# Patient Record
Sex: FEMALE | Race: WHITE | NOT HISPANIC OR LATINO | Employment: OTHER | URBAN - METROPOLITAN AREA
[De-identification: names, ages, dates, MRNs, and addresses within clinical notes are randomized per-mention and may not be internally consistent; named-entity substitution may affect disease eponyms.]

---

## 2018-01-25 ENCOUNTER — CONVERSION ENCOUNTER (OUTPATIENT)
Dept: FAMILY MEDICINE CLINIC | Facility: CLINIC | Age: 48
End: 2018-01-25

## 2018-01-25 ENCOUNTER — OFFICE VISIT CONVERTED (OUTPATIENT)
Dept: FAMILY MEDICINE CLINIC | Facility: CLINIC | Age: 48
End: 2018-01-25
Attending: FAMILY MEDICINE

## 2018-02-14 ENCOUNTER — OFFICE VISIT CONVERTED (OUTPATIENT)
Dept: PULMONOLOGY | Facility: CLINIC | Age: 48
End: 2018-02-14
Attending: INTERNAL MEDICINE

## 2018-02-20 ENCOUNTER — OFFICE VISIT CONVERTED (OUTPATIENT)
Dept: OTOLARYNGOLOGY | Facility: CLINIC | Age: 48
End: 2018-02-20
Attending: OTOLARYNGOLOGY

## 2018-03-23 ENCOUNTER — OFFICE VISIT CONVERTED (OUTPATIENT)
Dept: CARDIOLOGY | Facility: CLINIC | Age: 48
End: 2018-03-23
Attending: INTERNAL MEDICINE

## 2018-03-23 ENCOUNTER — CONVERSION ENCOUNTER (OUTPATIENT)
Dept: CARDIOLOGY | Facility: CLINIC | Age: 48
End: 2018-03-23

## 2018-04-13 ENCOUNTER — OFFICE VISIT CONVERTED (OUTPATIENT)
Dept: PULMONOLOGY | Facility: CLINIC | Age: 48
End: 2018-04-13
Attending: PHYSICIAN ASSISTANT

## 2018-05-02 ENCOUNTER — OFFICE VISIT CONVERTED (OUTPATIENT)
Dept: PULMONOLOGY | Facility: CLINIC | Age: 48
End: 2018-05-02
Attending: INTERNAL MEDICINE

## 2018-05-17 ENCOUNTER — OFFICE VISIT CONVERTED (OUTPATIENT)
Dept: PULMONOLOGY | Facility: CLINIC | Age: 48
End: 2018-05-17
Attending: PHYSICIAN ASSISTANT

## 2018-06-12 ENCOUNTER — OFFICE VISIT CONVERTED (OUTPATIENT)
Dept: PULMONOLOGY | Facility: CLINIC | Age: 48
End: 2018-06-12
Attending: PHYSICIAN ASSISTANT

## 2018-06-27 ENCOUNTER — OFFICE VISIT CONVERTED (OUTPATIENT)
Dept: PULMONOLOGY | Facility: CLINIC | Age: 48
End: 2018-06-27
Attending: INTERNAL MEDICINE

## 2018-07-16 ENCOUNTER — OFFICE VISIT CONVERTED (OUTPATIENT)
Dept: PULMONOLOGY | Facility: CLINIC | Age: 48
End: 2018-07-16
Attending: PHYSICIAN ASSISTANT

## 2018-07-25 ENCOUNTER — OFFICE VISIT CONVERTED (OUTPATIENT)
Dept: PULMONOLOGY | Facility: CLINIC | Age: 48
End: 2018-07-25
Attending: INTERNAL MEDICINE

## 2018-07-30 ENCOUNTER — OFFICE VISIT CONVERTED (OUTPATIENT)
Dept: FAMILY MEDICINE CLINIC | Facility: CLINIC | Age: 48
End: 2018-07-30
Attending: FAMILY MEDICINE

## 2018-08-01 ENCOUNTER — OFFICE VISIT CONVERTED (OUTPATIENT)
Dept: PULMONOLOGY | Facility: CLINIC | Age: 48
End: 2018-08-01
Attending: PHYSICIAN ASSISTANT

## 2018-08-09 ENCOUNTER — OFFICE VISIT CONVERTED (OUTPATIENT)
Dept: PULMONOLOGY | Facility: CLINIC | Age: 48
End: 2018-08-09
Attending: PHYSICIAN ASSISTANT

## 2018-08-09 ENCOUNTER — OFFICE VISIT CONVERTED (OUTPATIENT)
Dept: FAMILY MEDICINE CLINIC | Facility: CLINIC | Age: 48
End: 2018-08-09
Attending: FAMILY MEDICINE

## 2018-08-23 ENCOUNTER — CONVERSION ENCOUNTER (OUTPATIENT)
Dept: MAMMOGRAPHY | Facility: HOSPITAL | Age: 48
End: 2018-08-23

## 2018-08-27 ENCOUNTER — OFFICE VISIT CONVERTED (OUTPATIENT)
Dept: FAMILY MEDICINE CLINIC | Facility: CLINIC | Age: 48
End: 2018-08-27
Attending: FAMILY MEDICINE

## 2018-08-28 ENCOUNTER — OFFICE VISIT CONVERTED (OUTPATIENT)
Dept: CARDIOLOGY | Facility: CLINIC | Age: 48
End: 2018-08-28
Attending: INTERNAL MEDICINE

## 2018-09-07 ENCOUNTER — OFFICE VISIT CONVERTED (OUTPATIENT)
Dept: PULMONOLOGY | Facility: CLINIC | Age: 48
End: 2018-09-07
Attending: PHYSICIAN ASSISTANT

## 2018-09-11 ENCOUNTER — OFFICE VISIT CONVERTED (OUTPATIENT)
Dept: FAMILY MEDICINE CLINIC | Facility: CLINIC | Age: 48
End: 2018-09-11
Attending: FAMILY MEDICINE

## 2018-10-15 ENCOUNTER — OFFICE VISIT CONVERTED (OUTPATIENT)
Dept: FAMILY MEDICINE CLINIC | Facility: CLINIC | Age: 48
End: 2018-10-15
Attending: FAMILY MEDICINE

## 2018-11-02 ENCOUNTER — OFFICE VISIT CONVERTED (OUTPATIENT)
Dept: CARDIOLOGY | Facility: CLINIC | Age: 48
End: 2018-11-02
Attending: INTERNAL MEDICINE

## 2018-12-18 ENCOUNTER — OFFICE VISIT CONVERTED (OUTPATIENT)
Dept: PULMONOLOGY | Facility: CLINIC | Age: 48
End: 2018-12-18
Attending: INTERNAL MEDICINE

## 2018-12-20 ENCOUNTER — OFFICE VISIT CONVERTED (OUTPATIENT)
Dept: FAMILY MEDICINE CLINIC | Facility: CLINIC | Age: 48
End: 2018-12-20
Attending: FAMILY MEDICINE

## 2019-01-09 ENCOUNTER — HOSPITAL ENCOUNTER (OUTPATIENT)
Dept: CARDIOLOGY | Facility: HOSPITAL | Age: 49
Discharge: HOME | End: 2019-01-09
Attending: INTERNAL MEDICINE

## 2019-01-09 LAB — CONV HIV-1/ HIV-2: NEGATIVE

## 2019-01-16 ENCOUNTER — OFFICE VISIT CONVERTED (OUTPATIENT)
Dept: PULMONOLOGY | Facility: CLINIC | Age: 49
End: 2019-01-16
Attending: PHYSICIAN ASSISTANT

## 2019-01-22 ENCOUNTER — OFFICE VISIT CONVERTED (OUTPATIENT)
Dept: FAMILY MEDICINE CLINIC | Facility: CLINIC | Age: 49
End: 2019-01-22
Attending: FAMILY MEDICINE

## 2019-02-05 ENCOUNTER — OFFICE VISIT CONVERTED (OUTPATIENT)
Dept: PULMONOLOGY | Facility: CLINIC | Age: 49
End: 2019-02-05
Attending: INTERNAL MEDICINE

## 2019-02-05 ENCOUNTER — HOSPITAL ENCOUNTER (OUTPATIENT)
Dept: LAB | Facility: HOSPITAL | Age: 49
Discharge: HOME | End: 2019-02-05
Attending: INTERNAL MEDICINE

## 2019-02-05 LAB
25(OH)D3 SERPL-MCNC: 30.2 NG/ML (ref 30–100)
ALBUMIN SERPL-MCNC: 4.1 G/DL (ref 3.5–5)
ALBUMIN/GLOB SERPL: 1.2 {RATIO} (ref 1.4–2.6)
ALP SERPL-CCNC: 215 U/L (ref 42–98)
ALT SERPL-CCNC: 27 U/L (ref 10–40)
ANION GAP SERPL CALC-SCNC: 19 MMOL/L (ref 8–19)
AST SERPL-CCNC: 38 U/L (ref 15–50)
BASOPHILS # BLD AUTO: 0.03 10*3/UL (ref 0–0.2)
BASOPHILS NFR BLD AUTO: 0.31 % (ref 0–3)
BILIRUB SERPL-MCNC: 0.46 MG/DL (ref 0.2–1.3)
BUN SERPL-MCNC: 17 MG/DL (ref 5–25)
BUN/CREAT SERPL: 22 {RATIO} (ref 6–20)
CALCIUM SERPL-MCNC: 9.6 MG/DL (ref 8.7–10.4)
CHLORIDE SERPL-SCNC: 86 MMOL/L (ref 99–111)
CHOLEST SERPL-MCNC: 173 MG/DL (ref 107–200)
CHOLEST/HDLC SERPL: 5.4 {RATIO} (ref 3–6)
CONV CO2: 37 MMOL/L (ref 22–32)
CONV CREATININE URINE, RANDOM: 123 MG/DL (ref 10–300)
CONV MICROALBUM.,U,RANDOM: 19.2 MG/L (ref 0–20)
CONV TOTAL PROTEIN: 7.4 G/DL (ref 6.3–8.2)
CREAT UR-MCNC: 0.77 MG/DL (ref 0.5–0.9)
EOSINOPHIL # BLD AUTO: 0.24 10*3/UL (ref 0–0.7)
EOSINOPHIL # BLD AUTO: 2.4 % (ref 0–7)
ERYTHROCYTE [DISTWIDTH] IN BLOOD BY AUTOMATED COUNT: 12.8 % (ref 11.5–14.5)
EST. AVERAGE GLUCOSE BLD GHB EST-MCNC: 237 MG/DL
GFR SERPLBLD BASED ON 1.73 SQ M-ARVRAT: >60 ML/MIN/{1.73_M2}
GLOBULIN UR ELPH-MCNC: 3.3 G/DL (ref 2–3.5)
GLUCOSE SERPL-MCNC: 177 MG/DL (ref 65–99)
HBA1C MFR BLD: 13.9 G/DL (ref 12–16)
HBA1C MFR BLD: 9.9 % (ref 3.5–5.7)
HCT VFR BLD AUTO: 39.2 % (ref 37–47)
HDLC SERPL-MCNC: 32 MG/DL (ref 40–60)
LDLC SERPL CALC-MCNC: 80 MG/DL (ref 70–100)
LYMPHOCYTES # BLD AUTO: 2.19 10*3/UL (ref 1–5)
MAGNESIUM SERPL-MCNC: 1.65 MG/DL (ref 1.6–2.3)
MCH RBC QN AUTO: 31.1 PG (ref 27–31)
MCHC RBC AUTO-ENTMCNC: 35.4 G/DL (ref 33–37)
MCV RBC AUTO: 87.9 FL (ref 81–99)
MICROALBUMIN/CREAT UR: 15.6 MG/G{CRE} (ref 0–35)
MONOCYTES # BLD AUTO: 0.7 10*3/UL (ref 0.2–1.2)
MONOCYTES NFR BLD AUTO: 7.1 % (ref 3–10)
NEUTROPHILS # BLD AUTO: 6.64 10*3/UL (ref 2–8)
NEUTROPHILS NFR BLD AUTO: 67.8 % (ref 30–85)
NRBC BLD AUTO-RTO: 0 % (ref 0–0.01)
OSMOLALITY SERPL CALC.SUM OF ELEC: 294 MOSM/KG (ref 273–304)
PLATELET # BLD AUTO: 250 10*3/UL (ref 130–400)
PMV BLD AUTO: 6.6 FL (ref 7.4–10.4)
POTASSIUM SERPL-SCNC: 2.7 MMOL/L (ref 3.5–5.3)
RBC # BLD AUTO: 4.46 10*6/UL (ref 4.2–5.4)
SODIUM SERPL-SCNC: 139 MMOL/L (ref 135–147)
T4 FREE SERPL-MCNC: 1.3 NG/DL (ref 0.9–1.8)
TRIGL SERPL-MCNC: 305 MG/DL (ref 40–150)
TSH SERPL-ACNC: 7.25 M[IU]/L (ref 0.27–4.2)
VARIANT LYMPHS NFR BLD MANUAL: 22.4 % (ref 20–45)
VLDLC SERPL-MCNC: 61 MG/DL (ref 5–37)
WBC # BLD AUTO: 9.79 10*3/UL (ref 4.8–10.8)

## 2019-02-11 ENCOUNTER — OFFICE VISIT CONVERTED (OUTPATIENT)
Dept: CARDIOLOGY | Facility: CLINIC | Age: 49
End: 2019-02-11
Attending: INTERNAL MEDICINE

## 2019-02-11 ENCOUNTER — HOSPITAL ENCOUNTER (OUTPATIENT)
Dept: OTHER | Facility: HOSPITAL | Age: 49
Discharge: HOME | End: 2019-02-11
Attending: INTERNAL MEDICINE

## 2019-02-11 LAB
ANION GAP SERPL CALC-SCNC: 19 MMOL/L (ref 8–19)
BUN SERPL-MCNC: 13 MG/DL (ref 5–25)
BUN/CREAT SERPL: 19 {RATIO} (ref 6–20)
CALCIUM SERPL-MCNC: 9.9 MG/DL (ref 8.7–10.4)
CHLORIDE SERPL-SCNC: 100 MMOL/L (ref 99–111)
CONV CO2: 28 MMOL/L (ref 22–32)
CREAT UR-MCNC: 0.69 MG/DL (ref 0.5–0.9)
GFR SERPLBLD BASED ON 1.73 SQ M-ARVRAT: >60 ML/MIN/{1.73_M2}
GLUCOSE SERPL-MCNC: 106 MG/DL (ref 65–99)
OSMOLALITY SERPL CALC.SUM OF ELEC: 299 MOSM/KG (ref 273–304)
POTASSIUM SERPL-SCNC: 3.3 MMOL/L (ref 3.5–5.3)
SODIUM SERPL-SCNC: 144 MMOL/L (ref 135–147)

## 2019-03-08 ENCOUNTER — HOSPITAL ENCOUNTER (OUTPATIENT)
Dept: LAB | Facility: HOSPITAL | Age: 49
Discharge: HOME | End: 2019-03-08
Attending: INTERNAL MEDICINE

## 2019-03-08 LAB
ANION GAP SERPL CALC-SCNC: 14 MMOL/L (ref 8–19)
BNP SERPL-MCNC: 51 PG/ML (ref 0–900)
BUN SERPL-MCNC: 11 MG/DL (ref 5–25)
BUN/CREAT SERPL: 14 {RATIO} (ref 6–20)
CALCIUM SERPL-MCNC: 10.2 MG/DL (ref 8.7–10.4)
CHLORIDE SERPL-SCNC: 85 MMOL/L (ref 99–111)
CONV CO2: 37 MMOL/L (ref 22–32)
CREAT UR-MCNC: 0.8 MG/DL (ref 0.5–0.9)
GFR SERPLBLD BASED ON 1.73 SQ M-ARVRAT: >60 ML/MIN/{1.73_M2}
GLUCOSE SERPL-MCNC: 344 MG/DL (ref 65–99)
MAGNESIUM SERPL-MCNC: 1.62 MG/DL (ref 1.6–2.3)
OSMOLALITY SERPL CALC.SUM OF ELEC: 289 MOSM/KG (ref 273–304)
POTASSIUM SERPL-SCNC: 2.8 MMOL/L (ref 3.5–5.3)
SODIUM SERPL-SCNC: 133 MMOL/L (ref 135–147)

## 2019-03-19 ENCOUNTER — HOSPITAL ENCOUNTER (OUTPATIENT)
Dept: OTHER | Facility: HOSPITAL | Age: 49
Discharge: HOME | End: 2019-03-19
Attending: INTERNAL MEDICINE

## 2019-03-19 LAB
ANION GAP SERPL CALC-SCNC: 19 MMOL/L (ref 8–19)
BUN SERPL-MCNC: 14 MG/DL (ref 5–25)
BUN/CREAT SERPL: 15 {RATIO} (ref 6–20)
CALCIUM SERPL-MCNC: 9.8 MG/DL (ref 8.7–10.4)
CHLORIDE SERPL-SCNC: 91 MMOL/L (ref 99–111)
CONV CO2: 32 MMOL/L (ref 22–32)
CREAT UR-MCNC: 0.96 MG/DL (ref 0.5–0.9)
GFR SERPLBLD BASED ON 1.73 SQ M-ARVRAT: >60 ML/MIN/{1.73_M2}
GLUCOSE SERPL-MCNC: 327 MG/DL (ref 65–99)
OSMOLALITY SERPL CALC.SUM OF ELEC: 299 MOSM/KG (ref 273–304)
POTASSIUM SERPL-SCNC: 3.5 MMOL/L (ref 3.5–5.3)
SODIUM SERPL-SCNC: 138 MMOL/L (ref 135–147)

## 2019-04-09 ENCOUNTER — OFFICE VISIT CONVERTED (OUTPATIENT)
Dept: PULMONOLOGY | Facility: CLINIC | Age: 49
End: 2019-04-09
Attending: INTERNAL MEDICINE

## 2019-04-09 ENCOUNTER — HOSPITAL ENCOUNTER (OUTPATIENT)
Dept: LAB | Facility: HOSPITAL | Age: 49
Discharge: HOME | End: 2019-04-09
Attending: INTERNAL MEDICINE

## 2019-04-09 LAB
ALBUMIN SERPL-MCNC: 4.4 G/DL (ref 3.5–5)
ALBUMIN/GLOB SERPL: 1.3 {RATIO} (ref 1.4–2.6)
ALP SERPL-CCNC: 185 U/L (ref 42–98)
ALT SERPL-CCNC: 24 U/L (ref 10–40)
ANION GAP SERPL CALC-SCNC: 17 MMOL/L (ref 8–19)
AST SERPL-CCNC: 34 U/L (ref 15–50)
BILIRUB SERPL-MCNC: 0.32 MG/DL (ref 0.2–1.3)
BUN SERPL-MCNC: 13 MG/DL (ref 5–25)
BUN/CREAT SERPL: 19 {RATIO} (ref 6–20)
CALCIUM SERPL-MCNC: 10.4 MG/DL (ref 8.7–10.4)
CHLORIDE SERPL-SCNC: 99 MMOL/L (ref 99–111)
CONV CO2: 30 MMOL/L (ref 22–32)
CONV TOTAL PROTEIN: 7.8 G/DL (ref 6.3–8.2)
CREAT UR-MCNC: 0.7 MG/DL (ref 0.5–0.9)
GFR SERPLBLD BASED ON 1.73 SQ M-ARVRAT: >60 ML/MIN/{1.73_M2}
GLOBULIN UR ELPH-MCNC: 3.4 G/DL (ref 2–3.5)
GLUCOSE SERPL-MCNC: 147 MG/DL (ref 65–99)
OSMOLALITY SERPL CALC.SUM OF ELEC: 297 MOSM/KG (ref 273–304)
POTASSIUM SERPL-SCNC: 4.1 MMOL/L (ref 3.5–5.3)
SODIUM SERPL-SCNC: 142 MMOL/L (ref 135–147)

## 2019-04-18 ENCOUNTER — HOSPITAL ENCOUNTER (OUTPATIENT)
Dept: OTHER | Facility: HOSPITAL | Age: 49
Discharge: HOME | End: 2019-04-18
Attending: INTERNAL MEDICINE

## 2019-04-18 LAB
ANION GAP SERPL CALC-SCNC: 21 MMOL/L (ref 8–19)
BUN SERPL-MCNC: 19 MG/DL (ref 5–25)
BUN/CREAT SERPL: 22 {RATIO} (ref 6–20)
CALCIUM SERPL-MCNC: 10.6 MG/DL (ref 8.7–10.4)
CHLORIDE SERPL-SCNC: 93 MMOL/L (ref 99–111)
CONV CO2: 31 MMOL/L (ref 22–32)
CREAT UR-MCNC: 0.87 MG/DL (ref 0.5–0.9)
GFR SERPLBLD BASED ON 1.73 SQ M-ARVRAT: >60 ML/MIN/{1.73_M2}
GLUCOSE SERPL-MCNC: 207 MG/DL (ref 65–99)
OSMOLALITY SERPL CALC.SUM OF ELEC: 302 MOSM/KG (ref 273–304)
POTASSIUM SERPL-SCNC: 2.7 MMOL/L (ref 3.5–5.3)
SODIUM SERPL-SCNC: 142 MMOL/L (ref 135–147)

## 2019-04-22 ENCOUNTER — OFFICE VISIT CONVERTED (OUTPATIENT)
Dept: FAMILY MEDICINE CLINIC | Facility: CLINIC | Age: 49
End: 2019-04-22
Attending: FAMILY MEDICINE

## 2019-04-23 ENCOUNTER — HOSPITAL ENCOUNTER (OUTPATIENT)
Dept: GASTROENTEROLOGY | Facility: HOSPITAL | Age: 49
Setting detail: HOSPITAL OUTPATIENT SURGERY
Discharge: HOME | End: 2019-04-23
Attending: INTERNAL MEDICINE

## 2019-04-23 ENCOUNTER — HOSPITAL ENCOUNTER (OUTPATIENT)
Dept: OTHER | Facility: HOSPITAL | Age: 49
Discharge: HOME | End: 2019-04-23
Attending: INTERNAL MEDICINE

## 2019-04-23 LAB
ANION GAP SERPL CALC-SCNC: 19 MMOL/L (ref 8–19)
BUN SERPL-MCNC: 16 MG/DL (ref 5–25)
BUN/CREAT SERPL: 25 {RATIO} (ref 6–20)
CALCIUM SERPL-MCNC: 9.6 MG/DL (ref 8.7–10.4)
CHLORIDE SERPL-SCNC: 94 MMOL/L (ref 99–111)
CONV CO2: 30 MMOL/L (ref 22–32)
CREAT UR-MCNC: 0.63 MG/DL (ref 0.5–0.9)
GFR SERPLBLD BASED ON 1.73 SQ M-ARVRAT: >60 ML/MIN/{1.73_M2}
GLUCOSE BLD-MCNC: 92 MG/DL (ref 65–99)
GLUCOSE SERPL-MCNC: 52 MG/DL (ref 65–99)
OSMOLALITY SERPL CALC.SUM OF ELEC: 289 MOSM/KG (ref 273–304)
POTASSIUM SERPL-SCNC: 2.9 MMOL/L (ref 3.5–5.3)
SODIUM SERPL-SCNC: 140 MMOL/L (ref 135–147)

## 2019-04-25 ENCOUNTER — HOSPITAL ENCOUNTER (OUTPATIENT)
Dept: OTHER | Facility: HOSPITAL | Age: 49
Discharge: HOME | End: 2019-04-25
Attending: INTERNAL MEDICINE

## 2019-04-25 LAB
ANION GAP SERPL CALC-SCNC: 17 MMOL/L (ref 8–19)
BACTERIA SPEC AEROBE CULT: NORMAL
BUN SERPL-MCNC: 15 MG/DL (ref 5–25)
BUN/CREAT SERPL: 23 {RATIO} (ref 6–20)
CALCIUM SERPL-MCNC: 10.1 MG/DL (ref 8.7–10.4)
CHLORIDE SERPL-SCNC: 95 MMOL/L (ref 99–111)
CONV CO2: 30 MMOL/L (ref 22–32)
CREAT UR-MCNC: 0.64 MG/DL (ref 0.5–0.9)
GFR SERPLBLD BASED ON 1.73 SQ M-ARVRAT: >60 ML/MIN/{1.73_M2}
GLUCOSE SERPL-MCNC: 161 MG/DL (ref 65–99)
OSMOLALITY SERPL CALC.SUM OF ELEC: 292 MOSM/KG (ref 273–304)
POTASSIUM SERPL-SCNC: 3 MMOL/L (ref 3.5–5.3)
SODIUM SERPL-SCNC: 139 MMOL/L (ref 135–147)

## 2019-04-30 ENCOUNTER — HOSPITAL ENCOUNTER (OUTPATIENT)
Dept: OTHER | Facility: HOSPITAL | Age: 49
Discharge: HOME | End: 2019-04-30
Attending: INTERNAL MEDICINE

## 2019-05-02 ENCOUNTER — HOSPITAL ENCOUNTER (OUTPATIENT)
Dept: OTHER | Facility: HOSPITAL | Age: 49
Discharge: HOME | End: 2019-05-02
Attending: FAMILY MEDICINE

## 2019-05-02 LAB
ANION GAP SERPL CALC-SCNC: 18 MMOL/L (ref 8–19)
BUN SERPL-MCNC: 11 MG/DL (ref 5–25)
BUN/CREAT SERPL: 17 {RATIO} (ref 6–20)
CALCIUM SERPL-MCNC: 9.7 MG/DL (ref 8.7–10.4)
CHLORIDE SERPL-SCNC: 93 MMOL/L (ref 99–111)
CONV CO2: 33 MMOL/L (ref 22–32)
CREAT UR-MCNC: 0.66 MG/DL (ref 0.5–0.9)
GFR SERPLBLD BASED ON 1.73 SQ M-ARVRAT: >60 ML/MIN/{1.73_M2}
GLUCOSE SERPL-MCNC: 143 MG/DL (ref 65–99)
OSMOLALITY SERPL CALC.SUM OF ELEC: 292 MOSM/KG (ref 273–304)
POTASSIUM SERPL-SCNC: 3.5 MMOL/L (ref 3.5–5.3)
SODIUM SERPL-SCNC: 140 MMOL/L (ref 135–147)

## 2019-05-03 ENCOUNTER — OFFICE VISIT CONVERTED (OUTPATIENT)
Dept: CARDIOLOGY | Facility: CLINIC | Age: 49
End: 2019-05-03
Attending: INTERNAL MEDICINE

## 2019-05-07 ENCOUNTER — OFFICE VISIT CONVERTED (OUTPATIENT)
Dept: FAMILY MEDICINE CLINIC | Facility: CLINIC | Age: 49
End: 2019-05-07
Attending: FAMILY MEDICINE

## 2019-05-09 ENCOUNTER — HOSPITAL ENCOUNTER (OUTPATIENT)
Dept: OTHER | Facility: HOSPITAL | Age: 49
Discharge: HOME | End: 2019-05-09
Attending: FAMILY MEDICINE

## 2019-05-09 LAB
ANION GAP SERPL CALC-SCNC: 15 MMOL/L (ref 8–19)
BUN SERPL-MCNC: 16 MG/DL (ref 5–25)
BUN/CREAT SERPL: 24 {RATIO} (ref 6–20)
CALCIUM SERPL-MCNC: 9.8 MG/DL (ref 8.7–10.4)
CHLORIDE SERPL-SCNC: 96 MMOL/L (ref 99–111)
CONV CO2: 35 MMOL/L (ref 22–32)
CREAT UR-MCNC: 0.68 MG/DL (ref 0.5–0.9)
GFR SERPLBLD BASED ON 1.73 SQ M-ARVRAT: >60 ML/MIN/{1.73_M2}
GLUCOSE SERPL-MCNC: 73 MG/DL (ref 65–99)
OSMOLALITY SERPL CALC.SUM OF ELEC: 296 MOSM/KG (ref 273–304)
POTASSIUM SERPL-SCNC: 3.1 MMOL/L (ref 3.5–5.3)
SODIUM SERPL-SCNC: 143 MMOL/L (ref 135–147)

## 2019-05-21 ENCOUNTER — HOSPITAL ENCOUNTER (OUTPATIENT)
Dept: OTHER | Facility: HOSPITAL | Age: 49
Discharge: HOME | End: 2019-05-21
Attending: INTERNAL MEDICINE

## 2019-05-21 LAB
ANION GAP SERPL CALC-SCNC: 17 MMOL/L (ref 8–19)
BUN SERPL-MCNC: 18 MG/DL (ref 5–25)
BUN/CREAT SERPL: 25 {RATIO} (ref 6–20)
CALCIUM SERPL-MCNC: 9.7 MG/DL (ref 8.7–10.4)
CHLORIDE SERPL-SCNC: 102 MMOL/L (ref 99–111)
CONV CO2: 29 MMOL/L (ref 22–32)
CREAT UR-MCNC: 0.73 MG/DL (ref 0.5–0.9)
GFR SERPLBLD BASED ON 1.73 SQ M-ARVRAT: >60 ML/MIN/{1.73_M2}
GLUCOSE SERPL-MCNC: 97 MG/DL (ref 65–99)
OSMOLALITY SERPL CALC.SUM OF ELEC: 300 MOSM/KG (ref 273–304)
POTASSIUM SERPL-SCNC: 4 MMOL/L (ref 3.5–5.3)
SODIUM SERPL-SCNC: 144 MMOL/L (ref 135–147)

## 2019-06-04 ENCOUNTER — OFFICE VISIT CONVERTED (OUTPATIENT)
Dept: PULMONOLOGY | Facility: CLINIC | Age: 49
End: 2019-06-04
Attending: INTERNAL MEDICINE

## 2019-06-05 ENCOUNTER — HOSPITAL ENCOUNTER (OUTPATIENT)
Dept: OTHER | Facility: HOSPITAL | Age: 49
Discharge: HOME | End: 2019-06-05
Attending: FAMILY MEDICINE

## 2019-06-05 LAB
ANION GAP SERPL CALC-SCNC: 14 MMOL/L (ref 8–19)
BUN SERPL-MCNC: 15 MG/DL (ref 5–25)
BUN/CREAT SERPL: 21 {RATIO} (ref 6–20)
CALCIUM SERPL-MCNC: 9.9 MG/DL (ref 8.7–10.4)
CHLORIDE SERPL-SCNC: 94 MMOL/L (ref 99–111)
CONV CO2: 33 MMOL/L (ref 22–32)
CREAT UR-MCNC: 0.73 MG/DL (ref 0.5–0.9)
GFR SERPLBLD BASED ON 1.73 SQ M-ARVRAT: >60 ML/MIN/{1.73_M2}
GLUCOSE SERPL-MCNC: 281 MG/DL (ref 65–99)
OSMOLALITY SERPL CALC.SUM OF ELEC: 297 MOSM/KG (ref 273–304)
POTASSIUM SERPL-SCNC: 3.3 MMOL/L (ref 3.5–5.3)
SODIUM SERPL-SCNC: 138 MMOL/L (ref 135–147)

## 2019-06-17 ENCOUNTER — HOSPITAL ENCOUNTER (OUTPATIENT)
Dept: OTHER | Facility: HOSPITAL | Age: 49
Discharge: HOME | End: 2019-06-17
Attending: INTERNAL MEDICINE

## 2019-06-17 LAB
ANION GAP SERPL CALC-SCNC: 18 MMOL/L (ref 8–19)
BUN SERPL-MCNC: 21 MG/DL (ref 5–25)
BUN/CREAT SERPL: 22 {RATIO} (ref 6–20)
CALCIUM SERPL-MCNC: 9.6 MG/DL (ref 8.7–10.4)
CHLORIDE SERPL-SCNC: 99 MMOL/L (ref 99–111)
CONV CO2: 30 MMOL/L (ref 22–32)
CREAT UR-MCNC: 0.94 MG/DL (ref 0.5–0.9)
GFR SERPLBLD BASED ON 1.73 SQ M-ARVRAT: >60 ML/MIN/{1.73_M2}
GLUCOSE SERPL-MCNC: 202 MG/DL (ref 65–99)
OSMOLALITY SERPL CALC.SUM OF ELEC: 305 MOSM/KG (ref 273–304)
POTASSIUM SERPL-SCNC: 4.1 MMOL/L (ref 3.5–5.3)
SODIUM SERPL-SCNC: 143 MMOL/L (ref 135–147)

## 2019-06-18 ENCOUNTER — OFFICE VISIT CONVERTED (OUTPATIENT)
Dept: CARDIOLOGY | Facility: CLINIC | Age: 49
End: 2019-06-18
Attending: INTERNAL MEDICINE

## 2019-06-24 ENCOUNTER — OFFICE VISIT CONVERTED (OUTPATIENT)
Dept: FAMILY MEDICINE CLINIC | Facility: CLINIC | Age: 49
End: 2019-06-24
Attending: FAMILY MEDICINE

## 2019-06-24 ENCOUNTER — CONVERSION ENCOUNTER (OUTPATIENT)
Dept: FAMILY MEDICINE CLINIC | Facility: CLINIC | Age: 49
End: 2019-06-24

## 2019-06-24 ENCOUNTER — HOSPITAL ENCOUNTER (OUTPATIENT)
Dept: FAMILY MEDICINE CLINIC | Facility: CLINIC | Age: 49
Discharge: HOME | End: 2019-06-24
Attending: FAMILY MEDICINE

## 2019-06-24 LAB
ANION GAP SERPL CALC-SCNC: 18 MMOL/L (ref 8–19)
BUN SERPL-MCNC: 17 MG/DL (ref 5–25)
BUN/CREAT SERPL: 24 {RATIO} (ref 6–20)
CALCIUM SERPL-MCNC: 10.1 MG/DL (ref 8.7–10.4)
CHLORIDE SERPL-SCNC: 96 MMOL/L (ref 99–111)
CONV CO2: 27 MMOL/L (ref 22–32)
CREAT UR-MCNC: 0.7 MG/DL (ref 0.5–0.9)
GFR SERPLBLD BASED ON 1.73 SQ M-ARVRAT: >60 ML/MIN/{1.73_M2}
GLUCOSE SERPL-MCNC: 164 MG/DL (ref 65–99)
OSMOLALITY SERPL CALC.SUM OF ELEC: 287 MOSM/KG (ref 273–304)
POTASSIUM SERPL-SCNC: 4.8 MMOL/L (ref 3.5–5.3)
SODIUM SERPL-SCNC: 136 MMOL/L (ref 135–147)

## 2019-07-09 ENCOUNTER — HOSPITAL ENCOUNTER (OUTPATIENT)
Dept: OTHER | Facility: HOSPITAL | Age: 49
Discharge: HOME | End: 2019-07-09
Attending: INTERNAL MEDICINE

## 2019-07-09 LAB
ANION GAP SERPL CALC-SCNC: 19 MMOL/L (ref 8–19)
BUN SERPL-MCNC: 17 MG/DL (ref 5–25)
BUN/CREAT SERPL: 27 {RATIO} (ref 6–20)
CALCIUM SERPL-MCNC: 9.7 MG/DL (ref 8.7–10.4)
CHLORIDE SERPL-SCNC: 94 MMOL/L (ref 99–111)
CONV CO2: 30 MMOL/L (ref 22–32)
CREAT UR-MCNC: 0.62 MG/DL (ref 0.5–0.9)
GFR SERPLBLD BASED ON 1.73 SQ M-ARVRAT: >60 ML/MIN/{1.73_M2}
GLUCOSE SERPL-MCNC: 120 MG/DL (ref 65–99)
OSMOLALITY SERPL CALC.SUM OF ELEC: 291 MOSM/KG (ref 273–304)
POTASSIUM SERPL-SCNC: 3.8 MMOL/L (ref 3.5–5.3)
SODIUM SERPL-SCNC: 139 MMOL/L (ref 135–147)

## 2019-07-16 ENCOUNTER — HOSPITAL ENCOUNTER (OUTPATIENT)
Dept: OTHER | Facility: HOSPITAL | Age: 49
Discharge: HOME | End: 2019-07-16
Attending: INTERNAL MEDICINE

## 2019-07-16 LAB
ANION GAP SERPL CALC-SCNC: 26 MMOL/L (ref 8–19)
BUN SERPL-MCNC: 16 MG/DL (ref 5–25)
BUN/CREAT SERPL: 21 {RATIO} (ref 6–20)
CALCIUM SERPL-MCNC: 9.8 MG/DL (ref 8.7–10.4)
CHLORIDE SERPL-SCNC: 94 MMOL/L (ref 99–111)
CONV CO2: 25 MMOL/L (ref 22–32)
CREAT UR-MCNC: 0.78 MG/DL (ref 0.5–0.9)
GFR SERPLBLD BASED ON 1.73 SQ M-ARVRAT: >60 ML/MIN/{1.73_M2}
GLUCOSE SERPL-MCNC: 125 MG/DL (ref 65–99)
OSMOLALITY SERPL CALC.SUM OF ELEC: 297 MOSM/KG (ref 273–304)
POTASSIUM SERPL-SCNC: 3.2 MMOL/L (ref 3.5–5.3)
SODIUM SERPL-SCNC: 142 MMOL/L (ref 135–147)

## 2019-08-01 ENCOUNTER — OFFICE VISIT CONVERTED (OUTPATIENT)
Dept: FAMILY MEDICINE CLINIC | Facility: CLINIC | Age: 49
End: 2019-08-01
Attending: FAMILY MEDICINE

## 2019-08-02 ENCOUNTER — HOSPITAL ENCOUNTER (OUTPATIENT)
Dept: OTHER | Facility: HOSPITAL | Age: 49
Discharge: HOME | End: 2019-08-02
Attending: INTERNAL MEDICINE

## 2019-08-02 LAB
ANION GAP SERPL CALC-SCNC: 21 MMOL/L (ref 8–19)
BUN SERPL-MCNC: 17 MG/DL (ref 5–25)
BUN/CREAT SERPL: 24 {RATIO} (ref 6–20)
CALCIUM SERPL-MCNC: 9.8 MG/DL (ref 8.7–10.4)
CHLORIDE SERPL-SCNC: 90 MMOL/L (ref 99–111)
CONV CO2: 29 MMOL/L (ref 22–32)
CREAT UR-MCNC: 0.7 MG/DL (ref 0.5–0.9)
GFR SERPLBLD BASED ON 1.73 SQ M-ARVRAT: >60 ML/MIN/{1.73_M2}
GLUCOSE SERPL-MCNC: 272 MG/DL (ref 65–99)
OSMOLALITY SERPL CALC.SUM OF ELEC: 293 MOSM/KG (ref 273–304)
POTASSIUM SERPL-SCNC: 3.8 MMOL/L (ref 3.5–5.3)
SODIUM SERPL-SCNC: 136 MMOL/L (ref 135–147)

## 2019-08-14 ENCOUNTER — HOSPITAL ENCOUNTER (OUTPATIENT)
Dept: OTHER | Facility: HOSPITAL | Age: 49
Discharge: HOME | End: 2019-08-14
Attending: INTERNAL MEDICINE

## 2019-08-14 LAB
ANION GAP SERPL CALC-SCNC: 19 MMOL/L (ref 8–19)
BUN SERPL-MCNC: 15 MG/DL (ref 5–25)
BUN/CREAT SERPL: 19 {RATIO} (ref 6–20)
CALCIUM SERPL-MCNC: 9.5 MG/DL (ref 8.7–10.4)
CHLORIDE SERPL-SCNC: 98 MMOL/L (ref 99–111)
CONV CO2: 28 MMOL/L (ref 22–32)
CREAT UR-MCNC: 0.78 MG/DL (ref 0.5–0.9)
GFR SERPLBLD BASED ON 1.73 SQ M-ARVRAT: >60 ML/MIN/{1.73_M2}
GLUCOSE SERPL-MCNC: 133 MG/DL (ref 65–99)
OSMOLALITY SERPL CALC.SUM OF ELEC: 295 MOSM/KG (ref 273–304)
POTASSIUM SERPL-SCNC: 4 MMOL/L (ref 3.5–5.3)
SODIUM SERPL-SCNC: 141 MMOL/L (ref 135–147)

## 2019-09-10 ENCOUNTER — HOSPITAL ENCOUNTER (OUTPATIENT)
Dept: OTHER | Facility: HOSPITAL | Age: 49
Discharge: HOME | End: 2019-09-10
Attending: INTERNAL MEDICINE

## 2019-09-10 LAB
ANION GAP SERPL CALC-SCNC: 19 MMOL/L (ref 8–19)
BUN SERPL-MCNC: 26 MG/DL (ref 5–25)
BUN/CREAT SERPL: 31 {RATIO} (ref 6–20)
CALCIUM SERPL-MCNC: 10 MG/DL (ref 8.7–10.4)
CHLORIDE SERPL-SCNC: 93 MMOL/L (ref 99–111)
CONV CO2: 32 MMOL/L (ref 22–32)
CREAT UR-MCNC: 0.83 MG/DL (ref 0.5–0.9)
GFR SERPLBLD BASED ON 1.73 SQ M-ARVRAT: >60 ML/MIN/{1.73_M2}
GLUCOSE SERPL-MCNC: 175 MG/DL (ref 65–99)
OSMOLALITY SERPL CALC.SUM OF ELEC: 299 MOSM/KG (ref 273–304)
POTASSIUM SERPL-SCNC: 3.6 MMOL/L (ref 3.5–5.3)
SODIUM SERPL-SCNC: 140 MMOL/L (ref 135–147)

## 2019-09-17 ENCOUNTER — OFFICE VISIT CONVERTED (OUTPATIENT)
Dept: PULMONOLOGY | Facility: CLINIC | Age: 49
End: 2019-09-17
Attending: INTERNAL MEDICINE

## 2019-09-23 ENCOUNTER — OFFICE VISIT CONVERTED (OUTPATIENT)
Dept: CARDIOLOGY | Facility: CLINIC | Age: 49
End: 2019-09-23
Attending: INTERNAL MEDICINE

## 2019-10-10 ENCOUNTER — HOSPITAL ENCOUNTER (OUTPATIENT)
Dept: OTHER | Facility: HOSPITAL | Age: 49
Discharge: HOME | End: 2019-10-10
Attending: INTERNAL MEDICINE

## 2019-10-10 LAB
ANION GAP SERPL CALC-SCNC: 17 MMOL/L (ref 8–19)
BUN SERPL-MCNC: 23 MG/DL (ref 5–25)
BUN/CREAT SERPL: 26 {RATIO} (ref 6–20)
CALCIUM SERPL-MCNC: 10.5 MG/DL (ref 8.7–10.4)
CHLORIDE SERPL-SCNC: 91 MMOL/L (ref 99–111)
CONV CO2: 31 MMOL/L (ref 22–32)
CREAT UR-MCNC: 0.89 MG/DL (ref 0.5–0.9)
GFR SERPLBLD BASED ON 1.73 SQ M-ARVRAT: >60 ML/MIN/{1.73_M2}
GLUCOSE SERPL-MCNC: 129 MG/DL (ref 65–99)
OSMOLALITY SERPL CALC.SUM OF ELEC: 287 MOSM/KG (ref 273–304)
POTASSIUM SERPL-SCNC: 3.3 MMOL/L (ref 3.5–5.3)
SODIUM SERPL-SCNC: 136 MMOL/L (ref 135–147)

## 2019-10-15 ENCOUNTER — HOSPITAL ENCOUNTER (OUTPATIENT)
Dept: URGENT CARE | Facility: CLINIC | Age: 49
Discharge: HOME | End: 2019-10-15
Attending: EMERGENCY MEDICINE

## 2019-11-22 ENCOUNTER — OFFICE VISIT CONVERTED (OUTPATIENT)
Dept: FAMILY MEDICINE CLINIC | Facility: CLINIC | Age: 49
End: 2019-11-22
Attending: FAMILY MEDICINE

## 2019-11-22 ENCOUNTER — HOSPITAL ENCOUNTER (OUTPATIENT)
Dept: FAMILY MEDICINE CLINIC | Facility: CLINIC | Age: 49
Discharge: HOME | End: 2019-11-22
Attending: FAMILY MEDICINE

## 2019-11-22 LAB
ALBUMIN SERPL-MCNC: 4.3 G/DL (ref 3.5–5)
ALBUMIN/GLOB SERPL: 1.3 {RATIO} (ref 1.4–2.6)
ALP SERPL-CCNC: 208 U/L (ref 42–98)
ALT SERPL-CCNC: 40 U/L (ref 10–40)
AMYLASE SERPL-CCNC: 57 U/L (ref 30–110)
ANION GAP SERPL CALC-SCNC: 21 MMOL/L (ref 8–19)
AST SERPL-CCNC: 42 U/L (ref 15–50)
BASOPHILS # BLD AUTO: 0.04 10*3/UL (ref 0–0.2)
BASOPHILS NFR BLD AUTO: 0.4 % (ref 0–3)
BILIRUB SERPL-MCNC: 0.28 MG/DL (ref 0.2–1.3)
BUN SERPL-MCNC: 25 MG/DL (ref 5–25)
BUN/CREAT SERPL: 28 {RATIO} (ref 6–20)
CALCIUM SERPL-MCNC: 10.2 MG/DL (ref 8.7–10.4)
CHLORIDE SERPL-SCNC: 89 MMOL/L (ref 99–111)
CONV ABS IMM GRAN: 0.03 10*3/UL (ref 0–0.2)
CONV CO2: 30 MMOL/L (ref 22–32)
CONV IMMATURE GRAN: 0.3 % (ref 0–1.8)
CONV TOTAL PROTEIN: 7.7 G/DL (ref 6.3–8.2)
CREAT UR-MCNC: 0.89 MG/DL (ref 0.5–0.9)
DEPRECATED RDW RBC AUTO: 45.9 FL (ref 36.4–46.3)
EOSINOPHIL # BLD AUTO: 0.17 10*3/UL (ref 0–0.7)
EOSINOPHIL # BLD AUTO: 1.8 % (ref 0–7)
ERYTHROCYTE [DISTWIDTH] IN BLOOD BY AUTOMATED COUNT: 14.2 % (ref 11.7–14.4)
GFR SERPLBLD BASED ON 1.73 SQ M-ARVRAT: >60 ML/MIN/{1.73_M2}
GLOBULIN UR ELPH-MCNC: 3.4 G/DL (ref 2–3.5)
GLUCOSE SERPL-MCNC: 431 MG/DL (ref 65–99)
HCT VFR BLD AUTO: 40.6 % (ref 37–47)
HGB BLD-MCNC: 13.3 G/DL (ref 12–16)
LIPASE SERPL-CCNC: 36 U/L (ref 5–51)
LYMPHOCYTES # BLD AUTO: 1.32 10*3/UL (ref 1–5)
LYMPHOCYTES NFR BLD AUTO: 14.3 % (ref 20–45)
MCH RBC QN AUTO: 29.4 PG (ref 27–31)
MCHC RBC AUTO-ENTMCNC: 32.8 G/DL (ref 33–37)
MCV RBC AUTO: 89.6 FL (ref 81–99)
MONOCYTES # BLD AUTO: 0.73 10*3/UL (ref 0.2–1.2)
MONOCYTES NFR BLD AUTO: 7.9 % (ref 3–10)
NEUTROPHILS # BLD AUTO: 6.91 10*3/UL (ref 2–8)
NEUTROPHILS NFR BLD AUTO: 75.3 % (ref 30–85)
NRBC CBCN: 0 % (ref 0–0.7)
OSMOLALITY SERPL CALC.SUM OF ELEC: 305 MOSM/KG (ref 273–304)
PLATELET # BLD AUTO: 259 10*3/UL (ref 130–400)
PMV BLD AUTO: 9.7 FL (ref 9.4–12.3)
POTASSIUM SERPL-SCNC: 4.4 MMOL/L (ref 3.5–5.3)
RBC # BLD AUTO: 4.53 10*6/UL (ref 4.2–5.4)
SODIUM SERPL-SCNC: 136 MMOL/L (ref 135–147)
T4 FREE SERPL-MCNC: 1.4 NG/DL (ref 0.9–1.8)
TSH SERPL-ACNC: 7.21 M[IU]/L (ref 0.27–4.2)
WBC # BLD AUTO: 9.2 10*3/UL (ref 4.8–10.8)

## 2019-12-03 ENCOUNTER — HOSPITAL ENCOUNTER (OUTPATIENT)
Dept: URGENT CARE | Facility: CLINIC | Age: 49
Discharge: HOME | End: 2019-12-03
Attending: PHYSICIAN ASSISTANT

## 2019-12-09 ENCOUNTER — HOSPITAL ENCOUNTER (OUTPATIENT)
Dept: FAMILY MEDICINE CLINIC | Facility: CLINIC | Age: 49
Discharge: HOME | End: 2019-12-09
Attending: FAMILY MEDICINE

## 2019-12-09 LAB
C DIFF TOX B STL QL CT TISS CULT: NEGATIVE
CONV 027 TOXIN: NEGATIVE

## 2019-12-11 LAB — BACTERIA SPEC AEROBE CULT: NORMAL

## 2020-01-28 ENCOUNTER — OFFICE VISIT CONVERTED (OUTPATIENT)
Dept: CARDIOLOGY | Facility: CLINIC | Age: 50
End: 2020-01-28
Attending: INTERNAL MEDICINE

## 2020-01-28 ENCOUNTER — CONVERSION ENCOUNTER (OUTPATIENT)
Dept: CARDIOLOGY | Facility: CLINIC | Age: 50
End: 2020-01-28

## 2020-02-29 ENCOUNTER — HOSPITAL ENCOUNTER (OUTPATIENT)
Dept: URGENT CARE | Facility: CLINIC | Age: 50
Discharge: HOME | End: 2020-02-29

## 2020-04-16 ENCOUNTER — TELEMEDICINE CONVERTED (OUTPATIENT)
Dept: FAMILY MEDICINE CLINIC | Facility: CLINIC | Age: 50
End: 2020-04-16
Attending: FAMILY MEDICINE

## 2020-08-03 ENCOUNTER — OFFICE VISIT CONVERTED (OUTPATIENT)
Dept: CARDIOLOGY | Facility: CLINIC | Age: 50
End: 2020-08-03
Attending: INTERNAL MEDICINE

## 2020-08-07 ENCOUNTER — OFFICE VISIT CONVERTED (OUTPATIENT)
Dept: PULMONOLOGY | Facility: CLINIC | Age: 50
End: 2020-08-07
Attending: NURSE PRACTITIONER

## 2020-08-10 ENCOUNTER — HOSPITAL ENCOUNTER (OUTPATIENT)
Dept: LAB | Facility: HOSPITAL | Age: 50
Discharge: HOME | End: 2020-08-10
Attending: INTERNAL MEDICINE

## 2020-08-10 LAB
ALBUMIN SERPL-MCNC: 4.2 G/DL (ref 3.5–5)
ALBUMIN/GLOB SERPL: 1.3 {RATIO} (ref 1.4–2.6)
ALP SERPL-CCNC: 271 U/L (ref 53–141)
ALT SERPL-CCNC: 30 U/L (ref 10–40)
ANION GAP SERPL CALC-SCNC: 27 MMOL/L (ref 8–19)
AST SERPL-CCNC: 45 U/L (ref 15–50)
BILIRUB SERPL-MCNC: 0.44 MG/DL (ref 0.2–1.3)
BNP SERPL-MCNC: 38 PG/ML (ref 0–900)
BUN SERPL-MCNC: 33 MG/DL (ref 5–25)
BUN/CREAT SERPL: 28 {RATIO} (ref 6–20)
CALCIUM SERPL-MCNC: 10.7 MG/DL (ref 8.7–10.4)
CHLORIDE SERPL-SCNC: 86 MMOL/L (ref 99–111)
CONV CO2: 30 MMOL/L (ref 22–32)
CONV TOTAL PROTEIN: 7.4 G/DL (ref 6.3–8.2)
CREAT UR-MCNC: 1.16 MG/DL (ref 0.5–0.9)
GFR SERPLBLD BASED ON 1.73 SQ M-ARVRAT: 54 ML/MIN/{1.73_M2}
GLOBULIN UR ELPH-MCNC: 3.2 G/DL (ref 2–3.5)
GLUCOSE SERPL-MCNC: 289 MG/DL (ref 65–99)
MAGNESIUM SERPL-MCNC: 1.59 MG/DL (ref 1.6–2.3)
OSMOLALITY SERPL CALC.SUM OF ELEC: 306 MOSM/KG (ref 273–304)
POTASSIUM SERPL-SCNC: 3.5 MMOL/L (ref 3.5–5.3)
SODIUM SERPL-SCNC: 139 MMOL/L (ref 135–147)

## 2020-09-14 ENCOUNTER — CONVERSION ENCOUNTER (OUTPATIENT)
Dept: CARDIOLOGY | Facility: CLINIC | Age: 50
End: 2020-09-14

## 2020-10-08 ENCOUNTER — HOSPITAL ENCOUNTER (OUTPATIENT)
Dept: OTHER | Facility: HOSPITAL | Age: 50
Discharge: HOME | End: 2020-10-08
Attending: FAMILY MEDICINE

## 2020-10-08 LAB
ANION GAP SERPL CALC-SCNC: 21 MMOL/L (ref 8–19)
BUN SERPL-MCNC: 56 MG/DL (ref 5–25)
BUN/CREAT SERPL: 36 {RATIO} (ref 6–20)
CALCIUM SERPL-MCNC: 11 MG/DL (ref 8.7–10.4)
CHLORIDE SERPL-SCNC: 85 MMOL/L (ref 99–111)
CONV CO2: 27 MMOL/L (ref 22–32)
CREAT UR-MCNC: 1.55 MG/DL (ref 0.5–0.9)
GFR SERPLBLD BASED ON 1.73 SQ M-ARVRAT: 38 ML/MIN/{1.73_M2}
GLUCOSE SERPL-MCNC: 276 MG/DL (ref 65–99)
INR PPP: 1.94 (ref 2–3)
OSMOLALITY SERPL CALC.SUM OF ELEC: 293 MOSM/KG (ref 273–304)
POTASSIUM SERPL-SCNC: 3.8 MMOL/L (ref 3.5–5.3)
PROTHROMBIN TIME: 19.7 S (ref 9.4–12)
SODIUM SERPL-SCNC: 129 MMOL/L (ref 135–147)

## 2020-10-09 ENCOUNTER — OFFICE VISIT CONVERTED (OUTPATIENT)
Dept: FAMILY MEDICINE CLINIC | Facility: CLINIC | Age: 50
End: 2020-10-09
Attending: FAMILY MEDICINE

## 2020-10-09 ENCOUNTER — CONVERSION ENCOUNTER (OUTPATIENT)
Dept: FAMILY MEDICINE CLINIC | Facility: CLINIC | Age: 50
End: 2020-10-09

## 2020-10-15 ENCOUNTER — HOSPITAL ENCOUNTER (OUTPATIENT)
Dept: OTHER | Facility: HOSPITAL | Age: 50
Discharge: HOME | End: 2020-10-15
Attending: FAMILY MEDICINE

## 2020-10-15 LAB
ANION GAP SERPL CALC-SCNC: 20 MMOL/L (ref 8–19)
APPEARANCE UR: CLEAR
BILIRUB UR QL: NEGATIVE
BUN SERPL-MCNC: 53 MG/DL (ref 5–25)
BUN/CREAT SERPL: 42 {RATIO} (ref 6–20)
CALCIUM SERPL-MCNC: 10 MG/DL (ref 8.7–10.4)
CHLORIDE SERPL-SCNC: 82 MMOL/L (ref 99–111)
COLOR UR: YELLOW
CONV CO2: 33 MMOL/L (ref 22–32)
CONV COLLECTION SOURCE (UA): ABNORMAL
CONV UROBILINOGEN IN URINE BY AUTOMATED TEST STRIP: 0.2 {EHRLICHU}/DL (ref 0.1–1)
CREAT UR-MCNC: 1.27 MG/DL (ref 0.5–0.9)
GFR SERPLBLD BASED ON 1.73 SQ M-ARVRAT: 49 ML/MIN/{1.73_M2}
GLUCOSE SERPL-MCNC: 239 MG/DL (ref 65–99)
GLUCOSE UR QL: 100 MG/DL
HGB UR QL STRIP: NEGATIVE
INR PPP: 2.13 (ref 2–3)
KETONES UR QL STRIP: NEGATIVE MG/DL
LEUKOCYTE ESTERASE UR QL STRIP: NEGATIVE
MAGNESIUM SERPL-MCNC: 1.72 MG/DL (ref 1.6–2.3)
NITRITE UR QL STRIP: NEGATIVE
OSMOLALITY SERPL CALC.SUM OF ELEC: 294 MOSM/KG (ref 273–304)
PH UR STRIP.AUTO: 5.5 [PH] (ref 5–8)
POTASSIUM SERPL-SCNC: 3.5 MMOL/L (ref 3.5–5.3)
PROT UR QL: NEGATIVE MG/DL
PROTHROMBIN TIME: 20.9 S (ref 9.4–12)
SODIUM SERPL-SCNC: 131 MMOL/L (ref 135–147)
SP GR UR: 1.01 (ref 1–1.03)

## 2020-10-22 ENCOUNTER — OFFICE VISIT CONVERTED (OUTPATIENT)
Dept: PULMONOLOGY | Facility: CLINIC | Age: 50
End: 2020-10-22
Attending: INTERNAL MEDICINE

## 2020-10-23 ENCOUNTER — HOSPITAL ENCOUNTER (OUTPATIENT)
Dept: OTHER | Facility: HOSPITAL | Age: 50
Discharge: HOME | End: 2020-10-23
Attending: INTERNAL MEDICINE

## 2020-10-23 LAB
ANION GAP SERPL CALC-SCNC: 20 MMOL/L (ref 8–19)
BUN SERPL-MCNC: 50 MG/DL (ref 5–25)
BUN/CREAT SERPL: 39 {RATIO} (ref 6–20)
CALCIUM SERPL-MCNC: 10.8 MG/DL (ref 8.7–10.4)
CHLORIDE SERPL-SCNC: 91 MMOL/L (ref 99–111)
CONV CO2: 34 MMOL/L (ref 22–32)
CREAT UR-MCNC: 1.27 MG/DL (ref 0.5–0.9)
GFR SERPLBLD BASED ON 1.73 SQ M-ARVRAT: 49 ML/MIN/{1.73_M2}
GLUCOSE SERPL-MCNC: 134 MG/DL (ref 65–99)
OSMOLALITY SERPL CALC.SUM OF ELEC: 307 MOSM/KG (ref 273–304)
POTASSIUM SERPL-SCNC: 3.7 MMOL/L (ref 3.5–5.3)
SODIUM SERPL-SCNC: 141 MMOL/L (ref 135–147)

## 2020-10-26 ENCOUNTER — CONVERSION ENCOUNTER (OUTPATIENT)
Dept: OTHER | Facility: HOSPITAL | Age: 50
End: 2020-10-26

## 2020-10-26 ENCOUNTER — OFFICE VISIT CONVERTED (OUTPATIENT)
Dept: CARDIOLOGY | Facility: CLINIC | Age: 50
End: 2020-10-26
Attending: INTERNAL MEDICINE

## 2020-11-02 ENCOUNTER — HOSPITAL ENCOUNTER (OUTPATIENT)
Dept: OTHER | Facility: HOSPITAL | Age: 50
Discharge: HOME | End: 2020-11-02
Attending: INTERNAL MEDICINE

## 2020-11-03 LAB
ANION GAP SERPL CALC-SCNC: 15 MMOL/L (ref 8–19)
BUN SERPL-MCNC: 49 MG/DL (ref 5–25)
BUN/CREAT SERPL: 45 {RATIO} (ref 6–20)
CALCIUM SERPL-MCNC: 9.7 MG/DL (ref 8.7–10.4)
CHLORIDE SERPL-SCNC: 88 MMOL/L (ref 99–111)
CONV CO2: 39 MMOL/L (ref 22–32)
CREAT UR-MCNC: 1.09 MG/DL (ref 0.5–0.9)
GFR SERPLBLD BASED ON 1.73 SQ M-ARVRAT: 58 ML/MIN/{1.73_M2}
GLUCOSE SERPL-MCNC: 172 MG/DL (ref 65–99)
MAGNESIUM SERPL-MCNC: 1.91 MG/DL (ref 1.6–2.3)
OSMOLALITY SERPL CALC.SUM OF ELEC: 303 MOSM/KG (ref 273–304)
POTASSIUM SERPL-SCNC: 4.1 MMOL/L (ref 3.5–5.3)
SODIUM SERPL-SCNC: 138 MMOL/L (ref 135–147)

## 2020-11-19 ENCOUNTER — HOSPITAL ENCOUNTER (OUTPATIENT)
Dept: OTHER | Facility: HOSPITAL | Age: 50
Discharge: HOME | End: 2020-11-19
Attending: FAMILY MEDICINE

## 2020-11-19 LAB
ANION GAP SERPL CALC-SCNC: 13 MMOL/L (ref 8–19)
BASOPHILS # BLD AUTO: 0.03 10*3/UL (ref 0–0.2)
BASOPHILS NFR BLD AUTO: 0.3 % (ref 0–3)
BUN SERPL-MCNC: 46 MG/DL (ref 5–25)
BUN/CREAT SERPL: 45 {RATIO} (ref 6–20)
CALCIUM SERPL-MCNC: 9.9 MG/DL (ref 8.7–10.4)
CHLORIDE SERPL-SCNC: 87 MMOL/L (ref 99–111)
CONV ABS IMM GRAN: 0.07 10*3/UL (ref 0–0.2)
CONV CO2: 38 MMOL/L (ref 22–32)
CONV IMMATURE GRAN: 0.7 % (ref 0–1.8)
CREAT UR-MCNC: 1.03 MG/DL (ref 0.5–0.9)
DEPRECATED RDW RBC AUTO: 53.1 FL (ref 36.4–46.3)
EOSINOPHIL # BLD AUTO: 0.12 10*3/UL (ref 0–0.7)
EOSINOPHIL # BLD AUTO: 1.2 % (ref 0–7)
ERYTHROCYTE [DISTWIDTH] IN BLOOD BY AUTOMATED COUNT: 15.4 % (ref 11.7–14.4)
GFR SERPLBLD BASED ON 1.73 SQ M-ARVRAT: >60 ML/MIN/{1.73_M2}
GLUCOSE SERPL-MCNC: 239 MG/DL (ref 65–99)
HCT VFR BLD AUTO: 36 % (ref 37–47)
HGB BLD-MCNC: 11 G/DL (ref 12–16)
INR PPP: 3.08 (ref 2–3)
LYMPHOCYTES # BLD AUTO: 1.17 10*3/UL (ref 1–5)
LYMPHOCYTES NFR BLD AUTO: 11.9 % (ref 20–45)
MCH RBC QN AUTO: 28.7 PG (ref 27–31)
MCHC RBC AUTO-ENTMCNC: 30.6 G/DL (ref 33–37)
MCV RBC AUTO: 94 FL (ref 81–99)
MONOCYTES # BLD AUTO: 0.62 10*3/UL (ref 0.2–1.2)
MONOCYTES NFR BLD AUTO: 6.3 % (ref 3–10)
NEUTROPHILS # BLD AUTO: 7.8 10*3/UL (ref 2–8)
NEUTROPHILS NFR BLD AUTO: 79.6 % (ref 30–85)
NRBC CBCN: 0 % (ref 0–0.7)
OSMOLALITY SERPL CALC.SUM OF ELEC: 298 MOSM/KG (ref 273–304)
PLATELET # BLD AUTO: 298 10*3/UL (ref 130–400)
PMV BLD AUTO: 8.5 FL (ref 9.4–12.3)
POTASSIUM SERPL-SCNC: 3.5 MMOL/L (ref 3.5–5.3)
PROTHROMBIN TIME: 29.9 S (ref 9.4–12)
RBC # BLD AUTO: 3.83 10*6/UL (ref 4.2–5.4)
SODIUM SERPL-SCNC: 134 MMOL/L (ref 135–147)
WBC # BLD AUTO: 9.81 10*3/UL (ref 4.8–10.8)

## 2020-11-23 ENCOUNTER — OFFICE VISIT CONVERTED (OUTPATIENT)
Dept: CARDIOLOGY | Facility: CLINIC | Age: 50
End: 2020-11-23
Attending: INTERNAL MEDICINE

## 2020-11-24 ENCOUNTER — OFFICE VISIT CONVERTED (OUTPATIENT)
Dept: FAMILY MEDICINE CLINIC | Facility: CLINIC | Age: 50
End: 2020-11-24
Attending: FAMILY MEDICINE

## 2020-12-01 ENCOUNTER — HOSPITAL ENCOUNTER (OUTPATIENT)
Dept: OTHER | Facility: HOSPITAL | Age: 50
Discharge: HOME | End: 2020-12-01
Attending: FAMILY MEDICINE

## 2020-12-01 LAB
INR PPP: 2.03 (ref 2–3)
PROTHROMBIN TIME: 20 S (ref 9.4–12)

## 2020-12-02 ENCOUNTER — OFFICE VISIT CONVERTED (OUTPATIENT)
Dept: PULMONOLOGY | Facility: CLINIC | Age: 50
End: 2020-12-02
Attending: INTERNAL MEDICINE

## 2020-12-14 ENCOUNTER — OFFICE VISIT CONVERTED (OUTPATIENT)
Dept: FAMILY MEDICINE CLINIC | Facility: CLINIC | Age: 50
End: 2020-12-14
Attending: FAMILY MEDICINE

## 2020-12-14 ENCOUNTER — CONVERSION ENCOUNTER (OUTPATIENT)
Dept: FAMILY MEDICINE CLINIC | Facility: CLINIC | Age: 50
End: 2020-12-14

## 2020-12-14 ENCOUNTER — HOSPITAL ENCOUNTER (OUTPATIENT)
Dept: OTHER | Facility: HOSPITAL | Age: 50
Discharge: HOME | End: 2020-12-14
Attending: FAMILY MEDICINE

## 2020-12-14 LAB
INR PPP: 1.94 (ref 2–3)
PROTHROMBIN TIME: 19.2 S (ref 9.4–12)

## 2020-12-22 ENCOUNTER — HOSPITAL ENCOUNTER (OUTPATIENT)
Dept: OTHER | Facility: HOSPITAL | Age: 50
Discharge: HOME | End: 2020-12-22
Attending: FAMILY MEDICINE

## 2020-12-22 LAB
INR PPP: 2.55 (ref 2–3)
PROTHROMBIN TIME: 24.9 S (ref 9.4–12)

## 2020-12-28 ENCOUNTER — OFFICE VISIT CONVERTED (OUTPATIENT)
Dept: CARDIOLOGY | Facility: CLINIC | Age: 50
End: 2020-12-28
Attending: INTERNAL MEDICINE

## 2020-12-29 ENCOUNTER — HOSPITAL ENCOUNTER (OUTPATIENT)
Dept: OTHER | Facility: HOSPITAL | Age: 50
Discharge: HOME | End: 2020-12-29
Attending: INTERNAL MEDICINE

## 2020-12-29 LAB
ALBUMIN SERPL-MCNC: 3.8 G/DL (ref 3.5–5)
ALBUMIN/GLOB SERPL: 0.9 {RATIO} (ref 1.4–2.6)
ALP SERPL-CCNC: 331 U/L (ref 53–141)
ALT SERPL-CCNC: 32 U/L (ref 10–40)
ANION GAP SERPL CALC-SCNC: 18 MMOL/L (ref 8–19)
AST SERPL-CCNC: 62 U/L (ref 15–50)
BILIRUB SERPL-MCNC: 0.56 MG/DL (ref 0.2–1.3)
BUN SERPL-MCNC: 41 MG/DL (ref 5–25)
BUN/CREAT SERPL: 38 {RATIO} (ref 6–20)
CALCIUM SERPL-MCNC: 10.6 MG/DL (ref 8.7–10.4)
CHLORIDE SERPL-SCNC: 82 MMOL/L (ref 99–111)
CONV CO2: 37 MMOL/L (ref 22–32)
CONV TOTAL PROTEIN: 8.1 G/DL (ref 6.3–8.2)
CREAT UR-MCNC: 1.09 MG/DL (ref 0.5–0.9)
GFR SERPLBLD BASED ON 1.73 SQ M-ARVRAT: 58 ML/MIN/{1.73_M2}
GLOBULIN UR ELPH-MCNC: 4.3 G/DL (ref 2–3.5)
GLUCOSE SERPL-MCNC: 409 MG/DL (ref 65–99)
MAGNESIUM SERPL-MCNC: 1.65 MG/DL (ref 1.6–2.3)
OSMOLALITY SERPL CALC.SUM OF ELEC: 303 MOSM/KG (ref 273–304)
POTASSIUM SERPL-SCNC: 3.6 MMOL/L (ref 3.5–5.3)
SODIUM SERPL-SCNC: 133 MMOL/L (ref 135–147)

## 2020-12-31 ENCOUNTER — OFFICE VISIT CONVERTED (OUTPATIENT)
Dept: FAMILY MEDICINE CLINIC | Facility: CLINIC | Age: 50
End: 2020-12-31
Attending: FAMILY MEDICINE

## 2021-01-05 ENCOUNTER — HOSPITAL ENCOUNTER (OUTPATIENT)
Dept: CARDIOLOGY | Facility: HOSPITAL | Age: 51
Discharge: HOME | End: 2021-01-05
Attending: FAMILY MEDICINE

## 2021-01-05 ENCOUNTER — HOSPITAL ENCOUNTER (OUTPATIENT)
Dept: OTHER | Facility: HOSPITAL | Age: 51
Discharge: HOME | End: 2021-01-05
Attending: FAMILY MEDICINE

## 2021-01-05 ENCOUNTER — OFFICE VISIT CONVERTED (OUTPATIENT)
Dept: GASTROENTEROLOGY | Facility: CLINIC | Age: 51
End: 2021-01-05
Attending: INTERNAL MEDICINE

## 2021-01-05 LAB
ALBUMIN SERPL-MCNC: 4.1 G/DL (ref 3.5–5)
ALBUMIN/GLOB SERPL: 1 {RATIO} (ref 1.4–2.6)
ALP SERPL-CCNC: 615 U/L (ref 53–141)
ALT SERPL-CCNC: 178 U/L (ref 10–40)
ANION GAP SERPL CALC-SCNC: 14 MMOL/L (ref 8–19)
AST SERPL-CCNC: 208 U/L (ref 15–50)
BILIRUB SERPL-MCNC: 0.48 MG/DL (ref 0.2–1.3)
BUN SERPL-MCNC: 47 MG/DL (ref 5–25)
BUN/CREAT SERPL: 50 {RATIO} (ref 6–20)
CALCIUM SERPL-MCNC: 10.8 MG/DL (ref 8.7–10.4)
CHLORIDE SERPL-SCNC: 81 MMOL/L (ref 99–111)
CONV CO2: 42 MMOL/L (ref 22–32)
CONV TOTAL PROTEIN: 8.3 G/DL (ref 6.3–8.2)
CREAT UR-MCNC: 0.94 MG/DL (ref 0.5–0.9)
GFR SERPLBLD BASED ON 1.73 SQ M-ARVRAT: >60 ML/MIN/{1.73_M2}
GLOBULIN UR ELPH-MCNC: 4.2 G/DL (ref 2–3.5)
GLUCOSE SERPL-MCNC: 162 MG/DL (ref 65–99)
INR PPP: 2.39 (ref 2–3)
OSMOLALITY SERPL CALC.SUM OF ELEC: 294 MOSM/KG (ref 273–304)
POTASSIUM SERPL-SCNC: 2.6 MMOL/L (ref 3.5–5.3)
PROTHROMBIN TIME: 23.4 S (ref 9.4–12)
SODIUM SERPL-SCNC: 134 MMOL/L (ref 135–147)

## 2021-01-08 ENCOUNTER — HOSPITAL ENCOUNTER (OUTPATIENT)
Dept: ULTRASOUND IMAGING | Facility: HOSPITAL | Age: 51
Discharge: HOME | End: 2021-01-08
Attending: FAMILY MEDICINE

## 2021-01-11 ENCOUNTER — HOSPITAL ENCOUNTER (OUTPATIENT)
Dept: CARDIOLOGY | Facility: HOSPITAL | Age: 51
Discharge: HOME | End: 2021-01-11
Attending: FAMILY MEDICINE

## 2021-01-12 ENCOUNTER — HOSPITAL ENCOUNTER (OUTPATIENT)
Dept: OTHER | Facility: HOSPITAL | Age: 51
Discharge: HOME | End: 2021-01-12
Attending: FAMILY MEDICINE

## 2021-01-12 LAB
ALBUMIN SERPL-MCNC: 3.9 G/DL (ref 3.5–5)
ALBUMIN/GLOB SERPL: 1 {RATIO} (ref 1.4–2.6)
ALP SERPL-CCNC: 613 U/L (ref 53–141)
ALT SERPL-CCNC: 107 U/L (ref 10–40)
ANION GAP SERPL CALC-SCNC: 15 MMOL/L (ref 8–19)
AST SERPL-CCNC: 104 U/L (ref 15–50)
BASOPHILS # BLD AUTO: 0.07 10*3/UL (ref 0–0.2)
BASOPHILS NFR BLD AUTO: 0.7 % (ref 0–3)
BILIRUB SERPL-MCNC: 0.46 MG/DL (ref 0.2–1.3)
BUN SERPL-MCNC: 35 MG/DL (ref 5–25)
BUN/CREAT SERPL: 38 {RATIO} (ref 6–20)
CALCIUM SERPL-MCNC: 10.6 MG/DL (ref 8.7–10.4)
CHLORIDE SERPL-SCNC: 83 MMOL/L (ref 99–111)
CONV ABS IMM GRAN: 0.04 10*3/UL (ref 0–0.2)
CONV CO2: 39 MMOL/L (ref 22–32)
CONV IMMATURE GRAN: 0.4 % (ref 0–1.8)
CONV TOTAL PROTEIN: 7.8 G/DL (ref 6.3–8.2)
CREAT UR-MCNC: 0.93 MG/DL (ref 0.5–0.9)
DEPRECATED RDW RBC AUTO: 52.3 FL (ref 36.4–46.3)
EOSINOPHIL # BLD AUTO: 0.33 10*3/UL (ref 0–0.7)
EOSINOPHIL # BLD AUTO: 3.3 % (ref 0–7)
ERYTHROCYTE [DISTWIDTH] IN BLOOD BY AUTOMATED COUNT: 15.9 % (ref 11.7–14.4)
FERRITIN SERPL-MCNC: 140 NG/ML (ref 10–200)
GFR SERPLBLD BASED ON 1.73 SQ M-ARVRAT: >60 ML/MIN/{1.73_M2}
GLOBULIN UR ELPH-MCNC: 3.9 G/DL (ref 2–3.5)
GLUCOSE SERPL-MCNC: 242 MG/DL (ref 65–99)
HCT VFR BLD AUTO: 39 % (ref 37–47)
HGB BLD-MCNC: 12.2 G/DL (ref 12–16)
INR PPP: 1.8 (ref 2–3)
IRON SATN MFR SERPL: 12 % (ref 20–55)
IRON SERPL-MCNC: 61 UG/DL (ref 60–170)
LYMPHOCYTES # BLD AUTO: 1.88 10*3/UL (ref 1–5)
LYMPHOCYTES NFR BLD AUTO: 18.7 % (ref 20–45)
MCH RBC QN AUTO: 28.2 PG (ref 27–31)
MCHC RBC AUTO-ENTMCNC: 31.3 G/DL (ref 33–37)
MCV RBC AUTO: 90.1 FL (ref 81–99)
MONOCYTES # BLD AUTO: 0.89 10*3/UL (ref 0.2–1.2)
MONOCYTES NFR BLD AUTO: 8.9 % (ref 3–10)
NEUTROPHILS # BLD AUTO: 6.84 10*3/UL (ref 2–8)
NEUTROPHILS NFR BLD AUTO: 68 % (ref 30–85)
NRBC CBCN: 0 % (ref 0–0.7)
OSMOLALITY SERPL CALC.SUM OF ELEC: 294 MOSM/KG (ref 273–304)
PLATELET # BLD AUTO: 273 10*3/UL (ref 130–400)
PMV BLD AUTO: 9.5 FL (ref 9.4–12.3)
POTASSIUM SERPL-SCNC: 3.2 MMOL/L (ref 3.5–5.3)
PROTHROMBIN TIME: 17.9 S (ref 9.4–12)
RBC # BLD AUTO: 4.33 10*6/UL (ref 4.2–5.4)
SODIUM SERPL-SCNC: 134 MMOL/L (ref 135–147)
TIBC SERPL-MCNC: 496 UG/DL (ref 245–450)
TRANSFERRIN SERPL-MCNC: 347 MG/DL (ref 250–380)
WBC # BLD AUTO: 10.05 10*3/UL (ref 4.8–10.8)

## 2021-01-13 LAB
DSDNA AB SER-ACNC: NEGATIVE [IU]/ML
ENA AB SER IA-ACNC: NEGATIVE {RATIO}

## 2021-01-19 ENCOUNTER — HOSPITAL ENCOUNTER (OUTPATIENT)
Dept: OTHER | Facility: HOSPITAL | Age: 51
Discharge: HOME | End: 2021-01-19
Attending: FAMILY MEDICINE

## 2021-01-19 LAB
ANION GAP SERPL CALC-SCNC: 13 MMOL/L (ref 8–19)
BUN SERPL-MCNC: 24 MG/DL (ref 5–25)
BUN/CREAT SERPL: 26 {RATIO} (ref 6–20)
CALCIUM SERPL-MCNC: 10.1 MG/DL (ref 8.7–10.4)
CHLORIDE SERPL-SCNC: 87 MMOL/L (ref 99–111)
CONV AFP: 5 NG/ML (ref 0–8.7)
CONV CO2: 40 MMOL/L (ref 22–32)
CREAT UR-MCNC: 0.93 MG/DL (ref 0.5–0.9)
GFR SERPLBLD BASED ON 1.73 SQ M-ARVRAT: >60 ML/MIN/{1.73_M2}
GLUCOSE SERPL-MCNC: 114 MG/DL (ref 65–99)
INR PPP: 1.96 (ref 2–3)
IRON SATN MFR SERPL: 12 % (ref 20–55)
IRON SERPL-MCNC: 59 UG/DL (ref 60–170)
OSMOLALITY SERPL CALC.SUM OF ELEC: 289 MOSM/KG (ref 273–304)
POTASSIUM SERPL-SCNC: 3 MMOL/L (ref 3.5–5.3)
PROTHROMBIN TIME: 19.4 S (ref 9.4–12)
SODIUM SERPL-SCNC: 137 MMOL/L (ref 135–147)
TIBC SERPL-MCNC: 488 UG/DL (ref 245–450)
TRANSFERRIN SERPL-MCNC: 341 MG/DL (ref 250–380)

## 2021-01-20 LAB
A1AT SERPL-MCNC: 195 MG/DL (ref 101–187)
CERULOPLASMIN SERPL-MCNC: 49.9 MG/DL (ref 19–39)
CONV HEPATITIS B SURFACE AG W CONFIRMATION RE: NEGATIVE
DEPRECATED MITOCHONDRIA M2 IGG SER-ACNC: <20 UNITS (ref 0–20)
HAV IGM SERPL QL IA: NEGATIVE
HBV CORE IGM SERPL QL IA: NEGATIVE
HCV AB SER DONR QL: <0.1 S/CO RATIO (ref 0–0.9)

## 2021-01-26 ENCOUNTER — HOSPITAL ENCOUNTER (OUTPATIENT)
Dept: OTHER | Facility: HOSPITAL | Age: 51
Discharge: HOME | End: 2021-01-26
Attending: FAMILY MEDICINE

## 2021-01-26 LAB
ANION GAP SERPL CALC-SCNC: 24 MMOL/L (ref 8–19)
BUN SERPL-MCNC: 29 MG/DL (ref 5–25)
BUN/CREAT SERPL: 33 {RATIO} (ref 6–20)
CALCIUM SERPL-MCNC: 11 MG/DL (ref 8.7–10.4)
CHLORIDE SERPL-SCNC: 90 MMOL/L (ref 99–111)
CONV CO2: 30 MMOL/L (ref 22–32)
CREAT UR-MCNC: 0.88 MG/DL (ref 0.5–0.9)
GFR SERPLBLD BASED ON 1.73 SQ M-ARVRAT: >60 ML/MIN/{1.73_M2}
GLUCOSE SERPL-MCNC: 180 MG/DL (ref 65–99)
OSMOLALITY SERPL CALC.SUM OF ELEC: 302 MOSM/KG (ref 273–304)
POTASSIUM SERPL-SCNC: 2.9 MMOL/L (ref 3.5–5.3)
SODIUM SERPL-SCNC: 141 MMOL/L (ref 135–147)

## 2021-01-28 ENCOUNTER — OFFICE VISIT CONVERTED (OUTPATIENT)
Dept: FAMILY MEDICINE CLINIC | Facility: CLINIC | Age: 51
End: 2021-01-28
Attending: FAMILY MEDICINE

## 2021-01-29 ENCOUNTER — HOSPITAL ENCOUNTER (OUTPATIENT)
Dept: OTHER | Facility: HOSPITAL | Age: 51
Discharge: HOME | End: 2021-01-29
Attending: INTERNAL MEDICINE

## 2021-01-29 LAB
ANION GAP SERPL CALC-SCNC: 15 MMOL/L (ref 8–19)
BUN SERPL-MCNC: 17 MG/DL (ref 5–25)
BUN/CREAT SERPL: 21 {RATIO} (ref 6–20)
CALCIUM SERPL-MCNC: 10.2 MG/DL (ref 8.7–10.4)
CHLORIDE SERPL-SCNC: 89 MMOL/L (ref 99–111)
CONV CO2: 34 MMOL/L (ref 22–32)
CREAT UR-MCNC: 0.8 MG/DL (ref 0.5–0.9)
GFR SERPLBLD BASED ON 1.73 SQ M-ARVRAT: >60 ML/MIN/{1.73_M2}
GLUCOSE SERPL-MCNC: 313 MG/DL (ref 65–99)
OSMOLALITY SERPL CALC.SUM OF ELEC: 289 MOSM/KG (ref 273–304)
POTASSIUM SERPL-SCNC: 4.6 MMOL/L (ref 3.5–5.3)
SODIUM SERPL-SCNC: 133 MMOL/L (ref 135–147)

## 2021-02-02 ENCOUNTER — HOSPITAL ENCOUNTER (OUTPATIENT)
Dept: OTHER | Facility: HOSPITAL | Age: 51
Discharge: HOME | End: 2021-02-02
Attending: FAMILY MEDICINE

## 2021-02-02 LAB
ANION GAP SERPL CALC-SCNC: 12 MMOL/L (ref 8–19)
BUN SERPL-MCNC: 22 MG/DL (ref 5–25)
BUN/CREAT SERPL: 24 {RATIO} (ref 6–20)
CALCIUM SERPL-MCNC: 10.4 MG/DL (ref 8.7–10.4)
CHLORIDE SERPL-SCNC: 85 MMOL/L (ref 99–111)
CONV CO2: 40 MMOL/L (ref 22–32)
CREAT UR-MCNC: 0.93 MG/DL (ref 0.5–0.9)
GFR SERPLBLD BASED ON 1.73 SQ M-ARVRAT: >60 ML/MIN/{1.73_M2}
GLUCOSE SERPL-MCNC: 282 MG/DL (ref 65–99)
INR PPP: 1.07 (ref 2–3)
MAGNESIUM SERPL-MCNC: 1.84 MG/DL (ref 1.6–2.3)
OSMOLALITY SERPL CALC.SUM OF ELEC: 290 MOSM/KG (ref 273–304)
POTASSIUM SERPL-SCNC: 3.7 MMOL/L (ref 3.5–5.3)
PROTHROMBIN TIME: 11.4 S (ref 9.4–12)
SODIUM SERPL-SCNC: 133 MMOL/L (ref 135–147)

## 2021-02-12 ENCOUNTER — HOSPITAL ENCOUNTER (OUTPATIENT)
Dept: OTHER | Facility: HOSPITAL | Age: 51
Discharge: HOME | End: 2021-02-12
Attending: FAMILY MEDICINE

## 2021-02-12 LAB
ALBUMIN SERPL-MCNC: 3.9 G/DL (ref 3.5–5)
ALBUMIN/GLOB SERPL: 1 {RATIO} (ref 1.4–2.6)
ALP SERPL-CCNC: 377 U/L (ref 53–141)
ALT SERPL-CCNC: 30 U/L (ref 10–40)
ANION GAP SERPL CALC-SCNC: 17 MMOL/L (ref 8–19)
APPEARANCE UR: CLEAR
AST SERPL-CCNC: 46 U/L (ref 15–50)
BASOPHILS # BLD AUTO: 0.03 10*3/UL (ref 0–0.2)
BASOPHILS NFR BLD AUTO: 0.3 % (ref 0–3)
BILIRUB SERPL-MCNC: 0.34 MG/DL (ref 0.2–1.3)
BILIRUB UR QL: NEGATIVE
BUN SERPL-MCNC: 20 MG/DL (ref 5–25)
BUN/CREAT SERPL: 24 {RATIO} (ref 6–20)
CALCIUM SERPL-MCNC: 9.4 MG/DL (ref 8.7–10.4)
CHLORIDE SERPL-SCNC: 87 MMOL/L (ref 99–111)
COLOR UR: YELLOW
CONV ABS IMM GRAN: 0.04 10*3/UL (ref 0–0.2)
CONV CO2: 36 MMOL/L (ref 22–32)
CONV COLLECTION SOURCE (UA): NORMAL
CONV CREATININE URINE, RANDOM: 33.7 MG/DL (ref 10–300)
CONV IMMATURE GRAN: 0.4 % (ref 0–1.8)
CONV PROTEIN TO CREATININE RATIO (RANDOM URINE): 0.12 {RATIO} (ref 0–0.1)
CONV TOTAL PROTEIN: 7.9 G/DL (ref 6.3–8.2)
CONV UROBILINOGEN IN URINE BY AUTOMATED TEST STRIP: 0.2 {EHRLICHU}/DL (ref 0.1–1)
CREAT UR-MCNC: 0.82 MG/DL (ref 0.5–0.9)
DEPRECATED RDW RBC AUTO: 55.8 FL (ref 36.4–46.3)
EOSINOPHIL # BLD AUTO: 0.26 10*3/UL (ref 0–0.7)
EOSINOPHIL # BLD AUTO: 2.4 % (ref 0–7)
ERYTHROCYTE [DISTWIDTH] IN BLOOD BY AUTOMATED COUNT: 16.3 % (ref 11.7–14.4)
GFR SERPLBLD BASED ON 1.73 SQ M-ARVRAT: >60 ML/MIN/{1.73_M2}
GLOBULIN UR ELPH-MCNC: 4 G/DL (ref 2–3.5)
GLUCOSE SERPL-MCNC: 208 MG/DL (ref 65–99)
GLUCOSE UR QL: NEGATIVE MG/DL
HCT VFR BLD AUTO: 41 % (ref 37–47)
HGB BLD-MCNC: 12.6 G/DL (ref 12–16)
HGB UR QL STRIP: NEGATIVE
INR PPP: 1.14 (ref 2–3)
KETONES UR QL STRIP: NEGATIVE MG/DL
LEUKOCYTE ESTERASE UR QL STRIP: NEGATIVE
LYMPHOCYTES # BLD AUTO: 1.33 10*3/UL (ref 1–5)
LYMPHOCYTES NFR BLD AUTO: 12.5 % (ref 20–45)
MAGNESIUM SERPL-MCNC: 1.87 MG/DL (ref 1.6–2.3)
MCH RBC QN AUTO: 28.9 PG (ref 27–31)
MCHC RBC AUTO-ENTMCNC: 30.7 G/DL (ref 33–37)
MCV RBC AUTO: 94 FL (ref 81–99)
MONOCYTES # BLD AUTO: 0.81 10*3/UL (ref 0.2–1.2)
MONOCYTES NFR BLD AUTO: 7.6 % (ref 3–10)
NEUTROPHILS # BLD AUTO: 8.2 10*3/UL (ref 2–8)
NEUTROPHILS NFR BLD AUTO: 76.8 % (ref 30–85)
NITRITE UR QL STRIP: NEGATIVE
NRBC CBCN: 0 % (ref 0–0.7)
OSMOLALITY SERPL CALC.SUM OF ELEC: 293 MOSM/KG (ref 273–304)
PH UR STRIP.AUTO: 7 [PH] (ref 5–8)
PHOSPHATE SERPL-MCNC: 2.6 MG/DL (ref 2.4–4.5)
PLATELET # BLD AUTO: 295 10*3/UL (ref 130–400)
PMV BLD AUTO: 9.1 FL (ref 9.4–12.3)
POTASSIUM SERPL-SCNC: 3.1 MMOL/L (ref 3.5–5.3)
PROT UR QL: NEGATIVE MG/DL
PROT UR-MCNC: 4.1 MG/DL
PROTHROMBIN TIME: 12.2 S (ref 9.4–12)
RBC # BLD AUTO: 4.36 10*6/UL (ref 4.2–5.4)
SODIUM SERPL-SCNC: 137 MMOL/L (ref 135–147)
SP GR UR: 1.01 (ref 1–1.03)
TSH SERPL-ACNC: 1.79 M[IU]/L (ref 0.27–4.2)
WBC # BLD AUTO: 10.67 10*3/UL (ref 4.8–10.8)

## 2021-02-13 LAB — SMOOTH MUSCLE F-ACTIN AB IGG: 11 UNITS (ref 0–19)

## 2021-02-19 ENCOUNTER — TELEMEDICINE CONVERTED (OUTPATIENT)
Dept: FAMILY MEDICINE CLINIC | Facility: CLINIC | Age: 51
End: 2021-02-19
Attending: FAMILY MEDICINE

## 2021-02-23 ENCOUNTER — OFFICE VISIT CONVERTED (OUTPATIENT)
Dept: CARDIOLOGY | Facility: CLINIC | Age: 51
End: 2021-02-23
Attending: INTERNAL MEDICINE

## 2021-02-26 ENCOUNTER — HOSPITAL ENCOUNTER (OUTPATIENT)
Dept: OTHER | Facility: HOSPITAL | Age: 51
Discharge: HOME | End: 2021-02-26
Attending: INTERNAL MEDICINE

## 2021-02-26 LAB
ALBUMIN SERPL-MCNC: 3.8 G/DL (ref 3.5–5)
ANION GAP SERPL CALC-SCNC: 16 MMOL/L (ref 8–19)
APPEARANCE UR: CLEAR
BASOPHILS # BLD AUTO: 0.04 10*3/UL (ref 0–0.2)
BASOPHILS NFR BLD AUTO: 0.5 % (ref 0–3)
BILIRUB UR QL: NEGATIVE
BUN SERPL-MCNC: 18 MG/DL (ref 5–25)
BUN/CREAT SERPL: 20 {RATIO} (ref 6–20)
CALCIUM SERPL-MCNC: 9.6 MG/DL (ref 8.7–10.4)
CHLORIDE SERPL-SCNC: 90 MMOL/L (ref 99–111)
COLOR UR: YELLOW
CONV ABS IMM GRAN: 0.04 10*3/UL (ref 0–0.2)
CONV CO2: 35 MMOL/L (ref 22–32)
CONV COLLECTION SOURCE (UA): ABNORMAL
CONV CREATININE URINE, RANDOM: 25 MG/DL (ref 10–300)
CONV IMMATURE GRAN: 0.5 % (ref 0–1.8)
CONV PROTEIN TO CREATININE RATIO (RANDOM URINE): 0.16 {RATIO} (ref 0–0.1)
CONV UROBILINOGEN IN URINE BY AUTOMATED TEST STRIP: 0.2 {EHRLICHU}/DL (ref 0.1–1)
CREAT UR-MCNC: 0.89 MG/DL (ref 0.5–0.9)
DEPRECATED RDW RBC AUTO: 54.8 FL (ref 36.4–46.3)
EOSINOPHIL # BLD AUTO: 0.2 10*3/UL (ref 0–0.7)
EOSINOPHIL # BLD AUTO: 2.4 % (ref 0–7)
ERYTHROCYTE [DISTWIDTH] IN BLOOD BY AUTOMATED COUNT: 16 % (ref 11.7–14.4)
GFR SERPLBLD BASED ON 1.73 SQ M-ARVRAT: >60 ML/MIN/{1.73_M2}
GLUCOSE SERPL-MCNC: 344 MG/DL (ref 65–99)
GLUCOSE UR QL: 100 MG/DL
HCT VFR BLD AUTO: 41.2 % (ref 37–47)
HGB BLD-MCNC: 12.7 G/DL (ref 12–16)
HGB UR QL STRIP: NEGATIVE
INR PPP: 1.92 (ref 2–3)
KETONES UR QL STRIP: NEGATIVE MG/DL
LEUKOCYTE ESTERASE UR QL STRIP: NEGATIVE
LYMPHOCYTES # BLD AUTO: 1.29 10*3/UL (ref 1–5)
LYMPHOCYTES NFR BLD AUTO: 15.2 % (ref 20–45)
MAGNESIUM SERPL-MCNC: 1.99 MG/DL (ref 1.6–2.3)
MCH RBC QN AUTO: 28.5 PG (ref 27–31)
MCHC RBC AUTO-ENTMCNC: 30.8 G/DL (ref 33–37)
MCV RBC AUTO: 92.6 FL (ref 81–99)
MONOCYTES # BLD AUTO: 0.59 10*3/UL (ref 0.2–1.2)
MONOCYTES NFR BLD AUTO: 7 % (ref 3–10)
NEUTROPHILS # BLD AUTO: 6.32 10*3/UL (ref 2–8)
NEUTROPHILS NFR BLD AUTO: 74.4 % (ref 30–85)
NITRITE UR QL STRIP: NEGATIVE
NRBC CBCN: 0 % (ref 0–0.7)
OSMOLALITY SERPL CALC.SUM OF ELEC: 300 MOSM/KG (ref 273–304)
PH UR STRIP.AUTO: 7.5 [PH] (ref 5–8)
PHOSPHATE SERPL-MCNC: 2.4 MG/DL (ref 2.4–4.5)
PLATELET # BLD AUTO: 236 10*3/UL (ref 130–400)
PMV BLD AUTO: 9.3 FL (ref 9.4–12.3)
POTASSIUM SERPL-SCNC: 4.1 MMOL/L (ref 3.5–5.3)
PROT UR QL: NEGATIVE MG/DL
PROT UR-MCNC: <4 MG/DL
PROTHROMBIN TIME: 19.5 S (ref 9.4–12)
RBC # BLD AUTO: 4.45 10*6/UL (ref 4.2–5.4)
SODIUM SERPL-SCNC: 137 MMOL/L (ref 135–147)
SP GR UR: 1.01 (ref 1–1.03)
WBC # BLD AUTO: 8.48 10*3/UL (ref 4.8–10.8)

## 2021-03-04 ENCOUNTER — OFFICE VISIT CONVERTED (OUTPATIENT)
Dept: FAMILY MEDICINE CLINIC | Facility: CLINIC | Age: 51
End: 2021-03-04
Attending: FAMILY MEDICINE

## 2021-03-30 ENCOUNTER — HOSPITAL ENCOUNTER (OUTPATIENT)
Dept: OTHER | Facility: HOSPITAL | Age: 51
Discharge: HOME | End: 2021-03-30
Attending: INTERNAL MEDICINE

## 2021-03-30 LAB
ANION GAP SERPL CALC-SCNC: 16 MMOL/L (ref 8–19)
BUN SERPL-MCNC: 23 MG/DL (ref 5–25)
BUN/CREAT SERPL: 19 {RATIO} (ref 6–20)
CALCIUM SERPL-MCNC: 10.5 MG/DL (ref 8.7–10.4)
CHLORIDE SERPL-SCNC: 85 MMOL/L (ref 99–111)
CONV CO2: 35 MMOL/L (ref 22–32)
CREAT UR-MCNC: 1.24 MG/DL (ref 0.5–0.9)
GFR SERPLBLD BASED ON 1.73 SQ M-ARVRAT: 50 ML/MIN/{1.73_M2}
GLUCOSE SERPL-MCNC: 389 MG/DL (ref 65–99)
INR PPP: 2.25 (ref 2–3)
OSMOLALITY SERPL CALC.SUM OF ELEC: 294 MOSM/KG (ref 273–304)
POTASSIUM SERPL-SCNC: 3.9 MMOL/L (ref 3.5–5.3)
PROTHROMBIN TIME: 22.7 S (ref 9.4–12)
SODIUM SERPL-SCNC: 132 MMOL/L (ref 135–147)

## 2021-04-06 ENCOUNTER — OFFICE VISIT CONVERTED (OUTPATIENT)
Dept: FAMILY MEDICINE CLINIC | Facility: CLINIC | Age: 51
End: 2021-04-06
Attending: FAMILY MEDICINE

## 2021-04-06 LAB — CONV FLOW RATE: 4

## 2021-04-20 ENCOUNTER — HOSPITAL ENCOUNTER (OUTPATIENT)
Dept: GENERAL RADIOLOGY | Facility: HOSPITAL | Age: 51
Discharge: HOME | End: 2021-04-20
Attending: NURSE PRACTITIONER

## 2021-04-20 ENCOUNTER — OFFICE VISIT CONVERTED (OUTPATIENT)
Dept: PULMONOLOGY | Facility: CLINIC | Age: 51
End: 2021-04-20
Attending: NURSE PRACTITIONER

## 2021-04-20 ENCOUNTER — HOSPITAL ENCOUNTER (OUTPATIENT)
Dept: LAB | Facility: HOSPITAL | Age: 51
Discharge: HOME | End: 2021-04-20
Attending: NURSE PRACTITIONER

## 2021-04-20 LAB
ALBUMIN SERPL-MCNC: 3.9 G/DL (ref 3.5–5)
ALBUMIN/GLOB SERPL: 1.1 {RATIO} (ref 1.4–2.6)
ALP SERPL-CCNC: 487 U/L (ref 53–141)
ALT SERPL-CCNC: 82 U/L (ref 10–40)
ANION GAP SERPL CALC-SCNC: 15 MMOL/L (ref 8–19)
AST SERPL-CCNC: 90 U/L (ref 15–50)
BASOPHILS # BLD AUTO: 0.03 10*3/UL (ref 0–0.2)
BASOPHILS NFR BLD AUTO: 0.4 % (ref 0–3)
BILIRUB SERPL-MCNC: 0.61 MG/DL (ref 0.2–1.3)
BUN SERPL-MCNC: 18 MG/DL (ref 5–25)
BUN/CREAT SERPL: 20 {RATIO} (ref 6–20)
CALCIUM SERPL-MCNC: 9.5 MG/DL (ref 8.7–10.4)
CHLORIDE SERPL-SCNC: 89 MMOL/L (ref 99–111)
CONV ABS IMM GRAN: 0.06 10*3/UL (ref 0–0.2)
CONV CO2: 34 MMOL/L (ref 22–32)
CONV IMMATURE GRAN: 0.8 % (ref 0–1.8)
CONV TOTAL PROTEIN: 7.4 G/DL (ref 6.3–8.2)
CREAT UR-MCNC: 0.9 MG/DL (ref 0.5–0.9)
DEPRECATED RDW RBC AUTO: 52.4 FL (ref 36.4–46.3)
EOSINOPHIL # BLD AUTO: 0.14 10*3/UL (ref 0–0.7)
EOSINOPHIL # BLD AUTO: 1.9 % (ref 0–7)
ERYTHROCYTE [DISTWIDTH] IN BLOOD BY AUTOMATED COUNT: 15.4 % (ref 11.7–14.4)
GFR SERPLBLD BASED ON 1.73 SQ M-ARVRAT: >60 ML/MIN/{1.73_M2}
GLOBULIN UR ELPH-MCNC: 3.5 G/DL (ref 2–3.5)
GLUCOSE SERPL-MCNC: 434 MG/DL (ref 65–99)
HCT VFR BLD AUTO: 43.3 % (ref 37–47)
HGB BLD-MCNC: 13.6 G/DL (ref 12–16)
LYMPHOCYTES # BLD AUTO: 0.89 10*3/UL (ref 1–5)
LYMPHOCYTES NFR BLD AUTO: 11.8 % (ref 20–45)
MCH RBC QN AUTO: 29.4 PG (ref 27–31)
MCHC RBC AUTO-ENTMCNC: 31.4 G/DL (ref 33–37)
MCV RBC AUTO: 93.5 FL (ref 81–99)
MONOCYTES # BLD AUTO: 0.55 10*3/UL (ref 0.2–1.2)
MONOCYTES NFR BLD AUTO: 7.3 % (ref 3–10)
NEUTROPHILS # BLD AUTO: 5.87 10*3/UL (ref 2–8)
NEUTROPHILS NFR BLD AUTO: 77.8 % (ref 30–85)
NRBC CBCN: 0 % (ref 0–0.7)
OSMOLALITY SERPL CALC.SUM OF ELEC: 299 MOSM/KG (ref 273–304)
PLATELET # BLD AUTO: 246 10*3/UL (ref 130–400)
PMV BLD AUTO: 9.3 FL (ref 9.4–12.3)
POTASSIUM SERPL-SCNC: 3.9 MMOL/L (ref 3.5–5.3)
RBC # BLD AUTO: 4.63 10*6/UL (ref 4.2–5.4)
SODIUM SERPL-SCNC: 134 MMOL/L (ref 135–147)
WBC # BLD AUTO: 7.54 10*3/UL (ref 4.8–10.8)

## 2021-05-12 NOTE — PROGRESS NOTES
Progress Note      Patient Name: Joan Partida   Patient ID: [id number]   Sex: Female   YOB: 1970    Primary Care Provider: Francesco Gimenez DO   Referring Provider: Mk Dowling MD    Visit Date: April 16, 2020    Provider: Francesco Gimenez DO   Location: [facility]   Location Address: [address] [place] [address]  [id number]   Location Phone: [phone]          Chief Complaint  · Face to Face for Motor Wheelchair       History Of Present Illness  Video Conferencing Visit  Joan Partida is a 49 year old /White female who is presenting for evaluation via video conferencing. Verbal consent obtained before beginning visit.   The following staff were present during this visit: Clemencia Bolanos LPN   Joan Partida is a 49 year old /White female who presents for face to face visit for motorized scooter/wheelchair. She has COPD, morbid obesity, GIUSEPPE, Pulmonary HTN, CHF, and chronic hypoxia on O2 24/7. Currently she uses a manual wheelchair to get around her house. It is difficult due to her severe sob and requires resting to continue to the next activity. She is physically unable to use a cane or walker. When she uses these devices she has frequent falls. Thus she is unable to do her ADL even with a manual wheelchair without sitting and resting further. Currently she has chronic dependent edema with chronic ulcers. She has her lower extremities wrapped frequently and needs the ability to elevate her lower extremities. She does have the ability cognitively to use a motorized device       Past Medical History  Disease Name Date Onset Notes   Abnormal mammogram 10/21/2013 --    Allergy --  --    Anemia --  --    Arthritis --  --    COPD (chronic obstructive pulmonary disease) 03/04/2015 --    Dependent edema 08/27/2018 --    Depression --  --    Dermatitis 07/10/2014 07/10/2014    Dysphagia --  --    Fibromyalgia --  --    Foot pain 07/10/2014  "07/10/2014    GERD (gastroesophageal reflux disease) 10/17/2014 --    Hepatic steatosis 03/04/2015 --    History of tobacco abuse --  --    Hyperlipidemia --  --    Hypertension, essential 03/04/2015 --    Hypokalemia 05/07/2019 --    Hypothyroid --  --    Knee, Meniscal Derangement, NEC 01/01/2014 Left Knee Medial Meniscus Tear   LPRD (laryngopharyngeal reflux disease) --  --    Moderate episode of recurrent major depressive disorder 06/22/2017 --    Morbid obesity 03/04/2015 --    Mycobacterium avium complex 08/09/2018 --    Obesity 07/10/2014 07/10/2014   GIUSEPPE (obstructive sleep apnea) 08/09/2018 --    Pulmonary hypertension 08/27/2018 --    Stasis dermatitis of both legs 08/09/2018 --    Type 2 diabetes mellitus with diabetic polyneuropathy, with long-term current use of insulin 06/22/2017 --    Ventral hernia 03/04/2015 --    Vitamin D deficiency 11/13/2013 --    Voice hoarseness --  --          Past Surgical History  Procedure Name Date Notes   carpal tunnel 1997 --    Cesarian Section 1987,1989 --    Hernia 2011 Hernia Repair   Hysterectomy 2007 & 2011 Partial Hysterectomy   Knee surgery 2014 --          Medication List  Name Date Started Instructions   Allergy Relief (fexofenadine) 180 mg oral tablet 03/20/2020 TAKE 1 TABLET BY MOUTH TWICE DAILY   amitriptyline 50 mg oral tablet  take 1 tablet (50 mg) by oral route once daily at bedtime   BD Ultra-Fine Short Pen Needle 31 gauge x 5/16\" miscellaneous needle 10/18/2018 use as directed with Immanuel Strong 15 mcg/2 mL inhalation solution for nebulization  inhale 2 milliliters (15 mcg) by inhalation route 2 times per day   bumetanide 2 mg oral tablet  take 1 tablet (2 mg) by oral route 2 times per day   celecoxib 200 mg oral capsule 09/26/2019 TAKE 1 CAPSULE BY MOUTH TWICE DAILY   cholecalciferol (vitamin D3) 1,000 unit oral capsule 11/24/2019 TAKE 1 CAPSULE BY MOUTH ONCE DAILY   cholecalciferol (vitamin D3) 2,000 unit oral capsule 11/24/2019 TAKE 1 CAPSULE BY " MOUTH ONCE DAILY FOR 30 DAYS   dicyclomine 20 mg oral tablet  take 1 tablet by oral route As needed   Dulera 200-5 mcg/actuation inhalation HFA aerosol inhaler 03/20/2018 INHALE TWO PUFFS BY MOUTH TWICE DAILY IN THE MORNING AND IN THE EVENING   ezetimibe 10 mg oral tablet  take 1 tablet (10 mg) by oral route once daily   gabapentin 300 mg oral capsule 11/22/2019 take 3 capsules by oral route 3 times a day for 30 days   Humulin R U-500 (Conc) Insulin 500 unit/mL subcutaneous solution  inject by subcutaneous route per instructions. For use with insulin pump   ipratropium-albuterol 0.5 mg-3 mg(2.5 mg base)/3 mL inhalation solution for nebulization  use in nebulizer as directed every 4 hours as needed   levothyroxine 150 mcg oral tablet  take 1 tablet (150 mcg) by oral route once daily   losartan 50 mg oral tablet  take 1 tablet (50 mg) by oral route once daily   metolazone 2.5 mg oral tablet 03/03/2020 TAKE 1 TABLET BY MOUTH ON MONDAY, WEDNESDAY AND FRIDAY   metoprolol succinate 25 mg oral tablet extended release 24 hr 03/09/2020 TAKE 1TABLET BY MOUTH AT BEDTIME   montelukast 10 mg oral tablet 12/26/2019 TAKE 1 TABLET BY MOUTH ONCE DAILY IN THE EVENING   multivitamin Oral tablet  take 1 tablet by oral route daily   pantoprazole 40 mg oral tablet,delayed release (DR/EC) 03/23/2020 TAKE 1 TABLET BY MOUTH TWICE DAILY   polyethylene glycol 3350 17 gram/dose oral powder  take 17 gram mixed with 8 oz. water, juice, soda, coffee or tea by oral route once daily   potassium chloride 10 mEq oral tablet extended release  take 5 tablets by oral route 3 times a day   promethazine 25 mg oral tablet  take 1 tablet (25 mg) by oral route every 6 hours as needed   Pulmicort 0.5 mg/2 mL inhalation suspension for nebulization  inhale 2 milliliters (0.5 mg) by nebulization route 2 times per day   rosuvastatin 40 mg oral tablet  take 1 tablet (40 mg) by oral route once daily   sertraline 100 mg oral tablet 09/26/2019 TAKE 1 TABLET BY MOUTH  ONCE DAILY FOR 90 DAYS   Spiriva Respimat 2.5 mcg/actuation inhalation mist  inhale 2 puffs (5 mcg) by inhalation route once daily at the same time each day   spironolactone 100 mg oral tablet 2019 TAKE 1/2 (ONE-HALF) TABLET BY MOUTH TWICE DAILY   sumatriptan succinate 100 mg oral tablet 2019 TAKE 1 TABLET BY MOUTH AT ONSET OF MIGRAINE; MAY REPEAT AFTER 2 HOURS IF HEADACHE RETURNS, NOT TO EXCEED 200MG IN 24 HOURS   terbinafine HCl 250 mg oral tablet  take 1 tablet (250 mg) by oral route once daily   trazodone 50 mg oral tablet  half tablet at bedtime   triamcinolone acetonide 0.1 % topical cream 2020 APPLY A THIN LAYER OF CREAM TOPICALLY TO AFFECTED AREA(S) TWICE DAILY AS NEEDED   Ventolin HFA 90 mcg/actuation inhalation HFA aerosol inhaler  inhale 1 - 2 puffs (90 - 180 mcg) by inhalation route every 6 hours as needed         Allergy List  Allergen Name Date Reaction Notes   metformin 12 Severe --    damien --  --  --          Family Medical History  Disease Name Relative/Age Notes   Heart Disease Mother/   Grandparent-nonspecific   Diabetes Father/   --    Skin Carcinoma Father/   --          Reproductive History  Menstrual   Last Menstrual Period: 07/10/2014 Certainty of LMP Date: N/A Menopause Status: Postmenopausal   Age Menopause: 40 Method of Birth Control: Other   Pregnancy Summary   Total Pregnancies: 2 Full Term: 2 Premature: 0   Ab Induced: 0 Ab Spontaneous: 0 Ectopics: 0   Multiples: 0 Livin         Social History  Finding Status Start/Stop Quantity Notes   Alcohol Light --/-- occasional 2019 - 10/15/2018 - never drinks    Tobacco Former --/-- 1 pk/day Quit          Immunizations  NameDate Admin Mfg Trade Name Lot Number Route Inj VIS Given VIS Publication   Dvbmbxnio71/22/2019 PMC Fluzone Quadrivalent [id number] IM LD 2019    Comments: pt tolerated well with no complaints   Pgnfzkyrg27/30/2018 SKB Fluarix, quadrivalent, preservative free [id number] NE NE 2018   "  Comments: Patient recived in Dr. Pierce office   Xsrcqjgrm47/10/2014 NOV Fluvirin > 4 Years [id number] IM LD 01/10/2014 07/02/2012   Comments: Tolerated well and left office in stable condition.         Review of Systems  · Constitutional  o Denies  o : fever, fatigue, weight loss, weight gain  · Cardiovascular  o Denies  o : lower extremity edema, claudication, chest pressure, palpitations  · Respiratory  o Denies  o : shortness of breath, wheezing, cough, hemoptysis, dyspnea on exertion  · Gastrointestinal  o Denies  o : nausea, vomiting, diarrhea, constipation, abdominal pain      Vitals  Date Time BP Position Site L\R Cuff Size HR RR TEMP (F) WT  HT  BMI kg/m2 BSA m2 O2 Sat HC       09/23/2019 08:26 /58 Sitting    92 - R   418lbs 0oz 5'  2\" 76.45 2.88     11/22/2019 10:47 /65 Sitting    103 - R   425lbs 16oz 5'  2\" 77.92 2.91 97 %    01/28/2020 09:07 /60 Sitting    100 - R   435lbs 0oz 5'  2\" 79.56 2.94           Physical Examination  · Constitutional  o Appearance  o : well-nourished, well developed, no obvious deformities present  · Head and Face  o Head  o :   § Inspection  § : atraumatic, normocephalic  o Face  o :   § Inspection  § : no facial lesions  · Respiratory  o Respiratory Effort  o : breathing unlabored          Assessment  · CHF (congestive heart failure)     428.0/I50.9  · Morbid obesity     278.01/E66.01  · GIUSEPPE (obstructive sleep apnea)     327.23/G47.33  · Pulmonary hypertension     416.8/I27.20  · Stasis dermatitis of both legs     454.1/I87.2  · Mobility impaired     799.89/Z74.09  She is significantly mobility impaired due to the above. THis would give her greater freedom and conserve her energy for her ADLs      Plan  · Orders  o ACO-39: Current medications updated and reviewed () - - 04/16/2020  · Medications  o Medications have been Reconciled  o Transition of Care or Provider Policy  · Instructions  o Patient was educated/instructed on their diagnosis, treatment " and medications prior to discharge from the clinic today.            Electronically Signed by: Francesco Gimenez DO -Author on April 16, 2020 01:07:21 PM

## 2021-05-13 NOTE — PROGRESS NOTES
Progress Note      Patient Name: Joan Partida   Patient ID: [id number]   Sex: Female   YOB: 1970    Primary Care Provider: Francesco Gimenez DO   Referring Provider: Mk Dowling MD    Visit Date: October 26, 2020    Provider: Deven Myers MD   Location: [facility]   Location Address: [address] [place] [address], [address]   [facility] [place]  [id number]   Location Phone: [phone]          Chief Complaint  · Follow-up from recent hospital stay   · Reports shortness of breath and worsening edema      History Of Present Illness  REFERRING CARE PROVIDER: Mk Dowling MD and Francesco Gimenez DO   Joan Partida is a 49-year-old female with morbid obesity, chronic diastolic and right-sided heart failure, diabetes mellitus, obstructive sleep apnea, who is here as a follow-up from recent hospital stay. She was last seen in the office on September 14. She was sent to the emergency room for further evaluation and possible admission because of severe volume overload. She spent more than a week in the hospital and diuresed well and discharged home in stable condition. 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 Per report, she received IV fluids in the beginning and later received IV diuretics. She was discharged on Coumadin. Patient reports that she continues to gain weight since discharged from the hospital. She is very short of breath at rest, and pedal edema is increased as well. She was seen in pulmonology office last week and called our office as well. She received 10 mg of Metolazone for 2 days, which dropped her weight by 3 or 4 pounds, but now it is coming back up. She denies having any chest pain.   PAST MEDICAL HISTORY: (1) History of mycobacterium avium complex infection, completed long-term antibiotic therapy. (2) Reactive airway disease. (3) Hyperlipidemia. (4) Chronic edema of  "lower extremities. (5) Diabetes mellitus, on insulin pump. (6) Morbid obesity. (7) Obstructive sleep apnea, on CPAP.   PSYCHOSOCIAL HISTORY: She never used alcohol. She previously used tobacco but quit.   CURRENT MEDICATIONS: include Warfarin; Bumetanide 2 mg b.i.d.; Ezetimibe 10 mg daily; Losartan 25 mg daily; Metolazone 2.5 mg daily; Metoprolol Succinate 25 mg daily; Rosuvastatin 40 mg daily; Spironolactone 50 mg daily; Potassium 10 mEq t.i.d.; Vitamin D3; Fexofenadine 180 mg daily; Fluticasone; Gabapentin 300 mg t.i.d.; insulin; Levothyroxine 0.2 mg daily; Albuterol; Montelukast 10 mg daily; Pantoprazole 40 mg daily; MiraLAX; Sertraline 100 mg daily; Imitrex; Trazodone 50 mg daily; Incruse Ellipta Trazodone 50 mg to 100 mg nightly; melatonin 10 mg daily; . The dosage and frequency of the medications were reviewed with the patient.       Review of Systems  · Cardiovascular  o Admits  o : palpitations (fast, fluttering, or skipping beats), swelling (feet, ankles, hands), shortness of breath while walking or lying flat  o Denies  o : chest pain or angina pectoris   · Respiratory  o Admits  o : chronic or frequent cough      Vitals  Date Time BP Position Site L\R Cuff Size HR RR TEMP (F) WT  HT  BMI kg/m2 BSA m2 O2 Sat FR L/min FiO2 HC       10/26/2020 12:30 /56 Sitting    88 - R   490lbs 0oz 5'  2\" 89.62 3.12             Physical Examination  · Respiratory  o Auscultation of Lungs  o : Bilateral faint wheezing heard. Bilateral basilar crackles present.   · Cardiovascular  o Heart  o : Regular rate and rhythm. No murmurs, rubs, or gallops. No JVD.   · Gastrointestinal  o Abdominal Examination  o : Soft, nontender, nondistended. No free fluid. Bowel sounds heard in all four quadrants.  · Extremities  o Extremities  o : Warm and well perfused. 3+ pitting pedal edema bilaterally. Chronic venous stasis changes present.      Records from recent hospital stay were reviewed.    Labs done on 10/23/2020 after discharged " from the hospital showed BUN of 58, creatinine 1.27, sodium 141, potassium 3.7, chloride 91.  Calcium 10.8.           Assessment     ASSESSMENT AND PLAN:    1.  Chronic diastolic heart failure/right-sided heart failure:  Patient has significant volume overload on physical       examination today, and her weight is above her dry weight.  She recently had two admissions to the       hospital.  At this time I recommend to increase the Metolazone dose to 10 mg daily for the next 5 days,       after which she can go back to 5 mg daily.  Continue Bumex as it is.  Will increase Potassium        supplementation to 40 mEq 3 times a day and continue Spironolactone as it is.  If she does not get any       significant improvement over the next 2 to 3 days, encouraged her to go to the emergency room for        admission and IV diuretics.  2.  Follow-up closely in 6 weeks.    MD DAVIDA Diallo/panda           This note was transcribed by Alina Hills.  panda/DAVIDA  The above service was transcribed by Alina Hills, and I attest to the accuracy of the note.  JAEV                 Electronically Signed by: Alina Hills-, -Author on October 28, 2020 04:53:40 AM  Electronically Co-signed by: Deven Myers MD -Reviewer on October 28, 2020 02:32:28 PM

## 2021-05-13 NOTE — PROGRESS NOTES
Progress Note      Patient Name: Joan Partida   Patient ID: [id number]   Sex: Female   YOB: 1970    Primary Care Provider: Francesco Gimenez DO   Referring Provider: kM Dowling MD    Visit Date: November 23, 2020    Provider: Deven Myers MD   Location: [facility]   Location Address: [address] [place] [address], [address]   [facility] [place]  [id number]   Location Phone: [phone]          Chief Complaint  · Congestive heart failure   · Follow-up from recent hospital stay       History Of Present Illness  REFERRING CARE PROVIDER: Francesco Gimenez DO   Joan Partida is a 49-year-old female with morbid obesity, chronic diastolic and right-sided heart failure, diabetes mellitus, obstructive sleep apnea. Patient was recently admitted to the hospital again on 11/09/2020 with volume overload and also cellulitis of the left lower extremity. Her blood cultures were positive, and initially she had hypotension. She was treated with IV antibiotic therapy and later received aggressive IV diuresis. She was discharged home in stable condition. She lost a significant amount of fluid weight while in the hospital. However, today the patient reports that the fluids are building up again a week after getting discharged from the hospital. The redness, swelling and erythema and of the left lower extremity also has come back, and the leg is tender to touch. She thinks that the penicillin antibiotic is not working well. She reports compliance to all her medications. She denies any chest pain but reports significant shortness of breath on activities, orthopnea and also some weight gain, which she is unable to quantify.   PAST MEDICAL HISTORY: (1) History of mycobacterium avium complex infection, completed long-term antibiotic therapy. (2) Reactive airway disease. (3) Hyperlipidemia. (4) Chronic edema of lower extremities. (5) Diabetes mellitus, on insulin pump. (6) Morbid obesity. (7) Obstructive sleep  "apnea, on CPAP.   PSYCHOSOCIAL HISTORY: She never used alcohol. She previously used tobacco but quit.   CURRENT MEDICATIONS: include Ezetimibe 10 mg daily; Levothyroxine 0.2 mg daily; Metolazone 2.5 mg b.i.d.; Metoprolol 25 mg daily; Potassium 20 mEq daily; Rosuvastatin 40 mg daily; Warfarin; Bumetanide 2 mg daily; Clindamycin; L. acidophilus; penicillin; Vitamin D3; Fexofenadine 180 mg b.i.d.; Gabapentin 600 mg t.i.d.; Montelukast 10 mg daily; Pantoprazole 40 mg daily; Sertraline 100 mg daily; Trazodone 50 mg daily. The dosage and frequency of the medications were reviewed with the patient.      ALLERGIES:  Metformin, nickel.       Review of Systems  · Cardiovascular  o Admits  o : palpitations (fast, fluttering, or skipping beats), swelling (feet, ankles, hands), shortness of breath while walking or lying flat  o Denies  o : chest pain or angina pectoris   · Respiratory  o Admits  o : chronic or frequent cough      Vitals  Date Time BP Position Site L\R Cuff Size HR RR TEMP (F) WT  HT  BMI kg/m2 BSA m2 O2 Sat FR L/min FiO2 HC       11/23/2020 10:51 /64 Sitting    96 - R   490lbs 0oz 5'  2\" 89.62 3.12             Physical Examination  · Respiratory  o Auscultation of Lungs  o : Clear to auscultation bilaterally. No crackles or rhonchi.  · Cardiovascular  o Heart  o : S1, S2 is normally heard. No S3. No murmur, rubs, or gallops.  · Gastrointestinal  o Abdominal Examination  o : Soft, nontender, nondistended. No free fluid. Bowel sounds heard in all four quadrants.  · Extremities  o Extremities  o : 3+ pitting pedal edema bilaterally. Redness, erythema and tenderness of the left lower extremity present consistent with cellulitis.     Records from recent hospital stay, including labs, physician documentation and EKGs, were reviewed.             Assessment     ASSESSMENT AND PLAN:    1.  Left, lower-extremity cellulitis:  Symptoms significantly improved at the time of discharge from the hospital       but appears " to have come back.  She still has the PICC line and getting penicillin 4 times a day. Encouraged       the patient to talk to the primary care provider to see whether antibiotic duration needs to be increased or       she should get a different antibiotic for now.  2.  Chronic diastolic and right-sided heart failure:  Patient is significantly volume overloaded today.  She gained        fluid weight back since discharged from the hospital.  Recommend to increase the Metolazone dose to 10        mg once daily for the next 3 days and then go back to 5 mg daily (currently taking 2.5 mg b.i.d.).  She will        continue the current dose of Bumex.  3.  Follow up closely in 6 to 8 weeks.    MD DAVIDA Diallo/panda           This note was transcribed by Alina Hills.  panda/DAVIDA  The above service was transcribed by Alina Hills, and I attest to the accuracy of the note.  DAVIDA               Electronically Signed by: Alina Hills-, -Author on November 30, 2020 10:23:26 AM  Electronically Co-signed by: Deven Myers MD -Reviewer on November 30, 2020 10:24:18 AM

## 2021-05-13 NOTE — PROGRESS NOTES
Progress Note      Patient Name: Joan Partida   Patient ID: [id number]   Sex: Female   YOB: 1970    Primary Care Provider: Francesco Gimenez DO   Referring Provider: Mk Dowling MD    Visit Date: November 24, 2020    Provider: Francesco Gimenez DO   Location: [facility]   Location Address: [address] [place] [address]  [id number]   Location Phone: [phone]          Chief Complaint  · Follow up office visit within 7 calendar days of discharge from inpatient status (high complexity).      History Of Present Illness  FOLLOW UP OFFICE VISIT WITHIN 7 CALENDAR DAYS OF INPATIENT STATUS (SEVERE COMPLEXITY)  Joan Partida presents to office for follow up post discharge from inpatient status within 7 calendar days. Patient was contacted within 2 business days via phone conversation. Documentation of that phone contact is present in the patient's electronic chart. Patient was admitted to an inpatient faciliity on 11/09/2020 and discharged on 11/15/2020 due to: cellulitis, CHF   Admitting MD: [facility]   Her discharge summary has been reviewed and placed in the patient's electronic chart.   Patient's problem list is: Abnormal mammogram, Allergy, Arthritis, COPD (chronic obstructive pulmonary disease), Dependent edema, Depression, Dermatitis, Dysphagia, Fibromyalgia, Left Foot pain, GERD (gastroesophageal reflux disease), Hepatic steatosis, Hepatic vein thrombosis, History of tobacco abuse, Hyperlipidemia, Hypertension, essential, Hypokalemia, Hypothyroid, Long term (current) use of anticoagulants, LPRD (laryngopharyngeal reflux disease), Moderate episode of recurrent major depressive disorder, Morbid obesity, Mycobacterium avium complex, Obesity, GIUSEPPE (obstructive sleep apnea), Pulmonary hypertension, Stasis dermatitis of both legs, Type 2 diabetes mellitus with diabetic polyneuropathy, with long-term current use of insulin, Ventral hernia, Vitamin  "D deficiency, and Voice hoarseness   Patient's outpatient medication list has been reconciled with the medication list from the discharge summary and has been reviewed with the patient. Current medication list is: Allergy Relief (fexofenadine) 180 mg oral tablet, amitriptyline 50 mg oral tablet, BD Ultra-Fine Short Pen Needle 31 gauge x 5/16\" miscellaneous needle, Breo Ellipta 200-25 mcg/dose inhalation blister with device, Brovana 15 mcg/2 mL inhalation solution for nebulization, bumetanide 2 mg oral tablet, cholecalciferol (vitamin D3) 1,000 unit oral capsule, cholecalciferol (vitamin D3) 2,000 unit oral capsule, Coumadin 7.5 mg oral tablet, dicyclomine 20 mg oral tablet, EQ FEXOFENADINE 180MG TAB, ezetimibe 10 mg oral tablet, gabapentin 300 mg oral capsule, Humulin R U-500 (Conc) Insulin 500 unit/mL subcutaneous solution, Incruse Ellipta 62.5 mcg/actuation inhalation blister with device, ipratropium-albuterol 0.5 mg-3 mg(2.5 mg base)/3 mL inhalation solution for nebulization, losartan 50 mg oral tablet, metolazone 2.5 mg oral tablet, metoprolol succinate 25 mg oral tablet extended release 24 hr, montelukast 10 mg oral tablet, multivitamin Oral tablet, pantoprazole 40 mg oral tablet,delayed release (DR/EC), polyethylene glycol 3350 17 gram/dose oral powder, potassium chloride 10 mEq oral capsule, extended release, Probiotic oral, promethazine 25 mg oral tablet, Pulmicort 0.5 mg/2 mL inhalation suspension for nebulization, rosuvastatin 40 mg oral tablet, sertraline 100 mg oral tablet, spironolactone 100 mg oral tablet, sumatriptan succinate 100 mg oral tablet, terbinafine HCl 250 mg oral tablet, trazodone 50 mg oral tablet, triamcinolone acetonide 0.1 % topical cream, and Ventolin HFA 90 mcg/actuation inhalation HFA aerosol inhaler      She was d/c home on IV abx and has a PICC line in place. Her last dosage is today. Her LEFT leg remains painful. It remains warm, tender, and red.       Past Medical " "History  Disease Name Date Onset Notes   Abnormal mammogram 10/21/2013 --    Allergy --  --    Anemia --  --    Arthritis --  --    COPD (chronic obstructive pulmonary disease) 03/04/2015 --    Dependent edema 08/27/2018 --    Depression --  --    Dermatitis 07/10/2014 07/10/2014    Dysphagia --  --    Fibromyalgia --  --    Foot pain 07/10/2014 07/10/2014    GERD (gastroesophageal reflux disease) 10/17/2014 --    Hepatic steatosis 03/04/2015 --    Hepatic vein thrombosis 10/09/2020 --    History of tobacco abuse --  --    Hyperlipidemia --  --    Hypertension, essential 03/04/2015 --    Hypokalemia 05/07/2019 --    Hypothyroid --  --    Knee, Meniscal Derangement, NEC 01/01/2014 Left Knee Medial Meniscus Tear   Long term (current) use of anticoagulants --  --    LPRD (laryngopharyngeal reflux disease) --  --    Moderate episode of recurrent major depressive disorder 06/22/2017 --    Morbid obesity 03/04/2015 --    Mycobacterium avium complex 08/09/2018 --    Obesity 07/10/2014 07/10/2014   GIUSEPPE (obstructive sleep apnea) 08/09/2018 --    Pulmonary hypertension 08/27/2018 --    Stasis dermatitis of both legs 08/09/2018 --    Type 2 diabetes mellitus with diabetic polyneuropathy, with long-term current use of insulin 06/22/2017 --    Ventral hernia 03/04/2015 --    Vitamin D deficiency 11/13/2013 --    Voice hoarseness --  --          Past Surgical History  Procedure Name Date Notes   carpal tunnel 1997 --    Cesarian Section 1987,1989 --    Hernia 2011 Hernia Repair   Hysterectomy 2007 & 2011 Partial Hysterectomy   Knee surgery 2014 --          Medication List  Name Date Started Instructions   Allergy Relief (fexofenadine) 180 mg oral tablet 07/09/2020 TAKE 1 TABLET BY MOUTH TWICE DAILY   amitriptyline 50 mg oral tablet  take 1 tablet (50 mg) by oral route once daily at bedtime   BD Ultra-Fine Short Pen Needle 31 gauge x 5/16\" miscellaneous needle 10/18/2018 use as directed with Victoza   Breo Ellipta 200-25 mcg/dose " inhalation blister with device  inhale 1 puff by inhalation route once daily at the same time each day   Brovana 15 mcg/2 mL inhalation solution for nebulization  inhale 2 milliliters (15 mcg) by inhalation route 2 times per day   bumetanide 2 mg oral tablet  take 1 tablet (2 mg) by oral route 2 times per day   cholecalciferol (vitamin D3) 1,000 unit oral capsule 11/24/2019 TAKE 1 CAPSULE BY MOUTH ONCE DAILY   cholecalciferol (vitamin D3) 2,000 unit oral capsule 11/24/2019 TAKE 1 CAPSULE BY MOUTH ONCE DAILY FOR 30 DAYS   Coumadin 7.5 mg oral tablet  take 1 tablet (7.5 mg) by oral route once daily   dicyclomine 20 mg oral tablet  take 1 tablet by oral route As needed   EQ FEXOFENADINE 180MG TAB 08/12/2020 Take 1 tablet by mouth twice daily   ezetimibe 10 mg oral tablet  take 1 tablet (10 mg) by oral route once daily   gabapentin 300 mg oral capsule 05/18/2020 take 3 capsules by oral route 3 times a day for 30 days   Humulin R U-500 (Conc) Insulin 500 unit/mL subcutaneous solution  inject by subcutaneous route per instructions. For use with insulin pump   Incruse Ellipta 62.5 mcg/actuation inhalation blister with device  inhale 1 puff (62.5 mcg) by inhalation route once daily at the same time each day   ipratropium-albuterol 0.5 mg-3 mg(2.5 mg base)/3 mL inhalation solution for nebulization  use in nebulizer as directed every 4 hours as needed   losartan 50 mg oral tablet  take 1 tablet (50 mg) by oral route once daily   metolazone 2.5 mg oral tablet 03/03/2020 TAKE 1 TABLET BY MOUTH ON MONDAY, WEDNESDAY AND FRIDAY   metoprolol succinate 25 mg oral tablet extended release 24 hr 08/24/2020 TAKE HALF TABLET BY MOUTH AT BEDTIME   montelukast 10 mg oral tablet 12/26/2019 TAKE 1 TABLET BY MOUTH ONCE DAILY IN THE EVENING   multivitamin Oral tablet  take 1 tablet by oral route daily   pantoprazole 40 mg oral tablet,delayed release (DR/EC) 07/23/2020 TAKE 1 TABLET BY MOUTH TWICE DAILY   polyethylene glycol 3350 17 gram/dose  oral powder  take 17 gram mixed with 8 oz. water, juice, soda, coffee or tea by oral route once daily   potassium chloride 10 mEq oral capsule, extended release 2020 TAKE 4 CAPSULES BY MOUTH THREE TIMES DAILY   Probiotic oral  take 1 by oral route daily   promethazine 25 mg oral tablet  take 1 tablet (25 mg) by oral route every 6 hours as needed   Pulmicort 0.5 mg/2 mL inhalation suspension for nebulization  inhale 2 milliliters (0.5 mg) by nebulization route 2 times per day   rosuvastatin 40 mg oral tablet  take 1 tablet (40 mg) by oral route once daily   sertraline 100 mg oral tablet 2020 Take 1 tablet by mouth once daily   spironolactone 100 mg oral tablet 2020 TAKE 1/2 (ONE-HALF) TABLET BY MOUTH TWICE DAILY   sumatriptan succinate 100 mg oral tablet 2020 TAKE 1 TABLET BY MOUTH AT ONSET OF MIGRAINE; MAY REPEAT AFTER 2 HOURS IF HEADACHE RETURNS, NOT TO EXCEED 200MG IN 24 HOURS   terbinafine HCl 250 mg oral tablet  take 1 tablet (250 mg) by oral route once daily   trazodone 50 mg oral tablet  half tablet at bedtime   triamcinolone acetonide 0.1 % topical cream 2020 APPLY A THIN LAYER OF CREAM TOPICALLY TO AFFECTED AREA(S) TWICE DAILY AS NEEDED   Ventolin HFA 90 mcg/actuation inhalation HFA aerosol inhaler  inhale 1 - 2 puffs (90 - 180 mcg) by inhalation route every 6 hours as needed         Allergy List  Allergen Name Date Reaction Notes   metformin 12 Severe --    damien --  --  --          Family Medical History  Disease Name Relative/Age Notes   Heart Disease Mother/   Grandparent-nonspecific   Diabetes Father/   --    Skin Carcinoma Father/   --          Reproductive History  Menstrual   Last Menstrual Period: 07/10/2014 Certainty of LMP Date: N/A Menopause Status: Postmenopausal   Age Menopause: 40 Method of Birth Control: Other   Pregnancy Summary   Total Pregnancies: 2 Full Term: 2 Premature: 0   Ab Induced: 0 Ab Spontaneous: 0 Ectopics: 0   Multiples: 0 Livin  "        Social History  Finding Status Start/Stop Quantity Notes   Alcohol Light --/-- occasional 01/22/2019 - 10/15/2018 - never drinks    Tobacco Former --/-- 1 pk/day Quit 2004         Immunizations  NameDate Admin Mfg Trade Name Lot Number Route Inj VIS Given VIS Publication   [id number]/09/2020 PMC Fluzone Quadrivalent [id number] IM LD 10/09/2020 08/15/2019   Comments: PT TOLERATED WELL NDC 97127-381-89         Review of Systems  · Constitutional  o Denies  o : fatigue, fever, chills, body aches, night sweats  · HENT  o Denies  o : headaches  · Cardiovascular  o Admits  o : rapid heart rate, dyspnea on exertion, orthopnea, lower extremity edema  o Denies  o : chest pain, irregular heart beats  · Respiratory  o Denies  o : shortness of breath, wheezing, cough  · Gastrointestinal  o Admits  o : diarrhea  o Denies  o : nausea, vomiting  · Integument  o Admits  o : pigmentation changes  · Endocrine  o Admits  o : central obesity      Vitals  Date Time BP Position Site L\R Cuff Size HR RR TEMP (F) WT  HT  BMI kg/m2 BSA m2 O2 Sat FR L/min FiO2 HC       10/26/2020 12:30 /56 Sitting    88 - R   490lbs 0oz 5'  2\" 89.62 3.12       11/23/2020 10:51 /64 Sitting    96 - R   490lbs 0oz 5'  2\" 89.62 3.12       11/24/2020 09:19 /57 Sitting    88 - R  98.1     100 % 4 36%          Physical Examination  · Constitutional  o Appearance  o : well-nourished, well developed, alert, in no acute distress, well-tended appearance  · Head and Face  o Head  o :   § Inspection  § : atraumatic, normocephalic  o Face  o :   § Inspection  § : no facial lesions  o HEENT  o : Unremarkable  · Eyes  o Conjunctivae  o : conjunctivae normal  o Sclerae  o : sclerae white  o Pupils and Irises  o : pupils equal and round, pupils reactive to light bilaterally  o Eyelids/Ocular Adnexae  o : eyelid appearance normal  · Ears, Nose, Mouth and Throat  o Ears  o :   § External Ears  § : appearance within normal limits, no lesions " present  § Otoscopic Examination  § : tympanic membrane appearance within normal limits bilaterally without perforations, mobility normal  o Nose  o :   § External Nose  § : appearance normal  o Oral Cavity  o :   § Oral Mucosa  § : oral mucosa normal  § Lips  § : lip appearance normal  § Teeth  § : normal dentition for age  § Gums  § : gums pink, non-swollen, no bleeding present  § Tongue  § : tongue appearance normal  § Palate  § : hard palate normal, soft palate appearance normal  o Throat  o :   § Oropharynx  § : no inflammation or lesions present, tonsils within normal limits  · Neck  o Inspection/Palpation  o : normal appearance, no masses or tenderness, trachea midline  o Thyroid  o : gland size normal, nontender, no nodules or masses present on palpation  · Respiratory  o Respiratory Effort  o : breathing unlabored  o Auscultation of Lungs  o : normal breath sounds  · Cardiovascular  o Heart  o :   § Auscultation of Heart  § : regular rate, normal rhythm, no murmurs present  o Peripheral Vascular System  o :   § Extremities  § : mild lower extremity edema present   · Lymphatic  o Neck  o : no lymphadenopathy   · Skin and Subcutaneous Tissue  o Extremities  o :   § Left Lower Extremity  § : diffuse erythema, slight warmth, and tenderness          Assessment  · Morbid obesity     278.01/E66.01  discussed the importance of diet and significant weight loss  · Cellulitis of left lower extremity     682.6/L03.116  will add another week of abx      Plan  · Orders  o Discharge medications reconciled with the current medication list (1111F) - - 11/24/2020  · Medications  o doxycycline monohydrate 100 mg oral capsule   SIG: take 1 capsule (100 mg) by oral route 2 times per day for 10 days   DISP: (20) Capsule with 0 refills  Prescribed on 11/24/2020     o tramadol 50 mg oral tablet   SIG: take 1 tablet (50 mg) by oral route every 6 hours as needed for 10 days   DISP: (60) Tablet with 0 refills  Prescribed on  11/24/2020     o Medications have been Reconciled  o Transition of Care or Provider Policy  · Instructions  o Patient discharge summary has been reviewed and placed in patient's electronic medical record.  o Patient received a phone call from my office within 2 business days of discharge from inpatient status, and documented within the patient's chart.  o Also patient was seen (face to face) for follow up evaluation within 7 calendar days of discharge from inpatient status for a high complexity issue.  o Patient was educated on their diagnosis, treatment and any medication changes while being evaluated today.  · Disposition  o Call or Return if symptoms worsen or persist.  o Return Visit Request in/on 10 days +/- 2 days ([id number]).            Electronically Signed by: Francesco Gimenez DO -Author on November 24, 2020 09:47:23 AM

## 2021-05-13 NOTE — PROGRESS NOTES
Progress Note      Patient Name: Joan Partida   Patient ID: [id number]   Sex: Female   YOB: 1970    Primary Care Provider: Francesco Gimenez DO   Referring Provider: Mk Dowling MD    Visit Date: August 3, 2020    Provider: Deven Myers MD   Location: [facility]   Location Address: [address] [place] [address] [address]   [place]  [id number]   Location Phone: [phone]          Chief Complaint     Followup visit for diastolic and right-sided heart failure.       History Of Present Illness  REFERRING CARE PROVIDER: Mk Dowling MD   Joan Partida is a 49 year old /White female with morbid obesity, pulmonary hypertension, chronic edema, and diastolic heart failure and right-sided heart failure who is here for a followup visit. Last seen in January 2020. Over the past 6 months, she gained more than 35 pounds. She is feeling more short of breath and more swollen. She has significant orthopnea. She had a visit to Urgent Care in February for bronchitis. Denies having any chest pain. Taking all the medications as prescribed. Recently noted to have a high heart rate, as well.   PAST MEDICAL HISTORY: 1) History of Mycobacterium avium complex infection, completed long-term antibiotic therapy; 2) Reactive airway disease; 3) Hyperlipidemia; 4) Chronic edema of lower extremities; 5) Diabetes mellitus, on insulin pump; 6) Morbid obesity; 7) Obstructive sleep apnea, on CPAP.   PSYCHOSOCIAL HISTORY: Denies alcohol or tobacco use. Admits mood changes and depression.   CURRENT MEDICATIONS: Spironolactone 50 mg b.i.d.; Bumex 2 mg b.i.d.; potassium chloride 10 mEq t.i.d.; losartan 25 mg daily; rosuvastatin 20 mg daily; metoprolol ER 25 mg 1/2 q. h.s.; metolazone 2.5 mg p.o. Monday, Wednesday, Friday; sumatriptan 100 mg p.r.n.; dicyclomine 20 mg p.r.n.; gabapentin 300 mg 3 capsules t.i.d.; multivitamin daily; Ventolin p.r.n.; celecoxib 200 mg b.i.d.; sertraline 100 mg  "daily; montelukast 10 mg daily; pantoprazole 40 mg b.i.d.; fexofenadine 180 mg b.i.d.; levocetirizine 5 mg daily; Spiriva; Dulera; levothyroxine 75 mcg daily; Humulin R U-500; amitriptyline 50 mg daily; trazodone 50 mg daily; vitamin D 1000 units daily.       Review of Systems  · Cardiovascular  o Admits  o : palpitations (fast, fluttering, or skipping beats), swelling (feet, ankles, hands), shortness of breath while walking or lying flat  o Denies  o : chest pain or angina pectoris   · Respiratory  o Admits  o : chronic or frequent cough, asthma or wheezing      Vitals  Date Time BP Position Site L\R Cuff Size HR RR TEMP (F) WT  HT  BMI kg/m2 BSA m2 O2 Sat HC       08/03/2020 08:46 /72 Sitting    86 - R   478lbs 0oz 5'  2\" 87.43 3.08     08/03/2020 08:46 /78 Sitting                     Physical Examination  · Respiratory  o Auscultation of Lungs  o : Bilateral faint wheezing heard. Bilateral basilar crackles present.   · Cardiovascular  o Heart  o : Regular rate and rhythm. No murmurs, rubs, or gallops. No JVD.   · Gastrointestinal  o Abdominal Examination  o : Soft, nontender, nondistended. No free fluid. Bowel sounds heard in all four quadrants.  · Extremities  o Extremities  o : Warm and well perfused. 2+ pitting pedal edema bilaterally. Distal pulses not palpable due to edema.   · Labs  o Labs  o : No recent labs available for review.           Assessment     ASSESSMENT & PLAN:    1.  Chronic diastolic heart failure/right-sided heart failure.  Patient is significantly volume overloaded on        examination in the office today.  She has gained more than 30 pounds over the past 6 months.  Will        increase the Bumex dose to 3 mg twice a day and continue Aldactone and potassium supplementation at        the current dose.  Recommend the patient to take metolazone 2.5 mg daily for the next one week and then        go back to every other day.  Repeat a basic metabolic panel and BNP in one week.  2.  " Will follow the lab reports; otherwise, follow up closely in 6 weeks.  She is instructed to go to the        emergency room for any worsening symptoms in the meantime.        MD DAVIDA Diallo:vm             Electronically Signed by: Anya Cerda-, Other -Author on August 5, 2020 07:45:54 AM  Electronically Co-signed by: Deven Myers MD -Reviewer on August 9, 2020 05:43:03 PM

## 2021-05-13 NOTE — PROGRESS NOTES
Progress Note      Patient Name: Joan Partida   Patient ID: [id number]   Sex: Female   YOB: 1970    Primary Care Provider: Francesco Gimenez DO   Referring Provider: Mk Dowling MD    Visit Date: October 9, 2020    Provider: Francesco Gimenez DO   Location: [facility]   Location Address: [address] [place] [address]  [id number]   Location Phone: [phone]          Chief Complaint  · inpatient follow up- GIUSEPPE, morbid obesity  · Follow up office visit within 7 calendar days of discharge from inpatient status (high complexity).      History Of Present Illness  Joan Partida is a 49 year old /White female who presents for evaluation and treatment of:   FOLLOW UP OFFICE VISIT WITHIN 7 CALENDAR DAYS OF INPATIENT STATUS (SEVERE COMPLEXITY)  Joan Partida presents to office for follow up post discharge from inpatient status within 7 calendar days. Patient was contacted within 2 business days via phone conversation. Documentation of that phone contact is present in the patient's electronic chart. Patient was admitted to an inpatient faciliity on 09/28/2020 and discharged on 10/05/2020 due to: Hepatic vein thrombosis, right sided HF, Pulm HTN, UTI   Admitting MD: [facility] Hospitalist   Her discharge summary has been reviewed and placed in the patient's electronic chart.   Patient's problem list is: Abnormal mammogram, Allergy, Arthritis, COPD (chronic obstructive pulmonary disease), Dependent edema, Depression, Dermatitis, Dysphagia, Fibromyalgia, Left Foot pain, GERD (gastroesophageal reflux disease), Hepatic steatosis, History of tobacco abuse, Hyperlipidemia, Hypertension, essential, Hypokalemia, Hypothyroid, LPRD (laryngopharyngeal reflux disease), Moderate episode of recurrent major depressive disorder, Morbid obesity, Mycobacterium avium complex, Obesity, GIUSEPPE (obstructive sleep apnea), Pulmonary hypertension, Stasis dermatitis of both legs,  "Type 2 diabetes mellitus with diabetic polyneuropathy, with long-term current use of insulin, Ventral hernia, Vitamin D deficiency, and Voice hoarseness   Patient's outpatient medication list has been reconciled with the medication list from the discharge summary and has been reviewed with the patient. Current medication list is: Allergy Relief (fexofenadine) 180 mg oral tablet, amitriptyline 50 mg oral tablet, BD Ultra-Fine Short Pen Needle 31 gauge x 5/16\" miscellaneous needle, Breo Ellipta 200-25 mcg/dose inhalation blister with device, Brovana 15 mcg/2 mL inhalation solution for nebulization, bumetanide 2 mg oral tablet, celecoxib 200 mg oral capsule, cholecalciferol (vitamin D3) 1,000 unit oral capsule, cholecalciferol (vitamin D3) 2,000 unit oral capsule, Coumadin 7.5 mg oral tablet, dicyclomine 20 mg oral tablet, EQ FEXOFENADINE 180MG TAB, ezetimibe 10 mg oral tablet, gabapentin 300 mg oral capsule, Humulin R U-500 (Conc) Insulin 500 unit/mL subcutaneous solution, Incruse Ellipta 62.5 mcg/actuation inhalation blister with device, ipratropium-albuterol 0.5 mg-3 mg(2.5 mg base)/3 mL inhalation solution for nebulization, levothyroxine 150 mcg oral tablet, losartan 50 mg oral tablet, metolazone 2.5 mg oral tablet, metoprolol succinate 25 mg oral tablet extended release 24 hr, montelukast 10 mg oral tablet, multivitamin Oral tablet, pantoprazole 40 mg oral tablet,delayed release (DR/EC), polyethylene glycol 3350 17 gram/dose oral powder, potassium chloride 10 mEq oral capsule, extended release, promethazine 25 mg oral tablet, Pulmicort 0.5 mg/2 mL inhalation suspension for nebulization, rosuvastatin 40 mg oral tablet, sertraline 100 mg oral tablet, spironolactone 100 mg oral tablet, sumatriptan succinate 100 mg oral tablet, terbinafine HCl 250 mg oral tablet, trazodone 50 mg oral tablet, triamcinolone acetonide 0.1 % topical cream, and Ventolin HFA 90 mcg/actuation inhalation HFA aerosol inhaler      She has been " home 4 days now SHe has HH w/ PT and OT. THey have drawn her labs and her INR was 1.94 on 7.5mg daily of her Warfarin. She states that she feels exhausted and unable to sleep at night. She is using her Bi-PAP nightly.     SHe has persistent leg and back pain. Nothing seems to make it better. Currently she has a hospital bed.       Past Medical History  Disease Name Date Onset Notes   Abnormal mammogram 10/21/2013 --    Allergy --  --    Anemia --  --    Arthritis --  --    COPD (chronic obstructive pulmonary disease) 03/04/2015 --    Dependent edema 08/27/2018 --    Depression --  --    Dermatitis 07/10/2014 07/10/2014    Dysphagia --  --    Fibromyalgia --  --    Foot pain 07/10/2014 07/10/2014    GERD (gastroesophageal reflux disease) 10/17/2014 --    Hepatic steatosis 03/04/2015 --    History of tobacco abuse --  --    Hyperlipidemia --  --    Hypertension, essential 03/04/2015 --    Hypokalemia 05/07/2019 --    Hypothyroid --  --    Knee, Meniscal Derangement, NEC 01/01/2014 Left Knee Medial Meniscus Tear   LPRD (laryngopharyngeal reflux disease) --  --    Moderate episode of recurrent major depressive disorder 06/22/2017 --    Morbid obesity 03/04/2015 --    Mycobacterium avium complex 08/09/2018 --    Obesity 07/10/2014 07/10/2014   GIUSEPPE (obstructive sleep apnea) 08/09/2018 --    Pulmonary hypertension 08/27/2018 --    Stasis dermatitis of both legs 08/09/2018 --    Type 2 diabetes mellitus with diabetic polyneuropathy, with long-term current use of insulin 06/22/2017 --    Ventral hernia 03/04/2015 --    Vitamin D deficiency 11/13/2013 --    Voice hoarseness --  --          Past Surgical History  Procedure Name Date Notes   carpal tunnel 1997 --    Cesarian Section 1987,1989 --    Hernia 2011 Hernia Repair   Hysterectomy 2007 & 2011 Partial Hysterectomy   Knee surgery 2014 --          Medication List  Name Date Started Instructions   Allergy Relief (fexofenadine) 180 mg oral tablet 07/09/2020 TAKE 1 TABLET BY  "MOUTH TWICE DAILY   amitriptyline 50 mg oral tablet  take 1 tablet (50 mg) by oral route once daily at bedtime   BD Ultra-Fine Short Pen Needle 31 gauge x 5/16\" miscellaneous needle 10/18/2018 use as directed with Immanuel Rothman Ellipta 200-25 mcg/dose inhalation blister with device  inhale 1 puff by inhalation route once daily at the same time each day   Brovana 15 mcg/2 mL inhalation solution for nebulization  inhale 2 milliliters (15 mcg) by inhalation route 2 times per day   bumetanide 2 mg oral tablet  take 1 tablet (2 mg) by oral route 2 times per day   celecoxib 200 mg oral capsule 09/03/2020 TAKE 1 CAPSULE BY MOUTH TWICE DAILY   cholecalciferol (vitamin D3) 1,000 unit oral capsule 11/24/2019 TAKE 1 CAPSULE BY MOUTH ONCE DAILY   cholecalciferol (vitamin D3) 2,000 unit oral capsule 11/24/2019 TAKE 1 CAPSULE BY MOUTH ONCE DAILY FOR 30 DAYS   Coumadin 7.5 mg oral tablet  take 1 tablet (7.5 mg) by oral route once daily   dicyclomine 20 mg oral tablet  take 1 tablet by oral route As needed   EQ FEXOFENADINE 180MG TAB 08/12/2020 Take 1 tablet by mouth twice daily   ezetimibe 10 mg oral tablet  take 1 tablet (10 mg) by oral route once daily   gabapentin 300 mg oral capsule 05/18/2020 take 3 capsules by oral route 3 times a day for 30 days   Humulin R U-500 (Conc) Insulin 500 unit/mL subcutaneous solution  inject by subcutaneous route per instructions. For use with insulin pump   Incruse Ellipta 62.5 mcg/actuation inhalation blister with device  inhale 1 puff (62.5 mcg) by inhalation route once daily at the same time each day   ipratropium-albuterol 0.5 mg-3 mg(2.5 mg base)/3 mL inhalation solution for nebulization  use in nebulizer as directed every 4 hours as needed   levothyroxine 150 mcg oral tablet  take 1 tablet (150 mcg) by oral route once daily   losartan 50 mg oral tablet  take 1 tablet (50 mg) by oral route once daily   metolazone 2.5 mg oral tablet 03/03/2020 TAKE 1 TABLET BY MOUTH ON MONDAY, WEDNESDAY AND " FRIDAY   metoprolol succinate 25 mg oral tablet extended release 24 hr 08/24/2020 TAKE HALF TABLET BY MOUTH AT BEDTIME   montelukast 10 mg oral tablet 12/26/2019 TAKE 1 TABLET BY MOUTH ONCE DAILY IN THE EVENING   multivitamin Oral tablet  take 1 tablet by oral route daily   pantoprazole 40 mg oral tablet,delayed release (DR/EC) 07/23/2020 TAKE 1 TABLET BY MOUTH TWICE DAILY   polyethylene glycol 3350 17 gram/dose oral powder  take 17 gram mixed with 8 oz. water, juice, soda, coffee or tea by oral route once daily   potassium chloride 10 mEq oral capsule, extended release 09/02/2020 TAKE 5 CAPSULES BY MOUTH THREE TIMES DAILY   promethazine 25 mg oral tablet  take 1 tablet (25 mg) by oral route every 6 hours as needed   Pulmicort 0.5 mg/2 mL inhalation suspension for nebulization  inhale 2 milliliters (0.5 mg) by nebulization route 2 times per day   rosuvastatin 40 mg oral tablet  take 1 tablet (40 mg) by oral route once daily   sertraline 100 mg oral tablet 08/24/2020 Take 1 tablet by mouth once daily   spironolactone 100 mg oral tablet 07/20/2020 TAKE 1/2 (ONE-HALF) TABLET BY MOUTH TWICE DAILY   sumatriptan succinate 100 mg oral tablet 08/05/2020 TAKE 1 TABLET BY MOUTH AT ONSET OF MIGRAINE; MAY REPEAT AFTER 2 HOURS IF HEADACHE RETURNS, NOT TO EXCEED 200MG IN 24 HOURS   terbinafine HCl 250 mg oral tablet  take 1 tablet (250 mg) by oral route once daily   trazodone 50 mg oral tablet  half tablet at bedtime   triamcinolone acetonide 0.1 % topical cream 03/03/2020 APPLY A THIN LAYER OF CREAM TOPICALLY TO AFFECTED AREA(S) TWICE DAILY AS NEEDED   Ventolin HFA 90 mcg/actuation inhalation HFA aerosol inhaler  inhale 1 - 2 puffs (90 - 180 mcg) by inhalation route every 6 hours as needed         Allergy List  Allergen Name Date Reaction Notes   metformin 4/20/12 Severe --    damien --  --  --        Allergies Reconciled  Family Medical History  Disease Name Relative/Age Notes   Heart Disease Mother/   Grandparent-nonspecific  "  Diabetes Father/   --    Skin Carcinoma Father/   --          Reproductive History  Menstrual   Last Menstrual Period: 07/10/2014 Certainty of LMP Date: N/A Menopause Status: Postmenopausal   Age Menopause: 40 Method of Birth Control: Other   Pregnancy Summary   Total Pregnancies: 2 Full Term: 2 Premature: 0   Ab Induced: 0 Ab Spontaneous: 0 Ectopics: 0   Multiples: 0 Livin         Social History  Finding Status Start/Stop Quantity Notes   Alcohol Light --/-- occasional 2019 - 10/15/2018 - never drinks    Tobacco Former --/-- 1 pk/day Quit          Immunizations  NameDate Admin Mfg Trade Name Lot Number Route Inj VIS Given VIS Publication   [id number]/22/2019 [facility] Fluzone Quadrivalent [id number] IM LD 2019    Comments: pt tolerated well with no complaints         Review of Systems  · Constitutional  o Admits  o : fatigue  · Eyes  o Denies  o : blurred vision, changes in vision  · HENT  o Denies  o : headaches  · Cardiovascular  o Admits  o : rapid heart rate, dyspnea on exertion, orthopnea, lower extremity edema  o Denies  o : chest pain, irregular heart beats  · Respiratory  o Admits  o : dyspnea on exertion  o Denies  o : shortness of breath, wheezing, cough  · Gastrointestinal  o Denies  o : nausea, vomiting, diarrhea, constipation  · Endocrine  o Admits  o : central obesity      Vitals  Date Time BP Position Site L\R Cuff Size HR RR TEMP (F) WT  HT  BMI kg/m2 BSA m2 O2 Sat FR L/min FiO2 HC       2020 08:46 /72 Sitting    86 - R   478lbs 0oz 5'  2\" 87.43 3.08       2020 12:02 /55 Sitting    91 - R  98.1 500lbs 0oz 5'  2\" 91.45 3.15 90 %  21%    10/09/2020 03:01 /71 Sitting    102 - R  98.1 460lbs 5oz 5'  2\" 84.19 3.02 92 % 2 28%          Physical Examination  · Constitutional  o Appearance  o : well-nourished, well developed, alert, in no acute distress, well-tended appearance  · Head and Face  o Head  o :   § Inspection  § : atraumatic, normocephalic  o Face  o : "   § Inspection  § : no facial lesions  o HEENT  o : Unremarkable  · Eyes  o Conjunctivae  o : conjunctivae normal  o Sclerae  o : sclerae white  o Pupils and Irises  o : pupils equal and round, pupils reactive to light bilaterally  o Eyelids/Ocular Adnexae  o : eyelid appearance normal  · Ears, Nose, Mouth and Throat  o Ears  o :   § External Ears  § : appearance within normal limits, no lesions present  § Otoscopic Examination  § : tympanic membrane appearance within normal limits bilaterally without perforations, mobility normal  o Nose  o :   § External Nose  § : appearance normal  o Oral Cavity  o :   § Oral Mucosa  § : oral mucosa normal  § Lips  § : lip appearance normal  § Teeth  § : normal dentition for age  § Gums  § : gums pink, non-swollen, no bleeding present  § Tongue  § : tongue appearance normal  § Palate  § : hard palate normal, soft palate appearance normal  o Throat  o :   § Oropharynx  § : no inflammation or lesions present, tonsils within normal limits  · Neck  o Inspection/Palpation  o : normal appearance, no masses or tenderness, trachea midline  o Thyroid  o : gland size normal, nontender, no nodules or masses present on palpation  · Respiratory  o Respiratory Effort  o : breathing unlabored  o Auscultation of Lungs  o : normal breath sounds  · Cardiovascular  o Heart  o :   § Auscultation of Heart  § : regular rate, normal rhythm, no murmurs present  · Lymphatic  o Neck  o : no lymphadenopathy           Assessment  · Screening for depression     V79.0/Z13.89  · Need for influenza vaccination     V04.81/Z23  · CHF (congestive heart failure)     428.0/I50.9  this is primarily right sided due to morbid obesity and GIUSEPPE  · Urinary tract infection     599.0/N39.0  · Morbid obesity     278.01/E66.01  her weight is down due to diuresis.   · GIUSEPPE (obstructive sleep apnea)     327.23/G47.33  she wears her Bi-PAP nightly  · Pulmonary hypertension     416.8/I27.20  stable  · Type 2 diabetes mellitus with  diabetic polyneuropathy, with long-term current use of insulin       Type 2 diabetes mellitus with diabetic polyneuropathy     250.60/E11.42  Long term (current) use of insulin     250.60/Z79.4  care per [facility]  · Hepatic vein thrombosis     453.0/I82.0  Warfarin as prescribed    Problems Reconciled  Plan  · Orders  o Annual depression screening, 15 minutes (85975, ) - V79.0/Z13.89 - 10/09/2020  o ACO-18: Positive screen for clinical depression using a standardized tool and a follow-up plan documented () - V79.0/Z13.89 - 10/09/2020  o Immunization Admin Fee (Single) ([facility]) (45635) - V04.81/Z23 - 10/09/2020  o Fluzone Quadrivalent Vaccine, age 6 months + (97275) - V04.81/Z23 - 10/09/2020   Vaccine - Influenza; Dose: 0.5; Site: Left Deltoid; Route: Intramuscular; Date: 10/09/2020 15:13:00; Exp: 06/30/2021; Lot: [id number]; Mfg: sanCloak pasteur; TradeName: Fluzone Quadrivalent; Administered By: Maria D Cheng MA; Comment: PT TOLERATED WELL NDC 89642-809-19  o Discharge medications reconciled with the current medication list (1111F) - - 10/09/2020  o BMP [facility] (41074) - 428.0/I50.9 - 10/09/2020  o Urinalysis with Reflex Microscopy ([facility]) (54111) - 599.0/N39.0 - 10/09/2020  o ACO-39: Current medications updated and reviewed (, 1159F) - - 10/09/2020  o Magnesium, serum (76846) - 428.0/I50.9 - 10/09/2020  · Medications  o celecoxib 200 mg oral capsule   SIG: TAKE 1 CAPSULE BY MOUTH TWICE DAILY   DISP: (180) Capsule with 3 refills  Discontinued on 10/09/2020     o levothyroxine 150 mcg oral tablet   SIG: take 1 tablet (150 mcg) by oral route once daily   DISP: (0) tablet with 0 refills  Discontinued on 10/09/2020     o Medications have been Reconciled  o Transition of Care or Provider Policy  · Instructions  o Depression Screen completed and scanned into the EMR under the designated folder within the patient's documents.  o The provider screening met the required time of 15 minutes.  o Patient is taking  medications as prescribed and doing well.   o Take all medications as prescribed/directed.  o Patient instructed/educated on their diet and exercise program.  o Patient was educated/instructed on their diagnosis, treatment and medications prior to discharge from the clinic today.  o Patient instructed to seek medical attention urgently for new or worsening symptoms.  o Call the office with any concerns or questions.  o Bring all medicines with their bottles to each office visit.  o Patient discharge summary has been reviewed and placed in patient's electronic medical record.  o Patient received a phone call from my office within 2 business days of discharge from inpatient status, and documented within the patient's chart.  o Also patient was seen (face to face) for follow up evaluation within 7 calendar days of discharge from inpatient status for a high complexity issue.  o Patient was educated on their diagnosis, treatment and any medication changes while being evaluated today.  · Disposition  o Call or Return if symptoms worsen or persist.  o Return Visit Request in/on 1 month +/- 2 days (11133).            Electronically Signed by: Maria D Cheng MA -Author on October 9, 2020 04:34:21 PM  Electronically Co-signed by: Francesco Gimenez DO -Reviewer on October 9, 2020 05:37:08 PM

## 2021-05-13 NOTE — PROGRESS NOTES
Progress Note      Patient Name: Joan Partida   Patient ID: [id number]   Sex: Female   YOB: 1970    Primary Care Provider: Francesco Gimenez DO   Referring Provider: Mk Dowling MD    Visit Date: December 14, 2020    Provider: Francesco Gimenez DO   Location: [facility]   Location Address: [address] [place] [address]  [id number]   Location Phone: [phone]          Chief Complaint  · Follow up office visit within 14 calendar days of discharge from inpatient status (moderate complexity).      History Of Present Illness  FOLLOW UP OFFICE VISIT WITHIN 14 CALENDAR DAYS OF INPATIENT STATUS (MODERATE COMPLEXITY)  Joan Partida presents to office for follow up post discharge from inpatient status within 14 calendar days. Patient was contacted within 2 business days via phone conversation. Documentation of that phone contact is present in the patient's electronic chart. Patient was admitted to inpatient facility on 12/03/2020 and discharged 12/12/2020 due to: Cellulitis.   Admitting MD: [facility] Hospitalist   Her discharge summary has been reviewed and placed in the patient's electronic chart.   Patient's problem list is: Abnormal mammogram, Allergy, Arthritis, COPD (chronic obstructive pulmonary disease), Dependent edema, Depression, Dermatitis, Dysphagia, Fibromyalgia, Left Foot pain, GERD (gastroesophageal reflux disease), Hepatic steatosis, Hepatic vein thrombosis, History of tobacco abuse, Hyperlipidemia, Hypertension, essential, Hypokalemia, Hypothyroid, Long term (current) use of anticoagulants, LPRD (laryngopharyngeal reflux disease), Moderate episode of recurrent major depressive disorder, Morbid obesity, Mycobacterium avium complex, Obesity, GIUSEPPE (obstructive sleep apnea), Pulmonary hypertension, Stasis dermatitis of both legs, Type 2 diabetes mellitus with diabetic polyneuropathy, with long-term current use of insulin, Ventral hernia, Vitamin D  "deficiency, and Voice hoarseness   Patient's outpatient medication list has been reconcilled with the medication list from the discharge summary and has been reviewed with the patient. Current medication list is: Allergy Relief (fexofenadine) 180 mg oral tablet, amitriptyline 50 mg oral tablet, BD Ultra-Fine Short Pen Needle 31 gauge x 5/16\" miscellaneous needle, Breo Ellipta 200-25 mcg/dose inhalation blister with device, Brovana 15 mcg/2 mL inhalation solution for nebulization, bumetanide 2 mg oral tablet, cholecalciferol (vitamin D3) 1,000 unit oral capsule, cholecalciferol (vitamin D3) 2,000 unit oral capsule, Coumadin 7.5 mg oral tablet, dicyclomine 20 mg oral tablet, EQ FEXOFENADINE 180MG TAB, ezetimibe 10 mg oral tablet, gabapentin 300 mg oral capsule, Humulin R U-500 (Conc) Insulin 500 unit/mL subcutaneous solution, Incruse Ellipta 62.5 mcg/actuation inhalation blister with device, ipratropium-albuterol 0.5 mg-3 mg(2.5 mg base)/3 mL inhalation solution for nebulization, losartan 50 mg oral tablet, metolazone 2.5 mg oral tablet, metoprolol succinate 25 mg oral tablet extended release 24 hr, montelukast 10 mg oral tablet, multivitamin Oral tablet, pantoprazole 40 mg oral tablet,delayed release (DR/EC), polyethylene glycol 3350 17 gram/dose oral powder, potassium chloride 10 mEq oral capsule, extended release, Probiotic oral, promethazine 25 mg oral tablet, Pulmicort 0.5 mg/2 mL inhalation suspension for nebulization, rosuvastatin 40 mg oral tablet, sertraline 100 mg oral tablet, spironolactone 100 mg oral tablet, sumatriptan succinate 100 mg oral tablet, terbinafine HCl 250 mg oral tablet, tramadol 50 mg oral tablet, trazodone 50 mg oral tablet, triamcinolone acetonide 0.1 % topical cream, and Ventolin HFA 90 mcg/actuation inhalation HFA aerosol inhaler      Since she has been home her left lower leg remains red and painful. Currently she is on Zyvox.       Past Medical History  Disease Name Date Onset Notes " "  Abnormal mammogram 10/21/2013 --    Allergy --  --    Anemia --  --    Arthritis --  --    COPD (chronic obstructive pulmonary disease) 03/04/2015 --    Dependent edema 08/27/2018 --    Depression --  --    Dermatitis 07/10/2014 07/10/2014    Dysphagia --  --    Fibromyalgia --  --    Foot pain 07/10/2014 07/10/2014    GERD (gastroesophageal reflux disease) 10/17/2014 --    Hepatic steatosis 03/04/2015 --    Hepatic vein thrombosis 10/09/2020 --    History of tobacco abuse --  --    Hyperlipidemia --  --    Hypertension, essential 03/04/2015 --    Hypokalemia 05/07/2019 --    Hypothyroid --  --    Knee, Meniscal Derangement, NEC 01/01/2014 Left Knee Medial Meniscus Tear   Long term (current) use of anticoagulants --  --    LPRD (laryngopharyngeal reflux disease) --  --    Moderate episode of recurrent major depressive disorder 06/22/2017 --    Morbid obesity 03/04/2015 --    Mycobacterium avium complex 08/09/2018 --    Obesity 07/10/2014 07/10/2014   GIUSEPPE (obstructive sleep apnea) 08/09/2018 --    Pulmonary hypertension 08/27/2018 --    Stasis dermatitis of both legs 08/09/2018 --    Type 2 diabetes mellitus with diabetic polyneuropathy, with long-term current use of insulin 06/22/2017 --    Ventral hernia 03/04/2015 --    Vitamin D deficiency 11/13/2013 --    Voice hoarseness --  --          Past Surgical History  Procedure Name Date Notes   carpal tunnel 1997 --    Cesarian Section 1987,1989 --    Hernia 2011 Hernia Repair   Hysterectomy 2007 & 2011 Partial Hysterectomy   Knee surgery 2014 --          Medication List  Name Date Started Instructions   Allergy Relief (fexofenadine) 180 mg oral tablet 07/09/2020 TAKE 1 TABLET BY MOUTH TWICE DAILY   amitriptyline 50 mg oral tablet  take 1 tablet (50 mg) by oral route once daily at bedtime   BD Ultra-Fine Short Pen Needle 31 gauge x 5/16\" miscellaneous needle 10/18/2018 use as directed with Victoza   Breo Ellipta 200-25 mcg/dose inhalation blister with device  inhale " 1 puff by inhalation route once daily at the same time each day   Brovana 15 mcg/2 mL inhalation solution for nebulization  inhale 2 milliliters (15 mcg) by inhalation route 2 times per day   bumetanide 2 mg oral tablet  take 1 tablet (2 mg) by oral route 2 times per day   cholecalciferol (vitamin D3) 1,000 unit oral capsule 11/24/2019 TAKE 1 CAPSULE BY MOUTH ONCE DAILY   cholecalciferol (vitamin D3) 2,000 unit oral capsule 11/24/2019 TAKE 1 CAPSULE BY MOUTH ONCE DAILY FOR 30 DAYS   Coumadin 7.5 mg oral tablet  take 1 tablet (7.5 mg) by oral route once daily   dicyclomine 20 mg oral tablet  take 1 tablet by oral route As needed   EQ FEXOFENADINE 180MG TAB 08/12/2020 Take 1 tablet by mouth twice daily   ezetimibe 10 mg oral tablet  take 1 tablet (10 mg) by oral route once daily   gabapentin 300 mg oral capsule 11/30/2020 take 3 capsules by oral route 3 times a day for 30 days   Humulin R U-500 (Conc) Insulin 500 unit/mL subcutaneous solution  inject by subcutaneous route per instructions. For use with insulin pump   Incruse Ellipta 62.5 mcg/actuation inhalation blister with device  inhale 1 puff (62.5 mcg) by inhalation route once daily at the same time each day   ipratropium-albuterol 0.5 mg-3 mg(2.5 mg base)/3 mL inhalation solution for nebulization  use in nebulizer as directed every 4 hours as needed   losartan 50 mg oral tablet  take 1 tablet (50 mg) by oral route once daily   metolazone 2.5 mg oral tablet 03/03/2020 TAKE 1 TABLET BY MOUTH ON MONDAY, WEDNESDAY AND FRIDAY   metoprolol succinate 25 mg oral tablet extended release 24 hr 08/24/2020 TAKE HALF TABLET BY MOUTH AT BEDTIME   montelukast 10 mg oral tablet 12/26/2019 TAKE 1 TABLET BY MOUTH ONCE DAILY IN THE EVENING   multivitamin Oral tablet  take 1 tablet by oral route daily   pantoprazole 40 mg oral tablet,delayed release (DR/EC) 07/23/2020 TAKE 1 TABLET BY MOUTH TWICE DAILY   polyethylene glycol 3350 17 gram/dose oral powder  take 17 gram mixed with 8  oz. water, juice, soda, coffee or tea by oral route once daily   potassium chloride 10 mEq oral capsule, extended release 11/02/2020 TAKE 4 CAPSULES BY MOUTH THREE TIMES DAILY   Probiotic oral  take 1 by oral route daily   promethazine 25 mg oral tablet  take 1 tablet (25 mg) by oral route every 6 hours as needed   Pulmicort 0.5 mg/2 mL inhalation suspension for nebulization  inhale 2 milliliters (0.5 mg) by nebulization route 2 times per day   rosuvastatin 40 mg oral tablet  take 1 tablet (40 mg) by oral route once daily   sertraline 100 mg oral tablet 08/24/2020 Take 1 tablet by mouth once daily   spironolactone 100 mg oral tablet 07/20/2020 TAKE 1/2 (ONE-HALF) TABLET BY MOUTH TWICE DAILY   sumatriptan succinate 100 mg oral tablet 08/05/2020 TAKE 1 TABLET BY MOUTH AT ONSET OF MIGRAINE; MAY REPEAT AFTER 2 HOURS IF HEADACHE RETURNS, NOT TO EXCEED 200MG IN 24 HOURS   terbinafine HCl 250 mg oral tablet  take 1 tablet (250 mg) by oral route once daily   tramadol 50 mg oral tablet 11/24/2020 take 1 tablet (50 mg) by oral route every 6 hours as needed for 10 days   trazodone 50 mg oral tablet  half tablet at bedtime   triamcinolone acetonide 0.1 % topical cream 03/03/2020 APPLY A THIN LAYER OF CREAM TOPICALLY TO AFFECTED AREA(S) TWICE DAILY AS NEEDED   Ventolin HFA 90 mcg/actuation inhalation HFA aerosol inhaler  inhale 1 - 2 puffs (90 - 180 mcg) by inhalation route every 6 hours as needed         Allergy List  Allergen Name Date Reaction Notes   metformin 4/20/12 Severe --    damien --  --  --        Allergies Reconciled  Family Medical History  Disease Name Relative/Age Notes   Heart Disease Mother/   Grandparent-nonspecific   Diabetes Father/   --    Skin Carcinoma Father/   --          Reproductive History  Menstrual   Last Menstrual Period: 07/10/2014 Certainty of LMP Date: N/A Menopause Status: Postmenopausal   Age Menopause: 40 Method of Birth Control: Other   Pregnancy Summary   Total Pregnancies: 2 Full Term: 2  "Premature: 0   Ab Induced: 0 Ab Spontaneous: 0 Ectopics: 0   Multiples: 0 Livin         Social History  Finding Status Start/Stop Quantity Notes   Alcohol Light --/-- occasional 2019 - 10/15/2018 - never drinks    Tobacco Former --/-- 1 pk/day Quit          Immunizations  NameDate Admin Mfg Trade Name Lot Number Route Inj VIS Given VIS Publication   [place]25/2020 PMC Fluzone Quadrivalent [id number] IM LD 10/09/2020 08/15/2019   Comments: PT TOLERATED WELL NDC 14033-621-72         Review of Systems  · Constitutional  o Admits  o : fatigue  o Denies  o : fever, chills  · HENT  o Denies  o : headaches  · Cardiovascular  o Admits  o : dyspnea on exertion, lower extremity edema  o Denies  o : chest pain, irregular heart beats, rapid heart rate  · Integument  o Admits  o : rash, pigmentation changes      Vitals  Date Time BP Position Site L\R Cuff Size HR RR TEMP (F) WT  HT  BMI kg/m2 BSA m2 O2 Sat FR L/min FiO2 HC       10/26/2020 12:30 /56 Sitting    88 - R   490lbs 0oz 5'  2\" 89.62 3.12       2020 10:51 /64 Sitting    96 - R   490lbs 0oz 5'  2\" 89.62 3.12       2020 09:19 /57 Sitting    88 - R  98.1     100 % 4 36%    2020 04:09 /68 Sitting    90 - R  97.7  5'  2\"   96 %  21%          Physical Examination  · Constitutional  o Appearance  o : well-nourished, well developed, no obvious deformities present  · Head and Face  o Head  o :   § Inspection  § : atraumatic, normocephalic  o Face  o :   § Inspection  § : no facial lesions  · Respiratory  o Respiratory Effort  o : breathing unlabored, no accessory muscle use  o Auscultation of Lungs  o : normal breath sounds  · Cardiovascular  o Heart  o :   § Auscultation of Heart  § : regular rate, normal rhythm, no murmurs present  · Skin and Subcutaneous Tissue  o Extremities  o :   § Right Lower Extremity  § : chronic skin changes  § Left Lower Extremity  § : diffuse warmth, redness, and tenderness, Desquamation " feet          Assessment  · Dependent edema     782.3/R60.9  persistent. She is working on weight loss  · Hepatic vein thrombosis     453.0/I82.0  HH machelle an INR today. Her current dosage is 6mg.   · Morbid obesity     278.01/E66.01  she is working on weight loss  · Cellulitis of left lower extremity     682.6/L03.116  there is no culture to rely upon. She has a h/o MRSA therefore the Zyvox. Will have her finish and switch to Doxycycline  · Tinea pedis     110.4/B35.3  will have her keep her intradigital area clean, dry, and applu antifungal cream.       Plan  · Orders  o Discharge medications reconciled with the current medication list (1111F) - - 12/14/2020  · Medications  o Clotrimazole AF 1 % topical cream   SIG: apply to the affected and surrounding areas of skin by topical route 2 times per day in the morning and evening for 30 days   DISP: (1) Tube with 1 refills  Prescribed on 12/14/2020     o doxycycline monohydrate 100 mg oral capsule   SIG: take 1 capsule (100 mg) by oral route 2 times per day for 30 days   DISP: (60) Capsule with 0 refills  Prescribed on 12/14/2020     o Medications have been Reconciled  o Transition of Care or Provider Policy  · Instructions  o Patient discharge summary has been reviewed and placed in patient's electronic medical record.  o Patient received a phone call from my office within 2 business days of discharge from inpatient status, and documented within the patient's chart.  o Also patient was seen (face to face) for follow up evaluation within 14 calendar days of discharge from inpatient status for a severe complexity issue.  o Patient was educated on their diagnosis, treatment and any medication changes while being evaluated today.  · Disposition  o Call or Return if symptoms worsen or persist.  o Return Visit Request in/on 2 weeks +/- 2 days ([id number]).            Electronically Signed by: Francesco Gimenez DO -Author on December 14, 2020 05:27:18 PM

## 2021-05-14 ENCOUNTER — OFFICE VISIT CONVERTED (OUTPATIENT)
Dept: PULMONOLOGY | Facility: CLINIC | Age: 51
End: 2021-05-14
Attending: SPECIALIST

## 2021-05-14 VITALS
DIASTOLIC BLOOD PRESSURE: 55 MMHG | HEART RATE: 91 BPM | SYSTOLIC BLOOD PRESSURE: 135 MMHG | WEIGHT: 293 LBS | BODY MASS INDEX: 53.92 KG/M2 | TEMPERATURE: 98.1 F | OXYGEN SATURATION: 90 % | HEIGHT: 62 IN

## 2021-05-14 VITALS
SYSTOLIC BLOOD PRESSURE: 108 MMHG | HEIGHT: 62 IN | DIASTOLIC BLOOD PRESSURE: 71 MMHG | HEART RATE: 102 BPM | BODY MASS INDEX: 53.92 KG/M2 | TEMPERATURE: 98.1 F | WEIGHT: 293 LBS | OXYGEN SATURATION: 92 %

## 2021-05-14 VITALS
WEIGHT: 293 LBS | HEART RATE: 88 BPM | DIASTOLIC BLOOD PRESSURE: 56 MMHG | BODY MASS INDEX: 53.92 KG/M2 | SYSTOLIC BLOOD PRESSURE: 100 MMHG | HEIGHT: 62 IN

## 2021-05-14 VITALS
OXYGEN SATURATION: 98 % | DIASTOLIC BLOOD PRESSURE: 80 MMHG | WEIGHT: 293 LBS | BODY MASS INDEX: 53.92 KG/M2 | RESPIRATION RATE: 12 BRPM | SYSTOLIC BLOOD PRESSURE: 144 MMHG | HEIGHT: 62 IN | HEART RATE: 103 BPM

## 2021-05-14 VITALS
DIASTOLIC BLOOD PRESSURE: 66 MMHG | OXYGEN SATURATION: 93 % | HEART RATE: 94 BPM | SYSTOLIC BLOOD PRESSURE: 131 MMHG | TEMPERATURE: 97.7 F | HEIGHT: 62 IN

## 2021-05-14 VITALS
OXYGEN SATURATION: 98 % | HEIGHT: 62 IN | DIASTOLIC BLOOD PRESSURE: 69 MMHG | SYSTOLIC BLOOD PRESSURE: 138 MMHG | HEART RATE: 103 BPM | TEMPERATURE: 97.6 F

## 2021-05-14 VITALS
BODY MASS INDEX: 53.92 KG/M2 | HEART RATE: 96 BPM | DIASTOLIC BLOOD PRESSURE: 64 MMHG | SYSTOLIC BLOOD PRESSURE: 132 MMHG | WEIGHT: 293 LBS | HEIGHT: 62 IN

## 2021-05-14 VITALS
SYSTOLIC BLOOD PRESSURE: 118 MMHG | DIASTOLIC BLOOD PRESSURE: 57 MMHG | OXYGEN SATURATION: 100 % | TEMPERATURE: 98.1 F | HEART RATE: 88 BPM

## 2021-05-14 VITALS
WEIGHT: 293 LBS | SYSTOLIC BLOOD PRESSURE: 136 MMHG | HEIGHT: 62 IN | DIASTOLIC BLOOD PRESSURE: 68 MMHG | BODY MASS INDEX: 53.92 KG/M2 | HEART RATE: 98 BPM

## 2021-05-14 VITALS
TEMPERATURE: 97.7 F | DIASTOLIC BLOOD PRESSURE: 68 MMHG | OXYGEN SATURATION: 96 % | HEART RATE: 90 BPM | HEIGHT: 62 IN | SYSTOLIC BLOOD PRESSURE: 113 MMHG

## 2021-05-14 VITALS
HEART RATE: 104 BPM | SYSTOLIC BLOOD PRESSURE: 138 MMHG | BODY MASS INDEX: 53.92 KG/M2 | DIASTOLIC BLOOD PRESSURE: 82 MMHG | HEIGHT: 62 IN | WEIGHT: 293 LBS

## 2021-05-14 VITALS
TEMPERATURE: 97.7 F | SYSTOLIC BLOOD PRESSURE: 130 MMHG | DIASTOLIC BLOOD PRESSURE: 72 MMHG | HEART RATE: 98 BPM | HEIGHT: 62 IN | OXYGEN SATURATION: 99 %

## 2021-05-14 VITALS
SYSTOLIC BLOOD PRESSURE: 134 MMHG | HEART RATE: 95 BPM | HEIGHT: 62 IN | WEIGHT: 293 LBS | BODY MASS INDEX: 53.92 KG/M2 | DIASTOLIC BLOOD PRESSURE: 80 MMHG

## 2021-05-14 VITALS
HEART RATE: 103 BPM | DIASTOLIC BLOOD PRESSURE: 62 MMHG | TEMPERATURE: 97.3 F | OXYGEN SATURATION: 99 % | SYSTOLIC BLOOD PRESSURE: 130 MMHG | HEIGHT: 62 IN

## 2021-05-14 NOTE — PROGRESS NOTES
Progress Note      Patient Name: Joan Partida   Patient ID: [id number]   Sex: Female   YOB: 1970    Primary Care Provider: Francesco Gimenez DO    Visit Date: February 23, 2021    Provider: Deven Myers MD   Location: [facility]   Location Address: [address] [place] [address], [facility]   [place], [place]  [id number]   Location Phone: [phone]          Chief Complaint     Follow-up visit for congestive heart failure.       History Of Present Illness  REFERRING CARE PROVIDER: REFERRING CARE PROVIDER NAME   Joan Partida is a 50 year old /White female with morbid obesity, chronic diastolic and right sided heart failure, diabetes mellitus, obstructive sleep apnea who is here for follow-up visit. Last seen in the office in December 2020 and at that time she was also being treated for cellulitis of the lower extremities. Since then, the dose of diuretics were changed and we had a hard time in keeping up with the potassium in spite of getting significant potassium supplementation. She was seen by Dr. Joaquin, nephrologist, for persistent hypokalemia. Bumex and metolazone were discontinued and she was started on high dose of torsemide and spironolactone with minimal potassium supplementation. Today, she reports feeling better. Pedal edema is not back and her weight is close to her dry weight and she can sleep better with her BiPAP. The cellulitis is improving. Currently not on antibiotics. The most recent hospitalization was 2 months back.   PAST MEDICAL HISTORY: 1) Chronic diastolic and right-sided heart failure, on high-dose antibiotics; 2) Obstructive sleep apnea, on home CPAP; 3) Diabetes mellitus, on insulin pump; 4) Morbid obesity; 5) History of Mycobacterium avium complex infection, completed long-term antibiotic therapy.   PSYCHOSOCIAL HISTORY: Rarely uses alcohol. Previous use of tobacco, but quit.   CURRENT MEDICATIONS: Medication list was reviewed and is as documented.     "  ALLERGIES: Metformin; nickel.       Review of Systems  · Cardiovascular  o Admits  o : palpitations (fast, fluttering, or skipping beats), swelling (feet, ankles, hands), shortness of breath while walking or lying flat  o Denies  o : chest pain or angina pectoris   · Respiratory  o Admits  o : chronic or frequent cough      Vitals  Date Time BP Position Site L\R Cuff Size HR RR TEMP (F) WT  HT  BMI kg/m2 BSA m2 O2 Sat FR L/min FiO2 HC       02/23/2021 01:53 /68 Sitting    98 - R   460lbs 0oz 5'  2\" 84.13 3.02             Physical Examination  · Respiratory  o Auscultation of Lungs  o : Clear to auscultation bilaterally. No crackles or rhonchi.  · Cardiovascular  o Heart  o : S1, S2 is normally heard. No S3. No murmur, rubs, or gallops.  · Gastrointestinal  o Abdominal Examination  o : Soft, nontender, nondistended. No free fluid. Bowel sounds heard in all four quadrants.  · Extremities  o Extremities  o : 1+ pitting pedal edema bilaterally. Distal pulses are not palpable due to edema. Chronic venous stasis changes present.   · Labs  o Labs  o : The most recent available labs on 02/12/2021 showed BUN of 20, creatinine was 0.82, sodium is 137, potassium is 3.1, chloride is 87, bicarb is 36, total protein was 7.9, albumin was 3.9, globulin was 3.0, magnesium was 1.87, AST is 30, ALT is 46, TSH is 1.79.           Assessment     1.  Chronic diastolic heart failure/right sided heart failure. Diuretics were changed recently at the nephrology office due to persistent hyperkalemia. Her weight is close to her dry weight. We will get the latest labs including electrolytes from Dr. Joaquin's office. We will continue diuretics per nephrology with potassium supplementation.   2.  Hypertension, continue metoprolol. ARBs can be added as a next agent if needed, especially since the patient has diabetes mellitus.  3.  Follow-up in 6 months or earlier if needed.           Deven Myers MD  JV/vh               Electronically " Signed by: Cristal Garland-, OT -Author on March 4, 2021 09:14:44 AM  Electronically Co-signed by: Deven Myers MD -Reviewer on March 7, 2021 05:42:49 PM

## 2021-05-14 NOTE — PROGRESS NOTES
Progress Note      Patient Name: Joan Partida   Patient ID: [id number]   Sex: Female   YOB: 1970    Primary Care Provider: Francesco Gimenez DO    Visit Date: January 28, 2021    Provider: Francesco Gimenez DO   Location: [facility], [place]   Location Address: [address] [place] [address]  [id number]   Location Phone: [phone]          Chief Complaint  · 4 week follow up - Dminished pulses in lower extremitys, pain of lower left extremity, yeast dermatitis       History Of Present Illness  Joan Partida is a 50 year old /White female who presents for evaluation and treatment of: PVD. She had her arterial doppler of her upper extremities. She had mild decrease in her left compared to her right suggestive of mild, non-hemodynamic disease.      Hypokalemia- Her K+ continues to be low. She is currently up to 320mEq of K+ daily. Dr. Myers has stoped all of her diuretics except for the Spironolactone.      CHF- she has had increased weight gain with increased sob and edema.       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/09/2020 --    History of tobacco abuse --  --    Hyperlipidemia --  --    Hypertension, essential 03/04/2015 --    Hypokalemia 05/07/2019 --    Hypothyroid --  --    Knee, Meniscal Derangement, NEC 01/01/2014 Left Knee Medial Meniscus Tear   Long term (current) use of anticoagulants --  --    LPRD (laryngopharyngeal reflux disease) --  --    Moderate episode of recurrent major depressive  "disorder 06/22/2017 --    Morbid obesity 03/04/2015 --    Mycobacterium avium complex 08/09/2018 --    Obesity 07/10/2014 07/10/2014   GIUSEPPE (obstructive sleep apnea) 08/09/2018 --    Pulmonary hypertension 08/27/2018 --    Stasis dermatitis of both legs 08/09/2018 --    Type 2 diabetes mellitus with diabetic polyneuropathy, with long-term current use of insulin 06/22/2017 --    Ventral hernia 03/04/2015 --    Vitamin D deficiency 11/13/2013 --    Voice hoarseness --  --          Past Surgical History  Procedure Name Date Notes   Cardiac Catherization 4/2018 --    carpal tunnel 1997 --    Cesarian Section 1987,1989 --    Colonoscopy 2014 --    EGD 2014 --    Hernia 2011 Hernia Repair   Hysterectomy 2007 & 2011 Partial Hysterectomy   Knee surgery 2014 --          Medication List  Name Date Started Instructions   Allergy Relief (fexofenadine) 180 mg oral tablet 07/09/2020 TAKE 1 TABLET BY MOUTH TWICE DAILY   Aquaphor topical ointment 01/20/2021 apply to affected area by external route 2 times a day for 30 days   BD Ultra-Fine Short Pen Needle 31 gauge x 5/16\" miscellaneous needle 10/18/2018 use as directed with Victoza   Breo Ellipta 200-25 mcg/dose inhalation blister with device  inhale 1 puff by inhalation route once daily at the same time each day   Brovana 15 mcg/2 mL inhalation solution for nebulization  inhale 2 milliliters (15 mcg) by inhalation route 2 times per day   bumetanide 2 mg oral tablet  take 1 tablet (2 mg) by oral route 2 times per day   cholecalciferol (vitamin D3) 1,000 unit oral capsule 11/24/2019 TAKE 1 CAPSULE BY MOUTH ONCE DAILY   cholecalciferol (vitamin D3) 2,000 unit oral capsule 11/24/2019 TAKE 1 CAPSULE BY MOUTH ONCE DAILY FOR 30 DAYS   clotrimazole 1 % topical cream 12/31/2020 apply to the affected and surrounding areas of skin by topical route 2 times per day in the morning and evening for 14 days   Clotrimazole AF 1 % topical cream 12/14/2020 apply to the affected and surrounding areas " of skin by topical route 2 times per day in the morning and evening for 30 days   Coumadin 7.5 mg oral tablet  take 1 tablet (7.5 mg) by oral route once daily   dicyclomine 20 mg oral tablet  take 1 tablet by oral route As needed   doxycycline monohydrate 100 mg oral capsule 12/14/2020 take 1 capsule (100 mg) by oral route 2 times per day for 30 days   EQ FEXOFENADINE 180MG TAB 08/12/2020 Take 1 tablet by mouth twice daily   eszopiclone 1 mg oral tablet  take 1 tablet by oral route daily   ezetimibe 10 mg oral tablet  take 1 tablet (10 mg) by oral route once daily   ferrous sulfate 325 mg (65 mg iron) oral tablet 01/19/2021 take 1 tablet (325 mg) by oral route 2 times per day for 30 days   fluconazole 150 mg oral tablet 12/31/2020 take 1 tablet (150 mg) by oral route once for 1 day   gabapentin 300 mg oral capsule 11/30/2020 take 3 capsules by oral route 3 times a day for 30 days   Humulin R U-500 (Conc) Insulin 500 unit/mL subcutaneous solution  inject by subcutaneous route per instructions. For use with insulin pump   Incruse Ellipta 62.5 mcg/actuation inhalation blister with device  inhale 1 puff (62.5 mcg) by inhalation route once daily at the same time each day   ipratropium-albuterol 0.5 mg-3 mg(2.5 mg base)/3 mL inhalation solution for nebulization  use in nebulizer as directed every 4 hours as needed   levothyroxine 125 mcg oral tablet  take 1 tablet (125 mcg) by oral route once daily   losartan 50 mg oral tablet  take 1 tablet (50 mg) by oral route once daily   magnesium 250 mg oral tablet  take 2 tablets by oral route daily   metolazone 2.5 mg oral tablet 03/03/2020 TAKE 1 TABLET BY MOUTH ON MONDAY, WEDNESDAY AND FRIDAY   metolazone 5 mg oral tablet 01/28/2021 Take 1 tablet by mouth every other day   metoprolol succinate 25 mg oral tablet extended release 24 hr 01/14/2021 TAKE 2 TABLET BY MOUTH TWICE DAILY   montelukast 10 mg oral tablet 12/26/2019 TAKE 1 TABLET BY MOUTH ONCE DAILY IN THE EVENING    multivitamin Oral tablet  take 1 tablet by oral route daily   pantoprazole 40 mg oral tablet,delayed release (DR/EC) 07/23/2020 TAKE 1 TABLET BY MOUTH TWICE DAILY   polyethylene glycol 3350 17 gram/dose oral powder  take 17 gram mixed with 8 oz. water, juice, soda, coffee or tea by oral route once daily   potassium chloride 20 mEq oral tablet extended release 01/19/2021 take 4 tablets by oral route 4 times a day for 30 days   promethazine 25 mg oral tablet 01/20/2021 take 1 tablet (25 mg) by oral route every 6 hours as needed for 30 days   Pulmicort 0.5 mg/2 mL inhalation suspension for nebulization  inhale 2 milliliters (0.5 mg) by nebulization route 2 times per day   rosuvastatin 40 mg oral tablet  take 1 tablet (40 mg) by oral route once daily   sertraline 100 mg oral tablet 08/24/2020 Take 1 tablet by mouth once daily   spironolactone 100 mg oral tablet 07/20/2020 TAKE 1/2 (ONE-HALF) TABLET BY MOUTH TWICE DAILY   SUMATRIPTAN 100MG TABLETS 01/04/2021 TAKE 1 TABLET BY MOUTH AT ONSET OF MIGRAINE; MAY REPEAT AFTER 2 HOURS IF HEADACHE RETURNS, NOT TO EXCEED 200MG IN 24 HOURS   sumatriptan succinate 100 mg oral tablet 08/05/2020 TAKE 1 TABLET BY MOUTH AT ONSET OF MIGRAINE; MAY REPEAT AFTER 2 HOURS IF HEADACHE RETURNS, NOT TO EXCEED 200MG IN 24 HOURS   tramadol 50 mg oral tablet 01/07/2021 take 1 tablet (50 mg) by oral route every 6 hours as needed for 30 days   trazodone 50 mg oral tablet  half tablet at bedtime   triamcinolone acetonide 0.1 % topical cream 03/03/2020 APPLY A THIN LAYER OF CREAM TOPICALLY TO AFFECTED AREA(S) TWICE DAILY AS NEEDED   Ventolin HFA 90 mcg/actuation inhalation HFA aerosol inhaler  inhale 1 - 2 puffs (90 - 180 mcg) by inhalation route every 6 hours as needed   warfarin 5 mg oral tablet 01/12/2021 take 1 tablet (5 mg) by oral route once daily as directed         Allergy List  Allergen Name Date Reaction Notes   metformin 4/20/12 Severe --    damien --  --  --          Family Medical  "History  Disease Name Relative/Age Notes   Heart Disease Mother/   Grandparent-nonspecific   - No Family History of Colorectal Cancer  --    Diabetes Father/   --    Skin Carcinoma Father/   --          Reproductive History  Menstrual   Last Menstrual Period: 07/10/2014 Certainty of LMP Date: N/A Menopause Status: Postmenopausal   Age Menopause: 40 Method of Birth Control: Other   Pregnancy Summary   Total Pregnancies: 2 Full Term: 2 Premature: 0   Ab Induced: 0 Ab Spontaneous: 0 Ectopics: 0   Multiples: 0 Livin         Social History  Finding Status Start/Stop Quantity Notes   Alcohol Former --/-- occasional 2019 - 10/15/2018 - never drinks    Tobacco Former --/-- 1 pk/day Quit          Immunizations  NameDate Admin Mfg Trade Name Lot Number Route Inj VIS Given VIS Publication   Nefzpskpu28/2020 PMC Fluzone Quadrivalent [id number] IM LD 10/09/2020 08/15/2019   Comments: PT TOLERATED WELL NDC 29845-128-22         Review of Systems  · Constitutional  o Admits  o : fatigue, weight gain  o Denies  o : night sweats  · Cardiovascular  o Admits  o : dyspnea on exertion, orthopnea, lower extremity edema  o Denies  o : chest pain, irregular heart beats, rapid heart rate  · Respiratory  o Admits  o : shortness of breath, wheezing, cough, dyspnea on exertion      Vitals  Date Time BP Position Site L\R Cuff Size HR RR TEMP (F) WT  HT  BMI kg/m2 BSA m2 O2 Sat FR L/min FiO2 HC       2020 02:57 /62 Sitting    103 - R  97.3  5'  2\"   99 % 4 36%    2021 01:51 /80 Sitting    103 - R 12  449lbs 16oz 5'  2\" 82.31 2.99 98 %      2021 03:22 /66 Sitting    94 - R  97.7  5'  2\"   93 % 4 36%          Physical Examination  · Constitutional  o Appearance  o : well-nourished, well developed, no obvious deformities present  · Head and Face  o Head  o :   § Inspection  § : atraumatic, normocephalic  o Face  o :   § Inspection  § : no facial lesions  · Respiratory  o Respiratory Effort  o : " breathing unlabored, no accessory muscle use  o Auscultation of Lungs  o : normal breath sounds  · Cardiovascular  o Peripheral Vascular System  o :   § Extremities  § : 2+ edema present           Assessment  · CHF (congestive heart failure)     428.0/I50.9  Will await her labs tomorrow, but probably needs her BUmex and Spironolactone  · Hypokalemia     276.8/E87.6  will re-check tomorrow plus Mg+. She needs to fluid restrict and reduce her Sodium intake. She does have an appointment with Nephrology  · Morbid obesity     278.01/E66.01  worse due increased fluid retention due to holding diuretics.       Plan  · Orders  o [facility] (84878) - 428.0/I50.9, 276.8/E87.6 - 01/28/2021  o ACO-39: Current medications updated and reviewed (1159F, ) - - 01/28/2021  o Magnesium, serum (27990) - 428.0/I50.9, 276.8/E87.6 - 01/28/2021  · Medications  o metoprolol succinate 25 mg oral tablet extended release 24 hr   SIG: take 1 tablet by oral route 2 times a day for 999 days   DISP: (999) Tablet with 0 refills  Adjusted on 01/28/2021     o Clotrimazole AF 1 % topical cream   SIG: apply to the affected and surrounding areas of skin by topical route 2 times per day in the morning and evening for 30 days   DISP: (1) Tube with 1 refills  Discontinued on 01/28/2021     o dicyclomine 20 mg oral tablet   SIG: take 1 tablet by oral route As needed   DISP: (0) tablet with 0 refills  Discontinued on 01/28/2021     o doxycycline monohydrate 100 mg oral capsule   SIG: take 1 capsule (100 mg) by oral route 2 times per day for 30 days   DISP: (60) Capsule with 0 refills  Discontinued on 01/28/2021     o EQ FEXOFENADINE 180MG TAB   SIG: Take 1 tablet by mouth twice daily   DISP: (60) Tablet with -1 refills  Discontinued on 01/28/2021     o fluconazole 150 mg oral tablet   SIG: take 1 tablet (150 mg) by oral route once for 1 day   DISP: (1) Tablet with 0 refills  Discontinued on 01/28/2021     o losartan 50 mg oral tablet   SIG: take 1 tablet  (50 mg) by oral route once daily   DISP: (0) tablet with 0 refills  Discontinued on 01/28/2021     o sumatriptan succinate 100 mg oral tablet   SIG: TAKE 1 TABLET BY MOUTH AT ONSET OF MIGRAINE; MAY REPEAT AFTER 2 HOURS IF HEADACHE RETURNS, NOT TO EXCEED 200MG IN 24 HOURS   DISP: (9) Each with 0 refills  Discontinued on 01/28/2021     o Medications have been Reconciled  o Transition of Care or Provider Policy  · Instructions  o Patient is taking medications as prescribed and doing well.   o Take all medications as prescribed/directed.  o Patient instructed/educated on their diet and exercise program.  o Patient was educated/instructed on their diagnosis, treatment and medications prior to discharge from the clinic today.  o Patient instructed to seek medical attention urgently for new or worsening symptoms.  o Call the office with any concerns or questions.  o Bring all medicines with their bottles to each office visit.  · Disposition  o Call or Return if symptoms worsen or persist.  o Return Visit Request in/on 1 month +/- 2 days ([id number]).            Electronically Signed by: Francesco Gimenez DO -Author on January 28, 2021 04:12:49 PM

## 2021-05-14 NOTE — PROGRESS NOTES
Progress Note      Patient Name: Joan Partida   Patient ID: [id number]   Sex: Female   YOB: 1970    Primary Care Provider: Francesco Gimenez DO    Visit Date: March 4, 2021    Provider: Francesco Gimenez DO   Location: [facility], [place]   Location Address: [address] [place] [address]  [id number]   Location Phone: [phone]          Chief Complaint  · 1 month follow up - CHF, Morbid obesity, Hypokalemia, sinuitis      History Of Present Illness  Joan Partida is a 50 year old /White female who presents for evaluation and treatment of: CHF. She has seen Nephrology since our last visit and he had changed her diuretics. She is now on Spironolactone and Torsemide and NO Zaroxlyn or Bumex. Since then she has developed N/V and diarrhea. Her recent K+ was WNL.      MOrbid Obesity- her weight remains grossly unchanged.       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/09/2020 --    History of tobacco abuse --  --    Hyperlipidemia --  --    Hypertension, essential 03/04/2015 --    Hypokalemia 05/07/2019 --    Hypothyroid --  --    Knee, Meniscal Derangement, NEC 01/01/2014 Left Knee Medial Meniscus Tear   Long term (current) use of anticoagulants --  --    LPRD (laryngopharyngeal reflux disease) --  --    Moderate episode of recurrent major depressive disorder 06/22/2017 --    Morbid obesity 03/04/2015 --    Mycobacterium avium complex 08/09/2018 --    Obesity 07/10/2014 07/10/2014   GIUSEPPE (obstructive sleep apnea)  "08/09/2018 --    Pulmonary hypertension 08/27/2018 --    Stasis dermatitis of both legs 08/09/2018 --    Type 2 diabetes mellitus with diabetic polyneuropathy, with long-term current use of insulin 06/22/2017 --    Ventral hernia 03/04/2015 --    Vitamin D deficiency 11/13/2013 --    Voice hoarseness --  --          Past Surgical History  Procedure Name Date Notes   Cardiac Catherization 4/2018 --    carpal tunnel 1997 --    Cesarian Section 1987,1989 --    Colonoscopy 2014 --    EGD 2014 --    Hernia 2011 Hernia Repair   Hysterectomy 2007 & 2011 Partial Hysterectomy   Knee surgery 2014 --          Medication List  Name Date Started Instructions   Allergy Relief (fexofenadine) 180 mg oral tablet 07/09/2020 TAKE 1 TABLET BY MOUTH TWICE DAILY   Aquaphor topical ointment 01/20/2021 apply to affected area by external route 2 times a day for 30 days   BD Ultra-Fine Short Pen Needle 31 gauge x 5/16\" miscellaneous needle 10/18/2018 use as directed with Victoza   Breo Ellipta 200-25 mcg/dose inhalation blister with device  inhale 1 puff by inhalation route once daily at the same time each day   Brovana 15 mcg/2 mL inhalation solution for nebulization  inhale 2 milliliters (15 mcg) by inhalation route 2 times per day   clotrimazole 1 % topical cream 12/31/2020 apply to the affected and surrounding areas of skin by topical route 2 times per day in the morning and evening for 14 days   Coumadin 7.5 mg oral tablet  take 1 tablet (7.5 mg) by oral route once daily   eszopiclone 1 mg oral tablet  take 1 tablet by oral route daily   ezetimibe 10 mg oral tablet  take 1 tablet (10 mg) by oral route once daily   ferrous sulfate 325 mg (65 mg iron) oral tablet 01/19/2021 take 1 tablet (325 mg) by oral route 2 times per day for 30 days   fluconazole 150 mg oral tablet 02/19/2021 take 1 tablet (150 mg) by oral route once for 1 day   gabapentin 300 mg oral capsule 11/30/2020 take 3 capsules by oral route 3 times a day for 30 days "   Humulin R U-500 (Conc) Insulin 500 unit/mL subcutaneous solution  inject by subcutaneous route per instructions. For use with insulin pump   Incruse Ellipta 62.5 mcg/actuation inhalation blister with device  inhale 1 puff (62.5 mcg) by inhalation route once daily at the same time each day   ipratropium-albuterol 0.5 mg-3 mg(2.5 mg base)/3 mL inhalation solution for nebulization  use in nebulizer as directed every 4 hours as needed   levothyroxine 200 mcg oral tablet  take 1 tablet (200 mcg) by oral route once daily   magnesium 250 mg oral tablet  take 2 tablets by oral route daily   metoprolol succinate 25 mg oral tablet extended release 24 hr 01/28/2021 take 1 tablet by oral route 2 times a day for 999 days   montelukast 10 mg oral tablet 12/26/2019 TAKE 1 TABLET BY MOUTH ONCE DAILY IN THE EVENING   multivitamin Oral tablet  take 1 tablet by oral route daily   pantoprazole 40 mg oral tablet,delayed release (DR/EC) 07/23/2020 TAKE 1 TABLET BY MOUTH TWICE DAILY   polyethylene glycol 3350 17 gram/dose oral powder  take 17 gram mixed with 8 oz. water, juice, soda, coffee or tea by oral route once daily   potassium chloride 10 mEq oral tablet extended release  take 2 tablets (20 meq) by oral route 2 times per day with food   promethazine 25 mg oral tablet 01/20/2021 take 1 tablet (25 mg) by oral route every 6 hours as needed for 30 days   Pulmicort 0.5 mg/2 mL inhalation suspension for nebulization  inhale 2 milliliters (0.5 mg) by nebulization route 2 times per day   rosuvastatin 40 mg oral tablet  take 1 tablet (40 mg) by oral route once daily   sertraline 100 mg oral tablet 08/24/2020 Take 1 tablet by mouth once daily   spironolactone 100 mg oral tablet  take 1 tablet (100 mg) by oral route 3 times per day   torsemide 100 mg oral tablet  take 1 tablet by oral route 3 times a day   tramadol 50 mg oral tablet 01/07/2021 take 1 tablet (50 mg) by oral route every 6 hours as needed for 30 days   trazodone 50 mg oral  tablet  half tablet at bedtime   triamcinolone acetonide 0.1 % topical cream 2020 APPLY A THIN LAYER OF CREAM TOPICALLY TO AFFECTED AREA(S) TWICE DAILY AS NEEDED   Ventolin HFA 90 mcg/actuation inhalation HFA aerosol inhaler  inhale 1 - 2 puffs (90 - 180 mcg) by inhalation route every 6 hours as needed   Vitamin D3 125 mcg (5,000 unit) oral tablet  take 1 tablet by oral route daily   warfarin 2 mg oral tablet 2021 take 1 tablet (2 mg) by oral route once daily as directed   warfarin 5 mg oral tablet 2021 take 1 tablet (5 mg) by oral route once daily as directed         Allergy List  Allergen Name Date Reaction Notes   metformin 12 Severe --    damien --  --  --        Allergies Reconciled  Family Medical History  Disease Name Relative/Age Notes   Heart Disease Mother/   Grandparent-nonspecific   - No Family History of Colorectal Cancer  --    Diabetes Father/   --    Skin Carcinoma Father/   --          Reproductive History  Menstrual   Last Menstrual Period: 07/10/2014 Certainty of LMP Date: N/A Menopause Status: Postmenopausal   Age Menopause: 40 Method of Birth Control: Other   Pregnancy Summary   Total Pregnancies: 2 Full Term: 2 Premature: 0   Ab Induced: 0 Ab Spontaneous: 0 Ectopics: 0   Multiples: 0 Livin         Social History  Finding Status Start/Stop Quantity Notes   Alcohol Former --/-- occasional 2019 - 10/15/2018 - never drinks    Tobacco Former --/-- 1 pk/day Quit          Immunizations  NameDate Admin Mfg Trade Name Lot Number Route Inj VIS Given VIS Publication   Qazrcjtll09/2020 PMC Fluzone Quadrivalent [id number] LD 10/09/2020 08/15/2019   Comments: PT TOLERATED WELL NDC 64045-855-89         Review of Systems  · Constitutional  o Admits  o : fatigue  o Denies  o : weight loss  · HENT  o Denies  o : headaches  · Cardiovascular  o Admits  o : dyspnea on exertion, lower extremity edema  o Denies  o : chest pain, irregular heart beats, rapid heart  "rate  · Respiratory  o Admits  o : cough  o Denies  o : shortness of breath, wheezing  · Gastrointestinal  o Admits  o : nausea, vomiting, diarrhea      Vitals  Date Time BP Position Site L\R Cuff Size HR RR TEMP (F) WT  HT  BMI kg/m2 BSA m2 O2 Sat FR L/min FiO2 HC       02/19/2021 02:04 /80 Sitting    95 - R   462lbs 0oz 5'  2\" 84.5 3.03       02/23/2021 01:53 /68 Sitting    98 - R   460lbs 0oz 5'  2\" 84.13 3.02       03/04/2021 01:41 /72 Sitting    98 - R  97.7  5'  2\"   99 % 2 28%          Physical Examination  · Constitutional  o Appearance  o : well-nourished, well developed, no obvious deformities present  · Head and Face  o Head  o :   § Inspection  § : atraumatic, normocephalic  o Face  o :   § Inspection  § : no facial lesions  · Respiratory  o Respiratory Effort  o : breathing unlabored, no accessory muscle use  o Auscultation of Lungs  o : normal breath sounds  · Cardiovascular  o Heart  o :   § Auscultation of Heart  § : regular rate, normal rhythm, no murmurs present          Assessment  · Hepatic vein thrombosis     453.0/I82.0  continue anti-coagulant  · Hypertension, essential     401.9/I10  good control  · Hypokalemia     276.8/E87.6  improved with change of diuretics  · Morbid obesity     278.01/E66.01  her weight is unchanged. She needs to work harder on her caloric intake.       Plan  · Orders  o ACO-39: Current medications updated and reviewed (, 1159F) - - 03/04/2021  · Medications  o promethazine 25 mg oral tablet   SIG: take 1 tablet (25 mg) by oral route every 6 hours as needed for 30 days   DISP: (30) Tablet with 2 refills  Adjusted on 03/04/2021     o Medications have been Reconciled  o Transition of Care or Provider Policy  · Instructions  o Patient is taking medications as prescribed and doing well.   o Take all medications as prescribed/directed.  o Patient instructed/educated on their diet and exercise program.  o Patient was educated/instructed on their " diagnosis, treatment and medications prior to discharge from the clinic today.  o Patient instructed to seek medical attention urgently for new or worsening symptoms.  o Call the office with any concerns or questions.  o Bring all medicines with their bottles to each office visit.  · Disposition  o Call or Return if symptoms worsen or persist.  o Return Visit Request in/on 4 weeks +/- 2 days ([id number]).            Electronically Signed by: Francesco Gimenez, DO -Author on March 4, 2021 02:15:00 PM

## 2021-05-14 NOTE — PROGRESS NOTES
Progress Note      Patient Name: Joan Partida   Patient ID: [id number]   Sex: Female   YOB: 1970    Primary Care Provider: Francesco Gimenez DO    Visit Date: April 6, 2021    Provider: Francesco Gimenez DO   Location: [facility]   Location Address: [address] [place] [address]  [id number]   Location Phone: [phone]          Chief Complaint  · 4 week follow up - HTN, Obesity, Hepatic vein thrombosis, Hypokalemia, Morbid Obesity       History Of Present Illness  Joan Partida is a 50 year old /White female who presents for evaluation and treatment of: HTN. SHe is checking her BP at home and 130-140/70-80. SHe is taking all her meds as prescribed.      COPD- Her breathing is worse with allergy season.      CHF- She has had weight gain due to increased sodium intake.      Morbid obesity- Her weight remains poor. She is limited by what her  will cook or gets to eat out.       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/09/2020 --    History of tobacco abuse --  --    Hyperlipidemia --  --    Hypertension, essential 03/04/2015 --    Hypokalemia 05/07/2019 --    Hypothyroid --  --    Knee, Meniscal Derangement, NEC 01/01/2014 Left Knee Medial Meniscus Tear   Long term (current) use of anticoagulants --  --    LPRD (laryngopharyngeal reflux disease) --  --    Moderate episode of recurrent major depressive disorder 06/22/2017 --    Morbid obesity 03/04/2015 --    Mycobacterium avium complex 08/09/2018  "--    Obesity 07/10/2014 07/10/2014   GIUSEPPE (obstructive sleep apnea) 08/09/2018 --    Pulmonary hypertension 08/27/2018 --    Stasis dermatitis of both legs 08/09/2018 --    Type 2 diabetes mellitus with diabetic polyneuropathy, with long-term current use of insulin 06/22/2017 --    Ventral hernia 03/04/2015 --    Vitamin D deficiency 11/13/2013 --    Voice hoarseness --  --          Past Surgical History  Procedure Name Date Notes   Cardiac Catherization 4/2018 --    carpal tunnel 1997 --    Cesarian Section 1987,1989 --    Colonoscopy 2014 --    EGD 2014 --    Hernia 2011 Hernia Repair   Hysterectomy 2007 & 2011 Partial Hysterectomy   Knee surgery 2014 --          Medication List  Name Date Started Instructions   Allergy Relief (fexofenadine) 180 mg oral tablet 07/09/2020 TAKE 1 TABLET BY MOUTH TWICE DAILY   Aquaphor topical ointment 01/20/2021 apply to affected area by external route 2 times a day for 30 days   BD Ultra-Fine Short Pen Needle 31 gauge x 5/16\" miscellaneous needle 10/18/2018 use as directed with Victoza   Breo Ellipta 200-25 mcg/dose inhalation blister with device  inhale 1 puff by inhalation route once daily at the same time each day   Brovana 15 mcg/2 mL inhalation solution for nebulization  inhale 2 milliliters (15 mcg) by inhalation route 2 times per day   clotrimazole 1 % topical cream 12/31/2020 apply to the affected and surrounding areas of skin by topical route 2 times per day in the morning and evening for 14 days   Coumadin 7.5 mg oral tablet  take 1 tablet (7.5 mg) by oral route once daily   eszopiclone 1 mg oral tablet  take 1 tablet by oral route daily   ezetimibe 10 mg oral tablet  take 1 tablet (10 mg) by oral route once daily   ferrous sulfate 325 mg (65 mg iron) oral tablet 01/19/2021 take 1 tablet (325 mg) by oral route 2 times per day for 30 days   fluconazole 150 mg oral tablet 02/19/2021 take 1 tablet (150 mg) by oral route once for 1 day   gabapentin 300 mg oral capsule " 11/30/2020 take 3 capsules by oral route 3 times a day for 30 days   Humulin R U-500 (Conc) Insulin 500 unit/mL subcutaneous solution  inject by subcutaneous route per instructions. For use with insulin pump   Incruse Ellipta 62.5 mcg/actuation inhalation blister with device  inhale 1 puff (62.5 mcg) by inhalation route once daily at the same time each day   ipratropium-albuterol 0.5 mg-3 mg(2.5 mg base)/3 mL inhalation solution for nebulization  use in nebulizer as directed every 4 hours as needed   levothyroxine 200 mcg oral tablet  take 1 tablet (200 mcg) by oral route once daily   magnesium 250 mg oral tablet  take 2 tablets by oral route daily   metoprolol succinate 25 mg oral tablet extended release 24 hr 01/28/2021 take 1 tablet by oral route 2 times a day for 999 days   montelukast 10 mg oral tablet 12/26/2019 TAKE 1 TABLET BY MOUTH ONCE DAILY IN THE EVENING   multivitamin Oral tablet  take 1 tablet by oral route daily   pantoprazole 40 mg oral tablet,delayed release (DR/EC) 07/23/2020 TAKE 1 TABLET BY MOUTH TWICE DAILY   polyethylene glycol 3350 17 gram/dose oral powder  take 17 gram mixed with 8 oz. water, juice, soda, coffee or tea by oral route once daily   potassium chloride 10 mEq oral tablet extended release  take 2 tablets (20 meq) by oral route 2 times per day with food   promethazine 25 mg oral tablet 03/04/2021 take 1 tablet (25 mg) by oral route every 6 hours as needed for 30 days   Pulmicort 0.5 mg/2 mL inhalation suspension for nebulization  inhale 2 milliliters (0.5 mg) by nebulization route 2 times per day   rosuvastatin 40 mg oral tablet  take 1 tablet (40 mg) by oral route once daily   sertraline 100 mg oral tablet 08/24/2020 Take 1 tablet by mouth once daily   spironolactone 100 mg oral tablet  take 1 tablet (100 mg) by oral route 3 times per day   torsemide 100 mg oral tablet  take 1 tablet by oral route 3 times a day   tramadol 50 mg oral tablet 01/07/2021 take 1 tablet (50 mg) by oral  route every 6 hours as needed for 30 days   trazodone 50 mg oral tablet  half tablet at bedtime   triamcinolone acetonide 0.1 % topical cream 2020 APPLY A THIN LAYER OF CREAM TOPICALLY TO AFFECTED AREA(S) TWICE DAILY AS NEEDED   Ventolin HFA 90 mcg/actuation inhalation HFA aerosol inhaler  inhale 1 - 2 puffs (90 - 180 mcg) by inhalation route every 6 hours as needed   Vitamin D3 125 mcg (5,000 unit) oral tablet  take 1 tablet by oral route daily   warfarin 2 mg oral tablet 2021 take 1 tablet (2 mg) by oral route once daily as directed   warfarin 5 mg oral tablet 2021 take 1 tablet (5 mg) by oral route once daily as directed         Allergy List  Allergen Name Date Reaction Notes   metformin 12 Severe --    damien --  --  --        Allergies Reconciled  Family Medical History  Disease Name Relative/Age Notes   Heart Disease Mother/   Grandparent-nonspecific   - No Family History of Colorectal Cancer  --    Diabetes Father/   --    Skin Carcinoma Father/   --          Reproductive History  Menstrual   Last Menstrual Period: 07/10/2014 Certainty of LMP Date: N/A Menopause Status: Postmenopausal   Age Menopause: 40 Method of Birth Control: Other   Pregnancy Summary   Total Pregnancies: 2 Full Term: 2 Premature: 0   Ab Induced: 0 Ab Spontaneous: 0 Ectopics: 0   Multiples: 0 Livin         Social History  Finding Status Start/Stop Quantity Notes   Alcohol Former --/-- occasional 2019 - 10/15/2018 - never drinks    Tobacco Former --/-- 1 pk/day Quit          Immunizations  NameDate Admin Mfg Trade Name Lot Number Route Inj VIS Given VIS Publication   [id number]/2020 [facility] Fluzone Quadrivalent [id number] IM LD 10/09/2020 08/15/2019   Comments: PT TOLERATED WELL NDC 86914-216-72         Review of Systems  · Constitutional  o Admits  o : weight gain  o Denies  o : fatigue  · Eyes  o Denies  o : blurred vision, changes in vision  · HENT  o Admits  o : nasal congestion, postnasal  "drip  o Denies  o : headaches  · Cardiovascular  o Admits  o : dyspnea on exertion, lower extremity edema  o Denies  o : chest pain, irregular heart beats, rapid heart rate  · Respiratory  o Admits  o : cough, dyspnea on exertion  o Denies  o : shortness of breath, wheezing  · Endocrine  o Admits  o : polyuria, polydipsia, central obesity      Vitals  Date Time BP Position Site L\R Cuff Size HR RR TEMP (F) WT  HT  BMI kg/m2 BSA m2 O2 Sat FR L/min FiO2 HC       02/23/2021 01:53 /68 Sitting    98 - R   460lbs 0oz 5'  2\" 84.13 3.02       03/04/2021 01:41 /72 Sitting    98 - R  97.7  5'  2\"   99 % 2 28%    04/06/2021 02:31 /69 Sitting    103 - R  97.6  5'  2\"   98 % 4 36%          Physical Examination  · Constitutional  o Appearance  o : well-nourished, well developed, alert, in no acute distress, well-tended appearance  · Head and Face  o Head  o :   § Inspection  § : atraumatic, normocephalic  o Face  o :   § Inspection  § : no facial lesions  o HEENT  o : Unremarkable  · Eyes  o Conjunctivae  o : conjunctivae normal  o Sclerae  o : sclerae white  o Pupils and Irises  o : pupils equal and round, pupils reactive to light bilaterally  o Eyelids/Ocular Adnexae  o : eyelid appearance normal  · Ears, Nose, Mouth and Throat  o Ears  o :   § External Ears  § : appearance within normal limits, no lesions present  § Otoscopic Examination  § : tympanic membrane appearance within normal limits bilaterally without perforations, mobility normal  o Nose  o :   § External Nose  § : appearance normal  o Oral Cavity  o :   § Oral Mucosa  § : oral mucosa normal  § Lips  § : lip appearance normal  § Teeth  § : normal dentition for age  § Gums  § : gums pink, non-swollen, no bleeding present  § Tongue  § : tongue appearance normal  § Palate  § : hard palate normal, soft palate appearance normal  o Throat  o :   § Oropharynx  § : no inflammation or lesions present, tonsils within normal " limits  · Neck  o Inspection/Palpation  o : normal appearance, no masses or tenderness, trachea midline  o Thyroid  o : gland size normal, nontender, no nodules or masses present on palpation  · Respiratory  o Respiratory Effort  o : breathing unlabored  o Auscultation of Lungs  o : normal breath sounds  · Cardiovascular  o Heart  o :   § Auscultation of Heart  § : regular rate, normal rhythm, no murmurs present  · Lymphatic  o Neck  o : no lymphadenopathy   · Skin and Subcutaneous Tissue  o Extremities  o :   § Right Upper Extremity  § : induration, erythema and papules upper arms  § Left Upper Extremity  § : induration, erythema, and papules upper arms          Assessment  · Dermatitis     692.9/L30.9  this is most likely contact dermatitis.   · Hypertension, essential     401.9/I10  good control  · Hypokalemia     276.8/E87.6  It was 3.9 last week at Dr Guallpa's visit  · Morbid obesity     278.01/E66.01  unchanged  · Pulmonary hypertension     416.8/I27.20  poor, but stable  · Type 2 diabetes mellitus with diabetic polyneuropathy, with long-term current use of insulin       Type 2 diabetes mellitus with diabetic polyneuropathy     250.60/E11.42  Long term (current) use of insulin     250.60/Z79.4  care per Endo  · Hypothyroid     244.9/E03.9      Plan  · Orders  o ACO-39: Current medications updated and reviewed (1159F, ) - - 04/06/2021  o Depo-Medrol injection 80mg () - 692.9/L30.9 - 04/06/2021   Injection - Depo Medrol 80 mg; Dose: 80 mg; Site: Left Deltoid; Route: intramuscular; Date: 04/06/2021 15:50:04; Exp: 06/01/2021; Lot: [id number]; Mfg: TEVA PHARM; TradeName: methylprednisolone; Location: [facility]; Administered By: Apurva Carpenter MA; Comment: NDC- 4979-8689-05 pt tolerated well  · Medications  o Medications have been Reconciled  o Transition of Care or Provider Policy  · Instructions  o Patient is taking medications as prescribed and doing well.   o Take all  medications as prescribed/directed.  o Patient instructed/educated on their diet and exercise program.  o Patient was educated/instructed on their diagnosis, treatment and medications prior to discharge from the clinic today.  o Patient instructed to seek medical attention urgently for new or worsening symptoms.  o Call the office with any concerns or questions.  o Bring all medicines with their bottles to each office visit.  · Disposition  o Call or Return if symptoms worsen or persist.  o Return Visit Request in/on 2 months +/- 2 days ([id number]).            Electronically Signed by: Apurva Carpenter MA -Author on April 6, 2021 03:52:13 PM  Electronically Co-signed by: Francesco Gimenez DO -Reviewer on April 6, 2021 04:14:09 PM

## 2021-05-14 NOTE — PROGRESS NOTES
Progress Note      Patient Name: Joan Partida   Patient ID: [id number]   Sex: Female   YOB: 1970    Primary Care Provider: Francesco Gimenez DO    Visit Date: February 19, 2021    Provider: Francesco Gimenez DO   Location: [facility]   Location Address: [address] [place] [address]  [id number]   Location Phone: [phone]          Chief Complaint  · Acute visit for congestion, green drainage, headache, and sore nostrils      History Of Present Illness  Joan Partida is a 50 year old /White female who presents for evaluation and treatment of:   Video Conferencing Visit  Joan Partida is a 50 year old /White female who is presenting for evaluation via video conferencing via zoom. Verbal consent obtained before beginning visit.   The following staff were present during this visit: Sidra Bolanos LPN.      She states that she thinks that she has a snus infection. She states that she has head congestion and green nasal drainage. No fever or chills. She has a morning cough, no wheezing or sob. She has not taken any OTC cold meds.       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/09/2020 --    History of tobacco abuse --  --    Hyperlipidemia --  --    Hypertension, essential 03/04/2015 --    Hypokalemia 05/07/2019 --    Hypothyroid --  --    Knee, Meniscal Derangement, NEC 01/01/2014 Left Knee Medial Meniscus Tear   Long term (current) use of anticoagulants --  --    LPRD (laryngopharyngeal  "reflux disease) --  --    Moderate episode of recurrent major depressive disorder 06/22/2017 --    Morbid obesity 03/04/2015 --    Mycobacterium avium complex 08/09/2018 --    Obesity 07/10/2014 07/10/2014   GIUSEPPE (obstructive sleep apnea) 08/09/2018 --    Pulmonary hypertension 08/27/2018 --    Stasis dermatitis of both legs 08/09/2018 --    Type 2 diabetes mellitus with diabetic polyneuropathy, with long-term current use of insulin 06/22/2017 --    Ventral hernia 03/04/2015 --    Vitamin D deficiency 11/13/2013 --    Voice hoarseness --  --          Past Surgical History  Procedure Name Date Notes   Cardiac Catherization 4/2018 --    carpal tunnel 1997 --    Cesarian Section 1987,1989 --    Colonoscopy 2014 --    EGD 2014 --    Hernia 2011 Hernia Repair   Hysterectomy 2007 & 2011 Partial Hysterectomy   Knee surgery 2014 --          Medication List  Name Date Started Instructions   Allergy Relief (fexofenadine) 180 mg oral tablet 07/09/2020 TAKE 1 TABLET BY MOUTH TWICE DAILY   Aquaphor topical ointment 01/20/2021 apply to affected area by external route 2 times a day for 30 days   BD Ultra-Fine Short Pen Needle 31 gauge x 5/16\" miscellaneous needle 10/18/2018 use as directed with Victoza   Breo Ellipta 200-25 mcg/dose inhalation blister with device  inhale 1 puff by inhalation route once daily at the same time each day   Brovana 15 mcg/2 mL inhalation solution for nebulization  inhale 2 milliliters (15 mcg) by inhalation route 2 times per day   clotrimazole 1 % topical cream 12/31/2020 apply to the affected and surrounding areas of skin by topical route 2 times per day in the morning and evening for 14 days   Coumadin 7.5 mg oral tablet  take 1 tablet (7.5 mg) by oral route once daily   eszopiclone 1 mg oral tablet  take 1 tablet by oral route daily   ezetimibe 10 mg oral tablet  take 1 tablet (10 mg) by oral route once daily   ferrous sulfate 325 mg (65 mg iron) oral tablet 01/19/2021 take 1 tablet (325 mg) by oral " route 2 times per day for 30 days   gabapentin 300 mg oral capsule 11/30/2020 take 3 capsules by oral route 3 times a day for 30 days   Humulin R U-500 (Conc) Insulin 500 unit/mL subcutaneous solution  inject by subcutaneous route per instructions. For use with insulin pump   Incruse Ellipta 62.5 mcg/actuation inhalation blister with device  inhale 1 puff (62.5 mcg) by inhalation route once daily at the same time each day   ipratropium-albuterol 0.5 mg-3 mg(2.5 mg base)/3 mL inhalation solution for nebulization  use in nebulizer as directed every 4 hours as needed   levothyroxine 200 mcg oral tablet  take 1 tablet (200 mcg) by oral route once daily   magnesium 250 mg oral tablet  take 2 tablets by oral route daily   metoprolol succinate 25 mg oral tablet extended release 24 hr 01/28/2021 take 1 tablet by oral route 2 times a day for 999 days   montelukast 10 mg oral tablet 12/26/2019 TAKE 1 TABLET BY MOUTH ONCE DAILY IN THE EVENING   multivitamin Oral tablet  take 1 tablet by oral route daily   pantoprazole 40 mg oral tablet,delayed release (DR/EC) 07/23/2020 TAKE 1 TABLET BY MOUTH TWICE DAILY   polyethylene glycol 3350 17 gram/dose oral powder  take 17 gram mixed with 8 oz. water, juice, soda, coffee or tea by oral route once daily   potassium chloride 10 mEq oral tablet extended release  take 2 tablets (20 meq) by oral route 2 times per day with food   potassium chloride 20 mEq oral tablet extended release 01/19/2021 take 4 tablets by oral route 4 times a day for 30 days   promethazine 25 mg oral tablet 01/20/2021 take 1 tablet (25 mg) by oral route every 6 hours as needed for 30 days   Pulmicort 0.5 mg/2 mL inhalation suspension for nebulization  inhale 2 milliliters (0.5 mg) by nebulization route 2 times per day   rosuvastatin 40 mg oral tablet  take 1 tablet (40 mg) by oral route once daily   sertraline 100 mg oral tablet 08/24/2020 Take 1 tablet by mouth once daily   spironolactone 100 mg oral tablet  take 1  tablet (100 mg) by oral route 3 times per day   torsemide 100 mg oral tablet  take 1 tablet by oral route 3 times a day   tramadol 50 mg oral tablet 2021 take 1 tablet (50 mg) by oral route every 6 hours as needed for 30 days   trazodone 50 mg oral tablet  half tablet at bedtime   triamcinolone acetonide 0.1 % topical cream 2020 APPLY A THIN LAYER OF CREAM TOPICALLY TO AFFECTED AREA(S) TWICE DAILY AS NEEDED   Ventolin HFA 90 mcg/actuation inhalation HFA aerosol inhaler  inhale 1 - 2 puffs (90 - 180 mcg) by inhalation route every 6 hours as needed   Vitamin D3 125 mcg (5,000 unit) oral tablet  take 1 tablet by oral route daily   warfarin 2 mg oral tablet 2021 take 1 tablet (2 mg) by oral route once daily as directed   warfarin 5 mg oral tablet 2021 take 1 tablet (5 mg) by oral route once daily as directed         Allergy List  Allergen Name Date Reaction Notes   metformin 12 Severe --    damien --  --  --          Family Medical History  Disease Name Relative/Age Notes   Heart Disease Mother/   Grandparent-nonspecific   - No Family History of Colorectal Cancer  --    Diabetes Father/   --    Skin Carcinoma Father/   --          Reproductive History  Menstrual   Last Menstrual Period: 07/10/2014 Certainty of LMP Date: N/A Menopause Status: Postmenopausal   Age Menopause: 40 Method of Birth Control: Other   Pregnancy Summary   Total Pregnancies: 2 Full Term: 2 Premature: 0   Ab Induced: 0 Ab Spontaneous: 0 Ectopics: 0   Multiples: 0 Livin         Social History  Finding Status Start/Stop Quantity Notes   Alcohol Former --/-- occasional 2019 - 10/15/2018 - never drinks    Tobacco Former --/-- 1 pk/day Quit          Immunizations  NameDate Admin Mfg Trade Name Lot Number Route Inj VIS Given VIS Publication   [id number]/2020 [employer] Fluzone Quadrivalent [id number] LD 10/09/2020 08/15/2019   Comments: PT TOLERATED WELL NDC 66841-294-53         Review of  "Systems  · Constitutional  o Denies  o : fatigue, fever, chills  · HENT  o Admits  o : nasal congestion, nasal discharge, postnasal drip  o Denies  o : sinus pain, sore throat, ear pain  · Respiratory  o Admits  o : cough  o Denies  o : shortness of breath, wheezing, dyspnea on exertion      Vitals  Date Time BP Position Site L\R Cuff Size HR RR TEMP (F) WT  HT  BMI kg/m2 BSA m2 O2 Sat FR L/min FiO2 HC       01/05/2021 01:51 /80 Sitting    103 - R 12  449lbs 16oz 5'  2\" 82.31 2.99 98 %      01/28/2021 03:22 /66 Sitting    94 - R  97.7  5'  2\"   93 % 4 36%    02/19/2021 02:04 /80 Sitting    95 - R   462lbs 0oz 5'  2\" 84.5 3.03             Physical Examination  · Constitutional  o Appearance  o : well-nourished, well developed, no obvious deformities present  · Head and Face  o Head  o :   § Inspection  § : atraumatic, normocephalic  o Face  o :   § Inspection  § : no facial lesions  · Respiratory  o Respiratory Effort  o : breathing unlabored, no accessory muscle use  · Neurologic  o Mental Status Examination  o :   § Orientation  § : grossly oriented to person, place and time  § Speech/Language  § : level of language comprehension normal for age, communication ability within normal limits, voice quality normal, rate or speech normal, volume of speech normal, speech coherent  · Psychiatric  o Judgement and Insight  o : judgement for everyday activities and social situations within normal limits  o Thought Processes  o : rate of thoughts normal, thought content logical  o Mood and Affect  o : mood normal, affect appropriate          Assessment  · Sinusitis, acute     461.9/J01.90  will add a course of abx      Plan  · Orders  o ACO-39: Current medications updated and reviewed (, 1159F) - - 02/19/2021  · Medications  o cefdinir 300 mg oral capsule   SIG: take 1 capsule (300 mg) by oral route every 12 hours for 10 days   DISP: (20) Capsule with 0 refills  Prescribed on 02/19/2021     o fluconazole " 150 mg oral tablet   SIG: take 1 tablet (150 mg) by oral route once for 1 day   DISP: (1) Tablet with 0 refills  Prescribed on 02/19/2021     · Instructions  o Patient is taking medications as prescribed and doing well.   o Take all medications as prescribed/directed.  o Patient instructed/educated on their diet and exercise program.  o Patient was educated/instructed on their diagnosis, treatment and medications prior to discharge from the clinic today.  o Patient instructed to seek medical attention urgently for new or worsening symptoms.  o Call the office with any concerns or questions.  o Bring all medicines with their bottles to each office visit.  · Disposition  o Call or Return if symptoms worsen or persist.            Electronically Signed by: Apurva Carpenter MA -Author on February 19, 2021 03:00:17 PM  Electronically Co-signed by: Francesco Gimenez DO -Reviewer on February 19, 2021 03:08:47 PM

## 2021-05-14 NOTE — PROGRESS NOTES
Progress Note      Patient Name: Joan Partida   Patient ID: [id number]   Sex: Female   YOB: 1970    Primary Care Provider: Francesco Gimenez DO    Visit Date: December 31, 2020    Provider: Francesco Gimenez DO   Location: [facility]   Location Address: [address] [place] [address]  [id number]   Location Phone: [phone]          Chief Complaint  · 2 weeks follow up - Dependent Edema, Hepatic Vein thrombosis, obesity, celluilits of left lower extremity, Tinea pedis  · medication refills      History Of Present Illness  Joan Partida is a 50 year old /White female who presents for evaluation and treatment of: leg pain. Her left lower leg remains painful. It is worse with increased swelling and cold.      She has recently seen Cardiology, Dr. Myers, and had labs. These were stable, except for elevated bs. Her swelling is improved, especially her right leg.       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/09/2020 --    History of tobacco abuse --  --    Hyperlipidemia --  --    Hypertension, essential 03/04/2015 --    Hypokalemia 05/07/2019 --    Hypothyroid --  --    Knee, Meniscal Derangement, NEC 01/01/2014 Left Knee Medial Meniscus Tear   Long term (current) use of anticoagulants --  --    LPRD (laryngopharyngeal reflux disease) --  --    Moderate episode of recurrent major depressive disorder 06/22/2017 --    Morbid obesity 03/04/2015 --    Mycobacterium avium complex 08/09/2018 --    Obesity  "07/10/2014 07/10/2014   GIUSEPPE (obstructive sleep apnea) 08/09/2018 --    Pulmonary hypertension 08/27/2018 --    Stasis dermatitis of both legs 08/09/2018 --    Type 2 diabetes mellitus with diabetic polyneuropathy, with long-term current use of insulin 06/22/2017 --    Ventral hernia 03/04/2015 --    Vitamin D deficiency 11/13/2013 --    Voice hoarseness --  --          Past Surgical History  Procedure Name Date Notes   Cardiac Catherization 4/2018 --    carpal tunnel 1997 --    Cesarian Section 1987,1989 --    Colonoscopy 2014 --    EGD 2014 --    Hernia 2011 Hernia Repair   Hysterectomy 2007 & 2011 Partial Hysterectomy   Knee surgery 2014 --          Medication List  Name Date Started Instructions   Allergy Relief (fexofenadine) 180 mg oral tablet 07/09/2020 TAKE 1 TABLET BY MOUTH TWICE DAILY   amitriptyline 50 mg oral tablet  take 1 tablet (50 mg) by oral route once daily at bedtime   BD Ultra-Fine Short Pen Needle 31 gauge x 5/16\" miscellaneous needle 10/18/2018 use as directed with Victoza   Breo Ellipta 200-25 mcg/dose inhalation blister with device  inhale 1 puff by inhalation route once daily at the same time each day   Brovana 15 mcg/2 mL inhalation solution for nebulization  inhale 2 milliliters (15 mcg) by inhalation route 2 times per day   bumetanide 2 mg oral tablet  take 1 tablet (2 mg) by oral route 2 times per day   cholecalciferol (vitamin D3) 1,000 unit oral capsule 11/24/2019 TAKE 1 CAPSULE BY MOUTH ONCE DAILY   cholecalciferol (vitamin D3) 2,000 unit oral capsule 11/24/2019 TAKE 1 CAPSULE BY MOUTH ONCE DAILY FOR 30 DAYS   Clotrimazole AF 1 % topical cream 12/14/2020 apply to the affected and surrounding areas of skin by topical route 2 times per day in the morning and evening for 30 days   Coumadin 7.5 mg oral tablet  take 1 tablet (7.5 mg) by oral route once daily   dicyclomine 20 mg oral tablet  take 1 tablet by oral route As needed   doxycycline monohydrate 100 mg oral capsule 12/14/2020 take 1 " capsule (100 mg) by oral route 2 times per day for 30 days   EQ FEXOFENADINE 180MG TAB 08/12/2020 Take 1 tablet by mouth twice daily   ezetimibe 10 mg oral tablet  take 1 tablet (10 mg) by oral route once daily   gabapentin 300 mg oral capsule 11/30/2020 take 3 capsules by oral route 3 times a day for 30 days   Humulin R U-500 (Conc) Insulin 500 unit/mL subcutaneous solution  inject by subcutaneous route per instructions. For use with insulin pump   Incruse Ellipta 62.5 mcg/actuation inhalation blister with device  inhale 1 puff (62.5 mcg) by inhalation route once daily at the same time each day   ipratropium-albuterol 0.5 mg-3 mg(2.5 mg base)/3 mL inhalation solution for nebulization  use in nebulizer as directed every 4 hours as needed   losartan 50 mg oral tablet  take 1 tablet (50 mg) by oral route once daily   metolazone 2.5 mg oral tablet 03/03/2020 TAKE 1 TABLET BY MOUTH ON MONDAY, WEDNESDAY AND FRIDAY   metoprolol succinate 25 mg oral tablet extended release 24 hr 08/24/2020 TAKE HALF TABLET BY MOUTH AT BEDTIME   montelukast 10 mg oral tablet 12/26/2019 TAKE 1 TABLET BY MOUTH ONCE DAILY IN THE EVENING   multivitamin Oral tablet  take 1 tablet by oral route daily   pantoprazole 40 mg oral tablet,delayed release (DR/EC) 07/23/2020 TAKE 1 TABLET BY MOUTH TWICE DAILY   PANTOPRAZOLE 40MG TABLETS 12/28/2020 TAKE 1 TABLET BY MOUTH EVERY DAY   polyethylene glycol 3350 17 gram/dose oral powder  take 17 gram mixed with 8 oz. water, juice, soda, coffee or tea by oral route once daily   potassium chloride 10 mEq oral capsule, extended release 11/02/2020 TAKE 4 CAPSULES BY MOUTH THREE TIMES DAILY   Probiotic oral  take 1 by oral route daily   promethazine 25 mg oral tablet  take 1 tablet (25 mg) by oral route every 6 hours as needed   Pulmicort 0.5 mg/2 mL inhalation suspension for nebulization  inhale 2 milliliters (0.5 mg) by nebulization route 2 times per day   rosuvastatin 40 mg oral tablet  take 1 tablet (40 mg) by  oral route once daily   sertraline 100 mg oral tablet 2020 Take 1 tablet by mouth once daily   spironolactone 100 mg oral tablet 2020 TAKE 1/2 (ONE-HALF) TABLET BY MOUTH TWICE DAILY   sumatriptan succinate 100 mg oral tablet 2020 TAKE 1 TABLET BY MOUTH AT ONSET OF MIGRAINE; MAY REPEAT AFTER 2 HOURS IF HEADACHE RETURNS, NOT TO EXCEED 200MG IN 24 HOURS   terbinafine HCl 250 mg oral tablet  take 1 tablet (250 mg) by oral route once daily   tramadol 50 mg oral tablet 2020 take 1 tablet (50 mg) by oral route every 6 hours as needed for 10 days   trazodone 50 mg oral tablet  half tablet at bedtime   triamcinolone acetonide 0.1 % topical cream 2020 APPLY A THIN LAYER OF CREAM TOPICALLY TO AFFECTED AREA(S) TWICE DAILY AS NEEDED   Ventolin HFA 90 mcg/actuation inhalation HFA aerosol inhaler  inhale 1 - 2 puffs (90 - 180 mcg) by inhalation route every 6 hours as needed         Allergy List  Allergen Name Date Reaction Notes   metformin 12 Severe --    damien --  --  --          Family Medical History  Disease Name Relative/Age Notes   Heart Disease Mother/   Grandparent-nonspecific   Diabetes Father/   --    Skin Carcinoma Father/   --          Reproductive History  Menstrual   Last Menstrual Period: 07/10/2014 Certainty of LMP Date: N/A Menopause Status: Postmenopausal   Age Menopause: 40 Method of Birth Control: Other   Pregnancy Summary   Total Pregnancies: 2 Full Term: 2 Premature: 0   Ab Induced: 0 Ab Spontaneous: 0 Ectopics: 0   Multiples: 0 Livin         Social History  Finding Status Start/Stop Quantity Notes   Alcohol Light --/-- occasional 2019 - 10/15/2018 - never drinks    Tobacco Former --/-- 1 pk/day Quit          Immunizations  NameDate Admin Mfg Trade Name Lot Number Route Inj VIS Given VIS Publication   [id number] PMC Fluzone Quadrivalent [id number] IM LD 10/09/2020 08/15/2019   Comments: PT TOLERATED WELL NDC 93461-245-72         Review of  "Systems  · Constitutional  o Admits  o : weight loss  o Denies  o : fatigue  · Cardiovascular  o Admits  o : rapid heart rate, orthopnea, lower extremity edema  o Denies  o : chest pain, irregular heart beats, dyspnea on exertion  · Respiratory  o Admits  o : wheezing, cough, dyspnea on exertion  o Denies  o : shortness of breath      Vitals  Date Time BP Position Site L\R Cuff Size HR RR TEMP (F) WT  HT  BMI kg/m2 BSA m2 O2 Sat FR L/min FiO2 HC       12/14/2020 04:09 /68 Sitting    90 - R  97.7  5'  2\"   96 %  21%    12/28/2020 12:58 /82 Sitting    104 - R   449lbs 16oz 5'  2\" 82.31 2.99       12/31/2020 02:57 /62 Sitting    103 - R  97.3  5'  2\"   99 % 4 36%          Physical Examination  · Constitutional  o Appearance  o : well-nourished, well developed, no obvious deformities present  · Head and Face  o Head  o :   § Inspection  § : atraumatic, normocephalic  o Face  o :   § Inspection  § : no facial lesions  · Respiratory  o Respiratory Effort  o : breathing unlabored, no accessory muscle use  o Auscultation of Lungs  o : diminished breath sounds present    · Cardiovascular  o Heart  o :   § Auscultation of Heart  § : normal rhythm, no murmurs present, no pericardial friction rub  o Peripheral Vascular System  o :   § Pedal Pulses  § : dorsalis pedal pulse 2/5 right, 1/4 left   § Extremities  § : no edemal right lower leg. Chronic skin changes. Left lower leg +1 edema          Assessment  · Pain of left lower extremity     729.5/M79.605  will r/o arterial disease  · Diminished pulses in lower extremity     785.9/R09.89  will r/o arterial disease  · Yeast dermatitis     112.3/B37.2  she needs to keep clean and dry and apply anti-fungal cream      Plan  · Orders  o Arterial duplex scan of unilateral lower extremity (88633) - 729.5/M79.605, 785.9/R09.89 - 12/31/2020   left leg  · Medications  o clotrimazole 1 % topical cream   SIG: apply to the affected and surrounding areas of skin by topical " route 2 times per day in the morning and evening for 14 days   DISP: (1) Tube with 1 refills  Prescribed on 12/31/2020     o fluconazole 150 mg oral tablet   SIG: take 1 tablet (150 mg) by oral route once for 1 day   DISP: (1) Tablet with 0 refills  Prescribed on 12/31/2020     o Medications have been Reconciled  o Transition of Care or Provider Policy  · Instructions  o Patient is taking medications as prescribed and doing well.   o Take all medications as prescribed/directed.  o Patient instructed/educated on their diet and exercise program.  o Patient was educated/instructed on their diagnosis, treatment and medications prior to discharge from the clinic today.  o Patient instructed to seek medical attention urgently for new or worsening symptoms.  o Call the office with any concerns or questions.  o Bring all medicines with their bottles to each office visit.  · Disposition  o Call or Return if symptoms worsen or persist.  o Return Visit Request in/on 4 weeks +/- 2 days ([id number]).            Electronically Signed by: Francesco Gimenez, DO -Author on December 31, 2020 03:29:20 PM

## 2021-05-14 NOTE — PROGRESS NOTES
Progress Note      Patient Name: Joan Partida   Patient ID: [id number]   Sex: Female   YOB: 1970    Primary Care Provider: Francesco Gimenez DO    Visit Date: January 5, 2021    Provider: Jacob Niño MD   Location: [facility]   Location Address: [address] [place] [address]  [id number]   Location Phone: [phone]          Chief Complaint     Follow-up Budd-Chiari       History Of Present Illness     50-year-old morbidly obese female with a history of congestive heart failure, COPD on chronic home oxygen, and multiple comorbidities returns after recent hospitalization.  She was seen in October 2020 during an admission for congestive heart failure.  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  She has been on Coumadin since that time.  MRI was ordered but she was unable to tolerate due to her body habitus and inability to lie flat for any duration of time.  She returns now not having any abdominal complaints.  She denies nausea vomiting fevers or chills and overall does feel better.  She has lost about 50 pounds of fluid weight.  She has been consistent with her Coumadin.  She had labs done today however that showed an elevation of her AST, ALT, and alkaline phosphatase, that were all significantly worse when compared to previous lab results.       Past Medical History  Abnormal mammogram; Allergy; Anemia; Arthritis; COPD (chronic obstructive pulmonary disease); Dependent edema; Depression; Dermatitis; Dysphagia; Fibromyalgia; Foot pain; GERD (gastroesophageal reflux disease); Hepatic steatosis; Hepatic vein thrombosis; History of tobacco abuse; Hyperlipidemia; Hypertension, essential; Hypokalemia; Hypothyroid; Knee, Meniscal Derangement, NEC; Long term (current) use of anticoagulants; LPRD (laryngopharyngeal reflux disease); Moderate episode of recurrent major depressive disorder; Morbid obesity;  "Mycobacterium avium complex; Obesity; GIUSEPPE (obstructive sleep apnea); Pulmonary hypertension; Stasis dermatitis of both legs; Type 2 diabetes mellitus with diabetic polyneuropathy, with long-term current use of insulin; Ventral hernia; Vitamin D deficiency; Voice hoarseness         Past Surgical History  Cardiac Catherization; carpal tunnel; Cesarian Section; Colonoscopy; EGD; Hernia; Hysterectomy; Knee surgery         Medication List  Allergy Relief (fexofenadine) 180 mg oral tablet; BD Ultra-Fine Short Pen Needle 31 gauge x 5/16\" miscellaneous needle; Breo Ellipta 200-25 mcg/dose inhalation blister with device; Brovana 15 mcg/2 mL inhalation solution for nebulization; bumetanide 2 mg oral tablet; cholecalciferol (vitamin D3) 1,000 unit oral capsule; cholecalciferol (vitamin D3) 2,000 unit oral capsule; clotrimazole 1 % topical cream; Clotrimazole AF 1 % topical cream; Coumadin 7.5 mg oral tablet; dicyclomine 20 mg oral tablet; doxycycline monohydrate 100 mg oral capsule; EQ FEXOFENADINE 180MG TAB; eszopiclone 1 mg oral tablet; ezetimibe 10 mg oral tablet; fluconazole 150 mg oral tablet; gabapentin 300 mg oral capsule; Humulin R U-500 (Conc) Insulin 500 unit/mL subcutaneous solution; Incruse Ellipta 62.5 mcg/actuation inhalation blister with device; ipratropium-albuterol 0.5 mg-3 mg(2.5 mg base)/3 mL inhalation solution for nebulization; levothyroxine 125 mcg oral tablet; losartan 50 mg oral tablet; magnesium 250 mg oral tablet; metolazone 2.5 mg oral tablet; metoprolol succinate 25 mg oral tablet extended release 24 hr; montelukast 10 mg oral tablet; multivitamin Oral tablet; pantoprazole 40 mg oral tablet,delayed release (DR/EC); polyethylene glycol 3350 17 gram/dose oral powder; potassium chloride 10 mEq oral capsule, extended release; promethazine 25 mg oral tablet; Pulmicort 0.5 mg/2 mL inhalation suspension for nebulization; rosuvastatin 40 mg oral tablet; sertraline 100 mg oral tablet; spironolactone 100 mg " "oral tablet; SUMATRIPTAN 100MG TABLETS; sumatriptan succinate 100 mg oral tablet; trazodone 50 mg oral tablet; triamcinolone acetonide 0.1 % topical cream; Ventolin HFA 90 mcg/actuation inhalation HFA aerosol inhaler         Allergy List  metformin; damien       Allergies Reconciled  Family Medical History  Heart Disease; - No Family History of Colorectal Cancer; Diabetes; Skin Carcinoma         Reproductive History   2 Para 2 0 0 0 & Postmenopausal       Social History  Alcohol (Former); Tobacco (Former)         Immunizations  Name Date Admin   Influenza 10/09/2020   Influenza 2019   Influenza 2018   Influenza 01/10/2014         Review of Systems  · Constitutional  o Denies  o : chills, fever  · Cardiovascular  o Denies  o : exertional chest pain  · Respiratory  o Denies  o : shortness of breath  · Gastrointestinal  o Denies  o : nausea, vomiting, dysphagia  · Endocrine  o Denies  o : weight gain, unintentional weight loss      Vitals  Date Time BP Position Site L\R Cuff Size HR RR TEMP (F) WT  HT  BMI kg/m2 BSA m2 O2 Sat FR L/min FiO2        2021 01:51 /80 Sitting    103 - R 12  449lbs 16oz 5'  2\" 82.31 2.99 98 %            Physical Examination  · Constitutional  o Appearance  o : Morbidly obese and wheelchair-bound , awake and alert in no acute distress  · Head and Face  o Head  o : Normocephalic with no worriesome skin lesions  · Eyes  o Vision  o :   § Visual Fields  § : eyes move symmetrical in all directions  o Sclerae  o : sclerae anicteric  · Neck  o Inspection/Palpation  o : Trachea is midline, no adenopathy  · Respiratory  o Respiratory Effort  o : Breathing is unlabored.  o Inspection of Chest  o : normal appearance  o Auscultation of Lungs  o : Chest is clear to auscultation bilaterally.  · Cardiovascular  o Heart  o :   § Auscultation of Heart  § : no murmurs, rubs, or gallops  o Peripheral Vascular System  o :   § Extremities  § : no cyanosis, clubbing or edema; "   · Gastrointestinal  o Abdominal Examination  o : Abdomen is soft, nontender to palpation, with normal active bowel sounds, no appreciable hepatosplenomegaly.          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 She has been maintained on Coumadin now since early October 2020 we will continue this. Previous imaging was inconclusive for Budd-Chiari. I will repeat her liver enzymes in 1 week but send extensive lab evaluation at that time as well. She is not jaundiced today and has more of a cholestatic liver profile. We will therefore send additional labs to include DARINEL, AMA, ceruloplasmin, acute hepatitis panel, alpha-1 antitrypsin level, alpha-fetoprotein, and repeat PT/INR. This does not appear to be acute liver failure or acute Budd-Chiari syndrome however will monitor closely with repeat labs in 1 week. Unfortunately her body habitus makes additional imaging very challenging            Electronically Signed by: Jacob Niño MD -Author on January 5, 2021 02:33:32 PM

## 2021-05-14 NOTE — PROGRESS NOTES
Progress Note      Patient Name: Joan Partida   Patient ID: [id number]   Sex: Female   YOB: 1970    Primary Care Provider: Francesco Gimenez DO    Visit Date: December 28, 2020    Provider: Deven Myers MD   Location: [facility]   Location Address: [address] [place] [address], [facility]   [place], [place]  [id number]   Location Phone: [phone]          Chief Complaint     Followup visit for congestive heart failure, recent hospital stay.       History Of Present Illness  Joan Partida is a 50 year old /White female with morbid obesity, chronic diastolic and right-sided heart failure, diabetes mellitus, and obstructive sleep apnea who is here for a followup after a recent hospital stay. Patient was last seen in the office on 11/23/2020, but admitted to the hospital a week later because of increasing shortness of breath and swelling and erythema of the lower extremities. She was treated for congestive heart failure exacerbation and cellulitis of the lower extremities. She was discharged home on adjusted doses of her cardiac medications. Today, she reports feeling better. The swelling and erythema of the feet are improving, and currently on oral antibiotics. She continues to lose a few pounds and currently is close to her recent dry weight. Denies any chest pain, but does have palpitations and symptoms suggestive of orthopnea.   PAST MEDICAL HISTORY: 1) Chronic diastolic and right-sided heart failure, on high-dose antibiotics; 2) Obstructive sleep apnea, on home CPAP; 3) Diabetes mellitus, on insulin pump; 4) Morbid obesity; 5) History of Mycobacterium avium complex infection, completed long-term antibiotic therapy.   PSYCHOSOCIAL HISTORY: Previously smoked, but quit. Denies alcohol use.   CURRENT MEDICATIONS: Bentyl 20 mg p.r.n.; Humulin-R injection; pantoprazole 40 mg b.i.d. potassium 40 mEq t.i.d.; sertraline 100 mg daily; spironolactone 100 mg 1/2 tab b.i.d.; Synthroid 250 mg  "daily; gabapentin 300 mg 3 capsules t.i.d.; metoprolol succinate 25 mg b.i.d.; Bumex 3 mg b.i.d.; metolazone 5 mg daily.      ALLERGIES:   Byetta; Januvia; metformin; nickel; Trulicity.       Review of Systems  · Cardiovascular  o Admits  o : palpitations (fast, fluttering, or skipping beats), swelling (feet, ankles, hands), shortness of breath while walking or lying flat  o Denies  o : chest pain or angina pectoris   · Respiratory  o Admits  o : chronic or frequent cough      Vitals  Date Time BP Position Site L\R Cuff Size HR RR TEMP (F) WT  HT  BMI kg/m2 BSA m2 O2 Sat FR L/min FiO2 HC       12/28/2020 12:58 /82 Sitting    104 - R   449lbs 16oz 5'  2\" 82.31 2.99             Physical Examination  · Respiratory  o Auscultation of Lungs  o : Clear to auscultation bilaterally. No crackles or rhonchi.  · Cardiovascular  o Heart  o : S1, S2 is normally heard. No S3. No murmur, rubs, or gallops.  · Gastrointestinal  o Abdominal Examination  o : Soft, nontender, nondistended. No free fluid. Bowel sounds heard in all four quadrants.  · Extremities  o Extremities  o : 1+ pitting pedal edema, right lower extremity. 2+ pitting edema, left lower extremity. Minimal erythema of the left lower extremity present.   · Data  o Data  o : Records from recent hospital stay, including labs, physician documentation, and EKGs, reviewed.           Assessment     ASSESSMENT & PLAN:    1.  Chronic diastolic/right-sided heart failure.  Patient with recurrent admissions recently for volume overload.         Volume status is stable at this time, and her weight is close to her dry weight.  Continue the current dose        of Bumex, metolazone, and spironolactone, along with potassium supplementation.  She also has a p.r.n.        order for IV Bumex to be given by Home Health weekly as needed.  Will check a basic metabolic panel now        and adjust medications if needed.    2.  Hypertension.  Also noted to be tachycardic, and the dose of " metoprolol was increased.  Continue current        dose.    3.  Follow up in 3 months or earlier if needed.        Deven Myers MD  JV:vm             Electronically Signed by: Anya Cerda-, Other -Author on January 5, 2021 09:16:01 AM  Electronically Co-signed by: Deven Myers MD -Reviewer on January 11, 2021 08:32:03 AM

## 2021-05-15 VITALS
HEART RATE: 103 BPM | DIASTOLIC BLOOD PRESSURE: 65 MMHG | SYSTOLIC BLOOD PRESSURE: 133 MMHG | BODY MASS INDEX: 53.92 KG/M2 | WEIGHT: 293 LBS | HEIGHT: 62 IN | OXYGEN SATURATION: 97 %

## 2021-05-15 VITALS
DIASTOLIC BLOOD PRESSURE: 56 MMHG | TEMPERATURE: 99 F | BODY MASS INDEX: 53.92 KG/M2 | SYSTOLIC BLOOD PRESSURE: 118 MMHG | OXYGEN SATURATION: 95 % | WEIGHT: 293 LBS | HEIGHT: 62 IN | HEART RATE: 95 BPM

## 2021-05-15 VITALS
SYSTOLIC BLOOD PRESSURE: 116 MMHG | BODY MASS INDEX: 53.92 KG/M2 | TEMPERATURE: 98.2 F | WEIGHT: 293 LBS | HEART RATE: 78 BPM | OXYGEN SATURATION: 96 % | HEIGHT: 62 IN | DIASTOLIC BLOOD PRESSURE: 63 MMHG

## 2021-05-15 VITALS
SYSTOLIC BLOOD PRESSURE: 148 MMHG | HEART RATE: 86 BPM | WEIGHT: 293 LBS | HEIGHT: 62 IN | BODY MASS INDEX: 53.92 KG/M2 | DIASTOLIC BLOOD PRESSURE: 72 MMHG

## 2021-05-15 VITALS
SYSTOLIC BLOOD PRESSURE: 112 MMHG | WEIGHT: 293 LBS | HEIGHT: 62 IN | HEART RATE: 100 BPM | DIASTOLIC BLOOD PRESSURE: 60 MMHG | BODY MASS INDEX: 53.92 KG/M2

## 2021-05-15 VITALS
DIASTOLIC BLOOD PRESSURE: 58 MMHG | HEIGHT: 62 IN | WEIGHT: 293 LBS | SYSTOLIC BLOOD PRESSURE: 126 MMHG | HEART RATE: 92 BPM | BODY MASS INDEX: 53.92 KG/M2

## 2021-05-15 VITALS
BODY MASS INDEX: 53.92 KG/M2 | SYSTOLIC BLOOD PRESSURE: 144 MMHG | DIASTOLIC BLOOD PRESSURE: 66 MMHG | HEART RATE: 101 BPM | WEIGHT: 293 LBS | HEIGHT: 62 IN | OXYGEN SATURATION: 96 %

## 2021-05-15 VITALS
BODY MASS INDEX: 53.92 KG/M2 | SYSTOLIC BLOOD PRESSURE: 142 MMHG | HEART RATE: 88 BPM | HEIGHT: 62 IN | WEIGHT: 293 LBS | DIASTOLIC BLOOD PRESSURE: 58 MMHG

## 2021-05-15 VITALS
SYSTOLIC BLOOD PRESSURE: 112 MMHG | HEART RATE: 86 BPM | WEIGHT: 293 LBS | HEIGHT: 62 IN | DIASTOLIC BLOOD PRESSURE: 66 MMHG | BODY MASS INDEX: 53.92 KG/M2

## 2021-05-15 VITALS
OXYGEN SATURATION: 95 % | HEIGHT: 62 IN | DIASTOLIC BLOOD PRESSURE: 55 MMHG | BODY MASS INDEX: 53.92 KG/M2 | WEIGHT: 293 LBS | SYSTOLIC BLOOD PRESSURE: 134 MMHG | TEMPERATURE: 98.3 F | HEART RATE: 86 BPM

## 2021-05-16 VITALS
RESPIRATION RATE: 20 BRPM | WEIGHT: 293 LBS | BODY MASS INDEX: 53.92 KG/M2 | DIASTOLIC BLOOD PRESSURE: 82 MMHG | SYSTOLIC BLOOD PRESSURE: 164 MMHG | HEIGHT: 62 IN | HEART RATE: 112 BPM | OXYGEN SATURATION: 96 % | TEMPERATURE: 98.6 F

## 2021-05-16 VITALS
HEIGHT: 62 IN | OXYGEN SATURATION: 95 % | HEART RATE: 90 BPM | BODY MASS INDEX: 53.92 KG/M2 | DIASTOLIC BLOOD PRESSURE: 100 MMHG | TEMPERATURE: 98.9 F | SYSTOLIC BLOOD PRESSURE: 160 MMHG | WEIGHT: 293 LBS

## 2021-05-16 VITALS
DIASTOLIC BLOOD PRESSURE: 62 MMHG | SYSTOLIC BLOOD PRESSURE: 130 MMHG | HEIGHT: 62 IN | HEART RATE: 96 BPM | WEIGHT: 293 LBS | BODY MASS INDEX: 53.92 KG/M2

## 2021-05-16 VITALS
DIASTOLIC BLOOD PRESSURE: 62 MMHG | HEIGHT: 62 IN | WEIGHT: 293 LBS | SYSTOLIC BLOOD PRESSURE: 165 MMHG | HEART RATE: 105 BPM | BODY MASS INDEX: 53.92 KG/M2 | OXYGEN SATURATION: 98 %

## 2021-05-16 VITALS
WEIGHT: 293 LBS | SYSTOLIC BLOOD PRESSURE: 136 MMHG | HEIGHT: 62 IN | HEART RATE: 98 BPM | BODY MASS INDEX: 53.92 KG/M2 | TEMPERATURE: 98.2 F | OXYGEN SATURATION: 97 % | DIASTOLIC BLOOD PRESSURE: 81 MMHG

## 2021-05-16 VITALS
BODY MASS INDEX: 55.32 KG/M2 | SYSTOLIC BLOOD PRESSURE: 150 MMHG | WEIGHT: 293 LBS | HEIGHT: 61 IN | DIASTOLIC BLOOD PRESSURE: 78 MMHG | HEART RATE: 96 BPM

## 2021-05-16 VITALS — TEMPERATURE: 97.8 F | OXYGEN SATURATION: 94 % | RESPIRATION RATE: 20 BRPM | HEART RATE: 109 BPM

## 2021-05-16 VITALS
DIASTOLIC BLOOD PRESSURE: 78 MMHG | SYSTOLIC BLOOD PRESSURE: 171 MMHG | WEIGHT: 293 LBS | HEART RATE: 97 BPM | OXYGEN SATURATION: 95 % | BODY MASS INDEX: 53.92 KG/M2 | HEIGHT: 62 IN

## 2021-05-16 VITALS
HEIGHT: 62 IN | BODY MASS INDEX: 53.92 KG/M2 | WEIGHT: 293 LBS | DIASTOLIC BLOOD PRESSURE: 86 MMHG | HEART RATE: 96 BPM | SYSTOLIC BLOOD PRESSURE: 144 MMHG

## 2021-05-16 VITALS
HEIGHT: 62 IN | DIASTOLIC BLOOD PRESSURE: 88 MMHG | HEART RATE: 98 BPM | SYSTOLIC BLOOD PRESSURE: 152 MMHG | WEIGHT: 293 LBS | BODY MASS INDEX: 53.92 KG/M2

## 2021-05-16 VITALS
HEIGHT: 62 IN | TEMPERATURE: 98.1 F | SYSTOLIC BLOOD PRESSURE: 133 MMHG | WEIGHT: 293 LBS | HEART RATE: 92 BPM | BODY MASS INDEX: 53.92 KG/M2 | DIASTOLIC BLOOD PRESSURE: 65 MMHG | OXYGEN SATURATION: 96 %

## 2021-05-16 VITALS
OXYGEN SATURATION: 95 % | DIASTOLIC BLOOD PRESSURE: 79 MMHG | HEART RATE: 106 BPM | WEIGHT: 293 LBS | HEIGHT: 61 IN | BODY MASS INDEX: 55.32 KG/M2 | SYSTOLIC BLOOD PRESSURE: 157 MMHG

## 2021-05-16 VITALS
DIASTOLIC BLOOD PRESSURE: 71 MMHG | HEIGHT: 62 IN | HEART RATE: 105 BPM | WEIGHT: 293 LBS | SYSTOLIC BLOOD PRESSURE: 147 MMHG | BODY MASS INDEX: 53.92 KG/M2

## 2021-05-23 ENCOUNTER — TRANSCRIBE ORDERS (OUTPATIENT)
Dept: PULMONOLOGY | Facility: CLINIC | Age: 51
End: 2021-05-23

## 2021-05-23 DIAGNOSIS — J45.909 ASTHMA, UNSPECIFIED ASTHMA SEVERITY, UNSPECIFIED WHETHER COMPLICATED, UNSPECIFIED WHETHER PERSISTENT: Primary | ICD-10-CM

## 2021-05-28 VITALS
HEIGHT: 62 IN | SYSTOLIC BLOOD PRESSURE: 150 MMHG | OXYGEN SATURATION: 93 % | HEART RATE: 99 BPM | HEIGHT: 62 IN | DIASTOLIC BLOOD PRESSURE: 85 MMHG | BODY MASS INDEX: 53.92 KG/M2 | BODY MASS INDEX: 53.92 KG/M2 | BODY MASS INDEX: 53.92 KG/M2 | HEIGHT: 63 IN | SYSTOLIC BLOOD PRESSURE: 126 MMHG | TEMPERATURE: 98.4 F | HEIGHT: 62 IN | OXYGEN SATURATION: 97 % | TEMPERATURE: 99 F | HEART RATE: 101 BPM | RESPIRATION RATE: 18 BRPM | WEIGHT: 293 LBS | OXYGEN SATURATION: 93 % | WEIGHT: 293 LBS | OXYGEN SATURATION: 94 % | HEART RATE: 95 BPM | RESPIRATION RATE: 20 BRPM | DIASTOLIC BLOOD PRESSURE: 84 MMHG | SYSTOLIC BLOOD PRESSURE: 180 MMHG | DIASTOLIC BLOOD PRESSURE: 85 MMHG | DIASTOLIC BLOOD PRESSURE: 86 MMHG | OXYGEN SATURATION: 98 % | BODY MASS INDEX: 53.92 KG/M2 | HEART RATE: 104 BPM | TEMPERATURE: 98.6 F | DIASTOLIC BLOOD PRESSURE: 75 MMHG | WEIGHT: 293 LBS | RESPIRATION RATE: 14 BRPM | HEIGHT: 62 IN | SYSTOLIC BLOOD PRESSURE: 163 MMHG | TEMPERATURE: 98.7 F | TEMPERATURE: 98.2 F | TEMPERATURE: 99 F | SYSTOLIC BLOOD PRESSURE: 146 MMHG | BODY MASS INDEX: 51.91 KG/M2 | SYSTOLIC BLOOD PRESSURE: 154 MMHG | DIASTOLIC BLOOD PRESSURE: 89 MMHG | HEIGHT: 62 IN | DIASTOLIC BLOOD PRESSURE: 74 MMHG | HEART RATE: 110 BPM | BODY MASS INDEX: 53.92 KG/M2 | HEART RATE: 101 BPM | OXYGEN SATURATION: 93 % | HEART RATE: 104 BPM | OXYGEN SATURATION: 95 % | WEIGHT: 293 LBS | BODY MASS INDEX: 53.92 KG/M2 | SYSTOLIC BLOOD PRESSURE: 174 MMHG | TEMPERATURE: 98.7 F | HEART RATE: 108 BPM | RESPIRATION RATE: 16 BRPM | OXYGEN SATURATION: 96 % | HEIGHT: 62 IN | TEMPERATURE: 98.9 F | RESPIRATION RATE: 24 BRPM | RESPIRATION RATE: 14 BRPM | RESPIRATION RATE: 18 BRPM | RESPIRATION RATE: 20 BRPM | WEIGHT: 293 LBS | WEIGHT: 293 LBS | WEIGHT: 293 LBS | BODY MASS INDEX: 53.92 KG/M2 | DIASTOLIC BLOOD PRESSURE: 62 MMHG | SYSTOLIC BLOOD PRESSURE: 148 MMHG | HEIGHT: 62 IN | WEIGHT: 293 LBS

## 2021-05-28 VITALS
HEART RATE: 87 BPM | HEIGHT: 62 IN | WEIGHT: 293 LBS | BODY MASS INDEX: 53.92 KG/M2 | HEART RATE: 98 BPM | SYSTOLIC BLOOD PRESSURE: 140 MMHG | SYSTOLIC BLOOD PRESSURE: 168 MMHG | SYSTOLIC BLOOD PRESSURE: 146 MMHG | TEMPERATURE: 98.5 F | HEIGHT: 62 IN | DIASTOLIC BLOOD PRESSURE: 68 MMHG | SYSTOLIC BLOOD PRESSURE: 172 MMHG | RESPIRATION RATE: 14 BRPM | OXYGEN SATURATION: 95 % | DIASTOLIC BLOOD PRESSURE: 85 MMHG | DIASTOLIC BLOOD PRESSURE: 90 MMHG | WEIGHT: 293 LBS | BODY MASS INDEX: 53.92 KG/M2 | BODY MASS INDEX: 53.92 KG/M2 | TEMPERATURE: 99.1 F | BODY MASS INDEX: 53.92 KG/M2 | HEART RATE: 89 BPM | OXYGEN SATURATION: 97 % | HEIGHT: 62 IN | SYSTOLIC BLOOD PRESSURE: 130 MMHG | BODY MASS INDEX: 53.92 KG/M2 | DIASTOLIC BLOOD PRESSURE: 63 MMHG | HEART RATE: 117 BPM | HEART RATE: 101 BPM | HEIGHT: 62 IN | OXYGEN SATURATION: 95 % | SYSTOLIC BLOOD PRESSURE: 154 MMHG | RESPIRATION RATE: 14 BRPM | HEART RATE: 92 BPM | BODY MASS INDEX: 53.92 KG/M2 | OXYGEN SATURATION: 99 % | HEIGHT: 62 IN | HEART RATE: 100 BPM | HEIGHT: 62 IN | HEIGHT: 62 IN | DIASTOLIC BLOOD PRESSURE: 81 MMHG | HEIGHT: 62 IN | RESPIRATION RATE: 16 BRPM | HEART RATE: 92 BPM | RESPIRATION RATE: 18 BRPM | OXYGEN SATURATION: 93 % | DIASTOLIC BLOOD PRESSURE: 71 MMHG | TEMPERATURE: 98.7 F | DIASTOLIC BLOOD PRESSURE: 70 MMHG | RESPIRATION RATE: 18 BRPM | WEIGHT: 293 LBS | RESPIRATION RATE: 16 BRPM | OXYGEN SATURATION: 95 % | TEMPERATURE: 99 F | OXYGEN SATURATION: 94 % | DIASTOLIC BLOOD PRESSURE: 74 MMHG | BODY MASS INDEX: 53.92 KG/M2 | WEIGHT: 293 LBS | OXYGEN SATURATION: 94 % | HEIGHT: 62 IN | SYSTOLIC BLOOD PRESSURE: 129 MMHG | WEIGHT: 293 LBS | WEIGHT: 293 LBS | SYSTOLIC BLOOD PRESSURE: 168 MMHG | SYSTOLIC BLOOD PRESSURE: 117 MMHG | WEIGHT: 293 LBS | WEIGHT: 293 LBS | DIASTOLIC BLOOD PRESSURE: 82 MMHG | TEMPERATURE: 97.8 F | RESPIRATION RATE: 14 BRPM | HEART RATE: 101 BPM | OXYGEN SATURATION: 97 % | BODY MASS INDEX: 53.92 KG/M2 | RESPIRATION RATE: 14 BRPM | TEMPERATURE: 98.9 F | WEIGHT: 293 LBS | RESPIRATION RATE: 26 BRPM | BODY MASS INDEX: 53.92 KG/M2

## 2021-05-28 VITALS
RESPIRATION RATE: 15 BRPM | DIASTOLIC BLOOD PRESSURE: 60 MMHG | OXYGEN SATURATION: 95 % | HEIGHT: 62 IN | SYSTOLIC BLOOD PRESSURE: 120 MMHG | TEMPERATURE: 98.9 F | BODY MASS INDEX: 87.43 KG/M2 | HEART RATE: 100 BPM

## 2021-05-28 VITALS
RESPIRATION RATE: 18 BRPM | DIASTOLIC BLOOD PRESSURE: 69 MMHG | WEIGHT: 293 LBS | HEIGHT: 62 IN | HEART RATE: 90 BPM | OXYGEN SATURATION: 98 % | TEMPERATURE: 98 F | SYSTOLIC BLOOD PRESSURE: 130 MMHG | BODY MASS INDEX: 53.92 KG/M2

## 2021-05-28 VITALS
HEART RATE: 98 BPM | BODY MASS INDEX: 53.92 KG/M2 | WEIGHT: 293 LBS | SYSTOLIC BLOOD PRESSURE: 109 MMHG | HEIGHT: 62 IN | OXYGEN SATURATION: 96 % | DIASTOLIC BLOOD PRESSURE: 58 MMHG | RESPIRATION RATE: 18 BRPM | TEMPERATURE: 98.6 F

## 2021-05-28 VITALS
RESPIRATION RATE: 15 BRPM | OXYGEN SATURATION: 97 % | HEART RATE: 90 BPM | BODY MASS INDEX: 53.92 KG/M2 | TEMPERATURE: 97.7 F | HEIGHT: 62 IN | SYSTOLIC BLOOD PRESSURE: 126 MMHG | WEIGHT: 293 LBS | DIASTOLIC BLOOD PRESSURE: 55 MMHG

## 2021-05-28 NOTE — PROGRESS NOTES
Patient: AYLIN RAMOS     Acct: [id number]     Report: #[id number]  UNIT #: [id number]     : 1970    Encounter Date:2021  PRIMARY CARE: FILIBERTO BATISTA  ***Signed***  --------------------------------------------------------------------------------------------------------------------  Chief Complaint      Encounter Date      2021            Primary Care Provider      FILIBERTO BATISTA            Referring Provider      ROBERTO GARCIA            Patient Complaint      Patient is complaining of      PT here today for acute visit, Having SOA, Cough (really bad in AM), Asthma            VITALS      Height 5 ft 2 in / 157.48 cm      Weight 460 lbs  / 208.359960 kg      BSA 2.72 m2      BMI 84.1 kg/m2      Temperature 97.7 F / 36.5 C - Temporal      Pulse 90      Respirations 15      Blood Pressure 126/55 Sitting, Left Arm      Pulse Oximetry 97%, nasal canula , 4 lpm            HPI      The patient is a 50 year old female patient of Dr. Dowling's with pulmonary hype    rtension secondary to diastolic dysfunction, diastolic heart failure with     chronic volume overload and asthma. The patient presents for follow up today.             The patient states over the last several weeks she has been having increasing     shortness of breath, productive cough with yellow to green sputum and wheezing.     The patient states she is taking advair and spiriva everyday as prescribed and     allegra and Singulair for seasonal allergies. The patient states she has a     nebulizer machine at home however she has no nebulizer treatments. The patient     states she has been having some hoarseness from coughing. The patient denies any    fever or chills, night sweats, hemoptysis,  purulent sputum production, swollen     glands in head and neck, unintentional weight loss, chest pain or chest     tightness, abdominal pain, nausea or vomiting or diarrhea. The patient denies      any headaches, myalgias, sore throat,  changes in sense of taste and smell any     coronavirus or flu like symptoms. The patient states she has intermittent leg     swelling and is on diuretics and they have recently been increased and she is     scheduled to follow up with Dr. Myers.  The patient denies any orthopnea or     paroxysmal nocturnal dyspnea. The patient denies any exposure to any ill     contacts and denies any history of any bleeding or blood clotting disorders. The    patient denies any recent travel. The patient states she is active and does not     lead a sedentary lifestyle. The patient states she is able to perform her     activities of daily living.             I reviewed the Review of Systems, medical, surgical and family history and agree    with those as entered.      Copies To:   Mk Dowling      Constitutional:  Denies: Fatigue, Fever, Weight gain, Weight loss, Chills,     Insomnia, Other      Respiratory/Breathing:  Complains of: Shortness of air, Wheezing, Cough; Denies:    Hemoptysis, Pleuritic pain, Other      Endocrine:  Denies: Polydipsia, Polyuria, Heat/cold intolerance, Abnorml     menstrual pattern, Diabetes, Other      Eyes:  Denies: Blurred vision, Vision Changes, Other      Ears, nose, mouth, throat:  Denies: Mouth lesions, Thrush, Throat pain,     Hoarseness, Allergies/Hay Fever, Post Nasal Drip, Headaches, Recent Head Injury,    Nose Bleeding, Neck Stiffness, Thyroid Mass, Hearing Loss, Ear Fullness, Dry     Mouth, Nasal or Sinus Pain, Dry Lips, Nasal discharge, Nasal congestion, Other      Cardiovascular:  Denies: Palpitations, Syncope, Claudication, Chest Pain, Wake u    p Gasping for air, Leg Swelling, Irregular Heart Rate, Cyanosis, Dyspnea on     Exertion, Other      Gastrointestinal:  Denies: Nausea, Constipation, Diarrhea, Abdominal pain,     Vomiting, Difficulty Swallowing, Reflux/Heartburn, Dysphagia, Jaundice,     Bloating, Melena, Bloody stools, Other      Genitourinary:  Denies:  Urinary frequency, Incontinence, Hematuria, Urgency,     Nocturia, Dysuria, Testicular problems, Other      Musculoskeletal:  Denies: Joint Pain, Joint Stiffness, Joint Swelling, Myalgias,    Other      Hematologic/lymphatic:  DENIES: Lymphadenopathy, Bruising, Bleeding tendencies,     Other      Neurological:  Denies: Headache, Numbness, Weakness, Seizures, Other      Psychiatric:  Denies: Anxiety, Appropriate Effect, Depression, Other      Sleep:  No: Excessive daytime sleep, Morning Headache?, Snoring, Insomnia?, Stop    breathing at sleep?, Other      Integumentary:  Denies: Rash, Dry skin, Skin Warm to Touch, Other      Immunologic/Allergic:  Denies: Latex allergy, Seasonal allergies, Asthma, Urt    icaria, Eczema, Other      Immunization status:  No: Up to date            FAMILY/SOCIAL/MEDICAL HX      Surgical History:  Yes: Abdominal Surgery (HERNIA REPAIR), Oral Surgery (TONGUE     BIOPSY), Orthopedic Surgery (L MENISCUS REPAIR, RACHAEL CARPAL TUNNEL  ); No:     Appendectomy, Bladder Surgery, Bowel Surgery, CABG, Cholecystectomy, Head     Surgery, Vascular Surgery      Stroke - Family Hx:  Grandparent      Heart - Family Hx:  Grandparent      Diabetes - Family Hx:  Father      Is Father Still Living?:  Yes      Is Mother Still Living?:  Yes      Social History:  No Tobacco Use, No Alcohol Use, No Recreational Drug use      Smoking status:  Former smoker (6yo, 1 ppd, quit )       Section:  Yes (2)      Hysterectomy:  Yes (partial [id number] and 11)      Anticoagulation Therapy:  No      Antibiotic Prophylaxis:  No      Medical History:  Yes: Anemia, Arthritis, Asthma, Chronic Bronchitis/COPD,     Congestive Heart Failu, Diabetes (TYPE II), High Blood Pressure, Reflux Disease,    Shortness Of Breath (PULMONARY HYPERTENSION); No: Blood Disease, Broken Bones,     Cataracts, Chemical Dependency, Chemotherapy/Cancer, Emphysema, Chronic Liver     Disease, Colon Trouble, Colitis, Diverticulitis, Deafness  or Ringing Ears,     Depression, Anxiety, Bipolar Disorder, Epilepsy, Seizures, Glaucoma, Gall     Stones, Gout, Head Injury, Heart Attack, Heart Murmur, Hemorrhoids/Rectal Prob,     Hepatitis, Hiatal Hernia, HIV (Do not ask - volu, Kidney or Bladder Disease,     Kidney Stones, Migrane Headaches, Mitral Valve Prolapse, Psychiatric Care,     Rheumatic Fever, Sexually Transmitted Dis, Sinus Trouble, Skin     Disease/Psoriais/Ecz, Stroke, Thyroid Problem, Tuberculosis or Pos TB Te,     Miscellaneous Medical/oth (HIGH CHOLESTEROL, INSOMNIA)      Psychiatric History      none            PREVENTION      Hx Influenza Vaccination:  Yes      Date Influenza Vaccine Given:  Sep 1, 2020      Influenza Vaccine Declined:  No      2 or More Falls in Past Year?:  No      Fall Past Year with Injury?:  No      Hx Pneumococcal Vaccination:  Yes      Encouraged to follow-up with:  PCP regarding preventative exams.      Chart initiated by      Rima Cruz MA            ALLERGIES/MEDICATIONS      Allergies:        Coded Allergies:             DULAGLUTIDE (Verified  Allergy, Unknown, DUMPING SYNDROME, PANCREATITIS,     4/20/21)           EXENATIDE (Verified  Allergy, Unknown, PANCREATITIS, 4/20/21)           INSULIN DEGLUDEC (Verified  Allergy, Unknown, 4/20/21)           NICKEL (Verified  Allergy, Unknown, RASH, 4/20/21)           SITAGLIPTIN (Verified  Allergy, Unknown, PANCREATITIS, 4/20/21)           METFORMIN (Verified  Adverse Reaction, Unknown, SEVERE DIARRHEA,     DEHYDRATION, 4/20/21)      Uncoded Allergies:             COLBALT BLUE (METAL) (Allergy, Unknown, RASH; DIARRHEA, STOMACH UPSET,     4/17/18)           WHITE GOLD (Allergy, Unknown, RASH, DIARRHEA, STOMACH UPSET, 4/17/18)      Medications    Last Reconciled on 4/20/21 12:08 by ZELALEM SANCHEZ       Doxycycline Hyclate (Doxycycline Hyclate*) 100 Mg Capsule      100 MG PO BID for 7 Days, #14 CAP 0 Refills         Prov: ZELALEM SANCHEZ [facility]         4/20/21        predniSONE (predniSONE) 20 Mg Tablet      40 MG PO QDAY for 7 Days, #14 TAB 0 Refills         Prov: FROYLAN SANCHEZT [facility]         4/20/21       Albuterol/Ipratropium (Duoneb) 3 Ml Ampul.neb      3 ML INH Q4H PRN for SHORTNESS OF BREATH, #120 NEB 5 Refills         [facility]         4/20/21       Tiotropium (Spiriva) 18 Mcg Inh      2 PUFFS INH QDAY for 30 Days, #30 INH 11 Refills         Prov: Mk Dowling         3/4/21       Fluticasone/Salmeterol 230/21 (Advair /21 MCG) 12 Gm Hfa.aer.ad      2 PUFF INH BID for 30 Days, #1 INH 9 Refills         Prov: Mk Dowling         2/19/21       Ramelteon (Rozerem) 8 Mg Tab      8 MG PO HS, #30 TAB 6 Refills         Prov: ELDER JOSÉ         12/12/20       Spironolactone (Aldactone) 50 Mg Tablet      50 MG PO BID, #60 TAB         Prov: ELDER JOSÉ         12/11/20       Warfarin Sodium (Coumadin) 6 Mg Tablet      6 MG PO QDAY@16, #30 TAB 0 Refills         Prov: ELDER JOSÉ         12/11/20       Metoprolol Succinate (Metoprolol Succinate) 25 Mg Tab.er.24h      25 MG PO BID, #60 TAB.ER         Prov: ELDER JOSÉ         12/11/20       Bumetanide (BUMETANIDE) 2 Mg Tablet      3 TAB PO BID for 30 Days, #180 TAB         Prov: DAFNE ABRAMS         12/7/20       metOLazone (metOLazone) 5 Mg Tablet      5 MG PO QDAY for 30 Days, #30 TAB 3 Refills         Prov: DAFNE ABRAMS         12/7/20       Potassium Chloride (K-Dur*) 20 Meq Tab.er.prt      40 MEQ PO QID, #90 TAB 0 Refills         Prov: DAFNE ABRAMS         12/7/20       L. Acidophilus (L. Acidophilus) 1 Each Tablet      1 CAP PO TID for 14 Days, #42 TAB         Prov: Rolando Kim         11/13/20       Gabapentin (Gabapentin) 600 Mg Tablet      1.5 TAB PO TID, #90 TAB 0 Refills         Reported         11/9/20       Pantoprazole (Protonix) 40 Mg Tablet.dr      40 MG PO BIDAC, #60 TAB 0 Refills         Reported         9/14/20       Levothyroxine (Levothyroxine) 0.2 Mg  Tablet      0.2 MG PO QDAY@07, #30 TAB 0 Refills         Reported         9/14/20       Ezetimibe (Zetia) 10 Mg Tablet      10 MG PO QDAY         Reported         9/14/20       Cholecalciferol (Vitamin D3) (Vitamin D3) 5,000 Units Capsule      5000 UNITS PO QDAY for 30 Days, #30 CAP         Reported         9/14/20       Fexofenadine HCl (Allegra Allergy) 180 Mg Tablet      180 MG PO BID         Reported         9/14/20       Oxygen (OXYGEN)  Gas      L NARE EACH QDAY PRN PRN for SHORTNESS OF BREATH, #2         Reported         11/9/19       INSULIN PUMP (at home) (INSULIN PUMP (at home))  Each      1 EACH, BAG         Reported         11/9/19       Rosuvastatin Calcium (Crestor*) 40 Mg Tablet      40 MG PO HS, #30 TAB 0 Refills         Reported         6/4/19       Montelukast Sodium (Singulair*) 10 Mg Tablet      10 MG PO HS, TAB         Reported         1/20/14      Current Medications      Current Medications Reviewed 4/20/21            EXAM      Vital Signs Reviewed      Gen: WDWN, Alert, NAD.        HEENT:  PERRL, EOMI.  OP, nares clear, no sinus tenderness.      Neck:  Supple, no JVD, no thyromegaly.      Lymph: No axillary, cervical, supraclavicular lymphadenopathy noted bilaterally.      Chest: Diffuse expiratory wheezing throughout, no rales or rhonchi, normal work     of breathing noted. The patient is able to speak full sentences without     difficulty.       CV:  RRR, no MGR, pulses 2+, equal. S1, S2, present. No JVD. There is     significant bilateral pitting edema past the knees wrapped in ACE bandages.       Abd:  Soft, NT, ND, + BS, no HSM.      EXT:  No clubbing, no cyanosis, no joint tenderness. No calf tenderness     bilaterally.       Neuro:  A  Skin: No rashes or lesions.      Vtials      Vitals:             Height 5 ft 2 in / 157.48 cm           Weight 460 lbs  / 208.554073 kg           BSA 2.72 m2           BMI 84.1 kg/m2           Temperature 97.7 F / 36.5 C - Temporal           Pulse 90            Respirations 15           Blood Pressure 126/55 Sitting, Left Arm           Pulse Oximetry 97%, nasal canula , 4 lpm            REVIEW      Results Reviewed      [facility] Results Reviewed?:  Yes Prev Lab Results, Yes Prev Radiology Results, Yes     Previous [facility] Records      Lab Results      I personally reviewed Dr. Dowling's last Telehealth visit note.            Assessment      Asthma - J45.909            Dyspnea - R06.00            Cough - R05            Notes      New Medications      * Albuterol/Ipratropium (Duoneb) 3 ML AMPUL.NEB: 3 ML INH Q4H PRN SHORTNESS OF       BREATH #120         Instructions: J45.909         Dx: Asthma - J45.909      * predniSONE 20 MG TABLET: 40 MG PO QDAY 7 Days #14         Dx: Dyspnea - R06.00      * Doxycycline Hyclate (Doxycycline Hyclate*) 100 MG CAPSULE: 100 MG PO BID 7       Days #14         Dx: Dyspnea - R06.00      Discontinued Medications      * Linezoid (Zyvox) 600 MG TAB: 600 MG PO BID 5 Days #10      New Diagnostics      * Chest 2 View, 1 DAY         Dx: Dyspnea - R06.00      * CBC With Auto Diff, Routine         Dx: Dyspnea - R06.00      * Comp Metabolic Panel, 1 DAY         Dx: Dyspnea - R06.00      * Sputum Culture W/Gram Stain, 1 DAY         Dx: Cough - R05      ASSESSMENT:      1. Insomnia.       2. Pulmonary hypertension secondary to diastolic dysfunction.       3. Diastolic heart failure with chronic overload.       4. Acute asthma exacerbation.       5. Dyspnea.       6. Cough.       7. Wheezing.       8. Tobacco abuse of cigarettes in remission.       9. Seasonal allergies/allergic rhinitis.             PLAN:      1. For the patient's asthma exacerbation, I will start her on a 7 day course of     doxycycline 100 mg twice daily and prednisone burst. Expectations and course of     treatment were discussed with patient.  How to take medications and possible     side effects of medication discussed with patient.  The patient verbalized     understanding and  compliance.        2. Continue advair and spiriva as prescribed and rinse her mouth after each use.          3. I will start the patient on DuoNeb to use up to 4 times a day.       4. For seasonal allergies and allergic rhinitis continue allegra and Singulair     everyday as prescribed.       5. I will order a CBC, chest x-ray and sputum culture for further evaluation.       6. The patient is advised to call the office, call 911 or go to the ER for any     new or worsening symptoms.       7. Follow up with Dr. Myers and continue diuresis and diuretic management.       8. The patient reports she is up to date with flu and pneumonia vaccines. The     patient is advised to receive the COVID-19 vaccine when available.  The patient     is advised to follow CDC recommendations such as social distancing, wearing a     mask and washing hand for at least 20 seconds.      9. Follow up in 3-4 weeks sooner if needed.            Patient Education      Patient Education Provided:  Acute Asthma      Time Spent:  > 50% /Coord Care            Electronically signed by ZELALEM SANCHEZ [facility]  04/22/2021 15:43       Disclaimer: Converted document may not contain table formatting or lab diagrams. Please see [employer] for the authenticated document.

## 2021-05-28 NOTE — PROGRESS NOTES
Patient: AYLIN RAMOS     Acct: [id number]     Report: #[id number]  UNIT #: [id number]     : 1970    Encounter Date:2020  PRIMARY CARE: FILIBERTO BATISTA  ***Signed***  --------------------------------------------------------------------------------------------------------------------  Chief Complaint      Encounter Date      Aug 7, 2020            Primary Care Provider      FILIBERTO BATISTA            Referring Provider      ROBERTO GARCIA            Patient Complaint      Patient is complaining of      PT here today for F/U, wants to discuss oxygen, Asthma            VITALS      Height 5 ft 2.00 in / 157.48 cm      Temperature 98.9 F / 37.17 C - Temporal      Pulse 100      Respirations 15      Blood Pressure 120/60 Sitting, Right Arm      Pulse Oximetry 95%, nasal canula , 2 lpm            HPI      The patient is a 49 year old morbidly obese female with history of obstructive     sleep apnea, asthma and history of mycobacterium avium intracellular status post    treatment. The patient presents follow up today.             The patient has not been in the office since 2019. The patient states     since her last office visit her breathing is at baseline. The patient states she    will become short of breath that is moderate in severity, worse with exertion,     improved with rest. The patient states she has a history of pulmonary     hypertension, chronic hypoxic respiratory failure and lymphadema and she was     having a significant amount of swelling last week and she was seen by Dr. Myers who increased her diuretics. The patient states her swelling has     improved and she is scheduled to follow up with him next week. The patient     denies any fever or chills, night sweats, hemoptysis,  purulent sputum     production, swollen glands in head and neck, unintentional weight loss, chest     pain or chest tightness, abdominal pain, nausea or vomiting or diarrhea. The     patient  denies  any headaches, myalgias, changes in sense of taste and smell any    coronavirus or flu like symptoms. The patient does admit to intermittent leg     swelling and is under the care of Dr. Myers who is managing her diuretics. The    patient states she is on 3 liters of oxygen per minute per nasal cannula and     would like to have a portable oxygen concentrator due to not being able to carry    heavy tanks due to her congestive heart failure, lymphedema and pulmonary     hypertension and being almost wheelchair bound. The patient states she is     wearing her BiPAP every night however she feels like her mask leaks and air is     escaping and she wakes up in the middle of the night sometimes holding her mask     on. The patient states she would like to try nasal pillows. Upon reviewing the     patient's CPAP compliance report for the last 30 days the patient's average     daily use is 8 hours, 10 minutes and 15 seconds with IPAP pressure of 17 cm of     water with EPAP pressure of 13 cm of water with apnea hypopnea index of 6.4. The    patient states she does not have any morning headaches and she is not able to     sleep without her BiPAP  and her excessive daytime sleepiness has improved     however she feels air escapes her mask and would like to try nasal pillows. The     patient states she is taking dulera and spiriva everyday however when she is     sick she is using brovana and budesonide nebulizer in place of her dulera. The     patient needs a refill on her dulera and spiriva inhalers. The patient states     she is using DuoNeb and albuterol inhaler as needed. The patient states she has     not had to take any antibiotics or steroids recently and denies any     hospitalizations.             I reviewed the Review of Systems, medical, surgical and family history and agree    with those as entered.      Copies To:   Mk Dowling      Constitutional:  Denies: Fatigue, Fever, Weight  gain, Weight loss, Chills,     Insomnia, Other      Respiratory/Breathing:  Complains of: Shortness of air, Wheezing, Cough; Denies:    Hemoptysis, Pleuritic pain, Other      Endocrine:  Denies: Polydipsia, Polyuria, Heat/cold intolerance, Abnorml     menstrual pattern, Diabetes, Other      Eyes:  Denies: Blurred vision, Vision Changes, Other      Ears, nose, mouth, throat:  Denies: Mouth lesions, Thrush, Throat pain, Marie    rseness, Allergies/Hay Fever, Post Nasal Drip, Headaches, Recent Head Injury,     Nose Bleeding, Neck Stiffness, Thyroid Mass, Hearing Loss, Ear Fullness, Dry     Mouth, Nasal or Sinus Pain, Dry Lips, Nasal discharge, Nasal congestion, Other      Cardiovascular:  Denies: Palpitations, Syncope, Claudication, Chest Pain, Wake     up Gasping for air, Leg Swelling, Irregular Heart Rate, Cyanosis, Dyspnea on     Exertion, Other      Gastrointestinal:  Denies: Nausea, Constipation, Diarrhea, Abdominal pain,     Vomiting, Difficulty Swallowing, Reflux/Heartburn, Dysphagia, Jaundice,     Bloating, Melena, Bloody stools, Other      Genitourinary:  Denies: Urinary frequency, Incontinence, Hematuria, Urgency,     Nocturia, Dysuria, Testicular problems, Other      Musculoskeletal:  Complains of: Joint Swelling, Other (SWELLING ALL OVER);     Denies: Joint Pain, Joint Stiffness, Myalgias      Hematologic/lymphatic:  DENIES: Lymphadenopathy, Bruising, Bleeding tendencies,     Other      Neurological:  Complains of: Headache; Denies: Numbness, Weakness, Seizures,     Other      Psychiatric:  Denies: Anxiety, Appropriate Effect, Depression, Other      Sleep:  No: Excessive daytime sleep, Morning Headache?, Snoring, Insomnia?, Stop    breathing at sleep?, Other      Integumentary:  Denies: Rash, Dry skin, Skin Warm to Touch, Other      Immunologic/Allergic:  Denies: Latex allergy, Seasonal allergies, Asthma, Urti    caria, Eczema, Other      Immunization status:  No: Up to date            FAMILY/SOCIAL/MEDICAL  HX      Current History      carpel tunnel , hernia repair 11      Surgical History:  Yes: Abdominal Surgery (hernia repair ), Oral Surgery (tounge    biopsy); No: Appendectomy, Bladder Surgery, Bowel Surgery, CABG,     Cholecystectomy, Head Surgery, Orthopedic Surgery, Vascular Surgery      Stroke - Family Hx:  Grandparent      Heart - Family Hx:  Grandparent      Diabetes - Family Hx:  Father      Is Father Still Living?:  Yes      Is Mother Still Living?:  Yes       Family History:  Yes      Social History:  No Tobacco Use, No Alcohol Use, No Recreational Drug use      Smoking status:  Former smoker      Number of Pregnancies:  2      Age at menopause:  40       Section:  Yes (2)      Hysterectomy:  Yes (partial [id number] and 11)      Anticoagulation Therapy:  No      Antibiotic Prophylaxis:  No      Medical History:  Yes: Allergies, Anemia, Arthritis, Asthma, Congestive Heart     Failu, Diabetes (type II), High Blood Pressure, High Cholesterol, Reflux     Disease, Shortness Of Breath (pulmonary hypertension); No: Alcoholism, Blood     Disease, Broken Bones, Cataracts, Chemical Dependency, Chemotherapy/Cancer,     Chronic Bronchitis/COPD, Emphysema, Chronic Liver Disease, Colon Trouble,     Colitis, Diverticulitis, Deafness or Ringing Ears, Convulsions, Depression, Anxi    ety, Bipolar Disorder, Epilepsy, Seizures, Forgetfullness, Glaucoma, Gall     Stones, Gout, Head Injury, Heart Attack, Heart Murmur, Hemorrhoids/Rectal Prob,     Hepatitis, Hiatal Hernia, HIV (Do not ask - volu, Jaundice, Kidney or Bladder     Disease, Kidney Stones, Migrane Headaches, Mitral Valve Prolapse, Night sweats,     Phlebitis, Psychiatric Care, Rheumatic Fever, Sexually Transmitted Dis, Sinus     Trouble, Skin Disease/Psoriais/Ecz, Stroke, Thyroid Problem, Tuberculosis or Pos    TB Te, Miscellaneous Medical/oth (highcholestrol, insomnia)            PREVENTION      Hx Influenza Vaccination:  Yes      Date Influenza  Vaccine Given:  Sep 1, 2019      Influenza Vaccine Declined:  No      2 or More Falls in Past Year?:  No      Fall Past Year with Injury?:  No      Hx Pneumococcal Vaccination:  Yes      Encouraged to follow-up with:  PCP regarding preventative exams.      Chart initiated by      Danica Arriola CMA            ALLERGIES/MEDICATIONS      Allergies:        Coded Allergies:             EXENATIDE (Verified  Allergy, Unknown, PANCREATITIS, 8/7/20)           INSULIN DEGLUDEC (Verified  Allergy, Unknown, 8/7/20)           NICKEL (Verified  Allergy, Unknown, RASH, 8/7/20)           SITAGLIPTIN (Verified  Allergy, Unknown, PANCREATITIS, 8/7/20)           METFORMIN (Verified  Adverse Reaction, Unknown, SEVERE DIARRHEA,     DEHYDRATION, 8/7/20)      Uncoded Allergies:             COLBALT BLUE (METAL) (Allergy, Unknown, RASH; DIARRHEA, STOMACH UPSET,     4/17/18)           WHITE GOLD (Allergy, Unknown, RASH, DIARRHEA, STOMACH UPSET, 4/17/18)      Medications    Last Reconciled on 8/7/20 15:10 by ZELALEM SANCHEZ       Mometasone/Formoterol (Dulera 200 Mcg/5 Mcg) 13 Gm Hfa.aer.ad      2 PUFFS INH RTBID, #1 MDI 0 Refills         Prov: ZELALEM SANCHEZ [facility]         8/7/20       Tiotropium Bromide (Spiriva Respimat 2.5 mcg/Puff) 4 Gm Mist.inhal      2 PUFFS INH RTQDAY, #1 MDI 6 Refills         Prov: ZELALEM SANCHEZ [facility]         8/7/20       MDI-Albuterol (Ventolin HFA) 18 Gm Hfa.aer.ad      2 PUFFS INH QID PRN for WHEEZING / SHORTNESS OF BREATH, #1 MDI 6 Refills         Prov: ZELALEM SANCHEZ [facility]         8/7/20       metOLazone (metOLazone) 2.5 Mg Tablet      2.5 MG PO QDAY, TAB         Reported         8/7/20       Bumetanide (BUMETANIDE) 2 Mg Tablet      2.5 MG PO BID, #60 TAB         Reported         8/7/20       traZODone HCl (traZODone HCl) 50 Mg Tablet      50 MG PO HS, #30 TAB 3 Refills         Prov: Mk Dowling         5/29/20       Oxygen (OXYGEN)  Gas      L NARE EACH QDAY PRN PRN for SHORTNESS OF BREATH, #2          Reported         11/9/19       INSULIN PUMP (at home) (INSULIN PUMP (at home))  Each      1 EACH, BAG         Reported         11/9/19       Cholecalciferol (Vitamin D3) (Vitamin D3) 1,000 Units Capsule      3000 UNITS PO QDAY, #90 CAP 0 Refills         Reported         11/9/19       Gabapentin (Gabapentin) 600 Mg Tablet      600 MG PO TID, #90 TAB 0 Refills         Reported         11/9/19       Sertraline HCl (Sertraline*) 100 Mg Tablet      100 MG PO QDAY, #30 TAB 0 Refills         Reported         11/9/19       Potassium Chloride ER (Potassium Chloride ER) 10 Meq Tablet.er      50 MEQ PO TID for 30 Days, #450 TAB.ER         Reported         11/9/19       Rosuvastatin Calcium (Crestor*) 40 Mg Tablet      40 MG PO HS, #30 TAB 0 Refills         Reported         6/4/19       Levothyroxine (Levothyroxine) 0.15 Mg Tablet      0.15 MG PO QDAY@07, #30 TAB 0 Refills         Reported         6/4/19       Metoprolol Succinate (Metoprolol Succinate) 25 Mg Tab.er.24h      25 MG PO HS, #30 TAB 0 Refills         Reported         4/23/19       Amitriptyline HCl (Amitriptyline HCl) 50 Mg Tablet      50 MG PO HS, #30 TAB         Reported         4/13/19       Losartan Potassium (Losartan*) 50 Mg Tablet      50 MG PO QDAY, #30 TAB 0 Refills         Reported         4/13/19       Insulin Regular Human Rec (HumuLIN R 500 VIAL) 500 Unit/1 Ml Vial      SUBQ BID INSULIN, #1 VIAL 0 Refills         Reported         12/18/18       Promethazine Hcl (Phenergan*) 25 Mg Tablet      25 MG PO Q6H PRN for NAUSEA, #90 TAB 3 Refills         Prov: Angela Mansfield PA-C         10/22/18       Albuterol/Ipratropium (Duoneb) 3 Ml Ampul.neb      3 ML INH Q4H PRN for SHORTNESS OF BREATH, #120 NEB 6 Refills         Prov: Angela Mansfield PA-C         6/12/18       Neb-Budesonide (Pulmicort) 0.5 Mg/2 Ml Ampul.neb      0.5 MG INH BID, #60 NEB 4 Refills         Prov: Angela Mansfield PA-C         4/17/18       Arformoterol Tartrate (Brovana) 15 Mcg/2 Ml  Vial.neb      15 MCG INH BID, #60 NEB 4 Refills         Prov: Angela Mansfield PA-C         4/17/18       Fexofenadine Hcl (Fexofenadine Hcl) 180 Mg Tablet      180 MG PO BID, #60 TAB 0 Refills         Reported         4/12/18       SUMAtriptan Succinate (Imitrex) 100 Mg Tab      100 MG PO ASDIR PRN for MIGRAINE, TAB         Reported         4/12/18       Spironolactone (Aldactone) 25 Mg Tablet      50 MG PO BID, #60 TAB 3 Refills         Prov: Mk Dowling         10/23/17       Multivitamins (Multi-Vitamin) 1 Each Tablet      1 TAB PO QDAY, #30 TAB 0 Refills         Reported         6/5/17       celeCOXIB (CeleBREX) 200 Mg Capsule      200 MG PO BID, #60 CAP 0 Refills         Reported         6/5/17       Dicyclomine Hcl (DICYCLOMINE HCL) Unknown Strength Tablet      20 MG PO BID PRN for DIARRHEA, TAB         Reported         6/5/17       Montelukast Sodium (Singulair*) 10 Mg Tablet      10 MG PO HS, TAB         Reported         1/20/14      Current Medications      Current Medications Reviewed 8/7/20            EXAM      GEN- morbidly obese female, well developed, well nourished sittig in motorized     scooter  in no acute distress      Eyes-PERRL,  conjunctiva are normal in appearance extraocular muscles are     intact, no scleral icterus      Lymphatic-no swollen or enlarged cervical nodes, or axillary node, or femoral     nodes, or supraclavicular nodes      Mouth normal dentition, no erythema no ulcerations oropharynx appears normal no     exudate no evidence of postnasal drip, MP       Neck-there are no palpable supraclavicular or cervical adenopathy, thyroid is     normal in appearance no apparent nodules, there is no inspiratory or expiratory     stridor      Respiratory-Lungs clear to auscultation bilaterally, no wheezes, rales or     rhonchi, normal work of breathing noted, patient able to speak full sentences     without difficulty.        Cardiovascular-the heart rate is normal and regular S1 and S2  present with no     murmur or extra heart sounds, there is no JVD.       GI-the abdomen is normal in appearance, bowel sounds present and normal in all     quadrants no hepatosplenomegaly or masses felt      Extremities-no clubbing is present, pulses present in all extremities, capillary    refill time is normal. There is significant bilateral pitting edema present past    the knees with ACE wraps in place. Negative Kendall's sign bilaterally.  No signs    of lymphangitis, no calf tenderness.       Musculoskeletal-Normal strength in upper and lower extremities, inspection shows    no evidence of muscle atrophy      Skin-skin is normal in appearance it is warm and dry, no rashes present, no     evidence of cyanosis, palpation reveals no masses      Neurological-the patient is alert and oriented to time place and person, moves     all 4 extremities, normal gait, normal affect and mood, CN2-12 intact      Psych-normal judgment and insight is good, normal mood and affect, alert and     oriented to person, place, and time, and date      Vtials      Vitals:             Height 5 ft 2.00 in / 157.48 cm           Temperature 98.9 F / 37.17 C - Temporal           Pulse 100           Respirations 15           Blood Pressure 120/60 Sitting, Right Arm           Pulse Oximetry 95%, nasal canula , 2 lpm            REVIEW      Results Reviewed      PCCS Results Reviewed?:  Yes Prev Lab Results, Yes Prev Radiology Results, Yes     Previous Mecial Records      Lab Results      I personally reviewed Dr. Dowling's last office visit note.            Assessment      Notes      New Medications      * BUMETANIDE 2 MG TABLET: 2.5 MG PO BID #60      * metOLazone 2.5 MG TABLET: 2.5 MG PO QDAY      * UMECLIDINIUM BROMIDE (Incruse Ellipta) 62.5 MCG BLST.W.DEV: 1 PUFF INH RTQDAY       30 Days #1         Instructions: TO REPLACE SPIRIVA, NOT COVD      * Fluticasone/Vilanterol 200-25 Mcg Inh (Breo Ellipta 200-25 Mcg Inh) 1 EACH       BLST.W.DEV: 1  PUFF INH QDAY 30 Days #1      Renewed Medications      * MDI-Albuterol (Ventolin HFA) 18 GM HFA.AER.AD: 2 PUFFS INH QID PRN WHEEZING /       SHORTNESS OF BREATH #1         Dx: History of recurrent pulmonary infection - Z86.19      Discontinued Medications      * metOLazone 2.5 MG TABLET: 2.5 MG PO MOWEFR 30 Days #30      * BUMETANIDE 2 MG TABLET: 2 MG PO BID #60      * TIOTROPIUM BROMIDE (Spiriva Respimat 2.5 mcg/Puff) 4 GM MIST.INHAL: 2 PUFFS       INH RTQDAY #1      * Mometasone/Formoterol (Dulera 200 Mcg/5 Mcg) 13 GM HFA.AER.AD: 2 PUFFS INH       RTBID #1      IMPRESSION:      1. Mild intermittent asthma.       2. Insomnia.       3. Obstructive sleep apnea, patient on BiPAP.       4. Morbid obesity with BMI over 70.       5. Pulmonary hypertension secondary to obstructive sleep apnea and secondary     diastolic heart failure the patient is under the care of Dr. Myers for     diastolic heart failure.       6. History of mycobacterium avium infection.             PLAN:      1. Continue dulera and spiriva everyday as prescribed and rinse her mouth after     each use. The patient states she only uses her brovana and budesonide if she     becomes sick. The patient is advised not to use brovana and budesonide on the     days she is using dulera. The patient verbalized understanding and compliance.       2. Continue albuterol inhaler and DuoNeb as needed.       3. Upon reviewing the patient's BIPAP compliance report apnea hypopnea index is     6.4. The patient would like to try nasal mask before having settings switched     because she feels her full face mask leaks. I will have our clinical coordinator    Seth set the patient up with nasal pillows.       4. The patient would like to have a portable oxygen concentrator. The patient     states oxygen tanks are too heavy and having to sit in a motorized scooter and     due to her lymphedema and pulmonary hypertension and congestive heart failure it    is hard for her  to carry around heavy tanks. The patient would greatly benefit     from this because this would help her to become more active and perform her     activities of daily living. I will ask our clinical coordinator Seth to set the    patient up with a portable oxygen concentrator.       5. I spent three minutes counseling patient on diet and exercise. Patient's BMI     and weight were discussed.  The patient was counseled on initiating and     intensifying attempts to lose weight.  Patient was counseled about the risks of     obesity and advised to decrease caloric intake by 500 calories a day, eat three     small meals a day and advised to minimize snacking.  I recommended 30-60 minutes    of daily exercise.  The patient refuses referral to dietitian at this time.       6. The patient is advised to call the office, call 911 or go to the ER for any     new or worsening symptoms.       7. Continue supplemental oxygen to keep oxygen saturation at or above 89%.       8. Follow up with Dr. Myers for congestive heart failure and management of     diuretics as scheduled.       9. For pulmonary hypertension secondary to diastolic heart failure, continue     treatment of heart failure.       10. The patient reports she is up to date with flu and pneumonia vaccines. The p    atient is advised  to receive the new flu vaccine when it comes out later this     month. The patient verbalized understanding and compliance.       11. Follow up with Dr. Dowling in 2-3 months sooner if needed.            Patient Education      ACO BMI High above 25:  Counseling Given, Encouraged weight loss, Encourage     dietary changes, Referred to dietician      Patient Education Provided:  COPD      Time Spent:  > 50% /Coord Care            Electronically signed by [facility]ANC[facility]Z [facility]  08/11/2020 08:09       Disclaimer: Converted document may not contain table formatting or lab diagrams. Please see [employer] for the  authenticated document.

## 2021-05-28 NOTE — PROGRESS NOTES
Patient: AYLIN RAMOS     Acct: [id number]     Report: #[id number]  UNIT #: [id number]     : 1970    Encounter Date:2019  PRIMARY CARE: FILIBERTO BATISTA  ***Signed***  --------------------------------------------------------------------------------------------------------------------  Chief Complaint      Encounter Date      2019            Primary Care Provider      FILIBERTO BATISTA            Referring Provider      ROBERTO GARCIA            Patient Complaint      Patient is complaining of      Pt here for 2m f/u, copd            VITALS      Height 5 ft 2 in / 157.48 cm      Weight 413 lbs 2 oz / 187.25535 kg      BSA 2.60 m2      BMI 75.6 kg/m2      Temperature 98.7 F / 37.06 C - Oral      Pulse 101      Respirations 16      Blood Pressure 146/70 Sitting, Left Arm      Pulse Oximetry 94%, Room air            HPI      The patient is a very pleasant 48 year old female here today for follow up.  The    patient is doing better since last office visit and is continuing to have     frequent exacerbations of her asthma.  She has significant allergic rhinitis     with nasal congestion.  She is allergic to cats and has cats actively living in     her house.  The patient had pulmonary function studies since last office visit     that shows the presence of significant asthma with significant reversible     component.  She still has her daily shortness of breath, but no worse than her     baseline.            ROS      Constitutional:  Denies: Fatigue, Fever, Weight gain, Weight loss, Chills,     Insomnia, Other      Respiratory/Breathing:  Complains of: Shortness of air, Wheezing, Cough; Denies:    Hemoptysis, Pleuritic pain, Other      Endocrine:  Denies: Polydipsia, Polyuria, Heat/cold intolerance, Abnorml     menstrual pattern, Diabetes, Other      Eyes:  Denies: Blurred vision, Vision Changes, Other      Ears, nose, mouth, throat:  Denies: Congestion, Dysphagia, Hearing Changes, Nose     Bleeding, Nasal Discharge, Throat pain, Tinnitus, Other      Cardiovascular:  Denies: Chest Pain, Exertional dyspnea, Peripheral Edema,     Palpitations, Syncope, Wake up Gasping for air, Orthopnea, Tachycardia, Other      Gastrointestinal:  Denies: Abdominal pain/cramping, Bloody stools, Constipation,    Diarrhea, Melena, Nausea, Vomiting, Other      Genitourinary:  Denies: Dysuria, Urinary frequency, Incontinence, Hematuria,     Urgency, Other      Musculoskeletal:  Denies: Joint Pain, Joint Stiffness, Joint Swelling, Myalgias,    Other      Hematologic/lymphatic:  DENIES: Lymphadenopathy, Bruising, Bleeding tendencies,     Other      Neurologic:  Denies: Headache, Numbness, Weakness, Seizures, Other      Psychiatric:  Denies: Anxiety, Appropriate Effect, Depression, Other      Sleep:  No: Excessive daytime sleep, Morning Headache?, Snoring, Insomnia?, Stop    breathing at sleep?, Other      Integumentary:  Denies: Rash, Dry skin, Skin Warm to Touch, Other            FAMILY/SOCIAL/MEDICAL HX      Current History      carpel tunnel 97/98, hernia repair 11      Surgical History:  Yes: Abdominal Surgery (ABD HERNIA ), Orthopedic Surgery     (RACHAEL CARPAL TUNNEL , RT MENISCUS TEAR REPAIR)      Stroke - Family Hx:  Grandparent      Heart - Family Hx:  Grandparent      Diabetes - Family Hx:  Father      Is Father Still Living?:  Yes      Is Mother Still Living?:  Yes       Family History:  Yes      Social History:  No Tobacco Use, No Alcohol Use, No Recreational Drug use      Smoking status:  Former smoker (1ppd x 20 years)      Number of Pregnancies:  2      Age at menopause:  40       Section:  Yes (2)      Hysterectomy:  Yes (partial [id number] and 11)      Anticoagulation Therapy:  No      Antibiotic Prophylaxis:  No      Medical History:  Yes: Allergies, Anemia, Arthritis (SPINE AND KNEES), Asthma     (ALLERGY INDUCED), Blood Disease (ANEMIA), Chronic Bronchitis/COPD, Diabetes     (DMII),  Hemorrhoids/Rectal Prob (ACID REFLUX, RLQ PAIN, NAUSEA, DIARRHEA), High     Blood Pressure (MED CONTROLS), High Cholesterol, Reflux Disease, Shortness Of     Breath, Miscellaneous Medical/oth (OBESITY, HYPOTHYROID); No: Alcoholism, Broken    Bones, Cataracts, Chemical Dependency, Chemotherapy/Cancer, Emphysema, Chronic     Liver Disease, Colon Trouble, Colitis, Diverticulitis, Congestive Heart Failu,     Deafness or Ringing Ears, Convulsions, Depression, Anxiety, Bipolar Disorder,     Epilepsy, Seizures, Forgetfullness, Glaucoma, Gall Stones, Gout, Head Injury,     Heart Attack, Heart Murmur, Hepatitis, Hiatal Hernia, HIV (Do not ask - volu,     Jaundice, Kidney or Bladder Disease, Kidney Stones, Migrane Headaches, Mitral     Valve Prolapse, Night sweats, Phlebitis, Psychiatric Care, Rheumatic Fever,     Sexually Transmitted Dis, Sinus Trouble, Skin Disease/Psoriais/Ecz, Stroke,     Thyroid Problem, Tuberculosis or Pos TB Te      Psychiatric History      None            PREVENTION      Hx Influenza Vaccination:  Yes      Date Influenza Vaccine Given:  Sep 1, 2018      Influenza Vaccine Declined:  No      2 or More Falls Past Year?:  No      Fall Past Year with Injury?:  No      Hx Pneumococcal Vaccination:  Yes      Encouraged to follow-up with:  PCP regarding preventative exams.      Chart initiated by      Kellen Villa MA            ALLERGIES/MEDICATIONS      Allergies:        Coded Allergies:             EXENATIDE (Verified  Allergy, Severe, PANCREATITIS, 2/5/19)           INSULIN DEGLUDEC (Verified  Allergy, Severe, 2/5/19)           SITAGLIPTIN (Verified  Allergy, Severe, PANCREATITIS, 2/5/19)           NICKEL (Verified  Allergy, Unknown, RASH, 2/5/19)           METFORMIN (Verified  Adverse Reaction, Unknown, SEVERE DIARRHEA,     DEHYDRATION, 2/5/19)      Uncoded Allergies:             COLBALT BLUE (METAL) (Allergy, Unknown, RASH; DIARRHEA, STOMACH UPSET,     4/17/18)           WHITE GOLD (Allergy, Unknown,  RASH, DIARRHEA, STOMACH UPSET, 4/17/18)      Medications    Last Reconciled on 2/5/19 08:25 by EDGAR DOWLING MD      Bumetanide (BUMETANIDE) 2 Mg Tablet      2 MG PO BID, #60 TAB 3 Refills         Prov: Edgar Dowling         1/22/19       traZODone HCl (Desyrel) 50 Mg Tablet      25 MG PO HS for 30 Days, #30 TAB 3 Refills         Prov: Edgar Dowling         12/19/18       Azithromycin (Azithromycin) 250 Mg Tablet      250 MG PO QDAY, #30 TAB 6 Refills         Prov: Edgar Dowling         12/18/18       Lactobacillus Acidophilus (Florajen) 460 Mg Capsule      460 MG PO BID, #60 CAP 6 Refills         Prov: Edgar Dowling         12/18/18       Losartan Potassium (Losartan*) 25 Mg Tablet      25 MG PO QDAY, #30 TAB 0 Refills         Reported         12/18/18       Insulin Regular Human Rec (HumuLIN R-500 VIAL*) Unknown Strength Vial      SUBQ BID INSULIN, #1 VIAL 0 Refills         Reported         12/18/18       Lovastatin (Lovastatin) 20 Mg Tablet      40 MG PO QDAY for 30 Days, #60 TAB         Reported         12/18/18       Amitriptyline HCl (Amitriptyline HCl) 25 Mg Tablet      25 MG PO QDAY, #30 TAB         Reported         12/18/18       Promethazine Hcl (Phenergan*) 25 Mg Tablet      25 MG PO Q6H PRN for NAUSEA, #90 TAB 3 Refills         Prov: Angela Mansfield PA-C         10/22/18       Triamcinolone Acetonide 0.1% Oint (Triamcinolone Acetonide 0.1% Oint) 454 Gm     Oint...g.      1 APL TOPICAL BID for 30 Days, #1 TUBE 3 Refills         Prov: Angela Mansfield PA-C         9/7/18       Potassium Chloride (K-Tab*) 10 Meq Tablet.er      20 MEQ PO BID, TAB         Reported         9/7/18       Tiotropium Bromide (Spiriva Respimat 2.5 mcg/Puff) 4 Gm Mist.inhal      2 PUFFS INH RTQDAY, #1 MDI 6 Refills         Prov: Edgar Dowling         8/23/18       Bumetanide (BUMETANIDE) 1 Mg Tablet      2 MG PO BID, #60 TAB 4 Refills         Prov: RADHA CONNELLY         8/2/18       Ethambutol HCl (Myambutol *)  400 Mg Tab      1600 MG PO MOWEFR, #120 TAB 6 Refills         Prov: Mk Dowling         6/27/18       Rifampin (Rifampin) 300 Mg Cap      600 MG PO MOWEFR, #60 CAP 6 Refills         Prov: Mk Dowling         6/27/18       MDI-Albuterol (Ventolin HFA) 18 Gm Hfa.aer.ad      2 PUFFS INH QID PRN for WHEEZING / SHORTNESS OF BREATH, #1 MDI 6 Refills         Prov: Angela Mansfield PA-C         6/12/18       Albuterol/Ipratropium (Duoneb) 3 Ml Ampul.neb      3 ML INH Q4H PRN for SHORTNESS OF BREATH, #120 NEB 6 Refills         Prov: Angela Mansfield PA-C         6/12/18       Nebulizer Accessories (Nebulizer Kit RANDI) 1 Each Kit      EACH XX ONCE, #1 0 Refills         Prov: Angela Mansfield PA-C         4/17/18       Neb-Budesonide (Pulmicort) 0.5 Mg/2 Ml Ampul.neb      0.5 MG INH BID, #60 NEB 4 Refills         Prov: Angela Mansfield PA-C         4/17/18       Arformoterol Tartrate (Brovana) 15 Mcg/2 Ml Vial.neb      15 MCG INH BID, #60 NEB 4 Refills         Prov: Angela Mansfield PA-C         4/17/18       Cholecalciferol (Vitamin D3*) 2,000 Unit Tablet      2000 UNITS PO QDAY, #30 TAB         Reported         4/17/18       Cholecalciferol (Vitamin D*) 2,000 Unit Cap      1000 UNITS PO QDAY, #30 CAP 0 Refills         Reported         4/12/18       Fexofenadine Hcl (Fexofenadine Hcl) 180 Mg Tablet      180 MG PO BID, #60 TAB 0 Refills         Reported         4/12/18       SUMAtriptan Succinate (Imitrex) 100 Mg Tab      100 MG PO ASDIR PRN for MIGRAINE, TAB         Reported         4/12/18       Spironolactone (Aldactone) 25 Mg Tablet      50 MG PO BID, #60 TAB 3 Refills         Prov: Mk Dowling         10/23/17       Gabapentin (Gabapentin) 300 Mg Capsule      600 MG PO TID, #60 CAP 0 Refills         Reported         6/8/17       Ondansetron Hcl (Ondansetron*) Unknown Strength Tablet      4 MG PO Q4H PRN for NAUSEA, TAB 0 Refills         Reported         6/5/17       Pantoprazole (Protonix*) Unknown Strength  Tablet.dr      40 MG PO BID, #30 TAB 0 Refills         Reported         6/5/17       Multivitamins (Multi-Vitamin) Unknown Strength Tablet      PO QDAY, #30 TAB 0 Refills         Reported         6/5/17       celeCOXIB (CeleBREX) Unknown Strength Capsule      200 MG PO BID, #60 CAP 0 Refills         Reported         6/5/17       Dicyclomine Hcl (DICYCLOMINE HCL) Unknown Strength Tablet      20 MG PO BID PRN for DIARRHEA, TAB         Reported         6/5/17       Sertraline HCl (Sertraline*) 50 Mg Tablet      100 MG PO QDAY, #30 TAB 0 Refills         Reported         12/19/14       Levothyroxine (Levothyroxine) 0.1 Mg Tablet      125 MG PO QDAY@07, #30 TAB 0 Refills         Reported         12/19/14       Montelukast Sodium (Singulair*) 10 Mg Tablet      10 MG PO HS, TAB         Reported         1/20/14      Current Medications      Current Medications Reviewed 2/5/19            EXAM      GEN-patient is very pleasant but very morbidly obese lady resting comfortable in    no acute distress      Eyes-PERRL,  conjunctiva are normal in appearance extraocular muscles are     intact, no scleral icterus      Lymphatic-no swollen or enlarged cervical nodes, or axillary node, or femoral     nodes, or supraclavicular nodes      Mouth normal dentition, no erythema no ulcerations oropharynx appears normal no     exudate no evidence of postnasal drip, MP IV.        Neck-there are no palpable supraclavicular or cervical adenopathy, thyroid is     normal in appearance no apparent nodules, there is no inspiratory or expiratory     stridor      Respiratory-patient exhibits normal work of breathing, speaking in full     sentences without difficulty, the chest is normal in appearance, clear to     auscultation with no wheezes rales or rhonchi, chest is normal to percussion on     both the right and left sides      Cardiovascular-the heart rate is normal and regular S1 and S2 present with no     murmur or extra heart sounds, there is no  JVD, significant bilateral pitting     edema past the knees.        GI-the abdomen is normal in appearance, bowel sounds present and normal in all     quadrants no hepatosplenomegaly or masses felt      Extremities-no clubbing is present, pulses present in all extremities, capillary    refill time is normal      Skin-skin is normal in appearance it is warm and dry, no rashes present, no     evidence of cyanosis, palpation reveals no masses      Neurological-the patient is alert and oriented to time place and person, moves     all 4 extremities, normal gait, normal affect and mood, CN2-12 intact      Psych-normal judgment and insight is good, normal mood and affect, alert and     oriented to person, place, and time, and date      Vitals      Vitals:             Height 5 ft 2 in / 157.48 cm           Weight 413 lbs 2 oz / 187.05313 kg           BSA 2.60 m2           BMI 75.6 kg/m2           Temperature 98.7 F / 37.06 C - Oral           Pulse 101           Respirations 16           Blood Pressure 146/70 Sitting, Left Arm           Pulse Oximetry 94%, Room air            REVIEW      Results Reviewed      [facility] Results Reviewed?:  Yes Prev Lab Results, Yes Prev Radiology Results, Yes     Previous [facility] Records            Assessment      Seasonal allergies - J30.2            Notes      Changed Medications      * POTASSIUM CHLORIDE (K-Tab*) 10 MEQ TABLET.ER: 20 MEQ PO BID      Discontinued Medications      * Azithromycin (Zithromax) 250 MG TABLET: 500 MG PO ASDIR #30         Instructions: take 500mg by mouth Mondays, Wensdays and Fridays      * Amoxicillin/Clavulanic Acid 875/125 (Augmentin 875/125) 1 EACH TABLET: 875 MG       PO BID 7 Days #14      * predniSONE* 20 MG TABLET: 40 MG PO QDAY 7 Days #14      New Referrals      * Immunology/Allergy, SCHEDULED PROCEDURE         RANDY ESPINAL         Dx: Seasonal allergies - J30.2      ASSESSMENT/PLAN:       1.  MAC.  Continue triple drug therapy at this time.  Repeat bronchoscopy  in     June to make sure that this is clearing.      2.  Severe persistent uncontrolled asthma. Continue current bronchodilator     therapies, currently on inhaled corticosteroids, LABA/LAMA, antibiotics,     nebulizers. Patient may be a candidate for a biological agent, however I am not     wanting to do this at this time given the fact that she has been undergoing     active treatment for her CHRISTIANO.      3.  Chronic diastolic heart failure. Continue diuresis and Aldactone.      4.  Pulmonary hypertension.  Secondary to diastolic heart failure, continue     treatment of heart failure.      5. Morbid obesity with body mass index of 75.6.      6.  Patient continues to have weight gain. This is likely a significant     contributor to her shortness of breath.  When the patient's acute infection has     been cleared, we will send patient for evaluation for bariatric surgery.      7. We will see patient back in two months.      8.  I have personally reviewed laboratory data, imaging as well as previous     medical records.            Patient Education      Education resources provided:  Yes      Patient Education Provided:  Acute Bronchitis                 Disclaimer: Converted document may not contain table formatting or lab diagrams. Please see [employer] for the authenticated document.

## 2021-05-28 NOTE — PROGRESS NOTES
Patient: AYLIN RAMOS     Acct: [id number]     Report: #[id number]  UNIT #: [id number]     : 1970    Encounter Date:2018  PRIMARY CARE: FILIBERTO BATISTA  ***Signed***  --------------------------------------------------------------------------------------------------------------------  Chief Complaint      Encounter Date      Aug 8, 2018            Primary Care Provider      FILIBERTO BATISTA            Referring Provider      ROBERTO GARCIA            Patient Complaint      Patient is complaining of      LAN            TRANSITION OF CARE 7 D      Transition of Care      LAN 7 Day Followup      Aylin presents to office for follow up post discharge from inpatient status     within 7 calendar days. Patient was contacted within 2 business days via phone     conversation. Documentation of that phone call is present in the patient's     electronic chart. She  was admitted to inpatient facility on 18 and was     discharged on 18due to:  .            Admitting MD:       FILIBERTO BATISTA       Hers  discharge summary has been reviewed and placed in the patient's electronic    chart.            Final Diagnosis      Shortness of breath       MAC      GIUSEPPE      Morbid Obesity            Problem List      Problem List Reviewed      Patient's problem list has been reviewed from patient discharge summary.            Medications Reviewed      Meds Reviewed      Patient €™s outpatient medication list has been reconciled with the medication     list from the discharge summary and has been reviewed with the patient.            VITALS      Height 5 ft 2 in / 157.48 cm      Weight 421 lbs 5 oz / 191.450337 kg      BSA 3.04 m2      BMI 77.1 kg/m2      Temperature 98.7 F / 37.06 C - Oral      Pulse 110      Respirations 14      Blood Pressure 146/75 Sitting, Right Arm      Pulse Oximetry 94%, 2 liters            HPI      The patient is a pleasant 47-year-old white male who is a patient of Dr. Nitesh Dasilvas who  has had significant issues with volume overload from chronic     diastolic heart failure. She also does have a history of mild pulmonary     hypertension, obesity hypoventilation syndrome, and obstructive sleep apnea. She    had been seen several times by me and Dr. Dowling in the office and her     diuretics were continually increased. However, she continued to gain weight and     continued to be more short of breath with increasing edema in her abdomen and     lower extremities, so at  her last visit on 08/01/2018, I had her admitted to     [facility] for IV diuresis. She was admitted by Dr. Abhijit Swenson and was     given IV Bumex for a couple of days. She tells me that her weight started to     significantly go down, but then it has come right back up. She tells me that her    weigh was about 407 when she left the hospital but  not it is back up to 421     again. She is complaining of again increased lower extremity edema, tenderness     of her lower extremities, shortness of breath, and orthopnea. She denies     coughing or wheezing. She has not been seen by a cardiologist before, but has     been noted to have prior mild pulmonary hypertension and normal systolic     function. She tells me she is currently taking Bumex 1 mg p.o. b.i.d. and makes     adequate urine with this, but continues to gain weight and be short of breath.     She is also being treated for mycobacterium avium intracellulare infection with     rifampin, ethambutol, and azithromycin.             I have reviewed her review of systems, medical, surgical, and family history and    agree with those as entered.            ROS      Constitutional:  Denies: Fatigue, Fever, Weight gain, Weight loss, Chills,     Insomnia, Other      Respiratory/Breathing:  Complains of: Shortness of air; Denies: Wheezing, Cough,    Hemoptysis, Pleuritic pain, Other      Endocrine:  Denies: Polydipsia, Polyuria, Heat/cold intolerance, Abnorml     menstrual  pattern, Diabetes, Other      Eyes:  Denies: Blurred vision, Vision Changes, Other      Ears, nose, mouth, throat:  Denies: Congestion, Dysphagia, Hearing Changes, Nose    Bleeding, Nasal Discharge, Throat pain, Tinnitus, Other      Cardiovascular:  Denies: Chest Pain, Exertional dyspnea, Peripheral Edema,     Palpitations, Syncope, Wake up Gasping for air, Orthopnea, Tachycardia, Other      Gastrointestinal:  Denies: Abdominal pain/cramping, Bloody stools, Constipation,    Diarrhea, Melena, Nausea, Vomiting, Other      Genitourinary:  Denies: Dysuria, Urinary frequency, Incontinence, Hematuria,     Urgency, Other      Musculoskeletal:  Complains of: Other (LEG PAIN ); Denies: Joint Pain, Joint     Stiffness, Joint Swelling, Myalgias      Hematologic/lymphatic:  DENIES: Lymphadenopathy, Bruising, Bleeding tendencies,     Other      Neurologic:  Denies: Headache, Numbness, Weakness, Seizures, Other      Psychiatric:  Denies: Anxiety, Appropriate Effect, Depression, Other      Sleep:  Yes: Insomnia?; No: Excessive daytime sleep, Morning Headache?, Snoring,    Stop breathing at sleep?, Other      Integumentary:  Denies: Rash, Dry skin, Skin Warm to Touch, Other            FAMILY/SOCIAL/MEDICAL HX      Current History      carpel tunnel 97/98, hernia repair 11      Surgical History:  Yes: Abdominal Surgery (ABD HERNIA ), Orthopedic Surgery     (RACHAEL CARPAL TUNNEL , RT MENISCUS TEAR REPAIR)      Stroke - Family Hx:  Grandparent      Heart - Family Hx:  Grandparent      Diabetes - Family Hx:  Father      Is Father Still Living?:  Yes      Is Mother Still Living?:  Yes       Family History:  Yes      Social History:  No Tobacco Use, No Alcohol Use, No Recreational Drug use      Smoking status:  Former smoker (1ppd x 20 years)      Number of Pregnancies:  2      Age at menopause:  40       Section:  Yes (2)      Hysterectomy:  Yes (partial [id number] and 11)      Anticoagulation Therapy:  No      Antibiotic  Prophylaxis:  No      Medical History:  Yes: Allergies, Anemia, Arthritis (SPINE AND KNEES), Asthma     (ALLERGY INDUCED), Blood Disease (ANEMIA), Chronic Bronchitis/COPD, Diabetes     (DMII), Hemorrhoids/Rectal Prob (ACID REFLUX, RLQ PAIN, NAUSEA, DIARRHEA), High     Blood Pressure (MED CONTROLS), High Cholesterol, Reflux Disease, Shortness Of     Breath, Miscellaneous Medical/oth (OBESITY, HYPOTHYROID); No: Alcoholism, Broken    Bones, Cataracts, Chemical Dependency, Chemotherapy/Cancer, Emphysema, Chronic     Liver Disease, Colon Trouble, Colitis, Diverticulitis, Congestive Heart Failu,     Deafness or Ringing Ears, Convulsions, Depression, Anxiety, Bipolar Disorder,     Epilepsy, Seizures, Forgetfullness, Glaucoma, Gall Stones, Gout, Head Injury,     Heart Attack, Heart Murmur, Hepatitis, Hiatal Hernia, HIV (Do not ask - volu,     Jaundice, Kidney or Bladder Disease, Kidney Stones, Migrane Headaches, Mitral     Valve Prolapse, Night sweats, Phlebitis, Psychiatric Care, Rheumatic Fever,     Sexually Transmitted Dis, Sinus Trouble, Skin Disease/Psoriais/Ecz, Stroke,     Thyroid Problem, Tuberculosis or Pos TB Te      Psychiatric History       NONE            PREVENTION      Hx Influenza Vaccination:  Yes      Date Influenza Vaccine Given:  Sep 1, 2017      Influenza Vaccine Declined:  No      2 or More Falls Past Year?:  No      Fall Past Year with Injury?:  No      Hx Pneumococcal Vaccination:  Yes      Encouraged to follow-up with:  PCP regarding preventative exams.      Chart initiated by      JOAO MANN MA            ALLERGIES/MEDICATIONS      Allergies:        Coded Allergies:             EXENATIDE (Verified  Allergy, Severe, PANCREATITIS, 8/8/18)           INSULIN DEGLUDEC (Verified  Allergy, Severe, 8/8/18)           SITAGLIPTIN (Verified  Allergy, Severe, PANCREATITIS, 8/8/18)           NICKEL (Verified  Allergy, Unknown, RASH, 8/8/18)           METFORMIN (Verified  Adverse Reaction, Unknown, SEVERE  DIARRHEA,     DEHYDRATION, 8/8/18)      Uncoded Allergies:             COLBALT BLUE (METAL) (Allergy, Unknown, RASH; DIARRHEA, STOMACH UPSET,     4/17/18)           WHITE GOLD (Allergy, Unknown, RASH, DIARRHEA, STOMACH UPSET, 4/17/18)      Medications    Last Reconciled on 8/9/18 11:02 by JESSE RALPH      Bumetanide (Bumex) 1 Mg Tablet      2 MG PO BID, #60 TAB 4 Refills         Prov: RADHA CONNELLY         8/2/18       Dapagliflozin Propanediol (Farxiga) 10 Mg Tablet      10 MG PO QDAY, #30 TAB 0 Refills         Reported         7/16/18       Promethazine Hcl (Phenergan*) 25 Mg Tablet      25 MG PO Q6H Y for NAUSEA, #90 TAB 3 Refills         Prov: Mk Dowling         6/27/18       Ethambutol HCl (Myambutol *) 400 Mg Tab      1600 MG PO MOWEFR, #120 TAB 6 Refills         Prov: Mk Dowling         6/27/18       Rifampin (Rifampin) 300 Mg Cap      600 MG PO MOWEFR, #60 CAP 6 Refills         Prov: Mk Dowling         6/27/18       Azithromycin (Zithromax) 250 Mg Tablet      500 MG PO ASDIR, #30 TAB 6 Refills         Prov: Mk Dowling         6/27/18       MDI-Albuterol (Ventolin HFA*) 18 Gm Hfa.aer.ad      2 PUFFS INH QID Y for WHEEZING / SHORTNESS OF BREATH, #1 MDI 6 Refills         Prov: Angela Mansfield PA-C         6/12/18       Albuterol/Ipratropium (Duoneb) 3 Ml Ampul.neb      3 ML INH Q4H Y for SHORTNESS OF BREATH, #120 NEB 6 Refills         Prov: Angela Mansfield PA-C         6/12/18       traZODone HCl (Desyrel) 50 Mg Tablet      25 MG PO HS for 30 Days, #30 TAB 3 Refills         Prov: Mk Dowling         5/14/18       Nebulizer Accessories (Nebulizer Kit RANDI) 1 Each Kit      EACH XX ONCE, #1 0 Refills         Prov: Angela Mansfield PA-C         4/17/18       Neb-Budesonide (Pulmicort) 0.5 Mg/2 Ml Ampul.neb      0.5 MG INH BID, #60 NEB 4 Refills         Prov: Angela Mansfield PA-C         4/17/18       Arformoterol Tartrate (Brovana) 15 Mcg/2 Ml Vial.neb      15 MCG INH BID,  #60 NEB 4 Refills         Prov: Angela Mansfield PA-C         4/17/18       Tiotropium Bromide (Spiriva Respimat 2.5 mcg/Puff) 4 Gm Mist.inhal      2 PUFFS INH RTQDAY, #1 MDI 0 Refills         Reported         4/17/18       Cholecalciferol (Vitamin D3*) 2,000 Unit Tablet      2000 UNITS PO QDAY, #30 TAB         Reported         4/17/18       Cholecalciferol (Vitamin D*) 2,000 Unit Cap      1000 UNITS PO QDAY, #30 CAP 0 Refills         Reported         4/12/18       Fexofenadine Hcl (Fexofenadine Hcl) 180 Mg Tablet      180 MG PO BID, #60 TAB 0 Refills         Reported         4/12/18       Fluorometholone (FML 0.1% Ophth) 3.5 Gm Oint...g.      1 APL EYE EACH BID, TUBE         Reported         4/12/18       Liraglutide (Victoza 3-Jhony) 0.6 Mg/0.1 Ml Pen.injctr      1.8 MG SUBQ QDAY, PACKAGE         Reported         4/12/18       SUMAtriptan Succinate (Imitrex) 100 Mg Tab      100 MG PO ASDIR Y for MIGRAINE, TAB         Reported         4/12/18       Spironolactone (Aldactone) 25 Mg Tablet      50 MG PO BID, #60 TAB 3 Refills         Prov: Mk Dowling         10/23/17       Gabapentin (Gabapentin) 300 Mg Capsule      600 MG PO TID, #60 CAP 0 Refills         Reported         6/8/17       Ondansetron Hcl (Ondansetron*) Unknown Strength Tablet      4 MG PO Q4H Y for NAUSEA, TAB 0 Refills         Reported         6/5/17       Insulin Lispro (HumaLOG VIAL*) Unknown Strength Vial      SUBQ QID INSULIN, #1 VIAL 0 Refills         Reported         6/5/17       Pantoprazole (Protonix*) Unknown Strength Tablet.dr      40 MG PO BID, #30 TAB 0 Refills         Reported         6/5/17       Multivitamins (Multi-Vitamin) Unknown Strength Tablet      PO QDAY, #30 TAB 0 Refills         Reported         6/5/17       celeCOXIB (CeleBREX) Unknown Strength Capsule      200 MG PO BID, #60 CAP 0 Refills         Reported         6/5/17       Dicyclomine Hcl (Dicyclomine*) Unknown Strength Tablet      20 MG PO BID Y for DIARRHEA, TAB          Reported         6/5/17       Sertraline HCl (Sertraline*) 50 Mg Tablet      100 MG PO QDAY, #30 TAB 0 Refills         Reported         12/19/14       Levothyroxine (Levothyroxine) 0.1 Mg Tablet      125 MG PO QDAY@07, #30 TAB 0 Refills         Reported         12/19/14       MDI-Albuterol (Ventolin HFA*) 18 Gm Inhaler      1-2 PUFFS INH Q4H PRN, INH         Reported         1/20/14       Montelukast Sodium (Singulair*) 10 Mg Tablet      10 MG PO HS, TAB         Reported         1/20/14       Lovastatin (Lovastatin) 20 Mg Tab      20 MG PO QDAY, #30 0 Refills         Prov: GRAYSON WAYNE         4/10/13      Current Medications      Current Medications Reviewed 8/8/18            EXAM      GEN-patient appears stated age resting comfortable in no acute distress      Eyes-PERRL,   conjunctiva are normal in appearance extraocular muscles are     intact, no scleral icterus      Nasal-both nares are patent turbinates appear normal no polyps seen no nasal     discharge or ulcerations      Ears-tympanic membranes are normal no erythema no bulging, normal to inspection      Lymphatic-no swollen or enlarged cervical nodes, or axillary node, or femoral     nodes, or supraclavicular nodes      Mouth normal dentition, no erythema no ulcerations oropharynx appears normal no     exudate no evidence of postnasal drip      Neck-there are no palpable supraclavicular or cervical adenopathy, thyroid is     normal in appearance no apparent nodules, there is no inspiratory or expiratory     stridor      Respiratory-lungs are grossly clear to auscultation; no wheezes, rhonchi, or     crackles appreciate. Normal work of breathing noted.      Cardiovascular-bilateral lower extremities are warm and dry with +2 pitting     edema up into her thighs      GI-the abdomen is normal in appearance, bowel sounds present and normal in all     quadrants no hepatosplenomegaly or masses felt      Extremities-no clubbing is present, pulses present  in all extremities, capillary    refill time is normal      Musculoskeletal-Normal strength in upper and lower extremities, inspection shows    no evidence of muscle atrophy      Skin-skin is normal in appearance it is warm and dry, no rashes present, no     evidence of cyanosis, palpation reveals no masses      Neurological-the patient is alert and oriented to time place and person, moves     all 4 extremities, normal gait, normal affect and mood, CN2-12 intact      Psych-normal judgment and insight is good, normal mood and affect, alert and     oriented to person, place, and time, and date      Vitals      Vitals:             Height 5 ft 2 in / 157.48 cm           Weight 421 lbs 5 oz / 191.029135 kg           BSA 3.04 m2           BMI 77.1 kg/m2           Temperature 98.7 F / 37.06 C - Oral           Pulse 110           Respirations 14           Blood Pressure 146/75 Sitting, Right Arm           Pulse Oximetry 94%, 2 liters            REVIEW      Results Reviewed      P[facility] Results Reviewed?:  Yes Prev Lab Results, Yes Prev Radiology Results, Yes     Previous [facility] Records            Assessment      Chronic diastolic (congestive) heart failure - I50.32            Volume overload - E87.70            Leg edema - R60.0            Chronic hypoxemic respiratory failure - J96.11            SOB (shortness of breath) - R06.02            Notes      New Medications      * Bumetanide (Bumex) 2 MG TABLET: 2 MG PO BID #60      New Diagnostics      * BMP, Week         Dx: Chronic diastolic (congestive) heart failure - I50.32      New Referrals      * Cardiology, SCHEDULED PROCEDURE         Status changed from Active to Complete.         Dx: Chronic diastolic (congestive) heart failure - I50.32      ASSESSMENT:      1. Mycobacterium avium intracellulare infection.      2. Chronic diastolic congestive heart failure.      3. Volume overload.      4. Lower extremity edema.      5. Obesity hyperventilation syndrome.      6.  Pulmonary hypertension, mild.      7. Obstructive sleep apnea.      8. Super obesity with BMI of 77.1.            PLAN:       1. At this time, I have instructed the patient to increase her Bumex to 2 mg     p.o. b.i.d. and I will check a BMP in 1-2 weeks to followup on her electrolytes     and renal function. Her renal function has previously been normal during her     most recently hospitalization. She is not on supplemental potassium as she is     already on Aldactone 50 mg p.o. b.i.d. I have instructed her to continue taking     that. I have instructed her to keep her sodium total intake less than 2000 mg     daily. She should elevate her legs whenever she is seated. She was referred to a    lymphedema clinic after her most recent hospitalization, but they were hesitant     to wrap her legs, as they were afraid of putting much strain on her heart.       2. She should continue on rifampin, ethambutol, and azithromycin on Monday,     Wednesday, and Fridays for her mycobacterium avium intracellulare infection. She    is tolerating those well at this time.      3. She should continue Trilogy at current settings for her obesity     hypoventilation syndrome and continue her current supplemental oxygen.       4. At this time, her pulmonary hypertension should be treated with diuretics and    she is not indicated for other treatment such as pulmonary vasodilators at this     time.       5. I will refer her to cardiology for her chronic diastolic heart failure and     volume overload as that is difficult to treat with oral diuretics.       6. I will have her followup in one month with Dr. Dowling or me, sooner if     needed.            Patient Education      Time Spent:  > 50% /Coord Care                 Disclaimer: Converted document may not contain table formatting or lab diagrams. Please see [employer] for the authenticated document.

## 2021-05-28 NOTE — PROGRESS NOTES
Patient: AYLIN RAMOS     Acct: [id number]     Report: #[id number]  UNIT #: [id number]     : 1970    Encounter Date:2018  PRIMARY CARE: FILIBERTO BATISTA  ***Signed***  --------------------------------------------------------------------------------------------------------------------  Chief Complaint      Encounter Date      May 17, 2018            Primary Care Provider      FILIBERTO BATISTA            Referring Provider      ROBERTO GARCIA            Patient Complaint      Patient is complaining of      Pt is here to follow up/pneumonia/SOB/cough            VITALS      Height 5 ft 2 in / 157.48 cm      Weight 412 lbs 1 oz / 186.479557 kg      BSA 3.00 m2      BMI 75.4 kg/m2      Temperature 98.4 F / 36.89 C - Oral      Pulse 104      Respirations 24      Blood Pressure 148/86 Sitting, Left Arm      Pulse Oximetry 93%, room air            HPI      The patient is a very pleasant 47 year old white female who is super obese with     a history of mild pulmonary hypertension, obstructive sleep apnea and obesity     hypoventilation syndrome. She also recently had bronchoscopy on 18 after     having an abnormal chest CT scan several days prior showing consolidation and     collapse of right upper lobe bronchus. She was found to have thick mucous plugs     but no mass on bronchoscopy and was treated with a one week course of Levaquin     and Prednisone. The patient states that she has been very slow to recover since     that time and she is still having increased shortness of breath, coughing and     wheezing. She is using Brovana and Pulmicort nebulizers twice daily and feels     they are helping her quite a bit. She is also using Spiriva Respimat 2.5. She     is complaining of some nasal congestion with bright green mucous and some cough     productive of sputum ranging from white to light green. She is also requesting     a shower chair and a bedside commode today as she has difficulty making up  and     down the stairs to the restroom in her split level home secondary to her     breathing. She denies hemoptysis, fevers or chills.            I have reviewed his Review of Systems medical, surgical and family history and     agree with those as entered.            ROS      Constitutional:  Denies: Fatigue, Fever, Weight gain, Weight loss, Chills,     Insomnia, Other      Respiratory/Breathing:  Complains of: Shortness of air, Wheezing, Cough, Denies    : Hemoptysis, Pleuritic pain, Other      Endocrine:  Denies: Polydipsia, Polyuria, Heat/cold intolerance, Abnorml     menstrual pattern, Diabetes, Other      Eyes:  Denies: Blurred vision, Vision Changes, Other      Ears, nose, mouth, throat:  Denies: Mouth lesions, Thrush, Throat pain,     Hoarseness, Allergies/Hay Fever, Post Nasal Drip, Headaches, Recent Head Injury    , Nose Bleeding, Neck Stiffness, Thyroid Mass, Hearing Loss, Ear Fullness, Dry     Mouth, Nasal or Sinus Pain, Dry Lips, Nasal discharge, Nasal congestion, Other      Cardiovascular:  Denies: Palpitations, Syncope, Claudication, Chest Pain, Wake     up Gasping for air, Leg Swelling, Irregular Heart Rate, Cyanosis, Dyspnea on     Exertion, Other      Gastrointestinal:  Denies: Nausea, Constipation, Diarrhea, Abdominal pain,     Vomiting, Difficulty Swallowing, Reflux/Heartburn, Dysphagia, Jaundice, Bloating    , Melena, Bloody stools, Other      Genitourinary:  Denies: Urinary frequency, Incontinence, Hematuria, Urgency,     Nocturia, Dysuria, Testicular problems, Other      Musculoskeletal:  Denies: Joint Pain, Joint Stiffness, Joint Swelling, Myalgias    , Other      Hematologic/lymphatic:  DENIES: Lymphadenopathy, Bruising, Bleeding tendencies,     Other      Neurological:  Denies: Headache, Numbness, Weakness, Seizures, Other      Psychiatric:  Denies: Anxiety, Appropriate Effect, Depression, Other      Sleep:  No: Excessive daytime sleep, Morning Headache?, Snoring, Insomnia?,     Stop  breathing at sleep?, Other      Integumentary:  Denies: Rash, Dry skin, Skin Warm to Touch, Other      Immunologic/Allergic:  Denies: Latex allergy, Seasonal allergies, Asthma,     Urticaria, Eczema, Other      Immunization status:  No: Up to date            FAMILY/SOCIAL/MEDICAL HX      Current History      carpel tunnel , hernia repair 11      Surgical History:  Yes: Abdominal Surgery (ABD HERNIA ), Orthopedic Surgery     (RACHAEL CARPAL TUNNEL , LT MENISCUS TEAR REPAIR)      Stroke - Family Hx:  Grandparent      Heart - Family Hx:  Grandparent      Diabetes - Family Hx:  Father      Is Father Still Living?:  Yes      Is Mother Still Living?:  Yes      Smoking status:  Former smoker (1ppd x 20 years)      Number of Pregnancies:  2      Age at menopause:  40       Section:  Yes (2)      Hysterectomy:  Yes (partial [id number] and 11)      Anticoagulation Therapy:  No      Antibiotic Prophylaxis:  No      Medical History:  Yes: Allergies, Anemia, Arthritis (SPINE AND KNEES), Asthma (    ALLERGY INDUCED), Diabetes (DMII; INSULIN PUMP), Hemorrhoids/Rectal Prob (ACID     REFLUX, OCCASIONAL NAUSEA), High Blood Pressure (MED CONTROLS), High Cholesterol    , Reflux Disease, Shortness Of Breath, Miscellaneous Medical/oth (OBESITY,     HYPOTHYROID), No: Alcoholism, Blood Disease, Broken Bones, Cataracts, Chemical     Dependency, Chemotherapy/Cancer, Chronic Bronchitis/COPD, Emphysema, Chronic     Liver Disease, Colon Trouble, Colitis, Diverticulitis, Congestive Heart Failu,     Deafness or Ringing Ears, Convulsions, Depression, Anxiety, Bipolar Disorder,     Epilepsy, Seizures, Forgetfullness, Glaucoma, Gall Stones, Gout, Head Injury,     Heart Attack, Heart Murmur, Hepatitis, Hiatal Hernia, HIV (Do not ask - volu,     Jaundice, Kidney or Bladder Disease, Kidney Stones, Migrane Headaches, Mitral     Valve Prolapse, Night sweats, Phlebitis, Psychiatric Care, Rheumatic Fever,     Sexually Transmitted Dis, Sinus  Trouble, Skin Disease/Psoriais/Ecz, Stroke,     Thyroid Problem, Tuberculosis or Pos TB Te      Psychiatric History      None            PREVENTION      Hx Influenza Vaccination:  Yes      Date Influenza Vaccine Given:  Sep 1, 2017      Influenza Vaccine Declined:  No      2 or More Falls Past Year?:  No      Fall Past Year with Injury?:  No      Hx Pneumococcal Vaccination:  Yes      Encouraged to follow-up with:  PCP regarding preventative exams.      Chart initiated by      Pat Scott MA            ALLERGIES/MEDICATIONS      Allergies:        Coded Allergies:             EXENATIDE (Verified  Allergy, Severe, PANCREATITIS, 5/17/18)           INSULIN DEGLUDEC (Verified  Allergy, Severe, 5/17/18)           SITAGLIPTIN (Verified  Allergy, Severe, PANCREATITIS, 5/17/18)           NICKEL (Verified  Allergy, Unknown, RASH, 5/17/18)           METFORMIN (Verified  Adverse Reaction, Unknown, SEVERE DIARRHEA,     DEHYDRATION, 5/17/18)      Uncoded Allergies:             COLBALT BLUE (METAL) (Allergy, Unknown, RASH; DIARRHEA, STOMACH UPSET, 4/17 /18)           WHITE GOLD (Allergy, Unknown, RASH, DIARRHEA, STOMACH UPSET, 4/17/18)      Medications    Last Reconciled on 5/17/18 11:30 by LYNN ESPINOZA PA-C      Amoxicillin/Clavulanic Acid 875/125 (Augmentin 875/125) 1 Each Tablet      875 MG PO BID for 7 Days, #14 TAB 0 Refills         Prov: Belle Espinoza PA-C         5/17/18       predniSONE* (predniSONE*) 10 Mg Tablet      10 MG PO ASDIR, #48 TAB         Prov: Belle Espinoza PA-C         5/17/18       traZODone HCl (Desyrel) 50 Mg Tablet      25 MG PO HS for 30 Days, #30 TAB 3 Refills         Prov: Mk Dowling         5/14/18       Nebulizer Accessories (Nebulizer Kit RANDI) 1 Each Kit      EACH XX ONCE, #1 0 Refills         Prov: Belle Espinoza PA-C         4/17/18       Neb-Budesonide (Pulmicort) 0.5 Mg/2 Ml Ampul.neb      0.5 MG INH BID, #60 NEB 4 Refills         Prov: Belle Espinoza PA-C         4/17/18        Arformoterol Tartrate (Brovana) 15 Mcg/2 Ml Vial.neb      15 MCG INH BID, #60 NEB 4 Refills         Prov: Belle Espinoza PA-C         4/17/18       Tiotropium Bromide (Spiriva Respimat 2.5 mcg/Puff) 4 Gm Mist.inhal      2 PUFFS INH RTQDAY, #1 MDI 0 Refills         Reported         4/17/18       Furosemide* (Lasix*) 40 Mg Tablet      20 MG PO HS, #30 TAB 0 Refills         Reported         4/17/18       Cholecalciferol (Vitamin D3*) 2,000 Unit Tablet      2000 UNITS PO QDAY, #30 TAB         Reported         4/17/18       Cholecalciferol (Vitamin D*) 2,000 Unit Cap      1000 UNITS PO QDAY, #30 CAP 0 Refills         Reported         4/12/18       Fexofenadine Hcl (Fexofenadine Hcl) 180 Mg Tablet      180 MG PO BID, #60 TAB 0 Refills         Reported         4/12/18       Fluorometholone (FML 0.1% Ophth) 3.5 Gm Oint...g.      1 APL EYE EACH BID, TUBE         Reported         4/12/18       Liraglutide (Victoza 3-Jhony) 0.6 Mg/0.1 Ml Pen.injctr      1.8 MG SUBQ QDAY, PACKAGE         Reported         4/12/18       SUMAtriptan Succinate (Imitrex) 100 Mg Tab      100 MG PO ASDIR Y for MIGRAINE, TAB         Reported         4/12/18       Spironolactone (Aldactone) 25 Mg Tablet      50 MG PO BID, #60 TAB 3 Refills         Prov: Mk Dowling         10/23/17       Gabapentin (Gabapentin) 300 Mg Capsule      600 MG PO TID, #60 CAP 0 Refills         Reported         6/8/17       Ondansetron Hcl (Ondansetron*) Unknown Strength Tablet      4 MG PO Q4H Y for NAUSEA, TAB 0 Refills         Reported         6/5/17       Insulin Lispro (HumaLOG VIAL*) Unknown Strength Vial      SUBQ QID INSULIN, #1 VIAL 0 Refills         Reported         6/5/17       Pantoprazole (Protonix*) Unknown Strength Tablet.dr      40 MG PO BID, #30 TAB 0 Refills         Reported         6/5/17       Levocetirizine Dihydrochloride (Xyzal) Unknown Strength Tablet      5 MG PO QDAY, TAB         Reported         6/5/17       Multivitamins (Multi-Vitamin) Unknown  Strength Tablet      PO QDAY, #30 TAB 0 Refills         Reported         6/5/17       celeCOXIB (CeleBREX) Unknown Strength Capsule      200 MG PO BID, #60 CAP 0 Refills         Reported         6/5/17       Dicyclomine Hcl (Dicyclomine*) Unknown Strength Tablet      20 MG PO BID Y for DIARRHEA, TAB         Reported         6/5/17       Sertraline HCl (Sertraline*) 50 Mg Tablet      100 MG PO QDAY, #30 TAB 0 Refills         Reported         12/19/14       Levothyroxine (Levothyroxine) 0.1 Mg Tablet      125 MG PO QDAY@07, #30 TAB 0 Refills         Reported         12/19/14       MDI-Albuterol (Ventolin HFA*) 18 Gm Inhaler      1-2 PUFFS INH Q4H PRN, INH         Reported         1/20/14       Montelukast Sodium (Singulair*) 10 Mg Tablet      10 MG PO HS, TAB         Reported         1/20/14       Furosemide (Furosemide) 40 Mg Tablet      40 MG PO QDAY, TAB         Reported         1/20/14       Lovastatin (Lovastatin) 20 Mg Tab      20 MG PO QDAY, #30 0 Refills         Prov: GRAYSON WAYNE         4/10/13      Current Medications      Current Medications Reviewed 5/17/18            EXAM      GEN-patient appears stated age resting comfortable in no acute distress      Eyes-PERRL,  conjunctiva are normal in appearance extraocular muscles are intact    , no scleral icterus      Nasal-both nares are patent turbinates appear normal no polyps seen no nasal     discharge or ulcerations      Ears-tympanic membranes are normal no erythema no bulging, normal to inspection      Lymphatic-no swollen or enlarged cervical nodes, or axillary node, or femoral     nodes, or supraclavicular nodes      Mouth normal dentition, no erythema no ulcerations oropharynx appears normal no     exudate no evidence of postnasal drip, MP(default value)      Neck-there are no palpable supraclavicular or cervical adenopathy, thyroid is     normal in appearance no apparent nodules, there is no inspiratory or expiratory     stridor       Respiratory-mildly decreased breath sounds throughout, no wheezing, rhonchi or     crackles appreciated, normal work of breathing noted.       Cardiovascular-the heart rate is normal and regular S1 and S2 present with no     murmur or extra heart sounds, there is no JVD or pedal edema present      GI-the abdomen is normal in appearance, bowel sounds present and normal in all     quadrants no hepatosplenomegaly or masses felt      Extremities-no clubbing is present, pulses present in all extremities,     capillary refill time is normal      Musculoskeletal-Normal strength in upper and lower extremities, inspection     shows no evidence of muscle atrophy      Skin-skin is normal in appearance it is warm and dry, no rashes present, no     evidence of cyanosis, palpation reveals no masses      Neurological-the patient is alert and oriented to time place and person, moves     all 4 extremities, normal gait, normal affect and mood, CN2-12 intact      Psych-normal judgment and insight is good, normal mood and affect, alert and     oriented to person, place, and time, and date      Vtials      Vitals:             Height 5 ft 2 in / 157.48 cm           Weight 412 lbs 1 oz / 186.246507 kg           BSA 3.00 m2           BMI 75.4 kg/m2           Temperature 98.4 F / 36.89 C - Oral           Pulse 104           Respirations 24           Blood Pressure 148/86 Sitting, Left Arm           Pulse Oximetry 93%, room air            REVIEW      Results Reviewed      PCCS Results Reviewed?:  Yes Prev Lab Results, Yes Prev Radiology Results, Yes     Previous Mecial Records      Radiographic Results               [facility]                PACS RADIOLOGY REPORT            Patient: AYLIN RAMOS   Acct: #[id number]   Report: #[id number]            UNIT #: [id number]    DOS: 18       INSURANCE:[facility]   ORDER #:[id number]      LOCATION:[facility]     : 1970             PROVIDERS      ADMITTING:     ATTENDING: Mk Dowling      FAMILY:  LESTERFILIBERTO   ORDERING:  Mk Dowling         OTHER:    DICTATING:  Shahram De La Cruz MD            REQ #:[id number]   EXAM:CXR2 - CHEST 2V AP PA LAT      REASON FOR EXAM:  PNEUMONIA      REASON FOR VISIT:  PNEUMONIA            *******Signed******         PROCEDURE:   CHEST AP/PA AND LATERAL             COMPARISON:   [facility], CR, CHEST PA/AP   43.  [facility], CR, CHEST AP/PA 1 VIEW, 6/05/2017, 17:14.             INDICATIONS:   FOLLOW UP PNEUMONIA TIMES ONE WEEK             FINDINGS:         2 plain film images of the chest are compared to previous study performed on 6/5 /2017  and       2/12/2016.  Today's study reveals that there is a small focal area of     infiltrate in the right       middle lobe of the lung and in the  left lower lobe of the lung not seen on     previous study       consistent with inflammation and pneumonia and bronchitis.  Clinical     correlation would be helpful.        The heart mediastinum normal.  No evidence of pleural effusion or pneumothorax     or mass.             CONCLUSION:         1. Small focal infiltrates in the right middle lobe of the lung and in the      left lower lobe lung       might be caused by atelectasis or bronchitis or pneumonia.  Clinical     correlation would be helpful.              SHAHRAM DE LA CRUZ MD             Electronically Signed and Approved By: SHAHRAM DE LA CRUZ MD on 5/09/2018 at     20:47                        Until signed, this is an unconfirmed preliminary report that may contain      errors and is subject to change.                                              GOLKE:      D:05/09/18 2047                     [facility]                PACS RADIOLOGY REPORT            Patient: AYLIN RAMOS   Acct: #[id number]   Report: #[id number]            UNIT #:  [id number]    DOS: 18 1630      [facility]   ORDER #:[id number]      LOCATION:[facility]     : 1970            PROVIDERS      ADMITTING:     ATTENDING: Belle Espinoza PA-C      FAMILY:  NONE,MD   ORDERING:  Belle Espinoza PA-C         OTHER:    DICTATING:  Kang Reyes MD            REQ #:[id number]   EXAM:CHWO - CT CHEST without CONTRAST      REASON FOR EXAM:        REASON FOR VISIT:  COUGH            *******Signed******         PROCEDURE:   CT CHEST WITHOUT CONTRAST             COMPARISON:   [facility], CT, CHEST W/O CONTRAST, 2017, 12:    42.             INDICATIONS:   WHEEZING AND INCREASED SHORTNESS OF AIR             TECHNIQUE:   CT images were created without the administration of contrast     material.               PROTOCOL:     Standard imaging protocol performed                RADIATION:     DLP: 761mGy*cm          Automated exposure control was utilized to minimize radiation dose.              FINDINGS:         Lung window images reveal thin 4.5 cm area of consolidation in the anterior     right upper lobe, well       seen on series 2, image 32. This is confluent with, and obscures the right     hilum.  I cannot exclude       the possibility of an underlying mass with postobstructive pneumonia.  Close     followup is       recommended.               A 3.5 cm area of patchy airspace disease is seen in the lateral right upper     lobe on image 31.       Minimal airspace disease is seen at the lung bases.             Mediastinal windows reveal no mediastinal adenopathy.  Coronary artery     calcifications are evident.             CONCLUSION:         CT scan of the chest without IV contrast demonstrating 4.5 cm area of     consolidation or mass in the       anterior right upper lobe.  This could represent postobstructive pneumonia.      Close followup is       recommended to exclude the possibility of underlying malignancy.  Consider     obtaining a followup CT        scan in 3-4 weeks.  Bronchoscopy may be helpful.             Patchy airspace disease is seen elsewhere in the right upper lobe, and at the     lung bases.                JEANNA MANN MD             Electronically Signed and Approved By: JEANNA MANN MD on 4/13/2018 at 18:08                            Until signed, this is an unconfirmed preliminary report that may contain      errors and is subject to change.                                              COUST:      D:04/13/18 1808            Assessment      CAP (community acquired pneumonia) - J18.9            Bronchitis - J40            Obesity hypoventilation syndrome - E66.2            Super obesity - E66.9            GIUSEPPE (obstructive sleep apnea) - G47.33            Notes      New Medications      * predniSONE* 10 MG TABLET: 10 MG PO ASDIR #48       Instructions: Take 60 mg by mouth x 3 days, 40 mg x 3 days, 30 mg x 3 days, 20     mg x 3 days, then 10 mg x 3 days.       Dx: CAP (community acquired pneumonia) - J18.9      * Amoxicillin/Clavulanic Acid 875/125 (Augmentin 875/125) 1 EACH TABLET: 875 MG     PO BID 7 Days #14       Dx: CAP (community acquired pneumonia) - J18.9      New Diagnostics      * Chest W/O Cont CT, 6 WEEKS       Dx: CAP (community acquired pneumonia) - J18.9      ASSESSMENT:       1.  Recent right upper lobe collapse with consolidation.       2.  Obesity hypoventilation syndrome.      3.  Chronic hypercarbic respiratory failure.      4.  Obstructive sleep apnea.      5.  Asthma.        6.  Morbid super obesity with BMI of 75.4.            PLAN:        1. At this time I will treat the patient's upper respiratory infection with a     course of Augmentin and Prednisone.       2. Continue Brovana and Pulmicort nebulizers twice daily as well as Spiriva     Respimat 2.5.       3. Start DuoNeb q 4-6 hours as needed. She already has this at home.       4. I will repeat a CT scan without contrast in 4-6 weeks to ensure resolution     of her  recent pneumonia and right upper lobe bronchus collapse with     consolidation.       5. Follow up with Dr. Dowling, sooner if needed.            Patient Education      Patient Education Provided:  How to use a Nebulizer      Time Spent:  > 50% /Coord [facility]            Patient Education:        Bronchitis (Adult)                 Disclaimer: Converted document may not contain table formatting or lab diagrams. Please see "[employer] System for the authenticated document.

## 2021-05-28 NOTE — PROGRESS NOTES
Patient: AYLIN RAMOS     Acct: [id number]     Report: #[id number]  UNIT #: [id number]     : 1970    Encounter Date:2020  PRIMARY CARE: FILIBERTO BATISTA  ***Signed***  --------------------------------------------------------------------------------------------------------------------  History of Present Illness            Chief Complaint: 6 week follow up/ Asthma            Aylin Ramos is presenting for evaluation via Telehealth visit. Verbal     consent obtained before beginning visit.            PAST MEDICAL HISTORY/OVERVIEW OF PATIENT SYMPTOMS            Date of Onset: ()            Symptoms: SOA, wheezing, cough            Flu vaccine: current            Pneumonia Vaccine: Current            Former smoker; pt started smoking at 6 years old, 1 ppd x 30 years, quit at age     36            Quality of Symptoms:             Anything make it worse or better: ()            Severity of Symptoms: ()            Any known Exposure to COVID-19:No            Provider spent 15 minutes with the patient during telehealth visit.            The following staff were present during this visit: Pat Scott MA, Mk naik Do            The patient is a 50 year old female here for phone visit today. She has obesity     hypoventilation syndrome, severe sleep apnea and asthma and was recently     hospitalized for sepsis secondary to a cellulitic infection of her lower     extremities.  She does complain of significant insomnia, has no significant     improvement despite taking her current medication of trazodone.  She does have     pulmonary hypertension secondary to diastolic dysfunction.            Physical exam deferred due to TeleHealth visit.              Allergies and Medications      Allergies:        Coded Allergies:             DULAGLUTIDE (Verified  Allergy, Unknown, DUMPING SYNDROME, PANCREATITIS,     20)           EXENATIDE (Verified  Allergy, Unknown, PANCREATITIS, 20)            INSULIN DEGLUDEC (Verified  Allergy, Unknown, 12/2/20)           NICKEL (Verified  Allergy, Unknown, RASH, 12/2/20)           SITAGLIPTIN (Verified  Allergy, Unknown, PANCREATITIS, 12/2/20)           METFORMIN (Verified  Adverse Reaction, Unknown, SEVERE DIARRHEA,     DEHYDRATION, 12/2/20)      Uncoded Allergies:             COLBALT BLUE (METAL) (Allergy, Unknown, RASH; DIARRHEA, STOMACH UPSET,     4/17/18)           WHITE GOLD (Allergy, Unknown, RASH, DIARRHEA, STOMACH UPSET, 4/17/18)      Medications    Last Reconciled on 12/2/20 16:28 by EDGAR DOWLING MD      Ramelteon (Rozerem) 8 Mg Tab      8 MG PO HS, #30 TAB 6 Refills         Prov: Edgar Dowling         12/2/20       Clindamycin HCl (Clindamycin HCl) 300 Mg Capsule      300 MG PO QID for 10 Days, #40 CAP         Prov: Rolando Kim         11/14/20       Bumetanide (BUMETANIDE) 2 Mg Tablet      1 TAB PO TID for 30 Days, #90 TAB         Prov: Rolando Kim         11/14/20       L. Acidophilus (L. Acidophilus) 1 Each Tablet      1 CAP PO TID for 14 Days, #42 TAB         Prov: Rolando Kim         11/13/20       Gabapentin (Gabapentin) 600 Mg Tablet      1.5 TAB PO TID, #90 TAB 0 Refills         Reported         11/9/20       metOLazone (metOLazone) 5 Mg Tablet      5 MG PO BID         Reported         11/9/20       Warfarin Sod (Coumadin*) 7.5 Mg Tablet      7.5 MG PO QDAY         Reported         11/9/20       Potassium Chloride (K-Dur*) 20 Meq Tab.er.prt      40 MEQ PO TID, #90 TAB 0 Refills         Reported         11/9/20       traZODone HCl (Desyrel) 50 Mg Tablet      100 MG PO HS, #60 TAB 6 Refills         Prov: Edgar Dowling         10/22/20       Metoprolol Succinate (Metoprolol Succinate) 25 Mg Tab.er.24h      25 MG PO HS, #30 TAB 0 Refills         Reported         9/28/20       Pantoprazole (Protonix) 40 Mg Tablet.dr      40 MG PO BIDAC, #60 TAB 0 Refills         Reported         9/14/20       Levothyroxine (Levothyroxine) 0.2 Mg Tablet       0.2 MG PO QDAY@07, #30 TAB 0 Refills         Reported         9/14/20       Ezetimibe (Zetia) 10 Mg Tablet      10 MG PO QDAY         Reported         9/14/20       Cholecalciferol (Vitamin D3) (Vitamin D3) 5,000 Units Capsule      5000 UNITS PO QDAY for 30 Days, #30 CAP         Reported         9/14/20       Fexofenadine HCl (Allegra Allergy) 180 Mg Tablet      180 MG PO BID         Reported         9/14/20       Oxygen (OXYGEN)  Gas      L NARE EACH QDAY PRN PRN for SHORTNESS OF BREATH, #2         Reported         11/9/19       INSULIN PUMP (at home) (INSULIN PUMP (at home))  Each      1 EACH, BAG         Reported         11/9/19       Sertraline HCl (Sertraline*) 100 Mg Tablet      100 MG PO QDAY, #30 TAB 0 Refills         Reported         11/9/19       Rosuvastatin Calcium (Crestor*) 40 Mg Tablet      40 MG PO HS, #30 TAB 0 Refills         Reported         6/4/19       Montelukast Sodium (Singulair*) 10 Mg Tablet      10 MG PO HS, TAB         Reported         1/20/14            Assessment      Cough R05, Shortness of Air  R06.02 (wheezing)            Plan      Orders:  Phone Eval 11-20 mi [id number]      Instructions            * Chronic conditions reviewed and taken in consideration for today's treatment       plan.      * Plan Of Care: ()      * Patient instructed to seek medical attention urgently for new or worsening       symptoms.      * Patient was educated/instructed on their diagnosis, treatment and medications       today.      * Recommend self monitoring. Instructions given.      * Recommend self quarantine for 14 days.      * Recommend self quarantine until without fever for 72 hours without using fever       reducing medications.      * Recommends over the counter medications for symptom management.            ASSESSMENT:       1. Insomnia.      2. Pulmonary hypertension secondary to diastolic dysfunction.      3.  Diastolic heart failure with chronic volume overload.      4. Asthma.      5. Dyspnea.             PLAN:      1. Start patient on Rozerem.      2. Discontinue melatonin.      3. Continue trazodone.      4. Continue pap therapy.      5. Continue diuresis.      6. Continue bronchodilator therapies with Ventolin, Breo and Incruse.      7. Follow up in four months.            Electronically signed by Mk Dowling  12/14/2020 14:16       Disclaimer: Converted document may not contain table formatting or lab diagrams. Please see [employer] for the authenticated document.

## 2021-05-28 NOTE — PROGRESS NOTES
Patient: AYLIN RAMOS     Acct: [id number]     Report: #[id number]  UNIT #: [id number]     : 1970    Encounter Date:2018  PRIMARY CARE: FILIBERTO BATISTA  ***Signed***  --------------------------------------------------------------------------------------------------------------------  Chief Complaint      Encounter Date      2018            Primary Care Provider      FILIBERTO BATISTA            Referring Provider      ROBERTO GARCIA            Patient Complaint      Patient is complaining of      Pt here for f/u, GIUSEPPE            VITALS      Height 5 ft 2.00 in / 157.48 cm      Weight 423 lbs 8 oz / 192.722310 kg      BSA 3.05 m2      BMI 77.5 kg/m2      Temperature 98.9 F / 37.17 C - Oral      Pulse 117      Respirations 14      Blood Pressure 154/85 Sitting, Right Arm      Pulse Oximetry 93%, Room air            HPI      The patient is a very pleasant 47 year old white female with history of     pulmonary hypertension, obesity hypoventilation syndrome, and obstructive sleep     apnea.  She is here today for follow up.  The patient is up 11 pounds since her     office visit at the end of  on .  The patient is having worsening     shortness of breath.  The patient was recently seen in our clinic and placed on     Lasix, however, she does not feel that she is having a very good response to     Lasix.  The patient does report she takes Lasix everyday 40 mg. Initially she     was having sufficient urine output, however over the course of the past one week    , her urine output has tailed off.  She does have significant swelling of her     hands and her feet and legs past her knees bilaterally.  The patient denies     having any increased cough or increased sputum production.  Denies having any     wheezing. She has shortness of breath with all ADLs.  She feels that her weight     gain is from fluid and this is actually causing her significant issues with her     breathing. The patient  reports the use of her BiPAP machine and finds herself     using it more frequently. The patient recently had renal panel that showed     normal electrolytes. The patient does have a history of pulmonary hypertension     with very  mild pulmonary hypertension seen on right heart catheterization.            ROS      Constitutional:  Denies: Fatigue, Fever, Weight gain, Weight loss, Chills,     Insomnia, Other      Respiratory/Breathing:  Complains of: Shortness of air, Wheezing, Cough, Denies    : Hemoptysis, Pleuritic pain, Other      Endocrine:  Denies: Polydipsia, Polyuria, Heat/cold intolerance, Abnorml     menstrual pattern, Diabetes, Other      Eyes:  Denies: Blurred vision, Vision Changes, Other      Ears, nose, mouth, throat:  Denies: Mouth lesions, Thrush, Throat pain,     Hoarseness, Allergies/Hay Fever, Post Nasal Drip, Headaches, Recent Head Injury    , Nose Bleeding, Neck Stiffness, Thyroid Mass, Hearing Loss, Ear Fullness, Dry     Mouth, Nasal or Sinus Pain, Dry Lips, Nasal discharge, Nasal congestion, Other      Cardiovascular:  Denies: Palpitations, Syncope, Claudication, Chest Pain, Wake     up Gasping for air, Leg Swelling, Irregular Heart Rate, Cyanosis, Dyspnea on     Exertion, Other      Gastrointestinal:  Denies: Nausea, Constipation, Diarrhea, Abdominal pain,     Vomiting, Difficulty Swallowing, Reflux/Heartburn, Dysphagia, Jaundice, Bloating    , Melena, Bloody stools, Other      Genitourinary:  Denies: Urinary frequency, Incontinence, Hematuria, Urgency,     Nocturia, Dysuria, Testicular problems, Other      Musculoskeletal:  Denies: Joint Pain, Joint Stiffness, Joint Swelling, Myalgias    , Other      Hematologic/lymphatic:  DENIES: Lymphadenopathy, Bruising, Bleeding tendencies,     Other      Neurological:  Denies: Headache, Numbness, Weakness, Seizures, Other      Psychiatric:  Denies: Anxiety, Appropriate Effect, Depression, Other      Sleep:  No: Excessive daytime sleep, Morning  Headache?, Snoring, Insomnia?,     Stop breathing at sleep?, Other      Integumentary:  Denies: Rash, Dry skin, Skin Warm to Touch, Other      Immunologic/Allergic:  Denies: Latex allergy, Seasonal allergies, Asthma,     Urticaria, Eczema, Other      Immunization status:  No: Up to date            FAMILY/SOCIAL/MEDICAL HX      Current History      carpel tunnel 97/, hernia repair 9      Surgical History:  Yes: Abdominal Surgery (ABD HERNIA ), Orthopedic Surgery     (RACHAEL CARPAL TUNNEL , RT MENISCUS TEAR REPAIR)      Stroke - Family Hx:  Grandparent      Heart - Family Hx:  Grandparent      Diabetes - Family Hx:  Father      Is Father Still Living?:  Yes      Is Mother Still Living?:  Yes       Family History:  Yes      Social History:  No Tobacco Use, No Alcohol Use, No Recreational Drug use      Smoking status:  Former smoker (1ppd x 20 years)      Number of Pregnancies:  2      Age at menopause:  40       Section:  Yes (2)      Hysterectomy:  Yes (partial [id number] and 11)      Anticoagulation Therapy:  No      Antibiotic Prophylaxis:  No      Medical History:  Yes: Allergies, Anemia, Arthritis (SPINE AND KNEES), Asthma (    ALLERGY INDUCED), Blood Disease (ANEMIA), Chronic Bronchitis/COPD, Diabetes (    DMII), Hemorrhoids/Rectal Prob (ACID REFLUX, RLQ PAIN, NAUSEA, DIARRHEA), High     Blood Pressure (MED CONTROLS), High Cholesterol, Reflux Disease, Shortness Of     Breath, Miscellaneous Medical/oth (OBESITY, HYPOTHYROID), No: Alcoholism,     Broken Bones, Cataracts, Chemical Dependency, Chemotherapy/Cancer, Emphysema,     Chronic Liver Disease, Colon Trouble, Colitis, Diverticulitis, Congestive Heart     Failu, Deafness or Ringing Ears, Convulsions, Depression, Anxiety, Bipolar     Disorder, Epilepsy, Seizures, Forgetfullness, Glaucoma, Gall Stones, Gout, Head     Injury, Heart Attack, Heart Murmur, Hepatitis, Hiatal Hernia, HIV (Do not ask -     volu, Jaundice, Kidney or Bladder Disease, Kidney  Stones, Migrane Headaches,     Mitral Valve Prolapse, Night sweats, Phlebitis, Psychiatric Care, Rheumatic     Fever, Sexually Transmitted Dis, Sinus Trouble, Skin Disease/Psoriais/Ecz,     Stroke, Thyroid Problem, Tuberculosis or Pos TB Te      Psychiatric History      Kellen Villa MA            PREVENTION      Hx Influenza Vaccination:  Yes      Date Influenza Vaccine Given:  Sep 1, 2017      Influenza Vaccine Declined:  No      2 or More Falls Past Year?:  No      Fall Past Year with Injury?:  No      Hx Pneumococcal Vaccination:  Yes      Encouraged to follow-up with:  PCP regarding preventative exams.      Chart initiated by      Kellen Villa MA            ALLERGIES/MEDICATIONS      Allergies:        Coded Allergies:             EXENATIDE (Verified  Allergy, Severe, PANCREATITIS, 7/25/18)           INSULIN DEGLUDEC (Verified  Allergy, Severe, 7/25/18)           SITAGLIPTIN (Verified  Allergy, Severe, PANCREATITIS, 7/25/18)           NICKEL (Verified  Allergy, Unknown, RASH, 7/25/18)           METFORMIN (Verified  Adverse Reaction, Unknown, SEVERE DIARRHEA,     DEHYDRATION, 7/25/18)      Uncoded Allergies:             COLBALT BLUE (METAL) (Allergy, Unknown, RASH; DIARRHEA, STOMACH UPSET, 4/17 /18)           WHITE GOLD (Allergy, Unknown, RASH, DIARRHEA, STOMACH UPSET, 4/17/18)      Medications    Last Reconciled on 7/25/18 08:17 by MK LINDER MD      Furosemide* (Lasix*) 40 Mg Tablet      40 MG PO BID@09,17 for 7 Days, #14 TAB 0 Refills         Prov: Belle Mansfield PA-C         7/18/18       Dapagliflozin Propanediol (Farxiga) 10 Mg Tablet      10 MG PO QDAY, #30 TAB 0 Refills         Reported         7/16/18       Promethazine Hcl (Phenergan*) 25 Mg Tablet      25 MG PO Q6H Y for NAUSEA, #90 TAB 3 Refills         Prov: Mk Linder         6/27/18       Ethambutol HCl (Myambutol *) 400 Mg Tab      1600 MG PO MOWEFR, #120 TAB 6 Refills         Prov: Mk Linder         6/27/18       Rifampin  (Rifampin) 300 Mg Cap      600 MG PO MOWEFR, #60 CAP 6 Refills         Prov: Mk Dowling         6/27/18       Azithromycin (Zithromax) 250 Mg Tablet      500 MG PO ASDIR, #30 TAB 6 Refills         Prov: Mk Dowling         6/27/18       MDI-Albuterol (Ventolin HFA*) 18 Gm Hfa.aer.ad      2 PUFFS INH QID Y for WHEEZING / SHORTNESS OF BREATH, #1 MDI 6 Refills         Prov: Belle Mansfield PA-C         6/12/18       Albuterol/Ipratropium (Duoneb) 3 Ml Ampul.neb      3 ML INH Q4H Y for SHORTNESS OF BREATH, #120 NEB 6 Refills         Prov: Belle Mansfield PA-C         6/12/18       traZODone HCl (Desyrel) 50 Mg Tablet      25 MG PO HS for 30 Days, #30 TAB 3 Refills         Prov: Mk Dowling         5/14/18       Nebulizer Accessories (Nebulizer Kit RANDI) 1 Each Kit      EACH XX ONCE, #1 0 Refills         Prov: Belle Mansfield PA-C         4/17/18       Neb-Budesonide (Pulmicort) 0.5 Mg/2 Ml Ampul.neb      0.5 MG INH BID, #60 NEB 4 Refills         Prov: Belle Mansfield PA-C         4/17/18       Arformoterol Tartrate (Brovana) 15 Mcg/2 Ml Vial.neb      15 MCG INH BID, #60 NEB 4 Refills         Prov: Belle Mansfield PA-C         4/17/18       Tiotropium Bromide (Spiriva Respimat 2.5 mcg/Puff) 4 Gm Mist.inhal      2 PUFFS INH RTQDAY, #1 MDI 0 Refills         Reported         4/17/18       Furosemide* (Lasix*) 40 Mg Tablet      20 MG PO HS, #30 TAB 0 Refills         Reported         4/17/18       Cholecalciferol (Vitamin D3*) 2,000 Unit Tablet      2000 UNITS PO QDAY, #30 TAB         Reported         4/17/18       Cholecalciferol (Vitamin D*) 2,000 Unit Cap      1000 UNITS PO QDAY, #30 CAP 0 Refills         Reported         4/12/18       Fexofenadine Hcl (Fexofenadine Hcl) 180 Mg Tablet      180 MG PO BID, #60 TAB 0 Refills         Reported         4/12/18       Fluorometholone (FML 0.1% Ophth) 3.5 Gm Oint...g.      1 APL EYE EACH BID, TUBE         Reported         4/12/18       Liraglutide (Victoza 3-Jhony) 0.6 Mg/0.1 Ml  Pen.injctr      1.8 MG SUBQ QDAY, PACKAGE         Reported         4/12/18       SUMAtriptan Succinate (Imitrex) 100 Mg Tab      100 MG PO ASDIR Y for MIGRAINE, TAB         Reported         4/12/18       Spironolactone (Aldactone) 25 Mg Tablet      50 MG PO BID, #60 TAB 3 Refills         Prov: Mk Dowling         10/23/17       Gabapentin (Gabapentin) 300 Mg Capsule      600 MG PO TID, #60 CAP 0 Refills         Reported         6/8/17       Ondansetron Hcl (Ondansetron*) Unknown Strength Tablet      4 MG PO Q4H Y for NAUSEA, TAB 0 Refills         Reported         6/5/17       Insulin Lispro (HumaLOG VIAL*) Unknown Strength Vial      SUBQ QID INSULIN, #1 VIAL 0 Refills         Reported         6/5/17       Pantoprazole (Protonix*) Unknown Strength Tablet.dr      40 MG PO BID, #30 TAB 0 Refills         Reported         6/5/17       Levocetirizine Dihydrochloride (Xyzal) Unknown Strength Tablet      5 MG PO QDAY, TAB         Reported         6/5/17       Multivitamins (Multi-Vitamin) Unknown Strength Tablet      PO QDAY, #30 TAB 0 Refills         Reported         6/5/17       celeCOXIB (CeleBREX) Unknown Strength Capsule      200 MG PO BID, #60 CAP 0 Refills         Reported         6/5/17       Dicyclomine Hcl (Dicyclomine*) Unknown Strength Tablet      20 MG PO BID Y for DIARRHEA, TAB         Reported         6/5/17       Sertraline HCl (Sertraline*) 50 Mg Tablet      100 MG PO QDAY, #30 TAB 0 Refills         Reported         12/19/14       Levothyroxine (Levothyroxine) 0.1 Mg Tablet      125 MG PO QDAY@07, #30 TAB 0 Refills         Reported         12/19/14       MDI-Albuterol (Ventolin HFA*) 18 Gm Inhaler      1-2 PUFFS INH Q4H PRN, INH         Reported         1/20/14       Montelukast Sodium (Singulair*) 10 Mg Tablet      10 MG PO HS, TAB         Reported         1/20/14       Furosemide (Furosemide) 40 Mg Tablet      40 MG PO QDAY, TAB         Reported         1/20/14       Lovastatin (Lovastatin) 20 Mg Tab       20 MG PO QDAY, #30 0 Refills         Prov: GRAYSON WAYNE         4/10/13      Current Medications      Current Medications Reviewed 7/25/18            EXAM      GEN-patient is very pleasant but very morbidly obese lady resting comfortable     in no acute distress      Eyes-PERRL,  conjunctiva are normal in appearance extraocular muscles are intact    , no scleral icterus      Lymphatic-no swollen or enlarged cervical nodes, or axillary node, or femoral     nodes, or supraclavicular nodes      Mouth normal dentition, no erythema no ulcerations oropharynx appears normal no     exudate no evidence of postnasal drip, MP IV.        Neck-there are no palpable supraclavicular or cervical adenopathy, thyroid is     normal in appearance no apparent nodules, there is no inspiratory or expiratory     stridor      Respiratory-patient exhibits normal work of breathing, speaking in full     sentences without difficulty, the chest is normal in appearance, clear to     auscultation with no wheezes rales or rhonchi, chest is normal to percussion on     both the right and left sides      Cardiovascular-the heart rate is normal and regular S1 and S2 present with no     murmur or extra heart sounds, there is no JVD, significant bilateral pitting     edema past the knees.        GI-the abdomen is normal in appearance, bowel sounds present and normal in all     quadrants no hepatosplenomegaly or masses felt      Extremities-no clubbing is present, pulses present in all extremities,     capillary refill time is normal      Skin-skin is normal in appearance it is warm and dry, no rashes present, no     evidence of cyanosis, palpation reveals no masses      Neurological-the patient is alert and oriented to time place and person, moves     all 4 extremities, normal gait, normal affect and mood, CN2-12 intact      Psych-normal judgment and insight is good, normal mood and affect, alert and     oriented to person, place, and time,  and date      Vtials      Vitals:             Height 5 ft 2.00 in / 157.48 cm           Weight 423 lbs 8 oz / 192.777867 kg           BSA 3.05 m2           BMI 77.5 kg/m2           Temperature 98.9 F / 37.17 C - Oral           Pulse 117           Respirations 14           Blood Pressure 154/85 Sitting, Right Arm           Pulse Oximetry 93%, Room air            REVIEW      Results Reviewed      [facility] Results Reviewed?:  Yes Prev Lab Results, Yes Prev Radiology Results, Yes     Previous [facility] Records            Assessment      Pulmonary hypertension - I27.20            Notes      New Medications      * Bumetanide (Bumex) 1 MG TABLET: 1 MG PO QDAY #30      Discontinued Medications      * Furosemide* (Lasix*) 40 MG TABLET: 40 MG PO BID@09,17 7 Days #14      New Diagnostics      * Echo Complete, Week       Dx: Pulmonary hypertension - I27.20      * BMP, Week       Dx: Pulmonary hypertension - I27.20      ASSESSMENT/PLAN:       1.  Mycobacterium avium intracellulare infection.  Continue three drugs,     rifampin, ethambutol and Azithromax on Monday, Wednesday and Friday. The     patient is tolerating these well. She has no side effects at this time other     than some mild GI upset.      2.  Volume overload.  At this time, I feel as though the patient's shortness of     breath is secondary to her 11 pound weight gain in less than one month. I feel     that this is most likely due to fluid retention given her swelling on PE.  At     this time, I am concerned that she may not be absorbing the Lasix adequately.      We will change her to Bumex 1 mg daily which is a 40 mg Lasix equivalent which     she is what she is taking right now.  I instructed the patient to weigh herself     at the same time everyday and to call on Friday with her starting weight and     her current weight on Friday. If there is no improvement in her symptoms at     that time, we will increase the dose of Bumex to 1 mg twice a day. If the     patient  does not have any significant diuresis and weight loss, but has     persistent symptoms next week, then we will plan on admitting the patient to     the hospital for IV diuresis.      3.  Obesity/hypoventilation syndrome.  Continue trilogy at current settings.      4. Pulmonary hypertension.  Continue Aldactone and diuresis.  At this time the     patient's pulmonary hypertension is WHO group 3.  No indication for treatment     at this time in regards to pulmonary vasodilators and we will continue to treat     the underlying cause at this time.      5.  We will also obtain echocardiogram to make sure that the patient does not     have any acute changes in left ventricular function or worsening PA pressures.     I anticipate that she may have slightly increased PA pressures due to her     severe volume overload.      6.  Obstructive sleep apnea.  Continue PAP therapy at current settings.      7.  Nausea. Continue prn phenergan.  Nausea most likely is a side effect of the     Zithromax due to her treatment for MAC.      8. I have personally reviewed laboratory data, imaging as well as previous     medical records.            Patient Education      Education resources provided:  Yes      Patient Education Provided:  Pulmonary Hypertension, Sleep Apnea                 Disclaimer: Converted document may not contain table formatting or lab diagrams. Please see [email or web address] [facility] for the authenticated document.

## 2021-05-28 NOTE — PROGRESS NOTES
Patient: AYLIN RAMOS     Acct: [id number]     Report: #[id number]  UNIT #: [id number]     : 1970    Encounter Date:2018  PRIMARY CARE: FILIBERTO BATISTA  ***Signed***  --------------------------------------------------------------------------------------------------------------------  Chief Complaint      Encounter Date      2018            Primary Care Provider      FILIBERTO BATISTA            Referring Provider      FILIBERTO BATISTA            Patient Complaint      Patient is complaining of      acute visit soa            VITALS      Height 5 ft 2.00 in / 157.48 cm      Weight 423 lbs 4.000 oz / 191.651906 kg      BSA 3.05 m2      BMI 77.4 kg/m2      Temperature 98.2 F / 36.78 C - Oral      Pulse 99      Respirations 18      Blood Pressure 154/74 Sitting, Left Arm      Pulse Oximetry 96%            HPI      The patient is a very pleasant 47 year old white female who is a patient of Dr. 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 She was   having ongoing cough,     sputum production and shortness of breath and would temporarily do better with     steroids and antibiotics but would do worse when off of them.  She was started     on treatment for her mycobacterium avium complex with rifampin, Azithromycin     and ethambutol which she takes Monday, Wednesday and Friday for a year. The     patient states that she is no longer coughing up any sputum and is not having     any issues with coughing or wheezing but she has overall been more short of     breath for the past 1-2 weeks.  The patient states that she is having increased     lower extremity edema and that her weight has gone up although she does not     weigh herself at home. Her weight has gone from 186 kg two weeks ago to 191 kg     today. That is a 10 pound increase in  2 weeks. She is on Lasix 40 mg in the     morning and 20 mg in the evening and takes Aldactone 50 mg twice daily. She has     not had recent laboratory work done and denies hemoptysis, fevers or chills.     She does admit to some orthopnea and has had to sleep with extra pillows     recently.             I have reviewed his Review of Systems medical, surgical and family history and     agree with those as entered.            ROS      Constitutional:  Denies: Fatigue, Fever, Weight gain, Weight loss, Chills,     Insomnia, Other      Respiratory/Breathing:  Complains of: Shortness of air, Wheezing, Denies: Cough    , Hemoptysis, Pleuritic pain, Other      Endocrine:  Denies: Polydipsia, Polyuria, Heat/cold intolerance, Abnorml     menstrual pattern, Diabetes, Other      Eyes:  Denies: Blurred vision, Vision Changes, Other      Ears, nose, mouth, throat:  Denies: Mouth lesions, Thrush, Throat pain,     Hoarseness, Allergies/Hay Fever, Post Nasal Drip, Headaches, Recent Head Injury    , Nose Bleeding, Neck Stiffness, Thyroid Mass, Hearing Loss, Ear Fullness, Dry     Mouth, Nasal or Sinus Pain, Dry Lips, Nasal discharge, Nasal congestion, Other      Cardiovascular:  Denies: Palpitations, Syncope, Claudication, Chest Pain, Wake     up Gasping for air, Leg Swelling, Irregular Heart Rate, Cyanosis, Dyspnea on     Exertion, Other      Gastrointestinal:  Denies: Nausea, Constipation, Diarrhea, Abdominal pain,     Vomiting, Difficulty Swallowing, Reflux/Heartburn, Dysphagia, Jaundice, Bloating    , Melena, Bloody stools, Other      Genitourinary:  Denies: Urinary frequency, Incontinence, Hematuria, Urgency,     Nocturia, Dysuria, Testicular problems, Other      Musculoskeletal:  Denies: Joint Pain, Joint Stiffness, Joint Swelling, Myalgias    , Other      Hematologic/lymphatic:  DENIES: Lymphadenopathy, Bruising, Bleeding tendencies,     Other      Neurological:  Denies: Headache, Numbness, Weakness, Seizures, Other       Psychiatric:  Denies: Anxiety, Appropriate Effect, Depression, Other      Sleep:  No: Excessive daytime sleep, Morning Headache?, Snoring, Insomnia?,     Stop breathing at sleep?, Other      Integumentary:  Denies: Rash, Dry skin, Skin Warm to Touch, Other      Immunologic/Allergic:  Denies: Latex allergy, Seasonal allergies, Asthma,     Urticaria, Eczema, Other            FAMILY/SOCIAL/MEDICAL HX      Current History      carpel tunnel 97/98, hernia repair 11      Surgical History:  Yes: Abdominal Surgery (ABD HERNIA ), Orthopedic Surgery     (RACHAEL CARPAL TUNNEL , RT MENISCUS TEAR REPAIR)      Stroke - Family Hx:  Grandparent      Heart - Family Hx:  Grandparent      Diabetes - Family Hx:  Father      Is Father Still Living?:  Yes      Is Mother Still Living?:  Yes       Family History:  Yes      Social History:  No Tobacco Use, No Alcohol Use, No Recreational Drug use      Smoking status:  Former smoker (1ppd x 20 years)      Number of Pregnancies:  2      Age at menopause:  40       Section:  Yes (2)      Hysterectomy:  Yes (partial [id number] and 11)      Anticoagulation Therapy:  No      Antibiotic Prophylaxis:  No      Medical History:  Yes: Allergies, Anemia, Arthritis (SPINE AND KNEES), Asthma (    ALLERGY INDUCED), Blood Disease (ANEMIA), Chronic Bronchitis/COPD, Diabetes (    DMII), Hemorrhoids/Rectal Prob (ACID REFLUX, RLQ PAIN, NAUSEA, DIARRHEA), High     Blood Pressure (MED CONTROLS), High Cholesterol, Reflux Disease, Shortness Of     Breath, Miscellaneous Medical/oth (OBESITY, HYPOTHYROID), No: Alcoholism,     Broken Bones, Cataracts, Chemical Dependency, Chemotherapy/Cancer, Emphysema,     Chronic Liver Disease, Colon Trouble, Colitis, Diverticulitis, Congestive Heart     Failu, Deafness or Ringing Ears, Convulsions, Depression, Anxiety, Bipolar     Disorder, Epilepsy, Seizures, Forgetfullness, Glaucoma, Gall Stones, Gout, Head     Injury, Heart Attack, Heart Murmur, Hepatitis, Hiatal  Hernia, HIV (Do not ask -     volu, Jaundice, Kidney or Bladder Disease, Kidney Stones, Migrane Headaches,     Mitral Valve Prolapse, Night sweats, Phlebitis, Psychiatric Care, Rheumatic     Fever, Sexually Transmitted Dis, Sinus Trouble, Skin Disease/Psoriais/Ecz,     Stroke, Thyroid Problem, Tuberculosis or Pos TB Te      Psychiatric History      none            PREVENTION      Hx Influenza Vaccination:  Yes      Date Influenza Vaccine Given:  Sep 1, 2017      Influenza Vaccine Declined:  No      2 or More Falls Past Year?:  No      Fall Past Year with Injury?:  No      Hx Pneumococcal Vaccination:  Yes      Encouraged to follow-up with:  PCP regarding preventative exams.      Chart initiated by      petty cortes            ALLERGIES/MEDICATIONS      Allergies:        Coded Allergies:             EXENATIDE (Verified  Allergy, Severe, PANCREATITIS, 7/16/18)           INSULIN DEGLUDEC (Verified  Allergy, Severe, 7/16/18)           SITAGLIPTIN (Verified  Allergy, Severe, PANCREATITIS, 7/16/18)           NICKEL (Verified  Allergy, Unknown, RASH, 7/16/18)           METFORMIN (Verified  Adverse Reaction, Unknown, SEVERE DIARRHEA,     DEHYDRATION, 7/16/18)      Uncoded Allergies:             COLBALT BLUE (METAL) (Allergy, Unknown, RASH; DIARRHEA, STOMACH UPSET, 4/17 /18)           WHITE GOLD (Allergy, Unknown, RASH, DIARRHEA, STOMACH UPSET, 4/17/18)      Medications    Last Reconciled on 7/16/18 11:05 by LYNN STACK PA-C      Dapagliflozin Propanediol (Farxiga) 10 Mg Tablet      10 MG PO QDAY, #30 TAB 0 Refills         Reported         7/16/18       Promethazine Hcl (Phenergan*) 25 Mg Tablet      25 MG PO Q6H Y for NAUSEA, #90 TAB 3 Refills         Prov: Mk oDwling         6/27/18       Ethambutol HCl (Myambutol *) 400 Mg Tab      1600 MG PO MOWEFR, #120 TAB 6 Refills         Prov: Mk Dowling         6/27/18       Rifampin (Rifampin) 300 Mg Cap      600 MG PO MOWEFR, #60 CAP 6 Refills         Prov:  Mk Dowling         6/27/18       Azithromycin (Zithromax) 250 Mg Tablet      500 MG PO ASDIR, #30 TAB 6 Refills         Prov: Mk Dowling         6/27/18       MDI-Albuterol (Ventolin HFA*) 18 Gm Hfa.aer.ad      2 PUFFS INH QID Y for WHEEZING / SHORTNESS OF BREATH, #1 MDI 6 Refills         Prov: Belle Mansfield PA-C         6/12/18       Albuterol/Ipratropium (Duoneb) 3 Ml Ampul.neb      3 ML INH Q4H Y for SHORTNESS OF BREATH, #120 NEB 6 Refills         Prov: Belle Mansfield PA-C         6/12/18       traZODone HCl (Desyrel) 50 Mg Tablet      25 MG PO HS for 30 Days, #30 TAB 3 Refills         Prov: Mk Dowling         5/14/18       Nebulizer Accessories (Nebulizer Kit RANDI) 1 Each Kit      EACH XX ONCE, #1 0 Refills         Prov: Belle Mansfield PA-C         4/17/18       Neb-Budesonide (Pulmicort) 0.5 Mg/2 Ml Ampul.neb      0.5 MG INH BID, #60 NEB 4 Refills         Prov: Belle Mansfield PA-C         4/17/18       Arformoterol Tartrate (Brovana) 15 Mcg/2 Ml Vial.neb      15 MCG INH BID, #60 NEB 4 Refills         Prov: Belle Mansfield PA-C         4/17/18       Tiotropium Bromide (Spiriva Respimat 2.5 mcg/Puff) 4 Gm Mist.inhal      2 PUFFS INH RTQDAY, #1 MDI 0 Refills         Reported         4/17/18       Furosemide* (Lasix*) 40 Mg Tablet      20 MG PO HS, #30 TAB 0 Refills         Reported         4/17/18       Cholecalciferol (Vitamin D3*) 2,000 Unit Tablet      2000 UNITS PO QDAY, #30 TAB         Reported         4/17/18       Cholecalciferol (Vitamin D*) 2,000 Unit Cap      1000 UNITS PO QDAY, #30 CAP 0 Refills         Reported         4/12/18       Fexofenadine Hcl (Fexofenadine Hcl) 180 Mg Tablet      180 MG PO BID, #60 TAB 0 Refills         Reported         4/12/18       Fluorometholone (FML 0.1% Ophth) 3.5 Gm Oint...g.      1 APL EYE EACH BID, TUBE         Reported         4/12/18       Liraglutide (Victoza 3-Jhony) 0.6 Mg/0.1 Ml Pen.injctr      1.8 MG SUBQ QDAY, PACKAGE         Reported         4/12/18        SUMAtriptan Succinate (Imitrex) 100 Mg Tab      100 MG PO ASDIR Y for MIGRAINE, TAB         Reported         4/12/18       Spironolactone (Aldactone) 25 Mg Tablet      50 MG PO BID, #60 TAB 3 Refills         Prov: Mk Dowling         10/23/17       Gabapentin (Gabapentin) 300 Mg Capsule      600 MG PO TID, #60 CAP 0 Refills         Reported         6/8/17       Ondansetron Hcl (Ondansetron*) Unknown Strength Tablet      4 MG PO Q4H Y for NAUSEA, TAB 0 Refills         Reported         6/5/17       Insulin Lispro (HumaLOG VIAL*) Unknown Strength Vial      SUBQ QID INSULIN, #1 VIAL 0 Refills         Reported         6/5/17       Pantoprazole (Protonix*) Unknown Strength Tablet.dr      40 MG PO BID, #30 TAB 0 Refills         Reported         6/5/17       Levocetirizine Dihydrochloride (Xyzal) Unknown Strength Tablet      5 MG PO QDAY, TAB         Reported         6/5/17       Multivitamins (Multi-Vitamin) Unknown Strength Tablet      PO QDAY, #30 TAB 0 Refills         Reported         6/5/17       celeCOXIB (CeleBREX) Unknown Strength Capsule      200 MG PO BID, #60 CAP 0 Refills         Reported         6/5/17       Dicyclomine Hcl (Dicyclomine*) Unknown Strength Tablet      20 MG PO BID Y for DIARRHEA, TAB         Reported         6/5/17       Sertraline HCl (Sertraline*) 50 Mg Tablet      100 MG PO QDAY, #30 TAB 0 Refills         Reported         12/19/14       Levothyroxine (Levothyroxine) 0.1 Mg Tablet      125 MG PO QDAY@07, #30 TAB 0 Refills         Reported         12/19/14       MDI-Albuterol (Ventolin HFA*) 18 Gm Inhaler      1-2 PUFFS INH Q4H PRN, INH         Reported         1/20/14       Montelukast Sodium (Singulair*) 10 Mg Tablet      10 MG PO HS, TAB         Reported         1/20/14       Furosemide (Furosemide) 40 Mg Tablet      40 MG PO QDAY, TAB         Reported         1/20/14       Lovastatin (Lovastatin) 20 Mg Tab      20 MG PO QDAY, #30 0 Refills         Prov: COLASANTI,CHRYSALIS          4/10/13      Current Medications      Current Medications Reviewed 7/16/18            EXAM      GEN-patient appears stated age resting comfortable in no acute distress      Eyes-PERRL,  conjunctiva are normal in appearance extraocular muscles are intact    , no scleral icterus      Nasal-both nares are patent turbinates appear normal no polyps seen no nasal     discharge or ulcerations      Ears-tympanic membranes are normal no erythema no bulging, normal to inspection      Lymphatic-no swollen or enlarged cervical nodes, or axillary node, or femoral     nodes, or supraclavicular nodes      Mouth normal dentition, no erythema no ulcerations oropharynx appears normal no     exudate no evidence of postnasal drip, MP(default value)      Neck-there are no palpable supraclavicular or cervical adenopathy, thyroid is     normal in appearance no apparent nodules, there is no inspiratory or expiratory     stridor, obese with no jugular venous distension.        Respiratory-moderately decreased breath sounds throughout without wheezes,     rhonchi or crackles appreciated, normal work of breathing noted.        Cardiovascular-the heart rate is normal and regular S1 and S2 present with no     murmur or extra heart sounds, there is no JVD, +2 pitting edema up to knees     bilaterally      GI-the abdomen is normal in appearance, bowel sounds present and normal in all     quadrants no hepatosplenomegaly or masses felt      Extremities-no clubbing is present, pulses present in all extremities,     capillary refill time is normal      Musculoskeletal-Normal strength in upper and lower extremities, inspection     shows no evidence of muscle atrophy      Skin-skin is normal in appearance it is warm and dry, no rashes present, no     evidence of cyanosis, palpation reveals no masses      Neurological-the patient is alert and oriented to time place and person, moves     all 4 extremities, normal gait, normal affect and mood, CN2-12  intact      Psych-normal judgment and insight is good, normal mood and affect, alert and     oriented to person, place, and time, and date      Vtials      Vitals:             Height 5 ft 2.00 in / 157.48 cm           Weight 423 lbs 4.000 oz / 191.718627 kg           BSA 3.05 m2           BMI 77.4 kg/m2           Temperature 98.2 F / 36.78 C - Oral           Pulse 99           Respirations 18           Blood Pressure 154/74 Sitting, Left Arm           Pulse Oximetry 96%            REVIEW      Results Reviewed      [facility] Results Reviewed?:  Yes Prev Lab Results, Yes Prev Radiology Results, Yes     Previous [facility] Records            Assessment      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  Mycobacterium avium intracellulare infection being treated with rifampin,     Azithromycin and ethambutol since 06/27/18.       2.  Moderate persistent asthma.       3. Obstructive sleep apnea on nightly CPAP.      4. Acute on chronic diastolic congestive heart failure.       5. Lower extremity edema.       6. Super obesity with BMI 77.5.             PLAN:      1.  At this time I think the patient will need an increased dose of diuretics.     I will check basic metabolic panel and NT-Pro BNP today before making any     adjustments to her diuretics but I strongly suspect they will need to be     increased. I will let the patient know what increased dose of Lasix I will have     her take.       2. I have instructed her to maintain a low sodium diet of less than 2  grams     daily and to keep her fluid intake to less than 2 liters. She should elevate     her legs when sitting.       3. Continue her treatment of mycobacterium avium complex with rifampin,     Azithromycin and ethambutol.  She has only been taking this for 2 weeks but has     not complaints thus far.       4. Follow up with Dr. Dowling as scheduled, sooner if needed.            Patient Education      Time Spent:  > 50% /Coord [facility]                 Disclaimer: Converted document may not contain table formatting or lab diagrams. Please see [employer] for the authenticated document.

## 2021-05-28 NOTE — PROGRESS NOTES
Patient: AYLIN RAMOS     Acct: [id number]     Report: #[id number]  UNIT #: [id number]     : 1970    Encounter Date:2018  PRIMARY CARE: FILIBERTO BATISTA  ***Signed***  --------------------------------------------------------------------------------------------------------------------  Chief Complaint      Encounter Date      Dec 18, 2018            Primary Care Provider      FILIBERTO BATISTA            Referring Provider      ROBERTO GARCIA            Patient Complaint      Patient is complaining of      3 month f/u sleep apnea            VITALS      Height 5 ft 2 in / 157.48 cm      Weight 408 lbs 5 oz / 185.613731 kg      BSA 2.59 m2      BMI 74.7 kg/m2      Pulse 92      Respirations 14      Blood Pressure 140/74 Sitting, Left Arm      Pulse Oximetry 99%, 2 liters            HPI      The patient is a very pleasant 48 year old white female with history of     mycobacterium avium complex here today for follow up.             The patient still has persistent shortness of breath, worse with any kind of     movement, better with rest only. She does have orthopnea that is persistent     despite nebulizers and diuretics. She does have some episodic cough. She reports    compliance with her treatment for her mycobacterium avium complex however she is    having significant dyspepsia and diarrhea but no abdominal pain. Diarrhea occurs    sporadically throughout the week, several times per week along with nausea as     well. Her diarrhea is improved with anti-diarrheal medications. She denies any     bloody bowel movement at this time. She continues to have weight gain and has     significant whole body anasarca.            ROS      Constitutional:  Denies: Fatigue, Fever, Weight gain, Weight loss, Chills,     Insomnia, Other      Respiratory/Breathing:  Complains of: Shortness of air, Wheezing, Cough; Denies:    Hemoptysis, Pleuritic pain, Other      Endocrine:  Denies: Polydipsia, Polyuria, Heat/cold  intolerance, Abnorml     menstrual pattern, Diabetes, Other      Eyes:  Denies: Blurred vision, Vision Changes, Other      Ears, nose, mouth, throat:  Complains of: Post Nasal Drip, Nasal discharge;     Denies: Mouth lesions, Thrush, Throat pain, Hoarseness, Allergies/Hay Fever,     Headaches, Recent Head Injury, Nose Bleeding, Neck Stiffness, Thyroid Mass,     Hearing Loss, Ear Fullness, Dry Mouth, Nasal or Sinus Pain, Dry Lips, Nasal     congestion, Other (bloody nose )      Cardiovascular:  Denies: Palpitations, Syncope, Claudication, Chest Pain, Wake     up Gasping for air, Leg Swelling, Irregular Heart Rate, Cyanosis, Dyspnea on     Exertion, Other      Gastrointestinal:  Denies: Nausea, Constipation, Diarrhea, Abdominal pain,     Vomiting, Difficulty Swallowing, Reflux/Heartburn, Dysphagia, Jaundice,     Bloating, Melena, Bloody stools, Other      Genitourinary:  Denies: Urinary frequency, Incontinence, Hematuria, Urgency, Noc    turia, Dysuria, Testicular problems, Other      Musculoskeletal:  Denies: Joint Pain, Joint Stiffness, Joint Swelling, Myalgias,    Other      Hematologic/lymphatic:  DENIES: Lymphadenopathy, Bruising, Bleeding tendencies,     Other      Neurological:  Denies: Headache, Numbness, Weakness, Seizures, Other      Psychiatric:  Denies: Anxiety, Appropriate Effect, Depression, Other      Sleep:  No: Excessive daytime sleep, Morning Headache?, Snoring, Insomnia?, Stop    breathing at sleep?, Other      Integumentary:  Denies: Rash, Dry skin, Skin Warm to Touch, Other      Immunologic/Allergic:  Denies: Latex allergy, Seasonal allergies, Asthma,     Urticaria, Eczema, Other      Immunization status:  No: Up to date            FAMILY/SOCIAL/MEDICAL HX      Current History      carpel tunnel 97/98, hernia repair 11      Surgical History:  Yes: Abdominal Surgery (ABD HERNIA 2011), Orthopedic Surgery     (RACHAEL CARPAL TUNNEL , RT MENISCUS TEAR REPAIR)      Stroke - Family Hx:  Grandparent       Heart - Family Hx:  Grandparent      Diabetes - Family Hx:  Father      Is Father Still Living?:  Yes      Is Mother Still Living?:  Yes       Family History:  Yes      Social History:  No Tobacco Use, No Alcohol Use, No Recreational Drug use      Smoking status:  Former smoker (1ppd x 20 years)      Number of Pregnancies:  2      Age at menopause:  40       Section:  Yes (2)      Hysterectomy:  Yes (partial [id number] and 11)      Anticoagulation Therapy:  No      Antibiotic Prophylaxis:  No      Medical History:  Yes: Allergies, Anemia, Arthritis (SPINE AND KNEES), Asthma     (ALLERGY INDUCED), Blood Disease (ANEMIA), Chronic Bronchitis/COPD, Diabetes     (DMII), Hemorrhoids/Rectal Prob (ACID REFLUX, RLQ PAIN, NAUSEA, DIARRHEA), High     Blood Pressure (MED CONTROLS), High Cholesterol, Reflux Disease, Shortness Of     Breath, Miscellaneous Medical/oth (OBESITY, HYPOTHYROID); No: Alcoholism, Broken    Bones, Cataracts, Chemical Dependency, Chemotherapy/Cancer, Emphysema, Chronic     Liver Disease, Colon Trouble, Colitis, Diverticulitis, Congestive Heart Failu,     Deafness or Ringing Ears, Convulsions, Depression, Anxiety, Bipolar Disorder,     Epilepsy, Seizures, Forgetfullness, Glaucoma, Gall Stones, Gout, Head Injury,     Heart Attack, Heart Murmur, Hepatitis, Hiatal Hernia, HIV (Do not ask - volu,     Jaundice, Kidney or Bladder Disease, Kidney Stones, Migrane Headaches, Mitral     Valve Prolapse, Night sweats, Phlebitis, Psychiatric Care, Rheumatic Fever,     Sexually Transmitted Dis, Sinus Trouble, Skin Disease/Psoriais/Ecz, Stroke,     Thyroid Problem, Tuberculosis or Pos TB Te            PREVENTION      Hx Influenza Vaccination:  Yes      Date Influenza Vaccine Given:  Sep 11, 2018      Influenza Vaccine Declined:  No      2 or More Falls Past Year?:  No      Fall Past Year with Injury?:  No      Hx Pneumococcal Vaccination:  Yes      Encouraged to follow-up with:  PCP regarding preventative  exams.      Chart initiated by      Joao Reyes ma            ALLERGIES/MEDICATIONS      Allergies:        Coded Allergies:             EXENATIDE (Verified  Allergy, Severe, PANCREATITIS, 12/18/18)           INSULIN DEGLUDEC (Verified  Allergy, Severe, 12/18/18)           SITAGLIPTIN (Verified  Allergy, Severe, PANCREATITIS, 12/18/18)           NICKEL (Verified  Allergy, Unknown, RASH, 12/18/18)           METFORMIN (Verified  Adverse Reaction, Unknown, SEVERE DIARRHEA,     DEHYDRATION, 12/18/18)      Uncoded Allergies:             COLBALT BLUE (METAL) (Allergy, Unknown, RASH; DIARRHEA, STOMACH UPSET,     4/17/18)           WHITE GOLD (Allergy, Unknown, RASH, DIARRHEA, STOMACH UPSET, 4/17/18)      Medications    Last Reconciled on 12/18/18 08:08 by JOAO REYES      Losartan Potassium (Losartan*) 25 Mg Tablet      25 MG PO QDAY, #30 TAB 0 Refills         Reported         12/18/18       Insulin Regular Human Rec (HumuLIN R-500 VIAL*) Unknown Strength Vial      SUBQ BID INSULIN, #1 VIAL 0 Refills         Reported         12/18/18       Lovastatin (Lovastatin) 20 Mg Tablet      40 MG PO QDAY for 30 Days, #60 TAB         Reported         12/18/18       Amitriptyline HCl (Amitriptyline HCl) 25 Mg Tablet      25 MG PO QDAY, #30 TAB         Reported         12/18/18       Promethazine Hcl (Phenergan*) 25 Mg Tablet      25 MG PO Q6H PRN for NAUSEA, #90 TAB 3 Refills         Prov: Angela Mansfield PA-C         10/22/18       Bumetanide (BUMETANIDE) 2 Mg Tablet      2 MG PO BID, #60 TAB 3 Refills         Prov: Angela Mansfield PA-C         9/13/18       Triamcinolone Acetonide 0.1% Oint (Triamcinolone Acetonide 0.1% Oint) 454 Gm     Oint...g.      1 APL TOPICAL BID for 30 Days, #1 TUBE 3 Refills         Prov: Angela Mansfield PA-C         9/7/18       Potassium Chloride (K-Tab*) 10 Meq Tablet.er      20 MEQ PO mon,marina,frid, TAB         Reported         9/7/18       Tiotropium Bromide (Spiriva Respimat 2.5 mcg/Puff) 4 Gm  Mist.inhal      2 PUFFS INH RTQDAY, #1 MDI 6 Refills         Prov: Mk Dowling         8/23/18       Bumetanide (BUMETANIDE) 1 Mg Tablet      2 MG PO BID, #60 TAB 4 Refills         Prov: RADHA CONNELLY         8/2/18       Dapagliflozin Propanediol (Farxiga) 10 Mg Tablet      10 MG PO QDAY, #30 TAB 0 Refills         Reported         7/16/18       Ethambutol HCl (Myambutol *) 400 Mg Tab      1600 MG PO MOWEFR, #120 TAB 6 Refills         Prov: Mk Dowling         6/27/18       Rifampin (Rifampin) 300 Mg Cap      600 MG PO MOWEFR, #60 CAP 6 Refills         Prov: Mk Dowling         6/27/18       Azithromycin (Zithromax) 250 Mg Tablet      500 MG PO ASDIR, #30 TAB 6 Refills         Prov: Mk Dowling         6/27/18       MDI-Albuterol (Ventolin HFA) 18 Gm Hfa.aer.ad      2 PUFFS INH QID PRN for WHEEZING / SHORTNESS OF BREATH, #1 MDI 6 Refills         Prov: Angela Mansfield PA-C         6/12/18       Albuterol/Ipratropium (Duoneb) 3 Ml Ampul.neb      3 ML INH Q4H PRN for SHORTNESS OF BREATH, #120 NEB 6 Refills         Prov: Angela Mansfield PA-C         6/12/18       traZODone HCl (Desyrel) 50 Mg Tablet      25 MG PO HS for 30 Days, #30 TAB 3 Refills         Prov: Mk Dowling         5/14/18       Nebulizer Accessories (Nebulizer Kit RANDI) 1 Each Kit      EACH XX ONCE, #1 0 Refills         Prov: Angela Mansfield PA-C         4/17/18       Neb-Budesonide (Pulmicort) 0.5 Mg/2 Ml Ampul.neb      0.5 MG INH BID, #60 NEB 4 Refills         Prov: Angela Mansfield PA-C         4/17/18       Arformoterol Tartrate (Brovana) 15 Mcg/2 Ml Vial.neb      15 MCG INH BID, #60 NEB 4 Refills         Prov: Angela Mansfield PA-C         4/17/18       Cholecalciferol (Vitamin D3*) 2,000 Unit Tablet      2000 UNITS PO QDAY, #30 TAB         Reported         4/17/18       Cholecalciferol (Vitamin D*) 2,000 Unit Cap      1000 UNITS PO QDAY, #30 CAP 0 Refills         Reported         4/12/18       Fexofenadine Hcl  (Fexofenadine Hcl) 180 Mg Tablet      180 MG PO BID, #60 TAB 0 Refills         Reported         4/12/18       Fluorometholone (FML 0.1% Ophth) 3.5 Gm Oint...g.      1 APL EYE EACH BID, TUBE         Reported         4/12/18       Liraglutide (Victoza 3-Jhony) 0.6 Mg/0.1 Ml Pen.injctr      1.8 MG SUBQ QDAY, PACKAGE         Reported         4/12/18       SUMAtriptan Succinate (Imitrex) 100 Mg Tab      100 MG PO ASDIR PRN for MIGRAINE, TAB         Reported         4/12/18       Spironolactone (Aldactone) 25 Mg Tablet      50 MG PO BID, #60 TAB 3 Refills         Prov: Mk Dowling         10/23/17       Gabapentin (Gabapentin) 300 Mg Capsule      600 MG PO TID, #60 CAP 0 Refills         Reported         6/8/17       Ondansetron Hcl (Ondansetron*) Unknown Strength Tablet      4 MG PO Q4H PRN for NAUSEA, TAB 0 Refills         Reported         6/5/17       Insulin Lispro (HumaLOG VIAL*) Unknown Strength Vial      SUBQ QID INSULIN, #1 VIAL 0 Refills         Reported         6/5/17       Pantoprazole (Protonix*) Unknown Strength Tablet.dr      40 MG PO BID, #30 TAB 0 Refills         Reported         6/5/17       Multivitamins (Multi-Vitamin) Unknown Strength Tablet      PO QDAY, #30 TAB 0 Refills         Reported         6/5/17       celeCOXIB (CeleBREX) Unknown Strength Capsule      200 MG PO BID, #60 CAP 0 Refills         Reported         6/5/17       Dicyclomine Hcl (DICYCLOMINE HCL) Unknown Strength Tablet      20 MG PO BID PRN for DIARRHEA, TAB         Reported         6/5/17       Sertraline HCl (Sertraline*) 50 Mg Tablet      100 MG PO QDAY, #30 TAB 0 Refills         Reported         12/19/14       Levothyroxine (Levothyroxine) 0.1 Mg Tablet      125 MG PO QDAY@07, #30 TAB 0 Refills         Reported         12/19/14       MDI-Albuterol (Ventolin HFA*) 18 Gm Inhaler      1-2 PUFFS INH Q4H PRN, INH         Reported         1/20/14       Montelukast Sodium (Singulair*) 10 Mg Tablet      10 MG PO HS, TAB         Reported          1/20/14      Current Medications      Current Medications Reviewed 12/18/18            EXAM      GEN-patient is very pleasant but very morbidly obese lady resting comfortable in    no acute distress      Eyes-PERRL,  conjunctiva are normal in appearance extraocular muscles are     intact, no scleral icterus      Lymphatic-no swollen or enlarged cervical nodes, or axillary node, or femoral     nodes, or supraclavicular nodes      Mouth normal dentition, no erythema no ulcerations oropharynx appears normal no     exudate no evidence of postnasal drip, MP IV.        Neck-there are no palpable supraclavicular or cervical adenopathy, thyroid is     normal in appearance no apparent nodules, there is no inspiratory or expiratory     stridor      Respiratory-patient exhibits normal work of breathing, speaking in full     sentences without difficulty, the chest is normal in appearance, clear to     auscultation with no wheezes rales or rhonchi, chest is normal to percussion on     both the right and left sides      Cardiovascular-the heart rate is normal and regular S1 and S2 present with no     murmur or extra heart sounds, there is no JVD, significant bilateral pitting     edema past the knees.        GI-the abdomen is normal in appearance, bowel sounds present and normal in all q    uadrants no hepatosplenomegaly or masses felt      Extremities-no clubbing is present, pulses present in all extremities, capillary    refill time is normal      Skin-skin is normal in appearance it is warm and dry, no rashes present, no     evidence of cyanosis, palpation reveals no masses      Neurological-the patient is alert and oriented to time place and person, moves     all 4 extremities, normal gait, normal affect and mood, CN2-12 intact      Psych-normal judgment and insight is good, normal mood and affect, alert and     oriented to person, place, and time, and date      Vtials      Vitals:             Height 5 ft 2 in / 157.48  cm           Weight 408 lbs 5 oz / 185.654763 kg           BSA 2.59 m2           BMI 74.7 kg/m2           Pulse 92           Respirations 14           Blood Pressure 140/74 Sitting, Left Arm           Pulse Oximetry 99%, 2 liters            REVIEW      Results Reviewed      [facility] Results Reviewed?:  Yes Prev Lab Results, Yes Prev Radiology Results, Yes     Previous [facility] Records            Assessment      SOB (shortness of breath) - R06.02            Mycobacterium avium complex - A31.0            Notes      New Medications      * Amitriptyline HCl 25 MG TABLET: 25 MG PO QDAY #30      * Lovastatin 20 MG TABLET: 40 MG PO QDAY 30 Days #60      * Insulin Regular Human Rec (HumuLIN R-500 VIAL*) Unknown Strength VIAL: SUBQ       BID INSULIN #1         Instructions: **HIGH CONC**      * Losartan Potassium (Losartan*) 25 MG TABLET: 25 MG PO QDAY #30      * Lactobacillus Acidophilus (Florajen) 460 MG CAPSULE: 460 MG PO BID #60         Instructions: 1 CAP      * Azithromycin 250 MG TABLET: 250 MG PO QDAY #30      Discontinued Medications      * Lovastatin 20 MG TAB: 20 MG PO QDAY #30      New Diagnostics      * PFT-Comp, PrePost,DLCO,BodyBox, Week         Dx: SOB (shortness of breath) - R06.02      * AFB Culture      Dx: Mycobacterium avium complex - A31.0      * Hiv 1 By Eia W/West , Month         Dx: Mycobacterium avium complex - A31.0      * Sputum Culture W/Gram Stain, As Soon As Possible         Dx: Cough - R05      ASSESSMENT/PLAN:      1. Mycobacterium avium complex.  Increase Azithromycin to 250 mg daily. Continue    rifampin and ethambutol as written.       2. Obtain acid fast bacillus for sputum. She will need treatment for 1 year, she    is 6 months into the treatment now. We will plan on bronchoscopy in June 2019 if    the patient is unable to provide sputum.       3. Dyspnea. Multifactorial related to the patient's pulmonary hypertension which    is secondary to her diastolic heart failure. Continue diuresis at  this time. The    patient also has significant obesity which is contributing to her shortness of     breath.       4. Diabetes. Poorly controlled, playing a significant contributing factor.       5. We will check HIV status at this time. Immunoglobulin levels checked in the     past show no great reason why this patient has gotten this rare infection.       6. Obesity hypoventilation syndrome. Continue noninvasive positive pressure     ventilation.      7. Morbid obesity with BMI of 74.7. Will need bariatric surgery evaluation in     the future once some of her issues have been cleared up.       8. I have personally reviewed laboratory data, imaging as well as previous m    edical records.            Patient Education      Education resources provided:  Yes      Patient Education Provided:  Pulmonary Hypertension                 Disclaimer: Converted document may not contain table formatting or lab diagrams. Please see [facility] for the authenticated document.

## 2021-05-28 NOTE — PROGRESS NOTES
Patient: AYLIN RAMOS     Acct: [id number]     Report: #[id number]  UNIT #: [id number]     : 1970    Encounter Date:2019  PRIMARY CARE: FILIBERTO BATISTA  ***Signed***  --------------------------------------------------------------------------------------------------------------------  Chief Complaint      Encounter Date      Sep 17, 2019            Primary Care Provider      FILIBERTO BATISTA            Referring Provider      ROBERTO GARCIA            Patient Complaint      Patient is complaining of      PT here for 3m f/u, copd            VITALS      Height 5 ft 2 in / 157.48 cm      Weight 419 lbs 6 oz / 190.242430 kg      BSA 2.62 m2      BMI 76.7 kg/m2      Pulse 89      Respirations 14      Blood Pressure 117/63 Sitting, Left Arm      Pulse Oximetry 97%, Room air            HPI      The patient is a very pleasant 48 year old female with history of obstructive     sleep apnea, asthma and recently a history of mycobacterium avium intracellular     status post treatment. She is here for follow up today.             She is doing well at this time. She still complains of a little bit of     difficulty falling asleep on 25 mg of Trazodone but other than this she feels     that her breathing is at her baseline.  She still has her baseline dyspnea,     still has some lower extremity edema and her weight is up again today in the     office. She has no increased cough, no increased sputum production, no fever or     chills or night sweats.            ROS      Constitutional:  Denies: Fatigue, Fever, Weight gain, Weight loss, Chills,     Insomnia, Other      Respiratory/Breathing:  Complains of: Shortness of air, Wheezing, Cough; Denies:    Hemoptysis, Pleuritic pain, Other      Endocrine:  Denies: Polydipsia, Polyuria, Heat/cold intolerance, Abnorml     menstrual pattern, Diabetes, Other      Eyes:  Denies: Blurred vision, Vision Changes, Other      Ears, nose, mouth, throat:  Denies: Congestion,  Dysphagia, Hearing Changes, Nose    Bleeding, Nasal Discharge, Throat pain, Tinnitus, Other      Cardiovascular:  Denies: Chest Pain, Exertional dyspnea, Peripheral Edema,     Palpitations, Syncope, Wake up Gasping for air, Orthopnea, Tachycardia, Other      Gastrointestinal:  Denies: Abdominal pain/cramping, Bloody stools, Constipation,    Diarrhea, Melena, Nausea, Vomiting, Other      Genitourinary:  Denies: Dysuria, Urinary frequency, Incontinence, Hematuria,     Urgency, Other      Musculoskeletal:  Denies: Joint Pain, Joint Stiffness, Joint Swelling, Myalgias,    Other      Hematologic/lymphatic:  DENIES: Lymphadenopathy, Bruising, Bleeding tendencies,     Other      Neurologic:  Denies: Headache, Numbness, Weakness, Seizures, Other      Psychiatric:  Denies: Anxiety, Appropriate Effect, Depression, Other      Sleep:  No: Excessive daytime sleep, Morning Headache?, Snoring, Insomnia?, Stop    breathing at sleep?, Other      Integumentary:  Denies: Rash, Dry skin, Skin Warm to Touch, Other            FAMILY/SOCIAL/MEDICAL HX      Current History      carpel tunnel 97/98, hernia repair 11      Surgical History:  Yes: Abdominal Surgery (ABD. HERNIA ), Oral Surgery     (TONGUE BX. ), Orthopedic Surgery (RACHAEL CARPAL TUNNEL , RT MENISCUS TEAR REPAIR);    No: Appendectomy, Bladder Surgery, Bowel Surgery, CABG, Cholecystectomy, Head     Surgery, Vascular Surgery      Stroke - Family Hx:  Grandparent      Heart - Family Hx:  Grandparent      Diabetes - Family Hx:  Father      Is Father Still Living?:  Yes      Is Mother Still Living?:  Yes       Family History:  Yes      Social History:  No Tobacco Use, No Alcohol Use, No Recreational Drug use      Smoking status:  Former smoker      Number of Pregnancies:  2      Age at menopause:  40       Section:  Yes (2)      Hysterectomy:  Yes (partial [id number] and 11)      Anticoagulation Therapy:  No      Antibiotic Prophylaxis:  No      Medical History:  Yes:  Allergies, Anemia, Arthritis (SPINE AND KNEES), Asthma     (ALLERGY INDUCED), Blood Disease (ANEMIA), Congestive Heart Failu (4/2019),     Diabetes (TYPE II), Hemorrhoids/Rectal Prob (GERD, HIATAL HERNIA), High Blood     Pressure (ON MED. ), High Cholesterol, Reflux Disease, Shortness Of Breath     (MICOBACTERIUM CHE COMPLEX, COUGH); No: Alcoholism, Broken Bones, Cataracts,     Chemical Dependency, Chemotherapy/Cancer, Chronic Bronchitis/COPD, Emphysema,     Chronic Liver Disease, Colon Trouble, Colitis, Diverticulitis, Deafness or     Ringing Ears, Convulsions, Depression, Anxiety, Bipolar Disorder, Epilepsy,     Seizures, Forgetfullness, Glaucoma, Gall Stones, Gout, Head Injury, Heart     Attack, Heart Murmur, Hepatitis, Hiatal Hernia, HIV (Do not ask - volu,     Jaundice, Kidney or Bladder Disease, Kidney Stones, Migrane Headaches, Mitral     Valve Prolapse, Night sweats, Phlebitis, Psychiatric Care, Rheumatic Fever,     Sexually Transmitted Dis, Sinus Trouble, Skin Disease/Psoriais/Ecz, Stroke,     Thyroid Problem, Tuberculosis or Pos TB Te, Miscellaneous Medical/oth      Psychiatric History      None            PREVENTION      Hx Influenza Vaccination:  Yes      Date Influenza Vaccine Given:  Sep 1, 2018      Influenza Vaccine Declined:  No      2 or More Falls Past Year?:  No      Fall Past Year with Injury?:  No      Hx Pneumococcal Vaccination:  Yes      Encouraged to follow-up with:  PCP regarding preventative exams.      Chart initiated by      Kellen Villa MA            ALLERGIES/MEDICATIONS      Allergies:        Coded Allergies:             EXENATIDE (Verified  Allergy, Unknown, PANCREATITIS, 9/17/19)           INSULIN DEGLUDEC (Verified  Allergy, Unknown, 9/17/19)           NICKEL (Verified  Allergy, Unknown, RASH, 9/17/19)           SITAGLIPTIN (Verified  Allergy, Unknown, PANCREATITIS, 9/17/19)           METFORMIN (Verified  Adverse Reaction, Unknown, SEVERE DIARRHEA,     DEHYDRATION, 9/17/19)       Uncoded Allergies:             COLBALT BLUE (METAL) (Allergy, Unknown, RASH; DIARRHEA, STOMACH UPSET,     4/17/18)           WHITE GOLD (Allergy, Unknown, RASH, DIARRHEA, STOMACH UPSET, 4/17/18)      Medications    Last Reconciled on 9/17/19 08:01 by EDGAR DOWLING MD      traZODone HCl (Desyrel) 50 Mg Tablet      25 MG PO HS for 30 Days, #30 TAB 3 Refills         Prov: Edgar Dowling         8/1/19       Lactobacillus Acidophilus (Florajen) 460 Mg Capsule      460 MG PO BID, #60 CAP 4 Refills         Prov: Edgar Dowling         8/1/19       Bumetanide (BUMETANIDE) 2 Mg Tablet      2 MG PO BID, #60 TAB 6 Refills         Prov: Edgar Dowling         6/4/19       Rosuvastatin Calcium (Crestor*) 40 Mg Tablet      40 MG PO HS, #30 TAB 0 Refills         Reported         6/4/19       Levothyroxine (Levothyroxine) 0.15 Mg Tablet      0.15 MG PO QDAY@07, #30 TAB 0 Refills         Reported         6/4/19       Metoprolol Succinate (Metoprolol Succinate) 25 Mg Tab.er.24h      25 MG PO HS, #30 TAB 0 Refills         Reported         4/23/19       Metolazone (Metolazone) 2.5 Mg Tablet      2.5 MG PO MOWEFR for 30 Days, #30 TAB 1 Refill         Prov: DAFNE ABRAMS         4/16/19       Potassium Chloride (Potassium Chloride) 10 Meq Capsule.er      40 MEQ PO TID for 30 Days, #360 CAP.ER 1 Refill         Prov: DAFNE ABRAMS         4/16/19       Mometasone/Formoterol (Dulera 200 Mcg/5 Mcg) 13 Gm Hfa.aer.ad      2 PUFFS INH RTBID, #1 MDI 0 Refills         Reported         4/13/19       Amitriptyline HCl (Amitriptyline HCl) 50 Mg Tablet      50 MG PO HS, #30 TAB         Reported         4/13/19       Ergocalciferol (Ergocalciferol) 8,000 Units/Ml Drops      1000 UNITS PO QDAY, ML         Reported         4/13/19       Losartan Potassium (Losartan*) 50 Mg Tablet      50 MG PO QDAY, #30 TAB 0 Refills         Reported         4/13/19       Bumetanide (BUMETANIDE) 2 Mg Tablet      2 MG PO BID, #60 TAB 3 Refills         Prov:  Mk Dowling         1/22/19       Azithromycin (Azithromycin) 250 Mg Tablet      250 MG PO QDAY, #30 TAB 6 Refills         Prov: Mk Dowling         12/18/18       Insulin Regular Human Rec (HumuLIN R-500 VIAL*) Unknown Strength Vial      SUBQ BID INSULIN, #1 VIAL 0 Refills         Reported         12/18/18       Promethazine Hcl (Phenergan*) 25 Mg Tablet      25 MG PO Q6H PRN for NAUSEA, #90 TAB 3 Refills         Prov: Angela Mansfield PA-C         10/22/18       Tiotropium Bromide (Spiriva Respimat 2.5 mcg/Puff) 4 Gm Mist.inhal      2 PUFFS INH RTQDAY, #1 MDI 6 Refills         Prov: Mk Dowling         8/23/18       Ethambutol HCl (Myambutol *) 400 Mg Tab      1600 MG PO MOWEFR, #120 TAB 6 Refills         Prov: Mk Dowling         6/27/18       Rifampin (Rifampin) 300 Mg Cap      600 MG PO MOWEFR, #60 CAP 6 Refills         Prov: Mk Dowling         6/27/18       MDI-Albuterol (Ventolin HFA) 18 Gm Hfa.aer.ad      2 PUFFS INH QID PRN for WHEEZING / SHORTNESS OF BREATH, #1 MDI 6 Refills         Prov: Angela Mansfield PA-C         6/12/18       Albuterol/Ipratropium (Duoneb) 3 Ml Ampul.neb      3 ML INH Q4H PRN for SHORTNESS OF BREATH, #120 NEB 6 Refills         Prov: Angela Mansfield PA-C         6/12/18       Neb-Budesonide (Pulmicort) 0.5 Mg/2 Ml Ampul.neb      0.5 MG INH BID, #60 NEB 4 Refills         Prov: Angela Mansfield PA-C         4/17/18       Arformoterol Tartrate (Brovana) 15 Mcg/2 Ml Vial.neb      15 MCG INH BID, #60 NEB 4 Refills         Prov: Angela Mansfield PA-C         4/17/18       Cholecalciferol (Vitamin D3) (Vitamin D3) 2,000 Unit Cap      1000 UNITS PO QDAY, #30 CAP 0 Refills         Reported         4/12/18       Fexofenadine Hcl (Fexofenadine Hcl) 180 Mg Tablet      180 MG PO BID, #60 TAB 0 Refills         Reported         4/12/18       SUMAtriptan Succinate (Imitrex) 100 Mg Tab      100 MG PO ASDIR PRN for MIGRAINE, TAB         Reported         4/12/18        Spironolactone (Aldactone) 25 Mg Tablet      50 MG PO BID, #60 TAB 3 Refills         Prov: Mk Dowling         10/23/17       Gabapentin (Gabapentin) 300 Mg Capsule      600 MG PO TID, #60 CAP 0 Refills         Reported         6/8/17       Pantoprazole (Protonix*) Unknown Strength Tablet.dr      40 MG PO BID, #30 TAB 0 Refills         Reported         6/5/17       Multivitamins (Multi-Vitamin) Unknown Strength Tablet      PO QDAY, #30 TAB 0 Refills         Reported         6/5/17       celeCOXIB (CeleBREX) Unknown Strength Capsule      200 MG PO BID, #60 CAP 0 Refills         Reported         6/5/17       Dicyclomine Hcl (DICYCLOMINE HCL) Unknown Strength Tablet      20 MG PO BID PRN for DIARRHEA, TAB         Reported         6/5/17       Sertraline HCl (Sertraline*) 50 Mg Tablet      100 MG PO QDAY, #30 TAB 0 Refills         Reported         12/19/14       Montelukast Sodium (Singulair*) 10 Mg Tablet      10 MG PO HS, TAB         Reported         1/20/14      Current Medications      Current Medications Reviewed 9/17/19            EXAM      GEN-patient is very pleasant but very morbidly obese lady resting comfortable in    no acute distress      Eyes-PERRL,  conjunctiva are normal in appearance extraocular muscles are i    ntact, no scleral icterus      Lymphatic-no swollen or enlarged cervical nodes, or axillary node, or femoral     nodes, or supraclavicular nodes      Mouth normal dentition, no erythema no ulcerations oropharynx appears normal no     exudate no evidence of postnasal drip, MP IV.        Neck-there are no palpable supraclavicular or cervical adenopathy, thyroid is     normal in appearance no apparent nodules, there is no inspiratory or expiratory     stridor      Respiratory-patient exhibits normal work of breathing, speaking in full     sentences without difficulty, the chest is normal in appearance, clear to     auscultation with no wheezes rales or rhonchi, chest is normal to percussion  on     both the right and left sides      Cardiovascular-the heart rate is normal and regular S1 and S2 present with no     murmur or extra heart sounds, there is no JVD, significant bilateral pitting     edema past the knees.        GI-the abdomen is normal in appearance, bowel sounds present and normal in all     quadrants no hepatosplenomegaly or masses felt      Extremities-no clubbing is present, pulses present in all extremities, capillary    refill time is normal      Skin-skin is normal in appearance it is warm and dry, no rashes present, no     evidence of cyanosis, palpation reveals no masses      Neurological-the patient is alert and oriented to time place and person, moves     all 4 extremities, normal gait, normal affect and mood, CN2-12 intact      Psych-normal judgment and insight is good, normal mood and affect, alert and     oriented to person, place, and time, and date      Vitals      Vitals:             Height 5 ft 2 in / 157.48 cm           Weight 419 lbs 6 oz / 190.660982 kg           BSA 2.62 m2           BMI 76.7 kg/m2           Pulse 89           Respirations 14           Blood Pressure 117/63 Sitting, Left Arm           Pulse Oximetry 97%, Room air            REVIEW      Results Reviewed      [facility] Results Reviewed?:  Yes Prev Lab Results, Yes Prev Radiology Results, Yes     Previous [facility] Records            Assessment      Notes      New Medications      * traZODone HCl (Desyrel) 50 MG TABLET: 50 MG PO HS #30      * Polyethylene Glycol  3350 (Miralax) 119 GM POWDER: 17 GM PO QDAY #30      Discontinued Medications      * Fluconazole (Diflucan) 100 MG TABLET: 100 MG PO QDAY #3      ASSESSMENT/PLAN:      1. Mild intermittent asthma.  Continue current bronchodilator response, symptoms    appear to be well controlled at this time.       2. Insomnia. Increase trazodone to 50 mg PO q HS.       3. Obstructive sleep apnea. Continue PAP therapy at current settings.       4. Morbid obesity with BMI  76.7.  The patient needs to have weight loss surgery     and all her issues appear right now to be direct complications from her weight.     The patient is aware of this.       5. Pulmonary hypertension. Secondary to obstructive sleep apnea and secondary     diastolic heart failure. Continue treatment of diastolic heart failure.       6. I have personally reviewed laboratory data, imaging and previous medical     records.            Patient Education      Education resources provided:  Yes      Patient Education Provided:  Sleep Apnea                 Disclaimer: Converted document may not contain table formatting or lab diagrams. Please see [employer] for the authenticated document.

## 2021-05-28 NOTE — PROGRESS NOTES
Patient: AYLIN RAMOS     Acct: [id number]     Report: #[id number]  UNIT #: [id number]     : 1970    Encounter Date:2018  PRIMARY CARE: FILIBERTO BATISTA  ***Signed***  --------------------------------------------------------------------------------------------------------------------  Chief Complaint      Encounter Date      Sep 7, 2018            Primary Care Provider      FILIBERTO BATISTA            Referring Provider      ROBERTO GARCIA            Patient Complaint      Patient is complaining of      1 Month F/U Per [facility]. Results            VITALS      Height 5 ft 2 in / 157.48 cm      Weight 408 lbs 8 oz / 185.812528 kg      BSA 2.99 m2      BMI 74.7 kg/m2      Temperature 98.9 F / 37.17 C - Oral      Pulse 101      Respirations 16      Blood Pressure 163/85 Sitting, Right Arm      Pulse Oximetry 97%, 2 liters            HPI      The patient is a pleasant 47-year-old white female who is a patient of Dr. Nitesh melton and here for one month followup today. I last saw her on 2018     for transition of care after she was hospitalized at [facility]     for significant volume overload with acute-on-chronic diastolic heart failure.     She also has a history of mild pulmonary hypertension, obesity hypoventilation     syndrome, obstructive sleep apnea, and venous stasis in her lower extremities.     Since she was discharged from the hospital, I have placed her on Bumex 2 mg p.o.    b.i.d. and have referred her to cardiology as well for her diastolic heart failu    re. She tells me that she has seen Dr. Myers and that he has added metolazone     for her to take Monday, Wednesday, and Friday of each week and to also take     extra potassium on those days. She tells me that she can tell that the     metolazone and the increased dose of Bumex are helping. She denies any weight     gain, increased lower extremity edema, abdominal distention, or orthopnea. She      denies any increased coughing or wheezing, denies purulent sputum production,     hemoptysis, fever, or chills. While she was recently hospitalized at [facility] for her diastolic heart failure, she was also referred to the     lymphedema clinic for her chronic lower extremity edema and venous stasis. She     tells me that they wanted her PCP, Dr. Gimenez, to prescribe her topical steroid     cream to help with the venous stasis, but he would not prescribe this. She has     asked us to prescribe this for her today. She is doing lymphedema wraps and they    are also setting her up with compression stockings. Thus far the wraps seem to     be helping. She is also continuing to take her rifampin, ethambutol, and     azithromycin on Monday, Wednesday, Fridays for her mycobacterium avium     intracellulare infection. She denies any issues with taking those at this time.             I have reviewed her review of systems, medical, surgical, and family history and    agree with those as entered.            ROS      Constitutional:  Denies: Fatigue, Fever, Weight gain, Weight loss, Chills,     Insomnia, Other      Respiratory/Breathing:  Complains of: Shortness of air, Wheezing, Cough; Denies:    Hemoptysis, Pleuritic pain, Other      Endocrine:  Denies: Polydipsia, Polyuria, Heat/cold intolerance, Abnorml     menstrual pattern, Diabetes, Other      Eyes:  Denies: Blurred vision, Vision Changes, Other      Ears, nose, mouth, throat:  Denies: Mouth lesions, Thrush, Throat pain, Hoarsen    ess, Allergies/Hay Fever, Post Nasal Drip, Headaches, Recent Head Injury, Nose     Bleeding, Neck Stiffness, Thyroid Mass, Hearing Loss, Ear Fullness, Dry Mouth,     Nasal or Sinus Pain, Dry Lips, Nasal discharge, Nasal congestion, Other      Cardiovascular:  Denies: Palpitations, Syncope, Claudication, Chest Pain, Wake     up Gasping for air, Leg Swelling, Irregular Heart Rate, Cyanosis, Dyspnea on     Exertion, Other       Gastrointestinal:  Denies: Nausea, Constipation, Diarrhea, Abdominal pain,     Vomiting, Difficulty Swallowing, Reflux/Heartburn, Dysphagia, Jaundice,     Bloating, Melena, Bloody stools, Other      Genitourinary:  Denies: Urinary frequency, Incontinence, Hematuria, Urgency,     Nocturia, Dysuria, Testicular problems, Other      Musculoskeletal:  Denies: Joint Pain, Joint Stiffness, Joint Swelling, Myalgias,    Other      Hematologic/lymphatic:  DENIES: Lymphadenopathy, Bruising, Bleeding tendencies,     Other      Neurological:  Denies: Headache, Numbness, Weakness, Seizures, Other      Psychiatric:  Denies: Anxiety, Appropriate Effect, Depression, Other      Sleep:  No: Excessive daytime sleep, Morning Headache?, Snoring, Insomnia?, Stop    breathing at sleep?, Other      Integumentary:  Denies: Rash, Dry skin, Skin Warm to Touch, Other      Immunologic/Allergic:  Denies: Latex allergy, Seasonal allergies, Asthma,     Urticaria, Eczema, Other      Immunization status:  No: Up to date            FAMILY/SOCIAL/MEDICAL HX      Current History      carpel tunnel , hernia repair 11      Surgical History:  Yes: Abdominal Surgery (ABD HERNIA ), Orthopedic Surgery     (RACHAEL CARPAL TUNNEL , RT MENISCUS TEAR REPAIR)      Stroke - Family Hx:  Grandparent      Heart - Family Hx:  Grandparent      Diabetes - Family Hx:  Father      Is Father Still Living?:  Yes      Is Mother Still Living?:  Yes       Family History:  Yes      Social History:  No Tobacco Use, No Alcohol Use, No Recreational Drug use      Smoking status:  Former smoker (1ppd x 20 years)      Number of Pregnancies:  2      Age at menopause:  40       Section:  Yes (2)      Hysterectomy:  Yes (partial [id number] and 11)      Anticoagulation Therapy:  No      Antibiotic Prophylaxis:  No      Medical History:  Yes: Allergies, Anemia, Arthritis (SPINE AND KNEES), Asthma     (ALLERGY INDUCED), Blood Disease (ANEMIA), Chronic Bronchitis/COPD,  Diabetes     (DMII), Hemorrhoids/Rectal Prob (ACID REFLUX, RLQ PAIN, NAUSEA, DIARRHEA), High     Blood Pressure (MED CONTROLS), High Cholesterol, Reflux Disease, Shortness Of     Breath, Miscellaneous Medical/oth (OBESITY, HYPOTHYROID); No: Alcoholism, Broken    Bones, Cataracts, Chemical Dependency, Chemotherapy/Cancer, Emphysema, Chronic     Liver Disease, Colon Trouble, Colitis, Diverticulitis, Congestive Heart Failu,     Deafness or Ringing Ears, Convulsions, Depression, Anxiety, Bipolar Disorder,     Epilepsy, Seizures, Forgetfullness, Glaucoma, Gall Stones, Gout, Head Injury,     Heart Attack, Heart Murmur, Hepatitis, Hiatal Hernia, HIV (Do not ask - volu,     Jaundice, Kidney or Bladder Disease, Kidney Stones, Migrane Headaches, Mitral     Valve Prolapse, Night sweats, Phlebitis, Psychiatric Care, Rheumatic Fever,     Sexually Transmitted Dis, Sinus Trouble, Skin Disease/Psoriais/Ecz, Stroke,     Thyroid Problem, Tuberculosis or Pos TB Te            PREVENTION      Hx Influenza Vaccination:  Yes      Date Influenza Vaccine Given:  Sep 1, 2017      Influenza Vaccine Declined:  No      2 or More Falls Past Year?:  No      Fall Past Year with Injury?:  No      Hx Pneumococcal Vaccination:  Yes      Encouraged to follow-up with:  PCP regarding preventative exams.      Chart initiated by      Tamiko Reyes ma            ALLERGIES/MEDICATIONS      Allergies:        Coded Allergies:             EXENATIDE (Verified  Allergy, Severe, PANCREATITIS, 9/7/18)           INSULIN DEGLUDEC (Verified  Allergy, Severe, 9/7/18)           SITAGLIPTIN (Verified  Allergy, Severe, PANCREATITIS, 9/7/18)           NICKEL (Verified  Allergy, Unknown, RASH, 9/7/18)           METFORMIN (Verified  Adverse Reaction, Unknown, SEVERE DIARRHEA,     DEHYDRATION, 9/7/18)      Uncoded Allergies:             COLBALT BLUE (METAL) (Allergy, Unknown, RASH; DIARRHEA, STOMACH UPSET,     4/17/18)           WHITE GOLD (Allergy, Unknown, RASH, DIARRHEA,  STOMACH UPSET, 4/17/18)      Medications    Last Reconciled on 9/7/18 08:37 by JESSE RALPH      Potassium Chloride (K-Tab*) 10 Meq Tablet.er      20 MEQ PO mon,marina,frid, TAB         Reported         9/7/18       Tiotropium Bromide (Spiriva Respimat 2.5 mcg/Puff) 4 Gm Mist.inhal      2 PUFFS INH RTQDAY, #1 MDI 6 Refills         Prov: Mk Dowling         8/23/18       Bumetanide (Bumex) 2 Mg Tablet      2 MG PO BID, #60 TAB         Prov: Angela Espinoza PA-C         8/9/18       Bumetanide (Bumex) 1 Mg Tablet      2 MG PO BID, #60 TAB 4 Refills         Prov: RADHA CONNELLY         8/2/18       Dapagliflozin Propanediol (Farxiga) 10 Mg Tablet      10 MG PO QDAY, #30 TAB 0 Refills         Reported         7/16/18       Promethazine Hcl (Phenergan*) 25 Mg Tablet      25 MG PO Q6H PRN for NAUSEA, #90 TAB 3 Refills         Prov: Mk Dowling         6/27/18       Ethambutol HCl (Myambutol *) 400 Mg Tab      1600 MG PO MOWEFR, #120 TAB 6 Refills         Prov: Mk Dowling         6/27/18       Rifampin (Rifampin) 300 Mg Cap      600 MG PO MOWEFR, #60 CAP 6 Refills         Prov: Mk Dowling         6/27/18       Azithromycin (Zithromax) 250 Mg Tablet      500 MG PO ASDIR, #30 TAB 6 Refills         Prov: Mk Dowling         6/27/18       MDI-Albuterol (Ventolin HFA*) 18 Gm Hfa.aer.ad      2 PUFFS INH QID PRN for WHEEZING / SHORTNESS OF BREATH, #1 MDI 6 Refills         Prov: Angela Espinoza PA-C         6/12/18       Albuterol/Ipratropium (Duoneb) 3 Ml Ampul.neb      3 ML INH Q4H PRN for SHORTNESS OF BREATH, #120 NEB 6 Refills         Prov: Angela Espinoza PA-C         6/12/18       traZODone HCl (Desyrel) 50 Mg Tablet      25 MG PO HS for 30 Days, #30 TAB 3 Refills         Prov: Mk Dowling         5/14/18       Nebulizer Accessories (Nebulizer Kit RANDI) 1 Each Kit      EACH XX ONCE, #1 0 Refills         Prov: Angela Espinoza PA-C         4/17/18       Neb-Budesonide (Pulmicort) 0.5  Mg/2 Ml Ampul.neb      0.5 MG INH BID, #60 NEB 4 Refills         Prov: Angela Espinoza PA-C         4/17/18       Arformoterol Tartrate (Brovana) 15 Mcg/2 Ml Vial.neb      15 MCG INH BID, #60 NEB 4 Refills         Prov: Angela Espinoza PA-C         4/17/18       Cholecalciferol (Vitamin D3*) 2,000 Unit Tablet      2000 UNITS PO QDAY, #30 TAB         Reported         4/17/18       Cholecalciferol (Vitamin D*) 2,000 Unit Cap      1000 UNITS PO QDAY, #30 CAP 0 Refills         Reported         4/12/18       Fexofenadine Hcl (Fexofenadine Hcl) 180 Mg Tablet      180 MG PO BID, #60 TAB 0 Refills         Reported         4/12/18       Fluorometholone (FML 0.1% Ophth) 3.5 Gm Oint...g.      1 APL EYE EACH BID, TUBE         Reported         4/12/18       Liraglutide (Victoza 3-Jhony) 0.6 Mg/0.1 Ml Pen.injctr      1.8 MG SUBQ QDAY, PACKAGE         Reported         4/12/18       SUMAtriptan Succinate (Imitrex) 100 Mg Tab      100 MG PO ASDIR PRN for MIGRAINE, TAB         Reported         4/12/18       Spironolactone (Aldactone) 25 Mg Tablet      50 MG PO BID, #60 TAB 3 Refills         Prov: Mk Dowling         10/23/17       Gabapentin (Gabapentin) 300 Mg Capsule      600 MG PO TID, #60 CAP 0 Refills         Reported         6/8/17       Ondansetron Hcl (Ondansetron*) Unknown Strength Tablet      4 MG PO Q4H PRN for NAUSEA, TAB 0 Refills         Reported         6/5/17       Insulin Lispro (HumaLOG VIAL*) Unknown Strength Vial      SUBQ QID INSULIN, #1 VIAL 0 Refills         Reported         6/5/17       Pantoprazole (Protonix*) Unknown Strength Tablet.dr      40 MG PO BID, #30 TAB 0 Refills         Reported         6/5/17       Multivitamins (Multi-Vitamin) Unknown Strength Tablet      PO QDAY, #30 TAB 0 Refills         Reported         6/5/17       celeCOXIB (CeleBREX) Unknown Strength Capsule      200 MG PO BID, #60 CAP 0 Refills         Reported         6/5/17       Dicyclomine Hcl (Dicyclomine*) Unknown Strength Tablet       20 MG PO BID PRN for DIARRHEA, TAB         Reported         6/5/17       Sertraline HCl (Sertraline*) 50 Mg Tablet      100 MG PO QDAY, #30 TAB 0 Refills         Reported         12/19/14       Levothyroxine (Levothyroxine) 0.1 Mg Tablet      125 MG PO QDAY@07, #30 TAB 0 Refills         Reported         12/19/14       MDI-Albuterol (Ventolin HFA*) 18 Gm Inhaler      1-2 PUFFS INH Q4H PRN, INH         Reported         1/20/14       Montelukast Sodium (Singulair*) 10 Mg Tablet      10 MG PO HS, TAB         Reported         1/20/14       Lovastatin (Lovastatin) 20 Mg Tab      20 MG PO QDAY, #30 0 Refills         Prov: GRAYSON WAYNE         4/10/13      Current Medications      Current Medications Reviewed 9/7/18            EXAM      GEN-patient appears stated age resting comfortable in no acute distress      Eyes-PERRL,   conjunctiva are normal in appearance extraocular muscles are     intact, no scleral icterus      Nasal-both nares are patent turbinates appear normal no polyps seen no nasal     discharge or ulcerations      Ears-tympanic membranes are normal no erythema no bulging, normal to inspection      Lymphatic-no swollen or enlarged cervical nodes, or axillary node, or femoral     nodes, or supraclavicular nodes      Mouth normal dentition, no erythema no ulcerations oropharynx appears normal no     exudate no evidence of postnasal drip      Neck-there are no palpable supraclavicular or cervical adenopathy, thyroid is     normal in appearance no apparent nodules, there is no inspiratory or expiratory     stridor      Respiratory-lungs are grossly clear to auscultation; no wheezes, rhonchi, or     crackles appreciated. Normal work of breathing noted.      Cardiovascular-no JVD is appreciated, but neck is obese, bilateral lower     extremities have chronic edema and chronic venous stasis changes appreciated,     improved from prior      GI-the abdomen is normal in appearance, bowel sounds present and  normal in all     quadrants no hepatosplenomegaly or masses felt      Extremities-no clubbing is present, pulses present in all extremities, capillary    refill time is normal      Musculoskeletal-Normal strength in upper and lower extremities, inspection shows    no evidence of muscle atrophy      Skin-skin is normal in appearance it is warm and dry, no rashes present, no     evidence of cyanosis, palpation reveals no masses      Neurological-the patient is alert and oriented to time place and person, moves     all 4 extremities, normal gait, normal affect and mood, CN2-12 intact      Psych-normal judgment and insight is good, normal mood and affect, alert and     oriented to person, place, and time, and date      Vtials      Vitals:             Height 5 ft 2 in / 157.48 cm           Weight 408 lbs 8 oz / 185.493894 kg           BSA 2.99 m2           BMI 74.7 kg/m2           Temperature 98.9 F / 37.17 C - Oral           Pulse 101           Respirations 16           Blood Pressure 163/85 Sitting, Right Arm           Pulse Oximetry 97%, 2 liters            REVIEW      Results Reviewed      [facility] Results Reviewed?:  Yes Prev Lab Results, Yes Prev Radiology Results, Yes     Previous Mec[facility] Records      Lab Results      I have personally reviewed previous lab work, imaging, and provider notes     including previous office visits, lab work, chest imaging, and previous     echocardiogram.            Assessment      Notes      New Medications      * POTASSIUM CHLORIDE (K-Tab*) 10 MEQ TABLET.ER: 20 MEQ PO mon,marina,frid      * Triamcinolone Acetonide 0.1% Oint 454 GM OINT...G.: 1 APL TOPICAL BID 30 Days       #1      New Office Procedures      * Fluzone Vaccine, As Soon As Possible         Flu Vaccine Quadrivalent (Fluzone High Dose Syringe) 180 MCG/0.5 ML SYRINGE:         180 MICROGRAM INTRAMUSCULARLY Qty 1 SYRINGE      ASSESSMENT:      1. Mycobacterium avium intracellulare infection, on rifampin, ethambutol, and      azithromycin on Mondays, Wednesdays, and Fridays.      2. Chronic diastolic congestive heart failure, currently appears euvolemic.      3. Lower extremity edema, chronic.      4. Lymphedema and chronic venous stasis of her lower extremities.      5. Mild-to-moderate pulmonary hypertension.      6. Obesity hyperventilation syndrome.      7. Obstructive sleep apnea.      8. Super obesity with BMI of 77.1.            PLAN:       1. At this time, I will have the patient continue on her current dose of Bumex 2    mg p.o. b.i.d. as well as Monday, Wednesday, Friday metolazone with additional     potassium on those days as prescribed to her by Dr. Myers. She should continue    on Aldactone 50 mg p.o. b.i.d. as well.      2. I have reviewed her most recent BNP with her today that was done on     08/23/2018 and it was within normal limits. Her renal function has been     remaining stable and her electrolytes were within normal limits as well. She is     due to have blood work with another BNP in the next several weeks, which I will     review once it is available.       3. I recommend that she continue following up in the lymphedema clinic as this     seems to be helping with her chronic lower extremity edema. I have asked her to     have the lymphedema clinic send over their recommendation for topical steroid     cream and we can prescribe that for her.       4. She should continue on rifampin, ethambutol, and azithromycin on Mondays,     Wednesdays, and Fridays for her mycobacterium avium intracellulare infection.     She should continue nightly Trilogy and supplemental oxygen continuously for her    obesity hypoventilation syndrome.      5. She should continue on diuretics as mentioned above for her pulmonary     hypertension, which appears secondary to her diastolic heart failure.       6. I will also give her a flu shot today.       7. She should followup with Dr. Dowling in 2-3 months or sooner if needed.             Patient Education      Time Spent:  > 50% /[facility]                 Disclaimer: Converted document may not contain table formatting or lab diagrams. Please see [employer] for the authenticated document.

## 2021-05-28 NOTE — PROGRESS NOTES
Patient: AYLIN RAMOS     Acct: [id number]     Report: #[id number]  UNIT #: [id number]     : 1970    Encounter Date:2018  PRIMARY CARE: FILIBERTO BATISTA  ***Signed***  --------------------------------------------------------------------------------------------------------------------  Chief Complaint      Encounter Date      2018            Primary Care Provider      FILIBERTO BATISTA            Referring Provider      ROBERTO GARCIA            Patient Complaint      Patient is complaining of      Pt here for soa and cought            VITALS      Height 5 ft 2 in / 157.48 cm      Weight 401 lbs 3 oz / 181.391441 kg      BSA 2.96 m2      BMI 73.4 kg/m2      Temperature 99.0 F / 37.22 C - Oral      Pulse 104      Respirations 18      Blood Pressure 180/84 Sitting, Left Arm      Pulse Oximetry 95%, room air            HPI      The patient is a very pleasant 47 year old female who is a patient of Dr. Dowling's with a history of asthma and obstructive sleep apnea as well as super     obesity. She presents today with 1-2 week history of increased dyspnea on     exertion with minimal exertion such as ambulating from room to room, relieved     with rest. She also has nonproductive cough with increased wheezing.  She     states she that she can feel congestion but she is currently not able to cough     it up. She has also admitted to fevers and chills and states that her     temperature has been as high as 101 at home. She has been taking ibuprofen or     Tylenol for this. She reports she is still compliant with using her CPAP at     night and that she cannot sleep without it. She has been using Dulera and     Spiriva as well as DuoNeb as needed. She feels the DuoNeb are helping her the     most. She denies hemoptysis.             I have reviewed his Review of Systems medical, surgical and family history and     agree with those as entered.            ROS      Constitutional:  Denies: Fatigue,  Fever, Weight gain, Weight loss, Chills,     Insomnia, Other      Respiratory/Breathing:  Complains of: Shortness of air, Cough, Denies: Wheezing    , Hemoptysis, Pleuritic pain, Other      Endocrine:  Denies: Polydipsia, Polyuria, Heat/cold intolerance, Abnorml     menstrual pattern, Diabetes, Other      Eyes:  Denies: Blurred vision, Vision Changes, Other      Ears, nose, mouth, throat:  Denies: Mouth lesions, Thrush, Throat pain,     Hoarseness, Allergies/Hay Fever, Post Nasal Drip, Headaches, Recent Head Injury    , Nose Bleeding, Neck Stiffness, Thyroid Mass, Hearing Loss, Ear Fullness, Dry     Mouth, Nasal or Sinus Pain, Dry Lips, Nasal discharge, Nasal congestion, Other      Cardiovascular:  Denies: Palpitations, Syncope, Claudication, Chest Pain, Wake     up Gasping for air, Leg Swelling, Irregular Heart Rate, Cyanosis, Dyspnea on     Exertion, Other      Gastrointestinal:  Denies: Nausea, Constipation, Diarrhea, Abdominal pain,     Vomiting, Difficulty Swallowing, Reflux/Heartburn, Dysphagia, Jaundice, Bloating    , Melena, Bloody stools, Other      Genitourinary:  Denies: Urinary frequency, Incontinence, Hematuria, Urgency,     Nocturia, Dysuria, Testicular problems, Other      Musculoskeletal:  Denies: Joint Pain, Joint Stiffness, Joint Swelling, Myalgias    , Other      Hematologic/lymphatic:  DENIES: Lymphadenopathy, Bruising, Bleeding tendencies,     Other      Neurological:  Denies: Headache, Numbness, Weakness, Seizures, Other      Psychiatric:  Denies: Anxiety, Appropriate Effect, Depression, Other      Sleep:  No: Excessive daytime sleep, Morning Headache?, Snoring, Insomnia?,     Stop breathing at sleep?, Other      Integumentary:  Denies: Rash, Dry skin, Skin Warm to Touch, Other      Immunologic/Allergic:  Denies: Latex allergy, Seasonal allergies, Asthma,     Urticaria, Eczema, Other      Immunization status:  No: Up to date            FAMILY/SOCIAL/MEDICAL HX      Current History      jenna  tunnel , hernia repair 11      Surgical History:  Yes: Abdominal Surgery (ABD HERNIA ), Orthopedic Surgery     (RACHAEL CARPAL TUNNEL , LT MENISCUS TEAR REPAIR)      Stroke - Family Hx:  Grandparent      Heart - Family Hx:  Grandparent      Diabetes - Family Hx:  Father      Is Father Still Living?:  Yes      Is Mother Still Living?:  Yes      Smoking status:  Former smoker (1ppd x 20 years)      Number of Pregnancies:  2      Age at menopause:  40       Section:  Yes (2)      Hysterectomy:  Yes (partial [id number] and 11)      Anticoagulation Therapy:  No      Antibiotic Prophylaxis:  No      Medical History:  Yes: Allergies, Anemia, Arthritis (SPINE AND KNEES), Asthma (    ALLERGY INDUCED), Diabetes (DMII), Hemorrhoids/Rectal Prob (ACID REFLUX,     OCCASIONAL NAUSEA), High Blood Pressure (MED CONTROLS), High Cholesterol,     Reflux Disease, Shortness Of Breath, Miscellaneous Medical/oth (OBESITY,     HYPOTHYROID), No: Alcoholism, Blood Disease, Broken Bones, Cataracts, Chemical     Dependency, Chemotherapy/Cancer, Chronic Bronchitis/COPD, Emphysema, Chronic     Liver Disease, Colon Trouble, Colitis, Diverticulitis, Congestive Heart Failu,     Deafness or Ringing Ears, Convulsions, Depression, Anxiety, Bipolar Disorder,     Epilepsy, Seizures, Forgetfullness, Glaucoma, Gall Stones, Gout, Head Injury,     Heart Attack, Heart Murmur, Hepatitis, Hiatal Hernia, HIV (Do not ask - volu,     Jaundice, Kidney or Bladder Disease, Kidney Stones, Migrane Headaches, Mitral     Valve Prolapse, Night sweats, Phlebitis, Psychiatric Care, Rheumatic Fever,     Sexually Transmitted Dis, Sinus Trouble, Skin Disease/Psoriais/Ecz, Stroke,     Thyroid Problem, Tuberculosis or Pos TB Te      Psychiatric History      None            PREVENTION      Hx Influenza Vaccination:  Yes      Date Influenza Vaccine Given:  Sep 1, 2017      Influenza Vaccine Declined:  No      2 or More Falls Past Year?:  No      Fall Past Year with  Injury?:  No      Hx Pneumococcal Vaccination:  No      Encouraged to follow-up with:  PCP regarding preventative exams.      Chart initiated by      vinita Villa MA            ALLERGIES/MEDICATIONS      Allergies:        Coded Allergies:             NICKEL (Verified  Allergy, Unknown, RASH, 4/12/18)           METFORMIN (Verified  Adverse Reaction, Unknown, SEVERE DIARRHEA,     DEHYDRATION, 4/12/18)      Uncoded Allergies:             COLBALT BLUE (METAL) (Allergy, Unknown, RASH, 1/20/14)           WHITE GOLD (Allergy, Unknown, RASH, 1/20/14)      Medications    Last Reconciled on 4/13/18 16:12 by LYNN STACK PA-C      Levofloxacin (Levaquin*) 750 Mg Tablet      750 MG PO QDAY for 7 Days, #7 TAB 0 Refills         Prov: Belle Stack PA-C         4/13/18       predniSONE* (predniSONE*) 10 Mg Tablet      10 MG PO ASDIR, #48 TAB         Prov: Belle Stack PA-C         4/13/18       Neb-Budesonide (Pulmicort) 0.5 Mg/2 Ml Ampul.neb      0.5 MG INH BID, #60 NEB 4 Refills         Prov: Belle Stack PA-C         4/13/18       Arformoterol Tartrate (Brovana) 15 Mcg/2 Ml Vial.neb      15 MCG INH BID, #60 NEB 4 Refills         Prov: Belle Stack PA-C         4/13/18       Cholecalciferol (Vitamin D*) 2,000 Unit Cap      1000 UNITS PO QDAY, #30 CAP 0 Refills         Reported         4/12/18       Fexofenadine Hcl (Fexofenadine Hcl) 180 Mg Tablet      180 MG PO BID, #60 TAB 0 Refills         Reported         4/12/18       Fluorometholone (FML 0.1% Ophth) 3.5 Gm Oint...g.      1 APL EYE EACH BID, TUBE         Reported         4/12/18       Liraglutide (Victoza 3-Jhony) 0.6 Mg/0.1 Ml Pen.injctr      1.8 MG SUBQ QDAY, PACKAGE         Reported         4/12/18       SUMAtriptan Succinate (Imitrex) 100 Mg Tab      100 MG PO ASDIR Y for MIGRAINE, TAB         Reported         4/12/18       Furosemide (Furosemide) 20 Mg Tablet      20 MG PO QDAY, #30 TAB 0 Refills         Reported         4/12/18       Spironolactone (Aldactone) 25 Mg  Tablet      50 MG PO BID, #60 TAB 3 Refills         Prov: Mk Dowling         10/23/17       Tiotropium Bromide (Spiriva Respimat 2.5 mcg/Puff) 4 Gm Mist.inhal      2 PUFFS INH QDAY, #1 MDI 11 Refills         Prov: Mk Dowling         8/17/17       Magnesium Amino Acid Chelate (Magnesium*) 100 Mg Tablet      250 MG PO QDAY, TAB         Reported         6/8/17       Gabapentin (Gabapentin) 300 Mg Capsule      600 MG PO Q4HR, #60 CAP 0 Refills         Reported         6/8/17       Ondansetron Hcl (Ondansetron*) Unknown Strength Tablet      4 MG PO Q4H Y for NAUSEA, TAB 0 Refills         Reported         6/5/17       Insulin Lispro (HumaLOG VIAL*) Unknown Strength Vial      SUBQ QID INSULIN, #1 VIAL 0 Refills         Reported         6/5/17       Pantoprazole (Protonix*) Unknown Strength Tablet.dr      40 MG PO BID, #30 TAB 0 Refills         Reported         6/5/17       Levocetirizine Dihydrochloride (Xyzal) Unknown Strength Tablet      5 MG PO QDAY, TAB         Reported         6/5/17       Multivitamins (Multi-Vitamin) Unknown Strength Tablet      PO QDAY, #30 TAB 0 Refills         Reported         6/5/17       celeCOXIB (CeleBREX) Unknown Strength Capsule      200 MG PO BID, #60 CAP 0 Refills         Reported         6/5/17       Dicyclomine Hcl (Dicyclomine*) Unknown Strength Tablet      20 MG PO BID Y for DIARRHEA, TAB         Reported         6/5/17       Sertraline HCl (Sertraline*) 50 Mg Tablet      100 MG PO QDAY, #30 TAB 0 Refills         Reported         12/19/14       Mometasone/Formoterol (Dulera 100 Mcg/5 Mcg) 13 Gm Inh      1 PUFFS INH RTBID, INH         Reported         12/19/14       Levothyroxine (Levothyroxine) 0.1 Mg Tablet      125 MG PO QDAY@07, #30 TAB 0 Refills         Reported         12/19/14       MDI-Albuterol (Ventolin HFA*) 18 Gm Inhaler      1-2 PUFFS INH Q4H PRN, INH         Reported         1/20/14       Montelukast Sodium (Singulair*) 10 Mg Tablet      10 MG PO HS, TAB          Reported         1/20/14       Cholecalciferol (Vitamin D3*) 1,000 Units Capsule      2000 UNITS PO QDAY, CAP         Reported         1/20/14       Furosemide (Furosemide) 40 Mg Tablet      40 MG PO QDAY, TAB         Reported         1/20/14       Lovastatin (Lovastatin) 20 Mg Tab      20 MG PO QDAY, #30 0 Refills         Prov: GRAYSON WAYNE         4/10/13      Current Medications      Current Medications Reviewed 4/13/18            EXAM      GEN-patient appears stated age resting comfortable in no acute distress      Eyes-PERRL,  conjunctiva are normal in appearance extraocular muscles are intact    , no scleral icterus      Nasal-both nares are patent turbinates appear normal no polyps seen no nasal     discharge or ulcerations      Ears-tympanic membranes are normal no erythema no bulging, normal to inspection      Lymphatic-no swollen or enlarged cervical nodes, or axillary node, or femoral     nodes, or supraclavicular nodes      Mouth normal dentition, no erythema no ulcerations oropharynx appears normal no     exudate no evidence of postnasal drip, MP(default value)      Neck-there are no palpable supraclavicular or cervical adenopathy, thyroid is     normal in appearance no apparent nodules, there is no inspiratory or expiratory     stridor      Respiratory- mild to moderately decreased breath sounds throughout, scattered     expiratory wheezing, scattered rhonchi, no crackles, normal work of breathing     noted.       Cardiovascular-the heart rate is normal and regular S1 and S2 present with no     murmur or extra heart sounds, there is no JVD or pedal edema present      GI-the abdomen is normal in appearance, bowel sounds present and normal in all     quadrants no hepatosplenomegaly or masses felt      Extremities-no clubbing is present, pulses present in all extremities,     capillary refill time is normal      Musculoskeletal-Normal strength in upper and lower extremities, inspection     shows no  evidence of muscle atrophy      Skin-skin is normal in appearance it is warm and dry, no rashes present, no     evidence of cyanosis, palpation reveals no masses      Neurological-the patient is alert and oriented to time place and person, moves     all 4 extremities, normal gait, normal affect and mood, CN2-12 intact      Psych-normal judgment and insight is good, normal mood and affect, alert and     oriented to person, place, and time, and date      Vtials      Vitals:             Height 5 ft 2 in / 157.48 cm           Weight 401 lbs 3 oz / 181.931664 kg           BSA 2.96 m2           BMI 73.4 kg/m2           Temperature 99.0 F / 37.22 C - Oral           Pulse 104           Respirations 18           Blood Pressure 180/84 Sitting, Left Arm           Pulse Oximetry 95%, room air            REVIEW      Results Reviewed      [facility] Results Reviewed?:  Yes Prev Lab Results, Yes Prev Radiology Results, Yes     Previous [facility] Records            PLAN      Assessment      Asthma exacerbation - J45.901            Acute bronchitis - J20.9            CAP (community acquired pneumonia) - J18.9            BUSTOS (dyspnea on exertion) - R06.09            Cough - R05            Wheezing - R06.2            Super obese - E66.9            Notes      New Medications      * ARFORMOTEROL TARTRATE (Brovana) 15 MCG/2 ML VIAL.NEB: 15 MCG INH BID #60       Instructions: DIAGNOSIS CODE REQUIRED PRIOR TO PRESCRIBING.       Dx: Asthma exacerbation - J45.901      * Neb-Budesonide (Pulmicort) 0.5 MG/2 ML AMPUL.NEB: 0.5 MG INH BID #60       Instructions: DIAGNOSIS CODE REQUIRED PRIOR TO PRESCRIBING.       Dx: Asthma exacerbation - J45.901      * predniSONE* 10 MG TABLET: 10 MG PO ASDIR #48       Instructions: Take 60 mg by mouth x 3 days, 40 mg x 3 days, 30 mg x 3 days, 20     mg x 3 days, then 10 mg x 3 days.       Dx: Asthma exacerbation - J45.901      * Levofloxacin (Levaquin*) 750 MG TABLET: 750 MG PO QDAY 7 Days #7       Dx: Asthma  exacerbation - J45.901      New Diagnostics      * Chest W/O Cont CT, As Soon As Possible       Dx: Asthma exacerbation - J45.901      * Sputum Culture W/Gram Stain, As Soon As Possible       Dx: Asthma exacerbation - J45.901      New Office Procedures      * Solu-Medrol 125 MG, As Soon As Possible       METHYLPRED SOD SUCC (Solu-Medrol) 125 MG VIAL: 125 MILLIGRAM INTRAMUSC Qty 1     VIAL       Dx: Asthma exacerbation - J45.901      ASSESSMENT:      1. Asthma exacerbation.       2. Bronchitis versus community acquired pneumonia.       3. Dyspnea on exertion.       4. Cough.       5. Wheezing.       6. Super obesity with BMI 73.4.             PLAN:      1. At this time I will discontinue her Dulera and start her on Brovana and     Pulmicort nebulizers twice daily. Continue Spiriva Respimat 2.5 two puffs     daily. Continue DuoNeb q 4-6 hours as needed for increased coughing, wheezing     or shortness of breath.       2. I will start her on a course of Prednisone as well as Levaquin.       3. I will check a chest CT without contrast given her fevers and respiratory     symptoms to rule out a pneumonia.       4. I will check sputum culture with gram stain and tailor her antibiotic     therapy as appropriate.       5. I will give her an  intramuscular shot of Solu-Medrol 125 mg in the office     today. I have given her samples of Brovana and showed her how to use this.       6. Follow up with Dr. Dowling in 3 weeks, sooner if needed.             Addendum:       Her Chest CT resulted on 4/16/18 and did show a consolidation vs mass in her     RUL with partial lung collapse present, which could represent post-obstructive     pneumonia. I have reviewed the CT with Dr. Dowling, and after discussion with     the patient, we will proceed with bronchoscopy with BAL and washings for     further evaluation and treatment, as she is not yet feeling any better. I have     discussed the risks, benefits, and alternatives of the  procedure with the     patient over the phone, and she wishes to proceed as planned.            Patient Education      Patient Education Provided:  Acute Asthma, How to use an Inhaler, How to use a     Nebulizer      Time Spent:  > 50% /[facility]            Patient Education:        Acute Bronchitis            Procedure Orders      Get Consent signed for:        Bronchoscopy with bronchoalveolar lavage.      Risks and Benefits:        I explained the risks versus benefits to the patient including      pneumothorax, respiratory failure, bleeding and death. The patient voices      understanding and wishes to proceed with the procedure.                 Disclaimer: Converted document may not contain table formatting or lab diagrams. Please see [facility] for the authenticated document.

## 2021-05-28 NOTE — PROGRESS NOTES
Patient: AYLIN RAMOS     Acct: [id number]     Report: #[id number]  UNIT #: [id number]     : 1970    Encounter Date:2019  PRIMARY CARE: FILIBERTO BATISTA  ***Signed***  --------------------------------------------------------------------------------------------------------------------  Chief Complaint      Encounter Date      2019            Primary Care Provider      FILIBERTO BATISTA            Referring Provider      ROBERTO GARCIA            Patient Complaint      Patient is complaining of      Pt here for f/u, elpidio            VITALS      Height 5 ft 2 in / 157.48 cm      Weight 403 lbs 9 oz / 183.945905 kg      BSA 2.58 m2      BMI 73.8 kg/m2      Pulse 100      Respirations 18      Blood Pressure 168/81 Sitting, Right Arm      Pulse Oximetry 95%, Room air            HPI      The patient is a 48 year old female who is here today for follow up. She had     bronchoscopy back in 2019 to assess for clearance of her MAC infection, no     growth at this time at 5 weeks. The patient is doing well at this time. She is     20 pounds lighter than last office visit. She has been diuresed.  She is on a     stable dose of diuretics. She is complaining a heavy yeast infection in her     mouth as well as her vaginal area. This has been going on for several days. She     feels that it is likely related to her blood sugars. She has a history of poorly    controlled diabetes on an insulin pump along with her antibiotics which she     takes for her MAC infection, in particularly the high dose of Zithromax.  She     feels that she is not currently taking any medications. Symptoms have been going    on for the past several days. The patient also has some oral thrush as well.      The patient feels that her breathing is significantly improved since last office    visit, no longer using a wheelchair and is off the continuous oxygen.            ROS      Constitutional:  Denies: Fatigue, Fever, Weight gain,  Weight loss, Chills,     Insomnia, Other      Respiratory/Breathing:  Complains of: Shortness of air, Wheezing, Cough; Denies:    Hemoptysis, Pleuritic pain, Other      Endocrine:  Denies: Polydipsia, Polyuria, Heat/cold intolerance, Abnorml     menstrual pattern, Diabetes, Other      Eyes:  Denies: Blurred vision, Vision Changes, Other      Ears, nose, mouth, throat:  Denies: Mouth lesions, Thrush, Throat pain,     Hoarseness, Allergies/Hay Fever, Post Nasal Drip, Headaches, Recent Head Injury,    Nose Bleeding, Neck Stiffness, Thyroid Mass, Hearing Loss, Ear Fullness, Dry     Mouth, Nasal or Sinus Pain, Dry Lips, Nasal discharge, Nasal congestion, Other      Cardiovascular:  Denies: Palpitations, Syncope, Claudication, Chest Pain, Wake     up Gasping for air, Leg Swelling, Irregular Heart Rate, Cyanosis, Dyspnea on     Exertion, Other      Gastrointestinal:  Denies: Nausea, Constipation, Diarrhea, Abdominal pain,     Vomiting, Difficulty Swallowing, Reflux/Heartburn, Dysphagia, Jaundice,     Bloating, Melena, Bloody stools, Other      Genitourinary:  Denies: Urinary frequency, Incontinence, Hematuria, Urgency,     Nocturia, Dysuria, Testicular problems, Other      Musculoskeletal:  Denies: Joint Pain, Joint Stiffness, Joint Swelling, Myalgias,    Other      Hematologic/lymphatic:  DENIES: Lymphadenopathy, Bruising, Bleeding tendencies,     Other      Neurological:  Denies: Headache, Numbness, Weakness, Seizures, Other      Psychiatric:  Denies: Anxiety, Appropriate Effect, Depression, Other      Sleep:  No: Excessive daytime sleep, Morning Headache?, Snoring, Insomnia?, Stop    breathing at sleep?, Other      Integumentary:  Denies: Rash, Dry skin, Skin Warm to Touch, Other      Immunologic/Allergic:  Denies: Latex allergy, Seasonal allergies, Asthma,     Urticaria, Eczema, Other      Immunization status:  No: Up to date            FAMILY/SOCIAL/MEDICAL HX      Current History      carpel tunnel 97/98, hernia  repair 11      Surgical History:  Yes: Abdominal Surgery (ABD. HERNIA ), Oral Surgery     (TONGUE BX. ), Orthopedic Surgery (RACHAEL CARPAL TUNNEL , RT MENISCUS TEAR REPAIR);    No: Appendectomy, Bladder Surgery, Bowel Surgery, CABG, Cholecystectomy, Head     Surgery, Vascular Surgery      Stroke - Family Hx:  Grandparent      Heart - Family Hx:  Grandparent      Diabetes - Family Hx:  Father      Is Father Still Living?:  Yes      Is Mother Still Living?:  Yes       Family History:  Yes      Social History:  No Tobacco Use, No Alcohol Use, No Recreational Drug use      Smoking status:  Former smoker (1ppd x 20 years)      Number of Pregnancies:  2      Age at menopause:  40       Section:  Yes (2)      Hysterectomy:  Yes (partial [id number] and 11)      Anticoagulation Therapy:  No      Antibiotic Prophylaxis:  No      Medical History:  Yes: Allergies, Anemia, Arthritis (SPINE AND KNEES), Asthma     (ALLERGY INDUCED), Blood Disease (ANEMIA), Congestive Heart Failu (2019),     Diabetes (TYPE II), Hemorrhoids/Rectal Prob (GERD, HIATAL HERNIA), High Blood     Pressure (ON MED. ), High Cholesterol, Reflux Disease, Shortness Of Breath     (MICOBACTERIUM CHE COMPLEX, COUGH); No: Alcoholism, Broken Bones, Cataracts,     Chemical Dependency, Chemotherapy/Cancer, Chronic Bronchitis/COPD, Emphysema,     Chronic Liver Disease, Colon Trouble, Colitis, Diverticulitis, Deafness or     Ringing Ears, Convulsions, Depression, Anxiety, Bipolar Disorder, Epilepsy,     Seizures, Forgetfullness, Glaucoma, Gall Stones, Gout, Head Injury, Heart     Attack, Heart Murmur, Hepatitis, Hiatal Hernia, HIV (Do not ask - volu,     Jaundice, Kidney or Bladder Disease, Kidney Stones, Migrane Headaches, Mitral     Valve Prolapse, Night sweats, Phlebitis, Psychiatric Care, Rheumatic Fever,     Sexually Transmitted Dis, Sinus Trouble, Skin Disease/Psoriais/Ecz, Stroke,     Thyroid Problem, Tuberculosis or Pos TB Te, Miscellaneous  Medical/oth      Psychiatric History      None            PREVENTION      Hx Influenza Vaccination:  Yes      Date Influenza Vaccine Given:  Sep 1, 2018      Influenza Vaccine Declined:  No      2 or More Falls Past Year?:  No      Fall Past Year with Injury?:  No      Hx Pneumococcal Vaccination:  Yes      Encouraged to follow-up with:  PCP regarding preventative exams.      Chart initiated by      Kellen Villa MA            ALLERGIES/MEDICATIONS      Allergies:        Coded Allergies:             EXENATIDE (Verified  Allergy, Unknown, PANCREATITIS, 6/4/19)           INSULIN DEGLUDEC (Verified  Allergy, Unknown, 6/4/19)           NICKEL (Verified  Allergy, Unknown, RASH, 6/4/19)           SITAGLIPTIN (Verified  Allergy, Unknown, PANCREATITIS, 6/4/19)           METFORMIN (Verified  Adverse Reaction, Unknown, SEVERE DIARRHEA,     DEHYDRATION, 6/4/19)      Uncoded Allergies:             COLBALT BLUE (METAL) (Allergy, Unknown, RASH; DIARRHEA, STOMACH UPSET,     4/17/18)           WHITE GOLD (Allergy, Unknown, RASH, DIARRHEA, STOMACH UPSET, 4/17/18)      Medications    Last Reconciled on 6/4/19 08:07 by EDGAR LINDER MD      Rosuvastatin Calcium (Crestor*) 40 Mg Tablet      40 MG PO HS, #30 TAB 0 Refills         Reported         6/4/19       Levothyroxine (Levothyroxine) 0.15 Mg Tablet      0.15 MG PO QDAY@07, #30 TAB 0 Refills         Reported         6/4/19       Metoprolol Succinate (Toprol XL*) 25 Mg Tab.er.24h      25 MG PO HS, #30 TAB 0 Refills         Reported         4/23/19       Metolazone (Metolazone) 2.5 Mg Tablet      2.5 MG PO MOWEFR for 30 Days, #30 TAB 1 Refill         Prov: DAFNE ABRAMS         4/16/19       Potassium Chloride (Potassium Chloride) 10 Meq Capsule.er      40 MEQ PO TID for 30 Days, #360 CAP.ER 1 Refill         Prov: DAFNE ABRAMS         4/16/19       Mometasone/Formoterol (Dulera 200 Mcg/5 Mcg) 13 Gm Hfa.aer.ad      2 PUFFS INH RTBID, #1 MDI 0 Refills         Reported          4/13/19       Amitriptyline HCl (Amitriptyline HCl) 50 Mg Tablet      50 MG PO HS, #30 TAB         Reported         4/13/19       Ergocalciferol (Ergocalciferol) 8,000 Units/Ml Drops      1000 UNITS PO QDAY, ML         Reported         4/13/19       Losartan Potassium (Losartan*) 50 Mg Tablet      50 MG PO QDAY, #30 TAB 0 Refills         Reported         4/13/19       Bumetanide (BUMETANIDE) 2 Mg Tablet      2 MG PO BID, #60 TAB 3 Refills         Prov: Mk Dowling         1/22/19       traZODone HCl (Desyrel) 50 Mg Tablet      25 MG PO HS for 30 Days, #30 TAB 3 Refills         Prov: Mk Dowling         12/19/18       Azithromycin (Azithromycin) 250 Mg Tablet      250 MG PO QDAY, #30 TAB 6 Refills         Prov: Mk Dowling         12/18/18       Lactobacillus Acidophilus (Florajen) 460 Mg Capsule      460 MG PO BID, #60 CAP 6 Refills         Prov: Mk Dowling         12/18/18       Insulin Regular Human Rec (HumuLIN R-500 VIAL*) Unknown Strength Vial      SUBQ BID INSULIN, #1 VIAL 0 Refills         Reported         12/18/18       Promethazine Hcl (Phenergan*) 25 Mg Tablet      25 MG PO Q6H PRN for NAUSEA, #90 TAB 3 Refills         Prov: Angela Mansfield PA-C         10/22/18       Tiotropium Bromide (Spiriva Respimat 2.5 mcg/Puff) 4 Gm Mist.inhal      2 PUFFS INH RTQDAY, #1 MDI 6 Refills         Prov: Mk Dowling         8/23/18       Ethambutol HCl (Myambutol *) 400 Mg Tab      1600 MG PO MOWEFR, #120 TAB 6 Refills         Prov: Mk Dowling         6/27/18       Rifampin (Rifampin) 300 Mg Cap      600 MG PO MOWEFR, #60 CAP 6 Refills         Prov: Mk Dowling         6/27/18       MDI-Albuterol (Ventolin HFA) 18 Gm Hfa.aer.ad      2 PUFFS INH QID PRN for WHEEZING / SHORTNESS OF BREATH, #1 MDI 6 Refills         Prov: Angela Mansfield PA-C         6/12/18       Albuterol/Ipratropium (Duoneb) 3 Ml Ampul.neb      3 ML INH Q4H PRN for SHORTNESS OF BREATH, #120 NEB 6 Refills         Prov:  Angela Mansfield PA-C         6/12/18       Neb-Budesonide (Pulmicort) 0.5 Mg/2 Ml Ampul.neb      0.5 MG INH BID, #60 NEB 4 Refills         Prov: Angela Mansfield PA-C         4/17/18       Arformoterol Tartrate (Brovana) 15 Mcg/2 Ml Vial.neb      15 MCG INH BID, #60 NEB 4 Refills         Prov: Angela Mansfield PA-C         4/17/18       Cholecalciferol (Vitamin D3) (Vitamin D3) 2,000 Unit Cap      1000 UNITS PO QDAY, #30 CAP 0 Refills         Reported         4/12/18       Fexofenadine Hcl (Fexofenadine Hcl) 180 Mg Tablet      180 MG PO BID, #60 TAB 0 Refills         Reported         4/12/18       SUMAtriptan Succinate (Imitrex) 100 Mg Tab      100 MG PO ASDIR PRN for MIGRAINE, TAB         Reported         4/12/18       Spironolactone (Aldactone) 25 Mg Tablet      50 MG PO BID, #60 TAB 3 Refills         Prov: Mk Dowling         10/23/17       Gabapentin (Gabapentin) 300 Mg Capsule      600 MG PO TID, #60 CAP 0 Refills         Reported         6/8/17       Pantoprazole (Protonix*) Unknown Strength Tablet.dr      40 MG PO BID, #30 TAB 0 Refills         Reported         6/5/17       Multivitamins (Multi-Vitamin) Unknown Strength Tablet      PO QDAY, #30 TAB 0 Refills         Reported         6/5/17       celeCOXIB (CeleBREX) Unknown Strength Capsule      200 MG PO BID, #60 CAP 0 Refills         Reported         6/5/17       Dicyclomine Hcl (DICYCLOMINE HCL) Unknown Strength Tablet      20 MG PO BID PRN for DIARRHEA, TAB         Reported         6/5/17       Sertraline HCl (Sertraline*) 50 Mg Tablet      100 MG PO QDAY, #30 TAB 0 Refills         Reported         12/19/14       Montelukast Sodium (Singulair*) 10 Mg Tablet      10 MG PO HS, TAB         Reported         1/20/14      Current Medications      Current Medications Reviewed 6/4/19            EXAM      GEN-patient is very pleasant but very morbidly obese lady resting comfortable in    no acute distress      Eyes-PERRL,  conjunctiva are normal in appearance  extraocular muscles are     intact, no scleral icterus      Lymphatic-no swollen or enlarged cervical nodes, or axillary node, or femoral     nodes, or supraclavicular nodes      Mouth normal dentition, no erythema no ulcerations oropharynx appears normal no     exudate no evidence of postnasal drip, MP IV.        Neck-there are no palpable supraclavicular or cervical adenopathy, thyroid is     normal in appearance no apparent nodules, there is no inspiratory or expiratory     stridor      Respiratory-patient exhibits normal work of breathing, speaking in full     sentences without difficulty, the chest is normal in appearance, clear to     auscultation with no wheezes rales or rhonchi, chest is normal to percussion on     both the right and left sides      Cardiovascular-the heart rate is normal and regular S1 and S2 present with no     murmur or extra heart sounds, there is no JVD, significant bilateral pitting     edema past the knees.        GI-the abdomen is normal in appearance, bowel sounds present and normal in all     quadrants no hepatosplenomegaly or masses felt      Extremities-no clubbing is present, pulses present in all extremities, capillary    refill time is normal      Skin-skin is normal in appearance it is warm and dry, no rashes present, no     evidence of cyanosis, palpation reveals no masses      Neurological-the patient is alert and oriented to time place and person, moves     all 4 extremities, normal gait, normal affect and mood, CN2-12 intact      Psych-normal judgment and insight is good, normal mood and affect, alert and     oriented to person, place, and time, and date      Vtials      Vitals:             Height 5 ft 2 in / 157.48 cm           Weight 403 lbs 9 oz / 183.371079 kg           BSA 2.58 m2           BMI 73.8 kg/m2           Pulse 100           Respirations 18           Blood Pressure 168/81 Sitting, Right Arm           Pulse Oximetry 95%, Room air            REVIEW       Results Reviewed      [facility] Results Reviewed?:  Yes Prev Lab Results, Yes Prev Radiology Results, Yes     Previous [facility] Records            Assessment      Notes      New Medications      * BUMETANIDE 2 MG TABLET: 2 MG PO BID #60      * Fluconazole (Diflucan) 100 MG TABLET: 100 MG PO QDAY #3      Changed Medications      * Levothyroxine 0.15 MG TABLET: 0.15 MG PO QDAY@07 #30         Replaced 0.025 MG TABLET: 137 MCG PO QDAY@07 #15      * Rosuvastatin Calcium (Crestor*) 40 MG TABLET: 40 MG PO HS #30         Replaced 10 MG TABLET: 20 MG PO HS #60      ASSESSMENT/PLAN:      1. MAC infection. DC abx she is s/p 1 year Rx and cultures are negative      2. Chronic diastolic heart failure. compensated at this time cont current meds      3. Pulmonary hypertension. Secondary to diastolic heart failure.       4. Morbid obesity with BMI of 73.8. Needs to have weight loss and would likely     need weight loss surgery. The patient is in grave danger of death from comp    lications of her morbid obesity.       5.GIUSEPPE-cont PAP therapy      6 thrush and vaginal yeast infection-likely fromo her DM and the abx will give 3    days of oral Diflucan       I have reviewed, lab data, imaging and previous medical records.            Patient Education      Tobacco Cessation Counseling:  for under 3 minutes      Patient Education Provided:  Sleep Apnea                 Disclaimer: Converted document may not contain table formatting or lab diagrams. Please see [email or web address] for the authenticated document.

## 2021-05-28 NOTE — PROGRESS NOTES
Patient: AYLIN RAMOS     Acct: [id number]     Report: #[id number]  UNIT #: [id number]     : 1970    Encounter Date:2019  PRIMARY CARE: FILIBERTO BATISTA  ***Signed***  --------------------------------------------------------------------------------------------------------------------  Chief Complaint      Encounter Date      2019            Primary Care Provider      FILIBERTO BATISTA            Referring Provider      ROBERTO GARCIA            Patient Complaint      Patient is complaining of       Cough,Congestion,Increased Soa            VITALS      Height 5 ft 2.75 in / 159.39 cm      Weight 408 lbs 3 oz / 185.248780 kg      BSA 2.61 m2      BMI 72.9 kg/m2      Temperature 98.6 F / 37 C - Oral      Pulse 95      Respirations 14      Blood Pressure 126/62 Sitting, Left Arm      Pulse Oximetry 98%, 2 liters            HPI      The patient is a very pleasant 48 year old white female known to me from     previous office visits who is a patient of Dr. Dowling's with a history of super    morbid obesity, mycobacterium avium complex infection on Azithromycin, rifampin     and ethambutol, chronic dyspnea, obesity hypoventilation syndrome, chronic     diastolic heart failure and pulmonary hypertension secondary to heart failure.     She is here today with complaints of increased cough, wheezing, dyspnea,     increased nasal congestion with green drainage from her nose for the past 4-5     days. She denies hemoptysis, fevers or chills. She continues to use her Brovana     and Pulmicort nebs at home along with Spiriva Respimat 2.5. She has  chronic     lower extremity edema and has been diagnosed with lymphedema in the past as     well. He is doing wraps for that at home and has had no changes to her diuretic     regimen lately. She feels that her swelling is well as long as she elevates her     legs.             I have reviewed his Review of Systems medical, surgical and family history and      agree with those as entered.      Copies To:   Mk Dowling      Constitutional:  Denies: Fatigue, Fever, Weight gain, Weight loss, Chills,     Insomnia, Other      Respiratory/Breathing:  Complains of: Shortness of air, Wheezing, Cough; Denies:    Hemoptysis, Pleuritic pain, Other      Endocrine:  Denies: Polydipsia, Polyuria, Heat/cold intolerance, Abnorml     menstrual pattern, Diabetes, Other      Eyes:  Denies: Blurred vision, Vision Changes, Other      Ears, nose, mouth, throat:  Denies: Mouth lesions, Thrush, Throat pain,     Hoarseness, Allergies/Hay Fever, Post Nasal Drip, Headaches, Recent Head Injury,    Nose Bleeding, Neck Stiffness, Thyroid Mass, Hearing Loss, Ear Fullness, Dry     Mouth, Nasal or Sinus Pain, Dry Lips, Nasal discharge, Nasal congestion, Other      Cardiovascular:  Denies: Palpitations, Syncope, Claudication, Chest Pain, Wake     up Gasping for air, Leg Swelling, Irregular Heart Rate, Cyanosis, Dyspnea on     Exertion, Other      Gastrointestinal:  Denies: Nausea, Constipation, Diarrhea, Abdominal pain,     Vomiting, Difficulty Swallowing, Reflux/Heartburn, Dysphagia, Jaundice,     Bloating, Melena, Bloody stools, Other      Genitourinary:  Denies: Urinary frequency, Incontinence, Hematuria, Urgency,     Nocturia, Dysuria, Testicular problems, Other      Musculoskeletal:  Denies: Joint Pain, Joint Stiffness, Joint Swelling, Myalgias,    Other      Hematologic/lymphatic:  DENIES: Lymphadenopathy, Bruising, Bleeding tendencies,     Other      Neurological:  Denies: Headache, Numbness, Weakness, Seizures, Other      Psychiatric:  Denies: Anxiety, Appropriate Effect, Depression, Other      Sleep:  No: Excessive daytime sleep, Morning Headache?, Snoring, Insomnia?, Stop    breathing at sleep?, Other      Integumentary:  Denies: Rash, Dry skin, Skin Warm to Touch, Other      Immunologic/Allergic:  Denies: Latex allergy, Seasonal allergies, Asthma,     Urticaria, Eczema,  Other      Immunization status:  No: Up to date            FAMILY/SOCIAL/MEDICAL HX      Current History      carpel tunnel 97/98, hernia repair 11      Surgical History:  Yes: Abdominal Surgery (ABD HERNIA ), Orthopedic Surgery     (RACHAEL CARPAL TUNNEL , RT MENISCUS TEAR REPAIR)      Stroke - Family Hx:  Grandparent      Heart - Family Hx:  Grandparent      Diabetes - Family Hx:  Father      Is Father Still Living?:  Yes      Is Mother Still Living?:  Yes       Family History:  Yes      Social History:  No Tobacco Use, No Alcohol Use, No Recreational Drug use      Smoking status:  Former smoker (1ppd x 20 years)      Number of Pregnancies:  2      Age at menopause:  40       Section:  Yes (2)      Hysterectomy:  Yes (partial [id number] and 11)      Anticoagulation Therapy:  No      Antibiotic Prophylaxis:  No      Medical History:  Yes: Allergies, Anemia, Arthritis (SPINE AND KNEES), Asthma     (ALLERGY INDUCED), Blood Disease (ANEMIA), Chronic Bronchitis/COPD, Diabetes     (DMII), Hemorrhoids/Rectal Prob (ACID REFLUX, RLQ PAIN, NAUSEA, DIARRHEA), High     Blood Pressure (MED CONTROLS), High Cholesterol, Reflux Disease, Shortness Of     Breath, Miscellaneous Medical/oth (OBESITY, HYPOTHYROID); No: Alcoholism, Broken    Bones, Cataracts, Chemical Dependency, Chemotherapy/Cancer, Emphysema, Chronic     Liver Disease, Colon Trouble, Colitis, Diverticulitis, Congestive Heart Failu,     Deafness or Ringing Ears, Convulsions, Depression, Anxiety, Bipolar Disorder,     Epilepsy, Seizures, Forgetfullness, Glaucoma, Gall Stones, Gout, Head Injury,     Heart Attack, Heart Murmur, Hepatitis, Hiatal Hernia, HIV (Do not ask - volu,     Jaundice, Kidney or Bladder Disease, Kidney Stones, Migrane Headaches, Mitral     Valve Prolapse, Night sweats, Phlebitis, Psychiatric Care, Rheumatic Fever,     Sexually Transmitted Dis, Sinus Trouble, Skin Disease/Psoriais/Ecz, Stroke,     Thyroid Problem, Tuberculosis or Pos TB Te             PREVENTION      Hx Influenza Vaccination:  Yes      Date Influenza Vaccine Given:  Sep 11, 2018      Influenza Vaccine Declined:  No      2 or More Falls Past Year?:  No      Fall Past Year with Injury?:  No      Hx Pneumococcal Vaccination:  Yes      Encouraged to follow-up with:  PCP regarding preventative exams.      Chart initiated by      Tamiko Reyes ma            ALLERGIES/MEDICATIONS      Allergies:        Coded Allergies:             EXENATIDE (Verified  Allergy, Severe, PANCREATITIS, 1/16/19)           INSULIN DEGLUDEC (Verified  Allergy, Severe, 1/16/19)           SITAGLIPTIN (Verified  Allergy, Severe, PANCREATITIS, 1/16/19)           NICKEL (Verified  Allergy, Unknown, RASH, 1/16/19)           METFORMIN (Verified  Adverse Reaction, Unknown, SEVERE DIARRHEA,     DEHYDRATION, 1/16/19)      Uncoded Allergies:             COLBALT BLUE (METAL) (Allergy, Unknown, RASH; DIARRHEA, STOMACH UPSET, 4/ 17/18)           WHITE GOLD (Allergy, Unknown, RASH, DIARRHEA, STOMACH UPSET, 4/17/18)      Medications    Last Reconciled on 1/16/19 09:01 by JESSE RALPH      predniSONE* (predniSONE*) 20 Mg Tablet      40 MG PO QDAY for 7 Days, #14 TAB 0 Refills         Prov: Angela Mansfield PA-C         1/16/19       Amoxicillin/Clavulanic Acid 875/125 (Augmentin 875/125) 1 Each Tablet      875 MG PO BID for 7 Days, #14 TAB 0 Refills         Prov: Angela Mansfield PA-C         1/16/19       traZODone HCl (Desyrel) 50 Mg Tablet      25 MG PO HS for 30 Days, #30 TAB 3 Refills         Prov: Mk Dowling         12/19/18       Azithromycin (Azithromycin) 250 Mg Tablet      250 MG PO QDAY, #30 TAB 6 Refills         Prov: Mk Dowling         12/18/18       Lactobacillus Acidophilus (Florajen) 460 Mg Capsule      460 MG PO BID, #60 CAP 6 Refills         Prov: Mk Dowling         12/18/18       Losartan Potassium (Losartan*) 25 Mg Tablet      25 MG PO QDAY, #30 TAB 0 Refills         Reported          12/18/18       Insulin Regular Human Rec (HumuLIN R-500 VIAL*) Unknown Strength Vial      SUBQ BID INSULIN, #1 VIAL 0 Refills         Reported         12/18/18       Lovastatin (Lovastatin) 20 Mg Tablet      40 MG PO QDAY for 30 Days, #60 TAB         Reported         12/18/18       Amitriptyline HCl (Amitriptyline HCl) 25 Mg Tablet      25 MG PO QDAY, #30 TAB         Reported         12/18/18       Promethazine Hcl (Phenergan*) 25 Mg Tablet      25 MG PO Q6H PRN for NAUSEA, #90 TAB 3 Refills         Prov: Angela Mansfield PA-C         10/22/18       Bumetanide (BUMETANIDE) 2 Mg Tablet      2 MG PO BID, #60 TAB 3 Refills         Prov: Angela Mansfield PA-C         9/13/18       Triamcinolone Acetonide 0.1% Oint (Triamcinolone Acetonide 0.1% Oint) 454 Gm     Oint...g.      1 APL TOPICAL BID for 30 Days, #1 TUBE 3 Refills         Prov: Angela Mansfield PA-C         9/7/18       Potassium Chloride (K-Tab*) 10 Meq Tablet.er      20 MEQ PO mon,marina,frid, TAB         Reported         9/7/18       Tiotropium Bromide (Spiriva Respimat 2.5 mcg/Puff) 4 Gm Mist.inhal      2 PUFFS INH RTQDAY, #1 MDI 6 Refills         Prov: Mk Dowling         8/23/18       Bumetanide (BUMETANIDE) 1 Mg Tablet      2 MG PO BID, #60 TAB 4 Refills         Prov: RADHA CONNELLY         8/2/18       Dapagliflozin Propanediol (Farxiga) 10 Mg Tablet      10 MG PO QDAY, #30 TAB 0 Refills         Reported         7/16/18       Ethambutol HCl (Myambutol *) 400 Mg Tab      1600 MG PO MOWEFR, #120 TAB 6 Refills         Prov: Mk Dowling         6/27/18       Rifampin (Rifampin) 300 Mg Cap      600 MG PO MOWEFR, #60 CAP 6 Refills         Prov: Mk Dowling         6/27/18       Azithromycin (Zithromax) 250 Mg Tablet      500 MG PO ASDIR, #30 TAB 6 Refills         Prov: Mk Dowling         6/27/18       MDI-Albuterol (Ventolin HFA) 18 Gm Hfa.aer.ad      2 PUFFS INH QID PRN for WHEEZING / SHORTNESS OF BREATH, #1 MDI 6 Refills         Prov:  Angela Mansfield PA-C         6/12/18       Albuterol/Ipratropium (Duoneb) 3 Ml Ampul.neb      3 ML INH Q4H PRN for SHORTNESS OF BREATH, #120 NEB 6 Refills         Prov: Angela Mansfield PA-C         6/12/18       Nebulizer Accessories (Nebulizer Kit RANDI) 1 Each Kit      EACH XX ONCE, #1 0 Refills         Prov: Angela Mansfield PA-C         4/17/18       Neb-Budesonide (Pulmicort) 0.5 Mg/2 Ml Ampul.neb      0.5 MG INH BID, #60 NEB 4 Refills         Prov: Angela Mansfield PA-C         4/17/18       Arformoterol Tartrate (Brovana) 15 Mcg/2 Ml Vial.neb      15 MCG INH BID, #60 NEB 4 Refills         Prov: Angela Mansfield PA-C         4/17/18       Cholecalciferol (Vitamin D3*) 2,000 Unit Tablet      2000 UNITS PO QDAY, #30 TAB         Reported         4/17/18       Cholecalciferol (Vitamin D*) 2,000 Unit Cap      1000 UNITS PO QDAY, #30 CAP 0 Refills         Reported         4/12/18       Fexofenadine Hcl (Fexofenadine Hcl) 180 Mg Tablet      180 MG PO BID, #60 TAB 0 Refills         Reported         4/12/18       Fluorometholone (FML 0.1% Ophth) 3.5 Gm Oint...g.      1 APL EYE EACH BID, TUBE         Reported         4/12/18       Liraglutide (Victoza 3-Jhony) 0.6 Mg/0.1 Ml Pen.injctr      1.8 MG SUBQ QDAY, PACKAGE         Reported         4/12/18       SUMAtriptan Succinate (Imitrex) 100 Mg Tab      100 MG PO ASDIR PRN for MIGRAINE, TAB         Reported         4/12/18       Spironolactone (Aldactone) 25 Mg Tablet      50 MG PO BID, #60 TAB 3 Refills         Prov: Mk Dowling         10/23/17       Gabapentin (Gabapentin) 300 Mg Capsule      600 MG PO TID, #60 CAP 0 Refills         Reported         6/8/17       Ondansetron Hcl (Ondansetron*) Unknown Strength Tablet      4 MG PO Q4H PRN for NAUSEA, TAB 0 Refills         Reported         6/5/17       Insulin Lispro (HumaLOG VIAL*) Unknown Strength Vial      SUBQ QID INSULIN, #1 VIAL 0 Refills         Reported         6/5/17       Pantoprazole (Protonix*) Unknown  Strength Tablet.dr      40 MG PO BID, #30 TAB 0 Refills         Reported         6/5/17       Multivitamins (Multi-Vitamin) Unknown Strength Tablet      PO QDAY, #30 TAB 0 Refills         Reported         6/5/17       celeCOXIB (CeleBREX) Unknown Strength Capsule      200 MG PO BID, #60 CAP 0 Refills         Reported         6/5/17       Dicyclomine Hcl (DICYCLOMINE HCL) Unknown Strength Tablet      20 MG PO BID PRN for DIARRHEA, TAB         Reported         6/5/17       Sertraline HCl (Sertraline*) 50 Mg Tablet      100 MG PO QDAY, #30 TAB 0 Refills         Reported         12/19/14       Levothyroxine (Levothyroxine) 0.1 Mg Tablet      125 MG PO QDAY@07, #30 TAB 0 Refills         Reported         12/19/14       MDI-Albuterol (Ventolin HFA*) 18 Gm Inhaler      1-2 PUFFS INH Q4H PRN, INH         Reported         1/20/14       Montelukast Sodium (Singulair*) 10 Mg Tablet      10 MG PO HS, TAB         Reported         1/20/14      Current Medications      Current Medications Reviewed 1/16/19            EXAM      GEN-patient is very pleasant but very morbidly obese lady resting comfortable in    no acute distress      Eyes-PERRL,  conjunctiva are normal in appearance extraocular muscles are     intact, no scleral icterus      Lymphatic-no swollen or enlarged cervical nodes, or axillary node, or femoral     nodes, or supraclavicular nodes      Mouth normal dentition, no erythema no ulcerations oropharynx appears normal no     exudate no evidence of postnasal drip, MP IV.        Neck-there are no palpable supraclavicular or cervical adenopathy, thyroid is     normal in appearance no apparent nodules, there is no inspiratory or expiratory     stridor      Respiratory-mildly decreased bilateral breath sounds throughout, scattered     expiratory wheezing, no rhonchi or crackles appreciated, normal work of     breathing noted.        Cardiovascular-the heart rate is normal and regular S1 and S2 present with no     murmur or  extra heart sounds, there is no JVD, significant bilateral pitting     edema past the knees.        GI-the abdomen is normal in appearance, bowel sounds present and normal in all     quadrants no hepatosplenomegaly or masses felt      Extremities-no clubbing is present, pulses present in all extremities, capillary    refill time is normal      Skin-skin is normal in appearance it is warm and dry, no rashes present, no     evidence of cyanosis, palpation reveals no masses      Neurological-the patient is alert and oriented to time place and person, moves     all 4 extremities, normal gait, normal affect and mood, CN2-12 intact      Psych-normal judgment and insight is good, normal mood and affect, alert and     oriented to person, place, and time, and date      Vtials      Vitals:             Height 5 ft 2.75 in / 159.39 cm           Weight 408 lbs 3 oz / 185.436692 kg           BSA 2.61 m2           BMI 72.9 kg/m2           Temperature 98.6 F / 37 C - Oral           Pulse 95           Respirations 14           Blood Pressure 126/62 Sitting, Left Arm           Pulse Oximetry 98%, 2 liters            REVIEW      Results Reviewed      [facility] Results Reviewed?:  Yes Prev Lab Results, Yes Prev Radiology Results, Yes     Previous [facility] Records            Assessment      Notes      New Medications      * Amoxicillin/Clavulanic Acid 875/125 (Augmentin 875/125) 1 EACH TABLET: 875 MG       PO BID 7 Days #14      * predniSONE* 20 MG TABLET: 40 MG PO QDAY 7 Days #14      ASSESSMENT:      1. Acute bronchitis versus upper respiratory infection.       2. Mycobacterium avium complex infection.        3. Chronic diastolic heart failure.       4. Pulmonary hypertension secondary to congestive heart failure.       5. Diabetes, poorly controlled,        6. Obesity hypoventilation syndrome.      7. Chronic hypoxic respiratory failure.      8. Super morbid obesity with BMI 72.9.            PLAN:      1. I will treat the patient with a  short course of Augmentin and a burst of     Prednisone.        2.  Continue Brovana and Pulmicort nebulizers twice daily and Spiriva Respimat     2.5 two puffs once daily.       3.  Continue Azithromycin 250 mg daily along with rifampin and ethambutol for     her mycobacterium avium complex infection.       4. I have reviewed with the patient that her HIV testing was negative and her     recent sputum culture was also negative.       5. Continue current diuretic regimen and follow up with cardiology for     management of her chronic diastolic heart failure.       6. Continue oxygen at current flow rate and nightly Trilogy.       7. She is to let us know if her symptoms are worsening or not improving in the     coming week.       8.  The patient is up to date on her flu vaccine.        9. Follow up with Dr. Dowling as scheduled, sooner if needed.            Patient Education      Patient Education Provided:  Acute Bronchitis, How to use an Inhaler, How to use    a Nebulizer      Time Spent:  > 50% /[facility]                 Disclaimer: Converted document may not contain table formatting or lab diagrams. Please see [employer]! for the authenticated document.

## 2021-05-28 NOTE — PROGRESS NOTES
Patient: AYLIN RAMOS     Acct: [id number]     Report: #[id number]  UNIT #: [id number]     : 1970    Encounter Date:2018  PRIMARY CARE: FILIBERTO BATISTA  ***Signed***  --------------------------------------------------------------------------------------------------------------------  Chief Complaint      Encounter Date      2018            Primary Care Provider      FILIBERTO BATISTA            Referring Provider      ROBERTO GARCIA            Patient Complaint      Patient is complaining of      Pt here for 6w f/u and chest xray results            VITALS      Height 5 ft 2 in / 157.48 cm      Weight 412 lbs 1 oz / 186.582299 kg      BSA 3.00 m2      BMI 75.4 kg/m2      Temperature 99.1 F / 37.28 C - Oral      Pulse 92      Respirations 16      Blood Pressure 168/90 Sitting, Left Arm      Pulse Oximetry 95%, Room air            HPI      The patient is a 47 year old white female here today for office follow up.  The     patient underwent bronchoscopy that was performed on 2018 and the BAL has     grown out mycobacterium avium intracellulare.  The patient still has cough,     still has sputum production, still has shortness of breath. She is better when     she is on steroids and antibiotics, but as soon as she comes off steroids and     antibiotics her symptoms come back.  Her symptoms of cough are present on a     daily basis. Her symptoms of shortness of breath are also present on a daily     basis, worse with exertion, better with rest, minimal improvement in the use of     her bronchodilator therapies.  She denies having any fevers or chills at this     time.  Does have fatigue. No aggravating or relieving factors for her cough     that she could really identify other than being on steroids and antibiotics.            ROS      Constitutional:  Denies: Fatigue, Fever, Weight gain, Weight loss, Chills,     Insomnia, Other      Respiratory/Breathing:  Complains of: Shortness of air,  Wheezing, Cough, Denies    : Hemoptysis, Pleuritic pain, Other      Endocrine:  Denies: Polydipsia, Polyuria, Heat/cold intolerance, Abnorml     menstrual pattern, Diabetes, Other      Eyes:  Denies: Blurred vision, Vision Changes, Other      Ears, nose, mouth, throat:  Denies: Congestion, Dysphagia, Hearing Changes,     Nose Bleeding, Nasal Discharge, Throat pain, Tinnitus, Other      Cardiovascular:  Denies: Chest Pain, Exertional dyspnea, Peripheral Edema,     Palpitations, Syncope, Wake up Gasping for air, Orthopnea, Tachycardia, Other      Gastrointestinal:  Denies: Abdominal pain/cramping, Bloody stools, Constipation    , Diarrhea, Melena, Nausea, Vomiting, Other      Genitourinary:  Denies: Dysuria, Urinary frequency, Incontinence, Hematuria,     Urgency, Other      Musculoskeletal:  Denies: Joint Pain, Joint Stiffness, Joint Swelling, Myalgias    , Other      Hematologic/lymphatic:  DENIES: Lymphadenopathy, Bruising, Bleeding tendencies,     Other      Neurologic:  Denies: Headache, Numbness, Weakness, Seizures, Other      Psychiatric:  Denies: Anxiety, Appropriate Effect, Depression, Other      Sleep:  No: Excessive daytime sleep, Morning Headache?, Snoring, Insomnia?,     Stop breathing at sleep?, Other      Integumentary:  Denies: Rash, Dry skin, Skin Warm to Touch, Other            FAMILY/SOCIAL/MEDICAL HX      Current History      carpel tunnel 97/98, hernia repair 11      Surgical History:  Yes: Abdominal Surgery (ABD HERNIA 2011), Orthopedic Surgery     (RACHAEL CARPAL TUNNEL , RT MENISCUS TEAR REPAIR)      Stroke - Family Hx:  Grandparent      Heart - Family Hx:  Grandparent      Diabetes - Family Hx:  Father      Is Father Still Living?:  Yes      Is Mother Still Living?:  Yes       Family History:  Yes      Social History:  No Tobacco Use, No Alcohol Use, No Recreational Drug use      Smoking status:  Former smoker (1ppd x 20 years)      Number of Pregnancies:  2      Age at menopause:  40        Section:  Yes (2)      Hysterectomy:  Yes (partial [id number] and 11)      Anticoagulation Therapy:  No      Antibiotic Prophylaxis:  No      Medical History:  Yes: Allergies, Anemia, Arthritis (SPINE AND KNEES), Asthma (    ALLERGY INDUCED), Blood Disease (ANEMIA), Chronic Bronchitis/COPD, Diabetes (    DMII), Hemorrhoids/Rectal Prob (ACID REFLUX, RLQ PAIN, NAUSEA, DIARRHEA), High     Blood Pressure (MED CONTROLS), High Cholesterol, Reflux Disease, Shortness Of     Breath, Miscellaneous Medical/oth (OBESITY, HYPOTHYROID), No: Alcoholism,     Broken Bones, Cataracts, Chemical Dependency, Chemotherapy/Cancer, Emphysema,     Chronic Liver Disease, Colon Trouble, Colitis, Diverticulitis, Congestive Heart     Failu, Deafness or Ringing Ears, Convulsions, Depression, Anxiety, Bipolar     Disorder, Epilepsy, Seizures, Forgetfullness, Glaucoma, Gall Stones, Gout, Head     Injury, Heart Attack, Heart Murmur, Hepatitis, Hiatal Hernia, HIV (Do not ask -     volu, Jaundice, Kidney or Bladder Disease, Kidney Stones, Migrane Headaches,     Mitral Valve Prolapse, Night sweats, Phlebitis, Psychiatric Care, Rheumatic     Fever, Sexually Transmitted Dis, Sinus Trouble, Skin Disease/Psoriais/Ecz,     Stroke, Thyroid Problem, Tuberculosis or Pos TB Te            PREVENTION      Hx Influenza Vaccination:  Yes      Date Influenza Vaccine Given:  Sep 1, 2017      Influenza Vaccine Declined:  No      2 or More Falls Past Year?:  No      Fall Past Year with Injury?:  No      Hx Pneumococcal Vaccination:  Yes      Encouraged to follow-up with:  PCP regarding preventative exams.      Chart initiated by      Kellen Villa MA            ALLERGIES/MEDICATIONS      Allergies:        Coded Allergies:             EXENATIDE (Verified  Allergy, Severe, PANCREATITIS, 18)           INSULIN DEGLUDEC (Verified  Allergy, Severe, 18)           SITAGLIPTIN (Verified  Allergy, Severe, PANCREATITIS, 18)           NICKEL (Verified   Allergy, Unknown, RASH, 6/27/18)           METFORMIN (Verified  Adverse Reaction, Unknown, SEVERE DIARRHEA,     DEHYDRATION, 6/27/18)      Uncoded Allergies:             COLBALT BLUE (METAL) (Allergy, Unknown, RASH; DIARRHEA, STOMACH UPSET, 4/17 /18)           WHITE GOLD (Allergy, Unknown, RASH, DIARRHEA, STOMACH UPSET, 4/17/18)      Medications    Last Reconciled on 6/27/18 09:43 by EDGAR DOWLING MD      Promethazine Hcl (Phenergan*) 25 Mg Tablet      25 MG PO Q6H Y for NAUSEA, #90 TAB 3 Refills         Prov: Edgar Dowling         6/27/18       Ethambutol HCl (Myambutol *) 400 Mg Tab      1600 MG PO MOWEFR, #120 TAB 6 Refills         Prov: Edgar Dowling         6/27/18       Rifampin (Rifampin) 300 Mg Cap      600 MG PO MOWEFR, #60 CAP 6 Refills         Prov: Edgar Dowling         6/27/18       Azithromycin (Zithromax) 250 Mg Tablet      500 MG PO ASDIR, #30 TAB 6 Refills         Prov: Edgar Dowling         6/27/18       MDI-Albuterol (Ventolin HFA*) 18 Gm Hfa.aer.ad      2 PUFFS INH QID Y for WHEEZING / SHORTNESS OF BREATH, #1 MDI 6 Refills         Prov: Belle Espinoza PA-C         6/12/18       Albuterol/Ipratropium (Duoneb) 3 Ml Ampul.neb      3 ML INH Q4H Y for SHORTNESS OF BREATH, #120 NEB 6 Refills         Prov: Belle Espinoza PA-C         6/12/18       traZODone HCl (Desyrel) 50 Mg Tablet      25 MG PO HS for 30 Days, #30 TAB 3 Refills         Prov: Edgar Dowling         5/14/18       Nebulizer Accessories (Nebulizer Kit RANDI) 1 Each Kit      EACH XX ONCE, #1 0 Refills         Prov: Belle Espinoza PA-C         4/17/18       Neb-Budesonide (Pulmicort) 0.5 Mg/2 Ml Ampul.neb      0.5 MG INH BID, #60 NEB 4 Refills         Prov: Belle Espinoza PA-C         4/17/18       Arformoterol Tartrate (Brovana) 15 Mcg/2 Ml Vial.neb      15 MCG INH BID, #60 NEB 4 Refills         Prov: Belle Espinoza PA-C         4/17/18       Tiotropium Bromide (Spiriva Respimat 2.5 mcg/Puff) 4 Gm Mist.inhal      2 PUFFS INH RTQDAY,  #1 MDI 0 Refills         Reported         4/17/18       Furosemide* (Lasix*) 40 Mg Tablet      20 MG PO HS, #30 TAB 0 Refills         Reported         4/17/18       Cholecalciferol (Vitamin D3*) 2,000 Unit Tablet      2000 UNITS PO QDAY, #30 TAB         Reported         4/17/18       Cholecalciferol (Vitamin D*) 2,000 Unit Cap      1000 UNITS PO QDAY, #30 CAP 0 Refills         Reported         4/12/18       Fexofenadine Hcl (Fexofenadine Hcl) 180 Mg Tablet      180 MG PO BID, #60 TAB 0 Refills         Reported         4/12/18       Fluorometholone (FML 0.1% Ophth) 3.5 Gm Oint...g.      1 APL EYE EACH BID, TUBE         Reported         4/12/18       Liraglutide (Victoza 3-Jhony) 0.6 Mg/0.1 Ml Pen.injctr      1.8 MG SUBQ QDAY, PACKAGE         Reported         4/12/18       SUMAtriptan Succinate (Imitrex) 100 Mg Tab      100 MG PO ASDIR Y for MIGRAINE, TAB         Reported         4/12/18       Spironolactone (Aldactone) 25 Mg Tablet      50 MG PO BID, #60 TAB 3 Refills         Prov: Mk Dowling         10/23/17       Gabapentin (Gabapentin) 300 Mg Capsule      600 MG PO TID, #60 CAP 0 Refills         Reported         6/8/17       Ondansetron Hcl (Ondansetron*) Unknown Strength Tablet      4 MG PO Q4H Y for NAUSEA, TAB 0 Refills         Reported         6/5/17       Insulin Lispro (HumaLOG VIAL*) Unknown Strength Vial      SUBQ QID INSULIN, #1 VIAL 0 Refills         Reported         6/5/17       Pantoprazole (Protonix*) Unknown Strength Tablet.dr      40 MG PO BID, #30 TAB 0 Refills         Reported         6/5/17       Levocetirizine Dihydrochloride (Xyzal) Unknown Strength Tablet      5 MG PO QDAY, TAB         Reported         6/5/17       Multivitamins (Multi-Vitamin) Unknown Strength Tablet      PO QDAY, #30 TAB 0 Refills         Reported         6/5/17       celeCOXIB (CeleBREX) Unknown Strength Capsule      200 MG PO BID, #60 CAP 0 Refills         Reported         6/5/17       Dicyclomine Hcl (Dicyclomine*)  Unknown Strength Tablet      20 MG PO BID Y for DIARRHEA, TAB         Reported         6/5/17       Sertraline HCl (Sertraline*) 50 Mg Tablet      100 MG PO QDAY, #30 TAB 0 Refills         Reported         12/19/14       Levothyroxine (Levothyroxine) 0.1 Mg Tablet      125 MG PO QDAY@07, #30 TAB 0 Refills         Reported         12/19/14       MDI-Albuterol (Ventolin HFA*) 18 Gm Inhaler      1-2 PUFFS INH Q4H PRN, INH         Reported         1/20/14       Montelukast Sodium (Singulair*) 10 Mg Tablet      10 MG PO HS, TAB         Reported         1/20/14       Furosemide (Furosemide) 40 Mg Tablet      40 MG PO QDAY, TAB         Reported         1/20/14       Lovastatin (Lovastatin) 20 Mg Tab      20 MG PO QDAY, #30 0 Refills         Prov: GRAYSON WAYNE         4/10/13      Current Medications      Current Medications Reviewed 6/27/18            EXAM      GEN-patient is very pleasant but very morbidly obese lady resting comfortable     in no acute distress      Eyes-PERRL,  conjunctiva are normal in appearance extraocular muscles are intact    , no scleral icterus      Lymphatic-no swollen or enlarged cervical nodes, or axillary node, or femoral     nodes, or supraclavicular nodes      Mouth normal dentition, no erythema no ulcerations oropharynx appears normal no     exudate no evidence of postnasal drip, MP IV.        Neck-there are no palpable supraclavicular or cervical adenopathy, thyroid is     normal in appearance no apparent nodules, there is no inspiratory or expiratory     stridor      Respiratory-patient exhibits normal work of breathing, speaking in full     sentences without difficulty, the chest is normal in appearance, clear to     auscultation with no wheezes rales or rhonchi, chest is normal to percussion on     both the right and left sides      Cardiovascular-the heart rate is normal and regular S1 and S2 present with no     murmur or extra heart sounds, there is no JVD or pedal edema  present      GI-the abdomen is normal in appearance, bowel sounds present and normal in all     quadrants no hepatosplenomegaly or masses felt      Extremities-no clubbing is present, pulses present in all extremities,     capillary refill time is normal      Skin-skin is normal in appearance it is warm and dry, no rashes present, no     evidence of cyanosis, palpation reveals no masses      Neurological-the patient is alert and oriented to time place and person, moves     all 4 extremities, normal gait, normal affect and mood, CN2-12 intact      Psych-normal judgment and insight is good, normal mood and affect, alert and     oriented to person, place, and time, and date      Vitals      Vitals:             Height 5 ft 2 in / 157.48 cm           Weight 412 lbs 1 oz / 186.932464 kg           BSA 3.00 m2           BMI 75.4 kg/m2           Temperature 99.1 F / 37.28 C - Oral           Pulse 92           Respirations 16           Blood Pressure 168/90 Sitting, Left Arm           Pulse Oximetry 95%, Room air            REVIEW      Results Reviewed      [facility] Results Reviewed?:  Yes Prev Lab Results, Yes Prev Radiology Results, Yes     Previous [facility] Records            Assessment      Notes      New Medications      * Azithromycin (Zithromax) 250 MG TABLET: 500 MG PO ASDIR #30       Instructions: take 500mg by mouth Mondays, Wensdays and Fridays      * Rifampin 300 MG CAP: 600 MG PO MOWEFR #60      * Ethambutol HCl (Myambutol *) 400 MG TAB: 1,600 MG PO MOWEFR #120      * Promethazine Hcl (Phenergan*) 25 MG TABLET: 25 MG PO Q6H PRN NAUSEA #90      Discontinued Medications      * predniSONE* 10 MG TABLET: 10 MG PO ASDIR #48       Instructions: Take 60 mg by mouth x 3 days, 40 mg x 3 days, 30 mg x 3 days, 20     mg x 3 days, then 10 mg x 3 days.       Dx: History of recurrent pulmonary infection - Z86.19      * Levofloxacin (Levaquin*) 750 MG TABLET: 750 MG PO QDAY 7 Days #7       Dx: History of recurrent pulmonary infection  - Z86.19      ASSESSMENT/PLAN:       1.  Mycobacterium avium intracellulare infection.  We will start patient on     three drugs every other day for one year.  We will start patient on Rifampin     600 mg total, instructed her to take 300 mg twice daily on M-W-F.  Start     patient on Zithromax 500 mg M-W-F.  Start patient on ethambutol 1600 mg on M-W-F    , two tablets twice a day.      2.  Stressed to the patient that she will need treatment for one whole year     until her sputum clears. I have explained the risks versus benefits of this to     the patient including nausea, visual changes as well as neuropathy.  The     patient voices understanding and wishes to proceed with treatment.      3.  Pulmonary hypertension, very mild in nature, continue to follow. Continue     treatment of the patient's underlying sleep apnea.        4. Moderate persistent asthma.  Continue long acting beta agonist,     corticosteroids, albuterol, Singulair.      5.  Obstructive sleep apnea. Continue pap therapy at current settings.      6. We will also give patient Phenergan today for the nausea which I have     explained to her is very frequent in accompanying the patient's treatment for     CHRISTIANO.      7. We will see patient back in one month to monitor for toxicity from MAC     treatment.      8.  I have personally reviewed laboratory data, imaging as well as previous     medical records.            Patient Education      Education resources provided:  Yes      Patient Education Provided:  Sleep Apnea                 Disclaimer: Converted document may not contain table formatting or lab diagrams. Please see [facility] for the authenticated document.

## 2021-05-28 NOTE — PROGRESS NOTES
Patient: AYLIN RAMOS     Acct: [id number]     Report: #[id number]  UNIT #: [id number]     : 1970    Encounter Date:2019  PRIMARY CARE: FILIBERTO BATISTA  ***Signed***  --------------------------------------------------------------------------------------------------------------------  Chief Complaint      Encounter Date      2019            Primary Care Provider      FILIBERTO BATISTA            Referring Provider      ROBERTO GARCIA            Patient Complaint      Patient is complaining of      Pt here for f/u, Acute Bronchitis            VITALS      Height 5 ft 2 in / 157.48 cm      Weight 420 lbs 1 oz / 190.309003 kg      BSA 2.62 m2      BMI 76.8 kg/m2      Temperature 99.0 F / 37.22 C - Oral      Pulse 98      Respirations 14      Blood Pressure 130/71 Sitting, Right Arm      Pulse Oximetry 94%, room air            HPI      The patient is a 48 year old female with history of pulmonary hypertension,     obesity hyperventilation syndrome, obstructive sleep apnea, morbid obesity,     diastolic heart failure and asthma here today for follow up.             The patient is complaining of worsening shortness of breath, worsening swelling     and her weight is up from her baseline 405 to 420. She has been taken off     metolazone and feels that since then her swelling has been more severe and her     breathing has been worse. Her metolazone was lowered due to severe hypokalemia.     She denies having any chest pains or muscle cramps. She does have some very dry     cough at this time. She reports compliance with the use of all her     bronchodilator therapies. She is tolerating her MAC treatment with triple drug     therapy very well with no complications.      Comorbidities:  No Congestive heart failure, No Other            ROS      Constitutional:  Denies: Fatigue, Fever, Weight gain, Weight loss, Chills,     Insomnia, Other      Respiratory/Breathing:  Complains of: Shortness of air,  Wheezing, Cough; Denies:    Hemoptysis, Pleuritic pain, Other      Endocrine:  Denies: Polydipsia, Polyuria, Heat/cold intolerance, Abnorml     menstrual pattern, Diabetes, Other      Eyes:  Denies: Blurred vision, Vision Changes, Other      Ears, nose, mouth, throat:  Denies: Mouth lesions, Thrush, Throat pain,     Hoarseness, Allergies/Hay Fever, Post Nasal Drip, Headaches, Recent Head Injury,    Nose Bleeding, Neck Stiffness, Thyroid Mass, Hearing Loss, Ear Fullness, Dry     Mouth, Nasal or Sinus Pain, Dry Lips, Nasal discharge, Nasal congestion, Other      Cardiovascular:  Denies: Palpitations, Syncope, Claudication, Chest Pain, Wake     up Gasping for air, Leg Swelling, Irregular Heart Rate, Cyanosis, Dyspnea on     Exertion, Other      Gastrointestinal:  Denies: Nausea, Constipation, Diarrhea, Abdominal pain,     Vomiting, Difficulty Swallowing, Reflux/Heartburn, Dysphagia, Jaundice,     Bloating, Melena, Bloody stools, Other      Genitourinary:  Denies: Urinary frequency, Incontinence, Hematuria, Urgency,     Nocturia, Dysuria, Testicular problems, Other      Musculoskeletal:  Denies: Joint Pain, Joint Stiffness, Joint Swelling, Myalgias,    Other      Hematologic/lymphatic:  DENIES: Lymphadenopathy, Bruising, Bleeding tendencies,     Other      Neurological:  Denies: Headache, Numbness, Weakness, Seizures, Other      Psychiatric:  Denies: Anxiety, Appropriate Effect, Depression, Other      Sleep:  No: Excessive daytime sleep, Morning Headache?, Snoring, Insomnia?, Stop    breathing at sleep?, Other      Integumentary:  Denies: Rash, Dry skin, Skin Warm to Touch, Other      Immunologic/Allergic:  Denies: Latex allergy, Seasonal allergies, Asthma,     Urticaria, Eczema, Other      Immunization status:  No: Up to date            FAMILY/SOCIAL/MEDICAL HX      Current History      carpel tunnel 97/98, hernia repair 11      Surgical History:  Yes: Abdominal Surgery (ABD HERNIA 2011), Orthopedic Surgery     (RACHAEL  CARPAL TUNNEL , RT MENISCUS TEAR REPAIR)      Stroke - Family Hx:  Grandparent      Heart - Family Hx:  Grandparent      Diabetes - Family Hx:  Father      Is Father Still Living?:  Yes      Is Mother Still Living?:  Yes       Family History:  Yes      Social History:  No Tobacco Use, No Alcohol Use, No Recreational Drug use      Smoking status:  Former smoker (1ppd x 20 years)      Number of Pregnancies:  2      Age at menopause:  40       Section:  Yes (2)      Hysterectomy:  Yes (partial [id number] and 11)      Anticoagulation Therapy:  No      Antibiotic Prophylaxis:  No      Medical History:  Yes: Allergies, Anemia, Arthritis (SPINE AND KNEES), Asthma     (ALLERGY INDUCED), Blood Disease (ANEMIA), Chronic Bronchitis/COPD, Diabetes     (DMII), Hemorrhoids/Rectal Prob (ACID REFLUX, RLQ PAIN, NAUSEA, DIARRHEA), High     Blood Pressure (MED CONTROLS), High Cholesterol, Reflux Disease, Shortness Of     Breath, Miscellaneous Medical/oth (OBESITY, HYPOTHYROID); No: Alcoholism, Broken    Bones, Cataracts, Chemical Dependency, Chemotherapy/Cancer, Emphysema, Chronic     Liver Disease, Colon Trouble, Colitis, Diverticulitis, Congestive Heart Failu,     Deafness or Ringing Ears, Convulsions, Depression, Anxiety, Bipolar Disorder,     Epilepsy, Seizures, Forgetfullness, Glaucoma, Gall Stones, Gout, Head Injury,     Heart Attack, Heart Murmur, Hepatitis, Hiatal Hernia, HIV (Do not ask - volu,     Jaundice, Kidney or Bladder Disease, Kidney Stones, Migrane Headaches, Mitral     Valve Prolapse, Night sweats, Phlebitis, Psychiatric Care, Rheumatic Fever,     Sexually Transmitted Dis, Sinus Trouble, Skin Disease/Psoriais/Ecz, Stroke,     Thyroid Problem, Tuberculosis or Pos TB Te      Psychiatric History      None            PREVENTION      Hx Influenza Vaccination:  Yes      Date Influenza Vaccine Given:  Sep 1, 2018      Influenza Vaccine Declined:  No      2 or More Falls Past Year?:  No      Fall Past Year with  Injury?:  No      Hx Pneumococcal Vaccination:  Yes      Encouraged to follow-up with:  PCP regarding preventative exams.      Chart initiated by      Danica Arriola [facility]            ALLERGIES/MEDICATIONS      Allergies:        Coded Allergies:             EXENATIDE (Verified  Allergy, Severe, PANCREATITIS, 4/9/19)           INSULIN DEGLUDEC (Verified  Allergy, Severe, 4/9/19)           SITAGLIPTIN (Verified  Allergy, Severe, PANCREATITIS, 4/9/19)           NICKEL (Verified  Allergy, Unknown, RASH, 4/9/19)           METFORMIN (Verified  Adverse Reaction, Unknown, SEVERE DIARRHEA,     DEHYDRATION, 4/9/19)      Uncoded Allergies:             COLBALT BLUE (METAL) (Allergy, Unknown, RASH; DIARRHEA, STOMACH UPSET,     4/17/18)           WHITE GOLD (Allergy, Unknown, RASH, DIARRHEA, STOMACH UPSET, 4/17/18)      Medications    Last Reconciled on 4/9/19 08:25 by EDGAR DOWLING MD      Bumetanide (BUMETANIDE) 2 Mg Tablet      2 MG PO BID, #60 TAB 3 Refills         Prov: Edgar Dowling         1/22/19       traZODone HCl (Desyrel) 50 Mg Tablet      25 MG PO HS for 30 Days, #30 TAB 3 Refills         Prov: Edgar Dowling         12/19/18       Azithromycin (Azithromycin) 250 Mg Tablet      250 MG PO QDAY, #30 TAB 6 Refills         Prov: Edgar Dowling         12/18/18       Lactobacillus Acidophilus (Florajen) 460 Mg Capsule      460 MG PO BID, #60 CAP 6 Refills         Prov: Edgar Dowling         12/18/18       Losartan Potassium (Losartan*) 25 Mg Tablet      25 MG PO QDAY, #30 TAB 0 Refills         Reported         12/18/18       Insulin Regular Human Rec (HumuLIN R-500 VIAL*) Unknown Strength Vial      SUBQ BID INSULIN, #1 VIAL 0 Refills         Reported         12/18/18       Lovastatin (Lovastatin) 20 Mg Tablet      40 MG PO QDAY for 30 Days, #60 TAB         Reported         12/18/18       Amitriptyline HCl (Amitriptyline HCl) 25 Mg Tablet      25 MG PO QDAY, #30 TAB         Reported         12/18/18       Promethazine  Hcl (Phenergan*) 25 Mg Tablet      25 MG PO Q6H PRN for NAUSEA, #90 TAB 3 Refills         Prov: Angela Mansfield PA-C         10/22/18       Triamcinolone Acetonide 0.1% Oint (Triamcinolone Acetonide 0.1% Oint) 454 Gm     Oint...g.      1 APL TOPICAL BID for 30 Days, #1 TUBE 3 Refills         Prov: Angela Mansfield PA-C         9/7/18       Tiotropium Bromide (Spiriva Respimat 2.5 mcg/Puff) 4 Gm Mist.inhal      2 PUFFS INH RTQDAY, #1 MDI 6 Refills         Prov: Mk Dowling         8/23/18       Bumetanide (BUMETANIDE) 1 Mg Tablet      2 MG PO BID, #60 TAB 4 Refills         Prov: RADHA CONNELLY         8/2/18       Ethambutol HCl (Myambutol *) 400 Mg Tab      1600 MG PO MOWEFR, #120 TAB 6 Refills         Prov: Mk Dowling         6/27/18       Rifampin (Rifampin) 300 Mg Cap      600 MG PO MOWEFR, #60 CAP 6 Refills         Prov: Mk Dowling         6/27/18       MDI-Albuterol (Ventolin HFA) 18 Gm Hfa.aer.ad      2 PUFFS INH QID PRN for WHEEZING / SHORTNESS OF BREATH, #1 MDI 6 Refills         Prov: Angela Mansfield PA-C         6/12/18       Albuterol/Ipratropium (Duoneb) 3 Ml Ampul.neb      3 ML INH Q4H PRN for SHORTNESS OF BREATH, #120 NEB 6 Refills         Prov: Angela Mansfield PA-C         6/12/18       Nebulizer Accessories (Nebulizer Kit RANDI) 1 Each Kit      EACH XX ONCE, #1 0 Refills         Prov: Angela Mansfield PA-C         4/17/18       Neb-Budesonide (Pulmicort) 0.5 Mg/2 Ml Ampul.neb      0.5 MG INH BID, #60 NEB 4 Refills         Prov: Angela Mansfield PA-C         4/17/18       Arformoterol Tartrate (Brovana) 15 Mcg/2 Ml Vial.neb      15 MCG INH BID, #60 NEB 4 Refills         Prov: Angela Mansfield PA-C         4/17/18       Cholecalciferol (Vitamin D3*) 2,000 Unit Tablet      2000 UNITS PO QDAY, #30 TAB         Reported         4/17/18       Cholecalciferol (Vitamin D3*) 2,000 Unit Cap      1000 UNITS PO QDAY, #30 CAP 0 Refills         Reported         4/12/18       Fexofenadine Hcl  (Fexofenadine Hcl) 180 Mg Tablet      180 MG PO BID, #60 TAB 0 Refills         Reported         4/12/18       SUMAtriptan Succinate (Imitrex) 100 Mg Tab      100 MG PO ASDIR PRN for MIGRAINE, TAB         Reported         4/12/18       Spironolactone (Aldactone) 25 Mg Tablet      50 MG PO BID, #60 TAB 3 Refills         Prov: Mk Dowling         10/23/17       Gabapentin (Gabapentin) 300 Mg Capsule      600 MG PO TID, #60 CAP 0 Refills         Reported         6/8/17       Pantoprazole (Protonix*) Unknown Strength Tablet.dr      40 MG PO BID, #30 TAB 0 Refills         Reported         6/5/17       Multivitamins (Multi-Vitamin) Unknown Strength Tablet      PO QDAY, #30 TAB 0 Refills         Reported         6/5/17       celeCOXIB (CeleBREX) Unknown Strength Capsule      200 MG PO BID, #60 CAP 0 Refills         Reported         6/5/17       Dicyclomine Hcl (DICYCLOMINE HCL) Unknown Strength Tablet      20 MG PO BID PRN for DIARRHEA, TAB         Reported         6/5/17       Sertraline HCl (Sertraline*) 50 Mg Tablet      100 MG PO QDAY, #30 TAB 0 Refills         Reported         12/19/14       Levothyroxine (Levothyroxine) 0.1 Mg Tablet      125 MG PO QDAY@07, #30 TAB 0 Refills         Reported         12/19/14       Montelukast Sodium (Singulair*) 10 Mg Tablet      10 MG PO HS, TAB         Reported         1/20/14      Current Medications      Current Medications Reviewed 4/9/19            EXAM      GEN-patient is very pleasant but very morbidly obese lady resting comfortable in    no acute distress      Eyes-PERRL,  conjunctiva are normal in appearance extraocular muscles are     intact, no scleral icterus      Lymphatic-no swollen or enlarged cervical nodes, or axillary node, or femoral     nodes, or supraclavicular nodes      Mouth normal dentition, no erythema no ulcerations oropharynx appears normal no     exudate no evidence of postnasal drip, MP IV.        Neck-there are no palpable supraclavicular or  cervical adenopathy, thyroid is     normal in appearance no apparent nodules, there is no inspiratory or expiratory     stridor      Respiratory-patient exhibits normal work of breathing, speaking in full     sentences without difficulty, the chest is normal in appearance, clear to     auscultation with no wheezes rales or rhonchi, chest is normal to percussion on     both the right and left sides      Cardiovascular-the heart rate is normal and regular S1 and S2 present with no     murmur or extra heart sounds, there is no JVD, significant bilateral pitting     edema past the knees.        GI-the abdomen is normal in appearance, bowel sounds present and normal in all     quadrants no hepatosplenomegaly or masses felt      Extremities-no clubbing is present, pulses present in all extremities, capillary    refill time is normal      Skin-skin is normal in appearance it is warm and dry, no rashes present, no     evidence of cyanosis, palpation reveals no masses      Neurological-the patient is alert and oriented to time place and person, moves     all 4 extremities, normal gait, normal affect and mood, CN2-12 intact      Psych-normal judgment and insight is good, normal mood and affect, alert and     oriented to person, place, and time, and date      Vitals      Vitals:             Height 5 ft 2 in / 157.48 cm           Weight 420 lbs 1 oz / 190.512607 kg           BSA 2.62 m2           BMI 76.8 kg/m2           Temperature 99.0 F / 37.22 C - Oral           Pulse 98           Respirations 14           Blood Pressure 130/71 Sitting, Right Arm           Pulse Oximetry 94%, room air      Bedside Pulse Oximetry:  100            REVIEW      Results Reviewed      [facility] Results Reviewed?:  Yes Prev Lab Results, Yes Prev Radiology Results, Yes     [facility]            Assessment      SOB (shortness of breath) - R06.02            Notes      New Diagnostics      * CMP Comp Metabolic Panel, Month         Dx: SOB  (shortness of breath) - R06.02      ASSESSMENT/PLAN:      1. MAC infection. Continue triple drug therapy. Repeat bronchoscopy in 2 weeks.     The patient is not able to give a sample at this time and we need to make sure     the patient is cleared before we can discontinue treatment. She has been on     nearly a year of treatment and by the time we get the cultures back and     finalized it will be June 2019 to verify that her course has been complete with     1 year negative sputum or BAL.       2. Chronic diastolic heart failure. May be with a little bit of acute     decompensation. Continue diuresis and Aldactone. Obtain renal panel today and if    potassium is fine, the patient may need increased Metolazone. I have spoken with    cardiology regarding this and if the patient's potassium is low she may need     inpatient admission and IV diuretics.       3. Pulmonary hypertension. Secondary to diastolic heart failure.       4. Morbid obesity with BMI of 76.8. Needs to have weight loss and would likely     need weight loss surgery. The patient is in grave danger of death from     complications of her morbid obesity.       5. I have reviewed, lab data, imaging and previous medical records.            Patient Education      Education resources provided:  Yes      Patient Education Provided:  Acute Asthma                 Disclaimer: Converted document may not contain table formatting or lab diagrams. Please see [facility] for the authenticated document.

## 2021-05-28 NOTE — PROGRESS NOTES
Patient: AYLIN RAMOS     Acct: [id number]     Report: #[id number]  UNIT #: [id number]     : 1970    Encounter Date:2018  PRIMARY CARE: FILIBERTO BATISTA  ***Signed***  --------------------------------------------------------------------------------------------------------------------  Chief Complaint      Encounter Date      2018            Referring Provider      ROBERTO GARCIA            Patient Complaint      Patient is complaining of      Pt here for 3m f/u            VITALS      Height 5 ft 2 in / 157.48 cm      Weight 405 lbs 6 oz / 183.195221 kg      BSA 2.98 m2      BMI 74.1 kg/m2      Temperature 97.8 F / 36.56 C - Oral      Pulse 101      Respirations 26      Blood Pressure 172/68 Sitting, Right Arm      Pulse Oximetry 97%, Room air            HPI      Pleasant 47-year-old white female history of asthma, obstructive sleep apnea     here for follow-up      Patient is complaining of some lower extremity edema left worse than the right      He is complaining of some shortness of breath still present worse with exertion     better with rest, no orthopnea, no increased cough or increased sputum     production.      She does take bronchodilator therapies she is currently taking inhaled long-    acting beta agonist as well as inhaled corticosteroid and anticholinergic does     have moderate improvement in her symptoms but symptoms still persistent.      Still has very elevated weight with body mass mass index of 76.8      Has no associated chest pains or heart palpitations      Symptoms not any worse but not significantly better since last office visit.            ROS      Constitutional:  Denies: Fatigue, Fever, Weight gain, Weight loss, Chills,     Insomnia, Other      Respiratory/Breathing:  Complains of: Shortness of air, Wheezing, Cough, Denies    : Hemoptysis, Pleuritic pain, Other      Endocrine:  Denies: Polydipsia, Polyuria, Heat/cold intolerance, Abnorml     menstrual  pattern, Diabetes, Other      Eyes:  Denies: Blurred vision, Vision Changes, Other      Ears, nose, mouth, throat:  Denies: Mouth lesions, Thrush, Throat pain,     Hoarseness, Allergies/Hay Fever, Post Nasal Drip, Headaches, Recent Head Injury    , Nose Bleeding, Neck Stiffness, Thyroid Mass, Hearing Loss, Ear Fullness, Dry     Mouth, Nasal or Sinus Pain, Dry Lips, Nasal discharge, Nasal congestion, Other      Cardiovascular:  Denies: Palpitations, Syncope, Claudication, Chest Pain, Wake     up Gasping for air, Leg Swelling, Irregular Heart Rate, Cyanosis, Dyspnea on     Exertion, Other      Gastrointestinal:  Denies: Nausea, Constipation, Diarrhea, Abdominal pain,     Vomiting, Difficulty Swallowing, Reflux/Heartburn, Dysphagia, Jaundice, Bloating    , Melena, Bloody stools, Other      Genitourinary:  Denies: Urinary frequency, Incontinence, Hematuria, Urgency,     Nocturia, Dysuria, Testicular problems, Other      Musculoskeletal:  Denies: Joint Pain, Joint Stiffness, Joint Swelling, Myalgias    , Other      Hematologic/lymphatic:  DENIES: Lymphadenopathy, Bruising, Bleeding tendencies,     Other      Neurological:  Denies: Headache, Numbness, Weakness, Seizures, Other      Psychiatric:  Denies: Anxiety, Appropriate Effect, Depression, Other      Sleep:  No: Excessive daytime sleep, Morning Headache?, Snoring, Insomnia?,     Stop breathing at sleep?, Other      Integumentary:  Denies: Rash, Dry skin, Skin Warm to Touch, Other      Immunologic/Allergic:  Denies: Latex allergy, Seasonal allergies, Asthma,     Urticaria, Eczema, Other      Immunization status:  No: Up to date            FAMILY/SOCIAL/MEDICAL HX      Current History      carpel tunnel 97/98, hernia repair 11      Surgical History:  Yes: Abdominal Surgery (ABD HERNIA 2011), Orthopedic Surgery     (RACHAEL CARPAL TUNNEL , RT MENISCUS TEAR REPAIR)      Stroke - Family Hx:  Grandparent      Heart - Family Hx:  Grandparent      Diabetes - Family Hx:  Father       Is Father Still Living?:  Yes      Is Mother Still Living?:  Yes      Smoking status:  Former smoker (1ppd x 20 years)      Number of Pregnancies:  2      Age at menopause:  40       Section:  Yes (2)      Hysterectomy:  Yes (partial [id number] and 11)      Anticoagulation Therapy:  No      Antibiotic Prophylaxis:  No      Medical History:  Yes: Allergies, Anemia, Arthritis (SPINE AND KNEES), Asthma (    ALLERGY INDUCED), Blood Disease (ANEMIA), Chronic Bronchitis/COPD, Diabetes (    DMII), Hemorrhoids/Rectal Prob (ACID REFLUX, RLQ PAIN, NAUSEA, DIARRHEA), High     Blood Pressure (MED CONTROLS), High Cholesterol, Reflux Disease, Shortness Of     Breath, Miscellaneous Medical/oth (OBESITY, HYPOTHYROID), No: Alcoholism,     Broken Bones, Cataracts, Chemical Dependency, Chemotherapy/Cancer, Emphysema,     Chronic Liver Disease, Colon Trouble, Colitis, Diverticulitis, Congestive Heart     Failu, Deafness or Ringing Ears, Convulsions, Depression, Anxiety, Bipolar     Disorder, Epilepsy, Seizures, Forgetfullness, Glaucoma, Gall Stones, Gout, Head     Injury, Heart Attack, Heart Murmur, Hepatitis, Hiatal Hernia, HIV (Do not ask -     volu, Jaundice, Kidney or Bladder Disease, Kidney Stones, Migrane Headaches,     Mitral Valve Prolapse, Night sweats, Phlebitis, Psychiatric Care, Rheumatic     Fever, Sexually Transmitted Dis, Sinus Trouble, Skin Disease/Psoriais/Ecz,     Stroke, Thyroid Problem, Tuberculosis or Pos TB Te            Hx Influenza Vaccination:  Yes      Date Influenza Vaccine Given:  Sep 1, 2017      Influenza Vaccine Declined:  No      2 or More Falls Past Year?:  No      Fall Past Year with Injury?:  No      Hx Pneumococcal Vaccination:  Yes      Encouraged to follow-up with:  PCP regarding preventative exams.      Chart initiated by      Kellen Villa MA            ALLERGIES/MEDICATIONS      Allergies:        Coded Allergies:             METFORMIN (Verified  Allergy, Unknown, SEVERE DIARRHEA,  DEHYDRATION, 2/14/ 18)           NICKEL (Verified  Allergy, Unknown, RASH, 2/14/18)      Uncoded Allergies:             COLBALT BLUE (METAL) (Allergy, Unknown, RASH, 1/20/14)           WHITE GOLD (Allergy, Unknown, RASH, 1/20/14)      Medications    Last Reconciled on 2/14/18 09:03 by EDGAR DOWLING MD      Spironolactone (Aldactone) 25 Mg Tablet      50 MG PO BID, #60 TAB 3 Refills         Prov: Edgar Dowling         10/23/17       Butalbital/APAP/Caffeine 50/325/40 (Butalbital/APAP/Caffeine 50/325/40) 1 Each     Tablet      1 EACH PO Q6H Y for HEADACHE, TAB         Reported         9/27/17       Tiotropium Bromide (Spiriva Respimat 2.5 mcg/Puff) 4 Gm Mist.inhal      2 PUFFS INH QDAY, #1 MDI 11 Refills         Prov: Edgar Dowling         8/17/17       Magnesium Amino Acid Chelate (Magnesium*) 100 Mg Tablet      100 MG PO QDAY, TAB         Reported         6/8/17       Gabapentin (Gabapentin) 300 Mg Capsule      500 MG PO Q4HR, #60 CAP 0 Refills         Reported         6/8/17       Carboxymethylcellulose Sodium (Refresh Tears) Unknown Strength Drops      EYE EACH TID Y for DRY EYES, #1 BOTTLE         Reported         6/5/17       Fluocinonide/Emollient Base (Fluocinonide-E 0.05% Cream) Unknown Strength     Cream..g.      TOPICAL BID, #60 GM         Reported         6/5/17       Ondansetron Hcl (Ondansetron*) Unknown Strength Tablet      PO Q4H Y for NAUSEA, TAB 0 Refills         Reported         6/5/17       Insulin Lispro (HumaLOG VIAL*) Unknown Strength Vial      SUBQ QID INSULIN, #1 VIAL 0 Refills         Reported         6/5/17       Neb-Albuterol/Ipratropium (Ipratropium/Albuterol) Unknown Strength Ampul.neb      INH Q8H Y for SHORTNESS OF BREATH, #90 NEB 0 Refills         Reported         6/5/17       Pantoprazole (Protonix*) Unknown Strength Tablet.dr BLEDSOE HS, #30 TAB 0 Refills         Reported         6/5/17       Fluticasone Propionate 0.05% (Fluticasone Propionate 0.05%) Unknown Strength      Lotion      TOPICAL BID, BOTTLE         Reported         6/5/17       Sucralfate (Carafate) Unknown Strength Oral.susp      PO QDAY, #300 ML         Reported         6/5/17       Aspirin/Acetaminophen/Caffeine 250/250/65 MG (Excedrin Extra Strength) Unknown     Strength Tablet      PO Q6H, TAB         Reported         6/5/17       Levocetirizine Dihydrochloride (Xyzal) Unknown Strength Tablet      PO QDAY, TAB         Reported         6/5/17       Multivitamins (Multi-Vitamin) Unknown Strength Tablet      PO QDAY, #30 TAB 0 Refills         Reported         6/5/17       celeCOXIB (CeleBREX) Unknown Strength Capsule      100 PO BID, #60 CAP 0 Refills         Reported         6/5/17       Dicyclomine Hcl (Dicyclomine*) Unknown Strength Tablet      PO ACHS, TAB         Reported         6/5/17       Sertraline HCl (Sertraline*) 50 Mg Tablet      50 MG PO QDAY, #30 TAB 0 Refills         Reported         12/19/14       Mometasone/Formoterol (Dulera 100 Mcg/5 Mcg) 13 Gm Inh      1 PUFFS INH RTBID, INH         Reported         12/19/14       Levothyroxine (Levothyroxine) 0.1 Mg Tablet      1.5 MG PO QDAY@07, #30 TAB 0 Refills         Reported         12/19/14       MDI-Albuterol (Ventolin HFA*) 18 Gm Inhaler      1-2 PUFFS INH Q4H PRN, INH         Reported         1/20/14       Montelukast Sodium (Singulair*) 10 Mg Tablet      10 MG PO HS, TAB         Reported         1/20/14       Cholecalciferol (Vitamin D3*) 1,000 Units Capsule      3000 UNITS PO QDAY, CAP         Reported         1/20/14       Furosemide (Furosemide) 40 Mg Tablet      40 MG PO QDAY, TAB         Reported         1/20/14       Lovastatin (Lovastatin) 20 Mg Tab      20 MG PO QDAY, #30 0 Refills         Prov: GRAYSON WAYNE         4/10/13      Current Medications      Current Medications Reviewed 2/14/18            EXAM      GEN-patient is very pleasant but very morbidly obese lady resting comfortable     in no acute distress      Eyes-PERRL,   conjunctiva are normal in appearance extraocular muscles are intact    , no scleral icterus      Lymphatic-no swollen or enlarged cervical nodes, or axillary node, or femoral     nodes, or supraclavicular nodes      Mouth normal dentition, no erythema no ulcerations oropharynx appears normal no     exudate no evidence of postnasal drip, MP IV.        Neck-there are no palpable supraclavicular or cervical adenopathy, thyroid is     normal in appearance no apparent nodules, there is no inspiratory or expiratory     stridor      Respiratory-patient exhibits normal work of breathing, speaking in full     sentences without difficulty, the chest is normal in appearance, clear to     auscultation with no wheezes rales or rhonchi, chest is normal to percussion on     both the right and left sides      Cardiovascular-the heart rate is normal and regular S1 and S2 present with no     murmur or extra heart sounds, there is no JVD or pedal edema present      GI-the abdomen is normal in appearance, bowel sounds present and normal in all     quadrants no hepatosplenomegaly or masses felt      Extremities-no clubbing is present, pulses present in all extremities,     capillary refill time is normal      Skin-skin is normal in appearance it is warm and dry, no rashes present, no     evidence of cyanosis, palpation reveals no masses      Neurological-the patient is alert and oriented to time place and person, moves     all 4 extremities, normal gait, normal affect and mood, CN2-12 intact      Psych-normal judgment and insight is good, normal mood and affect, alert and     oriented to person, place, and time, and date      Vtials      Vitals:             Height 5 ft 2 in / 157.48 cm           Weight 405 lbs 6 oz / 183.164024 kg           BSA 2.98 m2           BMI 74.1 kg/m2           Temperature 97.8 F / 36.56 C - Oral           Pulse 101           Respirations 26           Blood Pressure 172/68 Sitting, Right Arm           Pulse  Oximetry 97%, Room air            REVIEW      Results Reviewed      [facility] Results Reviewed?:  Yes Prev Lab Results, Yes Prev Radiology Results, Yes     Previous [facility] Records            PLAN      Assessment      Leg swelling - M79.89            Pulmonary hypertension - I27.20            Notes      New Diagnostics      * Duplex Scan LE, SCHEDULED PROCEDURE       Dx: Leg swelling - M79.89      New Referrals      * Cardiology, SCHEDULED PROCEDURE       DAFNE ABRAMS with [facility]       Status changed from Active to Complete.       Dx: Pulmonary hypertension - I27.20      ASSESSMENT/PLAN:      1. dyspnea. awaiting echocardiogram to rule out PAH and LV dysfunction make     referral to cardiology      2. Moderate persistent asthma. Continue Singulair.  Continue long acting beta     agonists as well as inhaled corticosteroids. Continue prn albuterol.      3.  Lower extremity edema-ordered duplex of the lower extremity to rule out PE      4. Obstructive sleep apnea.  Continue BiPAP therapy current settings      5. Vaccinations up to date      6.  I have personally reviewed laboratory data, imaging as well as previous     medical records.            Patient Education      Education resources provided:  Yes      Patient Education Provided:  Sleep Apnea                 Disclaimer: Converted document may not contain table formatting or lab diagrams. Please see [employer] for the authenticated document.

## 2021-05-28 NOTE — PROGRESS NOTES
Patient: AYLIN RAMOS     Acct: [id number]     Report: #[id number]  UNIT #: [id number]     : 1970    Encounter Date:10/22/2020  PRIMARY CARE: FILIBERTO BATISTA  ***Signed***  --------------------------------------------------------------------------------------------------------------------  Chief Complaint      Encounter Date      Oct 22, 2020            Primary Care Provider      FILIBERTO BATISTA            Referring Provider      ROBERTO GARCIA            Patient Complaint      Patient is complaining of      2 month f/u, GIUSEPPE            VITALS      Height 5 ft 2 in / 157.48 cm      Weight 485 lbs 0 oz / 219.825340 kg      BSA 2.79 m2      BMI 88.7 kg/m2      Temperature 98.6 F / 37 C - Temporal      Pulse 98      Respirations 18      Blood Pressure 109/58 Sitting, Left Arm      Pulse Oximetry 96%, Nasal cannula, 2 lpm            HPI      The patient is a 49 year old female with history of diastolic heart failure,     pulmonary hypertension, asthma, obstructive sleep apnea and obesity here for     follow up today.             The patient has had a significant weight gain noted, diuretics were increased by    her primary cardiologist. Her dyspnea is worse, she feels it is secondary to her    swelling. 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 She reports no     bleeding.            ROS      Constitutional:  Complains of: Fatigue; Denies: Fever, Weight gain, Weight loss,    Chills, Insomnia, Other      Respiratory/Breathing:  Complains of: Shortness of air, Wheezing, Cough; Denies:    Hemoptysis, Pleuritic pain, Other      Endocrine:  Denies: Polydipsia, Polyuria, Heat/cold intolerance, Abnorml     menstrual pattern, Diabetes, Other      Eyes:  Denies: Blurred vision, Vision Changes, Other      Ears, nose, mouth, throat:  Denies: Mouth lesions, Thrush, Throat pain,     Hoarseness, Allergies/Hay Fever, Post Nasal  Drip, Headaches, Recent Head Injury,    Nose Bleeding, Neck Stiffness, Thyroid Mass, Hearing Loss, Ear Fullness, Dry     Mouth, Nasal or Sinus Pain, Dry Lips, Nasal discharge, Nasal congestion, Other      Cardiovascular:  Denies: Palpitations, Syncope, Claudication, Chest Pain, Wake     up Gasping for air, Leg Swelling, Irregular Heart Rate, Cyanosis, Dyspnea on     Exertion, Other      Gastrointestinal:  Denies: Nausea, Constipation, Diarrhea, Abdominal pain,     Vomiting, Difficulty Swallowing, Reflux/Heartburn, Dysphagia, Jaundice,     Bloating, Melena, Bloody stools, Other      Genitourinary:  Denies: Urinary frequency, Incontinence, Hematuria, Urgency,     Nocturia, Dysuria, Testicular problems, Other      Musculoskeletal:  Denies: Joint Pain, Joint Stiffness, Joint Swelling, Myalgias,    Other      Hematologic/lymphatic:  DENIES: Lymphadenopathy, Bruising, Bleeding tendencies,     Other      Neurological:  Denies: Headache, Numbness, Weakness, Seizures, Other      Psychiatric:  Denies: Anxiety, Appropriate Effect, Depression, Other      Sleep:  Yes: Insomnia?; No: Excessive daytime sleep, Morning Headache?, Snoring,    Stop breathing at sleep?, Other      Integumentary:  Denies: Rash, Dry skin, Skin Warm to Touch, Other      Immunologic/Allergic:  Denies: Latex allergy, Seasonal allergies, Asthma,     Urticaria, Eczema, Other      Immunization status:  No: Up to date            FAMILY/SOCIAL/MEDICAL HX      Surgical History:  Yes: Abdominal Surgery (hernia repair ), Oral Surgery (tounge    biopsy), Orthopedic Surgery (L MENISCUS REPAIR, RACHAEL CARPAL TUNNEL  ); No:     Appendectomy, Bladder Surgery, Bowel Surgery, CABG, Cholecystectomy, Head     Surgery, Vascular Surgery      Stroke - Family Hx:  Grandparent      Heart - Family Hx:  Grandparent      Diabetes - Family Hx:  Father      Is Father Still Living?:  Yes      Is Mother Still Living?:  Yes      Social History:  No Tobacco Use, No Alcohol Use, No  Recreational Drug use      Smoking status:  Former smoker (6yo, 1 ppd, quit )       Section:  Yes (2)      Hysterectomy:  Yes (partial [id number] and 11)      Anticoagulation Therapy:  No      Antibiotic Prophylaxis:  No      Medical History:  Yes: Anemia, Arthritis, Asthma, Congestive Heart Failu,     Diabetes (type II), High Blood Pressure, Reflux Disease, Shortness Of Breath     (pulmonary hypertension); No: Blood Disease, Broken Bones, Cataracts, Chemical     Dependency, Chemotherapy/Cancer, Chronic Bronchitis/COPD, Emphysema, Chronic     Liver Disease, Colon Trouble, Colitis, Diverticulitis, Deafness or Ringing Ears,    Depression, Anxiety, Bipolar Disorder, Epilepsy, Seizures, Glaucoma, Gall     Stones, Gout, Head Injury, Heart Attack, Heart Murmur, Hemorrhoids/Rectal Prob,     Hepatitis, Hiatal Hernia, HIV (Do not ask - volu, Kidney or Bladder Disease,     Kidney Stones, Migrane Headaches, Mitral Valve Prolapse, Psychiatric Care,     Rheumatic Fever, Sexually Transmitted Dis, Sinus Trouble, Skin     Disease/Psoriais/Ecz, Stroke, Thyroid Problem, Tuberculosis or Pos TB Te,     Miscellaneous Medical/oth (high cholestrol, insomnia)      Psychiatric History      none            PREVENTION      Hx Influenza Vaccination:  Yes      Date Influenza Vaccine Given:  Sep 1, 2020      Influenza Vaccine Declined:  No      2 or More Falls in Past Year?:  No      Fall Past Year with Injury?:  No      Hx Pneumococcal Vaccination:  Yes      Encouraged to follow-up with:  PCP regarding preventative exams.      Chart initiated by      Kellen Villa MA            ALLERGIES/MEDICATIONS      Allergies:        Coded Allergies:             DULAGLUTIDE (Verified  Allergy, Unknown, DUMPING SYNDROME, PANCREATITIS,     10/22/20)           EXENATIDE (Verified  Allergy, Unknown, PANCREATITIS, 10/22/20)           INSULIN DEGLUDEC (Verified  Allergy, Unknown, 10/22/20)           NICKEL (Verified  Allergy, Unknown, RASH,  10/22/20)           SITAGLIPTIN (Verified  Allergy, Unknown, PANCREATITIS, 10/22/20)           METFORMIN (Verified  Adverse Reaction, Unknown, SEVERE DIARRHEA,     DEHYDRATION, 10/22/20)      Uncoded Allergies:             COLBALT BLUE (METAL) (Allergy, Unknown, RASH; DIARRHEA, STOMACH UPSET,     4/17/18)           WHITE GOLD (Allergy, Unknown, RASH, DIARRHEA, STOMACH UPSET, 4/17/18)      Medications    Last Reconciled on 10/22/20 11:40 by EDGAR DOWLING MD      traZODone HCl (Desyrel) 50 Mg Tablet      100 MG PO HS, #60 TAB 6 Refills         Prov: Edgar Dowling         10/22/20       Warfarin Sod (Coumadin) 2.5 Mg Tablet      7.5 MG PO QDAY@16 for 60 Days, #180 TAB 0 Refills         Prov: ELDER JOSÉ         10/5/20       Potassium Chloride ER (Potassium Chloride ER) 10 Meq Tablet.er      20 MEQ PO TID for 30 Days, #180 TAB.ER         Prov: ELDER JOSÉ         10/5/20       Metoprolol Succinate (Metoprolol Succinate) 25 Mg Tab.er.24h      25 MG PO HS, #30 TAB 0 Refills         Reported         9/28/20       Bumetanide (BUMETANIDE) 2 Mg Tablet      3 MG PO BID for 30 Days, #90 TAB 4 Refills         Prov: DAFNE ABRAMS         9/21/20       metOLazone (metOLazone) 2.5 Mg Tablet      5 MG PO QDAY for 30 Days, #60 TAB 3 Refills         Prov: DAFNE ABRAMS         9/21/20       Polyethylene Glycol (Miralax*) 17 Gm Packet      17 GM PO QDAY PRN for CONSTIPATION, #30 PKT 0 Refills         Reported         9/14/20       Pantoprazole (Protonix) 40 Mg Tablet.dr      40 MG PO BIDAC, #60 TAB 0 Refills         Reported         9/14/20       Levothyroxine (Levothyroxine) 0.2 Mg Tablet      0.2 MG PO QDAY@07, #30 TAB 0 Refills         Reported         9/14/20       Ezetimibe (Zetia) 10 Mg Tablet      10 MG PO QDAY         Reported         9/14/20       Gabapentin (Gabapentin) 300 Mg Capsule      900 MG PO TID, #270 CAP 0 Refills         Reported         9/14/20       Losartan Potassium (Losartan*) 25 Mg Tablet       25 MG PO QDAY, #30 TAB 0 Refills         Reported         9/14/20       Spironolactone (Spironolactone*) 50 Mg Tablet      50 MG PO BID, TAB         Reported         9/14/20       Cholecalciferol (Vitamin D3) (Vitamin D3) 5,000 Units Capsule      5000 UNITS PO QDAY for 30 Days, #30 CAP         Reported         9/14/20       Fexofenadine HCl (Allegra Allergy) 180 Mg Tablet      180 MG PO BID         Reported         9/14/20       Fluticasone/Vilanterol 200-25 Mcg Inh (Breo Ellipta 200-25 Mcg Inh) 1 Each     Blst.w.dev      1 PUFF INH QDAY for 30 Days, #1 INH 5 Refills         Prov: [facility] SANCHEZ [facility]         8/10/20       Umeclidinium Bromide (Incruse Ellipta) 62.5 Mcg Blst.w.dev      1 PUFF INH RTQDAY for 30 Days, #1 MDI 5 Refills         Prov: [facility] SANC[facility]Z [facility]         8/10/20       MDI-Albuterol (Ventolin HFA) 18 Gm Hfa.aer.ad      2 PUFFS INH QID PRN for WHEEZING / SHORTNESS OF BREATH, #1 MDI 6 Refills         Prov: [facility] SANCHEZ [facility]         8/7/20       traZODone HCl (traZODone HCl) 50 Mg Tablet      50 MG PO HS, #30 TAB 3 Refills         Prov: Mk Dowling         5/29/20       Oxygen (OXYGEN)  Gas      L NARE EACH QDAY PRN PRN for SHORTNESS OF BREATH, #2         Reported         11/9/19       INSULIN PUMP (at home) (INSULIN PUMP (at home))  Each      1 EACH, BAG         Reported         11/9/19       Sertraline HCl (Sertraline*) 100 Mg Tablet      100 MG PO QDAY, #30 TAB 0 Refills         Reported         11/9/19       Rosuvastatin Calcium (Crestor*) 40 Mg Tablet      40 MG PO HS, #30 TAB 0 Refills         Reported         6/4/19       SUMAtriptan Succinate (Imitrex) 100 Mg Tab      100 MG PO ASDIR PRN for MIGRAINE, TAB         Reported         4/12/18       Montelukast Sodium (Singulair*) 10 Mg Tablet      10 MG PO HS, TAB         Reported         1/20/14      Current Medications      Current Medications Reviewed 10/22/20            EXAM      GEN-patient is very pleasant but very morbidly  obese lady resting comfortable in    no acute distress      Eyes-PERRL,  conjunctiva are normal in appearance extraocular muscles are     intact, no scleral icterus      Lymphatic-no swollen or enlarged cervical nodes, or axillary node, or femoral     nodes, or supraclavicular nodes      Mouth normal dentition, no erythema no ulcerations oropharynx appears normal no     exudate no evidence of postnasal drip, MP IV.        Neck-there are no palpable supraclavicular or cervical adenopathy, thyroid is     normal in appearance no apparent nodules, there is no inspiratory or expiratory     stridor      Respiratory-patient exhibits normal work of breathing, speaking in full     sentences without difficulty, the chest is normal in appearance, clear to     auscultation with no wheezes rales or rhonchi, chest is normal to percussion on     both the right and left sides      Cardiovascular-the heart rate is normal and regular S1 and S2 present with no     murmur or extra heart sounds, there is no JVD, significant bilateral pitting     edema past the knees.        GI-the abdomen is normal in appearance, bowel sounds present and normal in all     quadrants no hepatosplenomegaly or masses felt      Extremities-no clubbing is present, pulses present in all extremities, capillary    refill time is normal      Skin-skin is normal in appearance it is warm and dry, no rashes present, no     evidence of cyanosis, palpation reveals no masses      Neurological-the patient is alert and oriented to time place and person, moves     all 4 extremities, normal gait, normal affect and mood, CN2-12 intact      Psych-normal judgment and insight is good, normal mood and affect, alert and     oriented to person, place, and time, and date      Vtials      Vitals:             Height 5 ft 2 in / 157.48 cm           Weight 485 lbs 0 oz / 219.472166 kg           BSA 2.79 m2           BMI 88.7 kg/m2           Temperature 98.6 F / 37 C - Temporal            Pulse 98           Respirations 18           Blood Pressure 109/58 Sitting, Left Arm           Pulse Oximetry 96%, Nasal cannula, 2 lpm            REVIEW      Results Reviewed      [facility] Results Reviewed?:  Yes Prev Lab Results, Yes Prev Radiology Results, Yes     Previous [facility] Records            Assessment      Notes      New Medications      * traZODone HCl (Desyrel) 50 MG TABLET: 100 MG PO HS #60      Discontinued Medications      * CEFDINIR 300 MG CAP: 300 MG PO BID 5 Days #10      ASSESSMENT/PLAN:      1. Insomnia. Increase Trazodone to 100 mg PO q HS.       2. Pulmonary hypertension secondary to diastolic dysfunction and obstructive     sleep apnea. Continue PAP therapy and diuresis.       3. Diastolic heart failure with acute decompensation. Continue diuretics,     currently under the care of Dr. Myers who has adjusted her diuretic therapy.       4. Asthma. Continue current bronchodilator therapies, the patient takes     Ventolin, incruse and breo.       5. Allergies. Continue Singulair and fexofenadine.      6. We will see the patient back with a Telehealth visit.      7. I have personally reviewed laboratory data, imaging and previous medical     records.            Patient Education      Education resources provided:  Yes      Patient Education Provided:  Pulmonary Hypertension            Electronically signed by Mk Dowling  10/26/2020 21:29       Disclaimer: Converted document may not contain table formatting or lab diagrams. Please see [employer] for the authenticated document.

## 2021-05-28 NOTE — PROGRESS NOTES
Patient: AYLIN RAMOS     Acct: [id number]     Report: #[id number]  UNIT #: [id number]     : 1970    Encounter Date:2018  PRIMARY CARE: FILIBERTO BATISTA  ***Signed***  --------------------------------------------------------------------------------------------------------------------  Chief Complaint      Encounter Date      May 2, 2018            Primary Care Provider      FILIBERTO BATISTA            Referring Provider      ROBERTO GARCIA            Patient Complaint      Patient is complaining of      Pt here for f/u, soa, results and referral            VITALS      Height 5 ft 2 in / 157.48 cm      Weight 407 lbs 0 oz / 184.490063 kg      BSA 2.98 m2      BMI 74.4 kg/m2      Temperature 98.5 F / 36.94 C - Oral      Pulse 87      Respirations 18      Blood Pressure 129/82 Sitting, Right Arm      Pulse Oximetry 95%, Room air            HPI      The patient is a 47 year old obese female with pulmonary hypertension,     obstructive sleep apnea and obesity hypoventilation syndrome here today for     follow up.  The patient had bronchoscopy on 2018 after having a CT scan     that was abnormal on 2018 showing complete consolidation and collapse of     the right upper lobe bronchus. At that time, the patient underwent bronchoscopy     and no mass was seen, but the patient was found to have a thick mucous plug.      The patient still feels short of breath, but her symptoms have improved     somewhat.  She is complaining of some nasal congestion and postnasal drainage     that has been going on for the past several days.  The patient feels that she     is still short of breath.  She does report compliance with the use of her BiPAP     machine and does feel that this helps.  Since last visit with me she has also     had a right heart catheterization that showed the presence of mild pulmonary     hypertension with an elevated wedge.  The patient's shortness of breath is     better with  bronchodilator therapies, better with rest, no associated chest     pains or heart palpitations, but does have lower extremity edema.  She does     have cough that is dry and nonproductive and feels that it is related to her     sinus drainage.            ROS      Constitutional:  Denies: Fatigue, Fever, Weight gain, Weight loss, Chills,     Insomnia, Other      Respiratory/Breathing:  Complains of: Shortness of air, Wheezing, Cough, Denies    : Hemoptysis, Pleuritic pain, Other      Endocrine:  Denies: Polydipsia, Polyuria, Heat/cold intolerance, Abnorml     menstrual pattern, Diabetes, Other      Eyes:  Denies: Blurred vision, Vision Changes, Other      Ears, nose, mouth, throat:  Denies: Mouth lesions, Thrush, Throat pain,     Hoarseness, Allergies/Hay Fever, Post Nasal Drip, Headaches, Recent Head Injury    , Nose Bleeding, Neck Stiffness, Thyroid Mass, Hearing Loss, Ear Fullness, Dry     Mouth, Nasal or Sinus Pain, Dry Lips, Nasal discharge, Nasal congestion, Other      Cardiovascular:  Denies: Palpitations, Syncope, Claudication, Chest Pain, Wake     up Gasping for air, Leg Swelling, Irregular Heart Rate, Cyanosis, Dyspnea on     Exertion, Other      Gastrointestinal:  Denies: Nausea, Constipation, Diarrhea, Abdominal pain,     Vomiting, Difficulty Swallowing, Reflux/Heartburn, Dysphagia, Jaundice, Bloating    , Melena, Bloody stools, Other      Genitourinary:  Denies: Urinary frequency, Incontinence, Hematuria, Urgency,     Nocturia, Dysuria, Testicular problems, Other      Musculoskeletal:  Denies: Joint Pain, Joint Stiffness, Joint Swelling, Myalgias    , Other      Hematologic/lymphatic:  DENIES: Lymphadenopathy, Bruising, Bleeding tendencies,     Other      Neurological:  Denies: Headache, Numbness, Weakness, Seizures, Other      Psychiatric:  Denies: Anxiety, Appropriate Effect, Depression, Other      Sleep:  No: Excessive daytime sleep, Morning Headache?, Snoring, Insomnia?,     Stop breathing at  sleep?, Other      Integumentary:  Denies: Rash, Dry skin, Skin Warm to Touch, Other      Immunologic/Allergic:  Denies: Latex allergy, Seasonal allergies, Asthma,     Urticaria, Eczema, Other      Immunization status:  No: Up to date            FAMILY/SOCIAL/MEDICAL HX      Current History      carpel tunnel 97/98, hernia repair 11      Surgical History:  Yes: Abdominal Surgery (ABD HERNIA ), Orthopedic Surgery     (RACHAEL CARPAL TUNNEL , LT MENISCUS TEAR REPAIR)      Stroke - Family Hx:  Grandparent      Heart - Family Hx:  Grandparent      Diabetes - Family Hx:  Father      Is Father Still Living?:  Yes      Is Mother Still Living?:  Yes      Smoking status:  Former smoker (1ppd x 20 years)      Number of Pregnancies:  2      Age at menopause:  40       Section:  Yes (2)      Hysterectomy:  Yes (partial [id number] and 11)      Anticoagulation Therapy:  No      Antibiotic Prophylaxis:  No      Medical History:  Yes: Allergies, Anemia, Arthritis (SPINE AND KNEES), Asthma (    ALLERGY INDUCED), Diabetes (DMII; INSULIN PUMP), Hemorrhoids/Rectal Prob (ACID     REFLUX, OCCASIONAL NAUSEA), High Blood Pressure (MED CONTROLS), High Cholesterol    , Reflux Disease, Shortness Of Breath, Miscellaneous Medical/oth (OBESITY,     HYPOTHYROID), No: Alcoholism, Blood Disease, Broken Bones, Cataracts, Chemical     Dependency, Chemotherapy/Cancer, Chronic Bronchitis/COPD, Emphysema, Chronic     Liver Disease, Colon Trouble, Colitis, Diverticulitis, Congestive Heart Failu,     Deafness or Ringing Ears, Convulsions, Depression, Anxiety, Bipolar Disorder,     Epilepsy, Seizures, Forgetfullness, Glaucoma, Gall Stones, Gout, Head Injury,     Heart Attack, Heart Murmur, Hepatitis, Hiatal Hernia, HIV (Do not ask - volu,     Jaundice, Kidney or Bladder Disease, Kidney Stones, Migrane Headaches, Mitral     Valve Prolapse, Night sweats, Phlebitis, Psychiatric Care, Rheumatic Fever,     Sexually Transmitted Dis, Sinus Trouble,  Skin Disease/Psoriais/Ecz, Stroke,     Thyroid Problem, Tuberculosis or Pos TB Te      Psychiatric History      None            PREVENTION      Hx Influenza Vaccination:  Yes      Date Influenza Vaccine Given:  Sep 1, 2017      Influenza Vaccine Declined:  No      2 or More Falls Past Year?:  No      Fall Past Year with Injury?:  No      Hx Pneumococcal Vaccination:  Yes      Encouraged to follow-up with:  PCP regarding preventative exams.      Chart initiated by      Kellen Villa MA            ALLERGIES/MEDICATIONS      Allergies:        Coded Allergies:             EXENATIDE (Verified  Allergy, Severe, PANCREATITIS, 5/2/18)           INSULIN DEGLUDEC (Verified  Allergy, Severe, 5/2/18)           SITAGLIPTIN (Verified  Allergy, Severe, PANCREATITIS, 5/2/18)           NICKEL (Verified  Allergy, Unknown, RASH, 5/2/18)           METFORMIN (Verified  Adverse Reaction, Unknown, SEVERE DIARRHEA,     DEHYDRATION, 5/2/18)      Uncoded Allergies:             COLBALT BLUE (METAL) (Allergy, Unknown, RASH; DIARRHEA, STOMACH UPSET, 4/17 /18)           WHITE GOLD (Allergy, Unknown, RASH, DIARRHEA, STOMACH UPSET, 4/17/18)      Medications    Last Reconciled on 5/2/18 11:13 by EDGAR LINDER MD      Nebulizer Accessories (Nebulizer Kit RANDI) 1 Each Kit      EACH XX ONCE, #1 0 Refills         Prov: Belle Espinoza PA-C         4/17/18       Neb-Budesonide (Pulmicort) 0.5 Mg/2 Ml Ampul.neb      0.5 MG INH BID, #60 NEB 4 Refills         Prov: Belle Espinoza PA-C         4/17/18       Arformoterol Tartrate (Brovana) 15 Mcg/2 Ml Vial.neb      15 MCG INH BID, #60 NEB 4 Refills         Prov: Belle Espinoza PA-C         4/17/18       Tiotropium Bromide (Spiriva Respimat 2.5 mcg/Puff) 4 Gm Mist.inhal      2 PUFFS INH RTQDAY, #1 MDI 0 Refills         Reported         4/17/18       Furosemide* (Lasix*) 40 Mg Tablet      20 MG PO HS, #30 TAB 0 Refills         Reported         4/17/18       Cholecalciferol (Vitamin D3*) 2,000 Unit Tablet       2000 UNITS PO QDAY, #30 TAB         Reported         4/17/18       Cholecalciferol (Vitamin D*) 2,000 Unit Cap      1000 UNITS PO QDAY, #30 CAP 0 Refills         Reported         4/12/18       Fexofenadine Hcl (Fexofenadine Hcl) 180 Mg Tablet      180 MG PO BID, #60 TAB 0 Refills         Reported         4/12/18       Fluorometholone (FML 0.1% Ophth) 3.5 Gm Oint...g.      1 APL EYE EACH BID, TUBE         Reported         4/12/18       Liraglutide (Victoza 3-Jhony) 0.6 Mg/0.1 Ml Pen.injctr      1.8 MG SUBQ QDAY, PACKAGE         Reported         4/12/18       SUMAtriptan Succinate (Imitrex) 100 Mg Tab      100 MG PO ASDIR Y for MIGRAINE, TAB         Reported         4/12/18       Spironolactone (Aldactone) 25 Mg Tablet      50 MG PO BID, #60 TAB 3 Refills         Prov: Mk Dowling         10/23/17       Gabapentin (Gabapentin) 300 Mg Capsule      600 MG PO TID, #60 CAP 0 Refills         Reported         6/8/17       Ondansetron Hcl (Ondansetron*) Unknown Strength Tablet      4 MG PO Q4H Y for NAUSEA, TAB 0 Refills         Reported         6/5/17       Insulin Lispro (HumaLOG VIAL*) Unknown Strength Vial      SUBQ QID INSULIN, #1 VIAL 0 Refills         Reported         6/5/17       Pantoprazole (Protonix*) Unknown Strength Tablet.dr      40 MG PO BID, #30 TAB 0 Refills         Reported         6/5/17       Levocetirizine Dihydrochloride (Xyzal) Unknown Strength Tablet      5 MG PO QDAY, TAB         Reported         6/5/17       Multivitamins (Multi-Vitamin) Unknown Strength Tablet      PO QDAY, #30 TAB 0 Refills         Reported         6/5/17       celeCOXIB (CeleBREX) Unknown Strength Capsule      200 MG PO BID, #60 CAP 0 Refills         Reported         6/5/17       Dicyclomine Hcl (Dicyclomine*) Unknown Strength Tablet      20 MG PO BID Y for DIARRHEA, TAB         Reported         6/5/17       Sertraline HCl (Sertraline*) 50 Mg Tablet      100 MG PO QDAY, #30 TAB 0 Refills         Reported         12/19/14        Levothyroxine (Levothyroxine) 0.1 Mg Tablet      125 MG PO QDAY@07, #30 TAB 0 Refills         Reported         12/19/14       MDI-Albuterol (Ventolin HFA*) 18 Gm Inhaler      1-2 PUFFS INH Q4H PRN, INH         Reported         1/20/14       Montelukast Sodium (Singulair*) 10 Mg Tablet      10 MG PO HS, TAB         Reported         1/20/14       Furosemide (Furosemide) 40 Mg Tablet      40 MG PO QDAY, TAB         Reported         1/20/14       Lovastatin (Lovastatin) 20 Mg Tab      20 MG PO QDAY, #30 0 Refills         Prov: GRAYSON WAYNE         4/10/13      Current Medications      Current Medications Reviewed 5/2/18            EXAM      GEN-patient is very pleasant but very morbidly obese lady resting comfortable     in no acute distress      Eyes-PERRL,  conjunctiva are normal in appearance extraocular muscles are intact    , no scleral icterus      Lymphatic-no swollen or enlarged cervical nodes, or axillary node, or femoral     nodes, or supraclavicular nodes      Mouth normal dentition, no erythema no ulcerations oropharynx appears normal no     exudate no evidence of postnasal drip, MP IV.        Neck-there are no palpable supraclavicular or cervical adenopathy, thyroid is     normal in appearance no apparent nodules, there is no inspiratory or expiratory     stridor      Respiratory-patient exhibits normal work of breathing, speaking in full     sentences without difficulty, the chest is normal in appearance, clear to     auscultation with no wheezes rales or rhonchi, chest is normal to percussion on     both the right and left sides      Cardiovascular-the heart rate is normal and regular S1 and S2 present with no     murmur or extra heart sounds, there is no JVD or pedal edema present      GI-the abdomen is normal in appearance, bowel sounds present and normal in all     quadrants no hepatosplenomegaly or masses felt      Extremities-no clubbing is present, pulses present in all extremities,      capillary refill time is normal      Skin-skin is normal in appearance it is warm and dry, no rashes present, no     evidence of cyanosis, palpation reveals no masses      Neurological-the patient is alert and oriented to time place and person, moves     all 4 extremities, normal gait, normal affect and mood, CN2-12 intact      Psych-normal judgment and insight is good, normal mood and affect, alert and     oriented to person, place, and time, and date      Vtials      Vitals:             Height 5 ft 2 in / 157.48 cm           Weight 407 lbs 0 oz / 184.194599 kg           BSA 2.98 m2           BMI 74.4 kg/m2           Temperature 98.5 F / 36.94 C - Oral           Pulse 87           Respirations 18           Blood Pressure 129/82 Sitting, Right Arm           Pulse Oximetry 95%, Room air            REVIEW      Results Reviewed      [facility] Results Reviewed?:  Yes Prev Lab Results, Yes Prev Radiology Results, Yes     Previous [facility] Records            Assessment      Pneumonia - J18.9            Notes      Discontinued Medications      * predniSONE* 10 MG TABLET: 10 MG PO ASDIR #48       Instructions: Take 60 mg by mouth x 3 days, 40 mg x 3 days, 30 mg x 3 days, 20     mg x 3 days, then 10 mg x 3 days.       Dx: Asthma exacerbation - J45.901      New Diagnostics      * Chest 2 View, Week       Dx: Pneumonia - J18.9      New Office Procedures      * Prevnar 0.5 PCV13, As Soon As Possible       Pneumoc 13-Ai Conj-Dip CRm/Pf (Prevnar 13 Syringe) 0.5 ML SYRINGE: 0.5     MILLILITER INTRAMUSCULARLY Qty 1 SYRINGE      ASSESSMENT:       1.  Obesity hypoventilation syndrome.      2.  Chronic hypercarbic respiratory failure.      3.  Asthma.      4.  Obstructive sleep apnea.      5.  Morbid obesity with BMI of 74.4.      6. Right upper lobe collapse/atelectasis.            PLAN:        1.  Obtain chest x-ray today to assure resolution the previously seen pneumonia     and atelectasis. The patient has completed a course of  antibiotics.      2.  We will give patient Prevnar vaccine today in the office.      3.  Continue pap therapy at current settings at 17/13 for her obesity     hypoventilation syndrome/obstructive sleep apnea.      4.  Continue bronchodilator therapies including LABA/LAMA as well as Spiriva.      5.  The patient does have mild pulmonary hypertension, but has elevated wedge     pressure and has obesity hypoventilation syndrome which is the most likely     cause.  We will treat the patient's chronic hypoxia as well as hypercarbia.      6.  I have personally reviewed laboratory data, imaging as well as previous     medical records.            Patient Education      Education resources provided:  Yes      Patient Education Provided:  Acute Asthma                 Disclaimer: Converted document may not contain table formatting or lab diagrams. Please see [employer] for the authenticated document.

## 2021-05-28 NOTE — PROGRESS NOTES
Patient: AYLIN RAMOS     Acct: [id number]     Report: #[id number]  UNIT #: [id number]     : 1970    Encounter Date:2021  PRIMARY CARE: FILIBERTO BATISTA  ***Signed***  --------------------------------------------------------------------------------------------------------------------  Chief Complaint      Encounter Date      May 14, 2021            Primary Care Provider      FILIBERTO BATISTA            Referring Provider      ROBERTO GARCIA            Patient Complaint      Patient is complaining of      Patient is here for follow up, Asthma            VITALS      Height 5 ft 2.00 in / 157.48 cm      Weight 455 lbs  / 206.819420 kg      BSA 2.71 m2      BMI 83.2 kg/m2      Temperature 98.0 F / 36.67 C - Tympanic      Pulse 90      Respirations 18      Blood Pressure 130/69 Sitting, Left Arm      Pulse Oximetry 98%, cannula, 4 lpm      Comment: pMDI 30            High 30            HPI      Is a 50-year-old very pleasant female who is scooter and morbidly obese.  Being     closely followed by Dr. Dowling.  Patient has history of asthma, second     hypertension, diastolic dysfunction diabetes and other related.  Patient had a     chest x-ray done recently and was told that patient has the pneumonia.  And was     treated.  She is taking the inhalers is not helpful.  Her in check dial is 30.      She is on Advair and Spiriva.  And DuoNeb.  Patient used to smoke in the past     and quit in  and used to work in a restaurant in the past patient family     history of COPD especially to her dad and patient has a cat and a dog and she     knows she is allergic to the cat.  She thinks all these things happen since she     moved to [place] before that she was not having these problems.  Other medical     history management, social history and review the systems are reviewed and as     documented.      Patient had the sleep study done in 2017 and he had a AHI of 15.1 most     noticeable in rem.   No significant periodic leg movements.  Lowest oxygen     saturation is running around 80%.  And at the time patient was titrated to     bilevel of 18/14.  Patient did not bring her any download of the PAP at this     time      At present patient is on 17/13 with AHI of 6.4 and he is an average about 8     hours and this is noted in August 2020.      Patient was told that she has emphysema and also infection and she want to get     the x-rays soon.      Patient denies of having any fever admits for cough but nonproductive sputum.      Denied any recent travel history or any exposure to anybody who is sick around     her.            ROS      Constitutional:  Complains of: Fatigue; Denies: Fever, Weight gain, Weight loss,    Chills, Insomnia, Other      Respiratory/Breathing:  Complains of: Shortness of air, Wheezing; Denies: Cough     (dry), Hemoptysis, Pleuritic pain, Other      Endocrine:  Denies: Polydipsia, Polyuria, Heat/cold intolerance, Abnorml     menstrual pattern, Diabetes, Other      Eyes:  Denies: Blurred vision, Vision Changes, Other      Ears, nose, mouth, throat:  Denies: Mouth lesions, Thrush, Throat pain,     Hoarseness, Allergies/Hay Fever, Post Nasal Drip, Headaches, Recent Head Injury,    Nose Bleeding, Neck Stiffness, Thyroid Mass, Hearing Loss, Ear Fullness, Dry     Mouth, Nasal or Sinus Pain, Dry Lips, Nasal discharge, Nasal congestion, Other      Cardiovascular:  Denies: Palpitations, Syncope, Claudication, Chest Pain, Wake     up Gasping for air, Leg Swelling, Irregular Heart Rate, Cyanosis, Dyspnea on     Exertion, Other      Gastrointestinal:  Denies: Nausea, Constipation, Diarrhea, Abdominal pain,     Vomiting, Difficulty Swallowing, Reflux/Heartburn, Dysphagia, Jaundice,     Bloating, Melena, Bloody stools, Other      Genitourinary:  Denies: Urinary frequency, Incontinence, Hematuria, Urgency,     Nocturia, Dysuria, Testicular problems, Other      Musculoskeletal:  Denies: Joint Pain,  Joint Stiffness, Joint Swelling, Myalgias,    Other      Hematologic/lymphatic:  DENIES: Lymphadenopathy, Bruising, Bleeding tendencies,     Other      Neurological:  Denies: Headache, Numbness, Weakness, Seizures, Other      Psychiatric:  Denies: Anxiety, Appropriate Effect, Depression, Other      Sleep:  No: Excessive daytime sleep, Morning Headache?, Snoring, Insomnia?, Stop    breathing at sleep?, Other      Integumentary:  Denies: Rash, Dry skin, Skin Warm to Touch, Other      Immunologic/Allergic:  Denies: Latex allergy, Seasonal allergies, Asthma,     Urticaria, Eczema, Other      Immunization status:  No: Up to date            FAMILY/SOCIAL/MEDICAL HX      Surgical History:  Yes: Abdominal Surgery (HERNIA REPAIR), Oral Surgery (TONGUE     BIOPSY), Orthopedic Surgery (L MENISCUS REPAIR, RACHAEL CARPAL TUNNEL  ); No:     Appendectomy, Bladder Surgery, Bowel Surgery, CABG, Cholecystectomy, Head     Surgery, Vascular Surgery      Stroke - Family Hx:  Grandparent      Heart - Family Hx:  Grandparent      Diabetes - Family Hx:  Father      Is Father Still Living?:  Yes      Is Mother Still Living?:  Yes      Social History:  No Tobacco Use, No Alcohol Use, No Recreational Drug use      Smoking status:  Former smoker (8yo, 1 ppd, quit )       Section:  Yes (2)      Hysterectomy:  Yes (partial [id number] and 11)      Anticoagulation Therapy:  Yes (worfrin)      Antibiotic Prophylaxis:  No      Medical History:  Yes: Anemia, Arthritis, Asthma, Chronic Bronchitis/COPD,     Congestive Heart Failu, Diabetes (TYPE II), High Blood Pressure, Reflux Disease,    Shortness Of Breath (PULMONARY HYPERTENSION); No: Blood Disease, Broken Bones,     Cataracts, Chemical Dependency, Chemotherapy/Cancer, Emphysema, Chronic Liver     Disease, Colon Trouble, Colitis, Diverticulitis, Deafness or Ringing Ears,     Depression, Anxiety, Bipolar Disorder, Epilepsy, Seizures, Glaucoma, Gall     Stones, Gout, Head Injury, Heart  Attack, Heart Murmur, Hemorrhoids/Rectal Prob,     Hepatitis, Hiatal Hernia, HIV (Do not ask - volu, Kidney or Bladder Disease,     Kidney Stones, Migrane Headaches, Mitral Valve Prolapse, Psychiatric Care,     Rheumatic Fever, Sexually Transmitted Dis, Sinus Trouble, Skin     Disease/Psoriais/Ecz, Stroke, Thyroid Problem, Tuberculosis or Pos TB Te,     Miscellaneous Medical/oth (HIGH CHOLESTEROL, INSOMNIA)      Psychiatric History      none            PREVENTION      Hx Influenza Vaccination:  Yes      Date Influenza Vaccine Given:  Sep 1, 2020      Influenza Vaccine Declined:  No      2 or More Falls in Past Year?:  No      Fall Past Year with Injury?:  No      Hx Pneumococcal Vaccination:  Yes      Encouraged to follow-up with:  PCP regarding preventative exams.      Chart initiated by      Dayne Cr CMA            ALLERGIES/MEDICATIONS      Allergies:        Coded Allergies:             DULAGLUTIDE (Verified  Allergy, Unknown, DUMPING SYNDROME, PANCREATITIS,     5/14/21)           EXENATIDE (Verified  Allergy, Unknown, PANCREATITIS, 5/14/21)           INSULIN DEGLUDEC (Verified  Allergy, Unknown, 5/14/21)           NICKEL (Verified  Allergy, Unknown, RASH, 5/14/21)           SITAGLIPTIN (Verified  Allergy, Unknown, PANCREATITIS, 5/14/21)           METFORMIN (Verified  Adverse Reaction, Unknown, SEVERE DIARRHEA,     DEHYDRATION, 5/14/21)      Uncoded Allergies:             COLBALT BLUE (METAL) (Allergy, Unknown, RASH; DIARRHEA, STOMACH UPSET,     4/17/18)           WHITE GOLD (Allergy, Unknown, RASH, DIARRHEA, STOMACH UPSET, 4/17/18)      Medications    Last Reconciled on 5/14/21 15:26 by CALIN HEADLEY      Polyethylene Glycol  3350 (Miralax) 119 Gm Powder      17 GM PO QDAY, #1 BOTTLE         Prov: Mk Dowling         5/10/21       Albuterol/Ipratropium (Duoneb) 3 Ml Ampul.neb      3 ML INH Q4H PRN for SHORTNESS OF BREATH, #120 NEB 5 Refills         Prov: ZELALEM SANCHEZ [facility]          4/20/21       Tiotropium (Spiriva) 18 Mcg Inh      2 PUFFS INH QDAY for 30 Days, #30 INH 11 Refills         Prov: NiteshMk         3/4/21       Fluticasone/Salmeterol 230/21 (Advair /21 MCG) 12 Gm Hfa.aer.ad      2 PUFF INH BID for 30 Days, #1 INH 9 Refills         Prov: Mk Dowling         2/19/21       Ramelteon (Rozerem) 8 Mg Tab      8 MG PO HS, #30 TAB 6 Refills         Prov: ELDER JOSÉ         12/12/20       Spironolactone (Aldactone) 50 Mg Tablet      50 MG PO BID, #60 TAB         Prov: ELDER JOSÉ         12/11/20       Warfarin Sodium (Coumadin) 6 Mg Tablet      6 MG PO QDAY@16, #30 TAB 0 Refills         Prov: ELDER JOSÉ         12/11/20       Metoprolol Succinate (Metoprolol Succinate) 25 Mg Tab.er.24h      25 MG PO BID, #60 TAB.ER         Prov: ELDER JOSÉ         12/11/20       Bumetanide (BUMETANIDE) 2 Mg Tablet      3 TAB PO BID for 30 Days, #180 TAB         Prov: DAFNE ABRAMS         12/7/20       metOLazone (metOLazone) 5 Mg Tablet      5 MG PO QDAY for 30 Days, #30 TAB 3 Refills         Prov: DAFNE ABRAMS         12/7/20       Potassium Chloride (K-Dur*) 20 Meq Tab.er.prt      40 MEQ PO QID, #90 TAB 0 Refills         Prov: DAFNE ABRAMS         12/7/20       Gabapentin (Gabapentin) 600 Mg Tablet      1.5 TAB PO TID, #90 TAB 0 Refills         Reported         11/9/20       Pantoprazole (Protonix) 40 Mg Tablet.dr      40 MG PO BIDAC, #60 TAB 0 Refills         Reported         9/14/20       Levothyroxine (Levothyroxine) 0.2 Mg Tablet      0.2 MG PO QDAY@07, #30 TAB 0 Refills         Reported         9/14/20       Ezetimibe (Zetia) 10 Mg Tablet      10 MG PO QDAY         Reported         9/14/20       Cholecalciferol (Vitamin D3) (Vitamin D3) 5,000 Units Capsule      5000 UNITS PO QDAY for 30 Days, #30 CAP         Reported         9/14/20       Fexofenadine HCl (Allegra Allergy) 180 Mg Tablet      180 MG PO BID         Reported         9/14/20       Oxygen  (OXYGEN)  Gas      L NARE EACH QDAY PRN PRN for SHORTNESS OF BREATH, #2         Reported         11/9/19       INSULIN PUMP (at home) (INSULIN PUMP (at home))  Each      1 EACH, BAG         Reported         11/9/19       Rosuvastatin Calcium (Crestor*) 40 Mg Tablet      40 MG PO HS, #30 TAB 0 Refills         Reported         6/4/19       Montelukast Sodium (Singulair*) 10 Mg Tablet      10 MG PO HS, TAB         Reported         1/20/14      Current Medications      Current Medications Reviewed 5/14/21            EXAM      Pleasant female appeared to be anxious.  Alert and oriented x3.  Answering the     questions appropriately and accompanied by her family member.      Vtials      Vitals:             Height 5 ft 2.00 in / 157.48 cm           Weight 455 lbs  / 206.058275 kg           BSA 2.71 m2           BMI 83.2 kg/m2           Temperature 98.0 F / 36.67 C - Tympanic           Pulse 90           Respirations 18           Blood Pressure 130/69 Sitting, Left Arm           Pulse Oximetry 98%, cannula, 4 lpm           Comment: pMDI 30            High 30            Neck      Enlarged nodes:  Bilateral: None      Thyroid - size:  Normal            Respiratory      Work of breathing:  Normal (At rest)      Auscultation:  Bilateral: Diminished throughout, Rhonchi, Wheezes            Cardiovascular      Rhythm:  regular      Heart sounds:  NORMAL: S1, S2            Gastrointestinal      Abdomen description:  Obese.  Bowel sounds pres      Hepatosplenomegaly:  none      Mass:  None            Extremity      Radial pulse:  Bilateral: Normal            Skin      General color:  Normal            Assessment      Asthma         Asthma, unspecified asthma severity, unspecified whether complicated,        unspecified whether persistent - J45.909         Asthma severity: unspecified severity         Asthma persistence: unspecified         Asthma complication type: unspecified            Allergies         Allergy, initial encounter  - T78.40XA         Encounter type: initial encounter            Obstructive sleep apnea (adult) (pediatric) - G47.33            Morbid obesity - E66.01            CHF (congestive heart failure)         Chronic diastolic congestive heart failure - I50.32         Heart failure type: diastolic         Heart failure chronicity: chronic            Secondary pulmonary hypertension            History of pneumonia - Z87.01            Former smoker - Z87.891            Notes      Patient stated that she is using the BiPAP regularly I do not have any     compliance check at this time.  Encourage the patient is at least for 7 hours     which the patient stated that she is using.      With the next appointment make sure that she will bring the chip so that we will    make sure that is working okay and if needed that needs to be adjusted.      I reviewed the chest x-ray and the pulmonary function testing showed some     haziness at the bases and also spirometry.  Explained to patient patient is not     impressed.  I explained to her that I will change her medications and will see     her back again sometime in 3 months either by me or by Dr. Dowling.  If she is     not getting any better she is going to call us.  We will stop the inhalation     therapy as her in check dial is very poor.  Change to Pulmicort, Perforomist,     Yupelri, albuterol.      We will get the alpha-1 antitrypsin level with the phenotype..      Acapella.  Continue the other medical management as per the other physicians and    primary physician.      New Medications      * NEB-Albuterol Sulf (Albuterol) 2.5 MG/3 ML VIAL.NEB: 2.5 MG INH Q4H PRN       SHORTNESS OF BREATH #120         Instructions: Submit to Medicare B if applicable. Call for Diagnosis if        needed.      * Neb-Budesonide (Budesonide) 0.5 MG/2 ML AMPUL.NEB: 0.5 MG INH RTBID #60         Instructions: DIAGNOSIS CODE REQUIRED PRIOR TO PRESCRIBING.      * Formoterol Fumarate (Perforomist) 20  MCG/2 ML VIAL.NEB: 20 MCG INH BID 30 Days      #120      * REVEFENACIN (YUPELRI) 175 MCG/3 ML NEBU: 175 MCG INH RTQDAY 30 Days #30      Discontinued Medications      * L. Acidophilus 1 EACH TABLET: 1 CAP PO TID 14 Days #42      * Fluticasone/Salmeterol 230/21 (Advair /21 MCG) 12 GM HFA.AER.AD: 2 PUFF      INH BID 30 Days #1      * TIOTROPIUM (Spiriva) 18 MCG INH: 2 PUFFS INH QDAY 30 Days #30      * Albuterol/Ipratropium (Duoneb) 3 ML AMPUL.NEB: 3 ML INH Q4H PRN SHORTNESS OF       BREATH #120         Instructions: J45.909         Dx: Asthma - J45.909      * predniSONE 20 MG TABLET: 40 MG PO QDAY 7 Days #14         Dx: Dyspnea - R06.00      * Doxycycline Hyclate (Doxycycline Hyclate*) 100 MG CAPSULE: 100 MG PO BID 7       Days #14         Dx: Dyspnea - R06.00      New Diagnostics      * Chest 2 View, 3 Months         Dx: History of pneumonia - Z87.01      * PFT Pre/Post Spirometry Only, 3 Months         Dx: Asthma, unspecified asthma severity, unspecified whether complicated,        unspecified whether persistent - J45.909      * Alpha 1 Antitrypsin , 1 DAY            Electronically signed by CALIN HEADLEY  05/14/2021 15:27       Disclaimer: Converted document may not contain table formatting or lab diagrams. Please see [facility] for the authenticated document.

## 2021-05-28 NOTE — PROGRESS NOTES
Patient: AYLIN RAMOS     Acct: [id number]     Report: #[id number]  UNIT #: [id number]     : 1970    Encounter Date:2018  PRIMARY CARE: FILIBERTO BATISTA  ***Signed***  --------------------------------------------------------------------------------------------------------------------  Chief Complaint      Encounter Date      Aug 1, 2018            Primary Care Provider      FILIBERTO BATISTA            Referring Provider      FILIBERTO BATISTA            Patient Complaint      Patient is complaining of      Pt here for swollen legs/pain/COPD/Pulmonary Hypertension            VITALS      Height 5 ft 2 in / 157.48 cm      Weight 425 lbs 0 oz / 192.61463 kg      BSA 3.05 m2      BMI 77.7 kg/m2      Temperature 98.7 F / 37.06 C - Oral      Pulse 101      Respirations 20      Blood Pressure 150/85 Sitting, Right Arm      Pulse Oximetry 93%, room air            HPI      The patient is a pleasant 47-year-old white female who is a patient of Dr. Nitesh Beck™s who he and I have now seen several times in the past several weeks for    acute-on-chronic diastolic heart failure. She has continued to gain weight, havi    ng increased lower extremity edema, abdominal distension, and significant     orthopnea, despite initially having her Lasix increased to 40 mg p.o. b.i.d.     along with Aldactone 50 mg b.i.d. She did not have any significant response to     that and continued to gain weight, so Dr. Dowling then increased her Bumex to 1     mg p.o. b.i.d. along with her Aldactone, but she has continued to gain weight     and is not diuresing, despite increasing dose of oral diuretics. She is having     to sleep sitting straight up because she is significantly orthopneic, even     reclining a little bit. She is having increased lower extremity edema up into     her thighs, abdominal distension, dyspnea on exertion with minimal exertion,     relieved with rest. Denies any coughing or wheezing, other than occasional  dry     cough. Denies hemoptysis, fever, or chills. She has had a history of     mycobacterium avium intracellulare on BAL from a bronchoscopy done in early     April. She is being treated for that with Rifampin, azithromycin, and     ethambutol, which she will continue to take Monday, Wednesday, Friday for the     next year.             I have reviewed her review of systems, medical, surgical, and family history and    agree with those as entered.            ROS      Constitutional:  Denies: Fatigue, Fever, Weight gain, Weight loss, Chills,     Insomnia, Other      Respiratory/Breathing:  Complains of: Shortness of air, Wheezing, Cough; Denies:    Hemoptysis, Pleuritic pain, Other      Endocrine:  Denies: Polydipsia, Polyuria, Heat/cold intolerance, Abnorml     menstrual pattern, Diabetes, Other      Eyes:  Denies: Blurred vision, Vision Changes, Other      Ears, nose, mouth, throat:  Denies: Congestion, Dysphagia, Hearing Changes, Nose    Bleeding, Nasal Discharge, Throat pain, Tinnitus, Other      Cardiovascular:  Denies: Chest Pain, Exertional dyspnea, Peripheral Edema,     Palpitations, Syncope, Wake up Gasping for air, Orthopnea, Tachycardia, Other      Gastrointestinal:  Denies: Abdominal pain/cramping, Bloody stools, Constipation,    Diarrhea, Melena, Nausea, Vomiting, Other      Genitourinary:  Denies: Dysuria, Urinary frequency, Incontinence, Hematuria,     Urgency, Other      Musculoskeletal:  Denies: Joint Pain, Joint Stiffness, Joint Swelling, Myalgias,    Other      Hematologic/lymphatic:  DENIES: Lymphadenopathy, Bruising, Bleeding tendencies,     Other      Neurologic:  Denies: Headache, Numbness, Weakness, Seizures, Other      Psychiatric:  Denies: Anxiety, Appropriate Effect, Depression, Other      Sleep:  No: Excessive daytime sleep, Morning Headache?, Snoring, Insomnia?, Stop    breathing at sleep?, Other      Integumentary:  Denies: Rash, Dry skin, Skin Warm to Touch, Other             FAMILY/SOCIAL/MEDICAL HX      Current History      carpel tunnel , hernia repair 11      Surgical History:  Yes: Abdominal Surgery (ABD HERNIA ), Orthopedic Surgery     (RACHAEL CARPAL TUNNEL , RT MENISCUS TEAR REPAIR)      Stroke - Family Hx:  Grandparent      Heart - Family Hx:  Grandparent      Diabetes - Family Hx:  Father      Is Father Still Living?:  Yes      Is Mother Still Living?:  Yes       Family History:  Yes      Social History:  No Tobacco Use, No Alcohol Use, No Recreational Drug use      Smoking status:  Former smoker (1ppd x 20 years)      Number of Pregnancies:  2      Age at menopause:  40       Section:  Yes (2)      Hysterectomy:  Yes (partial [id number] and 11)      Anticoagulation Therapy:  No      Antibiotic Prophylaxis:  No      Medical History:  Yes: Allergies, Anemia, Arthritis (SPINE AND KNEES), Asthma     (ALLERGY INDUCED), Blood Disease (ANEMIA), Chronic Bronchitis/COPD, Diabetes     (DMII), Hemorrhoids/Rectal Prob (ACID REFLUX, RLQ PAIN, NAUSEA, DIARRHEA), High     Blood Pressure (MED CONTROLS), High Cholesterol, Reflux Disease, Shortness Of     Breath, Miscellaneous Medical/oth (OBESITY, HYPOTHYROID); No: Alcoholism, Broken    Bones, Cataracts, Chemical Dependency, Chemotherapy/Cancer, Emphysema, Chronic     Liver Disease, Colon Trouble, Colitis, Diverticulitis, Congestive Heart Failu,     Deafness or Ringing Ears, Convulsions, Depression, Anxiety, Bipolar Disorder,     Epilepsy, Seizures, Forgetfullness, Glaucoma, Gall Stones, Gout, Head Injury,     Heart Attack, Heart Murmur, Hepatitis, Hiatal Hernia, HIV (Do not ask - volu,     Jaundice, Kidney or Bladder Disease, Kidney Stones, Migrane Headaches, Mitral     Valve Prolapse, Night sweats, Phlebitis, Psychiatric Care, Rheumatic Fever,     Sexually Transmitted Dis, Sinus Trouble, Skin Disease/Psoriais/Ecz, Stroke,     Thyroid Problem, Tuberculosis or Pos TB Te      Psychiatric History      None            PREVENTION       Hx Influenza Vaccination:  Yes      Date Influenza Vaccine Given:  Sep 1, 2017      Influenza Vaccine Declined:  No      2 or More Falls Past Year?:  No      Fall Past Year with Injury?:  No      Hx Pneumococcal Vaccination:  Yes      Encouraged to follow-up with:  PCP regarding preventative exams.      Chart initiated by      Pat Scott MA            ALLERGIES/MEDICATIONS      Allergies:        Coded Allergies:             EXENATIDE (Verified  Allergy, Severe, PANCREATITIS, 8/1/18)           INSULIN DEGLUDEC (Verified  Allergy, Severe, 8/1/18)           SITAGLIPTIN (Verified  Allergy, Severe, PANCREATITIS, 8/1/18)           NICKEL (Verified  Allergy, Unknown, RASH, 8/1/18)           METFORMIN (Verified  Adverse Reaction, Unknown, SEVERE DIARRHEA,     DEHYDRATION, 8/1/18)      Uncoded Allergies:             COLBALT BLUE (METAL) (Allergy, Unknown, RASH; DIARRHEA, STOMACH UPSET,     4/17/18)           WHITE GOLD (Allergy, Unknown, RASH, DIARRHEA, STOMACH UPSET, 4/17/18)      Medications    Last Reconciled on 8/1/18 11:23 by LYNN STACK PA-C      Bumetanide (Bumex) 1 Mg Tablet      1 MG PO QDAY, #30 TAB 4 Refills         Prov: Mk Dowling         7/25/18       Dapagliflozin Propanediol (Farxiga) 10 Mg Tablet      10 MG PO QDAY, #30 TAB 0 Refills         Reported         7/16/18       Promethazine Hcl (Phenergan*) 25 Mg Tablet      25 MG PO Q6H Y for NAUSEA, #90 TAB 3 Refills         Prov: Mk Dowling         6/27/18       Ethambutol HCl (Myambutol *) 400 Mg Tab      1600 MG PO MOWEFR, #120 TAB 6 Refills         Prov: Mk Dowling         6/27/18       Rifampin (Rifampin) 300 Mg Cap      600 MG PO MOWEFR, #60 CAP 6 Refills         Prov: Mk Dowling         6/27/18       Azithromycin (Zithromax) 250 Mg Tablet      500 MG PO ASDIR, #30 TAB 6 Refills         Prov: Mk Dowling         6/27/18       MDI-Albuterol (Ventolin HFA*) 18 Gm Hfa.aer.ad      2 PUFFS INH QID Y for WHEEZING / SHORTNESS  OF BREATH, #1 MDI 6 Refills         Prov: Angela Mansfield PA-C         6/12/18       Albuterol/Ipratropium (Duoneb) 3 Ml Ampul.neb      3 ML INH Q4H Y for SHORTNESS OF BREATH, #120 NEB 6 Refills         Prov: Angela Mansfield PA-C         6/12/18       traZODone HCl (Desyrel) 50 Mg Tablet      25 MG PO HS for 30 Days, #30 TAB 3 Refills         Prov: Mk Dowling         5/14/18       Nebulizer Accessories (Nebulizer Kit RANDI) 1 Each Kit      EACH XX ONCE, #1 0 Refills         Prov: Angela Mansfield PA-C         4/17/18       Neb-Budesonide (Pulmicort) 0.5 Mg/2 Ml Ampul.neb      0.5 MG INH BID, #60 NEB 4 Refills         Prov: Angela Mansfield PA-C         4/17/18       Arformoterol Tartrate (Brovana) 15 Mcg/2 Ml Vial.neb      15 MCG INH BID, #60 NEB 4 Refills         Prov: Angela Mansfield PA-C         4/17/18       Tiotropium Bromide (Spiriva Respimat 2.5 mcg/Puff) 4 Gm Mist.inhal      2 PUFFS INH RTQDAY, #1 MDI 0 Refills         Reported         4/17/18       Furosemide* (Lasix*) 40 Mg Tablet      20 MG PO HS, #30 TAB 0 Refills         Reported         4/17/18       Cholecalciferol (Vitamin D3*) 2,000 Unit Tablet      2000 UNITS PO QDAY, #30 TAB         Reported         4/17/18       Cholecalciferol (Vitamin D*) 2,000 Unit Cap      1000 UNITS PO QDAY, #30 CAP 0 Refills         Reported         4/12/18       Fexofenadine Hcl (Fexofenadine Hcl) 180 Mg Tablet      180 MG PO BID, #60 TAB 0 Refills         Reported         4/12/18       Fluorometholone (FML 0.1% Ophth) 3.5 Gm Oint...g.      1 APL EYE EACH BID, TUBE         Reported         4/12/18       Liraglutide (Victoza 3-Jhony) 0.6 Mg/0.1 Ml Pen.injctr      1.8 MG SUBQ QDAY, PACKAGE         Reported         4/12/18       SUMAtriptan Succinate (Imitrex) 100 Mg Tab      100 MG PO ASDIR Y for MIGRAINE, TAB         Reported         4/12/18       Spironolactone (Aldactone) 25 Mg Tablet      50 MG PO BID, #60 TAB 3 Refills         Prov: Mk Dowling         10/23/17        Gabapentin (Gabapentin) 300 Mg Capsule      600 MG PO TID, #60 CAP 0 Refills         Reported         6/8/17       Ondansetron Hcl (Ondansetron*) Unknown Strength Tablet      4 MG PO Q4H Y for NAUSEA, TAB 0 Refills         Reported         6/5/17       Insulin Lispro (HumaLOG VIAL*) Unknown Strength Vial      SUBQ QID INSULIN, #1 VIAL 0 Refills         Reported         6/5/17       Pantoprazole (Protonix*) Unknown Strength Tablet.dr      40 MG PO BID, #30 TAB 0 Refills         Reported         6/5/17       Levocetirizine Dihydrochloride (Xyzal) Unknown Strength Tablet      5 MG PO QDAY, TAB         Reported         6/5/17       Multivitamins (Multi-Vitamin) Unknown Strength Tablet      PO QDAY, #30 TAB 0 Refills         Reported         6/5/17       celeCOXIB (CeleBREX) Unknown Strength Capsule      200 MG PO BID, #60 CAP 0 Refills         Reported         6/5/17       Dicyclomine Hcl (Dicyclomine*) Unknown Strength Tablet      20 MG PO BID Y for DIARRHEA, TAB         Reported         6/5/17       Sertraline HCl (Sertraline*) 50 Mg Tablet      100 MG PO QDAY, #30 TAB 0 Refills         Reported         12/19/14       Levothyroxine (Levothyroxine) 0.1 Mg Tablet      125 MG PO QDAY@07, #30 TAB 0 Refills         Reported         12/19/14       MDI-Albuterol (Ventolin HFA*) 18 Gm Inhaler      1-2 PUFFS INH Q4H PRN, INH         Reported         1/20/14       Montelukast Sodium (Singulair*) 10 Mg Tablet      10 MG PO HS, TAB         Reported         1/20/14       Furosemide (Furosemide) 40 Mg Tablet      40 MG PO QDAY, TAB         Reported         1/20/14       Lovastatin (Lovastatin) 20 Mg Tab      20 MG PO QDAY, #30 0 Refills         Prov: GRAYSON WAYNE         4/10/13      Current Medications      Current Medications Reviewed 8/1/18            EXAM      GEN-patient appears stated age resting comfortable in no acute distress      Eyes-PERRL,  conjunctiva are normal in appearance extraocular muscles are  intact,    no scleral icterus      Nasal-both nares are patent turbinates appear normal no polyps seen no nasal     discharge or ulcerations      Ears-tympanic membranes are normal no erythema no bulging, normal to inspection      Lymphatic-no swollen or enlarged cervical nodes, or axillary node, or femoral     nodes, or supraclavicular nodes      Mouth normal dentition, no erythema no ulcerations oropharynx appears normal no     exudate no evidence of postnasal drip      Neck-obese. JVD is difficult to assess      Respiratory-lungs have diminished breath sounds throughout. No wheezes, rhonchi,    or crackles appreciated. Normal work of breathing noted      Cardiovascular-the heart rate is normal and regular S1 and S2 present with no     murmur or extra heart sounds, there is no JVD or pedal edema present, nontender      GI-the abdomen is firm, mildly distended      Extremities-no clubbing is present, pulses present in all extremities, capillary    refill time is normal      Musculoskeletal-Bilateral lower extremities with +2-3 pitting edema up into     thighs      Skin-skin is normal in appearance it is warm and dry, no rashes present, no     evidence of cyanosis, palpation reveals no masses      Neurological-the patient is alert and oriented to time place and person, moves     all 4 extremities, normal gait, normal affect and mood, CN2-12 intact      Psych-normal judgment and insight is good, normal mood and affect, alert and or    iented to person, place, and time, and date      Vitals      Vitals:             Height 5 ft 2 in / 157.48 cm           Weight 425 lbs 0 oz / 192.20991 kg           BSA 3.05 m2           BMI 77.7 kg/m2           Temperature 98.7 F / 37.06 C - Oral           Pulse 101           Respirations 20           Blood Pressure 150/85 Sitting, Right Arm           Pulse Oximetry 93%, room air            REVIEW      Results Reviewed      [facility] Results Reviewed?:  Yes Prev Lab Results, Yes Prev  Radiology Results, Yes     [facility]            Assessment      ASSESSMENT:       1. Acute-on-chronic diastolic heart failure with significant volume overload,     not responding to oral diuretics.      2. Lower extremity edema.      3. Super obesity with BMI 77.7.      4. Mycobacterium avium intracellulare infection on rifampin, Azithromycin and     ethambutol since 06/27/18.       5. Moderate persistent asthma without acute exacerbation.      6. Obstructive sleep apnea on nightly CPAP.            PLAN:      1. At this time, as the patient is now not responding to multiple increases of     her oral diuretics, I will have her admitted to [facility] so that she can     receive IV diuretics. I suspect that she will need at least Lasix 80 mg IV     b.i.d. along with supplemental potassium and magnesium as needed.       2. I would recommend checking chest x-ray, BMP, and proBNP and continuing to     check her renal function and electrolytes daily.      She can followup with us again in the office after her hospital discharge. I     have spoken with Dr. Dayne Coleman about having her admitted.            Patient Education      Time Spent:  > 50% /[facility]                 Disclaimer: Converted document may not contain table formatting or lab diagrams. Please see [facility] [employer] [facility] for the authenticated document.

## 2021-05-28 NOTE — PROGRESS NOTES
Patient: AYLIN RAMOS     Acct: [id number]     Report: #[id number]  UNIT #: [id number]     : 1970    Encounter Date:2018  PRIMARY CARE: FILIBERTO BATISTA  ***Signed***  --------------------------------------------------------------------------------------------------------------------  Chief Complaint      Encounter Date      2018            Primary Care Provider      FILIBERTO BATISTA            Referring Provider      ROBERTO GARCIA            Patient Complaint      Patient is complaining of      soa            VITALS      Height 5 ft 2 in / 157.48 cm      Weight 420 lbs 0 oz / 190.682967 kg      BSA 3.03 m2      BMI 76.8 kg/m2      Temperature 99.0 F / 37.22 C - Oral      Pulse 108      Respirations 20      Blood Pressure 174/89 Sitting, Right Arm      Pulse Oximetry 93%, room air            HPI      The patient is a very pleasant 47 year old white female who is a patient of Dr. Dowling's who was last seen by me about a month ago for an acute visit. She has     a history of mild pulmonary hypertension secondary to her obesity     hypoventilation syndrome as well as obstructive sleep apnea, recurrent lung     infections and super obesity. She had a bronchoscopy by Dr. Dowling on 18     after having a chest CT scan showing complete consolidation and collapse of her     right upper lobe bronchus.  She was found to have thick mucous plugging but no     mass was seen. She has been treated with several rounds of antibiotics and     steroids since then but states she is not doing any better since her last     office visit about a month ago. She continues to take Brovana and Pulmicort     nebulizers twice daily as well as Spiriva Respimat 2.5. She also uses PRN     DuoNeb typically four times a day.  The patient states that she is still     coughing up green mucous and has green nasal congestion. She is still short of     breath with minimal exertion, productive cough and wheezing.   She admits to     recurrent lung infections last winter and spring. She is still using her CPAP     at night.             I have reviewed his Review of Systems medical, surgical and family history and     agree with those as entered.            ROS      Constitutional:  Denies: Fatigue, Fever, Weight gain, Weight loss, Chills,     Insomnia, Other      Respiratory/Breathing:  Complains of: Shortness of air, Wheezing, Cough, Denies    : Hemoptysis, Pleuritic pain, Other      Endocrine:  Denies: Polydipsia, Polyuria, Heat/cold intolerance, Abnorml     menstrual pattern, Diabetes, Other      Eyes:  Denies: Blurred vision, Vision Changes, Other      Ears, nose, mouth, throat:  Denies: Mouth lesions, Thrush, Throat pain,     Hoarseness, Allergies/Hay Fever, Post Nasal Drip, Headaches, Recent Head Injury    , Nose Bleeding, Neck Stiffness, Thyroid Mass, Hearing Loss, Ear Fullness, Dry     Mouth, Nasal or Sinus Pain, Dry Lips, Nasal discharge, Nasal congestion, Other      Cardiovascular:  Denies: Palpitations, Syncope, Claudication, Chest Pain, Wake     up Gasping for air, Leg Swelling, Irregular Heart Rate, Cyanosis, Dyspnea on     Exertion, Other      Gastrointestinal:  Denies: Nausea, Constipation, Diarrhea, Abdominal pain,     Vomiting, Difficulty Swallowing, Reflux/Heartburn, Dysphagia, Jaundice, Bloating    , Melena, Bloody stools, Other      Genitourinary:  Denies: Urinary frequency, Incontinence, Hematuria, Urgency,     Nocturia, Dysuria, Testicular problems, Other      Musculoskeletal:  Denies: Joint Pain, Joint Stiffness, Joint Swelling, Myalgias    , Other      Hematologic/lymphatic:  DENIES: Lymphadenopathy, Bruising, Bleeding tendencies,     Other      Neurological:  Denies: Headache, Numbness, Weakness, Seizures, Other      Psychiatric:  Denies: Anxiety, Appropriate Effect, Depression, Other      Sleep:  No: Excessive daytime sleep, Morning Headache?, Snoring, Insomnia?,     Stop breathing at sleep?, Other       Integumentary:  Denies: Rash, Dry skin, Skin Warm to Touch, Other      Immunologic/Allergic:  Denies: Latex allergy, Seasonal allergies, Asthma,     Urticaria, Eczema, Other      Immunization status:  No: Up to date            FAMILY/SOCIAL/MEDICAL HX      Current History      carpel tunnel 97/, hernia repair 11      Surgical History:  Yes: Abdominal Surgery (ABD HERNIA ), Orthopedic Surgery     (RACHAEL CARPAL TUNNEL , LT MENISCUS TEAR REPAIR)      Stroke - Family Hx:  Grandparent      Heart - Family Hx:  Grandparent      Diabetes - Family Hx:  Father      Is Father Still Living?:  Yes      Is Mother Still Living?:  Yes       Family History:  Yes      Social History:  No Tobacco Use, No Alcohol Use, No Recreational Drug use      Smoking status:  Former smoker (1ppd x 20 years)      Number of Pregnancies:  2      Age at menopause:  40       Section:  Yes (2)      Hysterectomy:  Yes (partial [id number] and 11)      Anticoagulation Therapy:  No      Antibiotic Prophylaxis:  No      Medical History:  Yes: Allergies, Anemia, Arthritis (SPINE AND KNEES), Asthma (    ALLERGY INDUCED), Diabetes (DMII; INSULIN PUMP), Hemorrhoids/Rectal Prob (ACID     REFLUX, OCCASIONAL NAUSEA), High Blood Pressure (MED CONTROLS), High Cholesterol    , Reflux Disease, Shortness Of Breath, Miscellaneous Medical/oth (OBESITY,     HYPOTHYROID), No: Alcoholism, Blood Disease, Broken Bones, Cataracts, Chemical     Dependency, Chemotherapy/Cancer, Chronic Bronchitis/COPD, Emphysema, Chronic     Liver Disease, Colon Trouble, Colitis, Diverticulitis, Congestive Heart Failu,     Deafness or Ringing Ears, Convulsions, Depression, Anxiety, Bipolar Disorder,     Epilepsy, Seizures, Forgetfullness, Glaucoma, Gall Stones, Gout, Head Injury,     Heart Attack, Heart Murmur, Hepatitis, Hiatal Hernia, HIV (Do not ask - volu,     Jaundice, Kidney or Bladder Disease, Kidney Stones, Migrane Headaches, Mitral     Valve Prolapse, Night sweats,  Phlebitis, Psychiatric Care, Rheumatic Fever,     Sexually Transmitted Dis, Sinus Trouble, Skin Disease/Psoriais/Ecz, Stroke,     Thyroid Problem, Tuberculosis or Pos TB Te      Psychiatric History      none            PREVENTION      Hx Influenza Vaccination:  Yes      Date Influenza Vaccine Given:  Sep 1, 2017      Influenza Vaccine Declined:  No      2 or More Falls Past Year?:  No      Fall Past Year with Injury?:  No      Hx Pneumococcal Vaccination:  Yes      Encouraged to follow-up with:  PCP regarding preventative exams.      Chart initiated by      mehran estevez/ ma            ALLERGIES/MEDICATIONS      Allergies:        Coded Allergies:             EXENATIDE (Verified  Allergy, Severe, PANCREATITIS, 6/12/18)           INSULIN DEGLUDEC (Verified  Allergy, Severe, 6/12/18)           SITAGLIPTIN (Verified  Allergy, Severe, PANCREATITIS, 6/12/18)           NICKEL (Verified  Allergy, Unknown, RASH, 6/12/18)           METFORMIN (Verified  Adverse Reaction, Unknown, SEVERE DIARRHEA,     DEHYDRATION, 6/12/18)      Uncoded Allergies:             COLBALT BLUE (METAL) (Allergy, Unknown, RASH; DIARRHEA, STOMACH UPSET, 4/17 /18)           WHITE GOLD (Allergy, Unknown, RASH, DIARRHEA, STOMACH UPSET, 4/17/18)      Medications    Last Reconciled on 6/12/18 09:09 by LYNN ESPINOZA PA-C MDI-Albuterol (Ventolin HFA*) 18 Gm Hfa.aer.ad      2 PUFFS INH QID Y for WHEEZING / SHORTNESS OF BREATH, #1 MDI 6 Refills         Prov: Belle Espinoza PA-C         6/12/18       Albuterol/Ipratropium (Duoneb) 3 Ml Ampul.neb      3 ML INH Q4H Y for SHORTNESS OF BREATH, #120 NEB 6 Refills         Prov: Belle Espinoza PA-C         6/12/18       Levofloxacin (Levaquin*) 750 Mg Tablet      750 MG PO QDAY for 7 Days, #7 TAB 0 Refills         Prov: Belle Espinoza PA-C         6/12/18       predniSONE* (predniSONE*) 10 Mg Tablet      10 MG PO ASDIR, #48 TAB         Prov: Belle Espinoza PA-C         6/12/18       traZODone HCl (Desyrel) 50 Mg  Tablet      25 MG PO HS for 30 Days, #30 TAB 3 Refills         Prov: Mk Dowling         5/14/18       Nebulizer Accessories (Nebulizer Kit RANDI) 1 Each Kit      EACH XX ONCE, #1 0 Refills         Prov: Belle Espinoza PA-C         4/17/18       Neb-Budesonide (Pulmicort) 0.5 Mg/2 Ml Ampul.neb      0.5 MG INH BID, #60 NEB 4 Refills         Prov: Belle Espinoza PA-C         4/17/18       Arformoterol Tartrate (Brovana) 15 Mcg/2 Ml Vial.neb      15 MCG INH BID, #60 NEB 4 Refills         Prov: Belle Espinoza PA-C         4/17/18       Tiotropium Bromide (Spiriva Respimat 2.5 mcg/Puff) 4 Gm Mist.inhal      2 PUFFS INH RTQDAY, #1 MDI 0 Refills         Reported         4/17/18       Furosemide* (Lasix*) 40 Mg Tablet      20 MG PO HS, #30 TAB 0 Refills         Reported         4/17/18       Cholecalciferol (Vitamin D3*) 2,000 Unit Tablet      2000 UNITS PO QDAY, #30 TAB         Reported         4/17/18       Cholecalciferol (Vitamin D*) 2,000 Unit Cap      1000 UNITS PO QDAY, #30 CAP 0 Refills         Reported         4/12/18       Fexofenadine Hcl (Fexofenadine Hcl) 180 Mg Tablet      180 MG PO BID, #60 TAB 0 Refills         Reported         4/12/18       Fluorometholone (FML 0.1% Ophth) 3.5 Gm Oint...g.      1 APL EYE EACH BID, TUBE         Reported         4/12/18       Liraglutide (Victoza 3-Jhony) 0.6 Mg/0.1 Ml Pen.injctr      1.8 MG SUBQ QDAY, PACKAGE         Reported         4/12/18       SUMAtriptan Succinate (Imitrex) 100 Mg Tab      100 MG PO ASDIR Y for MIGRAINE, TAB         Reported         4/12/18       Spironolactone (Aldactone) 25 Mg Tablet      50 MG PO BID, #60 TAB 3 Refills         Prov: Mk Dowling         10/23/17       Gabapentin (Gabapentin) 300 Mg Capsule      600 MG PO TID, #60 CAP 0 Refills         Reported         6/8/17       Ondansetron Hcl (Ondansetron*) Unknown Strength Tablet      4 MG PO Q4H Y for NAUSEA, TAB 0 Refills         Reported         6/5/17       Insulin Lispro (HumaLOG VIAL*)  Unknown Strength Vial      SUBQ QID INSULIN, #1 VIAL 0 Refills         Reported         6/5/17       Pantoprazole (Protonix*) Unknown Strength Tablet.dr      40 MG PO BID, #30 TAB 0 Refills         Reported         6/5/17       Levocetirizine Dihydrochloride (Xyzal) Unknown Strength Tablet      5 MG PO QDAY, TAB         Reported         6/5/17       Multivitamins (Multi-Vitamin) Unknown Strength Tablet      PO QDAY, #30 TAB 0 Refills         Reported         6/5/17       celeCOXIB (CeleBREX) Unknown Strength Capsule      200 MG PO BID, #60 CAP 0 Refills         Reported         6/5/17       Dicyclomine Hcl (Dicyclomine*) Unknown Strength Tablet      20 MG PO BID Y for DIARRHEA, TAB         Reported         6/5/17       Sertraline HCl (Sertraline*) 50 Mg Tablet      100 MG PO QDAY, #30 TAB 0 Refills         Reported         12/19/14       Levothyroxine (Levothyroxine) 0.1 Mg Tablet      125 MG PO QDAY@07, #30 TAB 0 Refills         Reported         12/19/14       MDI-Albuterol (Ventolin HFA*) 18 Gm Inhaler      1-2 PUFFS INH Q4H PRN, INH         Reported         1/20/14       Montelukast Sodium (Singulair*) 10 Mg Tablet      10 MG PO HS, TAB         Reported         1/20/14       Furosemide (Furosemide) 40 Mg Tablet      40 MG PO QDAY, TAB         Reported         1/20/14       Lovastatin (Lovastatin) 20 Mg Tab      20 MG PO QDAY, #30 0 Refills         Prov: GRAYSON WAYNE         4/10/13      Current Medications      Current Medications Reviewed 6/12/18            EXAM      GEN-patient appears stated age resting comfortable in no acute distress      Eyes-PERRL,  conjunctiva are normal in appearance extraocular muscles are intact    , no scleral icterus      Nasal-both nares are patent turbinates appear normal no polyps seen no nasal     discharge or ulcerations      Ears-tympanic membranes are normal no erythema no bulging, normal to inspection      Lymphatic-no swollen or enlarged cervical nodes, or axillary  node, or femoral     nodes, or supraclavicular nodes      Mouth normal dentition, no erythema no ulcerations oropharynx appears normal no     exudate no evidence of postnasal drip, MP(default value)      Neck-there are no palpable supraclavicular or cervical adenopathy, thyroid is     normal in appearance no apparent nodules, there is no inspiratory or expiratory     stridor      Respiratory-mildly decreased breath sounds throughout with scattered expiratory     wheezing, no rhonchi or crackles, normal work of breathing noted.       Cardiovascular-the heart rate is normal and regular S1 and S2 present with no     murmur or extra heart sounds, there is no JVD, there is +2 pitting edema present      GI-the abdomen is normal in appearance, bowel sounds present and normal in all     quadrants no hepatosplenomegaly or masses felt      Extremities-no clubbing is present, pulses present in all extremities,     capillary refill time is normal      Musculoskeletal-Normal strength in upper and lower extremities, inspection     shows no evidence of muscle atrophy      Skin-skin is normal in appearance it is warm and dry, no rashes present, no     evidence of cyanosis, palpation reveals no masses      Neurological-the patient is alert and oriented to time place and person, moves     all 4 extremities, normal gait, normal affect and mood, CN2-12 intact      Psych-normal judgment and insight is good, normal mood and affect, alert and     oriented to person, place, and time, and date      Vtials      Vitals:             Height 5 ft 2 in / 157.48 cm           Weight 420 lbs 0 oz / 190.430579 kg           BSA 3.03 m2           BMI 76.8 kg/m2           Temperature 99.0 F / 37.22 C - Oral           Pulse 108           Respirations 20           Blood Pressure 174/89 Sitting, Right Arm           Pulse Oximetry 93%, room air            REVIEW      Results Reviewed      PCCS Results Reviewed?:  Yes Prev Lab Results, Yes Prev Radiology  Results, Yes     [facility]            Assessment      History of recurrent pulmonary infection - Z86.19            GIUSEPPE (obstructive sleep apnea) - G47.33            Obesity hypoventilation syndrome - E66.2            CAP (community acquired pneumonia) - J18.9            Super obesity - E66.9            Notes      New Medications      * predniSONE* 10 MG TABLET: 10 MG PO ASDIR #48       Instructions: Take 60 mg by mouth x 3 days, 40 mg x 3 days, 30 mg x 3 days, 20     mg x 3 days, then 10 mg x 3 days.       Dx: History of recurrent pulmonary infection - Z86.19      * Levofloxacin (Levaquin*) 750 MG TABLET: 750 MG PO QDAY 7 Days #7       Dx: History of recurrent pulmonary infection - Z86.19      * Albuterol/Ipratropium (Duoneb) 3 ML AMPUL.NEB: 3 ML INH Q4H PRN SHORTNESS OF     BREATH #120       Instructions: DIAGNOSIS CODE REQUIRED PRIOR TO PRESCRIBING.       Dx: History of recurrent pulmonary infection - Z86.19      * MDI-Albuterol (Ventolin HFA*) 18 GM HFA.AER.AD: 2 PUFFS INH QID PRN WHEEZING     / SHORTNESS OF BREATH #1       Dx: History of recurrent pulmonary infection - Z86.19      New Diagnostics      * Probrain Natriuretic, As Soon As Possible       Dx: History of recurrent pulmonary infection - Z86.19      * Sputum Culture W/Gram Stain, As Soon As Possible       Dx: History of recurrent pulmonary infection - Z86.19      * Histoplasma Antigen Urine, As Soon As Possible       Dx: History of recurrent pulmonary infection - Z86.19      * Rast Aspergillus Fum IGE, As Soon As Possible       Dx: History of recurrent pulmonary infection - Z86.19      * IgG SUBCLASSES 1-4  IGGSC, As Soon As Possible       Dx: History of recurrent pulmonary infection - Z86.19      * Immunoglobulin  E (I, As Soon As Possible       Dx: History of recurrent pulmonary infection - Z86.19      New Office Procedures      * Solu-Medrol 125 MG, As Soon As Possible       METHYLPRED SOD SUCC (Solu-Medrol) 125 MG VIAL: 125 MILLIGRAM  INTRAMUSC Qty 1     VIAL       Dx: History of recurrent pulmonary infection - Z86.19      ASSESSMENT:       1.  Obesity hypoventilation syndrome.      2.  Chronic hypercarbic respiratory failure.      3. Asthma.        4.  Obstructive sleep apnea on CPAP.        5.  Super obesity with BMI of 76.8.      6. History of right upper lobe collapse/atelectasis.       7. Recurrent lung infections.       8. Cough.       9. Wheezing.       10. Bronchitis versus recurrent pneumonia.             PLAN:        1. At this time I will treat the patient's recurrent lung infection with a     course of Levaquin and a tapered course of Prednisone. I will also give her an      intramuscular shot of Solu-Medrol 125 mg in the office today.       2.  I will repeat a chest CT scan now to ensure follow up of her recent     pneumonia and consolidation in her right upper lobe. The patient states that     her symptoms have not gotten any better over the past several months so I will     also do fungal work up, IgE RAST aspergillus, histoplasma antigen and IgG     subclasses.       3. Given her increasing lower extremity edema despite taking Lasix 60 mg PO     daily, I will check a NT-Pro BNP and see if she needs more diuretics.       4. Continue her current bronchodilator regimen using Brovana and Pulmicort     nebulizers twice daily, DuoNeb q 4-6 hours PRN and Spiriva Respimat 2.5.       5. I will check a sputum culture with gram stain so antibiotics therapy can be     tailored as appropriate       6. Follow up with Dr. Dowling in 2-4, sooner if needed.            Patient Education      Patient Education Provided:  How to use an Inhaler, How to use a Nebulizer      Time Spent:  > 50% /[facility]                 Disclaimer: Converted document may not contain table formatting or lab diagrams. Please see clickTRUE for the authenticated document.

## 2021-06-02 ENCOUNTER — HOSPITAL ENCOUNTER (OUTPATIENT)
Dept: LAB | Facility: HOSPITAL | Age: 51
Discharge: HOME | End: 2021-06-02
Attending: INTERNAL MEDICINE

## 2021-06-02 LAB
ALBUMIN SERPL-MCNC: 4.3 G/DL (ref 3.5–5)
ANION GAP SERPL CALC-SCNC: 14 MMOL/L (ref 8–19)
APPEARANCE UR: CLEAR
BILIRUB UR QL: NEGATIVE
BUN SERPL-MCNC: 21 MG/DL (ref 5–25)
BUN/CREAT SERPL: 23 {RATIO} (ref 6–20)
CALCIUM SERPL-MCNC: 10.5 MG/DL (ref 8.7–10.4)
CHLORIDE SERPL-SCNC: 91 MMOL/L (ref 99–111)
COLOR UR: YELLOW
CONV BACTERIA: ABNORMAL
CONV CO2: 37 MMOL/L (ref 22–32)
CONV COLLECTION SOURCE (UA): ABNORMAL
CONV CREATININE URINE, RANDOM: 49.8 MG/DL (ref 10–300)
CONV UROBILINOGEN IN URINE BY AUTOMATED TEST STRIP: 0.2 {EHRLICHU}/DL (ref 0.1–1)
CREAT UR-MCNC: 0.9 MG/DL (ref 0.5–0.9)
GFR SERPLBLD BASED ON 1.73 SQ M-ARVRAT: >60 ML/MIN/{1.73_M2}
GLUCOSE SERPL-MCNC: 74 MG/DL (ref 65–99)
GLUCOSE UR QL: NEGATIVE MG/DL
HGB UR QL STRIP: NEGATIVE
KETONES UR QL STRIP: NEGATIVE MG/DL
LEUKOCYTE ESTERASE UR QL STRIP: ABNORMAL
MAGNESIUM SERPL-MCNC: 2.07 MG/DL (ref 1.6–2.3)
NITRITE UR QL STRIP: NEGATIVE
PH UR STRIP.AUTO: 7 [PH] (ref 5–8)
PHOSPHATE SERPL-MCNC: 2.9 MG/DL (ref 2.4–4.5)
POTASSIUM SERPL-SCNC: 3.2 MMOL/L (ref 3.5–5.3)
PROT UR QL: NEGATIVE MG/DL
PROT UR-MCNC: 5.4 MG/DL
RBC #/AREA URNS HPF: ABNORMAL /[HPF]
SODIUM SERPL-SCNC: 139 MMOL/L (ref 135–147)
SP GR UR: 1.01 (ref 1–1.03)
WBC #/AREA URNS HPF: ABNORMAL /[HPF]

## 2021-06-15 DIAGNOSIS — E11.40 TYPE 2 DIABETES MELLITUS WITH DIABETIC NEUROPATHY, WITHOUT LONG-TERM CURRENT USE OF INSULIN (HCC): Primary | ICD-10-CM

## 2021-06-15 RX ORDER — GABAPENTIN 300 MG/1
900 CAPSULE ORAL 3 TIMES DAILY
Qty: 270 CAPSULE | Refills: 5 | Status: SHIPPED | OUTPATIENT
Start: 2021-06-15 | End: 2021-07-20 | Stop reason: SDUPTHER

## 2021-06-15 RX ORDER — GABAPENTIN 300 MG/1
3 CAPSULE ORAL 3 TIMES DAILY
COMMUNITY
End: 2021-07-09 | Stop reason: SDUPTHER

## 2021-06-15 RX ORDER — TEMAZEPAM 15 MG/1
15 CAPSULE ORAL NIGHTLY PRN
Status: CANCELLED | OUTPATIENT
Start: 2021-06-15

## 2021-06-22 NOTE — TELEPHONE ENCOUNTER
I don't see where you have ever filled this prescription. Pt claims your name is on the prescription bottle. Canceled rx for temazepam.

## 2021-06-28 RX ORDER — ESZOPICLONE 1 MG/1
TABLET, FILM COATED ORAL
COMMUNITY
End: 2021-06-28 | Stop reason: SDUPTHER

## 2021-06-29 RX ORDER — ESZOPICLONE 1 MG/1
TABLET, FILM COATED ORAL
Qty: 30 TABLET | Refills: 3 | Status: SHIPPED | OUTPATIENT
Start: 2021-06-29 | End: 2021-10-21

## 2021-07-01 ENCOUNTER — TELEPHONE (OUTPATIENT)
Dept: FAMILY MEDICINE CLINIC | Facility: CLINIC | Age: 51
End: 2021-07-01

## 2021-07-01 NOTE — TELEPHONE ENCOUNTER
Caller: Joan Partida    Relationship to patient: Self    Best call back number: [phone]     Patient is needing: PATIENT STATED SHE HAS BEEN OUT OF HER INSULIN SHOTS FOR A DAY NOW AND WANTED TO CHECK IF DR. BATISTA HAD ANY SINGLE USE SYRINGES SHE COULD COME  UNTIL HER SCRIPT GETS FIXED AND IS READY FOR HER. PATIENT STATED THEY NEED TO BE  SYRINGES SPECIFICALLY. PLEASE CALL PATIENT BACK TO INFORM IF ANY WOULD BE AVAILABLE FOR HER

## 2021-07-06 ENCOUNTER — OFFICE VISIT (OUTPATIENT)
Dept: GASTROENTEROLOGY | Facility: CLINIC | Age: 51
End: 2021-07-06

## 2021-07-06 VITALS
SYSTOLIC BLOOD PRESSURE: 155 MMHG | BODY MASS INDEX: 51.91 KG/M2 | DIASTOLIC BLOOD PRESSURE: 64 MMHG | OXYGEN SATURATION: 94 % | WEIGHT: 293 LBS | HEIGHT: 63 IN | HEART RATE: 88 BPM

## 2021-07-06 DIAGNOSIS — I82.0 BUDD-CHIARI SYNDROME (HCC): Primary | ICD-10-CM

## 2021-07-06 DIAGNOSIS — K76.0 NAFLD (NONALCOHOLIC FATTY LIVER DISEASE): ICD-10-CM

## 2021-07-06 DIAGNOSIS — R74.8 ABNORMAL LIVER ENZYMES: ICD-10-CM

## 2021-07-06 PROCEDURE — 99214 OFFICE O/P EST MOD 30 MIN: CPT | Performed by: INTERNAL MEDICINE

## 2021-07-06 RX ORDER — ARFORMOTEROL TARTRATE 15 UG/2ML
15 SOLUTION RESPIRATORY (INHALATION)
COMMUNITY
End: 2022-01-11

## 2021-07-06 RX ORDER — INSULIN LISPRO 100 [IU]/ML
INJECTION, SOLUTION INTRAVENOUS; SUBCUTANEOUS
COMMUNITY
End: 2021-07-13

## 2021-07-06 RX ORDER — FLUTICASONE PROPIONATE 50 MCG
SPRAY, SUSPENSION (ML) NASAL
COMMUNITY
End: 2021-07-13

## 2021-07-06 RX ORDER — WARFARIN SODIUM 3 MG/1
3 TABLET ORAL DAILY
COMMUNITY
Start: 2021-02-16 | End: 2021-12-07 | Stop reason: HOSPADM

## 2021-07-06 RX ORDER — LOVASTATIN 40 MG/1
40 TABLET ORAL DAILY
COMMUNITY
End: 2021-07-13

## 2021-07-06 RX ORDER — POTASSIUM CHLORIDE 1500 MG/1
40 TABLET, FILM COATED, EXTENDED RELEASE ORAL 2 TIMES DAILY
COMMUNITY

## 2021-07-06 RX ORDER — TORSEMIDE 100 MG/1
100 TABLET ORAL 3 TIMES DAILY
COMMUNITY

## 2021-07-06 RX ORDER — WARFARIN SODIUM 5 MG/1
TABLET ORAL
COMMUNITY
Start: 2021-01-12 | End: 2021-09-14

## 2021-07-06 RX ORDER — ALBUTEROL SULFATE 90 UG/1
1-2 AEROSOL, METERED RESPIRATORY (INHALATION) EVERY 6 HOURS PRN
COMMUNITY

## 2021-07-06 RX ORDER — LOSARTAN POTASSIUM 50 MG/1
50 TABLET ORAL DAILY
COMMUNITY
End: 2021-07-13

## 2021-07-06 RX ORDER — ROSUVASTATIN CALCIUM 40 MG/1
40 TABLET, COATED ORAL DAILY
COMMUNITY
End: 2021-08-23

## 2021-07-06 RX ORDER — WARFARIN SODIUM 7.5 MG/1
TABLET ORAL
COMMUNITY
End: 2021-08-23

## 2021-07-06 RX ORDER — BUDESONIDE 0.5 MG/2ML
0.5 INHALANT ORAL
COMMUNITY

## 2021-07-06 RX ORDER — INSULIN HUMAN 500 [IU]/ML
INJECTION, SOLUTION SUBCUTANEOUS
COMMUNITY
End: 2021-12-02

## 2021-07-06 RX ORDER — SUCRALFATE 1 G/1
1 TABLET ORAL 4 TIMES DAILY
COMMUNITY
End: 2022-01-11

## 2021-07-06 RX ORDER — DICYCLOMINE HCL 20 MG
20 TABLET ORAL 3 TIMES DAILY PRN
COMMUNITY
End: 2021-12-02

## 2021-07-06 RX ORDER — POLYETHYLENE GLYCOL 3350 17 G/17G
17 POWDER, FOR SOLUTION ORAL
COMMUNITY
End: 2021-12-02

## 2021-07-06 RX ORDER — AMITRIPTYLINE HYDROCHLORIDE 50 MG/1
50 TABLET, FILM COATED ORAL DAILY
COMMUNITY
End: 2021-07-13

## 2021-07-06 RX ORDER — EZETIMIBE 10 MG/1
10 TABLET ORAL DAILY
COMMUNITY

## 2021-07-06 RX ORDER — TRAZODONE HYDROCHLORIDE 50 MG/1
50 TABLET ORAL DAILY
COMMUNITY
End: 2021-08-20 | Stop reason: SDUPTHER

## 2021-07-06 RX ORDER — SPIRONOLACTONE 100 MG/1
200 TABLET, FILM COATED ORAL 2 TIMES DAILY
COMMUNITY

## 2021-07-06 RX ORDER — LEVOTHYROXINE SODIUM 0.2 MG/1
200 TABLET ORAL DAILY
COMMUNITY
End: 2021-07-09 | Stop reason: SDUPTHER

## 2021-07-06 RX ORDER — MAGNESIUM GLUCONATE 30 MG(550)
30 TABLET ORAL 2 TIMES DAILY
COMMUNITY
End: 2021-12-02

## 2021-07-06 RX ORDER — DAPAGLIFLOZIN 10 MG/1
10 TABLET, FILM COATED ORAL DAILY
COMMUNITY
End: 2021-07-13

## 2021-07-06 NOTE — PROGRESS NOTES
Chief Complaint    Joan Partida is a 50 y.o. 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  The patient has been maintained on Coumadin.  She has COPD and is also maintained on 4 L of oxygen at all times.  She has morbid obesity.  She is confined predominantly to a motorized wheelchair.  She has a history of pancreatitis.  She reports recently being found to have a nickel allergy.  She is noted to have elevated liver enzymes which appear stable on the  range for her AST and ALT.  She denies any alcohol.  She was unable to tolerate MRI as an inpatient    Result Review :{Labs  Result Review  Imaging  Med Tab  Media  Procedures :23}     The following data was reviewed by: Jacob Niño MD on 07/06/2021:             Past Medical History:   Diagnosis Date   • Abnormal mammogram 10/21/2013   • Allergy    • Anemia    • Arthralgia    • COPD (chronic obstructive pulmonary disease) (CMS/[facility]) 03/04/2015   • Dependent edema 08/27/2018   • Depression    • Derangement of meniscus of left knee 01/01/2014    LEFT KNEE MEDIAL MENISCUS TEAR   • Dermatitis 07/10/2014   • Dysphagia    • Essential hypertension 03/04/2015   • Fibromyalgia    • Foot pain 07/10/2014   • GERD (gastroesophageal reflux disease) 10/17/2014   • Hepatic steatosis 03/04/2015   • 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CMS/HCC) 10/09/2020   • History of tobacco abuse    • Hyperlipidemia    • Hypokalemia 05/07/2019   • Hypothyroid    • Long term current use of anticoagulant    • LPRD (laryngopharyngeal reflux disease)    • Moderate episode of recurrent major depressive disorder (CMS/HCC) 06/22/2017   • Morbid obesity (CMS/HCC) 03/04/2015   • Mycobacterium avium complex (CMS/HCC) 08/09/2018   • Obesity 07/10/2014   • GIUSEPPE (obstructive sleep apnea) 08/09/2018   • Pulmonary hypertension (CMS/HCC) 08/27/2018   • Stasis dermatitis of both legs  "2018   • Type 2 diabetes mellitus, with long-term current use of insulin (CMS/HCC) 2017   • Ventral hernia 2015   • Vitamin D deficiency 2013   • Voice hoarseness        Past Surgical History:   Procedure Laterality Date   • CARDIAC CATHETERIZATION  2018   • CARPAL TUNNEL RELEASE     •  SECTION  ,     • COLONOSCOPY     • ENDOSCOPY     • HERNIA REPAIR     • HYSTERECTOMY  ,     PARTIAL   • KNEE SURGERY         Social History     Social History Narrative   • Not on file       Objective     Vital Signs:   /64   Pulse 88   Ht 160 cm (63\")   Wt (!) 203 kg (447 lb)   SpO2 94%   BMI 79.18 kg/m²     Body mass index is 79.18 kg/m².    Physical Exam            Assessment and Plan    Diagnoses and all orders for this visit:    1. Budd-Chiari syndrome (CMS/HCC) (Primary)  -     CT Abdomen Pelvis With Contrast; Future    2. Abnormal liver enzymes  -     CT Abdomen Pelvis With Contrast; Future    3. NAFLD (nonalcoholic fatty liver disease)  -     CT Abdomen Pelvis With Contrast; Future      We will schedule the patient for a follow-up CT abdomen and pelvis with IV contrast/liver protocol.  If she has no evidence of ongoing hepatic vein thrombosis then consideration to stop her Coumadin would be reasonable.  I also think she has some irritable bowel syndrome type symptoms currently and may benefit from avoidance of certain dietary triggers although she attributes this all to nickel ingestion.  She will follow up with me in 6 months            Follow Up   No follow-ups on file.  Patient was given instructions and counseling regarding her condition or for health maintenance advice. Please see specific information pulled into the AVS if appropriate.     "

## 2021-07-09 ENCOUNTER — OFFICE VISIT (OUTPATIENT)
Dept: FAMILY MEDICINE CLINIC | Facility: CLINIC | Age: 51
End: 2021-07-09

## 2021-07-09 VITALS
TEMPERATURE: 97.3 F | HEART RATE: 83 BPM | SYSTOLIC BLOOD PRESSURE: 125 MMHG | HEIGHT: 55 IN | OXYGEN SATURATION: 92 % | BODY MASS INDEX: 510.86 KG/M2 | DIASTOLIC BLOOD PRESSURE: 68 MMHG

## 2021-07-09 DIAGNOSIS — E03.9 ACQUIRED HYPOTHYROIDISM: ICD-10-CM

## 2021-07-09 DIAGNOSIS — G47.33 OSA (OBSTRUCTIVE SLEEP APNEA): ICD-10-CM

## 2021-07-09 DIAGNOSIS — E11.9 TYPE 2 DIABETES MELLITUS WITHOUT COMPLICATION, WITH LONG-TERM CURRENT USE OF INSULIN (HCC): ICD-10-CM

## 2021-07-09 DIAGNOSIS — K76.0 NAFLD (NONALCOHOLIC FATTY LIVER DISEASE): ICD-10-CM

## 2021-07-09 DIAGNOSIS — I82.0 HEPATIC VEIN THROMBOSIS (HCC): ICD-10-CM

## 2021-07-09 DIAGNOSIS — I10 HTN, GOAL BELOW 140/90: Primary | ICD-10-CM

## 2021-07-09 DIAGNOSIS — F33.1 MODERATE EPISODE OF RECURRENT MAJOR DEPRESSIVE DISORDER (HCC): ICD-10-CM

## 2021-07-09 DIAGNOSIS — E78.5 HYPERLIPIDEMIA, UNSPECIFIED HYPERLIPIDEMIA TYPE: ICD-10-CM

## 2021-07-09 DIAGNOSIS — Z79.4 TYPE 2 DIABETES MELLITUS WITHOUT COMPLICATION, WITH LONG-TERM CURRENT USE OF INSULIN (HCC): ICD-10-CM

## 2021-07-09 PROBLEM — E11.42 DIABETIC POLYNEUROPATHY: Status: ACTIVE | Noted: 2018-11-16

## 2021-07-09 PROBLEM — E11.69 HYPERLIPIDEMIA ASSOCIATED WITH TYPE 2 DIABETES MELLITUS (HCC): Status: RESOLVED | Noted: 2021-02-08 | Resolved: 2021-07-09

## 2021-07-09 PROBLEM — Z87.891 PERSONAL HISTORY OF TOBACCO USE, PRESENTING HAZARDS TO HEALTH: Status: ACTIVE | Noted: 2021-07-09

## 2021-07-09 PROBLEM — Z87.19 HISTORY OF PANCREATITIS: Status: ACTIVE | Noted: 2018-11-16

## 2021-07-09 PROBLEM — F32.A DEPRESSION: Status: ACTIVE | Noted: 2021-07-09

## 2021-07-09 PROBLEM — E88.81 INSULIN RESISTANCE: Status: ACTIVE | Noted: 2018-11-16

## 2021-07-09 PROBLEM — I27.20 PULMONARY HYPERTENSION (HCC): Status: ACTIVE | Noted: 2018-08-27

## 2021-07-09 PROBLEM — Z88.9 PREDISPOSITION TO ALLERGIC REACTION: Status: ACTIVE | Noted: 2021-07-09

## 2021-07-09 PROBLEM — E88.819 INSULIN RESISTANCE: Status: ACTIVE | Noted: 2018-11-16

## 2021-07-09 PROBLEM — E16.1 HYPERINSULINISM: Status: ACTIVE | Noted: 2021-02-08

## 2021-07-09 PROBLEM — M19.90 ARTHRITIS: Status: ACTIVE | Noted: 2021-07-09

## 2021-07-09 PROBLEM — E11.69 HYPERLIPIDEMIA ASSOCIATED WITH TYPE 2 DIABETES MELLITUS (HCC): Status: ACTIVE | Noted: 2021-02-08

## 2021-07-09 PROBLEM — E11.65 UNCONTROLLED TYPE 2 DIABETES MELLITUS WITH HYPERGLYCEMIA: Status: ACTIVE | Noted: 2021-02-08

## 2021-07-09 PROBLEM — R80.9 MICROALBUMINURIA: Status: ACTIVE | Noted: 2021-05-14

## 2021-07-09 PROBLEM — Z79.01 LONG TERM (CURRENT) USE OF ANTICOAGULANTS: Status: ACTIVE | Noted: 2021-07-09

## 2021-07-09 PROBLEM — R13.10 DYSPHAGIA: Status: ACTIVE | Noted: 2021-07-09

## 2021-07-09 PROBLEM — R49.0 VOICE HOARSENESS: Status: ACTIVE | Noted: 2021-07-09

## 2021-07-09 PROBLEM — E11.29 TYPE 2 DIABETES MELLITUS WITH RENAL COMPLICATION: Status: ACTIVE | Noted: 2018-11-16

## 2021-07-09 PROBLEM — M79.7 FIBROMYALGIA: Status: ACTIVE | Noted: 2021-07-09

## 2021-07-09 PROBLEM — E87.6 HYPOKALEMIA: Status: ACTIVE | Noted: 2019-05-07

## 2021-07-09 PROBLEM — A31.0 MYCOBACTERIUM AVIUM COMPLEX: Status: ACTIVE | Noted: 2018-08-09

## 2021-07-09 PROBLEM — K21.9 LPRD (LARYNGOPHARYNGEAL REFLUX DISEASE): Status: ACTIVE | Noted: 2021-07-09

## 2021-07-09 PROBLEM — Z96.41 PRESENCE OF INSULIN PUMP: Status: ACTIVE | Noted: 2018-11-16

## 2021-07-09 PROBLEM — IMO0002 UNCONTROLLED TYPE 2 DIABETES MELLITUS WITH NEUROLOGIC COMPLICATION: Status: ACTIVE | Noted: 2021-02-08

## 2021-07-09 PROBLEM — R60.9 DEPENDENT EDEMA: Status: ACTIVE | Noted: 2018-08-27

## 2021-07-09 PROBLEM — I87.2 STASIS DERMATITIS OF BOTH LEGS: Status: ACTIVE | Noted: 2018-08-09

## 2021-07-09 LAB
ALBUMIN SERPL-MCNC: 4.5 G/DL (ref 3.5–5.2)
ALBUMIN/GLOB SERPL: 1.3 G/DL
ALP SERPL-CCNC: 509 U/L (ref 39–117)
ALT SERPL W P-5'-P-CCNC: 60 U/L (ref 1–33)
ANION GAP SERPL CALCULATED.3IONS-SCNC: 15.5 MMOL/L (ref 5–15)
AST SERPL-CCNC: 59 U/L (ref 1–32)
BASOPHILS # BLD AUTO: 0.05 10*3/MM3 (ref 0–0.2)
BASOPHILS NFR BLD AUTO: 0.5 % (ref 0–1.5)
BILIRUB SERPL-MCNC: 0.5 MG/DL (ref 0–1.2)
BUN SERPL-MCNC: 36 MG/DL (ref 6–20)
BUN/CREAT SERPL: 31.6 (ref 7–25)
CALCIUM SPEC-SCNC: 11.1 MG/DL (ref 8.6–10.5)
CHLORIDE SERPL-SCNC: 81 MMOL/L (ref 98–107)
CHOLEST SERPL-MCNC: 243 MG/DL (ref 0–200)
CO2 SERPL-SCNC: 36.5 MMOL/L (ref 22–29)
CREAT SERPL-MCNC: 1.14 MG/DL (ref 0.57–1)
DEPRECATED RDW RBC AUTO: 48.5 FL (ref 37–54)
EOSINOPHIL # BLD AUTO: 0.21 10*3/MM3 (ref 0–0.4)
EOSINOPHIL NFR BLD AUTO: 2.3 % (ref 0.3–6.2)
ERYTHROCYTE [DISTWIDTH] IN BLOOD BY AUTOMATED COUNT: 14.3 % (ref 12.3–15.4)
GFR SERPL CREATININE-BSD FRML MDRD: 50 ML/MIN/1.73
GLOBULIN UR ELPH-MCNC: 3.5 GM/DL
GLUCOSE SERPL-MCNC: 415 MG/DL (ref 65–99)
HCT VFR BLD AUTO: 45.3 % (ref 34–46.6)
HDLC SERPL-MCNC: 45 MG/DL (ref 40–60)
HGB BLD-MCNC: 15.3 G/DL (ref 12–15.9)
IMM GRANULOCYTES # BLD AUTO: 0.06 10*3/MM3 (ref 0–0.05)
IMM GRANULOCYTES NFR BLD AUTO: 0.7 % (ref 0–0.5)
INR PPP: 1.31 (ref 2–3)
LDLC SERPL CALC-MCNC: 140 MG/DL (ref 0–100)
LDLC/HDLC SERPL: 2.97 {RATIO}
LYMPHOCYTES # BLD AUTO: 1.19 10*3/MM3 (ref 0.7–3.1)
LYMPHOCYTES NFR BLD AUTO: 13 % (ref 19.6–45.3)
MCH RBC QN AUTO: 31.3 PG (ref 26.6–33)
MCHC RBC AUTO-ENTMCNC: 33.8 G/DL (ref 31.5–35.7)
MCV RBC AUTO: 92.6 FL (ref 79–97)
MONOCYTES # BLD AUTO: 0.68 10*3/MM3 (ref 0.1–0.9)
MONOCYTES NFR BLD AUTO: 7.4 % (ref 5–12)
NEUTROPHILS NFR BLD AUTO: 6.99 10*3/MM3 (ref 1.7–7)
NEUTROPHILS NFR BLD AUTO: 76.1 % (ref 42.7–76)
NRBC BLD AUTO-RTO: 0 /100 WBC (ref 0–0.2)
PLATELET # BLD AUTO: 269 10*3/MM3 (ref 140–450)
PMV BLD AUTO: 9.4 FL (ref 6–12)
POTASSIUM SERPL-SCNC: 3.8 MMOL/L (ref 3.5–5.2)
PROT SERPL-MCNC: 8 G/DL (ref 6–8.5)
PROTHROMBIN TIME: 13.8 SECONDS (ref 9.4–12)
RBC # BLD AUTO: 4.89 10*6/MM3 (ref 3.77–5.28)
SODIUM SERPL-SCNC: 133 MMOL/L (ref 136–145)
T4 FREE SERPL-MCNC: 1.36 NG/DL (ref 0.93–1.7)
TRIGL SERPL-MCNC: 321 MG/DL (ref 0–150)
TSH SERPL DL<=0.05 MIU/L-ACNC: 1.37 UIU/ML (ref 0.27–4.2)
VLDLC SERPL-MCNC: 58 MG/DL (ref 5–40)
WBC # BLD AUTO: 9.18 10*3/MM3 (ref 3.4–10.8)

## 2021-07-09 PROCEDURE — 84681 ASSAY OF C-PEPTIDE: CPT | Performed by: FAMILY MEDICINE

## 2021-07-09 PROCEDURE — 84439 ASSAY OF FREE THYROXINE: CPT | Performed by: FAMILY MEDICINE

## 2021-07-09 PROCEDURE — 99214 OFFICE O/P EST MOD 30 MIN: CPT | Performed by: FAMILY MEDICINE

## 2021-07-09 PROCEDURE — 85025 COMPLETE CBC W/AUTO DIFF WBC: CPT | Performed by: FAMILY MEDICINE

## 2021-07-09 PROCEDURE — 85610 PROTHROMBIN TIME: CPT | Performed by: FAMILY MEDICINE

## 2021-07-09 PROCEDURE — 84443 ASSAY THYROID STIM HORMONE: CPT | Performed by: FAMILY MEDICINE

## 2021-07-09 PROCEDURE — 80053 COMPREHEN METABOLIC PANEL: CPT | Performed by: FAMILY MEDICINE

## 2021-07-09 PROCEDURE — 80061 LIPID PANEL: CPT | Performed by: FAMILY MEDICINE

## 2021-07-09 RX ORDER — METOPROLOL SUCCINATE 25 MG/1
25 TABLET, EXTENDED RELEASE ORAL 2 TIMES DAILY
COMMUNITY
Start: 2021-06-29 | End: 2021-11-30

## 2021-07-09 RX ORDER — SUMATRIPTAN 100 MG/1
1 TABLET, FILM COATED ORAL AS NEEDED
COMMUNITY
Start: 2021-06-29 | End: 2021-11-29

## 2021-07-09 RX ORDER — FEXOFENADINE HYDROCHLORIDE 180 MG/1
180 TABLET, FILM COATED ORAL 2 TIMES DAILY
COMMUNITY
Start: 2021-06-01 | End: 2021-09-24

## 2021-07-09 RX ORDER — SERTRALINE HYDROCHLORIDE 100 MG/1
150 TABLET, FILM COATED ORAL DAILY
Qty: 135 TABLET | Refills: 1 | Status: SHIPPED | OUTPATIENT
Start: 2021-07-09 | End: 2021-09-13

## 2021-07-09 RX ORDER — SYRINGE,INSUL U-500,NDL,0.5ML 31GX15/64"
SYRINGE, EMPTY DISPOSABLE MISCELLANEOUS
COMMUNITY
Start: 2021-07-02 | End: 2021-12-02

## 2021-07-09 RX ORDER — PROCHLORPERAZINE 25 MG/1
SUPPOSITORY RECTAL
COMMUNITY
Start: 2021-06-29 | End: 2021-12-02

## 2021-07-09 RX ORDER — DOXYCYCLINE HYCLATE 100 MG/1
100 CAPSULE ORAL 2 TIMES DAILY PRN
COMMUNITY
Start: 2021-04-20 | End: 2021-08-23

## 2021-07-09 RX ORDER — PROCHLORPERAZINE 25 MG/1
SUPPOSITORY RECTAL
COMMUNITY
Start: 2021-06-01 | End: 2021-12-02

## 2021-07-09 RX ORDER — NYSTATIN 100000 [USP'U]/G
POWDER TOPICAL 3 TIMES DAILY
Qty: 180 G | Refills: 1 | Status: SHIPPED | OUTPATIENT
Start: 2021-07-09 | End: 2021-09-29

## 2021-07-09 RX ORDER — FORMOTEROL FUMARATE DIHYDRATE 20 UG/2ML
20 SOLUTION RESPIRATORY (INHALATION)
COMMUNITY
End: 2021-12-07 | Stop reason: HOSPADM

## 2021-07-09 RX ORDER — SERTRALINE HYDROCHLORIDE 100 MG/1
100 TABLET, FILM COATED ORAL DAILY
COMMUNITY
Start: 2021-06-12 | End: 2021-07-09 | Stop reason: SDUPTHER

## 2021-07-09 RX ORDER — SYRINGE-NEEDLE,INSULIN,0.5 ML 31 GX5/16"
SYRINGE, EMPTY DISPOSABLE MISCELLANEOUS
COMMUNITY
Start: 2021-06-30 | End: 2021-12-02

## 2021-07-09 RX ORDER — MONTELUKAST SODIUM 10 MG/1
TABLET ORAL
COMMUNITY
Start: 2021-07-01 | End: 2021-11-03

## 2021-07-09 RX ORDER — IPRATROPIUM BROMIDE AND ALBUTEROL SULFATE 2.5; .5 MG/3ML; MG/3ML
SOLUTION RESPIRATORY (INHALATION)
COMMUNITY
Start: 2021-06-18 | End: 2021-09-27

## 2021-07-09 RX ORDER — CLOTRIMAZOLE 1 %
CREAM (GRAM) TOPICAL
COMMUNITY
Start: 2021-05-22 | End: 2021-12-02

## 2021-07-09 RX ORDER — PANTOPRAZOLE SODIUM 40 MG/1
40 TABLET, DELAYED RELEASE ORAL DAILY
COMMUNITY
Start: 2021-07-01 | End: 2021-10-13

## 2021-07-09 RX ORDER — LEVOTHYROXINE SODIUM 0.12 MG/1
250 TABLET ORAL DAILY
COMMUNITY
Start: 2021-06-22

## 2021-07-09 RX ORDER — PROMETHAZINE HYDROCHLORIDE 25 MG/1
TABLET ORAL AS NEEDED
COMMUNITY
Start: 2021-05-22 | End: 2021-09-17

## 2021-07-09 NOTE — ASSESSMENT & PLAN NOTE
Her last thyroid function was several months ago while we are doing labs we will update that today.

## 2021-07-09 NOTE — ASSESSMENT & PLAN NOTE
Currently she sees endocrinology through [facility].  Her point-of-care A1c was 10.6.

## 2021-07-09 NOTE — PROGRESS NOTES
Chief Complaint   Patient presents with   • Follow-up     2 month - HTN, Obesity, Hepatic vein thrombosis, Hypokalemia, Morbid Obesity, Pulmonary hypertension   • Med Refill        Subjective     Joanse JOMAR Partida  has a past medical history of Abnormal mammogram (10/21/2013), Allergy, Anemia, Arthralgia, COPD (chronic obstructive pulmonary disease) (CMS/[facility]) (03/04/2015), Dependent edema (08/27/2018), Depression, Derangement of meniscus of left knee (01/01/2014), Dermatitis (07/10/2014), Dysphagia, Essential hypertension (03/04/2015), Fibromyalgia, Foot pain (07/10/2014), GERD (gastroesophageal reflux disease) (10/17/2014), Hepatic steatosis (03/04/2015), 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CMS/[facility]) (10/09/2020), History of tobacco abuse, Hyperlipidemia, Hypokalemia (05/07/2019), Hypothyroid, Long term current use of anticoagulant, LPRD (laryngopharyngeal reflux disease), Moderate episode of recurrent major depressive disorder (CMS/[facility]) (06/22/2017), Morbid obesity (CMS/[facility]) (03/04/2015), Mycobacterium avium complex (CMS/[facility]) (08/09/2018), Obesity (07/10/2014), GIUSEPPE (obstructive sleep apnea) (08/09/2018), Pulmonary hypertension (CMS/[facility]) (08/27/2018), Stasis dermatitis of both legs (08/09/2018), Type 2 diabetes mellitus, with long-term current use of insulin (CMS/[facility]) (06/22/2017), Ventral hernia (03/04/2015), Vitamin D deficiency (11/13/2013), and Voice hoarseness.    Type 2 diabetes insulin-dependent-she sees an endocrinologist out of [facility].  Her last A1c was 10.  She has had some difficulty obtaining her insulin and to getting a replacement insulin pump as well.    Hypertension-she checks her blood pressure outside the office and it typically is in the mid 130s over 70s.  She is taking her medication on a daily basis.    Hyperlipidemia-I reviewed her records from here as well as up at [facility] and I do not see a lipid profile since last summer.    Nonalcoholic liver disease-recently she is seeing gastroenterology,   Jacob Morean, and he has diagnosed her with nonalcoholic fatty liver disease.      PHQ-2 Depression Screening  Little interest or pleasure in doing things? 1   Feeling down, depressed, or hopeless? 0   PHQ-2 Total Score 1   PHQ-9 Depression Screening  Little interest or pleasure in doing things? 1   Feeling down, depressed, or hopeless? 0   Trouble falling or staying asleep, or sleeping too much?     Feeling tired or having little energy?     Poor appetite or overeating?     Feeling bad about yourself - or that you are a failure or have let yourself or your family down?     Trouble concentrating on things, such as reading the newspaper or watching television?     Moving or speaking so slowly that other people could have noticed? Or the opposite - being so fidgety or restless that you have been moving around a lot more than usual?     Thoughts that you would be better off dead, or of hurting yourself in some way?     PHQ-9 Total Score 1   If you checked off any problems, how difficult have these problems made it for you to do your work, take care of things at home, or get along with other people?       Allergies   Allergen Reactions   • Cobalt Unknown - High Severity     Cobalt blue, by allergy testing.    • Dulaglutide Nausea And Vomiting   • Exenatide Other (See Comments)     pancreantitis   • Metformin Unknown - Low Severity   • Palladium Chloride Unknown - Low Severity     by allergy testing.    • Sitagliptin Other (See Comments)     panceatitis       Prior to Admission medications    Medication Sig Start Date End Date Taking? Authorizing Provider   Allergy Relief 180 MG tablet Take 180 mg by mouth 2 (Two) Times a Day. 6/1/21  Yes ProviderGina MD   amitriptyline (ELAVIL) 50 MG tablet Take 50 mg by mouth Daily.   Yes Provider, MD Gina   BD Insulin Syringe U-500 31G X 6MM 0.5 ML misc  7/2/21  Yes ProviderGina MD   budesonide (Pulmicort) 0.5 MG/2ML nebulizer solution 2 mL.   Yes Provider  MD Gina   Cholecalciferol 125 MCG (5000 UT) tablet Take 125 mcg by mouth Daily.   Yes Gina Hunt MD   clotrimazole (LOTRIMIN) 1 % cream APPLY EXTERNALLY TO THE AFFECTED AND SURROUNDING AREAS TWICE DAILY IN THE MORNING AND IN THE EVENING FOR 14 DAYS 5/22/21  Yes Gina Hunt MD   Continuous Blood Gluc  (Dexcom G6 ) device USE RECIEVER DEVICE AS DIRECTED 6/1/21  Yes Gina Hunt MD   Continuous Blood Gluc Sensor (Dexcom G6 Sensor) APPLY 1 TOPICALLY EVERY 10 DAYS 6/29/21  Yes Gina Hunt MD   Continuous Blood Gluc Transmit (Dexcom G6 Transmitter) misc USE TRANSMITTER AS INSTRUCTED 6/1/21  Yes Gina Hunt MD   Dapagliflozin Propanediol (Farxiga) 10 MG tablet Take 10 mg by mouth Daily.   Yes Gina Hunt MD   dicyclomine (BENTYL) 20 MG tablet Take 20 mg by mouth 3 (Three) Times a Day.   Yes Gina Hunt MD   doxycycline (VIBRAMYCIN) 100 MG capsule Take 100 mg by mouth 2 (Two) Times a Day As Needed. 4/20/21  Yes Gina Hunt MD   eszopiclone (LUNESTA) 1 MG tablet Take immediately before bedtime 6/29/21  Yes Mk Dowling,    ezetimibe (ZETIA) 10 MG tablet Take 10 mg by mouth Daily.   Yes Gina Hunt MD   fluticasone (FLONASE) 50 MCG/ACT nasal spray fluticasone 50 mcg/actuation nasal spray,suspension spray 1 - 2 sprays (50 - 100 mcg) in each nostril by intranasal route once daily as needed   Suspended   Yes Gina Hunt MD   Fluticasone Furoate-Vilanterol (Breo Ellipta) 200-25 MCG/INH inhaler Breo Ellipta 200-25 mcg/dose inhalation blister with device inhale 1 puff by inhalation route once daily at the same time each day   Active   Yes Gina Hunt MD   formoterol (Perforomist) 20 MCG/2ML nebulizer solution Take  by nebulization 2 (Two) Times a Day.   Yes Gina Hunt MD   gabapentin (NEURONTIN) 300 MG capsule Take 3 capsules by mouth 3 (Three) Times a Day. 6/15/21  Yes  Francesco Gimenez, DO   Insulin Lispro (HumaLOG) 100 UNIT/ML solution cartridge Humalog 100 unit/mL subcutaneous cartridge inject by subcutaneous route per prescriber's instructions. Insulin dosing requires individualization.   Suspended   Yes Gina Hunt MD   insulin regular (HUMULIN R) 500 UNIT/ML CONCENTRATED injection Humulin R U-500 (Conc) Insulin 500 unit/mL subcutaneous solution inject by subcutaneous route per  instructions.  For use with insulin pump   Active   Yes Gina Hunt MD   ipratropium-albuterol (DUO-NEB) 0.5-2.5 mg/3 ml nebulizer USE 3 ML VIA NEBULIZER EVERY 4 HOURS AS NEEDED FOR SHORTNESS OF BREATH 6/18/21  Yes Gina Hunt MD   levothyroxine (SYNTHROID, LEVOTHROID) 125 MCG tablet Take 250 mcg by mouth. 6/22/21  Yes Gina Hunt MD   losartan (COZAAR) 50 MG tablet Take 50 mg by mouth Daily.   Yes Gina Hunt MD   lovastatin (MEVACOR) 40 MG tablet Take 40 mg by mouth Daily.   Yes Gina Hunt MD   Magnesium Gluconate 550 MG tablet Take 30 mg by mouth 2 (Two) Times a Day.   Yes Gina Hunt MD   metoprolol succinate XL (TOPROL-XL) 25 MG 24 hr tablet Take 25 mg by mouth 2 (Two) Times a Day. 6/29/21  Yes Gina Hunt MD   mineral oil-hydrophilic petrolatum (AQUAPHOR) ointment 2 (Two) Times a Day. to affected area 6/30/21  Yes Gina Hunt MD   montelukast (SINGULAIR) 10 MG tablet TAKE 1 TABLET BY MOUTH ONCE DAILY EVERY EVENING 7/1/21  Yes Gina Hunt MD   pantoprazole (PROTONIX) 40 MG EC tablet Take 40 mg by mouth Daily. 7/1/21  Yes Gina Hunt MD   polyethylene glycol (MIRALAX) 17 GM/SCOOP powder 17 g.   Yes Gina Hunt MD   potassium chloride 10 MEQ CR tablet potassium chloride 10 mEq oral tablet extended release take 2 tablets (20 meq) by oral route 2 times per day with food   Active   Yes Gina Hunt MD   promethazine (PHENERGAN) 25 MG tablet As Needed. 5/22/21  Yes  "ProviderGina MD   revefenacin (YUPELRI) 175 MCG/3ML nebulizer solution Take  by nebulization Daily.   Yes ProviderGina MD   rosuvastatin (CRESTOR) 40 MG tablet Take 40 mg by mouth Daily.   Yes ProviderGina MD   sertraline (ZOLOFT) 100 MG tablet Take 100 mg by mouth Daily. 6/12/21  Yes Gina Hunt MD   spironolactone (ALDACTONE) 100 MG tablet Take 100 mg by mouth Daily.   Yes Gina Hunt MD   sucralfate (CARAFATE) 1 g tablet sucralfate 1 gram oral tablet take 1 tablet (1 gram) by oral route 4 times per day on an empty stomach 1 hour before meals and at bedtime   Suspended   Yes Gina Hunt MD   SUMAtriptan (IMITREX) 100 MG tablet Take 1 tablet by mouth As Needed. 6/29/21  Yes Gina Hunt MD   torsemide (DEMADEX) 100 MG tablet Take 100 mg by mouth 3 (Three) Times a Day.   Yes ProviderGina MD   traZODone (DESYREL) 50 MG tablet Take 50 mg by mouth Daily.   Yes ProviderGina MD   TRUEplus Insulin Syringe 31G X 5/16\" 0.5 ML misc USE AS DIRECTED SIX TIMES DAILY 6/30/21  Yes ProviderGina MD   vitamin D3 125 MCG (5000 UT) capsule capsule Take 1 capsule by mouth Daily. 6/1/21  Yes Gina Hunt MD   warfarin (COUMADIN) 2 MG tablet warfarin 2 mg oral tablet take 1 tablet (2 mg) by oral route once daily as directed 2/16/2021  Active 2/16/21  Yes ProviderGina MD   warfarin (COUMADIN) 5 MG tablet warfarin 5 mg oral tablet take 1 tablet (5 mg) by oral route once daily as directed 1/12/2021  Active 1/12/21  Yes ProviderGina MD   warfarin (Coumadin) 7.5 MG tablet Coumadin 7.5 mg oral tablet take 1 tablet (7.5 mg) by oral route once daily   Active   Yes Gina Hunt MD   albuterol sulfate HFA (Ventolin HFA) 108 (90 Base) MCG/ACT inhaler Ventolin HFA 90 mcg/actuation inhalation HFA aerosol inhaler inhale 1 - 2 puffs (90 - 180 mcg) by inhalation route every 6 hours as needed   Active    Provider, Gina, " MD   arformoterol (Brovana) 15 MCG/2ML nebulizer solution 2 mL.    ProviderGina MD   Umeclidinium Bromide (Incruse Ellipta) 62.5 MCG/INH aerosol powder  Incruse Ellipta 62.5 mcg/actuation inhalation blister with device inhale 1 puff (62.5 mcg) by inhalation route once daily at the same time each day   Active    ProviderGina MD   gabapentin (NEURONTIN) 300 MG capsule Take 3 capsules by mouth 3 (Three) Times a Day.  7/9/21  ProviderGina MD   levothyroxine (SYNTHROID, LEVOTHROID) 200 MCG tablet Take 200 mcg by mouth Daily.  7/9/21  Gina Hunt MD        Patient Active Problem List   Diagnosis   • NAFLD (nonalcoholic fatty liver disease)   • Budd-Chiari syndrome (CMS/HCC)   • Abnormal liver enzymes   • Abnormal mammogram   • Acquired hypothyroidism   • Anemia   • Arthritis   • Chronic right-sided low back pain with right-sided sciatica   • Contact dermatitis and other eczema   • COPD (chronic obstructive pulmonary disease) (CMS/[facility])   • Dependent edema   • Depression   • Major depressive disorder with current active episode   • Diabetic polyneuropathy (CMS/HCC)   • Dysphagia   • Elevated alkaline phosphatase level   • Elevated ferritin   • Encounter for long-term (current) use of insulin (CMS/HCC)   • Essential hypertension   • HTN, goal below 140/90   • Fibromyalgia   • Neck pain, bilateral   • Foot pain   • Gastroesophageal reflux disease without esophagitis   • History of pancreatitis   • Hyperlipidemia   • Hypokalemia   • Hyperinsulinism   • Insulin resistance   • Long term (current) use of anticoagulants   • LPRD (laryngopharyngeal reflux disease)   • Magnesium deficiency   • Microalbuminuria   • Moderate episode of recurrent major depressive disorder (CMS/HCC)   • Morbid obesity with BMI of 70 and over, adult (CMS/HCC)   • Mycobacterium avium complex (CMS/HCC)   • GIUSEPPE (obstructive sleep apnea)   • Pain in left hip   • Personal history of tobacco use, presenting hazards to  health   • Predisposition to allergic reaction   • Presence of insulin pump   • Pulmonary hypertension (CMS/HCC)   • Pulmonary nodule   • Serum calcium elevated   • Stasis dermatitis of both legs   • Thoracic back pain   • Uncontrolled type 2 diabetes mellitus with hyperglycemia (CMS/HCC)   • Uncontrolled type 2 diabetes mellitus with neurologic complication (CMS/HCC)   • Type 2 diabetes mellitus, with long-term current use of insulin (CMS/HCC)   • Uncontrolled type 2 diabetes mellitus without complication, with long-term current use of insulin   • Ventral hernia   • Vitamin D deficiency   • Voice hoarseness   • Wheezing   • 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CMS/HCC)   • Hepatic steatosis        Past Surgical History:   Procedure Laterality Date   • CARDIAC CATHETERIZATION  2018   • CARPAL TUNNEL RELEASE     •  SECTION  ,     • COLONOSCOPY     • ENDOSCOPY     • HERNIA REPAIR     • HYSTERECTOMY  ,     PARTIAL   • KNEE SURGERY         Social History     Socioeconomic History   • Marital status:      Spouse name: Not on file   • Number of children: Not on file   • Years of education: Not on file   • Highest education level: Not on file   Tobacco Use   • Smoking status: Former Smoker     Packs/day: 1.00   • Smokeless tobacco: Never Used   • Tobacco comment: QUIT 2004   Vaping Use   • Vaping Use: Never used   Substance and Sexual Activity   • Alcohol use: Not Currently     Comment: FORMER; OCCASIONAL   • Drug use: Never   • Sexual activity: Defer       Family History   Problem Relation Age of Onset   • Heart disease Mother         GRANDPARENT-NONSPECIFIC   • Diabetes Father    • Skin cancer Father        Family history, surgical history, past medical history, Allergies and med's reviewed with patient today and updated in [employer] EMR.     ROS:  Review of Systems   Constitutional: Positive for fatigue.   HENT: Negative for congestion, postnasal drip and rhinorrhea.    Eyes:  "Positive for blurred vision. Negative for visual disturbance.   Respiratory: Positive for cough, shortness of breath and wheezing. Negative for chest tightness.    Cardiovascular: Negative for chest pain and palpitations.   Gastrointestinal: Positive for constipation. Negative for diarrhea and indigestion.   Endocrine: Negative for polydipsia and polyuria.   Skin: Negative for rash and skin lesions.   Neurological: Negative for headache.   Hematological: Negative for adenopathy.   Psychiatric/Behavioral: Negative for depressed mood. The patient is not nervous/anxious.        OBJECTIVE:  Vitals:    07/09/21 1514   BP: 125/68   BP Location: Left arm   Patient Position: Sitting   Cuff Size: Large Adult   Pulse: 83   Temp: 97.3 °F (36.3 °C)   TempSrc: Temporal   SpO2: 92%   Height: 63 cm (24.8\")     No exam data present   Body mass index is 510.86 kg/m².  No LMP recorded (lmp unknown). Patient has had a hysterectomy.    Physical Exam  Vitals and nursing note reviewed.   Constitutional:       General: She is not in acute distress.     Appearance: Normal appearance. She is obese.   HENT:      Head: Normocephalic.      Right Ear: Tympanic membrane, ear canal and external ear normal.      Left Ear: Tympanic membrane, ear canal and external ear normal.      Nose: Nose normal.      Mouth/Throat:      Mouth: Mucous membranes are moist.      Pharynx: Oropharynx is clear.   Eyes:      General: No scleral icterus.     Conjunctiva/sclera: Conjunctivae normal.      Pupils: Pupils are equal, round, and reactive to light.   Cardiovascular:      Rate and Rhythm: Normal rate and regular rhythm.      Pulses: Normal pulses.      Heart sounds: Normal heart sounds. No murmur heard.     Pulmonary:      Effort: Pulmonary effort is normal.      Breath sounds: Normal breath sounds. No wheezing, rhonchi or rales.   Musculoskeletal:      Cervical back: No rigidity or tenderness.   Lymphadenopathy:      Cervical: No cervical adenopathy.   Skin:   "   General: Skin is warm and dry.      Coloration: Skin is not jaundiced.      Findings: Erythema present. No rash.      Comments: Under breast   Neurological:      General: No focal deficit present.      Mental Status: She is alert and oriented to person, place, and time.      Gait: Gait normal.   Psychiatric:         Mood and Affect: Mood normal.         Thought Content: Thought content normal.         Judgment: Judgment normal.         Procedures    No visits with results within 30 Day(s) from this visit.   Latest known visit with results is:   Hospital Outpatient Visit on 04/20/2021   Component Date Value Ref Range Status   • Glucose 04/20/2021 434* 65 - 99 mg/dL Final   • BUN 04/20/2021 18  5 - 25 mg/dL Final   • Creatinine 04/20/2021 0.90  0.50 - 0.90 mg/dL Final   • BUN/Creatinine Ratio 04/20/2021 20  6 - 20 [ratio] Final   • GFR 04/20/2021 >60  >60 mL/min/[1.73_m2] Final    Comment: Interpretative Data:  ------------------------------------  STAGE                  GFR  Stage 1                90 mL/min or greater  Stage 2                60-89 mL/min  Stage 3                30-59 mL/min  Stage 4                15-29 mL/min  Value <60 mL/min for 3 or more months is defined as CKD.     • Sodium 04/20/2021 134* 135 - 147 mmol/L Final   • Potassium 04/20/2021 3.9  3.5 - 5.3 mmol/L Final   • Chloride 04/20/2021 89* 99 - 111 mmol/L Final   • CO2 04/20/2021 34* 22 - 32 mmol/L Final   • Anion Gap 04/20/2021 15  8 - 19 mmol/L Final   • OSMOLALITY CALC 04/20/2021 299  273 - 304 Final   • Total Protein 04/20/2021 7.4  6.3 - 8.2 g/dL Final    Comment: If Patient is receiving dextran as a blood volume expander  result may show a potential interference.     • Albumin 04/20/2021 3.9  3.5 - 5.0 g/dL Final   • Globulin 04/20/2021 3.5  2.0 - 3.5 g/dL Final   • A/G Ratio 04/20/2021 1.1* 1.4 - 2.6 [ratio] Final   • Calcium 04/20/2021 9.5  8.7 - 10.4 mg/dL Final    Calcium value was calculated to correct for low albumin result.   •  Alkaline Phosphatase 04/20/2021 487* 53 - 141 U/L Final   • ALT (SGPT) 04/20/2021 82* 10 - 40 U/L Final   • AST (SGOT) 04/20/2021 90* 15 - 50 U/L Final   • Total Bilirubin 04/20/2021 0.61  0.20 - 1.30 mg/dL Final   • WBC 04/20/2021 7.54  4.80 - 10.80 10*3/uL Final   • RBC 04/20/2021 4.63  4.20 - 5.40 10*6/uL Final   • Hemoglobin 04/20/2021 13.6  12.0 - 16.0 g/dL Final   • Hematocrit 04/20/2021 43.3  37.0 - 47.0 % Final   • MCV 04/20/2021 93.5  81.0 - 99.0 fL Final   • MCH 04/20/2021 29.4  27.0 - 31.0 pg Final   • MCHC 04/20/2021 31.4* 33.0 - 37.0 Final   • RDW 04/20/2021 15.4* 11.7 - 14.4 % Final   • Platelets 04/20/2021 246  130 - 400 10*3/uL Final   • MPV 04/20/2021 9.3* 9.4 - 12.3 fL Final   • Neutrophil Rel % 04/20/2021 77.8  30.0 - 85.0 % Final   • Lymphocyte Rel % 04/20/2021 11.8* 20.0 - 45.0 % Final   • Monocyte Rel % 04/20/2021 7.3  3.0 - 10.0 % Final   • Eosinophil Rel % 04/20/2021 1.9  0.0 - 7.0 % Final   • Basophil Rel % 04/20/2021 0.4  0.0 - 3.0 % Final   • Neutrophils Absolute 04/20/2021 5.87  2.00 - 8.00 10*3/uL Final   • Lymphocytes Absolute 04/20/2021 0.89* 1.00 - 5.00 10*3/uL Final   • Monocytes Absolute 04/20/2021 0.55  0.20 - 1.20 10*3/uL Final   • Eosinophils Absolute 04/20/2021 0.14  0.00 - 0.70 10*3/uL Final   • Basophils Absolute 04/20/2021 0.03  0.00 - 0.20 10*3/uL Final   • Immature Granulocyte Rel % 04/20/2021 0.8  0.0 - 1.8 % Final   • Abs Imm Gran 04/20/2021 0.06  0.00 - 0.20 10*3/uL Final   • RDW-SD 04/20/2021 52.4* 36.4 - 46.3 fL Final   • NRBC 04/20/2021 0.00  0.00 - 0.70 % Final       ASSESSMENT/ PLAN:    Diagnoses and all orders for this visit:    1. HTN, goal below 140/90 (Primary)  Assessment & Plan:  Her blood pressure remains good here today.    Orders:  -     Lipid Panel  -     Comprehensive Metabolic Panel  -     CBC & Differential    2. Hyperlipidemia, unspecified hyperlipidemia type  Assessment & Plan:  She has got many other labs related to her diabetes and hypertension  through her endocrinologist but no lipid profile we will get that updated today.    Orders:  -     TSH+Free T4  -     Lipid Panel  -     Comprehensive Metabolic Panel    3. 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CMS/HCC)  Assessment & Plan:  She currently remains anticoagulated.    Orders:  -     Protime-INR; Future    4. NAFLD (nonalcoholic fatty liver disease)  Assessment & Plan:  We discussed her nonalcoholic fatty liver disease today.  That the choices are really not beneficial overall clinical trials.  Vitamin E as well as Actos have failed to show any significant response and make carry more risk than any potential benefit whatsoever.  She could use a GLP-1 but has had previous reactions to these medications and thus is probably not a candidate for that either.  Before starting anything like this she would need to rediscuss that with her endocrinologist.    Orders:  -     Comprehensive Metabolic Panel    5. GIUSEPPE (obstructive sleep apnea)  Assessment & Plan:  She wears her CPAP nightly.      6. Acquired hypothyroidism  Assessment & Plan:  Her last thyroid function was several months ago while we are doing labs we will update that today.    Orders:  -     TSH+Free T4    7. Type 2 diabetes mellitus without complication, with long-term current use of insulin (CMS/[facility])  Assessment & Plan:  Currently she sees endocrinology through [facility].  Her point-of-care A1c was 10.6.    Orders:  -     Comprehensive Metabolic Panel  -     C-Peptide; Future    8. Moderate episode of recurrent major depressive disorder (CMS/HCC)  Assessment & Plan:  She states she does not think her Zoloft was helping as much and a longer.  She states she remains tearful many days.    Orders:  -     TSH+Free T4    Other orders  -     sertraline (ZOLOFT) 100 MG tablet; Take 1.5 tablets by mouth Daily for 90 days.  Dispense: 135 tablet; Refill: 1  -     nystatin (MYCOSTATIN) 476447 UNIT/GM powder; Apply  topically to the appropriate area as directed 3  (Three) Times a Day for 90 days.  Dispense: 180 g; Refill: 1      Orders Placed Today:     New Medications Ordered This Visit   Medications   • sertraline (ZOLOFT) 100 MG tablet     Sig: Take 1.5 tablets by mouth Daily for 90 days.     Dispense:  135 tablet     Refill:  1   • nystatin (MYCOSTATIN) [id number] UNIT/GM powder     Sig: Apply  topically to the appropriate area as directed 3 (Three) Times a Day for 90 days.     Dispense:  180 g     Refill:  1        Management Plan:     An After Visit Summary was printed and given to the patient at discharge.    Follow-up: Return in about 3 months (around 10/9/2021), or if symptoms worsen or fail to improve, for Recheck.    Francesco Gimenez,  7/9/2021 16:00 EDT  This note was electronically signed.

## 2021-07-09 NOTE — ASSESSMENT & PLAN NOTE
She has got many other labs related to her diabetes and hypertension through her endocrinologist but no lipid profile we will get that updated today.

## 2021-07-09 NOTE — PATIENT INSTRUCTIONS
Obesity, Adult  Obesity is the condition of having too much total body fat. Being overweight or obese means that your weight is greater than what is considered healthy for your body size. Obesity is determined by a measurement called BMI. BMI is an estimate of body fat and is calculated from height and weight. For adults, a BMI of 30 or higher is considered obese.  Obesity can lead to other health concerns and major illnesses, including:  · Stroke.  · Coronary artery disease (CAD).  · Type 2 diabetes.  · Some types of cancer, including cancers of the colon, breast, uterus, and gallbladder.  · Osteoarthritis.  · High blood pressure (hypertension).  · High cholesterol.  · Sleep apnea.  · Gallbladder stones.  · Infertility problems.  What are the causes?  Common causes of this condition include:  · Eating daily meals that are high in calories, sugar, and fat.  · Being born with genes that may make you more likely to become obese.  · Having a medical condition that causes obesity, including:  ? Hypothyroidism.  ? Polycystic ovarian syndrome (PCOS).  ? Binge-eating disorder.  ? Cushing syndrome.  · Taking certain medicines, such as steroids, antidepressants, and seizure medicines.  · Not being physically active (sedentary lifestyle).  · Not getting enough sleep.  · Drinking high amounts of sugar-sweetened beverages, such as soft drinks.  What increases the risk?  The following factors may make you more likely to develop this condition:  · Having a family history of obesity.  · Being a woman of  descent.  · Being a man of  descent.  · Living in an area with limited access to:  ? [place], recreation centers, or sidewalks.  ? Healthy food choices, such as grocery stores and farmers' markets.  What are the signs or symptoms?  The main sign of this condition is having too much body fat.  How is this diagnosed?  This condition is diagnosed based on:  · Your BMI. If you are an adult with a BMI of 30 or  higher, you are considered obese.  · Your waist circumference. This measures the distance around your waistline.  · Your skinfold thickness. Your health care provider may gently pinch a fold of your skin and measure it.  You may have other tests to check for underlying conditions.  How is this treated?  Treatment for this condition often includes changing your lifestyle. Treatment may include some or all of the following:  · Dietary changes. This may include developing a healthy meal plan.  · Regular physical activity. This may include activity that causes your heart to beat faster (aerobic exercise) and strength training. Work with your health care provider to design an exercise program that works for you.  · Medicine to help you lose weight if you are unable to lose 1 pound a week after 6 weeks of healthy eating and more physical activity.  · Treating conditions that cause the obesity (underlying conditions).  · Surgery. Surgical options may include gastric banding and gastric bypass. Surgery may be done if:  ? Other treatments have not helped to improve your condition.  ? You have a BMI of 40 or higher.  ? You have life-threatening health problems related to obesity.  Follow these instructions at home:  Eating and drinking    · Follow recommendations from your health care provider about what you eat and drink. Your health care provider may advise you to:  ? Limit fast food, sweets, and processed snack foods.  ? Choose low-fat options, such as low-fat milk instead of whole milk.  ? Eat 5 or more servings of fruits or vegetables every day.  ? Eat at home more often. This gives you more control over what you eat.  ? Choose healthy foods when you eat out.  ? Learn to read food labels. This will help you understand how much food is considered 1 serving.  ? Learn what a healthy serving size is.  ? Keep low-fat snacks available.  ? Limit sugary drinks, such as soda, fruit juice, sweetened iced tea, and flavored  milk.  · Drink enough water to keep your urine pale yellow.  · Do not follow a fad diet. Fad diets can be unhealthy and even dangerous.  Physical activity  · Exercise regularly, as told by your health care provider.  ? Most adults should get up to 150 minutes of moderate-intensity exercise every week.  ? Ask your health care provider what types of exercise are safe for you and how often you should exercise.  · Warm up and stretch before being active.  · Cool down and stretch after being active.  · Rest between periods of activity.  Lifestyle  · Work with your health care provider and a dietitian to set a weight-loss goal that is healthy and reasonable for you.  · Limit your screen time.  · Find ways to reward yourself that do not involve food.  · Do not drink alcohol if:  ? Your health care provider tells you not to drink.  ? You are pregnant, may be pregnant, or are planning to become pregnant.  · If you drink alcohol:  ? Limit how much you use to:  § 0-1 drink a day for women.  § 0-2 drinks a day for men.  ? Be aware of how much alcohol is in your drink. In the [place] one drink equals one 12 oz bottle of beer (355 mL), one 5 oz glass of wine (148 mL), or one 1½ oz glass of hard liquor (44 mL).  General instructions  · Keep a weight-loss journal to keep track of the food you eat and how much exercise you get.  · Take over-the-counter and prescription medicines only as told by your health care provider.  · Take vitamins and supplements only as told by your health care provider.  · Consider joining a support group. Your health care provider may be able to recommend a support group.  · Keep all follow-up visits as told by your health care provider. This is important.  Contact a health care provider if:  · You are unable to meet your weight loss goal after 6 weeks of dietary and lifestyle changes.  Get help right away if you are having:  · Trouble breathing.  · Suicidal thoughts or behaviors.  Summary  · Obesity is the  condition of having too much total body fat.  · Being overweight or obese means that your weight is greater than what is considered healthy for your body size.  · Work with your health care provider and a dietitian to set a weight-loss goal that is healthy and reasonable for you.  · Exercise regularly, as told by your health care provider. Ask your health care provider what types of exercise are safe for you and how often you should exercise.  This information is not intended to replace advice given to you by your health care provider. Make sure you discuss any questions you have with your health care provider.  Document Revised: 08/22/2019 Document Reviewed: 08/22/2019  [employer] Patient Education © 2021 [employer]

## 2021-07-09 NOTE — ASSESSMENT & PLAN NOTE
We discussed her nonalcoholic fatty liver disease today.  That the choices are really not beneficial overall clinical trials.  Vitamin E as well as Actos have failed to show any significant response and make carry more risk than any potential benefit whatsoever.  She could use a GLP-1 but has had previous reactions to these medications and thus is probably not a candidate for that either.  Before starting anything like this she would need to rediscuss that with her endocrinologist.

## 2021-07-09 NOTE — ASSESSMENT & PLAN NOTE
She states she does not think her Zoloft was helping as much and a longer.  She states she remains tearful many days.

## 2021-07-12 ENCOUNTER — TELEPHONE (OUTPATIENT)
Dept: FAMILY MEDICINE CLINIC | Facility: CLINIC | Age: 51
End: 2021-07-12

## 2021-07-12 LAB — C PEPTIDE SERPL-MCNC: 8.7 NG/ML (ref 1.1–4.4)

## 2021-07-12 NOTE — TELEPHONE ENCOUNTER
To make 8 mg on her warfarin she would take a 5 mg and 1 1/2  2 mg tablets to make 8 mg.  She also needs a follow-up with her endocrinologist to talk about these other abnormalities.

## 2021-07-12 NOTE — TELEPHONE ENCOUNTER
----- Message from Francesco Gimenez DO sent at 7/10/2021  2:29 PM EDT -----  Labs are abnormal. Markedly elevated blood sugar. She needs to monitor her diet and adjust insulin. Needs to contact her Endo for Insulin adjustments.   Renal function elevated also. Needs to hydrate better.  Elevated LFT secondary to fatty liver disease.  INR is low. Increase Warfarin to 8mg daily and repeat INR 1 week.

## 2021-07-12 NOTE — TELEPHONE ENCOUNTER
Called and s/w pt. Pt is aware. Pt has concerns with her Cholesterol and wondering if she should do anything with that still being elevated. Also pt states with her Warfarin she only has a 5 mg and 2 mg tablet. Pt was asking if she could have 8 mg be prescribed to her. Please advise.

## 2021-07-13 PROCEDURE — 87086 URINE CULTURE/COLONY COUNT: CPT | Performed by: PHYSICIAN ASSISTANT

## 2021-07-14 ENCOUNTER — TELEPHONE (OUTPATIENT)
Dept: FAMILY MEDICINE CLINIC | Facility: CLINIC | Age: 51
End: 2021-07-14

## 2021-07-14 NOTE — TELEPHONE ENCOUNTER
Caller: Joan Partida    Relationship: Self    Best call back number: [phone]    What is the best time to reach you: ANY    Who are you requesting to speak with (clinical staff, provider,  specific staff member): CLINICAL    Do you know the name of the person who called:     What was the call regarding: PATIENT STATES SHE WAS SEEN AT [facility] 7/13/21 AND WAS INFORMED SHE HAS KIDNEY INFECTION, ALSO REPORTS BACK PAIN IN RIB AREA. WAS INFORMED AT URGENT CARE THAT THIS IS POSSIBLE BE KIDNEY STONES. PATIENT REQUESTING TO BE ADVISED.     Do you require a callback: YES

## 2021-07-14 NOTE — TELEPHONE ENCOUNTER
Patient is going to wait for the urine culture and call back if it is normal or if abnormal and antibiotics are not working.

## 2021-07-16 ENCOUNTER — APPOINTMENT (OUTPATIENT)
Dept: CT IMAGING | Facility: HOSPITAL | Age: 51
End: 2021-07-16

## 2021-07-16 ENCOUNTER — HOSPITAL ENCOUNTER (EMERGENCY)
Facility: HOSPITAL | Age: 51
Discharge: HOME | End: 2021-07-16
Attending: EMERGENCY MEDICINE | Admitting: EMERGENCY MEDICINE

## 2021-07-16 ENCOUNTER — TELEPHONE (OUTPATIENT)
Dept: FAMILY MEDICINE CLINIC | Facility: CLINIC | Age: 51
End: 2021-07-16

## 2021-07-16 VITALS
WEIGHT: 293 LBS | DIASTOLIC BLOOD PRESSURE: 70 MMHG | OXYGEN SATURATION: 96 % | HEART RATE: 96 BPM | TEMPERATURE: 98.9 F | BODY MASS INDEX: 51.91 KG/M2 | HEIGHT: 63 IN | RESPIRATION RATE: 19 BRPM | SYSTOLIC BLOOD PRESSURE: 124 MMHG

## 2021-07-16 DIAGNOSIS — B02.9 HERPES ZOSTER WITHOUT COMPLICATION: Primary | ICD-10-CM

## 2021-07-16 DIAGNOSIS — M25.552 PAIN IN LEFT HIP: ICD-10-CM

## 2021-07-16 LAB
ALBUMIN SERPL-MCNC: 4.2 G/DL (ref 3.5–5.2)
ALBUMIN/GLOB SERPL: 1.4 G/DL
ALP SERPL-CCNC: 448 U/L (ref 39–117)
ALT SERPL W P-5'-P-CCNC: 61 U/L (ref 1–33)
ANION GAP SERPL CALCULATED.3IONS-SCNC: 11.2 MMOL/L (ref 5–15)
AST SERPL-CCNC: 75 U/L (ref 1–32)
BASOPHILS # BLD AUTO: 0.04 10*3/MM3 (ref 0–0.2)
BASOPHILS NFR BLD AUTO: 0.5 % (ref 0–1.5)
BILIRUB SERPL-MCNC: 1 MG/DL (ref 0–1.2)
BILIRUB UR QL STRIP: ABNORMAL
BUN SERPL-MCNC: 19 MG/DL (ref 6–20)
BUN/CREAT SERPL: 18.8 (ref 7–25)
CALCIUM SPEC-SCNC: 9.5 MG/DL (ref 8.6–10.5)
CHLORIDE SERPL-SCNC: 92 MMOL/L (ref 98–107)
CLARITY UR: CLEAR
CO2 SERPL-SCNC: 32.8 MMOL/L (ref 22–29)
COLOR UR: ABNORMAL
CREAT SERPL-MCNC: 1.01 MG/DL (ref 0.57–1)
DEPRECATED RDW RBC AUTO: 51.8 FL (ref 37–54)
EOSINOPHIL # BLD AUTO: 0.22 10*3/MM3 (ref 0–0.4)
EOSINOPHIL NFR BLD AUTO: 2.9 % (ref 0.3–6.2)
ERYTHROCYTE [DISTWIDTH] IN BLOOD BY AUTOMATED COUNT: 14.9 % (ref 12.3–15.4)
GFR SERPL CREATININE-BSD FRML MDRD: 58 ML/MIN/1.73
GLOBULIN UR ELPH-MCNC: 3.1 GM/DL
GLUCOSE SERPL-MCNC: 223 MG/DL (ref 65–99)
GLUCOSE UR STRIP-MCNC: NEGATIVE MG/DL
HCT VFR BLD AUTO: 44 % (ref 34–46.6)
HGB BLD-MCNC: 14.3 G/DL (ref 12–15.9)
HGB UR QL STRIP.AUTO: NEGATIVE
IMM GRANULOCYTES # BLD AUTO: 0.05 10*3/MM3 (ref 0–0.05)
IMM GRANULOCYTES NFR BLD AUTO: 0.7 % (ref 0–0.5)
KETONES UR QL STRIP: NEGATIVE
LEUKOCYTE ESTERASE UR QL STRIP.AUTO: NEGATIVE
LYMPHOCYTES # BLD AUTO: 1.13 10*3/MM3 (ref 0.7–3.1)
LYMPHOCYTES NFR BLD AUTO: 14.9 % (ref 19.6–45.3)
MCH RBC QN AUTO: 31.2 PG (ref 26.6–33)
MCHC RBC AUTO-ENTMCNC: 32.5 G/DL (ref 31.5–35.7)
MCV RBC AUTO: 96.1 FL (ref 79–97)
MONOCYTES # BLD AUTO: 0.71 10*3/MM3 (ref 0.1–0.9)
MONOCYTES NFR BLD AUTO: 9.3 % (ref 5–12)
NEUTROPHILS NFR BLD AUTO: 5.45 10*3/MM3 (ref 1.7–7)
NEUTROPHILS NFR BLD AUTO: 71.7 % (ref 42.7–76)
NITRITE UR QL STRIP: NEGATIVE
NRBC BLD AUTO-RTO: 0 /100 WBC (ref 0–0.2)
NT-PROBNP SERPL-MCNC: 245.4 PG/ML (ref 0–900)
PH UR STRIP.AUTO: 5.5 [PH] (ref 5–8)
PLATELET # BLD AUTO: 194 10*3/MM3 (ref 140–450)
PMV BLD AUTO: 9.1 FL (ref 6–12)
POTASSIUM SERPL-SCNC: 4.2 MMOL/L (ref 3.5–5.2)
PROT SERPL-MCNC: 7.3 G/DL (ref 6–8.5)
PROT UR QL STRIP: NEGATIVE
RBC # BLD AUTO: 4.58 10*6/MM3 (ref 3.77–5.28)
SODIUM SERPL-SCNC: 136 MMOL/L (ref 136–145)
SP GR UR STRIP: 1.01 (ref 1–1.03)
UROBILINOGEN UR QL STRIP: ABNORMAL
WBC # BLD AUTO: 7.6 10*3/MM3 (ref 3.4–10.8)

## 2021-07-16 PROCEDURE — 36415 COLL VENOUS BLD VENIPUNCTURE: CPT

## 2021-07-16 PROCEDURE — 83880 ASSAY OF NATRIURETIC PEPTIDE: CPT | Performed by: NURSE PRACTITIONER

## 2021-07-16 PROCEDURE — 87086 URINE CULTURE/COLONY COUNT: CPT | Performed by: NURSE PRACTITIONER

## 2021-07-16 PROCEDURE — 81003 URINALYSIS AUTO W/O SCOPE: CPT | Performed by: PHYSICIAN ASSISTANT

## 2021-07-16 PROCEDURE — 99283 EMERGENCY DEPT VISIT LOW MDM: CPT

## 2021-07-16 PROCEDURE — 80053 COMPREHEN METABOLIC PANEL: CPT | Performed by: NURSE PRACTITIONER

## 2021-07-16 PROCEDURE — 85025 COMPLETE CBC W/AUTO DIFF WBC: CPT | Performed by: NURSE PRACTITIONER

## 2021-07-16 RX ORDER — HYDROCODONE BITARTRATE AND ACETAMINOPHEN 7.5; 325 MG/1; MG/1
1 TABLET ORAL ONCE
Status: COMPLETED | OUTPATIENT
Start: 2021-07-16 | End: 2021-07-16

## 2021-07-16 RX ORDER — ACYCLOVIR 800 MG/1
800 TABLET ORAL ONCE
Status: COMPLETED | OUTPATIENT
Start: 2021-07-16 | End: 2021-07-16

## 2021-07-16 RX ORDER — ACYCLOVIR 400 MG/1
400 TABLET ORAL
Qty: 35 TABLET | Refills: 0 | Status: SHIPPED | OUTPATIENT
Start: 2021-07-16 | End: 2021-08-17

## 2021-07-16 RX ORDER — HYDROCODONE BITARTRATE AND ACETAMINOPHEN 5; 325 MG/1; MG/1
1 TABLET ORAL EVERY 6 HOURS PRN
Qty: 12 TABLET | Refills: 0 | Status: SHIPPED | OUTPATIENT
Start: 2021-07-16 | End: 2021-07-20 | Stop reason: SDUPTHER

## 2021-07-16 RX ADMIN — ACYCLOVIR 800 MG: 800 TABLET ORAL at 22:11

## 2021-07-16 RX ADMIN — HYDROCODONE BITARTRATE AND ACETAMINOPHEN 1 TABLET: 7.5; 325 TABLET ORAL at 22:11

## 2021-07-16 NOTE — TELEPHONE ENCOUNTER
Caller: Joan Partida    Relationship: Self    Best call back number: [phone]    What is the best time to reach you: ANY    Who are you requesting to speak with (clinical staff, provider,  specific staff member): CLINICAL STAFF    What was the call regarding: PATIENT CALLED STATING THAT SHE WAS TOLD BY DR GONSALVES NURSE TO GIVE A CALL BACK IF SHE IS NOT FEELING ANY BETTER. SHE WOULD LIKE TO SPEAK TO A NURSE AS SOON AS POSSIBLE    Do you require a callback: YES

## 2021-07-17 NOTE — ED PROVIDER NOTES
Subjective   The patient presents to the emergency department and states that she has been having some left-sided back pain for about a week.  She states that she was seen at an urgent care 3 days ago and was given antibiotics for a UTI.  She states that they told her they would call her with her culture results and she has not heard from them yet.  She states she has had no fever.  She reports a decrease in appetite with nausea no vomiting.  She denies any abdominal pain or tenderness with palpation.  Also states that she developed a rash yesterday along her mid left back that wraps around her left rib area to her upper abdomen.  This is a vesicular rash that appears to be herpes zoster.  The vesicles are intact at this time.  She states that it is very tender to touch and is shooting pains up into her shoulder and down into her back.  She denies any urinary symptoms.  She states she is had no burning or discharge.  She states that she has chronic back pain and thought maybe that was what was going on until they diagnosed her with a UTI.  She states her symptoms have not gotten better with the antibiotics.          Review of Systems   Constitutional: Negative for chills and fever.   HENT: Negative for congestion, ear pain and sore throat.    Eyes: Negative for pain.   Respiratory: Negative for cough, chest tightness and shortness of breath.    Cardiovascular: Negative for chest pain.   Gastrointestinal: Positive for nausea. Negative for abdominal pain, diarrhea and vomiting.   Genitourinary: Negative for flank pain and hematuria.   Musculoskeletal: Positive for back pain. Negative for joint swelling.   Skin: Positive for rash. Negative for pallor.   Neurological: Negative for seizures and headaches.   All other systems reviewed and are negative.      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   • History of tobacco abuse    • Hyperlipidemia    • Hypokalemia 2019   • Hypothyroid    • Long term current use of anticoagulant    • LPRD (laryngopharyngeal reflux disease)    • Moderate episode of recurrent major depressive disorder (CMS/HCC) 2017   • Morbid obesity (CMS/HCC) 2015   • Mycobacterium avium complex (CMS/HCC) 2018   • Obesity 07/10/2014   • GIUSEPPE (obstructive sleep apnea) 2018   • Pulmonary hypertension (CMS/HCC) 2018   • Stasis dermatitis of both legs 2018   • Type 2 diabetes mellitus, with long-term current use of insulin (CMS/HCC) 2017   • Ventral hernia 2015   • Vitamin D deficiency 2013   • Voice hoarseness        Allergies   Allergen Reactions   • Cobalt Unknown - High Severity     Cobalt blue, by allergy testing.    • Dulaglutide Nausea And Vomiting   • Exenatide Other (See Comments)     pancreantitis   • Metformin Unknown - Low Severity   • Palladium Chloride Unknown - Low Severity     by allergy testing.    • Sitagliptin Other (See Comments)     panceatitis       Past Surgical History:   Procedure Laterality Date   • CARDIAC CATHETERIZATION  2018   • CARPAL TUNNEL RELEASE     •  SECTION  ,     • COLONOSCOPY     • ENDOSCOPY     • HERNIA REPAIR     • HYSTERECTOMY  ,     PARTIAL   • KNEE SURGERY         Family History   Problem Relation Age of Onset   • Heart disease Mother         GRANDPARENT-NONSPECIFIC   • Diabetes Father    • Skin cancer  Father        Social History     Socioeconomic History   • Marital status:      Spouse name: Not on file   • Number of children: Not on file   • Years of education: Not on file   • Highest education level: Not on file   Tobacco Use   • Smoking status: Former Smoker     Packs/day: 1.00   • Smokeless tobacco: Never Used   • Tobacco comment: QUIT 2004   Vaping Use   • Vaping Use: Never used   Substance and Sexual Activity   • Alcohol use: Not Currently     Comment: FORMER; OCCASIONAL   • Drug use: Never   • Sexual activity: Defer           Objective   Physical Exam  Vitals and nursing note reviewed.   Constitutional:       General: She is not in acute distress.     Appearance: Normal appearance. She is not toxic-appearing.   HENT:      Head: Normocephalic and atraumatic.   Cardiovascular:      Rate and Rhythm: Normal rate.      Pulses: Normal pulses.   Pulmonary:      Effort: Pulmonary effort is normal. No respiratory distress.      Breath sounds: Normal breath sounds.   Abdominal:      General: Abdomen is flat.      Palpations: Abdomen is soft.      Tenderness: There is no abdominal tenderness.   Musculoskeletal:         General: Normal range of motion.      Cervical back: Normal range of motion.   Skin:     General: Skin is warm and dry.      Capillary Refill: Capillary refill takes less than 2 seconds.      Findings: Erythema, lesion and rash present.   Neurological:      General: No focal deficit present.      Mental Status: She is alert and oriented to person, place, and time. Mental status is at baseline.         Procedures           ED Course  ED Course as of Jul 17 0008 Fri Jul 16, 2021 2146 I reviewed the patient's urine results with her.  I explained to her that I believe that was just significantly contaminated at the urgent care and that the antibiotics that she had been placed on was not necessary.  I told her I did review her urine culture which showed numerous contaminants but no definite  organisms.  The patient became emotional and is requesting to have blood work done states that she is concerned that her urine is dark and does not understand why.  She is also requesting that we contact Dr. Joaquin concerning her kidneys prior to her being discharged.    [TC]   2253 Monocytes Absolute: 0.71 [TC]      ED Course User Index  [TC] Pamela Pozo APRN                                           MDM  Number of Diagnoses or Management Options  Herpes zoster without complication: minor  Pain in left hip: minor     Amount and/or Complexity of Data Reviewed  Clinical lab tests: reviewed    Risk of Complications, Morbidity, and/or Mortality  Presenting problems: low  Diagnostic procedures: low  Management options: low    Patient Progress  Patient progress: stable      Final diagnoses:   Herpes zoster without complication   Pain in left hip       ED Disposition  ED Disposition     ED Disposition Condition Comment    Discharge Stable           Francesco Gimenez, DO  [address] [place] [address] [phone]    In 2 days  FOR FOLLOW UP    Lincoln Joaquin MD  [address]  [address]  [place] [id number] [phone]      AS SCHEDULED         Medication List      New Prescriptions    acyclovir 400 MG tablet  Commonly known as: ZOVIRAX  Take 1 tablet by mouth 5 (Five) Times a Day. Take no more than 5 doses a day.     HYDROcodone-acetaminophen 5-325 MG per tablet  Commonly known as: NORCO  Take 1 tablet by mouth Every 6 (Six) Hours As Needed for Moderate Pain .        Stop    phenazopyridine 100 MG tablet  Commonly known as: PYRIDIUM           Where to Get Your Medications      These medications were sent to [id number] - [place], [place] - [address] AT [facility] [phone] [facility] [phone] FX  [address] [place] [address]    Hours: 24-hours Phone: [phone]   · acyclovir 400 MG tablet  · HYDROcodone-acetaminophen  5-325 MG per tablet          Pamela Pozo, APRN  07/17/21 0008

## 2021-07-17 NOTE — DISCHARGE INSTRUCTIONS
Take your meds as prescribed.  Do not apply lotions or ointments to your rash.  Do not scratch or rub the areas either.  Keep covered while the lesions are weeping and open.  Once the area scabs over and is dry you do not have to cover anymore.  Continue with your home Neurontin as prescribed.  Call Dr. Gimenez's office on Monday to see if they would allow you to increase your Neurontin or how they would want you to manager shingles pain.  Return to the emergency department for any acutely developing shortness of breath, any acutely developing neurological symptoms, or any new or worse concerns.

## 2021-07-18 LAB — BACTERIA SPEC AEROBE CULT: NO GROWTH

## 2021-07-19 ENCOUNTER — APPOINTMENT (OUTPATIENT)
Dept: CT IMAGING | Facility: HOSPITAL | Age: 51
End: 2021-07-19

## 2021-07-19 NOTE — TELEPHONE ENCOUNTER
PATIENT STATES SHE ENDED UP GOING TO THE HOSPITAL FOR SHINGLES. PATIENT STATES SHE WOULD LIKE A CALL BACK TO DISCUSS TREATMENT OPTIONS. [phone]

## 2021-07-20 ENCOUNTER — OFFICE VISIT (OUTPATIENT)
Dept: FAMILY MEDICINE CLINIC | Facility: CLINIC | Age: 51
End: 2021-07-20

## 2021-07-20 ENCOUNTER — TELEPHONE (OUTPATIENT)
Dept: FAMILY MEDICINE CLINIC | Facility: CLINIC | Age: 51
End: 2021-07-20

## 2021-07-20 ENCOUNTER — TELEPHONE (OUTPATIENT)
Dept: GASTROENTEROLOGY | Facility: CLINIC | Age: 51
End: 2021-07-20

## 2021-07-20 VITALS
TEMPERATURE: 97.4 F | HEART RATE: 92 BPM | SYSTOLIC BLOOD PRESSURE: 128 MMHG | OXYGEN SATURATION: 94 % | DIASTOLIC BLOOD PRESSURE: 57 MMHG

## 2021-07-20 DIAGNOSIS — E11.40 TYPE 2 DIABETES MELLITUS WITH DIABETIC NEUROPATHY, WITHOUT LONG-TERM CURRENT USE OF INSULIN (HCC): ICD-10-CM

## 2021-07-20 DIAGNOSIS — M25.552 PAIN IN LEFT HIP: ICD-10-CM

## 2021-07-20 DIAGNOSIS — IMO0002 UNCONTROLLED TYPE 2 DIABETES MELLITUS WITH NEUROLOGIC COMPLICATION: Primary | ICD-10-CM

## 2021-07-20 PROBLEM — B02.9 HERPES ZOSTER WITHOUT COMPLICATION: Status: ACTIVE | Noted: 2021-07-20

## 2021-07-20 PROCEDURE — 99214 OFFICE O/P EST MOD 30 MIN: CPT | Performed by: FAMILY MEDICINE

## 2021-07-20 RX ORDER — GABAPENTIN 300 MG/1
900 CAPSULE ORAL 4 TIMES DAILY
Qty: 360 CAPSULE | Refills: 5 | Status: SHIPPED | OUTPATIENT
Start: 2021-07-20 | End: 2021-12-02

## 2021-07-20 RX ORDER — HYDROCODONE BITARTRATE AND ACETAMINOPHEN 5; 325 MG/1; MG/1
1 TABLET ORAL EVERY 6 HOURS PRN
Qty: 50 TABLET | Refills: 0 | Status: SHIPPED | OUTPATIENT
Start: 2021-07-20 | End: 2021-07-20 | Stop reason: SDUPTHER

## 2021-07-20 RX ORDER — HYDROCODONE BITARTRATE AND ACETAMINOPHEN 5; 325 MG/1; MG/1
1 TABLET ORAL EVERY 6 HOURS PRN
Qty: 50 TABLET | Refills: 0 | Status: SHIPPED | OUTPATIENT
Start: 2021-07-20 | End: 2021-07-30

## 2021-07-20 NOTE — PROGRESS NOTES
Chief Complaint   Patient presents with   • Hospital Follow Up Visit        Subjective     Joan Partida  has a past medical history of Abnormal mammogram (10/21/2013), Allergy, Anemia, Arthralgia, CHF (congestive heart failure) (CMS/[facility]), COPD (chronic obstructive pulmonary disease) (CMS/[facility]) (03/04/2015), Dependent edema (08/27/2018), Depression, Derangement of meniscus of left knee (01/01/2014), Dermatitis (07/10/2014), Dysphagia, Essential hypertension (03/04/2015), Fibromyalgia, Foot pain (07/10/2014), GERD (gastroesophageal reflux disease) (10/17/2014), Hepatic steatosis (03/04/2015), 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CMS/[facility]) (10/09/2020), History of tobacco abuse, Hyperlipidemia, Hypokalemia (05/07/2019), Hypothyroid, Long term current use of anticoagulant, LPRD (laryngopharyngeal reflux disease), Moderate episode of recurrent major depressive disorder (CMS/[facility]) (06/22/2017), Morbid obesity (CMS/[facility]) (03/04/2015), Mycobacterium avium complex (CMS/[facility]) (08/09/2018), Obesity (07/10/2014), GIUSEPPE (obstructive sleep apnea) (08/09/2018), Pulmonary hypertension (CMS/[facility]) (08/27/2018), Stasis dermatitis of both legs (08/09/2018), Type 2 diabetes mellitus, with long-term current use of insulin (CMS/[facility]) (06/22/2017), Ventral hernia (03/04/2015), Vitamin D deficiency (11/13/2013), and Voice hoarseness.    ER follow-up that she was to [facility] ER about a week ago for 4 left flank pain.  She had previously been to the urgent care and they thought she had a UTI and was given antibiotics.  Her's pain got worse and presented to the emergency room.  During the evaluation there she was discovered to have a rash and was diagnosed with shingles.  She was given acyclovir and Norco 5/3/2025.  She is still having a lot of pain and is using the Norco on a regular basis.      PHQ-2 Depression Screening  Little interest or pleasure in doing things?     Feeling down, depressed, or hopeless?     PHQ-2 Total Score     PHQ-9 Depression  Screening  Little interest or pleasure in doing things?     Feeling down, depressed, or hopeless?     Trouble falling or staying asleep, or sleeping too much?     Feeling tired or having little energy?     Poor appetite or overeating?     Feeling bad about yourself - or that you are a failure or have let yourself or your family down?     Trouble concentrating on things, such as reading the newspaper or watching television?     Moving or speaking so slowly that other people could have noticed? Or the opposite - being so fidgety or restless that you have been moving around a lot more than usual?     Thoughts that you would be better off dead, or of hurting yourself in some way?     PHQ-9 Total Score     If you checked off any problems, how difficult have these problems made it for you to do your work, take care of things at home, or get along with other people?       Allergies   Allergen Reactions   • Cobalt Unknown - High Severity     Cobalt blue, by allergy testing.    • Dulaglutide Nausea And Vomiting   • Exenatide Other (See Comments)     pancreantitis   • Metformin Unknown - Low Severity   • Palladium Chloride Unknown - Low Severity     by allergy testing.    • Sitagliptin Other (See Comments)     panceatitis       Prior to Admission medications    Medication Sig Start Date End Date Taking? Authorizing Provider   acyclovir (ZOVIRAX) 400 MG tablet Take 1 tablet by mouth 5 (Five) Times a Day. Take no more than 5 doses a day. 7/16/21  Yes Pamela Pozo APRN   albuterol sulfate HFA (Ventolin HFA) 108 (90 Base) MCG/ACT inhaler Ventolin HFA 90 mcg/actuation inhalation HFA aerosol inhaler inhale 1 - 2 puffs (90 - 180 mcg) by inhalation route every 6 hours as needed   Active   Yes Gina Hunt MD   Allergy Relief 180 MG tablet Take 180 mg by mouth 2 (Two) Times a Day. 6/1/21  Yes Gina Hunt MD   arformoterol (Brovana) 15 MCG/2ML nebulizer solution 2 mL.   Yes Gina Hunt MD   BD Insulin  Syringe U-500 31G X 6MM 0.5 ML misc  7/2/21  Yes Gina Hunt MD   budesonide (Pulmicort) 0.5 MG/2ML nebulizer solution 2 mL.   Yes Gina Hunt MD   cefdinir (OMNICEF) 300 MG capsule Take 1 capsule by mouth 2 (Two) Times a Day for 7 days. 7/13/21 7/20/21 Yes Sharda Reddy PA-C   Cholecalciferol 125 MCG (5000 UT) tablet Take 125 mcg by mouth Daily.   Yes Gina Hunt MD   clotrimazole (LOTRIMIN) 1 % cream APPLY EXTERNALLY TO THE AFFECTED AND SURROUNDING AREAS TWICE DAILY IN THE MORNING AND IN THE EVENING FOR 14 DAYS 5/22/21  Yes Gina Hunt MD   Continuous Blood Gluc  (Dexcom G6 ) device USE RECIEVER DEVICE AS DIRECTED 6/1/21  Yes Gina Hunt MD   Continuous Blood Gluc Sensor (Dexcom G6 Sensor) APPLY 1 TOPICALLY EVERY 10 DAYS 6/29/21  Yes Gina Hunt MD   Continuous Blood Gluc Transmit (Dexcom G6 Transmitter) misc USE TRANSMITTER AS INSTRUCTED 6/1/21  Yes Gina Hunt MD   dicyclomine (BENTYL) 20 MG tablet Take 20 mg by mouth 3 (Three) Times a Day.   Yes Gina Hunt MD   docusate sodium (COLACE) 100 MG capsule Take 100 mg by mouth 2 (Two) Times a Day.   Yes Emergency, Nurse Epic, RN   eszopiclone (LUNESTA) 1 MG tablet Take immediately before bedtime 6/29/21  Yes Mk Dowling,    ezetimibe (ZETIA) 10 MG tablet Take 10 mg by mouth Daily.   Yes Gina Hunt MD   ferrous sulfate 325 (65 FE) MG tablet Take 325 mg by mouth 2 (Two) Times a Day.   Yes Emergency, Nurse Epic, RN   Fluticasone Furoate-Vilanterol (Breo Ellipta) 200-25 MCG/INH inhaler Breo Ellipta 200-25 mcg/dose inhalation blister with device inhale 1 puff by inhalation route once daily at the same time each day   Active   Yes Gina Hunt MD   formoterol (Perforomist) 20 MCG/2ML nebulizer solution Take  by nebulization 2 (Two) Times a Day.   Yes Gina Hunt MD   gabapentin (NEURONTIN) 300 MG capsule Take 3 capsules by  mouth 3 (Three) Times a Day. 6/15/21  Yes Francesco Gimenez DO   HYDROcodone-acetaminophen (NORCO) 5-325 MG per tablet Take 1 tablet by mouth Every 6 (Six) Hours As Needed for Moderate Pain . 7/16/21  Yes Carlos A Kennedy DO   insulin regular (HUMULIN R) 500 UNIT/ML CONCENTRATED injection Humulin R U-500 (Conc) Insulin 500 unit/mL subcutaneous solution inject by subcutaneous route per  instructions.  For use with insulin pump   Active   Yes Gina Hunt MD   ipratropium-albuterol (DUO-NEB) 0.5-2.5 mg/3 ml nebulizer USE 3 ML VIA NEBULIZER EVERY 4 HOURS AS NEEDED FOR SHORTNESS OF BREATH 6/18/21  Yes Gina Hunt MD   levothyroxine (SYNTHROID, LEVOTHROID) 125 MCG tablet Take 250 mcg by mouth. 6/22/21  Yes Gina Hunt MD   Magnesium Gluconate 550 MG tablet Take 30 mg by mouth 2 (Two) Times a Day.   Yes Gina Hunt MD   metoprolol succinate XL (TOPROL-XL) 25 MG 24 hr tablet Take 25 mg by mouth 2 (Two) Times a Day. 6/29/21  Yes Gina Hunt MD   mineral oil-hydrophilic petrolatum (AQUAPHOR) ointment 2 (Two) Times a Day. to affected area 6/30/21  Yes Gina Hunt MD   montelukast (SINGULAIR) 10 MG tablet TAKE 1 TABLET BY MOUTH ONCE DAILY EVERY EVENING 7/1/21  Yes Gina Hunt MD   nystatin (MYCOSTATIN) 666636 UNIT/GM powder Apply  topically to the appropriate area as directed 3 (Three) Times a Day for 90 days. 7/9/21 10/7/21 Yes Francesco Gimenez,    pantoprazole (PROTONIX) 40 MG EC tablet Take 40 mg by mouth Daily. 7/1/21  Yes Gina Hunt MD   polyethylene glycol (MIRALAX) 17 GM/SCOOP powder 17 g.   Yes Gina Hunt MD   potassium chloride 10 MEQ CR tablet potassium chloride 10 mEq oral tablet extended release take 2 tablets (20 meq) by oral route 2 times per day with food   Active   Yes Gina Hunt MD   promethazine (PHENERGAN) 25 MG tablet As Needed. 5/22/21  Yes Gina Hunt MD   revefenacin (YUPELRI) 175  "MCG/3ML nebulizer solution Take  by nebulization Daily.   Yes Gina Hunt MD   rosuvastatin (CRESTOR) 40 MG tablet Take 40 mg by mouth Daily.   Yes Gina Hunt MD   sertraline (ZOLOFT) 100 MG tablet Take 1.5 tablets by mouth Daily for 90 days. 7/9/21 10/7/21 Yes Francesco Gimenez,    spironolactone (ALDACTONE) 100 MG tablet Take 100 mg by mouth Daily.   Yes Gina Hunt MD   sucralfate (CARAFATE) 1 g tablet sucralfate 1 gram oral tablet take 1 tablet (1 gram) by oral route 4 times per day on an empty stomach 1 hour before meals and at bedtime   Suspended   Yes Gina Hunt MD   SUMAtriptan (IMITREX) 100 MG tablet Take 1 tablet by mouth As Needed. 6/29/21  Yes Gina Hunt MD   torsemide (DEMADEX) 100 MG tablet Take 100 mg by mouth 3 (Three) Times a Day.   Yes Gina Hunt MD   traZODone (DESYREL) 50 MG tablet Take 50 mg by mouth Daily.   Yes Gina Hunt MD   TRUEplus Insulin Syringe 31G X 5/16\" 0.5 ML misc USE AS DIRECTED SIX TIMES DAILY 6/30/21  Yes Gina Hunt MD   vitamin D3 125 MCG (5000 UT) capsule capsule Take 1 capsule by mouth Daily. 6/1/21  Yes Gina Hunt MD   warfarin (COUMADIN) 2 MG tablet warfarin 2 mg oral tablet take 1 tablet (2 mg) by oral route once daily as directed 2/16/2021  Active 2/16/21  Yes Gina Hunt MD   warfarin (COUMADIN) 5 MG tablet warfarin 5 mg oral tablet take 1 tablet (5 mg) by oral route once daily as directed 1/12/2021  Active 1/12/21  Yes Gina Hunt MD   warfarin (Coumadin) 7.5 MG tablet Coumadin 7.5 mg oral tablet take 1 tablet (7.5 mg) by oral route once daily   Active   Yes Gina Hunt MD   doxycycline (VIBRAMYCIN) 100 MG capsule Take 100 mg by mouth 2 (Two) Times a Day As Needed.  Patient not taking: Reported on 7/13/2021 4/20/21   Gina Hunt MD        Patient Active Problem List   Diagnosis   • NAFLD (nonalcoholic fatty liver disease)   • " Budd-Chiari syndrome (CMS/HCC)   • Abnormal liver enzymes   • Abnormal mammogram   • Acquired hypothyroidism   • Anemia   • Arthritis   • Chronic right-sided low back pain with right-sided sciatica   • Contact dermatitis and other eczema   • COPD (chronic obstructive pulmonary disease) (CMS/HCC)   • Dependent edema   • Depression   • Major depressive disorder with current active episode   • Diabetic polyneuropathy (CMS/HCC)   • Dysphagia   • Elevated alkaline phosphatase level   • Elevated ferritin   • Encounter for long-term (current) use of insulin (CMS/HCC)   • Essential hypertension   • HTN, goal below 140/90   • Fibromyalgia   • Neck pain, bilateral   • Foot pain   • Gastroesophageal reflux disease without esophagitis   • History of pancreatitis   • Hyperlipidemia   • Hypokalemia   • Hyperinsulinism   • Insulin resistance   • Long term (current) use of anticoagulants   • LPRD (laryngopharyngeal reflux disease)   • Magnesium deficiency   • Microalbuminuria   • Moderate episode of recurrent major depressive disorder (CMS/HCC)   • Morbid obesity with BMI of 70 and over, adult (CMS/HCC)   • Mycobacterium avium complex (CMS/HCC)   • GIUSEPPE (obstructive sleep apnea)   • Pain in left hip   • Personal history of tobacco use, presenting hazards to health   • Predisposition to allergic reaction   • Presence of insulin pump   • Pulmonary hypertension (CMS/HCC)   • Pulmonary nodule   • Serum calcium elevated   • Stasis dermatitis of both legs   • Thoracic back pain   • Uncontrolled type 2 diabetes mellitus with hyperglycemia (CMS/HCC)   • Uncontrolled type 2 diabetes mellitus with neurologic complication (CMS/HCC)   • Type 2 diabetes mellitus, with long-term current use of insulin (CMS/HCC)   • Uncontrolled type 2 diabetes mellitus without complication, with long-term current use of insulin   • Ventral hernia   • Vitamin D deficiency   • Voice hoarseness   • Wheezing   • 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CMS/HCC)   • Hepatic steatosis    • Herpes zoster without complication        Past Surgical History:   Procedure Laterality Date   • CARDIAC CATHETERIZATION  2018   • CARPAL TUNNEL RELEASE     •  SECTION  ,     • COLONOSCOPY     • ENDOSCOPY     • HERNIA REPAIR     • HYSTERECTOMY  ,     PARTIAL   • KNEE SURGERY         Social History     Socioeconomic History   • Marital status:      Spouse name: Not on file   • Number of children: Not on file   • Years of education: Not on file   • Highest education level: Not on file   Tobacco Use   • Smoking status: Former Smoker     Packs/day: 1.00   • Smokeless tobacco: Never Used   • Tobacco comment: QUIT    Vaping Use   • Vaping Use: Never used   Substance and Sexual Activity   • Alcohol use: Not Currently     Comment: FORMER; OCCASIONAL   • Drug use: Never   • Sexual activity: Defer       Family History   Problem Relation Age of Onset   • Heart disease Mother         GRANDPARENT-NONSPECIFIC   • Diabetes Father    • Skin cancer Father        Family history, surgical history, past medical history, Allergies and med's reviewed with patient today and updated in [employer] EMR.     ROS:  Review of Systems   Constitutional: Positive for chills. Negative for fatigue and fever.   Musculoskeletal: Positive for back pain.   Skin: Positive for rash.       OBJECTIVE:  Vitals:    21 1143   BP: 128/57   BP Location: Left arm   Patient Position: Sitting   Cuff Size: Adult   Pulse: 92   Temp: 97.4 °F (36.3 °C)   TempSrc: Temporal   SpO2: 94%     No exam data present   There is no height or weight on file to calculate BMI.  No LMP recorded (lmp unknown). Patient has had a hysterectomy.    Physical Exam  Vitals and nursing note reviewed.   Constitutional:       General: She is not in acute distress.     Appearance: Normal appearance. She is obese. She is not ill-appearing.   HENT:      Head: Normocephalic and atraumatic.   Skin:     Findings: Rash present. Rash is  vesicular.             Comments: Left flank posterior to anterior   Neurological:      Mental Status: She is alert.         Procedures    Admission on 07/16/2021, Discharged on 07/16/2021   Component Date Value Ref Range Status   • Color, UA 07/16/2021 Dark Yellow* Yellow, Straw Final   • Appearance, UA 07/16/2021 Clear  Clear Final   • pH, UA 07/16/2021 5.5  5.0 - 8.0 Final   • Specific Gravity, UA 07/16/2021 1.014  1.005 - 1.030 Final   • Glucose, UA 07/16/2021 Negative  Negative Final   • Ketones, UA 07/16/2021 Negative  Negative Final   • Bilirubin, UA 07/16/2021 Small (1+)* Negative Final   • Blood, UA 07/16/2021 Negative  Negative Final   • Protein, UA 07/16/2021 Negative  Negative Final   • Leuk Esterase, UA 07/16/2021 Negative  Negative Final   • Nitrite, UA 07/16/2021 Negative  Negative Final   • Urobilinogen, UA 07/16/2021 0.2 E.U./dL  0.2 - 1.0 E.U./dL Final   • Glucose 07/16/2021 223* 65 - 99 mg/dL Final   • BUN 07/16/2021 19  6 - 20 mg/dL Final   • Creatinine 07/16/2021 1.01* 0.57 - 1.00 mg/dL Final   • Sodium 07/16/2021 136  136 - 145 mmol/L Final   • Potassium 07/16/2021 4.2  3.5 - 5.2 mmol/L Final   • Chloride 07/16/2021 92* 98 - 107 mmol/L Final   • CO2 07/16/2021 32.8* 22.0 - 29.0 mmol/L Final   • Calcium 07/16/2021 9.5  8.6 - 10.5 mg/dL Final   • Total Protein 07/16/2021 7.3  6.0 - 8.5 g/dL Final   • Albumin 07/16/2021 4.20  3.50 - 5.20 g/dL Final   • ALT (SGPT) 07/16/2021 61* 1 - 33 U/L Final   • AST (SGOT) 07/16/2021 75* 1 - 32 U/L Final   • Alkaline Phosphatase 07/16/2021 448* 39 - 117 U/L Final   • Total Bilirubin 07/16/2021 1.0  0.0 - 1.2 mg/dL Final   • eGFR Non African Amer 07/16/2021 58* >60 mL/min/1.73 Final   • Globulin 07/16/2021 3.1  gm/dL Final   • A/G Ratio 07/16/2021 1.4  g/dL Final   • BUN/Creatinine Ratio 07/16/2021 18.8  7.0 - 25.0 Final   • Anion Gap 07/16/2021 11.2  5.0 - 15.0 mmol/L Final   • proBNP 07/16/2021 245.4  0.0 - 900.0 pg/mL Final   • WBC 07/16/2021 7.60  3.40 -  10.80 10*3/mm3 Final   • RBC 07/16/2021 4.58  3.77 - 5.28 10*6/mm3 Final   • Hemoglobin 07/16/2021 14.3  12.0 - 15.9 g/dL Final   • Hematocrit 07/16/2021 44.0  34.0 - 46.6 % Final   • MCV 07/16/2021 96.1  79.0 - 97.0 fL Final   • MCH 07/16/2021 31.2  26.6 - 33.0 pg Final   • MCHC 07/16/2021 32.5  31.5 - 35.7 g/dL Final   • RDW 07/16/2021 14.9  12.3 - 15.4 % Final   • RDW-SD 07/16/2021 51.8  37.0 - 54.0 fl Final   • MPV 07/16/2021 9.1  6.0 - 12.0 fL Final   • Platelets 07/16/2021 194  140 - 450 10*3/mm3 Final   • Neutrophil % 07/16/2021 71.7  42.7 - 76.0 % Final   • Lymphocyte % 07/16/2021 14.9* 19.6 - 45.3 % Final   • Monocyte % 07/16/2021 9.3  5.0 - 12.0 % Final   • Eosinophil % 07/16/2021 2.9  0.3 - 6.2 % Final   • Basophil % 07/16/2021 0.5  0.0 - 1.5 % Final   • Immature Grans % 07/16/2021 0.7* 0.0 - 0.5 % Final   • Neutrophils, Absolute 07/16/2021 5.45  1.70 - 7.00 10*3/mm3 Final   • Lymphocytes, Absolute 07/16/2021 1.13  0.70 - 3.10 10*3/mm3 Final   • Monocytes, Absolute 07/16/2021 0.71  0.10 - 0.90 10*3/mm3 Final   • Eosinophils, Absolute 07/16/2021 0.22  0.00 - 0.40 10*3/mm3 Final   • Basophils, Absolute 07/16/2021 0.04  0.00 - 0.20 10*3/mm3 Final   • Immature Grans, Absolute 07/16/2021 0.05  0.00 - 0.05 10*3/mm3 Final   • nRBC 07/16/2021 0.0  0.0 - 0.2 /100 WBC Final   • Urine Culture 07/16/2021 No growth   Final   Admission on 07/13/2021, Discharged on 07/13/2021   Component Date Value Ref Range Status   • Color 07/13/2021 Yellow  Yellow, Straw, Dark Yellow, Nora Final   • Clarity, UA 07/13/2021 Cloudy* Clear Final   • Glucose, UA 07/13/2021 Negative  Negative, 1000 mg/dL (3+) mg/dL Final   • Bilirubin 07/13/2021 Small (1+)* Negative Final   • Ketones, UA 07/13/2021 Trace* Negative Final   • Specific Gravity  07/13/2021 1.015  1.005 - 1.030 Final   • Blood, UA 07/13/2021 Trace* Negative Final   • pH, Urine 07/13/2021 5.0  5.0 - 8.0 Final   • Protein, POC 07/13/2021 Negative  Negative mg/dL Final   •  Urobilinogen, UA 07/13/2021 Normal  Normal Final   • Nitrite, UA 07/13/2021 Negative  Negative Final   • Leukocytes 07/13/2021 Large (3+)* Negative Final   • Urine Culture 07/13/2021 >100,000 CFU/mL Mixed Karmen Isolated   Final   Office Visit on 07/09/2021   Component Date Value Ref Range Status   • TSH 07/09/2021 1.370  0.270 - 4.200 uIU/mL Final   • Free T4 07/09/2021 1.36  0.93 - 1.70 ng/dL Final    T4 results may be falsely increased if patient taking Biotin.   • Total Cholesterol 07/09/2021 243* 0 - 200 mg/dL Final   • Triglycerides 07/09/2021 321* 0 - 150 mg/dL Final   • HDL Cholesterol 07/09/2021 45  40 - 60 mg/dL Final   • LDL Cholesterol  07/09/2021 140* 0 - 100 mg/dL Final   • VLDL Cholesterol 07/09/2021 58* 5 - 40 mg/dL Final   • LDL/HDL Ratio 07/09/2021 2.97   Final   • Glucose 07/09/2021 415* 65 - 99 mg/dL Final   • BUN 07/09/2021 36* 6 - 20 mg/dL Final   • Creatinine 07/09/2021 1.14* 0.57 - 1.00 mg/dL Final   • Sodium 07/09/2021 133* 136 - 145 mmol/L Final   • Potassium 07/09/2021 3.8  3.5 - 5.2 mmol/L Final   • Chloride 07/09/2021 81* 98 - 107 mmol/L Final   • CO2 07/09/2021 36.5* 22.0 - 29.0 mmol/L Final   • Calcium 07/09/2021 11.1* 8.6 - 10.5 mg/dL Final   • Total Protein 07/09/2021 8.0  6.0 - 8.5 g/dL Final   • Albumin 07/09/2021 4.50  3.50 - 5.20 g/dL Final   • ALT (SGPT) 07/09/2021 60* 1 - 33 U/L Final   • AST (SGOT) 07/09/2021 59* 1 - 32 U/L Final   • Alkaline Phosphatase 07/09/2021 509* 39 - 117 U/L Final   • Total Bilirubin 07/09/2021 0.5  0.0 - 1.2 mg/dL Final   • eGFR Non African Amer 07/09/2021 50* >60 mL/min/1.73 Final   • Globulin 07/09/2021 3.5  gm/dL Final   • A/G Ratio 07/09/2021 1.3  g/dL Final   • BUN/Creatinine Ratio 07/09/2021 31.6* 7.0 - 25.0 Final   • Anion Gap 07/09/2021 15.5* 5.0 - 15.0 mmol/L Final   • WBC 07/09/2021 9.18  3.40 - 10.80 10*3/mm3 Final   • RBC 07/09/2021 4.89  3.77 - 5.28 10*6/mm3 Final   • Hemoglobin 07/09/2021 15.3  12.0 - 15.9 g/dL Final   • Hematocrit  07/09/2021 45.3  34.0 - 46.6 % Final   • MCV 07/09/2021 92.6  79.0 - 97.0 fL Final   • MCH 07/09/2021 31.3  26.6 - 33.0 pg Final   • MCHC 07/09/2021 33.8  31.5 - 35.7 g/dL Final   • RDW 07/09/2021 14.3  12.3 - 15.4 % Final   • RDW-SD 07/09/2021 48.5  37.0 - 54.0 fl Final   • MPV 07/09/2021 9.4  6.0 - 12.0 fL Final   • Platelets 07/09/2021 269  140 - 450 10*3/mm3 Final   • Neutrophil % 07/09/2021 76.1* 42.7 - 76.0 % Final   • Lymphocyte % 07/09/2021 13.0* 19.6 - 45.3 % Final   • Monocyte % 07/09/2021 7.4  5.0 - 12.0 % Final   • Eosinophil % 07/09/2021 2.3  0.3 - 6.2 % Final   • Basophil % 07/09/2021 0.5  0.0 - 1.5 % Final   • Immature Grans % 07/09/2021 0.7* 0.0 - 0.5 % Final   • Neutrophils, Absolute 07/09/2021 6.99  1.70 - 7.00 10*3/mm3 Final   • Lymphocytes, Absolute 07/09/2021 1.19  0.70 - 3.10 10*3/mm3 Final   • Monocytes, Absolute 07/09/2021 0.68  0.10 - 0.90 10*3/mm3 Final   • Eosinophils, Absolute 07/09/2021 0.21  0.00 - 0.40 10*3/mm3 Final   • Basophils, Absolute 07/09/2021 0.05  0.00 - 0.20 10*3/mm3 Final   • Immature Grans, Absolute 07/09/2021 0.06* 0.00 - 0.05 10*3/mm3 Final   • nRBC 07/09/2021 0.0  0.0 - 0.2 /100 WBC Final   • Protime 07/09/2021 13.8* 9.4 - 12.0 Seconds Final   • INR 07/09/2021 1.31* 2.00 - 3.00 Final   • C-Peptide 07/09/2021 8.7* 1.1 - 4.4 ng/mL Final    C-Peptide reference interval is for fasting patients.       ASSESSMENT/ PLAN:    Diagnoses and all orders for this visit:    1. Uncontrolled type 2 diabetes mellitus with neurologic complication (CMS/HCC) (Primary)  Assessment & Plan:  She has persistent burning of her feet.  She is already on gabapentin 900 mg 3 times daily .  We will max it out to 900 mg 4 times a day and see if it gives her any additional benefit      2. Pain in left hip  -     HYDROcodone-acetaminophen (NORCO) 5-325 MG per tablet; Take 1 tablet by mouth Every 6 (Six) Hours As Needed for Moderate Pain  for up to 10 days.  Dispense: 50 tablet; Refill: 0    3. Type 2  diabetes mellitus with diabetic neuropathy, without long-term current use of insulin (CMS/[facility])  -     gabapentin (NEURONTIN) 300 MG capsule; Take 3 capsules by mouth 4 (Four) Times a Day for 30 days.  Dispense: 360 capsule; Refill: 5      Orders Placed Today:     New Medications Ordered This Visit   Medications   • HYDROcodone-acetaminophen (NORCO) 5-325 MG per tablet     Sig: Take 1 tablet by mouth Every 6 (Six) Hours As Needed for Moderate Pain  for up to 10 days.     Dispense:  50 tablet     Refill:  0   • gabapentin (NEURONTIN) 300 MG capsule     Sig: Take 3 capsules by mouth 4 (Four) Times a Day for 30 days.     Dispense:  360 capsule     Refill:  5        Management Plan:     An After Visit Summary was printed and given to the patient at discharge.    Follow-up: Return in about 4 weeks (around 8/17/2021).    Francesco Gimenez DO 7/20/2021 12:12 EDT  This note was electronically signed.

## 2021-07-20 NOTE — ASSESSMENT & PLAN NOTE
She has persistent burning of her feet.  She is already on gabapentin 900 mg 3 times daily .  We will max it out to 900 mg 4 times a day and see if it gives her any additional benefit

## 2021-07-20 NOTE — TELEPHONE ENCOUNTER
Patients pharmacy in [place] is out of the hydrocodone. Pt is requesting it be sent to the 24 hour [facility] in [facility] as they will not transfer it

## 2021-07-20 NOTE — ASSESSMENT & PLAN NOTE
She has a typical herpes zoster rash.  She was started on the acyclovir within about 24 hours of onset of the rash.  She will take that hopefully her rash over the next week and pain will improve.  In the meantime we will continue her hydrocodone as needed.

## 2021-07-20 NOTE — TELEPHONE ENCOUNTER
Kate HENRY did peer to peer on 07/19/21   auth number is [id number]  Exp dates are 07/14/21 through 09/11/21

## 2021-07-27 ENCOUNTER — HOSPITAL ENCOUNTER (OUTPATIENT)
Dept: CT IMAGING | Facility: HOSPITAL | Age: 51
Discharge: HOME | End: 2021-07-27
Admitting: INTERNAL MEDICINE

## 2021-07-27 DIAGNOSIS — K76.0 NAFLD (NONALCOHOLIC FATTY LIVER DISEASE): ICD-10-CM

## 2021-07-27 DIAGNOSIS — I82.0 BUDD-CHIARI SYNDROME (HCC): ICD-10-CM

## 2021-07-27 DIAGNOSIS — R74.8 ABNORMAL LIVER ENZYMES: ICD-10-CM

## 2021-07-27 PROCEDURE — 0 IOPAMIDOL PER 1 ML: Performed by: INTERNAL MEDICINE

## 2021-07-27 PROCEDURE — 74178 CT ABD&PLV WO CNTR FLWD CNTR: CPT

## 2021-07-27 PROCEDURE — 74178 CT ABD&PLV WO CNTR FLWD CNTR: CPT | Performed by: RADIOLOGY

## 2021-07-27 RX ADMIN — IOPAMIDOL 100 ML: 755 INJECTION, SOLUTION INTRAVENOUS at 17:35

## 2021-08-05 ENCOUNTER — TELEPHONE (OUTPATIENT)
Dept: FAMILY MEDICINE CLINIC | Facility: CLINIC | Age: 51
End: 2021-08-05

## 2021-08-05 DIAGNOSIS — I87.2 STASIS DERMATITIS OF BOTH LEGS: Primary | ICD-10-CM

## 2021-08-05 RX ORDER — HYDROCODONE BITARTRATE AND ACETAMINOPHEN 5; 325 MG/1; MG/1
1 TABLET ORAL EVERY 6 HOURS PRN
Qty: 60 TABLET | Refills: 0 | Status: SHIPPED | OUTPATIENT
Start: 2021-08-05 | End: 2021-09-04

## 2021-08-05 NOTE — TELEPHONE ENCOUNTER
PATIENT CALLED BACK STATING   [facility] HAS 10MG BUT NOT 5MG   [facility] HAS LIMITED 5MG BUT NOT 10MG

## 2021-08-05 NOTE — TELEPHONE ENCOUNTER
Who gave her this last a refill of the hydrocodone?  She filled the prescription I gave her a couple weeks ago.

## 2021-08-05 NOTE — TELEPHONE ENCOUNTER
Caller: Helga Joan HADLEY    Relationship: Self    Best call back number: [phone]     Medication needed:   HYDROCODONE 5 MG     PATIENT STATED THAT SHE HAS SINGLES NAD WAS PRESCRIBED THE MEDICATION ABOVE TO HELP WITH HER PAIN. SHE STATED THAT SHE CALLED HER PHARMACY ([facility]) TO GET A REFILL ON THE PAIN MEDICATIONS BUT SHE WAS TOLD THAT [facility] OF THE [facility] HAVE THE 5MG TABLETS, ONLY THE 10 MG. PATIENT STATED SHE WAS GOING TO BE CALLING THE [facility] PHARMACY TO SEE IF THEY ARE CARRYING THE 5MG TABLETS AND WILL CALLBACK TO UPDATE. SHE STATED THAT IF [facility] DID NOT CARRY THE MEDICATION, SHE IS REQUESTING DR. RIVERA TO PRESCRIBE THE 10 MG AND ADJUST THE DIRECTIONS ACCORDINGLY.     PLEASE ADVISE     What is the patient's preferred pharmacy:    PATIENT IS CALLING BACK TO UPDATE PHARMACY

## 2021-08-06 NOTE — TELEPHONE ENCOUNTER
Hub staff attempted to follow warm transfer process and was unsuccessful     Caller: Joan Partida    Relationship to patient: Self    Best call back number: [phone]    Patient is needing: THE PATIENT IS TRYING TO CALL BACK ALEXY.

## 2021-08-17 ENCOUNTER — OFFICE VISIT (OUTPATIENT)
Dept: FAMILY MEDICINE CLINIC | Facility: CLINIC | Age: 51
End: 2021-08-17

## 2021-08-17 VITALS
HEART RATE: 87 BPM | OXYGEN SATURATION: 97 % | TEMPERATURE: 97.8 F | DIASTOLIC BLOOD PRESSURE: 83 MMHG | HEIGHT: 63 IN | WEIGHT: 293 LBS | SYSTOLIC BLOOD PRESSURE: 135 MMHG | BODY MASS INDEX: 51.91 KG/M2

## 2021-08-17 DIAGNOSIS — B02.9 HERPES ZOSTER WITHOUT COMPLICATION: Primary | ICD-10-CM

## 2021-08-17 PROBLEM — B02.29 POSTHERPETIC NEURALGIA: Status: ACTIVE | Noted: 2021-08-17

## 2021-08-17 PROCEDURE — 99213 OFFICE O/P EST LOW 20 MIN: CPT | Performed by: FAMILY MEDICINE

## 2021-08-17 RX ORDER — LIDOCAINE 50 MG/G
PATCH TOPICAL
Qty: 30 PATCH | Refills: 1 | Status: SHIPPED | OUTPATIENT
Start: 2021-08-17 | End: 2021-12-02

## 2021-08-17 NOTE — PROGRESS NOTES
Chief Complaint   Patient presents with   • Follow-up     1 MO F/U- rash, side pain         Subjective     Joan JOMAR Partida  has a past medical history of Abnormal mammogram (10/21/2013), Allergy, Anemia, Arthralgia, CHF (congestive heart failure) (CMS/HCC), COPD (chronic obstructive pulmonary disease) (CMS/[facility]) (03/04/2015), Dependent edema (08/27/2018), Depression, Derangement of meniscus of left knee (01/01/2014), Dermatitis (07/10/2014), Dysphagia, Essential hypertension (03/04/2015), Fibromyalgia, Foot pain (07/10/2014), GERD (gastroesophageal reflux disease) (10/17/2014), Hepatic steatosis (03/04/2015), 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/09/2020), History of tobacco abuse, Hyperlipidemia, Hypokalemia (05/07/2019), Hypothyroid, Long term current use of anticoagulant, LPRD (laryngopharyngeal reflux disease), Moderate episode of recurrent major depressive disorder (CMS/HCC) (06/22/2017), Morbid obesity (CMS/HCC) (03/04/2015), Mycobacterium avium complex (CMS/HCC) (08/09/2018), Obesity (07/10/2014), GIUSEPPE (obstructive sleep apnea) (08/09/2018), Pulmonary hypertension (CMS/HCC) (08/27/2018), Stasis dermatitis of both legs (08/09/2018), Type 2 diabetes mellitus, with long-term current use of insulin (CMS/HCC) (06/22/2017), Ventral hernia (03/04/2015), Vitamin D deficiency (11/13/2013), and Voice hoarseness.    Herpes zoster-since to the emergency room about a month ago with some flank pain.  They noticed a rash suggestive of herpes zoster.  She was treated with Valtrex.  She states the rash is still present with persistent pain burning and itching.        PHQ-2 Depression Screening  Little interest or pleasure in doing things?     Feeling down, depressed, or hopeless?     PHQ-2 Total Score     PHQ-9 Depression Screening  Little interest or pleasure in doing things?     Feeling down, depressed, or hopeless?     Trouble falling or staying asleep, or sleeping too much?     Feeling tired or having little energy?      Poor appetite or overeating?     Feeling bad about yourself - or that you are a failure or have let yourself or your family down?     Trouble concentrating on things, such as reading the newspaper or watching television?     Moving or speaking so slowly that other people could have noticed? Or the opposite - being so fidgety or restless that you have been moving around a lot more than usual?     Thoughts that you would be better off dead, or of hurting yourself in some way?     PHQ-9 Total Score     If you checked off any problems, how difficult have these problems made it for you to do your work, take care of things at home, or get along with other people?       Allergies   Allergen Reactions   • Cobalt Unknown - High Severity     Cobalt blue, by allergy testing.    • Dulaglutide Nausea And Vomiting   • Exenatide Other (See Comments)     pancreantitis   • Metformin Unknown - Low Severity   • Palladium Chloride Unknown - Low Severity     by allergy testing.    • Sitagliptin Other (See Comments)     panceatitis       Prior to Admission medications    Medication Sig Start Date End Date Taking? Authorizing Provider   acyclovir (ZOVIRAX) 400 MG tablet Take 1 tablet by mouth 5 (Five) Times a Day. Take no more than 5 doses a day. 7/16/21   Pamela Pozo APRN   albuterol sulfate HFA (Ventolin HFA) 108 (90 Base) MCG/ACT inhaler Ventolin HFA 90 mcg/actuation inhalation HFA aerosol inhaler inhale 1 - 2 puffs (90 - 180 mcg) by inhalation route every 6 hours as needed   Active    Gina Hunt MD   Allergy Relief 180 MG tablet Take 180 mg by mouth 2 (Two) Times a Day. 6/1/21   Gina Hunt MD   arformoterol (Brovana) 15 MCG/2ML nebulizer solution 2 mL.    Gina Hunt MD   BD Insulin Syringe U-500 31G X 6MM 0.5 ML misc  7/2/21   Gina Hunt MD   budesonide (Pulmicort) 0.5 MG/2ML nebulizer solution 2 mL.    Gina Hunt MD   Cholecalciferol 125 MCG (5000 UT) tablet Take 125 mcg by  mouth Daily.    Gina Hunt MD   clotrimazole (LOTRIMIN) 1 % cream APPLY EXTERNALLY TO THE AFFECTED AND SURROUNDING AREAS TWICE DAILY IN THE MORNING AND IN THE EVENING FOR 14 DAYS 5/22/21   Gina Hunt MD   Continuous Blood Gluc  (Dexcom G6 ) device USE RECIEVER DEVICE AS DIRECTED 6/1/21   Gina Hunt MD   Continuous Blood Gluc Sensor (Dexcom G6 Sensor) APPLY 1 TOPICALLY EVERY 10 DAYS 6/29/21   Gina Hunt MD   Continuous Blood Gluc Transmit (Dexcom G6 Transmitter) misc USE TRANSMITTER AS INSTRUCTED 6/1/21   Gina Hunt MD   dicyclomine (BENTYL) 20 MG tablet Take 20 mg by mouth 3 (Three) Times a Day.    Gina Hunt MD   docusate sodium (COLACE) 100 MG capsule Take 100 mg by mouth 2 (Two) Times a Day.    Emergency, Nurse Suni RN   doxycycline (VIBRAMYCIN) 100 MG capsule Take 100 mg by mouth 2 (Two) Times a Day As Needed.  Patient not taking: Reported on 7/13/2021 4/20/21   Gina Hunt MD   eszopiclone (LUNESTA) 1 MG tablet Take immediately before bedtime 6/29/21   Mk Dowling, DO   ezetimibe (ZETIA) 10 MG tablet Take 10 mg by mouth Daily.    Gina Hunt MD   ferrous sulfate 325 (65 FE) MG tablet Take 325 mg by mouth 2 (Two) Times a Day.    Emergency, Nurse Suni, RN   Fluticasone Furoate-Vilanterol (Breo Ellipta) 200-25 MCG/INH inhaler Breo Ellipta 200-25 mcg/dose inhalation blister with device inhale 1 puff by inhalation route once daily at the same time each day   Active    Gina Hunt MD   formoterol (Perforomist) 20 MCG/2ML nebulizer solution Take  by nebulization 2 (Two) Times a Day.    Gina Hunt MD   gabapentin (NEURONTIN) 300 MG capsule Take 3 capsules by mouth 4 (Four) Times a Day for 30 days. 7/20/21 8/19/21  Francesco Gimenez,    HYDROcodone-acetaminophen (Norco) 5-325 MG per tablet Take 1 tablet by mouth Every 6 (Six) Hours As Needed for Moderate Pain  for up to 30  days. 8/5/21 9/4/21  Francesco Gimenez,    insulin regular (HUMULIN R) 500 UNIT/ML CONCENTRATED injection Humulin R U-500 (Conc) Insulin 500 unit/mL subcutaneous solution inject by subcutaneous route per  instructions.  For use with insulin pump   Active    Gina Hunt MD   ipratropium-albuterol (DUO-NEB) 0.5-2.5 mg/3 ml nebulizer USE 3 ML VIA NEBULIZER EVERY 4 HOURS AS NEEDED FOR SHORTNESS OF BREATH 6/18/21   Gina Hunt MD   levothyroxine (SYNTHROID, LEVOTHROID) 125 MCG tablet Take 250 mcg by mouth. 6/22/21   Gina Hunt MD   Magnesium Gluconate 550 MG tablet Take 30 mg by mouth 2 (Two) Times a Day.    Gina Hunt MD   metoprolol succinate XL (TOPROL-XL) 25 MG 24 hr tablet Take 25 mg by mouth 2 (Two) Times a Day. 6/29/21   Gina Hunt MD   mineral oil-hydrophilic petrolatum (AQUAPHOR) ointment 2 (Two) Times a Day. to affected area 6/30/21   Gina Hunt MD   montelukast (SINGULAIR) 10 MG tablet TAKE 1 TABLET BY MOUTH ONCE DAILY EVERY EVENING 7/1/21   Gina Hunt MD   nystatin (MYCOSTATIN) 337226 UNIT/GM powder Apply  topically to the appropriate area as directed 3 (Three) Times a Day for 90 days. 7/9/21 10/7/21  Francesco Gimenez,    pantoprazole (PROTONIX) 40 MG EC tablet Take 40 mg by mouth Daily. 7/1/21   Gina Hunt MD   polyethylene glycol (MIRALAX) 17 GM/SCOOP powder 17 g.    Gina Hunt MD   potassium chloride 10 MEQ CR tablet potassium chloride 10 mEq oral tablet extended release take 2 tablets (20 meq) by oral route 2 times per day with food   Active    Gina Hunt MD   promethazine (PHENERGAN) 25 MG tablet As Needed. 5/22/21   Gina Hunt MD   revefenacin (YUPELRI) 175 MCG/3ML nebulizer solution Take  by nebulization Daily.    Gina Hunt MD   rosuvastatin (CRESTOR) 40 MG tablet Take 40 mg by mouth Daily.    Gina Hunt MD   sertraline (ZOLOFT) 100 MG tablet  "Take 1.5 tablets by mouth Daily for 90 days. 7/9/21 10/7/21  Francesco Gimenez,    spironolactone (ALDACTONE) 100 MG tablet Take 100 mg by mouth Daily.    Gina Hunt MD   sucralfate (CARAFATE) 1 g tablet sucralfate 1 gram oral tablet take 1 tablet (1 gram) by oral route 4 times per day on an empty stomach 1 hour before meals and at bedtime   Suspended    Gina Hunt MD   SUMAtriptan (IMITREX) 100 MG tablet Take 1 tablet by mouth As Needed. 6/29/21   Gina Hunt MD   torsemide (DEMADEX) 100 MG tablet Take 100 mg by mouth 3 (Three) Times a Day.    Gina Hunt MD   traZODone (DESYREL) 50 MG tablet Take 50 mg by mouth Daily.    Gina Hunt MD   TRUEplus Insulin Syringe 31G X 5/16\" 0.5 ML misc USE AS DIRECTED SIX TIMES DAILY 6/30/21   Gina Hunt MD   vitamin D3 125 MCG (5000 UT) capsule capsule Take 1 capsule by mouth Daily. 6/1/21   Gina Hunt MD   warfarin (COUMADIN) 2 MG tablet warfarin 2 mg oral tablet take 1 tablet (2 mg) by oral route once daily as directed 2/16/2021  Active 2/16/21   Gina Hunt MD   warfarin (COUMADIN) 5 MG tablet warfarin 5 mg oral tablet take 1 tablet (5 mg) by oral route once daily as directed 1/12/2021  Active 1/12/21   Gina Hunt MD   warfarin (Coumadin) 7.5 MG tablet Coumadin 7.5 mg oral tablet take 1 tablet (7.5 mg) by oral route once daily   Active    Gina Hunt MD        Patient Active Problem List   Diagnosis   • NAFLD (nonalcoholic fatty liver disease)   • Budd-Chiari syndrome (CMS/HCC)   • Abnormal liver enzymes   • Abnormal mammogram   • Acquired hypothyroidism   • Anemia   • Arthritis   • Chronic right-sided low back pain with right-sided sciatica   • Contact dermatitis and other eczema   • COPD (chronic obstructive pulmonary disease) (CMS/HCC)   • Dependent edema   • Depression   • Major depressive disorder with current active episode   • Diabetic polyneuropathy " (CMS/HCC)   • Dysphagia   • Elevated alkaline phosphatase level   • Elevated ferritin   • Encounter for long-term (current) use of insulin (CMS/HCC)   • Essential hypertension   • HTN, goal below 140/90   • Fibromyalgia   • Neck pain, bilateral   • Foot pain   • Gastroesophageal reflux disease without esophagitis   • History of pancreatitis   • Hyperlipidemia   • Hypokalemia   • Hyperinsulinism   • Insulin resistance   • Long term (current) use of anticoagulants   • LPRD (laryngopharyngeal reflux disease)   • Magnesium deficiency   • Microalbuminuria   • Moderate episode of recurrent major depressive disorder (CMS/HCC)   • Morbid obesity with BMI of 70 and over, adult (CMS/HCC)   • Mycobacterium avium complex (CMS/HCC)   • GIUSEPPE (obstructive sleep apnea)   • Pain in left hip   • Personal history of tobacco use, presenting hazards to health   • Predisposition to allergic reaction   • Presence of insulin pump   • Pulmonary hypertension (CMS/HCC)   • Pulmonary nodule   • Serum calcium elevated   • Stasis dermatitis of both legs   • Thoracic back pain   • Uncontrolled type 2 diabetes mellitus with hyperglycemia (CMS/HCC)   • Uncontrolled type 2 diabetes mellitus with neurologic complication (CMS/[facility])   • Type 2 diabetes mellitus, with long-term current use of insulin (CMS/HCC)   • Uncontrolled type 2 diabetes mellitus without complication, with long-term current use of insulin   • Ventral hernia   • Vitamin D deficiency   • Voice hoarseness   • Wheezing   • 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CMS/HCC)   • Hepatic steatosis   • Herpes zoster without complication   • Postherpetic neuralgia        Past Surgical History:   Procedure Laterality Date   • CARDIAC CATHETERIZATION  2018   • CARPAL TUNNEL RELEASE     •  SECTION  ,     • COLONOSCOPY     • ENDOSCOPY     • HERNIA REPAIR     • HYSTERECTOMY  ,     PARTIAL   • KNEE SURGERY         Social History     Socioeconomic History   • Marital  "status:      Spouse name: Not on file   • Number of children: Not on file   • Years of education: Not on file   • Highest education level: Not on file   Tobacco Use   • Smoking status: Former Smoker     Packs/day: 1.00   • Smokeless tobacco: Never Used   • Tobacco comment: QUIT 2004   Vaping Use   • Vaping Use: Never used   Substance and Sexual Activity   • Alcohol use: Not Currently     Comment: FORMER; OCCASIONAL   • Drug use: Never   • Sexual activity: Defer       Family History   Problem Relation Age of Onset   • Heart disease Mother         GRANDPARENT-NONSPECIFIC   • Diabetes Father    • Skin cancer Father        Family history, surgical history, past medical history, Allergies and med's reviewed with patient today and updated in [employer] EMR.     ROS:  Review of Systems   Constitutional: Negative for chills, fatigue and fever.   Skin: Positive for rash.       OBJECTIVE:  Vitals:    08/17/21 1342   BP: 135/83   BP Location: Left arm   Patient Position: Sitting   Cuff Size: Adult   Pulse: 87   Temp: 97.8 °F (36.6 °C)   TempSrc: Temporal   SpO2: 97%   Weight: (!) 202 kg (446 lb 3.2 oz)   Height: 159.4 cm (62.75\")     No exam data present   Body mass index is 79.67 kg/m².  No LMP recorded (lmp unknown). Patient has had a hysterectomy.    Physical Exam  Vitals and nursing note reviewed.   Constitutional:       General: She is not in acute distress.     Appearance: Normal appearance. She is obese.   HENT:      Head: Normocephalic and atraumatic.   Skin:     Findings: Rash present.             Comments: Ulcerated lesions with hyperpigmentation.   Neurological:      Mental Status: She is alert.         Procedures    No visits with results within 30 Day(s) from this visit.   Latest known visit with results is:   Admission on 07/16/2021, Discharged on 07/16/2021   Component Date Value Ref Range Status   • Color, UA 07/16/2021 Dark Yellow* Yellow, Straw Final   • Appearance, UA 07/16/2021 Clear  Clear Final   • pH, " UA 07/16/2021 5.5  5.0 - 8.0 Final   • Specific Gravity, UA 07/16/2021 1.014  1.005 - 1.030 Final   • Glucose, UA 07/16/2021 Negative  Negative Final   • Ketones, UA 07/16/2021 Negative  Negative Final   • Bilirubin, UA 07/16/2021 Small (1+)* Negative Final   • Blood, UA 07/16/2021 Negative  Negative Final   • Protein, UA 07/16/2021 Negative  Negative Final   • Leuk Esterase, UA 07/16/2021 Negative  Negative Final   • Nitrite, UA 07/16/2021 Negative  Negative Final   • Urobilinogen, UA 07/16/2021 0.2 E.U./dL  0.2 - 1.0 E.U./dL Final   • Glucose 07/16/2021 223* 65 - 99 mg/dL Final   • BUN 07/16/2021 19  6 - 20 mg/dL Final   • Creatinine 07/16/2021 1.01* 0.57 - 1.00 mg/dL Final   • Sodium 07/16/2021 136  136 - 145 mmol/L Final   • Potassium 07/16/2021 4.2  3.5 - 5.2 mmol/L Final   • Chloride 07/16/2021 92* 98 - 107 mmol/L Final   • CO2 07/16/2021 32.8* 22.0 - 29.0 mmol/L Final   • Calcium 07/16/2021 9.5  8.6 - 10.5 mg/dL Final   • Total Protein 07/16/2021 7.3  6.0 - 8.5 g/dL Final   • Albumin 07/16/2021 4.20  3.50 - 5.20 g/dL Final   • ALT (SGPT) 07/16/2021 61* 1 - 33 U/L Final   • AST (SGOT) 07/16/2021 75* 1 - 32 U/L Final   • Alkaline Phosphatase 07/16/2021 448* 39 - 117 U/L Final   • Total Bilirubin 07/16/2021 1.0  0.0 - 1.2 mg/dL Final   • eGFR Non African Amer 07/16/2021 58* >60 mL/min/1.73 Final   • Globulin 07/16/2021 3.1  gm/dL Final   • A/G Ratio 07/16/2021 1.4  g/dL Final   • BUN/Creatinine Ratio 07/16/2021 18.8  7.0 - 25.0 Final   • Anion Gap 07/16/2021 11.2  5.0 - 15.0 mmol/L Final   • proBNP 07/16/2021 245.4  0.0 - 900.0 pg/mL Final   • WBC 07/16/2021 7.60  3.40 - 10.80 10*3/mm3 Final   • RBC 07/16/2021 4.58  3.77 - 5.28 10*6/mm3 Final   • Hemoglobin 07/16/2021 14.3  12.0 - 15.9 g/dL Final   • Hematocrit 07/16/2021 44.0  34.0 - 46.6 % Final   • MCV 07/16/2021 96.1  79.0 - 97.0 fL Final   • MCH 07/16/2021 31.2  26.6 - 33.0 pg Final   • MCHC 07/16/2021 32.5  31.5 - 35.7 g/dL Final   • RDW 07/16/2021 14.9   12.3 - 15.4 % Final   • RDW-SD 07/16/2021 51.8  37.0 - 54.0 fl Final   • MPV 07/16/2021 9.1  6.0 - 12.0 fL Final   • Platelets 07/16/2021 194  140 - 450 10*3/mm3 Final   • Neutrophil % 07/16/2021 71.7  42.7 - 76.0 % Final   • Lymphocyte % 07/16/2021 14.9* 19.6 - 45.3 % Final   • Monocyte % 07/16/2021 9.3  5.0 - 12.0 % Final   • Eosinophil % 07/16/2021 2.9  0.3 - 6.2 % Final   • Basophil % 07/16/2021 0.5  0.0 - 1.5 % Final   • Immature Grans % 07/16/2021 0.7* 0.0 - 0.5 % Final   • Neutrophils, Absolute 07/16/2021 5.45  1.70 - 7.00 10*3/mm3 Final   • Lymphocytes, Absolute 07/16/2021 1.13  0.70 - 3.10 10*3/mm3 Final   • Monocytes, Absolute 07/16/2021 0.71  0.10 - 0.90 10*3/mm3 Final   • Eosinophils, Absolute 07/16/2021 0.22  0.00 - 0.40 10*3/mm3 Final   • Basophils, Absolute 07/16/2021 0.04  0.00 - 0.20 10*3/mm3 Final   • Immature Grans, Absolute 07/16/2021 0.05  0.00 - 0.05 10*3/mm3 Final   • nRBC 07/16/2021 0.0  0.0 - 0.2 /100 WBC Final   • Urine Culture 07/16/2021 No growth   Final       ASSESSMENT/ PLAN:    Diagnoses and all orders for this visit:    1. Herpes zoster without complication (Primary)  Assessment & Plan:  Her rash is somewhat better but still has a lot of neuropathic pain.        Orders Placed Today:     No orders of the defined types were placed in this encounter.       Management Plan:     An After Visit Summary was printed and given to the patient at discharge.    Follow-up: Return in about 4 weeks (around 9/14/2021) for Recheck.    Francesco Gimenez DO 8/17/2021 14:19 EDT  This note was electronically signed.

## 2021-08-17 NOTE — ASSESSMENT & PLAN NOTE
Her shingles rash is improved she just still has some hyperpigmentation and ulcerations with eschar.  She still though is rather symptomatic she is already on a high dose of gabapentin at 3600 mg daily.  She had previously been on Elavil in the past for other neuropathic pain, but did not give much benefit and interfered with her warfarin.  We will try a lidocaine patch as well as see if we get her in the pain management for a nerve block.

## 2021-08-18 ENCOUNTER — TELEPHONE (OUTPATIENT)
Dept: FAMILY MEDICINE CLINIC | Facility: CLINIC | Age: 51
End: 2021-08-18

## 2021-08-18 NOTE — TELEPHONE ENCOUNTER
Caller: Joan Partida    Relationship: Self    Best call back number: [phone]    What is the best time to reach you: ANY  Who are you requesting to speak with (clinical staff, provider,  specific staff member): CLINICAL    What was the call regarding: LIDOCAINE PATCH PRIOR AUTHORIZATION NEED PER JOHNNY LEMUS [address]    Do you require a callback: YES

## 2021-08-20 NOTE — TELEPHONE ENCOUNTER
Caller: Joan Partida    Relationship: Self    Best call back number: [phone]            What is the best time to reach you: ANYTIME    Who are you requesting to speak with (clinical staff, provider,  specific staff member): CLINICAL      What was the call regarding: LIDOCAINE PATCH PRIOR AUTHORIZATION NEED PER JOHNNY LEMUS [address]      Do you require a callback: YES

## 2021-08-23 ENCOUNTER — OFFICE VISIT (OUTPATIENT)
Dept: CARDIOLOGY | Facility: CLINIC | Age: 51
End: 2021-08-23

## 2021-08-23 VITALS
HEART RATE: 90 BPM | SYSTOLIC BLOOD PRESSURE: 120 MMHG | BODY MASS INDEX: 51.91 KG/M2 | HEIGHT: 63 IN | DIASTOLIC BLOOD PRESSURE: 56 MMHG | WEIGHT: 293 LBS

## 2021-08-23 DIAGNOSIS — I10 ESSENTIAL HYPERTENSION: ICD-10-CM

## 2021-08-23 DIAGNOSIS — I50.32 CHRONIC DIASTOLIC HEART FAILURE (HCC): Primary | ICD-10-CM

## 2021-08-23 PROCEDURE — 99214 OFFICE O/P EST MOD 30 MIN: CPT | Performed by: INTERNAL MEDICINE

## 2021-08-23 RX ORDER — TRAZODONE HYDROCHLORIDE 50 MG/1
50 TABLET ORAL DAILY
Qty: 30 TABLET | Refills: 4 | Status: SHIPPED | OUTPATIENT
Start: 2021-08-23 | End: 2021-12-23

## 2021-08-23 NOTE — PROGRESS NOTES
CARDIOLOGY FOLLOW-UP PROGRESS NOTE        Chief Complaint  Follow-up and Congestive Heart Failure    Subjective            Joan Partida presents to [facility] CARDIOLOGY  History of Present Illness    This is a 50-year-old female with morbid obesity, chronic diastolic and right-sided heart failure, diabetes mellitus, obstructive sleep apnea who is here for follow-up visit.  Most recent visit to the office was in February of this year.  Patient currently follows up with Dr. Joaquin, nephrologist and is on high-dose of diuretics including 400 mg of spironolactone daily along with 300 mg of torsemide daily in divided doses.  She is feeling fine at this time no recent hospitalizations in the past 6 months.  She lost several pounds of weight.  She is not very active at baseline but able to walk a few steps at home.  The cellulitis of the lower extremities are subsided as well.  Currently she is not taking any antibiotics.      Past History:    1) Chronic diastolic and right-sided heart failure, on high-dose diuretics; 2) Obstructive sleep apnea, on home CPAP; 3) Diabetes mellitus, on insulin pump; 4) Morbid obesity; 5) History of Mycobacterium avium complex infection, completed long-term antibiotic therapy. 6) hypertension    Medical History: has a past medical history of Abnormal mammogram (10/21/2013), Allergy, Anemia, Arthralgia, CHF (congestive heart failure) (CMS/[facility]), COPD (chronic obstructive pulmonary disease) (CMS/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), History of tobacco abuse, Hyperlipidemia, Hypokalemia (05/07/2019), Hypothyroid, Long term current use of anticoagulant, LPRD  (laryngopharyngeal reflux disease), Moderate episode of recurrent major depressive disorder (CMS/HCC) (2017), Morbid obesity (CMS/HCC) (2015), Mycobacterium avium complex (CMS/HCC) (2018), Obesity (07/10/2014), GIUSEPPE (obstructive sleep apnea) (2018), Pulmonary hypertension (CMS/HCC) (2018), Stasis dermatitis of both legs (2018), Type 2 diabetes mellitus, with long-term current use of insulin (CMS/HCC) (2017), Ventral hernia (2015), Vitamin D deficiency (2013), and Voice hoarseness.     Surgical History: has a past surgical history that includes Cardiac catheterization (2018); Carpal tunnel release ();  section (,  ); Colonoscopy (); Esophagogastroduodenoscopy (); Hernia repair (); Hysterectomy (, ); and Knee surgery ().     Family History: family history includes Diabetes in her father; Heart disease in her mother; Skin cancer in her father.     Social History: reports that she has quit smoking. She smoked 1.00 pack per day. She has never used smokeless tobacco. She reports previous alcohol use. She reports that she does not use drugs.    Allergies: Nickel, Cobalt, Dulaglutide, Exenatide, Metformin, Palladium chloride, and Sitagliptin    Current Outpatient Medications on File Prior to Visit   Medication Sig   • albuterol sulfate HFA (Ventolin HFA) 108 (90 Base) MCG/ACT inhaler Ventolin HFA 90 mcg/actuation inhalation HFA aerosol inhaler inhale 1 - 2 puffs (90 - 180 mcg) by inhalation route every 6 hours as needed   Active   • Allergy Relief 180 MG tablet Take 180 mg by mouth 2 (Two) Times a Day.   • arformoterol (Brovana) 15 MCG/2ML nebulizer solution 2 mL.   • BD Insulin Syringe U-500 31G X 6MM 0.5 ML misc    • budesonide (Pulmicort) 0.5 MG/2ML nebulizer solution 2 mL.   • Cholecalciferol 125 MCG (5000 UT) tablet Take 125 mcg by mouth Daily.   • clotrimazole (LOTRIMIN) 1 % cream APPLY EXTERNALLY TO THE AFFECTED AND  SURROUNDING AREAS TWICE DAILY IN THE MORNING AND IN THE EVENING FOR 14 DAYS   • Continuous Blood Gluc  (Dexcom G6 ) device USE RECIEVER DEVICE AS DIRECTED   • Continuous Blood Gluc Sensor (Dexcom G6 Sensor) APPLY 1 TOPICALLY EVERY 10 DAYS   • Continuous Blood Gluc Transmit (Dexcom G6 Transmitter) misc USE TRANSMITTER AS INSTRUCTED   • dicyclomine (BENTYL) 20 MG tablet Take 20 mg by mouth 3 (Three) Times a Day.   • docusate sodium (COLACE) 100 MG capsule Take 100 mg by mouth 2 (Two) Times a Day.   • eszopiclone (LUNESTA) 1 MG tablet Take immediately before bedtime   • ezetimibe (ZETIA) 10 MG tablet Take 10 mg by mouth Daily.   • ferrous sulfate 325 (65 FE) MG tablet Take 325 mg by mouth 2 (Two) Times a Day.   • formoterol (Perforomist) 20 MCG/2ML nebulizer solution Take  by nebulization 2 (Two) Times a Day.   • HYDROcodone-acetaminophen (Norco) 5-325 MG per tablet Take 1 tablet by mouth Every 6 (Six) Hours As Needed for Moderate Pain  for up to 30 days.   • insulin regular (HUMULIN R) 500 UNIT/ML CONCENTRATED injection Humulin R U-500 (Conc) Insulin 500 unit/mL subcutaneous solution inject by subcutaneous route per  instructions.  For use with insulin pump   Active   • ipratropium-albuterol (DUO-NEB) 0.5-2.5 mg/3 ml nebulizer USE 3 ML VIA NEBULIZER EVERY 4 HOURS AS NEEDED FOR SHORTNESS OF BREATH   • levothyroxine (SYNTHROID, LEVOTHROID) 125 MCG tablet Take 250 mcg by mouth Daily.   • lidocaine (LIDODERM) 5 % Remove & Discard patch within 12 hours or as directed by MD   • Magnesium Gluconate 550 MG tablet Take 30 mg by mouth 2 (Two) Times a Day.   • metoprolol succinate XL (TOPROL-XL) 25 MG 24 hr tablet Take 25 mg by mouth 2 (Two) Times a Day.   • mineral oil-hydrophilic petrolatum (AQUAPHOR) ointment 2 (Two) Times a Day. to affected area   • montelukast (SINGULAIR) 10 MG tablet TAKE 1 TABLET BY MOUTH ONCE DAILY EVERY EVENING   • nystatin (MYCOSTATIN) 283928 UNIT/GM powder Apply  topically to the  "appropriate area as directed 3 (Three) Times a Day for 90 days.   • pantoprazole (PROTONIX) 40 MG EC tablet Take 40 mg by mouth Daily.   • polyethylene glycol (MIRALAX) 17 GM/SCOOP powder 17 g.   • potassium chloride 10 MEQ CR tablet Take 20 mEq by mouth 2 (two) times a day.   • promethazine (PHENERGAN) 25 MG tablet As Needed.   • revefenacin (YUPELRI) 175 MCG/3ML nebulizer solution Take  by nebulization Daily.   • sertraline (ZOLOFT) 100 MG tablet Take 1.5 tablets by mouth Daily for 90 days.   • spironolactone (ALDACTONE) 100 MG tablet Take 200 mg by mouth 2 (two) times a day.   • sucralfate (CARAFATE) 1 g tablet sucralfate 1 gram oral tablet take 1 tablet (1 gram) by oral route 4 times per day on an empty stomach 1 hour before meals and at bedtime   Suspended   • SUMAtriptan (IMITREX) 100 MG tablet Take 1 tablet by mouth As Needed.   • torsemide (DEMADEX) 100 MG tablet Take 100 mg by mouth 3 (Three) Times a Day.   • traZODone (DESYREL) 50 MG tablet Take 50 mg by mouth Daily.   • TRUEplus Insulin Syringe 31G X 5/16\" 0.5 ML misc USE AS DIRECTED SIX TIMES DAILY   • vitamin D3 125 MCG (5000 UT) capsule capsule Take 1 capsule by mouth Daily.   • warfarin (COUMADIN) 2 MG tablet warfarin 2 mg oral tablet take 1 tablet (2 mg) by oral route once daily as directed 2/16/2021  Active   • warfarin (COUMADIN) 5 MG tablet warfarin 5 mg oral tablet take 1 tablet (5 mg) by oral route once daily as directed 1/12/2021  Active   • gabapentin (NEURONTIN) 300 MG capsule Take 3 capsules by mouth 4 (Four) Times a Day for 30 days.   • [DISCONTINUED] doxycycline (VIBRAMYCIN) 100 MG capsule Take 100 mg by mouth 2 (Two) Times a Day As Needed. (Patient not taking: Reported on 7/13/2021)   • [DISCONTINUED] Fluticasone Furoate-Vilanterol (Breo Ellipta) 200-25 MCG/INH inhaler Breo Ellipta 200-25 mcg/dose inhalation blister with device inhale 1 puff by inhalation route once daily at the same time each day   Active   • [DISCONTINUED] rosuvastatin " "(CRESTOR) 40 MG tablet Take 40 mg by mouth Daily.   • [DISCONTINUED] warfarin (Coumadin) 7.5 MG tablet Coumadin 7.5 mg oral tablet take 1 tablet (7.5 mg) by oral route once daily   Active     No current facility-administered medications on file prior to visit.          Review of Systems   Respiratory: Positive for shortness of breath (Chronic). Negative for cough and wheezing.    Cardiovascular: Negative for chest pain, palpitations and leg swelling.   Gastrointestinal: Negative for nausea and vomiting.   Neurological: Negative for dizziness and syncope.        Objective     /56   Pulse 90   Ht 159.4 cm (62.75\")   Wt (!) 202 kg (446 lb)   BMI 79.64 kg/m²       Physical Exam  General : Alert, awake, no acute distress, morbidly obese, in wheelchair  CVS : Regular rate and rhythm, no murmur, rubs or gallops  Lungs: Clear to auscultation bilaterally, no crackles or rhonchi  Abdomen: Soft, nontender, bowel sounds heard in all 4 quadrants  Extremities: Warm, well-perfused, 1+ edema bilaterally    Result Review :     The following data was reviewed by: Deven Myers MD on 08/23/2021:    CMP    CMP 6/2/21 7/9/21 7/16/21   Glucose  415 (A) 223 (A)   Glucose 74     BUN 21 36 (A) 19   Creatinine 0.90 1.14 (A) 1.01 (A)   eGFR Non African Am  50 (A) 58 (A)   Sodium 139 133 (A) 136   Potassium 3.2 (A) 3.8 4.2   Chloride 91 (A) 81 (A) 92 (A)   Calcium 10.5 (A) 11.1 (A) 9.5   Albumin 4.3 4.50 4.20   Total Bilirubin  0.5 1.0   Alkaline Phosphatase  509 (A) 448 (A)   AST (SGOT)  59 (A) 75 (A)   ALT (SGPT)  60 (A) 61 (A)   (A) Abnormal value            CBC    CBC 4/20/21 7/9/21 7/16/21   WBC 7.54 9.18 7.60   RBC 4.63 4.89 4.58   Hemoglobin 13.6 15.3 14.3   Hematocrit 43.3 45.3 44.0   MCV 93.5 92.6 96.1   MCH 29.4 31.3 31.2   MCHC 31.4 (A) 33.8 32.5   RDW 15.4 (A) 14.3 14.9   Platelets 246 269 194   (A) Abnormal value            TSH    TSH 11/6/20 2/12/21 7/9/21   TSH 4.260 (A) 1.790 1.370   (A) Abnormal value        "     Lipid Panel    Lipid Panel 10/2/20 7/9/21   Total Cholesterol  243 (A)   Total Cholesterol 106 (A)    Triglycerides 183 (A) 321 (A)   HDL Cholesterol 36 (A) 45   VLDL Cholesterol 37 58 (A)   LDL Cholesterol  33 (A) 140 (A)   LDL/HDL Ratio  2.97   (A) Abnormal value       Comments are available for some flowsheets but are not being displayed.                         Assessment and Plan        Diagnoses and all orders for this visit:    1. Chronic diastolic heart failure (CMS/HCC) (Primary)    Diastolic heart failure along with right-sided heart failure.  Volume status is stable at this time.  She hasn't had any hospital admissions in the past 6 months.  She is on high-dose of torsemide and spironolactone, currently managed by nephrology.  Recent labs show stable electrolytes including potassium and renal functions.  We will continue the same.  Previous echocardiogram showed preserved LV systolic function.    2. Essential hypertension    Well-controlled, continue metoprolol.    3.  Anticoagulant therapy : Initiated in the past for potential hepatic vein thrombosis.          Follow Up     Return in about 6 months (around 2/23/2022) for Recheck, Next scheduled follow up.    Patient was given instructions and counseling regarding her condition or for health maintenance advice. Please see specific information pulled into the AVS if appropriate.

## 2021-08-25 ENCOUNTER — APPOINTMENT (OUTPATIENT)
Dept: RESPIRATORY THERAPY | Facility: HOSPITAL | Age: 51
End: 2021-08-25

## 2021-09-13 RX ORDER — SERTRALINE HYDROCHLORIDE 100 MG/1
TABLET, FILM COATED ORAL
Qty: 90 TABLET | Refills: 1 | Status: SHIPPED | OUTPATIENT
Start: 2021-09-13 | End: 2022-07-25

## 2021-09-14 RX ORDER — WARFARIN SODIUM 5 MG/1
TABLET ORAL
Qty: 90 TABLET | Refills: 4 | Status: SHIPPED | OUTPATIENT
Start: 2021-09-14 | End: 2021-09-22 | Stop reason: SDUPTHER

## 2021-09-17 ENCOUNTER — TELEPHONE (OUTPATIENT)
Dept: FAMILY MEDICINE CLINIC | Facility: CLINIC | Age: 51
End: 2021-09-17

## 2021-09-17 RX ORDER — PROMETHAZINE HYDROCHLORIDE 25 MG/1
TABLET ORAL
Qty: 30 TABLET | Refills: 0 | Status: SHIPPED | OUTPATIENT
Start: 2021-09-17 | End: 2021-12-02

## 2021-09-17 NOTE — TELEPHONE ENCOUNTER
Caller: Joan Partida    Relationship: Self    Best call back number: [phone]    What medication are you requesting:   BED SIDE COMMODE     If a prescription is needed, what is your preferred pharmacy and phone number:      [facility] [address], [address]   [place] [address]   [phone]    Additional notes:  PATIENT STATED THAT SHE WOULD LIKE A PRESCRIPTION FOR A NEW BED SIDE COMMODE. PATIENT STATED THAT THE ONE SHE HAS CRACKED AND SHE WOULD LIKE A PRESCRIPTION FOR A NEW ONE

## 2021-09-20 RX ORDER — FERROUS SULFATE 325(65) MG
TABLET ORAL
Qty: 60 TABLET | Refills: 10 | Status: SHIPPED | OUTPATIENT
Start: 2021-09-20 | End: 2022-08-25

## 2021-09-20 NOTE — TELEPHONE ENCOUNTER
Yes go ahead and order.  Need to taking count her physical excise and probably needs a bariatric bedside commode.

## 2021-09-21 RX ORDER — PROMETHAZINE HYDROCHLORIDE 25 MG/1
TABLET ORAL
Qty: 30 TABLET | Refills: 0 | OUTPATIENT
Start: 2021-09-21

## 2021-09-22 ENCOUNTER — OFFICE VISIT (OUTPATIENT)
Dept: FAMILY MEDICINE CLINIC | Facility: CLINIC | Age: 51
End: 2021-09-22

## 2021-09-22 VITALS
SYSTOLIC BLOOD PRESSURE: 131 MMHG | OXYGEN SATURATION: 95 % | HEART RATE: 92 BPM | BODY MASS INDEX: 51.91 KG/M2 | TEMPERATURE: 98 F | HEIGHT: 63 IN | DIASTOLIC BLOOD PRESSURE: 66 MMHG | WEIGHT: 293 LBS

## 2021-09-22 DIAGNOSIS — Z79.01 LONG TERM (CURRENT) USE OF ANTICOAGULANTS: ICD-10-CM

## 2021-09-22 DIAGNOSIS — I82.0 BUDD-CHIARI SYNDROME (HCC): ICD-10-CM

## 2021-09-22 DIAGNOSIS — B02.29 POSTHERPETIC NEURALGIA: Primary | ICD-10-CM

## 2021-09-22 DIAGNOSIS — E66.01 MORBID OBESITY (HCC): ICD-10-CM

## 2021-09-22 PROBLEM — L91.8 SKIN TAGS, MULTIPLE ACQUIRED: Status: ACTIVE | Noted: 2021-09-22

## 2021-09-22 LAB
INR PPP: 1.68 (ref 2–3)
PROTHROMBIN TIME: 17.2 SECONDS (ref 9.4–12)

## 2021-09-22 PROCEDURE — 99213 OFFICE O/P EST LOW 20 MIN: CPT | Performed by: FAMILY MEDICINE

## 2021-09-22 PROCEDURE — 85610 PROTHROMBIN TIME: CPT | Performed by: FAMILY MEDICINE

## 2021-09-22 RX ORDER — PROCHLORPERAZINE 25 MG/1
SUPPOSITORY RECTAL
COMMUNITY
Start: 2021-08-25 | End: 2021-12-02

## 2021-09-22 RX ORDER — GABAPENTIN 300 MG/1
300 CAPSULE ORAL 4 TIMES DAILY
COMMUNITY
End: 2022-02-17 | Stop reason: SDUPTHER

## 2021-09-22 RX ORDER — WARFARIN SODIUM 5 MG/1
7.5 TABLET ORAL NIGHTLY
Qty: 135 TABLET | Refills: 3 | Status: SHIPPED | OUTPATIENT
Start: 2021-09-22 | End: 2022-01-10

## 2021-09-22 RX ORDER — PROCHLORPERAZINE 25 MG/1
SUPPOSITORY RECTAL
COMMUNITY
Start: 2021-09-19 | End: 2021-12-02

## 2021-09-22 RX ORDER — BLOOD-GLUCOSE METER
1 KIT MISCELLANEOUS 4 TIMES DAILY
COMMUNITY
Start: 2021-09-07 | End: 2021-12-02

## 2021-09-22 NOTE — PROGRESS NOTES
Chief Complaint   Patient presents with   • Follow-up     1 month         Subjective     Joanse JOMAR Partida  has a past medical history of Abnormal mammogram (10/21/2013), Allergy, Anemia, Arthralgia, CHF (congestive heart failure) (CMS/[facility]), COPD (chronic obstructive pulmonary disease) (CMS/[facility]) (03/04/2015), Dependent edema (08/27/2018), Depression, Derangement of meniscus of left knee (01/01/2014), Dermatitis (07/10/2014), Dysphagia, Essential hypertension (03/04/2015), Fibromyalgia, Foot pain (07/10/2014), GERD (gastroesophageal reflux disease) (10/17/2014), Hepatic steatosis (03/04/2015), 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/09/2020), History of tobacco abuse, Hyperlipidemia, Hypokalemia (05/07/2019), Hypothyroid, Long term current use of anticoagulant, LPRD (laryngopharyngeal reflux disease), Moderate episode of recurrent major depressive disorder (CMS/HCC) (06/22/2017), Mycobacterium avium complex (CMS/HCC) (08/09/2018), GIUSEPPE (obstructive sleep apnea) (08/09/2018), Pulmonary hypertension (CMS/HCC) (08/27/2018), Stasis dermatitis of both legs (08/09/2018), Type 2 diabetes mellitus, with long-term current use of insulin (CMS/HCC) (06/22/2017), Ventral hernia (03/04/2015), Vitamin D deficiency (11/13/2013), and Voice hoarseness.    Herpes zoster-he states the rash persists but the pain is all gone.    Obesity-she is down 12 pounds from her last visit about a month ago.  She has been doing caloric restriction and moderating her sodium intake.      PHQ-2 Depression Screening  Little interest or pleasure in doing things?     Feeling down, depressed, or hopeless?     PHQ-2 Total Score     PHQ-9 Depression Screening  Little interest or pleasure in doing things?     Feeling down, depressed, or hopeless?     Trouble falling or staying asleep, or sleeping too much?     Feeling tired or having little energy?     Poor appetite or overeating?     Feeling bad about yourself - or that you are a failure or have let yourself or  your family down?     Trouble concentrating on things, such as reading the newspaper or watching television?     Moving or speaking so slowly that other people could have noticed? Or the opposite - being so fidgety or restless that you have been moving around a lot more than usual?     Thoughts that you would be better off dead, or of hurting yourself in some way?     PHQ-9 Total Score     If you checked off any problems, how difficult have these problems made it for you to do your work, take care of things at home, or get along with other people?       Allergies   Allergen Reactions   • Nickel Diarrhea and Nausea And Vomiting   • Cobalt Unknown - High Severity     Cobalt blue, by allergy testing.    • Dulaglutide Nausea And Vomiting   • Exenatide Other (See Comments)     pancreantitis   • Metformin Unknown - Low Severity   • Palladium Chloride Unknown - Low Severity     by allergy testing.    • Sitagliptin Other (See Comments)     panceatitis       Prior to Admission medications    Medication Sig Start Date End Date Taking? Authorizing Provider   albuterol sulfate HFA (Ventolin HFA) 108 (90 Base) MCG/ACT inhaler Ventolin HFA 90 mcg/actuation inhalation HFA aerosol inhaler inhale 1 - 2 puffs (90 - 180 mcg) by inhalation route every 6 hours as needed   Active   Yes Gina Hunt MD   Allergy Relief 180 MG tablet Take 180 mg by mouth 2 (Two) Times a Day. 6/1/21  Yes Gina Hunt MD   arformoterol (Brovana) 15 MCG/2ML nebulizer solution 2 mL.   Yes Gina Hunt MD   BD Insulin Syringe U-500 31G X 6MM 0.5 ML misc  7/2/21  Yes Gina Hunt MD   budesonide (Pulmicort) 0.5 MG/2ML nebulizer solution 2 mL.   Yes Gina Hunt MD   Cholecalciferol 125 MCG (5000 UT) tablet Take 125 mcg by mouth Daily.   Yes Gina Hunt MD   clotrimazole (LOTRIMIN) 1 % cream APPLY EXTERNALLY TO THE AFFECTED AND SURROUNDING AREAS TWICE DAILY IN THE MORNING AND IN THE EVENING FOR 14 DAYS  5/22/21  Yes Gina Hunt MD   Continuous Blood Gluc  (Dexcom G6 ) device USE RECIEVER DEVICE AS DIRECTED 6/1/21  Yes Gina Hunt MD   Continuous Blood Gluc Sensor (Dexcom G6 Sensor) APPLY 1 TOPICALLY EVERY 10 DAYS 6/29/21  Yes Gina Hunt MD   Continuous Blood Gluc Sensor (Dexcom G6 Sensor) APPLY 1 TOPICALLY EVERY 10 DAYS 9/19/21  Yes Gina Hunt MD   Continuous Blood Gluc Transmit (Dexcom G6 Transmitter) misc USE TRANSMITTER AS INSTRUCTED 6/1/21  Yes Gina Hunt MD   Continuous Blood Gluc Transmit (Dexcom G6 Transmitter) misc USE TRANSMITTER AS INSTRUCTED. 8/25/21  Yes Gina Hunt MD   dicyclomine (BENTYL) 20 MG tablet Take 20 mg by mouth 3 (Three) Times a Day.   Yes Gina Hunt MD   docusate sodium (COLACE) 100 MG capsule Take 100 mg by mouth 2 (Two) Times a Day.   Yes Emergency, Nurse Suni, RN   eszopiclone (LUNESTA) 1 MG tablet Take immediately before bedtime 6/29/21  Yes Mk Dowling, DO   ezetimibe (ZETIA) 10 MG tablet Take 10 mg by mouth Daily.   Yes Gina Hunt MD   FeroSul 325 (65 Fe) MG tablet TAKE 1 TABLET(325 MG) BY MOUTH TWICE DAILY 9/20/21  Yes Francesco Gimenez, DO   formoterol (Perforomist) 20 MCG/2ML nebulizer solution Take  by nebulization 2 (Two) Times a Day.   Yes Gina Hunt MD   gabapentin (NEURONTIN) 300 MG capsule Take 300 mg by mouth 4 (Four) Times a Day. Pt takes 3 capsules 4 times a day   Yes Gina Hunt MD   glucose blood (FREESTYLE LITE) test strip 1 strip by Other route 4 (Four) Times a Day. 9/7/21  Yes Gina Hunt MD   insulin regular (HUMULIN R) 500 UNIT/ML CONCENTRATED injection Humulin R U-500 (Conc) Insulin 500 unit/mL subcutaneous solution inject by subcutaneous route per  instructions.  For use with insulin pump   Active   Yes Gina Hunt MD   ipratropium-albuterol (DUO-NEB) 0.5-2.5 mg/3 ml nebulizer USE 3 ML VIA NEBULIZER  EVERY 4 HOURS AS NEEDED FOR SHORTNESS OF BREATH 6/18/21  Yes Gina Hunt MD   levothyroxine (SYNTHROID, LEVOTHROID) 125 MCG tablet Take 250 mcg by mouth Daily. 6/22/21  Yes Gina Hunt MD   lidocaine (LIDODERM) 5 % Remove & Discard patch within 12 hours or as directed by MD 8/17/21  Yes Francesco Gimenez DO   Magnesium Gluconate 550 MG tablet Take 30 mg by mouth 2 (Two) Times a Day.   Yes Gina Hunt MD   metoprolol succinate XL (TOPROL-XL) 25 MG 24 hr tablet Take 25 mg by mouth 2 (Two) Times a Day. 6/29/21  Yes Gina Hunt MD   mineral oil-hydrophilic petrolatum (AQUAPHOR) ointment 2 (Two) Times a Day. to affected area 6/30/21  Yes Gina Hunt MD   montelukast (SINGULAIR) 10 MG tablet TAKE 1 TABLET BY MOUTH ONCE DAILY EVERY EVENING 7/1/21  Yes Gina Hunt MD   nystatin (MYCOSTATIN) 030767 UNIT/GM powder Apply  topically to the appropriate area as directed 3 (Three) Times a Day for 90 days. 7/9/21 10/7/21 Yes Francesco Gimenez DO   pantoprazole (PROTONIX) 40 MG EC tablet Take 40 mg by mouth Daily. 7/1/21  Yes Gina Hunt MD   polyethylene glycol (MIRALAX) 17 GM/SCOOP powder 17 g.   Yes Gina Hunt MD   potassium chloride 10 MEQ CR tablet Take 20 mEq by mouth 2 (two) times a day.   Yes Gina Hunt MD   promethazine (PHENERGAN) 25 MG tablet TAKE 1 TABLET(25 MG) BY MOUTH EVERY 6 HOURS AS NEEDED 9/17/21  Yes Francesco Gimenez DO   revefenacin (YUPELRI) 175 MCG/3ML nebulizer solution Take  by nebulization Daily.   Yes Gina Hunt MD   sertraline (ZOLOFT) 100 MG tablet TAKE 1 TABLET BY MOUTH ONCE DAILY 9/13/21  Yes Francesco Gimenez DO   spironolactone (ALDACTONE) 100 MG tablet Take 200 mg by mouth 2 (two) times a day.   Yes Gina Hunt MD   sucralfate (CARAFATE) 1 g tablet sucralfate 1 gram oral tablet take 1 tablet (1 gram) by oral route 4 times per day on an empty stomach 1 hour  "before meals and at bedtime   Suspended   Yes Provider, MD Gina   SUMAtriptan (IMITREX) 100 MG tablet Take 1 tablet by mouth As Needed. 6/29/21  Yes Gina Hunt MD   torsemide (DEMADEX) 100 MG tablet Take 100 mg by mouth 3 (Three) Times a Day.   Yes Provider, MD Gina   traZODone (DESYREL) 50 MG tablet Take 1 tablet by mouth Daily. 8/23/21  Yes Mk Dowling,    TRUEplus Insulin Syringe 31G X 5/16\" 0.5 ML misc USE AS DIRECTED SIX TIMES DAILY 6/30/21  Yes ProviderGina MD   vitamin D3 125 MCG (5000 UT) capsule capsule Take 1 capsule by mouth Daily. 6/1/21  Yes Gina Hunt MD   warfarin (COUMADIN) 2 MG tablet warfarin 2 mg oral tablet take 1 tablet (2 mg) by oral route once daily as directed 2/16/2021  Active 2/16/21  Yes ProviderGina MD   warfarin (COUMADIN) 5 MG tablet TAKE 1 TABLET(5 MG) BY MOUTH EVERY DAY AS DIRECTED 9/14/21  Yes Francesco Gimenez,    gabapentin (NEURONTIN) 300 MG capsule Take 3 capsules by mouth 4 (Four) Times a Day for 30 days. 7/20/21 8/19/21  Francesco Gimenez DO        Patient Active Problem List   Diagnosis   • NAFLD (nonalcoholic fatty liver disease)   • Budd-Chiari syndrome (CMS/HCC)   • Abnormal liver enzymes   • Abnormal mammogram   • Acquired hypothyroidism   • Anemia   • Arthritis   • Chronic right-sided low back pain with right-sided sciatica   • Contact dermatitis and other eczema   • COPD (chronic obstructive pulmonary disease) (CMS/HCC)   • Dependent edema   • Depression   • Diabetic polyneuropathy (CMS/HCC)   • Dysphagia   • Elevated alkaline phosphatase level   • Elevated ferritin   • Encounter for long-term (current) use of insulin (CMS/HCC)   • Essential hypertension   • Fibromyalgia   • Neck pain, bilateral   • Foot pain   • Gastroesophageal reflux disease without esophagitis   • History of pancreatitis   • Hyperlipidemia   • Hypokalemia   • Hyperinsulinism   • Insulin resistance   • Long term " (current) use of anticoagulants   • LPRD (laryngopharyngeal reflux disease)   • Magnesium deficiency   • Microalbuminuria   • Moderate episode of recurrent major depressive disorder (CMS/HCC)   • Morbid obesity ([facility])   • Mycobacterium avium complex (CMS/HCC)   • GIUSEPPE (obstructive sleep apnea)   • Pain in left hip   • Personal history of tobacco use, presenting hazards to health   • Predisposition to allergic reaction   • Presence of insulin pump   • Pulmonary hypertension (CMS/HCC)   • Pulmonary nodule   • Serum calcium elevated   • Stasis dermatitis of both legs   • Thoracic back pain   • Uncontrolled type 2 diabetes mellitus with hyperglycemia (CMS/HCC)   • Uncontrolled type 2 diabetes mellitus with neurologic complication (CMS/HCC)   • Uncontrolled type 2 diabetes mellitus without complication, with long-term current use of insulin   • Ventral hernia   • Vitamin D deficiency   • Voice hoarseness   • Wheezing   • 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CMS/HCC)   • Herpes zoster without complication   • Postherpetic neuralgia   • Skin tags, multiple acquired        Past Surgical History:   Procedure Laterality Date   • CARDIAC CATHETERIZATION  2018   • CARPAL TUNNEL RELEASE     •  SECTION  ,     • COLONOSCOPY     • ENDOSCOPY     • HERNIA REPAIR     • HYSTERECTOMY  ,     PARTIAL   • KNEE SURGERY         Social History     Socioeconomic History   • Marital status:      Spouse name: Not on file   • Number of children: Not on file   • Years of education: Not on file   • Highest education level: Not on file   Tobacco Use   • Smoking status: Former Smoker     Packs/day: 1.00   • Smokeless tobacco: Never Used   • Tobacco comment: QUIT 2004   Vaping Use   • Vaping Use: Never used   Substance and Sexual Activity   • Alcohol use: Not Currently     Comment: FORMER; OCCASIONAL   • Drug use: Never   • Sexual activity: Defer       Family History   Problem Relation Age of Onset   • Heart  "disease Mother         GRANDPARENT-NONSPECIFIC   • Diabetes Father    • Skin cancer Father        Family history, surgical history, past medical history, Allergies and med's reviewed with patient today and updated in [facility] EMR.     ROS:  Review of Systems   Constitutional: Negative for fatigue.   Skin: Positive for rash.       OBJECTIVE:  Vitals:    09/22/21 1443   BP: 131/66   BP Location: Left arm   Patient Position: Sitting   Pulse: 92   Temp: 98 °F (36.7 °C)   SpO2: 95%   Weight: (!) 197 kg (434 lb)   Height: 159.4 cm (62.75\")     No exam data present   Body mass index is 77.49 kg/m².  No LMP recorded (lmp unknown). Patient has had a hysterectomy.    Physical Exam  Vitals and nursing note reviewed.   Constitutional:       General: She is not in acute distress.     Appearance: Normal appearance. She is obese.   HENT:      Head: Normocephalic and atraumatic.   Skin:     Comments: Hyperpigmented lesions on the left anterior flank.  Multiple skin tags face upper eyelids next axilla under her breasts and flanks.   Neurological:      Mental Status: She is alert.         Procedures    No visits with results within 30 Day(s) from this visit.   Latest known visit with results is:   Admission on 07/16/2021, Discharged on 07/16/2021   Component Date Value Ref Range Status   • Color, UA 07/16/2021 Dark Yellow* Yellow, Straw Final   • Appearance, UA 07/16/2021 Clear  Clear Final   • pH, UA 07/16/2021 5.5  5.0 - 8.0 Final   • Specific Gravity, UA 07/16/2021 1.014  1.005 - 1.030 Final   • Glucose, UA 07/16/2021 Negative  Negative Final   • Ketones, UA 07/16/2021 Negative  Negative Final   • Bilirubin, UA 07/16/2021 Small (1+)* Negative Final   • Blood, UA 07/16/2021 Negative  Negative Final   • Protein, UA 07/16/2021 Negative  Negative Final   • Leuk Esterase, UA 07/16/2021 Negative  Negative Final   • Nitrite, UA 07/16/2021 Negative  Negative Final   • Urobilinogen, UA 07/16/2021 0.2 E.U./dL  0.2 - 1.0 E.U./dL Final   • " Glucose 07/16/2021 223* 65 - 99 mg/dL Final   • BUN 07/16/2021 19  6 - 20 mg/dL Final   • Creatinine 07/16/2021 1.01* 0.57 - 1.00 mg/dL Final   • Sodium 07/16/2021 136  136 - 145 mmol/L Final   • Potassium 07/16/2021 4.2  3.5 - 5.2 mmol/L Final   • Chloride 07/16/2021 92* 98 - 107 mmol/L Final   • CO2 07/16/2021 32.8* 22.0 - 29.0 mmol/L Final   • Calcium 07/16/2021 9.5  8.6 - 10.5 mg/dL Final   • Total Protein 07/16/2021 7.3  6.0 - 8.5 g/dL Final   • Albumin 07/16/2021 4.20  3.50 - 5.20 g/dL Final   • ALT (SGPT) 07/16/2021 61* 1 - 33 U/L Final   • AST (SGOT) 07/16/2021 75* 1 - 32 U/L Final   • Alkaline Phosphatase 07/16/2021 448* 39 - 117 U/L Final   • Total Bilirubin 07/16/2021 1.0  0.0 - 1.2 mg/dL Final   • eGFR Non African Amer 07/16/2021 58* >60 mL/min/1.73 Final   • Globulin 07/16/2021 3.1  gm/dL Final   • A/G Ratio 07/16/2021 1.4  g/dL Final   • BUN/Creatinine Ratio 07/16/2021 18.8  7.0 - 25.0 Final   • Anion Gap 07/16/2021 11.2  5.0 - 15.0 mmol/L Final   • proBNP 07/16/2021 245.4  0.0 - 900.0 pg/mL Final   • WBC 07/16/2021 7.60  3.40 - 10.80 10*3/mm3 Final   • RBC 07/16/2021 4.58  3.77 - 5.28 10*6/mm3 Final   • Hemoglobin 07/16/2021 14.3  12.0 - 15.9 g/dL Final   • Hematocrit 07/16/2021 44.0  34.0 - 46.6 % Final   • MCV 07/16/2021 96.1  79.0 - 97.0 fL Final   • MCH 07/16/2021 31.2  26.6 - 33.0 pg Final   • MCHC 07/16/2021 32.5  31.5 - 35.7 g/dL Final   • RDW 07/16/2021 14.9  12.3 - 15.4 % Final   • RDW-SD 07/16/2021 51.8  37.0 - 54.0 fl Final   • MPV 07/16/2021 9.1  6.0 - 12.0 fL Final   • Platelets 07/16/2021 194  140 - 450 10*3/mm3 Final   • Neutrophil % 07/16/2021 71.7  42.7 - 76.0 % Final   • Lymphocyte % 07/16/2021 14.9* 19.6 - 45.3 % Final   • Monocyte % 07/16/2021 9.3  5.0 - 12.0 % Final   • Eosinophil % 07/16/2021 2.9  0.3 - 6.2 % Final   • Basophil % 07/16/2021 0.5  0.0 - 1.5 % Final   • Immature Grans % 07/16/2021 0.7* 0.0 - 0.5 % Final   • Neutrophils, Absolute 07/16/2021 5.45  1.70 - 7.00 10*3/mm3  Final   • Lymphocytes, Absolute 07/16/2021 1.13  0.70 - 3.10 10*3/mm3 Final   • Monocytes, Absolute 07/16/2021 0.71  0.10 - 0.90 10*3/mm3 Final   • Eosinophils, Absolute 07/16/2021 0.22  0.00 - 0.40 10*3/mm3 Final   • Basophils, Absolute 07/16/2021 0.04  0.00 - 0.20 10*3/mm3 Final   • Immature Grans, Absolute 07/16/2021 0.05  0.00 - 0.05 10*3/mm3 Final   • nRBC 07/16/2021 0.0  0.0 - 0.2 /100 WBC Final   • Urine Culture 07/16/2021 No growth   Final       ASSESSMENT/ PLAN:    Diagnoses and all orders for this visit:    1. Postherpetic neuralgia (Primary)  Assessment & Plan:  Her pain and discomfort is gone.  She still has some numbness in this region.  She does not have any lesions but some residual hyperpigmentation.      2. Long term (current) use of anticoagulants  Assessment & Plan:  Current warfarin dose is 7.5 mg daily.  We will update her INR.    Orders:  -     Protime-INR Coumadin YES  (Warfarin) Therapy; Future    3. Budd-Chiari syndrome (CMS/HCC)    4. Morbid obesity (HCC)      Orders Placed Today:     No orders of the defined types were placed in this encounter.       Management Plan:     An After Visit Summary was printed and given to the patient at discharge.    Follow-up: Return in about 2 months (around 11/22/2021) for Recheck.    Francesco Gimenez DO 9/22/2021 15:24 EDT  This note was electronically signed.

## 2021-09-22 NOTE — ASSESSMENT & PLAN NOTE
Her pain and discomfort is gone.  She still has some numbness in this region.  She does not have any lesions but some residual hyperpigmentation.

## 2021-09-22 NOTE — ASSESSMENT & PLAN NOTE
She has multiple skin tags in multiple areas.  Instructed we can remove these and how we remove them although it does take quite a bit of time with her multiple skin tags.

## 2021-09-24 DIAGNOSIS — J30.9 ALLERGIC RHINITIS, UNSPECIFIED SEASONALITY, UNSPECIFIED TRIGGER: Primary | ICD-10-CM

## 2021-09-24 RX ORDER — FEXOFENADINE HYDROCHLORIDE 180 MG/1
TABLET, FILM COATED ORAL
Qty: 60 TABLET | Refills: 11 | Status: SHIPPED | OUTPATIENT
Start: 2021-09-24 | End: 2022-10-10

## 2021-09-27 ENCOUNTER — TELEPHONE (OUTPATIENT)
Dept: FAMILY MEDICINE CLINIC | Facility: CLINIC | Age: 51
End: 2021-09-27

## 2021-09-27 RX ORDER — IPRATROPIUM BROMIDE AND ALBUTEROL SULFATE 2.5; .5 MG/3ML; MG/3ML
SOLUTION RESPIRATORY (INHALATION)
Qty: 360 ML | Refills: 5 | Status: SHIPPED | OUTPATIENT
Start: 2021-09-27

## 2021-09-27 NOTE — TELEPHONE ENCOUNTER
Caller: Joan Partida    Relationship: Self      Medication requested (name and dosage): TRIAMCINOLONE    Pharmacy where request should be sent: [id number] - [place], [place] - [address] [facility] AT [facility] OF [address] & [place] - [phone] [facility] [phone] [facility]    Additional details provided by patient: PATIENT IS COMPLETELY OUT AND NEEDS A NEW SCRIPT CALLED IN, SHE STATES HER MEDICATION REFILL IS OUTDATED.     Best call back number: [phone]    Does the patient have less than a 3 day supply:  [x] Yes  [] No    Liz Calrke Rep   09/27/21 15:07 EDT

## 2021-09-29 RX ORDER — NYSTATIN 100000 [USP'U]/G
POWDER TOPICAL
Qty: 180 G | Refills: 1 | Status: SHIPPED | OUTPATIENT
Start: 2021-09-29

## 2021-10-12 ENCOUNTER — TELEPHONE (OUTPATIENT)
Dept: FAMILY MEDICINE CLINIC | Facility: CLINIC | Age: 51
End: 2021-10-12

## 2021-10-12 NOTE — TELEPHONE ENCOUNTER
Caller: Joan Partida    Relationship to patient: Self    Best call back number: [phone]    Chief complaint: removing skin tags/3 month check up    Type of visit: IN-OFFICE PROCEDURE    Requested date: ANY DATE- MORNING        Additional notes:THE PATIENT STATED SHE WOULD LIKE A CALL TO SCHEDULE HER APPOINTMENT FOR ANY DAY IN THE MORNING WITH PCP

## 2021-10-13 DIAGNOSIS — K21.9 GASTROESOPHAGEAL REFLUX DISEASE, UNSPECIFIED WHETHER ESOPHAGITIS PRESENT: Primary | ICD-10-CM

## 2021-10-13 RX ORDER — PANTOPRAZOLE SODIUM 40 MG/1
TABLET, DELAYED RELEASE ORAL
Qty: 60 TABLET | Refills: 1 | Status: SHIPPED | OUTPATIENT
Start: 2021-10-13 | End: 2022-05-02

## 2021-10-21 RX ORDER — ESZOPICLONE 1 MG/1
TABLET, FILM COATED ORAL
Qty: 30 TABLET | Refills: 2 | Status: SHIPPED | OUTPATIENT
Start: 2021-10-21 | End: 2022-03-04

## 2021-10-25 ENCOUNTER — TELEPHONE (OUTPATIENT)
Dept: FAMILY MEDICINE CLINIC | Facility: CLINIC | Age: 51
End: 2021-10-25

## 2021-10-25 NOTE — TELEPHONE ENCOUNTER
Caller: KIM    Relationship: MEDICAL SUPPLIER    Best call back number: [phone]    What form or medical record are you requesting: RECORDS REGARDING PATIENTS WHEELCHAIR    Who is requesting this form or medical record from you: [employer]    How would you like to receive the form or medical records (pick-up, mail, fax): FAX  If fax, what is the fax number: [phone]    Timeframe paperwork needed: ASAP    Additional notes: KIM FROM [employer] CALLED NEEDING ALL OFFICE NOTES REGARDING THE PATIENTS WHEELCHAIR FROM THE LAST THREE MONTHS

## 2021-11-03 ENCOUNTER — OFFICE VISIT (OUTPATIENT)
Dept: PULMONOLOGY | Facility: CLINIC | Age: 51
End: 2021-11-03

## 2021-11-03 VITALS
TEMPERATURE: 98 F | HEART RATE: 86 BPM | SYSTOLIC BLOOD PRESSURE: 138 MMHG | OXYGEN SATURATION: 97 % | DIASTOLIC BLOOD PRESSURE: 69 MMHG

## 2021-11-03 DIAGNOSIS — G47.33 OSA (OBSTRUCTIVE SLEEP APNEA): ICD-10-CM

## 2021-11-03 DIAGNOSIS — I50.32 CHRONIC DIASTOLIC CONGESTIVE HEART FAILURE (HCC): ICD-10-CM

## 2021-11-03 DIAGNOSIS — J45.909 ASTHMA, UNSPECIFIED ASTHMA SEVERITY, UNSPECIFIED WHETHER COMPLICATED, UNSPECIFIED WHETHER PERSISTENT: Primary | ICD-10-CM

## 2021-11-03 DIAGNOSIS — R91.1 PULMONARY NODULE: ICD-10-CM

## 2021-11-03 DIAGNOSIS — R06.2 WHEEZING: ICD-10-CM

## 2021-11-03 DIAGNOSIS — J30.2 SEASONAL ALLERGIES: ICD-10-CM

## 2021-11-03 DIAGNOSIS — B37.9 YEAST INFECTION: ICD-10-CM

## 2021-11-03 DIAGNOSIS — I27.20 PULMONARY HYPERTENSION (HCC): ICD-10-CM

## 2021-11-03 DIAGNOSIS — J32.9 SINUSITIS, UNSPECIFIED CHRONICITY, UNSPECIFIED LOCATION: ICD-10-CM

## 2021-11-03 DIAGNOSIS — E66.01 CLASS 3 SEVERE OBESITY WITH BODY MASS INDEX (BMI) OF 50.0 TO 59.9 IN ADULT, UNSPECIFIED OBESITY TYPE, UNSPECIFIED WHETHER SERIOUS COMORBIDITY PRESENT (HCC): ICD-10-CM

## 2021-11-03 DIAGNOSIS — J30.9 ALLERGIC RHINITIS, UNSPECIFIED SEASONALITY, UNSPECIFIED TRIGGER: ICD-10-CM

## 2021-11-03 PROCEDURE — 99214 OFFICE O/P EST MOD 30 MIN: CPT | Performed by: NURSE PRACTITIONER

## 2021-11-03 RX ORDER — AMOXICILLIN AND CLAVULANATE POTASSIUM 875; 125 MG/1; MG/1
1 TABLET, FILM COATED ORAL 2 TIMES DAILY
Qty: 14 TABLET | Refills: 0 | Status: SHIPPED | OUTPATIENT
Start: 2021-11-03 | End: 2021-12-02

## 2021-11-03 RX ORDER — MONTELUKAST SODIUM 10 MG/1
TABLET ORAL
Qty: 90 TABLET | Refills: 3 | Status: SHIPPED | OUTPATIENT
Start: 2021-11-03

## 2021-11-03 RX ORDER — FLUCONAZOLE 150 MG/1
150 TABLET ORAL ONCE
Qty: 7 TABLET | Refills: 0 | Status: SHIPPED | OUTPATIENT
Start: 2021-11-03 | End: 2021-11-03

## 2021-11-03 NOTE — PROGRESS NOTES
Primary Care Provider  Francesco Gimenez DO     Referring Provider  No ref. provider found     Chief Complaint  COPD (3mo f/u), Shortness of Breath, Wheezing, and Cough    Subjective          Joan Partida presents to [facility]  History of Present Illness  Joan Partida is a 50 y.o. female patient of Dr. Dowling with a history of pulmonary hypertension secondary to diastolic dysfunction, diastolic heart failure, fluid overload, and asthma.  She is here for follow-up visit today.    Patient said that she is doing well since her last visit. She denies having use any antibiotics or steroids for her lungs. She continues to use Yupelri, Perforomist, and Pulmicort as prescribed. She is also on nighttime Singulair. She states that over the past few months she has been doing well, however the past 3 weeks or so she believes that she has had an sinus infection that has migrated to her chest. She continues to wear 4 L of oxygen. She also wears her BiPAP machine at night. Patient's most recent compliance report shows a usage of 88/90 days, average use 7 hours and 5 minutes, and an AHI of 3.3. Patient states that she is in need of a new machine and she has since changed insurance companies. Overall, patient has no other concerns at this time. She is up-to-date with her flu, pneumonia, and Covid vaccine. She is able to form her ADLs as long she paces herself.     Her history of smoking is      Tobacco Use: Medium Risk   • Smoking Tobacco Use: Former Smoker   • Smokeless Tobacco Use: Never Used   .    Review of Systems   Constitutional: Negative for chills, fatigue, fever, unexpected weight gain and unexpected weight loss.   HENT: Negative for congestion (Nasal), hearing loss, mouth sores, nosebleeds, postnasal drip, sore throat and trouble swallowing.    Eyes: Negative for blurred vision and visual disturbance.   Respiratory: Positive for cough, shortness of breath  and wheezing. Negative for apnea.         Negative for Hemoptysis     Cardiovascular: Negative for chest pain, palpitations and leg swelling.   Gastrointestinal: Negative for abdominal pain, constipation, diarrhea, nausea, vomiting and GERD.        Negative for Jaundice  Negative for Bloating  Negative for Melena   Musculoskeletal: Negative for joint swelling and myalgias.        Negative for Joint pain  Negative for Joint stiffness   Skin: Negative for color change.        Negative for cyanosis   Neurological: Negative for syncope, weakness, numbness and headache.      Sleep: Negative for Excessive daytime sleepiness  Negative for morning headaches  Negative for Snoring    Family History   Problem Relation Age of Onset   • Heart disease Mother         GRANDPARENT-NONSPECIFIC   • Diabetes Father    • Skin cancer Father         Social History     Socioeconomic History   • Marital status:    Tobacco Use   • Smoking status: Former Smoker     Packs/day: 1.00   • Smokeless tobacco: Never Used   • Tobacco comment: QUIT 2004   Vaping Use   • Vaping Use: Never used   Substance and Sexual Activity   • Alcohol use: Not Currently     Comment: FORMER; OCCASIONAL   • Drug use: Never   • Sexual activity: Defer        Past Medical History:   Diagnosis Date   • Abnormal mammogram 10/21/2013   • Allergy    • Anemia    • Arthralgia    • CHF (congestive heart failure) ([facility])    • COPD (chronic obstructive pulmonary disease) ([facility]) 03/04/2015   • Dependent edema 08/27/2018   • Depression    • Derangement of meniscus of left knee 01/01/2014    LEFT KNEE MEDIAL MENISCUS TEAR   • Dermatitis 07/10/2014   • Dysphagia    • Essential hypertension 03/04/2015   • Fibromyalgia    • Foot pain 07/10/2014   • GERD (gastroesophageal reflux disease) 10/17/2014   • Hepatic steatosis 03/04/2015   • 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[facility]) 10/09/2020   • History of tobacco abuse    • Hyperlipidemia    • Hypokalemia 05/07/2019   • Hypothyroid    • Long term  current use of anticoagulant    • LPRD (laryngopharyngeal reflux disease)    • Moderate episode of recurrent major depressive disorder ([facility]) 06/22/2017   • Mycobacterium avium complex ([facility]) 08/09/2018   • GIUSEPPE (obstructive sleep apnea) 08/09/2018   • Pulmonary hypertension ([facility]) 08/27/2018   • Stasis dermatitis of both legs 08/09/2018   • Type 2 diabetes mellitus, with long-term current use of insulin ([facility]) 06/22/2017   • Ventral hernia 03/04/2015   • Vitamin D deficiency 11/13/2013   • Voice hoarseness         Immunization History   Administered Date(s) Administered   • COVID-19 (PFIZER) 04/21/2021, 05/12/2021   • Flu Vaccine Quad PF >18YRS 09/30/2018, 11/22/2019   • Influenza TIV (IM) 01/10/2014, 10/09/2020   • Influenza, Unspecified 11/04/2016, 10/09/2020   • Pneumococcal Polysaccharide (PPSV23) 01/01/2007   • Tdap 12/12/2016         Allergies   Allergen Reactions   • Nickel Diarrhea and Nausea And Vomiting   • Insulin Degludec Unknown - High Severity     Can trigger pancreatitis   • [place] Unknown - High Severity     [place] blue, by allergy testing.    • Dulaglutide Nausea And Vomiting   • Exenatide Other (See Comments)     pancreantitis   • Metformin Unknown - Low Severity   • Palladium Chloride Unknown - Low Severity     by allergy testing.    • Sitagliptin Other (See Comments)     panceatitis          Current Outpatient Medications:   •  albuterol sulfate HFA (Ventolin HFA) 108 (90 Base) MCG/ACT inhaler, Ventolin HFA 90 mcg/actuation inhalation HFA aerosol inhaler inhale 1 - 2 puffs (90 - 180 mcg) by inhalation route every 6 hours as needed   Active, Disp: , Rfl:   •  Allergy Relief 180 MG tablet, TAKE 1 TABLET BY MOUTH TWICE DAILY, Disp: 60 tablet, Rfl: 11  •  arformoterol (Brovana) 15 MCG/2ML nebulizer solution, 2 mL., Disp: , Rfl:   •  BD Insulin Syringe U-500 31G X 6MM 0.5 ML misc, , Disp: , Rfl:   •  budesonide (Pulmicort) 0.5 MG/2ML nebulizer solution, 2 mL., Disp: , Rfl:   •  Cholecalciferol 125 MCG  (5000 UT) tablet, Take 125 mcg by mouth Daily., Disp: , Rfl:   •  clotrimazole (LOTRIMIN) 1 % cream, APPLY EXTERNALLY TO THE AFFECTED AND SURROUNDING AREAS TWICE DAILY IN THE MORNING AND IN THE EVENING FOR 14 DAYS, Disp: , Rfl:   •  Continuous Blood Gluc  (Dexcom G6 ) device, USE RECIEVER DEVICE AS DIRECTED, Disp: , Rfl:   •  Continuous Blood Gluc Sensor (Dexcom G6 Sensor), APPLY 1 TOPICALLY EVERY 10 DAYS, Disp: , Rfl:   •  Continuous Blood Gluc Transmit (Dexcom G6 Transmitter) misc, USE TRANSMITTER AS INSTRUCTED, Disp: , Rfl:   •  dicyclomine (BENTYL) 20 MG tablet, Take 20 mg by mouth 3 (Three) Times a Day., Disp: , Rfl:   •  docusate sodium (COLACE) 100 MG capsule, Take 100 mg by mouth 2 (Two) Times a Day., Disp: , Rfl:   •  eszopiclone (LUNESTA) 1 MG tablet, TAKE 1 TABLET BY MOUTH IMMEDIATELY BEFORE BEDTIME, Disp: 30 tablet, Rfl: 2  •  ezetimibe (ZETIA) 10 MG tablet, Take 10 mg by mouth Daily., Disp: , Rfl:   •  FeroSul 325 (65 Fe) MG tablet, TAKE 1 TABLET(325 MG) BY MOUTH TWICE DAILY, Disp: 60 tablet, Rfl: 10  •  formoterol (Perforomist) 20 MCG/2ML nebulizer solution, Take  by nebulization 2 (Two) Times a Day., Disp: , Rfl:   •  gabapentin (NEURONTIN) 300 MG capsule, Take 300 mg by mouth 4 (Four) Times a Day. Pt takes 3 capsules 4 times a day, Disp: , Rfl:   •  glucagon (GLUCAGEN) 1 MG injection, Inject 1 mg into the appropriate muscle as directed by prescriber., Disp: , Rfl:   •  glucose blood (FREESTYLE LITE) test strip, 1 strip by Other route 4 (Four) Times a Day., Disp: , Rfl:   •  insulin regular (HUMULIN R) 500 UNIT/ML CONCENTRATED injection, Humulin R U-500 (Conc) Insulin 500 unit/mL subcutaneous solution inject by subcutaneous route per  instructions.  For use with insulin pump   Active, Disp: , Rfl:   •  ipratropium-albuterol (DUO-NEB) 0.5-2.5 mg/3 ml nebulizer, USE 3 ML VIA NEBULIZER EVERY 4 HOURS AS NEEDED FOR SHORTNESS OF BREATH, Disp: 360 mL, Rfl: 5  •  levothyroxine (SYNTHROID,  LEVOTHROID) 125 MCG tablet, Take 250 mcg by mouth Daily., Disp: , Rfl:   •  lidocaine (LIDODERM) 5 %, Remove & Discard patch within 12 hours or as directed by MD, Disp: 30 patch, Rfl: 1  •  Magnesium Gluconate 550 MG tablet, Take 30 mg by mouth 2 (Two) Times a Day., Disp: , Rfl:   •  metoprolol succinate XL (TOPROL-XL) 25 MG 24 hr tablet, Take 25 mg by mouth 2 (Two) Times a Day., Disp: , Rfl:   •  mineral oil-hydrophilic petrolatum (AQUAPHOR) ointment, 2 (Two) Times a Day. to affected area, Disp: , Rfl:   •  nystatin 365826 UNIT/GM powder, APPLY TO AFFECTED AREA THREE TIMES DAILY, Disp: 180 g, Rfl: 1  •  pantoprazole (PROTONIX) 40 MG EC tablet, TAKE 1 TABLET BY MOUTH EVERY DAY, Disp: 60 tablet, Rfl: 1  •  polyethylene glycol (MIRALAX) 17 GM/SCOOP powder, 17 g., Disp: , Rfl:   •  potassium chloride 10 MEQ CR tablet, Take 20 mEq by mouth 2 (two) times a day., Disp: , Rfl:   •  promethazine (PHENERGAN) 25 MG tablet, TAKE 1 TABLET(25 MG) BY MOUTH EVERY 6 HOURS AS NEEDED, Disp: 30 tablet, Rfl: 0  •  revefenacin (YUPELRI) 175 MCG/3ML nebulizer solution, Take  by nebulization Daily., Disp: , Rfl:   •  sertraline (ZOLOFT) 100 MG tablet, TAKE 1 TABLET BY MOUTH ONCE DAILY, Disp: 90 tablet, Rfl: 1  •  spironolactone (ALDACTONE) 100 MG tablet, Take 200 mg by mouth 2 (two) times a day., Disp: , Rfl:   •  SUMAtriptan (IMITREX) 100 MG tablet, Take 1 tablet by mouth As Needed., Disp: , Rfl:   •  torsemide (DEMADEX) 100 MG tablet, Take 100 mg by mouth 3 (Three) Times a Day., Disp: , Rfl:   •  traZODone (DESYREL) 50 MG tablet, Take 1 tablet by mouth Daily., Disp: 30 tablet, Rfl: 4  •  warfarin (COUMADIN) 2 MG tablet, warfarin 2 mg oral tablet take 1 tablet (2 mg) by oral route once daily as directed 2/16/2021  Active, Disp: , Rfl:   •  warfarin (COUMADIN) 5 MG tablet, Take 1.5 tablets by mouth Every Night for 90 days., Disp: 135 tablet, Rfl: 3  •  amoxicillin-clavulanate (Augmentin) 875-125 MG per tablet, Take 1 tablet by mouth 2  "(Two) Times a Day., Disp: 14 tablet, Rfl: 0  •  Continuous Blood Gluc Sensor (Dexcom G6 Sensor), APPLY 1 TOPICALLY EVERY 10 DAYS, Disp: , Rfl:   •  Continuous Blood Gluc Transmit (Dexcom G6 Transmitter) misc, USE TRANSMITTER AS INSTRUCTED., Disp: , Rfl:   •  fluconazole (Diflucan) 150 MG tablet, Take 1 tablet by mouth 1 (One) Time for 1 dose., Disp: 7 tablet, Rfl: 0  •  gabapentin (NEURONTIN) 300 MG capsule, Take 3 capsules by mouth 4 (Four) Times a Day for 30 days., Disp: 360 capsule, Rfl: 5  •  montelukast (SINGULAIR) 10 MG tablet, TAKE 1 TABLET BY MOUTH ONCE DAILY EVERY EVENING, Disp: 90 tablet, Rfl: 3  •  sucralfate (CARAFATE) 1 g tablet, sucralfate 1 gram oral tablet take 1 tablet (1 gram) by oral route 4 times per day on an empty stomach 1 hour before meals and at bedtime   Suspended, Disp: , Rfl:   •  TRUEplus Insulin Syringe 31G X 5/16\" 0.5 ML misc, USE AS DIRECTED SIX TIMES DAILY, Disp: , Rfl:   •  vitamin D3 125 MCG (5000 UT) capsule capsule, Take 1 capsule by mouth Daily., Disp: , Rfl:      Objective   Physical Exam  Constitutional:       General: She is not in acute distress.     Appearance: Normal appearance. She is obese.   HENT:      Right Ear: Hearing normal.      Left Ear: Hearing normal.      Nose: No nasal tenderness or congestion.      Mouth/Throat:      Mouth: Mucous membranes are moist. No oral lesions.   Eyes:      Extraocular Movements: Extraocular movements intact.      Pupils: Pupils are equal, round, and reactive to light.   Neck:      Thyroid: No thyroid mass or thyromegaly.   Cardiovascular:      Rate and Rhythm: Normal rate and regular rhythm.      Pulses: Normal pulses.      Heart sounds: Normal heart sounds. No murmur heard.      Pulmonary:      Effort: Pulmonary effort is normal.      Breath sounds: Decreased breath sounds present. No wheezing, rhonchi or rales.      Comments: Patient is on 4 L of oxygen. She is able to speak full sentences.  Chest:   Breasts:      Right: No axillary " adenopathy.       Abdominal:      General: Bowel sounds are normal. There is no distension.      Palpations: Abdomen is soft.      Tenderness: There is no abdominal tenderness.   Musculoskeletal:      Cervical back: Neck supple.      Right lower leg: No edema.      Left lower leg: No edema.   Lymphadenopathy:      Cervical: No cervical adenopathy.      Upper Body:      Right upper body: No axillary adenopathy.   Skin:     General: Skin is warm and dry.      Findings: No lesion or rash.   Neurological:      General: No focal deficit present.      Mental Status: She is alert and oriented to person, place, and time.      Cranial Nerves: Cranial nerves are intact.   Psychiatric:         Mood and Affect: Affect normal. Mood is not anxious or depressed.         Vital Signs:   /69 (BP Location: Left arm, Patient Position: Sitting, Cuff Size: Adult)   Pulse 86   Temp 98 °F (36.7 °C) (Tympanic)   SpO2 97% Comment: 4L Oxygen       Result Review :     CMP    CMP 7/9/21 7/16/21 11/1/21   Glucose 415 (A) 223 (A)    Glucose   144 (A)   BUN 36 (A) 19 31 (A)   Creatinine 1.14 (A) 1.01 (A) 0.9   eGFR Non African Am 50 (A) 58 (A)    Sodium 133 (A) 136 138   Potassium 3.8 4.2 3.7   Chloride 81 (A) 92 (A) 90 (A)   Calcium 11.1 (A) 9.5 10.7 (A)   Albumin 4.50 4.20 4.2   Total Bilirubin 0.5 1.0 0.7   Alkaline Phosphatase 509 (A) 448 (A) 472 (A)   AST (SGOT) 59 (A) 75 (A) 71 (A)   ALT (SGPT) 60 (A) 61 (A) 66   (A) Abnormal value            CBC w/diff    CBC w/Diff 7/9/21 7/16/21 11/1/21   WBC 9.18 7.60 9.81   RBC 4.89 4.58 4.80   Hemoglobin 15.3 14.3 15.3   Hematocrit 45.3 44.0 48.0 (A)   MCV 92.6 96.1 100.0   MCH 31.3 31.2 31.9   MCHC 33.8 32.5 31.9   RDW 14.3 14.9 14.4   Platelets 269 194 278   Neutrophil Rel % 76.1 (A) 71.7 72.9   Immature Granulocyte Rel % 0.7 (A) 0.7 (A) 0.6   Lymphocyte Rel % 13.0 (A) 14.9 (A) 14.5 (A)   Monocyte Rel % 7.4 9.3 8.7   Eosinophil Rel % 2.3 2.9 2.8   Basophil Rel % 0.5 0.5 0.5   (A) Abnormal  value            Data reviewed: Radiologic studies Chest x-ray 4/20/2021 and Cindi HENRY last office note   Procedures        Assessment and Plan    Diagnoses and all orders for this visit:    1. Asthma, unspecified asthma severity, unspecified whether complicated, unspecified whether persistent (Primary)    2. Pulmonary hypertension (HCC)    3. GIUSEPPE (obstructive sleep apnea)  -     CPAP Therapy    4. Allergic rhinitis, unspecified seasonality, unspecified trigger    5. Seasonal allergies    6. Chronic diastolic congestive heart failure (HCC)    7. Sinusitis, unspecified chronicity, unspecified location  -     amoxicillin-clavulanate (Augmentin) 875-125 MG per tablet; Take 1 tablet by mouth 2 (Two) Times a Day.  Dispense: 14 tablet; Refill: 0    8. Yeast infection  -     fluconazole (Diflucan) 150 MG tablet; Take 1 tablet by mouth 1 (One) Time for 1 dose.  Dispense: 7 tablet; Refill: 0    9. Pulmonary nodule    10. Wheezing    11. Class 3 severe obesity with body mass index (BMI) of 50.0 to 59.9 in adult, unspecified obesity type, unspecified whether serious comorbidity present (HCC)    12. Continue Pulmicort, Perforomist, and Yupelri as prescribed. Rinse mouth after each use.  13. Continue albuterol DuoNeb's as needed.  14. Continue submental oxygen to maintain saturations at or above 89%.  15. Continue allergy relief, and Singulair.  16. Continue BiPAP at current settings. Encouraged patient to clean mask and tubing daily. New CPAP order sent to DME company.  17. Encouraged patient to try stay as active as possible. Recommended 30 minutes of daily exercise.  18. Follow-up with our office in 5 months, sooner if needed.        Follow Up   Return in about 5 months (around 4/3/2022) for Recheck with Rayo.  Patient was given instructions and counseling regarding her condition or for health maintenance advice. Please see specific information pulled into the AVS if appropriate.

## 2021-11-03 NOTE — PATIENT INSTRUCTIONS
Sleep Apnea  Sleep apnea is a condition in which breathing pauses or becomes shallow during sleep. Episodes of sleep apnea usually last 10 seconds or longer, and they may occur as many as 20 times an hour. Sleep apnea disrupts your sleep and keeps your body from getting the rest that it needs. This condition can increase your risk of certain health problems, including:  · Heart attack.  · Stroke.  · Obesity.  · Diabetes.  · Heart failure.  · Irregular heartbeat.  What are the causes?  There are three kinds of sleep apnea:  · Obstructive sleep apnea. This kind is caused by a blocked or collapsed airway.  · Central sleep apnea. This kind happens when the part of the brain that controls breathing does not send the correct signals to the muscles that control breathing.  · Mixed sleep apnea. This is a combination of obstructive and central sleep apnea.  The most common cause of this condition is a collapsed or blocked airway. An airway can collapse or become blocked if:  · Your throat muscles are abnormally relaxed.  · Your tongue and tonsils are larger than normal.  · You are overweight.  · Your airway is smaller than normal.  What increases the risk?  You are more likely to develop this condition if you:  · Are overweight.  · Smoke.  · Have a smaller than normal airway.  · Are elderly.  · Are male.  · Drink alcohol.  · Take sedatives or tranquilizers.  · Have a family history of sleep apnea.  What are the signs or symptoms?  Symptoms of this condition include:  · Trouble staying asleep.  · Daytime sleepiness and tiredness.  · Irritability.  · Loud snoring.  · Morning headaches.  · Trouble concentrating.  · Forgetfulness.  · Decreased interest in sex.  · Unexplained sleepiness.  · Mood swings.  · Personality changes.  · Feelings of depression.  · Waking up often during the night to urinate.  · Dry mouth.  · Sore throat.  How is this diagnosed?  This condition may be diagnosed with:  · A medical history.  · A physical  exam.  · A series of tests that are done while you are sleeping (sleep study). These tests are usually done in a sleep lab, but they may also be done at home.  How is this treated?  Treatment for this condition aims to restore normal breathing and to ease symptoms during sleep. It may involve managing health issues that can affect breathing, such as high blood pressure or obesity. Treatment may include:  · Sleeping on your side.  · Using a decongestant if you have nasal congestion.  · Avoiding the use of depressants, including alcohol, sedatives, and narcotics.  · Losing weight if you are overweight.  · Making changes to your diet.  · Quitting smoking.  · Using a device to open your airway while you sleep, such as:  ? An oral appliance. This is a custom-made mouthpiece that shifts your lower jaw forward.  ? A continuous positive airway pressure (CPAP) device. This device blows air through a mask when you breathe out (exhale).  ? A nasal expiratory positive airway pressure (EPAP) device. This device has valves that you put into each nostril.  ? A bi-level positive airway pressure (BPAP) device. This device blows air through a mask when you breathe in (inhale) and breathe out (exhale).  · Having surgery if other treatments do not work. During surgery, excess tissue is removed to create a wider airway.  It is important to get treatment for sleep apnea. Without treatment, this condition can lead to:  · High blood pressure.  · Coronary artery disease.  · In men, an inability to achieve or maintain an erection (impotence).  · Reduced thinking abilities.  Follow these instructions at home:  Lifestyle  · Make any lifestyle changes that your health care provider recommends.  · Eat a healthy, well-balanced diet.  · Take steps to lose weight if you are overweight.  · Avoid using depressants, including alcohol, sedatives, and narcotics.  · Do not use any products that contain nicotine or tobacco, such as cigarettes,  e-cigarettes, and chewing tobacco. If you need help quitting, ask your health care provider.  General instructions  · Take over-the-counter and prescription medicines only as told by your health care provider.  · If you were given a device to open your airway while you sleep, use it only as told by your health care provider.  · If you are having surgery, make sure to tell your health care provider you have sleep apnea. You may need to bring your device with you.  · Keep all follow-up visits as told by your health care provider. This is important.  Contact a health care provider if:  · The device that you received to open your airway during sleep is uncomfortable or does not seem to be working.  · Your symptoms do not improve.  · Your symptoms get worse.  Get help right away if:  · You develop:  ? Chest pain.  ? Shortness of breath.  ? Discomfort in your back, arms, or stomach.  · You have:  ? Trouble speaking.  ? Weakness on one side of your body.  ? Drooping in your face.  These symptoms may represent a serious problem that is an emergency. Do not wait to see if the symptoms will go away. Get medical help right away. Call your local emergency services (911 in the [place] Do not drive yourself to the hospital.  Summary  · Sleep apnea is a condition in which breathing pauses or becomes shallow during sleep.  · The most common cause is a collapsed or blocked airway.  · The goal of treatment is to restore normal breathing and to ease symptoms during sleep.  This information is not intended to replace advice given to you by your health care provider. Make sure you discuss any questions you have with your health care provider.  Document Revised: 06/03/2020 Document Reviewed: 08/13/2019  Elsevier Patient Education © 2021 [employer]

## 2021-11-08 ENCOUNTER — TELEPHONE (OUTPATIENT)
Dept: PULMONOLOGY | Facility: CLINIC | Age: 51
End: 2021-11-08

## 2021-11-08 NOTE — TELEPHONE ENCOUNTER
Pt called and left a voicemail stating that you prescribed her amoxicillin for a possible sinus infection, she believes it is moving into her chest and wants to know if she can get a stronger rx.

## 2021-11-09 ENCOUNTER — APPOINTMENT (OUTPATIENT)
Dept: RESPIRATORY THERAPY | Facility: HOSPITAL | Age: 51
End: 2021-11-09

## 2021-11-09 DIAGNOSIS — J06.9 UPPER RESPIRATORY TRACT INFECTION, UNSPECIFIED TYPE: Primary | ICD-10-CM

## 2021-11-09 RX ORDER — DOXYCYCLINE HYCLATE 100 MG/1
100 CAPSULE ORAL 2 TIMES DAILY
Qty: 20 CAPSULE | Refills: 0 | Status: SHIPPED | OUTPATIENT
Start: 2021-11-09 | End: 2021-12-02

## 2021-11-24 ENCOUNTER — TELEPHONE (OUTPATIENT)
Dept: FAMILY MEDICINE CLINIC | Facility: CLINIC | Age: 51
End: 2021-11-24

## 2021-11-24 NOTE — TELEPHONE ENCOUNTER
Caller: Joan Partida    Relationship: Self    Best call back number: [phone]    What medication are you requesting: ANTIBIOTIC    What are your current symptoms: LEG PAIN    Have you had these symptoms before:    [] Yes  [x] No    Have you been treated for these symptoms before:   [] Yes  [x] No    If a prescription is needed, what is your preferred pharmacy and phone number:    [id number] - [place], [place] - [address] AT [facility] [address] [facility] & [place] - [phone]  - [phone]   [phone]    Additional notes: PATIENT CALLED STATING THAT SHE THINKS SHE HAS GOUT

## 2021-11-24 NOTE — TELEPHONE ENCOUNTER
Called patient to advise Dr. Gimenez is out of the office until 11/29/2021 and if needed she should seek treatment at [facility]. Patient states should would seek treatment if pain got worse.

## 2021-11-29 RX ORDER — SUMATRIPTAN 100 MG/1
TABLET, FILM COATED ORAL
Qty: 9 TABLET | Refills: 3 | Status: SHIPPED | OUTPATIENT
Start: 2021-11-29 | End: 2022-04-14

## 2021-11-30 RX ORDER — METOPROLOL SUCCINATE 25 MG/1
TABLET, EXTENDED RELEASE ORAL
Qty: 180 TABLET | Refills: 2 | Status: SHIPPED | OUTPATIENT
Start: 2021-11-30 | End: 2022-01-20

## 2021-12-02 ENCOUNTER — APPOINTMENT (OUTPATIENT)
Dept: GENERAL RADIOLOGY | Facility: HOSPITAL | Age: 51
End: 2021-12-02

## 2021-12-02 ENCOUNTER — HOSPITAL ENCOUNTER (INPATIENT)
Facility: HOSPITAL | Age: 51
LOS: 5 days | Discharge: HOME | End: 2021-12-07
Attending: EMERGENCY MEDICINE | Admitting: INTERNAL MEDICINE

## 2021-12-02 ENCOUNTER — APPOINTMENT (OUTPATIENT)
Dept: CT IMAGING | Facility: HOSPITAL | Age: 51
End: 2021-12-02

## 2021-12-02 DIAGNOSIS — J18.9 PNEUMONIA OF RIGHT LOWER LOBE DUE TO INFECTIOUS ORGANISM: ICD-10-CM

## 2021-12-02 DIAGNOSIS — G47.33 OSA (OBSTRUCTIVE SLEEP APNEA): ICD-10-CM

## 2021-12-02 DIAGNOSIS — J81.0 ACUTE PULMONARY EDEMA (HCC): ICD-10-CM

## 2021-12-02 DIAGNOSIS — J44.1 COPD EXACERBATION (HCC): ICD-10-CM

## 2021-12-02 DIAGNOSIS — J18.9 PNEUMONIA OF BOTH LUNGS DUE TO INFECTIOUS ORGANISM, UNSPECIFIED PART OF LUNG: ICD-10-CM

## 2021-12-02 DIAGNOSIS — J96.21 ACUTE ON CHRONIC RESPIRATORY FAILURE WITH HYPOXIA (HCC): Primary | ICD-10-CM

## 2021-12-02 LAB
ALBUMIN SERPL-MCNC: 4.1 G/DL (ref 3.5–5.2)
ALBUMIN/GLOB SERPL: 1.1 G/DL
ALP SERPL-CCNC: 504 U/L (ref 39–117)
ALT SERPL W P-5'-P-CCNC: 44 U/L (ref 1–33)
ANION GAP SERPL CALCULATED.3IONS-SCNC: 14.4 MMOL/L (ref 5–15)
AST SERPL-CCNC: 62 U/L (ref 1–32)
BASOPHILS # BLD AUTO: 0.05 10*3/MM3 (ref 0–0.2)
BASOPHILS NFR BLD AUTO: 0.5 % (ref 0–1.5)
BILIRUB SERPL-MCNC: 1.3 MG/DL (ref 0–1.2)
BUN SERPL-MCNC: 32 MG/DL (ref 6–20)
BUN/CREAT SERPL: 25.4 (ref 7–25)
CALCIUM SPEC-SCNC: 10.5 MG/DL (ref 8.6–10.5)
CHLORIDE SERPL-SCNC: 82 MMOL/L (ref 98–107)
CO2 SERPL-SCNC: 35.6 MMOL/L (ref 22–29)
CREAT SERPL-MCNC: 1.26 MG/DL (ref 0.57–1)
D-LACTATE SERPL-SCNC: 2.3 MMOL/L (ref 0.5–2)
D-LACTATE SERPL-SCNC: 3.6 MMOL/L (ref 0.5–2)
DEPRECATED RDW RBC AUTO: 54.7 FL (ref 37–54)
EOSINOPHIL # BLD AUTO: 0.09 10*3/MM3 (ref 0–0.4)
EOSINOPHIL NFR BLD AUTO: 0.8 % (ref 0.3–6.2)
ERYTHROCYTE [DISTWIDTH] IN BLOOD BY AUTOMATED COUNT: 15.7 % (ref 12.3–15.4)
FLUAV AG NPH QL: NEGATIVE
FLUBV AG NPH QL IA: NEGATIVE
GFR SERPL CREATININE-BSD FRML MDRD: 44 ML/MIN/1.73
GLOBULIN UR ELPH-MCNC: 3.9 GM/DL
GLUCOSE BLDC GLUCOMTR-MCNC: 367 MG/DL (ref 70–99)
GLUCOSE BLDC GLUCOMTR-MCNC: 434 MG/DL (ref 70–99)
GLUCOSE SERPL-MCNC: 287 MG/DL (ref 65–99)
HCT VFR BLD AUTO: 43 % (ref 34–46.6)
HGB BLD-MCNC: 14.4 G/DL (ref 12–15.9)
HOLD SPECIMEN: NORMAL
HOLD SPECIMEN: NORMAL
IMM GRANULOCYTES # BLD AUTO: 0.06 10*3/MM3 (ref 0–0.05)
IMM GRANULOCYTES NFR BLD AUTO: 0.5 % (ref 0–0.5)
INR PPP: 1.81 (ref 2–3)
INR PPP: 1.81 (ref 2–3)
L PNEUMO1 AG UR QL IA: NEGATIVE
LYMPHOCYTES # BLD AUTO: 0.76 10*3/MM3 (ref 0.7–3.1)
LYMPHOCYTES NFR BLD AUTO: 6.9 % (ref 19.6–45.3)
MCH RBC QN AUTO: 32.1 PG (ref 26.6–33)
MCHC RBC AUTO-ENTMCNC: 33.5 G/DL (ref 31.5–35.7)
MCV RBC AUTO: 96 FL (ref 79–97)
MONOCYTES # BLD AUTO: 0.96 10*3/MM3 (ref 0.1–0.9)
MONOCYTES NFR BLD AUTO: 8.8 % (ref 5–12)
NEUTROPHILS NFR BLD AUTO: 82.5 % (ref 42.7–76)
NEUTROPHILS NFR BLD AUTO: 9.02 10*3/MM3 (ref 1.7–7)
NRBC BLD AUTO-RTO: 0 /100 WBC (ref 0–0.2)
NT-PROBNP SERPL-MCNC: 107.1 PG/ML (ref 0–900)
PLATELET # BLD AUTO: 222 10*3/MM3 (ref 140–450)
PMV BLD AUTO: 8.7 FL (ref 6–12)
POTASSIUM SERPL-SCNC: 3.4 MMOL/L (ref 3.5–5.2)
PROCALCITONIN SERPL-MCNC: 0.23 NG/ML (ref 0–0.25)
PROT SERPL-MCNC: 8 G/DL (ref 6–8.5)
PROTHROMBIN TIME: 17.7 SECONDS (ref 9.4–12)
PROTHROMBIN TIME: 17.7 SECONDS (ref 9.4–12)
RBC # BLD AUTO: 4.48 10*6/MM3 (ref 3.77–5.28)
S PNEUM AG SPEC QL LA: NEGATIVE
SARS-COV-2 N GENE RESP QL NAA+PROBE: NOT DETECTED
SODIUM SERPL-SCNC: 132 MMOL/L (ref 136–145)
TROPONIN T SERPL-MCNC: <0.01 NG/ML (ref 0–0.03)
WBC NRBC COR # BLD: 10.94 10*3/MM3 (ref 3.4–10.8)
WHOLE BLOOD HOLD SPECIMEN: NORMAL
WHOLE BLOOD HOLD SPECIMEN: NORMAL

## 2021-12-02 PROCEDURE — 82962 GLUCOSE BLOOD TEST: CPT

## 2021-12-02 PROCEDURE — 87147 CULTURE TYPE IMMUNOLOGIC: CPT | Performed by: EMERGENCY MEDICINE

## 2021-12-02 PROCEDURE — 25010000002 CEFTRIAXONE PER 250 MG: Performed by: INTERNAL MEDICINE

## 2021-12-02 PROCEDURE — 25010000002 DEXAMETHASONE PER 1 MG: Performed by: EMERGENCY MEDICINE

## 2021-12-02 PROCEDURE — 25010000002 METHYLPREDNISOLONE PER 125 MG: Performed by: EMERGENCY MEDICINE

## 2021-12-02 PROCEDURE — 93005 ELECTROCARDIOGRAM TRACING: CPT

## 2021-12-02 PROCEDURE — 93010 ELECTROCARDIOGRAM REPORT: CPT | Performed by: INTERNAL MEDICINE

## 2021-12-02 PROCEDURE — 94799 UNLISTED PULMONARY SVC/PX: CPT

## 2021-12-02 PROCEDURE — 84145 PROCALCITONIN (PCT): CPT | Performed by: INTERNAL MEDICINE

## 2021-12-02 PROCEDURE — 87186 SC STD MICRODIL/AGAR DIL: CPT | Performed by: EMERGENCY MEDICINE

## 2021-12-02 PROCEDURE — 94640 AIRWAY INHALATION TREATMENT: CPT

## 2021-12-02 PROCEDURE — 71045 X-RAY EXAM CHEST 1 VIEW: CPT

## 2021-12-02 PROCEDURE — 85610 PROTHROMBIN TIME: CPT | Performed by: INTERNAL MEDICINE

## 2021-12-02 PROCEDURE — 87899 AGENT NOS ASSAY W/OPTIC: CPT | Performed by: INTERNAL MEDICINE

## 2021-12-02 PROCEDURE — 84484 ASSAY OF TROPONIN QUANT: CPT | Performed by: EMERGENCY MEDICINE

## 2021-12-02 PROCEDURE — 87635 SARS-COV-2 COVID-19 AMP PRB: CPT | Performed by: EMERGENCY MEDICINE

## 2021-12-02 PROCEDURE — 63710000001 INSULIN LISPRO (HUMAN) PER 5 UNITS: Performed by: PHYSICIAN ASSISTANT

## 2021-12-02 PROCEDURE — 25010000002 ONDANSETRON PER 1 MG: Performed by: EMERGENCY MEDICINE

## 2021-12-02 PROCEDURE — 87804 INFLUENZA ASSAY W/OPTIC: CPT | Performed by: EMERGENCY MEDICINE

## 2021-12-02 PROCEDURE — 71250 CT THORAX DX C-: CPT

## 2021-12-02 PROCEDURE — 99233 SBSQ HOSP IP/OBS HIGH 50: CPT | Performed by: INTERNAL MEDICINE

## 2021-12-02 PROCEDURE — 85025 COMPLETE CBC W/AUTO DIFF WBC: CPT

## 2021-12-02 PROCEDURE — 83880 ASSAY OF NATRIURETIC PEPTIDE: CPT | Performed by: EMERGENCY MEDICINE

## 2021-12-02 PROCEDURE — 83605 ASSAY OF LACTIC ACID: CPT | Performed by: EMERGENCY MEDICINE

## 2021-12-02 PROCEDURE — 99284 EMERGENCY DEPT VISIT MOD MDM: CPT

## 2021-12-02 PROCEDURE — 36415 COLL VENOUS BLD VENIPUNCTURE: CPT

## 2021-12-02 PROCEDURE — 99223 1ST HOSP IP/OBS HIGH 75: CPT | Performed by: INTERNAL MEDICINE

## 2021-12-02 PROCEDURE — 80053 COMPREHEN METABOLIC PANEL: CPT

## 2021-12-02 PROCEDURE — 93005 ELECTROCARDIOGRAM TRACING: CPT | Performed by: EMERGENCY MEDICINE

## 2021-12-02 PROCEDURE — 25010000002 AZITHROMYCIN PER 500 MG: Performed by: INTERNAL MEDICINE

## 2021-12-02 PROCEDURE — 25010000002 FUROSEMIDE PER 20 MG: Performed by: EMERGENCY MEDICINE

## 2021-12-02 PROCEDURE — 85610 PROTHROMBIN TIME: CPT

## 2021-12-02 PROCEDURE — 87040 BLOOD CULTURE FOR BACTERIA: CPT | Performed by: EMERGENCY MEDICINE

## 2021-12-02 RX ORDER — SODIUM CHLORIDE 0.9 % (FLUSH) 0.9 %
10 SYRINGE (ML) INJECTION EVERY 12 HOURS SCHEDULED
Status: DISCONTINUED | OUTPATIENT
Start: 2021-12-02 | End: 2021-12-07 | Stop reason: HOSPADM

## 2021-12-02 RX ORDER — DEXAMETHASONE SODIUM PHOSPHATE 10 MG/ML
10 INJECTION INTRAMUSCULAR; INTRAVENOUS ONCE
Status: COMPLETED | OUTPATIENT
Start: 2021-12-02 | End: 2021-12-02

## 2021-12-02 RX ORDER — IPRATROPIUM BROMIDE AND ALBUTEROL SULFATE 2.5; .5 MG/3ML; MG/3ML
3 SOLUTION RESPIRATORY (INHALATION) ONCE
Status: COMPLETED | OUTPATIENT
Start: 2021-12-02 | End: 2021-12-02

## 2021-12-02 RX ORDER — BISACODYL 10 MG
10 SUPPOSITORY, RECTAL RECTAL DAILY PRN
Status: DISCONTINUED | OUTPATIENT
Start: 2021-12-02 | End: 2021-12-07 | Stop reason: HOSPADM

## 2021-12-02 RX ORDER — NICOTINE POLACRILEX 4 MG
15 LOZENGE BUCCAL
Status: DISCONTINUED | OUTPATIENT
Start: 2021-12-02 | End: 2021-12-07 | Stop reason: HOSPADM

## 2021-12-02 RX ORDER — ARFORMOTEROL TARTRATE 15 UG/2ML
15 SOLUTION RESPIRATORY (INHALATION)
Status: DISCONTINUED | OUTPATIENT
Start: 2021-12-02 | End: 2021-12-07 | Stop reason: HOSPADM

## 2021-12-02 RX ORDER — AMOXICILLIN 250 MG
2 CAPSULE ORAL 2 TIMES DAILY
Status: DISCONTINUED | OUTPATIENT
Start: 2021-12-02 | End: 2021-12-07 | Stop reason: HOSPADM

## 2021-12-02 RX ORDER — WARFARIN SODIUM 4 MG/1
8 TABLET ORAL ONCE
Status: COMPLETED | OUTPATIENT
Start: 2021-12-02 | End: 2021-12-02

## 2021-12-02 RX ORDER — SODIUM CHLORIDE 0.9 % (FLUSH) 0.9 %
10 SYRINGE (ML) INJECTION AS NEEDED
Status: DISCONTINUED | OUTPATIENT
Start: 2021-12-02 | End: 2021-12-07 | Stop reason: HOSPADM

## 2021-12-02 RX ORDER — DEXTROSE MONOHYDRATE 100 MG/ML
25 INJECTION, SOLUTION INTRAVENOUS
Status: DISCONTINUED | OUTPATIENT
Start: 2021-12-02 | End: 2021-12-07 | Stop reason: HOSPADM

## 2021-12-02 RX ORDER — POLYETHYLENE GLYCOL 3350 17 G/17G
17 POWDER, FOR SOLUTION ORAL DAILY PRN
Status: DISCONTINUED | OUTPATIENT
Start: 2021-12-02 | End: 2021-12-07 | Stop reason: HOSPADM

## 2021-12-02 RX ORDER — CEFTRIAXONE SODIUM 1 G/50ML
1 INJECTION, SOLUTION INTRAVENOUS ONCE
Status: DISCONTINUED | OUTPATIENT
Start: 2021-12-02 | End: 2021-12-02

## 2021-12-02 RX ORDER — POTASSIUM CHLORIDE 750 MG/1
40 CAPSULE, EXTENDED RELEASE ORAL DAILY
Status: DISCONTINUED | OUTPATIENT
Start: 2021-12-02 | End: 2021-12-04 | Stop reason: SDUPTHER

## 2021-12-02 RX ORDER — CEFTRIAXONE SODIUM 1 G/50ML
1 INJECTION, SOLUTION INTRAVENOUS EVERY 24 HOURS
Status: DISCONTINUED | OUTPATIENT
Start: 2021-12-02 | End: 2021-12-07 | Stop reason: HOSPADM

## 2021-12-02 RX ORDER — ONDANSETRON 4 MG/1
4 TABLET, FILM COATED ORAL EVERY 6 HOURS PRN
Status: DISCONTINUED | OUTPATIENT
Start: 2021-12-02 | End: 2021-12-07 | Stop reason: HOSPADM

## 2021-12-02 RX ORDER — METHYLPREDNISOLONE SODIUM SUCCINATE 40 MG/ML
40 INJECTION, POWDER, LYOPHILIZED, FOR SOLUTION INTRAMUSCULAR; INTRAVENOUS EVERY 12 HOURS
Status: DISCONTINUED | OUTPATIENT
Start: 2021-12-02 | End: 2021-12-04

## 2021-12-02 RX ORDER — MONTELUKAST SODIUM 10 MG/1
10 TABLET ORAL NIGHTLY
Status: DISCONTINUED | OUTPATIENT
Start: 2021-12-02 | End: 2021-12-07 | Stop reason: HOSPADM

## 2021-12-02 RX ORDER — BISACODYL 5 MG/1
5 TABLET, DELAYED RELEASE ORAL DAILY PRN
Status: DISCONTINUED | OUTPATIENT
Start: 2021-12-02 | End: 2021-12-07 | Stop reason: HOSPADM

## 2021-12-02 RX ORDER — NICOTINE POLACRILEX 4 MG
15 LOZENGE BUCCAL
Status: DISCONTINUED | OUTPATIENT
Start: 2021-12-02 | End: 2021-12-02

## 2021-12-02 RX ORDER — TRAZODONE HYDROCHLORIDE 50 MG/1
50 TABLET ORAL DAILY
Status: DISCONTINUED | OUTPATIENT
Start: 2021-12-03 | End: 2021-12-03

## 2021-12-02 RX ORDER — ACETAMINOPHEN 325 MG/1
650 TABLET ORAL EVERY 6 HOURS PRN
Status: DISCONTINUED | OUTPATIENT
Start: 2021-12-02 | End: 2021-12-02 | Stop reason: SDUPTHER

## 2021-12-02 RX ORDER — PANTOPRAZOLE SODIUM 40 MG/1
40 TABLET, DELAYED RELEASE ORAL DAILY
Status: DISCONTINUED | OUTPATIENT
Start: 2021-12-03 | End: 2021-12-07 | Stop reason: HOSPADM

## 2021-12-02 RX ORDER — BUDESONIDE 0.5 MG/2ML
0.5 INHALANT ORAL
Status: DISCONTINUED | OUTPATIENT
Start: 2021-12-02 | End: 2021-12-07 | Stop reason: HOSPADM

## 2021-12-02 RX ORDER — SERTRALINE HYDROCHLORIDE 100 MG/1
100 TABLET, FILM COATED ORAL DAILY
Status: DISCONTINUED | OUTPATIENT
Start: 2021-12-03 | End: 2021-12-07 | Stop reason: HOSPADM

## 2021-12-02 RX ORDER — FUROSEMIDE 10 MG/ML
40 INJECTION INTRAMUSCULAR; INTRAVENOUS ONCE
Status: COMPLETED | OUTPATIENT
Start: 2021-12-02 | End: 2021-12-02

## 2021-12-02 RX ORDER — LEVOTHYROXINE SODIUM 0.12 MG/1
250 TABLET ORAL
Status: DISCONTINUED | OUTPATIENT
Start: 2021-12-03 | End: 2021-12-07 | Stop reason: HOSPADM

## 2021-12-02 RX ORDER — METOPROLOL SUCCINATE 25 MG/1
25 TABLET, EXTENDED RELEASE ORAL 2 TIMES DAILY
Status: DISCONTINUED | OUTPATIENT
Start: 2021-12-02 | End: 2021-12-07 | Stop reason: HOSPADM

## 2021-12-02 RX ORDER — ACETAMINOPHEN 325 MG/1
650 TABLET ORAL EVERY 4 HOURS PRN
Status: DISCONTINUED | OUTPATIENT
Start: 2021-12-02 | End: 2021-12-07 | Stop reason: HOSPADM

## 2021-12-02 RX ORDER — IPRATROPIUM BROMIDE AND ALBUTEROL SULFATE 2.5; .5 MG/3ML; MG/3ML
3 SOLUTION RESPIRATORY (INHALATION)
Status: DISCONTINUED | OUTPATIENT
Start: 2021-12-02 | End: 2021-12-07 | Stop reason: HOSPADM

## 2021-12-02 RX ORDER — METHYLPREDNISOLONE SODIUM SUCCINATE 125 MG/2ML
125 INJECTION, POWDER, LYOPHILIZED, FOR SOLUTION INTRAMUSCULAR; INTRAVENOUS ONCE
Status: COMPLETED | OUTPATIENT
Start: 2021-12-02 | End: 2021-12-02

## 2021-12-02 RX ORDER — FUROSEMIDE 10 MG/ML
40 INJECTION INTRAMUSCULAR; INTRAVENOUS EVERY 12 HOURS
Status: DISCONTINUED | OUTPATIENT
Start: 2021-12-03 | End: 2021-12-07 | Stop reason: HOSPADM

## 2021-12-02 RX ORDER — GUAIFENESIN 600 MG/1
600 TABLET, EXTENDED RELEASE ORAL EVERY 12 HOURS SCHEDULED
Status: DISCONTINUED | OUTPATIENT
Start: 2021-12-02 | End: 2021-12-07 | Stop reason: HOSPADM

## 2021-12-02 RX ORDER — GABAPENTIN 400 MG/1
800 CAPSULE ORAL EVERY 8 HOURS SCHEDULED
Status: DISCONTINUED | OUTPATIENT
Start: 2021-12-02 | End: 2021-12-07 | Stop reason: HOSPADM

## 2021-12-02 RX ORDER — ONDANSETRON 2 MG/ML
4 INJECTION INTRAMUSCULAR; INTRAVENOUS ONCE
Status: COMPLETED | OUTPATIENT
Start: 2021-12-02 | End: 2021-12-02

## 2021-12-02 RX ADMIN — ACETAMINOPHEN 650 MG: 325 TABLET ORAL at 20:01

## 2021-12-02 RX ADMIN — IPRATROPIUM BROMIDE AND ALBUTEROL SULFATE 3 ML: .5; 2.5 SOLUTION RESPIRATORY (INHALATION) at 14:15

## 2021-12-02 RX ADMIN — CEFTRIAXONE SODIUM 1 G: 1 INJECTION, SOLUTION INTRAVENOUS at 15:48

## 2021-12-02 RX ADMIN — IPRATROPIUM BROMIDE AND ALBUTEROL SULFATE 3 ML: .5; 2.5 SOLUTION RESPIRATORY (INHALATION) at 20:27

## 2021-12-02 RX ADMIN — POTASSIUM CHLORIDE 40 MEQ: 750 CAPSULE, EXTENDED RELEASE ORAL at 18:46

## 2021-12-02 RX ADMIN — BUDESONIDE 0.5 MG: 0.5 SUSPENSION RESPIRATORY (INHALATION) at 20:27

## 2021-12-02 RX ADMIN — GABAPENTIN 800 MG: 400 CAPSULE ORAL at 23:14

## 2021-12-02 RX ADMIN — ONDANSETRON 4 MG: 2 INJECTION INTRAMUSCULAR; INTRAVENOUS at 15:29

## 2021-12-02 RX ADMIN — SODIUM CHLORIDE, PRESERVATIVE FREE 10 ML: 5 INJECTION INTRAVENOUS at 23:15

## 2021-12-02 RX ADMIN — SENNOSIDES AND DOCUSATE SODIUM 2 TABLET: 50; 8.6 TABLET ORAL at 23:08

## 2021-12-02 RX ADMIN — FUROSEMIDE 40 MG: 10 INJECTION, SOLUTION INTRAMUSCULAR; INTRAVENOUS at 15:33

## 2021-12-02 RX ADMIN — AZITHROMYCIN DIHYDRATE 500 MG: 500 INJECTION, POWDER, LYOPHILIZED, FOR SOLUTION INTRAVENOUS at 16:30

## 2021-12-02 RX ADMIN — ARFORMOTEROL TARTRATE 15 MCG: 15 SOLUTION RESPIRATORY (INHALATION) at 20:27

## 2021-12-02 RX ADMIN — DEXAMETHASONE SODIUM PHOSPHATE 10 MG: 10 INJECTION INTRAMUSCULAR; INTRAVENOUS at 15:33

## 2021-12-02 RX ADMIN — WARFARIN SODIUM 8 MG: 4 TABLET ORAL at 23:13

## 2021-12-02 RX ADMIN — GUAIFENESIN 600 MG: 600 TABLET ORAL at 23:12

## 2021-12-02 RX ADMIN — MONTELUKAST SODIUM 10 MG: 10 TABLET, FILM COATED ORAL at 23:12

## 2021-12-02 RX ADMIN — INSULIN LISPRO 12 UNITS: 100 INJECTION, SOLUTION INTRAVENOUS; SUBCUTANEOUS at 23:11

## 2021-12-02 RX ADMIN — METOPROLOL SUCCINATE 25 MG: 25 TABLET, EXTENDED RELEASE ORAL at 23:12

## 2021-12-02 RX ADMIN — METHYLPREDNISOLONE SODIUM SUCCINATE 125 MG: 125 INJECTION, POWDER, FOR SOLUTION INTRAMUSCULAR; INTRAVENOUS at 14:11

## 2021-12-02 NOTE — ED PROVIDER NOTES
Subjective   Patient presents with a 1 month history of progressively worsening cough and shortness of air.  Cough she states has been productive in nature.  She denies exacerbating alleviating factors and describes symptoms as moderate to severe.  She states has been on multiple rounds of antibiotics with only transient improvement.  She does have a history of COPD and CHF and is home oxygen dependent.  She denies chest pain at this time.          Review of Systems   Constitutional: Negative for chills and fever.   HENT: Negative for congestion, ear pain and sore throat.    Eyes: Negative for pain.   Respiratory: Positive for cough, shortness of breath and wheezing. Negative for chest tightness.    Cardiovascular: Negative for chest pain.   Gastrointestinal: Negative for abdominal pain, diarrhea, nausea and vomiting.   Genitourinary: Negative for flank pain and hematuria.   Musculoskeletal: Negative for joint swelling.   Skin: Negative for pallor.   Neurological: Negative for seizures and headaches.   All other systems reviewed and are negative.      Past Medical History:   Diagnosis Date   • Abnormal mammogram 10/21/2013   • Allergy    • Anemia    • Arthralgia    • CHF (congestive heart failure) ([facility])    • COPD (chronic obstructive pulmonary disease) ([facility]) 03/04/2015   • Dependent edema 08/27/2018   • Depression    • Derangement of meniscus of left knee 01/01/2014    LEFT KNEE MEDIAL MENISCUS TEAR   • Dermatitis 07/10/2014   • Dysphagia    • Essential hypertension 03/04/2015   • Fibromyalgia    • Foot pain 07/10/2014   • GERD (gastroesophageal reflux disease) 10/17/2014   • Hepatic steatosis 03/04/2015   • 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   • History of tobacco abuse    • Hyperlipidemia    • Hypokalemia 05/07/2019   • Hypothyroid    • Long term current use of anticoagulant    • LPRD (laryngopharyngeal reflux disease)    • Moderate episode of recurrent major depressive disorder ([facility]) 06/22/2017   •  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   • GIUSEPPE (obstructive sleep apnea) 2018   • Pulmonary hypertension ([facility]) 2018   • Stasis dermatitis of both legs 2018   • Type 2 diabetes mellitus, with long-term current use of insulin ([facility]) 2017   • Ventral hernia 2015   • Vitamin D deficiency 2013   • Voice hoarseness        Allergies   Allergen Reactions   • Nickel Diarrhea and Nausea And Vomiting   • Insulin Degludec Unknown - High Severity     Can trigger pancreatitis   • Sitagliptin-Metformin Hcl Other (See Comments)   • [place] Unknown - High Severity     Cobalt blue, by allergy testing.    • Dulaglutide Nausea And Vomiting   • Exenatide Other (See Comments)     pancreantitis   • Metformin Unknown - Low Severity   • Palladium Chloride Unknown - Low Severity     by allergy testing.    • Sitagliptin Other (See Comments)     panceatitis       Past Surgical History:   Procedure Laterality Date   • CARDIAC CATHETERIZATION  2018   • CARPAL TUNNEL RELEASE     •  SECTION  ,     • COLONOSCOPY     • ENDOSCOPY     • HERNIA REPAIR     • HYSTERECTOMY  ,     PARTIAL   • KNEE SURGERY         Family History   Problem Relation Age of Onset   • Heart disease Mother         GRANDPARENT-NONSPECIFIC   • Diabetes Father    • Skin cancer Father        Social History     Socioeconomic History   • Marital status:    Tobacco Use   • Smoking status: Former Smoker     Packs/day: 1.00   • Smokeless tobacco: Never Used   • Tobacco comment: QUIT 2004   Vaping Use   • Vaping Use: Never used   Substance and Sexual Activity   • Alcohol use: Not Currently     Comment: FORMER; OCCASIONAL   • Drug use: Never   • Sexual activity: Defer           Objective   Physical Exam  Constitutional:       Appearance: She is obese.   Pulmonary:      Breath sounds: Wheezing present.         Procedures           ED Course  ED Course as of 21 1536   u Dec 02, 2021   1354  EKG: Rate 93, normal P waves, decreased voltage, nonspecific ST segment changes, normal QT interval.,  No change from December 2020. [RW]      ED Course User Index  [RW] Vitaly Malloy MD                                                 [facility]  Number of Diagnoses or Management Options  Pneumonia of both lungs due to infectious organism, unspecified part of lung  Diagnosis management comments: Patient presents with cough and shortness of air.  Old records reviewed she is had multiple visits for pulmonary related issues.  Differential diagnostic considerations today include but not limited to pneumonia versus Covid pneumonia versus CHF or COPD exacerbation.  Chest radiograph is read by radiology and reviewed by me suggest multifocal pneumonia and/or pulmonary edema.  INR is 1.8 and she is on Coumadin White counts 10.9.  Chemistries demonstrated potassium 3.4.  Covid ordered and pending.  ED course: She received bronchodilators and steroids with only mild improvement and remained symptomatic.  Pulmonary consultation is obtained along with medicine consultation she is admitted to medicine service with diagnosis of pulmonary edema and pneumonia and possible Covid pneumonia with steroids and antibiotics ordered and patient stable on final assessment.       Amount and/or Complexity of Data Reviewed  Clinical lab tests: reviewed  Tests in the radiology section of CPT®: reviewed  Tests in the medicine section of CPT®: reviewed    Risk of Complications, Morbidity, and/or Mortality  Presenting problems: high  Management options: low    Patient Progress  Patient progress: stable      Final diagnoses:   Pneumonia of both lungs due to infectious organism, unspecified part of lung   Acute pulmonary edema (HCC)   COPD exacerbation (HCC)       ED Disposition  ED Disposition     ED Disposition Condition Comment    Decision to Admit            No follow-up provider specified.       Medication List      No changes were made to your  prescriptions during this visit.          Vitaly Malloy MD  12/02/21 1539

## 2021-12-02 NOTE — ED NOTES
Patient states that she had a sinus infection last month, took a round of antibiotics with no relief, patient called doctor and she started another round of antibiotics, patient states she finished all the antibiotics and now has a cough, patient states she is on 4L of 02 at home,      Laya Esposito, RN  12/02/21 5274

## 2021-12-02 NOTE — H&P
[facility]  [facility]   Patient Name: Joan Partida  : 1970  MRN: [id number]  Primary Care Physician:  Francesco Gimenez DO  Date of admission: 2021    Subjective   Subjective     Chief Complaint: Difficulty breathing    HPI:  Joan Partida is a 51 y.o. female super morbid obesity, chronic respiratory failure, GIUSEPPE, COPD, diastolic congestive heart failure, right-sided heart failure who presents to the hospital with chief complaint of difficulty breathing.  She went to urgent care earlier today for evaluation of cough and was sent to the hospital.  She reports increased dyspnea at rest over the past month which acutely worsened over the last couple days.  She reports increased cough and wheezing.  Denies sick contacts.  No fevers, chills, sick contacts.  She was vaccinated back in  for Covid but has not had booster.  She has chronic lymphedema in the lower extremities and orthopnea at baseline. On arrival she did not meet SIRS criteria.  She was on baseline 4 L of oxygen with O2 sats 89%. While in the emergency room she received bronchodilators and steroids with only mild improvement in her respiratory symptoms.  She was admitted to the hospital service for ongoing evaluation and management.      Personal History     Past Medical History:  Chronic hypoxic respiratory failure  Chronic obstructive pulmonary disease  Congestive heart failure  Obstructive sleep apnea  Type 2 diabetes with long-term current use of insulin  History of Mycobacterium avium complex  Hypothyroidism  Hypertension      Past Surgical History:  Past Surgical History:   Procedure Laterality Date   • CARDIAC CATHETERIZATION  2018   • CARPAL TUNNEL RELEASE     •  SECTION  ,     • COLONOSCOPY     • ENDOSCOPY     • HERNIA REPAIR     • HYSTERECTOMY  ,     PARTIAL   • KNEE SURGERY         Family History:   Family History   Problem  Relation Age of Onset   • Heart disease Mother         GRANDPARENT-NONSPECIFIC   • Diabetes Father    • Skin cancer Father          Social History:   Social History     Socioeconomic History   • Marital status:    Tobacco Use   • Smoking status: Former Smoker     Packs/day: 1.00   • Smokeless tobacco: Never Used   • Tobacco comment: QUIT 2004   Vaping Use   • Vaping Use: Never used   Substance and Sexual Activity   • Alcohol use: Not Currently     Comment: FORMER; OCCASIONAL   • Drug use: Never   • Sexual activity: Defer         Home Medications:  Cholecalciferol, Magnesium, SUMAtriptan, albuterol sulfate HFA, arformoterol, budesonide, docusate sodium, eszopiclone, ezetimibe, ferrous sulfate, fexofenadine, formoterol, gabapentin, glucagon, insulin, ipratropium-albuterol, levothyroxine, metoprolol succinate XL, mineral oil-hydrophilic petrolatum, montelukast, nystatin, pantoprazole, potassium chloride ER, revefenacin, sertraline, spironolactone, sucralfate, torsemide, traZODone, and warfarin    Allergies:  Allergies   Allergen Reactions   • Nickel Diarrhea and Nausea And Vomiting   • Insulin Degludec Unknown - High Severity     Can trigger pancreatitis   • Sitagliptin-Metformin Hcl Other (See Comments)   • [place] Unknown - High Severity     Cobalt blue, by allergy testing.    • Dulaglutide Nausea And Vomiting   • Exenatide Other (See Comments)     pancreantitis   • Metformin Unknown - Low Severity   • Palladium Chloride Unknown - Low Severity     by allergy testing.    • Sitagliptin Other (See Comments)     panceatitis       Review of Systems  Constitutional:  No Fever, No Chills, +Fatigue  HEENT:  No Hearing Changes, No Visual changes  Cardiovascular:  No Chest Pain, +Edema, No Palpitations  Respiratory:  +Cough, +Dyspnea, No Wheezing  Gastrointestinal:  No Nausea, No Vomiting, No Diarrhea  Genitourinary:  No Dysuria, No Hesitancy  Musculoskeletal:  +Arthralgias, No Myalgias,  Neuro:  No Focal Weakness, +  headache, + peripheral neuropathy  Heme/Lymph:  No Bruising, No Bleeding      Objective   Objective     Vitals:   Temp:  [97.6 °F (36.4 °C)-98.7 °F (37.1 °C)] 98.7 °F (37.1 °C)  Heart Rate:  [87-94] 94  Resp:  [18-24] 18  BP: (142-156)/(69-75) 142/69  Flow (L/min):  [4] 4    Physical Exam    Constitutional:  female, morbid obese, alert and conversant   eyes: Pupils equal and reactive, no conjunctival injection   HENT: NCAT, nares patent, mucous membranes moist   Neck: Supple, trachea midline   Respiratory: Poor aeration bilaterally with rhonchi and expiratory wheezing, nonlabored respiration cardiovascular: RRR, no murmurs, bilateral lower extremity edema   Gastrointestinal: Positive bowel sounds, soft, nontender, nondistended   Musculoskeletal: No gross deformities, no clubbing or cyanosis to extremities   Neurologic: Oriented x 3, Cranial Nerves grossly intact, speech clear   Skin: Warm and dry, no rashes or open wounds     Result Review    Result Review:  I have personally reviewed the results from the time of this admission to 12/2/2021 16:57 EST and agree with these findings:  [x]  Laboratory  Basic Metabolic Panel    Sodium Sodium   Date Value Ref Range Status   12/02/2021 132 (L) 136 - 145 mmol/L Final      Potassium Potassium   Date Value Ref Range Status   12/02/2021 3.4 (L) 3.5 - 5.2 mmol/L Final      Chloride Chloride   Date Value Ref Range Status   12/02/2021 82 (L) 98 - 107 mmol/L Final      Bicarbonate No results found for: PLASMABICARB   BUN BUN   Date Value Ref Range Status   12/02/2021 32 (H) 6 - 20 mg/dL Final      Creatinine Creatinine   Date Value Ref Range Status   12/02/2021 1.26 (H) 0.57 - 1.00 mg/dL Final      Calcium Calcium   Date Value Ref Range Status   12/02/2021 10.5 8.6 - 10.5 mg/dL Final      Glucose      No components found for: GLUCOSE.*       INR 1.8  Procalcitonin 0.23  WBC 10.4  Creatinine 1.26    []  Microbiology  [x]  Radiology   XR Chest 1 View    Result Date:  12/2/2021  PROCEDURE: XR CHEST 1 VW  COMPARISON: [facility], CR, CHEST PA/AP & LAT 2V, 4/20/2021, 13:11.  INDICATIONS: SOA Triage Protocol  FINDINGS:  The heart is enlarged.  There are diffuse bilateral alveolar airspace opacities consistent with multifocal pneumonia or pulmonary edema.  There is no evidence of pneumothorax.  There is no pleural effusion.  CONCLUSION: Multifocal alveolar infiltrates could be from pulmonary edema or pneumonia       NORMA WYNN MD       Electronically Signed and Approved By: NORMA WYNN MD on 12/02/2021 at 13:59             [x]  EKG/Telemetry admission EKG normal sinus rhythm  []  Cardiology/Vascular   []  Pathology  []  Old records  []  Other:      Assessment/Plan   Assessment / Plan     Assessment:  Active Hospital Problems    Diagnosis  POA   • **Acute on chronic respiratory failure with hypoxia (HCC) [J96.21]  Yes   • Long term (current) use of anticoagulants [Z79.01]  Not Applicable   • Abnormal liver enzymes [R74.8]  Yes   • Uncontrolled type 2 diabetes mellitus with hyperglycemia (HCC) [E11.65]  Yes   • 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HCC) [I82.0]  Yes   • Hypokalemia [E87.6]  Yes   • Diabetic polyneuropathy (HCC) [E11.42]  Yes   • GIUSEPPE (obstructive sleep apnea) [G47.33]  Yes   • Acquired hypothyroidism [E03.9]  Yes   • COPD (chronic obstructive pulmonary disease) (HCC) [J44.9]  Yes   • Essential hypertension [I10]  Yes   • Morbid obesity (HCC) [E66.01]  Yes   Subtherapeutic INR    Plan:  Admit to monitored bed  Enhanced airborne isolation pending results of Covid   Check strep and Legionella urine antigen, respiratory sputum culture. Begin IV azithromycin and Rocephin. Start Brovana and Pulmicort nebs twice a day.  Bronchopulmonary hygiene protocol. Supplemental oxygen to maintain O2 saturation >90%Pulmonology consult, appreciate assistance  Start p.o. potassium chloride 40 mg daily.IV Lasix 40 mg twice daily.  Strict I's and O's. We will see how she responds  to this however likely will need a higher dose of diuretic.   Restart warfarin.  Pharmacy consult for INR dosing.  Goal INR 2-3  High dose sliding scale insulin  Home medications reconciled and resumed as appropriate  Diabetic, low-sodium diet  Activity ad alvaro.    DVT prophylaxis:  Mechanical DVT prophylaxis orders are present.    Admission Status:  I believe this patient meets INPATIENT status.    Electronically signed by Jean Garza DO, 12/02/21, 4:57 PM EST.

## 2021-12-02 NOTE — ED NOTES
Patient states that she has been having a productive cough. She went to urgent care today and was sent here to be evaluated,      Laya Esposito, RN  12/02/21 8230

## 2021-12-02 NOTE — CONSULTS
Pulmonary / Critical Care Consult Note      Patient Name: Joan Partida  : 1970  MRN: [id number]  Primary Care Physician:  Francesco Gimenez DO  Referring Physician: Vitaly Malloy MD  Date of admission: 2021    Subjective   Subjective     Reason for Consult/ Chief Complaint: Acute on chronic hypoxic respiratory failure    HPI:  Joan Partida is a 51 y.o. female with past medical history of COPD/asthma overlap chronic O2 use of 4 L NC, GIUSEPPE/OHS compliant with NIPPV, diastolic HFpEF, PAH, HTN, and DM presented to the ED with complaints of worsening shortness of breath, wheezing, and coarse non-productive cough.  She states she was treated one month ago for sinus infection and completed a course of Amoxicillin.  Her symptoms progressed and she then completed a course of Doxycycline.  In the ED she was found to be hypoxic on 4 L.  proBNP 107, WBC 10.94, procal 0.23, and lactate 2.3.  CXR revealed diffuse bilateral infiltrates representing multifocal pneumonia or pulmonary edema.  Because of the above our services was consulted for further evaluation and treatment.  Upon exam, she is sitting in chair.  On 4 L NC with O2 sats 89%.  She endorses a coarse non-productive cough.  She has mild increased work of breathing.  She states she has gained approximately 10 lbs in the last 2 weeks.  She denies any chest pain, hemoptysis, nausea, vomiting, or diarrhea.  She states she is compliant with her BIPAP at home that is currently under recall.  She is vaccinated for COVID but has not been able to get her booster vaccine yet.  She denies being around any sick contacts.    Review of Systems  Constitutional symptoms:  weakness and fatigue, otherwise denied complaints   Ear, nose, throat: congestion, otherwise denied complaints  Cardiovascular:  BUSTOS, orthopnea, otherwise denied complaints  Respiratory: dyspnea, cough, nonproductive, otherwise denied complaints  Gastrointestinal: Denied  complaints  Musculoskeletal: Denied complaints  Genitourinary: Denied complaints  Allergy / Immunology: Denied complaints  Hematologic: Denied complaints  Neurologic: Denied complaints  Skin: Denied complaints  Endocrine: Denied complaints  Psychiatric: Denied complaints    Personal History     Past Medical History:   Diagnosis Date   • Abnormal mammogram 10/21/2013   • Allergy    • Anemia    • Arthralgia    • CHF (congestive heart failure) ([facility])    • COPD (chronic obstructive pulmonary disease) ([facility]) 2015   • Dependent edema 2018   • Depression    • Derangement of meniscus of left knee 2014    LEFT KNEE MEDIAL MENISCUS TEAR   • Dermatitis 07/10/2014   • Dysphagia    • Essential hypertension 2015   • Fibromyalgia    • Foot pain 07/10/2014   • GERD (gastroesophageal reflux disease) 10/17/2014   • Hepatic steatosis 2015   • 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   • History of tobacco abuse    • Hyperlipidemia    • Hypokalemia 2019   • Hypothyroid    • Long term current use of anticoagulant    • LPRD (laryngopharyngeal reflux disease)    • Moderate episode of recurrent major depressive disorder ([facility]) 2017   • 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   • GIUSEPPE (obstructive sleep apnea) 2018   • Pulmonary hypertension ([facility]) 2018   • Stasis dermatitis of both legs 2018   • Type 2 diabetes mellitus, with long-term current use of insulin ([facility]) 2017   • Ventral hernia 2015   • Vitamin D deficiency 2013   • Voice hoarseness        Past Surgical History:   Procedure Laterality Date   • CARDIAC CATHETERIZATION  2018   • CARPAL TUNNEL RELEASE     •  SECTION  ,     • COLONOSCOPY     • ENDOSCOPY     • HERNIA REPAIR     • HYSTERECTOMY  ,     PARTIAL   • KNEE SURGERY         Family History: family history includes Diabetes in her father; Heart disease in her mother; Skin cancer in her father. Otherwise  pertinent FHx was reviewed and not pertinent to current issue.    Social History:  reports that she has quit smoking. She smoked 1.00 pack per day. She has never used smokeless tobacco. She reports previous alcohol use. She reports that she does not use drugs.    Home Medications:  Cholecalciferol, Dexcom G6 , Dexcom G6 Sensor, Dexcom G6 Transmitter, Insulin Syringe-Needle U-100, Insulin Syringe/Needle U-500, Magnesium, Magnesium Gluconate, SUMAtriptan, albuterol sulfate HFA, amoxicillin-clavulanate, arformoterol, budesonide, clotrimazole, dicyclomine, docusate sodium, doxycycline, eszopiclone, ezetimibe, ferrous sulfate, fexofenadine, formoterol, gabapentin, glucagon, glucose blood, insulin regular, ipratropium-albuterol, levothyroxine, lidocaine, metoprolol succinate XL, mineral oil-hydrophilic petrolatum, montelukast, nystatin, pantoprazole, polyethylene glycol, potassium chloride, promethazine, revefenacin, sertraline, spironolactone, sucralfate, torsemide, traZODone, vitamin D3, and warfarin    Allergies:  Allergies   Allergen Reactions   • Nickel Diarrhea and Nausea And Vomiting   • Insulin Degludec Unknown - High Severity     Can trigger pancreatitis   • Sitagliptin-Metformin Hcl Other (See Comments)   • [place]ham Unknown - High Severity     Cobalt blue, by allergy testing.    • Dulaglutide Nausea And Vomiting   • Exenatide Other (See Comments)     pancreantitis   • Metformin Unknown - Low Severity   • Palladium Chloride Unknown - Low Severity     by allergy testing.    • Sitagliptin Other (See Comments)     panceatitis       Objective    Objective     Vitals:   Temp:  [97.6 °F (36.4 °C)-98.7 °F (37.1 °C)] 98.7 °F (37.1 °C)  Heart Rate:  [87-93] 87  Resp:  [18-24] 18  BP: (142-156)/(69-75) 142/69  Flow (L/min):  [4] 4    Physical Exam:  Vital Signs Reviewed   General: Obese female, Awake and Alert, mild distress on 4 L NC    HEENT:  PERRL, EOMI.  OP, nares clear, no sinus tenderness  Neck:  Supple, no  JVD, no thyromegaly  Lymph: no axillary, cervical, supraclavicular lymphadenopathy noted bilaterally  Chest:  good aeration, coarse breath sounds with rhonchi and scattered wheezes, tympanic to percussion bilaterally, mild work of breathing noted  CV: RRR, no MGR, pulses 2+, equal  Abd:  Soft, NT, ND, + BS, no HSM, obese  EXT:  no clubbing, no cyanosis, 1+ BLE edema, no joint tenderness  Neuro:  A&Ox3, CN grossly intact, no focal deficits  Skin: No rashes or lesions noted    Result Review    Result Review:  I have personally reviewed the results from the time of this admission to 12/2/2021 15:06 EST and agree with these findings:  [x]  Laboratory  [x]  Microbiology  [x]  Radiology  [x]  EKG/Telemetry   []  Cardiology/Vascular   []  Pathology  [x]  Old records  []  Other:  Most notable findings include: WBC 10.94, procal 0.23, lactate 2.3, INR 1.81, proBNP 107, K+ 3.4, Na 132, Cr 1.26, AST 44, ALT 62    CXR with diffuse bilateral infiltrates representing multifocal pneumonia or pulmonary edema     Blood cultures pending  Covid pending  Flu negative    11/2020 Echo EF 55%, trace tricuspid regurg, technically difficult study    Assessment/Plan   Assessment / Plan     Active Hospital Problems:  There are no active hospital problems to display for this patient.    Impression:  Acute on chronic hypoxic respiratory failure  Multifocal pneumonia of unspecified organism  Volume overload  AMY: baseline Cr 0.9  Hypokalemia  Hyponatremia  Elevated liver enzymes  HFpEF without acute exacerbation  PAH: WHO class II and WHO class III  COPD/Asthma overlap  GIUSEPPE/OHS: compliant with NIPPV at home  HTN  DM  Super super Obesity: BMI 77.9    Plan:  Continue supplemental O2 to keep sats >90%.  May use NIPPV at night and prn naps.  May bring in home machine and use.  Procal 0.23.  Flu swab negative.  Pending COVID swab and blood cultures.  Will check sputum culture and urinary antigen for strep and legionella.  Agree with Azithromycin and  Ceftriaxone.  Can de-escalate based off cultures.  Continue Lasix 40 mg IV BID.  Trend renal panel and electrolytes.  Will start Solu-medrol 40 mg IV BID.  Continue Brovana, Pulmicort, and Duonebs.  Will also add Yulperi nebulizer.  Continue bronchopulmonary hygiene.  Encourage IS and flutter valve.  Will start Singulair and Mucinex.  Will obtain CT chest without contrast.  Encourage activity.  Up to chair as tolerated.    DVT prophylaxis:  No DVT prophylaxis order currently exists.     There are no questions and answers to display.      Labs, microbiology, radiology, medications, and provider notes personally reviewed.  Discussed with primary services and bedside RN.    Thank you for this consult and allowing me to participate in the care of Ms. Partida.    I, Mnoica PHAN , am scribing for Dr. Muhammad on 12/2/21.     Portions of this note were scribed by,  Monica PHAN, who was present on rounds with me. I personally reviewed the entirety of the documentation & made changes where appropriate. The documentation, as is signed by myself, accurately reflects my involvement in the patients care today.    Electronically signed by ELAINE Kaba, 12/02/21, 5:24 PM EST.  Electronically signed by Franko Muhammad MD, 12/02/21, 5:45 PM EST.

## 2021-12-03 LAB
ANION GAP SERPL CALCULATED.3IONS-SCNC: 15.1 MMOL/L (ref 5–15)
BASOPHILS # BLD AUTO: 0.02 10*3/MM3 (ref 0–0.2)
BASOPHILS NFR BLD AUTO: 0.1 % (ref 0–1.5)
BUN SERPL-MCNC: 44 MG/DL (ref 6–20)
BUN/CREAT SERPL: 31.9 (ref 7–25)
CALCIUM SPEC-SCNC: 10.1 MG/DL (ref 8.6–10.5)
CHLORIDE SERPL-SCNC: 82 MMOL/L (ref 98–107)
CO2 SERPL-SCNC: 32.9 MMOL/L (ref 22–29)
CREAT SERPL-MCNC: 1.38 MG/DL (ref 0.57–1)
DEPRECATED RDW RBC AUTO: 52.7 FL (ref 37–54)
EOSINOPHIL # BLD AUTO: 0 10*3/MM3 (ref 0–0.4)
EOSINOPHIL NFR BLD AUTO: 0 % (ref 0.3–6.2)
ERYTHROCYTE [DISTWIDTH] IN BLOOD BY AUTOMATED COUNT: 15.5 % (ref 12.3–15.4)
GFR SERPL CREATININE-BSD FRML MDRD: 40 ML/MIN/1.73
GLUCOSE BLDC GLUCOMTR-MCNC: 353 MG/DL (ref 70–99)
GLUCOSE BLDC GLUCOMTR-MCNC: 377 MG/DL (ref 70–99)
GLUCOSE BLDC GLUCOMTR-MCNC: 410 MG/DL (ref 70–99)
GLUCOSE SERPL-MCNC: 405 MG/DL (ref 65–99)
HCT VFR BLD AUTO: 41.4 % (ref 34–46.6)
HGB BLD-MCNC: 14.3 G/DL (ref 12–15.9)
IMM GRANULOCYTES # BLD AUTO: 0.12 10*3/MM3 (ref 0–0.05)
IMM GRANULOCYTES NFR BLD AUTO: 0.8 % (ref 0–0.5)
INR PPP: 1.71 (ref 2–3)
LYMPHOCYTES # BLD AUTO: 0.47 10*3/MM3 (ref 0.7–3.1)
LYMPHOCYTES NFR BLD AUTO: 3.1 % (ref 19.6–45.3)
MCH RBC QN AUTO: 32.4 PG (ref 26.6–33)
MCHC RBC AUTO-ENTMCNC: 34.5 G/DL (ref 31.5–35.7)
MCV RBC AUTO: 93.9 FL (ref 79–97)
MONOCYTES # BLD AUTO: 0.7 10*3/MM3 (ref 0.1–0.9)
MONOCYTES NFR BLD AUTO: 4.6 % (ref 5–12)
NEUTROPHILS NFR BLD AUTO: 13.99 10*3/MM3 (ref 1.7–7)
NEUTROPHILS NFR BLD AUTO: 91.4 % (ref 42.7–76)
NRBC BLD AUTO-RTO: 0 /100 WBC (ref 0–0.2)
PLATELET # BLD AUTO: 227 10*3/MM3 (ref 140–450)
PMV BLD AUTO: 8.8 FL (ref 6–12)
POTASSIUM SERPL-SCNC: 3.9 MMOL/L (ref 3.5–5.2)
PROTHROMBIN TIME: 16.8 SECONDS (ref 9.4–12)
RBC # BLD AUTO: 4.41 10*6/MM3 (ref 3.77–5.28)
SODIUM SERPL-SCNC: 130 MMOL/L (ref 136–145)
WBC NRBC COR # BLD: 15.3 10*3/MM3 (ref 3.4–10.8)

## 2021-12-03 PROCEDURE — 80048 BASIC METABOLIC PNL TOTAL CA: CPT | Performed by: INTERNAL MEDICINE

## 2021-12-03 PROCEDURE — 25010000002 CEFTRIAXONE PER 250 MG: Performed by: INTERNAL MEDICINE

## 2021-12-03 PROCEDURE — 94660 CPAP INITIATION&MGMT: CPT

## 2021-12-03 PROCEDURE — 85025 COMPLETE CBC W/AUTO DIFF WBC: CPT | Performed by: INTERNAL MEDICINE

## 2021-12-03 PROCEDURE — 63710000001 INSULIN LISPRO (HUMAN) PER 5 UNITS: Performed by: PHYSICIAN ASSISTANT

## 2021-12-03 PROCEDURE — 99232 SBSQ HOSP IP/OBS MODERATE 35: CPT | Performed by: INTERNAL MEDICINE

## 2021-12-03 PROCEDURE — 25010000002 METHYLPREDNISOLONE PER 40 MG: Performed by: NURSE PRACTITIONER

## 2021-12-03 PROCEDURE — 99233 SBSQ HOSP IP/OBS HIGH 50: CPT | Performed by: INTERNAL MEDICINE

## 2021-12-03 PROCEDURE — 25010000002 REVEFENACIN 175 MCG/3ML SOLUTION: Performed by: NURSE PRACTITIONER

## 2021-12-03 PROCEDURE — 85610 PROTHROMBIN TIME: CPT | Performed by: INTERNAL MEDICINE

## 2021-12-03 PROCEDURE — 87070 CULTURE OTHR SPECIMN AEROBIC: CPT | Performed by: INTERNAL MEDICINE

## 2021-12-03 PROCEDURE — 63710000001 INSULIN DETEMIR PER 5 UNITS: Performed by: INTERNAL MEDICINE

## 2021-12-03 PROCEDURE — 94799 UNLISTED PULMONARY SVC/PX: CPT

## 2021-12-03 PROCEDURE — 82962 GLUCOSE BLOOD TEST: CPT

## 2021-12-03 PROCEDURE — 94640 AIRWAY INHALATION TREATMENT: CPT

## 2021-12-03 PROCEDURE — 63710000001 INSULIN LISPRO (HUMAN) PER 5 UNITS: Performed by: INTERNAL MEDICINE

## 2021-12-03 PROCEDURE — 87205 SMEAR GRAM STAIN: CPT | Performed by: INTERNAL MEDICINE

## 2021-12-03 PROCEDURE — 25010000002 AZITHROMYCIN PER 500 MG: Performed by: INTERNAL MEDICINE

## 2021-12-03 PROCEDURE — 25010000002 FUROSEMIDE PER 20 MG: Performed by: INTERNAL MEDICINE

## 2021-12-03 RX ORDER — TRAZODONE HYDROCHLORIDE 50 MG/1
50 TABLET ORAL NIGHTLY
Status: DISCONTINUED | OUTPATIENT
Start: 2021-12-03 | End: 2021-12-07 | Stop reason: HOSPADM

## 2021-12-03 RX ORDER — WARFARIN SODIUM 6 MG/1
12 TABLET ORAL
Status: COMPLETED | OUTPATIENT
Start: 2021-12-03 | End: 2021-12-03

## 2021-12-03 RX ORDER — NYSTATIN 100000 [USP'U]/G
POWDER TOPICAL EVERY 12 HOURS SCHEDULED
Status: DISCONTINUED | OUTPATIENT
Start: 2021-12-03 | End: 2021-12-07 | Stop reason: HOSPADM

## 2021-12-03 RX ORDER — SUMATRIPTAN 50 MG/1
50 TABLET, FILM COATED ORAL
Status: DISCONTINUED | OUTPATIENT
Start: 2021-12-03 | End: 2021-12-07 | Stop reason: HOSPADM

## 2021-12-03 RX ADMIN — GABAPENTIN 800 MG: 400 CAPSULE ORAL at 21:00

## 2021-12-03 RX ADMIN — INSULIN LISPRO 10 UNITS: 100 INJECTION, SOLUTION INTRAVENOUS; SUBCUTANEOUS at 17:27

## 2021-12-03 RX ADMIN — SODIUM CHLORIDE, PRESERVATIVE FREE 10 ML: 5 INJECTION INTRAVENOUS at 08:56

## 2021-12-03 RX ADMIN — SUMATRIPTAN SUCCINATE 50 MG: 50 TABLET, FILM COATED ORAL at 23:16

## 2021-12-03 RX ADMIN — ACETAMINOPHEN 650 MG: 325 TABLET ORAL at 21:00

## 2021-12-03 RX ADMIN — METHYLPREDNISOLONE SODIUM SUCCINATE 40 MG: 40 INJECTION, POWDER, FOR SOLUTION INTRAMUSCULAR; INTRAVENOUS at 05:23

## 2021-12-03 RX ADMIN — SENNOSIDES AND DOCUSATE SODIUM 2 TABLET: 50; 8.6 TABLET ORAL at 08:54

## 2021-12-03 RX ADMIN — ARFORMOTEROL TARTRATE 15 MCG: 15 SOLUTION RESPIRATORY (INHALATION) at 08:05

## 2021-12-03 RX ADMIN — ACETAMINOPHEN 650 MG: 325 TABLET ORAL at 00:58

## 2021-12-03 RX ADMIN — IPRATROPIUM BROMIDE AND ALBUTEROL SULFATE 3 ML: .5; 2.5 SOLUTION RESPIRATORY (INHALATION) at 13:51

## 2021-12-03 RX ADMIN — AZITHROMYCIN DIHYDRATE 500 MG: 500 INJECTION, POWDER, LYOPHILIZED, FOR SOLUTION INTRAVENOUS at 15:39

## 2021-12-03 RX ADMIN — FUROSEMIDE 40 MG: 10 INJECTION, SOLUTION INTRAVENOUS at 17:26

## 2021-12-03 RX ADMIN — WARFARIN SODIUM 12 MG: 6 TABLET ORAL at 17:54

## 2021-12-03 RX ADMIN — IPRATROPIUM BROMIDE AND ALBUTEROL SULFATE 3 ML: .5; 2.5 SOLUTION RESPIRATORY (INHALATION) at 20:08

## 2021-12-03 RX ADMIN — GABAPENTIN 800 MG: 400 CAPSULE ORAL at 05:23

## 2021-12-03 RX ADMIN — METOPROLOL SUCCINATE 25 MG: 25 TABLET, EXTENDED RELEASE ORAL at 21:00

## 2021-12-03 RX ADMIN — LEVOTHYROXINE SODIUM 250 MCG: 125 TABLET ORAL at 05:49

## 2021-12-03 RX ADMIN — PANTOPRAZOLE SODIUM 40 MG: 40 TABLET, DELAYED RELEASE ORAL at 08:54

## 2021-12-03 RX ADMIN — INSULIN LISPRO 24 UNITS: 100 INJECTION, SOLUTION INTRAVENOUS; SUBCUTANEOUS at 06:34

## 2021-12-03 RX ADMIN — MONTELUKAST SODIUM 10 MG: 10 TABLET, FILM COATED ORAL at 21:00

## 2021-12-03 RX ADMIN — POTASSIUM CHLORIDE 40 MEQ: 750 CAPSULE, EXTENDED RELEASE ORAL at 08:54

## 2021-12-03 RX ADMIN — INSULIN LISPRO 10 UNITS: 100 INJECTION, SOLUTION INTRAVENOUS; SUBCUTANEOUS at 12:12

## 2021-12-03 RX ADMIN — TRAZODONE HYDROCHLORIDE 50 MG: 50 TABLET ORAL at 21:00

## 2021-12-03 RX ADMIN — CEFTRIAXONE SODIUM 1 G: 1 INJECTION, SOLUTION INTRAVENOUS at 14:47

## 2021-12-03 RX ADMIN — INSULIN LISPRO 10 UNITS: 100 INJECTION, SOLUTION INTRAVENOUS; SUBCUTANEOUS at 08:54

## 2021-12-03 RX ADMIN — SENNOSIDES AND DOCUSATE SODIUM 2 TABLET: 50; 8.6 TABLET ORAL at 21:00

## 2021-12-03 RX ADMIN — GABAPENTIN 800 MG: 400 CAPSULE ORAL at 14:15

## 2021-12-03 RX ADMIN — NYSTATIN: 100000 POWDER TOPICAL at 05:23

## 2021-12-03 RX ADMIN — INSULIN LISPRO 16 UNITS: 100 INJECTION, SOLUTION INTRAVENOUS; SUBCUTANEOUS at 17:27

## 2021-12-03 RX ADMIN — BUDESONIDE 0.5 MG: 0.5 SUSPENSION RESPIRATORY (INHALATION) at 08:05

## 2021-12-03 RX ADMIN — IPRATROPIUM BROMIDE AND ALBUTEROL SULFATE 3 ML: .5; 2.5 SOLUTION RESPIRATORY (INHALATION) at 02:22

## 2021-12-03 RX ADMIN — REVEFENACIN 175 MCG: 175 SOLUTION RESPIRATORY (INHALATION) at 08:24

## 2021-12-03 RX ADMIN — INSULIN LISPRO 20 UNITS: 100 INJECTION, SOLUTION INTRAVENOUS; SUBCUTANEOUS at 09:09

## 2021-12-03 RX ADMIN — GUAIFENESIN 600 MG: 600 TABLET ORAL at 21:00

## 2021-12-03 RX ADMIN — FUROSEMIDE 40 MG: 10 INJECTION, SOLUTION INTRAVENOUS at 05:23

## 2021-12-03 RX ADMIN — ARFORMOTEROL TARTRATE 15 MCG: 15 SOLUTION RESPIRATORY (INHALATION) at 20:08

## 2021-12-03 RX ADMIN — SERTRALINE HYDROCHLORIDE 100 MG: 100 TABLET ORAL at 08:54

## 2021-12-03 RX ADMIN — INSULIN DETEMIR 30 UNITS: 100 INJECTION, SOLUTION SUBCUTANEOUS at 08:56

## 2021-12-03 RX ADMIN — INSULIN LISPRO 20 UNITS: 100 INJECTION, SOLUTION INTRAVENOUS; SUBCUTANEOUS at 12:12

## 2021-12-03 RX ADMIN — ACETAMINOPHEN 650 MG: 325 TABLET ORAL at 10:55

## 2021-12-03 RX ADMIN — METHYLPREDNISOLONE SODIUM SUCCINATE 40 MG: 40 INJECTION, POWDER, FOR SOLUTION INTRAMUSCULAR; INTRAVENOUS at 17:26

## 2021-12-03 RX ADMIN — IPRATROPIUM BROMIDE AND ALBUTEROL SULFATE 3 ML: .5; 2.5 SOLUTION RESPIRATORY (INHALATION) at 08:05

## 2021-12-03 RX ADMIN — GUAIFENESIN 600 MG: 600 TABLET ORAL at 10:55

## 2021-12-03 RX ADMIN — NYSTATIN: 100000 POWDER TOPICAL at 22:49

## 2021-12-03 RX ADMIN — METOPROLOL SUCCINATE 25 MG: 25 TABLET, EXTENDED RELEASE ORAL at 08:54

## 2021-12-03 RX ADMIN — TRAZODONE HYDROCHLORIDE 50 MG: 50 TABLET ORAL at 00:59

## 2021-12-03 RX ADMIN — BUDESONIDE 0.5 MG: 0.5 SUSPENSION RESPIRATORY (INHALATION) at 20:08

## 2021-12-03 RX ADMIN — SODIUM CHLORIDE, PRESERVATIVE FREE 10 ML: 5 INJECTION INTRAVENOUS at 22:49

## 2021-12-03 NOTE — SIGNIFICANT NOTE
12/02/21 2000   Provider Notification   Reason for Communication Patient request   Provider Name JESSE Robles   Notification Route Phone call   Response See orders     Pt brought insulin pump from home. Orders placed to allow patient to self dose with pump. Release of responsibility form signed by patient and RN.

## 2021-12-03 NOTE — PROGRESS NOTES
Pharmacy to Dose: Warfarin  Consulting provider: Kayla  Indication for warfarin: Hepatic Vein Thrombosis  Goal INR range: 2 - 3  Home warfarin dose: warfarin 8 mg daily    Date INR Warfarin Dose Given   12/2   1.81 8 mg    12/3                      Any Vitamin K given?: no  Any major drug interactions:   - inpt - ceftriaxone, azithromycin, methylprednisolone  - home meds - sertraline, levothyroxine  Is patient on bridging therapy with another anticoagulant?: no    Plan:   - Daily INR  - RN confirmed pt did not take warfarin dose on 12/2  - Warfarin 8 mg x 1 dose, future dosing based on INR response        Thank you for this consult. Pharmacy will continue to follow.

## 2021-12-03 NOTE — PLAN OF CARE
Goal Outcome Evaluation:  Plan of Care Reviewed With: patient        Progress: improving  Outcome Summary: patient came to ED after becoming increasingly SOA at home. Covid swab resulted negative. patient VSS. O2 currently stable on baseline o2 of 4L and bipap at night for GIUSEPPE. PRN tylenol given x 2 for headache. Trazadone given for sleep. . insulin given as ordered by provider. no significant changes at this time.

## 2021-12-03 NOTE — SIGNIFICANT NOTE
12/02/21 2141   Provider Notification   Reason for Communication Review case   Provider Name JESSE Robles   Notification Route Phone call   Response See orders     BG 434orders placed for coverage

## 2021-12-03 NOTE — PLAN OF CARE
Goal Outcome Evaluation:      Patient remained on 4 liters nasal cannula this shift, at baseline. Patient's  brought her home CPAP that she wears when taking naps and sleeping at night. Blood sugars ranged 353-377. Patient has been up to [facility] and has been sitting up on cough this shift.  ace wrapped patient's BLE per request and as allowed by MD. Patient given Mucinex this shift for congestion. Possible bronchoscopy on Monday if symptoms persist per Pulmonology.   Problem: Fluid Imbalance (Pneumonia)  Goal: Fluid Balance  Outcome: Ongoing, Progressing  Intervention: Monitor and Manage Fluid Balance  Description: Assess fluid requirements to determine goal-directed fluid therapy.  Keep accurate intake, output and daily weight; monitor trends.  Monitor laboratory value trends and need for treatment adjustment.  Encourage oral intake when able; if not able to meet requirements, determine need for intravenous fluid therapy to achieve fluid balance.  Evaluate potential sources that may lead to fluid overload (e.g., oral, enteral, intravenous fluid, medication).  Evaluate need for and response to fluid restriction.  Anticipate need for diuretic agent; monitor effects if administered (e.g., blood pressure change, dysrhythmia, electrolyte alteration).  Monitor and maintain skin integrity; avoid constrictive devices and clothing if edema present.  Recent Flowsheet Documentation  Taken 12/3/2021 0856 by Jessica Craven RN  Fluid/Electrolyte Management: fluids provided     Problem: Infection (Pneumonia)  Goal: Resolution of Infection Signs and Symptoms  Outcome: Ongoing, Progressing  Intervention: Prevent Infection Progression  Description: Implement transmission-based precautions and isolation, as indicated, to prevent spread of infection.  Obtain cultures prior to initiating antimicrobial therapy. Do not delay treatment for laboratory results in the presence of high suspicion.  Administer ordered antimicrobial  therapy promptly; reassess need regularly.  Identify and manage signs of early sepsis.  Provide fever-reduction and comfort measures.  Recent Flowsheet Documentation  Taken 12/3/2021 1645 by Jessica Craven RN  Infection Management: aseptic technique maintained     Problem: Respiratory Compromise (Pneumonia)  Goal: Effective Oxygenation and Ventilation  Outcome: Ongoing, Progressing  Intervention: Promote Airway Secretion Clearance  Description: Assess the effectiveness of pulmonary hygiene and ability to perform airway clearance techniques.  Promote early mobility/ambulation; match to ability and tolerance.  Encourage deep breathing and lung expansion therapy to prevent atelectasis (e.g., incentive spirometry, positive airway pressure).  Anticipate the need to splint chest or abdominal wall with cough to minimize discomfort; assist if needed.  Initiate cough-enhancement and airway-clearance techniques with instruction (e.g., active cycle breathing, positive expiratory pressure, suction); consider mechanical insufflation-exsufflation in the presence of neuromuscular weakness.  Consider pharmacologic therapy (e.g., systemic corticosteroid, beta-2 agonist, mucolytic, antimicrobial) to improve mucus clearance, inflammation, cough response and air flow.  Initiate inspiratory muscle training to decrease the risk of pulmonary complications.  Recent Flowsheet Documentation  Taken 12/3/2021 0856 by Jessica Craven RN  Cough And Deep Breathing: done independently per patient  Intervention: Optimize Oxygenation and Ventilation  Description: Establish oxygenation and ventilation parameters and goals; consider home baseline values for chronic cardiac and lung conditions.  Anticipate noninvasive and invasive monitoring (e.g., pulse oximetry, end-tidal carbon dioxide, blood gases, cardiovascular).  Maintain optimal position to relieve discomfort, breathlessness and ventilation/perfusion mismatch.  Provide oxygen therapy judiciously  to avoid hyperoxemia; adjust to achieve oxygenation goal.  Monitor fluid balance closely to minimize the risk of fluid overload.  Implement noninvasive or invasive positive pressure to enhance alveolar ventilation.  Recent Flowsheet Documentation  Taken 12/3/2021 0856 by Jessica Craven RN  Airway/Ventilation Management: airway patency maintained     Problem: Adult Inpatient Plan of Care  Goal: Plan of Care Review  Outcome: Ongoing, Progressing  Flowsheets (Taken 12/3/2021 1846)  Progress: no change  Goal: Patient-Specific Goal (Individualized)  Outcome: Ongoing, Progressing  Goal: Absence of Hospital-Acquired Illness or Injury  Outcome: Ongoing, Progressing  Intervention: Identify and Manage Fall Risk  Description: Perform standard risk assessment with a validated tool or comprehensive approach appropriate to the patient on admission; reassess fall risk frequently, with change in status or transfer to another level of care.  Communicate fall injury risk to interprofessional healthcare team.  Determine need for increased observation, equipment and environmental modification, such as low bed and signage, as well as supportive, nonskid footwear.  Adjust safety measures to individual developmental age, stage and identified risk factors.  Reinforce the importance of safety and physical activity with patient and family.  Perform regular intentional rounding to assess need for position change, pain assessment, personal needs, including assistance with toileting.  Recent Flowsheet Documentation  Taken 12/3/2021 1725 by Jessica Craven RN  Safety Promotion/Fall Prevention:   safety round/check completed   room organization consistent   nonskid shoes/slippers when out of bed   clutter free environment maintained   assistive device/personal items within reach  Taken 12/3/2021 1645 by Jessica Craven RN  Safety Promotion/Fall Prevention:   toileting scheduled   safety round/check completed   room organization consistent   nonskid  shoes/slippers when out of bed   fall prevention program maintained   clutter free environment maintained   assistive device/personal items within reach  Taken 12/3/2021 1631 by Jessica Craven RN  Safety Promotion/Fall Prevention:   toileting scheduled   safety round/check completed   room organization consistent   nonskid shoes/slippers when out of bed   fall prevention program maintained   clutter free environment maintained   assistive device/personal items within reach  Taken 12/3/2021 1538 by Jessica Craven RN  Safety Promotion/Fall Prevention:   safety round/check completed   toileting scheduled   room organization consistent   nonskid shoes/slippers when out of bed   fall prevention program maintained   clutter free environment maintained   assistive device/personal items within reach  Taken 12/3/2021 1446 by Jessica Craven RN  Safety Promotion/Fall Prevention:   safety round/check completed   room organization consistent   nonskid shoes/slippers when out of bed   clutter free environment maintained   assistive device/personal items within reach   fall prevention program maintained   toileting scheduled  Taken 12/3/2021 1211 by Jessica Craven RN  Safety Promotion/Fall Prevention:   toileting scheduled   safety round/check completed   room organization consistent   nonskid shoes/slippers when out of bed   fall prevention program maintained   clutter free environment maintained   assistive device/personal items within reach  Taken 12/3/2021 1054 by Jessica Craven RN  Safety Promotion/Fall Prevention:   safety round/check completed   room organization consistent   toileting scheduled   nonskid shoes/slippers when out of bed   fall prevention program maintained   clutter free environment maintained   assistive device/personal items within reach  Taken 12/3/2021 0853 by Jessica Craven RN  Safety Promotion/Fall Prevention:   toileting scheduled   safety round/check completed   room organization consistent   nonskid  shoes/slippers when out of bed   fall prevention program maintained   clutter free environment maintained   assistive device/personal items within reach  Intervention: Prevent Skin Injury  Description: Assess skin risk on admission and at regular intervals throughout hospital stay.  Keep all areas of skin (especially folds) clean and dry.  Maintain adequate skin hydration.  Relieve and redistribute pressure and protect bony prominences; implement measures based on patient-specific risk factors.  Match turning and repositioning schedule to clinical condition.  Encourage weight shift frequently; assist with reposition if unable to complete independently.  Float heels off bed. Avoid pressure on the Achilles tendon.  Keep skin free from extended contact with medical devices.  Use aids (e.g., slide boards, mechanical lift) during transfer.  Recent Flowsheet Documentation  Taken 12/3/2021 1645 by Jessica Craven RN  Body Position: position changed independently  Taken 12/3/2021 0856 by Jessica Craven RN  Skin Protection:   adhesive use limited   incontinence pads utilized   skin sealant/moisture barrier applied   transparent dressing maintained   tubing/devices free from skin contact  Intervention: Prevent and Manage VTE (venous thromboembolism) Risk  Description: Assess for VTE risk.  Encourage/assist with early ambulation.  Initiate and maintain compression or other therapy, as indicated based on identified risk in accordance with organizational protocol/provider order.  Encourage both active and passive leg exercises while in bed, if unable to ambulate.  Recent Flowsheet Documentation  Taken 12/3/2021 0856 by Jessica Craven RN  VTE Prevention/Management: sequential compression devices off  Intervention: Prevent Infection  Description: Maintain skin and mucous membrane integrity; promote hand, oral and pulmonary hygiene.  Optimize fluid balance, nutrition, sleep and glycemic control to maximize infection  resistance.  Identify potential sources of infection early to prevent or mitigate progression of infection (e.g., wound, lines, devices).  Evaluate ongoing need for invasive devices; remove promptly when no longer indicated.  Recent Flowsheet Documentation  Taken 12/3/2021 1725 by Jessica Craven RN  Infection Prevention:   visitors restricted/screened   single patient room provided   rest/sleep promoted   hand hygiene promoted  Taken 12/3/2021 1645 by Jessica Craven RN  Infection Prevention:   visitors restricted/screened   single patient room provided   rest/sleep promoted   hand hygiene promoted  Taken 12/3/2021 1631 by Jessica Craven RN  Infection Prevention:   visitors restricted/screened   single patient room provided   rest/sleep promoted   hand hygiene promoted  Taken 12/3/2021 1538 by Jessica Craven RN  Infection Prevention:   visitors restricted/screened   single patient room provided   rest/sleep promoted   hand hygiene promoted  Taken 12/3/2021 1446 by Jessica Craven RN  Infection Prevention:   visitors restricted/screened   single patient room provided   rest/sleep promoted   hand hygiene promoted  Taken 12/3/2021 1211 by Jessica Craven RN  Infection Prevention:   visitors restricted/screened   single patient room provided   rest/sleep promoted   hand hygiene promoted  Taken 12/3/2021 1054 by Jessica Craven RN  Infection Prevention:   visitors restricted/screened   single patient room provided   rest/sleep promoted   personal protective equipment utilized   hand hygiene promoted  Taken 12/3/2021 0853 by Jessica Craven RN  Infection Prevention:   visitors restricted/screened   single patient room provided   rest/sleep promoted   hand hygiene promoted  Goal: Optimal Comfort and Wellbeing  Outcome: Ongoing, Progressing  Intervention: Provide Person-Centered Care  Description: Use a family-focused approach to care.  Develop trust and rapport by proactively providing information, encouraging questions, addressing  concerns and offering reassurance.  Acknowledge emotional response to hospitalization.  Recognize and utilize personal coping strategies.  Honor spiritual and cultural preferences.  Recent Flowsheet Documentation  Taken 12/3/2021 0856 by Jessica Craven RN  Trust Relationship/Rapport:   care explained   choices provided   emotional support provided   empathic listening provided   questions answered   questions encouraged   reassurance provided   thoughts/feelings acknowledged  Goal: Readiness for Transition of Care  Outcome: Ongoing, Progressing     Problem: Skin Injury Risk Increased  Goal: Skin Health and Integrity  Outcome: Ongoing, Progressing  Intervention: Optimize Skin Protection  Description: Reassess skin injury risk and inspect skin frequently (e.g., scheduled interval, with turning, with change in condition) to provide optimal prevention.  Maintain adequate tissue perfusion (e.g., encourage fluid balance, avoid crossing legs, constrictive clothing or devices) to promote tissue oxygenation.  Maintain head of bed at lowest degree of elevation tolerated, considering medical condition and other restrictions. Limit amount of time head of bed is elevated greater than 30 degrees to prevent friction/shear injury.  Avoid positioning onto an area that remains reddened.  Minimize incontinence and moisture (e.g., toileting schedule; moisture-wicking pad, diaper or incontinence collection device, skin moisture barrier).  Cleanse skin promptly and gently when soiled utilizing a pH-balanced cleanser.  Relieve and redistribute pressure (e.g., schedule position changes; utilize higher specification foam mattress, chair cushion, constant low-pressure or alternating-pressure support surface; medical device repositioning; protective dressing applicatio  Support increased progressive functional activity (e.g., therapeutic exercise) to decrease risk associated with immobility. Balance rest with activity.  Recent Flowsheet  Documentation  Taken 12/3/2021 0856 by Jessica Craven RN  Pressure Reduction Techniques:   frequent weight shift encouraged   rest period provided between sit times   pressure points protected  Pressure Reduction Devices: alternating pressure pump (ADD)  Skin Protection:   adhesive use limited   incontinence pads utilized   skin sealant/moisture barrier applied   transparent dressing maintained   tubing/devices free from skin contact     Problem: Fall Injury Risk  Goal: Absence of Fall and Fall-Related Injury  Outcome: Ongoing, Progressing  Intervention: Identify and Manage Contributors to Fall Injury Risk  Description: Reassess fall risk frequently and with change in status or transfer to another level of care.  Communicate fall injury risk to all healthcare team members (e.g., rounds, change of shift/provider, patient transport).  Anticipate needs; perform regular intentional rounding to assess need for position change, pain assessment, personal needs (e.g., toileting) and placement of necessary items.  Provide reorientation, appropriate sensory stimulation and routines with changes in mental status to decrease risk of fall.  Promote use of personal vision and auditory aids (e.g., glasses, hearing aids).  Assess assistance level required for safe and effective care; provide support as needed (e.g., toileting, bathing, mobilization).  Define behavior and activity limits to patient and family.  If fall occurs, assess for and treat injury; determine cause; revise fall injury prevention plan.  Regularly review medication contribution to fall risk; adjust medication administration times to minimize risk of falling.  Consider risk related to polypharmacy and age.  Balance adequate pain management with potential for oversedation.  Recent Flowsheet Documentation  Taken 12/3/2021 1725 by Jessica Craven, RN  Medication Review/Management: medications reviewed  Taken 12/3/2021 1645 by Jessica Craven, RN  Medication  Review/Management: medications reviewed  Self-Care Promotion:   independence encouraged   BADL personal objects within reach   BADL personal routines maintained   safe use of adaptive equipment encouraged  Taken 12/3/2021 1631 by Jessica Craven RN  Medication Review/Management: medications reviewed  Taken 12/3/2021 1538 by Jessica Craven RN  Medication Review/Management: medications reviewed  Taken 12/3/2021 1446 by Jessica Craven RN  Medication Review/Management: medications reviewed  Taken 12/3/2021 1211 by Jessica Craven RN  Medication Review/Management: medications reviewed  Taken 12/3/2021 1054 by Jessica Craven RN  Medication Review/Management: medications reviewed  Taken 12/3/2021 0853 by Jessica Craven RN  Medication Review/Management: medications reviewed  Intervention: Promote Injury-Free Environment  Description: Provide a safe, barrier-free environment that encourages independent activity.  Keep care area uncluttered and well-lighted.  Determine need for increased observation or auditory alerts (e.g., bed or chair alarm).  Assess equipment and environmental modification needs (e.g., low bed, signage, nonskid footwear, grab bars).  Avoid use of restraints.  Recent Flowsheet Documentation  Taken 12/3/2021 1725 by Jessica Craven RN  Safety Promotion/Fall Prevention:   safety round/check completed   room organization consistent   nonskid shoes/slippers when out of bed   clutter free environment maintained   assistive device/personal items within reach  Taken 12/3/2021 1645 by Jessica Craven RN  Safety Promotion/Fall Prevention:   toileting scheduled   safety round/check completed   room organization consistent   nonskid shoes/slippers when out of bed   fall prevention program maintained   clutter free environment maintained   assistive device/personal items within reach  Taken 12/3/2021 1631 by Jessica Craven RN  Safety Promotion/Fall Prevention:   toileting scheduled   safety round/check completed   room  organization consistent   nonskid shoes/slippers when out of bed   fall prevention program maintained   clutter free environment maintained   assistive device/personal items within reach  Taken 12/3/2021 1538 by Jessica Craven RN  Safety Promotion/Fall Prevention:   safety round/check completed   toileting scheduled   room organization consistent   nonskid shoes/slippers when out of bed   fall prevention program maintained   clutter free environment maintained   assistive device/personal items within reach  Taken 12/3/2021 1446 by Jessica Craven RN  Safety Promotion/Fall Prevention:   safety round/check completed   room organization consistent   nonskid shoes/slippers when out of bed   clutter free environment maintained   assistive device/personal items within reach   fall prevention program maintained   toileting scheduled  Taken 12/3/2021 1211 by Jessica Craven RN  Safety Promotion/Fall Prevention:   toileting scheduled   safety round/check completed   room organization consistent   nonskid shoes/slippers when out of bed   fall prevention program maintained   clutter free environment maintained   assistive device/personal items within reach  Taken 12/3/2021 1054 by Jessica Craven RN  Safety Promotion/Fall Prevention:   safety round/check completed   room organization consistent   toileting scheduled   nonskid shoes/slippers when out of bed   fall prevention program maintained   clutter free environment maintained   assistive device/personal items within reach  Taken 12/3/2021 0853 by Jessica Craven RN  Safety Promotion/Fall Prevention:   toileting scheduled   safety round/check completed   room organization consistent   nonskid shoes/slippers when out of bed   fall prevention program maintained   clutter free environment maintained   assistive device/personal items within reach     Problem: COPD Comorbidity  Goal: Maintenance of COPD Symptom Control  Outcome: Ongoing, Progressing  Intervention: Maintain COPD-Symptom  Control  Description: Evaluate adherence to management plan (e.g., medication, trigger avoidance, infection prevention, self-monitoring).  Advocate for continuation of home regimen, including medication, method of delivery, schedule and symptom monitoring.  Anticipate the need for breathing techniques and activity pacing to minimize fatigue and breathlessness.  Assess for proper use of inhaled medication and delivery technique; assist or reinstruct if needed.  Evaluate effectiveness of coping skills; encourage expression of feelings, expectations and concerns related to disease management and quality of life; reinforce education to enhance health management plan and wellbeing.  Recent Flowsheet Documentation  Taken 12/3/2021 1725 by Jessica Craven RN  Medication Review/Management: medications reviewed  Taken 12/3/2021 1645 by Jessica Craven RN  Medication Review/Management: medications reviewed  Taken 12/3/2021 1631 by Jessica Craven RN  Medication Review/Management: medications reviewed  Taken 12/3/2021 1538 by Jessica Craven RN  Medication Review/Management: medications reviewed  Taken 12/3/2021 1446 by Jessica Craven RN  Medication Review/Management: medications reviewed  Taken 12/3/2021 1211 by Jessica Craven RN  Medication Review/Management: medications reviewed  Taken 12/3/2021 1054 by Jessica Craven RN  Medication Review/Management: medications reviewed  Taken 12/3/2021 0853 by Jessica Craven RN  Medication Review/Management: medications reviewed     Problem: Diabetes Comorbidity  Goal: Blood Glucose Level Within Desired Range  Outcome: Ongoing, Progressing  Intervention: Maintain Glycemic Control  Description: Establish target blood glucose levels based on patient-specific factors such as age, developmental stage and illness severity.  Document blood glucose levels and monitor trend; advocate for treatment if not within desired range.  Provide pharmacologic therapy to maintain glycemic control.  Advocate for  correctional doses if blood glucose level is above targeted blood glucose level; match insulin dose to carbohydrate intake to avoid elevated postprandial blood glucose level.  Establish and follow a plan to identify and treat hypoglycemia.  Avoid hypoglycemic episodes by adjusting insulin dose to change in condition (e.g., illness severity, decreased oral intake, missed or refused meals/snacks or medication change, such as steroid taper).  Identify potential cause in event of a decreased blood glucose level.  Recent Flowsheet Documentation  Taken 12/3/2021 0856 by Jessica Craven RN  Glycemic Management:   blood glucose monitoring   supplemental insulin given     Problem: Heart Failure Comorbidity  Goal: Maintenance of Heart Failure Symptom Control  Outcome: Ongoing, Progressing  Intervention: Maintain Heart Failure-Management Strategies  Description: Evaluate adherence to home heart failure self-care regimen (e.g., medication, fluid balance, sodium intake, daily weight, physical activity, telemonitoring, support).  Advocate continuation of home medication and schedule.  Consider pharmacologic therapy administration time and effects (e.g., avoid giving diuretic prior to bedtime or nitrates on empty stomach).  Monitor response to pharmacologic therapy (e.g., weight fluctuations, blood pressure, electrolyte level).  Monitor for signs and symptoms of anxiety and depression, including severity and duration; if present, provide psychosocial support.  Consider need for palliative care consult.  Recent Flowsheet Documentation  Taken 12/3/2021 1725 by Jessica Craven RN  Medication Review/Management: medications reviewed  Taken 12/3/2021 1645 by Jessica Craven RN  Medication Review/Management: medications reviewed  Taken 12/3/2021 1631 by Jessica Craven RN  Medication Review/Management: medications reviewed  Taken 12/3/2021 1538 by Jessica Craven RN  Medication Review/Management: medications reviewed  Taken 12/3/2021 1446 by  Jessica Craven RN  Medication Review/Management: medications reviewed  Taken 12/3/2021 1211 by Jessica Craven RN  Medication Review/Management: medications reviewed  Taken 12/3/2021 1054 by Jessica Craven RN  Medication Review/Management: medications reviewed  Taken 12/3/2021 0853 by Jessica Craven RN  Medication Review/Management: medications reviewed     Problem: Hypertension Comorbidity  Goal: Blood Pressure in Desired Range  Outcome: Ongoing, Progressing  Intervention: Maintain Hypertension-Management Strategies  Description: Evaluate adherence to home antihypertensive regimen (e.g., exercise and activity, diet modification, medication).  Provide scheduled antihypertensive medication; consider administration time and effects (e.g., avoid giving diuretic prior to bedtime).  Monitor response to medication therapy (e.g., blood pressure, electrolyte level, medication effects).  Minimize risk of orthostatic hypotension; encourage caution with position changes, particularly if elderly  Recent Flowsheet Documentation  Taken 12/3/2021 1725 by Jessica Craven RN  Medication Review/Management: medications reviewed  Taken 12/3/2021 1645 by Jessica Craven RN  Medication Review/Management: medications reviewed  Taken 12/3/2021 1631 by Jessica Craven RN  Medication Review/Management: medications reviewed  Taken 12/3/2021 1538 by Jessica Craven RN  Medication Review/Management: medications reviewed  Taken 12/3/2021 1446 by Jessica Craven RN  Medication Review/Management: medications reviewed  Taken 12/3/2021 1211 by Jessica Craven RN  Medication Review/Management: medications reviewed  Taken 12/3/2021 1054 by Jsesica Carven RN  Medication Review/Management: medications reviewed  Taken 12/3/2021 0856 by Jessica Craven RN  Syncope Management: position changed slowly  Taken 12/3/2021 0853 by Jessica Craven RN  Medication Review/Management: medications reviewed     Problem: Obstructive Sleep Apnea Risk or Actual (Comorbidity Management)  Goal:  Unobstructed Breathing During Sleep  Outcome: Ongoing, Progressing

## 2021-12-03 NOTE — SIGNIFICANT NOTE
12/02/21 2022   Provider Notification   Reason for Communication Critical lab value   Provider Name JESSE Robles   Notification Route Phone call   Response No new orders     Lactic acid 3.6. JESSE Marie notified. No new orders placed

## 2021-12-03 NOTE — PROGRESS NOTES
Pharmacy to Dose: Warfarin  Consulting provider: CARMEN  Indication for warfarin: HEPATIC VEIN THROMBOSIS  Goal INR range: 2-3  Home warfarin dose: 8 MG DAILY  Actions or monitoring: INR & S/S OF BLEEDING    Any Vitamin K given?: NO  Any major drug interactions: AZITHROMYCIN  Is patient on bridging therapy with another anticoagulant?: NO    Plan: INR decreased by 0.1. Will give a one-time bolus of 1.5 times the daily maintenance dose (12 mg). INR ordered for a.m.    Date HGB PLATELETS INR Warfarin Dose Given   12-2 14.4 222 1.81 8 mg   12-3 14.3 227 1.71 12 mg

## 2021-12-03 NOTE — SIGNIFICANT NOTE
12/03/21 0626   Provider Notification   Reason for Communication Critical lab value   Provider Name JESSE Robles   Notification Route Phone call   Response See orders   Pt blood glucose 410. JESSE Marie notified. See orders

## 2021-12-03 NOTE — PROGRESS NOTES
[facility]   Hospitalist Progress Note  Date: 12/3/2021  Patient Name: Joan Partida  : 1970  MRN: [id number]  Date of admission: 2021      Subjective   Subjective     Chief Complaint: Follow-up for shortness of breath    Summary: 51 y.o. female super morbid obesity, chronic respiratory failure (4L), GIUSEPPE, COPD, diastolic congestive heart failure, right-sided heart failure presented with shortness of breath, increased cough, weight gain. On baseline 4 L NC. Chest x-ray with pneumonia versus edema. Pulm on board.    Interval Followup: Didn't sleep at all last night due to persistent coughing. Feels like there is a lot of mucus in her chest and is unable to spit any up. Shortness of breath persistent.     Review of Systems  Constitutional:  No Fever, + Fatigue  HEENT: Denied complaints  Cardiovascular:  No Chest Pain, +Edema, No Palpitations  Respiratory:  +Cough, +Dyspnea, +Wheezing, No Hemoptysis  Gastrointestinal:  Denied complaints  Genitourinary:  Denied complaints  Musculoskeletal: +Arthralgias, No Myalgias,  Neuro:  Denied complaints  Heme/Lymph:  No Bruising, No Bleeding  Endocrine: + Hyperglycemia,   Psych: + Anxiety,    Objective   Objective     Vitals:   Temp:  [97.6 °F (36.4 °C)-99.4 °F (37.4 °C)] 98.3 °F (36.8 °C)  Heart Rate:  [] 80  Resp:  [18-31] 21  BP: (106-156)/(52-78) 124/72  Flow (L/min):  [4] 4  Physical Exam    Constitutional:  female, morbid obese, up on side of bed, alert and conversant              eyes: Pupils equal and reactive, no conjunctival injection              HENT: NCAT, nares patent, mucous membranes moist              Neck: Supple, trachea midline              Respiratory: Poor aeration bilaterally with rhonchi and expiratory wheezing, nonlabored respiration              cardiovascular: RRR, no murmurs, bilateral lower extremity edema              Gastrointestinal: Positive bowel sounds, soft, nontender, nondistended              Musculoskeletal:  No gross deformities, no clubbing or cyanosis to extremities              Neurologic: Oriented x 3, Cranial Nerves grossly intact, speech clear              Skin: Warm and dry, no rashes or open wounds   Result Review    Result Review:  I have personally reviewed the results from the time of this admission to 12/3/2021 10:17 EST and agree with these findings:  [x]  Laboratory   WBC   Date Value Ref Range Status   12/03/2021 15.30 (H) 3.40 - 10.80 10*3/mm3 Final     RBC   Date Value Ref Range Status   12/03/2021 4.41 3.77 - 5.28 10*6/mm3 Final     Hemoglobin   Date Value Ref Range Status   12/03/2021 14.3 12.0 - 15.9 g/dL Final     Hematocrit   Date Value Ref Range Status   12/03/2021 41.4 34.0 - 46.6 % Final     MCV   Date Value Ref Range Status   12/03/2021 93.9 79.0 - 97.0 fL Final     MCH   Date Value Ref Range Status   12/03/2021 32.4 26.6 - 33.0 pg Final     MCHC   Date Value Ref Range Status   12/03/2021 34.5 31.5 - 35.7 g/dL Final     RDW   Date Value Ref Range Status   12/03/2021 15.5 (H) 12.3 - 15.4 % Final     RDW-SD   Date Value Ref Range Status   12/03/2021 52.7 37.0 - 54.0 fl Final     MPV   Date Value Ref Range Status   12/03/2021 8.8 6.0 - 12.0 fL Final     Platelets   Date Value Ref Range Status   12/03/2021 227 140 - 450 10*3/mm3 Final     Neutrophil %   Date Value Ref Range Status   12/03/2021 91.4 (H) 42.7 - 76.0 % Final     Lymphocyte %   Date Value Ref Range Status   12/03/2021 3.1 (L) 19.6 - 45.3 % Final     Monocyte %   Date Value Ref Range Status   12/03/2021 4.6 (L) 5.0 - 12.0 % Final     Eosinophil %   Date Value Ref Range Status   12/03/2021 0.0 (L) 0.3 - 6.2 % Final     Basophil %   Date Value Ref Range Status   12/03/2021 0.1 0.0 - 1.5 % Final     Immature Grans %   Date Value Ref Range Status   12/03/2021 0.8 (H) 0.0 - 0.5 % Final     Neutrophils, Absolute   Date Value Ref Range Status   12/03/2021 13.99 (H) 1.70 - 7.00 10*3/mm3 Final     Lymphocytes, Absolute   Date Value Ref Range  Status   12/03/2021 0.47 (L) 0.70 - 3.10 10*3/mm3 Final     Monocytes, Absolute   Date Value Ref Range Status   12/03/2021 0.70 0.10 - 0.90 10*3/mm3 Final     Eosinophils, Absolute   Date Value Ref Range Status   12/03/2021 0.00 0.00 - 0.40 10*3/mm3 Final     Basophils, Absolute   Date Value Ref Range Status   12/03/2021 0.02 0.00 - 0.20 10*3/mm3 Final     Immature Grans, Absolute   Date Value Ref Range Status   12/03/2021 0.12 (H) 0.00 - 0.05 10*3/mm3 Final     nRBC   Date Value Ref Range Status   12/03/2021 0.0 0.0 - 0.2 /100 WBC Final       [x]  Microbiology  COVID negative  [x]  Radiology  CT Chest Without Contrast Diagnostic    Result Date: 12/3/2021  PROCEDURE: CT CHEST WO CONTRAST DIAGNOSTIC  COMPARISONS: [facility], CR, XR CHEST 1 VW, 12/02/2021, 13:45.   [facility], CT, CT ABD-PELVIS W WO CONTRAST, 7/27/2021, 17:15.  INDICATIONS: COPD, acute exacerbation.  TECHNIQUE: 406 CT images were created without the administration of contrast material.   PROTOCOL:   Standard imaging protocol performed    RADIATION:   DLP: 652 mGy*cm   Automated exposure control was utilized to minimize radiation dose.  FINDINGS: The study is limited by motion artifact and large body habitus.  Portions of the chest and abdominal wall are excluded from the field of view.  There is diffuse hepatic steatosis with hepatomegaly.  Splenomegaly is suspected.  Multiple nonspecific collateral vessels are seen in the superficial subcutaneous left posterior, lateral abdominal wall, as on the prior exam.  The significance of this finding is uncertain.  The finding is related to an intertriginous fold.  It is incompletely evaluated by nonenhanced CT examination.  There is a subpleural opacity in the inferior, posterior lower lobe of the right lung, which measures approximately 3.3 x 5 x 3.1 cm in craniocaudal, transverse, and AP (anteroposterior) extent, respectively, as seen on image 55 of series 5, image 90 of series  4, and image 69 of series 3.  It is subpleural in location.  It is noncalcified and non-cavitating.  It may represent round atelectasis.  An infiltrate, such as related to pneumonia, including aspiration pneumonia, cannot be excluded.  It may contain minimal air bronchograms.  Other associated opacities in the right lung base are also seen, which are somewhat nodular in appearance.  These findings are new since the 7/27/2021 abdominal/pelvic CT study.  An infectious process is possible.  Atelectasis and/or infiltrate and/or fibrosis is (are) also suspected elsewhere within the bilateral lungs.  The central tracheobronchial tree is well aerated without filling defect.  There are small mediastinal and hilar lymph nodes, which may be reactive in nature.  No cardiac enlargement is suspected.  There may be minimal coronary artery calcifications.  No aneurysmal dilatation of the thoracic aorta is suggested. There are degenerative changes throughout the imaged spine.  Ossification of the anterior longitudinal ligament is seen and may represent diffuse idiopathic skeletal hyperostosis (DISH).   CONCLUSION:   1. Possible right lower lobe pneumonia.  Consider imaging follow-up to ensure a benign progression and to exclude an underlying malignant process.  Bronchoscopy may be helpful in further assessment.  There may be acute infiltrates and/or atelectasis elsewhere bilaterally to a lesser degree.  Infectious multifocal pneumonia is possible.  Pulmonary edema cannot be excluded but is probably less likely.   2. There is hepatosplenomegaly, as seen previously.   3. Probably no cardiac enlargement.   4. No pleural or pericardial effusion.   5. Mild coronary artery calcifications are seen.   6. Overall, the study is limited due to large body habitus and motion artifact.     REHANA CAMPOS JR, MD       Electronically Signed and Approved By: REHANA CAMPOS JR, MD on 12/03/2021 at 0:27             XR Chest 1 View    Result Date:  12/2/2021  PROCEDURE: XR CHEST 1 VW  COMPARISON: [facility], CR, CHEST PA/AP & LAT 2V, 4/20/2021, 13:11.  INDICATIONS: SOA Triage Protocol  FINDINGS:  The heart is enlarged.  There are diffuse bilateral alveolar airspace opacities consistent with multifocal pneumonia or pulmonary edema.  There is no evidence of pneumothorax.  There is no pleural effusion.  CONCLUSION: Multifocal alveolar infiltrates could be from pulmonary edema or pneumonia       NORMA WYNN MD       Electronically Signed and Approved By: NORMA WYNN MD on 12/02/2021 at 13:59             []  EKG/Telemetry   []  Cardiology/Vascular   []  Pathology  []  Old records  []  Other:    Assessment/Plan   Assessment / Plan     Assessment:  Active Hospital Problems    Diagnosis  POA   • **Acute on chronic respiratory failure with hypoxia (HCC) [J96.21]  Yes   • COPD (chronic obstructive pulmonary disease) (HCC) [J44.9]  Yes     Priority: High   • Long term (current) use of anticoagulants [Z79.01]  Not Applicable   • Abnormal liver enzymes [R74.8]  Yes   • Uncontrolled type 2 diabetes mellitus with hyperglycemia (HCC) [E11.65]  Yes   • 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 Okay to discontinue isolation  Will add MetaNebs QID. Continue bronchopulmonary hygiene protocol  Brovana and Pulmicort twice daily  Continue IV Lasix 40 mg every 12 hours.  Appreciate pulmonology assistance  Continue steroids per their recommendation  Continue azithromycin and Rocephin, day 2. Awaiting respiratory culture  Continue supplemental oxygen maintain SpO2 >90%  Add Levemir 30 units daily with mealtime Humalog 10 units TID  Continue Toprol-XL 25 mg twice daily  Continue  Synthroid  Warfarin increased to 12 mg today. Repeat INR tomorrow  CBC, BMP in a.m.    Discussed plan with RN, pulmonology    DVT prophylaxis:  Medical and mechanical DVT prophylaxis orders are present.    CODE STATUS:   Code Status (Patient has no pulse and is not breathing): CPR (Attempt to Resuscitate)  Medical Interventions (Patient has pulse or is breathing): Full Support        Electronically signed by Jean Garza DO, 12/03/21, 10:17 AM EST.

## 2021-12-03 NOTE — SIGNIFICANT NOTE
12/02/21 5689   Provider Notification   Reason for Communication Change in status   Provider Name Margaret   Notification Route Phone call   Response See orders     Pt had to remove insulin pump (from home) so orders added for coverage

## 2021-12-03 NOTE — SIGNIFICANT NOTE
12/03/21 1125   Coping/Psychosocial   Observed Emotional State calm; cooperative   Verbalized Emotional State hopefulness   Trust Relationship/Rapport empathic listening provided   Family/Support Persons spouse   Involvement in Care interacting with patient   Additional Documentation Spiritual Care (Group)   Spiritual Care   Use of Spiritual Resources spirituality for coping, indicated strong use of; prayer   Spiritual Care Source  initiative   Spiritual Care Follow-Up follow-up, none required as presently assessed   Response to Spiritual Care receptive of support   Spiritual Care Interventions supportive conversation provided; prayer support provided   Spiritual Care Visit Type initial   Receptivity to Spiritual Care visit welcomed

## 2021-12-03 NOTE — CASE MANAGEMENT/SOCIAL WORK
Discharge Planning Assessment   Maxi     Patient Name: Joan Partida  MRN: [id number]  Today's Date: 12/3/2021    Admit Date: 12/2/2021     Discharge Needs Assessment     Row Name 12/03/21 1201       Living Environment    Lives With significant other; spouse    Current Living Arrangements home/apartment/condo    Primary Care Provided by self; spouse/significant other    Provides Primary Care For no one, unable/limited ability to care for self    Family Caregiver if Needed spouse    Quality of Family Relationships helpful; involved; supportive    Able to Return to Prior Arrangements yes       Transition Planning    Patient/Family Anticipates Transition to home    Patient/Family Anticipated Services at Transition home health care  Pt would like A [facility] referral. Referral sent.    Transportation Anticipated family or friend will provide       Discharge Needs Assessment    Readmission Within the Last 30 Days no previous admission in last 30 days    Current Outpatient/Agency/Support Group other (see comments)  Pt gets O2 and dme needs through [employer]. Pt has ped[employer] cpap but states dme company is no longer in business.    Equipment Currently Used at Home medication pump; wheelchair, motorized    Concerns to be Addressed discharge planning    Equipment Needed After Discharge none    Discharge Facility/Level of Care Needs home with home health               Discharge Plan     Row Name 12/03/21 1211       Plan    Plan Pt alert and oriented. Covid negative. Pt lives at home with spouse, independent but spouse assists with bathing and using restroom. Pt has all dme needed. Pt states she has not used her cpap at home since it has been recalled. RT cm notified to assist. Pt asked for referral for A [facility].  Referral sent. Pt was discharged from services about 2 months ago. Pt sees Dr Dowling for resp needs and Dr Judith pinedo [facility] for dm needs. Pt states her pcp recently got her a new bsc however it is too tall for  her to use and asked for new bsc. Cm reached out to Zion from [employer] for new bsc that has adjustable legs per pts request. Pt denies any other dme needs.              Continued Care and Services - Admitted Since 12/2/2021     Home Medical Care     Service Provider Request Status Selected Services Address Phone Fax Patient Preferred    [facility] [place]  Pending - Request Sent N/A [address], [place] [address] [phone]                  Demographic Summary     Row Name 12/03/21 1154       General Information    Admission Type inpatient    Arrived From emergency department    Reason for Consult discharge planning    Preferred Language English     Used During This Interaction no       Contact Information    Permission Granted to Share Info With family/designee               Functional Status     Row Name 12/03/21 1200       Functional Status    Usual Activity Tolerance moderate    Current Activity Tolerance moderate       Functional Status, IADL    Medications independent    Meal Preparation assistive person; independent    Housekeeping assistive person    Laundry assistive person    Shopping assistive person       Mental Status Summary    Recent Changes in Mental Status/Cognitive Functioning no changes               Psychosocial    No documentation.                Abuse/Neglect    No documentation.                Legal    No documentation.                Substance Abuse    No documentation.                Patient Forms    No documentation.                   Keiry Sanchez RN

## 2021-12-03 NOTE — PROGRESS NOTES
Pulmonary / Critical Care Progress Note      Patient Name: Joan Partida  : 1970  MRN: [id number]  Attending:  Jean Garza DO  Date of admission: 2021    Subjective   Subjective   Follow-up for pneumonia    Over past 24 hours:  No real changes.  Has a hacking cough productive of thick clear sputum.  States she has trouble bringing up secretions  On her home 3 to 4 L of oxygen.  Did wear NIPPV overnight  No change in weakness or fatigue  Chest CT with right base pneumonia/masslike opacity  Does still feel very wheezy  Shortness of breath unchanged  No nausea, fevers or chills    Review of Systems  General: Fatigue, weakness, otherwise denied complaints  Cardiovascular:  Denied complaints  Respiratory: Dyspnea, cough, wheeze, otherwise denied complaints  Gastrointestinal: Denied complaints        Objective   Objective     Vitals:   Temp:  [98.2 °F (36.8 °C)-99.4 °F (37.4 °C)] 98.6 °F (37 °C)  Heart Rate:  [] 84  Resp:  [18-31] 22  BP: (106-138)/(52-78) 138/77  Flow (L/min):  [4] 4    Physical Exam   Vital Signs Reviewed   General: Obese female, Awake and Alert, mild distress on 4 L NC    HEENT:  PERRL, EOMI.  OP, nares clear, no sinus tenderness  Neck:  Supple, no JVD, no thyromegaly  Chest:  good aeration, coarse breath sounds with rhonchi and scattered wheezes, tympanic to percussion bilaterally, mild work of breathing noted  CV: RRR, no MGR, pulses 2+, equal  Abd:  Soft, NT, ND, + BS, no HSM, obese  EXT:  no clubbing, no cyanosis, 1+ BLE edema, no joint tenderness  Neuro:  A&Ox3, CN grossly intact, no focal deficits  Skin: No rashes or lesions noted         Result Review    Result Review:  I have personally reviewed the results from the time of this admission to 12/3/2021 13:37 EST and agree with these findings:  [x]  Laboratory  [x]  Microbiology  [x]  Radiology  [x]  EKG/Telemetry   []  Cardiology/Vascular   []  Pathology  []  Old records  []  Other:  Most notable findings include:  Infectious work-up so far negative.  Chest CT shows right basilar infiltrate/masslike opacity.  Sodium 130, bicarbonate 33, creatinine 1.3, white blood cell count 15        Assessment/Plan   Assessment / Plan     Active Hospital Problems:  Active Hospital Problems    Diagnosis    • **Acute on chronic respiratory failure with hypoxia (HCC)    • Long term (current) use of anticoagulants    • Abnormal liver enzymes    • Uncontrolled type 2 diabetes mellitus with hyperglycemia (HCC)    • 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HCC)    • Hypokalemia    • Diabetic polyneuropathy (HCC)    • GIUSEPPE (obstructive sleep apnea)    • Acquired hypothyroidism    • COPD (chronic obstructive pulmonary disease) (HCC)    • Essential hypertension    • Morbid obesity (HCC)          Impression:  Acute on chronic hypoxic respiratory failure  Community-acquired pneumonia of right lower lobe from unspecified organism  Abnormal chest CT  Mucous plugging/issues with airway clearance  Volume overload  AMY: baseline Cr 0.9  Hypokalemia  Hyponatremia  Elevated liver enzymes  HFpEF without acute exacerbation  PAH: WHO class II and WHO class III  COPD/Asthma overlap  GIUSEPPE/OHS: compliant with NIPPV at home  HTN  DM  Super super Obesity: BMI 77.9     Plan:  Continue supplemental O2 to keep sats >90%.  Wears 3 to 4 L at home  Continue NIPPV at night and prn naps.  May bring in home machine and use.  On day 2 of azithromycin and ceftriaxone.  Chest CT does show right base infiltrate.  Complete full course of therapy  Continue nebulizers and aggressive bronchopulmonary hygiene.  Add MetaNeb.  If no improvement in airway clearance over the weekend, might need bronchoscopy on Monday  Continue Lasix 40 mg IV BID.  Trend renal panel and electrolytes.  Continue Solu-medrol 40 mg IV BID.  Continue Singulair and Mucinex.  Will obtain CT chest without contrast.  Repeat noncontrast chest CT in 3 months encourage activity.  Up to chair as tolerated.     DVT prophylaxis:  Medical  and mechanical DVT prophylaxis orders are present.    CODE STATUS:   Code Status (Patient has no pulse and is not breathing): CPR (Attempt to Resuscitate)  Medical Interventions (Patient has pulse or is breathing): Full Support      Labs, imaging, microbiology, notes and medications personally reviewed.  Discussed with primary    Electronically signed by Franko Muhammad MD, 12/03/21, 1:40 PM EST.

## 2021-12-03 NOTE — SIGNIFICANT NOTE
12/02/21 2300   Provider Notification   Reason for Communication Patient request   Provider Name JESSE Robles   Notification Route Phone call   Response See orders     Patient requested Lunesta and Trazadone be restarted. JESSE Marie placed orders.

## 2021-12-04 LAB
ANION GAP SERPL CALCULATED.3IONS-SCNC: 16.3 MMOL/L (ref 5–15)
BUN SERPL-MCNC: 49 MG/DL (ref 6–20)
BUN/CREAT SERPL: 43.4 (ref 7–25)
CALCIUM SPEC-SCNC: 9.9 MG/DL (ref 8.6–10.5)
CHLORIDE SERPL-SCNC: 82 MMOL/L (ref 98–107)
CO2 SERPL-SCNC: 32.7 MMOL/L (ref 22–29)
CREAT SERPL-MCNC: 1.13 MG/DL (ref 0.57–1)
DEPRECATED RDW RBC AUTO: 53.9 FL (ref 37–54)
ERYTHROCYTE [DISTWIDTH] IN BLOOD BY AUTOMATED COUNT: 15.5 % (ref 12.3–15.4)
GFR SERPL CREATININE-BSD FRML MDRD: 50 ML/MIN/1.73
GLUCOSE BLDC GLUCOMTR-MCNC: 200 MG/DL (ref 70–99)
GLUCOSE BLDC GLUCOMTR-MCNC: 336 MG/DL (ref 70–99)
GLUCOSE BLDC GLUCOMTR-MCNC: 345 MG/DL (ref 70–99)
GLUCOSE BLDC GLUCOMTR-MCNC: 353 MG/DL (ref 70–99)
GLUCOSE BLDC GLUCOMTR-MCNC: 365 MG/DL (ref 70–99)
GLUCOSE BLDC GLUCOMTR-MCNC: 405 MG/DL (ref 70–99)
GLUCOSE BLDC GLUCOMTR-MCNC: 467 MG/DL (ref 70–99)
GLUCOSE SERPL-MCNC: 403 MG/DL (ref 65–99)
GLUCOSE SERPL-MCNC: 448 MG/DL (ref 65–99)
HCT VFR BLD AUTO: 41.7 % (ref 34–46.6)
HGB BLD-MCNC: 14.4 G/DL (ref 12–15.9)
INR PPP: 2.3 (ref 2–3)
MCH RBC QN AUTO: 32.9 PG (ref 26.6–33)
MCHC RBC AUTO-ENTMCNC: 34.5 G/DL (ref 31.5–35.7)
MCV RBC AUTO: 95.2 FL (ref 79–97)
PLATELET # BLD AUTO: 226 10*3/MM3 (ref 140–450)
PMV BLD AUTO: 9.1 FL (ref 6–12)
POTASSIUM SERPL-SCNC: 3.3 MMOL/L (ref 3.5–5.2)
PROTHROMBIN TIME: 22.3 SECONDS (ref 9.4–12)
RBC # BLD AUTO: 4.38 10*6/MM3 (ref 3.77–5.28)
SODIUM SERPL-SCNC: 131 MMOL/L (ref 136–145)
WBC NRBC COR # BLD: 14.19 10*3/MM3 (ref 3.4–10.8)

## 2021-12-04 PROCEDURE — 63710000001 INSULIN DETEMIR PER 5 UNITS: Performed by: INTERNAL MEDICINE

## 2021-12-04 PROCEDURE — 99232 SBSQ HOSP IP/OBS MODERATE 35: CPT | Performed by: INTERNAL MEDICINE

## 2021-12-04 PROCEDURE — 94799 UNLISTED PULMONARY SVC/PX: CPT

## 2021-12-04 PROCEDURE — 25010000002 FUROSEMIDE PER 20 MG: Performed by: INTERNAL MEDICINE

## 2021-12-04 PROCEDURE — 82947 ASSAY GLUCOSE BLOOD QUANT: CPT | Performed by: INTERNAL MEDICINE

## 2021-12-04 PROCEDURE — 25010000002 AZITHROMYCIN PER 500 MG: Performed by: INTERNAL MEDICINE

## 2021-12-04 PROCEDURE — 25010000002 METHYLPREDNISOLONE PER 40 MG: Performed by: NURSE PRACTITIONER

## 2021-12-04 PROCEDURE — 85610 PROTHROMBIN TIME: CPT | Performed by: INTERNAL MEDICINE

## 2021-12-04 PROCEDURE — 25010000002 CEFTRIAXONE PER 250 MG: Performed by: INTERNAL MEDICINE

## 2021-12-04 PROCEDURE — 36415 COLL VENOUS BLD VENIPUNCTURE: CPT | Performed by: INTERNAL MEDICINE

## 2021-12-04 PROCEDURE — 80048 BASIC METABOLIC PNL TOTAL CA: CPT | Performed by: INTERNAL MEDICINE

## 2021-12-04 PROCEDURE — 25010000002 REVEFENACIN 175 MCG/3ML SOLUTION: Performed by: NURSE PRACTITIONER

## 2021-12-04 PROCEDURE — 85027 COMPLETE CBC AUTOMATED: CPT | Performed by: INTERNAL MEDICINE

## 2021-12-04 PROCEDURE — 63710000001 ONDANSETRON PER 8 MG: Performed by: INTERNAL MEDICINE

## 2021-12-04 PROCEDURE — 82962 GLUCOSE BLOOD TEST: CPT

## 2021-12-04 PROCEDURE — 63710000001 INSULIN LISPRO (HUMAN) PER 5 UNITS: Performed by: INTERNAL MEDICINE

## 2021-12-04 RX ORDER — WARFARIN SODIUM 4 MG/1
8 TABLET ORAL
Status: DISCONTINUED | OUTPATIENT
Start: 2021-12-04 | End: 2021-12-05

## 2021-12-04 RX ORDER — PREDNISONE 20 MG/1
40 TABLET ORAL
Status: DISCONTINUED | OUTPATIENT
Start: 2021-12-05 | End: 2021-12-07 | Stop reason: HOSPADM

## 2021-12-04 RX ORDER — POTASSIUM CHLORIDE 750 MG/1
40 CAPSULE, EXTENDED RELEASE ORAL DAILY
Status: DISCONTINUED | OUTPATIENT
Start: 2021-12-04 | End: 2021-12-07 | Stop reason: HOSPADM

## 2021-12-04 RX ADMIN — IPRATROPIUM BROMIDE AND ALBUTEROL SULFATE 3 ML: .5; 2.5 SOLUTION RESPIRATORY (INHALATION) at 19:55

## 2021-12-04 RX ADMIN — METOPROLOL SUCCINATE 25 MG: 25 TABLET, EXTENDED RELEASE ORAL at 20:05

## 2021-12-04 RX ADMIN — INSULIN LISPRO 15 UNITS: 100 INJECTION, SOLUTION INTRAVENOUS; SUBCUTANEOUS at 08:13

## 2021-12-04 RX ADMIN — TRAZODONE HYDROCHLORIDE 50 MG: 50 TABLET ORAL at 20:05

## 2021-12-04 RX ADMIN — FUROSEMIDE 40 MG: 10 INJECTION, SOLUTION INTRAVENOUS at 06:10

## 2021-12-04 RX ADMIN — REVEFENACIN 175 MCG: 175 SOLUTION RESPIRATORY (INHALATION) at 10:09

## 2021-12-04 RX ADMIN — AZITHROMYCIN DIHYDRATE 500 MG: 500 INJECTION, POWDER, LYOPHILIZED, FOR SOLUTION INTRAVENOUS at 16:54

## 2021-12-04 RX ADMIN — INSULIN LISPRO 20 UNITS: 100 INJECTION, SOLUTION INTRAVENOUS; SUBCUTANEOUS at 12:35

## 2021-12-04 RX ADMIN — NYSTATIN: 100000 POWDER TOPICAL at 20:07

## 2021-12-04 RX ADMIN — METHYLPREDNISOLONE SODIUM SUCCINATE 40 MG: 40 INJECTION, POWDER, FOR SOLUTION INTRAMUSCULAR; INTRAVENOUS at 06:10

## 2021-12-04 RX ADMIN — GUAIFENESIN 600 MG: 600 TABLET ORAL at 20:05

## 2021-12-04 RX ADMIN — GABAPENTIN 800 MG: 400 CAPSULE ORAL at 22:04

## 2021-12-04 RX ADMIN — INSULIN LISPRO 24 UNITS: 100 INJECTION, SOLUTION INTRAVENOUS; SUBCUTANEOUS at 06:40

## 2021-12-04 RX ADMIN — GUAIFENESIN 600 MG: 600 TABLET ORAL at 08:14

## 2021-12-04 RX ADMIN — SODIUM CHLORIDE, PRESERVATIVE FREE 10 ML: 5 INJECTION INTRAVENOUS at 09:59

## 2021-12-04 RX ADMIN — ACETAMINOPHEN 650 MG: 325 TABLET ORAL at 16:54

## 2021-12-04 RX ADMIN — WARFARIN SODIUM 8 MG: 4 TABLET ORAL at 18:01

## 2021-12-04 RX ADMIN — FUROSEMIDE 40 MG: 10 INJECTION, SOLUTION INTRAVENOUS at 17:58

## 2021-12-04 RX ADMIN — IPRATROPIUM BROMIDE AND ALBUTEROL SULFATE 3 ML: .5; 2.5 SOLUTION RESPIRATORY (INHALATION) at 13:09

## 2021-12-04 RX ADMIN — LEVOTHYROXINE SODIUM 250 MCG: 125 TABLET ORAL at 06:12

## 2021-12-04 RX ADMIN — INSULIN LISPRO 20 UNITS: 100 INJECTION, SOLUTION INTRAVENOUS; SUBCUTANEOUS at 18:00

## 2021-12-04 RX ADMIN — PANTOPRAZOLE SODIUM 40 MG: 40 TABLET, DELAYED RELEASE ORAL at 08:14

## 2021-12-04 RX ADMIN — BUDESONIDE 0.5 MG: 0.5 SUSPENSION RESPIRATORY (INHALATION) at 19:55

## 2021-12-04 RX ADMIN — POTASSIUM CHLORIDE 40 MEQ: 750 CAPSULE, EXTENDED RELEASE ORAL at 08:15

## 2021-12-04 RX ADMIN — GABAPENTIN 800 MG: 400 CAPSULE ORAL at 16:54

## 2021-12-04 RX ADMIN — POTASSIUM CHLORIDE 40 MEQ: 750 CAPSULE, EXTENDED RELEASE ORAL at 16:54

## 2021-12-04 RX ADMIN — CEFTRIAXONE SODIUM 1 G: 1 INJECTION, SOLUTION INTRAVENOUS at 19:42

## 2021-12-04 RX ADMIN — NYSTATIN: 100000 POWDER TOPICAL at 09:59

## 2021-12-04 RX ADMIN — INSULIN DETEMIR 25 UNITS: 100 INJECTION, SOLUTION SUBCUTANEOUS at 20:07

## 2021-12-04 RX ADMIN — METOPROLOL SUCCINATE 25 MG: 25 TABLET, EXTENDED RELEASE ORAL at 08:14

## 2021-12-04 RX ADMIN — SODIUM CHLORIDE, PRESERVATIVE FREE 10 ML: 5 INJECTION INTRAVENOUS at 20:05

## 2021-12-04 RX ADMIN — SERTRALINE HYDROCHLORIDE 100 MG: 100 TABLET ORAL at 08:13

## 2021-12-04 RX ADMIN — MONTELUKAST SODIUM 10 MG: 10 TABLET, FILM COATED ORAL at 20:05

## 2021-12-04 RX ADMIN — ONDANSETRON HYDROCHLORIDE 4 MG: 4 TABLET, FILM COATED ORAL at 19:42

## 2021-12-04 RX ADMIN — GABAPENTIN 800 MG: 400 CAPSULE ORAL at 06:11

## 2021-12-04 RX ADMIN — INSULIN DETEMIR 50 UNITS: 100 INJECTION, SOLUTION SUBCUTANEOUS at 08:25

## 2021-12-04 RX ADMIN — ARFORMOTEROL TARTRATE 15 MCG: 15 SOLUTION RESPIRATORY (INHALATION) at 19:55

## 2021-12-04 RX ADMIN — INSULIN LISPRO 24 UNITS: 100 INJECTION, SOLUTION INTRAVENOUS; SUBCUTANEOUS at 12:35

## 2021-12-04 NOTE — PROGRESS NOTES
Pharmacy to Dose: Warfarin  Consulting provider: CARMEN  Indication for warfarin: HEPATIC VEIN THROMBOSIS  Goal INR range: 2-3  Home warfarin dose: 8 MG DAILY  Actions or monitoring: INR & S/S OF BLEEDING    Any Vitamin K given?: NO  Any major drug interactions: AZITHROMYCIN  Is patient on bridging therapy with another anticoagulant?: NO    Date HGB PLATELETS INR Warfarin Dose Given   12-2 14.4 222 1.81 8 mg   12-3 14.3 227 1.71 12 mg   12-4 14.4 226 2.30 8MG                                 NOTE INR= 2.30, UP FROM 1.71 THE PREVIOUS DAY.  WILL RETURN TO HOME DOSE OF 8MG QDAY FOR NOW.    INR TOMORROW AM

## 2021-12-04 NOTE — NURSING NOTE
Pt transferred to room 419 via wheelchair, pt reporting lower back pain and mild headache, VSS, pt declines to get into bed and states she rests and feels better when she stays in her recliner, bedside commode present and pt states she is able to get up and down to BSC independently, encouraged pt to let someone know when she gets up as a safety precaution, pt denies any further needs at this time, will continue to monitor

## 2021-12-04 NOTE — PLAN OF CARE
Problem: Adult Inpatient Plan of Care  Goal: Plan of Care Review  Outcome: Ongoing, Progressing  Flowsheets  Taken 12/4/2021 1847 by Daija Leal, RN  Outcome Summary: Patient's blood sugars have been increasing throughout the shift. MD was notified and new orders were placed. Patient wears cpap when sleeping and 4-5L NC when awake. Possible bronch on Monday.  Taken 12/4/2021 0537 by Chaparrita Mays RN  Progress: no change  Plan of Care Reviewed With: patient   Goal Outcome Evaluation:              Outcome Summary: Patient's blood sugars have been increasing throughout the shift. MD was notified and new orders were placed. Patient wears cpap when sleeping and 4-5L NC when awake. Possible bronch on Monday.

## 2021-12-04 NOTE — NURSING NOTE
Notified JESSE Robles of bedside blood glucose of 405. Ordered STAT blood glucose by lab. PA verbalized understanding. YUMIKO DOMÍNGUEZ RN

## 2021-12-04 NOTE — PLAN OF CARE
Goal Outcome Evaluation:  Plan of Care Reviewed With: patient        Progress: no change  Outcome Summary: No signifigant changes since transfer

## 2021-12-04 NOTE — PROGRESS NOTES
[facility]   Hospitalist Progress Note  Date: 2021  Patient Name: Joan Partida  : 1970  MRN: [id number]  Date of admission: 2021      Subjective   Subjective     Chief Complaint: Follow-up for shortness of breath    Summary: 51 y.o. female super morbid obesity, chronic respiratory failure (4L), GIUSEPPE, COPD, diastolic congestive heart failure, right-sided heart failure presented with shortness of breath, increased cough, weight gain. On baseline 4 L NC. Chest x-ray with pneumonia versus edema.  CT shows right base infiltrate.  On azithromycin and Rocephin.  Pulm on board.    Interval Followup: Compliant with home NIPPV overnight.  Up in chair this morning on baseline nasal cannula requirement.  Feels she is starting to bring up more sputum however shortness of air has not improved.    Review of Systems  Constitutional:  No Fever, + Fatigue  Cardiovascular:  No Chest Pain, +Edema, No Palpitations  Respiratory:  +Cough, +Dyspnea, +Wheezing, No Hemoptysis  Gastrointestinal:  Denied complaints  Genitourinary:  Denied complaints  Musculoskeletal: + Bilateral hip discomfort, No Myalgias,  Neuro:  Denied complaints  Heme/Lymph:  No Bruising, No Bleeding  Endocrine: + Hyperglycemia  Psych: + Anxiety otherwise denies complaints    Objective   Objective     Vitals:   Temp:  [97.4 °F (36.3 °C)-98.2 °F (36.8 °C)] 98.2 °F (36.8 °C)  Heart Rate:  [73-92] 74  Resp:  [18-24] 20  BP: (124-136)/(69-85) 124/85  Flow (L/min):  [4-5] 4  Physical Exam    Constitutional:  female, morbid obese, up in chair, alert and conversant              eyes: Pupils equal and reactive, no conjunctival injection              HENT: NCAT, nares patent, mucous membranes moist              Neck: Supple, trachea midline              Respiratory: Poor aeration bilaterally with rhonchi and expiratory wheezing, nonlabored respiration              cardiovascular: RRR, no murmurs, bilateral lower extremity edema               Gastrointestinal: Positive bowel sounds, soft, nontender, nondistended              Musculoskeletal: No gross deformities, no clubbing or cyanosis to extremities              Neurologic: Oriented x 3, Cranial Nerves grossly intact, speech clear              Skin: Warm and dry, no rashes or open wounds   Result Review    Result Review:  I have personally reviewed the results from the time of this admission to 12/4/2021 13:47 EST and agree with these findings:  [x]  Laboratory   WBC   Date Value Ref Range Status   12/04/2021 14.19 (H) 3.40 - 10.80 10*3/mm3 Final     RBC   Date Value Ref Range Status   12/04/2021 4.38 3.77 - 5.28 10*6/mm3 Final     Hemoglobin   Date Value Ref Range Status   12/04/2021 14.4 12.0 - 15.9 g/dL Final     Hematocrit   Date Value Ref Range Status   12/04/2021 41.7 34.0 - 46.6 % Final     MCV   Date Value Ref Range Status   12/04/2021 95.2 79.0 - 97.0 fL Final     MCH   Date Value Ref Range Status   12/04/2021 32.9 26.6 - 33.0 pg Final     MCHC   Date Value Ref Range Status   12/04/2021 34.5 31.5 - 35.7 g/dL Final     RDW   Date Value Ref Range Status   12/04/2021 15.5 (H) 12.3 - 15.4 % Final     RDW-SD   Date Value Ref Range Status   12/04/2021 53.9 37.0 - 54.0 fl Final     MPV   Date Value Ref Range Status   12/04/2021 9.1 6.0 - 12.0 fL Final     Platelets   Date Value Ref Range Status   12/04/2021 226 140 - 450 10*3/mm3 Final     Neutrophil %   Date Value Ref Range Status   12/03/2021 91.4 (H) 42.7 - 76.0 % Final     Lymphocyte %   Date Value Ref Range Status   12/03/2021 3.1 (L) 19.6 - 45.3 % Final     Monocyte %   Date Value Ref Range Status   12/03/2021 4.6 (L) 5.0 - 12.0 % Final     Eosinophil %   Date Value Ref Range Status   12/03/2021 0.0 (L) 0.3 - 6.2 % Final     Basophil %   Date Value Ref Range Status   12/03/2021 0.1 0.0 - 1.5 % Final     Immature Grans %   Date Value Ref Range Status   12/03/2021 0.8 (H) 0.0 - 0.5 % Final     Neutrophils, Absolute   Date Value Ref Range Status    12/03/2021 13.99 (H) 1.70 - 7.00 10*3/mm3 Final     Lymphocytes, Absolute   Date Value Ref Range Status   12/03/2021 0.47 (L) 0.70 - 3.10 10*3/mm3 Final     Monocytes, Absolute   Date Value Ref Range Status   12/03/2021 0.70 0.10 - 0.90 10*3/mm3 Final     Eosinophils, Absolute   Date Value Ref Range Status   12/03/2021 0.00 0.00 - 0.40 10*3/mm3 Final     Basophils, Absolute   Date Value Ref Range Status   12/03/2021 0.02 0.00 - 0.20 10*3/mm3 Final     Immature Grans, Absolute   Date Value Ref Range Status   12/03/2021 0.12 (H) 0.00 - 0.05 10*3/mm3 Final     nRBC   Date Value Ref Range Status   12/03/2021 0.0 0.0 - 0.2 /100 WBC Final       [x]  Microbiology  COVID negative  [x]  Radiology  CT Chest Without Contrast Diagnostic    Result Date: 12/3/2021  PROCEDURE: CT CHEST WO CONTRAST DIAGNOSTIC  COMPARISONS: [facility], CR, XR CHEST 1 VW, 12/02/2021, 13:45.   [facility], CT, CT ABD-PELVIS W WO CONTRAST, 7/27/2021, 17:15.  INDICATIONS: COPD, acute exacerbation.  TECHNIQUE: 406 CT images were created without the administration of contrast material.   PROTOCOL:   Standard imaging protocol performed    RADIATION:   DLP: 652 mGy*cm   Automated exposure control was utilized to minimize radiation dose.  FINDINGS: The study is limited by motion artifact and large body habitus.  Portions of the chest and abdominal wall are excluded from the field of view.  There is diffuse hepatic steatosis with hepatomegaly.  Splenomegaly is suspected.  Multiple nonspecific collateral vessels are seen in the superficial subcutaneous left posterior, lateral abdominal wall, as on the prior exam.  The significance of this finding is uncertain.  The finding is related to an intertriginous fold.  It is incompletely evaluated by nonenhanced CT examination.  There is a subpleural opacity in the inferior, posterior lower lobe of the right lung, which measures approximately 3.3 x 5 x 3.1 cm in craniocaudal, transverse,  and AP (anteroposterior) extent, respectively, as seen on image 55 of series 5, image 90 of series 4, and image 69 of series 3.  It is subpleural in location.  It is noncalcified and non-cavitating.  It may represent round atelectasis.  An infiltrate, such as related to pneumonia, including aspiration pneumonia, cannot be excluded.  It may contain minimal air bronchograms.  Other associated opacities in the right lung base are also seen, which are somewhat nodular in appearance.  These findings are new since the 7/27/2021 abdominal/pelvic CT study.  An infectious process is possible.  Atelectasis and/or infiltrate and/or fibrosis is (are) also suspected elsewhere within the bilateral lungs.  The central tracheobronchial tree is well aerated without filling defect.  There are small mediastinal and hilar lymph nodes, which may be reactive in nature.  No cardiac enlargement is suspected.  There may be minimal coronary artery calcifications.  No aneurysmal dilatation of the thoracic aorta is suggested. There are degenerative changes throughout the imaged spine.  Ossification of the anterior longitudinal ligament is seen and may represent diffuse idiopathic skeletal hyperostosis (DISH).   CONCLUSION:   1. Possible right lower lobe pneumonia.  Consider imaging follow-up to ensure a benign progression and to exclude an underlying malignant process.  Bronchoscopy may be helpful in further assessment.  There may be acute infiltrates and/or atelectasis elsewhere bilaterally to a lesser degree.  Infectious multifocal pneumonia is possible.  Pulmonary edema cannot be excluded but is probably less likely.   2. There is hepatosplenomegaly, as seen previously.   3. Probably no cardiac enlargement.   4. No pleural or pericardial effusion.   5. Mild coronary artery calcifications are seen.   6. Overall, the study is limited due to large body habitus and motion artifact.     REHANA CAMPOS JR, MD       Electronically Signed and  Approved By: REHANA CAMPOS JR, MD on 12/03/2021 at 0:27             XR Chest 1 View    Result Date: 12/2/2021  PROCEDURE: XR CHEST 1 VW  COMPARISON: [facility], CR, CHEST PA/AP & LAT 2V, 4/20/2021, 13:11.  INDICATIONS: SOA Triage Protocol  FINDINGS:  The heart is enlarged.  There are diffuse bilateral alveolar airspace opacities consistent with multifocal pneumonia or pulmonary edema.  There is no evidence of pneumothorax.  There is no pleural effusion.  CONCLUSION: Multifocal alveolar infiltrates could be from pulmonary edema or pneumonia       NORMA WYNN MD       Electronically Signed and Approved By: NORMA WYNN MD on 12/02/2021 at 13:59             []  EKG/Telemetry   []  Cardiology/Vascular   []  Pathology  []  Old records  [x]  Other:     Intake/Output Summary (Last 24 hours) at 12/4/2021 1352  Last data filed at 12/3/2021 1900  Gross per 24 hour   Intake 300 ml   Output 600 ml   Net -300 ml         Assessment/Plan   Assessment / Plan     Assessment:  Active Hospital Problems    Diagnosis  POA   • **Acute on chronic respiratory failure with hypoxia (HCC) [J96.21]  Yes   • COPD (chronic obstructive pulmonary disease) (HCC) [J44.9]  Yes     Priority: High   • Long term (current) use of anticoagulants [Z79.01]  Not Applicable   • Abnormal liver enzymes [R74.8]  Yes   • Uncontrolled type 2 diabetes mellitus with hyperglycemia (HCC) [E11.65]  Yes   • 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HCC) [I82.0]  Yes   • Hypokalemia [E87.6]  Yes   • Diabetic polyneuropathy (HCC) [E11.42]  Yes   • GIUSEPPE (obstructive sleep apnea) [G47.33]  Yes   • Acquired hypothyroidism [E03.9]  Yes   • Essential hypertension [I10]  Yes   • Morbid obesity (HCC) [E66.01]  Yes        Plan:  · Adjust a.m. detemir to 50 units.  Add nighttime detemir 25 units.  Increase mealtime Humalog to 20 units three times a day.  Continue high-dose SSI.  · Continue MetaNebs QID. Brovana and Pulmicort twice daily. Continue bronchopulmonary hygiene  protocol  · Switch steroids to prednisone 40 mg daily  · Add p.o. potassium chloride 40 mg daily.  Continue IV Lasix 40 mg every 12 hours.  · Respiratory culture normal doni.  Continue azithromycin and Rocephin, day 3.   · Continue supplemental oxygen maintain SpO2 >90%  · Appreciate further pulmonology recommendations  · Continue Toprol-XL 25 mg twice daily  · Continue Synthroid  · INR therapeutic.  Continue warfarin dosing per pharmacy.  Serial INR  · CBC, BMP in a.m.    Discussed plan with RN, pulmonology    DVT prophylaxis:  Medical and mechanical DVT prophylaxis orders are present.    CODE STATUS:   Code Status (Patient has no pulse and is not breathing): CPR (Attempt to Resuscitate)  Medical Interventions (Patient has pulse or is breathing): Full Support      Electronically signed by Jean Garza DO, 12/04/21, 1:47 PM EST.

## 2021-12-04 NOTE — PROGRESS NOTES
Pulmonary / Critical Care Progress Note      Patient Name: Joan Partida  : 1970  MRN: [id number]  Attending:  Jean Garza DO  Date of admission: 2021    Subjective   Subjective   Follow-up for pneumonia    Over past 24 hours:  No real changes.  On 3 to 4 L  Feels that she is smothering with her secretions has difficulties expectorating and getting the mucus all the way out  Despite airway clearance    Review of Systems  General: Fatigue, weakness, otherwise denied complaints  Cardiovascular:  Denied complaints  Respiratory: Dyspnea, cough, wheeze, otherwise denied complaints  Gastrointestinal: Denied complaints        Objective   Objective     Vitals:   Temp:  [97.4 °F (36.3 °C)-98.2 °F (36.8 °C)] 98.1 °F (36.7 °C)  Heart Rate:  [71-92] 71  Resp:  [18-24] 20  BP: (120-136)/(75-85) 120/84  Flow (L/min):  [4-5] 4    Physical Exam   Vital Signs Reviewed   General: Obese female, Awake and Alert, mild distress on 4 L NC    HEENT:  PERRL, EOMI.  OP, nares clear, no sinus tenderness  Neck:  Supple, no JVD, no thyromegaly  Chest:  good aeration, coarse breath sounds with rhonchi and scattered wheezes, tympanic to percussion bilaterally, mild work of breathing noted  CV: RRR, no MGR, pulses 2+, equal  Abd:  Soft, NT, ND, + BS, no HSM, obese  EXT:  no clubbing, no cyanosis, 1+ BLE edema, no joint tenderness  Neuro:  A&Ox3, CN grossly intact, no focal deficits  Skin: No rashes or lesions noted         Result Review    Result Review:  I have personally reviewed the results from the time of this admission to 2021 18:48 EST and agree with these findings:  [x]  Laboratory  [x]  Microbiology  [x]  Radiology  [x]  EKG/Telemetry   []  Cardiology/Vascular   []  Pathology  []  Old records  []  Other:  Most notable findings include: Infectious work-up so far negative.  Chest CT shows right basilar infiltrate/masslike opacity.  Sodium 130, bicarbonate 33, creatinine 1.3, white blood cell count  15        Assessment/Plan   Assessment / Plan     Active Hospital Problems:  Active Hospital Problems    Diagnosis    • **Acute on chronic respiratory failure with hypoxia (HCC)    • Long term (current) use of anticoagulants    • Abnormal liver enzymes    • Uncontrolled type 2 diabetes mellitus with hyperglycemia (HCC)    • 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HCC)    • Hypokalemia    • Diabetic polyneuropathy (HCC)    • GIUSEPPE (obstructive sleep apnea)    • Acquired hypothyroidism    • COPD (chronic obstructive pulmonary disease) (HCC)    • Essential hypertension    • Morbid obesity (HCC)          Impression:  Acute on chronic hypoxic respiratory failure  Community-acquired pneumonia of right lower lobe from unspecified organism  Abnormal chest CT  Mucous plugging/issues with airway clearance  Volume overload  AMY: baseline Cr 0.9  Hypokalemia  Hyponatremia  Elevated liver enzymes  HFpEF without acute exacerbation  PAH: WHO class II and WHO class III  COPD/Asthma overlap  GIUSEPPE/OHS: compliant with NIPPV at home  HTN  DM  Super super Obesity: BMI 77.9     Plan:  Continue oxygen  Encourage patient be out of bed in chair  Continue NIPPV  Continue antibiotics day 3 of Zithromax and ceftriaxone  Will need 7 total days  Continue diuresis  Continue Solu-Medrol  May need bronchoscopy will reassess tomorrow      DVT prophylaxis:  Medical and mechanical DVT prophylaxis orders are present.    CODE STATUS:   Code Status (Patient has no pulse and is not breathing): CPR (Attempt to Resuscitate)  Medical Interventions (Patient has pulse or is breathing): Full Support      Labs, imaging, microbiology, notes and medications personally reviewed.  Discussed with primary    Electronically signed by Mk Dowling DO, 12/04/21, 6:49 PM EST.

## 2021-12-05 LAB
ANION GAP SERPL CALCULATED.3IONS-SCNC: 9 MMOL/L (ref 5–15)
BACTERIA SPEC RESP CULT: NORMAL
BUN SERPL-MCNC: 42 MG/DL (ref 6–20)
BUN/CREAT SERPL: 47.2 (ref 7–25)
CALCIUM SPEC-SCNC: 10 MG/DL (ref 8.6–10.5)
CHLORIDE SERPL-SCNC: 89 MMOL/L (ref 98–107)
CO2 SERPL-SCNC: 39 MMOL/L (ref 22–29)
CREAT SERPL-MCNC: 0.89 MG/DL (ref 0.57–1)
DEPRECATED RDW RBC AUTO: 56.6 FL (ref 37–54)
ERYTHROCYTE [DISTWIDTH] IN BLOOD BY AUTOMATED COUNT: 15.9 % (ref 12.3–15.4)
GFR SERPL CREATININE-BSD FRML MDRD: 66 ML/MIN/1.73
GLUCOSE BLDC GLUCOMTR-MCNC: 187 MG/DL (ref 70–99)
GLUCOSE BLDC GLUCOMTR-MCNC: 234 MG/DL (ref 70–99)
GLUCOSE BLDC GLUCOMTR-MCNC: 282 MG/DL (ref 70–99)
GLUCOSE BLDC GLUCOMTR-MCNC: 284 MG/DL (ref 70–99)
GLUCOSE BLDC GLUCOMTR-MCNC: 321 MG/DL (ref 70–99)
GLUCOSE BLDC GLUCOMTR-MCNC: 362 MG/DL (ref 70–99)
GLUCOSE SERPL-MCNC: 195 MG/DL (ref 65–99)
GRAM STN SPEC: NORMAL
HCT VFR BLD AUTO: 40.7 % (ref 34–46.6)
HGB BLD-MCNC: 13.5 G/DL (ref 12–15.9)
INR PPP: 3.24 (ref 2–3)
MCH RBC QN AUTO: 32.5 PG (ref 26.6–33)
MCHC RBC AUTO-ENTMCNC: 33.2 G/DL (ref 31.5–35.7)
MCV RBC AUTO: 97.8 FL (ref 79–97)
PLATELET # BLD AUTO: 202 10*3/MM3 (ref 140–450)
PMV BLD AUTO: 8.9 FL (ref 6–12)
POTASSIUM SERPL-SCNC: 3.3 MMOL/L (ref 3.5–5.2)
PROTHROMBIN TIME: 31.2 SECONDS (ref 9.4–12)
QT INTERVAL: 368 MS
RBC # BLD AUTO: 4.16 10*6/MM3 (ref 3.77–5.28)
SODIUM SERPL-SCNC: 137 MMOL/L (ref 136–145)
WBC NRBC COR # BLD: 10 10*3/MM3 (ref 3.4–10.8)

## 2021-12-05 PROCEDURE — 99232 SBSQ HOSP IP/OBS MODERATE 35: CPT | Performed by: INTERNAL MEDICINE

## 2021-12-05 PROCEDURE — 80048 BASIC METABOLIC PNL TOTAL CA: CPT | Performed by: INTERNAL MEDICINE

## 2021-12-05 PROCEDURE — 36415 COLL VENOUS BLD VENIPUNCTURE: CPT | Performed by: INTERNAL MEDICINE

## 2021-12-05 PROCEDURE — 63710000001 PREDNISONE PER 1 MG: Performed by: INTERNAL MEDICINE

## 2021-12-05 PROCEDURE — 25010000002 REVEFENACIN 175 MCG/3ML SOLUTION: Performed by: NURSE PRACTITIONER

## 2021-12-05 PROCEDURE — 94799 UNLISTED PULMONARY SVC/PX: CPT

## 2021-12-05 PROCEDURE — 85027 COMPLETE CBC AUTOMATED: CPT | Performed by: INTERNAL MEDICINE

## 2021-12-05 PROCEDURE — 63710000001 INSULIN DETEMIR PER 5 UNITS: Performed by: INTERNAL MEDICINE

## 2021-12-05 PROCEDURE — 25010000002 FUROSEMIDE PER 20 MG: Performed by: INTERNAL MEDICINE

## 2021-12-05 PROCEDURE — 82962 GLUCOSE BLOOD TEST: CPT

## 2021-12-05 PROCEDURE — 25010000002 CEFTRIAXONE PER 250 MG: Performed by: INTERNAL MEDICINE

## 2021-12-05 PROCEDURE — 85610 PROTHROMBIN TIME: CPT | Performed by: INTERNAL MEDICINE

## 2021-12-05 PROCEDURE — 63710000001 INSULIN LISPRO (HUMAN) PER 5 UNITS: Performed by: INTERNAL MEDICINE

## 2021-12-05 RX ADMIN — SODIUM CHLORIDE, PRESERVATIVE FREE 10 ML: 5 INJECTION INTRAVENOUS at 11:07

## 2021-12-05 RX ADMIN — FUROSEMIDE 40 MG: 10 INJECTION, SOLUTION INTRAVENOUS at 06:05

## 2021-12-05 RX ADMIN — GABAPENTIN 800 MG: 400 CAPSULE ORAL at 17:51

## 2021-12-05 RX ADMIN — REVEFENACIN 175 MCG: 175 SOLUTION RESPIRATORY (INHALATION) at 06:30

## 2021-12-05 RX ADMIN — SERTRALINE HYDROCHLORIDE 100 MG: 100 TABLET ORAL at 10:56

## 2021-12-05 RX ADMIN — INSULIN LISPRO 20 UNITS: 100 INJECTION, SOLUTION INTRAVENOUS; SUBCUTANEOUS at 13:23

## 2021-12-05 RX ADMIN — LEVOTHYROXINE SODIUM 250 MCG: 125 TABLET ORAL at 06:05

## 2021-12-05 RX ADMIN — INSULIN LISPRO 12 UNITS: 100 INJECTION, SOLUTION INTRAVENOUS; SUBCUTANEOUS at 17:51

## 2021-12-05 RX ADMIN — IPRATROPIUM BROMIDE AND ALBUTEROL SULFATE 3 ML: .5; 2.5 SOLUTION RESPIRATORY (INHALATION) at 00:17

## 2021-12-05 RX ADMIN — INSULIN DETEMIR 25 UNITS: 100 INJECTION, SOLUTION SUBCUTANEOUS at 21:33

## 2021-12-05 RX ADMIN — BUDESONIDE 0.5 MG: 0.5 SUSPENSION RESPIRATORY (INHALATION) at 06:30

## 2021-12-05 RX ADMIN — SODIUM CHLORIDE, PRESERVATIVE FREE 10 ML: 5 INJECTION INTRAVENOUS at 21:33

## 2021-12-05 RX ADMIN — ARFORMOTEROL TARTRATE 15 MCG: 15 SOLUTION RESPIRATORY (INHALATION) at 19:07

## 2021-12-05 RX ADMIN — TRAZODONE HYDROCHLORIDE 50 MG: 50 TABLET ORAL at 21:34

## 2021-12-05 RX ADMIN — ACETAMINOPHEN 650 MG: 325 TABLET ORAL at 21:57

## 2021-12-05 RX ADMIN — INSULIN LISPRO 20 UNITS: 100 INJECTION, SOLUTION INTRAVENOUS; SUBCUTANEOUS at 17:51

## 2021-12-05 RX ADMIN — PANTOPRAZOLE SODIUM 40 MG: 40 TABLET, DELAYED RELEASE ORAL at 10:56

## 2021-12-05 RX ADMIN — FUROSEMIDE 40 MG: 10 INJECTION, SOLUTION INTRAVENOUS at 17:51

## 2021-12-05 RX ADMIN — INSULIN LISPRO 16 UNITS: 100 INJECTION, SOLUTION INTRAVENOUS; SUBCUTANEOUS at 13:23

## 2021-12-05 RX ADMIN — NYSTATIN: 100000 POWDER TOPICAL at 21:57

## 2021-12-05 RX ADMIN — INSULIN LISPRO 8 UNITS: 100 INJECTION, SOLUTION INTRAVENOUS; SUBCUTANEOUS at 10:56

## 2021-12-05 RX ADMIN — METOPROLOL SUCCINATE 25 MG: 25 TABLET, EXTENDED RELEASE ORAL at 21:34

## 2021-12-05 RX ADMIN — ARFORMOTEROL TARTRATE 15 MCG: 15 SOLUTION RESPIRATORY (INHALATION) at 06:30

## 2021-12-05 RX ADMIN — CEFTRIAXONE SODIUM 1 G: 1 INJECTION, SOLUTION INTRAVENOUS at 15:41

## 2021-12-05 RX ADMIN — POTASSIUM CHLORIDE 40 MEQ: 750 CAPSULE, EXTENDED RELEASE ORAL at 10:56

## 2021-12-05 RX ADMIN — IPRATROPIUM BROMIDE AND ALBUTEROL SULFATE 3 ML: .5; 2.5 SOLUTION RESPIRATORY (INHALATION) at 19:07

## 2021-12-05 RX ADMIN — GABAPENTIN 800 MG: 400 CAPSULE ORAL at 21:34

## 2021-12-05 RX ADMIN — NYSTATIN: 100000 POWDER TOPICAL at 11:08

## 2021-12-05 RX ADMIN — GUAIFENESIN 600 MG: 600 TABLET ORAL at 21:34

## 2021-12-05 RX ADMIN — INSULIN LISPRO 20 UNITS: 100 INJECTION, SOLUTION INTRAVENOUS; SUBCUTANEOUS at 10:57

## 2021-12-05 RX ADMIN — GABAPENTIN 800 MG: 400 CAPSULE ORAL at 06:05

## 2021-12-05 RX ADMIN — IPRATROPIUM BROMIDE AND ALBUTEROL SULFATE 3 ML: .5; 2.5 SOLUTION RESPIRATORY (INHALATION) at 06:30

## 2021-12-05 RX ADMIN — BUDESONIDE 0.5 MG: 0.5 SUSPENSION RESPIRATORY (INHALATION) at 19:07

## 2021-12-05 RX ADMIN — IPRATROPIUM BROMIDE AND ALBUTEROL SULFATE 3 ML: .5; 2.5 SOLUTION RESPIRATORY (INHALATION) at 13:00

## 2021-12-05 RX ADMIN — INSULIN DETEMIR 50 UNITS: 100 INJECTION, SOLUTION SUBCUTANEOUS at 11:05

## 2021-12-05 RX ADMIN — GUAIFENESIN 600 MG: 600 TABLET ORAL at 10:56

## 2021-12-05 RX ADMIN — PREDNISONE 40 MG: 20 TABLET ORAL at 10:55

## 2021-12-05 RX ADMIN — METOPROLOL SUCCINATE 25 MG: 25 TABLET, EXTENDED RELEASE ORAL at 10:56

## 2021-12-05 RX ADMIN — ACETAMINOPHEN 650 MG: 325 TABLET ORAL at 10:54

## 2021-12-05 RX ADMIN — MONTELUKAST SODIUM 10 MG: 10 TABLET, FILM COATED ORAL at 21:34

## 2021-12-05 NOTE — PLAN OF CARE
Problem: Adult Inpatient Plan of Care  Goal: Plan of Care Review  Outcome: Ongoing, Progressing  Flowsheets  Taken 12/5/2021 1847 by Daija Leal, RN  Outcome Summary: Patient has been resting most of the day. Blood sugars have been more stable. Complained of pain once, medicated per MAR. Will continue to monitor.  Taken 12/5/2021 0502 by Chaparrita Mays, RN  Progress: no change  Plan of Care Reviewed With: patient   Goal Outcome Evaluation:              Outcome Summary: Patient has been resting most of the day. Blood sugars have been more stable. Complained of pain once, medicated per MAR. Will continue to monitor.

## 2021-12-05 NOTE — PLAN OF CARE
Goal Outcome Evaluation:  Plan of Care Reviewed With: patient        Progress: no change  Outcome Summary: pt requested bariatric bed be removed from room and replaced with regular bed, no other changes

## 2021-12-05 NOTE — PROGRESS NOTES
Pharmacy to Dose: Warfarin  Consulting provider: CARMEN  Indication for warfarin: HEPATIC VEIN THROMBOSIS  Goal INR range: 2-3  Home warfarin dose: 8 MG DAILY  Actions or monitoring: INR & S/S OF BLEEDING    Any Vitamin K given?: NO  Any major drug interactions: AZITHROMYCIN  Is patient on bridging therapy with another anticoagulant?: NO    Date HGB PLATELETS INR Warfarin Dose Given   12-2 14.4 222 1.81 8 mg   12-3 14.3 227 1.71 12 mg   12-4 14.4 226 2.30 8MG   12-5 13.5 202 3.24 HOLD                          NOTE SIGNIFICANT INCREASE IN INR OVER THE LAST FEW DAYS.  WARFARIN HAS BEEN PLACED ON HOLD.  INR ORDERED FOR TOMORROW.

## 2021-12-05 NOTE — PROGRESS NOTES
[facility]   Hospitalist Progress Note  Date: 2021  Patient Name: Joan Partida  : 1970  MRN: [id number]  Date of admission: 2021      Subjective   Subjective     Chief Complaint: Follow-up for shortness of breath    Summary: 51 y.o. female super morbid obesity, chronic respiratory failure (4L), GIUSEPPE, COPD, diastolic congestive heart failure, right-sided heart failure presented with shortness of breath, increased cough, weight gain. On baseline 4 L NC. Chest x-ray with pneumonia versus edema.  CT shows right base infiltrate.  On azithromycin and Rocephin.  Pulm on board.    Interval Followup: No acute issues overnight.  No fevers.  Reports persistent dyspnea, no improvement since admission.  Chest feels tight with wheezing.  Having persistent dry cough and not able to bring up any sputum    Review of Systems  Constitutional:  No Fever, + Fatigue  Cardiovascular:  No Chest Pain, +Edema, No Palpitations  Respiratory:  +Cough, +Dyspnea, +Wheezing, No Hemoptysis  Gastrointestinal:  Denied complaints  Genitourinary:  Denied complaints  Musculoskeletal: Denied complaints  Neuro:  Denied complaints  Heme/Lymph:  No Bruising, No Bleeding  Endocrine: + Hyperglycemia  Psych: Denied complaints    Objective   Objective     Vitals:   Temp:  [97.3 °F (36.3 °C)-98.6 °F (37 °C)] 97.7 °F (36.5 °C)  Heart Rate:  [] 87  Resp:  [20-22] 22  BP: (109-173)/() 117/61  Flow (L/min):  [4-5] 5  Physical Exam    Constitutional:  female, morbid obese, lying in bed, conversant              eyes: Pupils equal and reactive, no conjunctival injection              HENT: NCAT, nares patent, mucous membranes moist              Neck: Supple, trachea midline              Respiratory: Poor aeration bilaterally with rhonchi and expiratory wheezing   Cardiovascular: RRR, no murmurs, bilateral lower extremity edema              Gastrointestinal: Positive bowel sounds, soft, nontender, nondistended               Musculoskeletal: No gross deformities, no clubbing or cyanosis to extremities              Neurologic: Oriented x 3, Cranial Nerves grossly intact, speech clear              Skin: Warm and dry, no rashes or open wounds     Result Review    Result Review:  I have personally reviewed the results from the time of this admission to 12/5/2021 12:46 EST and agree with these findings:  [x]  Laboratory   WBC   Date Value Ref Range Status   12/05/2021 10.00 3.40 - 10.80 10*3/mm3 Final     RBC   Date Value Ref Range Status   12/05/2021 4.16 3.77 - 5.28 10*6/mm3 Final     Hemoglobin   Date Value Ref Range Status   12/05/2021 13.5 12.0 - 15.9 g/dL Final     Hematocrit   Date Value Ref Range Status   12/05/2021 40.7 34.0 - 46.6 % Final     MCV   Date Value Ref Range Status   12/05/2021 97.8 (H) 79.0 - 97.0 fL Final     MCH   Date Value Ref Range Status   12/05/2021 32.5 26.6 - 33.0 pg Final     MCHC   Date Value Ref Range Status   12/05/2021 33.2 31.5 - 35.7 g/dL Final     RDW   Date Value Ref Range Status   12/05/2021 15.9 (H) 12.3 - 15.4 % Final     RDW-SD   Date Value Ref Range Status   12/05/2021 56.6 (H) 37.0 - 54.0 fl Final     MPV   Date Value Ref Range Status   12/05/2021 8.9 6.0 - 12.0 fL Final     Platelets   Date Value Ref Range Status   12/05/2021 202 140 - 450 10*3/mm3 Final     Neutrophil %   Date Value Ref Range Status   12/03/2021 91.4 (H) 42.7 - 76.0 % Final     Lymphocyte %   Date Value Ref Range Status   12/03/2021 3.1 (L) 19.6 - 45.3 % Final     Monocyte %   Date Value Ref Range Status   12/03/2021 4.6 (L) 5.0 - 12.0 % Final     Eosinophil %   Date Value Ref Range Status   12/03/2021 0.0 (L) 0.3 - 6.2 % Final     Basophil %   Date Value Ref Range Status   12/03/2021 0.1 0.0 - 1.5 % Final     Immature Grans %   Date Value Ref Range Status   12/03/2021 0.8 (H) 0.0 - 0.5 % Final     Neutrophils, Absolute   Date Value Ref Range Status   12/03/2021 13.99 (H) 1.70 - 7.00 10*3/mm3 Final     Lymphocytes, Absolute    Date Value Ref Range Status   12/03/2021 0.47 (L) 0.70 - 3.10 10*3/mm3 Final     Monocytes, Absolute   Date Value Ref Range Status   12/03/2021 0.70 0.10 - 0.90 10*3/mm3 Final     Eosinophils, Absolute   Date Value Ref Range Status   12/03/2021 0.00 0.00 - 0.40 10*3/mm3 Final     Basophils, Absolute   Date Value Ref Range Status   12/03/2021 0.02 0.00 - 0.20 10*3/mm3 Final     Immature Grans, Absolute   Date Value Ref Range Status   12/03/2021 0.12 (H) 0.00 - 0.05 10*3/mm3 Final     nRBC   Date Value Ref Range Status   12/03/2021 0.0 0.0 - 0.2 /100 WBC Final       []  Microbiology    []  Radiology  []  EKG/Telemetry   []  Cardiology/Vascular   []  Pathology  []  Old records  [x]  Other:     Intake/Output Summary (Last 24 hours) at 12/5/2021 1248  Last data filed at 12/5/2021 1109  Gross per 24 hour   Intake 840 ml   Output --   Net 840 ml         Assessment/Plan   Assessment / Plan     Assessment:  Active Hospital Problems    Diagnosis  POA   • **Acute on chronic respiratory failure with hypoxia (HCC) [J96.21]  Yes   • COPD (chronic obstructive pulmonary disease) (HCC) [J44.9]  Yes     Priority: High   • Long term (current) use of anticoagulants [Z79.01]  Not Applicable   • Abnormal liver enzymes [R74.8]  Yes   • Uncontrolled type 2 diabetes mellitus with hyperglycemia (HCC) [E11.65]  Yes   • 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  Continue basal and mealtime insulin as ordered.  Continue high-dose SSI.  · Continue MetaNebs QID. Brovana and Pulmicort twice daily. Continue bronchopulmonary hygiene protocol. Continue  prednisone 40 mg daily. Discontinue azithromycin after today.  Continue Rocephin, day 4.Continue supplemental oxygen  maintain SpO2 >90%.  Continue home NIPPV at night and with naps.  Pulmonology following, appreciate further recommendations.  · Continue potassium chloride 40 mg daily.  Continue IV Lasix 40 mg every 12 hours.  · Continue Toprol-XL 25 mg twice daily  · Continue Synthroid  · Continue warfarin.  Pharmacy to dose.  Daily INR  · CBC, BMP in a.m.    Discussed plan with RN, pulmonology    DVT prophylaxis:  Medical and mechanical DVT prophylaxis orders are present.    CODE STATUS:   Code Status (Patient has no pulse and is not breathing): CPR (Attempt to Resuscitate)  Medical Interventions (Patient has pulse or is breathing): Full Support    Electronically signed by Jean Garza DO, 12/05/21, 12:46 PM EST.

## 2021-12-06 ENCOUNTER — ANESTHESIA (OUTPATIENT)
Dept: PERIOP | Facility: HOSPITAL | Age: 51
End: 2021-12-06

## 2021-12-06 ENCOUNTER — ANESTHESIA EVENT (OUTPATIENT)
Dept: PERIOP | Facility: HOSPITAL | Age: 51
End: 2021-12-06

## 2021-12-06 PROBLEM — J18.9 PNEUMONIA OF RIGHT LOWER LOBE DUE TO INFECTIOUS ORGANISM: Status: ACTIVE | Noted: 2021-12-02

## 2021-12-06 LAB
ACB CMPLX DNA BAL NAA+NON-PRB-NCNCRNG: NOT DETECTED
ANION GAP SERPL CALCULATED.3IONS-SCNC: 10.3 MMOL/L (ref 5–15)
BACTERIA SPEC AEROBE CULT: ABNORMAL
BACTERIA SPEC AEROBE CULT: ABNORMAL
BLACTX-M ISLT/SPM QL: ABNORMAL
BLAIMP ISLT/SPM QL: ABNORMAL
BLAKPC ISLT/SPM QL: ABNORMAL
BLAOXA-48-LIKE ISLT/SPM QL: ABNORMAL
BLAVIM ISLT/SPM QL: ABNORMAL
BUN SERPL-MCNC: 26 MG/DL (ref 6–20)
BUN/CREAT SERPL: 37.1 (ref 7–25)
C PNEUM DNA NPH QL NAA+NON-PROBE: NOT DETECTED
CALCIUM SPEC-SCNC: 9.9 MG/DL (ref 8.6–10.5)
CHLORIDE SERPL-SCNC: 92 MMOL/L (ref 98–107)
CO2 SERPL-SCNC: 35.7 MMOL/L (ref 22–29)
CREAT SERPL-MCNC: 0.7 MG/DL (ref 0.57–1)
DEPRECATED RDW RBC AUTO: 53.1 FL (ref 37–54)
E CLOAC COMP DNA BAL NAA+NON-PRB-NCNCRNG: NOT DETECTED
E COLI DNA BAL NAA+NON-PRB-NCNCRNG: NOT DETECTED
ERYTHROCYTE [DISTWIDTH] IN BLOOD BY AUTOMATED COUNT: 15.4 % (ref 12.3–15.4)
FLUAV SUBTYP SPEC NAA+PROBE: NOT DETECTED
FLUBV RNA ISLT QL NAA+PROBE: NOT DETECTED
GFR SERPL CREATININE-BSD FRML MDRD: 88 ML/MIN/1.73
GLUCOSE BLDC GLUCOMTR-MCNC: 171 MG/DL (ref 70–99)
GLUCOSE BLDC GLUCOMTR-MCNC: 201 MG/DL (ref 70–99)
GLUCOSE BLDC GLUCOMTR-MCNC: 209 MG/DL (ref 70–99)
GLUCOSE BLDC GLUCOMTR-MCNC: 220 MG/DL (ref 70–99)
GLUCOSE BLDC GLUCOMTR-MCNC: 273 MG/DL (ref 70–99)
GLUCOSE SERPL-MCNC: 198 MG/DL (ref 65–99)
GP B STREP DNA BAL NAA+NON-PRB-NCNCRNG: NOT DETECTED
GRAM STN SPEC: ABNORMAL
GRAM STN SPEC: ABNORMAL
HADV DNA SPEC NAA+PROBE: NOT DETECTED
HAEM INFLU DNA BAL NAA+NON-PRB-NCNCRNG: DETECTED
HCOV RNA LOWER RESP QL NAA+NON-PROBE: NOT DETECTED
HCT VFR BLD AUTO: 40.3 % (ref 34–46.6)
HGB BLD-MCNC: 13.8 G/DL (ref 12–15.9)
HMPV RNA NPH QL NAA+NON-PROBE: DETECTED
HPIV RNA LOWER RESP QL NAA+NON-PROBE: NOT DETECTED
INR PPP: 2.7 (ref 2–3)
ISOLATED FROM: ABNORMAL
ISOLATED FROM: ABNORMAL
K AEROGENES DNA BAL NAA+NON-PRB-NCNCRNG: NOT DETECTED
K OXYTOCA DNA BAL NAA+NON-PRB-NCNCRNG: NOT DETECTED
K PNEU GRP DNA BAL NAA+NON-PRB-NCNCRNG: NOT DETECTED
L PNEUMO DNA LOWER RESP QL NAA+NON-PROBE: NOT DETECTED
LYMPHOCYTES NFR FLD MANUAL: 8 %
M CATARRHALIS DNA BAL NAA+NON-PRB-NCNCRNG: NOT DETECTED
M PNEUMO IGG SER IA-ACNC: NOT DETECTED
MACROPHAGE FLUID: 5 %
MCH RBC QN AUTO: 32.4 PG (ref 26.6–33)
MCHC RBC AUTO-ENTMCNC: 34.2 G/DL (ref 31.5–35.7)
MCV RBC AUTO: 94.6 FL (ref 79–97)
MECA+MECC ISLT/SPM QL: ABNORMAL
NDM GENE: ABNORMAL
NEUTROPHILS NFR FLD MANUAL: 87 %
P AERUGINOSA DNA BAL NAA+NON-PRB-NCNCRNG: NOT DETECTED
PLATELET # BLD AUTO: 188 10*3/MM3 (ref 140–450)
PMV BLD AUTO: 9 FL (ref 6–12)
POTASSIUM SERPL-SCNC: 3.3 MMOL/L (ref 3.5–5.2)
PROTEUS SP DNA BAL NAA+NON-PRB-NCNCRNG: NOT DETECTED
PROTHROMBIN TIME: 26.1 SECONDS (ref 9.4–12)
RBC # BLD AUTO: 4.26 10*6/MM3 (ref 3.77–5.28)
RHINOVIRUS RNA SPEC NAA+PROBE: NOT DETECTED
RSV RNA NPH QL NAA+NON-PROBE: NOT DETECTED
S AUREUS DNA BAL NAA+NON-PRB-NCNCRNG: NOT DETECTED
S MARCESCENS DNA BAL NAA+NON-PRB-NCNCRNG: NOT DETECTED
S PNEUM DNA BAL NAA+NON-PRB-NCNCRNG: NOT DETECTED
S PYO DNA BAL NAA+NON-PRB-NCNCRNG: NOT DETECTED
SODIUM SERPL-SCNC: 138 MMOL/L (ref 136–145)
VISUAL PRESENCE OF BLOOD: SLIGHT
WBC NRBC COR # BLD: 7.4 10*3/MM3 (ref 3.4–10.8)

## 2021-12-06 PROCEDURE — 63710000001 INSULIN DETEMIR PER 5 UNITS: Performed by: INTERNAL MEDICINE

## 2021-12-06 PROCEDURE — 80048 BASIC METABOLIC PNL TOTAL CA: CPT | Performed by: INTERNAL MEDICINE

## 2021-12-06 PROCEDURE — 36415 COLL VENOUS BLD VENIPUNCTURE: CPT | Performed by: INTERNAL MEDICINE

## 2021-12-06 PROCEDURE — 88108 CYTOPATH CONCENTRATE TECH: CPT | Performed by: INTERNAL MEDICINE

## 2021-12-06 PROCEDURE — 99233 SBSQ HOSP IP/OBS HIGH 50: CPT | Performed by: INTERNAL MEDICINE

## 2021-12-06 PROCEDURE — 25010000002 PROPOFOL 10 MG/ML EMULSION: Performed by: NURSE ANESTHETIST, CERTIFIED REGISTERED

## 2021-12-06 PROCEDURE — 25010000002 FUROSEMIDE PER 20 MG: Performed by: INTERNAL MEDICINE

## 2021-12-06 PROCEDURE — 0B9F8ZX DRAINAGE OF RIGHT LOWER LUNG LOBE, VIA NATURAL OR ARTIFICIAL OPENING ENDOSCOPIC, DIAGNOSTIC: ICD-10-PCS | Performed by: INTERNAL MEDICINE

## 2021-12-06 PROCEDURE — 31645 BRNCHSC W/THER ASPIR 1ST: CPT | Performed by: INTERNAL MEDICINE

## 2021-12-06 PROCEDURE — 87205 SMEAR GRAM STAIN: CPT | Performed by: INTERNAL MEDICINE

## 2021-12-06 PROCEDURE — 88312 SPECIAL STAINS GROUP 1: CPT | Performed by: INTERNAL MEDICINE

## 2021-12-06 PROCEDURE — 25010000002 CEFTRIAXONE PER 250 MG: Performed by: INTERNAL MEDICINE

## 2021-12-06 PROCEDURE — 85027 COMPLETE CBC AUTOMATED: CPT | Performed by: INTERNAL MEDICINE

## 2021-12-06 PROCEDURE — 87281 PNEUMOCYSTIS CARINII AG IF: CPT | Performed by: INTERNAL MEDICINE

## 2021-12-06 PROCEDURE — 0B938ZZ DRAINAGE OF RIGHT MAIN BRONCHUS, VIA NATURAL OR ARTIFICIAL OPENING ENDOSCOPIC: ICD-10-PCS | Performed by: INTERNAL MEDICINE

## 2021-12-06 PROCEDURE — 0B978ZZ DRAINAGE OF LEFT MAIN BRONCHUS, VIA NATURAL OR ARTIFICIAL OPENING ENDOSCOPIC: ICD-10-PCS | Performed by: INTERNAL MEDICINE

## 2021-12-06 PROCEDURE — 94760 N-INVAS EAR/PLS OXIMETRY 1: CPT

## 2021-12-06 PROCEDURE — 94799 UNLISTED PULMONARY SVC/PX: CPT

## 2021-12-06 PROCEDURE — 87071 CULTURE AEROBIC QUANT OTHER: CPT | Performed by: INTERNAL MEDICINE

## 2021-12-06 PROCEDURE — 82962 GLUCOSE BLOOD TEST: CPT

## 2021-12-06 PROCEDURE — 89051 BODY FLUID CELL COUNT: CPT | Performed by: INTERNAL MEDICINE

## 2021-12-06 PROCEDURE — 85610 PROTHROMBIN TIME: CPT | Performed by: INTERNAL MEDICINE

## 2021-12-06 PROCEDURE — 63710000001 INSULIN LISPRO (HUMAN) PER 5 UNITS: Performed by: INTERNAL MEDICINE

## 2021-12-06 PROCEDURE — 87116 MYCOBACTERIA CULTURE: CPT | Performed by: INTERNAL MEDICINE

## 2021-12-06 PROCEDURE — 31624 DX BRONCHOSCOPE/LAVAGE: CPT | Performed by: INTERNAL MEDICINE

## 2021-12-06 PROCEDURE — 87206 SMEAR FLUORESCENT/ACID STAI: CPT | Performed by: INTERNAL MEDICINE

## 2021-12-06 PROCEDURE — 25010000002 REVEFENACIN 175 MCG/3ML SOLUTION: Performed by: NURSE PRACTITIONER

## 2021-12-06 PROCEDURE — 87633 RESP VIRUS 12-25 TARGETS: CPT | Performed by: INTERNAL MEDICINE

## 2021-12-06 PROCEDURE — 87102 FUNGUS ISOLATION CULTURE: CPT | Performed by: INTERNAL MEDICINE

## 2021-12-06 PROCEDURE — 87107 FUNGI IDENTIFICATION MOLD: CPT | Performed by: INTERNAL MEDICINE

## 2021-12-06 RX ORDER — ONDANSETRON 2 MG/ML
4 INJECTION INTRAMUSCULAR; INTRAVENOUS ONCE AS NEEDED
Status: DISCONTINUED | OUTPATIENT
Start: 2021-12-06 | End: 2021-12-06 | Stop reason: HOSPADM

## 2021-12-06 RX ORDER — SODIUM CHLORIDE, SODIUM LACTATE, POTASSIUM CHLORIDE, CALCIUM CHLORIDE 600; 310; 30; 20 MG/100ML; MG/100ML; MG/100ML; MG/100ML
9 INJECTION, SOLUTION INTRAVENOUS CONTINUOUS PRN
Status: DISCONTINUED | OUTPATIENT
Start: 2021-12-06 | End: 2021-12-06 | Stop reason: HOSPADM

## 2021-12-06 RX ORDER — PROPOFOL 10 MG/ML
VIAL (ML) INTRAVENOUS AS NEEDED
Status: DISCONTINUED | OUTPATIENT
Start: 2021-12-06 | End: 2021-12-06 | Stop reason: SURG

## 2021-12-06 RX ORDER — WARFARIN SODIUM 6 MG/1
6 TABLET ORAL
Status: COMPLETED | OUTPATIENT
Start: 2021-12-06 | End: 2021-12-06

## 2021-12-06 RX ORDER — MAGNESIUM HYDROXIDE 1200 MG/15ML
LIQUID ORAL AS NEEDED
Status: DISCONTINUED | OUTPATIENT
Start: 2021-12-06 | End: 2021-12-06 | Stop reason: HOSPADM

## 2021-12-06 RX ORDER — OXYCODONE HYDROCHLORIDE 5 MG/1
5 TABLET ORAL
Status: DISCONTINUED | OUTPATIENT
Start: 2021-12-06 | End: 2021-12-06 | Stop reason: HOSPADM

## 2021-12-06 RX ORDER — PROMETHAZINE HYDROCHLORIDE 25 MG/1
25 SUPPOSITORY RECTAL ONCE AS NEEDED
Status: DISCONTINUED | OUTPATIENT
Start: 2021-12-06 | End: 2021-12-06 | Stop reason: HOSPADM

## 2021-12-06 RX ORDER — ALBUTEROL SULFATE 2.5 MG/3ML
SOLUTION RESPIRATORY (INHALATION) AS NEEDED
Status: DISCONTINUED | OUTPATIENT
Start: 2021-12-06 | End: 2021-12-06 | Stop reason: SURG

## 2021-12-06 RX ORDER — PROMETHAZINE HYDROCHLORIDE 12.5 MG/1
25 TABLET ORAL ONCE AS NEEDED
Status: DISCONTINUED | OUTPATIENT
Start: 2021-12-06 | End: 2021-12-06 | Stop reason: HOSPADM

## 2021-12-06 RX ORDER — IBUPROFEN 200 MG
1 CAPSULE ORAL
Status: DISCONTINUED | OUTPATIENT
Start: 2021-12-06 | End: 2021-12-07 | Stop reason: HOSPADM

## 2021-12-06 RX ORDER — SODIUM CHLORIDE, SODIUM LACTATE, POTASSIUM CHLORIDE, CALCIUM CHLORIDE 600; 310; 30; 20 MG/100ML; MG/100ML; MG/100ML; MG/100ML
30 INJECTION, SOLUTION INTRAVENOUS CONTINUOUS
Status: DISCONTINUED | OUTPATIENT
Start: 2021-12-06 | End: 2021-12-07 | Stop reason: HOSPADM

## 2021-12-06 RX ORDER — ROCURONIUM BROMIDE 10 MG/ML
INJECTION, SOLUTION INTRAVENOUS AS NEEDED
Status: DISCONTINUED | OUTPATIENT
Start: 2021-12-06 | End: 2021-12-06 | Stop reason: SURG

## 2021-12-06 RX ORDER — ACETAMINOPHEN 500 MG
1000 TABLET ORAL ONCE
Status: DISCONTINUED | OUTPATIENT
Start: 2021-12-06 | End: 2021-12-06 | Stop reason: HOSPADM

## 2021-12-06 RX ORDER — LIDOCAINE HYDROCHLORIDE 20 MG/ML
INJECTION, SOLUTION INFILTRATION; PERINEURAL AS NEEDED
Status: DISCONTINUED | OUTPATIENT
Start: 2021-12-06 | End: 2021-12-06 | Stop reason: SURG

## 2021-12-06 RX ORDER — SUCCINYLCHOLINE/SOD CL,ISO/PF 100 MG/5ML
SYRINGE (ML) INTRAVENOUS AS NEEDED
Status: DISCONTINUED | OUTPATIENT
Start: 2021-12-06 | End: 2021-12-06 | Stop reason: SURG

## 2021-12-06 RX ORDER — MEPERIDINE HYDROCHLORIDE 25 MG/ML
12.5 INJECTION INTRAMUSCULAR; INTRAVENOUS; SUBCUTANEOUS
Status: DISCONTINUED | OUTPATIENT
Start: 2021-12-06 | End: 2021-12-06 | Stop reason: HOSPADM

## 2021-12-06 RX ADMIN — INSULIN LISPRO 4 UNITS: 100 INJECTION, SOLUTION INTRAVENOUS; SUBCUTANEOUS at 09:01

## 2021-12-06 RX ADMIN — BUDESONIDE 0.5 MG: 0.5 SUSPENSION RESPIRATORY (INHALATION) at 19:35

## 2021-12-06 RX ADMIN — SODIUM CHLORIDE, POTASSIUM CHLORIDE, SODIUM LACTATE AND CALCIUM CHLORIDE: 600; 310; 30; 20 INJECTION, SOLUTION INTRAVENOUS at 15:09

## 2021-12-06 RX ADMIN — PROPOFOL 300 MG: 10 INJECTION, EMULSION INTRAVENOUS at 15:09

## 2021-12-06 RX ADMIN — IPRATROPIUM BROMIDE AND ALBUTEROL SULFATE 3 ML: .5; 2.5 SOLUTION RESPIRATORY (INHALATION) at 19:35

## 2021-12-06 RX ADMIN — IPRATROPIUM BROMIDE AND ALBUTEROL SULFATE 3 ML: .5; 2.5 SOLUTION RESPIRATORY (INHALATION) at 00:36

## 2021-12-06 RX ADMIN — SODIUM CHLORIDE, PRESERVATIVE FREE 10 ML: 5 INJECTION INTRAVENOUS at 21:36

## 2021-12-06 RX ADMIN — TRAZODONE HYDROCHLORIDE 50 MG: 50 TABLET ORAL at 21:36

## 2021-12-06 RX ADMIN — WARFARIN SODIUM 6 MG: 6 TABLET ORAL at 17:30

## 2021-12-06 RX ADMIN — IPRATROPIUM BROMIDE AND ALBUTEROL SULFATE 3 ML: .5; 2.5 SOLUTION RESPIRATORY (INHALATION) at 12:17

## 2021-12-06 RX ADMIN — LEVOTHYROXINE SODIUM 250 MCG: 125 TABLET ORAL at 05:31

## 2021-12-06 RX ADMIN — SODIUM CHLORIDE, PRESERVATIVE FREE 10 ML: 5 INJECTION INTRAVENOUS at 09:05

## 2021-12-06 RX ADMIN — INSULIN LISPRO 8 UNITS: 100 INJECTION, SOLUTION INTRAVENOUS; SUBCUTANEOUS at 19:19

## 2021-12-06 RX ADMIN — GUAIFENESIN 600 MG: 600 TABLET ORAL at 09:01

## 2021-12-06 RX ADMIN — ARFORMOTEROL TARTRATE 15 MCG: 15 SOLUTION RESPIRATORY (INHALATION) at 07:05

## 2021-12-06 RX ADMIN — INSULIN LISPRO 8 UNITS: 100 INJECTION, SOLUTION INTRAVENOUS; SUBCUTANEOUS at 12:11

## 2021-12-06 RX ADMIN — ROCURONIUM BROMIDE 5 MG: 10 INJECTION INTRAVENOUS at 15:09

## 2021-12-06 RX ADMIN — METOPROLOL SUCCINATE 25 MG: 25 TABLET, EXTENDED RELEASE ORAL at 09:01

## 2021-12-06 RX ADMIN — GUAIFENESIN 600 MG: 600 TABLET ORAL at 21:36

## 2021-12-06 RX ADMIN — ROCURONIUM BROMIDE 25 MG: 10 INJECTION INTRAVENOUS at 15:16

## 2021-12-06 RX ADMIN — SUGAMMADEX 200 MG: 100 INJECTION, SOLUTION INTRAVENOUS at 15:25

## 2021-12-06 RX ADMIN — IPRATROPIUM BROMIDE AND ALBUTEROL SULFATE 3 ML: .5; 2.5 SOLUTION RESPIRATORY (INHALATION) at 07:05

## 2021-12-06 RX ADMIN — SERTRALINE HYDROCHLORIDE 100 MG: 100 TABLET ORAL at 09:01

## 2021-12-06 RX ADMIN — INSULIN DETEMIR 25 UNITS: 100 INJECTION, SOLUTION SUBCUTANEOUS at 21:37

## 2021-12-06 RX ADMIN — GABAPENTIN 800 MG: 400 CAPSULE ORAL at 17:03

## 2021-12-06 RX ADMIN — BUDESONIDE 0.5 MG: 0.5 SUSPENSION RESPIRATORY (INHALATION) at 07:05

## 2021-12-06 RX ADMIN — SENNOSIDES AND DOCUSATE SODIUM 2 TABLET: 50; 8.6 TABLET ORAL at 21:36

## 2021-12-06 RX ADMIN — FUROSEMIDE 40 MG: 10 INJECTION, SOLUTION INTRAVENOUS at 17:30

## 2021-12-06 RX ADMIN — REVEFENACIN 175 MCG: 175 SOLUTION RESPIRATORY (INHALATION) at 07:05

## 2021-12-06 RX ADMIN — NYSTATIN: 100000 POWDER TOPICAL at 21:37

## 2021-12-06 RX ADMIN — GABAPENTIN 800 MG: 400 CAPSULE ORAL at 21:36

## 2021-12-06 RX ADMIN — METOPROLOL SUCCINATE 25 MG: 25 TABLET, EXTENDED RELEASE ORAL at 21:36

## 2021-12-06 RX ADMIN — INSULIN DETEMIR 50 UNITS: 100 INJECTION, SOLUTION SUBCUTANEOUS at 09:02

## 2021-12-06 RX ADMIN — ARFORMOTEROL TARTRATE 15 MCG: 15 SOLUTION RESPIRATORY (INHALATION) at 19:35

## 2021-12-06 RX ADMIN — CEFTRIAXONE SODIUM 1 G: 1 INJECTION, SOLUTION INTRAVENOUS at 17:30

## 2021-12-06 RX ADMIN — ALBUTEROL SULFATE 2.5 MG: 2.5 SOLUTION RESPIRATORY (INHALATION) at 15:26

## 2021-12-06 RX ADMIN — Medication 180 MG: at 15:09

## 2021-12-06 RX ADMIN — MONTELUKAST SODIUM 10 MG: 10 TABLET, FILM COATED ORAL at 21:36

## 2021-12-06 RX ADMIN — NYSTATIN: 100000 POWDER TOPICAL at 09:04

## 2021-12-06 RX ADMIN — LIDOCAINE HYDROCHLORIDE 50 MG: 20 INJECTION, SOLUTION INFILTRATION; PERINEURAL at 15:09

## 2021-12-06 NOTE — PLAN OF CARE
Goal Outcome Evaluation:     Patient slept well after she transferred back to bed.  She wears a bipap at night.  She is scheduled for a bronchoscopy today.  Raul is aware and has been NPO after midnight.

## 2021-12-06 NOTE — OP NOTE
Bronchoscopy Procedure Note    Procedure:  Bronchoscopy with bronchoalveolar lavage right lower lobe  Bronchoscopy with bronchial washings tracheobronchial tree  Mucous plugging  Airway inspection    Pre-Operative Diagnosis: Mucous plugging, respiratory failure, bronchitis  Post-Operative Diagnosis: Mucous plugging, respiratory failure, bronchitis    Indication:  Mucous plugging, recurrent bronchitis.    Anesthesia: MAC anesthesia.  Patient was intubated by anesthesia service for the procedure.  Procedure Details: Patient was consented for the procedure with all risk and benefit of the procedure explained in detail. Patient was given the opportunity to ask questions and all concerns were answered.    The bronchoscope was inserted into the main airway via the ET tube. An anatomical survey was done of the main airways and the subsegmental bronchus. The findings are reported below.  ET tube was ending in yann, pulled out 2 cm.  Significant mucus plugging was seen in right lower lobe, right upper lobe and left lower lobe.  It was suctioned out in several attempts.  A bronchoalveolar lavage  was performed using 6 cc aliquots of normal saline x2 instilled into the airways then aspirated back from right lower lobe of lung with 30 cc serosanguineous fluid in return.  Bronchial washing was obtained from entire tracheobronchial tree.  Minimal oozing after the procedure.      Findings:  Friable mucosa, easy bleeding with suction  Scattered purulent secretions  Mucous plugging right lower lobe, right upper lobe and left lower lobe    Estimated Blood Loss: Insignificant    Specimens:  BAL right lower lobe  Bronchial washings tracheobronchial tree    Complications: None; patient tolerated the procedure well.    Disposition: To floor, once stable in recovery.    Patient tolerated the procedure well.      Electronically signed by David Britton MD, 12/6/2021, 15:35 EST.

## 2021-12-06 NOTE — NURSING NOTE
Text Comfort Robles.  Okay to give the levothyroxine with a sip of water, but hold the other medications due at 0600.

## 2021-12-06 NOTE — ANESTHESIA PREPROCEDURE EVALUATION
Anesthesia Evaluation     Patient summary reviewed and Nursing notes reviewed   no history of anesthetic complications:  NPO Solid Status: > 8 hours  NPO Liquid Status: > 2 hours           Airway   Mallampati: IV  TM distance: >3 FB  Neck ROM: full  Difficult intubation highly probable  Dental      Pulmonary - normal exam    breath sounds clear to auscultation  (+) pneumonia , COPD, sleep apnea,   Cardiovascular - normal exam  Exercise tolerance: good (4-7 METS)    Rhythm: regular    (+) hypertension, CHF , hyperlipidemia,       Neuro/Psych  (+) numbness, psychiatric history,     GI/Hepatic/Renal/Endo    (+) morbid obesity, GERD,  liver disease, diabetes mellitus type 2,     Musculoskeletal     (+) neck pain,   Abdominal    Substance History - negative use     OB/GYN negative ob/gyn ROS         Other   arthritis,           Results for AYLIN RAMOS (MRN [id number]) as of 12/6/2021 14:08   Ref. Range 12/6/2021 05:30   Glucose Latest Ref Range: 65 - 99 mg/dL 198 (H)   Sodium Latest Ref Range: 136 - 145 mmol/L 138   Potassium Latest Ref Range: 3.5 - 5.2 mmol/L 3.3 (L)   CO2 Latest Ref Range: 22.0 - 29.0 mmol/L 35.7 (H)   Chloride Latest Ref Range: 98 - 107 mmol/L 92 (L)   Anion Gap Latest Ref Range: 5.0 - 15.0 mmol/L 10.3   Creatinine Latest Ref Range: 0.57 - 1.00 mg/dL 0.70   BUN Latest Ref Range: 6 - 20 mg/dL 26 (H)   BUN/Creatinine Ratio Latest Ref Range: 7.0 - 25.0  37.1 (H)   Calcium Latest Ref Range: 8.6 - 10.5 mg/dL 9.9   eGFR Non African Am Latest Ref Range: >60 mL/min/1.73 88     Results for AYLIN RAMOS (MRN [id number]) as of 12/6/2021 14:08   Ref. Range 12/2/2021 13:06   Troponin T Latest Ref Range: 0.000 - 0.030 ng/mL <0.010   proBNP Latest Ref Range: 0.0 - 900.0 pg/mL 107.1     Results for AYLIN RAMOS (MRN [id number]) as of 12/6/2021 14:08   Ref. Range 1/19/2021 09:30   Iron Latest Ref Range: 60 - 170 ug/dL 59 (L)   Iron Saturation Latest Ref Range: 20 - 55 % 12 (L)   Transferrin Latest Ref  Range: 250.00 - 380.00 mg/dL 341.00   TIBC Latest Ref Range: 245 - 450 ug/dL 488 (H)     Results for AYLIN RAMOS (MRN [id number]) as of 12/6/2021 14:08   Ref. Range 12/6/2021 05:30   Protime Latest Ref Range: 9.4 - 12.0 Seconds 26.1 (H)   INR Latest Ref Range: 2.00 - 3.00  2.70     Results for AYLIN RAMOS (MRN [id number]) as of 12/6/2021 14:08   Ref. Range 12/6/2021 05:30   Hemoglobin Latest Ref Range: 12.0 - 15.9 g/dL 13.8   Hematocrit Latest Ref Range: 34.0 - 46.6 % 40.3     - OTHERWISE NORMAL ECG -  Sinus rhythm  Low voltage, precordial leads    IMPRESSION:    1.  Technically difficult images.    2.  Normal ejection fraction of 55%.    3.  Trace tricuspid regurgitation.           Anesthesia Plan    ASA 4     general   (Total IV Anesthesia    Patient understands anesthesia not responsible for dental damage.    Pt cannot lie flat, inr 2.7, to main or for procedure  )  intravenous induction     Anesthetic plan, all risks, benefits, and alternatives have been provided, discussed and informed consent has been obtained with: patient.    Plan discussed with CRNA.

## 2021-12-06 NOTE — ANESTHESIA POSTPROCEDURE EVALUATION
Patient: Joan Partida    Procedure Summary     Date: 12/06/21 Room / Location: [facility] OR 05 / [facility] MAIN OR    Anesthesia Start: 1506 Anesthesia Stop:     Procedure: BRONCHOSCOPY WITH BRONCHIOALVEOLAR LAVAGE AND WASHINGS (N/A Bronchus) Diagnosis:       Pneumonia of right lower lobe due to infectious organism      (Pneumonia of right lower lobe due to infectious organism [J18.9])    Surgeons: David Britton MD Provider: Xiomara Napoles DO    Anesthesia Type: general ASA Status: 4          Anesthesia Type: general    Vitals  Vitals Value Taken Time   /55 12/06/21 1547   Temp     Pulse 95 12/06/21 1548   Resp     SpO2 88 % 12/06/21 1548   Vitals shown include unvalidated device data.        Post Anesthesia Care and Evaluation    Patient location during evaluation: bedside  Patient participation: complete - patient participated  Level of consciousness: awake  Pain management: adequate  Airway patency: patent  Anesthetic complications: No anesthetic complications  PONV Status: none  Cardiovascular status: acceptable and stable  Respiratory status: acceptable  Hydration status: acceptable    Comments: An Anesthesiologist personally participated in the most demanding procedures (including induction and emergence if applicable) in the anesthesia plan, monitored the course of anesthesia administration at frequent intervals and remained physically present and available for immediate diagnosis and treatment of emergencies.

## 2021-12-06 NOTE — PROGRESS NOTES
Pulmonary / Critical Care Progress Note      Patient Name: Joan Partida  : 1970  MRN: [id number]  Attending:  Jean Garza DO  Date of admission: 2021    Subjective   Subjective   Follow-up for pneumonia    Over past 24 hours:  No real changes.  Having significant issues with mucus production and unable to clear mucus  States that she needs bronchoscopy as she feels she is smothering at times.    This morning,  She is n.p.o. for the procedure.  She has cough, not able to clear secretions.  Has wheezing, chest tightness.  Has trouble laying flat.  No fever or chills.  No nausea or vomiting.      Review of Systems  General:  Fatigue, No Fever  HEENT: No dysphagia, No Visual Changes, no rhinorrhea  Respiratory:  + cough,+Dyspnea, not able to clear secretions, No Pleuritic Pain, + wheezing, no hemoptysis  Cardiovascular: Denies chest pain, denies palpitations,+BUSTOS, No Chest Pressure  Gastrointestinal:  No Abdominal Pain, No Nausea, No Vomiting, No Diarrhea  Genitourinary:  No Dysuria, No Frequency, No Hesitancy  Musculoskeletal: No muscle pain or swelling  Endocrine:  No Heat Intolerance, No Cold Intolerance, Fatigue  Hematologic:  No Bleeding, No Bruising  Neurologic:  No Confusion, no Dysarthria, No Headaches  Skin:  No Rash, No Open Wounds          Objective   Objective     Vitals:   Temp:  [97.2 °F (36.2 °C)-98.1 °F (36.7 °C)] 97.9 °F (36.6 °C)  Heart Rate:  [0-87] 72  Resp:  [16-22] 20  BP: (105-137)/(52-74) 114/52  Flow (L/min):  [5] 5    Physical Exam   Vital Signs Reviewed   General: Obese female, Awake and Alert, mild distress on 4 L NC    HEENT:  PERRL, EOMI.  OP, nares clear, no sinus tenderness  Neck:  Supple, no JVD, no thyromegaly  Chest:  good aeration, coarse breath sounds with rhonchi and scattered wheezes, tympanic to percussion bilaterally, mild work of breathing noted  CV: RRR, no MGR, pulses 2+, equal  Abd:  Soft, NT, ND, + BS, no HSM, obese  EXT:  no clubbing, no cyanosis, 1+ BLE  edema, no joint tenderness  Neuro:  A&Ox3, CN grossly intact, no focal deficits  Skin: No rashes or lesions noted         Result Review    Result Review:  I have personally reviewed the results from the time of this admission to 12/6/2021 09:20 EST and agree with these findings:  [x]  Laboratory  [x]  Microbiology  [x]  Radiology  [x]  EKG/Telemetry   []  Cardiology/Vascular   []  Pathology  []  Old records  []  Other:  Most notable findings include: Infectious work-up so far negative.  Chest CT shows right basilar infiltrate/masslike opacity.  Sodium 130, bicarbonate 33, creatinine 1.3, white blood cell count 15        Assessment/Plan   Assessment / Plan     Active Hospital Problems:  Active Hospital Problems    Diagnosis    • **Acute on chronic respiratory failure with hypoxia (HCC)    • Pneumonia of right lower lobe due to infectious organism      Added automatically from request for surgery [id number]     • Long term (current) use of anticoagulants    • Abnormal liver enzymes    • Uncontrolled type 2 diabetes mellitus with hyperglycemia (HCC)    • 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HCC)    • Hypokalemia    • Diabetic polyneuropathy (HCC)    • GIUSEPPE (obstructive sleep apnea)    • Acquired hypothyroidism    • COPD (chronic obstructive pulmonary disease) (HCC)    • Essential hypertension    • Morbid obesity (HCC)          Impression:  Acute on chronic hypoxic respiratory failure  Community-acquired pneumonia of right lower lobe from unspecified organism  Abnormal chest CT  Mucous plugging/issues with airway clearance  Volume overload  AMY: baseline Cr 0.9  Hypokalemia  Hyponatremia  Elevated liver enzymes  HFpEF without acute exacerbation  PAH: WHO class II and WHO class III  COPD/Asthma overlap  GIUSEPPE/OHS: compliant with NIPPV at home  HTN  DM  Super super Obesity: BMI 77.9     Plan:  Continue oxygen  Encourage patient be out of bed in chair  Proceed with bronchoscopy today.  Risk versus benefits of bronchoscopy explained to the  patient including death, respiratory failure requiring intubation mechanical ventilation, pneumothorax requiring chest tube placement, bleeding, patient voices understanding of the risk of the procedure and wishes to proceed.    Patient is at high risk for this procedure given her comorbid conditions and her severe morbid obesity    Reviewed labs, imaging and provider notes.  Discussed with bedside nurse and primary service.    DVT prophylaxis:  Medical and mechanical DVT prophylaxis orders are present.    CODE STATUS:   Code Status (Patient has no pulse and is not breathing): CPR (Attempt to Resuscitate)  Medical Interventions (Patient has pulse or is breathing): Full Support    Electronically signed by David Britton MD, 12/06/21, 2:37 PM EST.

## 2021-12-06 NOTE — SIGNIFICANT NOTE
12/06/21 1050   Coping/Psychosocial   Observed Emotional State calm; pleasant   Verbalized Emotional State hopefulness; acceptance   Trust Relationship/Rapport empathic listening provided   Family/Support Persons spouse   Involvement in Care interacting with patient   Additional Documentation Spiritual Care (Group)   Spiritual Care   Use of Spiritual Resources spirituality for coping, indicated strong use of; prayer   Spiritual Care Source  initiative   Spiritual Care Follow-Up follow-up planned regularly for general support   Response to Spiritual Care receptive of support; engaged in conversation; thanks expressed   Spiritual Care Interventions prayer support provided; supportive conversation provided   Spiritual Care Visit Type initial   Receptivity to Spiritual Care visit welcomed

## 2021-12-06 NOTE — PROGRESS NOTES
Pulmonary / Critical Care Progress Note      Patient Name: Joan Partida  : 1970  MRN: [id number]  Attending:  Jean Garza DO  Date of admission: 2021    Subjective   Subjective   Follow-up for pneumonia    Over past 24 hours:  No real changes.  Having significant issues with mucus production and unable to clear mucus  States that she needs bronchoscopy as she feels she is smothering at times      Review of Systems  General: Fatigue, weakness, otherwise denied complaints  Cardiovascular:  Denied complaints  Respiratory: Dyspnea, cough, wheeze, otherwise denied complaints  Gastrointestinal: Denied complaints        Objective   Objective     Vitals:   Temp:  [97.3 °F (36.3 °C)-98.6 °F (37 °C)] 97.7 °F (36.5 °C)  Heart Rate:  [0-132] 74  Resp:  [18-22] 18  BP: (109-137)/(43-87) 117/56  Flow (L/min):  [5] 5    Physical Exam   Vital Signs Reviewed   General: Obese female, Awake and Alert, mild distress on 4 L NC    HEENT:  PERRL, EOMI.  OP, nares clear, no sinus tenderness  Neck:  Supple, no JVD, no thyromegaly  Chest:  good aeration, coarse breath sounds with rhonchi and scattered wheezes, tympanic to percussion bilaterally, mild work of breathing noted  CV: RRR, no MGR, pulses 2+, equal  Abd:  Soft, NT, ND, + BS, no HSM, obese  EXT:  no clubbing, no cyanosis, 1+ BLE edema, no joint tenderness  Neuro:  A&Ox3, CN grossly intact, no focal deficits  Skin: No rashes or lesions noted         Result Review    Result Review:  I have personally reviewed the results from the time of this admission to 2021 20:25 EST and agree with these findings:  [x]  Laboratory  [x]  Microbiology  [x]  Radiology  [x]  EKG/Telemetry   []  Cardiology/Vascular   []  Pathology  []  Old records  []  Other:  Most notable findings include: Infectious work-up so far negative.  Chest CT shows right basilar infiltrate/masslike opacity.  Sodium 130, bicarbonate 33, creatinine 1.3, white blood cell count 15        Assessment/Plan    Assessment / Plan     Active Hospital Problems:  Active Hospital Problems    Diagnosis    • **Acute on chronic respiratory failure with hypoxia (HCC)    • Long term (current) use of anticoagulants    • Abnormal liver enzymes    • Uncontrolled type 2 diabetes mellitus with hyperglycemia (HCC)    • 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HCC)    • Hypokalemia    • Diabetic polyneuropathy (HCC)    • GIUSEPPE (obstructive sleep apnea)    • Acquired hypothyroidism    • COPD (chronic obstructive pulmonary disease) (HCC)    • Essential hypertension    • Morbid obesity (HCC)          Impression:  Acute on chronic hypoxic respiratory failure  Community-acquired pneumonia of right lower lobe from unspecified organism  Abnormal chest CT  Mucous plugging/issues with airway clearance  Volume overload  AMY: baseline Cr 0.9  Hypokalemia  Hyponatremia  Elevated liver enzymes  HFpEF without acute exacerbation  PAH: WHO class II and WHO class III  COPD/Asthma overlap  GIUSEPPE/OHS: compliant with NIPPV at home  HTN  DM  Super super Obesity: BMI 77.9     Plan:  Continue oxygen  Encourage patient be out of bed in chair  Bronchoscopy tomorrow with Dr. Britton  Explained risk versus benefits    Risk versus benefits of bronchoscopy explained to the patient including death, respiratory failure requiring intubation mechanical ventilation, pneumothorax requiring chest tube placement, bleeding, patient voices understanding of the risk of the procedure and wishes to proceed.    Patient is at high risk for this procedure given her comorbid conditions and her severe morbid obesity    N.p.o. after midnight      DVT prophylaxis:  Medical and mechanical DVT prophylaxis orders are present.    CODE STATUS:   Code Status (Patient has no pulse and is not breathing): CPR (Attempt to Resuscitate)  Medical Interventions (Patient has pulse or is breathing): Full Support      Electronically signed by Mk Dowling DO, 12/05/21, 8:25 PM EST.

## 2021-12-06 NOTE — PROGRESS NOTES
[facility]   Hospitalist Progress Note  Date: 2021  Patient Name: Joan Partida  : 1970  MRN: [id number]  Date of admission: 2021      Subjective   Subjective     Chief Complaint: Follow-up for shortness of breath    Summary: 51 y.o. female super morbid obesity, chronic respiratory failure (4L), GIUSEPPE, COPD, diastolic congestive heart failure, right-sided heart failure presented with shortness of breath, increased cough, weight gain. On baseline 4 L NC. Chest x-ray with pneumonia versus edema.  CT shows right base infiltrate.  On azithromycin and Rocephin.  Pulm on board.  Bronchoscopy .    Interval Followup: No improvement in subjective dyspnea or ability to clear mucous.  About to go down for bronchoscopy    Review of Systems  Constitutional:  No Fever, + Fatigue  Cardiovascular:  No Chest Pain, +Edema, No Palpitations  Respiratory:  +Cough, +Dyspnea, +Wheezing, No Hemoptysis  Gastrointestinal:  Denied complaints  Genitourinary:  Denied complaints  Musculoskeletal: Denied complaints  Neuro:  Denied complaints  Heme/Lymph: Denied complaints  Endocrine: Hyperglycemia, improved  Psych: Denied complaints    Objective   Objective     Vitals:   Temp:  [97.2 °F (36.2 °C)-98.6 °F (37 °C)] 98.6 °F (37 °C)  Heart Rate:  [0-86] 79  Resp:  [16-24] 24  BP: (105-137)/(52-74) 130/59  Flow (L/min):  [5] 5  Physical Exam    Constitutional:  female, morbid obese, tachypneic complaining of trouble breathing              eyes: Pupils equal and reactive, no conjunctival injection              HENT: NCAT, nares patent, mucous membranes moist              Neck: Supple, trachea midline              Respiratory: Poor aeration bilaterally with rhonchi and expiratory wheezing   Cardiovascular: RRR, no murmurs, bilateral lower extremity edema              Gastrointestinal: Positive bowel sounds, soft, nontender, nondistended              Musculoskeletal: No gross deformities, no clubbing or cyanosis to  extremities              Neurologic: Oriented x 3, Cranial Nerves grossly intact, speech clear              Skin: Warm and dry, no rashes or open wounds     Result Review    Result Review:  I have personally reviewed the results from the time of this admission to 12/6/2021 14:05 EST and agree with these findings:  [x]  Laboratory   WBC   Date Value Ref Range Status   12/06/2021 7.40 3.40 - 10.80 10*3/mm3 Final     RBC   Date Value Ref Range Status   12/06/2021 4.26 3.77 - 5.28 10*6/mm3 Final     Hemoglobin   Date Value Ref Range Status   12/06/2021 13.8 12.0 - 15.9 g/dL Final     Hematocrit   Date Value Ref Range Status   12/06/2021 40.3 34.0 - 46.6 % Final     MCV   Date Value Ref Range Status   12/06/2021 94.6 79.0 - 97.0 fL Final     MCH   Date Value Ref Range Status   12/06/2021 32.4 26.6 - 33.0 pg Final     MCHC   Date Value Ref Range Status   12/06/2021 34.2 31.5 - 35.7 g/dL Final     RDW   Date Value Ref Range Status   12/06/2021 15.4 12.3 - 15.4 % Final     RDW-SD   Date Value Ref Range Status   12/06/2021 53.1 37.0 - 54.0 fl Final     MPV   Date Value Ref Range Status   12/06/2021 9.0 6.0 - 12.0 fL Final     Platelets   Date Value Ref Range Status   12/06/2021 188 140 - 450 10*3/mm3 Final       []  Microbiology    []  Radiology  []  EKG/Telemetry   []  Cardiology/Vascular   []  Pathology  []  Old records  [x]  Other:     Intake/Output Summary (Last 24 hours) at 12/6/2021 1405  Last data filed at 12/6/2021 0500  Gross per 24 hour   Intake 900 ml   Output 851 ml   Net 49 ml         Assessment/Plan   Assessment / Plan     Assessment:  Active Hospital Problems    Diagnosis  POA   • **Acute on chronic respiratory failure with hypoxia (HCC) [J96.21]  Yes   • COPD (chronic obstructive pulmonary disease) (HCC) [J44.9]  Yes     Priority: High   • Pneumonia of right lower lobe due to infectious organism [J18.9]  Unknown     Added automatically from request for surgery [id number]     • Long term (current) use of  anticoagulants [Z79.01]  Not Applicable   • Abnormal liver enzymes [R74.8]  Yes   • Uncontrolled type 2 diabetes mellitus with hyperglycemia (HCC) [E11.65]  Yes   • 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HCC) [I82.0]  Yes   • Hypokalemia [E87.6]  Yes   • Diabetic polyneuropathy (HCC) [E11.42]  Yes   • GIUSEPPE (obstructive sleep apnea) [G47.33]  Yes   • Acquired hypothyroidism [E03.9]  Yes   • Essential hypertension [I10]  Yes   • Morbid obesity (HCC) [E66.01]  Yes        Plan:  · Bronchoscopy today per pulmonology, appreciate assistance  · Continue Brovana and Pulmicort nebs twice a day  · Continue IV Rocephin, day 4  · Continue IV Lasix 40 mg twice daily  · Wean supplemental oxygen, SPO2 goal >90%  · Continue basal and mealtime insulin regimen + high-dose SSI  · Continue Toprol-XL 25 mg twice daily  · Continue Synthroid  · INR therapeutic.  Continue warfarin.  Pharmacy to dose.  Daily INR  · Activity ad alvaro.  · CBC, BMP in a.m.    Discussed plan with RN, pulmonology    DVT prophylaxis:  Medical and mechanical DVT prophylaxis orders are present.    CODE STATUS:   Code Status (Patient has no pulse and is not breathing): CPR (Attempt to Resuscitate)  Medical Interventions (Patient has pulse or is breathing): Full Support    Electronically signed by Jean Garza DO, 12/06/21, 2:05 PM EST.

## 2021-12-07 ENCOUNTER — READMISSION MANAGEMENT (OUTPATIENT)
Dept: CALL CENTER | Facility: HOSPITAL | Age: 51
End: 2021-12-07

## 2021-12-07 VITALS
RESPIRATION RATE: 20 BRPM | OXYGEN SATURATION: 96 % | BODY MASS INDEX: 51.91 KG/M2 | WEIGHT: 293 LBS | TEMPERATURE: 98.1 F | HEART RATE: 86 BPM | HEIGHT: 63 IN | SYSTOLIC BLOOD PRESSURE: 140 MMHG | DIASTOLIC BLOOD PRESSURE: 54 MMHG

## 2021-12-07 LAB
ANION GAP SERPL CALCULATED.3IONS-SCNC: 11.8 MMOL/L (ref 5–15)
BUN SERPL-MCNC: 17 MG/DL (ref 6–20)
BUN/CREAT SERPL: 21.8 (ref 7–25)
CALCIUM SPEC-SCNC: 10.1 MG/DL (ref 8.6–10.5)
CHLORIDE SERPL-SCNC: 91 MMOL/L (ref 98–107)
CO2 SERPL-SCNC: 35.2 MMOL/L (ref 22–29)
CREAT SERPL-MCNC: 0.78 MG/DL (ref 0.57–1)
DEPRECATED RDW RBC AUTO: 57.6 FL (ref 37–54)
ERYTHROCYTE [DISTWIDTH] IN BLOOD BY AUTOMATED COUNT: 15.8 % (ref 12.3–15.4)
GFR SERPL CREATININE-BSD FRML MDRD: 77 ML/MIN/1.73
GLUCOSE BLDC GLUCOMTR-MCNC: 198 MG/DL (ref 70–99)
GLUCOSE SERPL-MCNC: 196 MG/DL (ref 65–99)
HCT VFR BLD AUTO: 44.5 % (ref 34–46.6)
HGB BLD-MCNC: 14.5 G/DL (ref 12–15.9)
INR PPP: 1.96 (ref 2–3)
MCH RBC QN AUTO: 32.4 PG (ref 26.6–33)
MCHC RBC AUTO-ENTMCNC: 32.6 G/DL (ref 31.5–35.7)
MCV RBC AUTO: 99.3 FL (ref 79–97)
PLATELET # BLD AUTO: 247 10*3/MM3 (ref 140–450)
PMV BLD AUTO: 9.1 FL (ref 6–12)
POTASSIUM SERPL-SCNC: 3.7 MMOL/L (ref 3.5–5.2)
PROTHROMBIN TIME: 19.1 SECONDS (ref 9.4–12)
RBC # BLD AUTO: 4.48 10*6/MM3 (ref 3.77–5.28)
SODIUM SERPL-SCNC: 138 MMOL/L (ref 136–145)
WBC NRBC COR # BLD: 8.53 10*3/MM3 (ref 3.4–10.8)

## 2021-12-07 PROCEDURE — 25010000002 REVEFENACIN 175 MCG/3ML SOLUTION: Performed by: INTERNAL MEDICINE

## 2021-12-07 PROCEDURE — 82962 GLUCOSE BLOOD TEST: CPT

## 2021-12-07 PROCEDURE — 25010000002 FUROSEMIDE PER 20 MG: Performed by: INTERNAL MEDICINE

## 2021-12-07 PROCEDURE — 80048 BASIC METABOLIC PNL TOTAL CA: CPT | Performed by: INTERNAL MEDICINE

## 2021-12-07 PROCEDURE — 63710000001 INSULIN LISPRO (HUMAN) PER 5 UNITS: Performed by: INTERNAL MEDICINE

## 2021-12-07 PROCEDURE — 63710000001 INSULIN DETEMIR PER 5 UNITS: Performed by: INTERNAL MEDICINE

## 2021-12-07 PROCEDURE — 99232 SBSQ HOSP IP/OBS MODERATE 35: CPT | Performed by: INTERNAL MEDICINE

## 2021-12-07 PROCEDURE — 94799 UNLISTED PULMONARY SVC/PX: CPT

## 2021-12-07 PROCEDURE — 63710000001 PREDNISONE PER 1 MG: Performed by: INTERNAL MEDICINE

## 2021-12-07 PROCEDURE — 99239 HOSP IP/OBS DSCHRG MGMT >30: CPT | Performed by: STUDENT IN AN ORGANIZED HEALTH CARE EDUCATION/TRAINING PROGRAM

## 2021-12-07 PROCEDURE — 85027 COMPLETE CBC AUTOMATED: CPT | Performed by: INTERNAL MEDICINE

## 2021-12-07 PROCEDURE — 85610 PROTHROMBIN TIME: CPT | Performed by: INTERNAL MEDICINE

## 2021-12-07 RX ORDER — WARFARIN SODIUM 4 MG/1
8 TABLET ORAL
Status: DISCONTINUED | OUTPATIENT
Start: 2021-12-07 | End: 2021-12-07 | Stop reason: HOSPADM

## 2021-12-07 RX ORDER — CEFDINIR 300 MG/1
300 CAPSULE ORAL 2 TIMES DAILY
Qty: 6 CAPSULE | Refills: 0 | Status: SHIPPED | OUTPATIENT
Start: 2021-12-07 | End: 2021-12-10

## 2021-12-07 RX ORDER — GUAIFENESIN 600 MG/1
600 TABLET, EXTENDED RELEASE ORAL EVERY 12 HOURS SCHEDULED
Qty: 60 TABLET | Refills: 0 | Status: SHIPPED | OUTPATIENT
Start: 2021-12-07 | End: 2022-01-06

## 2021-12-07 RX ORDER — PREDNISONE 20 MG/1
40 TABLET ORAL DAILY
Qty: 6 TABLET | Refills: 0 | Status: SHIPPED | OUTPATIENT
Start: 2021-12-07 | End: 2021-12-10

## 2021-12-07 RX ADMIN — BUDESONIDE 0.5 MG: 0.5 SUSPENSION RESPIRATORY (INHALATION) at 06:56

## 2021-12-07 RX ADMIN — REVEFENACIN 175 MCG: 175 SOLUTION RESPIRATORY (INHALATION) at 06:56

## 2021-12-07 RX ADMIN — INSULIN LISPRO 20 UNITS: 100 INJECTION, SOLUTION INTRAVENOUS; SUBCUTANEOUS at 08:37

## 2021-12-07 RX ADMIN — GUAIFENESIN 600 MG: 600 TABLET ORAL at 08:37

## 2021-12-07 RX ADMIN — SODIUM CHLORIDE, PRESERVATIVE FREE 10 ML: 5 INJECTION INTRAVENOUS at 08:55

## 2021-12-07 RX ADMIN — NYSTATIN 1 APPLICATION: 100000 POWDER TOPICAL at 08:55

## 2021-12-07 RX ADMIN — LEVOTHYROXINE SODIUM 250 MCG: 125 TABLET ORAL at 05:53

## 2021-12-07 RX ADMIN — METOPROLOL SUCCINATE 25 MG: 25 TABLET, EXTENDED RELEASE ORAL at 08:37

## 2021-12-07 RX ADMIN — INSULIN LISPRO 4 UNITS: 100 INJECTION, SOLUTION INTRAVENOUS; SUBCUTANEOUS at 08:37

## 2021-12-07 RX ADMIN — PREDNISONE 40 MG: 20 TABLET ORAL at 08:37

## 2021-12-07 RX ADMIN — INSULIN DETEMIR 50 UNITS: 100 INJECTION, SOLUTION SUBCUTANEOUS at 08:38

## 2021-12-07 RX ADMIN — SERTRALINE HYDROCHLORIDE 100 MG: 100 TABLET ORAL at 08:37

## 2021-12-07 RX ADMIN — PANTOPRAZOLE SODIUM 40 MG: 40 TABLET, DELAYED RELEASE ORAL at 08:37

## 2021-12-07 RX ADMIN — IPRATROPIUM BROMIDE AND ALBUTEROL SULFATE 3 ML: .5; 2.5 SOLUTION RESPIRATORY (INHALATION) at 06:56

## 2021-12-07 RX ADMIN — POTASSIUM CHLORIDE 40 MEQ: 750 CAPSULE, EXTENDED RELEASE ORAL at 08:36

## 2021-12-07 RX ADMIN — FUROSEMIDE 40 MG: 10 INJECTION, SOLUTION INTRAVENOUS at 05:53

## 2021-12-07 RX ADMIN — GABAPENTIN 800 MG: 400 CAPSULE ORAL at 05:53

## 2021-12-07 RX ADMIN — ARFORMOTEROL TARTRATE 15 MCG: 15 SOLUTION RESPIRATORY (INHALATION) at 06:56

## 2021-12-07 NOTE — OUTREACH NOTE
Prep Survey      Responses   [facility] patient discharged from? [facility]   Is LACE score < 7 ? No   Emergency Room discharge w/ pulse ox? No   Eligibility [facility]   Date of Admission 12/02/21   Date of Discharge 12/07/21   Discharge Disposition Home or Self Care   Discharge diagnosis COPD   Does the patient have one of the following disease processes/diagnoses(primary or secondary)? COPD/Pneumonia   Is there a DME ordered? Yes   What DME was ordered? [employer]  BSC and CPAP   Prep survey completed? Yes          Nancy Chapa RN

## 2021-12-07 NOTE — CONSULTS
RT CM consulted to arrange home vest and bilevel for patient.  Pt is a former smoker with a hx of super morbid obesity (BMI 74), GIUSEPPE (AHI 15), asthma/ COPD overlap and PAH.  Pt is home oxygen dependant and requires a Bilevel therapy (17/13) for GIUSEPPE due to intolerance of CPAP pressures. Last PFT was performed 2019, showing FEV1 77% post treatment with significant response to bronchodilator. Pt takes brovana and pulmicort nebulizer for maintenance, due to inspiratory flow rate being too poor for dpi maintenance.  Albuterol Hfa with a spacer and duoneb used for rescue.  Pt admitted with pneumonia of right lower lobe and abnormal chest CT. Additionally, pt required bronchoscopy secondary to bronchiectasis, mucous plugging and airway clearance difficulty.   Pt states she has had on ongoing, daily productive cough over the past year,  noting she tends to cough for an hour straight every morning. Pt has tried deep breathing and cough techniques including incentive spirometer and Aerobika and Acapella to fully mobilize her secretions. Metaneb also tried and failed; therefore chest vest will be order on discharge.  RT CM discussed use of smartvest for airway clearance and is in agreement to using at home.  Pt instructed to follow up with pulmonary on discharge.

## 2021-12-07 NOTE — PLAN OF CARE
Goal Outcome Evaluation:  Plan of Care Reviewed With: patient        Progress: improving  Outcome Summary: pt aox4, Vs stable, had bronch today. blood sugars in low 200s. still on 5 L NC

## 2021-12-07 NOTE — DISCHARGE SUMMARY
[facility]         HOSPITALIST  DISCHARGE SUMMARY    Patient Name: Joan Partida  : 1970  MRN: [id number]    Date of Admission: 2021  Date of Discharge: 2021  Primary Care Physician: Francesco Gimenez DO    Consults     Date and Time Order Name Status Description    12/3/2021  7:36 AM Inpatient Pulmonology Consult      2021  3:05 PM Hospitalist (on-call MD unless specified)      2021  2:47 PM IP General Consult (Use specialty-specific consult if known)      2021  2:47 PM Hospitalist (on-call MD unless specified)            Active and Resolved Hospital Problems:  Active Hospital Problems    Diagnosis POA   • **Acute on chronic respiratory failure with hypoxia (HCC) [J96.21] Yes   • Pneumonia of right lower lobe due to infectious organism [J18.9] Unknown   • Long term (current) use of anticoagulants [Z79.01] Not Applicable   • Abnormal liver enzymes [R74.8] Yes   • Uncontrolled type 2 diabetes mellitus with hyperglycemia (HCC) [E11.65] Yes   • 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       Hospital Course     Hospital Course:  Joan Partida is a 51 y.o. female who presented with chief complaint of shortness of breath. On initial presentation patient had to be with acute on chronic hypoxic respiratory failure and was started on intravenous diuretics due to appearing volume overloaded. Patient additionally had a coronavirus PCR which was negative. She was also found to have a subtherapeutic INR. Patient was started on azithromycin and Rocephin empirically. She also received breathing  treatments which improved her symptoms. Ultimately patient respiratory status continued to decline further and the decision was made for patient to undergo bronchoscopy. Patient underwent bronchoscopy and had mucous plugs removed and was noted to have friable mucosa with additional scattered purulent secretions. Patient tolerated procedure without any complications. He was ultimately weaned back down to her baseline oxygen requirements prior to discharge. Patient will continue with cefdinir empirically. She also be continued on prednisone. She will follow-up with her PCP in 1 week. She will follow-up with pulmonary for further recommendations. Patient was counseled importance of compliance with her medications and diet. At the time of discharge patient had no chest pain, chest pressure, palpitations, fever, or chills. She was discharged in stable condition.      DISCHARGE Follow Up Recommendations for labs and diagnostics: PCP 1 week. Pulmonary as scheduled.      Day of Discharge     Vital Signs:  Temp:  [97.3 °F (36.3 °C)-98.6 °F (37 °C)] 98.1 °F (36.7 °C)  Heart Rate:  [] 86  Resp:  [18-24] 20  BP: (100-161)/() 140/54  Flow (L/min):  [5-6] 5  FiO2 (%):  [1 %] 1 %  Physical Exam:     Constitutional: Alert, awake, no acute distress, obese  HEENT:  PERRLA, EOMI; No Scleral icterus  Neck:  Supple; No Mass, No Lymphadenopathy  Cardiovascular:  No Rubs, No Edema, No JVD  Respiratory: No respiratory distress, unlabored breathing, saturating well on nasal cannula  Abdomen:  Normal BS all 4 Quadrants; No Guarding, No Tenderness  Musculoskeletal:  Pulses Positive all 4 Ext; No Cyanosis, No Edema  Neurological:  Alert+Ox3, Mental status WNL; No Dysarthria  Skin:  No Rash, No Cellulitis, No Erythema      Discharge Details        Discharge Medications      New Medications      Instructions Start Date   cefdinir 300 MG capsule  Commonly known as: OMNICEF   300 mg, Oral, 2 Times Daily      guaiFENesin 600 MG 12 hr  tablet  Commonly known as: MUCINEX   600 mg, Oral, Every 12 Hours Scheduled      predniSONE 20 MG tablet  Commonly known as: DELTASONE   40 mg, Oral, Daily         Changes to Medications      Instructions Start Date   FeroSul 325 (65 FE) MG tablet  Generic drug: ferrous sulfate  What changed: See the new instructions.   TAKE 1 TABLET(325 MG) BY MOUTH TWICE DAILY      ipratropium-albuterol 0.5-2.5 mg/3 ml nebulizer  Commonly known as: DUO-NEB  What changed: See the new instructions.   USE 3 ML VIA NEBULIZER EVERY 4 HOURS AS NEEDED FOR SHORTNESS OF BREATH      montelukast 10 MG tablet  Commonly known as: SINGULAIR  What changed: See the new instructions.   TAKE 1 TABLET BY MOUTH ONCE DAILY EVERY EVENING      nystatin 164895 UNIT/GM powder  Generic drug: nystatin  What changed: See the new instructions.   APPLY TO AFFECTED AREA THREE TIMES DAILY      SUMAtriptan 100 MG tablet  Commonly known as: IMITREX  What changed: See the new instructions.   TAKE 1 TABLET BY MOUTH AT ONSET OF MIGRAINE; MAY REPEAT AFTER 2 HOURS IF HEADACHE RETURNS, NOT TO EXCEED 200MG IN 24 HOURS      traZODone 50 MG tablet  Commonly known as: DESYREL  What changed: when to take this   50 mg, Oral, Daily      warfarin 5 MG tablet  Commonly known as: COUMADIN  What changed:   · how much to take  · Another medication with the same name was removed. Continue taking this medication, and follow the directions you see here.   7.5 mg, Oral, Nightly         Continue These Medications      Instructions Start Date   Allergy Relief 180 MG tablet  Generic drug: fexofenadine   TAKE 1 TABLET BY MOUTH TWICE DAILY      Brovana 15 MCG/2ML nebulizer solution  Generic drug: arformoterol   15 mcg, Nebulization, 2 Times Daily - RT      Cholecalciferol 125 MCG (5000 UT) tablet   5,000 Units, Oral, Daily      docusate sodium 100 MG capsule  Commonly known as: COLACE   100 mg, Oral, 2 Times Daily      eszopiclone 1 MG tablet  Commonly known as: LUNESTA   TAKE 1 TABLET BY  MOUTH IMMEDIATELY BEFORE BEDTIME      ezetimibe 10 MG tablet  Commonly known as: ZETIA   10 mg, Oral, Daily      gabapentin 300 MG capsule  Commonly known as: NEURONTIN   300 mg, Oral, 4 Times Daily, Pt takes 3 capsules 4 times a day       glucagon 1 MG injection  Commonly known as: GLUCAGEN   1 mg, Intramuscular      insulin  patient supplied pump   Subcutaneous, Continuous, Type of Insulin: HUMULIN R 500u/mL Dose: 2.5 basal Prescriber: SAÚL MAHER ([facility])  PUMP IS REMOVED AT TIME OF VISIT      levothyroxine 125 MCG tablet  Commonly known as: SYNTHROID, LEVOTHROID   250 mcg, Oral, Daily      Magnesium 400 MG tablet   400 mg, Oral, Daily      metoprolol succinate XL 25 MG 24 hr tablet  Commonly known as: TOPROL-XL   TAKE 1 TABLET BY MOUTH TWICE DAILY      mineral oil-hydrophilic petrolatum ointment   1 application, Topical, 2 Times Daily, to affected area      pantoprazole 40 MG EC tablet  Commonly known as: PROTONIX   TAKE 1 TABLET BY MOUTH EVERY DAY      potassium chloride ER 20 MEQ tablet controlled-release ER tablet  Commonly known as: K-TAB   40 mEq, Oral, 2 Times Daily      Pulmicort 0.5 MG/2ML nebulizer solution  Generic drug: budesonide   0.5 mg, Nebulization, Daily - RT      revefenacin 175 MCG/3ML nebulizer solution  Commonly known as: YUPELRI   175 mcg, Nebulization, Daily - RT      sertraline 100 MG tablet  Commonly known as: ZOLOFT   TAKE 1 TABLET BY MOUTH ONCE DAILY      spironolactone 100 MG tablet  Commonly known as: ALDACTONE   200 mg, Oral, 2 times daily      sucralfate 1 g tablet  Commonly known as: CARAFATE   1 g, Oral, 4 Times Daily      torsemide 100 MG tablet  Commonly known as: DEMADEX   100 mg, Oral, 3 Times Daily      Ventolin  (90 Base) MCG/ACT inhaler  Generic drug: albuterol sulfate HFA   1-2 puffs, Inhalation, Every 6 Hours PRN         Stop These Medications    Perforomist 20 MCG/2ML nebulizer solution  Generic drug: formoterol            Allergies   Allergen  Reactions   • Nickel Diarrhea and Nausea And Vomiting   • Insulin Degludec Unknown - High Severity     Can trigger pancreatitis   • Sitagliptin-Metformin Hcl Other (See Comments)   • Viroqua Unknown - High Severity     Cobalt blue, by allergy testing.    • Dulaglutide Nausea And Vomiting   • Exenatide Other (See Comments)     pancreantitis   • Metformin Unknown - Low Severity   • Palladium Chloride Unknown - Low Severity     by allergy testing.    • Sitagliptin Other (See Comments)     panceatitis       Discharge Disposition:  Home or Self Care    Diet:  Hospital:  Diet Order   Procedures   • Diet Regular; Consistent Carbohydrate, Cardiac       Discharge Activity:       CODE STATUS:  Code Status and Medical Interventions:   Ordered at: 12/03/21 0412     Code Status (Patient has no pulse and is not breathing):    CPR (Attempt to Resuscitate)     Medical Interventions (Patient has pulse or is breathing):    Full Support         Future Appointments   Date Time Provider Department Center   1/11/2022  1:30 PM [place] [facility] [place] [facility] [facility]   2/28/2022  2:45 PM Deven Myers [place] [facility] [place] [facility] [facility]   4/5/2022  8:00 AM Marylu Chávez APRN [facility] [place]           Pertinent  and/or Most Recent Results     PROCEDURES:   CT Chest Without Contrast Diagnostic    Result Date: 12/3/2021  Narrative: PROCEDURE: CT CHEST WO CONTRAST DIAGNOSTIC  COMPARISONS: [facility], CR, XR CHEST 1 VW, 12/02/2021, 13:45.   [facility], CT, CT ABD-PELVIS W WO CONTRAST, 7/27/2021, 17:15.  INDICATIONS: COPD, acute exacerbation.  TECHNIQUE: 406 CT images were created without the administration of contrast material.   PROTOCOL:   Standard imaging protocol performed    RADIATION:   DLP: 652 mGy*cm   Automated exposure control was utilized to minimize radiation dose.  FINDINGS: The study is limited by motion artifact and large body habitus.  Portions of the chest and abdominal wall are excluded from the field of  view.  There is diffuse hepatic steatosis with hepatomegaly.  Splenomegaly is suspected.  Multiple nonspecific collateral vessels are seen in the superficial subcutaneous left posterior, lateral abdominal wall, as on the prior exam.  The significance of this finding is uncertain.  The finding is related to an intertriginous fold.  It is incompletely evaluated by nonenhanced CT examination.  There is a subpleural opacity in the inferior, posterior lower lobe of the right lung, which measures approximately 3.3 x 5 x 3.1 cm in craniocaudal, transverse, and AP (anteroposterior) extent, respectively, as seen on image 55 of series 5, image 90 of series 4, and image 69 of series 3.  It is subpleural in location.  It is noncalcified and non-cavitating.  It may represent round atelectasis.  An infiltrate, such as related to pneumonia, including aspiration pneumonia, cannot be excluded.  It may contain minimal air bronchograms.  Other associated opacities in the right lung base are also seen, which are somewhat nodular in appearance.  These findings are new since the 7/27/2021 abdominal/pelvic CT study.  An infectious process is possible.  Atelectasis and/or infiltrate and/or fibrosis is (are) also suspected elsewhere within the bilateral lungs.  The central tracheobronchial tree is well aerated without filling defect.  There are small mediastinal and hilar lymph nodes, which may be reactive in nature.  No cardiac enlargement is suspected.  There may be minimal coronary artery calcifications.  No aneurysmal dilatation of the thoracic aorta is suggested. There are degenerative changes throughout the imaged spine.  Ossification of the anterior longitudinal ligament is seen and may represent diffuse idiopathic skeletal hyperostosis (DISH).   CONCLUSION:   1. Possible right lower lobe pneumonia.  Consider imaging follow-up to ensure a benign progression and to exclude an underlying malignant process.  Bronchoscopy may be helpful  in further assessment.  There may be acute infiltrates and/or atelectasis elsewhere bilaterally to a lesser degree.  Infectious multifocal pneumonia is possible.  Pulmonary edema cannot be excluded but is probably less likely.   2. There is hepatosplenomegaly, as seen previously.   3. Probably no cardiac enlargement.   4. No pleural or pericardial effusion.   5. Mild coronary artery calcifications are seen.   6. Overall, the study is limited due to large body habitus and motion artifact.     REHANA CAMPOS JR, MD       Electronically Signed and Approved By: REHANA CAMPOS JR, MD on 12/03/2021 at 0:27             XR Chest 1 View    Result Date: 12/2/2021  Narrative: PROCEDURE: XR CHEST 1 VW  COMPARISON: [facility], CR, CHEST PA/AP & LAT 2V, 4/20/2021, 13:11.  INDICATIONS: SOA Triage Protocol  FINDINGS:  The heart is enlarged.  There are diffuse bilateral alveolar airspace opacities consistent with multifocal pneumonia or pulmonary edema.  There is no evidence of pneumothorax.  There is no pleural effusion.  CONCLUSION: Multifocal alveolar infiltrates could be from pulmonary edema or pneumonia       NORMA WYNN MD       Electronically Signed and Approved By: NORMA WYNN MD on 12/02/2021 at 13:59                 LAB RESULTS:      Lab 12/07/21  0456 12/06/21  0530 12/05/21  0446 12/04/21  0442 12/03/21  0628 12/02/21  1911 12/02/21  1546 12/02/21  1306 12/02/21  1306   WBC 8.53 7.40 10.00 14.19* 15.30*  --   --    < > 10.94*   HEMOGLOBIN 14.5 13.8 13.5 14.4 14.3  --   --    < > 14.4   HEMATOCRIT 44.5 40.3 40.7 41.7 41.4  --   --    < > 43.0   PLATELETS 247 188 202 226 227  --   --    < > 222   NEUTROS ABS  --   --   --   --  13.99*  --   --   --  9.02*   IMMATURE GRANS (ABS)  --   --   --   --  0.12*  --   --   --  0.06*   LYMPHS ABS  --   --   --   --  0.47*  --   --   --  0.76   MONOS ABS  --   --   --   --  0.70  --   --   --  0.96*   EOS ABS  --   --   --   --  0.00  --   --   --  0.09   MCV  99.3* 94.6 97.8* 95.2 93.9  --   --    < > 96.0   PROCALCITONIN  --   --   --   --   --   --   --   --  0.23   LACTATE  --   --   --   --   --  3.6* 2.3*  --   --    PROTIME 19.1* 26.1* 31.2* 22.3* 16.8* 17.7*  --    < > 17.7*    < > = values in this interval not displayed.         Lab 12/07/21  0456 12/06/21  0530 12/05/21 0446 12/04/21  0657 12/04/21 0442 12/03/21 0628 12/03/21 0628   SODIUM 138 138 137  --  131*  --  130*   POTASSIUM 3.7 3.3* 3.3*  --  3.3*  --  3.9   CHLORIDE 91* 92* 89*  --  82*  --  82*   CO2 35.2* 35.7* 39.0*  --  32.7*  --  32.9*   ANION GAP 11.8 10.3 9.0  --  16.3*  --  15.1*   BUN 17 26* 42*  --  49*  --  44*   CREATININE 0.78 0.70 0.89  --  1.13*  --  1.38*   GLUCOSE 196* 198* 195* 448* 403*   < > 405*   CALCIUM 10.1 9.9 10.0  --  9.9  --  10.1    < > = values in this interval not displayed.         Lab 12/02/21  1306   TOTAL PROTEIN 8.0   ALBUMIN 4.10   GLOBULIN 3.9   ALT (SGPT) 44*   AST (SGOT) 62*   BILIRUBIN 1.3*   ALK PHOS 504*         Lab 12/07/21  0456 12/06/21  0530 12/05/21 0446 12/04/21 0442 12/03/21 0628 12/02/21  1911 12/02/21  1306   PROBNP  --   --   --   --   --   --  107.1   TROPONIN T  --   --   --   --   --   --  <0.010   PROTIME 19.1* 26.1* 31.2* 22.3* 16.8*   < > 17.7*   INR 1.96* 2.70 3.24* 2.30 1.71*   < > 1.81*    < > = values in this interval not displayed.                 Brief Urine Lab Results  (Last result in the past 365 days)      Color   Clarity   Blood   Leuk Est   Nitrite   Protein   CREAT   Urine HCG        07/16/21 2025 Dark Yellow   Clear   Negative   Negative   Negative   Negative               Microbiology Results (last 10 days)     Procedure Component Value - Date/Time    BAL Culture, Quantitative - Lavage, Lung, Right Lower Lobe [[id number]] Collected: 12/06/21 1523    Lab Status: Preliminary result Specimen: Lavage from Lung, Right Lower Lobe Updated: 12/06/21 1733     Gram Stain Many (4+) WBCs seen    Pneumonia Panel - Lavage, Lung, Right  Lower Lobe [[id number]]  (Abnormal) Collected: 12/06/21 1523    Lab Status: Final result Specimen: Lavage from Lung, Right Lower Lobe Updated: 12/06/21 1952     Escherichia coli PCR Not Detected     Acinetobacter calcoaceticus-baumannii complex PCR Not Detected     Enterobacter cloacae PCR Not Detected     Klebsiella oxytoca PCR Not Detected     Klebsiella pneumoniae group PCR Not Detected     Klebsiella aerogenes PCR Not Detected     Moraxella catarrhalis PCR Not Detected     Proteus species PCR Not Detected     Pseudomonas aeroginosa PCR Not Detected     Serratia marcescens PCR Not Detected     Staphylococcus aureus PCR Not Detected     Streptococcus pyogenes PCR Not Detected     Haemophilus influenzae PCR Detected     Comment: 10^5 Bin copies/mL        Streptococcus agalactiae PCR Not Detected     Streptococcus pneumoniae PCR Not Detected     Chlamydophila pneumoniae PCR Not Detected     Legionella pneumophilia PCR Not Detected     Mycoplasma pneumo by PCR Not Detected     ADENOVIRUS, PCR Not Detected     CTX-M Gene N/A     IMP Gene N/A     KPC Gene N/A     mecA/C and MREJ Gene N/A     NDM Gene N/A     OXA-48-like Gene N/A     VIM Gene N/A     Coronavirus Not Detected     Human Metapneumovirus Detected     Human Rhinovirus/Enterovirus Not Detected     Influenza A PCR Not Detected     Influenza B PCR Not Detected     RSV, PCR Not Detected     Parainfluenza virus PCR Not Detected    Respiratory Culture - Sputum, Cough [[id number]] Collected: 12/03/21 1800    Lab Status: Final result Specimen: Sputum from Cough Updated: 12/05/21 1357     Respiratory Culture Heavy growth (4+) Normal respiratory doni. No S. aureus or Pseudomonas aeruginosa detected. Final report.     Gram Stain Few (2+) Gram positive cocci in pairs and chains      Rare (1+) Gram negative bacilli      Few (2+) WBCs seen      Occasional Gram positive bacilli      Mucous strands    Legionella Antigen, Urine - Urine, Urine, Clean Catch [[id number]]   (Normal) Collected: 12/02/21 1853    Lab Status: Final result Specimen: Urine, Clean Catch Updated: 12/02/21 1947     LEGIONELLA ANTIGEN, URINE Negative    S. Pneumo Ag Urine or CSF - Urine, Urine, Clean Catch [[id number]]  (Normal) Collected: 12/02/21 1853    Lab Status: Final result Specimen: Urine, Clean Catch Updated: 12/02/21 1947     Strep Pneumo Ag Negative    Blood Culture - Blood, Arm, Left [[id number]]  (Abnormal) Collected: 12/02/21 1546    Lab Status: Final result Specimen: Blood from Arm, Left Updated: 12/06/21 0830     Blood Culture Staphylococcus capitis     Isolated from Anaerobic Bottle     Gram Stain Anaerobic Bottle Gram positive cocci in clusters    Narrative:      Probable contaminant requires clinical correlation, susceptibility not performed unless requested by physician.      Blood Culture - Blood, Blood, Central Line [[id number]]  (Abnormal) Collected: 12/02/21 1546    Lab Status: Edited Result - FINAL Specimen: Blood, Central Line Updated: 12/06/21 0830     Blood Culture Staphylococcus epidermidis     Isolated from Anaerobic Bottle     Gram Stain Anaerobic Bottle Gram positive cocci in clusters    Narrative:      Probable contaminant requires clinical correlation, susceptibility not performed unless requested by physician.        Influenza Antigen, Rapid - Swab, Nasopharynx [[id number]]  (Normal) Collected: 12/02/21 1424    Lab Status: Final result Specimen: Swab from Nasopharynx Updated: 12/02/21 1457     Influenza A Ag, EIA Negative     Influenza B Ag, EIA Negative    COVID-19,CEPHEID/CHASIDY/BDMAX,COR/PARTHA/PAD/VÍCTOR IN-HOUSE(OR EMERGENT/ADD-ON),NP SWAB IN TRANSPORT MEDIA 3-4 HR TAT, RT-PCR - Swab, Nasopharynx [[id number]]  (Normal) Collected: 12/02/21 1424    Lab Status: Final result Specimen: Swab from Nasopharynx Updated: 12/02/21 1742     COVID19 Not Detected    Narrative:      Fact sheet for providers: https://www.fda.gov/media/656614/download     Fact sheet for patients:  https://www.fda.gov/media/227411/download  Presumptive Positive: additional testing may be indicated if it is necessary to differentiate between SARS-CoV-2 and other Sarbecovirus, for epidemiological purposes, or clinical management.                           Labs Pending at Discharge:  Pending Labs     Order Current Status    AFB Culture - Lavage, Lung, Right Lower Lobe In process    Fungus Culture - Lavage, Lung, Right Lower Lobe In process    Non-gynecologic Cytology In process    Pneumocystis Smear By DFA - Lavage, Lung, Right Lower Lobe In process    BAL Culture, Quantitative - Lavage, Lung, Right Lower Lobe Preliminary result            Time spent on Discharge including face to face service:  32 minutes    Electronically signed by Arvin Woodson DO, 12/07/21, 10:40 AM EST.

## 2021-12-07 NOTE — PROGRESS NOTES
Pulmonary / Critical Care Progress Note      Patient Name: Joan Partida  : 1970  MRN: [id number]  Attending:  Arvin Woodson DO  Date of admission: 2021    Subjective   Subjective   Follow-up for pneumonia    Over past 24 hours, underwent bronchoscopy for mucous plugging.  Continues with airway clearance with metanebs.    No acute events overnight.  Wore home BIPAP.    This morning,  Sitting up in chair on 4 L NC  Feels so much better today after bronch  Wheezing improving  Nonproductive cough  No chest pain  No fever or chills.  No nausea or vomiting  Wants to go home    Review of Systems  General:  Fatigue, No Fever  HEENT: No dysphagia, No Visual Changes, no rhinorrhea  Respiratory:  + cough,+Dyspnea, not able to clear secretions, No Pleuritic Pain, + wheezing, no hemoptysis  Cardiovascular: Denies chest pain, denies palpitations,+BUSTOS, No Chest Pressure  Gastrointestinal:  No Abdominal Pain, No Nausea, No Vomiting, No Diarrhea    Objective   Objective     Vitals:   Temp:  [97.3 °F (36.3 °C)-98.6 °F (37 °C)] 98.1 °F (36.7 °C)  Heart Rate:  [] 86  Resp:  [18-24] 20  BP: (100-161)/() 140/54  Flow (L/min):  [5-6] 5  FiO2 (%):  [1 %] 1 %    Physical Exam   Vital Signs Reviewed   General: Obese female, Awake and Alert, mild distress on 4 L NC    HEENT:  PERRL, EOMI.  OP, nares clear, no sinus tenderness  Neck:  Supple, no JVD, no thyromegaly  Chest:  good aeration, coarse breath sounds with scattered wheezes, tympanic to percussion bilaterally, mild work of breathing noted  CV: RRR, no MGR, pulses 2+, equal  Abd:  Soft, NT, ND, + BS, no HSM, obese  EXT:  no clubbing, no cyanosis, 1+ BLE edema, no joint tenderness  Neuro:  A&Ox3, CN grossly intact, no focal deficits  Skin: No rashes or lesions noted    Result Review    Result Review:  I have personally reviewed the results from the time of this admission to 2021 07:28 EST and agree with these findings:  [x]  Laboratory  [x]   Microbiology  [x]  Radiology  [x]  EKG/Telemetry   []  Cardiology/Vascular   []  Pathology  []  Old records  []  Other:  Most notable findings include: WBC 8.53, INR 1.96, K 3.7, Cr 0.78    Pneumonia panel hemophilus influenzae and human metapneumovirus  BAL pending    Assessment/Plan   Assessment / Plan     Active Hospital Problems:  Active Hospital Problems    Diagnosis    • **Acute on chronic respiratory failure with hypoxia (HCC)    • Pneumonia of right lower lobe due to infectious organism      Added automatically from request for surgery [id number]     • Long term (current) use of anticoagulants    • Abnormal liver enzymes    • Uncontrolled type 2 diabetes mellitus with hyperglycemia (HCC)    • 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HCC)    • Hypokalemia    • Diabetic polyneuropathy (HCC)    • GIUSEPPE (obstructive sleep apnea)    • Acquired hypothyroidism    • COPD (chronic obstructive pulmonary disease) (HCC)    • Essential hypertension    • Morbid obesity (HCC)      Impression:  Acute on chronic hypoxic respiratory failure  Community-acquired pneumonia of RLL from hemophilus influenzae  Abnormal chest CT  Bronchiectasis with mucous plugging/issues with airway clearance  Volume overload  AMY: baseline Cr 0.9  Hypokalemia  Hyponatremia  Elevated liver enzymes  HFpEF without acute exacerbation  PAH: WHO class II and WHO class III  COPD/Asthma overlap  GIUSEPPE/OHS: compliant with NIPPV at home  HTN  DM  Super super Obesity: BMI 77.9     Plan:  Continue supplemental O2 to keep sats >90%.  Wears 3 to 4 L at home.  Continue home NIPPV use at nights and prn days.  Continue Ceftriaxone, day 6.  Can transition to Augmentin at discharge to complete 10 days.  Continue Lasix 40 mg IV BID.  Trend renal panel and electrolytes.  Continue Prednisone 40 mg daily. Recommend taper over the next 2 weeks.  Continue Singulair and Mucinex.  Continue Brovana, Pulmicort, and Yulperi nebulizers.  Continue airway clearance with Metanebs.  Continue  bronchopulmonary hygiene.  Encourage IS and flutter valve.  Encourage activity.  Up to chair as tolerated.  Repeat noncontrast chest CT in 3 months .    Patient would benefit from chest vest at home to help with airway clearance and help prevent re-hospitalizations.  Consult RT  to arrange chest vest.      Ok from pulmonary standpoint to discharge home with Augmentin and Prednisone taper.  Follow up in our clinic in 2 weeks.  Repeat CT chest in 3 months.    DVT prophylaxis:  Medical and mechanical DVT prophylaxis orders are present.    CODE STATUS:   Code Status (Patient has no pulse and is not breathing): CPR (Attempt to Resuscitate)  Medical Interventions (Patient has pulse or is breathing): Full Support    Labs, microbiology, radiology, medications, and provider notes personally reviewed.  Discussed with primary services and bedside RN.    Electronically signed by ELAINE Kaba, 12/07/21, 10:45 AM EST.

## 2021-12-08 ENCOUNTER — TRANSITIONAL CARE MANAGEMENT TELEPHONE ENCOUNTER (OUTPATIENT)
Dept: CALL CENTER | Facility: HOSPITAL | Age: 51
End: 2021-12-08

## 2021-12-08 LAB
BACTERIA SPEC AEROBE CULT: NO GROWTH
CYTO UR: NORMAL
GRAM STN SPEC: NORMAL
LAB AP CASE REPORT: NORMAL
LAB AP CLINICAL INFORMATION: NORMAL
LAB AP SPECIAL STAINS: NORMAL
P JIROVECII AG SPEC QL IF: NEGATIVE
PATH REPORT.FINAL DX SPEC: NORMAL
PATH REPORT.GROSS SPEC: NORMAL
STAT OF ADQ CVX/VAG CYTO-IMP: NORMAL

## 2021-12-08 NOTE — OUTREACH NOTE
Call Center TCM Note      Responses   [facility] patient discharged from? [facility]   Does the patient have one of the following disease processes/diagnoses(primary or secondary)? COPD/Pneumonia   TCM attempt successful? Yes   Call start time 1046   Call end time 1053   Discharge diagnosis COPD   Meds reviewed with patient/caregiver? Yes   Is the patient having any side effects they believe may be caused by any medication additions or changes? No   Does the patient have all medications ordered at discharge? Yes   Is the patient taking all medications as directed (includes completed medication regime)? Yes   Comments regarding appointments Pulm appt on 12/21/21 @9am   Does the patient have a primary care provider?  Yes   Does the patient have an appointment with their PCP or specialist within 7 days of discharge? Yes   Comments regarding PCP Hospital d/c f/u appt on 12/13/21 @12:15pm   Has the patient kept scheduled appointments due by today? N/A   What is the Home health agency?  [facility] request sent   Has home health visited the patient within 72 hours of discharge? No   What DME was ordered? [employer]  BSC and CPAP   Has all DME been delivered? No   DME comments Pt had home O2 prior to this admission   Pulse Ox monitoring Intermittent   Pulse Ox device source Patient   O2 Sat comments 93% on 6L O2   O2 Sat: education provided Sat levels,  Monitoring frequency,  When to seek care   Psychosocial issues? Yes   Did the patient receive a copy of their discharge instructions? Yes   Nursing interventions Reviewed instructions with patient   What is the patient's perception of their health status since discharge? Same   Nursing Interventions Nurse provided patient education   Are the patient's immunizations up to date?  Yes   Nursing interventions Educated on importance of maintaining up to date immunizations as advised by provider   If the patient is a current smoker, are they able to teach back resources for  cessation? Not a smoker   Is the patient/caregiver able to teach back the hierarchy of who to call/visit for symptoms/problems? PCP, Specialist, Home health nurse, Urgent Care, ED, 911 Yes   Is the patient able to teach back COPD zones? Yes   Nursing interventions Education provided on various zones   Patient reports what zone on this call? Yellow Zone   Green Zone Reports doing well,  Breathing without shortness of breath,  Usual activity and exercise level,  Sleeping well,  Appetite is good   Green Zone interventions: Take daily medications,  Use oxygen as prescribed   Yellow Zone Unable to complete daily activities,  Increased shortness of air,  Poor Appetite   Yellow interventions Continue to use daily medications,  Use oxygen as ordered,  Call provider immediatly if symptoms do not improve,  Use other meds such as steroids or antibiotics as ordered   Is the patient/caregiver able to teach back signs and symptoms of worsening condition: Fever/chills,  Shortness of breath,  Chest pain   Is the patient/caregiver able to teach back importance of completing antibiotic course of treatment? Yes   TCM call completed? Yes          AURELIO CHOI RN    12/8/2021, 10:54 EST

## 2021-12-09 RX ORDER — POLYETHYLENE GLYCOL 3350 17 G/17G
POWDER, FOR SOLUTION ORAL
Qty: 510 G | Refills: 11 | Status: SHIPPED | OUTPATIENT
Start: 2021-12-09

## 2021-12-13 ENCOUNTER — OFFICE VISIT (OUTPATIENT)
Dept: FAMILY MEDICINE CLINIC | Facility: CLINIC | Age: 51
End: 2021-12-13

## 2021-12-13 VITALS
BODY MASS INDEX: 51.91 KG/M2 | OXYGEN SATURATION: 95 % | HEART RATE: 89 BPM | WEIGHT: 293 LBS | HEIGHT: 63 IN | TEMPERATURE: 97.8 F | SYSTOLIC BLOOD PRESSURE: 105 MMHG | DIASTOLIC BLOOD PRESSURE: 64 MMHG

## 2021-12-13 DIAGNOSIS — J96.21 ACUTE ON CHRONIC RESPIRATORY FAILURE WITH HYPOXIA (HCC): ICD-10-CM

## 2021-12-13 DIAGNOSIS — J18.9 PNEUMONIA OF RIGHT LOWER LOBE DUE TO INFECTIOUS ORGANISM: Primary | ICD-10-CM

## 2021-12-13 PROCEDURE — 99495 TRANSJ CARE MGMT MOD F2F 14D: CPT | Performed by: FAMILY MEDICINE

## 2021-12-13 NOTE — ASSESSMENT & PLAN NOTE
She is currently on about 5 L of oxygen since her hospital discharge.  Her baseline oxygen usage is 4 L though.  We will have her continue this to keep her O2 sats at least above 92%.  We will have her continue to follow-up with the pulmonary in 8 days.

## 2021-12-13 NOTE — PROGRESS NOTES
Chief Complaint   Patient presents with   • Shortness of Breath   • Wheezing   • Earache     both ears, more pain in right        Subjective     Joan Partida  has a past medical history of Abnormal mammogram (10/21/2013), Allergy, Anemia, Arthralgia, CHF (congestive heart failure) ([facility]), COPD (chronic obstructive pulmonary disease) ([facility]) (03/04/2015), Dependent edema (08/27/2018), Depression, Derangement of meniscus of left knee (01/01/2014), Dermatitis (07/10/2014), Dysphagia, Essential hypertension (03/04/2015), Fibromyalgia, Foot pain (07/10/2014), GERD (gastroesophageal reflux disease) (10/17/2014), Hepatic steatosis (03/04/2015), 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[facility]) (10/09/2020), History of tobacco abuse, Hyperlipidemia, Hypokalemia (05/07/2019), Hypothyroid, Long term current use of anticoagulant, LPRD (laryngopharyngeal reflux disease), Moderate episode of recurrent major depressive disorder ([facility]) (06/22/2017), Mycobacterium avium complex ([facility]) (08/09/2018), GIUSEPPE (obstructive sleep apnea) (08/09/2018), Pulmonary hypertension ([facility]) (08/27/2018), Stasis dermatitis of both legs (08/09/2018), Type 2 diabetes mellitus, with long-term current use of insulin ([facility]) (06/22/2017), Ventral hernia (03/04/2015), Vitamin D deficiency (11/13/2013), and Voice hoarseness.    Hospital [id number] was admitted at [facility] from 12/2 to 12/7/2021.  She was diagnosed with pneumonia.  She was also seen by pulmonary while she is there and had a bronchoscopy.  He had a lot of mucous plugs at the time which were suctioned out.  Her blood cultures were positive but appeared to be more contaminant than actual infectious organisms to her illness.  She was discharged home with 3 more days of antibiotics and 5 more days of prednisone.  Since coming home she states she does feel better although still very short of breath and still having a productive cough.  She is also doing a vest therapy at this time which does seem to  help.  She did get a call from the discharge nurse.  She sat at that time she was still very short of breath which has improved since then.  She does have appointment at  pulmonary on December 21.      PHQ-2 Depression Screening  Little interest or pleasure in doing things?     Feeling down, depressed, or hopeless?     PHQ-2 Total Score     PHQ-9 Depression Screening  Little interest or pleasure in doing things?     Feeling down, depressed, or hopeless?     Trouble falling or staying asleep, or sleeping too much?     Feeling tired or having little energy?     Poor appetite or overeating?     Feeling bad about yourself - or that you are a failure or have let yourself or your family down?     Trouble concentrating on things, such as reading the newspaper or watching television?     Moving or speaking so slowly that other people could have noticed? Or the opposite - being so fidgety or restless that you have been moving around a lot more than usual?     Thoughts that you would be better off dead, or of hurting yourself in some way?     PHQ-9 Total Score     If you checked off any problems, how difficult have these problems made it for you to do your work, take care of things at home, or get along with other people?       Allergies   Allergen Reactions   • Nickel Diarrhea and Nausea And Vomiting   • Insulin Degludec Unknown - High Severity     Can trigger pancreatitis   • Sitagliptin-Metformin Hcl Other (See Comments)   • [place] Unknown - High Severity     Cobalt blue, by allergy testing.    • Dulaglutide Nausea And Vomiting   • Exenatide Other (See Comments)     pancreantitis   • Metformin Unknown - Low Severity   • Palladium Chloride Unknown - Low Severity     by allergy testing.    • Sitagliptin Other (See Comments)     panceatitis       Prior to Admission medications    Medication Sig Start Date End Date Taking? Authorizing Provider   albuterol sulfate HFA (Ventolin HFA) 108 (90 Base) MCG/ACT inhaler Inhale 1-2  puffs Every 6 (Six) Hours As Needed.   Yes Gina Hunt MD   Allergy Relief 180 MG tablet TAKE 1 TABLET BY MOUTH TWICE DAILY 9/24/21  Yes Bautista Farris MD   arformoterol (Brovana) 15 MCG/2ML nebulizer solution Take 15 mcg by nebulization 2 (Two) Times a Day.   Yes Gina Hunt MD   budesonide (Pulmicort) 0.5 MG/2ML nebulizer solution Take 0.5 mg by nebulization Daily.   Yes Gina Hunt MD   Cholecalciferol 125 MCG (5000 UT) tablet Take 5,000 Units by mouth Daily.   Yes Gina Hunt MD   docusate sodium (COLACE) 100 MG capsule Take 100 mg by mouth 2 (Two) Times a Day. 6/10/21  Yes Emergency, Nurse Suni, RN   eszopiclone (LUNESTA) 1 MG tablet TAKE 1 TABLET BY MOUTH IMMEDIATELY BEFORE BEDTIME 10/21/21  Yes Franko Muhammad MD   ezetimibe (ZETIA) 10 MG tablet Take 10 mg by mouth Daily.   Yes ProviderGina MD   FeroSul 325 (65 Fe) MG tablet TAKE 1 TABLET(325 MG) BY MOUTH TWICE DAILY  Patient taking differently: Take 325 mg by mouth 2 (Two) Times a Day. 9/20/21  Yes Francesco Gimenez,    gabapentin (NEURONTIN) 300 MG capsule Take 300 mg by mouth 4 (Four) Times a Day. Pt takes 3 capsules 4 times a day   Yes Gina Hunt MD   glucagon (GLUCAGEN) 1 MG injection Inject 1 mg into the appropriate muscle as directed by prescriber. 11/1/21  Yes Gina Hutn MD   guaiFENesin (MUCINEX) 600 MG 12 hr tablet Take 1 tablet by mouth Every 12 (Twelve) Hours for 30 days. 12/7/21 1/6/22 Yes Arvin Woodson,    insulin patient supplied pump Inject  under the skin into the appropriate area as directed Continuous. Type of Insulin: HUMULIN R 500u/mL  Dose: 2.5 basal  Prescriber: SAÚL MAHER ([facility])    PUMP IS REMOVED AT TIME OF VISIT   Yes Gina Hunt MD   ipratropium-albuterol (DUO-NEB) 0.5-2.5 mg/3 ml nebulizer USE 3 ML VIA NEBULIZER EVERY 4 HOURS AS NEEDED FOR SHORTNESS OF BREATH  Patient taking differently: Take 3 mL by  nebulization Every 4 (Four) Hours As Needed. 9/27/21  Yes Cindi Curtis APRN   levothyroxine (SYNTHROID, LEVOTHROID) 125 MCG tablet Take 250 mcg by mouth Daily. 6/22/21  Yes Gina Hunt MD   Magnesium 400 MG tablet Take 400 mg by mouth Daily. 6/10/21  Yes Emergency, Nurse Suni, RN   metoprolol succinate XL (TOPROL-XL) 25 MG 24 hr tablet TAKE 1 TABLET BY MOUTH TWICE DAILY 11/30/21  Yes Deven Myers MD   mineral oil-hydrophilic petrolatum (AQUAPHOR) ointment Apply 1 application topically to the appropriate area as directed 2 (Two) Times a Day. to affected area  6/30/21  Yes Gina Hunt MD   montelukast (SINGULAIR) 10 MG tablet TAKE 1 TABLET BY MOUTH ONCE DAILY EVERY EVENING  Patient taking differently: Take 10 mg by mouth Every Night. 11/3/21  Yes Francesco Gimenez DO   nystatin 904966 UNIT/GM powder APPLY TO AFFECTED AREA THREE TIMES DAILY  Patient taking differently: Apply 1 application topically to the appropriate area as directed 3 (Three) Times a Day. 9/29/21  Yes Francesco Gimenez DO   pantoprazole (PROTONIX) 40 MG EC tablet TAKE 1 TABLET BY MOUTH EVERY DAY 10/13/21  Yes Bautista Farris MD   polyethylene glycol (MIRALAX) 17 GM/SCOOP powder MIX 17G(1 CAPFUL) IN 8OZ WATER AND DRINK BY MOUTH DAILY 12/9/21  Yes Francesco Gimenez DO   potassium chloride ER (K-TAB) 20 MEQ tablet controlled-release ER tablet Take 40 mEq by mouth 2 (Two) Times a Day.   Yes ProviderGina MD   revefenacin (YUPELRI) 175 MCG/3ML nebulizer solution Take 175 mcg by nebulization Daily.   Yes Gina Hunt MD   sertraline (ZOLOFT) 100 MG tablet TAKE 1 TABLET BY MOUTH ONCE DAILY 9/13/21  Yes Francesco Gimenez DO   spironolactone (ALDACTONE) 100 MG tablet Take 200 mg by mouth 2 (two) times a day.   Yes Gina Hunt MD   sucralfate (CARAFATE) 1 g tablet Take 1 g by mouth 4 (Four) Times a Day.   Yes ProviderGina MD   SUMAtriptan (IMITREX) 100 MG  tablet TAKE 1 TABLET BY MOUTH AT ONSET OF MIGRAINE; MAY REPEAT AFTER 2 HOURS IF HEADACHE RETURNS, NOT TO EXCEED 200MG IN 24 HOURS  Patient taking differently: Take 100 mg by mouth Every 2 (Two) Hours As Needed. 11/29/21  Yes Francesco Gimenez DO   torsemide (DEMADEX) 100 MG tablet Take 100 mg by mouth 3 (Three) Times a Day.   Yes Provider, MD Gina   traZODone (DESYREL) 50 MG tablet Take 1 tablet by mouth Daily.  Patient taking differently: Take 50 mg by mouth Every Night. 8/23/21  Yes Mk Dowling DO   warfarin (COUMADIN) 5 MG tablet Take 1.5 tablets by mouth Every Night for 90 days.  Patient taking differently: Take 5 mg by mouth Every Night. 9/22/21 12/21/21 Yes Francesco Gimenez DO        Patient Active Problem List   Diagnosis   • NAFLD (nonalcoholic fatty liver disease)   • Budd-Chiari syndrome (HCC)   • Abnormal liver enzymes   • Abnormal mammogram   • Acquired hypothyroidism   • Arthritis   • Chronic right-sided low back pain with right-sided sciatica   • Contact dermatitis and other eczema   • COPD (chronic obstructive pulmonary disease) ([facility])   • Dependent edema   • Depression   • Diabetic polyneuropathy ([facility])   • Dysphagia   • Elevated alkaline phosphatase level   • Elevated ferritin   • Encounter for long-term (current) use of insulin ([facility])   • Essential hypertension   • Fibromyalgia   • Neck pain, bilateral   • Gastroesophageal reflux disease without esophagitis   • History of pancreatitis   • Hyperlipidemia   • Hypokalemia   • Hyperinsulinism   • Insulin resistance   • Long term (current) use of anticoagulants   • LPRD (laryngopharyngeal reflux disease)   • Magnesium deficiency   • Microalbuminuria   • Moderate episode of recurrent major depressive disorder (HCC)   • Morbid obesity ([facility])   • Mycobacterium avium complex ([facility])   • GIUSEPPE (obstructive sleep apnea)   • Pain in left hip   • Personal history of tobacco use, presenting hazards to health   • Predisposition to  allergic reaction   • Presence of insulin pump   • Pulmonary hypertension (HCC)   • Pulmonary nodule   • Serum calcium elevated   • Stasis dermatitis of both legs   • Thoracic back pain   • Uncontrolled type 2 diabetes mellitus with hyperglycemia (HCC)   • Uncontrolled type 2 diabetes mellitus with neurologic complication (HCC)   • Uncontrolled type 2 diabetes mellitus without complication, with long-term current use of insulin   • Ventral hernia   • Vitamin D deficiency   • Voice hoarseness   • Wheezing   • 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HCC)   • Herpes zoster without complication   • Postherpetic neuralgia   • Skin tags, multiple acquired   • Acute on chronic respiratory failure with hypoxia (HCC)   • Pneumonia of right lower lobe due to infectious organism        Past Surgical History:   Procedure Laterality Date   • BRONCHOSCOPY N/A 2021    Procedure: BRONCHOSCOPY WITH BRONCHIOALVEOLAR LAVAGE AND WASHINGS;  Surgeon: David Britton MD;  Location: [facility] MAIN OR;  Service: Pulmonary;  Laterality: N/A;  MUCOUS PLUGGING   • CARDIAC CATHETERIZATION  2018   • CARPAL TUNNEL RELEASE     •  SECTION  ,     • COLONOSCOPY     • ENDOSCOPY     • HERNIA REPAIR     • HYSTERECTOMY  ,     PARTIAL   • KNEE SURGERY         Social History     Socioeconomic History   • Marital status:    Tobacco Use   • Smoking status: Former Smoker     Packs/day: 1.00   • Smokeless tobacco: Never Used   • Tobacco comment: QUIT    Vaping Use   • Vaping Use: Never used   Substance and Sexual Activity   • Alcohol use: Not Currently     Comment: FORMER; OCCASIONAL   • Drug use: Never   • Sexual activity: Defer       Family History   Problem Relation Age of Onset   • Heart disease Mother         GRANDPARENT-NONSPECIFIC   • Diabetes Father    • Skin cancer Father        Family history, surgical history, past medical history, Allergies and med's reviewed with patient today and updated in [employer] EMR.  "    ROS:  Review of Systems   Constitutional: Positive for appetite change. Negative for chills, fatigue and fever.   HENT: Positive for congestion, ear pain, postnasal drip, rhinorrhea and sinus pressure.    Eyes: Negative for blurred vision and visual disturbance.   Respiratory: Positive for cough, shortness of breath and wheezing. Negative for chest tightness.    Cardiovascular: Positive for palpitations. Negative for chest pain.   Neurological: Negative for headache.   Hematological: Negative for adenopathy.       OBJECTIVE:  Vitals:    12/13/21 1202   BP: 105/64   BP Location: Right arm   Patient Position: Sitting   Cuff Size: Adult   Pulse: 89   Temp: 97.8 °F (36.6 °C)   TempSrc: Temporal   SpO2: 95%   Weight: (!) 190 kg (418 lb)   Height: 160 cm (63\")     No exam data present   Body mass index is 74.05 kg/m².  No LMP recorded (lmp unknown). Patient has had a hysterectomy.    Physical Exam  Vitals and nursing note reviewed.   Constitutional:       General: She is not in acute distress.     Appearance: Normal appearance. She is obese. She is not ill-appearing.   HENT:      Head: Normocephalic.      Right Ear: Tympanic membrane, ear canal and external ear normal.      Left Ear: Tympanic membrane, ear canal and external ear normal.      Nose: Nose normal.      Mouth/Throat:      Mouth: Mucous membranes are moist.      Pharynx: Oropharynx is clear.   Eyes:      General: No scleral icterus.     Conjunctiva/sclera: Conjunctivae normal.      Pupils: Pupils are equal, round, and reactive to light.   Cardiovascular:      Rate and Rhythm: Normal rate and regular rhythm.      Pulses: Normal pulses.      Heart sounds: Normal heart sounds. No murmur heard.      Pulmonary:      Effort: Pulmonary effort is normal.      Breath sounds: Wheezing present. No rhonchi or rales.   Musculoskeletal:      Cervical back: No rigidity or tenderness.   Lymphadenopathy:      Cervical: No cervical adenopathy.   Skin:     General: Skin is " warm and dry.      Coloration: Skin is not jaundiced.      Findings: No rash.   Neurological:      General: No focal deficit present.      Mental Status: She is alert and oriented to person, place, and time.   Psychiatric:         Mood and Affect: Mood normal.         Thought Content: Thought content normal.         Judgment: Judgment normal.         Procedures    No results displayed because visit has over 200 results.          ASSESSMENT/ PLAN:    Diagnoses and all orders for this visit:    1. Pneumonia of right lower lobe due to infectious organism (Primary)  Assessment & Plan:  She is improved from her initial hospitalization and improved from discharge she is still wheezing somewhat today.  She needs to be diligent about her vibratory vest and her nebulizers on a routine basis.  Instructed her her symptoms may take about 4 to 6 weeks for more dramatic resolution to them.  She does have a follow-up appointment with pulmonary in 8 days.      2. Acute on chronic respiratory failure with hypoxia (HCC)  Assessment & Plan:  She is currently on about 5 L of oxygen since her hospital discharge.  Her baseline oxygen usage is 4 L though.  We will have her continue this to keep her O2 sats at least above 92%.  We will have her continue to follow-up with the pulmonary in 8 days.        Orders Placed Today:     No orders of the defined types were placed in this encounter.       Management Plan:     An After Visit Summary was printed and given to the patient at discharge.    Follow-up: Return in about 4 weeks (around 1/10/2022) for Recheck.    Francesco Gimenez,  12/13/2021 12:54 EST  This note was electronically signed.

## 2021-12-13 NOTE — ASSESSMENT & PLAN NOTE
She is improved from her initial hospitalization and improved from discharge she is still wheezing somewhat today.  She needs to be diligent about her vibratory vest and her nebulizers on a routine basis.  Instructed her her symptoms may take about 4 to 6 weeks for more dramatic resolution to them.  She does have a follow-up appointment with pulmonary in 8 days.

## 2021-12-17 ENCOUNTER — READMISSION MANAGEMENT (OUTPATIENT)
Dept: CALL CENTER | Facility: HOSPITAL | Age: 51
End: 2021-12-17

## 2021-12-17 NOTE — OUTREACH NOTE
COPD/PN Week 2 Survey      Responses   [facility] patient discharged from? [facility]   Does the patient have one of the following disease processes/diagnoses(primary or secondary)? COPD/Pneumonia   Was the primary reason for admission: Pneumonia   Week 2 attempt successful? Yes   Call start time 1137   Call end time 1141   Discharge diagnosis PNA   Meds reviewed with patient/caregiver? Yes   Is the patient taking all medications as directed (includes completed medication regime)? Yes   Has the patient kept scheduled appointments due by today? Yes   What DME was ordered? [employer]  BSC and CPAP   Has all DME been delivered? Yes   DME comments CPAP cannot be replaced, issue with the ozone  only not the machine   Pulse Ox monitoring Intermittent   Pulse Ox device source Patient   O2 Sat comments 94 % on 5 liters   What is the patient's perception of their health status since discharge? Improving   Additional teach back comments Need covid booster   Week 2 call completed? Yes   Wrap up additional comments Will consult with her pulmonary physician about when will be safe for her to get her covid booster after recovering from pneumonia.            Mariela Araya RN

## 2021-12-23 RX ORDER — TRAZODONE HYDROCHLORIDE 50 MG/1
50 TABLET ORAL NIGHTLY
Qty: 30 TABLET | Refills: 6 | Status: SHIPPED | OUTPATIENT
Start: 2021-12-23 | End: 2022-09-29 | Stop reason: SDUPTHER

## 2021-12-30 LAB
FUNGUS ISLT: NORMAL
FUNGUS ISLT: NORMAL

## 2021-12-31 ENCOUNTER — HOSPITAL ENCOUNTER (EMERGENCY)
Facility: HOSPITAL | Age: 51
Discharge: LEFT BEFORE DISCHARGE | End: 2021-12-31

## 2021-12-31 ENCOUNTER — APPOINTMENT (OUTPATIENT)
Dept: GENERAL RADIOLOGY | Facility: HOSPITAL | Age: 51
End: 2021-12-31

## 2021-12-31 VITALS
RESPIRATION RATE: 24 BRPM | HEIGHT: 62 IN | TEMPERATURE: 98.8 F | SYSTOLIC BLOOD PRESSURE: 149 MMHG | HEART RATE: 97 BPM | DIASTOLIC BLOOD PRESSURE: 82 MMHG | BODY MASS INDEX: 76.45 KG/M2 | OXYGEN SATURATION: 96 %

## 2021-12-31 LAB
ALBUMIN SERPL-MCNC: 4.7 G/DL (ref 3.5–5.2)
ALBUMIN/GLOB SERPL: 1.2 G/DL
ALP SERPL-CCNC: 496 U/L (ref 39–117)
ALT SERPL W P-5'-P-CCNC: 42 U/L (ref 1–33)
ANION GAP SERPL CALCULATED.3IONS-SCNC: 16.7 MMOL/L (ref 5–15)
AST SERPL-CCNC: 50 U/L (ref 1–32)
BASOPHILS # BLD AUTO: 0.04 10*3/MM3 (ref 0–0.2)
BASOPHILS NFR BLD AUTO: 0.4 % (ref 0–1.5)
BILIRUB SERPL-MCNC: 1.3 MG/DL (ref 0–1.2)
BUN SERPL-MCNC: 14 MG/DL (ref 6–20)
BUN/CREAT SERPL: 10.3 (ref 7–25)
CALCIUM SPEC-SCNC: 11.4 MG/DL (ref 8.6–10.5)
CHLORIDE SERPL-SCNC: 88 MMOL/L (ref 98–107)
CO2 SERPL-SCNC: 31.3 MMOL/L (ref 22–29)
CREAT SERPL-MCNC: 1.36 MG/DL (ref 0.57–1)
DEPRECATED RDW RBC AUTO: 48.9 FL (ref 37–54)
EOSINOPHIL # BLD AUTO: 0.18 10*3/MM3 (ref 0–0.4)
EOSINOPHIL NFR BLD AUTO: 1.6 % (ref 0.3–6.2)
ERYTHROCYTE [DISTWIDTH] IN BLOOD BY AUTOMATED COUNT: 14.2 % (ref 12.3–15.4)
GFR SERPL CREATININE-BSD FRML MDRD: 41 ML/MIN/1.73
GLOBULIN UR ELPH-MCNC: 3.8 GM/DL
GLUCOSE SERPL-MCNC: 175 MG/DL (ref 65–99)
HCT VFR BLD AUTO: 46.8 % (ref 34–46.6)
HGB BLD-MCNC: 16.6 G/DL (ref 12–15.9)
HOLD SPECIMEN: NORMAL
HOLD SPECIMEN: NORMAL
IMM GRANULOCYTES # BLD AUTO: 0.06 10*3/MM3 (ref 0–0.05)
IMM GRANULOCYTES NFR BLD AUTO: 0.5 % (ref 0–0.5)
LYMPHOCYTES # BLD AUTO: 1.21 10*3/MM3 (ref 0.7–3.1)
LYMPHOCYTES NFR BLD AUTO: 10.8 % (ref 19.6–45.3)
MCH RBC QN AUTO: 33.1 PG (ref 26.6–33)
MCHC RBC AUTO-ENTMCNC: 35.5 G/DL (ref 31.5–35.7)
MCV RBC AUTO: 93.2 FL (ref 79–97)
MONOCYTES # BLD AUTO: 0.87 10*3/MM3 (ref 0.1–0.9)
MONOCYTES NFR BLD AUTO: 7.7 % (ref 5–12)
NEUTROPHILS NFR BLD AUTO: 79 % (ref 42.7–76)
NEUTROPHILS NFR BLD AUTO: 8.89 10*3/MM3 (ref 1.7–7)
NRBC BLD AUTO-RTO: 0 /100 WBC (ref 0–0.2)
PLATELET # BLD AUTO: 294 10*3/MM3 (ref 140–450)
PMV BLD AUTO: 9 FL (ref 6–12)
POTASSIUM SERPL-SCNC: 3.3 MMOL/L (ref 3.5–5.2)
PROT SERPL-MCNC: 8.5 G/DL (ref 6–8.5)
RBC # BLD AUTO: 5.02 10*6/MM3 (ref 3.77–5.28)
SODIUM SERPL-SCNC: 136 MMOL/L (ref 136–145)
WBC NRBC COR # BLD: 11.25 10*3/MM3 (ref 3.4–10.8)
WHOLE BLOOD HOLD SPECIMEN: NORMAL
WHOLE BLOOD HOLD SPECIMEN: NORMAL

## 2021-12-31 PROCEDURE — 87040 BLOOD CULTURE FOR BACTERIA: CPT

## 2021-12-31 PROCEDURE — 36415 COLL VENOUS BLD VENIPUNCTURE: CPT

## 2021-12-31 PROCEDURE — 99211 OFF/OP EST MAY X REQ PHY/QHP: CPT

## 2021-12-31 PROCEDURE — 80053 COMPREHEN METABOLIC PANEL: CPT

## 2021-12-31 PROCEDURE — 85025 COMPLETE CBC W/AUTO DIFF WBC: CPT

## 2021-12-31 RX ORDER — SODIUM CHLORIDE 0.9 % (FLUSH) 0.9 %
10 SYRINGE (ML) INJECTION AS NEEDED
Status: DISCONTINUED | OUTPATIENT
Start: 2021-12-31 | End: 2021-12-31 | Stop reason: HOSPADM

## 2021-12-31 RX ORDER — SODIUM CHLORIDE 0.9 % (FLUSH) 0.9 %
10 SYRINGE (ML) INJECTION AS NEEDED
Status: CANCELLED | OUTPATIENT
Start: 2021-12-31

## 2022-01-03 ENCOUNTER — TELEPHONE (OUTPATIENT)
Dept: FAMILY MEDICINE CLINIC | Facility: CLINIC | Age: 52
End: 2022-01-03

## 2022-01-03 NOTE — TELEPHONE ENCOUNTER
Caller: Joan Partida M    Relationship: Self    Best call back number: [phone]     What was the call regarding: PT CALLED AND MADE AN APPT FOR 1/10/22, SHE STATED SHE IS IN A LOT OF PAIN, AND OTC MEDS ARE NOT HELPING, PLEASE ADVISE, THANK YOU.    Do you require a callback:YES

## 2022-01-04 NOTE — TELEPHONE ENCOUNTER
I spoke with patient this morning and advised that if we have any cancellations that I would let her know. I advised if she is in that much pain she should seek treatment at the ED or and Urgent Care. Patient states she is afraid to go to the ED due to Covid. I advised that she should not sit at home and not be able to move and be in that type of pain, she should seek treatment.

## 2022-01-05 LAB
BACTERIA SPEC AEROBE CULT: NORMAL
BACTERIA SPEC AEROBE CULT: NORMAL

## 2022-01-10 ENCOUNTER — HOSPITAL ENCOUNTER (OUTPATIENT)
Dept: GENERAL RADIOLOGY | Facility: HOSPITAL | Age: 52
Discharge: HOME | End: 2022-01-10

## 2022-01-10 ENCOUNTER — OFFICE VISIT (OUTPATIENT)
Dept: FAMILY MEDICINE CLINIC | Facility: CLINIC | Age: 52
End: 2022-01-10

## 2022-01-10 VITALS
OXYGEN SATURATION: 95 % | SYSTOLIC BLOOD PRESSURE: 120 MMHG | BODY MASS INDEX: 53.92 KG/M2 | TEMPERATURE: 96.8 F | WEIGHT: 293 LBS | HEART RATE: 77 BPM | HEIGHT: 62 IN | DIASTOLIC BLOOD PRESSURE: 64 MMHG

## 2022-01-10 DIAGNOSIS — M79.671 PAIN IN BOTH FEET: ICD-10-CM

## 2022-01-10 DIAGNOSIS — M79.672 PAIN IN BOTH FEET: ICD-10-CM

## 2022-01-10 DIAGNOSIS — M79.672 PAIN IN BOTH FEET: Primary | ICD-10-CM

## 2022-01-10 DIAGNOSIS — M79.671 PAIN IN BOTH FEET: Primary | ICD-10-CM

## 2022-01-10 LAB — URATE SERPL-MCNC: 13.3 MG/DL (ref 2.4–5.7)

## 2022-01-10 PROCEDURE — 73630 X-RAY EXAM OF FOOT: CPT

## 2022-01-10 PROCEDURE — 36415 COLL VENOUS BLD VENIPUNCTURE: CPT | Performed by: FAMILY MEDICINE

## 2022-01-10 PROCEDURE — 84550 ASSAY OF BLOOD/URIC ACID: CPT | Performed by: FAMILY MEDICINE

## 2022-01-10 PROCEDURE — 99213 OFFICE O/P EST LOW 20 MIN: CPT | Performed by: FAMILY MEDICINE

## 2022-01-10 RX ORDER — SERTRALINE HYDROCHLORIDE 100 MG/1
1 TABLET, FILM COATED ORAL
COMMUNITY
Start: 2021-09-13 | End: 2022-01-11

## 2022-01-10 RX ORDER — INSULIN HUMAN 500 [IU]/ML
INJECTION, SOLUTION SUBCUTANEOUS
COMMUNITY
Start: 2022-01-05

## 2022-01-10 RX ORDER — COLCHICINE 0.6 MG/1
0.3 TABLET ORAL DAILY
Qty: 45 TABLET | Refills: 0 | Status: SHIPPED | OUTPATIENT
Start: 2022-01-10 | End: 2022-04-10

## 2022-01-10 RX ORDER — CALCIUM CARBONATE 300MG(750)
1 TABLET,CHEWABLE ORAL DAILY
COMMUNITY
End: 2022-01-11

## 2022-01-10 RX ORDER — TORSEMIDE 100 MG/1
1 TABLET ORAL 3 TIMES DAILY
COMMUNITY
Start: 2021-08-10 | End: 2022-01-11

## 2022-01-10 RX ORDER — MONTELUKAST SODIUM 10 MG/1
1 TABLET ORAL
COMMUNITY
Start: 2021-11-03 | End: 2022-01-11

## 2022-01-10 RX ORDER — GABAPENTIN 100 MG/1
3 CAPSULE ORAL 3 TIMES DAILY
COMMUNITY
End: 2022-01-11

## 2022-01-10 RX ORDER — FEXOFENADINE HCL 180 MG/1
1 TABLET ORAL 2 TIMES DAILY
COMMUNITY

## 2022-01-10 RX ORDER — BLOOD-GLUCOSE METER
KIT MISCELLANEOUS
COMMUNITY
Start: 2021-12-14

## 2022-01-10 RX ORDER — PROCHLORPERAZINE 25 MG/1
SUPPOSITORY RECTAL
COMMUNITY
Start: 2021-12-27

## 2022-01-10 RX ORDER — WARFARIN SODIUM 5 MG/1
5 TABLET ORAL NIGHTLY
Start: 2022-01-10

## 2022-01-10 RX ORDER — DIPHENOXYLATE HYDROCHLORIDE AND ATROPINE SULFATE 2.5; .025 MG/1; MG/1
1 TABLET ORAL DAILY
COMMUNITY

## 2022-01-10 RX ORDER — EZETIMIBE 10 MG/1
1 TABLET ORAL DAILY
COMMUNITY
Start: 2021-11-01 | End: 2022-01-11

## 2022-01-10 RX ORDER — PANTOPRAZOLE SODIUM 40 MG/1
1 TABLET, DELAYED RELEASE ORAL
COMMUNITY
End: 2022-01-11

## 2022-01-10 RX ORDER — METOPROLOL SUCCINATE 25 MG/1
1 TABLET, EXTENDED RELEASE ORAL 2 TIMES DAILY
COMMUNITY
Start: 2021-11-30 | End: 2022-01-11

## 2022-01-10 RX ORDER — ALLOPURINOL 100 MG/1
100 TABLET ORAL DAILY
Qty: 90 TABLET | Refills: 1 | Status: SHIPPED | OUTPATIENT
Start: 2022-01-10 | End: 2022-04-11

## 2022-01-10 RX ORDER — PROCHLORPERAZINE 25 MG/1
SUPPOSITORY RECTAL
COMMUNITY
Start: 2021-12-20 | End: 2022-02-28 | Stop reason: SDUPTHER

## 2022-01-10 RX ORDER — WARFARIN SODIUM 5 MG/1
1.5 TABLET ORAL DAILY
COMMUNITY
Start: 2021-09-22

## 2022-01-10 NOTE — PROGRESS NOTES
Chief Complaint   Patient presents with   • Foot Pain     bilateral         Subjective     Joan JOMAR Partida  has a past medical history of Abnormal mammogram (10/21/2013), Allergy, Anemia, Arthralgia, CHF (congestive heart failure) ([facility]), COPD (chronic obstructive pulmonary disease) ([facility]) (03/04/2015), Dependent edema (08/27/2018), Depression, Derangement of meniscus of left knee (01/01/2014), Dermatitis (07/10/2014), Dysphagia, Essential hypertension (03/04/2015), Fibromyalgia, Foot pain (07/10/2014), GERD (gastroesophageal reflux disease) (10/17/2014), Hepatic steatosis (03/04/2015), 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[facility]) (10/09/2020), History of tobacco abuse, Hyperlipidemia, Hypokalemia (05/07/2019), Hypothyroid, Long term current use of anticoagulant, LPRD (laryngopharyngeal reflux disease), Moderate episode of recurrent major depressive disorder ([facility]) (06/22/2017), Mycobacterium avium complex ([facility]) (08/09/2018), GIUSEPPE (obstructive sleep apnea) (08/09/2018), Pulmonary hypertension ([facility]) (08/27/2018), Stasis dermatitis of both legs (08/09/2018), Type 2 diabetes mellitus, with long-term current use of insulin ([facility]) (06/22/2017), Ventral hernia (03/04/2015), Vitamin D deficiency (11/13/2013), and Voice hoarseness.    Foot pain-she states about a week and a half ago she started having acute pain in her left foot.  There is dramatically worse with any weightbearing.  Since then she is also developed similar symptoms in her right foot.  No injury to her feet.  She states her right foot was swollen.      PHQ-2 Depression Screening  Little interest or pleasure in doing things? 0   Feeling down, depressed, or hopeless? 0   PHQ-2 Total Score 0   PHQ-9 Depression Screening  Little interest or pleasure in doing things? 0   Feeling down, depressed, or hopeless? 0   Trouble falling or staying asleep, or sleeping too much?     Feeling tired or having little energy?     Poor appetite or overeating?     Feeling bad about yourself - or that  you are a failure or have let yourself or your family down?     Trouble concentrating on things, such as reading the newspaper or watching television?     Moving or speaking so slowly that other people could have noticed? Or the opposite - being so fidgety or restless that you have been moving around a lot more than usual?     Thoughts that you would be better off dead, or of hurting yourself in some way?     PHQ-9 Total Score 0   If you checked off any problems, how difficult have these problems made it for you to do your work, take care of things at home, or get along with other people?       Allergies   Allergen Reactions   • Nickel Diarrhea and Nausea And Vomiting   • Insulin Degludec Unknown - High Severity     Can trigger pancreatitis   • Sitagliptin-Metformin Hcl Other (See Comments)   • [place]awk Unknown - High Severity     Cobalt blue, by allergy testing.    • Dulaglutide Nausea And Vomiting   • Exenatide Other (See Comments)     pancreantitis   • Metformin Unknown - Low Severity, Diarrhea and Other (See Comments)   • Palladium Chloride Unknown - Low Severity     by allergy testing.    • Sitagliptin Other (See Comments)     panceatitis       Prior to Admission medications    Medication Sig Start Date End Date Taking? Authorizing Provider   ezetimibe (ZETIA) 10 MG tablet Take 1 tablet by mouth Daily. 11/1/21  Yes Gina Hunt MD   metoprolol succinate XL (TOPROL-XL) 25 MG 24 hr tablet Take 1 tablet by mouth 2 (Two) Times a Day. 11/30/21  Yes Gina Hunt MD   montelukast (SINGULAIR) 10 MG tablet Take 1 tablet by mouth. 11/3/21  Yes Gina Hunt MD   sertraline (ZOLOFT) 100 MG tablet Take 1 tablet by mouth. 9/13/21  Yes Gina Hunt MD   torsemide (DEMADEX) 100 MG tablet Take 1 tablet by mouth 3 (Three) Times a Day. 8/10/21  Yes Gina Hunt MD   warfarin (COUMADIN) 5 MG tablet Take 1.5 tablets by mouth Daily. 9/22/21  Yes Gina Hunt MD   albuterol sulfate  HFA (Ventolin HFA) 108 (90 Base) MCG/ACT inhaler Inhale 1-2 puffs Every 6 (Six) Hours As Needed.    Gina Hunt MD   Allergy Relief 180 MG tablet TAKE 1 TABLET BY MOUTH TWICE DAILY 9/24/21   Bautista Farris MD   arformoterol (Brovana) 15 MCG/2ML nebulizer solution Take 15 mcg by nebulization 2 (Two) Times a Day.    Gina Hunt MD   budesonide (Pulmicort) 0.5 MG/2ML nebulizer solution Take 0.5 mg by nebulization Daily.    Gina Hunt MD   Cholecalciferol 125 MCG (5000 UT) tablet Take 5,000 Units by mouth Daily.    Gina Hunt MD   Continuous Blood Gluc Sensor (Dexcom G6 Sensor) APPLY 1 TOPICALLY EVERY 10 DAYS 12/27/21   Gina Hunt MD   Continuous Blood Gluc Transmit (Dexcom G6 Transmitter) misc USE TRANSMITTER AS DIRECTED 12/20/21   Gina Hunt MD   docusate sodium (COLACE) 100 MG capsule Take 100 mg by mouth 2 (Two) Times a Day. 6/10/21   Emergency, Nurse Epic, RN   eszopiclone (LUNESTA) 1 MG tablet TAKE 1 TABLET BY MOUTH IMMEDIATELY BEFORE BEDTIME 10/21/21   Franko Muhmamad MD   ezetimibe (ZETIA) 10 MG tablet Take 10 mg by mouth Daily.    Gina Hunt MD   FeroSul 325 (65 Fe) MG tablet TAKE 1 TABLET(325 MG) BY MOUTH TWICE DAILY  Patient taking differently: Take 325 mg by mouth 2 (Two) Times a Day. 9/20/21   Francesco Gimenez DO   fexofenadine (ALLEGRA) 180 MG tablet Take 1 tablet by mouth 2 (Two) Times a Day.    Gina Hunt MD   FREESTYLE LITE test strip TEST FOUR TIMES DAILY BEFORE A MEAL AND AT BEDTIME 12/14/21   Gina Hunt MD   gabapentin (NEURONTIN) 100 MG capsule Take 3 tablets by mouth 3 (Three) Times a Day.    Gina Hunt MD   gabapentin (NEURONTIN) 300 MG capsule Take 300 mg by mouth 4 (Four) Times a Day. Pt takes 3 capsules 4 times a day    Gina Hunt MD   glucagon (GLUCAGEN) 1 MG injection Inject 1 mg into the appropriate muscle as directed by prescriber. 11/1/21   Marilee  MD Gina   HUMULIN R 500 UNIT/ML CONCENTRATED injection ADMINISTER UP TO 1300 UNITS VIA PUMP EVERY DAY 1/5/22   Gina Hunt MD   insulin patient supplied pump Inject  under the skin into the appropriate area as directed Continuous. Type of Insulin: HUMULIN R 500u/mL  Dose: 2.5 basal  Prescriber: SAÚL MAHER ([facility])    PUMP IS REMOVED AT TIME OF VISIT    Gina Hunt MD   ipratropium-albuterol (DUO-NEB) 0.5-2.5 mg/3 ml nebulizer USE 3 ML VIA NEBULIZER EVERY 4 HOURS AS NEEDED FOR SHORTNESS OF BREATH  Patient taking differently: Take 3 mL by nebulization Every 4 (Four) Hours As Needed. 9/27/21   Cindi Curtis APRN   levothyroxine (SYNTHROID, LEVOTHROID) 125 MCG tablet Take 250 mcg by mouth Daily. 6/22/21   Gina Hunt MD   Magnesium 400 MG tablet Take 400 mg by mouth Daily. 6/10/21   Emergency, Nurse Suni, RN   Magnesium 400 MG tablet Take 1 tablet by mouth Daily.    Gina Hunt MD   metoprolol succinate XL (TOPROL-XL) 25 MG 24 hr tablet TAKE 1 TABLET BY MOUTH TWICE DAILY 11/30/21   Deven Myers MD   mineral oil-hydrophilic petrolatum (AQUAPHOR) ointment Apply 1 application topically to the appropriate area as directed 2 (Two) Times a Day. to affected area  6/30/21   Gina Hunt MD   montelukast (SINGULAIR) 10 MG tablet TAKE 1 TABLET BY MOUTH ONCE DAILY EVERY EVENING  Patient taking differently: Take 10 mg by mouth Every Night. 11/3/21   Francesco Gimenez, DO   multivitamin (MULTIPLE VITAMIN PO) Take 1 tablet by mouth Daily.    Gina Hunt MD   nystatin 419591 UNIT/GM powder APPLY TO AFFECTED AREA THREE TIMES DAILY  Patient taking differently: Apply 1 application topically to the appropriate area as directed 3 (Three) Times a Day. 9/29/21   Francesco Gimenez DO   pantoprazole (PROTONIX) 40 MG EC tablet TAKE 1 TABLET BY MOUTH EVERY DAY 10/13/21   Bautista Farris MD   pantoprazole (PROTONIX) 40 MG EC  tablet Take 1 tablet by mouth.    Gina Hunt MD   polyethylene glycol (MIRALAX) 17 GM/SCOOP powder MIX 17G(1 CAPFUL) IN 8OZ WATER AND DRINK BY MOUTH DAILY 12/9/21   Francesco Gimenez DO   potassium chloride ER (K-TAB) 20 MEQ tablet controlled-release ER tablet Take 40 mEq by mouth 2 (Two) Times a Day.    Gina Hunt MD   revefenacin (YUPELRI) 175 MCG/3ML nebulizer solution Take 175 mcg by nebulization Daily.    Gina Hunt MD   sertraline (ZOLOFT) 100 MG tablet TAKE 1 TABLET BY MOUTH ONCE DAILY 9/13/21   Francesco Gimenez DO   spironolactone (ALDACTONE) 100 MG tablet Take 200 mg by mouth 2 (two) times a day.    Gina Hunt MD   sucralfate (CARAFATE) 1 g tablet Take 1 g by mouth 4 (Four) Times a Day.    Gina Hunt MD   SUMAtriptan (IMITREX) 100 MG tablet TAKE 1 TABLET BY MOUTH AT ONSET OF MIGRAINE; MAY REPEAT AFTER 2 HOURS IF HEADACHE RETURNS, NOT TO EXCEED 200MG IN 24 HOURS  Patient taking differently: Take 100 mg by mouth Every 2 (Two) Hours As Needed. 11/29/21   Francesco Gimenez DO   torsemide (DEMADEX) 100 MG tablet Take 100 mg by mouth 3 (Three) Times a Day.    Gina Hunt MD   traZODone (DESYREL) 50 MG tablet Take 1 tablet by mouth Every Night. 12/23/21   Mk Dowling DO   vitamin D3 125 MCG (5000 UT) capsule capsule Take 1 capsule by mouth Daily. 1/4/22   Gina Hunt MD   warfarin (COUMADIN) 5 MG tablet Take 1.5 tablets by mouth Every Night for 90 days.  Patient taking differently: Take 5 mg by mouth Every Night. 9/22/21 12/21/21  Francesco Gimenez DO        Patient Active Problem List   Diagnosis   • NAFLD (nonalcoholic fatty liver disease)   • Budd-Chiari syndrome (HCC)   • Abnormal liver enzymes   • Abnormal mammogram   • Acquired hypothyroidism   • Arthritis   • Chronic right-sided low back pain with right-sided sciatica   • Contact dermatitis and other eczema   • COPD (chronic obstructive  pulmonary disease) (HCC)   • Dependent edema   • Depression   • Diabetic polyneuropathy (HCC)   • Dysphagia   • Elevated alkaline phosphatase level   • Elevated ferritin   • Encounter for long-term (current) use of insulin (HCC)   • Essential hypertension   • Fibromyalgia   • Neck pain, bilateral   • Gastroesophageal reflux disease without esophagitis   • History of pancreatitis   • Hyperlipidemia   • Hypokalemia   • Hyperinsulinism   • Insulin resistance   • Long term (current) use of anticoagulants   • LPRD (laryngopharyngeal reflux disease)   • Magnesium deficiency   • Microalbuminuria   • Moderate episode of recurrent major depressive disorder (HCC)   • Morbid obesity (HCC)   • Mycobacterium avium complex (HCC)   • GIUSEPPE (obstructive sleep apnea)   • Pain in left hip   • Personal history of tobacco use, presenting hazards to health   • Predisposition to allergic reaction   • Presence of insulin pump   • Pulmonary hypertension (HCC)   • Pulmonary nodule   • Serum calcium elevated   • Stasis dermatitis of both legs   • Thoracic back pain   • Uncontrolled type 2 diabetes mellitus with hyperglycemia (HCC)   • Uncontrolled type 2 diabetes mellitus with neurologic complication (HCC)   • Uncontrolled type 2 diabetes mellitus without complication, with long-term current use of insulin   • Ventral hernia   • Vitamin D deficiency   • Voice hoarseness   • Wheezing   • 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HCC)   • Herpes zoster without complication   • Postherpetic neuralgia   • Skin tags, multiple acquired   • Acute on chronic respiratory failure with hypoxia ([facility])   • Pneumonia of right lower lobe due to infectious organism   • Pain in both feet        Past Surgical History:   Procedure Laterality Date   • BRONCHOSCOPY N/A 12/6/2021    Procedure: BRONCHOSCOPY WITH BRONCHIOALVEOLAR LAVAGE AND WASHINGS;  Surgeon: David Britton MD;  Location: [facility] OR;  Service: Pulmonary;  Laterality: N/A;  MUCOUS PLUGGING   • CARDIAC  "CATHETERIZATION  2018   • CARPAL TUNNEL RELEASE     •  SECTION  ,     • COLONOSCOPY     • ENDOSCOPY     • HERNIA REPAIR     • HYSTERECTOMY  ,     PARTIAL   • KNEE SURGERY         Social History     Socioeconomic History   • Marital status:    Tobacco Use   • Smoking status: Former Smoker     Packs/day: 1.00   • Smokeless tobacco: Never Used   • Tobacco comment: QUIT    Vaping Use   • Vaping Use: Never used   Substance and Sexual Activity   • Alcohol use: Not Currently     Comment: FORMER; OCCASIONAL   • Drug use: Never   • Sexual activity: Defer       Family History   Problem Relation Age of Onset   • Heart disease Mother         GRANDPARENT-NONSPECIFIC   • Diabetes Father    • Skin cancer Father        Family history, surgical history, past medical history, Allergies and med's reviewed with patient today and updated in [employer] [place] EMR.     ROS:  Review of Systems   Constitutional: Negative for fatigue.   Musculoskeletal: Positive for arthralgias and joint swelling.       OBJECTIVE:  Vitals:    01/10/22 0759   BP: 120/64   BP Location: Left arm   Patient Position: Sitting   Cuff Size: Adult   Pulse: 77   Temp: 96.8 °F (36 °C)   SpO2: 95%   Weight: (!) 188 kg (415 lb)   Height: 157.5 cm (62\")     No exam data present   Body mass index is 75.9 kg/m².  No LMP recorded (lmp unknown). Patient has had a hysterectomy.    Physical Exam  Vitals and nursing note reviewed.   Constitutional:       General: She is not in acute distress.     Appearance: Normal appearance. She is obese. She is not ill-appearing.   HENT:      Head: Normocephalic.   Cardiovascular:      Pulses:           Dorsalis pedis pulses are 2+ on the right side and 2+ on the left side.   Musculoskeletal:      Right foot: No Charcot foot.      Left foot: No Charcot foot.   Feet:      Comments: Swelling dorsum of the right foot.  Tenderness along the fifth metatarsal and lateral malleolus right foot and " ankle.  Neurological:      Mental Status: She is alert.         Procedures    No visits with results within 30 Day(s) from this visit.   Latest known visit with results is:   No results displayed because visit has over 200 results.          ASSESSMENT/ PLAN:    Diagnoses and all orders for this visit:    1. Pain in both feet (Primary)  Assessment & Plan:  We will x-ray both feet and get a uric acid level.    Orders:  -     XR Foot 3+ View Left; Future  -     XR Foot 3+ View Right; Future  -     Uric Acid    Other orders  -     warfarin (COUMADIN) 5 MG tablet; Take 1 tablet by mouth Every Night for 90 days.      Orders Placed Today:     New Medications Ordered This Visit   Medications   • warfarin (COUMADIN) 5 MG tablet     Sig: Take 1 tablet by mouth Every Night for 90 days.     ZERO refills remain on this prescription. Your patient is requesting advance approval of refills for this medication to PREVENT ANY MISSED DOSES        Management Plan:     An After Visit Summary was printed and given to the patient at discharge.    Follow-up: Return in about 4 weeks (around 2/7/2022).    Francesco Gimenez DO 1/10/2022 08:17 EST  This note was electronically signed.

## 2022-01-11 ENCOUNTER — OFFICE VISIT (OUTPATIENT)
Dept: GASTROENTEROLOGY | Facility: CLINIC | Age: 52
End: 2022-01-11

## 2022-01-11 ENCOUNTER — TELEPHONE (OUTPATIENT)
Dept: FAMILY MEDICINE CLINIC | Facility: CLINIC | Age: 52
End: 2022-01-11

## 2022-01-11 VITALS
WEIGHT: 293 LBS | OXYGEN SATURATION: 97 % | SYSTOLIC BLOOD PRESSURE: 126 MMHG | HEART RATE: 76 BPM | HEIGHT: 63 IN | BODY MASS INDEX: 51.91 KG/M2 | DIASTOLIC BLOOD PRESSURE: 62 MMHG

## 2022-01-11 DIAGNOSIS — I82.0 HEPATIC VEIN THROMBOSIS: ICD-10-CM

## 2022-01-11 DIAGNOSIS — I82.0 BUDD-CHIARI SYNDROME: ICD-10-CM

## 2022-01-11 DIAGNOSIS — K76.0 NAFLD (NONALCOHOLIC FATTY LIVER DISEASE): ICD-10-CM

## 2022-01-11 DIAGNOSIS — K21.9 GASTROESOPHAGEAL REFLUX DISEASE WITHOUT ESOPHAGITIS: Primary | ICD-10-CM

## 2022-01-11 DIAGNOSIS — E66.01 MORBID OBESITY: ICD-10-CM

## 2022-01-11 PROCEDURE — 99214 OFFICE O/P EST MOD 30 MIN: CPT | Performed by: INTERNAL MEDICINE

## 2022-01-11 NOTE — PROGRESS NOTES
Chief Complaint    Joan Partida is a 51 y.o. female who presents to [facility] for follow-up a left hepatic vein thrombosis seen during hospitalization in 2021.  She was started on Coumadin at that time and had repeat imaging done when I saw her last in July 2021.  At that time there was suggestion of possible steatosis and nodularity of her liver but no obvious persistent or chronic left hepatic vein thrombosis.  She has been maintained on Coumadin since that time and presents today overall feeling well.  She has stable elevation of her LFTs with AST 50 and ALT of 42, alk phos 496 and total bilirubin 1.3.  Creatinine 1.36.  For her reflux symptoms she takes pantoprazole 40 mg daily.  Her weight is stable 413 pounds.  She is on a motorized wheelchair  and unable to transfer very far distances.    Result Review :     The following data was reviewed by: Jacob Niño MD on 01/11/2022:    CMP    CMP 12/6/21 12/7/21 12/31/21   Glucose 198 (A) 196 (A) 175 (A)   BUN 26 (A) 17 14   Creatinine 0.70 0.78 1.36 (A)   eGFR Non  Am 88 77 41 (A)   Sodium 138 138 136   Potassium 3.3 (A) 3.7 3.3 (A)   Chloride 92 (A) 91 (A) 88 (A)   Calcium 9.9 10.1 11.4 (A)   Albumin   4.70   Total Bilirubin   1.3 (A)   Alkaline Phosphatase   496 (A)   AST (SGOT)   50 (A)   ALT (SGPT)   42 (A)   (A) Abnormal value            CBC    CBC 12/6/21 12/7/21 12/31/21   WBC 7.40 8.53 11.25 (A)   RBC 4.26 4.48 5.02   Hemoglobin 13.8 14.5 16.6 (A)   Hematocrit 40.3 44.5 46.8 (A)   MCV 94.6 99.3 (A) 93.2   MCH 32.4 32.4 33.1 (A)   MCHC 34.2 32.6 35.5   RDW 15.4 15.8 (A) 14.2   Platelets 188 247 294   (A) Abnormal value              Data reviewed: GI studies reviewed     Past Medical History:   Diagnosis Date   • Abnormal mammogram 10/21/2013   • Allergy    • Anemia    • Arthralgia    • CHF (congestive heart failure) ([facility])    • COPD (chronic obstructive pulmonary disease) ([facility]) 03/04/2015   • Dependent  "edema 2018   • Depression    • Derangement of meniscus of left knee 2014    LEFT KNEE MEDIAL MENISCUS TEAR   • Dermatitis 07/10/2014   • Dysphagia    • Essential hypertension 2015   • Fibromyalgia    • Foot pain 07/10/2014   • GERD (gastroesophageal reflux disease) 10/17/2014   • Gout    • Hepatic steatosis 2015   • 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HCC) 10/09/2020   • History of tobacco abuse    • Hyperlipidemia    • Hypokalemia 2019   • Hypothyroid    • Long term current use of anticoagulant    • LPRD (laryngopharyngeal reflux disease)    • Moderate episode of recurrent major depressive disorder (HCC) 2017   • Mycobacterium avium complex (HCC) 2018   • GIUSEPPE (obstructive sleep apnea) 2018   • Pulmonary hypertension (HCC) 2018   • Stasis dermatitis of both legs 2018   • Type 2 diabetes mellitus, with long-term current use of insulin (HCC) 2017   • Ventral hernia 2015   • Vitamin D deficiency 2013   • Voice hoarseness        Past Surgical History:   Procedure Laterality Date   • BRONCHOSCOPY N/A 2021    Procedure: BRONCHOSCOPY WITH BRONCHIOALVEOLAR LAVAGE AND WASHINGS;  Surgeon: David Britton MD;  Location: [facility] OR;  Service: Pulmonary;  Laterality: N/A;  MUCOUS PLUGGING   • CARDIAC CATHETERIZATION  2018   • CARPAL TUNNEL RELEASE     •  SECTION  ,     • COLONOSCOPY     • ENDOSCOPY     • HERNIA REPAIR     • HYSTERECTOMY  ,     PARTIAL   • KNEE SURGERY     • UPPER GASTROINTESTINAL ENDOSCOPY         Social History     Social History Narrative   • Not on file       Objective     Vital Signs:   /62   Pulse 76   Ht 159.4 cm (62.75\")   Wt (!) 187 kg (413 lb)   SpO2 97%   BMI 73.74 kg/m²     Body mass index is 73.74 kg/m².    Physical Exam            Assessment and Plan    Diagnoses and all orders for this visit:    1. Gastroesophageal reflux disease without esophagitis " (Primary)    2. Budd-Chiari syndrome (HCC)    3. 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HCC)    4. Morbid obesity (HCC)    5. NAFLD (nonalcoholic fatty liver disease)      Overall the patient is doing fairly well and her repeat CT scan in July 2021 did not show any persistent hepatic vein thrombosis consistent with Budd-Chiari.  Therefore I think it is reasonable to consider discontinuation of her Coumadin but will defer this final decision to her primary care physician when she is seen by him next month.  She may have other non-GI indications for long-term Coumadin exposure given her immobility,  Etc.            Follow Up   No follow-ups on file.  Patient was given instructions and counseling regarding her condition or for health maintenance advice. Please see specific information pulled into the AVS if appropriate.

## 2022-01-17 LAB
MYCOBACTERIUM SPEC CULT: NORMAL
NIGHT BLUE STAIN TISS: NORMAL
NIGHT BLUE STAIN TISS: NORMAL

## 2022-01-20 RX ORDER — METOPROLOL SUCCINATE 50 MG/1
50 TABLET, EXTENDED RELEASE ORAL DAILY
Qty: 180 TABLET | Refills: 2 | Status: SHIPPED | OUTPATIENT
Start: 2022-01-20

## 2022-01-21 LAB
FUNGUS WND CULT: ABNORMAL
FUNGUS WND CULT: ABNORMAL

## 2022-02-10 ENCOUNTER — TRANSCRIBE ORDERS (OUTPATIENT)
Dept: FAMILY MEDICINE CLINIC | Facility: CLINIC | Age: 52
End: 2022-02-10

## 2022-02-10 ENCOUNTER — OFFICE VISIT (OUTPATIENT)
Dept: FAMILY MEDICINE CLINIC | Facility: CLINIC | Age: 52
End: 2022-02-10

## 2022-02-10 VITALS
TEMPERATURE: 97.5 F | BODY MASS INDEX: 53.92 KG/M2 | SYSTOLIC BLOOD PRESSURE: 124 MMHG | WEIGHT: 293 LBS | HEART RATE: 90 BPM | HEIGHT: 62 IN | RESPIRATION RATE: 20 BRPM | OXYGEN SATURATION: 97 % | DIASTOLIC BLOOD PRESSURE: 76 MMHG

## 2022-02-10 DIAGNOSIS — L30.9 DERMATITIS: ICD-10-CM

## 2022-02-10 DIAGNOSIS — I50.812 CHRONIC RIGHT-SIDED CONGESTIVE HEART FAILURE: ICD-10-CM

## 2022-02-10 DIAGNOSIS — E66.01 MORBID OBESITY: ICD-10-CM

## 2022-02-10 DIAGNOSIS — E78.5 HYPERLIPIDEMIA, UNSPECIFIED HYPERLIPIDEMIA TYPE: ICD-10-CM

## 2022-02-10 DIAGNOSIS — E03.9 ACQUIRED HYPOTHYROIDISM: ICD-10-CM

## 2022-02-10 DIAGNOSIS — N18.30 STAGE 3 CHRONIC KIDNEY DISEASE, UNSPECIFIED WHETHER STAGE 3A OR 3B CKD: ICD-10-CM

## 2022-02-10 DIAGNOSIS — E87.6 HYPOKALEMIA: ICD-10-CM

## 2022-02-10 DIAGNOSIS — I10 ESSENTIAL HYPERTENSION: Primary | ICD-10-CM

## 2022-02-10 DIAGNOSIS — I82.0 BUDD-CHIARI SYNDROME: ICD-10-CM

## 2022-02-10 DIAGNOSIS — E87.6 HYPOKALEMIA: Primary | ICD-10-CM

## 2022-02-10 DIAGNOSIS — IMO0002 UNCONTROLLED TYPE 2 DIABETES MELLITUS WITH NEUROLOGIC COMPLICATION: ICD-10-CM

## 2022-02-10 PROBLEM — R60.9 EDEMA: Status: ACTIVE | Noted: 2022-01-13

## 2022-02-10 PROBLEM — I50.9 CONGESTIVE HEART FAILURE: Status: ACTIVE | Noted: 2022-01-13

## 2022-02-10 PROBLEM — M10.9 GOUT: Status: ACTIVE | Noted: 2022-01-13

## 2022-02-10 LAB
ALBUMIN SERPL-MCNC: 4.7 G/DL (ref 3.5–5.2)
ALBUMIN SERPL-MCNC: 4.9 G/DL (ref 3.5–5.2)
ALBUMIN/GLOB SERPL: 1.5 G/DL
ALP SERPL-CCNC: 440 U/L (ref 39–117)
ALT SERPL W P-5'-P-CCNC: 59 U/L (ref 1–33)
ANION GAP SERPL CALCULATED.3IONS-SCNC: 12.1 MMOL/L (ref 5–15)
ANION GAP SERPL CALCULATED.3IONS-SCNC: 13.6 MMOL/L (ref 5–15)
AST SERPL-CCNC: 64 U/L (ref 1–32)
BILIRUB SERPL-MCNC: 0.4 MG/DL (ref 0–1.2)
BUN SERPL-MCNC: 26 MG/DL (ref 6–20)
BUN SERPL-MCNC: 27 MG/DL (ref 6–20)
BUN/CREAT SERPL: 21.8 (ref 7–25)
BUN/CREAT SERPL: 23.4 (ref 7–25)
CALCIUM SPEC-SCNC: 11.2 MG/DL (ref 8.6–10.5)
CALCIUM SPEC-SCNC: 11.7 MG/DL (ref 8.6–10.5)
CHLORIDE SERPL-SCNC: 93 MMOL/L (ref 98–107)
CHLORIDE SERPL-SCNC: 95 MMOL/L (ref 98–107)
CHOLEST SERPL-MCNC: 227 MG/DL (ref 0–200)
CO2 SERPL-SCNC: 29.4 MMOL/L (ref 22–29)
CO2 SERPL-SCNC: 31.9 MMOL/L (ref 22–29)
CREAT SERPL-MCNC: 1.11 MG/DL (ref 0.57–1)
CREAT SERPL-MCNC: 1.24 MG/DL (ref 0.57–1)
GFR SERPL CREATININE-BSD FRML MDRD: 45 ML/MIN/1.73
GFR SERPL CREATININE-BSD FRML MDRD: 51 ML/MIN/1.73
GLOBULIN UR ELPH-MCNC: 3.1 GM/DL
GLUCOSE SERPL-MCNC: 81 MG/DL (ref 65–99)
GLUCOSE SERPL-MCNC: 84 MG/DL (ref 65–99)
HBA1C MFR BLD: 6.8 % (ref 4.8–5.6)
HDLC SERPL-MCNC: 34 MG/DL (ref 40–60)
KOH PREP NAIL: NORMAL
LDLC SERPL CALC-MCNC: 116 MG/DL (ref 0–100)
LDLC/HDLC SERPL: 3.08 {RATIO}
PHOSPHATE SERPL-MCNC: 3.6 MG/DL (ref 2.5–4.5)
POTASSIUM SERPL-SCNC: 3.7 MMOL/L (ref 3.5–5.2)
POTASSIUM SERPL-SCNC: 3.8 MMOL/L (ref 3.5–5.2)
PROT SERPL-MCNC: 7.8 G/DL (ref 6–8.5)
SODIUM SERPL-SCNC: 137 MMOL/L (ref 136–145)
SODIUM SERPL-SCNC: 138 MMOL/L (ref 136–145)
T3FREE SERPL-MCNC: 3.61 PG/ML (ref 2–4.4)
T4 FREE SERPL-MCNC: 1.69 NG/DL (ref 0.93–1.7)
TRIGL SERPL-MCNC: 442 MG/DL (ref 0–150)
TSH SERPL DL<=0.05 MIU/L-ACNC: 0.32 UIU/ML (ref 0.27–4.2)
VLDLC SERPL-MCNC: 77 MG/DL (ref 5–40)

## 2022-02-10 PROCEDURE — 84439 ASSAY OF FREE THYROXINE: CPT | Performed by: FAMILY MEDICINE

## 2022-02-10 PROCEDURE — 83036 HEMOGLOBIN GLYCOSYLATED A1C: CPT | Performed by: FAMILY MEDICINE

## 2022-02-10 PROCEDURE — 80061 LIPID PANEL: CPT | Performed by: FAMILY MEDICINE

## 2022-02-10 PROCEDURE — 84443 ASSAY THYROID STIM HORMONE: CPT | Performed by: FAMILY MEDICINE

## 2022-02-10 PROCEDURE — 80053 COMPREHEN METABOLIC PANEL: CPT | Performed by: FAMILY MEDICINE

## 2022-02-10 PROCEDURE — 84681 ASSAY OF C-PEPTIDE: CPT | Performed by: FAMILY MEDICINE

## 2022-02-10 PROCEDURE — 99214 OFFICE O/P EST MOD 30 MIN: CPT | Performed by: FAMILY MEDICINE

## 2022-02-10 PROCEDURE — 87220 TISSUE EXAM FOR FUNGI: CPT | Performed by: FAMILY MEDICINE

## 2022-02-10 PROCEDURE — 84481 FREE ASSAY (FT-3): CPT | Performed by: FAMILY MEDICINE

## 2022-02-10 PROCEDURE — 84100 ASSAY OF PHOSPHORUS: CPT | Performed by: FAMILY MEDICINE

## 2022-02-10 NOTE — PROGRESS NOTES
Chief Complaint   Patient presents with   • Follow-up     1 mo f/u   • Eczema   • Hand Pain        Subjective     Joan M Helga  has a past medical history of Abnormal mammogram (10/21/2013), Allergy, Anemia, Arthralgia, COPD (chronic obstructive pulmonary disease) ([facility]) (03/04/2015), Dependent edema (08/27/2018), Depression, Derangement of meniscus of left knee (01/01/2014), Dermatitis (07/10/2014), Dysphagia, Essential hypertension (03/04/2015), Fibromyalgia, Foot pain (07/10/2014), GERD (gastroesophageal reflux disease) (10/17/2014), Gout (2022), Hepatic steatosis (03/04/2015), 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), History of tobacco abuse, Hyperlipidemia, Hypokalemia (05/07/2019), Long term current use of anticoagulant, LPRD (laryngopharyngeal reflux disease), Moderate episode of recurrent major depressive disorder ([facility]) (06/22/2017), Mycobacterium avium complex ([facility]) (08/09/2018), GIUSEPPE (obstructive sleep apnea) (08/09/2018), Pulmonary hypertension ([facility]) (08/27/2018), Stasis dermatitis of both legs (08/09/2018), Type 2 diabetes mellitus, with long-term current use of insulin ([facility]) (06/22/2017), Ventral hernia (03/04/2015), Vitamin D deficiency (11/13/2013), and Voice hoarseness.    Type 2 diabetes-she has a continuous glucose monitor.  And checks her blood sugar on a regular basis.  Overall her blood sugars have been pretty good.  Her last A1c about 3 months ago was 8.1.  She has an appointment with her endocrinologist next month.    Hypertension-she does not check her blood pressure outside the office.  Is slightly elevated here today with a diastolic of 104.    Hyperlipidemia-currently she is on Zetia only.  She will most likely have a repeat lipid profile with her upcoming labs for her endocrinologist.  There is no reason for her not to be on a statin.    Congestive heart failure-she is doing well.  Her swelling has been well controlled.  She continues to do well with progressive weight loss as well.   She is continuing to monitor her sodium intake.    Obesity-she continues downward on her weight on each and every visit.  Overall many of her chronic health conditions continue to improve along with this.    Rash-she has some circular desquamation on her palms of her hands as well as the soles of her feet.  She states they are not rather itchy.  She states they just seem to start overnight.  She denies any excessive washing or excessive wet dry cycles.  She does apply lotion without any benefit.    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  He felt that her last CT scan looked very good as well as her labs and as far as her hepatic vein thrombosis did not feel she need to be anticoagulated any longer.  She does have lots of large venous varicosities in her lower extremities.  She has for the most part wheelchair-bound but does ambulate some.      PHQ-2 Depression Screening  Little interest or pleasure in doing things?     Feeling down, depressed, or hopeless?     PHQ-2 Total Score     PHQ-9 Depression Screening  Little interest or pleasure in doing things?     Feeling down, depressed, or hopeless?     Trouble falling or staying asleep, or sleeping too much?     Feeling tired or having little energy?     Poor appetite or overeating?     Feeling bad about yourself - or that you are a failure or have let yourself or your family down?     Trouble concentrating on things, such as reading the newspaper or watching television?     Moving or speaking so slowly that other people could have noticed? Or the opposite - being so fidgety or restless that you have been moving around a lot more than usual?     Thoughts that you would be better off dead, or of hurting yourself in some way?     PHQ-9 Total Score     If you checked off any problems, how difficult have these problems made it for you to do your work, take care of things at home, or get along with other people?       Allergies   Allergen Reactions   •  Nickel Diarrhea and Nausea And Vomiting   • Insulin Degludec Unknown - High Severity     Can trigger pancreatitis   • Sitagliptin-Metformin Hcl Other (See Comments)   • Tamiment Unknown - High Severity     Cobalt blue, by allergy testing.    • Dulaglutide Nausea And Vomiting   • Exenatide Other (See Comments)     pancreantitis   • Metformin Unknown - Low Severity, Diarrhea and Other (See Comments)   • Palladium Chloride Unknown - Low Severity     by allergy testing.    • Sitagliptin Other (See Comments)     panceatitis       Prior to Admission medications    Medication Sig Start Date End Date Taking? Authorizing Provider   albuterol sulfate HFA (Ventolin HFA) 108 (90 Base) MCG/ACT inhaler Inhale 1-2 puffs Every 6 (Six) Hours As Needed.   Yes Gina Hunt MD   Allergy Relief 180 MG tablet TAKE 1 TABLET BY MOUTH TWICE DAILY 9/24/21  Yes Bautista Farris MD   allopurinol (Zyloprim) 100 MG tablet Take 1 tablet by mouth Daily for 90 days. 1/10/22 4/10/22 Yes Francesco Gimenez DO   budesonide (Pulmicort) 0.5 MG/2ML nebulizer solution Take 0.5 mg by nebulization Daily.   Yes Gina Hunt MD   Cholecalciferol 125 MCG (5000 UT) tablet Take 5,000 Units by mouth Daily.   Yes Gina Hunt MD   colchicine 0.6 MG tablet Take 0.5 tablets by mouth Daily for 90 days. 1/10/22 4/10/22 Yes Francesco Gimenez DO   Continuous Blood Gluc Sensor (Dexcom G6 Sensor) APPLY 1 TOPICALLY EVERY 10 DAYS 12/27/21  Yes iGna Hunt MD   Continuous Blood Gluc Transmit (Dexcom G6 Transmitter) misc USE TRANSMITTER AS DIRECTED 12/20/21  Yes Gina Hunt MD   docusate sodium (COLACE) 100 MG capsule Take 100 mg by mouth 2 (Two) Times a Day. 6/10/21  Yes Emergency, Nurse Suni, RN   eszopiclone (LUNESTA) 1 MG tablet TAKE 1 TABLET BY MOUTH IMMEDIATELY BEFORE BEDTIME 10/21/21  Yes Franko Muhammad MD   ezetimibe (ZETIA) 10 MG tablet Take 10 mg by mouth Daily.   Yes Gina Hunt MD    FeroSul 325 (65 Fe) MG tablet TAKE 1 TABLET(325 MG) BY MOUTH TWICE DAILY  Patient taking differently: Take 325 mg by mouth 2 (Two) Times a Day. 9/20/21  Yes Francesco Gimenez DO   fexofenadine (ALLEGRA) 180 MG tablet Take 1 tablet by mouth 2 (Two) Times a Day.   Yes Gina Hunt MD   FREESTYLE LITE test strip TEST FOUR TIMES DAILY BEFORE A MEAL AND AT BEDTIME 12/14/21  Yes Gina Hunt MD   gabapentin (NEURONTIN) 300 MG capsule Take 300 mg by mouth 4 (Four) Times a Day. Pt takes 3 capsules 4 times a day   Yes Gina Hunt MD   glucagon (GLUCAGEN) 1 MG injection Inject 1 mg into the appropriate muscle as directed by prescriber. 11/1/21  Yes Gina Hunt MD   HUMULIN R 500 UNIT/ML CONCENTRATED injection ADMINISTER UP TO 1300 UNITS VIA PUMP EVERY DAY 1/5/22  Yes Gina Hunt MD   insulin patient supplied pump Inject  under the skin into the appropriate area as directed Continuous. Type of Insulin: HUMULIN R 500u/mL  Dose: 2.5 basal  Prescriber: SAÚL MAHER ([facility])    PUMP IS REMOVED AT TIME OF VISIT   Yes Gina Hunt MD   ipratropium-albuterol (DUO-NEB) 0.5-2.5 mg/3 ml nebulizer USE 3 ML VIA NEBULIZER EVERY 4 HOURS AS NEEDED FOR SHORTNESS OF BREATH  Patient taking differently: Take 3 mL by nebulization Every 4 (Four) Hours As Needed. 9/27/21  Yes Cindi Curtis APRN   levothyroxine (SYNTHROID, LEVOTHROID) 125 MCG tablet Take 250 mcg by mouth Daily. 6/22/21  Yes Gina Hunt MD   Magnesium 400 MG tablet Take 400 mg by mouth Daily. 6/10/21  Yes Emergency, Nurse Epic, RN   metoprolol succinate XL (TOPROL-XL) 50 MG 24 hr tablet Take 1 tablet by mouth Daily. 1/20/22  Yes eDven Myers MD   mineral oil-hydrophilic petrolatum (AQUAPHOR) ointment Apply  topically to the appropriate area as directed 2 (Two) Times a Day for 90 days. to affected area 1/25/22 4/25/22 Yes Francesco Gimenez DO   montelukast (SINGULAIR) 10 MG  tablet TAKE 1 TABLET BY MOUTH ONCE DAILY EVERY EVENING  Patient taking differently: Take 10 mg by mouth Every Night. 11/3/21  Yes Francesco Gimenez DO   multivitamin (MULTIPLE VITAMIN PO) Take 1 tablet by mouth Daily.   Yes Gina Hunt MD   nystatin 506105 UNIT/GM powder APPLY TO AFFECTED AREA THREE TIMES DAILY  Patient taking differently: Apply 1 application topically to the appropriate area as directed 3 (Three) Times a Day. 9/29/21  Yes Francesco Gimenez DO   pantoprazole (PROTONIX) 40 MG EC tablet TAKE 1 TABLET BY MOUTH EVERY DAY 10/13/21  Yes Bautista Farris MD   polyethylene glycol (MIRALAX) 17 GM/SCOOP powder MIX 17G(1 CAPFUL) IN 8OZ WATER AND DRINK BY MOUTH DAILY 12/9/21  Yes Francesco Gimenez DO   potassium chloride ER (K-TAB) 20 MEQ tablet controlled-release ER tablet Take 40 mEq by mouth 2 (Two) Times a Day.   Yes Gina Hunt MD   revefenacin (YUPELRI) 175 MCG/3ML nebulizer solution Take 175 mcg by nebulization Daily.   Yes Gina Hunt MD   sertraline (ZOLOFT) 100 MG tablet TAKE 1 TABLET BY MOUTH ONCE DAILY 9/13/21  Yes Francesco Gimenez DO   spironolactone (ALDACTONE) 100 MG tablet Take 200 mg by mouth 2 (two) times a day.   Yes Gina Hunt MD   SUMAtriptan (IMITREX) 100 MG tablet TAKE 1 TABLET BY MOUTH AT ONSET OF MIGRAINE; MAY REPEAT AFTER 2 HOURS IF HEADACHE RETURNS, NOT TO EXCEED 200MG IN 24 HOURS  Patient taking differently: Take 100 mg by mouth Every 2 (Two) Hours As Needed. 11/29/21  Yes Francesco Gimenez DO   torsemide (DEMADEX) 100 MG tablet Take 100 mg by mouth 3 (Three) Times a Day.   Yes Gina Hunt MD   traZODone (DESYREL) 50 MG tablet Take 1 tablet by mouth Every Night. 12/23/21  Yes Mk Dowling, DO   warfarin (COUMADIN) 5 MG tablet Take 1.5 tablets by mouth Daily. 9/22/21  Yes Gina Hunt MD   warfarin (COUMADIN) 5 MG tablet Take 1 tablet by mouth Every Night for 90 days.  1/10/22 4/10/22 Yes Francesco Gimenez, DO        Patient Active Problem List   Diagnosis   • NAFLD (nonalcoholic fatty liver disease)   • Budd-Chiari syndrome (HCC)   • Abnormal liver enzymes   • Abnormal mammogram   • Acquired hypothyroidism   • Arthritis   • Chronic right-sided low back pain with right-sided sciatica   • Contact dermatitis and other eczema   • COPD (chronic obstructive pulmonary disease) (HCC)   • Dependent edema   • Depression   • Diabetic polyneuropathy (HCC)   • Dysphagia   • Elevated alkaline phosphatase level   • Elevated ferritin   • Encounter for long-term (current) use of insulin (HCC)   • Essential hypertension   • Fibromyalgia   • Neck pain, bilateral   • Gastroesophageal reflux disease without esophagitis   • History of pancreatitis   • Hyperlipidemia   • Hypokalemia   • Hyperinsulinism   • Insulin resistance   • Long term (current) use of anticoagulants   • LPRD (laryngopharyngeal reflux disease)   • Magnesium deficiency   • Microalbuminuria   • Moderate episode of recurrent major depressive disorder (HCC)   • Morbid obesity (HCC)   • Mycobacterium avium complex (HCC)   • GIUSEPPE (obstructive sleep apnea)   • Pain in left hip   • Personal history of tobacco use, presenting hazards to health   • Predisposition to allergic reaction   • Presence of insulin pump   • Pulmonary hypertension (HCC)   • Pulmonary nodule   • Serum calcium elevated   • Stasis dermatitis of both legs   • Thoracic back pain   • Uncontrolled type 2 diabetes mellitus with hyperglycemia (HCC)   • Uncontrolled type 2 diabetes mellitus with neurologic complication (HCC)   • Uncontrolled type 2 diabetes mellitus without complication, with long-term current use of insulin   • Ventral hernia   • Vitamin D deficiency   • Voice hoarseness   • Wheezing   • 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HCC)   • Herpes zoster without complication   • Postherpetic neuralgia   • Skin tags, multiple acquired   • Acute on chronic respiratory failure  with hypoxia (HCC)   • Pneumonia of right lower lobe due to infectious organism   • Pain in both feet   • Congestive heart failure (HCC)   • Edema   • Gout   • Stage 3 chronic kidney disease (HCC)   • Dermatitis        Past Surgical History:   Procedure Laterality Date   • BRONCHOSCOPY N/A 2021    Procedure: BRONCHOSCOPY WITH BRONCHIOALVEOLAR LAVAGE AND WASHINGS;  Surgeon: David Britton MD;  Location: [facility] MAIN OR;  Service: Pulmonary;  Laterality: N/A;  MUCOUS PLUGGING   • CARDIAC CATHETERIZATION  2018   • CARPAL TUNNEL RELEASE     •  SECTION  ,     • COLONOSCOPY     • ENDOSCOPY     • HERNIA REPAIR     • HYSTERECTOMY  ,     PARTIAL   • KNEE SURGERY     • UPPER GASTROINTESTINAL ENDOSCOPY         Social History     Socioeconomic History   • Marital status:    Tobacco Use   • Smoking status: Former Smoker     Packs/day: 1.00     Quit date:      Years since quittin.1   • Smokeless tobacco: Never Used   • Tobacco comment: Exposure to 2nd hand smoke/parents/   Vaping Use   • Vaping Use: Never used   Substance and Sexual Activity   • Alcohol use: Not Currently     Comment: FORMER; OCCASIONAL   • Drug use: Never   • Sexual activity: Defer       Family History   Problem Relation Age of Onset   • Heart disease Mother         GRANDPARENT-NONSPECIFIC   • Diabetes Father    • Skin cancer Father    • Colon cancer Neg Hx        Family history, surgical history, past medical history, Allergies and med's reviewed with patient today and updated in [facility].     ROS:  Review of Systems   Constitutional: Negative for fatigue.   HENT: Negative for congestion, nosebleeds, postnasal drip and rhinorrhea.    Respiratory: Positive for cough, shortness of breath and wheezing. Negative for chest tightness.    Cardiovascular: Negative for chest pain, palpitations and leg swelling.   Gastrointestinal: Negative for blood in stool.   Genitourinary: Negative for  "hematuria.   Hematological: Does not bruise/bleed easily.       OBJECTIVE:  Vitals:    02/10/22 0935 02/10/22 1012   BP: (!) 124/104 124/76   BP Location: Left arm Left arm   Patient Position: Sitting Sitting   Cuff Size: Large Adult Large Adult   Pulse: 90    Resp: 20    Temp: 97.5 °F (36.4 °C)    TempSrc: Temporal    SpO2: 97%    Weight: (!) 182 kg (402 lb)    Height: 158.2 cm (62.3\")      No exam data present   Body mass index is 72.82 kg/m².  No LMP recorded (lmp unknown). Patient has had a hysterectomy.    Physical Exam  Vitals and nursing note reviewed.   Constitutional:       General: She is not in acute distress.     Appearance: Normal appearance. She is obese. She is not ill-appearing.   HENT:      Head: Normocephalic.      Right Ear: Tympanic membrane, ear canal and external ear normal.      Left Ear: Tympanic membrane, ear canal and external ear normal.      Nose: Nose normal.      Mouth/Throat:      Mouth: Mucous membranes are moist.      Pharynx: Oropharynx is clear.   Eyes:      General: No scleral icterus.     Conjunctiva/sclera: Conjunctivae normal.      Pupils: Pupils are equal, round, and reactive to light.   Cardiovascular:      Rate and Rhythm: Normal rate and regular rhythm.      Pulses: Normal pulses.      Heart sounds: Normal heart sounds. No murmur heard.      Pulmonary:      Effort: Pulmonary effort is normal.      Breath sounds: Normal breath sounds. No wheezing, rhonchi or rales.   Musculoskeletal:      Cervical back: No rigidity or tenderness.      Right lower leg: No edema.      Left lower leg: No edema.   Lymphadenopathy:      Cervical: No cervical adenopathy.   Skin:     General: Skin is warm and dry.      Coloration: Skin is not jaundiced.      Findings: No rash.   Neurological:      General: No focal deficit present.      Mental Status: She is alert and oriented to person, place, and time.   Psychiatric:         Mood and Affect: Mood normal.         Thought Content: Thought content " normal.         Judgment: Judgment normal.         Procedures    No visits with results within 30 Day(s) from this visit.   Latest known visit with results is:   Office Visit on 01/10/2022   Component Date Value Ref Range Status   • Uric Acid 01/10/2022 13.3* 2.4 - 5.7 mg/dL Final       ASSESSMENT/ PLAN:    Diagnoses and all orders for this visit:    1. Essential hypertension (Primary)  Assessment & Plan:  Go ahead and repeat her blood pressure manually.  Maginot be much better rather than the automatic 1.    Orders:  -     Comprehensive Metabolic Panel  -     Lipid Panel    2. Hyperlipidemia, unspecified hyperlipidemia type  Assessment & Plan:  Await to see her labs from endocrinology to see what her lipids are.  Either way she needs a statin the choice is just how potent statin will she need.    Orders:  -     Comprehensive Metabolic Panel  -     Lipid Panel    3. Chronic right-sided congestive heart failure (HCC)  Assessment & Plan:  Her current heart failure is much improved.  Her notable weight loss has made dramatic improvement in this.      4. Acquired hypothyroidism  Assessment & Plan:  When she sees endocrinology I will assume that they will update her thyroid profile as well.    Orders:  -     TSH+Free T4  -     T3, Free    5. Budd-Chiari syndrome (HCC)  Assessment & Plan:  GI does not feel she needs to be anticoagulated.  I am apprehensive to stop it given her varicosities in her lower extremities and her limited physical activity.      6. Morbid obesity (HCC)  Assessment & Plan:  She is doing well she continues to have progressive weight loss with each and every visit.  As a result of this her overall health conditions have improved      7. Dermatitis  Assessment & Plan:  This rash on her palms and soles of her feet is either fungal or atopic dermatitis.    Orders:  -     KOH Prep - Swab, Foot; Future    8. Uncontrolled type 2 diabetes mellitus with neurologic complication (HCC)  -     Hemoglobin A1c  -      C-Peptide; Future      Orders Placed Today:     No orders of the defined types were placed in this encounter.       Management Plan:     An After Visit Summary was printed and given to the patient at discharge.    Follow-up: Return in about 4 months (around 6/10/2022) for Recheck.    Francesco Gimenez,  2/10/2022 10:25 EST  This note was electronically signed.

## 2022-02-10 NOTE — ASSESSMENT & PLAN NOTE
Her current heart failure is much improved.  Her notable weight loss has made dramatic improvement in this.

## 2022-02-10 NOTE — ASSESSMENT & PLAN NOTE
GI does not feel she needs to be anticoagulated.  I am apprehensive to stop it given her varicosities in her lower extremities and her limited physical activity.

## 2022-02-10 NOTE — ASSESSMENT & PLAN NOTE
Await to see her labs from endocrinology to see what her lipids are.  Either way she needs a statin the choice is just how potent statin will she need.

## 2022-02-10 NOTE — ASSESSMENT & PLAN NOTE
Go ahead and repeat her blood pressure manually.  Maginot be much better rather than the automatic 1.

## 2022-02-10 NOTE — ASSESSMENT & PLAN NOTE
She is doing well she continues to have progressive weight loss with each and every visit.  As a result of this her overall health conditions have improved

## 2022-02-11 LAB — C PEPTIDE SERPL-MCNC: 0.7 NG/ML (ref 1.1–4.4)

## 2022-02-17 ENCOUNTER — TELEPHONE (OUTPATIENT)
Dept: FAMILY MEDICINE CLINIC | Facility: CLINIC | Age: 52
End: 2022-02-17

## 2022-02-17 RX ORDER — WARFARIN SODIUM 2 MG/1
TABLET ORAL
Qty: 60 TABLET | Refills: 10 | Status: SHIPPED | OUTPATIENT
Start: 2022-02-17

## 2022-02-17 RX ORDER — GABAPENTIN 300 MG/1
900 CAPSULE ORAL 4 TIMES DAILY
Qty: 360 CAPSULE | Refills: 5 | Status: SHIPPED | OUTPATIENT
Start: 2022-02-17 | End: 2022-09-08

## 2022-02-17 NOTE — TELEPHONE ENCOUNTER
Caller: Joan Partida M    Relationship: Self    Best call back number: [phone]    Who are you requesting to speak with (clinical staff, provider,  specific staff member): RENEE    What was the call regarding: PATIENT MISSED A CALL FROM RENEE. ATTEMPTED TO WARM TRANSFER. PLEASE CALL PATIENT BACK.    SHE HAS A LOT OF ITCHING ON HER HANDS AS WELL AND WOULD LIKE TO KNOW WHAT CAN BE DONE FOR IT.

## 2022-02-17 NOTE — TELEPHONE ENCOUNTER
There are 2 different instructions on her gabapentin. Call the patient and see how she takes her gabapentin. That way we can send the correct instructions to the pharmacy.

## 2022-02-17 NOTE — TELEPHONE ENCOUNTER
Spoke with patient. Joan stated she takes 3 tabs, 4 times daily for her gabapentin. Patient also wanted to make you aware that her hands are itching to the point her skin is breaking open. Please advice.

## 2022-02-25 ENCOUNTER — TELEPHONE (OUTPATIENT)
Dept: FAMILY MEDICINE CLINIC | Facility: CLINIC | Age: 52
End: 2022-02-25

## 2022-02-25 NOTE — TELEPHONE ENCOUNTER
Caller: Joan Partida    Relationship to patient: Self    Best call back number: [phone]    Patient is needing: PATIENT CALLED STATING SHE IS NEEDING A PRIOR AUTHORIZATION SENT TO [employer] FOR HER TO RECEIVE HEALTHY MEALS. THE PATIENT STATED SHE IS NEEDING IT TO BE FAXED OVER PLEASE ADVISE THANK YOU.       REFERENCE NUMBER: [id number]    FAX NUMBER: [phone]

## 2022-02-28 ENCOUNTER — OFFICE VISIT (OUTPATIENT)
Dept: CARDIOLOGY | Facility: CLINIC | Age: 52
End: 2022-02-28

## 2022-02-28 VITALS
BODY MASS INDEX: 53.92 KG/M2 | SYSTOLIC BLOOD PRESSURE: 140 MMHG | HEART RATE: 95 BPM | HEIGHT: 62 IN | WEIGHT: 293 LBS | DIASTOLIC BLOOD PRESSURE: 76 MMHG

## 2022-02-28 DIAGNOSIS — E78.2 MIXED HYPERLIPIDEMIA: ICD-10-CM

## 2022-02-28 DIAGNOSIS — I50.32 CHRONIC DIASTOLIC HEART FAILURE: Primary | ICD-10-CM

## 2022-02-28 PROCEDURE — 99213 OFFICE O/P EST LOW 20 MIN: CPT | Performed by: INTERNAL MEDICINE

## 2022-02-28 RX ORDER — EVOLOCUMAB 140 MG/ML
INJECTION, SOLUTION SUBCUTANEOUS
COMMUNITY
Start: 2022-02-24

## 2022-02-28 NOTE — TELEPHONE ENCOUNTER
Spoke with patient. Joan stated she is going to call and get them to fax something to our office to see if this is something we can even do for her.

## 2022-03-01 ENCOUNTER — TELEPHONE (OUTPATIENT)
Dept: FAMILY MEDICINE CLINIC | Facility: CLINIC | Age: 52
End: 2022-03-01

## 2022-03-01 NOTE — TELEPHONE ENCOUNTER
Pt states that the rash on her hands has moved to her forearms and up towards her elbows. She was wanting to know if she could have a cream or something sent in as she has been itching and scratching to the point she has raw spots.

## 2022-03-02 NOTE — TELEPHONE ENCOUNTER
If her rash is that bad and that encompassing she probably needs to be seen.  There would be a lot of steroid cream to apply on her hands and forearms.

## 2022-03-04 DIAGNOSIS — G47.33 OSA (OBSTRUCTIVE SLEEP APNEA): Primary | ICD-10-CM

## 2022-03-04 RX ORDER — ESZOPICLONE 1 MG/1
TABLET, FILM COATED ORAL
Qty: 30 TABLET | Refills: 3 | Status: SHIPPED | OUTPATIENT
Start: 2022-03-04

## 2022-03-06 PROBLEM — I50.32 CHRONIC DIASTOLIC HEART FAILURE (HCC): Status: ACTIVE | Noted: 2022-03-06

## 2022-03-06 NOTE — PROGRESS NOTES
CARDIOLOGY FOLLOW-UP PROGRESS NOTE        Chief Complaint  Congestive Heart Failure (Follow-up)    Subjective            Joan JOMAR Partida presents to [facility] CARDIOLOGY  History of Present Illness    Ms Partida is here for a 6-month follow-up visit.  She lost approximately 50 pounds in the past 6 months.  The diuretics are currently managed by nephrology.  She is currently on Repatha for hyperlipidemia.  Overall feeling much better.  Still has shortness of breath on exertion.  She is only able to walk a few steps by herself.  Denies any chest pain, palpitations or dizziness.  She had admission to the hospital December 2021 for community-acquired pneumonia with hypoxic respiratory failure and mucous plugging.  She underwent bronchoscopy during stay.      Past History:    1) Chronic diastolic and right-sided heart failure, on high-dose diuretics; 2) Obstructive sleep apnea, on home CPAP; 3) Diabetes mellitus, on insulin pump; 4) Morbid obesity; 5) History of Mycobacterium avium complex infection, completed long-term antibiotic therapy. 6) hypertension 7) history of hepatic vein thrombosis, on anticoagulation    Medical History:  Past Medical History:   Diagnosis Date   • Abnormal mammogram 10/21/2013   • Allergy    • Anemia    • Arthralgia    • COPD (chronic obstructive pulmonary disease) (HCC) 03/04/2015   • Dependent edema 08/27/2018   • Depression    • Derangement of meniscus of left knee 01/01/2014    LEFT KNEE MEDIAL MENISCUS TEAR   • Dermatitis 07/10/2014   • Dysphagia    • Essential hypertension 03/04/2015   • Fibromyalgia    • Foot pain 07/10/2014   • GERD (gastroesophageal reflux disease) 10/17/2014   • Gout 2022   • Hepatic steatosis 03/04/2015   • 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HCC) 10/09/2020   • History of tobacco abuse    • Hyperlipidemia    • Hypokalemia 05/07/2019   • Long term current use of anticoagulant    • LPRD (laryngopharyngeal reflux disease)    • Moderate episode of recurrent  major depressive disorder ([facility]) 2017   • Mycobacterium avium complex ([facility]) 2018   • GIUSEPPE (obstructive sleep apnea) 2018   • Pulmonary hypertension ([facility]) 2018   • Stasis dermatitis of both legs 2018   • Type 2 diabetes mellitus, with long-term current use of insulin ([facility]) 2017   • Ventral hernia 2015   • Vitamin D deficiency 2013   • Voice hoarseness        Surgical History: has a past surgical history that includes Cardiac catheterization (2018); Carpal tunnel release ();  section (,  ); Colonoscopy (); Esophagogastroduodenoscopy (); Hernia repair (); Hysterectomy (, ); Knee surgery (); Bronchoscopy (N/A, 2021); and Upper gastrointestinal endoscopy ().     Family History: family history includes Diabetes in her father; Heart disease in her mother; Skin cancer in her father.     Social History: reports that she quit smoking about 18 years ago. She smoked 1.00 pack per day. She has never used smokeless tobacco. She reports previous alcohol use.  Drug: Marijuana.    Allergies: Nickel, Insulin degludec, Sitagliptin-metformin hcl, Cobalt, Dulaglutide, Exenatide, Metformin, Palladium chloride, and Sitagliptin    Current Outpatient Medications on File Prior to Visit   Medication Sig   • albuterol sulfate HFA (Ventolin HFA) 108 (90 Base) MCG/ACT inhaler Inhale 1-2 puffs Every 6 (Six) Hours As Needed.   • Allergy Relief 180 MG tablet TAKE 1 TABLET BY MOUTH TWICE DAILY   • allopurinol (Zyloprim) 100 MG tablet Take 1 tablet by mouth Daily for 90 days.   • budesonide (Pulmicort) 0.5 MG/2ML nebulizer solution Take 0.5 mg by nebulization Daily.   • Cholecalciferol 125 MCG (5000 UT) tablet Take 5,000 Units by mouth Daily.   • colchicine 0.6 MG tablet Take 0.5 tablets by mouth Daily for 90 days.   • Continuous Blood Gluc Sensor (Dexcom G6 Sensor) APPLY 1 TOPICALLY EVERY 10 DAYS   • docusate sodium (COLACE) 100 MG capsule Take 100  mg by mouth 2 (Two) Times a Day.   • ezetimibe (ZETIA) 10 MG tablet Take 10 mg by mouth Daily.   • FeroSul 325 (65 Fe) MG tablet TAKE 1 TABLET(325 MG) BY MOUTH TWICE DAILY (Patient taking differently: Take 325 mg by mouth 2 (Two) Times a Day.)   • fexofenadine (ALLEGRA) 180 MG tablet Take 1 tablet by mouth 2 (Two) Times a Day.   • FREESTYLE LITE test strip TEST FOUR TIMES DAILY BEFORE A MEAL AND AT BEDTIME   • gabapentin (NEURONTIN) 300 MG capsule Take 3 capsules by mouth 4 (Four) Times a Day for 30 days.   • glucagon (GLUCAGEN) 1 MG injection Inject 1 mg into the appropriate muscle as directed by prescriber.   • HUMULIN R 500 UNIT/ML CONCENTRATED injection ADMINISTER UP TO 1300 UNITS VIA PUMP EVERY DAY   • insulin patient supplied pump Inject  under the skin into the appropriate area as directed Continuous. Type of Insulin: HUMULIN R 500u/mL  Dose: 2.5 basal  Prescriber: SAÚL MAHER ([facility])    PUMP IS REMOVED AT TIME OF VISIT   • ipratropium-albuterol (DUO-NEB) 0.5-2.5 mg/3 ml nebulizer USE 3 ML VIA NEBULIZER EVERY 4 HOURS AS NEEDED FOR SHORTNESS OF BREATH (Patient taking differently: Take 3 mL by nebulization Every 4 (Four) Hours As Needed.)   • levothyroxine (SYNTHROID, LEVOTHROID) 125 MCG tablet Take 250 mcg by mouth Daily.   • Magnesium 400 MG tablet Take 400 mg by mouth Daily.   • metoprolol succinate XL (TOPROL-XL) 50 MG 24 hr tablet Take 1 tablet by mouth Daily.   • mineral oil-hydrophilic petrolatum (AQUAPHOR) ointment Apply  topically to the appropriate area as directed 2 (Two) Times a Day for 90 days. to affected area   • montelukast (SINGULAIR) 10 MG tablet TAKE 1 TABLET BY MOUTH ONCE DAILY EVERY EVENING (Patient taking differently: Take 10 mg by mouth Every Night.)   • multivitamin (MULTIPLE VITAMIN PO) Take 1 tablet by mouth Daily.   • nystatin 218108 UNIT/GM powder APPLY TO AFFECTED AREA THREE TIMES DAILY (Patient taking differently: Apply 1 application topically to the appropriate  "area as directed 3 (Three) Times a Day.)   • pantoprazole (PROTONIX) 40 MG EC tablet TAKE 1 TABLET BY MOUTH EVERY DAY   • polyethylene glycol (MIRALAX) 17 GM/SCOOP powder MIX 17G(1 CAPFUL) IN 8OZ WATER AND DRINK BY MOUTH DAILY   • potassium chloride ER (K-TAB) 20 MEQ tablet controlled-release ER tablet Take 40 mEq by mouth 2 (Two) Times a Day.   • Repatha SureClick solution auto-injector SureClick injection    • revefenacin (YUPELRI) 175 MCG/3ML nebulizer solution Take 175 mcg by nebulization Daily.   • sertraline (ZOLOFT) 100 MG tablet TAKE 1 TABLET BY MOUTH ONCE DAILY   • spironolactone (ALDACTONE) 100 MG tablet Take 200 mg by mouth 2 (two) times a day.   • SUMAtriptan (IMITREX) 100 MG tablet TAKE 1 TABLET BY MOUTH AT ONSET OF MIGRAINE; MAY REPEAT AFTER 2 HOURS IF HEADACHE RETURNS, NOT TO EXCEED 200MG IN 24 HOURS (Patient taking differently: Take 100 mg by mouth Every 2 (Two) Hours As Needed.)   • torsemide (DEMADEX) 100 MG tablet Take 100 mg by mouth 3 (Three) Times a Day.   • traZODone (DESYREL) 50 MG tablet Take 1 tablet by mouth Every Night.   • vitamin D3 125 MCG (5000 UT) capsule capsule Take 1 capsule by mouth Daily.   • warfarin (COUMADIN) 2 MG tablet TAKE 1 TABLET(2 MG) BY MOUTH EVERY DAY AS DIRECTED   • warfarin (COUMADIN) 5 MG tablet Take 1.5 tablets by mouth Daily.   • warfarin (COUMADIN) 5 MG tablet Take 1 tablet by mouth Every Night for 90 days.     No current facility-administered medications on file prior to visit.          Review of Systems   Constitutional: Positive for fatigue.   Respiratory: Positive for shortness of breath. Negative for cough and wheezing.    Cardiovascular: Positive for leg swelling. Negative for chest pain and palpitations.   Gastrointestinal: Negative for nausea and vomiting.   Neurological: Negative for dizziness and syncope.        Objective     /76   Pulse 95   Ht 157.5 cm (62\")   Wt (!) 176 kg (388 lb)   BMI 70.97 kg/m²       Physical Exam    General : Alert, " awake, no acute distress, in wheelchair, moderately obese  Neck : Supple, no carotid bruit, no jugular venous distention  CVS : Regular rate and rhythm, no murmur, rubs or gallops  Lungs: Clear to auscultation bilaterally, no crackles or rhonchi  Abdomen: Soft, nontender, bowel sounds heard in all 4 quadrants  Extremities: Warm, well-perfused, 1+ edema bilaterally, chronic venous stasis changes present    Result Review :     The following data was reviewed by: Deven Myers MD on 02/28/2022:    CMP    CMP 12/7/21 12/31/21 2/10/22 2/10/22      1050 1050   Glucose 196 (A) 175 (A) 81 84   BUN 17 14 27 (A) 26 (A)   Creatinine 0.78 1.36 (A) 1.24 (A) 1.11 (A)   eGFR Non  Am 77 41 (A) 45 (A) 51 (A)   Sodium 138 136 137 138   Potassium 3.7 3.3 (A) 3.7 3.8   Chloride 91 (A) 88 (A) 93 (A) 95 (A)   Calcium 10.1 11.4 (A) 11.2 (A) 11.7 (A)   Albumin  4.70 4.70 4.90   Total Bilirubin  1.3 (A) 0.4    Alkaline Phosphatase  496 (A) 440 (A)    AST (SGOT)  50 (A) 64 (A)    ALT (SGPT)  42 (A) 59 (A)    (A) Abnormal value            CBC    CBC 12/6/21 12/7/21 12/31/21   WBC 7.40 8.53 11.25 (A)   RBC 4.26 4.48 5.02   Hemoglobin 13.8 14.5 16.6 (A)   Hematocrit 40.3 44.5 46.8 (A)   MCV 94.6 99.3 (A) 93.2   MCH 32.4 32.4 33.1 (A)   MCHC 34.2 32.6 35.5   RDW 15.4 15.8 (A) 14.2   Platelets 188 247 294   (A) Abnormal value            TSH    TSH 7/9/21 11/1/21 2/10/22   TSH 1.370 1.500 0.318           Lipid Panel    Lipid Panel 7/9/21 2/10/22   Total Cholesterol 243 (A) 227 (A)   Triglycerides 321 (A) 442 (A)   HDL Cholesterol 45 34 (A)   VLDL Cholesterol 58 (A) 77 (A)   LDL Cholesterol  140 (A) 116 (A)   LDL/HDL Ratio 2.97 3.08   (A) Abnormal value                 Data reviewed: Cardiology studies        Echocardiogram done on 11/10/2020    1.  Technically difficult images.    2.  Normal ejection fraction of 55%.    3.  Trace tricuspid regurgitation.                     Assessment and Plan        Diagnoses and all orders for this  visit:    1. Chronic diastolic heart failure (HCC) (Primary)  Assessment & Plan:  She also has a component of right-sided heart failure and mild chronic kidney disease.  Currently on high-dose of torsemide and spironolactone, managed by nephrology.  She lost significant amount of weight over the past 6 months and appears to be near euvolemic.  Recent labs show stable electrolytes and renal functions.  Continue torsemide and spironolactone at the current dose.      2. Mixed hyperlipidemia  Assessment & Plan:  LDL in 140s, she was recently started on Repatha from endocrinology office.  Along with Zetia.              Follow Up     Return in about 9 months (around 11/28/2022) for Next scheduled follow up, Recheck.    Patient was given instructions and counseling regarding her condition or for health maintenance advice. Please see specific information pulled into the AVS if appropriate.

## 2022-03-06 NOTE — ASSESSMENT & PLAN NOTE
She also has a component of right-sided heart failure and mild chronic kidney disease.  Currently on high-dose of torsemide and spironolactone, managed by nephrology.  She lost significant amount of weight over the past 6 months and appears to be near euvolemic.  Recent labs show stable electrolytes and renal functions.  Continue torsemide and spironolactone at the current dose.

## 2022-03-16 ENCOUNTER — TELEPHONE (OUTPATIENT)
Dept: FAMILY MEDICINE CLINIC | Facility: CLINIC | Age: 52
End: 2022-03-16

## 2022-03-16 NOTE — TELEPHONE ENCOUNTER
Caller: RAFIQ    Relationship: Other    Best call back number: [phone]    What form or medical record are you requesting: LAST OFFICE NOTE    Who is requesting this form or medical record from you: SELF    How would you like to receive the form or medical records (pick-up, mail, fax): FAX  If fax, what is the fax number: [phone] ATTN:[email or web address]    Timeframe paperwork needed: ASAP

## 2022-04-11 RX ORDER — ALLOPURINOL 100 MG/1
TABLET ORAL
Qty: 90 TABLET | Refills: 1 | Status: SHIPPED | OUTPATIENT
Start: 2022-04-11 | End: 2022-04-13 | Stop reason: SDUPTHER

## 2022-04-13 RX ORDER — ALLOPURINOL 100 MG/1
100 TABLET ORAL DAILY
Qty: 90 TABLET | Refills: 3 | Status: SHIPPED | OUTPATIENT
Start: 2022-04-13 | End: 2022-07-21 | Stop reason: SDUPTHER

## 2022-04-13 NOTE — TELEPHONE ENCOUNTER
Caller: HelgaJoan    Relationship: Self    Best call back number: [phone]    Requested Prescriptions: PLEASE SEE NOT ABOUT GETTING A BIGGER JAR OF HER TRIAMCINOLONE   Requested Prescriptions     Pending Prescriptions Disp Refills   • allopurinol (ZYLOPRIM) 100 MG tablet 90 tablet 1     Sig: Take 1 tablet by mouth Daily.   • triamcinolone (KENALOG) 0.1 % ointment          Pharmacy where request should be sent:  [id number] - [place], [place] - [address] [place], [place] [id number] [facility] AT [facility] OF [address] /[facility] & [place] - [phone] [facility] [phone] FX      Additional details provided by patient: PATIENT STATES THAT THE PHARMACY SAID THAT PCP DENIED BOTH OF THE MEDICATIONS.  PATIENT IS REQUESTING TO GET     THIS MEDICATION IN A BIGGER JAR  triamcinolone (KENALOG) 0.1 % ointment      LESS THAN A 3 DAY SUPPLY. PATIENT IS LEAVING TOWN Friday, April 15, 2022 FOR THE WEEKEND AND NEEDS MEDICATION BEFORE THEN.     Does the patient have less than a 3 day supply:  [x] Yes  [] No    Liz Arceo Rep   04/13/22 14:41 EDT         ”

## 2022-04-14 RX ORDER — SUMATRIPTAN 100 MG/1
TABLET, FILM COATED ORAL
Qty: 9 TABLET | Refills: 3 | Status: SHIPPED | OUTPATIENT
Start: 2022-04-14 | End: 2022-10-03 | Stop reason: SDUPTHER

## 2022-04-23 DIAGNOSIS — K21.9 GASTROESOPHAGEAL REFLUX DISEASE, UNSPECIFIED WHETHER ESOPHAGITIS PRESENT: ICD-10-CM

## 2022-05-02 RX ORDER — PANTOPRAZOLE SODIUM 40 MG/1
TABLET, DELAYED RELEASE ORAL
Qty: 60 TABLET | Refills: 1 | Status: SHIPPED | OUTPATIENT
Start: 2022-05-02 | End: 2022-08-25

## 2022-06-06 ENCOUNTER — OFFICE VISIT (OUTPATIENT)
Dept: FAMILY MEDICINE CLINIC | Facility: CLINIC | Age: 52
End: 2022-06-06

## 2022-06-06 VITALS
SYSTOLIC BLOOD PRESSURE: 141 MMHG | DIASTOLIC BLOOD PRESSURE: 117 MMHG | BODY MASS INDEX: 53.92 KG/M2 | WEIGHT: 293 LBS | HEIGHT: 62 IN | OXYGEN SATURATION: 95 % | HEART RATE: 83 BPM | TEMPERATURE: 98 F

## 2022-06-06 DIAGNOSIS — I10 ESSENTIAL HYPERTENSION: ICD-10-CM

## 2022-06-06 DIAGNOSIS — M1A.0720 IDIOPATHIC CHRONIC GOUT OF LEFT FOOT WITHOUT TOPHUS: ICD-10-CM

## 2022-06-06 DIAGNOSIS — E78.2 MIXED HYPERLIPIDEMIA: ICD-10-CM

## 2022-06-06 DIAGNOSIS — Z23 NEED FOR COVID-19 VACCINE: Primary | ICD-10-CM

## 2022-06-06 DIAGNOSIS — N18.30 STAGE 3 CHRONIC KIDNEY DISEASE, UNSPECIFIED WHETHER STAGE 3A OR 3B CKD: ICD-10-CM

## 2022-06-06 PROCEDURE — 91305 COVID-19 (PFIZER) 12+ YRS: CPT | Performed by: FAMILY MEDICINE

## 2022-06-06 PROCEDURE — 0053A COVID-19 (PFIZER) 12+ YRS: CPT | Performed by: FAMILY MEDICINE

## 2022-06-06 PROCEDURE — 99214 OFFICE O/P EST MOD 30 MIN: CPT | Performed by: FAMILY MEDICINE

## 2022-06-06 PROCEDURE — 96372 THER/PROPH/DIAG INJ SC/IM: CPT | Performed by: FAMILY MEDICINE

## 2022-06-06 RX ORDER — HYDROCODONE BITARTRATE AND ACETAMINOPHEN 5; 325 MG/1; MG/1
1 TABLET ORAL EVERY 6 HOURS PRN
Qty: 15 TABLET | Refills: 0 | Status: SHIPPED | OUTPATIENT
Start: 2022-06-06

## 2022-06-06 RX ORDER — METHYLPREDNISOLONE ACETATE 80 MG/ML
80 INJECTION, SUSPENSION INTRA-ARTICULAR; INTRALESIONAL; INTRAMUSCULAR; SOFT TISSUE ONCE
Status: COMPLETED | OUTPATIENT
Start: 2022-06-06 | End: 2022-06-06

## 2022-06-06 RX ADMIN — METHYLPREDNISOLONE ACETATE 80 MG: 80 INJECTION, SUSPENSION INTRA-ARTICULAR; INTRALESIONAL; INTRAMUSCULAR; SOFT TISSUE at 09:11

## 2022-06-06 NOTE — PROGRESS NOTES
Chief Complaint   Patient presents with   • Follow-up     4 month    C/O gout flareup , requesting covid shot    • Hyperlipidemia   • Hypertension        Subjective     Joanse JOMAR Partida  has a past medical history of Abnormal mammogram (10/21/2013), Allergy, Anemia, Arthralgia, COPD (chronic obstructive pulmonary disease) ([facility]) (03/04/2015), Dependent edema (08/27/2018), Depression, Derangement of meniscus of left knee (01/01/2014), Dermatitis (07/10/2014), Dysphagia, Essential hypertension (03/04/2015), Fibromyalgia, Foot pain (07/10/2014), GERD (gastroesophageal reflux disease) (10/17/2014), Gout (2022), Hepatic steatosis (03/04/2015), 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), History of tobacco abuse, Hypokalemia (05/07/2019), Long term current use of anticoagulant, LPRD (laryngopharyngeal reflux disease), Moderate episode of recurrent major depressive disorder ([facility]) (06/22/2017), Mycobacterium avium complex ([facility]) (08/09/2018), GIUSEPPE (obstructive sleep apnea) (08/09/2018), Pulmonary hypertension ([facility]) (08/27/2018), Stasis dermatitis of both legs (08/09/2018), Type 2 diabetes mellitus, with long-term current use of insulin ([facility]) (06/22/2017), Ventral hernia (03/04/2015), Vitamin D deficiency (11/13/2013), and Voice hoarseness.    Hypertension- she does not check her blood pressure outside the office.  Her blood pressure is somewhat elevated here today at 141/117, but she is in a lot of pain with her acute gouty episode.    Hyperlipidemia- she does have her Repatha injection every 2 weeks.    Gout- she states it starts in her first metatarsal phalangeal joint and then goes across all the metatarsal phalangeal joints in the midfoot.  The foot has also been somewhat swollen.  It started about 3 days ago.  She denies any changes in her diet and denies any recent alcohol usage.  She has been taking her allopurinol on a daily basis.      PHQ-2 Depression Screening  Little interest or pleasure in doing things?     Feeling  down, depressed, or hopeless?     PHQ-2 Total Score     PHQ-9 Depression Screening  Little interest or pleasure in doing things?     Feeling down, depressed, or hopeless?     Trouble falling or staying asleep, or sleeping too much?     Feeling tired or having little energy?     Poor appetite or overeating?     Feeling bad about yourself - or that you are a failure or have let yourself or your family down?     Trouble concentrating on things, such as reading the newspaper or watching television?     Moving or speaking so slowly that other people could have noticed? Or the opposite - being so fidgety or restless that you have been moving around a lot more than usual?     Thoughts that you would be better off dead, or of hurting yourself in some way?     PHQ-9 Total Score     If you checked off any problems, how difficult have these problems made it for you to do your work, take care of things at home, or get along with other people?       Allergies   Allergen Reactions   • Nickel Diarrhea and Nausea And Vomiting   • Insulin Degludec Unknown - High Severity     Can trigger pancreatitis   • Sitagliptin-Metformin Hcl Other (See Comments)   • [place] Unknown - High Severity     Cobalt blue, by allergy testing.    • Dulaglutide Nausea And Vomiting   • Exenatide Other (See Comments)     pancreantitis   • Metformin Unknown - Low Severity, Diarrhea and Other (See Comments)   • Palladium Chloride Unknown - Low Severity     by allergy testing.    • Sitagliptin Other (See Comments)     panceatitis       Prior to Admission medications    Medication Sig Start Date End Date Taking? Authorizing Provider   albuterol sulfate  (90 Base) MCG/ACT inhaler Inhale 1-2 puffs Every 6 (Six) Hours As Needed.   Yes Gina Hunt MD   Allergy Relief 180 MG tablet TAKE 1 TABLET BY MOUTH TWICE DAILY 9/24/21  Yes Bautista Farris MD   allopurinol (ZYLOPRIM) 100 MG tablet Take 1 tablet by mouth Daily. 4/13/22  Yes Pernell  Francesco Terrazas DO   benzonatate (TESSALON) 100 MG capsule Take 1 capsule by mouth 3 (Three) Times a Day As Needed for Cough. 4/22/22  Yes Shan Saldivar PA   budesonide (PULMICORT) 0.5 MG/2ML nebulizer solution Take 0.5 mg by nebulization Daily.   Yes Gina Hunt MD   Cholecalciferol 125 MCG (5000 UT) tablet Take 5,000 Units by mouth Daily.   Yes Gina Hunt MD   Continuous Blood Gluc Sensor (Dexcom G6 Sensor) APPLY 1 TOPICALLY EVERY 10 DAYS 12/27/21  Yes Gina Hunt MD   Continuous Blood Gluc Transmit (Dexcom G6 Transmitter) misc USE TRANSMITTER AS DIRECTED 3/19/22  Yes Emergency, Nurse TARYN Culp   docusate sodium (COLACE) 100 MG capsule Take 100 mg by mouth 2 (Two) Times a Day. 6/10/21  Yes Emergency, Nurse Epic, RN   eszopiclone (LUNESTA) 1 MG tablet TAKE 1 TABLET BY MOUTH IMMEDIATELY BEFORE BEDTIME 3/4/22  Yes Franko Muhammad MD   ezetimibe (ZETIA) 10 MG tablet Take 10 mg by mouth Daily.   Yes Gina Hunt MD   FeroSul 325 (65 Fe) MG tablet TAKE 1 TABLET(325 MG) BY MOUTH TWICE DAILY  Patient taking differently: Take 325 mg by mouth 2 (Two) Times a Day. 9/20/21  Yes Francesco Gimenez DO   fexofenadine (ALLEGRA) 180 MG tablet Take 1 tablet by mouth 2 (Two) Times a Day.   Yes Gina Hunt MD   fluticasone (FLONASE) 50 MCG/ACT nasal spray 2 sprays into the nostril(s) as directed by provider Daily. 4/22/22  Yes Shan Saldivar PA   FREESTYLE LITE test strip TEST FOUR TIMES DAILY BEFORE A MEAL AND AT BEDTIME 12/14/21  Yes Gina Hunt MD   glucagon (GLUCAGEN) 1 MG injection Inject 1 mg into the appropriate muscle as directed by prescriber. 11/1/21  Yes Gina Hunt MD   HUMULIN R 500 UNIT/ML CONCENTRATED injection ADMINISTER UP TO 1300 UNITS VIA PUMP EVERY DAY 1/5/22  Yes Gina Hunt MD   insulin patient supplied pump Inject  under the skin into the appropriate area as directed Continuous. Type of Insulin: HUMULIN R  500u/mL  Dose: 2.5 basal  Prescriber: SAÚL MAHER ([facility])    PUMP IS REMOVED AT TIME OF VISIT   Yes Gina Hunt MD   ipratropium-albuterol (DUO-NEB) 0.5-2.5 mg/3 ml nebulizer USE 3 ML VIA NEBULIZER EVERY 4 HOURS AS NEEDED FOR SHORTNESS OF BREATH  Patient taking differently: Take 3 mL by nebulization Every 4 (Four) Hours As Needed. 9/27/21  Yes Cindi Curtis APRN   levothyroxine (SYNTHROID, LEVOTHROID) 125 MCG tablet Take 250 mcg by mouth Daily. 6/22/21  Yes Gina Hunt MD   Magnesium 400 MG tablet Take 400 mg by mouth Daily. 6/10/21  Yes Emergency, Nurse Suni, RN   metoprolol succinate XL (TOPROL-XL) 50 MG 24 hr tablet Take 1 tablet by mouth Daily. 1/20/22  Yes Deven Myers MD   montelukast (SINGULAIR) 10 MG tablet TAKE 1 TABLET BY MOUTH ONCE DAILY EVERY EVENING  Patient taking differently: Take 10 mg by mouth Every Night. 11/3/21  Yes Francesco Gimenez, DO   multivitamin (THERAGRAN) tablet tablet Take 1 tablet by mouth Daily.   Yes Gina Hunt MD   nystatin 211160 UNIT/GM powder APPLY TO AFFECTED AREA THREE TIMES DAILY  Patient taking differently: Apply 1 application topically to the appropriate area as directed 3 (Three) Times a Day. 9/29/21  Yes Francesco Gimenez DO   pantoprazole (PROTONIX) 40 MG EC tablet TAKE 1 TABLET BY MOUTH EVERY DAY 5/2/22  Yes Bautista Farris MD   polyethylene glycol (MIRALAX) 17 GM/SCOOP powder MIX 17G(1 CAPFUL) IN 8OZ WATER AND DRINK BY MOUTH DAILY 12/9/21  Yes Francesco Gimenez DO   potassium chloride 10 MEQ CR tablet  3/14/22  Yes Emergency, Nurse Suni RN   potassium chloride ER (K-TAB) 20 MEQ tablet controlled-release ER tablet Take 40 mEq by mouth 2 (Two) Times a Day.   Yes iGna Hunt MD   Repatha SureClick solution auto-injector SureClick injection  2/24/22  Yes Gina Hunt MD   revefenacin (YUPELRI) 175 MCG/3ML nebulizer solution Take 175 mcg by nebulization Daily.    Yes Gina Hunt MD   sertraline (ZOLOFT) 100 MG tablet TAKE 1 TABLET BY MOUTH ONCE DAILY 9/13/21  Yes Francesco Gimenez DO   spironolactone (ALDACTONE) 100 MG tablet Take 200 mg by mouth 2 (two) times a day.   Yes Gina Hunt MD   spironolactone (ALDACTONE) 100 MG tablet Take 2 tablets by mouth 2 (Two) Times a Day. 3/24/22  Yes Emergency, Nurse Epic, RN   SUMAtriptan (IMITREX) 100 MG tablet TAKE 1 TABLET BY MOUTH AT ONSET OF MIGRAINE. MAY REPEAT AFTER 2 HOURS. IF HEADACHE RETURNS. NOT TO EXCEED 200 MG IN 24 HOURS 4/14/22  Yes Francesco Gimenez DO   torsemide (DEMADEX) 100 MG tablet Take 100 mg by mouth 3 (Three) Times a Day.   Yes Gina Hunt MD   traZODone (DESYREL) 50 MG tablet Take 1 tablet by mouth Every Night. 12/23/21  Yes Mk Dowling, DO   triamcinolone (KENALOG) 0.1 % ointment Apply  topically to the appropriate area as directed 2 (Two) Times a Day As Needed for Irritation. 4/13/22  Yes Francesco Gimenez DO   vitamin D3 125 MCG (5000 UT) capsule capsule Take 1 capsule by mouth Daily. 2/4/22  Yes Gina Hunt MD   warfarin (COUMADIN) 2 MG tablet TAKE 1 TABLET(2 MG) BY MOUTH EVERY DAY AS DIRECTED 2/17/22  Yes Francesco Gimenez DO   warfarin (COUMADIN) 5 MG tablet Take 1.5 tablets by mouth Daily. 9/22/21  Yes Gina Hunt MD   gabapentin (NEURONTIN) 300 MG capsule Take 3 capsules by mouth 4 (Four) Times a Day for 30 days. 2/17/22 3/19/22  Francesco Gimenez DO   warfarin (COUMADIN) 5 MG tablet Take 1 tablet by mouth Every Night for 90 days. 1/10/22 4/10/22  Francesco Gimenez DO        Patient Active Problem List   Diagnosis   • NAFLD (nonalcoholic fatty liver disease)   • Budd-Chiari syndrome (HCC)   • Abnormal liver enzymes   • Abnormal mammogram   • Acquired hypothyroidism   • Arthritis   • Chronic right-sided low back pain with right-sided sciatica   • Contact dermatitis and other eczema   • COPD (chronic  obstructive pulmonary disease) (HCC)   • Dependent edema   • Depression   • Diabetic polyneuropathy (HCC)   • Dysphagia   • Elevated alkaline phosphatase level   • Elevated ferritin   • Encounter for long-term (current) use of insulin (HCC)   • Essential hypertension   • Fibromyalgia   • Neck pain, bilateral   • Gastroesophageal reflux disease without esophagitis   • History of pancreatitis   • Mixed hyperlipidemia   • Hypokalemia   • Hyperinsulinism   • Insulin resistance   • Long term (current) use of anticoagulants   • LPRD (laryngopharyngeal reflux disease)   • Magnesium deficiency   • Microalbuminuria   • Moderate episode of recurrent major depressive disorder (HCC)   • Morbid obesity (HCC)   • Mycobacterium avium complex (HCC)   • GIUSEPPE (obstructive sleep apnea)   • Pain in left hip   • Personal history of tobacco use, presenting hazards to health   • Predisposition to allergic reaction   • Presence of insulin pump   • Pulmonary hypertension (HCC)   • Pulmonary nodule   • Serum calcium elevated   • Stasis dermatitis of both legs   • Thoracic back pain   • Uncontrolled type 2 diabetes mellitus with hyperglycemia (HCC)   • Uncontrolled type 2 diabetes mellitus with neurologic complication   • Uncontrolled type 2 diabetes mellitus without complication, with long-term current use of insulin   • Ventral hernia   • Vitamin D deficiency   • Voice hoarseness   • Wheezing   • 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HCC)   • Herpes zoster without complication   • Postherpetic neuralgia   • Skin tags, multiple acquired   • Acute on chronic respiratory failure with hypoxia (HCC)   • Pneumonia of right lower lobe due to infectious organism   • Pain in both feet   • Congestive heart failure (HCC)   • Edema   • Gout   • Stage 3 chronic kidney disease (HCC)   • Dermatitis   • Chronic diastolic heart failure (HCC)        Past Surgical History:   Procedure Laterality Date   • BRONCHOSCOPY N/A 12/6/2021    Procedure: BRONCHOSCOPY WITH  BRONCHIOALVEOLAR LAVAGE AND WASHINGS;  Surgeon: David Britton MD;  Location: [facility] MAIN OR;  Service: Pulmonary;  Laterality: N/A;  MUCOUS PLUGGING   • CARDIAC CATHETERIZATION  2018   • CARPAL TUNNEL RELEASE     •  SECTION  1989   • COLONOSCOPY     • ENDOSCOPY     • HERNIA REPAIR     • HYSTERECTOMY  ,     PARTIAL   • KNEE SURGERY     • UPPER GASTROINTESTINAL ENDOSCOPY         Social History     Socioeconomic History   • Marital status:    Tobacco Use   • Smoking status: Former Smoker     Packs/day: 1.00     Quit date:      Years since quittin.4   • Smokeless tobacco: Never Used   • Tobacco comment: Exposure to 2nd hand smoke/parents/   Vaping Use   • Vaping Use: Never used   Substance and Sexual Activity   • Alcohol use: Not Currently     Comment: FORMER; OCCASIONAL   • Sexual activity: Defer       Family History   Problem Relation Age of Onset   • Heart disease Mother         GRANDPARENT-NONSPECIFIC   • Diabetes Father    • Skin cancer Father    • Colon cancer Neg Hx        Family history, surgical history, past medical history, Allergies and med's reviewed with patient today and updated in "[employer] EMR.     ROS:  Review of Systems   Constitutional: Negative for fatigue.   HENT: Negative for congestion, postnasal drip and rhinorrhea.    Eyes: Negative for blurred vision and visual disturbance.   Respiratory: Negative for cough, chest tightness, shortness of breath and wheezing.    Cardiovascular: Negative for chest pain and palpitations.   Musculoskeletal: Positive for arthralgias and joint swelling.   Skin: Negative for rash and skin lesions.   Allergic/Immunologic: Negative for environmental allergies.   Neurological: Negative for headache.   Psychiatric/Behavioral: Negative for depressed mood. The patient is not nervous/anxious.        OBJECTIVE:  Vitals:    22 0822   BP: (!) 141/117   BP Location: Right arm   Patient Position: Sitting  "  Pulse: 83   Temp: 98 °F (36.7 °C)   SpO2: 95%   Weight: (!) 167 kg (367 lb 12.8 oz)   Height: 157.5 cm (62\")     No exam data present   Body mass index is 67.27 kg/m².  No LMP recorded (lmp unknown). Patient has had a hysterectomy.    Physical Exam  Vitals and nursing note reviewed.   Constitutional:       General: She is not in acute distress.     Appearance: Normal appearance. She is obese.   HENT:      Head: Normocephalic.      Right Ear: Tympanic membrane, ear canal and external ear normal.      Left Ear: Tympanic membrane, ear canal and external ear normal.      Nose: Nose normal.      Mouth/Throat:      Mouth: Mucous membranes are moist.      Pharynx: Oropharynx is clear.   Eyes:      General: No scleral icterus.     Conjunctiva/sclera: Conjunctivae normal.      Pupils: Pupils are equal, round, and reactive to light.   Cardiovascular:      Rate and Rhythm: Normal rate and regular rhythm.      Pulses: Normal pulses.      Heart sounds: Normal heart sounds. No murmur heard.  Pulmonary:      Effort: Pulmonary effort is normal.      Breath sounds: Normal breath sounds. No wheezing, rhonchi or rales.   Musculoskeletal:      Cervical back: Neck supple. No rigidity or tenderness.      Left foot: Swelling and tenderness present.        Legs:    Lymphadenopathy:      Cervical: No cervical adenopathy.   Skin:     General: Skin is warm and dry.      Coloration: Skin is not jaundiced.      Findings: No rash.   Neurological:      General: No focal deficit present.      Mental Status: She is alert and oriented to person, place, and time.   Psychiatric:         Mood and Affect: Mood normal.         Thought Content: Thought content normal.         Judgment: Judgment normal.         Procedures    No visits with results within 30 Day(s) from this visit.   Latest known visit with results is:   Office Visit on 02/10/2022   Component Date Value Ref Range Status   • Glucose 02/10/2022 81  65 - 99 mg/dL Final   • BUN 02/10/2022 27 " (A) 6 - 20 mg/dL Final   • Creatinine 02/10/2022 1.24 (A) 0.57 - 1.00 mg/dL Final   • Sodium 02/10/2022 137  136 - 145 mmol/L Final   • Potassium 02/10/2022 3.7  3.5 - 5.2 mmol/L Final   • Chloride 02/10/2022 93 (A) 98 - 107 mmol/L Final   • CO2 02/10/2022 31.9 (A) 22.0 - 29.0 mmol/L Final   • Calcium 02/10/2022 11.2 (A) 8.6 - 10.5 mg/dL Final   • Total Protein 02/10/2022 7.8  6.0 - 8.5 g/dL Final   • Albumin 02/10/2022 4.70  3.50 - 5.20 g/dL Final   • ALT (SGPT) 02/10/2022 59 (A) 1 - 33 U/L Final   • AST (SGOT) 02/10/2022 64 (A) 1 - 32 U/L Final   • Alkaline Phosphatase 02/10/2022 440 (A) 39 - 117 U/L Final   • Total Bilirubin 02/10/2022 0.4  0.0 - 1.2 mg/dL Final   • eGFR Non African Amer 02/10/2022 45 (A) >60 mL/min/1.73 Final   • Globulin 02/10/2022 3.1  gm/dL Final   • A/G Ratio 02/10/2022 1.5  g/dL Final   • BUN/Creatinine Ratio 02/10/2022 21.8  7.0 - 25.0 Final   • Anion Gap 02/10/2022 12.1  5.0 - 15.0 mmol/L Final   • Total Cholesterol 02/10/2022 227 (A) 0 - 200 mg/dL Final   • Triglycerides 02/10/2022 442 (A) 0 - 150 mg/dL Final   • HDL Cholesterol 02/10/2022 34 (A) 40 - 60 mg/dL Final   • LDL Cholesterol  02/10/2022 116 (A) 0 - 100 mg/dL Final   • VLDL Cholesterol 02/10/2022 77 (A) 5 - 40 mg/dL Final   • LDL/HDL Ratio 02/10/2022 3.08   Final   • Hemoglobin A1C 02/10/2022 6.80 (A) 4.80 - 5.60 % Final   • TSH 02/10/2022 0.318  0.270 - 4.200 uIU/mL Final   • Free T4 02/10/2022 1.69  0.93 - 1.70 ng/dL Final    T4 results may be falsely increased if patient taking Biotin.   • T3, Free 02/10/2022 3.61  2.00 - 4.40 pg/mL Final   • C-Peptide 02/10/2022 0.7 (A) 1.1 - 4.4 ng/mL Final    C-Peptide reference interval is for fasting patients.   • KOH Prep 02/10/2022 No yeast or hyphal elements seen  No yeast or hyphal elements seen Final   • Glucose 02/10/2022 84  65 - 99 mg/dL Final   • BUN 02/10/2022 26 (A) 6 - 20 mg/dL Final   • Creatinine 02/10/2022 1.11 (A) 0.57 - 1.00 mg/dL Final   • Sodium 02/10/2022 138  136 -  145 mmol/L Final   • Potassium 02/10/2022 3.8  3.5 - 5.2 mmol/L Final   • Chloride 02/10/2022 95 (A) 98 - 107 mmol/L Final   • CO2 02/10/2022 29.4 (A) 22.0 - 29.0 mmol/L Final   • Calcium 02/10/2022 11.7 (A) 8.6 - 10.5 mg/dL Final   • Albumin 02/10/2022 4.90  3.50 - 5.20 g/dL Final   • Phosphorus 02/10/2022 3.6  2.5 - 4.5 mg/dL Final   • Anion Gap 02/10/2022 13.6  5.0 - 15.0 mmol/L Final   • BUN/Creatinine Ratio 02/10/2022 23.4  7.0 - 25.0 Final   • eGFR Non  Amer 02/10/2022 51 (A) >60 mL/min/1.73 Final       ASSESSMENT/ PLAN:    Diagnoses and all orders for this visit:    1. Need for COVID-19 vaccine (Primary)  -     COVID-19 Vaccine (Pfizer) Gray Cap    2. Essential hypertension  Assessment & Plan:  Her blood pressure is elevated here today, but this is most likely due to pain due to her acute gouty episode.    Orders:  -     Basic Metabolic Panel    3. Mixed hyperlipidemia  Assessment & Plan:  We will not update her lipids on this visit.      4. Stage 3 chronic kidney disease, unspecified whether stage 3a or 3b CKD (HCC)    5. 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  She is somewhat limited due to her elevated renal function.  We will update her uric acid level and give her a shot of steroids.    Orders:  -     Uric Acid  -     methylPREDNISolone acetate (DEPO-medrol) injection 80 mg  -     HYDROcodone-acetaminophen (Norco) 5-325 MG per tablet; Take 1 tablet by mouth Every 6 (Six) Hours As Needed for Moderate Pain .  Dispense: 15 tablet; Refill: 0      Orders Placed Today:     New Medications Ordered This Visit   Medications   • methylPREDNISolone acetate (DEPO-medrol) injection 80 mg   • HYDROcodone-acetaminophen (Norco) 5-325 MG per tablet     Sig: Take 1 tablet by mouth Every 6 (Six) Hours As Needed for Moderate Pain .     Dispense:  15 tablet     Refill:  0        Management Plan:     An After Visit Summary was printed and given to  the patient at discharge.    Follow-up: Return in about 4 months (around 10/6/2022) for Recheck.    Francesco Gimenez,  6/6/2022 08:48 EDT  This note was electronically signed.

## 2022-06-06 NOTE — ASSESSMENT & PLAN NOTE
She is having an acute gouty episode.  She is somewhat limited due to her elevated renal function.  We will update her uric acid level and give her a shot of steroids.

## 2022-06-06 NOTE — ASSESSMENT & PLAN NOTE
Her blood pressure is elevated here today, but this is most likely due to pain due to her acute gouty episode.

## 2022-07-21 ENCOUNTER — TRANSCRIBE ORDERS (OUTPATIENT)
Dept: LAB | Facility: HOSPITAL | Age: 52
End: 2022-07-21

## 2022-07-21 ENCOUNTER — LAB (OUTPATIENT)
Dept: LAB | Facility: HOSPITAL | Age: 52
End: 2022-07-21

## 2022-07-21 DIAGNOSIS — E87.6 HYPOPOTASSEMIA: ICD-10-CM

## 2022-07-21 DIAGNOSIS — M10.9 GOUT, UNSPECIFIED CAUSE, UNSPECIFIED CHRONICITY, UNSPECIFIED SITE: ICD-10-CM

## 2022-07-21 DIAGNOSIS — N18.31 STAGE 3A CHRONIC KIDNEY DISEASE: ICD-10-CM

## 2022-07-21 DIAGNOSIS — N18.31 STAGE 3A CHRONIC KIDNEY DISEASE: Primary | ICD-10-CM

## 2022-07-21 LAB
25(OH)D3 SERPL-MCNC: 68.8 NG/ML (ref 30–100)
ALBUMIN SERPL-MCNC: 4.4 G/DL (ref 3.5–5.2)
ANION GAP SERPL CALCULATED.3IONS-SCNC: 12.4 MMOL/L (ref 5–15)
BACTERIA UR QL AUTO: ABNORMAL /HPF
BASOPHILS # BLD AUTO: 0.05 10*3/MM3 (ref 0–0.2)
BASOPHILS NFR BLD AUTO: 0.5 % (ref 0–1.5)
BILIRUB UR QL STRIP: NEGATIVE
BUN SERPL-MCNC: 22 MG/DL (ref 6–20)
BUN/CREAT SERPL: 21.4 (ref 7–25)
CALCIUM SPEC-SCNC: 10.4 MG/DL (ref 8.6–10.5)
CHLORIDE SERPL-SCNC: 95 MMOL/L (ref 98–107)
CLARITY UR: CLEAR
CO2 SERPL-SCNC: 32.6 MMOL/L (ref 22–29)
COLOR UR: YELLOW
CREAT SERPL-MCNC: 1.03 MG/DL (ref 0.57–1)
CREAT UR-MCNC: 86.5 MG/DL
DEPRECATED RDW RBC AUTO: 40.9 FL (ref 37–54)
EGFRCR SERPLBLD CKD-EPI 2021: 65.2 ML/MIN/1.73
EOSINOPHIL # BLD AUTO: 0.38 10*3/MM3 (ref 0–0.4)
EOSINOPHIL NFR BLD AUTO: 3.7 % (ref 0.3–6.2)
ERYTHROCYTE [DISTWIDTH] IN BLOOD BY AUTOMATED COUNT: 12.5 % (ref 12.3–15.4)
GLUCOSE SERPL-MCNC: 119 MG/DL (ref 65–99)
GLUCOSE UR STRIP-MCNC: NEGATIVE MG/DL
HCT VFR BLD AUTO: 44.5 % (ref 34–46.6)
HGB BLD-MCNC: 15.4 G/DL (ref 12–15.9)
HGB UR QL STRIP.AUTO: NEGATIVE
HYALINE CASTS UR QL AUTO: ABNORMAL /LPF
IMM GRANULOCYTES # BLD AUTO: 0.05 10*3/MM3 (ref 0–0.05)
IMM GRANULOCYTES NFR BLD AUTO: 0.5 % (ref 0–0.5)
KETONES UR QL STRIP: NEGATIVE
LEUKOCYTE ESTERASE UR QL STRIP.AUTO: ABNORMAL
LYMPHOCYTES # BLD AUTO: 1.44 10*3/MM3 (ref 0.7–3.1)
LYMPHOCYTES NFR BLD AUTO: 13.9 % (ref 19.6–45.3)
MAGNESIUM SERPL-MCNC: 2.2 MG/DL (ref 1.6–2.6)
MCH RBC QN AUTO: 31.8 PG (ref 26.6–33)
MCHC RBC AUTO-ENTMCNC: 34.6 G/DL (ref 31.5–35.7)
MCV RBC AUTO: 91.8 FL (ref 79–97)
MONOCYTES # BLD AUTO: 0.94 10*3/MM3 (ref 0.1–0.9)
MONOCYTES NFR BLD AUTO: 9 % (ref 5–12)
NEUTROPHILS NFR BLD AUTO: 7.53 10*3/MM3 (ref 1.7–7)
NEUTROPHILS NFR BLD AUTO: 72.4 % (ref 42.7–76)
NITRITE UR QL STRIP: NEGATIVE
NRBC BLD AUTO-RTO: 0 /100 WBC (ref 0–0.2)
PH UR STRIP.AUTO: 6 [PH] (ref 5–8)
PHOSPHATE SERPL-MCNC: 3.3 MG/DL (ref 2.5–4.5)
PLATELET # BLD AUTO: 266 10*3/MM3 (ref 140–450)
PMV BLD AUTO: 9.4 FL (ref 6–12)
POTASSIUM SERPL-SCNC: 4.1 MMOL/L (ref 3.5–5.2)
PROT ?TM UR-MCNC: 7.9 MG/DL
PROT ?TM UR-MCNC: 8.6 MG/DL
PROT UR QL STRIP: NEGATIVE
PROT/CREAT UR: 0.09 MG/G{CREAT}
PTH-INTACT SERPL-MCNC: 73.1 PG/ML (ref 15–65)
RBC # BLD AUTO: 4.85 10*6/MM3 (ref 3.77–5.28)
RBC # UR STRIP: ABNORMAL /HPF
REF LAB TEST METHOD: ABNORMAL
SODIUM SERPL-SCNC: 140 MMOL/L (ref 136–145)
SP GR UR STRIP: 1.02 (ref 1–1.03)
SQUAMOUS #/AREA URNS HPF: ABNORMAL /HPF
URATE SERPL-MCNC: 9.7 MG/DL (ref 2.4–5.7)
UROBILINOGEN UR QL STRIP: ABNORMAL
WBC # UR STRIP: ABNORMAL /HPF
WBC NRBC COR # BLD: 10.39 10*3/MM3 (ref 3.4–10.8)

## 2022-07-21 PROCEDURE — 83735 ASSAY OF MAGNESIUM: CPT

## 2022-07-21 PROCEDURE — 85025 COMPLETE CBC W/AUTO DIFF WBC: CPT

## 2022-07-21 PROCEDURE — 84550 ASSAY OF BLOOD/URIC ACID: CPT | Performed by: FAMILY MEDICINE

## 2022-07-21 PROCEDURE — 36415 COLL VENOUS BLD VENIPUNCTURE: CPT

## 2022-07-21 PROCEDURE — 81001 URINALYSIS AUTO W/SCOPE: CPT

## 2022-07-21 PROCEDURE — 82570 ASSAY OF URINE CREATININE: CPT

## 2022-07-21 PROCEDURE — 80069 RENAL FUNCTION PANEL: CPT

## 2022-07-21 PROCEDURE — 84156 ASSAY OF PROTEIN URINE: CPT

## 2022-07-21 PROCEDURE — 82306 VITAMIN D 25 HYDROXY: CPT

## 2022-07-21 PROCEDURE — 83970 ASSAY OF PARATHORMONE: CPT

## 2022-07-21 RX ORDER — ALLOPURINOL 300 MG/1
300 TABLET ORAL DAILY
Qty: 90 TABLET | Refills: 1 | Status: SHIPPED | OUTPATIENT
Start: 2022-07-21 | End: 2023-02-02

## 2022-07-22 DIAGNOSIS — M1A.0720 IDIOPATHIC CHRONIC GOUT OF LEFT FOOT WITHOUT TOPHUS: Primary | ICD-10-CM

## 2022-07-25 RX ORDER — SERTRALINE HYDROCHLORIDE 100 MG/1
TABLET, FILM COATED ORAL
Qty: 90 TABLET | Refills: 1 | Status: SHIPPED | OUTPATIENT
Start: 2022-07-25

## 2022-07-28 ENCOUNTER — OFFICE VISIT (OUTPATIENT)
Dept: FAMILY MEDICINE CLINIC | Facility: CLINIC | Age: 52
End: 2022-07-28

## 2022-07-28 VITALS
BODY MASS INDEX: 53.92 KG/M2 | SYSTOLIC BLOOD PRESSURE: 136 MMHG | OXYGEN SATURATION: 94 % | HEART RATE: 79 BPM | HEIGHT: 62 IN | WEIGHT: 293 LBS | TEMPERATURE: 97.4 F | DIASTOLIC BLOOD PRESSURE: 69 MMHG

## 2022-07-28 DIAGNOSIS — F33.1 MODERATE EPISODE OF RECURRENT MAJOR DEPRESSIVE DISORDER: Primary | ICD-10-CM

## 2022-07-28 PROCEDURE — 99213 OFFICE O/P EST LOW 20 MIN: CPT | Performed by: FAMILY MEDICINE

## 2022-07-28 NOTE — ASSESSMENT & PLAN NOTE
She continues to have some ongoing issues with depression and anxiety.  I think there are multiple factors involved in this.  Increasing doses of medication has not prescribed a whole lot of benefit.  She is willing to see a therapist.

## 2022-07-28 NOTE — PROGRESS NOTES
Chief Complaint   Patient presents with   • Anxiety   • Depression   • Mole     Patient wants to have moles checked out. All over body.        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), History of tobacco abuse, Hypokalemia (05/07/2019), Long term current use of anticoagulant, LPRD (laryngopharyngeal reflux disease), Moderate episode of recurrent major depressive disorder ([facility]) (06/22/2017), Mycobacterium avium complex ([facility]) (08/09/2018), GIUSEPPE (obstructive sleep apnea) (08/09/2018), Pulmonary hypertension ([facility]) (08/27/2018), Stasis dermatitis of both legs (08/09/2018), Type 2 diabetes mellitus, with long-term current use of insulin ([facility]) (06/22/2017), Ventral hernia (03/04/2015), Vitamin D deficiency (11/13/2013), and Voice hoarseness.    Depression anxiety- she states her home life is somewhat stressful.  She states her  becomes somewhat irritable and anxious at times she states he cooks oftentimes Benita raves and will throw things.  None of this is necessarily towards her in any physical Gila.  She states when he becomes rather upset this creates some anxiety within her as well.      PHQ-2 Depression Screening  Little interest or pleasure in doing things?     Feeling down, depressed, or hopeless?     PHQ-2 Total Score     PHQ-9 Depression Screening  Little interest or pleasure in doing things?     Feeling down, depressed, or hopeless?     Trouble falling or staying asleep, or sleeping too much?     Feeling tired or having  little energy?     Poor appetite or overeating?     Feeling bad about yourself - or that you are a failure or have let yourself or your family down?     Trouble concentrating on things, such as reading the newspaper or watching television?     Moving or speaking so slowly that other people could have noticed? Or the opposite - being so fidgety or restless that you have been moving around a lot more than usual?     Thoughts that you would be better off dead, or of hurting yourself in some way?     PHQ-9 Total Score     If you checked off any problems, how difficult have these problems made it for you to do your work, take care of things at home, or get along with other people?       Allergies   Allergen Reactions   • Nickel Diarrhea and Nausea And Vomiting   • Insulin Degludec Unknown - High Severity     Can trigger pancreatitis   • Sitagliptin-Metformin Hcl Other (See Comments)   • [place] Unknown - High Severity     [place] blue, by allergy testing.    • Dulaglutide Nausea And Vomiting   • Exenatide Other (See Comments)     pancreantitis   • Metformin Unknown - Low Severity, Diarrhea and Other (See Comments)   • Palladium Chloride Unknown - Low Severity     by allergy testing.    • Sitagliptin Other (See Comments)     panceatitis       Prior to Admission medications    Medication Sig Start Date End Date Taking? Authorizing Provider   albuterol sulfate  (90 Base) MCG/ACT inhaler Inhale 1-2 puffs Every 6 (Six) Hours As Needed.   Yes Gina Hunt MD   Allergy Relief 180 MG tablet TAKE 1 TABLET BY MOUTH TWICE DAILY 9/24/21  Yes Bautista Farris MD   allopurinol (ZYLOPRIM) 300 MG tablet Take 1 tablet by mouth Daily. 7/21/22  Yes Francesco Gimenez, DO   budesonide (PULMICORT) 0.5 MG/2ML nebulizer solution Take 0.5 mg by nebulization Daily.   Yes Gina Hunt MD   Cholecalciferol 125 MCG (5000 UT) tablet Take 5,000 Units by mouth Daily.   Yes Gina Hunt MD   Continuous  Blood Gluc Sensor (Dexcom G6 Sensor) APPLY 1 TOPICALLY EVERY 10 DAYS 12/27/21  Yes Gnia Hunt MD   Continuous Blood Gluc Transmit (Dexcom G6 Transmitter) misc USE TRANSMITTER AS DIRECTED 3/19/22  Yes Emergency, Nurse Suni, RN   eszopiclone (LUNESTA) 1 MG tablet TAKE 1 TABLET BY MOUTH IMMEDIATELY BEFORE BEDTIME 3/4/22  Yes Franko Muhammad MD   ezetimibe (ZETIA) 10 MG tablet Take 10 mg by mouth Daily.   Yes Gina Hunt MD   FeroSul 325 (65 Fe) MG tablet TAKE 1 TABLET(325 MG) BY MOUTH TWICE DAILY  Patient taking differently: Take 325 mg by mouth 2 (Two) Times a Day. 9/20/21  Yes Francesco Gimenez DO   fexofenadine (ALLEGRA) 180 MG tablet Take 1 tablet by mouth 2 (Two) Times a Day.   Yes Gina Hunt MD   FREESTYLE LITE test strip TEST FOUR TIMES DAILY BEFORE A MEAL AND AT BEDTIME 12/14/21  Yes Gina Hunt MD   gabapentin (NEURONTIN) 300 MG capsule Take 3 capsules by mouth 4 (Four) Times a Day for 30 days. 2/17/22  Yes Francesco Gimenez DO   glucagon (GLUCAGEN) 1 MG injection Inject 1 mg into the appropriate muscle as directed by prescriber. 11/1/21  Yes Gina Hunt MD   HUMULIN R 500 UNIT/ML CONCENTRATED injection ADMINISTER UP TO 1300 UNITS VIA PUMP EVERY DAY 1/5/22  Yes Gina Hunt MD   insulin patient supplied pump Inject  under the skin into the appropriate area as directed Continuous. Type of Insulin: HUMULIN R 500u/mL  Dose: 2.5 basal  Prescriber: SAÚL MAHER ([facility])    PUMP IS REMOVED AT TIME OF VISIT   Yes Gina Hunt MD   ipratropium-albuterol (DUO-NEB) 0.5-2.5 mg/3 ml nebulizer USE 3 ML VIA NEBULIZER EVERY 4 HOURS AS NEEDED FOR SHORTNESS OF BREATH  Patient taking differently: Take 3 mL by nebulization Every 4 (Four) Hours As Needed. 9/27/21  Yes Cindi Curtis APRN   levothyroxine (SYNTHROID, LEVOTHROID) 125 MCG tablet Take 250 mcg by mouth Daily. 6/22/21  Yes Gina Hunt MD   Magnesium 400  MG tablet Take 400 mg by mouth Daily. 6/10/21  Yes Emergency, Nurse Suni, RN   metoprolol succinate XL (TOPROL-XL) 50 MG 24 hr tablet Take 1 tablet by mouth Daily. 1/20/22  Yes Deven Myers MD   montelukast (SINGULAIR) 10 MG tablet TAKE 1 TABLET BY MOUTH ONCE DAILY EVERY EVENING  Patient taking differently: Take 10 mg by mouth Every Night. 11/3/21  Yes Francesco Gimenez DO   multivitamin (THERAGRAN) tablet tablet Take 1 tablet by mouth Daily.   Yes ProviderGina MD   nystatin 624279 UNIT/GM powder APPLY TO AFFECTED AREA THREE TIMES DAILY  Patient taking differently: Apply 1 application topically to the appropriate area as directed 3 (Three) Times a Day. 9/29/21  Yes Francesco Gimenez DO   pantoprazole (PROTONIX) 40 MG EC tablet TAKE 1 TABLET BY MOUTH EVERY DAY 5/2/22  Yes Bautista Farris MD   potassium chloride 10 MEQ CR tablet  3/14/22  Yes Emergency, Nurse Suni, TARYN   Repatha SureClick solution auto-injector SureClick injection  2/24/22  Yes ProviderGina MD   revefenacin (YUPELRI) 175 MCG/3ML nebulizer solution Take 175 mcg by nebulization Daily.   Yes ProviderGina MD   sertraline (ZOLOFT) 100 MG tablet TAKE 1 TABLET BY MOUTH ONCE DAILY 7/25/22  Yes Francesco Gimenez DO   spironolactone (ALDACTONE) 100 MG tablet Take 200 mg by mouth 2 (two) times a day.   Yes Gina Hunt MD   SUMAtriptan (IMITREX) 100 MG tablet TAKE 1 TABLET BY MOUTH AT ONSET OF MIGRAINE. MAY REPEAT AFTER 2 HOURS. IF HEADACHE RETURNS. NOT TO EXCEED 200 MG IN 24 HOURS 4/14/22  Yes Francesco Gimenez DO   torsemide (DEMADEX) 100 MG tablet Take 100 mg by mouth 3 (Three) Times a Day.   Yes Gina Hunt MD   traZODone (DESYREL) 50 MG tablet Take 1 tablet by mouth Every Night. 12/23/21  Yes Mk Dowling, DO   triamcinolone (KENALOG) 0.1 % ointment APPLY TOPICALLY TO THE AFFECTED AREA TWICE DAILY AS DIRECTED AS NEEDED FOR IRRITATION 6/28/22  Yes Pernell  Francesco Terrazas DO   vitamin D3 125 MCG (5000 UT) capsule capsule Take 1 capsule by mouth Daily. 2/4/22  Yes Gina Hunt MD   warfarin (COUMADIN) 2 MG tablet TAKE 1 TABLET(2 MG) BY MOUTH EVERY DAY AS DIRECTED 2/17/22  Yes Francesco Gimenez DO   warfarin (COUMADIN) 5 MG tablet Take 1 tablet by mouth Every Night for 90 days. 1/10/22  Yes Francesco Gimenez DO   benzonatate (TESSALON) 100 MG capsule Take 1 capsule by mouth 3 (Three) Times a Day As Needed for Cough. 4/22/22   Shan Saldivar PA   docusate sodium (COLACE) 100 MG capsule Take 100 mg by mouth 2 (Two) Times a Day. 6/10/21   Emergency, Nurse Epic, RN   fluticasone (FLONASE) 50 MCG/ACT nasal spray 2 sprays into the nostril(s) as directed by provider Daily. 4/22/22   Shan Saldivar PA   HYDROcodone-acetaminophen (Norco) 5-325 MG per tablet Take 1 tablet by mouth Every 6 (Six) Hours As Needed for Moderate Pain . 6/6/22   Francesco Gimenez DO   polyethylene glycol (MIRALAX) 17 GM/SCOOP powder MIX 17G(1 CAPFUL) IN 8OZ WATER AND DRINK BY MOUTH DAILY 12/9/21   Francesco Gimenez DO   potassium chloride ER (K-TAB) 20 MEQ tablet controlled-release ER tablet Take 40 mEq by mouth 2 (Two) Times a Day.    Gina Hunt MD   spironolactone (ALDACTONE) 100 MG tablet Take 2 tablets by mouth 2 (Two) Times a Day. 3/24/22   Emergency, Nurse Suni, RN   warfarin (COUMADIN) 5 MG tablet Take 1.5 tablets by mouth Daily. 9/22/21   Gina Hunt MD        Patient Active Problem List   Diagnosis   • NAFLD (nonalcoholic fatty liver disease)   • Budd-Chiari syndrome (HCC)   • Abnormal liver enzymes   • Abnormal mammogram   • Acquired hypothyroidism   • Arthritis   • Chronic right-sided low back pain with right-sided sciatica   • Contact dermatitis and other eczema   • COPD (chronic obstructive pulmonary disease) (HCC)   • Dependent edema   • Depression   • Diabetic polyneuropathy (HCC)   • Dysphagia   • Elevated alkaline  phosphatase level   • Elevated ferritin   • Encounter for long-term (current) use of insulin (HCC)   • Essential hypertension   • Fibromyalgia   • Neck pain, bilateral   • Gastroesophageal reflux disease without esophagitis   • History of pancreatitis   • Mixed hyperlipidemia   • Hypokalemia   • Hyperinsulinism   • Insulin resistance   • Long term (current) use of anticoagulants   • LPRD (laryngopharyngeal reflux disease)   • Magnesium deficiency   • Microalbuminuria   • Moderate episode of recurrent major depressive disorder ([facility])   • Morbid obesity ([facility])   • Mycobacterium avium complex ([facility])   • GIUSEPPE (obstructive sleep apnea)   • Pain in left hip   • Personal history of tobacco use, presenting hazards to health   • Predisposition to allergic reaction   • Presence of insulin pump   • Pulmonary hypertension ([facility])   • Pulmonary nodule   • Serum calcium elevated   • Stasis dermatitis of both legs   • Thoracic back pain   • Uncontrolled type 2 diabetes mellitus with hyperglycemia (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)   • Herpes zoster without complication   • Postherpetic neuralgia   • Skin tags, multiple acquired   • Acute on chronic respiratory failure with hypoxia ([facility])   • Pneumonia of right lower lobe due to infectious organism   • Pain in both feet   • Congestive heart failure ([facility])   • Edema   • Gout   • Stage 3 chronic kidney disease ([facility])   • Dermatitis   • Chronic diastolic heart failure ([facility])        Past Surgical History:   Procedure Laterality Date   • BRONCHOSCOPY N/A 12/6/2021    Procedure: BRONCHOSCOPY WITH BRONCHIOALVEOLAR LAVAGE AND WASHINGS;  Surgeon: David Britton MD;  Location: [facility] MAIN OR;  Service: Pulmonary;  Laterality: N/A;  MUCOUS PLUGGING  "  • CARDIAC CATHETERIZATION  2018   • CARPAL TUNNEL RELEASE     •  SECTION  ,     • COLONOSCOPY     • ENDOSCOPY     • HERNIA REPAIR     • HYSTERECTOMY  ,     PARTIAL   • KNEE SURGERY     • UPPER GASTROINTESTINAL ENDOSCOPY         Social History     Socioeconomic History   • Marital status:    Tobacco Use   • Smoking status: Former Smoker     Packs/day: 1.00     Quit date:      Years since quittin.5   • Smokeless tobacco: Never Used   • Tobacco comment: Exposure to 2nd hand smoke/parents/   Vaping Use   • Vaping Use: Never used   Substance and Sexual Activity   • Alcohol use: Not Currently     Comment: FORMER; OCCASIONAL   • Sexual activity: Defer       Family History   Problem Relation Age of Onset   • Heart disease Mother         GRANDPARENT-NONSPECIFIC   • Diabetes Father    • Skin cancer Father    • Colon cancer Neg Hx        Family history, surgical history, past medical history, Allergies and meds reviewed with patient today and updated in [employer] EMR.     ROS:  Review of Systems   Constitutional: Negative for fatigue.   Psychiatric/Behavioral: Positive for depressed mood. Negative for agitation and suicidal ideas. The patient is nervous/anxious.        OBJECTIVE:  Vitals:    22 0903 22 0911   BP:  136/69   Pulse:  79   Temp:  97.4 °F (36.3 °C)   TempSrc:  Temporal   SpO2:  94%   Weight: (!) 159 kg (350 lb 12.8 oz) (!) 159 kg (350 lb 6.4 oz)   Height: 157.5 cm (62\") 157.5 cm (62\")     No exam data present   Body mass index is 64.09 kg/m².  No LMP recorded (lmp unknown). Patient has had a hysterectomy.    Physical Exam  Vitals and nursing note reviewed.   Constitutional:       General: She is not in acute distress.     Appearance: Normal appearance. She is obese.   HENT:      Head: Normocephalic.   Cardiovascular:      Rate and Rhythm: Normal rate and regular rhythm.      Heart sounds: Normal heart sounds. No murmur " heard.  Pulmonary:      Effort: Pulmonary effort is normal.      Breath sounds: Normal breath sounds. No wheezing, rhonchi or rales.   Neurological:      Mental Status: She is alert and oriented to person, place, and time.   Psychiatric:         Mood and Affect: Mood normal. Affect is tearful.         Behavior: Behavior normal.         Thought Content: Thought content normal.         Judgment: Judgment normal.         Procedures    Lab on 07/21/2022   Component Date Value Ref Range Status   • Glucose 07/21/2022 119 (A) 65 - 99 mg/dL Final   • BUN 07/21/2022 22 (A) 6 - 20 mg/dL Final   • Creatinine 07/21/2022 1.03 (A) 0.57 - 1.00 mg/dL Final   • Sodium 07/21/2022 140  136 - 145 mmol/L Final   • Potassium 07/21/2022 4.1  3.5 - 5.2 mmol/L Final   • Chloride 07/21/2022 95 (A) 98 - 107 mmol/L Final   • CO2 07/21/2022 32.6 (A) 22.0 - 29.0 mmol/L Final   • Calcium 07/21/2022 10.4  8.6 - 10.5 mg/dL Final   • Albumin 07/21/2022 4.40  3.50 - 5.20 g/dL Final   • Phosphorus 07/21/2022 3.3  2.5 - 4.5 mg/dL Final   • Anion Gap 07/21/2022 12.4  5.0 - 15.0 mmol/L Final   • BUN/Creatinine Ratio 07/21/2022 21.4  7.0 - 25.0 Final   • eGFR 07/21/2022 65.2  >60.0 mL/min/1.73 Final    National Kidney Foundation and American Society of Nephrology (ASN) Task Force recommended calculation based on the Chronic Kidney Disease Epidemiology Collaboration (CKD-EPI) equation refit without adjustment for race.   • Magnesium 07/21/2022 2.2  1.6 - 2.6 mg/dL Final   • 25 Hydroxy, Vitamin D 07/21/2022 68.8  30.0 - 100.0 ng/ml Final   • PTH, Intact 07/21/2022 73.1 (A) 15.0 - 65.0 pg/mL Final   • WBC 07/21/2022 10.39  3.40 - 10.80 10*3/mm3 Final   • RBC 07/21/2022 4.85  3.77 - 5.28 10*6/mm3 Final   • Hemoglobin 07/21/2022 15.4  12.0 - 15.9 g/dL Final   • Hematocrit 07/21/2022 44.5  34.0 - 46.6 % Final   • MCV 07/21/2022 91.8  79.0 - 97.0 fL Final   • MCH 07/21/2022 31.8  26.6 - 33.0 pg Final   • MCHC 07/21/2022 34.6  31.5 - 35.7 g/dL Final   • RDW  07/21/2022 12.5  12.3 - 15.4 % Final   • RDW-SD 07/21/2022 40.9  37.0 - 54.0 fl Final   • MPV 07/21/2022 9.4  6.0 - 12.0 fL Final   • Platelets 07/21/2022 266  140 - 450 10*3/mm3 Final   • Neutrophil % 07/21/2022 72.4  42.7 - 76.0 % Final   • Lymphocyte % 07/21/2022 13.9 (A) 19.6 - 45.3 % Final   • Monocyte % 07/21/2022 9.0  5.0 - 12.0 % Final   • Eosinophil % 07/21/2022 3.7  0.3 - 6.2 % Final   • Basophil % 07/21/2022 0.5  0.0 - 1.5 % Final   • Immature Grans % 07/21/2022 0.5  0.0 - 0.5 % Final   • Neutrophils, Absolute 07/21/2022 7.53 (A) 1.70 - 7.00 10*3/mm3 Final   • Lymphocytes, Absolute 07/21/2022 1.44  0.70 - 3.10 10*3/mm3 Final   • Monocytes, Absolute 07/21/2022 0.94 (A) 0.10 - 0.90 10*3/mm3 Final   • Eosinophils, Absolute 07/21/2022 0.38  0.00 - 0.40 10*3/mm3 Final   • Basophils, Absolute 07/21/2022 0.05  0.00 - 0.20 10*3/mm3 Final   • Immature Grans, Absolute 07/21/2022 0.05  0.00 - 0.05 10*3/mm3 Final   • nRBC 07/21/2022 0.0  0.0 - 0.2 /100 WBC Final   • Color, UA 07/21/2022 Yellow  Yellow, Straw Final   • Appearance, UA 07/21/2022 Clear  Clear Final   • pH, UA 07/21/2022 6.0  5.0 - 8.0 Final   • Specific Gravity, UA 07/21/2022 1.016  1.005 - 1.030 Final   • Glucose, UA 07/21/2022 Negative  Negative Final   • Ketones, UA 07/21/2022 Negative  Negative Final   • Bilirubin, UA 07/21/2022 Negative  Negative Final   • Blood, UA 07/21/2022 Negative  Negative Final   • Protein, UA 07/21/2022 Negative  Negative Final   • Leuk Esterase, UA 07/21/2022 Moderate (2+) (A) Negative Final   • Nitrite, UA 07/21/2022 Negative  Negative Final   • Urobilinogen, UA 07/21/2022 0.2 E.U./dL  0.2 - 1.0 E.U./dL Final   • Creatinine, Urine 07/21/2022 86.5  mg/dL Final   • Total Protein, Urine 07/21/2022 7.9  mg/dL Final   • Protein/Creatinine Ratio, Urine 07/21/2022 0.09   Final   • Total Protein, Urine 07/21/2022 8.6  mg/dL Final   • RBC, UA 07/21/2022 0-2  None Seen, 0-2 /HPF Final   • WBC, UA 07/21/2022 6-12 (A) None Seen, 0-2  /HPF Final   • Bacteria, UA 07/21/2022 1+ (A) None Seen /HPF Final   • Squamous Epithelial Cells, UA 07/21/2022 0-2  None Seen, 0-2 /HPF Final   • Hyaline Casts,  07/21/2022 None Seen  None Seen /LPF Final   • Methodology 07/21/2022 Automated Microscopy   Final       ASSESSMENT/ PLAN:    Diagnoses and all orders for this visit:    1. Moderate episode of recurrent major depressive disorder (HCC) (Primary)  Assessment & Plan:  She continues to have some ongoing issues with depression and anxiety.  I think there are multiple factors involved in this.  Increasing doses of medication has not prescribed a whole lot of benefit.  She is willing to see a therapist.    Orders:  -     Ambulatory Referral to Behavioral Health      Orders Placed Today:     No orders of the defined types were placed in this encounter.       Management Plan:     An After Visit Summary was printed and given to the patient at discharge.    Follow-up: Return in about 2 months (around 9/28/2022) for Recheck.    Francesco Gimenez,  7/28/2022 09:43 EDT  This note was electronically signed.  Answers for HPI/ROS submitted by the patient on 7/27/2022  Please describe your symptoms.: Moles and tags removed and talk about maybe adding or upping my depression meds  Have you had these symptoms before?: Yes  How long have you been having these symptoms?: Greater than 2 weeks  Please describe any probable cause for these symptoms. : All I do is cry  What is the primary reason for your visit?: Other

## 2022-08-25 DIAGNOSIS — K21.9 GASTROESOPHAGEAL REFLUX DISEASE, UNSPECIFIED WHETHER ESOPHAGITIS PRESENT: ICD-10-CM

## 2022-08-25 RX ORDER — PANTOPRAZOLE SODIUM 40 MG/1
TABLET, DELAYED RELEASE ORAL
Qty: 60 TABLET | Refills: 1 | Status: SHIPPED | OUTPATIENT
Start: 2022-08-25 | End: 2022-12-12

## 2022-08-25 RX ORDER — FERROUS SULFATE 325(65) MG
TABLET ORAL
Qty: 60 TABLET | Refills: 10 | Status: SHIPPED | OUTPATIENT
Start: 2022-08-25

## 2022-09-08 RX ORDER — GABAPENTIN 300 MG/1
CAPSULE ORAL
Qty: 360 CAPSULE | Refills: 1 | Status: SHIPPED | OUTPATIENT
Start: 2022-09-08 | End: 2022-10-18

## 2022-09-29 ENCOUNTER — OFFICE VISIT (OUTPATIENT)
Dept: FAMILY MEDICINE CLINIC | Facility: CLINIC | Age: 52
End: 2022-09-29

## 2022-09-29 VITALS
TEMPERATURE: 97.2 F | WEIGHT: 293 LBS | SYSTOLIC BLOOD PRESSURE: 118 MMHG | OXYGEN SATURATION: 98 % | HEART RATE: 77 BPM | BODY MASS INDEX: 53.92 KG/M2 | DIASTOLIC BLOOD PRESSURE: 69 MMHG | HEIGHT: 62 IN

## 2022-09-29 DIAGNOSIS — E03.9 ACQUIRED HYPOTHYROIDISM: ICD-10-CM

## 2022-09-29 DIAGNOSIS — N18.30 STAGE 3 CHRONIC KIDNEY DISEASE, UNSPECIFIED WHETHER STAGE 3A OR 3B CKD: ICD-10-CM

## 2022-09-29 DIAGNOSIS — E11.69 HYPERLIPIDEMIA ASSOCIATED WITH TYPE 2 DIABETES MELLITUS: ICD-10-CM

## 2022-09-29 DIAGNOSIS — E78.2 MIXED HYPERLIPIDEMIA: ICD-10-CM

## 2022-09-29 DIAGNOSIS — E11.42 TYPE 2 DIABETES MELLITUS WITH DIABETIC POLYNEUROPATHY, WITH LONG-TERM CURRENT USE OF INSULIN: ICD-10-CM

## 2022-09-29 DIAGNOSIS — I10 ESSENTIAL HYPERTENSION: Primary | ICD-10-CM

## 2022-09-29 DIAGNOSIS — Z79.4 TYPE 2 DIABETES MELLITUS WITH DIABETIC POLYNEUROPATHY, WITH LONG-TERM CURRENT USE OF INSULIN: ICD-10-CM

## 2022-09-29 DIAGNOSIS — M1A.0720 IDIOPATHIC CHRONIC GOUT OF LEFT FOOT WITHOUT TOPHUS: ICD-10-CM

## 2022-09-29 DIAGNOSIS — E78.5 HYPERLIPIDEMIA ASSOCIATED WITH TYPE 2 DIABETES MELLITUS: ICD-10-CM

## 2022-09-29 DIAGNOSIS — E11.42 DIABETIC POLYNEUROPATHY ASSOCIATED WITH TYPE 2 DIABETES MELLITUS: ICD-10-CM

## 2022-09-29 PROBLEM — E11.9 TYPE 2 DIABETES MELLITUS, WITH LONG-TERM CURRENT USE OF INSULIN: Status: ACTIVE | Noted: 2017-06-22

## 2022-09-29 PROBLEM — U07.1 COVID-19 VIRUS INFECTION: Status: ACTIVE | Noted: 2017-06-22

## 2022-09-29 LAB
ALBUMIN SERPL-MCNC: 4.1 G/DL (ref 3.5–5.2)
ALBUMIN/GLOB SERPL: 1.9 G/DL
ALP SERPL-CCNC: 329 U/L (ref 39–117)
ALT SERPL W P-5'-P-CCNC: 28 U/L (ref 1–33)
ANION GAP SERPL CALCULATED.3IONS-SCNC: 10.2 MMOL/L (ref 5–15)
AST SERPL-CCNC: 28 U/L (ref 1–32)
BILIRUB SERPL-MCNC: 0.2 MG/DL (ref 0–1.2)
BUN SERPL-MCNC: 16 MG/DL (ref 6–20)
BUN/CREAT SERPL: 16.3 (ref 7–25)
CALCIUM SPEC-SCNC: 10.1 MG/DL (ref 8.6–10.5)
CHLORIDE SERPL-SCNC: 102 MMOL/L (ref 98–107)
CHOLEST SERPL-MCNC: 166 MG/DL (ref 0–200)
CO2 SERPL-SCNC: 28.8 MMOL/L (ref 22–29)
CREAT SERPL-MCNC: 0.98 MG/DL (ref 0.57–1)
EGFRCR SERPLBLD CKD-EPI 2021: 69.2 ML/MIN/1.73
GLOBULIN UR ELPH-MCNC: 2.2 GM/DL
GLUCOSE SERPL-MCNC: 111 MG/DL (ref 65–99)
HBA1C MFR BLD: 6.2 % (ref 4.8–5.6)
HDLC SERPL-MCNC: 35 MG/DL (ref 40–60)
LDLC SERPL CALC-MCNC: 85 MG/DL (ref 0–100)
LDLC/HDLC SERPL: 2.15 {RATIO}
POTASSIUM SERPL-SCNC: 4.3 MMOL/L (ref 3.5–5.2)
PROT SERPL-MCNC: 6.3 G/DL (ref 6–8.5)
SODIUM SERPL-SCNC: 141 MMOL/L (ref 136–145)
T4 FREE SERPL-MCNC: 1.19 NG/DL (ref 0.93–1.7)
TRIGL SERPL-MCNC: 279 MG/DL (ref 0–150)
TSH SERPL DL<=0.05 MIU/L-ACNC: 3.78 UIU/ML (ref 0.27–4.2)
URATE SERPL-MCNC: 6.1 MG/DL (ref 2.4–5.7)
VLDLC SERPL-MCNC: 46 MG/DL (ref 5–40)

## 2022-09-29 PROCEDURE — 84550 ASSAY OF BLOOD/URIC ACID: CPT | Performed by: FAMILY MEDICINE

## 2022-09-29 PROCEDURE — 80061 LIPID PANEL: CPT | Performed by: FAMILY MEDICINE

## 2022-09-29 PROCEDURE — 84439 ASSAY OF FREE THYROXINE: CPT | Performed by: FAMILY MEDICINE

## 2022-09-29 PROCEDURE — 84443 ASSAY THYROID STIM HORMONE: CPT | Performed by: FAMILY MEDICINE

## 2022-09-29 PROCEDURE — 83036 HEMOGLOBIN GLYCOSYLATED A1C: CPT | Performed by: FAMILY MEDICINE

## 2022-09-29 PROCEDURE — 80053 COMPREHEN METABOLIC PANEL: CPT | Performed by: FAMILY MEDICINE

## 2022-09-29 PROCEDURE — 99214 OFFICE O/P EST MOD 30 MIN: CPT | Performed by: FAMILY MEDICINE

## 2022-09-29 PROCEDURE — 36415 COLL VENOUS BLD VENIPUNCTURE: CPT | Performed by: FAMILY MEDICINE

## 2022-09-29 RX ORDER — DULOXETIN HYDROCHLORIDE 30 MG/1
30 CAPSULE, DELAYED RELEASE ORAL DAILY
Qty: 90 CAPSULE | Refills: 1 | Status: SHIPPED | OUTPATIENT
Start: 2022-09-29 | End: 2022-10-18

## 2022-09-29 RX ORDER — TRAZODONE HYDROCHLORIDE 100 MG/1
100 TABLET ORAL NIGHTLY
Qty: 90 TABLET | Refills: 0 | Status: SHIPPED | OUTPATIENT
Start: 2022-09-29 | End: 2022-10-18

## 2022-09-29 NOTE — ASSESSMENT & PLAN NOTE
Her diabetic neuropathy is persistent.  We will try adding on some duloxetine to see if that will help.

## 2022-09-29 NOTE — ASSESSMENT & PLAN NOTE
Her blood pressure is good here today.  We will continue her current meds and update her routine labs.

## 2022-09-29 NOTE — PROGRESS NOTES
Chief Complaint   Patient presents with   • Follow-up     2 month follow up htn   Hips hurting         Subjective     Joanse JOMAR Partida  has a past medical history of Abnormal mammogram (10/21/2013), Allergy, Anemia, Arthralgia, COPD (chronic obstructive pulmonary disease) ([facility]) (03/04/2015), Dependent edema (08/27/2018), Depression, Derangement of meniscus of left knee (01/01/2014), Dermatitis (07/10/2014), Dysphagia, Essential hypertension (03/04/2015), Fibromyalgia, Foot pain (07/10/2014), GERD (gastroesophageal reflux disease) (10/17/2014), Gout (2022), Hepatic steatosis (03/04/2015), 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), History of tobacco abuse, Hypokalemia (05/07/2019), Long term current use of anticoagulant, LPRD (laryngopharyngeal reflux disease), Moderate episode of recurrent major depressive disorder ([facility]) (06/22/2017), Mycobacterium avium complex ([facility]) (08/09/2018), GIUSEPPE (obstructive sleep apnea) (08/09/2018), Pulmonary hypertension ([facility]) (08/27/2018), Stasis dermatitis of both legs (08/09/2018), Type 2 diabetes mellitus, with long-term current use of insulin ([facility]) (06/22/2017), Ventral hernia (03/04/2015), Vitamin D deficiency (11/13/2013), and Voice hoarseness.    Type 2 diabetes insulin-dependent- she states her blood sugars have been very good.  She missed her last appointment with her endocrinologist and she is unable to get in until December.  Left she has not had any recent labs for about 7 months.    Hypertension- she does not check her blood pressure outside the office.  It is great here today at 118/69.    Hyperlipidemia- she does do her Repatha injections every 2 weeks.  Since she has been ill she has not done it.    Diabetic neuropathy- she states her neuropathic pain is worse.  She is on a large dose of gabapentin of 900 mg 4 times a day.  Currently she is not on any norepinephrine products.    COVID-19- she tested positive recently for COVID-19.  She was seen at the urgent care.  Her  evaluation there was remarkable for an otitis media as well.  This was treated with Levaquin.  She does feel somewhat better since then.  She is taking her antibiotics on a daily basis.      PHQ-2 Depression Screening  Little interest or pleasure in doing things?     Feeling down, depressed, or hopeless?     PHQ-2 Total Score     PHQ-9 Depression Screening  Little interest or pleasure in doing things?     Feeling down, depressed, or hopeless?     Trouble falling or staying asleep, or sleeping too much?     Feeling tired or having little energy?     Poor appetite or overeating?     Feeling bad about yourself - or that you are a failure or have let yourself or your family down?     Trouble concentrating on things, such as reading the newspaper or watching television?     Moving or speaking so slowly that other people could have noticed? Or the opposite - being so fidgety or restless that you have been moving around a lot more than usual?     Thoughts that you would be better off dead, or of hurting yourself in some way?     PHQ-9 Total Score     If you checked off any problems, how difficult have these problems made it for you to do your work, take care of things at home, or get along with other people?       Allergies   Allergen Reactions   • Nickel Diarrhea and Nausea And Vomiting   • Insulin Degludec Unknown - High Severity     Can trigger pancreatitis   • Sitagliptin-Metformin Hcl Other (See Comments)   • [place] Unknown - High Severity     Cobalt blue, by allergy testing.    • Dulaglutide Nausea And Vomiting   • Exenatide Other (See Comments)     pancreantitis   • Metformin Unknown - Low Severity, Diarrhea and Other (See Comments)   • Palladium Chloride Unknown - Low Severity     by allergy testing.    • Sitagliptin Other (See Comments)     panceatitis       Prior to Admission medications    Medication Sig Start Date End Date Taking? Authorizing Provider   albuterol sulfate  (90 Base) MCG/ACT inhaler Inhale  1-2 puffs Every 6 (Six) Hours As Needed.   Yes Gina Hunt MD   Allergy Relief 180 MG tablet TAKE 1 TABLET BY MOUTH TWICE DAILY 9/24/21  Yes Bautista Farris MD   allopurinol (ZYLOPRIM) 300 MG tablet Take 1 tablet by mouth Daily. 7/21/22  Yes Francesco Gimenez DO   budesonide (PULMICORT) 0.5 MG/2ML nebulizer solution Take 0.5 mg by nebulization Daily.   Yes Gina Hunt MD   Continuous Blood Gluc Sensor (Dexcom G6 Sensor) APPLY 1 TOPICALLY EVERY 10 DAYS 12/27/21  Yes Gina Hunt MD   Continuous Blood Gluc Transmit (Dexcom G6 Transmitter) misc USE TRANSMITTER AS DIRECTED 3/19/22  Yes Emergency, Nurse Suni, RN   ezetimibe (ZETIA) 10 MG tablet Take 10 mg by mouth Daily.   Yes Gina Hunt MD   FeroSul 325 (65 Fe) MG tablet TAKE 1 TABLET(325 MG) BY MOUTH TWICE DAILY 8/25/22  Yes Francesco Gimenez DO   fexofenadine (ALLEGRA) 180 MG tablet Take 1 tablet by mouth 2 (Two) Times a Day.   Yes Gina Hunt MD   fluticasone (FLONASE) 50 MCG/ACT nasal spray 2 sprays into the nostril(s) as directed by provider Daily. 4/22/22  Yes Shan Saldivar PA   FREESTYLE LITE test strip TEST FOUR TIMES DAILY BEFORE A MEAL AND AT BEDTIME 12/14/21  Yes Gina Hunt MD   gabapentin (NEURONTIN) 300 MG capsule TAKE 3 CAPSULES BY MOUTH FOUR TIMES DAILY 9/8/22  Yes Francesco Gimenez DO   glucagon (GLUCAGEN) 1 MG injection Inject 1 mg into the appropriate muscle as directed by prescriber. 11/1/21  Yes Gina Hunt MD   HUMULIN R 500 UNIT/ML CONCENTRATED injection ADMINISTER UP TO 1300 UNITS VIA PUMP EVERY DAY 1/5/22  Yes Gina Hunt MD   HYDROcodone-acetaminophen (Norco) 5-325 MG per tablet Take 1 tablet by mouth Every 6 (Six) Hours As Needed for Moderate Pain . 6/6/22  Yes Francesco Gimenez DO   insulin patient supplied pump Inject  under the skin into the appropriate area as directed Continuous. Type of Insulin: HUMULIN R  500u/mL  Dose: 2.5 basal  Prescriber: SAÚL MAHER ([facility])    PUMP IS REMOVED AT TIME OF VISIT   Yes Gina Hunt MD   ipratropium-albuterol (DUO-NEB) 0.5-2.5 mg/3 ml nebulizer USE 3 ML VIA NEBULIZER EVERY 4 HOURS AS NEEDED FOR SHORTNESS OF BREATH  Patient taking differently: Take 3 mL by nebulization Every 4 (Four) Hours As Needed. 9/27/21  Yes Cindi Curtis APRN   levoFLOXacin (LEVAQUIN) 750 MG tablet Take 1 tablet by mouth Daily for 10 days. 9/27/22 10/7/22 Yes Jocelyn Fischer APRN   levothyroxine (SYNTHROID, LEVOTHROID) 125 MCG tablet Take 250 mcg by mouth Daily. 6/22/21  Yes Gina Hunt MD   Magnesium 400 MG tablet Take 400 mg by mouth Daily. 6/10/21  Yes Emergency, Nurse Suni, RN   metoprolol succinate XL (TOPROL-XL) 50 MG 24 hr tablet Take 1 tablet by mouth Daily. 1/20/22  Yes Deven Myers MD   montelukast (SINGULAIR) 10 MG tablet TAKE 1 TABLET BY MOUTH ONCE DAILY EVERY EVENING  Patient taking differently: Take 10 mg by mouth Every Night. 11/3/21  Yes Francesco Gimenez, DO   multivitamin (THERAGRAN) tablet tablet Take 1 tablet by mouth Daily.   Yes Gina Hunt MD   nystatin 705406 UNIT/GM powder APPLY TO AFFECTED AREA THREE TIMES DAILY  Patient taking differently: Apply 1 application topically to the appropriate area as directed 3 (Three) Times a Day. 9/29/21  Yes Francesco Gimenez DO   pantoprazole (PROTONIX) 40 MG EC tablet TAKE 1 TABLET BY MOUTH EVERY DAY 8/25/22  Yes Bautista Farris MD   polyethylene glycol (MIRALAX) 17 GM/SCOOP powder MIX 17G(1 CAPFUL) IN 8OZ WATER AND DRINK BY MOUTH DAILY 12/9/21  Yes Francesco Gimenez, DO   potassium chloride 10 MEQ CR tablet  3/14/22  Yes Emergency, Nurse Suni, RN   potassium chloride ER (K-TAB) 20 MEQ tablet controlled-release ER tablet Take 40 mEq by mouth 2 (Two) Times a Day.   Yes Provider, Historical, MD   Repatha SureClick solution auto-injector SureClick injection   2/24/22  Yes Gina Hunt MD Repatha SureClick solution auto-injector SureClick injection  9/13/22  Yes Emergency, Nurse Suni RN   revefenacin (YUPELRI) 175 MCG/3ML nebulizer solution Take 175 mcg by nebulization Daily.   Yes Gina Hunt MD   sertraline (ZOLOFT) 100 MG tablet TAKE 1 TABLET BY MOUTH ONCE DAILY 7/25/22  Yes Francesco Gimenez DO   spironolactone (ALDACTONE) 100 MG tablet Take 200 mg by mouth 2 (two) times a day.   Yes Gina Hunt MD   SUMAtriptan (IMITREX) 100 MG tablet TAKE 1 TABLET BY MOUTH AT ONSET OF MIGRAINE. MAY REPEAT AFTER 2 HOURS. IF HEADACHE RETURNS. NOT TO EXCEED 200 MG IN 24 HOURS 4/14/22  Yes Francesco Gimenez DO   torsemide (DEMADEX) 100 MG tablet Take 100 mg by mouth 3 (Three) Times a Day.   Yes Gina Hunt MD   traZODone (DESYREL) 50 MG tablet Take 1 tablet by mouth Every Night. 12/23/21  Yes Mk Dowling,    triamcinolone (KENALOG) 0.1 % ointment APPLY TOPICALLY TO THE AFFECTED AREA TWICE DAILY AS DIRECTED AS NEEDED FOR IRRITATION 6/28/22  Yes Francesco Gimenez DO   vitamin D3 125 MCG (5000 UT) capsule capsule Take 1 capsule by mouth Daily. 2/4/22  Yes Gina Hunt MD   warfarin (COUMADIN) 2 MG tablet TAKE 1 TABLET(2 MG) BY MOUTH EVERY DAY AS DIRECTED 2/17/22  Yes Francesco Gimenez DO   warfarin (COUMADIN) 5 MG tablet Take 1.5 tablets by mouth Daily. 9/22/21  Yes Gina Hunt MD   warfarin (COUMADIN) 5 MG tablet Take 1 tablet by mouth Every Night for 90 days. 1/10/22  Yes Francesco Gimenez DO   allopurinol (ZYLOPRIM) 100 MG tablet Take 100 mg by mouth Daily. 9/11/22   Emergency, Nurse Suni, RN   benzonatate (TESSALON) 100 MG capsule Take 1 capsule by mouth 3 (Three) Times a Day As Needed for Cough. 4/22/22   Shan Saldivar PA   Cholecalciferol 125 MCG (5000 UT) tablet Take 5,000 Units by mouth Daily.    Gina Hunt MD   docusate sodium (COLACE) 100 MG capsule Take  100 mg by mouth 2 (Two) Times a Day. 6/10/21   Emergency, Nurse Suni, RN   eszopiclone (LUNESTA) 1 MG tablet TAKE 1 TABLET BY MOUTH IMMEDIATELY BEFORE BEDTIME 3/4/22   Franko Muhammad MD   ezetimibe (ZETIA) 10 MG tablet  9/9/22   Emergency, Nurse Suni, RN   levothyroxine (SYNTHROID, LEVOTHROID) 125 MCG tablet  9/9/22   Emergency, Nurse Epic, RN        Patient Active Problem List   Diagnosis   • NAFLD (nonalcoholic fatty liver disease)   • Budd-Chiari syndrome (HCC)   • Abnormal liver enzymes   • Abnormal mammogram   • Acquired hypothyroidism   • Arthritis   • Chronic right-sided low back pain with right-sided sciatica   • Contact dermatitis and other eczema   • COPD (chronic obstructive pulmonary disease) ([facility])   • Dependent edema   • Depression   • Diabetic polyneuropathy ([facility])   • Dysphagia   • Elevated alkaline phosphatase level   • Elevated ferritin   • Encounter for long-term (current) use of insulin ([facility])   • Essential hypertension   • Fibromyalgia   • Neck pain, bilateral   • Gastroesophageal reflux disease without esophagitis   • History of pancreatitis   • Mixed hyperlipidemia   • Hypokalemia   • Hyperinsulinism   • Insulin resistance   • Long term (current) use of anticoagulants   • LPRD (laryngopharyngeal reflux disease)   • Magnesium deficiency   • Microalbuminuria   • Moderate episode of recurrent major depressive disorder ([facility])   • Morbid obesity ([facility])   • Mycobacterium avium complex ([facility])   • GIUSEPPE (obstructive sleep apnea)   • Pain in left hip   • Personal history of tobacco use, presenting hazards to health   • Predisposition to allergic reaction   • Presence of insulin pump   • Pulmonary hypertension ([facility])   • Pulmonary nodule   • Serum calcium elevated   • Stasis dermatitis of both legs   • Thoracic back pain   • Ventral hernia   • Vitamin D deficiency   • Voice hoarseness   • Wheezing   • 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HCC)   • Herpes zoster without complication   • Postherpetic neuralgia   • Skin  tags, multiple acquired   • Acute on chronic respiratory failure with hypoxia (HCC)   • Pneumonia of right lower lobe due to infectious organism   • Pain in both feet   • Congestive heart failure (HCC)   • Edema   • Gout   • Stage 3 chronic kidney disease (HCC)   • Dermatitis   • Chronic diastolic heart failure (HCC)   • Type 2 diabetes mellitus, with long-term current use of insulin (HCC)   • COVID-19 virus infection        Past Surgical History:   Procedure Laterality Date   • BRONCHOSCOPY N/A 2021    Procedure: BRONCHOSCOPY WITH BRONCHIOALVEOLAR LAVAGE AND WASHINGS;  Surgeon: David Britton MD;  Location: [facility] MAIN OR;  Service: Pulmonary;  Laterality: N/A;  MUCOUS PLUGGING   • CARDIAC CATHETERIZATION  2018   • CARPAL TUNNEL RELEASE     •  SECTION  ,     • COLONOSCOPY     • ENDOSCOPY     • HERNIA REPAIR     • HYSTERECTOMY  ,     PARTIAL   • KNEE SURGERY     • UPPER GASTROINTESTINAL ENDOSCOPY         Social History     Socioeconomic History   • Marital status:    Tobacco Use   • Smoking status: Former Smoker     Packs/day: 1.00     Quit date:      Years since quittin.7   • Smokeless tobacco: Never Used   • Tobacco comment: Exposure to 2nd hand smoke/parents/   Vaping Use   • Vaping Use: Never used   Substance and Sexual Activity   • Alcohol use: Not Currently     Comment: FORMER; OCCASIONAL   • Drug use: Yes     Types: Marijuana     Comment: CURRENT MARIJUANA SMOKER   • Sexual activity: Defer       Family History   Problem Relation Age of Onset   • Heart disease Mother         GRANDPARENT-NONSPECIFIC   • Diabetes Father    • Skin cancer Father    • Colon cancer Neg Hx        Family history, surgical history, past medical history, Allergies and meds reviewed with patient today and updated in [email or web address] EMR.     ROS:  Review of Systems   Constitutional: Positive for chills. Negative for fatigue and fever.   HENT: Positive for congestion, ear  "pain, postnasal drip, rhinorrhea and sinus pressure.    Eyes: Negative for blurred vision and visual disturbance.   Respiratory: Positive for cough, shortness of breath and wheezing. Negative for chest tightness.    Cardiovascular: Negative for chest pain and palpitations.   Musculoskeletal: Positive for arthralgias and myalgias.   Allergic/Immunologic: Negative for environmental allergies.   Neurological: Negative for light-headedness and headache.        (+) Neuropathic pain   Psychiatric/Behavioral: Negative for depressed mood. The patient is not nervous/anxious.        OBJECTIVE:  Vitals:    09/29/22 0902   BP: 118/69   BP Location: Right arm   Patient Position: Sitting   Cuff Size: Adult   Pulse: 77   Temp: 97.2 °F (36.2 °C)   TempSrc: Temporal   SpO2: 98%   Weight: (!) 159 kg (350 lb)   Height: 157.5 cm (62\")     No exam data present   Body mass index is 64.02 kg/m².  No LMP recorded (lmp unknown). Patient has had a hysterectomy.    Physical Exam  Vitals and nursing note reviewed.   Constitutional:       General: She is not in acute distress.     Appearance: Normal appearance. She is obese.   HENT:      Head: Normocephalic.      Right Ear: Tympanic membrane, ear canal and external ear normal.      Left Ear: Tympanic membrane, ear canal and external ear normal.      Nose: Nose normal.      Mouth/Throat:      Mouth: Mucous membranes are moist.      Pharynx: Oropharynx is clear.   Eyes:      General: No scleral icterus.     Conjunctiva/sclera: Conjunctivae normal.      Pupils: Pupils are equal, round, and reactive to light.   Cardiovascular:      Rate and Rhythm: Normal rate and regular rhythm.      Pulses: Normal pulses.      Heart sounds: Normal heart sounds. No murmur heard.  Pulmonary:      Effort: Pulmonary effort is normal.      Breath sounds: Normal breath sounds. No wheezing, rhonchi or rales.   Musculoskeletal:      Cervical back: Neck supple. No rigidity or tenderness.   Lymphadenopathy:      Cervical: " No cervical adenopathy.   Skin:     General: Skin is warm and dry.      Coloration: Skin is not jaundiced.      Findings: No rash.   Neurological:      General: No focal deficit present.      Mental Status: She is alert and oriented to person, place, and time.   Psychiatric:         Mood and Affect: Mood normal.         Thought Content: Thought content normal.         Judgment: Judgment normal.         Procedures    No visits with results within 30 Day(s) from this visit.   Latest known visit with results is:   Lab on 07/21/2022   Component Date Value Ref Range Status   • Glucose 07/21/2022 119 (A) 65 - 99 mg/dL Final   • BUN 07/21/2022 22 (A) 6 - 20 mg/dL Final   • Creatinine 07/21/2022 1.03 (A) 0.57 - 1.00 mg/dL Final   • Sodium 07/21/2022 140  136 - 145 mmol/L Final   • Potassium 07/21/2022 4.1  3.5 - 5.2 mmol/L Final   • Chloride 07/21/2022 95 (A) 98 - 107 mmol/L Final   • CO2 07/21/2022 32.6 (A) 22.0 - 29.0 mmol/L Final   • Calcium 07/21/2022 10.4  8.6 - 10.5 mg/dL Final   • Albumin 07/21/2022 4.40  3.50 - 5.20 g/dL Final   • Phosphorus 07/21/2022 3.3  2.5 - 4.5 mg/dL Final   • Anion Gap 07/21/2022 12.4  5.0 - 15.0 mmol/L Final   • BUN/Creatinine Ratio 07/21/2022 21.4  7.0 - 25.0 Final   • eGFR 07/21/2022 65.2  >60.0 mL/min/1.73 Final    National Kidney Foundation and American Society of Nephrology (ASN) Task Force recommended calculation based on the Chronic Kidney Disease Epidemiology Collaboration (CKD-EPI) equation refit without adjustment for race.   • Magnesium 07/21/2022 2.2  1.6 - 2.6 mg/dL Final   • 25 Hydroxy, Vitamin D 07/21/2022 68.8  30.0 - 100.0 ng/ml Final   • PTH, Intact 07/21/2022 73.1 (A) 15.0 - 65.0 pg/mL Final   • WBC 07/21/2022 10.39  3.40 - 10.80 10*3/mm3 Final   • RBC 07/21/2022 4.85  3.77 - 5.28 10*6/mm3 Final   • Hemoglobin 07/21/2022 15.4  12.0 - 15.9 g/dL Final   • Hematocrit 07/21/2022 44.5  34.0 - 46.6 % Final   • MCV 07/21/2022 91.8  79.0 - 97.0 fL Final   • MCH 07/21/2022 31.8   26.6 - 33.0 pg Final   • MCHC 07/21/2022 34.6  31.5 - 35.7 g/dL Final   • RDW 07/21/2022 12.5  12.3 - 15.4 % Final   • RDW-SD 07/21/2022 40.9  37.0 - 54.0 fl Final   • MPV 07/21/2022 9.4  6.0 - 12.0 fL Final   • Platelets 07/21/2022 266  140 - 450 10*3/mm3 Final   • Neutrophil % 07/21/2022 72.4  42.7 - 76.0 % Final   • Lymphocyte % 07/21/2022 13.9 (A) 19.6 - 45.3 % Final   • Monocyte % 07/21/2022 9.0  5.0 - 12.0 % Final   • Eosinophil % 07/21/2022 3.7  0.3 - 6.2 % Final   • Basophil % 07/21/2022 0.5  0.0 - 1.5 % Final   • Immature Grans % 07/21/2022 0.5  0.0 - 0.5 % Final   • Neutrophils, Absolute 07/21/2022 7.53 (A) 1.70 - 7.00 10*3/mm3 Final   • Lymphocytes, Absolute 07/21/2022 1.44  0.70 - 3.10 10*3/mm3 Final   • Monocytes, Absolute 07/21/2022 0.94 (A) 0.10 - 0.90 10*3/mm3 Final   • Eosinophils, Absolute 07/21/2022 0.38  0.00 - 0.40 10*3/mm3 Final   • Basophils, Absolute 07/21/2022 0.05  0.00 - 0.20 10*3/mm3 Final   • Immature Grans, Absolute 07/21/2022 0.05  0.00 - 0.05 10*3/mm3 Final   • nRBC 07/21/2022 0.0  0.0 - 0.2 /100 WBC Final   • Color, UA 07/21/2022 Yellow  Yellow, Straw Final   • Appearance, UA 07/21/2022 Clear  Clear Final   • pH, UA 07/21/2022 6.0  5.0 - 8.0 Final   • Specific Gravity, UA 07/21/2022 1.016  1.005 - 1.030 Final   • Glucose, UA 07/21/2022 Negative  Negative Final   • Ketones, UA 07/21/2022 Negative  Negative Final   • Bilirubin, UA 07/21/2022 Negative  Negative Final   • Blood, UA 07/21/2022 Negative  Negative Final   • Protein, UA 07/21/2022 Negative  Negative Final   • Leuk Esterase, UA 07/21/2022 Moderate (2+) (A) Negative Final   • Nitrite, UA 07/21/2022 Negative  Negative Final   • Urobilinogen, UA 07/21/2022 0.2 E.U./dL  0.2 - 1.0 E.U./dL Final   • Creatinine, Urine 07/21/2022 86.5  mg/dL Final   • Total Protein, Urine 07/21/2022 7.9  mg/dL Final   • Protein/Creatinine Ratio, Urine 07/21/2022 0.09   Final   • Total Protein, Urine 07/21/2022 8.6  mg/dL Final   • RBC, UA 07/21/2022  0-2  None Seen, 0-2 /HPF Final   • WBC, UA 07/21/2022 6-12 (A) None Seen, 0-2 /HPF Final   • Bacteria, UA 07/21/2022 1+ (A) None Seen /HPF Final   • Squamous Epithelial Cells, UA 07/21/2022 0-2  None Seen, 0-2 /HPF Final   • Hyaline Casts, UA 07/21/2022 None Seen  None Seen /LPF Final   • Methodology 07/21/2022 Automated Microscopy   Final       ASSESSMENT/ PLAN:    Diagnoses and all orders for this visit:    1. Essential hypertension (Primary)  Assessment & Plan:  Her blood pressure is good here today.  We will continue her current meds and update her routine labs.    Orders:  -     Comprehensive Metabolic Panel  -     Lipid Panel    2. Hyperlipidemia associated with type 2 diabetes mellitus (HCC)  -     Comprehensive Metabolic Panel  -     Lipid Panel  -     TSH+Free T4    3. Type 2 diabetes mellitus with diabetic polyneuropathy, with long-term current use of insulin (HCC)  Assessment & Plan:  Her blood sugars have been good at home.  Her last A1c 6 to 7 months ago was also good.  We will update her labs as she does not get into her endocrinologist for another 2 to 3 months.    Orders:  -     Comprehensive Metabolic Panel  -     Lipid Panel  -     Hemoglobin A1c    4. Mixed hyperlipidemia  Assessment & Plan:  Update her lipid profile her labs here today.    Orders:  -     Comprehensive Metabolic Panel  -     Lipid Panel  -     TSH+Free T4    5. Acquired hypothyroidism  -     TSH+Free T4    6. Stage 3 chronic kidney disease, unspecified whether stage 3a or 3b CKD (HCC)  -     Comprehensive Metabolic Panel    7. Diabetic polyneuropathy associated with type 2 diabetes mellitus (HCC)  Assessment & Plan:  Her diabetic neuropathy is persistent.  We will try adding on some duloxetine to see if that will help.      Other orders  -     DULoxetine (CYMBALTA) 30 MG capsule; Take 1 capsule by mouth Daily.  Dispense: 90 capsule; Refill: 1  -     traZODone (DESYREL) 100 MG tablet; Take 1 tablet by mouth Every Night.  Dispense:  90 tablet; Refill: 0      Orders Placed Today:     New Medications Ordered This Visit   Medications   • DULoxetine (CYMBALTA) 30 MG capsule     Sig: Take 1 capsule by mouth Daily.     Dispense:  90 capsule     Refill:  1   • traZODone (DESYREL) 100 MG tablet     Sig: Take 1 tablet by mouth Every Night.     Dispense:  90 tablet     Refill:  0     ZERO refills remain on this prescription. Your patient is requesting advance approval of refills for this medication to PREVENT ANY MISSED DOSES        Management Plan:     An After Visit Summary was printed and given to the patient at discharge.    Follow-up: Return in about 2 months (around 11/29/2022) for Recheck.    Francesco Gimenez DO 9/29/2022 09:41 EDT  This note was electronically signed.

## 2022-09-29 NOTE — ASSESSMENT & PLAN NOTE
Her blood sugars have been good at home.  Her last A1c 6 to 7 months ago was also good.  We will update her labs as she does not get into her endocrinologist for another 2 to 3 months.

## 2022-10-03 RX ORDER — SUMATRIPTAN 100 MG/1
TABLET, FILM COATED ORAL
Qty: 9 TABLET | Refills: 3 | Status: SHIPPED | OUTPATIENT
Start: 2022-10-03 | End: 2023-01-19

## 2022-10-08 DIAGNOSIS — J30.9 ALLERGIC RHINITIS, UNSPECIFIED SEASONALITY, UNSPECIFIED TRIGGER: ICD-10-CM

## 2022-10-10 RX ORDER — FEXOFENADINE HYDROCHLORIDE 180 MG/1
TABLET, FILM COATED ORAL
Qty: 60 TABLET | Refills: 11 | Status: SHIPPED | OUTPATIENT
Start: 2022-10-10

## 2022-10-18 ENCOUNTER — TELEPHONE (OUTPATIENT)
Dept: FAMILY MEDICINE CLINIC | Facility: CLINIC | Age: 52
End: 2022-10-18

## 2022-10-18 RX ORDER — GABAPENTIN 300 MG/1
CAPSULE ORAL
Qty: 360 CAPSULE | Refills: 5 | Status: SHIPPED | OUTPATIENT
Start: 2022-10-18

## 2022-10-18 RX ORDER — BLOOD-GLUCOSE METER
1 KIT MISCELLANEOUS 4 TIMES DAILY
Qty: 1 KIT | Refills: 0 | Status: SHIPPED | OUTPATIENT
Start: 2022-10-18

## 2022-10-18 RX ORDER — TRAZODONE HYDROCHLORIDE 100 MG/1
100 TABLET ORAL NIGHTLY
Qty: 90 TABLET | Refills: 0 | Status: SHIPPED | OUTPATIENT
Start: 2022-10-18

## 2022-10-18 RX ORDER — DULOXETIN HYDROCHLORIDE 60 MG/1
60 CAPSULE, DELAYED RELEASE ORAL DAILY
Qty: 90 CAPSULE | Refills: 1 | Status: SHIPPED | OUTPATIENT
Start: 2022-10-18

## 2022-10-18 NOTE — TELEPHONE ENCOUNTER
[facility] called and wants to know if you can sign a CMN order for a DEXCOM she cannot get into the endo until Dec.  I told them to just fax the form over for you to look at and he could decide if he wanted to sign it or not

## 2022-10-18 NOTE — TELEPHONE ENCOUNTER
Patient called and stated that she needs her duloxetine upped due to her fibromyalgia. Patient is also asking if she get a new diabetic meter, stated it broke during move.

## 2022-10-20 ENCOUNTER — TELEPHONE (OUTPATIENT)
Dept: FAMILY MEDICINE CLINIC | Facility: CLINIC | Age: 52
End: 2022-10-20

## 2022-10-21 ENCOUNTER — TELEPHONE (OUTPATIENT)
Dept: FAMILY MEDICINE CLINIC | Facility: CLINIC | Age: 52
End: 2022-10-21

## 2022-10-21 RX ORDER — WARFARIN SODIUM 5 MG/1
5 TABLET ORAL NIGHTLY
Qty: 30 TABLET | Refills: 2 | Status: CANCELLED
Start: 2022-10-21

## 2022-10-21 RX ORDER — MONTELUKAST SODIUM 10 MG/1
10 TABLET ORAL NIGHTLY
Status: CANCELLED | OUTPATIENT
Start: 2022-10-21

## 2022-11-01 ENCOUNTER — TELEPHONE (OUTPATIENT)
Dept: FAMILY MEDICINE CLINIC | Facility: CLINIC | Age: 52
End: 2022-11-01

## 2022-11-01 RX ORDER — WARFARIN SODIUM 5 MG/1
TABLET ORAL
Qty: 90 TABLET | Refills: 4 | Status: SHIPPED | OUTPATIENT
Start: 2022-11-01

## 2022-11-01 NOTE — TELEPHONE ENCOUNTER
Caller: ISACC MORGAN       Best call back number: [phone]      What was the call regarding: ERIKA IS CHECKING STATUS OF FAX FOR POWER WHEELCHAIR REPAIR - NEEDS TO BE SIGNED AND FAXED BACK    Do you require a callback: YES TO CONFIRM

## 2022-12-12 DIAGNOSIS — K21.9 GASTROESOPHAGEAL REFLUX DISEASE, UNSPECIFIED WHETHER ESOPHAGITIS PRESENT: ICD-10-CM

## 2022-12-12 RX ORDER — PANTOPRAZOLE SODIUM 40 MG/1
TABLET, DELAYED RELEASE ORAL
Qty: 90 TABLET | Refills: 3 | Status: SHIPPED | OUTPATIENT
Start: 2022-12-12

## 2023-01-06 RX ORDER — POLYETHYLENE GLYCOL 3350 17 G/17G
POWDER, FOR SOLUTION ORAL
Qty: 510 G | Refills: 11 | OUTPATIENT
Start: 2023-01-06

## 2023-01-19 RX ORDER — SUMATRIPTAN 100 MG/1
TABLET, FILM COATED ORAL
Qty: 9 TABLET | Refills: 3 | Status: SHIPPED | OUTPATIENT
Start: 2023-01-19

## 2023-01-24 RX ORDER — METOPROLOL SUCCINATE 50 MG/1
50 TABLET, EXTENDED RELEASE ORAL DAILY
Qty: 180 TABLET | Refills: 2 | OUTPATIENT
Start: 2023-01-24

## 2023-02-02 RX ORDER — ALLOPURINOL 300 MG/1
300 TABLET ORAL DAILY
Qty: 90 TABLET | Refills: 0 | Status: SHIPPED | OUTPATIENT
Start: 2023-02-02

## 2023-02-07 ENCOUNTER — TELEPHONE (OUTPATIENT)
Dept: FAMILY MEDICINE CLINIC | Facility: CLINIC | Age: 53
End: 2023-02-07
Payer: MEDICARE

## 2023-02-21 ENCOUNTER — HOSPITAL ENCOUNTER (OUTPATIENT)
Age: 53
Discharge: HOME | End: 2023-02-21
Payer: COMMERCIAL

## 2023-02-21 DIAGNOSIS — Z13.89: ICD-10-CM

## 2023-02-21 DIAGNOSIS — I81: Primary | ICD-10-CM

## 2023-02-21 DIAGNOSIS — E55.9: ICD-10-CM

## 2023-02-21 DIAGNOSIS — E03.9: ICD-10-CM

## 2023-02-21 DIAGNOSIS — E87.6: ICD-10-CM

## 2023-02-21 DIAGNOSIS — I50.32: ICD-10-CM

## 2023-02-21 LAB
ALANINE AMINOTRANSFER ALT/SGPT: 29 U/L (ref 13–56)
ALBUMIN SERPL-MCNC: 3.5 G/DL (ref 3.2–5)
ALKALINE PHOSPHATASE: 280 U/L (ref 45–117)
ANION GAP: 7 (ref 5–15)
AST(SGOT): 32 U/L (ref 15–37)
BUN SERPL-MCNC: 10 MG/DL (ref 7–18)
BUN/CREAT RATIO: 11 RATIO (ref 10–20)
CALCIUM SERPL-MCNC: 10.2 MG/DL (ref 8.5–10.1)
CARBON DIOXIDE: 33 MMOL/L (ref 21–32)
CHLORIDE: 102 MMOL/L (ref 98–107)
DEPRECATED RDW RBC: 43.9 FL (ref 35.1–43.9)
ERYTHROCYTE [DISTWIDTH] IN BLOOD: 13.1 % (ref 11.6–14.6)
EST GLOM FILT RATE - AFR AMER: 83 ML/MIN (ref 60–?)
GLOBULIN: 3.7 G/DL (ref 2.2–4.2)
GLUCOSE: 50 MG/DL (ref 74–106)
HCT VFR BLD AUTO: 44.8 % (ref 37–47)
HEMOGLOBIN: 15 G/DL (ref 12–15)
HGB BLD-MCNC: 15 G/DL (ref 12–15)
IMMATURE GRANULOCYTES COUNT: 0.04 X10^3/UL (ref 0–0)
MCV RBC: 92 FL (ref 81–99)
MEAN CORP HGB CONC: 33.5 G/DL (ref 32–36)
MEAN PLATELET VOL.: 9.2 FL (ref 6.2–12)
NRBC FLAGGED BY ANALYZER: 0 % (ref 0–5)
PLATELET # BLD: 286 K/MM3 (ref 150–450)
PLATELET COUNT: 286 K/MM3 (ref 150–450)
POTASSIUM: 3.5 MMOL/L (ref 3.5–5.1)
PROTHROMBIN TIME (PROTIME)PT.: 14.3 SECONDS (ref 11.7–14.9)
RBC # BLD AUTO: 4.87 M/MM3 (ref 4.2–5.4)
RBC DISTRIBUTION WIDTH CV: 13.1 % (ref 11.6–14.6)
RBC DISTRIBUTION WIDTH SD: 43.9 FL (ref 35.1–43.9)
VITAMIN D,25 HYDROXY: 44.2 NG/ML
WBC # BLD AUTO: 9.8 K/MM3 (ref 4.4–11)
WHITE BLOOD COUNT: 9.8 K/MM3 (ref 4.4–11)

## 2023-02-21 PROCEDURE — 80053 COMPREHEN METABOLIC PANEL: CPT

## 2023-02-21 PROCEDURE — 36415 COLL VENOUS BLD VENIPUNCTURE: CPT

## 2023-02-21 PROCEDURE — 85610 PROTHROMBIN TIME: CPT

## 2023-02-21 PROCEDURE — 84443 ASSAY THYROID STIM HORMONE: CPT

## 2023-02-21 PROCEDURE — 86803 HEPATITIS C AB TEST: CPT

## 2023-02-21 PROCEDURE — 85025 COMPLETE CBC W/AUTO DIFF WBC: CPT

## 2023-02-21 PROCEDURE — 82306 VITAMIN D 25 HYDROXY: CPT

## 2023-03-21 ENCOUNTER — HOSPITAL ENCOUNTER (OUTPATIENT)
Age: 53
Discharge: HOME | End: 2023-03-21
Payer: MEDICARE

## 2023-03-21 DIAGNOSIS — R20.0: Primary | ICD-10-CM

## 2023-03-21 PROCEDURE — 72040 X-RAY EXAM NECK SPINE 2-3 VW: CPT

## 2023-04-20 RX ORDER — DULOXETIN HYDROCHLORIDE 60 MG/1
60 CAPSULE, DELAYED RELEASE ORAL DAILY
Qty: 90 CAPSULE | Refills: 1 | OUTPATIENT
Start: 2023-04-20

## 2023-05-04 ENCOUNTER — HOSPITAL ENCOUNTER (OUTPATIENT)
Dept: HOSPITAL 100 - OPBI | Age: 53
Discharge: HOME | End: 2023-05-04
Payer: COMMERCIAL

## 2023-05-04 DIAGNOSIS — Z12.31: Primary | ICD-10-CM

## 2023-05-04 DIAGNOSIS — N63.21: ICD-10-CM

## 2023-05-04 PROCEDURE — 77067 SCR MAMMO BI INCL CAD: CPT

## 2023-05-04 PROCEDURE — 77063 BREAST TOMOSYNTHESIS BI: CPT

## 2023-05-09 ENCOUNTER — HOSPITAL ENCOUNTER (OUTPATIENT)
Age: 53
Discharge: HOME | End: 2023-05-09
Payer: MEDICARE

## 2023-05-09 DIAGNOSIS — I10: ICD-10-CM

## 2023-05-09 DIAGNOSIS — E11.42: ICD-10-CM

## 2023-05-09 DIAGNOSIS — R92.8: Primary | ICD-10-CM

## 2023-05-09 LAB
ALANINE AMINOTRANSFER ALT/SGPT: 47 U/L (ref 13–56)
ALBUMIN SERPL-MCNC: 3.6 G/DL (ref 3.2–5)
ALKALINE PHOSPHATASE: 229 U/L (ref 45–117)
ANION GAP: 6 (ref 5–15)
AST(SGOT): 33 U/L (ref 15–37)
BUN SERPL-MCNC: 22 MG/DL (ref 7–18)
BUN/CREAT RATIO: 19.1 RATIO (ref 10–20)
CALCIUM SERPL-MCNC: 9.8 MG/DL (ref 8.5–10.1)
CARBON DIOXIDE: 34 MMOL/L (ref 21–32)
CHLORIDE: 97 MMOL/L (ref 98–107)
DEPRECATED RDW RBC: 47.4 FL (ref 35.1–43.9)
ERYTHROCYTE [DISTWIDTH] IN BLOOD: 13.8 % (ref 11.6–14.6)
EST GLOM FILT RATE - AFR AMER: 63 ML/MIN (ref 60–?)
GLOBULIN: 3.7 G/DL (ref 2.2–4.2)
GLUCOSE: 198 MG/DL (ref 74–106)
HCT VFR BLD AUTO: 42.8 % (ref 37–47)
HEMOGLOBIN: 13.9 G/DL (ref 12–15)
HGB BLD-MCNC: 13.9 G/DL (ref 12–15)
IMMATURE GRANULOCYTES COUNT: 0.07 X10^3/UL (ref 0–0)
MCV RBC: 94.1 FL (ref 81–99)
MEAN CORP HGB CONC: 32.5 G/DL (ref 32–36)
MEAN PLATELET VOL.: 8.8 FL (ref 6.2–12)
NRBC FLAGGED BY ANALYZER: 0 % (ref 0–5)
PLATELET # BLD: 232 K/MM3 (ref 150–450)
PLATELET COUNT: 232 K/MM3 (ref 150–450)
POTASSIUM: 3.8 MMOL/L (ref 3.5–5.1)
RBC # BLD AUTO: 4.55 M/MM3 (ref 4.2–5.4)
RBC DISTRIBUTION WIDTH CV: 13.8 % (ref 11.6–14.6)
RBC DISTRIBUTION WIDTH SD: 47.4 FL (ref 35.1–43.9)
WBC # BLD AUTO: 10 K/MM3 (ref 4.4–11)
WHITE BLOOD COUNT: 10 K/MM3 (ref 4.4–11)

## 2023-05-09 PROCEDURE — 76642 ULTRASOUND BREAST LIMITED: CPT

## 2023-05-09 PROCEDURE — 80053 COMPREHEN METABOLIC PANEL: CPT

## 2023-05-09 PROCEDURE — 36415 COLL VENOUS BLD VENIPUNCTURE: CPT

## 2023-05-09 PROCEDURE — 84443 ASSAY THYROID STIM HORMONE: CPT

## 2023-05-09 PROCEDURE — 85025 COMPLETE CBC W/AUTO DIFF WBC: CPT

## 2023-05-23 ENCOUNTER — HOSPITAL ENCOUNTER (OUTPATIENT)
Dept: HOSPITAL 100 - PSN | Age: 53
Discharge: HOME | End: 2023-05-23
Payer: COMMERCIAL

## 2023-05-23 DIAGNOSIS — R68.83: Primary | ICD-10-CM

## 2023-05-23 PROCEDURE — C9803 HOPD COVID-19 SPEC COLLECT: HCPCS

## 2023-05-23 PROCEDURE — 87804 INFLUENZA ASSAY W/OPTIC: CPT

## 2023-05-23 PROCEDURE — 87635 SARS-COV-2 COVID-19 AMP PRB: CPT

## 2023-05-23 PROCEDURE — 87807 RSV ASSAY W/OPTIC: CPT

## 2023-05-23 PROCEDURE — U0005 INFEC AGEN DETEC AMPLI PROBE: HCPCS

## 2023-06-06 ENCOUNTER — HOSPITAL ENCOUNTER (OUTPATIENT)
Age: 53
Discharge: HOME | End: 2023-06-06
Payer: MEDICARE

## 2023-06-06 DIAGNOSIS — F11.20: Primary | ICD-10-CM

## 2023-06-06 LAB
AMPHET UR-MCNC: NEGATIVE NG/ML
BARBITURATE URINE VISTA: NEGATIVE
BENZODIAZEPINE URINE VISTA: NEGATIVE
COCAINE URINE VISTA: NEGATIVE
DRUG CONFIRMATION TO FOLLOW?: (no result)
ECSTACY URINE VISTA: NEGATIVE
METHADONE URINE VISTA: NEGATIVE
PCP UR QL: NEGATIVE
PH UR: 6 [PH]
THC URINE VISTA: POSITIVE

## 2023-06-06 PROCEDURE — 80307 DRUG TEST PRSMV CHEM ANLYZR: CPT

## 2023-08-03 ENCOUNTER — HOSPITAL ENCOUNTER (OUTPATIENT)
Age: 53
Discharge: HOME | End: 2023-08-03
Payer: MEDICARE

## 2023-08-03 DIAGNOSIS — E11.65: Primary | ICD-10-CM

## 2023-08-03 DIAGNOSIS — I10: ICD-10-CM

## 2023-08-03 DIAGNOSIS — L03.90: ICD-10-CM

## 2023-08-03 LAB
ALANINE AMINOTRANSFER ALT/SGPT: 27 U/L (ref 13–56)
ALBUMIN SERPL-MCNC: 3.3 G/DL (ref 3.2–5)
ALKALINE PHOSPHATASE: 308 U/L (ref 45–117)
ANION GAP: 7 (ref 5–15)
AST(SGOT): 23 U/L (ref 15–37)
BUN SERPL-MCNC: 17 MG/DL (ref 7–18)
BUN/CREAT RATIO: 13.4 RATIO (ref 10–20)
CALCIUM SERPL-MCNC: 9.7 MG/DL (ref 8.5–10.1)
CARBON DIOXIDE: 31 MMOL/L (ref 21–32)
CHLORIDE: 98 MMOL/L (ref 98–107)
DEPRECATED RDW RBC: 45.8 FL (ref 35.1–43.9)
ERYTHROCYTE [DISTWIDTH] IN BLOOD: 13.7 % (ref 11.6–14.6)
EST GLOM FILT RATE - AFR AMER: 56 ML/MIN (ref 60–?)
GLOBULIN: 4.3 G/DL (ref 2.2–4.2)
GLUCOSE: 230 MG/DL (ref 74–106)
HCT VFR BLD AUTO: 43.6 % (ref 37–47)
HEMOGLOBIN: 14.9 G/DL (ref 12–15)
HGB BLD-MCNC: 14.9 G/DL (ref 12–15)
IMMATURE GRANULOCYTES COUNT: 0.04 X10^3/UL (ref 0–0)
MCV RBC: 91 FL (ref 81–99)
MEAN CORP HGB CONC: 34.2 G/DL (ref 32–36)
MEAN PLATELET VOL.: 9.1 FL (ref 6.2–12)
NRBC FLAGGED BY ANALYZER: 0 % (ref 0–5)
PLATELET # BLD: 249 K/MM3 (ref 150–450)
PLATELET COUNT: 249 K/MM3 (ref 150–450)
POTASSIUM: 3.7 MMOL/L (ref 3.5–5.1)
RBC # BLD AUTO: 4.79 M/MM3 (ref 4.2–5.4)
RBC DISTRIBUTION WIDTH CV: 13.7 % (ref 11.6–14.6)
RBC DISTRIBUTION WIDTH SD: 45.8 FL (ref 35.1–43.9)
STAPH AUREUS DNA BY PCR: POSITIVE
WBC # BLD AUTO: 9.1 K/MM3 (ref 4.4–11)
WHITE BLOOD COUNT: 9.1 K/MM3 (ref 4.4–11)

## 2023-08-03 PROCEDURE — 87070 CULTURE OTHR SPECIMN AEROBIC: CPT

## 2023-08-03 PROCEDURE — 80053 COMPREHEN METABOLIC PANEL: CPT

## 2023-08-03 PROCEDURE — 84443 ASSAY THYROID STIM HORMONE: CPT

## 2023-08-03 PROCEDURE — 87205 SMEAR GRAM STAIN: CPT

## 2023-08-03 PROCEDURE — 85025 COMPLETE CBC W/AUTO DIFF WBC: CPT

## 2023-08-03 PROCEDURE — 87640 STAPH A DNA AMP PROBE: CPT

## 2023-08-03 PROCEDURE — 87186 SC STD MICRODIL/AGAR DIL: CPT

## 2023-08-03 PROCEDURE — 36415 COLL VENOUS BLD VENIPUNCTURE: CPT

## 2023-08-03 PROCEDURE — 87077 CULTURE AEROBIC IDENTIFY: CPT

## 2023-08-18 ENCOUNTER — HOSPITAL ENCOUNTER (OUTPATIENT)
Dept: HOSPITAL 100 - POLAB3 | Age: 53
Discharge: HOME | End: 2023-08-18
Payer: MEDICARE

## 2023-08-18 DIAGNOSIS — S31.109A: Primary | ICD-10-CM
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
## 2023-08-18 LAB — STAPH AUREUS DNA BY PCR: POSITIVE

## 2023-08-18 PROCEDURE — 87186 SC STD MICRODIL/AGAR DIL: CPT

## 2023-08-18 PROCEDURE — 87205 SMEAR GRAM STAIN: CPT

## 2023-08-18 PROCEDURE — 87640 STAPH A DNA AMP PROBE: CPT

## 2023-08-18 PROCEDURE — 87070 CULTURE OTHR SPECIMN AEROBIC: CPT

## 2023-08-23 VITALS
TEMPERATURE: 96.62 F | RESPIRATION RATE: 20 BRPM | HEART RATE: 87 BPM | SYSTOLIC BLOOD PRESSURE: 151 MMHG | DIASTOLIC BLOOD PRESSURE: 95 MMHG

## 2023-08-29 ENCOUNTER — HOSPITAL ENCOUNTER (OUTPATIENT)
Age: 53
Discharge: HOME | End: 2023-08-29
Payer: MEDICARE

## 2023-08-29 DIAGNOSIS — I81: Primary | ICD-10-CM

## 2023-08-29 LAB — PROTHROMBIN TIME (PROTIME)PT.: 13.7 SECONDS (ref 11.7–14.9)

## 2023-08-29 PROCEDURE — 36415 COLL VENOUS BLD VENIPUNCTURE: CPT

## 2023-08-29 PROCEDURE — 85610 PROTHROMBIN TIME: CPT

## 2023-08-30 ENCOUNTER — HOSPITAL ENCOUNTER (OUTPATIENT)
Dept: HOSPITAL 100 - WC | Age: 53
LOS: 1 days | Discharge: HOME | End: 2023-08-31
Payer: MEDICARE

## 2023-08-30 VITALS
TEMPERATURE: 97.6 F | HEART RATE: 87 BPM | SYSTOLIC BLOOD PRESSURE: 139 MMHG | DIASTOLIC BLOOD PRESSURE: 78 MMHG | RESPIRATION RATE: 22 BRPM

## 2023-08-30 VITALS — BODY MASS INDEX: 67.1 KG/M2

## 2023-08-30 DIAGNOSIS — E03.9: ICD-10-CM

## 2023-08-30 DIAGNOSIS — Z87.891: ICD-10-CM

## 2023-08-30 DIAGNOSIS — L98.491: ICD-10-CM

## 2023-08-30 DIAGNOSIS — Z79.890: ICD-10-CM

## 2023-08-30 DIAGNOSIS — Z79.899: ICD-10-CM

## 2023-08-30 DIAGNOSIS — E11.22: ICD-10-CM

## 2023-08-30 DIAGNOSIS — E11.622: Primary | ICD-10-CM

## 2023-08-30 DIAGNOSIS — E66.01: ICD-10-CM

## 2023-08-30 DIAGNOSIS — I50.9: ICD-10-CM

## 2023-08-30 DIAGNOSIS — Z79.4: ICD-10-CM

## 2023-08-30 DIAGNOSIS — Z79.01: ICD-10-CM

## 2023-08-30 DIAGNOSIS — L30.4: ICD-10-CM

## 2023-08-30 DIAGNOSIS — J44.9: ICD-10-CM

## 2023-08-30 DIAGNOSIS — N18.30: ICD-10-CM

## 2023-08-30 PROCEDURE — 11042 DBRDMT SUBQ TIS 1ST 20SQCM/<: CPT

## 2023-08-30 PROCEDURE — 99213 OFFICE O/P EST LOW 20 MIN: CPT

## 2023-08-30 PROCEDURE — G0463 HOSPITAL OUTPT CLINIC VISIT: HCPCS

## 2023-09-01 VITALS
TEMPERATURE: 97.6 F | DIASTOLIC BLOOD PRESSURE: 78 MMHG | SYSTOLIC BLOOD PRESSURE: 139 MMHG | HEART RATE: 87 BPM | RESPIRATION RATE: 22 BRPM

## 2023-09-13 ENCOUNTER — HOSPITAL ENCOUNTER (OUTPATIENT)
Dept: HOSPITAL 100 - WC | Age: 53
LOS: 2 days | Discharge: HOME | End: 2023-09-15
Payer: MEDICARE

## 2023-09-13 VITALS
RESPIRATION RATE: 20 BRPM | TEMPERATURE: 96.8 F | DIASTOLIC BLOOD PRESSURE: 92 MMHG | SYSTOLIC BLOOD PRESSURE: 155 MMHG | HEART RATE: 98 BPM

## 2023-09-13 VITALS — BODY MASS INDEX: 67.1 KG/M2

## 2023-09-13 DIAGNOSIS — J44.9: ICD-10-CM

## 2023-09-13 DIAGNOSIS — E11.22: ICD-10-CM

## 2023-09-13 DIAGNOSIS — I50.9: ICD-10-CM

## 2023-09-13 DIAGNOSIS — Z79.01: ICD-10-CM

## 2023-09-13 DIAGNOSIS — Z09: Primary | ICD-10-CM

## 2023-09-13 DIAGNOSIS — N18.30: ICD-10-CM

## 2023-09-13 PROCEDURE — 99213 OFFICE O/P EST LOW 20 MIN: CPT

## 2023-09-13 PROCEDURE — G0463 HOSPITAL OUTPT CLINIC VISIT: HCPCS

## 2023-10-30 ENCOUNTER — HOSPITAL ENCOUNTER (EMERGENCY)
Age: 53
LOS: 1 days | Discharge: HOME | End: 2023-10-31
Payer: MEDICARE

## 2023-10-30 VITALS
SYSTOLIC BLOOD PRESSURE: 155 MMHG | HEART RATE: 114 BPM | BODY MASS INDEX: 71.8 KG/M2 | OXYGEN SATURATION: 95 % | DIASTOLIC BLOOD PRESSURE: 79 MMHG | TEMPERATURE: 99.3 F | RESPIRATION RATE: 22 BRPM

## 2023-10-30 DIAGNOSIS — Z79.01: ICD-10-CM

## 2023-10-30 DIAGNOSIS — F12.90: ICD-10-CM

## 2023-10-30 DIAGNOSIS — Z87.891: ICD-10-CM

## 2023-10-30 DIAGNOSIS — E66.9: ICD-10-CM

## 2023-10-30 DIAGNOSIS — M10.9: ICD-10-CM

## 2023-10-30 DIAGNOSIS — I50.9: ICD-10-CM

## 2023-10-30 DIAGNOSIS — M25.572: Primary | ICD-10-CM

## 2023-10-30 PROCEDURE — 84550 ASSAY OF BLOOD/URIC ACID: CPT

## 2023-10-30 PROCEDURE — A4216 STERILE WATER/SALINE, 10 ML: HCPCS

## 2023-10-30 PROCEDURE — 73610 X-RAY EXAM OF ANKLE: CPT

## 2023-10-30 PROCEDURE — 85025 COMPLETE CBC W/AUTO DIFF WBC: CPT

## 2023-10-30 PROCEDURE — 99284 EMERGENCY DEPT VISIT MOD MDM: CPT

## 2023-10-30 PROCEDURE — 86140 C-REACTIVE PROTEIN: CPT

## 2023-10-30 PROCEDURE — 80053 COMPREHEN METABOLIC PANEL: CPT

## 2023-10-30 PROCEDURE — 96375 TX/PRO/DX INJ NEW DRUG ADDON: CPT

## 2023-10-30 PROCEDURE — 85652 RBC SED RATE AUTOMATED: CPT

## 2023-10-30 PROCEDURE — 96374 THER/PROPH/DIAG INJ IV PUSH: CPT

## 2023-10-31 VITALS
HEART RATE: 109 BPM | SYSTOLIC BLOOD PRESSURE: 146 MMHG | OXYGEN SATURATION: 98 % | DIASTOLIC BLOOD PRESSURE: 97 MMHG | RESPIRATION RATE: 14 BRPM

## 2023-10-31 LAB
ALANINE AMINOTRANSFER ALT/SGPT: 37 U/L (ref 13–56)
ALBUMIN SERPL-MCNC: 3.5 G/DL (ref 3.2–5)
ALKALINE PHOSPHATASE: 302 U/L (ref 45–117)
ANION GAP: 5 (ref 5–15)
AST(SGOT): 40 U/L (ref 15–37)
BUN SERPL-MCNC: 16 MG/DL (ref 7–18)
BUN/CREAT RATIO: 12.8 RATIO (ref 10–20)
CALCIUM SERPL-MCNC: 9.2 MG/DL (ref 8.5–10.1)
CARBON DIOXIDE: 35 MMOL/L (ref 21–32)
CHLORIDE: 96 MMOL/L (ref 98–107)
CRP SERPL-MCNC: 37.2 MG/L (ref 0–3)
DEPRECATED RDW RBC: 45.6 FL (ref 35.1–43.9)
ERYTHROCYTE [DISTWIDTH] IN BLOOD: 13.9 % (ref 11.6–14.6)
EST GLOM FILT RATE - AFR AMER: 57 ML/MIN (ref 60–?)
ESTIMATED CREATININE CLEARANCE: 40.69 ML/MIN
GLOBULIN: 4.1 G/DL (ref 2.2–4.2)
GLUCOSE: 155 MG/DL (ref 74–106)
HCT VFR BLD AUTO: 41.3 % (ref 37–47)
HEMOGLOBIN: 13.8 G/DL (ref 12–15)
HGB BLD-MCNC: 13.8 G/DL (ref 12–15)
IMMATURE GRANULOCYTES COUNT: 0.24 X10^3/UL (ref 0–0)
MCV RBC: 90.6 FL (ref 81–99)
MEAN CORP HGB CONC: 33.4 G/DL (ref 32–36)
MEAN PLATELET VOL.: 9.1 FL (ref 6.2–12)
NRBC FLAGGED BY ANALYZER: 0 % (ref 0–5)
PLATELET # BLD: 303 K/MM3 (ref 150–450)
PLATELET COUNT: 303 K/MM3 (ref 150–450)
POTASSIUM: 3.4 MMOL/L (ref 3.5–5.1)
RBC # BLD AUTO: 4.56 M/MM3 (ref 4.2–5.4)
RBC DISTRIBUTION WIDTH CV: 13.9 % (ref 11.6–14.6)
RBC DISTRIBUTION WIDTH SD: 45.6 FL (ref 35.1–43.9)
URATE SERPL-MCNC: 8.3 MG/DL (ref 2.6–6)
WBC # BLD AUTO: 16.7 K/MM3 (ref 4.4–11)
WHITE BLOOD COUNT: 16.7 K/MM3 (ref 4.4–11)

## 2023-11-01 ENCOUNTER — HOSPITAL ENCOUNTER (EMERGENCY)
Age: 53
Discharge: HOME | End: 2023-11-01
Payer: MEDICARE

## 2023-11-01 VITALS — HEART RATE: 112 BPM | RESPIRATION RATE: 18 BRPM | OXYGEN SATURATION: 95 % | TEMPERATURE: 101 F

## 2023-11-01 VITALS
SYSTOLIC BLOOD PRESSURE: 113 MMHG | DIASTOLIC BLOOD PRESSURE: 68 MMHG | HEART RATE: 108 BPM | RESPIRATION RATE: 20 BRPM | TEMPERATURE: 101 F | OXYGEN SATURATION: 94 %

## 2023-11-01 VITALS — BODY MASS INDEX: 69.9 KG/M2

## 2023-11-01 VITALS
SYSTOLIC BLOOD PRESSURE: 133 MMHG | OXYGEN SATURATION: 93 % | RESPIRATION RATE: 20 BRPM | DIASTOLIC BLOOD PRESSURE: 58 MMHG

## 2023-11-01 VITALS — RESPIRATION RATE: 18 BRPM

## 2023-11-01 DIAGNOSIS — E11.9: ICD-10-CM

## 2023-11-01 DIAGNOSIS — Z79.4: ICD-10-CM
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
## 2023-11-01 DIAGNOSIS — E66.9: ICD-10-CM

## 2023-11-01 DIAGNOSIS — J98.8: Primary | ICD-10-CM

## 2023-11-01 DIAGNOSIS — I50.9: ICD-10-CM

## 2023-11-01 DIAGNOSIS — Z79.01: ICD-10-CM

## 2023-11-01 DIAGNOSIS — Z87.891: ICD-10-CM

## 2023-11-01 DIAGNOSIS — Z79.899: ICD-10-CM

## 2023-11-01 DIAGNOSIS — S83.91XA: ICD-10-CM

## 2023-11-01 DIAGNOSIS — F12.90: ICD-10-CM

## 2023-11-01 PROCEDURE — 73562 X-RAY EXAM OF KNEE 3: CPT

## 2023-11-01 PROCEDURE — 99284 EMERGENCY DEPT VISIT MOD MDM: CPT

## 2023-11-01 PROCEDURE — 73552 X-RAY EXAM OF FEMUR 2/>: CPT

## 2023-11-01 PROCEDURE — 96372 THER/PROPH/DIAG INJ SC/IM: CPT

## 2023-11-01 PROCEDURE — 72170 X-RAY EXAM OF PELVIS: CPT

## 2023-11-01 PROCEDURE — 73610 X-RAY EXAM OF ANKLE: CPT

## 2023-11-06 ENCOUNTER — HOSPITAL ENCOUNTER (OUTPATIENT)
Dept: HOSPITAL 100 - POLAB3 | Age: 53
Discharge: HOME | End: 2023-11-06
Payer: MEDICARE

## 2023-11-06 DIAGNOSIS — E11.65: Primary | ICD-10-CM

## 2023-11-06 DIAGNOSIS — I10: ICD-10-CM

## 2023-11-06 LAB
ALANINE AMINOTRANSFER ALT/SGPT: 24 U/L (ref 13–56)
ALBUMIN SERPL-MCNC: 3 G/DL (ref 3.2–5)
ALKALINE PHOSPHATASE: 375 U/L (ref 45–117)
ANION GAP: 7 (ref 5–15)
AST(SGOT): 18 U/L (ref 15–37)
BUN SERPL-MCNC: 13 MG/DL (ref 7–18)
BUN/CREAT RATIO: 13.7 RATIO (ref 10–20)
CALCIUM SERPL-MCNC: 9.6 MG/DL (ref 8.5–10.1)
CARBON DIOXIDE: 32 MMOL/L (ref 21–32)
CHLORIDE: 100 MMOL/L (ref 98–107)
DEPRECATED RDW RBC: 48.9 FL (ref 35.1–43.9)
ERYTHROCYTE [DISTWIDTH] IN BLOOD: 14.4 % (ref 11.6–14.6)
EST GLOM FILT RATE - AFR AMER: 79 ML/MIN (ref 60–?)
GLOBULIN: 4.2 G/DL (ref 2.2–4.2)
GLUCOSE: 111 MG/DL (ref 74–106)
HCT VFR BLD AUTO: 40.2 % (ref 37–47)
HEMOGLOBIN: 13.2 G/DL (ref 12–15)
HGB BLD-MCNC: 13.2 G/DL (ref 12–15)
IMMATURE GRANULOCYTES COUNT: 0.04 X10^3/UL (ref 0–0)
MCV RBC: 93.9 FL (ref 81–99)
MEAN CORP HGB CONC: 32.8 G/DL (ref 32–36)
MEAN PLATELET VOL.: 8.4 FL (ref 6.2–12)
NRBC FLAGGED BY ANALYZER: 0 % (ref 0–5)
PLATELET # BLD: 317 K/MM3 (ref 150–450)
PLATELET COUNT: 317 K/MM3 (ref 150–450)
POTASSIUM: 3.3 MMOL/L (ref 3.5–5.1)
RBC # BLD AUTO: 4.28 M/MM3 (ref 4.2–5.4)
RBC DISTRIBUTION WIDTH CV: 14.4 % (ref 11.6–14.6)
RBC DISTRIBUTION WIDTH SD: 48.9 FL (ref 35.1–43.9)
WBC # BLD AUTO: 9.4 K/MM3 (ref 4.4–11)
WHITE BLOOD COUNT: 9.4 K/MM3 (ref 4.4–11)

## 2023-11-06 PROCEDURE — 85025 COMPLETE CBC W/AUTO DIFF WBC: CPT

## 2023-11-06 PROCEDURE — 80053 COMPREHEN METABOLIC PANEL: CPT

## 2023-11-06 PROCEDURE — 84443 ASSAY THYROID STIM HORMONE: CPT

## 2023-11-06 PROCEDURE — 36415 COLL VENOUS BLD VENIPUNCTURE: CPT

## 2023-11-21 ENCOUNTER — HOSPITAL ENCOUNTER (OUTPATIENT)
Dept: HOSPITAL 100 - RAD | Age: 53
Discharge: HOME | End: 2023-11-21
Payer: MEDICARE

## 2023-11-21 ENCOUNTER — HOSPITAL ENCOUNTER (OUTPATIENT)
Dept: HOSPITAL 100 - POLAB3 | Age: 53
Discharge: HOME | End: 2023-11-21
Payer: MEDICARE

## 2023-11-21 DIAGNOSIS — L03.116: Primary | ICD-10-CM

## 2023-11-21 DIAGNOSIS — M25.561: Primary | ICD-10-CM

## 2023-11-21 DIAGNOSIS — N18.31: ICD-10-CM

## 2023-11-21 LAB
ANION GAP: 3 (ref 5–15)
BUN SERPL-MCNC: 30 MG/DL (ref 7–18)
BUN/CREAT RATIO: 26.5 RATIO (ref 10–20)
CALCIUM SERPL-MCNC: 10.6 MG/DL (ref 8.5–10.1)
CARBON DIOXIDE: 35 MMOL/L (ref 21–32)
CHLORIDE: 95 MMOL/L (ref 98–107)
EST GLOM FILT RATE - AFR AMER: 64 ML/MIN (ref 60–?)
GLUCOSE: 145 MG/DL (ref 74–106)
POTASSIUM: 4.1 MMOL/L (ref 3.5–5.1)
STAPH AUREUS DNA BY PCR: NEGATIVE

## 2023-11-21 PROCEDURE — 87186 SC STD MICRODIL/AGAR DIL: CPT

## 2023-11-21 PROCEDURE — 36415 COLL VENOUS BLD VENIPUNCTURE: CPT

## 2023-11-21 PROCEDURE — 87640 STAPH A DNA AMP PROBE: CPT

## 2023-11-21 PROCEDURE — 87070 CULTURE OTHR SPECIMN AEROBIC: CPT

## 2023-11-21 PROCEDURE — 73562 X-RAY EXAM OF KNEE 3: CPT

## 2023-11-21 PROCEDURE — 87205 SMEAR GRAM STAIN: CPT

## 2023-11-21 PROCEDURE — 87077 CULTURE AEROBIC IDENTIFY: CPT

## 2023-11-21 PROCEDURE — 80048 BASIC METABOLIC PNL TOTAL CA: CPT

## 2024-01-11 ENCOUNTER — APPOINTMENT (OUTPATIENT)
Dept: RADIOLOGY | Facility: HOSPITAL | Age: 54
DRG: 394 | End: 2024-01-11
Payer: MEDICARE

## 2024-01-11 ENCOUNTER — HOSPITAL ENCOUNTER (INPATIENT)
Facility: HOSPITAL | Age: 54
LOS: 3 days | Discharge: HOME | DRG: 394 | End: 2024-01-14
Attending: EMERGENCY MEDICINE | Admitting: STUDENT IN AN ORGANIZED HEALTH CARE EDUCATION/TRAINING PROGRAM
Payer: MEDICARE

## 2024-01-11 DIAGNOSIS — K56.609 SMALL BOWEL OBSTRUCTION (MULTI): Primary | ICD-10-CM

## 2024-01-11 LAB
ALBUMIN SERPL BCP-MCNC: 4.4 G/DL (ref 3.4–5)
ALP SERPL-CCNC: 330 U/L (ref 33–110)
ALT SERPL W P-5'-P-CCNC: 25 U/L (ref 7–45)
ANION GAP SERPL CALC-SCNC: 14 MMOL/L (ref 10–20)
APPEARANCE UR: CLEAR
AST SERPL W P-5'-P-CCNC: 26 U/L (ref 9–39)
BASOPHILS # BLD AUTO: 0.05 X10*3/UL (ref 0–0.1)
BASOPHILS NFR BLD AUTO: 0.4 %
BILIRUB DIRECT SERPL-MCNC: 0.1 MG/DL (ref 0–0.3)
BILIRUB SERPL-MCNC: 0.4 MG/DL (ref 0–1.2)
BILIRUB UR STRIP.AUTO-MCNC: NEGATIVE MG/DL
BUN SERPL-MCNC: 19 MG/DL (ref 6–23)
CALCIUM SERPL-MCNC: 11.1 MG/DL (ref 8.6–10.3)
CHLORIDE SERPL-SCNC: 94 MMOL/L (ref 98–107)
CO2 SERPL-SCNC: 33 MMOL/L (ref 21–32)
COLOR UR: YELLOW
CREAT SERPL-MCNC: 0.83 MG/DL (ref 0.5–1.05)
EGFRCR SERPLBLD CKD-EPI 2021: 83 ML/MIN/1.73M*2
EOSINOPHIL # BLD AUTO: 0.11 X10*3/UL (ref 0–0.7)
EOSINOPHIL NFR BLD AUTO: 0.9 %
ERYTHROCYTE [DISTWIDTH] IN BLOOD BY AUTOMATED COUNT: 14 % (ref 11.5–14.5)
GLUCOSE SERPL-MCNC: 95 MG/DL (ref 74–99)
GLUCOSE UR STRIP.AUTO-MCNC: NEGATIVE MG/DL
HCT VFR BLD AUTO: 45.7 % (ref 36–46)
HGB BLD-MCNC: 15.2 G/DL (ref 12–16)
IMM GRANULOCYTES # BLD AUTO: 0.06 X10*3/UL (ref 0–0.7)
IMM GRANULOCYTES NFR BLD AUTO: 0.5 % (ref 0–0.9)
KETONES UR STRIP.AUTO-MCNC: NEGATIVE MG/DL
LACTATE SERPL-SCNC: 1.7 MMOL/L (ref 0.4–2)
LEUKOCYTE ESTERASE UR QL STRIP.AUTO: NEGATIVE
LIPASE SERPL-CCNC: 32 U/L (ref 9–82)
LYMPHOCYTES # BLD AUTO: 1.46 X10*3/UL (ref 1.2–4.8)
LYMPHOCYTES NFR BLD AUTO: 12.1 %
MAGNESIUM SERPL-MCNC: 1.82 MG/DL (ref 1.6–2.4)
MCH RBC QN AUTO: 31 PG (ref 26–34)
MCHC RBC AUTO-ENTMCNC: 33.3 G/DL (ref 32–36)
MCV RBC AUTO: 93 FL (ref 80–100)
MONOCYTES # BLD AUTO: 0.9 X10*3/UL (ref 0.1–1)
MONOCYTES NFR BLD AUTO: 7.5 %
NEUTROPHILS # BLD AUTO: 9.49 X10*3/UL (ref 1.2–7.7)
NEUTROPHILS NFR BLD AUTO: 78.6 %
NITRITE UR QL STRIP.AUTO: NEGATIVE
NRBC BLD-RTO: 0 /100 WBCS (ref 0–0)
PH UR STRIP.AUTO: 7 [PH]
PLATELET # BLD AUTO: 264 X10*3/UL (ref 150–450)
POTASSIUM SERPL-SCNC: 4.2 MMOL/L (ref 3.5–5.3)
PROT SERPL-MCNC: 7.3 G/DL (ref 6.4–8.2)
PROT UR STRIP.AUTO-MCNC: NEGATIVE MG/DL
RBC # BLD AUTO: 4.91 X10*6/UL (ref 4–5.2)
RBC # UR STRIP.AUTO: NEGATIVE /UL
SODIUM SERPL-SCNC: 137 MMOL/L (ref 136–145)
SP GR UR STRIP.AUTO: 1.01
UROBILINOGEN UR STRIP.AUTO-MCNC: <2 MG/DL
WBC # BLD AUTO: 12.1 X10*3/UL (ref 4.4–11.3)

## 2024-01-11 PROCEDURE — 83735 ASSAY OF MAGNESIUM: CPT | Performed by: EMERGENCY MEDICINE

## 2024-01-11 PROCEDURE — 96372 THER/PROPH/DIAG INJ SC/IM: CPT | Performed by: STUDENT IN AN ORGANIZED HEALTH CARE EDUCATION/TRAINING PROGRAM

## 2024-01-11 PROCEDURE — 74177 CT ABD & PELVIS W/CONTRAST: CPT

## 2024-01-11 PROCEDURE — 81003 URINALYSIS AUTO W/O SCOPE: CPT | Performed by: EMERGENCY MEDICINE

## 2024-01-11 PROCEDURE — 2500000004 HC RX 250 GENERAL PHARMACY W/ HCPCS (ALT 636 FOR OP/ED): Performed by: STUDENT IN AN ORGANIZED HEALTH CARE EDUCATION/TRAINING PROGRAM

## 2024-01-11 PROCEDURE — 99223 1ST HOSP IP/OBS HIGH 75: CPT | Performed by: SURGERY

## 2024-01-11 PROCEDURE — 96372 THER/PROPH/DIAG INJ SC/IM: CPT | Mod: 59

## 2024-01-11 PROCEDURE — 36415 COLL VENOUS BLD VENIPUNCTURE: CPT | Performed by: EMERGENCY MEDICINE

## 2024-01-11 PROCEDURE — 80048 BASIC METABOLIC PNL TOTAL CA: CPT | Performed by: EMERGENCY MEDICINE

## 2024-01-11 PROCEDURE — 74018 RADEX ABDOMEN 1 VIEW: CPT | Performed by: RADIOLOGY

## 2024-01-11 PROCEDURE — 2500000005 HC RX 250 GENERAL PHARMACY W/O HCPCS: Performed by: EMERGENCY MEDICINE

## 2024-01-11 PROCEDURE — 99222 1ST HOSP IP/OBS MODERATE 55: CPT | Performed by: STUDENT IN AN ORGANIZED HEALTH CARE EDUCATION/TRAINING PROGRAM

## 2024-01-11 PROCEDURE — 94760 N-INVAS EAR/PLS OXIMETRY 1: CPT

## 2024-01-11 PROCEDURE — 2550000001 HC RX 255 CONTRASTS: Performed by: EMERGENCY MEDICINE

## 2024-01-11 PROCEDURE — 96361 HYDRATE IV INFUSION ADD-ON: CPT

## 2024-01-11 PROCEDURE — 0D9670Z DRAINAGE OF STOMACH WITH DRAINAGE DEVICE, VIA NATURAL OR ARTIFICIAL OPENING: ICD-10-PCS | Performed by: STUDENT IN AN ORGANIZED HEALTH CARE EDUCATION/TRAINING PROGRAM

## 2024-01-11 PROCEDURE — 83605 ASSAY OF LACTIC ACID: CPT | Performed by: EMERGENCY MEDICINE

## 2024-01-11 PROCEDURE — 96374 THER/PROPH/DIAG INJ IV PUSH: CPT

## 2024-01-11 PROCEDURE — 99285 EMERGENCY DEPT VISIT HI MDM: CPT | Performed by: EMERGENCY MEDICINE

## 2024-01-11 PROCEDURE — 74177 CT ABD & PELVIS W/CONTRAST: CPT | Performed by: RADIOLOGY

## 2024-01-11 PROCEDURE — 94762 N-INVAS EAR/PLS OXIMTRY CONT: CPT

## 2024-01-11 PROCEDURE — 96375 TX/PRO/DX INJ NEW DRUG ADDON: CPT

## 2024-01-11 PROCEDURE — 83690 ASSAY OF LIPASE: CPT | Performed by: EMERGENCY MEDICINE

## 2024-01-11 PROCEDURE — 96376 TX/PRO/DX INJ SAME DRUG ADON: CPT

## 2024-01-11 PROCEDURE — 85025 COMPLETE CBC W/AUTO DIFF WBC: CPT | Performed by: EMERGENCY MEDICINE

## 2024-01-11 PROCEDURE — 2500000001 HC RX 250 WO HCPCS SELF ADMINISTERED DRUGS (ALT 637 FOR MEDICARE OP): Performed by: HOSPITALIST

## 2024-01-11 PROCEDURE — 2500000004 HC RX 250 GENERAL PHARMACY W/ HCPCS (ALT 636 FOR OP/ED)

## 2024-01-11 PROCEDURE — 2500000004 HC RX 250 GENERAL PHARMACY W/ HCPCS (ALT 636 FOR OP/ED): Performed by: EMERGENCY MEDICINE

## 2024-01-11 PROCEDURE — 1100000001 HC PRIVATE ROOM DAILY

## 2024-01-11 PROCEDURE — 82248 BILIRUBIN DIRECT: CPT | Performed by: EMERGENCY MEDICINE

## 2024-01-11 PROCEDURE — 74018 RADEX ABDOMEN 1 VIEW: CPT

## 2024-01-11 RX ORDER — ACETAMINOPHEN 160 MG/5ML
650 SOLUTION ORAL EVERY 4 HOURS PRN
Status: DISCONTINUED | OUTPATIENT
Start: 2024-01-11 | End: 2024-01-14 | Stop reason: HOSPADM

## 2024-01-11 RX ORDER — PROCHLORPERAZINE EDISYLATE 5 MG/ML
5 INJECTION INTRAMUSCULAR; INTRAVENOUS ONCE
Status: COMPLETED | OUTPATIENT
Start: 2024-01-11 | End: 2024-01-11

## 2024-01-11 RX ORDER — ONDANSETRON 4 MG/1
4 TABLET, ORALLY DISINTEGRATING ORAL EVERY 8 HOURS PRN
Status: DISCONTINUED | OUTPATIENT
Start: 2024-01-11 | End: 2024-01-14 | Stop reason: HOSPADM

## 2024-01-11 RX ORDER — SPIRONOLACTONE 100 MG/1
300 TABLET, FILM COATED ORAL 2 TIMES DAILY
COMMUNITY
End: 2024-03-22 | Stop reason: HOSPADM

## 2024-01-11 RX ORDER — METOPROLOL SUCCINATE 50 MG/1
50 TABLET, EXTENDED RELEASE ORAL DAILY
COMMUNITY
End: 2024-03-26 | Stop reason: SDUPTHER

## 2024-01-11 RX ORDER — TRAZODONE HYDROCHLORIDE 100 MG/1
100 TABLET ORAL NIGHTLY
COMMUNITY

## 2024-01-11 RX ORDER — ONDANSETRON HYDROCHLORIDE 2 MG/ML
8 INJECTION, SOLUTION INTRAVENOUS ONCE
Status: COMPLETED | OUTPATIENT
Start: 2024-01-11 | End: 2024-01-11

## 2024-01-11 RX ORDER — DULOXETIN HYDROCHLORIDE 60 MG/1
60 CAPSULE, DELAYED RELEASE ORAL DAILY
COMMUNITY
End: 2024-01-16 | Stop reason: ALTCHOICE

## 2024-01-11 RX ORDER — PANTOPRAZOLE SODIUM 40 MG/10ML
40 INJECTION, POWDER, LYOPHILIZED, FOR SOLUTION INTRAVENOUS
Status: DISCONTINUED | OUTPATIENT
Start: 2024-01-12 | End: 2024-01-12

## 2024-01-11 RX ORDER — LEVOTHYROXINE SODIUM 200 UG/1
200 TABLET ORAL DAILY
COMMUNITY
End: 2024-03-14 | Stop reason: SDUPTHER

## 2024-01-11 RX ORDER — MORPHINE SULFATE 4 MG/ML
4 INJECTION, SOLUTION INTRAMUSCULAR; INTRAVENOUS ONCE
Status: COMPLETED | OUTPATIENT
Start: 2024-01-11 | End: 2024-01-11

## 2024-01-11 RX ORDER — POTASSIUM CHLORIDE 20 MEQ/1
20 TABLET, EXTENDED RELEASE ORAL 3 TIMES DAILY
COMMUNITY
End: 2024-03-22 | Stop reason: HOSPADM

## 2024-01-11 RX ORDER — ACETAMINOPHEN 650 MG/1
650 SUPPOSITORY RECTAL EVERY 4 HOURS PRN
Status: DISCONTINUED | OUTPATIENT
Start: 2024-01-11 | End: 2024-01-14 | Stop reason: HOSPADM

## 2024-01-11 RX ORDER — TORSEMIDE 100 MG/1
100 TABLET ORAL 2 TIMES DAILY
COMMUNITY
End: 2024-03-22 | Stop reason: HOSPADM

## 2024-01-11 RX ORDER — FLUOXETINE HYDROCHLORIDE 60 MG/1
60 TABLET, FILM COATED ORAL; ORAL DAILY
COMMUNITY
End: 2024-01-16 | Stop reason: ALTCHOICE

## 2024-01-11 RX ORDER — WARFARIN SODIUM 5 MG/1
7.5 TABLET ORAL DAILY
COMMUNITY
End: 2024-04-23 | Stop reason: SDUPTHER

## 2024-01-11 RX ORDER — ONDANSETRON HYDROCHLORIDE 2 MG/ML
4 INJECTION, SOLUTION INTRAVENOUS EVERY 8 HOURS PRN
Status: DISCONTINUED | OUTPATIENT
Start: 2024-01-11 | End: 2024-01-14 | Stop reason: HOSPADM

## 2024-01-11 RX ORDER — SODIUM CHLORIDE 9 MG/ML
150 INJECTION, SOLUTION INTRAVENOUS CONTINUOUS
Status: DISCONTINUED | OUTPATIENT
Start: 2024-01-11 | End: 2024-01-11

## 2024-01-11 RX ORDER — SODIUM CHLORIDE 9 MG/ML
120 INJECTION, SOLUTION INTRAVENOUS CONTINUOUS
Status: DISCONTINUED | OUTPATIENT
Start: 2024-01-11 | End: 2024-01-12

## 2024-01-11 RX ORDER — BISMUTH SUBSALICYLATE 262 MG
1 TABLET,CHEWABLE ORAL DAILY
Status: ON HOLD | COMMUNITY
End: 2024-03-20 | Stop reason: ENTERED-IN-ERROR

## 2024-01-11 RX ORDER — PROMETHAZINE HYDROCHLORIDE 25 MG/1
25 TABLET ORAL EVERY 6 HOURS PRN
Status: ON HOLD | COMMUNITY
End: 2024-03-20 | Stop reason: ENTERED-IN-ERROR

## 2024-01-11 RX ORDER — GABAPENTIN 300 MG/1
900 CAPSULE ORAL 4 TIMES DAILY
COMMUNITY

## 2024-01-11 RX ORDER — MORPHINE SULFATE 2 MG/ML
INJECTION, SOLUTION INTRAMUSCULAR; INTRAVENOUS
Status: COMPLETED
Start: 2024-01-11 | End: 2024-01-11

## 2024-01-11 RX ORDER — ROSUVASTATIN CALCIUM 40 MG/1
40 TABLET, COATED ORAL DAILY
COMMUNITY

## 2024-01-11 RX ORDER — LABETALOL HYDROCHLORIDE 5 MG/ML
20 INJECTION, SOLUTION INTRAVENOUS EVERY 6 HOURS PRN
Status: DISCONTINUED | OUTPATIENT
Start: 2024-01-11 | End: 2024-01-14 | Stop reason: HOSPADM

## 2024-01-11 RX ORDER — CITALOPRAM 20 MG/1
20 TABLET, FILM COATED ORAL DAILY
COMMUNITY
End: 2024-01-16 | Stop reason: ALTCHOICE

## 2024-01-11 RX ORDER — KETOROLAC TROMETHAMINE 30 MG/ML
30 INJECTION, SOLUTION INTRAMUSCULAR; INTRAVENOUS EVERY 6 HOURS PRN
Status: DISCONTINUED | OUTPATIENT
Start: 2024-01-11 | End: 2024-01-14 | Stop reason: HOSPADM

## 2024-01-11 RX ORDER — HEPARIN SODIUM 5000 [USP'U]/ML
7500 INJECTION, SOLUTION INTRAVENOUS; SUBCUTANEOUS EVERY 8 HOURS SCHEDULED
Status: DISCONTINUED | OUTPATIENT
Start: 2024-01-11 | End: 2024-01-12

## 2024-01-11 RX ORDER — MORPHINE SULFATE 2 MG/ML
2 INJECTION, SOLUTION INTRAMUSCULAR; INTRAVENOUS EVERY 6 HOURS PRN
Status: DISCONTINUED | OUTPATIENT
Start: 2024-01-11 | End: 2024-01-14 | Stop reason: HOSPADM

## 2024-01-11 RX ORDER — ACETAMINOPHEN 325 MG/1
650 TABLET ORAL EVERY 4 HOURS PRN
Status: DISCONTINUED | OUTPATIENT
Start: 2024-01-11 | End: 2024-01-14 | Stop reason: HOSPADM

## 2024-01-11 RX ORDER — POLYETHYLENE GLYCOL 3350 17 G/17G
17 POWDER, FOR SOLUTION ORAL DAILY
Status: DISCONTINUED | OUTPATIENT
Start: 2024-01-11 | End: 2024-01-14 | Stop reason: HOSPADM

## 2024-01-11 RX ORDER — PANTOPRAZOLE SODIUM 40 MG/1
40 TABLET, DELAYED RELEASE ORAL 2 TIMES DAILY
COMMUNITY

## 2024-01-11 RX ADMIN — HEPARIN SODIUM 7500 UNITS: 5000 INJECTION INTRAVENOUS; SUBCUTANEOUS at 15:05

## 2024-01-11 RX ADMIN — KETOROLAC TROMETHAMINE 30 MG: 30 INJECTION, SOLUTION INTRAMUSCULAR; INTRAVENOUS at 17:11

## 2024-01-11 RX ADMIN — MORPHINE SULFATE 2 MG: 2 INJECTION, SOLUTION INTRAMUSCULAR; INTRAVENOUS at 09:03

## 2024-01-11 RX ADMIN — KETOROLAC TROMETHAMINE 30 MG: 30 INJECTION, SOLUTION INTRAMUSCULAR; INTRAVENOUS at 10:53

## 2024-01-11 RX ADMIN — IOHEXOL 62 ML: 350 INJECTION, SOLUTION INTRAVENOUS at 03:33

## 2024-01-11 RX ADMIN — MORPHINE SULFATE 4 MG: 4 INJECTION, SOLUTION INTRAMUSCULAR; INTRAVENOUS at 01:58

## 2024-01-11 RX ADMIN — Medication 4 L/MIN: at 06:19

## 2024-01-11 RX ADMIN — SODIUM CHLORIDE 100 ML/HR: 9 INJECTION, SOLUTION INTRAVENOUS at 18:48

## 2024-01-11 RX ADMIN — SODIUM CHLORIDE 150 ML/HR: 9 INJECTION, SOLUTION INTRAVENOUS at 05:18

## 2024-01-11 RX ADMIN — HEPARIN SODIUM 7500 UNITS: 5000 INJECTION INTRAVENOUS; SUBCUTANEOUS at 09:27

## 2024-01-11 RX ADMIN — PROCHLORPERAZINE EDISYLATE 5 MG: 5 INJECTION INTRAMUSCULAR; INTRAVENOUS at 01:58

## 2024-01-11 RX ADMIN — SODIUM CHLORIDE 100 ML/HR: 9 INJECTION, SOLUTION INTRAVENOUS at 09:00

## 2024-01-11 RX ADMIN — ONDANSETRON 8 MG: 2 INJECTION INTRAMUSCULAR; INTRAVENOUS at 05:18

## 2024-01-11 RX ADMIN — MORPHINE SULFATE 2 MG: 2 INJECTION, SOLUTION INTRAMUSCULAR; INTRAVENOUS at 17:11

## 2024-01-11 RX ADMIN — HEPARIN SODIUM 7500 UNITS: 5000 INJECTION INTRAVENOUS; SUBCUTANEOUS at 21:29

## 2024-01-11 RX ADMIN — PHENOL 1 SPRAY: 1.4 SPRAY ORAL at 19:36

## 2024-01-11 SDOH — HEALTH STABILITY: MENTAL HEALTH: HOW MANY STANDARD DRINKS CONTAINING ALCOHOL DO YOU HAVE ON A TYPICAL DAY?: PATIENT DECLINED

## 2024-01-11 SDOH — SOCIAL STABILITY: SOCIAL INSECURITY
WITHIN THE LAST YEAR, HAVE TO BEEN RAPED OR FORCED TO HAVE ANY KIND OF SEXUAL ACTIVITY BY YOUR PARTNER OR EX-PARTNER?: PATIENT DECLINED

## 2024-01-11 SDOH — ECONOMIC STABILITY: INCOME INSECURITY: IN THE LAST 12 MONTHS, WAS THERE A TIME WHEN YOU WERE NOT ABLE TO PAY THE MORTGAGE OR RENT ON TIME?: PATIENT DECLINED

## 2024-01-11 SDOH — HEALTH STABILITY: MENTAL HEALTH
STRESS IS WHEN SOMEONE FEELS TENSE, NERVOUS, ANXIOUS, OR CAN'T SLEEP AT NIGHT BECAUSE THEIR MIND IS TROUBLED. HOW STRESSED ARE YOU?: PATIENT DECLINED

## 2024-01-11 SDOH — ECONOMIC STABILITY: FOOD INSECURITY: WITHIN THE PAST 12 MONTHS, THE FOOD YOU BOUGHT JUST DIDN'T LAST AND YOU DIDN'T HAVE MONEY TO GET MORE.: PATIENT DECLINED

## 2024-01-11 SDOH — SOCIAL STABILITY: SOCIAL NETWORK: IN A TYPICAL WEEK, HOW MANY TIMES DO YOU TALK ON THE PHONE WITH FAMILY, FRIENDS, OR NEIGHBORS?: PATIENT DECLINED

## 2024-01-11 SDOH — SOCIAL STABILITY: SOCIAL NETWORK: HOW OFTEN DO YOU GET TOGETHER WITH FRIENDS OR RELATIVES?: PATIENT DECLINED

## 2024-01-11 SDOH — SOCIAL STABILITY: SOCIAL NETWORK: HOW OFTEN DO YOU ATTEND CHURCH OR RELIGIOUS SERVICES?: PATIENT DECLINED

## 2024-01-11 SDOH — HEALTH STABILITY: MENTAL HEALTH: HOW OFTEN DO YOU HAVE 6 OR MORE DRINKS ON ONE OCCASION?: PATIENT DECLINED

## 2024-01-11 SDOH — SOCIAL STABILITY: SOCIAL INSECURITY
WITHIN THE LAST YEAR, HAVE YOU BEEN HUMILIATED OR EMOTIONALLY ABUSED IN OTHER WAYS BY YOUR PARTNER OR EX-PARTNER?: PATIENT DECLINED

## 2024-01-11 SDOH — ECONOMIC STABILITY: INCOME INSECURITY
IN THE PAST 12 MONTHS, HAS THE ELECTRIC, GAS, OIL, OR WATER COMPANY THREATENED TO SHUT OFF SERVICE IN YOUR HOME?: PATIENT DECLINED

## 2024-01-11 SDOH — SOCIAL STABILITY: SOCIAL INSECURITY: HAS ANYONE EVER THREATENED TO HURT YOUR FAMILY OR YOUR PETS?: UNABLE TO ASSESS

## 2024-01-11 SDOH — HEALTH STABILITY: PHYSICAL HEALTH
ON AVERAGE, HOW MANY DAYS PER WEEK DO YOU ENGAGE IN MODERATE TO STRENUOUS EXERCISE (LIKE A BRISK WALK)?: PATIENT DECLINED

## 2024-01-11 SDOH — SOCIAL STABILITY: SOCIAL INSECURITY: HAVE YOU HAD THOUGHTS OF HARMING ANYONE ELSE?: YES

## 2024-01-11 SDOH — HEALTH STABILITY: MENTAL HEALTH: HOW OFTEN DO YOU HAVE A DRINK CONTAINING ALCOHOL?: PATIENT DECLINED

## 2024-01-11 SDOH — SOCIAL STABILITY: SOCIAL NETWORK
DO YOU BELONG TO ANY CLUBS OR ORGANIZATIONS SUCH AS CHURCH GROUPS UNIONS, FRATERNAL OR ATHLETIC GROUPS, OR SCHOOL GROUPS?: PATIENT DECLINED

## 2024-01-11 SDOH — SOCIAL STABILITY: SOCIAL INSECURITY: WITHIN THE LAST YEAR, HAVE YOU BEEN AFRAID OF YOUR PARTNER OR EX-PARTNER?: PATIENT DECLINED

## 2024-01-11 SDOH — HEALTH STABILITY: PHYSICAL HEALTH: ON AVERAGE, HOW MANY MINUTES DO YOU ENGAGE IN EXERCISE AT THIS LEVEL?: PATIENT DECLINED

## 2024-01-11 SDOH — ECONOMIC STABILITY: TRANSPORTATION INSECURITY
IN THE PAST 12 MONTHS, HAS LACK OF TRANSPORTATION KEPT YOU FROM MEETINGS, WORK, OR FROM GETTING THINGS NEEDED FOR DAILY LIVING?: PATIENT DECLINED

## 2024-01-11 SDOH — ECONOMIC STABILITY: FOOD INSECURITY: WITHIN THE PAST 12 MONTHS, YOU WORRIED THAT YOUR FOOD WOULD RUN OUT BEFORE YOU GOT MONEY TO BUY MORE.: PATIENT DECLINED

## 2024-01-11 SDOH — SOCIAL STABILITY: SOCIAL INSECURITY: DO YOU FEEL UNSAFE GOING BACK TO THE PLACE WHERE YOU ARE LIVING?: UNABLE TO ASSESS

## 2024-01-11 SDOH — SOCIAL STABILITY: SOCIAL INSECURITY: DOES ANYONE TRY TO KEEP YOU FROM HAVING/CONTACTING OTHER FRIENDS OR DOING THINGS OUTSIDE YOUR HOME?: UNABLE TO ASSESS

## 2024-01-11 SDOH — SOCIAL STABILITY: SOCIAL INSECURITY: DO YOU FEEL ANYONE HAS EXPLOITED OR TAKEN ADVANTAGE OF YOU FINANCIALLY OR OF YOUR PERSONAL PROPERTY?: UNABLE TO ASSESS

## 2024-01-11 SDOH — ECONOMIC STABILITY: TRANSPORTATION INSECURITY
IN THE PAST 12 MONTHS, HAS THE LACK OF TRANSPORTATION KEPT YOU FROM MEDICAL APPOINTMENTS OR FROM GETTING MEDICATIONS?: PATIENT DECLINED

## 2024-01-11 SDOH — SOCIAL STABILITY: SOCIAL INSECURITY
WITHIN THE LAST YEAR, HAVE YOU BEEN KICKED, HIT, SLAPPED, OR OTHERWISE PHYSICALLY HURT BY YOUR PARTNER OR EX-PARTNER?: PATIENT DECLINED

## 2024-01-11 SDOH — SOCIAL STABILITY: SOCIAL NETWORK: HOW OFTEN DO YOU ATTENT MEETINGS OF THE CLUB OR ORGANIZATION YOU BELONG TO?: PATIENT DECLINED

## 2024-01-11 SDOH — SOCIAL STABILITY: SOCIAL INSECURITY: ABUSE: ADULT

## 2024-01-11 SDOH — SOCIAL STABILITY: SOCIAL INSECURITY: ARE YOU OR HAVE YOU BEEN THREATENED OR ABUSED PHYSICALLY, EMOTIONALLY, OR SEXUALLY BY ANYONE?: UNABLE TO ASSESS

## 2024-01-11 SDOH — ECONOMIC STABILITY: INCOME INSECURITY: HOW HARD IS IT FOR YOU TO PAY FOR THE VERY BASICS LIKE FOOD, HOUSING, MEDICAL CARE, AND HEATING?: PATIENT DECLINED

## 2024-01-11 SDOH — SOCIAL STABILITY: SOCIAL INSECURITY: ARE THERE ANY APPARENT SIGNS OF INJURIES/BEHAVIORS THAT COULD BE RELATED TO ABUSE/NEGLECT?: UNABLE TO ASSESS

## 2024-01-11 SDOH — SOCIAL STABILITY: SOCIAL NETWORK: ARE YOU MARRIED, WIDOWED, DIVORCED, SEPARATED, NEVER MARRIED, OR LIVING WITH A PARTNER?: PATIENT DECLINED

## 2024-01-11 SDOH — SOCIAL STABILITY: SOCIAL INSECURITY: WERE YOU ABLE TO COMPLETE ALL THE BEHAVIORAL HEALTH SCREENINGS?: YES

## 2024-01-11 SDOH — ECONOMIC STABILITY: HOUSING INSECURITY
IN THE LAST 12 MONTHS, WAS THERE A TIME WHEN YOU DID NOT HAVE A STEADY PLACE TO SLEEP OR SLEPT IN A SHELTER (INCLUDING NOW)?: PATIENT DECLINED

## 2024-01-11 SDOH — ECONOMIC STABILITY: HOUSING INSECURITY: IN THE LAST 12 MONTHS, HOW MANY PLACES HAVE YOU LIVED?: 1

## 2024-01-11 ASSESSMENT — PAIN DESCRIPTION - LOCATION
LOCATION: ABDOMEN
LOCATION: ABDOMEN
LOCATION: UMBILICUS
LOCATION: UMBILICUS
LOCATION: HEAD

## 2024-01-11 ASSESSMENT — PAIN SCALES - GENERAL
PAINLEVEL_OUTOF10: 8
PAINLEVEL_OUTOF10: 1
PAINLEVEL_OUTOF10: 7
PAINLEVEL_OUTOF10: 3
PAINLEVEL_OUTOF10: 3
PAINLEVEL_OUTOF10: 5 - MODERATE PAIN
PAINLEVEL_OUTOF10: 0 - NO PAIN
PAINLEVEL_OUTOF10: 8
PAINLEVEL_OUTOF10: 9

## 2024-01-11 ASSESSMENT — PAIN DESCRIPTION - ONSET
ONSET: GRADUAL

## 2024-01-11 ASSESSMENT — COGNITIVE AND FUNCTIONAL STATUS - GENERAL
PATIENT BASELINE BEDBOUND: NO
CLIMB 3 TO 5 STEPS WITH RAILING: A LOT
STANDING UP FROM CHAIR USING ARMS: A LITTLE
CLIMB 3 TO 5 STEPS WITH RAILING: TOTAL
MOBILITY SCORE: 17
STANDING UP FROM CHAIR USING ARMS: A LITTLE
MOVING TO AND FROM BED TO CHAIR: A LITTLE
WALKING IN HOSPITAL ROOM: A LITTLE
MOBILITY SCORE: 19
MOVING TO AND FROM BED TO CHAIR: A LITTLE
DAILY ACTIVITIY SCORE: 24
WALKING IN HOSPITAL ROOM: A LOT

## 2024-01-11 ASSESSMENT — COLUMBIA-SUICIDE SEVERITY RATING SCALE - C-SSRS

## 2024-01-11 ASSESSMENT — ACTIVITIES OF DAILY LIVING (ADL)
ADEQUATE_TO_COMPLETE_ADL: YES
GROOMING: INDEPENDENT
HEARING - LEFT EAR: FUNCTIONAL
TOILETING: INDEPENDENT
ASSISTIVE_DEVICE: WHEELCHAIR;EYEGLASSES
PATIENT'S MEMORY ADEQUATE TO SAFELY COMPLETE DAILY ACTIVITIES?: YES
BATHING: INDEPENDENT
FEEDING YOURSELF: INDEPENDENT
DRESSING YOURSELF: INDEPENDENT
JUDGMENT_ADEQUATE_SAFELY_COMPLETE_DAILY_ACTIVITIES: YES
WALKS IN HOME: NEEDS ASSISTANCE
HEARING - RIGHT EAR: FUNCTIONAL
LACK_OF_TRANSPORTATION: PATIENT DECLINED

## 2024-01-11 ASSESSMENT — PATIENT HEALTH QUESTIONNAIRE - PHQ9
2. FEELING DOWN, DEPRESSED OR HOPELESS: NOT AT ALL
SUM OF ALL RESPONSES TO PHQ9 QUESTIONS 1 & 2: 0
1. LITTLE INTEREST OR PLEASURE IN DOING THINGS: NOT AT ALL

## 2024-01-11 ASSESSMENT — PAIN DESCRIPTION - FREQUENCY
FREQUENCY: CONSTANT/CONTINUOUS

## 2024-01-11 ASSESSMENT — LIFESTYLE VARIABLES
SKIP TO QUESTIONS 9-10: 1
SKIP TO QUESTIONS 9-10: 0
HOW OFTEN DO YOU HAVE A DRINK CONTAINING ALCOHOL: MONTHLY OR LESS
HOW MANY STANDARD DRINKS CONTAINING ALCOHOL DO YOU HAVE ON A TYPICAL DAY: PATIENT DOES NOT DRINK
AUDIT-C TOTAL SCORE: 1
PRESCIPTION_ABUSE_PAST_12_MONTHS: NO
SUBSTANCE_ABUSE_PAST_12_MONTHS: YES
AUDIT-C TOTAL SCORE: -1
AUDIT-C TOTAL SCORE: 1
HOW OFTEN DO YOU HAVE 6 OR MORE DRINKS ON ONE OCCASION: NEVER

## 2024-01-11 ASSESSMENT — PAIN DESCRIPTION - DESCRIPTORS
DESCRIPTORS: ACHING;DULL
DESCRIPTORS: ACHING;DULL

## 2024-01-11 ASSESSMENT — PAIN - FUNCTIONAL ASSESSMENT
PAIN_FUNCTIONAL_ASSESSMENT: 0-10

## 2024-01-11 ASSESSMENT — PAIN DESCRIPTION - PAIN TYPE
TYPE: ACUTE PAIN
TYPE: ACUTE PAIN

## 2024-01-11 NOTE — PROGRESS NOTES
"General Surgery Consultation    Patient: Roselia Mo  : 1970  MRN: [id number]  Date of Consultation: 24    Primary Care Provider: Zena Quijano MD  Referring Provider: Dr. Dioni Sanchez    Chief Complaint: Abdominal pain    History of Present Illness: Roselia Mo is a 53 y.o. old female seen at the request of Dr. Sanchez for evaluation of SBO.  Presents to the emergency department with abdominal pain that began overnight.  She last ate around 3 PM yesterday afternoon.  She reports that it did not taste well.  She subsequently had some dry heaves.  She continues to pass flatus, and was passing gas even this morning in the emergency department.  Given the abdominal pain she took MiraLAX and had a small, thin bowel movement.  She has a recurrent ventral hernia.  This was last repaired in  at the time of a partial hysterectomy.  She had a postoperative infection and early recurrence of that hernia.  She is morbidly obese with BMI 67.  She has had 4 previous bowel obstruction secondary to this hernia, none of which required surgical intervention.  In the emergency department, CT scan showed her ventral hernia with multiple Swiss cheese defects.  The inferior most fascial defect contains bowel with evidence of proximal dilation consistent with obstruction.  Nasogastric tube was placed.    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    " "Sitagliptin Phos-Metformin Unknown     Family History:   Reviewed, non-contributory to presenting illness.    Social History:  Lives at home with family. Does not ambulate within the house, is in a wheel chair. Non-smoker. Uses marijuana almost daily. No alcohol use.    ROS:  Constitutional:  no fever, sweats, and chills  Cardiovascular: +peripheral edema  Respiratory: No cough or shortness of breath  Gastrointestinal: +abdominal pain, dry heaves. Continues to pass flatus.  Genitourinary: no dysuria  Musculoskeletal: no weakness or swelling  Integumentary: +h/o skin infections  Neurological: no confusion  Endocrine: no heat or cold intolerance  Heme/Lymph: + easy bruising or bleeding, on Coumadin    Objective:  /67   Pulse 99   Temp 36 °C (96.8 °F)   Resp 16   Ht 1.575 m (5' 2\")   Wt (!) 167 kg (368 lb 2.7 oz)   SpO2 97%   BMI 67.34 kg/m²     Physical Exam:  Constitutional: No acute distress, conversant, pleasant, laying in right lateral decubitus position in bed with a family member at bedside  Neurologic: alert and oriented  Psych: appropriate affect  Ears, Nose, Mouth and Throat: mucus membranes moist  Pulmonary: No labored breathing, on supplemental oxygen  Cardiovascular: Regular rate and rhythm  Abdomen: soft, distended but also morbidly obese with BMI 67, ngt in place with ~400cc light bilious output in canister, abdomen is mildly tender to palpation, no peritonitis or concern of bowel ischemia, she has an infraumbilical midline laparotomy scar.  She has a small hernia at the umbilicus as well as an additional defect in the inferior midline, she has some chronic skin changes to the left of this scar overlying this hernia defect, while I am unable to get this to fully reduce it is soft and I suspect that this is chronic, I was able to get some bowel sounds on my attempted reduction and it may have partially reduced.  Musculoskeletal: Moves all extremities, +trace peripheral edema  Skin: no " jaundice    Labs:  WBC 12, LA 1.7, Cr 0.83    Imaging:  CT a/p: Hernia in the mid lower abdominal wall which contains a segment of bowel.  The proximal bowel is dilated and distally decompressed 2 small fat-containing midline ventral hernias and additional fat-containing hernia through the left rectus abdominis.    Assessment and Plan: Roselia Mo is a 53 y.o. old female with partial small bowel obstruction secondary to a recurrent ventral hernia in the setting of morbid obesity with BMI 67.  She would be a very high risk surgical candidate given her morbid obesity, CHF, non-ambulatory status, as well as Coumadin.  She does not have any evidence of bowel ischemia.  For these reasons, along with the fact that this is a partial obstruction, I recommend a trial of nonoperative management.  Although her obesity limits exam, I suspect that I was able to get this to at least partially reduce on examination this morning.  Recommend bowel rest, NG tube decompression, electrolyte optimization, and physical therapy involvement to keep her is ambulatory as possible, at least her baseline level.  Will continue to follow.    Lesly Guillen MD  1/11/2024

## 2024-01-11 NOTE — PROGRESS NOTES
General Surgery Afternoon Rounds    NG tube has about 900cc output throughout the day.  She is very frustrated and tearful this afternoon with the intermittent partial and recurring nature of her obstructive symptoms.  She is frustrated that she has had 4 previous admissions (3 in [place], and one here) with these obstructions and the fact that she has not had surgical repair of her hernia during any of those admissions. Her abdomen remains soft and no evidence of bowel ischemia.  She remains very high risk for surgical complication. Will continue with non-operative management. Ideally, we would get her through this acute partial obstruction, get her to achieve some medical weight loss (she reports being able to lose 170 lbs in the past on a keto diet) and get her ambulatory, before considering eventual elective repair.    Lesly Guillen MD  01/11/24  5:04 PM

## 2024-01-11 NOTE — H&P
History Of Present Illness  53-year-old female who presented with chief complaint of abdominal pain and constipation.  On presentation temperature was 36, pulse rate 99, respiratory rate 20, blood pressure 140/100 and she was saturating 94% on room air BMP showed elevated bicarb and low chloride, calcium was also elevated at 11.1.  CBC showed slightly elevated white cell count of 12.1 UA was unremarkable.  CT abdomen showed small bowel obstruction secondary to lower abdominal ventral hernia.  Nasogastric tube was put  in the ER.  Patient says that she has had 4-5 episodes of bowel obstruction and she is getting tired of it.  She was considered high risk for surgery because of her history but no she cannot take it anymore.  Her symptoms usually start with 3 to 4 days of diarrhea and then constipation.  Does report some nausea but no vomiting.  Had a colonoscopy about 5 years ago which showed some polyps but nothing else     Past Medical History  Past medical history of morbid obesity, hypothyroidism, hypertension, neuropathy, ventral hernia, recurrent bowel obstructions    Surgical History  No past surgical history on file.     Social History  No smoking or alcohol use  Family History  Reviewed, noncontributory     Allergies  Nickel, Palladium, and Sitagliptin phos-metformin    Review of Systems  As per HPI, comprehensive review of systems performed  Physical Exam  Constitutional:       General: She is in acute distress.      Appearance: She is obese. She is not toxic-appearing.   HENT:      Head: Normocephalic and atraumatic.   Eyes:      Extraocular Movements: Extraocular movements intact.      Conjunctiva/sclera: Conjunctivae normal.   Pulmonary:      Effort: Pulmonary effort is normal. No respiratory distress.      Breath sounds: No wheezing.   Abdominal:      General: There is distension.      Palpations: Abdomen is soft.      Tenderness: There is abdominal tenderness.      Hernia: A hernia is present.  "  Musculoskeletal:         General: Swelling present. No deformity.      Cervical back: Neck supple.   Neurological:      General: No focal deficit present.      Mental Status: She is alert.   Psychiatric:         Mood and Affect: Mood normal.          Last Recorded Vitals  Blood pressure (!) 189/90, pulse 98, temperature 36 °C (96.8 °F), resp. rate 18, height 1.575 m (5' 2\"), weight (!) 167 kg (368 lb 2.7 oz), SpO2 95 %.    Relevant Results             Assessment/Plan   Principal Problem:    Small bowel obstruction (CMS/HCC)      53-year-old morbidly obese female with past medical history of hypertension, anxiety/depression, insomnia, hypothyroidism, hepatic vein thrombosis, ventral hernia on exam recurrent bowel obstruction admitted with another episode of SBO    Keep the patient n.p.o.  Keep NG tube to suction  IV fluids  Provide pain control and supportive care  Keep potassium around 4  General surgery consulted  IV labetalol as needed for hypertension  Hold regular oral meds, can be restarted once NG tube is off suction  If she is off for thyroid for more than 3 days will need to be started on IV thyroid   DVT prophylaxis      Baldemar Lew MD    "

## 2024-01-11 NOTE — ED PROVIDER NOTES
Patient is a 53-year-old female presents with a chief complaint of abdominal pain and constipation.  Stated she just had several days of diarrhea and then today she has had almost no bowel movements.  She states that is a pattern however occasionally she does have an obstruction.  She does have 2 abdominal wall hernias.  Positive nausea no vomiting.         Review of Systems     Physical Exam  Vitals and nursing note reviewed.   Constitutional:       General: She is not in acute distress.     Appearance: She is well-developed.   HENT:      Head: Normocephalic and atraumatic.   Eyes:      Conjunctiva/sclera: Conjunctivae normal.   Cardiovascular:      Rate and Rhythm: Normal rate and regular rhythm.      Heart sounds: No murmur heard.  Pulmonary:      Effort: Pulmonary effort is normal. No respiratory distress.      Breath sounds: Normal breath sounds.   Abdominal:      Palpations: Abdomen is soft.      Tenderness: There is no abdominal tenderness.   Musculoskeletal:         General: No swelling.      Cervical back: Neck supple.   Skin:     General: Skin is warm and dry.      Capillary Refill: Capillary refill takes less than 2 seconds.   Neurological:      Mental Status: She is alert.   Psychiatric:         Mood and Affect: Mood normal.          Labs Reviewed   CBC WITH AUTO DIFFERENTIAL - Abnormal       Result Value    WBC 12.1 (*)     nRBC 0.0      RBC 4.91      Hemoglobin 15.2      Hematocrit 45.7      MCV 93      MCH 31.0      MCHC 33.3      RDW 14.0      Platelets 264      Neutrophils % 78.6      Immature Granulocytes %, Automated 0.5      Lymphocytes % 12.1      Monocytes % 7.5      Eosinophils % 0.9      Basophils % 0.4      Neutrophils Absolute 9.49 (*)     Immature Granulocytes Absolute, Automated 0.06      Lymphocytes Absolute 1.46      Monocytes Absolute 0.90      Eosinophils Absolute 0.11      Basophils Absolute 0.05     BASIC METABOLIC PANEL - Abnormal    Glucose 95      Sodium 137      Potassium 4.2       Chloride 94 (*)     Bicarbonate 33 (*)     Anion Gap 14      Urea Nitrogen 19      Creatinine 0.83      eGFR 83      Calcium 11.1 (*)    HEPATIC FUNCTION PANEL - Abnormal    Albumin 4.4      Bilirubin, Total 0.4      Bilirubin, Direct 0.1      Alkaline Phosphatase 330 (*)     ALT 25      AST 26      Total Protein 7.3     MAGNESIUM - Normal    Magnesium 1.82     LIPASE - Normal    Lipase 32      Narrative:     Venipuncture immediately after or during the administration of Metamizole may lead to falsely low results. Testing should be performed immediately prior to Metamizole dosing.   LACTATE - Normal    Lactate 1.7      Narrative:     Venipuncture immediately after or during the administration of Metamizole may lead to falsely low results. Testing should be performed immediately  prior to Metamizole dosing.   URINALYSIS WITH REFLEX CULTURE AND MICROSCOPIC - Normal    Color, Urine Yellow      Appearance, Urine Clear      Specific Gravity, Urine 1.015      pH, Urine 7.0      Protein, Urine NEGATIVE      Glucose, Urine NEGATIVE      Blood, Urine NEGATIVE      Ketones, Urine NEGATIVE      Bilirubin, Urine NEGATIVE      Urobilinogen, Urine <2.0      Nitrite, Urine NEGATIVE      Leukocyte Esterase, Urine NEGATIVE     URINALYSIS WITH REFLEX CULTURE AND MICROSCOPIC    Narrative:     The following orders were created for panel order Urinalysis with Reflex Culture and Microscopic.  Procedure                               Abnormality         Status                     ---------                               -----------         ------                     Urinalysis with Reflex C...[[id number]]  Normal              Final result               Extra Urine Gray Tube[[id number]]                                                         Please view results for these tests on the individual orders.   EXTRA URINE GRAY TUBE        XR abdomen 1 view   Final Result   The nasogastric tube courses below the diaphragm with tip overlying   the  gastric body.             MACRO:   None        Signed by: Anitha Saba 1/11/2024 4:35 AM   Dictation workstation:   [id number]      CT abdomen pelvis w IV contrast   Final Result   Small-bowel obstruction secondary to a lower abdominal ventral hernia   as detailed above.        Additional fat containing hernias more superiorly in the ventral   abdominal wall.        Subcutaneous stranding in the lower abdominal wall. Please correlate   clinically to exclude cystitis.        MACRO:   None        Signed by: Anitha Saba 1/11/2024 3:44 AM   Dictation workstation:   [id number]           Procedures     Medical Decision Making  Patient presents with a chief complaint of abdominal pain and decreased bowel movements.  She does have a history of multiple small bowel obstructions however she has so far not required surgery.  Upon arrival IV access was obtained.  Patient did get good relief with 4 mg IVP morphine and 5 mg IVP Compazine.  Maintenance fluids were ordered.  CT scan was consistent with a small bowel obstruction.  No perforations and with normal lactic acid strangulation seems unlikely.  NG tube was placed.  I contacted Dr. Lesly Guillen who will see the patient in the morning.  Patient will be admitted to the hospitalist service.         Diagnoses as of 01/11/24 0509   Small bowel obstruction (CMS/HCC)                    Dioni Sanchez MD  01/11/24 0501

## 2024-01-12 ENCOUNTER — APPOINTMENT (OUTPATIENT)
Dept: RADIOLOGY | Facility: HOSPITAL | Age: 54
DRG: 394 | End: 2024-01-12
Payer: MEDICARE

## 2024-01-12 LAB
ANION GAP SERPL CALC-SCNC: 14 MMOL/L (ref 10–20)
BASOPHILS # BLD AUTO: 0.04 X10*3/UL (ref 0–0.1)
BASOPHILS NFR BLD AUTO: 0.6 %
BNP SERPL-MCNC: 33 PG/ML (ref 0–99)
BUN SERPL-MCNC: 20 MG/DL (ref 6–23)
CALCIUM SERPL-MCNC: 9.9 MG/DL (ref 8.6–10.3)
CHLORIDE SERPL-SCNC: 94 MMOL/L (ref 98–107)
CO2 SERPL-SCNC: 32 MMOL/L (ref 21–32)
CREAT SERPL-MCNC: 0.8 MG/DL (ref 0.5–1.05)
EGFRCR SERPLBLD CKD-EPI 2021: 87 ML/MIN/1.73M*2
EOSINOPHIL # BLD AUTO: 0.15 X10*3/UL (ref 0–0.7)
EOSINOPHIL NFR BLD AUTO: 2.1 %
ERYTHROCYTE [DISTWIDTH] IN BLOOD BY AUTOMATED COUNT: 14.1 % (ref 11.5–14.5)
GLUCOSE BLD MANUAL STRIP-MCNC: 360 MG/DL (ref 74–99)
GLUCOSE BLD MANUAL STRIP-MCNC: 375 MG/DL (ref 74–99)
GLUCOSE SERPL-MCNC: 296 MG/DL (ref 74–99)
HCT VFR BLD AUTO: 42.9 % (ref 36–46)
HGB BLD-MCNC: 13.8 G/DL (ref 12–16)
IMM GRANULOCYTES # BLD AUTO: 0.03 X10*3/UL (ref 0–0.7)
IMM GRANULOCYTES NFR BLD AUTO: 0.4 % (ref 0–0.9)
INR PPP: 1.7 (ref 0.9–1.1)
LYMPHOCYTES # BLD AUTO: 2.06 X10*3/UL (ref 1.2–4.8)
LYMPHOCYTES NFR BLD AUTO: 28.5 %
MAGNESIUM SERPL-MCNC: 2.06 MG/DL (ref 1.6–2.4)
MCH RBC QN AUTO: 30.2 PG (ref 26–34)
MCHC RBC AUTO-ENTMCNC: 32.2 G/DL (ref 32–36)
MCV RBC AUTO: 94 FL (ref 80–100)
MONOCYTES # BLD AUTO: 0.76 X10*3/UL (ref 0.1–1)
MONOCYTES NFR BLD AUTO: 10.5 %
NEUTROPHILS # BLD AUTO: 4.18 X10*3/UL (ref 1.2–7.7)
NEUTROPHILS NFR BLD AUTO: 57.9 %
NRBC BLD-RTO: 0 /100 WBCS (ref 0–0)
PLATELET # BLD AUTO: 269 X10*3/UL (ref 150–450)
POTASSIUM SERPL-SCNC: 4 MMOL/L (ref 3.5–5.3)
PROTHROMBIN TIME: 19.4 SECONDS (ref 9.8–12.8)
RBC # BLD AUTO: 4.57 X10*6/UL (ref 4–5.2)
SODIUM SERPL-SCNC: 136 MMOL/L (ref 136–145)
WBC # BLD AUTO: 7.2 X10*3/UL (ref 4.4–11.3)

## 2024-01-12 PROCEDURE — 99233 SBSQ HOSP IP/OBS HIGH 50: CPT | Performed by: SURGERY

## 2024-01-12 PROCEDURE — 2550000001 HC RX 255 CONTRASTS: Performed by: HOSPITALIST

## 2024-01-12 PROCEDURE — 85025 COMPLETE CBC W/AUTO DIFF WBC: CPT | Performed by: STUDENT IN AN ORGANIZED HEALTH CARE EDUCATION/TRAINING PROGRAM

## 2024-01-12 PROCEDURE — 5A09357 ASSISTANCE WITH RESPIRATORY VENTILATION, LESS THAN 24 CONSECUTIVE HOURS, CONTINUOUS POSITIVE AIRWAY PRESSURE: ICD-10-PCS | Performed by: STUDENT IN AN ORGANIZED HEALTH CARE EDUCATION/TRAINING PROGRAM

## 2024-01-12 PROCEDURE — 2500000004 HC RX 250 GENERAL PHARMACY W/ HCPCS (ALT 636 FOR OP/ED): Performed by: SURGERY

## 2024-01-12 PROCEDURE — 1100000001 HC PRIVATE ROOM DAILY

## 2024-01-12 PROCEDURE — 2500000004 HC RX 250 GENERAL PHARMACY W/ HCPCS (ALT 636 FOR OP/ED): Performed by: STUDENT IN AN ORGANIZED HEALTH CARE EDUCATION/TRAINING PROGRAM

## 2024-01-12 PROCEDURE — 2500000002 HC RX 250 W HCPCS SELF ADMINISTERED DRUGS (ALT 637 FOR MEDICARE OP, ALT 636 FOR OP/ED)

## 2024-01-12 PROCEDURE — 85610 PROTHROMBIN TIME: CPT | Performed by: SURGERY

## 2024-01-12 PROCEDURE — 82947 ASSAY GLUCOSE BLOOD QUANT: CPT

## 2024-01-12 PROCEDURE — 80048 BASIC METABOLIC PNL TOTAL CA: CPT | Performed by: STUDENT IN AN ORGANIZED HEALTH CARE EDUCATION/TRAINING PROGRAM

## 2024-01-12 PROCEDURE — 83735 ASSAY OF MAGNESIUM: CPT

## 2024-01-12 PROCEDURE — 99233 SBSQ HOSP IP/OBS HIGH 50: CPT

## 2024-01-12 PROCEDURE — 36415 COLL VENOUS BLD VENIPUNCTURE: CPT | Performed by: STUDENT IN AN ORGANIZED HEALTH CARE EDUCATION/TRAINING PROGRAM

## 2024-01-12 PROCEDURE — 74250 X-RAY XM SM INT 1CNTRST STD: CPT

## 2024-01-12 PROCEDURE — 2500000004 HC RX 250 GENERAL PHARMACY W/ HCPCS (ALT 636 FOR OP/ED)

## 2024-01-12 PROCEDURE — 99222 1ST HOSP IP/OBS MODERATE 55: CPT | Performed by: STUDENT IN AN ORGANIZED HEALTH CARE EDUCATION/TRAINING PROGRAM

## 2024-01-12 PROCEDURE — 94760 N-INVAS EAR/PLS OXIMETRY 1: CPT

## 2024-01-12 PROCEDURE — 2500000001 HC RX 250 WO HCPCS SELF ADMINISTERED DRUGS (ALT 637 FOR MEDICARE OP)

## 2024-01-12 PROCEDURE — 74250 X-RAY XM SM INT 1CNTRST STD: CPT | Performed by: RADIOLOGY

## 2024-01-12 PROCEDURE — 83880 ASSAY OF NATRIURETIC PEPTIDE: CPT

## 2024-01-12 RX ORDER — WARFARIN 10 MG/1
10 TABLET ORAL ONCE
Status: COMPLETED | OUTPATIENT
Start: 2024-01-12 | End: 2024-01-12

## 2024-01-12 RX ORDER — WARFARIN 7.5 MG/1
7.5 TABLET ORAL DAILY
Status: DISCONTINUED | OUTPATIENT
Start: 2024-01-12 | End: 2024-01-13

## 2024-01-12 RX ORDER — PANTOPRAZOLE SODIUM 40 MG/1
40 TABLET, DELAYED RELEASE ORAL
Status: DISCONTINUED | OUTPATIENT
Start: 2024-01-13 | End: 2024-01-14 | Stop reason: HOSPADM

## 2024-01-12 RX ORDER — POTASSIUM CHLORIDE 20 MEQ/1
40 TABLET, EXTENDED RELEASE ORAL 2 TIMES DAILY
Status: DISCONTINUED | OUTPATIENT
Start: 2024-01-12 | End: 2024-01-14 | Stop reason: HOSPADM

## 2024-01-12 RX ORDER — TORSEMIDE 20 MG/1
100 TABLET ORAL 3 TIMES DAILY
Status: DISCONTINUED | OUTPATIENT
Start: 2024-01-12 | End: 2024-01-14 | Stop reason: HOSPADM

## 2024-01-12 RX ORDER — GABAPENTIN 300 MG/1
900 CAPSULE ORAL 4 TIMES DAILY
Status: DISCONTINUED | OUTPATIENT
Start: 2024-01-12 | End: 2024-01-14 | Stop reason: HOSPADM

## 2024-01-12 RX ORDER — TRAZODONE HYDROCHLORIDE 100 MG/1
100 TABLET ORAL NIGHTLY
Status: DISCONTINUED | OUTPATIENT
Start: 2024-01-12 | End: 2024-01-14 | Stop reason: HOSPADM

## 2024-01-12 RX ORDER — METOPROLOL TARTRATE 1 MG/ML
5 INJECTION, SOLUTION INTRAVENOUS EVERY 6 HOURS PRN
Status: DISCONTINUED | OUTPATIENT
Start: 2024-01-12 | End: 2024-01-14 | Stop reason: HOSPADM

## 2024-01-12 RX ORDER — ENOXAPARIN SODIUM 150 MG/ML
150 INJECTION SUBCUTANEOUS EVERY 12 HOURS
Status: DISCONTINUED | OUTPATIENT
Start: 2024-01-12 | End: 2024-01-12

## 2024-01-12 RX ORDER — METOPROLOL SUCCINATE 50 MG/1
50 TABLET, EXTENDED RELEASE ORAL DAILY
Status: DISCONTINUED | OUTPATIENT
Start: 2024-01-12 | End: 2024-01-14 | Stop reason: HOSPADM

## 2024-01-12 RX ORDER — DULOXETIN HYDROCHLORIDE 60 MG/1
60 CAPSULE, DELAYED RELEASE ORAL DAILY
Status: DISCONTINUED | OUTPATIENT
Start: 2024-01-12 | End: 2024-01-14 | Stop reason: HOSPADM

## 2024-01-12 RX ORDER — DEXTROSE 50 % IN WATER (D50W) INTRAVENOUS SYRINGE
25
Status: DISCONTINUED | OUTPATIENT
Start: 2024-01-12 | End: 2024-01-14 | Stop reason: HOSPADM

## 2024-01-12 RX ORDER — MULTIVIT-MIN/IRON FUM/FOLIC AC 7.5 MG-4
1 TABLET ORAL DAILY
Status: DISCONTINUED | OUTPATIENT
Start: 2024-01-12 | End: 2024-01-14 | Stop reason: HOSPADM

## 2024-01-12 RX ORDER — SPIRONOLACTONE 25 MG/1
300 TABLET ORAL 2 TIMES DAILY
Status: DISCONTINUED | OUTPATIENT
Start: 2024-01-12 | End: 2024-01-14 | Stop reason: HOSPADM

## 2024-01-12 RX ORDER — ROSUVASTATIN CALCIUM 20 MG/1
40 TABLET, COATED ORAL NIGHTLY
Status: DISCONTINUED | OUTPATIENT
Start: 2024-01-12 | End: 2024-01-14 | Stop reason: HOSPADM

## 2024-01-12 RX ORDER — CITALOPRAM 20 MG/1
20 TABLET, FILM COATED ORAL DAILY
Status: DISCONTINUED | OUTPATIENT
Start: 2024-01-12 | End: 2024-01-12 | Stop reason: CLARIF

## 2024-01-12 RX ORDER — DEXTROSE MONOHYDRATE 100 MG/ML
0.3 INJECTION, SOLUTION INTRAVENOUS ONCE AS NEEDED
Status: DISCONTINUED | OUTPATIENT
Start: 2024-01-12 | End: 2024-01-14 | Stop reason: HOSPADM

## 2024-01-12 RX ORDER — PROMETHAZINE HYDROCHLORIDE 25 MG/1
25 TABLET ORAL EVERY 6 HOURS PRN
Status: DISCONTINUED | OUTPATIENT
Start: 2024-01-12 | End: 2024-01-14 | Stop reason: HOSPADM

## 2024-01-12 RX ORDER — ESCITALOPRAM OXALATE 10 MG/1
10 TABLET ORAL DAILY
Status: DISCONTINUED | OUTPATIENT
Start: 2024-01-12 | End: 2024-01-14 | Stop reason: HOSPADM

## 2024-01-12 RX ORDER — ALBUTEROL SULFATE 90 UG/1
2 AEROSOL, METERED RESPIRATORY (INHALATION) EVERY 6 HOURS PRN
Status: DISCONTINUED | OUTPATIENT
Start: 2024-01-12 | End: 2024-01-12 | Stop reason: CLARIF

## 2024-01-12 RX ORDER — ALBUTEROL SULFATE 0.83 MG/ML
2.5 SOLUTION RESPIRATORY (INHALATION) EVERY 6 HOURS PRN
Status: DISCONTINUED | OUTPATIENT
Start: 2024-01-12 | End: 2024-01-14 | Stop reason: HOSPADM

## 2024-01-12 RX ORDER — LEVOTHYROXINE SODIUM 125 UG/1
250 TABLET ORAL
Status: DISCONTINUED | OUTPATIENT
Start: 2024-01-13 | End: 2024-01-14 | Stop reason: HOSPADM

## 2024-01-12 RX ORDER — FLUOXETINE HYDROCHLORIDE 20 MG/1
60 CAPSULE ORAL DAILY
Status: DISCONTINUED | OUTPATIENT
Start: 2024-01-12 | End: 2024-01-14 | Stop reason: HOSPADM

## 2024-01-12 RX ADMIN — POTASSIUM CHLORIDE 40 MEQ: 1500 TABLET, EXTENDED RELEASE ORAL at 14:45

## 2024-01-12 RX ADMIN — PROMETHAZINE HYDROCHLORIDE 25 MG: 25 TABLET ORAL at 20:53

## 2024-01-12 RX ADMIN — Medication 1 TABLET: at 14:44

## 2024-01-12 RX ADMIN — FLUOXETINE 60 MG: 20 CAPSULE ORAL at 14:44

## 2024-01-12 RX ADMIN — WARFARIN SODIUM 10 MG: 10 TABLET ORAL at 18:07

## 2024-01-12 RX ADMIN — ESCITALOPRAM OXALATE 10 MG: 10 TABLET ORAL at 14:45

## 2024-01-12 RX ADMIN — ROSUVASTATIN CALCIUM 40 MG: 20 TABLET, FILM COATED ORAL at 20:53

## 2024-01-12 RX ADMIN — TORSEMIDE 100 MG: 20 TABLET ORAL at 20:53

## 2024-01-12 RX ADMIN — METOPROLOL SUCCINATE 50 MG: 50 TABLET, EXTENDED RELEASE ORAL at 14:45

## 2024-01-12 RX ADMIN — PROMETHAZINE HYDROCHLORIDE 25 MG: 25 TABLET ORAL at 14:45

## 2024-01-12 RX ADMIN — TORSEMIDE 100 MG: 20 TABLET ORAL at 17:14

## 2024-01-12 RX ADMIN — SPIRONOLACTONE 300 MG: 25 TABLET ORAL at 20:53

## 2024-01-12 RX ADMIN — TRAZODONE HYDROCHLORIDE 100 MG: 100 TABLET ORAL at 20:53

## 2024-01-12 RX ADMIN — DULOXETINE HYDROCHLORIDE 60 MG: 60 CAPSULE, DELAYED RELEASE ORAL at 14:45

## 2024-01-12 RX ADMIN — SODIUM CHLORIDE 100 ML/HR: 9 INJECTION, SOLUTION INTRAVENOUS at 05:06

## 2024-01-12 RX ADMIN — DIATRIZOATE MEGLUMINE AND DIATRIZOATE SODIUM 240 ML: 660; 100 LIQUID ORAL; RECTAL at 12:15

## 2024-01-12 RX ADMIN — INSULIN HUMAN 10 UNITS: 100 INJECTION, SOLUTION PARENTERAL at 17:13

## 2024-01-12 RX ADMIN — HEPARIN SODIUM 7500 UNITS: 5000 INJECTION INTRAVENOUS; SUBCUTANEOUS at 05:07

## 2024-01-12 RX ADMIN — DIATRIZOATE MEGLUMINE AND DIATRIZOATE SODIUM 120 ML: 660; 100 LIQUID ORAL; RECTAL at 12:16

## 2024-01-12 RX ADMIN — GABAPENTIN 900 MG: 300 CAPSULE ORAL at 14:45

## 2024-01-12 RX ADMIN — ONDANSETRON 4 MG: 2 INJECTION INTRAMUSCULAR; INTRAVENOUS at 05:07

## 2024-01-12 RX ADMIN — DIATRIZOATE MEGLUMINE AND DIATRIZOATE SODIUM 120 ML: 660; 100 LIQUID ORAL; RECTAL at 12:15

## 2024-01-12 RX ADMIN — GABAPENTIN 900 MG: 300 CAPSULE ORAL at 20:53

## 2024-01-12 RX ADMIN — SODIUM CHLORIDE 1000 ML: 9 INJECTION, SOLUTION INTRAVENOUS at 07:34

## 2024-01-12 RX ADMIN — POTASSIUM CHLORIDE 40 MEQ: 1500 TABLET, EXTENDED RELEASE ORAL at 20:53

## 2024-01-12 ASSESSMENT — COGNITIVE AND FUNCTIONAL STATUS - GENERAL
PERSONAL GROOMING: A LITTLE
DAILY ACTIVITIY SCORE: 21
STANDING UP FROM CHAIR USING ARMS: A LITTLE
TOILETING: A LITTLE
CLIMB 3 TO 5 STEPS WITH RAILING: A LOT
MOBILITY SCORE: 19
WALKING IN HOSPITAL ROOM: A LITTLE
EATING MEALS: A LITTLE
MOVING TO AND FROM BED TO CHAIR: A LITTLE

## 2024-01-12 ASSESSMENT — PAIN SCALES - GENERAL: PAINLEVEL_OUTOF10: 0 - NO PAIN

## 2024-01-12 NOTE — PROGRESS NOTES
"Physical Therapy                 Therapy Communication Note    Patient Name: Roselia Mo  MRN: [id number]  Today's Date: 1/12/2024     Discipline: Physical Therapy    Missed Visit Reason: Missed Visit Reason: Other (Comment) (PT screen/DC)    PT order received, chart reviewed.  Per NP Brenda requested order and states patient wants a FWW for at home.  PT spoke with bed nurse who states patient is able to get up to bathroom and walk.  PT entered room and patient sitting on EOB and states she doesn't really want PT.  States she is doing more here than she does at home.  States at home she has a BSC that is by her bed at night and by her chair during day for her to use.  Spouse asking if they could get a second BSC so he doesn't have to keep moving the one they have.  Patient states she is able to walk if she can and states reasons for not being able to walk are respiratory compromise and pain at times.  During PT interview patient abruptly states \"I gotta pee\" and impulsively gets up off bed and walks into bathroom 10' away with spouse walking beside her.  Patient transferring to toilet independently. PT did present bariatric FWW for patient to use to ambulate out of bathroom with and PT educated patient on safety with transfers/hand placement.  Patient voiced she understands and feels she needs no further PT.  Patient wanted to sit on toilet for a while.  No further skilled PT needs at this level of care.  A bariatric FWW for home use may be beneficial for patient.  Patient would also benefit from continuing to ambulate at home and increase her activity.  "

## 2024-01-12 NOTE — PROGRESS NOTES
Roselia Mo is a 53 y.o. female on day 1 of admission presenting with Small bowel obstruction (CMS/HCC).      Subjective   Laying in the bed with BiPAP on, awake, alert and oriented x 3.  Reports of nausea.  NG tube present to low intermittent suction.  N.p.o. today, small bowel series today.       Objective     Last Recorded Vitals  /80 (BP Location: Right arm, Patient Position: Lying)   Pulse 71   Temp 36.7 °C (98.1 °F) (Temporal)   Resp 20   Wt (!) 167 kg (368 lb 2.7 oz)   SpO2 94%   Intake/Output last 3 Shifts:    Intake/Output Summary (Last 24 hours) at 1/12/2024 1207  Last data filed at 1/12/2024 0811  Gross per 24 hour   Intake 1921.67 ml   Output 1850 ml   Net 71.67 ml       Admission Weight  Weight: (!) 159 kg (350 lb) (01/11/24 0142)    Daily Weight  01/12/24 : (!) 167 kg (368 lb 2.7 oz)    Image Results  XR abdomen 1 view  Narrative: Interpreted By:  Anitha Saba,   STUDY:  XR ABDOMEN 1 VIEW; ;  1/11/2024 4:27 am      INDICATION:  Signs/Symptoms:NG placement.      COMPARISON:  02/17/2023      ACCESSION NUMBER(S):  [id number]      ORDERING CLINICIAN:  NELLI PERES      FINDINGS:  Single portable radiograph centered on the chest and abdomen.  Nasogastric tube crosses below the diaphragm with tip overlying the  gastric body. The lung bases are clear. No air-filled dilated bowel  in the imaged abdomen.      Impression: The nasogastric tube courses below the diaphragm with tip overlying  the gastric body.          MACRO:  None      Signed by: Anitha Saba 1/11/2024 4:35 AM  Dictation workstation:   [id number]  CT abdomen pelvis w IV contrast  Narrative: Interpreted By:  Anitha Saba,   STUDY:  CT ABDOMEN PELVIS W IV CONTRAST;  1/11/2024 3:30 am      INDICATION:  Signs/Symptoms:Abdominal pain.      COMPARISON:  02/18/2023      ACCESSION NUMBER(S):  [id number]      ORDERING CLINICIAN:  NELLI PERES      TECHNIQUE:  Axial CT images of the abdomen and pelvis with coronal and  sagittal  reconstructed images obtained after intravenous administration of  contrast      FINDINGS:  LOWER CHEST: Mild bibasilar atelectasis.  BONES: No acute osseous abnormality. Diffuse degenerative disc  changes. ABDOMINAL WALL: There is a hernia in the mid lower abdominal  wall, which contains a segment of small bowel. The bowel proximal to  the hernia is dilated and distally decompressed (series 6, image 29  and series 7, image 64). 2 small fat containing midline ventral  hernia and additional fat containing hernia through the left rectus  abdominus. Nonspecific subcutaneous stranding in the lower abdominal  wall, more prominent on the right.      ABDOMEN:      LIVER: Within normal limits.  BILE DUCTS: No biliary dilatation.  GALLBLADDER: No calcified gallstones. No pericholecystic inflammatory  changes. PANCREAS: Within normal limits.  SPLEEN: Borderline enlarged, 13.2 cm craniocaudad length.  ADRENALS: Within normal limits.  KIDNEYS and URETERS: Symmetric renal enhancement. No hydronephrosis  or perinephric fluid collection.          VESSELS: No aortic aneurysm.  RETROPERITONEUM: No pathologically enlarged retroperitoneal lymph  nodes.      PELVIS:      REPRODUCTIVE ORGANS: Hysterectomy.  BLADDER: Within normal limits.      BOWEL: No dilated bowel.  Normal appendix.  PERITONEUM: No ascites or free air, no fluid collection.      Impression: Small-bowel obstruction secondary to a lower abdominal ventral hernia  as detailed above.      Additional fat containing hernias more superiorly in the ventral  abdominal wall.      Subcutaneous stranding in the lower abdominal wall. Please correlate  clinically to exclude cystitis.      MACRO:  None      Signed by: Anitha Saba 1/11/2024 3:44 AM  Dictation workstation:   [id number]      Physical Exam  Constitutional: No acute distress, conversant, pleasant, laying in right lateral decubitus position in bed.  Neurologic: alert and oriented  Psych: appropriate  affect  Ears, Nose, Mouth and Throat: mucus membranes moist  Pulmonary: No labored breathing, on supplemental oxygen  Cardiovascular: Regular rate and rhythm  Abdomen: soft, distended but also morbidly obese with BMI 67, ngt in place with ~400cc light bilious output in canister, abdomen is mildly tender to palpation, no peritonitis or concern of bowel ischemia, she has an infraumbilical midline laparotomy scar.  She has a small hernia at the umbilicus as well as an additional defect in the inferior midline, she has some chronic skin changes to the left of this scar overlying this hernia defect, while I am unable to get this to fully reduce it is soft and I suspect that this is chronic, I was able to get some bowel sounds on my attempted reduction and it may have partially reduced.  Musculoskeletal: Moves all extremities, +trace peripheral edema  Skin: no jaundice  Relevant Results      Scheduled medications  diatrizoate rory-diatrizoat sod, 60 mL, oral, Once  enoxaparin, 150 mg, subcutaneous, q12h  pantoprazole, 40 mg, intravenous, Daily before breakfast  pneumoc 20-home conj-dip cr(PF), 0.5 mL, intramuscular, During hospitalization  polyethylene glycol, 17 g, oral, Daily      Continuous medications  sodium chloride 0.9%, 120 mL/hr, Last Rate: 100 mL/hr (01/12/24 0506)      PRN medications  PRN medications: acetaminophen **OR** acetaminophen **OR** acetaminophen, acetaminophen **OR** acetaminophen **OR** acetaminophen, benzocaine-menthol, ketorolac, labetaloL, metoprolol, morphine, ondansetron ODT **OR** ondansetron, oxygen, phenoL  Results for orders placed or performed during the hospital encounter of 01/11/24 (from the past 24 hour(s))   Basic metabolic panel   Result Value Ref Range    Glucose 296 (H) 74 - 99 mg/dL    Sodium 136 136 - 145 mmol/L    Potassium 4.0 3.5 - 5.3 mmol/L    Chloride 94 (L) 98 - 107 mmol/L    Bicarbonate 32 21 - 32 mmol/L    Anion Gap 14 10 - 20 mmol/L    Urea Nitrogen 20 6 - 23 mg/dL     Creatinine 0.80 0.50 - 1.05 mg/dL    eGFR 87 >60 mL/min/1.73m*2    Calcium 9.9 8.6 - 10.3 mg/dL   CBC and Auto Differential   Result Value Ref Range    WBC 7.2 4.4 - 11.3 x10*3/uL    nRBC 0.0 0.0 - 0.0 /100 WBCs    RBC 4.57 4.00 - 5.20 x10*6/uL    Hemoglobin 13.8 12.0 - 16.0 g/dL    Hematocrit 42.9 36.0 - 46.0 %    MCV 94 80 - 100 fL    MCH 30.2 26.0 - 34.0 pg    MCHC 32.2 32.0 - 36.0 g/dL    RDW 14.1 11.5 - 14.5 %    Platelets 269 150 - 450 x10*3/uL    Neutrophils % 57.9 40.0 - 80.0 %    Immature Granulocytes %, Automated 0.4 0.0 - 0.9 %    Lymphocytes % 28.5 13.0 - 44.0 %    Monocytes % 10.5 2.0 - 10.0 %    Eosinophils % 2.1 0.0 - 6.0 %    Basophils % 0.6 0.0 - 2.0 %    Neutrophils Absolute 4.18 1.20 - 7.70 x10*3/uL    Immature Granulocytes Absolute, Automated 0.03 0.00 - 0.70 x10*3/uL    Lymphocytes Absolute 2.06 1.20 - 4.80 x10*3/uL    Monocytes Absolute 0.76 0.10 - 1.00 x10*3/uL    Eosinophils Absolute 0.15 0.00 - 0.70 x10*3/uL    Basophils Absolute 0.04 0.00 - 0.10 x10*3/uL   Protime-INR   Result Value Ref Range    Protime 19.4 (H) 9.8 - 12.8 seconds    INR 1.7 (H) 0.9 - 1.1   B-type natriuretic peptide   Result Value Ref Range    BNP 33 0 - 99 pg/mL             Assessment/Plan      Principal Problem:    Small bowel obstruction (CMS/HCC)    53-year-old morbidly obese female with past medical history of hypertension, anxiety/depression, insomnia, hypothyroidism, hepatic vein thrombosis, ventral hernia on exam recurrent bowel obstruction admitted with another episode of SBO on 1/11/23.    Small Bowel obstruction  -Surgery consulted, recommendations appreciated  -For clear liquid diet per surgery  -To remove NG per surgery  -d/c IV fluids  -Monitor electrolytes, replace as needed  -Check intake and output  -Small bowel series looks good per surgery    CHF  -BNP 33  -Daily weight  -Low-sodium diet  -1500 mL fluid restriction  -strict I&O  -Monitor electrolytes, replace as needed  -EKG  -Follow-up with cardiology  outpatient    Obstructive sleep apnea  -Continue home BiPAP and oxygen      Hepatic vein thrombosis  -On Coumadin at home  -Pharmacy to dose Coumadin    Hypertension  -monitor blood pressure  -Since he cannot take orals will resume home dose p.o. Toprol XL    Neuropathy  -Continue home dose Neurontin    Hypothyroidism  -Continue home dose Synthroid    Diabetes  -Checks ACHS  -Clear liquid diet  -Regular insulin sliding scale  -Blood sugar closely    Full code     DVT prophylaxis: Coumadin     Discharge disposition: Anticipated discharge home tomorrow if able to advance diet.  Will need walker on the discharge              HAYLEY Elliott-CNP

## 2024-01-12 NOTE — CONSULTS
Inpatient consult to Cardiology  Consult performed by: Delfin Earl MD  Consult ordered by: HAYLEY Elliott-CNP  Reason for consult: CHF      History Of Present Illness:    Mrs. Roselia Mo is a 53 y.o. former smoker female being consulted by the Cardiology team for CHF. Patient with prior medical history significant for morbid obesity, CHF on home oxygen, CY on BIPAP, hypothyroidism, hypertension, neuropathy, ventral hernia, recurrent bowel obstructions, anxiety, depression, insomnia, hepatic vein thrombosis on Warfarin, ventral hernia on exam, recurrent bowel obstruction admitted with another episode of SBO. Patient presented to ED [facility] on 01/11/2024 complaining of abdominal pain. The patient reported experiencing 4-5 episodes of bowel obstruction and expressed fatigue with the recurring issue. Despite being considered high risk for surgery based on her medical history, she conveyed her inability to endure the situation any longer. Her symptoms typically initiated with 3 to 4 days of diarrhea followed by constipation. She mentioned experiencing some nausea but no vomiting. A colonoscopy conducted approximately 5 years ago revealed the presence of polyps but no other significant findings. Upon assessment, the patient presented with a temperature of 36, pulse rate of 99, respiratory rate of 20, blood pressure of 140/100, and oxygen saturation of 94% on room air. The basic metabolic panel (BMP) revealed an elevated bicarbonate level, low chloride, and elevated calcium at 11.1. The complete blood count (CBC) indicated a slightly elevated white cell count of 12.1, while the urinalysis (UA) showed no remarkable findings. A CT scan of the abdomen identified a small bowel obstruction due to a ventral hernia in the lower abdomen. A nasogastric tube was inserted in the emergency room. Patient was admitted for clinical compensation.       Last Recorded Vitals:  Vitals:    01/12/24 0846  "01/12/24 0913 01/12/24 1106 01/12/24 1108   BP:       BP Location:       Patient Position:       Pulse:       Resp:  20     Temp:       TempSrc:       SpO2:  94%     Weight: (!) 167 kg (368 lb 2.7 oz)  (!) 167 kg (368 lb 2.7 oz)    Height:   1.575 m (5' 2.01\") 1.575 m (5' 2.01\")       Last Labs:  CBC - 1/12/2024:  4:55 AM  7.2 13.8 269    42.9      CMP - 1/12/2024:  4:55 AM  9.9 7.3 26 --- 0.4   2.8 4.4 25 330      PTT - 2/17/2023:  3:40 AM  1.7   19.4 39     BNP   Date/Time Value Ref Range Status   01/12/2024 04:55 AM 33 0 - 99 pg/mL Final      Last I/O:  I/O last 3 completed shifts:  In: 1855 (11.1 mL/kg) [I.V.:1135 (6.8 mL/kg); NG/GT:720]  Out: 1850 (11.1 mL/kg) [Emesis/NG output:1850]  Weight: 167 kg     Past Cardiology Tests (Last 3 Years):  EKG:  No results found for this or any previous visit from the past 1095 days.    Echo:  No results found for this or any previous visit from the past 1095 days.    Ejection Fractions:  No results found for: \"EF\"  Cath:  No results found for this or any previous visit from the past 1095 days.    Stress Test:  No results found for this or any previous visit from the past 1095 days.    Cardiac Imaging:  No results found for this or any previous visit from the past 1095 days.      Past Medical History:  She has a past medical history of Arthritis, Asthma, CHF (congestive heart failure) (CMS/HCC), COPD (chronic obstructive pulmonary disease) (CMS/HCC), Diabetes mellitus (CMS/HCC), Disease of thyroid gland, and Hypertension.    Past Surgical History:  She has no past surgical history on file.      Social History:  She reports that she has quit smoking. Her smoking use included cigarettes. She has never used smokeless tobacco. She reports that she does not currently use alcohol. She reports current drug use. Frequency: 2.00 times per week. Drug: Marijuana.    Family History:  No family history on file.     Allergies:  Nickel, Dulaglutide, Palladium, Sitagliptin phos-metformin, " and Wells    Inpatient Medications:  Scheduled medications   Medication Dose Route Frequency    pantoprazole  40 mg intravenous Daily before breakfast    pneumoc 20-home conj-dip cr(PF)  0.5 mL intramuscular During hospitalization    polyethylene glycol  17 g oral Daily     PRN medications   Medication    acetaminophen    Or    acetaminophen    Or    acetaminophen    acetaminophen    Or    acetaminophen    Or    acetaminophen    benzocaine-menthol    ketorolac    labetaloL    metoprolol    morphine    ondansetron ODT    Or    ondansetron    oxygen    phenoL     Continuous Medications   Medication Dose Last Rate     Outpatient Medications:  Current Outpatient Medications   Medication Instructions    - refin regular (HumuLIN R U-500) 500 unit/mL CONCENTRATED - refill for patient own pump 1 each, subcutaneous, As needed, Take as directed per insulin instructions. Use U-500 insulin syringe.    albuterol 180 mcg, inhalation, Every 4 hours    citalopram (CELEXA) 20 mg, oral, Daily    diclofenac sodium (VOLTAREN TOP) 1 Application, transdermal, Daily PRN, Applies to the feet for neuropathy.    DULoxetine (CYMBALTA) 60 mg, oral, Daily, Do not crush or chew.    FLUoxetine (PROZAC) 60 mg, oral, Daily    gabapentin (NEURONTIN) 900 mg, oral, 4 times daily    levothyroxine (SYNTHROID, LEVOXYL) 250 mcg, oral, Daily before breakfast    metoprolol succinate XL (TOPROL-XL) 50 mg, oral, Daily, Do not crush or chew.    multivitamin tablet 1 tablet, oral, Daily    pantoprazole (PROTONIX) 40 mg, oral, Daily before breakfast, Do not crush, chew, or split.    potassium chloride CR 20 mEq ER tablet 40 mEq, oral, 2 times daily, Do not crush or chew.    promethazine (PHENERGAN) 25 mg, oral, Every 6 hours PRN    rosuvastatin (CRESTOR) 40 mg, oral, Daily    spironolactone (ALDACTONE) 300 mg, oral, 2 times daily    torsemide (DEMADEX) 100 mg, oral, 3 times daily    traZODone (DESYREL) 100 mg, oral, Nightly    UNKNOWN TO PATIENT 1 tablet, oral,  2 times daily, Unknown allergy medication.    warfarin (COUMADIN) 7.5 mg, oral, Daily, Take as directed per After Visit Summary.       Physical Exam:  General: alert, oriented and in no acute distress. Morbid obesity.  HEENT: NC/AT; EOMI; PERRLA, external ear is normal  Neck: supple; trachea midline; no masses; no JVD  Chest: clear breath sounds bilaterally; no wheezing  Cardio: regular rhythm, S1S2 normal, no murmurs  Abdomen: Difficult assessment due to morbid obesity  Extremities: no clubbing/cyanosis/edema  Neuro: Grossly intact     Psychiatric: Normal mood and affect     Assessment/Plan   In summary, Mrs. Roselia Mo is a 53 y.o. former smoker female being consulted by the Cardiology team for CHF. Patient with prior medical history significant for morbid obesity, CHF on home oxygen, CY on BIPAP, hypothyroidism, hypertension, neuropathy, ventral hernia, recurrent bowel obstructions, anxiety, depression, insomnia, hepatic vein thrombosis on Warfarin, ventral hernia on exam, recurrent bowel obstruction admitted with another episode of SBO. Patient presented to ED [facility] on 01/11/2024 complaining of abdominal pain. The patient reported experiencing 4-5 episodes of bowel obstruction and expressed fatigue with the recurring issue. Despite being considered high risk for surgery based on her medical history, she conveyed her inability to endure the situation any longer. Her symptoms typically initiated with 3 to 4 days of diarrhea followed by constipation. She mentioned experiencing some nausea but no vomiting. A colonoscopy conducted approximately 5 years ago revealed the presence of polyps but no other significant findings. Upon assessment, the patient presented with a temperature of 36, pulse rate of 99, respiratory rate of 20, blood pressure of 140/100, and oxygen saturation of 94% on room air. The basic metabolic panel (BMP) revealed an elevated bicarbonate level, low chloride, and elevated calcium at  "11.1. The complete blood count (CBC) indicated a slightly elevated white cell count of 12.1, while the urinalysis (UA) showed no remarkable findings. A CT scan of the abdomen identified a small bowel obstruction due to a ventral hernia in the lower abdomen. A nasogastric tube was inserted in the emergency room. Patient was admitted for clinical compensation.    Assessment    # CHF   - BNP 33  - Crea 0.8  - Patient looks \"warm and dry\". Looks compensated from cardiovascular standpoint.  - Patient   - On home O2. And BIPAP. Keep oxygen.  - Keep daily weights. Patient's baseline weight is 368 lb.  - Low sodium diet (2g).  - 1500mL fluid restriction.  - Strict I&Os.  - Electrolyte control and daily BMP including magnesium.  - General recommendations for heart failure, including low-sodium diet, fluid restriction, daily weight and adherence to medication.   - Would suggest to keep home medication at this point.  - Keep Metoprolol succinate 50mg daily.  - Replete Magnesium.  - No EKG or echocardiogram available. Would suggest to request tests.  - Follow up with Cardiology team as outpatient.    # Hepatic vein thrombosis  - Keep Warfarin    # Hypertension  - Controlled blood pressure.  - Keep home medication.  - Patient counseled to keep a healthy lifestyle including low-sodium diet.  - Goal of BP < 130/80mmHg.    # CY  - Keep home oxygen, BIPAP    # Hypothyroidism  - Keep Levothyroxine 250mcg daily    # SBO  - Assessment per surgical team    Thank you for allowing me to participate in the care of this patient. Please reach me out if you have any questions or if you need any clarifications regarding the patient's care.      Peripheral IV 01/11/24 20 G Right Antecubital (Active)   Site Assessment Clean;Dry;Intact 01/12/24 0800   Dressing Status Clean;Dry 01/12/24 0800   Number of days: 1       NG/OG/Feeding Tube NG - [place] sump 16 Fr Right nostril (Active)   Site Assessment Clean;Dry;Intact 01/12/24 1214   Number of days: " 1       Code Status:  Full Code    Delfin Earl MD

## 2024-01-12 NOTE — PROGRESS NOTES
"General Surgery Progress Note    Patient is a 53 y.o. female with SBO, suspected chronic intermittent. No flatus or bm. Pain improved compared to on admission, but still with some pain. Had 1.8L output from NGT, although was recorded as having 720cc \"intake\" via NGT. Had some tachycardia this AM and only 1 urine occurrence documented. I therefore ordered a 1L bolus. She received 500cc. She reports that she urinated twice, but was dark/concentrated. She states that she gets tachycardic when she walks and walked to the bathroom. I therefore stopped the remainder of the bolus given her CHF.     /80 (BP Location: Right arm, Patient Position: Lying)   Pulse 71   Temp 36.7 °C (98.1 °F) (Temporal)   Resp 18   Ht 1.575 m (5' 2\")   Wt (!) 167 kg (368 lb 2.7 oz)   SpO2 91%   BMI 67.34 kg/m²   NAD, laying in right lateral decubitus position in bed  No labored breathing, on CPAP  Low grade sinus tachycardia  Abdomen soft, suspect still mildly distended although difficult exam due to BMI 67, NGT with bilious output, mildly tender to palpation, no peritonitis or concern for ischemia, infraumbilical midline laparotomy scar. Small hernia at umbilicus as well as additional larger defect in inferior midline, some mild chronic skin changes/thickening to left of this scar, her hernia is chronically incarcerated but soft and only minimally tender on palpation.    Intake/Output Summary (Last 24 hours) at 1/12/2024 0822  Last data filed at 1/12/2024 0811  Gross per 24 hour   Intake 2471.67 ml   Output 1850 ml   Net 621.67 ml     WBC 7.2, Hgb 13.8  INR 1.7  Glucose 296    Patient is a 53 y.o. female with SBO, suspected chronic intermittent. Very high risk surgical candidate due to BMI 67, CHF, and non-ambulatory status. She needs more accurate documentation of I/O's, including volume of urine not just \"occurrences\" to help guide fluid administration given her CHF. Will obtain a small bowel follow through study today to see if " contrast passes through to colon. I suspect that this is a chronic partial obstruction.    Lesly Guillen MD  01/12/24  8:32 AM

## 2024-01-12 NOTE — PROGRESS NOTES
"Nutrition Initial Assessment:   Nutrition Assessment         Patient is a 53 y.o. female presenting with: partial small bowel obstruction.  Has NG to LIWS.  History of bowel obstruction due to hernia and high risk for surgical repair.    Patient seen - wearing Bipap - stated lost weight 170# over 2 years with Keto but was not managed by physician, no labs or ketones monitored.  Has not had medical weight loss offered to her.  If planning to try Keto again recommend medical management.  Is looking for PCP and needs a walker at home - informed SW.       Nutrition History:  Energy Intake: Good > 75 %  Food and Nutrient History: Patient seen stated was eating well until acute illness  Food Allergies/Intolerances:  None  GI Symptoms: Abdominal pain  Oral Problems: None       Anthropometrics:  Height: 157.5 cm (5' 2.01\")   Weight: (!) 167 kg (368 lb 2.7 oz)   BMI (Calculated): 67.32  IBW/kg (Dietitian Calculated): 50 kg  Percent of IBW: 334 %  Adjusted Body Weight (kg): 79 kg    Weight History:     Weight Change %:  Weight History / % Weight Change: had lost 170# >2 years ago and regained  Significant Weight Loss: No    Nutrition Focused Physical Exam Findings:  defer: not indicated    Nutrition Significant Labs:  BMP Trend:   Results from last 7 days   Lab Units 01/12/24  0455 01/11/24  0153   GLUCOSE mg/dL 296* 95   CALCIUM mg/dL 9.9 11.1*   SODIUM mmol/L 136 137   POTASSIUM mmol/L 4.0 4.2   CO2 mmol/L 32 33*   CHLORIDE mmol/L 94* 94*   BUN mg/dL 20 19   CREATININE mg/dL 0.80 0.83    , Liver Function Trend:   Results from last 7 days   Lab Units 01/11/24  0153   ALK PHOS U/L 330*   AST U/L 26   ALT U/L 25   BILIRUBIN TOTAL mg/dL 0.4          I/O:   Last BM Date: 01/10/24;          Dietary Orders (From admission, onward)       Start     Ordered    01/11/24 1421  May Participate in Room Service With Assistance  Once        Question:  .  Answer:  Yes    01/11/24 1421    01/11/24 0844  NPO Diet; Effective now  Diet " effective now         01/11/24 0843                     Estimated Needs:   Total Energy Estimated Needs (kCal): 2218 kCal  Method for Estimating Needs: [place] Je[place]  Total Protein Estimated Needs (g): 79 g  Method for Estimating Needs: 1 gm/kg adjusted IBW  Total Fluid Estimated Needs (mL): 2370 mL           Nutrition Diagnosis        Nutrition Diagnosis  Patient has Nutrition Diagnosis: Yes  Diagnosis Status (1): New  Nutrition Diagnosis 1: Altered GI function  Related to (1): hernia  As Evidenced by (1): partial small bowel obstruction  Additional Nutrition Diagnosis: Diagnosis 2  Diagnosis Status (2): New  Nutrition Diagnosis 2: Obese  Related to (2): excess calorie intake  As Evidenced by (2): BMI 67.32 kg/m2       Nutrition Interventions/Recommendations         Nutrition Prescription:  Individualized Nutrition Prescription Provided for : Currently NPO with MATTIE MEDINA        Nutrition Interventions:        Coordination of Nutrition Care by a Nutrition Professional  Collaboration and Referral of Nutrition Care: Collaboration by nutrition professional with other providers             Nutrition Monitoring and Evaluation   Food/Nutrient Related History Monitoring  Monitoring and Evaluation Plan: Energy intake  Energy Intake: Estimated energy intake  Criteria: Monitor NPO/clear liquid diet for advancement    Body Composition/Growth/Weight History  Monitoring and Evaluation Plan: Weight, BMI  Weight: Measured weight  Criteria: Goal for slow weight loss  Body Mass: Body mass index (BMI)  Criteria: BMI will decline toward healthier weight                 Time Spent/Follow-up Reminder:   Time Spent (min): 30 minutes  Last Date of Nutrition Visit: 01/12/24  Nutrition Follow-Up Needed?: 3-5 days  Follow up Comment: diet advancement    Jadyn Beard RDN, LD

## 2024-01-12 NOTE — CARE PLAN
"REMOTE COVERAGE- called into pt room, role of TCC explained.  answers and says, \"let me give you her.\" Pt on phone states, \"I gotta pee, I gotta pee, I gotta pee. Here tell her.\" Phone disconnects. Called again, pt states, \"uhg will you come down here so I can pee. I gotta go.\" Asked pt to turn on call light, states, \"Oh yes I can.\" Then asked pt to give phone to  for assessment questions. States, \"Yes, sure.\" Phone disconnects. Per care rounds no discharge x48 hours. Will follow on site today by on-site staff or this TCC tomorrow on site. CT will follow.   "

## 2024-01-12 NOTE — NURSING NOTE
"Called to room was told patient had fallen. Per PCT \"I heard a thump. By the time I got in the room patient was getting up and sitting on shower bench.\" Patient stated I was using the walker and standing leaning forward so me  could wipe me and the walker moved. I fell forward on my knees.\" Denies any pain pain or hitting her head. No injuries noted. Refused any Xray.  Vss taken.  See vss flowsheet.   "

## 2024-01-12 NOTE — SIGNIFICANT EVENT
Patient fell, she was using her walker and standing in the toilet leaning on the walker for her  to help with cleaning.  Fell down on her knees.  Patient is sitting in the shower, reports no pain able to move bilateral lower extremities without any restrictions.  No redness no bruising no abrasions noted.  Patient denies to do x-rays of her knees but advised to let us know of any pain in the knees and we can proceed with x-rays of bilateral knees.  She was awake, did not lose consciousness, denies hitting her head.  Will monitor closely  
Patient seen admitted overnight.  Reviewed vitals, labs, imaging and agree with assessment plan.  
no

## 2024-01-12 NOTE — PROGRESS NOTES
General Surgery    SBFT showed normal caliber small bowel. Contrast passes to colon. She has since had bowel movements this afternoon. Okay to remove NGT and start on clear liquid diet. Can advance as tolerated, stop IVF and if tolerates oral intake can discharge home. Recommend follow up with PCP or dietician to address strategies for medical weight loss. No need for scheduled surgical follow up as she is not a candidate for elective hernia repair at this time given BMI 67. Will sign off, available if I can be of further assistance until 6pm after which Dr. Her will be providing surgical coverage over the weekend.    Lesly Guillen MD  01/12/24  12:38 PM

## 2024-01-12 NOTE — PROGRESS NOTES
Occupational Therapy                 Therapy Communication Note    Patient Name: Roselia Mo  MRN: [id number]  Today's Date: 1/12/2024     Discipline: Occupational Therapy    Missed Visit Reason: Missed Visit Reason: Other (Comment) (OT screen/DC)    Screen/DC 12:50 - 13:00 PM    Comment: Pt observed seat on EOB upon arrival. Pt reported moving more compared to at home. Pt feels she would benefit from walker and bedside commode at home to assist as needed. Pt demo'd ambulating into bathroom with SBA of spouse. Performing toileting independently per pt. Pt reported no OT needs or OT concerns for discharge. Will discharge OT orders.

## 2024-01-13 LAB
ALBUMIN SERPL BCP-MCNC: 4.3 G/DL (ref 3.4–5)
ALP SERPL-CCNC: 271 U/L (ref 33–110)
ALT SERPL W P-5'-P-CCNC: 16 U/L (ref 7–45)
ANION GAP SERPL CALC-SCNC: 14 MMOL/L (ref 10–20)
AST SERPL W P-5'-P-CCNC: 13 U/L (ref 9–39)
BASOPHILS # BLD AUTO: 0.03 X10*3/UL (ref 0–0.1)
BASOPHILS NFR BLD AUTO: 0.4 %
BILIRUB SERPL-MCNC: 0.7 MG/DL (ref 0–1.2)
BUN SERPL-MCNC: 14 MG/DL (ref 6–23)
CALCIUM SERPL-MCNC: 9.8 MG/DL (ref 8.6–10.3)
CHLORIDE SERPL-SCNC: 93 MMOL/L (ref 98–107)
CO2 SERPL-SCNC: 32 MMOL/L (ref 21–32)
CREAT SERPL-MCNC: 0.81 MG/DL (ref 0.5–1.05)
EGFRCR SERPLBLD CKD-EPI 2021: 86 ML/MIN/1.73M*2
EOSINOPHIL # BLD AUTO: 0.12 X10*3/UL (ref 0–0.7)
EOSINOPHIL NFR BLD AUTO: 1.5 %
ERYTHROCYTE [DISTWIDTH] IN BLOOD BY AUTOMATED COUNT: 13.9 % (ref 11.5–14.5)
GLUCOSE BLD MANUAL STRIP-MCNC: 169 MG/DL (ref 74–99)
GLUCOSE BLD MANUAL STRIP-MCNC: 261 MG/DL (ref 74–99)
GLUCOSE BLD MANUAL STRIP-MCNC: 340 MG/DL (ref 74–99)
GLUCOSE BLD MANUAL STRIP-MCNC: 391 MG/DL (ref 74–99)
GLUCOSE BLD MANUAL STRIP-MCNC: 420 MG/DL (ref 74–99)
GLUCOSE BLD MANUAL STRIP-MCNC: 46 MG/DL (ref 74–99)
GLUCOSE BLD MANUAL STRIP-MCNC: 86 MG/DL (ref 74–99)
GLUCOSE SERPL-MCNC: 358 MG/DL (ref 74–99)
HCT VFR BLD AUTO: 41.9 % (ref 36–46)
HGB BLD-MCNC: 13.7 G/DL (ref 12–16)
IMM GRANULOCYTES # BLD AUTO: 0.03 X10*3/UL (ref 0–0.7)
IMM GRANULOCYTES NFR BLD AUTO: 0.4 % (ref 0–0.9)
INR PPP: 1.4 (ref 0.9–1.1)
LYMPHOCYTES # BLD AUTO: 2.28 X10*3/UL (ref 1.2–4.8)
LYMPHOCYTES NFR BLD AUTO: 29 %
MAGNESIUM SERPL-MCNC: 2.06 MG/DL (ref 1.6–2.4)
MCH RBC QN AUTO: 30.4 PG (ref 26–34)
MCHC RBC AUTO-ENTMCNC: 32.7 G/DL (ref 32–36)
MCV RBC AUTO: 93 FL (ref 80–100)
MONOCYTES # BLD AUTO: 0.75 X10*3/UL (ref 0.1–1)
MONOCYTES NFR BLD AUTO: 9.5 %
NEUTROPHILS # BLD AUTO: 4.65 X10*3/UL (ref 1.2–7.7)
NEUTROPHILS NFR BLD AUTO: 59.2 %
NRBC BLD-RTO: 0 /100 WBCS (ref 0–0)
PLATELET # BLD AUTO: 285 X10*3/UL (ref 150–450)
POTASSIUM SERPL-SCNC: 4.3 MMOL/L (ref 3.5–5.3)
PROT SERPL-MCNC: 7.1 G/DL (ref 6.4–8.2)
PROTHROMBIN TIME: 16.3 SECONDS (ref 9.8–12.8)
RBC # BLD AUTO: 4.51 X10*6/UL (ref 4–5.2)
SODIUM SERPL-SCNC: 135 MMOL/L (ref 136–145)
WBC # BLD AUTO: 7.9 X10*3/UL (ref 4.4–11.3)

## 2024-01-13 PROCEDURE — 2500000001 HC RX 250 WO HCPCS SELF ADMINISTERED DRUGS (ALT 637 FOR MEDICARE OP)

## 2024-01-13 PROCEDURE — 99232 SBSQ HOSP IP/OBS MODERATE 35: CPT | Performed by: STUDENT IN AN ORGANIZED HEALTH CARE EDUCATION/TRAINING PROGRAM

## 2024-01-13 PROCEDURE — 83735 ASSAY OF MAGNESIUM: CPT

## 2024-01-13 PROCEDURE — 99232 SBSQ HOSP IP/OBS MODERATE 35: CPT | Performed by: PHYSICIAN ASSISTANT

## 2024-01-13 PROCEDURE — 2500000004 HC RX 250 GENERAL PHARMACY W/ HCPCS (ALT 636 FOR OP/ED): Performed by: STUDENT IN AN ORGANIZED HEALTH CARE EDUCATION/TRAINING PROGRAM

## 2024-01-13 PROCEDURE — 85025 COMPLETE CBC W/AUTO DIFF WBC: CPT | Performed by: STUDENT IN AN ORGANIZED HEALTH CARE EDUCATION/TRAINING PROGRAM

## 2024-01-13 PROCEDURE — 94640 AIRWAY INHALATION TREATMENT: CPT

## 2024-01-13 PROCEDURE — 2500000004 HC RX 250 GENERAL PHARMACY W/ HCPCS (ALT 636 FOR OP/ED): Mod: JZ | Performed by: PHYSICIAN ASSISTANT

## 2024-01-13 PROCEDURE — 94761 N-INVAS EAR/PLS OXIMETRY MLT: CPT

## 2024-01-13 PROCEDURE — 1100000001 HC PRIVATE ROOM DAILY

## 2024-01-13 PROCEDURE — 36415 COLL VENOUS BLD VENIPUNCTURE: CPT | Performed by: STUDENT IN AN ORGANIZED HEALTH CARE EDUCATION/TRAINING PROGRAM

## 2024-01-13 PROCEDURE — 2500000005 HC RX 250 GENERAL PHARMACY W/O HCPCS: Performed by: STUDENT IN AN ORGANIZED HEALTH CARE EDUCATION/TRAINING PROGRAM

## 2024-01-13 PROCEDURE — 82947 ASSAY GLUCOSE BLOOD QUANT: CPT

## 2024-01-13 PROCEDURE — 2500000004 HC RX 250 GENERAL PHARMACY W/ HCPCS (ALT 636 FOR OP/ED)

## 2024-01-13 PROCEDURE — 85610 PROTHROMBIN TIME: CPT

## 2024-01-13 PROCEDURE — 94760 N-INVAS EAR/PLS OXIMETRY 1: CPT

## 2024-01-13 PROCEDURE — 84075 ASSAY ALKALINE PHOSPHATASE: CPT

## 2024-01-13 PROCEDURE — 2500000002 HC RX 250 W HCPCS SELF ADMINISTERED DRUGS (ALT 637 FOR MEDICARE OP, ALT 636 FOR OP/ED)

## 2024-01-13 RX ORDER — INSULIN GLARGINE 100 [IU]/ML
30 INJECTION, SOLUTION SUBCUTANEOUS EVERY 24 HOURS
Status: DISCONTINUED | OUTPATIENT
Start: 2024-01-13 | End: 2024-01-13

## 2024-01-13 RX ORDER — DEXTROSE MONOHYDRATE 100 MG/ML
0.3 INJECTION, SOLUTION INTRAVENOUS ONCE AS NEEDED
Status: DISCONTINUED | OUTPATIENT
Start: 2024-01-13 | End: 2024-01-14 | Stop reason: HOSPADM

## 2024-01-13 RX ORDER — WARFARIN 10 MG/1
10 TABLET ORAL ONCE
Status: COMPLETED | OUTPATIENT
Start: 2024-01-13 | End: 2024-01-13

## 2024-01-13 RX ORDER — DEXTROSE 50 % IN WATER (D50W) INTRAVENOUS SYRINGE
25
Status: DISCONTINUED | OUTPATIENT
Start: 2024-01-13 | End: 2024-01-14 | Stop reason: HOSPADM

## 2024-01-13 RX ADMIN — ONDANSETRON 4 MG: 4 TABLET, ORALLY DISINTEGRATING ORAL at 20:09

## 2024-01-13 RX ADMIN — GLUCAGON 1 MG: 1 INJECTION, POWDER, LYOPHILIZED, FOR SOLUTION INTRAMUSCULAR; INTRAVENOUS at 22:36

## 2024-01-13 RX ADMIN — ONDANSETRON 4 MG: 4 TABLET, ORALLY DISINTEGRATING ORAL at 12:59

## 2024-01-13 RX ADMIN — ALBUTEROL SULFATE 2.5 MG: 2.5 SOLUTION RESPIRATORY (INHALATION) at 06:43

## 2024-01-13 RX ADMIN — METOPROLOL SUCCINATE 50 MG: 50 TABLET, EXTENDED RELEASE ORAL at 09:53

## 2024-01-13 RX ADMIN — TRAZODONE HYDROCHLORIDE 100 MG: 100 TABLET ORAL at 20:03

## 2024-01-13 RX ADMIN — SPIRONOLACTONE 300 MG: 25 TABLET ORAL at 09:51

## 2024-01-13 RX ADMIN — POLYETHYLENE GLYCOL 3350 17 G: 17 POWDER, FOR SOLUTION ORAL at 10:01

## 2024-01-13 RX ADMIN — WARFARIN SODIUM 10 MG: 10 TABLET ORAL at 18:08

## 2024-01-13 RX ADMIN — POTASSIUM CHLORIDE 40 MEQ: 1500 TABLET, EXTENDED RELEASE ORAL at 20:03

## 2024-01-13 RX ADMIN — GABAPENTIN 900 MG: 300 CAPSULE ORAL at 16:19

## 2024-01-13 RX ADMIN — TORSEMIDE 100 MG: 20 TABLET ORAL at 09:52

## 2024-01-13 RX ADMIN — GABAPENTIN 900 MG: 300 CAPSULE ORAL at 20:03

## 2024-01-13 RX ADMIN — PANTOPRAZOLE SODIUM 40 MG: 40 TABLET, DELAYED RELEASE ORAL at 05:10

## 2024-01-13 RX ADMIN — Medication 1 TABLET: at 09:52

## 2024-01-13 RX ADMIN — GABAPENTIN 900 MG: 300 CAPSULE ORAL at 13:05

## 2024-01-13 RX ADMIN — INSULIN LISPRO 15.6 UNITS: 100 INJECTION, SOLUTION INTRAVENOUS; SUBCUTANEOUS at 10:02

## 2024-01-13 RX ADMIN — TORSEMIDE 100 MG: 20 TABLET ORAL at 20:03

## 2024-01-13 RX ADMIN — ALBUTEROL SULFATE 2.5 MG: 2.5 SOLUTION RESPIRATORY (INHALATION) at 11:32

## 2024-01-13 RX ADMIN — GABAPENTIN 900 MG: 300 CAPSULE ORAL at 05:10

## 2024-01-13 RX ADMIN — FLUOXETINE 60 MG: 20 CAPSULE ORAL at 09:52

## 2024-01-13 RX ADMIN — LEVOTHYROXINE SODIUM 250 MCG: 0.12 TABLET ORAL at 06:03

## 2024-01-13 RX ADMIN — ALBUTEROL SULFATE 2.5 MG: 2.5 SOLUTION RESPIRATORY (INHALATION) at 18:40

## 2024-01-13 RX ADMIN — TORSEMIDE 100 MG: 20 TABLET ORAL at 16:19

## 2024-01-13 RX ADMIN — ESCITALOPRAM OXALATE 10 MG: 10 TABLET ORAL at 09:53

## 2024-01-13 RX ADMIN — SPIRONOLACTONE 300 MG: 25 TABLET ORAL at 20:03

## 2024-01-13 RX ADMIN — POTASSIUM CHLORIDE 40 MEQ: 1500 TABLET, EXTENDED RELEASE ORAL at 09:52

## 2024-01-13 RX ADMIN — DULOXETINE HYDROCHLORIDE 60 MG: 60 CAPSULE, DELAYED RELEASE ORAL at 09:53

## 2024-01-13 RX ADMIN — ROSUVASTATIN CALCIUM 40 MG: 20 TABLET, FILM COATED ORAL at 20:03

## 2024-01-13 ASSESSMENT — COGNITIVE AND FUNCTIONAL STATUS - GENERAL
WALKING IN HOSPITAL ROOM: A LITTLE
CLIMB 3 TO 5 STEPS WITH RAILING: A LOT
MOBILITY SCORE: 20
MOVING TO AND FROM BED TO CHAIR: A LITTLE

## 2024-01-13 ASSESSMENT — PAIN - FUNCTIONAL ASSESSMENT
PAIN_FUNCTIONAL_ASSESSMENT: 0-10

## 2024-01-13 ASSESSMENT — PAIN DESCRIPTION - DESCRIPTORS: DESCRIPTORS: SORE

## 2024-01-13 ASSESSMENT — PAIN SCALES - GENERAL
PAINLEVEL_OUTOF10: 0 - NO PAIN
PAINLEVEL_OUTOF10: 2
PAINLEVEL_OUTOF10: 0 - NO PAIN

## 2024-01-13 ASSESSMENT — ACTIVITIES OF DAILY LIVING (ADL): LACK_OF_TRANSPORTATION: NO

## 2024-01-13 NOTE — CARE PLAN
Problem: Skin  Goal: Decreased wound size/increased tissue granulation at next dressing change  Outcome: Progressing

## 2024-01-13 NOTE — PROGRESS NOTES
01/13/24 1346   Discharge Planning   Living Arrangements Spouse/significant other   Support Systems Spouse/significant other   Assistance Needed independent in bathing, dressing, ambulating. Has: manual and power chair, BSC. Would like another and a diffrent BSC, likes what hospital has.   Type of Residence Private residence   Number of Stairs to Enter Residence 0  (ramp enterance)   Number of Stairs Within Residence 0  (has but does not use stairs within home.)   Do you have animals or pets at home? Yes   Type of Animals or Pets 2 dogs   Who is requesting discharge planning? Provider   Home or Post Acute Services None   Patient expects to be discharged to: Home with FWW   Does the patient need discharge transport arranged? No   Financial Resource Strain   How hard is it for you to pay for the very basics like food, housing, medical care, and heating? Not hard   Housing Stability   In the last 12 months, was there a time when you were not able to pay the mortgage or rent on time? N   In the last 12 months, how many places have you lived? 2  (just moved from [place])   In the last 12 months, was there a time when you did not have a steady place to sleep or slept in a shelter (including now)? N   Transportation Needs   In the past 12 months, has lack of transportation kept you from medical appointments or from getting medications? no   In the past 12 months, has lack of transportation kept you from meetings, work, or from getting things needed for daily living? No     Met with patient and husban in room. Pt gives permission to speak in front of . Address, telephone, contact number confirmed. PCP is Dr. Quijano last visit a couple of months ago. Pt verbalizes desire to find new PCP as well as a new cardiologist, renal doctor, pulmonologist and GI doctor. Provider her PCP choice list with instructions to choose, call and establish new patient. Provided numbers for requested specialty doctors, although her PCP  told her that he could send her to who he thought she needed and that he couldl likely manage some of her conditions. [facility] 21/19, pt did have fall here yesterday, without injuries. Therapy note states pt would benefit from bariatric FWW.  would alos like another BSC so that they dont have to move the one they have so often. Pt would like a shorter one that what they have at home. Faxed walker script to [facility] pharmacy per pt request. With instruction to call patient and only fill if insurance covers it. Provided and instructed patient to call [facility] if insurance wont cover walker and to check on BSC. RT working with pt on home oxygen and setting patient up with consent to transfer CPAP back to [place] company. Plan- home with bariatric walker (Script sent to [facility]). May not be available to patient til Monday or Tuesday, pt aware and states that same will be fine. CT will follow.

## 2024-01-13 NOTE — PROGRESS NOTES
"Subjective Data:  Patient reports feeling well, no new adverse events overnight. Patient denies any chest pain, shortness of breath, palpitations, dizziness or syncope. Patient is hemodynamically stable.    Overnight Events:    No     Objective Data:  Last Recorded Vitals:  Vitals:    01/13/24 0643 01/13/24 0700 01/13/24 1132 01/13/24 1500   BP:  103/52  134/64   BP Location:  Right arm     Patient Position:  Lying     Pulse:  78  72   Resp:  18  18   Temp:  35.8 °C (96.4 °F)  36 °C (96.8 °F)   TempSrc:  Temporal  Temporal   SpO2: 98% 95% 96% 97%   Weight:       Height:           Last Labs:  CBC - 1/13/2024:  5:19 AM  7.9 13.7 285    41.9      CMP - 1/13/2024:  5:19 AM  9.8 7.1 13 --- 0.7   2.8 4.3 16 271      PTT - 2/17/2023:  3:40 AM  1.4   16.3 39     BNP   Date/Time Value Ref Range Status   01/12/2024 04:55 AM 33 0 - 99 pg/mL Final      Last I/O:  I/O last 3 completed shifts:  In: 2808.3 (16.8 mL/kg) [I.V.:2171.7 (13 mL/kg); NG/GT:20; IV Piggyback:616.7]  Out: 2451 (14.7 mL/kg) [Urine:600 (0.1 mL/kg/hr); Emesis/NG output:1850; Stool:1]  Weight: 167 kg     Past Cardiology Tests (Last 3 Years):  EKG:  No results found for this or any previous visit from the past 1095 days.    Echo:  No results found for this or any previous visit from the past 1095 days.    Ejection Fractions:  No results found for: \"EF\"  Cath:  No results found for this or any previous visit from the past 1095 days.    Stress Test:  No results found for this or any previous visit from the past 1095 days.    Cardiac Imaging:  No results found for this or any previous visit from the past 1095 days.      Inpatient Medications:  Scheduled medications   Medication Dose Route Frequency    DULoxetine  60 mg oral Daily    escitalopram  10 mg oral Daily    FLUoxetine  60 mg oral Daily    gabapentin  900 mg oral 4x daily    levothyroxine  250 mcg oral Daily before breakfast    metoprolol succinate XL  50 mg oral Daily    multivitamin with minerals  1 " tablet oral Daily    pantoprazole  40 mg oral Daily before breakfast    pneumoc 20-home conj-dip cr(PF)  0.5 mL intramuscular During hospitalization    polyethylene glycol  17 g oral Daily    potassium chloride CR  40 mEq oral BID    rosuvastatin  40 mg oral Nightly    spironolactone  300 mg oral BID    torsemide  100 mg oral TID    traZODone  100 mg oral Nightly    warfarin  10 mg oral Once     PRN medications   Medication    acetaminophen    Or    acetaminophen    Or    acetaminophen    acetaminophen    Or    acetaminophen    Or    acetaminophen    albuterol    benzocaine-menthol    dextrose 10 % in water (D10W)    dextrose 10 % in water (D10W)    dextrose    dextrose    glucagon    glucagon    ketorolac    labetaloL    metoprolol    morphine    ondansetron ODT    Or    ondansetron    oxygen    phenoL    promethazine     Continuous Medications   Medication Dose Last Rate       Physical Exam:  General: alert, oriented and in no acute distress. Morbid obesity.  HEENT: NC/AT; EOMI; PERRLA, external ear is normal  Neck: supple; trachea midline; no masses; no JVD  Chest: clear breath sounds bilaterally; no wheezing  Cardio: regular rhythm, S1S2 normal, no murmurs  Abdomen: Difficult assessment due to morbid obesity  Extremities: no clubbing/cyanosis/edema  Neuro: Grossly intact     Psychiatric: Normal mood and affect     Assessment/Plan   In summary, Mrs. Roselia Mo is a 53 y.o. former smoker female being consulted by the Cardiology team for CHF. Patient with prior medical history significant for morbid obesity, CHF on home oxygen, CY on BIPAP, hypothyroidism, hypertension, neuropathy, ventral hernia, recurrent bowel obstructions, anxiety, depression, insomnia, hepatic vein thrombosis on Warfarin, ventral hernia on exam, recurrent bowel obstruction admitted with another episode of SBO. Patient presented to ED [facility] on 01/11/2024 complaining of abdominal pain. The patient reported experiencing 4-5 episodes  "of bowel obstruction and expressed fatigue with the recurring issue. Despite being considered high risk for surgery based on her medical history, she conveyed her inability to endure the situation any longer. Her symptoms typically initiated with 3 to 4 days of diarrhea followed by constipation. She mentioned experiencing some nausea but no vomiting. A colonoscopy conducted approximately 5 years ago revealed the presence of polyps but no other significant findings. Upon assessment, the patient presented with a temperature of 36, pulse rate of 99, respiratory rate of 20, blood pressure of 140/100, and oxygen saturation of 94% on room air. The basic metabolic panel (BMP) revealed an elevated bicarbonate level, low chloride, and elevated calcium at 11.1. The complete blood count (CBC) indicated a slightly elevated white cell count of 12.1, while the urinalysis (UA) showed no remarkable findings. A CT scan of the abdomen identified a small bowel obstruction due to a ventral hernia in the lower abdomen. A nasogastric tube was inserted in the emergency room. Patient was admitted for clinical compensation.     Assessment     # CHF   - BNP 33  - Crea 0.8  - Patient looks \"warm and dry\". Looks compensated from cardiovascular standpoint.  - Patient   - On home O2. And BIPAP. Keep oxygen.  - Keep daily weights. Patient's baseline weight is 368 lb.  - Low sodium diet (2g).  - 1500mL fluid restriction.  - Strict I&Os.  - Electrolyte control and daily BMP including magnesium.  - General recommendations for heart failure, including low-sodium diet, fluid restriction, daily weight and adherence to medication.   - Would suggest to keep home medication at this point.  - Keep Metoprolol succinate 50mg daily.  - No EKG or echocardiogram available. Would suggest to request tests.  - Follow up with Cardiology team as outpatient.     # Hepatic vein thrombosis  - Keep Warfarin; would switch to Enoxaparin in case surgery is a possibility. "      # Hypertension  - Controlled blood pressure.  - Keep home medication.  - Patient counseled to keep a healthy lifestyle including low-sodium diet.  - Goal of BP < 130/80mmHg.     # CY  - Keep home oxygen, BIPAP     # Hypothyroidism  - Keep Levothyroxine 250mcg daily     # SBO  - Assessment per surgical team     Thank you for allowing me to participate in the care of this patient. Please reach me out if you have any questions or if you need any clarifications regarding the patient's care.       Code Status:  Full Code    Delfin Earl MD

## 2024-01-13 NOTE — PROGRESS NOTES
"Roselia Mo is a 53 y.o. female on day 2 of admission presenting with Small bowel obstruction (CMS/HCC).    Subjective   Patient seen and examined at bedside.  Patient continues to have some lower abdominal discomfort but she is significantly better.       Objective     Physical Exam    Constitutional: awake/alert/oriented x3, no distress, alert and cooperative  Eyes: clear sclera  ENMT: mucous membranes moist, adequate dentition, neck supple  Respiratory/Thorax: No dyspnea, no cough, breath sounds clear   Cardiovascular: Regular rate and rhythm, 2+ equal pulses of the extremities, normal S 1and S 2, no edema  Gastrointestinal: soft, suspect still mildly distended although difficult exam due to BMI 67, mildly tender to palpation, no peritonitis or concern for ischemia, infraumbilical midline laparotomy scar. Small hernia at umbilicus as well as additional larger defect in inferior midline, some mild chronic skin changes/thickening to left of this scar, her hernia is chronically incarcerated but soft and only minimally tender on palpation.     Musculoskeletal: ROM intact, no joint swelling,  Neurological: alert and oriented x3, no tremor, speech clear,   Psychological: Appropriate mood and behavior, cooperative and pleasant  Skin: Warm and dry, no lesions,     Last Recorded Vitals  Blood pressure 103/52, pulse 78, temperature 35.8 °C (96.4 °F), temperature source Temporal, resp. rate 18, height 1.575 m (5' 2.01\"), weight (!) 167 kg (368 lb 2.7 oz), SpO2 96 %.  Intake/Output last 3 Shifts:  I/O last 3 completed shifts:  In: 2808.3 (16.8 mL/kg) [I.V.:2171.7 (13 mL/kg); NG/GT:20; IV Piggyback:616.7]  Out: 2451 (14.7 mL/kg) [Urine:600 (0.1 mL/kg/hr); Emesis/NG output:1850; Stool:1]  Weight: 167 kg     Relevant Results    Scheduled medications  DULoxetine, 60 mg, oral, Daily  escitalopram, 10 mg, oral, Daily  FLUoxetine, 60 mg, oral, Daily  gabapentin, 900 mg, oral, 4x daily  levothyroxine, 250 mcg, oral, Daily before " breakfast  metoprolol succinate XL, 50 mg, oral, Daily  multivitamin with minerals, 1 tablet, oral, Daily  pantoprazole, 40 mg, oral, Daily before breakfast  pneumoc 20-home conj-dip cr(PF), 0.5 mL, intramuscular, During hospitalization  polyethylene glycol, 17 g, oral, Daily  potassium chloride CR, 40 mEq, oral, BID  rosuvastatin, 40 mg, oral, Nightly  spironolactone, 300 mg, oral, BID  torsemide, 100 mg, oral, TID  traZODone, 100 mg, oral, Nightly  warfarin, 10 mg, oral, Once      Continuous medications     PRN medications  PRN medications: acetaminophen **OR** acetaminophen **OR** acetaminophen, acetaminophen **OR** acetaminophen **OR** acetaminophen, albuterol, benzocaine-menthol, dextrose 10 % in water (D10W), dextrose 10 % in water (D10W), dextrose, dextrose, glucagon, glucagon, ketorolac, labetaloL, metoprolol, morphine, ondansetron ODT **OR** ondansetron, oxygen, phenoL, promethazine      FL small bowel series    Result Date: 1/12/2024  Interpreted By:  Leif Chao, STUDY: FL SMALL BOWEL SERIES;  1/12/2024 12:04 pm   INDICATION: Signs/Symptoms:SBO (suspected partial/chronic) in setting of ventral hernia in morbidly obese, high surgical risk patient. Please assess for passage of contrast..   COMPARISON: None.   ACCESSION NUMBER(S): [id number]   ORDERING CLINICIAN: IZABELA CARRILLO   TECHNIQUE: An initial radiograph of the abdomen and pelvis was obtained. Following the administration of contrast Gastrografin, delayed static fluoroscopic images of the abdomen in the posteroanterior projection were obtained at multiple intervals.   FINDINGS: Initial  image demonstrates  a nonspecific nonobstructive bowel gas pattern. An enteric tube is seen with the tip over the mid stomach.   The small bowel is of  normalcaliber with no mucosal thickening appreciated.  A normal feathery appearance is observed to the jejunum.  The normal smooth appearance of the ileum is observed. There is no evidence for annular  constricting lesions, large intraluminal mass lesion, or mucosal ulceration.   The terminal ileum and cecum are well visualized and within normal limits. Transit time of contrast to the cecum was identified by  20 minutes.       1.  No evidence of bowel obstruction..     MACRO: None   Signed by: Leif Chao 1/12/2024 12:20 PM Dictation workstation:   [id number]       Results for orders placed or performed during the hospital encounter of 01/11/24 (from the past 24 hour(s))   POCT GLUCOSE   Result Value Ref Range    POCT Glucose 360 (H) 74 - 99 mg/dL   POCT GLUCOSE   Result Value Ref Range    POCT Glucose 375 (H) 74 - 99 mg/dL   CBC and Auto Differential   Result Value Ref Range    WBC 7.9 4.4 - 11.3 x10*3/uL    nRBC 0.0 0.0 - 0.0 /100 WBCs    RBC 4.51 4.00 - 5.20 x10*6/uL    Hemoglobin 13.7 12.0 - 16.0 g/dL    Hematocrit 41.9 36.0 - 46.0 %    MCV 93 80 - 100 fL    MCH 30.4 26.0 - 34.0 pg    MCHC 32.7 32.0 - 36.0 g/dL    RDW 13.9 11.5 - 14.5 %    Platelets 285 150 - 450 x10*3/uL    Neutrophils % 59.2 40.0 - 80.0 %    Immature Granulocytes %, Automated 0.4 0.0 - 0.9 %    Lymphocytes % 29.0 13.0 - 44.0 %    Monocytes % 9.5 2.0 - 10.0 %    Eosinophils % 1.5 0.0 - 6.0 %    Basophils % 0.4 0.0 - 2.0 %    Neutrophils Absolute 4.65 1.20 - 7.70 x10*3/uL    Immature Granulocytes Absolute, Automated 0.03 0.00 - 0.70 x10*3/uL    Lymphocytes Absolute 2.28 1.20 - 4.80 x10*3/uL    Monocytes Absolute 0.75 0.10 - 1.00 x10*3/uL    Eosinophils Absolute 0.12 0.00 - 0.70 x10*3/uL    Basophils Absolute 0.03 0.00 - 0.10 x10*3/uL   Protime-INR   Result Value Ref Range    Protime 16.3 (H) 9.8 - 12.8 seconds    INR 1.4 (H) 0.9 - 1.1   Comprehensive metabolic panel   Result Value Ref Range    Glucose 358 (H) 74 - 99 mg/dL    Sodium 135 (L) 136 - 145 mmol/L    Potassium 4.3 3.5 - 5.3 mmol/L    Chloride 93 (L) 98 - 107 mmol/L    Bicarbonate 32 21 - 32 mmol/L    Anion Gap 14 10 - 20 mmol/L    Urea Nitrogen 14 6 - 23 mg/dL    Creatinine 0.81  0.50 - 1.05 mg/dL    eGFR 86 >60 mL/min/1.73m*2    Calcium 9.8 8.6 - 10.3 mg/dL    Albumin 4.3 3.4 - 5.0 g/dL    Alkaline Phosphatase 271 (H) 33 - 110 U/L    Total Protein 7.1 6.4 - 8.2 g/dL    AST 13 9 - 39 U/L    Bilirubin, Total 0.7 0.0 - 1.2 mg/dL    ALT 16 7 - 45 U/L   Magnesium   Result Value Ref Range    Magnesium 2.06 1.60 - 2.40 mg/dL   POCT GLUCOSE   Result Value Ref Range    POCT Glucose 340 (H) 74 - 99 mg/dL   POCT GLUCOSE   Result Value Ref Range    POCT Glucose 391 (H) 74 - 99 mg/dL   POCT GLUCOSE   Result Value Ref Range    POCT Glucose 420 (H) 74 - 99 mg/dL                                  Assessment/Plan   Principal Problem:    Small bowel obstruction (CMS/HCC)    53-year-old morbidly obese female with past medical history of hypertension, anxiety/depression, insomnia, hypothyroidism, hepatic vein thrombosis, ventral hernia on exam recurrent bowel obstruction admitted with another episode of SBO on 1/11/23.     Small Bowel obstruction  -Surgery consulted, recommendations appreciated  -Patient has tolerated clear liquids for the past 24 hours.  Advance patient to full liquid diet for lunch and then if tolerated will advance to GI soft for supper.  If continues to tolerate this with no recurrence of nausea vomiting okay to discharge tomorrow   -no IV fluids  -Monitor electrolytes, replace as needed  -Check intake and output  -Small bowel series looks good per surgery.  She is passing gas.  She had a very large bowel movement yesterday.  No bowel movement today.  No nausea or vomiting.     CHF  -BNP 33  -Daily weight  -Low-sodium diet  -1500 mL fluid restriction  -strict I&O  -Monitor electrolytes, replace as needed  -EKG  -Follow-up with cardiology outpatient   -Continue her high-dose spironolactone and high-dose torsemide     Obstructive sleep apnea  -Continue home BiPAP and oxygen   -Respiratory assisting patient in getting supplies for CPAP at home.       Hepatic vein thrombosis  -On Coumadin at  home  -Pharmacy to dose Coumadin.  INR is still low.  Pharmacy adjusting     Hypertension  -monitor blood pressure  -Continue Toprol XL     Neuropathy  -Continue home dose Neurontin     Hypothyroidism  -Continue home dose Synthroid     Diabetes  -Checks ACHS  -Patient has a insulin pump.  Patient was instructed to bring in her insulin pump and she is alert and oriented x 3 and can manage this on her own.  She brought in her own Humulin R 500.  This was communicated with staffing and pharmacy.  - Monitor Blood sugar closely    Hyperlipidemia   -Continue Crestor    GERD   -Continue Protonix     Full code      DVT prophylaxis: Coumadin      Discharge disposition: Anticipated discharge home tomorrow if able to advance diet.  Will need walker on the discharge.  Form completed for the walker.          I spent 30 minutes in the professional and overall care of this patient.      Brenda Dillon PA-C

## 2024-01-14 VITALS
DIASTOLIC BLOOD PRESSURE: 61 MMHG | HEART RATE: 69 BPM | RESPIRATION RATE: 18 BRPM | HEIGHT: 62 IN | BODY MASS INDEX: 53.92 KG/M2 | WEIGHT: 293 LBS | SYSTOLIC BLOOD PRESSURE: 148 MMHG | OXYGEN SATURATION: 97 % | TEMPERATURE: 96.3 F

## 2024-01-14 PROBLEM — K56.609 SMALL BOWEL OBSTRUCTION (MULTI): Status: RESOLVED | Noted: 2024-01-11 | Resolved: 2024-01-14

## 2024-01-14 LAB
ANION GAP SERPL CALC-SCNC: 12 MMOL/L (ref 10–20)
BASOPHILS # BLD AUTO: 0.06 X10*3/UL (ref 0–0.1)
BASOPHILS NFR BLD AUTO: 0.5 %
BUN SERPL-MCNC: 15 MG/DL (ref 6–23)
CALCIUM SERPL-MCNC: 10.6 MG/DL (ref 8.6–10.3)
CHLORIDE SERPL-SCNC: 96 MMOL/L (ref 98–107)
CO2 SERPL-SCNC: 35 MMOL/L (ref 21–32)
CREAT SERPL-MCNC: 0.91 MG/DL (ref 0.5–1.05)
EGFRCR SERPLBLD CKD-EPI 2021: 75 ML/MIN/1.73M*2
EOSINOPHIL # BLD AUTO: 0.21 X10*3/UL (ref 0–0.7)
EOSINOPHIL NFR BLD AUTO: 1.6 %
ERYTHROCYTE [DISTWIDTH] IN BLOOD BY AUTOMATED COUNT: 13.8 % (ref 11.5–14.5)
GLUCOSE BLD MANUAL STRIP-MCNC: 48 MG/DL (ref 74–99)
GLUCOSE BLD MANUAL STRIP-MCNC: 73 MG/DL (ref 74–99)
GLUCOSE BLD MANUAL STRIP-MCNC: 85 MG/DL (ref 74–99)
GLUCOSE BLD MANUAL STRIP-MCNC: 93 MG/DL (ref 74–99)
GLUCOSE SERPL-MCNC: 46 MG/DL (ref 74–99)
HCT VFR BLD AUTO: 41.8 % (ref 36–46)
HGB BLD-MCNC: 13.9 G/DL (ref 12–16)
IMM GRANULOCYTES # BLD AUTO: 0.08 X10*3/UL (ref 0–0.7)
IMM GRANULOCYTES NFR BLD AUTO: 0.6 % (ref 0–0.9)
INR PPP: 2.2 (ref 0.9–1.1)
LYMPHOCYTES # BLD AUTO: 2.97 X10*3/UL (ref 1.2–4.8)
LYMPHOCYTES NFR BLD AUTO: 22.9 %
MCH RBC QN AUTO: 30.8 PG (ref 26–34)
MCHC RBC AUTO-ENTMCNC: 33.3 G/DL (ref 32–36)
MCV RBC AUTO: 93 FL (ref 80–100)
MONOCYTES # BLD AUTO: 1.13 X10*3/UL (ref 0.1–1)
MONOCYTES NFR BLD AUTO: 8.7 %
NEUTROPHILS # BLD AUTO: 8.52 X10*3/UL (ref 1.2–7.7)
NEUTROPHILS NFR BLD AUTO: 65.7 %
NRBC BLD-RTO: 0 /100 WBCS (ref 0–0)
PLATELET # BLD AUTO: 317 X10*3/UL (ref 150–450)
POTASSIUM SERPL-SCNC: 3.8 MMOL/L (ref 3.5–5.3)
PROTHROMBIN TIME: 25 SECONDS (ref 9.8–12.8)
RBC # BLD AUTO: 4.51 X10*6/UL (ref 4–5.2)
SODIUM SERPL-SCNC: 139 MMOL/L (ref 136–145)
WBC # BLD AUTO: 13 X10*3/UL (ref 4.4–11.3)

## 2024-01-14 PROCEDURE — 82947 ASSAY GLUCOSE BLOOD QUANT: CPT

## 2024-01-14 PROCEDURE — 85025 COMPLETE CBC W/AUTO DIFF WBC: CPT | Performed by: STUDENT IN AN ORGANIZED HEALTH CARE EDUCATION/TRAINING PROGRAM

## 2024-01-14 PROCEDURE — 2500000004 HC RX 250 GENERAL PHARMACY W/ HCPCS (ALT 636 FOR OP/ED)

## 2024-01-14 PROCEDURE — 2500000005 HC RX 250 GENERAL PHARMACY W/O HCPCS: Performed by: STUDENT IN AN ORGANIZED HEALTH CARE EDUCATION/TRAINING PROGRAM

## 2024-01-14 PROCEDURE — 2500000005 HC RX 250 GENERAL PHARMACY W/O HCPCS: Performed by: EMERGENCY MEDICINE

## 2024-01-14 PROCEDURE — 80048 BASIC METABOLIC PNL TOTAL CA: CPT | Performed by: STUDENT IN AN ORGANIZED HEALTH CARE EDUCATION/TRAINING PROGRAM

## 2024-01-14 PROCEDURE — 94640 AIRWAY INHALATION TREATMENT: CPT

## 2024-01-14 PROCEDURE — 36415 COLL VENOUS BLD VENIPUNCTURE: CPT | Performed by: STUDENT IN AN ORGANIZED HEALTH CARE EDUCATION/TRAINING PROGRAM

## 2024-01-14 PROCEDURE — 2500000001 HC RX 250 WO HCPCS SELF ADMINISTERED DRUGS (ALT 637 FOR MEDICARE OP)

## 2024-01-14 PROCEDURE — 94761 N-INVAS EAR/PLS OXIMETRY MLT: CPT

## 2024-01-14 PROCEDURE — 2500000002 HC RX 250 W HCPCS SELF ADMINISTERED DRUGS (ALT 637 FOR MEDICARE OP, ALT 636 FOR OP/ED)

## 2024-01-14 PROCEDURE — 85610 PROTHROMBIN TIME: CPT

## 2024-01-14 PROCEDURE — 99231 SBSQ HOSP IP/OBS SF/LOW 25: CPT

## 2024-01-14 RX ORDER — WARFARIN 7.5 MG/1
7.5 TABLET ORAL ONCE
Status: DISCONTINUED | OUTPATIENT
Start: 2024-01-14 | End: 2024-01-14 | Stop reason: HOSPADM

## 2024-01-14 RX ORDER — ONDANSETRON 4 MG/1
4 TABLET, ORALLY DISINTEGRATING ORAL EVERY 8 HOURS PRN
Qty: 12 TABLET | Refills: 0 | Status: SHIPPED | OUTPATIENT
Start: 2024-01-14 | End: 2024-01-18

## 2024-01-14 RX ADMIN — PANTOPRAZOLE SODIUM 40 MG: 40 TABLET, DELAYED RELEASE ORAL at 06:36

## 2024-01-14 RX ADMIN — SPIRONOLACTONE 300 MG: 25 TABLET ORAL at 09:11

## 2024-01-14 RX ADMIN — FLUOXETINE 60 MG: 20 CAPSULE ORAL at 09:11

## 2024-01-14 RX ADMIN — Medication 4 L/MIN: at 06:45

## 2024-01-14 RX ADMIN — ALBUTEROL SULFATE 2.5 MG: 2.5 SOLUTION RESPIRATORY (INHALATION) at 11:42

## 2024-01-14 RX ADMIN — TORSEMIDE 100 MG: 20 TABLET ORAL at 09:11

## 2024-01-14 RX ADMIN — GABAPENTIN 900 MG: 300 CAPSULE ORAL at 06:35

## 2024-01-14 RX ADMIN — POTASSIUM CHLORIDE 40 MEQ: 1500 TABLET, EXTENDED RELEASE ORAL at 09:10

## 2024-01-14 RX ADMIN — ALBUTEROL SULFATE 2.5 MG: 2.5 SOLUTION RESPIRATORY (INHALATION) at 00:24

## 2024-01-14 RX ADMIN — ONDANSETRON 4 MG: 4 TABLET, ORALLY DISINTEGRATING ORAL at 09:01

## 2024-01-14 RX ADMIN — DULOXETINE HYDROCHLORIDE 60 MG: 60 CAPSULE, DELAYED RELEASE ORAL at 09:10

## 2024-01-14 RX ADMIN — LEVOTHYROXINE SODIUM 250 MCG: 0.12 TABLET ORAL at 06:35

## 2024-01-14 RX ADMIN — ALBUTEROL SULFATE 2.5 MG: 2.5 SOLUTION RESPIRATORY (INHALATION) at 06:45

## 2024-01-14 RX ADMIN — ESCITALOPRAM OXALATE 10 MG: 10 TABLET ORAL at 09:10

## 2024-01-14 RX ADMIN — METOPROLOL SUCCINATE 50 MG: 50 TABLET, EXTENDED RELEASE ORAL at 09:10

## 2024-01-14 ASSESSMENT — COGNITIVE AND FUNCTIONAL STATUS - GENERAL
WALKING IN HOSPITAL ROOM: A LITTLE
MOVING TO AND FROM BED TO CHAIR: A LITTLE
MOBILITY SCORE: 21
CLIMB 3 TO 5 STEPS WITH RAILING: A LITTLE

## 2024-01-14 ASSESSMENT — PAIN - FUNCTIONAL ASSESSMENT: PAIN_FUNCTIONAL_ASSESSMENT: 0-10

## 2024-01-14 ASSESSMENT — PAIN SCALES - GENERAL: PAINLEVEL_OUTOF10: 0 - NO PAIN

## 2024-01-14 NOTE — DISCHARGE INSTRUCTIONS
Small Bowel Obstruction Discharge Instructions    About this topic  A small bowel obstruction is when the small bowel is blocked. Doctors may order tests to diagnose a blocked small bowel. The small bowel may be partly blocked or totally blocked. This obstruction may cause a buildup of stool and other digested contents inside the bowel. It may cause serious problems if not treated right away.    What care is needed at home?  Ask your doctor what you need to do when you go home. Make sure you ask questions if you do not understand what the doctor says. This way you will know what you need to do.  You may have a tube in your nose in the hospital to remove digestive fluids. If you still have a tube in your nose when you go home, your doctor will tell you how to clean and care for it.  Take your drugs as ordered by your doctor.  If you had surgery, ask your doctor about how to care for your cut site:  When you should change your bandages  When you may take a bath or shower  If you need to be careful with lifting things over 10 pounds (4.5 kg)  When you may go back to your normal activities like work and driving  What follow-up care is needed?  Your doctor may ask you to make visits to the office to check on your progress. Be sure to keep these visits.  If you have stitches or staples, you will need to have them taken out. Your doctor will often want to do this in 1 to 2 weeks. If the doctor used skin glue, the glue will fall off on its own.  Your doctor may order extra tests to see how well you are doing.  If this condition was caused by cancer, your doctor may tell you to have other treatments for the cancer.  What drugs may be needed?  The doctor may order drugs to:  Help with pain  Fight an infection  Keep your stool soft  Will physical activity be limited?  Physical activities may be limited if you are suffering from pain. Talk to your doctor about the right amount of activity for you. If your doctor says it is  okay, take short walks every day to help prevent blood clots.  What changes to diet are needed?  Ask your doctor or dietitian for a diet plan. Your doctor may suggest a liquid or soft diet until your bowel is stable and ready for regular food. Drink lots of clear liquids. Clear liquids include water, fruit juices, soup broth, gelatin, popsicles that do not have fruit or fruit pulp, tea or coffee with no added milk, and sports drinks. Avoid beer, wine, and mixed drinks (alcohol) and limit caffeine.  What problems could happen?  Hard stools  Dehydration  Infection  Tear or hole in the small bowel  Tissue death  Bowel function does not return to normal  When do I need to call the doctor?  Signs of infection. These include a fever of 100.4°F (38°C) or higher, chills, pain with passing urine or not able to pass urine, wound that will not heal.  Signs of wound infection. These include swelling, redness, warmth around the wound; too much pain when touched; yellowish, greenish, or bloody discharge; foul smell coming from the cut site; cut site opens up.  Unable to pass stool or gas  Very upset stomach or throwing up  Very bad belly pain  Teach Back: Helping You Understand  The Teach Back Method helps you understand the information we are giving you. After you talk with the staff, tell them in your own words what you learned. This helps to make sure the staff has described each thing clearly. It also helps to explain things that may have been confusing. Before going home, make sure you can do these:  I can tell you about my condition.  I can tell you how to care for my cut site, if I have one.  I can tell you what I will do if I have an upset stomach, throwing up, or am not able to pass stool or gas.      Discharge:    Discharge home  Resume home meds  Escribed to [facility] pharmacy Zofran every 8 hours as needed for nausea x 4 days  Patient to establish care with PCP and will need follow-up within 2 weeks  Follow-up with  cardiology, Dr. Earl, in 2 weeks  Would benefit from establishing care with the heart failure clinic  Continue following low-sodium diet, daily weight measurements, monitor for signs and symptoms  Continue GI soft diet    Thank you for allowing  Nic to participate in your care. Return to the ER  if symptoms worsen

## 2024-01-14 NOTE — CARE PLAN
The patient's goals for the shift include to feel better    The clinical goals for the shift include regular bowel movements  Pt began shift with frequent diarrhea but it has slowed down throughout shift.  Nausea noted at beginning of shift treated with zofran

## 2024-01-14 NOTE — DISCHARGE SUMMARY
Discharge Diagnosis  Small bowel obstruction (CMS/HCC)    Issues Requiring Follow-Up  Small bowel obstruction    Discharge Meds     Your medication list        START taking these medications        Instructions Last Dose Given Next Dose Due   ondansetron ODT 4 mg disintegrating tablet  Commonly known as: Zofran-ODT      Take 1 tablet (4 mg) by mouth every 8 hours if needed for nausea for up to 4 days.              CONTINUE taking these medications        Instructions Last Dose Given Next Dose Due   - refin regular 500 unit/mL CONCENTRATED - refill for patient own pump  Commonly known as: HumuLIN R U-500           albuterol 90 mcg/actuation aerosol powdr breath activated inhaler           citalopram 20 mg tablet  Commonly known as: CeleXA           DULoxetine 60 mg DR capsule  Commonly known as: Cymbalta           FLUoxetine 60 mg tablet  Commonly known as: PROzac           gabapentin 300 mg capsule  Commonly known as: Neurontin           levothyroxine 125 mcg tablet  Commonly known as: Synthroid, Levoxyl           metoprolol succinate XL 50 mg 24 hr tablet  Commonly known as: Toprol-XL           multivitamin tablet           pantoprazole 40 mg EC tablet  Commonly known as: ProtoNix           potassium chloride CR 20 mEq ER tablet  Commonly known as: Klor-Con M20           promethazine 25 mg tablet  Commonly known as: Phenergan           rosuvastatin 40 mg tablet  Commonly known as: Crestor           spironolactone 100 mg tablet  Commonly known as: Aldactone           torsemide 100 mg tablet  Commonly known as: Demadex           traZODone 100 mg tablet  Commonly known as: Desyrel           UNKNOWN TO PATIENT           VOLTAREN TOP           warfarin 5 mg tablet  Commonly known as: Coumadin                     Where to Get Your Medications        These medications were sent to [facility]  [address] [place] [address]      Hours: 8 AM to 5:30 Mon-Fri, 8 AM to 4 PM Saturday and Sunday Phone:  [phone]   ondansetron ODT 4 mg disintegrating tablet         Test Results Pending At Discharge  Pending Labs       Order Current Status    Extra Urine Gray Tube Collected (01/11/24 8417)    Urinalysis with Reflex Culture and Microscopic In process            Hospital Course   53-year-old female who presented with chief complaint of abdominal pain and constipation.  On presentation temperature was 36, pulse rate 99, respiratory rate 20, blood pressure 140/100 and she was saturating 94% on room air BMP showed elevated bicarb and low chloride, calcium was also elevated at 11.1.  CBC showed slightly elevated white cell count of 12.1 UA was unremarkable.  CT abdomen showed small bowel obstruction secondary to lower abdominal ventral hernia.  Nasogastric tube was put  in the ER.  Patient says that she has had 4-5 episodes of bowel obstruction and she is getting tired of it.  She was considered high risk for surgery because of her history but no she cannot take it anymore.  Her symptoms usually start with 3 to 4 days of diarrhea and then constipation.  Does report some nausea but no vomiting.  Had a colonoscopy about 5 years ago which showed some polyps but nothing else     Patient was seen by surgery and NG tube was placed.  Plan was made for nonoperative management.  Patient continued to improve, NG was removed, and patient was started on a diet.  Has tolerated diet well.  Patient complains of periods of diarrhea since hospitalization.  Patient was seen by cardiology who recommended echocardiogram.  Can be done on outpatient follow-up.    Patient is in fair condition to discharge home.  She needs to establish care with a PCP as she does not like her current PCP.  Patient received a list with current providers taking new patients.  Reviewed with patient to continue GI soft low-sodium diet.  Patient had prescription for a walker sent to [facility] pharmacy.  Resume home meds.  E scribed to [facility] Zofran every  8 hours as needed for nausea x 4 days.  Patient to establish care with PCP and follow-up within 2 weeks.  Patient to follow-up with cardiology, Dr. Earl, in 2 weeks.  Patient would benefit from following in the heart failure clinic    Pertinent Physical Exam At Time of Discharge  Physical Exam  General Appearance: AAO x 3, not in acute distress  Skin: skin color pink, warm, and dry; no suspicious rashes or lesions  Eyes : PERRL, EOM's intact  ENT: mucous membranes pink and moist  Neck: normocephalic  Respiratory: lungs clear to auscultation anteriorly; no wheezing, rhonchi, or crackles.   Heart: regular rate and rhythm.   Abdomen: Nondistended, positive bowel sounds x4, soft,  nontender  Extremities: mild non-pitting edema to B/L ankles  Peripheral pulses: normal x4 extremities  Neuro: alert, coherent and conversant, no focal motor deficits  Outpatient Follow-Up  No future appointments.      Aurea Pitt, APRN-CNP

## 2024-01-15 PROBLEM — K76.0 NONALCOHOLIC FATTY LIVER: Status: ACTIVE | Noted: 2021-07-06

## 2024-01-15 PROBLEM — J96.21 ACUTE ON CHRONIC RESPIRATORY FAILURE WITH HYPOXIA (MULTI): Status: ACTIVE | Noted: 2020-12-12

## 2024-01-15 PROBLEM — M54.50 CHRONIC LOW BACK PAIN: Status: ACTIVE | Noted: 2024-01-15

## 2024-01-15 PROBLEM — Z96.41 PRESENCE OF INSULIN PUMP: Status: ACTIVE | Noted: 2018-11-16

## 2024-01-15 PROBLEM — E78.5 HYPERLIPIDEMIA ASSOCIATED WITH TYPE 2 DIABETES MELLITUS (MULTI): Status: ACTIVE | Noted: 2021-02-08

## 2024-01-15 PROBLEM — E87.6 HYPOKALEMIA: Chronic | Status: ACTIVE | Noted: 2019-05-07

## 2024-01-15 PROBLEM — I50.32 CHRONIC DIASTOLIC HEART FAILURE (MULTI): Status: ACTIVE | Noted: 2020-12-12

## 2024-01-15 PROBLEM — E11.69 DYSLIPIDEMIA DUE TO TYPE 2 DIABETES MELLITUS (MULTI): Status: ACTIVE | Noted: 2021-02-08

## 2024-01-15 PROBLEM — K21.9 LPRD (LARYNGOPHARYNGEAL REFLUX DISEASE): Status: ACTIVE | Noted: 2021-07-09

## 2024-01-15 PROBLEM — M10.9 GOUT: Status: ACTIVE | Noted: 2022-01-13

## 2024-01-15 PROBLEM — K43.0 IRREDUCIBLE INCISIONAL HERNIA: Status: ACTIVE | Noted: 2023-02-19

## 2024-01-15 PROBLEM — I50.9 CONGESTIVE HEART FAILURE (MULTI): Status: ACTIVE | Noted: 2022-01-13

## 2024-01-15 PROBLEM — I82.0 BUDD-CHIARI SYNDROME (MULTI): Status: ACTIVE | Noted: 2020-12-12

## 2024-01-15 PROBLEM — I27.20 PULMONARY HYPERTENSION (MULTI): Status: ACTIVE | Noted: 2018-08-27

## 2024-01-15 PROBLEM — E11.69 HYPERLIPIDEMIA ASSOCIATED WITH TYPE 2 DIABETES MELLITUS (MULTI): Status: ACTIVE | Noted: 2021-02-08

## 2024-01-15 PROBLEM — E78.5 DYSLIPIDEMIA DUE TO TYPE 2 DIABETES MELLITUS (MULTI): Status: ACTIVE | Noted: 2021-02-08

## 2024-01-15 PROBLEM — I82.0 HEPATIC VEIN THROMBOSIS (MULTI): Status: ACTIVE | Noted: 2020-10-09

## 2024-01-15 PROBLEM — E88.819 INSULIN RESISTANCE: Status: ACTIVE | Noted: 2018-11-16

## 2024-01-15 PROBLEM — E11.9 TYPE 2 DIABETES MELLITUS (MULTI): Status: ACTIVE | Noted: 2018-11-16

## 2024-01-15 PROBLEM — F33.1 MODERATE EPISODE OF RECURRENT MAJOR DEPRESSIVE DISORDER (MULTI): Status: ACTIVE | Noted: 2017-06-22

## 2024-01-15 PROBLEM — G89.29 CHRONIC LOW BACK PAIN: Status: ACTIVE | Noted: 2024-01-15

## 2024-01-15 PROBLEM — G47.33 OBSTRUCTIVE SLEEP APNEA SYNDROME: Status: ACTIVE | Noted: 2018-08-09

## 2024-01-15 PROBLEM — N18.30 STAGE 3 CHRONIC KIDNEY DISEASE (MULTI): Status: ACTIVE | Noted: 2022-01-13

## 2024-01-15 PROBLEM — A31.0 MYCOBACTERIUM AVIUM COMPLEX (MULTI): Status: ACTIVE | Noted: 2018-08-09

## 2024-01-15 PROBLEM — R60.9 DEPENDENT EDEMA: Status: ACTIVE | Noted: 2018-08-27

## 2024-01-15 PROBLEM — M79.7 FIBROMYALGIA: Status: ACTIVE | Noted: 2021-07-09

## 2024-01-15 PROBLEM — I81 PORTAL VEIN THROMBOSIS: Status: ACTIVE | Noted: 2023-02-19

## 2024-01-15 PROBLEM — E11.42 DIABETIC POLYNEUROPATHY (MULTI): Status: ACTIVE | Noted: 2018-11-16

## 2024-01-16 ENCOUNTER — OFFICE VISIT (OUTPATIENT)
Dept: PRIMARY CARE | Facility: CLINIC | Age: 54
End: 2024-01-16
Payer: MEDICARE

## 2024-01-16 ENCOUNTER — OFFICE VISIT (OUTPATIENT)
Dept: PULMONOLOGY | Facility: CLINIC | Age: 54
End: 2024-01-16
Payer: MEDICARE

## 2024-01-16 VITALS
WEIGHT: 293 LBS | HEIGHT: 62 IN | BODY MASS INDEX: 53.92 KG/M2 | SYSTOLIC BLOOD PRESSURE: 118 MMHG | HEART RATE: 84 BPM | DIASTOLIC BLOOD PRESSURE: 72 MMHG

## 2024-01-16 VITALS
HEART RATE: 93 BPM | HEIGHT: 62 IN | BODY MASS INDEX: 53.92 KG/M2 | OXYGEN SATURATION: 94 % | SYSTOLIC BLOOD PRESSURE: 130 MMHG | WEIGHT: 293 LBS | DIASTOLIC BLOOD PRESSURE: 79 MMHG | TEMPERATURE: 97.5 F

## 2024-01-16 DIAGNOSIS — R19.8 ALTERNATING CONSTIPATION AND DIARRHEA: ICD-10-CM

## 2024-01-16 DIAGNOSIS — Z87.19 H/O ACUTE PANCREATITIS: ICD-10-CM

## 2024-01-16 DIAGNOSIS — F33.1 MODERATE EPISODE OF RECURRENT MAJOR DEPRESSIVE DISORDER (MULTI): Primary | ICD-10-CM

## 2024-01-16 DIAGNOSIS — I82.0 HEPATIC VEIN THROMBOSIS (MULTI): ICD-10-CM

## 2024-01-16 DIAGNOSIS — G47.33 OBSTRUCTIVE SLEEP APNEA (ADULT) (PEDIATRIC): ICD-10-CM

## 2024-01-16 DIAGNOSIS — M79.604 LEG PAIN, BILATERAL: ICD-10-CM

## 2024-01-16 DIAGNOSIS — E11.69 HYPERLIPIDEMIA ASSOCIATED WITH TYPE 2 DIABETES MELLITUS (MULTI): ICD-10-CM

## 2024-01-16 DIAGNOSIS — Z78.0 POST-MENOPAUSAL: ICD-10-CM

## 2024-01-16 DIAGNOSIS — E83.52 HYPERCALCEMIA: ICD-10-CM

## 2024-01-16 DIAGNOSIS — E03.9 ACQUIRED HYPOTHYROIDISM: ICD-10-CM

## 2024-01-16 DIAGNOSIS — Z99.3 WHEELCHAIR DEPENDENT: ICD-10-CM

## 2024-01-16 DIAGNOSIS — J41.8 MIXED SIMPLE AND MUCOPURULENT CHRONIC BRONCHITIS (MULTI): ICD-10-CM

## 2024-01-16 DIAGNOSIS — D64.9 ANEMIA, UNSPECIFIED TYPE: ICD-10-CM

## 2024-01-16 DIAGNOSIS — H54.7 POOR VISION: ICD-10-CM

## 2024-01-16 DIAGNOSIS — I15.2 HYPERTENSION ASSOCIATED WITH DIABETES (MULTI): ICD-10-CM

## 2024-01-16 DIAGNOSIS — I50.32 CHRONIC DIASTOLIC HEART FAILURE (MULTI): ICD-10-CM

## 2024-01-16 DIAGNOSIS — F41.9 ANXIETY: ICD-10-CM

## 2024-01-16 DIAGNOSIS — I81 PORTAL VEIN THROMBOSIS: ICD-10-CM

## 2024-01-16 DIAGNOSIS — R06.09 DYSPNEA ON EXERTION: Primary | ICD-10-CM

## 2024-01-16 DIAGNOSIS — R11.0 CHRONIC NAUSEA: ICD-10-CM

## 2024-01-16 DIAGNOSIS — E66.01 MORBID (SEVERE) OBESITY DUE TO EXCESS CALORIES (MULTI): ICD-10-CM

## 2024-01-16 DIAGNOSIS — Z87.39 H/O: GOUT: ICD-10-CM

## 2024-01-16 DIAGNOSIS — I89.0 LYMPHEDEMA: ICD-10-CM

## 2024-01-16 DIAGNOSIS — Z78.9 POOR PHYSICAL HEALTH: ICD-10-CM

## 2024-01-16 DIAGNOSIS — R09.02 HYPOXEMIA: ICD-10-CM

## 2024-01-16 DIAGNOSIS — E78.5 HYPERLIPIDEMIA ASSOCIATED WITH TYPE 2 DIABETES MELLITUS (MULTI): ICD-10-CM

## 2024-01-16 DIAGNOSIS — E11.59 HYPERTENSION ASSOCIATED WITH DIABETES (MULTI): ICD-10-CM

## 2024-01-16 DIAGNOSIS — N18.31 STAGE 3A CHRONIC KIDNEY DISEASE (MULTI): ICD-10-CM

## 2024-01-16 DIAGNOSIS — E55.9 VITAMIN D DEFICIENCY: ICD-10-CM

## 2024-01-16 DIAGNOSIS — Z12.31 ENCOUNTER FOR SCREENING MAMMOGRAM FOR MALIGNANT NEOPLASM OF BREAST: ICD-10-CM

## 2024-01-16 DIAGNOSIS — E61.2 MAGNESIUM DEFICIENCY: ICD-10-CM

## 2024-01-16 DIAGNOSIS — E11.9 TYPE 2 DIABETES MELLITUS WITHOUT COMPLICATION, WITHOUT LONG-TERM CURRENT USE OF INSULIN (MULTI): ICD-10-CM

## 2024-01-16 DIAGNOSIS — M79.605 LEG PAIN, BILATERAL: ICD-10-CM

## 2024-01-16 PROBLEM — R60.9 DEPENDENT EDEMA: Status: RESOLVED | Noted: 2018-08-27 | Resolved: 2024-01-16

## 2024-01-16 PROBLEM — M10.9 GOUT: Status: RESOLVED | Noted: 2022-01-13 | Resolved: 2024-01-16

## 2024-01-16 PROBLEM — J96.21 ACUTE ON CHRONIC RESPIRATORY FAILURE WITH HYPOXIA (MULTI): Status: RESOLVED | Noted: 2020-12-12 | Resolved: 2024-01-16

## 2024-01-16 PROCEDURE — 3008F BODY MASS INDEX DOCD: CPT | Performed by: INTERNAL MEDICINE

## 2024-01-16 PROCEDURE — 99205 OFFICE O/P NEW HI 60 MIN: CPT | Performed by: INTERNAL MEDICINE

## 2024-01-16 PROCEDURE — 3074F SYST BP LT 130 MM HG: CPT | Performed by: INTERNAL MEDICINE

## 2024-01-16 PROCEDURE — 3078F DIAST BP <80 MM HG: CPT | Performed by: INTERNAL MEDICINE

## 2024-01-16 PROCEDURE — 3075F SYST BP GE 130 - 139MM HG: CPT | Performed by: INTERNAL MEDICINE

## 2024-01-16 PROCEDURE — 1036F TOBACCO NON-USER: CPT | Performed by: INTERNAL MEDICINE

## 2024-01-16 RX ORDER — DULOXETIN HYDROCHLORIDE 60 MG/1
60 CAPSULE, DELAYED RELEASE ORAL DAILY
Qty: 90 CAPSULE | Refills: 3 | Status: SHIPPED | OUTPATIENT
Start: 2024-01-16

## 2024-01-16 RX ORDER — BUPROPION HYDROCHLORIDE 150 MG/1
150 TABLET ORAL EVERY MORNING
Qty: 90 TABLET | Refills: 3 | Status: SHIPPED | OUTPATIENT
Start: 2024-01-16

## 2024-01-16 RX ORDER — ALLOPURINOL 300 MG/1
300 TABLET ORAL DAILY
Qty: 90 TABLET | Refills: 3 | Status: SHIPPED | OUTPATIENT
Start: 2024-01-16

## 2024-01-16 RX ORDER — ALLOPURINOL 300 MG/1
300 TABLET ORAL DAILY
Qty: 90 TABLET | Refills: 3 | Status: SHIPPED | OUTPATIENT
Start: 2024-01-16 | End: 2024-01-16 | Stop reason: SDUPTHER

## 2024-01-16 RX ORDER — ONDANSETRON 4 MG/1
4 TABLET, FILM COATED ORAL EVERY 8 HOURS PRN
Qty: 30 TABLET | Refills: 5 | Status: SHIPPED | OUTPATIENT
Start: 2024-01-16

## 2024-01-16 RX ORDER — LEVOTHYROXINE SODIUM 50 UG/1
50 TABLET ORAL
COMMUNITY
End: 2024-03-14 | Stop reason: SDUPTHER

## 2024-01-16 RX ORDER — ALLOPURINOL 100 MG/1
100 TABLET ORAL DAILY
Qty: 30 TABLET | Refills: 11 | Status: CANCELLED | OUTPATIENT
Start: 2024-01-16

## 2024-01-16 RX ORDER — ALLOPURINOL 300 MG/1
100 TABLET ORAL DAILY
COMMUNITY
End: 2024-01-16 | Stop reason: SDUPTHER

## 2024-01-16 RX ORDER — CETIRIZINE HYDROCHLORIDE 10 MG/1
10 TABLET ORAL DAILY
COMMUNITY

## 2024-01-16 ASSESSMENT — ENCOUNTER SYMPTOMS
CHILLS: 0
BACK PAIN: 0
CARDIOVASCULAR NEGATIVE: 1
SHORTNESS OF BREATH: 0
DYSURIA: 0
NAUSEA: 0
FEVER: 0
NERVOUS/ANXIOUS: 1
CONSTIPATION: 1
WEAKNESS: 0
VOMITING: 0
DIARRHEA: 1
ABDOMINAL DISTENTION: 0
PALPITATIONS: 0
WHEEZING: 0
LIGHT-HEADEDNESS: 0
DIZZINESS: 0
DYSPHORIC MOOD: 1
COUGH: 0
NEUROLOGICAL NEGATIVE: 1
AGITATION: 0
ABDOMINAL PAIN: 1
MUSCULOSKELETAL NEGATIVE: 1
RHINORRHEA: 0
HEADACHES: 0
ENDOCRINE NEGATIVE: 1

## 2024-01-16 ASSESSMENT — PATIENT HEALTH QUESTIONNAIRE - PHQ9
SUM OF ALL RESPONSES TO PHQ9 QUESTIONS 1 AND 2: 6
5. POOR APPETITE OR OVEREATING: NEARLY EVERY DAY
1. LITTLE INTEREST OR PLEASURE IN DOING THINGS: NEARLY EVERY DAY
3. TROUBLE FALLING OR STAYING ASLEEP OR SLEEPING TOO MUCH: NEARLY EVERY DAY
6. FEELING BAD ABOUT YOURSELF - OR THAT YOU ARE A FAILURE OR HAVE LET YOURSELF OR YOUR FAMILY DOWN: NEARLY EVERY DAY
2. FEELING DOWN, DEPRESSED OR HOPELESS: NEARLY EVERY DAY
10. IF YOU CHECKED OFF ANY PROBLEMS, HOW DIFFICULT HAVE THESE PROBLEMS MADE IT FOR YOU TO DO YOUR WORK, TAKE CARE OF THINGS AT HOME, OR GET ALONG WITH OTHER PEOPLE: SOMEWHAT DIFFICULT
8. MOVING OR SPEAKING SO SLOWLY THAT OTHER PEOPLE COULD HAVE NOTICED. OR THE OPPOSITE, BEING SO FIGETY OR RESTLESS THAT YOU HAVE BEEN MOVING AROUND A LOT MORE THAN USUAL: NEARLY EVERY DAY
7. TROUBLE CONCENTRATING ON THINGS, SUCH AS READING THE NEWSPAPER OR WATCHING TELEVISION: NOT AT ALL
9. THOUGHTS THAT YOU WOULD BE BETTER OFF DEAD, OR OF HURTING YOURSELF: NOT AT ALL
SUM OF ALL RESPONSES TO PHQ QUESTIONS 1-9: 21
4. FEELING TIRED OR HAVING LITTLE ENERGY: NEARLY EVERY DAY

## 2024-01-16 NOTE — PROGRESS NOTES
Subjective   Patient ID: Roselia Mo is a 53 y.o. female who presents for Establish Care, COPD, Asthma, and Pulmonary Hypertension.  HPI  This 53-year-old  female with a history of being diagnosed with COPD and asthma for the past 7 years.  Patient does have shortness of breath on a daily basis.  There is some variability however from day-to-day.  She does have oxygen at home which she wears at nighttime and continuous flow at 4 L/min and OCD during the day as needed.  According the patient she had a PFT done about 5 to 6 years ago in [place].  She is currently on a bilevel device.  Currently her device is through [facility].  She has had bilateral salpingo-oophorectomy and total abdominal hysterectomy for noncancerous reasons.  Family medical history mother with hyperlipidemia and hypertension.  Father with COPD and congestive heart failure.  Patient lived in the [facility] for about 20 years but was originally born in [place].  She denies any alcohol abuse.  She lives with her  who is a non-smoker.  Patient herself is smoked 1 pack/day for 30 years and now almost recently is inhaling marijuana.    Patient reports her weight has been unchanged over the past year.  Her maximum weight was 505 pounds.  She has insulin-dependent diabetes mellitus.  She reports having had a blood clot x 1 in her liver about 3 years ago.  She does have some summertime allergies.  She does use a wheelchair during the day.  She has been treated for depression.  Review of Systems  Refer to the above.  Patient is currently on a BiPAP device at 17/13 cm of water pressure.  Objective   Physical Exam  Patient currently has an oxygen saturation of 94% on room air at rest.  Pulmonary, lungs are clear to auscultation bilaterally.  Cardio, heart sounds are regular rate and rhythm.  Remedies, patient has significant lower extremity pretibial edema.  She is currently has the lower extremities wrapped in Ace bandages.  Skin, no abnormal  rashes or skin lesions were noted on the visible portions of her extremities.  Assessment/Plan        Impressions:  1.  COPD.  2.  Obstructive sleep apnea syndrome currently being treated by [facility].  3.  Dyspnea on exertion  Recommendations:  1.  Complete pulmonary function testing.  2.  Chest x-ray-PA and lateral.  3.  Room air arterial blood gases.  4.  Follow-up with the patient after the above testing is completed and have further recommendations at that time.      This note was transcribed using the Dragon Dictation system.  There may be grammatical, punctuation, or verbiage errors that occur with voice recognition programs.    Sujit Lamb DO 01/16/24 11:53 AM

## 2024-01-16 NOTE — PROGRESS NOTES
Subjective   Patient ID: Roselia Mo is a 53 y.o. female who presents for New Patient Visit (ESTABLISH NEW PATIENT - TRANSFER DR. STEPHEN IN [place], [place]. F/U ER VISIT 1/11/24 SMALL BOWEL OBSTRUCTION. ).  HPI  NEW PT. F/U AFTER HOSPITAL STAY FOR ABDOMINAL PAIN AND WAS DX WITH SMALL BOWEL OBSTRUCTION. HAD ABDOMINAL CT AND XR AND LABS DONE. STILL HAS THE ABDOMINAL PAIN (PERIUMBILICAL), BUT IS LESS SEVERE 2-3/10 ANS LESS FREQUENT. IS SUPPOSED TO HAVE F/U WITH CARDIOLOGIST, GI, ENDO , GENERAL SURGERY AND PULMONOLOGIST. WORSENING DEPRESSION / ANXIETY , POOR VISION, CHRONIC ALTERNATIONS OF CONSTIPATION AND DIARRHEA.  Review of Systems   Constitutional:  Negative for chills and fever.   HENT: Negative.  Negative for congestion, postnasal drip and rhinorrhea.    Eyes:  Positive for visual disturbance.   Respiratory:  Negative for cough, shortness of breath and wheezing.    Cardiovascular: Negative.  Negative for chest pain, palpitations and leg swelling.   Gastrointestinal:  Positive for abdominal pain, constipation and diarrhea. Negative for abdominal distention, nausea and vomiting.        S/P SMALL BOWEL OBSTRUCTION.   Endocrine: Negative.    Genitourinary:  Negative for dysuria and urgency.   Musculoskeletal: Negative.  Negative for back pain.   Skin: Negative.  Negative for rash.   Allergic/Immunologic: Negative for immunocompromised state.   Neurological: Negative.  Negative for dizziness, weakness, light-headedness and headaches.   Psychiatric/Behavioral:  Positive for dysphoric mood. Negative for agitation, self-injury and suicidal ideas. The patient is nervous/anxious.      Vitals:    01/16/24 1320   BP: 118/72   Pulse: 84         Objective   Physical Exam  Constitutional:       General: She is not in acute distress.  HENT:      Head: Normocephalic.      Nose: Nose normal.      Mouth/Throat:      Mouth: Mucous membranes are moist.   Eyes:      Conjunctiva/sclera: Conjunctivae normal.      Pupils: Pupils are  equal, round, and reactive to light.   Cardiovascular:      Rate and Rhythm: Normal rate and regular rhythm.      Pulses: Normal pulses.      Heart sounds: Normal heart sounds.   Pulmonary:      Effort: No respiratory distress.      Breath sounds: No wheezing.   Chest:      Chest wall: No tenderness.   Abdominal:      General: Abdomen is flat. Bowel sounds are normal.      Palpations: Abdomen is soft.      Tenderness: There is abdominal tenderness.      Comments: MILD PERIUMBILICAL TENDERNESS, VENTRAL HHERNIA   Musculoskeletal:         General: No tenderness. Normal range of motion.      Cervical back: Normal range of motion.      Comments: B/L LEG LYMPHEDEMA   Lymphadenopathy:      Cervical: No cervical adenopathy.   Skin:     General: Skin is warm and dry.      Findings: No rash.   Neurological:      General: No focal deficit present.      Mental Status: She is alert. Mental status is at baseline.   Psychiatric:         Mood and Affect: Mood normal.         Behavior: Behavior normal.         Assessment/Plan   1. Moderate episode of recurrent major depressive disorder (CMS/HCC)  DULoxetine (Cymbalta) 60 mg DR capsule    buPROPion XL (Wellbutrin XL) 150 mg 24 hr tablet      2. Leg pain, bilateral  DULoxetine (Cymbalta) 60 mg DR capsule      3. Anxiety        4. Chronic diastolic heart failure (CMS/HCC)  empagliflozin (Jardiance) 10 mg    Comprehensive Metabolic Panel      5. Type 2 diabetes mellitus without complication, without long-term current use of insulin (CMS/HCC)  empagliflozin (Jardiance) 10 mg    Comprehensive Metabolic Panel    Hemoglobin A1C    Albumin , Urine Random    Referral to Ophthalmology    Referral to Nutrition Services      6. Chronic nausea  ondansetron (Zofran) 4 mg tablet      7. Alternating constipation and diarrhea        8. Encounter for screening mammogram for malignant neoplasm of breast  BI mammo bilateral screening tomosynthesis      9. Post-menopausal  XR DEXA bone density      10.  Portal vein thrombosis  Referral to Anticoagulation - Warfarin Monitoring      11. H/O: gout  Uric Acid    allopurinol (Zyloprim) 300 mg tablet      12. Magnesium deficiency  Magnesium      13. Hyperlipidemia associated with type 2 diabetes mellitus (CMS/[facility])  Lipid Panel      14. Vitamin D deficiency  Vitamin D 25-Hydroxy,Total (for eval of Vitamin D levels)      15. Anemia, unspecified type  CBC and Auto Differential      16. Acquired hypothyroidism  TSH with reflex to Free T4 if abnormal      17. Poor vision  Referral to Ophthalmology      18. 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CMS/[facility])        19. Morbid (severe) obesity due to excess calories (CMS/[facility])  Referral to Nutrition Services      20. Stage 3a chronic kidney disease (CMS/[facility])        21. Hypertension associated with diabetes (CMS/[facility])        22. H/O acute pancreatitis        23. Wheelchair dependent        24. Lymphedema        25. Poor physical health        26. Hypercalcemia        TEST RESULTS WERE DISCUSSED.  ADVISED TO HAVE LOW FAT AND LOW CALORIE DIET AND TO LOOSE WEIGHT, DAILY EXERCISE. ADVISED FOR FALL PRECAUTION.  ADVISED FOR B/L LEG ELEVATION PRN.  ADVISED FOR RELAXATION TECHNIQUES AND TO CUT DOWN ON CAFFEINE. OFFERED REFERRAL TO PSYCHIATRIST, PT REFUSED. WILL  DISCONTINUE THE PROZAC AND CELEXA (SINCE IS ON CYMBALTA). ADVISED TO DECREASE THE PROZAC TO 60 MG Q OTHER DAY X 1 WEEK THEN TO STOP IT. WILL START THE WELLBUTRIN 150 MG DAILY.  ADVISED TO STOP OTC CA , OR MULTIVITAMIN WITH CALCIUM.  ADVISED TO ADD MORE FIBER TO DIET , TO KEEP THE GI F/U FOR POSSIBLE COLONOSCOPY.  MDM    1) COMPLEXITY: 1 OR MORE CHRONIC ILLNESS WITH SEVERE EXACERBATION, PROGRESSION OR SIDE EFFECTS TO TREATMENT  2)DATA: TESTS INTERPRETED AND OR ORDERED, TOOK INDEPENDENT HISTORY OR RECORDS REVIEWED,   3)RISK: HIGH RISK DUE TO NATURE OF MEDICAL CONDITIONS/COMORBIDITY OR MEDICATIONS ORDERED OR SURGICAL OR PROCEDURE REFERRAL, OR REFERRED TO HOSPITAL .     3-4 weeks

## 2024-01-23 ENCOUNTER — APPOINTMENT (OUTPATIENT)
Dept: NUTRITION | Facility: HOSPITAL | Age: 54
End: 2024-01-23
Payer: MEDICARE

## 2024-01-23 ENCOUNTER — APPOINTMENT (OUTPATIENT)
Dept: PHARMACY | Facility: HOSPITAL | Age: 54
End: 2024-01-23
Payer: MEDICARE

## 2024-01-23 ENCOUNTER — APPOINTMENT (OUTPATIENT)
Dept: RESPIRATORY THERAPY | Facility: HOSPITAL | Age: 54
End: 2024-01-23
Payer: MEDICARE

## 2024-01-23 ENCOUNTER — APPOINTMENT (OUTPATIENT)
Dept: CARDIOLOGY | Facility: HOSPITAL | Age: 54
End: 2024-01-23
Payer: MEDICARE

## 2024-01-24 ENCOUNTER — OFFICE VISIT (OUTPATIENT)
Dept: NEPHROLOGY | Facility: CLINIC | Age: 54
End: 2024-01-24
Payer: MEDICARE

## 2024-01-24 VITALS
HEIGHT: 62 IN | SYSTOLIC BLOOD PRESSURE: 142 MMHG | BODY MASS INDEX: 67.31 KG/M2 | DIASTOLIC BLOOD PRESSURE: 82 MMHG | HEART RATE: 87 BPM

## 2024-01-24 DIAGNOSIS — G47.33 OBSTRUCTIVE SLEEP APNEA SYNDROME: ICD-10-CM

## 2024-01-24 DIAGNOSIS — K59.04 CHRONIC IDIOPATHIC CONSTIPATION: ICD-10-CM

## 2024-01-24 DIAGNOSIS — E66.01 MORBID OBESITY (MULTI): ICD-10-CM

## 2024-01-24 DIAGNOSIS — E11.59 HYPERTENSION ASSOCIATED WITH DIABETES (MULTI): ICD-10-CM

## 2024-01-24 DIAGNOSIS — N18.31 STAGE 3A CHRONIC KIDNEY DISEASE (MULTI): Primary | ICD-10-CM

## 2024-01-24 DIAGNOSIS — I89.0 LYMPHEDEMA: ICD-10-CM

## 2024-01-24 DIAGNOSIS — I15.2 HYPERTENSION ASSOCIATED WITH DIABETES (MULTI): ICD-10-CM

## 2024-01-24 PROBLEM — K59.00 CONSTIPATION: Status: ACTIVE | Noted: 2024-01-16

## 2024-01-24 PROCEDURE — 3079F DIAST BP 80-89 MM HG: CPT | Performed by: INTERNAL MEDICINE

## 2024-01-24 PROCEDURE — 1036F TOBACCO NON-USER: CPT | Performed by: INTERNAL MEDICINE

## 2024-01-24 PROCEDURE — 3077F SYST BP >= 140 MM HG: CPT | Performed by: INTERNAL MEDICINE

## 2024-01-24 PROCEDURE — 3008F BODY MASS INDEX DOCD: CPT | Performed by: INTERNAL MEDICINE

## 2024-01-24 PROCEDURE — 99204 OFFICE O/P NEW MOD 45 MIN: CPT | Performed by: INTERNAL MEDICINE

## 2024-01-24 PROCEDURE — 3066F NEPHROPATHY DOC TX: CPT | Performed by: INTERNAL MEDICINE

## 2024-01-24 RX ORDER — METOLAZONE 5 MG/1
5 TABLET ORAL DAILY
Qty: 30 TABLET | Refills: 0 | Status: SHIPPED | OUTPATIENT
Start: 2024-01-24 | End: 2024-02-18

## 2024-01-24 RX ORDER — LACTULOSE 20 G/20G
20 POWDER, FOR SOLUTION ORAL 3 TIMES DAILY
Qty: 90 PACKET | Refills: 0 | Status: SHIPPED | OUTPATIENT
Start: 2024-01-24 | End: 2024-01-25

## 2024-01-24 ASSESSMENT — ENCOUNTER SYMPTOMS
HEMATOLOGIC/LYMPHATIC NEGATIVE: 1
EYES NEGATIVE: 1
MUSCULOSKELETAL NEGATIVE: 1
CONSTITUTIONAL NEGATIVE: 1
NEUROLOGICAL NEGATIVE: 1
GASTROINTESTINAL NEGATIVE: 1
ENDOCRINE NEGATIVE: 1
SHORTNESS OF BREATH: 1
ALLERGIC/IMMUNOLOGIC NEGATIVE: 1
PSYCHIATRIC NEGATIVE: 1

## 2024-01-24 NOTE — PROGRESS NOTES
Subjective   She is here due to swelling  On Torsemide 100  3x/day  Swelling is going up.  Has legs wrapped  Takes Spironolactone 300 BID  Usual weight is about 350.  -    Patient ID: Roselia Mo is a 53 y.o. female who presents for Follow-up (New Patient/Decreased kidney function).  HPI  She is here for new patient visit  She was referred here secondary to abnormal renal function  My evaluation plan is communicated back via the electronic medical record    Blood work was recently obtained on January 14th.  INR is 2.2  Hemoglobin is 13.9  Metabolic panel shows a BUN of 15 and creatinine of 0.9  Calcium is 10.6  It appears that renal function is actually fairly normal  Sugars do run on the high side  Bicarb run slightly on the high side  I do not see an echo.  She was seen by cardiology in the past and no says she has CHF.  She was seen in the hospital by them.  It was suggested that she have an EKG and an echo but these were not done.  Medications are reviewed she is on insulin albuterol allopurinol Jardiance gabapentin levothyroxine metoprolol a PPI potassium a statin spironolactone torsemide Coumadin  Blood pressure here in the office today is 142/82    Review of Systems   Constitutional: Negative.    HENT: Negative.     Eyes: Negative.    Respiratory:  Positive for shortness of breath.    Cardiovascular:  Positive for leg swelling.   Gastrointestinal: Negative.    Endocrine: Negative.    Genitourinary: Negative.    Musculoskeletal: Negative.    Skin: Negative.    Allergic/Immunologic: Negative.    Neurological: Negative.    Hematological: Negative.    Psychiatric/Behavioral: Negative.         Objective   Physical Exam  Constitutional:       Appearance: Normal appearance.   HENT:      Head: Normocephalic and atraumatic.      Right Ear: External ear normal.      Left Ear: External ear normal.      Nose: Nose normal.      Mouth/Throat:      Mouth: Mucous membranes are moist.      Pharynx: Oropharynx is clear.    Eyes:      Extraocular Movements: Extraocular movements intact.      Conjunctiva/sclera: Conjunctivae normal.      Pupils: Pupils are equal, round, and reactive to light.   Cardiovascular:      Rate and Rhythm: Normal rate and regular rhythm.   Pulmonary:      Effort: Pulmonary effort is normal.      Breath sounds: Normal breath sounds.   Abdominal:      General: Abdomen is flat.      Palpations: Abdomen is soft.   Musculoskeletal:      Comments: Legs wrapped to knees  + lymphaedema  In wheelchair   Skin:     General: Skin is warm and dry.   Neurological:      General: No focal deficit present.      Mental Status: She is alert and oriented to person, place, and time.   Psychiatric:         Mood and Affect: Mood normal.         Behavior: Behavior normal.         Assessment/Plan   Problem List Items Addressed This Visit             ICD-10-CM    Hypertension associated with diabetes (CMS/[facility]) E11.59, I15.2    Morbid obesity (CMS/[facility]) E66.01    Obstructive sleep apnea syndrome G47.33    Stage 3 chronic kidney disease (CMS/[facility]) - Primary N18.30    Relevant Medications    metOLazone (Zaroxolyn) 5 mg tablet    Other Relevant Orders    Follow Up In Nephrology    Comprehensive metabolic panel    PTH, intact    Magnesium    Phosphorus    Constipation K59.00    Relevant Medications    lactulose (Kristalose) 20 gram packet    Lymphedema I89.0     Plan:   Start Lactulose to get BM going  Start Metoloazone:  2-3 x/week max  Weigh daily and record  Recheck labs in 1 month     CKD II/III:  Currently looks good  Diastolic CHF  Possible Cardiorenal Syndrome  Morbid Obesity  Lymphedema  HTN  DM II  Right Sided CHF  Pulmonary HTN  CY on CPAP  Hypercalcemia with TUMS use          Pardeep Nichole DO 01/24/24 2:23 PM

## 2024-01-25 RX ORDER — LUBIPROSTONE 24 UG/1
24 CAPSULE ORAL
Qty: 90 CAPSULE | Refills: 3 | Status: SHIPPED | OUTPATIENT
Start: 2024-01-25 | End: 2024-02-29

## 2024-01-26 ENCOUNTER — HOSPITAL ENCOUNTER (OUTPATIENT)
Dept: RADIOLOGY | Facility: CLINIC | Age: 54
Discharge: HOME | End: 2024-01-26
Payer: MEDICARE

## 2024-01-26 DIAGNOSIS — Z12.31 ENCOUNTER FOR SCREENING MAMMOGRAM FOR MALIGNANT NEOPLASM OF BREAST: ICD-10-CM

## 2024-01-26 DIAGNOSIS — Z78.0 POST-MENOPAUSAL: ICD-10-CM

## 2024-01-26 PROCEDURE — 77067 SCR MAMMO BI INCL CAD: CPT | Performed by: RADIOLOGY

## 2024-01-26 PROCEDURE — 77063 BREAST TOMOSYNTHESIS BI: CPT | Performed by: RADIOLOGY

## 2024-01-26 PROCEDURE — 77067 SCR MAMMO BI INCL CAD: CPT

## 2024-01-26 PROCEDURE — 77080 DXA BONE DENSITY AXIAL: CPT | Performed by: RADIOLOGY

## 2024-01-26 PROCEDURE — 77080 DXA BONE DENSITY AXIAL: CPT

## 2024-01-29 DIAGNOSIS — N63.20 MASS OF LEFT BREAST, UNSPECIFIED QUADRANT: ICD-10-CM

## 2024-01-29 DIAGNOSIS — R92.8 ABNORMAL MAMMOGRAM: Primary | ICD-10-CM

## 2024-01-30 ENCOUNTER — TELEPHONE (OUTPATIENT)
Dept: NEPHROLOGY | Facility: CLINIC | Age: 54
End: 2024-01-30

## 2024-01-30 ENCOUNTER — APPOINTMENT (OUTPATIENT)
Dept: PULMONOLOGY | Facility: CLINIC | Age: 54
End: 2024-01-30
Payer: MEDICARE

## 2024-01-30 ENCOUNTER — TELEPHONE (OUTPATIENT)
Dept: PRIMARY CARE | Facility: CLINIC | Age: 54
End: 2024-01-30

## 2024-01-30 NOTE — PROGRESS NOTES
Pt called in stating she has been taking Lubiprostone 24 mcg since it was prescribed but she has only had one bowel movement since then. She stated she is very uncomfortable and would like to know what she should do?

## 2024-01-30 NOTE — TELEPHONE ENCOUNTER
PLEASE LET HER KNOW HER MAMMO WAS ABNORMAL FOR L BREAST (MASS) AND NEEDS ADDITIONAL TEST. I ORDERED THE U/S OF L BREAST.

## 2024-02-01 ENCOUNTER — HOSPITAL ENCOUNTER (OUTPATIENT)
Dept: RADIOLOGY | Facility: HOSPITAL | Age: 54
Discharge: HOME | End: 2024-02-01
Payer: MEDICARE

## 2024-02-01 ENCOUNTER — APPOINTMENT (OUTPATIENT)
Dept: CARDIOLOGY | Facility: HOSPITAL | Age: 54
End: 2024-02-01
Payer: MEDICARE

## 2024-02-01 ENCOUNTER — HOSPITAL ENCOUNTER (OUTPATIENT)
Dept: RESPIRATORY THERAPY | Facility: HOSPITAL | Age: 54
Discharge: HOME | End: 2024-02-01
Payer: MEDICARE

## 2024-02-01 ENCOUNTER — HOSPITAL ENCOUNTER (OUTPATIENT)
Dept: CARDIOLOGY | Facility: HOSPITAL | Age: 54
Discharge: HOME | End: 2024-02-01
Payer: MEDICARE

## 2024-02-01 ENCOUNTER — LAB (OUTPATIENT)
Dept: LAB | Facility: LAB | Age: 54
End: 2024-02-01
Payer: MEDICARE

## 2024-02-01 DIAGNOSIS — E11.69 HYPERLIPIDEMIA ASSOCIATED WITH TYPE 2 DIABETES MELLITUS (MULTI): ICD-10-CM

## 2024-02-01 DIAGNOSIS — E11.9 TYPE 2 DIABETES MELLITUS WITHOUT COMPLICATION, WITHOUT LONG-TERM CURRENT USE OF INSULIN (MULTI): ICD-10-CM

## 2024-02-01 DIAGNOSIS — D64.9 ANEMIA, UNSPECIFIED TYPE: ICD-10-CM

## 2024-02-01 DIAGNOSIS — E78.5 HYPERLIPIDEMIA ASSOCIATED WITH TYPE 2 DIABETES MELLITUS (MULTI): ICD-10-CM

## 2024-02-01 DIAGNOSIS — R06.09 DYSPNEA ON EXERTION: ICD-10-CM

## 2024-02-01 DIAGNOSIS — E61.2 MAGNESIUM DEFICIENCY: ICD-10-CM

## 2024-02-01 DIAGNOSIS — N63.20 MASS OF LEFT BREAST, UNSPECIFIED QUADRANT: ICD-10-CM

## 2024-02-01 DIAGNOSIS — I50.32 CHRONIC DIASTOLIC HEART FAILURE (MULTI): ICD-10-CM

## 2024-02-01 DIAGNOSIS — Z87.39 H/O: GOUT: ICD-10-CM

## 2024-02-01 DIAGNOSIS — E03.9 ACQUIRED HYPOTHYROIDISM: ICD-10-CM

## 2024-02-01 DIAGNOSIS — R92.8 ABNORMAL MAMMOGRAM: ICD-10-CM

## 2024-02-01 DIAGNOSIS — E55.9 VITAMIN D DEFICIENCY: ICD-10-CM

## 2024-02-01 LAB
25(OH)D3 SERPL-MCNC: 34 NG/ML (ref 30–100)
ALBUMIN SERPL BCP-MCNC: 4.7 G/DL (ref 3.4–5)
ALP SERPL-CCNC: 542 U/L (ref 33–110)
ALT SERPL W P-5'-P-CCNC: 58 U/L (ref 7–45)
ANION GAP SERPL CALC-SCNC: 18 MMOL/L (ref 10–20)
ARTERIAL PATENCY WRIST A: NEGATIVE
AST SERPL W P-5'-P-CCNC: 84 U/L (ref 9–39)
BASE EXCESS BLDA CALC-SCNC: 11.9 MMOL/L (ref -2–3)
BASOPHILS # BLD AUTO: 0.05 X10*3/UL (ref 0–0.1)
BASOPHILS NFR BLD AUTO: 0.3 %
BILIRUB SERPL-MCNC: 0.5 MG/DL (ref 0–1.2)
BODY TEMPERATURE: 37 DEGREES CELSIUS
BUN SERPL-MCNC: 77 MG/DL (ref 6–23)
CALCIUM SERPL-MCNC: 11.4 MG/DL (ref 8.6–10.3)
CHLORIDE SERPL-SCNC: 81 MMOL/L (ref 98–107)
CHOLEST SERPL-MCNC: 200 MG/DL (ref 0–199)
CHOLESTEROL/HDL RATIO: 4.3
CO2 SERPL-SCNC: 34 MMOL/L (ref 21–32)
COHGB MFR BLDA: 0 %
CREAT SERPL-MCNC: 1.65 MG/DL (ref 0.5–1.05)
CREAT UR-MCNC: 92.9 MG/DL (ref 20–320)
DO-HGB MFR BLDA: 6.2 % (ref 0–5)
EGFRCR SERPLBLD CKD-EPI 2021: 37 ML/MIN/1.73M*2
EOSINOPHIL # BLD AUTO: 0.11 X10*3/UL (ref 0–0.7)
EOSINOPHIL NFR BLD AUTO: 0.7 %
ERYTHROCYTE [DISTWIDTH] IN BLOOD BY AUTOMATED COUNT: 13.9 % (ref 11.5–14.5)
EST. AVERAGE GLUCOSE BLD GHB EST-MCNC: 192 MG/DL
GLUCOSE SERPL-MCNC: 120 MG/DL (ref 74–99)
HBA1C MFR BLD: 8.3 %
HCO3 BLDA-SCNC: 34.7 MMOL/L (ref 22–26)
HCT VFR BLD AUTO: 46.8 % (ref 36–46)
HDLC SERPL-MCNC: 47 MG/DL
HGB BLD-MCNC: 16.4 G/DL (ref 12–16)
HGB BLDA-MCNC: 16.7 G/DL (ref 12–16)
IMM GRANULOCYTES # BLD AUTO: 0.1 X10*3/UL (ref 0–0.7)
IMM GRANULOCYTES NFR BLD AUTO: 0.6 % (ref 0–0.9)
INHALED O2 CONCENTRATION: 21 %
LDLC SERPL CALC-MCNC: ABNORMAL MG/DL
LYMPHOCYTES # BLD AUTO: 1.69 X10*3/UL (ref 1.2–4.8)
LYMPHOCYTES NFR BLD AUTO: 10.6 %
MAGNESIUM SERPL-MCNC: 2.43 MG/DL (ref 1.6–2.4)
MCH RBC QN AUTO: 30.8 PG (ref 26–34)
MCHC RBC AUTO-ENTMCNC: 35 G/DL (ref 32–36)
MCV RBC AUTO: 88 FL (ref 80–100)
METHGB MFR BLDA: 0 % (ref 0–1.5)
MICROALBUMIN UR-MCNC: 30.5 MG/L
MICROALBUMIN/CREAT UR: 32.8 UG/MG CREAT
MONOCYTES # BLD AUTO: 1.42 X10*3/UL (ref 0.1–1)
MONOCYTES NFR BLD AUTO: 8.9 %
NEUTROPHILS # BLD AUTO: 12.53 X10*3/UL (ref 1.2–7.7)
NEUTROPHILS NFR BLD AUTO: 78.9 %
NON HDL CHOLESTEROL: 153 MG/DL (ref 0–149)
NRBC BLD-RTO: 0 /100 WBCS (ref 0–0)
OXYHGB MFR BLDA: 93.8 % (ref 94–98)
PCO2 BLDA: 37 MM HG (ref 38–42)
PH BLDA: 7.58 PH (ref 7.38–7.42)
PLATELET # BLD AUTO: 351 X10*3/UL (ref 150–450)
PO2 BLDA: 70 MM HG (ref 85–95)
POTASSIUM SERPL-SCNC: 3.3 MMOL/L (ref 3.5–5.3)
PROT SERPL-MCNC: 7.8 G/DL (ref 6.4–8.2)
RBC # BLD AUTO: 5.33 X10*6/UL (ref 4–5.2)
SAO2 % BLDA: 94 % (ref 94–100)
SODIUM SERPL-SCNC: 130 MMOL/L (ref 136–145)
SPECIMEN DRAWN FROM PATIENT: ABNORMAL
TRIGL SERPL-MCNC: 485 MG/DL (ref 0–149)
TSH SERPL-ACNC: 0.79 MIU/L (ref 0.44–3.98)
URATE SERPL-MCNC: 9.4 MG/DL (ref 2.3–6.7)
VLDL: ABNORMAL
WBC # BLD AUTO: 15.9 X10*3/UL (ref 4.4–11.3)

## 2024-02-01 PROCEDURE — 71046 X-RAY EXAM CHEST 2 VIEWS: CPT | Performed by: RADIOLOGY

## 2024-02-01 PROCEDURE — 80061 LIPID PANEL: CPT | Performed by: INTERNAL MEDICINE

## 2024-02-01 PROCEDURE — 83036 HEMOGLOBIN GLYCOSYLATED A1C: CPT

## 2024-02-01 PROCEDURE — 82306 VITAMIN D 25 HYDROXY: CPT

## 2024-02-01 PROCEDURE — 94726 PLETHYSMOGRAPHY LUNG VOLUMES: CPT

## 2024-02-01 PROCEDURE — 36415 COLL VENOUS BLD VENIPUNCTURE: CPT

## 2024-02-01 PROCEDURE — 83735 ASSAY OF MAGNESIUM: CPT | Performed by: INTERNAL MEDICINE

## 2024-02-01 PROCEDURE — 82375 ASSAY CARBOXYHB QUANT: CPT | Performed by: INTERNAL MEDICINE

## 2024-02-01 PROCEDURE — 85025 COMPLETE CBC W/AUTO DIFF WBC: CPT

## 2024-02-01 PROCEDURE — 84443 ASSAY THYROID STIM HORMONE: CPT

## 2024-02-01 PROCEDURE — 82043 UR ALBUMIN QUANTITATIVE: CPT

## 2024-02-01 PROCEDURE — 76642 ULTRASOUND BREAST LIMITED: CPT | Mod: LT

## 2024-02-01 PROCEDURE — 80053 COMPREHEN METABOLIC PANEL: CPT | Performed by: INTERNAL MEDICINE

## 2024-02-01 PROCEDURE — 82570 ASSAY OF URINE CREATININE: CPT

## 2024-02-01 PROCEDURE — 84550 ASSAY OF BLOOD/URIC ACID: CPT | Performed by: INTERNAL MEDICINE

## 2024-02-01 PROCEDURE — 71046 X-RAY EXAM CHEST 2 VIEWS: CPT

## 2024-02-01 PROCEDURE — 76642 ULTRASOUND BREAST LIMITED: CPT | Mod: LEFT SIDE | Performed by: RADIOLOGY

## 2024-02-01 NOTE — PROGRESS NOTES
Roselia Mo is a 53 y.o. female on day 0 of admission presenting with No Principal Problem: There is no principal problem currently on the Problem List. Please update the Problem List and refresh..    Subjective   ***       Objective     Physical Exam    Last Recorded Vitals  There were no vitals taken for this visit.  Intake/Output last 3 Shifts:  No intake/output data recorded.    Relevant Results  {If you would like to pull in Medications, type .meds     If you would like to pull in Lab results for the last 24 hours, type .[id number]    If you would like to pull in Imaging results, type .imgrslt :99}    {Link to Stroke Scoring tools - Link :99}                       Assessment/Plan   Active Problems:  There are no active Hospital Problems.    ***     {This patient does not have an ACP note on file for this encounter, please fill one out - Advance Care Planning Activity :99}      John Arauz, RRT

## 2024-02-03 ENCOUNTER — HOSPITAL ENCOUNTER (OUTPATIENT)
Facility: HOSPITAL | Age: 54
Setting detail: OBSERVATION
Discharge: HOME | End: 2024-02-06
Attending: STUDENT IN AN ORGANIZED HEALTH CARE EDUCATION/TRAINING PROGRAM | Admitting: EMERGENCY MEDICINE
Payer: MEDICARE

## 2024-02-03 DIAGNOSIS — K59.00 CONSTIPATION, UNSPECIFIED CONSTIPATION TYPE: Primary | ICD-10-CM

## 2024-02-03 LAB
ABO GROUP (TYPE) IN BLOOD: NORMAL
ALBUMIN SERPL BCP-MCNC: 4.8 G/DL (ref 3.4–5)
ALP SERPL-CCNC: 489 U/L (ref 33–110)
ALT SERPL W P-5'-P-CCNC: 65 U/L (ref 7–45)
ANION GAP BLDV CALCULATED.4IONS-SCNC: 5 MMOL/L (ref 10–25)
ANION GAP SERPL CALC-SCNC: 17 MMOL/L (ref 10–20)
ANTIBODY SCREEN: NORMAL
AST SERPL W P-5'-P-CCNC: 61 U/L (ref 9–39)
BASE EXCESS BLDV CALC-SCNC: 12.5 MMOL/L (ref -2–3)
BASOPHILS # BLD AUTO: 0.07 X10*3/UL (ref 0–0.1)
BASOPHILS NFR BLD AUTO: 0.4 %
BILIRUB DIRECT SERPL-MCNC: 0.3 MG/DL (ref 0–0.3)
BILIRUB SERPL-MCNC: 0.5 MG/DL (ref 0–1.2)
BODY TEMPERATURE: 37 DEGREES CELSIUS
BUN SERPL-MCNC: 71 MG/DL (ref 6–23)
CA-I BLDV-SCNC: 1.29 MMOL/L (ref 1.1–1.33)
CALCIUM SERPL-MCNC: 11.9 MG/DL (ref 8.6–10.6)
CHLORIDE BLDV-SCNC: 85 MMOL/L (ref 98–107)
CHLORIDE SERPL-SCNC: 81 MMOL/L (ref 98–107)
CO2 SERPL-SCNC: 33 MMOL/L (ref 21–32)
CREAT SERPL-MCNC: 1.49 MG/DL (ref 0.5–1.05)
EGFRCR SERPLBLD CKD-EPI 2021: 41 ML/MIN/1.73M*2
EOSINOPHIL # BLD AUTO: 0.19 X10*3/UL (ref 0–0.7)
EOSINOPHIL NFR BLD AUTO: 1.2 %
ERYTHROCYTE [DISTWIDTH] IN BLOOD BY AUTOMATED COUNT: 13.4 % (ref 11.5–14.5)
GLUCOSE BLDV-MCNC: 133 MG/DL (ref 74–99)
GLUCOSE SERPL-MCNC: 97 MG/DL (ref 74–99)
HCO3 BLDV-SCNC: 37.5 MMOL/L (ref 22–26)
HCT VFR BLD AUTO: 44.4 % (ref 36–46)
HCT VFR BLD EST: 48 % (ref 36–46)
HGB BLD-MCNC: 16.2 G/DL (ref 12–16)
HGB BLDV-MCNC: 15.9 G/DL (ref 12–16)
IMM GRANULOCYTES # BLD AUTO: 0.17 X10*3/UL (ref 0–0.7)
IMM GRANULOCYTES NFR BLD AUTO: 1.1 % (ref 0–0.9)
INHALED O2 CONCENTRATION: 21 %
LACTATE BLDV-SCNC: 1.3 MMOL/L (ref 0.4–2)
LIPASE SERPL-CCNC: 60 U/L (ref 9–82)
LYMPHOCYTES # BLD AUTO: 2.43 X10*3/UL (ref 1.2–4.8)
LYMPHOCYTES NFR BLD AUTO: 15 %
MCH RBC QN AUTO: 30.9 PG (ref 26–34)
MCHC RBC AUTO-ENTMCNC: 36.5 G/DL (ref 32–36)
MCV RBC AUTO: 85 FL (ref 80–100)
MONOCYTES # BLD AUTO: 1.73 X10*3/UL (ref 0.1–1)
MONOCYTES NFR BLD AUTO: 10.7 %
NEUTROPHILS # BLD AUTO: 11.6 X10*3/UL (ref 1.2–7.7)
NEUTROPHILS NFR BLD AUTO: 71.6 %
NRBC BLD-RTO: 0 /100 WBCS (ref 0–0)
OXYHGB MFR BLDV: 69 % (ref 45–75)
PCO2 BLDV: 47 MM HG (ref 41–51)
PH BLDV: 7.51 PH (ref 7.33–7.43)
PLATELET # BLD AUTO: 300 X10*3/UL (ref 150–450)
PO2 BLDV: 44 MM HG (ref 35–45)
POTASSIUM BLDV-SCNC: 3.6 MMOL/L (ref 3.5–5.3)
POTASSIUM SERPL-SCNC: 3.5 MMOL/L (ref 3.5–5.3)
PROT SERPL-MCNC: 8.3 G/DL (ref 6.4–8.2)
RBC # BLD AUTO: 5.25 X10*6/UL (ref 4–5.2)
RH FACTOR (ANTIGEN D): NORMAL
SAO2 % BLDV: 70 % (ref 45–75)
SODIUM BLDV-SCNC: 124 MMOL/L (ref 136–145)
SODIUM SERPL-SCNC: 127 MMOL/L (ref 136–145)
WBC # BLD AUTO: 16.2 X10*3/UL (ref 4.4–11.3)

## 2024-02-03 PROCEDURE — 36415 COLL VENOUS BLD VENIPUNCTURE: CPT | Performed by: STUDENT IN AN ORGANIZED HEALTH CARE EDUCATION/TRAINING PROGRAM

## 2024-02-03 PROCEDURE — 93010 ELECTROCARDIOGRAM REPORT: CPT | Performed by: STUDENT IN AN ORGANIZED HEALTH CARE EDUCATION/TRAINING PROGRAM

## 2024-02-03 PROCEDURE — 83690 ASSAY OF LIPASE: CPT

## 2024-02-03 PROCEDURE — 82248 BILIRUBIN DIRECT: CPT

## 2024-02-03 PROCEDURE — 96375 TX/PRO/DX INJ NEW DRUG ADDON: CPT | Performed by: STUDENT IN AN ORGANIZED HEALTH CARE EDUCATION/TRAINING PROGRAM

## 2024-02-03 PROCEDURE — 80053 COMPREHEN METABOLIC PANEL: CPT | Performed by: STUDENT IN AN ORGANIZED HEALTH CARE EDUCATION/TRAINING PROGRAM

## 2024-02-03 PROCEDURE — 99285 EMERGENCY DEPT VISIT HI MDM: CPT | Performed by: STUDENT IN AN ORGANIZED HEALTH CARE EDUCATION/TRAINING PROGRAM

## 2024-02-03 PROCEDURE — 85025 COMPLETE CBC W/AUTO DIFF WBC: CPT | Performed by: STUDENT IN AN ORGANIZED HEALTH CARE EDUCATION/TRAINING PROGRAM

## 2024-02-03 PROCEDURE — 86901 BLOOD TYPING SEROLOGIC RH(D): CPT

## 2024-02-03 PROCEDURE — 84132 ASSAY OF SERUM POTASSIUM: CPT | Performed by: STUDENT IN AN ORGANIZED HEALTH CARE EDUCATION/TRAINING PROGRAM

## 2024-02-03 PROCEDURE — 2500000004 HC RX 250 GENERAL PHARMACY W/ HCPCS (ALT 636 FOR OP/ED)

## 2024-02-03 PROCEDURE — 99285 EMERGENCY DEPT VISIT HI MDM: CPT | Mod: 25 | Performed by: STUDENT IN AN ORGANIZED HEALTH CARE EDUCATION/TRAINING PROGRAM

## 2024-02-03 PROCEDURE — 2500000001 HC RX 250 WO HCPCS SELF ADMINISTERED DRUGS (ALT 637 FOR MEDICARE OP)

## 2024-02-03 RX ORDER — DICYCLOMINE HYDROCHLORIDE 10 MG/1
10 CAPSULE ORAL ONCE
Status: COMPLETED | OUTPATIENT
Start: 2024-02-03 | End: 2024-02-03

## 2024-02-03 RX ORDER — ONDANSETRON HYDROCHLORIDE 2 MG/ML
4 INJECTION, SOLUTION INTRAVENOUS ONCE
Status: COMPLETED | OUTPATIENT
Start: 2024-02-03 | End: 2024-02-03

## 2024-02-03 RX ADMIN — DICYCLOMINE HYDROCHLORIDE 10 MG: 10 CAPSULE ORAL at 22:36

## 2024-02-03 RX ADMIN — ONDANSETRON 4 MG: 2 INJECTION INTRAMUSCULAR; INTRAVENOUS at 22:36

## 2024-02-03 ASSESSMENT — COLUMBIA-SUICIDE SEVERITY RATING SCALE - C-SSRS
1. IN THE PAST MONTH, HAVE YOU WISHED YOU WERE DEAD OR WISHED YOU COULD GO TO SLEEP AND NOT WAKE UP?: NO
6. HAVE YOU EVER DONE ANYTHING, STARTED TO DO ANYTHING, OR PREPARED TO DO ANYTHING TO END YOUR LIFE?: NO
2. HAVE YOU ACTUALLY HAD ANY THOUGHTS OF KILLING YOURSELF?: NO

## 2024-02-04 ENCOUNTER — CLINICAL SUPPORT (OUTPATIENT)
Dept: EMERGENCY MEDICINE | Facility: HOSPITAL | Age: 54
End: 2024-02-04
Payer: MEDICARE

## 2024-02-04 ENCOUNTER — APPOINTMENT (OUTPATIENT)
Dept: RADIOLOGY | Facility: HOSPITAL | Age: 54
End: 2024-02-04
Payer: MEDICARE

## 2024-02-04 LAB
ALBUMIN SERPL BCP-MCNC: 4.7 G/DL (ref 3.4–5)
ALP SERPL-CCNC: 451 U/L (ref 33–110)
ALT SERPL W P-5'-P-CCNC: 58 U/L (ref 7–45)
ANION GAP SERPL CALC-SCNC: 15 MMOL/L (ref 10–20)
AST SERPL W P-5'-P-CCNC: 52 U/L (ref 9–39)
BILIRUB SERPL-MCNC: 0.7 MG/DL (ref 0–1.2)
BUN SERPL-MCNC: 70 MG/DL (ref 6–23)
CALCIUM SERPL-MCNC: 11.2 MG/DL (ref 8.6–10.6)
CHLORIDE SERPL-SCNC: 81 MMOL/L (ref 98–107)
CO2 SERPL-SCNC: 35 MMOL/L (ref 21–32)
CREAT SERPL-MCNC: 1.44 MG/DL (ref 0.5–1.05)
EGFRCR SERPLBLD CKD-EPI 2021: 43 ML/MIN/1.73M*2
GLUCOSE BLD MANUAL STRIP-MCNC: 129 MG/DL (ref 74–99)
GLUCOSE BLD MANUAL STRIP-MCNC: 159 MG/DL (ref 74–99)
GLUCOSE SERPL-MCNC: 153 MG/DL (ref 74–99)
POTASSIUM SERPL-SCNC: 3.2 MMOL/L (ref 3.5–5.3)
PROT SERPL-MCNC: 8.1 G/DL (ref 6.4–8.2)
SODIUM SERPL-SCNC: 128 MMOL/L (ref 136–145)

## 2024-02-04 PROCEDURE — 2500000004 HC RX 250 GENERAL PHARMACY W/ HCPCS (ALT 636 FOR OP/ED): Mod: MUE

## 2024-02-04 PROCEDURE — 82947 ASSAY GLUCOSE BLOOD QUANT: CPT | Mod: 91

## 2024-02-04 PROCEDURE — 74177 CT ABD & PELVIS W/CONTRAST: CPT | Mod: FOREIGN READ | Performed by: RADIOLOGY

## 2024-02-04 PROCEDURE — 96376 TX/PRO/DX INJ SAME DRUG ADON: CPT | Performed by: STUDENT IN AN ORGANIZED HEALTH CARE EDUCATION/TRAINING PROGRAM

## 2024-02-04 PROCEDURE — 2500000001 HC RX 250 WO HCPCS SELF ADMINISTERED DRUGS (ALT 637 FOR MEDICARE OP)

## 2024-02-04 PROCEDURE — 2500000005 HC RX 250 GENERAL PHARMACY W/O HCPCS

## 2024-02-04 PROCEDURE — 2500000004 HC RX 250 GENERAL PHARMACY W/ HCPCS (ALT 636 FOR OP/ED)

## 2024-02-04 PROCEDURE — G0378 HOSPITAL OBSERVATION PER HR: HCPCS

## 2024-02-04 PROCEDURE — 2550000001 HC RX 255 CONTRASTS: Performed by: STUDENT IN AN ORGANIZED HEALTH CARE EDUCATION/TRAINING PROGRAM

## 2024-02-04 PROCEDURE — 74177 CT ABD & PELVIS W/CONTRAST: CPT

## 2024-02-04 PROCEDURE — 96361 HYDRATE IV INFUSION ADD-ON: CPT | Performed by: STUDENT IN AN ORGANIZED HEALTH CARE EDUCATION/TRAINING PROGRAM

## 2024-02-04 PROCEDURE — 84075 ASSAY ALKALINE PHOSPHATASE: CPT

## 2024-02-04 PROCEDURE — 2500000004 HC RX 250 GENERAL PHARMACY W/ HCPCS (ALT 636 FOR OP/ED): Performed by: PHYSICIAN ASSISTANT

## 2024-02-04 PROCEDURE — 36415 COLL VENOUS BLD VENIPUNCTURE: CPT

## 2024-02-04 PROCEDURE — 93005 ELECTROCARDIOGRAM TRACING: CPT

## 2024-02-04 RX ORDER — ONDANSETRON 4 MG/1
4 TABLET, FILM COATED ORAL EVERY 6 HOURS PRN
Status: DISCONTINUED | OUTPATIENT
Start: 2024-02-04 | End: 2024-02-06 | Stop reason: HOSPADM

## 2024-02-04 RX ORDER — TORSEMIDE 100 MG/1
100 TABLET ORAL 2 TIMES DAILY
Status: DISCONTINUED | OUTPATIENT
Start: 2024-02-04 | End: 2024-02-06 | Stop reason: HOSPADM

## 2024-02-04 RX ORDER — METOPROLOL SUCCINATE 50 MG/1
50 TABLET, EXTENDED RELEASE ORAL DAILY
Status: DISCONTINUED | OUTPATIENT
Start: 2024-02-04 | End: 2024-02-06 | Stop reason: HOSPADM

## 2024-02-04 RX ORDER — LEVOTHYROXINE SODIUM 50 UG/1
50 TABLET ORAL
Status: DISCONTINUED | OUTPATIENT
Start: 2024-02-04 | End: 2024-02-06 | Stop reason: HOSPADM

## 2024-02-04 RX ORDER — PANTOPRAZOLE SODIUM 40 MG/1
40 TABLET, DELAYED RELEASE ORAL
Status: DISCONTINUED | OUTPATIENT
Start: 2024-02-04 | End: 2024-02-06 | Stop reason: HOSPADM

## 2024-02-04 RX ORDER — SPIRONOLACTONE 100 MG/1
300 TABLET, FILM COATED ORAL 2 TIMES DAILY
Status: DISCONTINUED | OUTPATIENT
Start: 2024-02-04 | End: 2024-02-06 | Stop reason: HOSPADM

## 2024-02-04 RX ORDER — POLYETHYLENE GLYCOL 3350, SODIUM CHLORIDE, SODIUM BICARBONATE, POTASSIUM CHLORIDE 420; 11.2; 5.72; 1.48 G/4L; G/4L; G/4L; G/4L
4000 POWDER, FOR SOLUTION ORAL ONCE
Status: DISCONTINUED | OUTPATIENT
Start: 2024-02-04 | End: 2024-02-04

## 2024-02-04 RX ORDER — POLYETHYLENE GLYCOL 3350, SODIUM CHLORIDE, SODIUM BICARBONATE, POTASSIUM CHLORIDE 420; 11.2; 5.72; 1.48 G/4L; G/4L; G/4L; G/4L
4000 POWDER, FOR SOLUTION ORAL ONCE
Status: COMPLETED | OUTPATIENT
Start: 2024-02-04 | End: 2024-02-04

## 2024-02-04 RX ORDER — ACETAMINOPHEN 325 MG/1
975 TABLET ORAL ONCE
Status: COMPLETED | OUTPATIENT
Start: 2024-02-04 | End: 2024-02-04

## 2024-02-04 RX ORDER — SODIUM CHLORIDE 9 MG/ML
125 INJECTION, SOLUTION INTRAVENOUS CONTINUOUS
Status: DISCONTINUED | OUTPATIENT
Start: 2024-02-04 | End: 2024-02-06 | Stop reason: HOSPADM

## 2024-02-04 RX ORDER — SYRING-NEEDL,DISP,INSUL,0.3 ML 29 G X1/2"
297 SYRINGE, EMPTY DISPOSABLE MISCELLANEOUS ONCE AS NEEDED
Status: DISCONTINUED | OUTPATIENT
Start: 2024-02-04 | End: 2024-02-04

## 2024-02-04 RX ORDER — BUPROPION HYDROCHLORIDE 150 MG/1
150 TABLET ORAL EVERY MORNING
Status: DISCONTINUED | OUTPATIENT
Start: 2024-02-04 | End: 2024-02-06 | Stop reason: HOSPADM

## 2024-02-04 RX ORDER — ROSUVASTATIN CALCIUM 40 MG/1
40 TABLET, COATED ORAL DAILY
Status: DISCONTINUED | OUTPATIENT
Start: 2024-02-04 | End: 2024-02-06 | Stop reason: HOSPADM

## 2024-02-04 RX ORDER — ALLOPURINOL 100 MG/1
300 TABLET ORAL DAILY
Status: DISCONTINUED | OUTPATIENT
Start: 2024-02-04 | End: 2024-02-06 | Stop reason: HOSPADM

## 2024-02-04 RX ORDER — METOLAZONE 5 MG/1
5 TABLET ORAL DAILY
Status: DISCONTINUED | OUTPATIENT
Start: 2024-02-04 | End: 2024-02-06 | Stop reason: HOSPADM

## 2024-02-04 RX ORDER — WARFARIN SODIUM 5 MG/1
7.5 TABLET ORAL DAILY
Status: DISCONTINUED | OUTPATIENT
Start: 2024-02-04 | End: 2024-02-06 | Stop reason: HOSPADM

## 2024-02-04 RX ORDER — LEVOTHYROXINE SODIUM 100 UG/1
200 TABLET ORAL DAILY
Status: DISCONTINUED | OUTPATIENT
Start: 2024-02-04 | End: 2024-02-06 | Stop reason: HOSPADM

## 2024-02-04 RX ORDER — ONDANSETRON HYDROCHLORIDE 2 MG/ML
4 INJECTION, SOLUTION INTRAVENOUS ONCE
Status: COMPLETED | OUTPATIENT
Start: 2024-02-04 | End: 2024-02-04

## 2024-02-04 RX ORDER — TRAZODONE HYDROCHLORIDE 50 MG/1
100 TABLET ORAL NIGHTLY
Status: DISCONTINUED | OUTPATIENT
Start: 2024-02-04 | End: 2024-02-06 | Stop reason: HOSPADM

## 2024-02-04 RX ORDER — POTASSIUM CHLORIDE 20 MEQ/1
20 TABLET, EXTENDED RELEASE ORAL 3 TIMES DAILY
Status: DISCONTINUED | OUTPATIENT
Start: 2024-02-04 | End: 2024-02-06 | Stop reason: HOSPADM

## 2024-02-04 RX ORDER — DULOXETIN HYDROCHLORIDE 30 MG/1
60 CAPSULE, DELAYED RELEASE ORAL DAILY
Status: DISCONTINUED | OUTPATIENT
Start: 2024-02-04 | End: 2024-02-06 | Stop reason: HOSPADM

## 2024-02-04 RX ADMIN — BUPROPION HYDROCHLORIDE 150 MG: 150 TABLET, FILM COATED, EXTENDED RELEASE ORAL at 08:15

## 2024-02-04 RX ADMIN — IOHEXOL 100 ML: 350 INJECTION, SOLUTION INTRAVENOUS at 00:51

## 2024-02-04 RX ADMIN — SPIRONOLACTONE 300 MG: 100 TABLET ORAL at 08:14

## 2024-02-04 RX ADMIN — ONDANSETRON 4 MG: 2 INJECTION INTRAMUSCULAR; INTRAVENOUS at 10:02

## 2024-02-04 RX ADMIN — ROSUVASTATIN CALCIUM 40 MG: 40 TABLET, FILM COATED ORAL at 09:00

## 2024-02-04 RX ADMIN — ACETAMINOPHEN 975 MG: 325 TABLET ORAL at 10:02

## 2024-02-04 RX ADMIN — POTASSIUM CHLORIDE 20 MEQ: 1500 TABLET, EXTENDED RELEASE ORAL at 15:15

## 2024-02-04 RX ADMIN — POTASSIUM CHLORIDE 20 MEQ: 1500 TABLET, EXTENDED RELEASE ORAL at 08:14

## 2024-02-04 RX ADMIN — SODIUM CHLORIDE 500 ML: 9 INJECTION, SOLUTION INTRAVENOUS at 02:00

## 2024-02-04 RX ADMIN — POTASSIUM CHLORIDE 20 MEQ: 1500 TABLET, EXTENDED RELEASE ORAL at 21:39

## 2024-02-04 RX ADMIN — POLYETHYLENE GLYCOL 3350, SODIUM SULFATE ANHYDROUS, SODIUM BICARBONATE, SODIUM CHLORIDE, POTASSIUM CHLORIDE 4000 ML: 236; 22.74; 6.74; 5.86; 2.97 POWDER, FOR SOLUTION ORAL at 05:10

## 2024-02-04 RX ADMIN — ONDANSETRON HYDROCHLORIDE 4 MG: 4 TABLET, FILM COATED ORAL at 05:53

## 2024-02-04 RX ADMIN — TORSEMIDE 100 MG: 100 TABLET ORAL at 21:38

## 2024-02-04 RX ADMIN — METOPROLOL SUCCINATE 50 MG: 50 TABLET, EXTENDED RELEASE ORAL at 08:14

## 2024-02-04 RX ADMIN — TORSEMIDE 100 MG: 100 TABLET ORAL at 08:15

## 2024-02-04 RX ADMIN — ACETAMINOPHEN 975 MG: 325 TABLET ORAL at 20:47

## 2024-02-04 RX ADMIN — ONDANSETRON HYDROCHLORIDE 4 MG: 4 TABLET, FILM COATED ORAL at 20:47

## 2024-02-04 RX ADMIN — PANTOPRAZOLE SODIUM 40 MG: 40 TABLET, DELAYED RELEASE ORAL at 06:27

## 2024-02-04 RX ADMIN — METOLAZONE 5 MG: 5 TABLET ORAL at 08:13

## 2024-02-04 RX ADMIN — DULOXETINE HYDROCHLORIDE 60 MG: 30 CAPSULE, DELAYED RELEASE ORAL at 08:15

## 2024-02-04 RX ADMIN — ALLOPURINOL 300 MG: 100 TABLET ORAL at 08:13

## 2024-02-04 RX ADMIN — SPIRONOLACTONE 300 MG: 100 TABLET ORAL at 21:38

## 2024-02-04 RX ADMIN — SODIUM CHLORIDE 125 ML/HR: 9 INJECTION, SOLUTION INTRAVENOUS at 05:03

## 2024-02-04 RX ADMIN — LEVOTHYROXINE SODIUM 200 MCG: 100 TABLET ORAL at 06:27

## 2024-02-04 RX ADMIN — LEVOTHYROXINE SODIUM 50 MCG: 50 TABLET ORAL at 06:27

## 2024-02-04 RX ADMIN — TRAZODONE HYDROCHLORIDE 100 MG: 50 TABLET ORAL at 21:38

## 2024-02-04 RX ADMIN — WARFARIN SODIUM 7.5 MG: 7.5 TABLET ORAL at 08:16

## 2024-02-04 RX ADMIN — EMPAGLIFLOZIN 10 MG: 10 TABLET, FILM COATED ORAL at 08:15

## 2024-02-04 ASSESSMENT — PAIN SCALES - GENERAL
PAINLEVEL_OUTOF10: 0 - NO PAIN
PAINLEVEL_OUTOF10: 7
PAINLEVEL_OUTOF10: 0 - NO PAIN
PAINLEVEL_OUTOF10: 0 - NO PAIN

## 2024-02-04 ASSESSMENT — PAIN DESCRIPTION - LOCATION: LOCATION: HEAD

## 2024-02-04 ASSESSMENT — PAIN - FUNCTIONAL ASSESSMENT: PAIN_FUNCTIONAL_ASSESSMENT: 0-10

## 2024-02-04 NOTE — H&P
History and Physical  [facility] CLINICAL DECISION UNIT    MRN: [id number]  Date of Placement in CDU: 2/3/2024  Time Placed in Observation: 2:41 AM  ED Provider: Teagan Romano PA-C      Limitations to history: none  Independent Historians: patient   External Records Reviewed: EMR, outside records, Care-everywhere     Patient History   Roselia Mo is a 53 y.o. female with PMH of morbid obesity, CHF on home oxygen, CY on BIPAP, hypothyroidism, hypertension, neuropathy, ventral hernia, recurrent bowel obstructions, anxiety, depression, insomnia, hepatic vein thrombosis on Warfarin,  whom presented to the ED for persistent constipation. Patient reportedly has had 2 BM's over the last month and endorsed some increased nausea and associated abdominal discomfort whenever she attempts to eat or drink anything. The pain has been getting progressively worse and feels similar to prior SBO. Denies any recent abdominal surgeries, fever, chills, diarrhea. Patient endorses still passing gas but mentions that she was also still passing gas with prior SBO's. She was notably recently admitted last month for a small bowel obstruction, at which time NG tube was placed for bowel decompression.    While in the ED, patient remained afebrile and vital signs were stable. The acute evaluation included complete labs and CT imaging of the abdomen and pelvis which revealed moderate amount of stool within the colon as well as multiple ventral hernias but no acute obstruction or perforations. Lab work-up was notable for elevated WBC at 16 and hyponatremia at 127. Her lipase was WNL. Given persistent inability to have BM and history of recurrent SBO's, it was elected that patient be placed in the CDU for constipation and continued observation.      Upon admission to the Clinical Decision Unit, patient is hemodynamically stable. Patient is still tolerating PO intake but states reduced appetite secondary to post-prandial pain  and nausea. She is however, hungry and requesting food shortly after her arrival in the CDU. On my assessment, patient's abdomen is soft and non-distended, diffusely tender to palpation.     The acute ED evaluation included:  Orders Placed This Encounter   Procedures    CT abdomen pelvis w IV contrast    Basic metabolic panel    CBC and Auto Differential    Hepatic function panel    Lipase    Type And Screen    BLOOD GAS VENOUS FULL PANEL    POCT GLUCOSE    ECG 12 lead    Send to CDU    Send to CDU         Past Medical History: reviewed, see EMR and HPI  Past Surgical History: reviewed, see EMR  Social History: No tobacco use. Denies EtOH and illict substance use.  Family History: Non-contributory  Allergies: reviewed      Medications: reviewed  Previous Medications    - REFIN REGULAR (HUMULIN R U-500) 500 UNIT/ML CONCENTRATED - REFILL FOR PATIENT OWN PUMP    Inject 1 mL (1 each) under the skin if needed. Take as directed per insulin instructions. Use U-500 insulin syringe.    ALBUTEROL 90 MCG/ACTUATION AEROSOL POWDR BREATH ACTIVATED INHALER    Inhale 2 puffs every 4 hours.    ALLOPURINOL (ZYLOPRIM) 300 MG TABLET    Take 1 tablet (300 mg) by mouth once daily.    BUPROPION XL (WELLBUTRIN XL) 150 MG 24 HR TABLET    Take 1 tablet (150 mg) by mouth once daily in the morning. Do not crush, chew, or split.    CETIRIZINE (ZYRTEC) 10 MG CHEWABLE TABLET    Chew 1 tablet (10 mg) once daily.    DICLOFENAC SODIUM (VOLTAREN TOP)    Place 1 Application on the skin once daily as needed (Neuropathy). Applies to the feet for neuropathy.    DULOXETINE (CYMBALTA) 60 MG DR CAPSULE    Take 1 capsule (60 mg) by mouth once daily. Do not crush or chew.    EMPAGLIFLOZIN (JARDIANCE) 10 MG    Take 1 tablet (10 mg) by mouth once daily.    GABAPENTIN (NEURONTIN) 300 MG CAPSULE    Take 3 capsules (900 mg) by mouth 4 times a day.    LEVOTHYROXINE (SYNTHROID, LEVOXYL) 200 MCG TABLET    Take 1 tablet (200 mcg) by mouth once daily.    LEVOTHYROXINE  (SYNTHROID, LEVOXYL) 50 MCG TABLET    Take 1 tablet (50 mcg) by mouth once daily in the morning. Take before meals. TAKE WITH 200MG    LUBIPROSTONE (AMITIZA) 24 MCG CAPSULE    Take 1 capsule (24 mcg) by mouth once daily with breakfast.    METOLAZONE (ZAROXOLYN) 5 MG TABLET    Take 1 tablet (5 mg) by mouth once daily.    METOPROLOL SUCCINATE XL (TOPROL-XL) 50 MG 24 HR TABLET    Take 1 tablet (50 mg) by mouth once daily. Do not crush or chew.    MULTIVITAMIN TABLET    Take 1 tablet by mouth once daily.    ONDANSETRON (ZOFRAN) 4 MG TABLET    Take 1 tablet (4 mg) by mouth every 8 hours if needed for nausea or vomiting.    PANTOPRAZOLE (PROTONIX) 40 MG EC TABLET    Take 1 tablet (40 mg) by mouth once daily in the morning. Take before meals. Do not crush, chew, or split.    POTASSIUM CHLORIDE CR 20 MEQ ER TABLET    Take 1 tablet (20 mEq) by mouth 3 times a day. Do not crush or chew.    PROMETHAZINE (PHENERGAN) 25 MG TABLET    Take 1 tablet (25 mg) by mouth every 6 hours if needed for nausea or vomiting.    ROSUVASTATIN (CRESTOR) 40 MG TABLET    Take 1 tablet (40 mg) by mouth once daily.    SPIRONOLACTONE (ALDACTONE) 100 MG TABLET    Take 3 tablets (300 mg) by mouth 2 times a day.    TORSEMIDE (DEMADEX) 100 MG TABLET    Take 1 tablet (100 mg) by mouth 2 times a day.    TRAZODONE (DESYREL) 100 MG TABLET    Take 1 tablet (100 mg) by mouth once daily at bedtime.    WARFARIN (COUMADIN) 5 MG TABLET    Take 1.5 tablets (7.5 mg) by mouth once daily. Take as directed per After Visit Summary.       Scheduled medications  [MAR Hold] polyethylene glycol-electrolytes, 4,000 mL, oral, Once  sodium chloride, 500 mL, intravenous, Once      Continuous medications     PRN medications         Review of Systems   All systems reviewed and otherwise negative, except as stated above in HPI.      Physical Examination     Visit Vitals  /75   Pulse 92   Temp 36.2 °C (97.2 °F) (Temporal)   Resp 20   SpO2 94%   OB Status Menopausal   Smoking  Status Former       VS reviewed and noted NAD. Afebrile.   General: Patient is awake, conversant, well-nourished and overall well-appearing.    Head: NC/AT. No apparent traumatic injuries.   Eyes: EOMI, sclerae anicteric    ENT: Hearing grossly intact. TMs clear. MMM without lesions. Posterior oropharynx unremarkable. Normal phonation without stridor, drooling or trismus.   Neck: Supple, full ROM without meningismus. No lymphadenopathy.   Cardiac: Regular rate & rhythm. No murmur, gallops or rubs.    Lungs: CTAB with normal respiratory effort and good aeration throughout. No accessory muscle usage or adventitious breath sounds. Chest wall is non-tender to palpation.    Abdomen: Soft, non-tender, nonsurgical. No masses. No rebound, rigidity or guarding. Normoactive BS   : Deferred.   MSK: Full ROM intact. Symmetric muscle bulk without step-offs or gross deformity.   Vascular: Pulses full and equal bilaterally. No cyanosis, clubbing or pitting LE edema. Capillary refill < 2s   Skin: Warm and intact without rashes, lesions or discoloration. Turgor is good.   Neuro: CN II-XII grossly intact. A&Ox3. Speech is clear and fluent. No focal deficits identified.   Psychiatric: Mood and affect are appropriate for situation.      Diagnostic Evaluation   I reviewed the below labs and imaging as ordered by the ED provider:  CT abdomen pelvis w IV contrast   Final Result   Several midline hernia defects at least to contain loops of bowel but   without definitive obstructive pattern on today's exam.  The   inflammation is also improved.  Additional findings as above..   Signed by Dimitris Murphy DO          Labs Reviewed   BASIC METABOLIC PANEL - Abnormal       Result Value    Glucose 97      Sodium 127 (*)     Potassium 3.5      Chloride 81 (*)     Bicarbonate 33 (*)     Anion Gap 17      Urea Nitrogen 71 (*)     Creatinine 1.49 (*)     eGFR 41 (*)     Calcium 11.9 (*)    CBC WITH AUTO DIFFERENTIAL - Abnormal    WBC 16.2 (*)      nRBC 0.0      RBC 5.25 (*)     Hemoglobin 16.2 (*)     Hematocrit 44.4      MCV 85      MCH 30.9      MCHC 36.5 (*)     RDW 13.4      Platelets 300      Neutrophils % 71.6      Immature Granulocytes %, Automated 1.1 (*)     Lymphocytes % 15.0      Monocytes % 10.7      Eosinophils % 1.2      Basophils % 0.4      Neutrophils Absolute 11.60 (*)     Immature Granulocytes Absolute, Automated 0.17      Lymphocytes Absolute 2.43      Monocytes Absolute 1.73 (*)     Eosinophils Absolute 0.19      Basophils Absolute 0.07     HEPATIC FUNCTION PANEL - Abnormal    Albumin 4.8      Bilirubin, Total 0.5      Bilirubin, Direct 0.3      Alkaline Phosphatase 489 (*)     ALT 65 (*)     AST 61 (*)     Total Protein 8.3 (*)    BLOOD GAS VENOUS FULL PANEL - Abnormal    POCT pH, Venous 7.51 (*)     POCT pCO2, Venous 47      POCT pO2, Venous 44      POCT SO2, Venous 70      POCT Oxy Hemoglobin, Venous 69.0      POCT Hematocrit Calculated, Venous 48.0 (*)     POCT Sodium, Venous 124 (*)     POCT Potassium, Venous 3.6      POCT Chloride, Venous 85 (*)     POCT Ionized Calicum, Venous 1.29      POCT Glucose, Venous 133 (*)     POCT Lactate, Venous 1.3      POCT Base Excess, Venous 12.5 (*)     POCT HCO3 Calculated, Venous 37.5 (*)     POCT Hemoglobin, Venous 15.9      POCT Anion Gap, Venous 5.0 (*)     Patient Temperature 37.0      FiO2 21     POCT GLUCOSE - Abnormal    POCT Glucose 129 (*)    LIPASE - Normal    Lipase 60      Narrative:     Venipuncture immediately after or during the administration of Metamizole may lead to falsely low results. Testing should be performed immediately prior to Metamizole dosing.   TYPE AND SCREEN    ABO TYPE A      Rh TYPE POS      ANTIBODY SCREEN NEG     POCT GLUCOSE METER       Diagnostic studies and ED interventions germane to this period of clinical observation will include:        Consultants (if applicable)  1)       Assessment and Plan   In summary, Roselia Mo is admitted to the [facility]  [place] for Emergency Medicine Clinical Decision Unit for constipation. Dr. Ritchie is the CDU admission attending.    This patient has been risk-stratified based on available history, physical exam, and related study findings. Admission to the observation status for further diagnosis/treatment/monitoring of patient's condition is warranted clinically. This extended period of observation is specifically required to determine the need for hospitalization.     The goals of this admission based on the patient’s clinical problem list are:  1) Constipation     --  mIVF: 0.9% NS at maintenance rate     -- Bowel regiment: GoLytely     -- P.O challenge in AM     -- Cardiac monitoring    2) Hyponatremia     -- mIVF: 0.9% NS at maintenance rate      -- Repeat CMP in AM      -- Cardiac telemetry    We will observe the patient for the following endpoints:   Stable vital signs  Ability to tolerate PO intake  Clinical resolution of symptoms  Normalization of electrolyte levels    When met, appropriate disposition will be arranged.      Teagan Romano PA-C  [facility]  Emergency Dept.

## 2024-02-04 NOTE — ED PROVIDER NOTES
CC: Constipation (Pt concerned she is constipated. States she has had 2 bowel movement in the past 2 months)     HPI: Roselia Mo is an 53 y.o. female with history including SBO, diabetes presenting to the emergency department for constipation. She reports that she was recently admitted for a small bowel obstruction. She notes that she has only had 2 bowel movements over the past month.  She does report having worsening pain today.  Denies dysuria.  Notes had lab work done as an outpatient.  She was supposed be following up with the had a bariatric surgery.  She is requesting that she will be able to see them today.  They informed her when she was having her last admission that for any further surgical workup would need to be done downtown.  Denies fevers, chills, chest pain, shortness of breath    Limitations to History:  none  Additional History Obtained from:  chart review    PMHx/PSHx:  Per HPI.   - has a past medical history of Arthritis, Asthma, CHF (congestive heart failure) (CMS/[facility]), COPD (chronic obstructive pulmonary disease) (CMS/[facility]), Diabetes mellitus (CMS/[facility]), Disease of thyroid gland, and Hypertension.  - has a past surgical history that includes  section, low transverse; Carpal tunnel release (Bilateral, ); Hysterectomy; Hernia repair; and Knee surgery (Left).    Social History:  - Tobacco:  reports that she quit smoking about 9 years ago. Her smoking use included cigarettes. She started smoking about 47 years ago. She has a 37.00 pack-year smoking history. She has never used smokeless tobacco.   - Alcohol:  reports current alcohol use.   - Drugs:  reports current drug use. Frequency: 7.00 times per week. Drug: Marijuana.     Medications: Reviewed in EMR.     Allergies:  Nickel, Metformin, Dulaglutide, Palladium, Cobalt, Exenatide, and Sitagliptin    ???????????????????????????????????????????????????????????????  Triage Vitals:  T 36.2 °C (97.2 °F)  HR 92  /75  RR 20  O2  "94 % None (Room air)    Physical Exam  Constitutional:       General: She is not in acute distress.  HENT:      Head: Normocephalic.   Cardiovascular:      Rate and Rhythm: Normal rate.   Pulmonary:      Effort: Pulmonary effort is normal. No respiratory distress.   Abdominal:      General: Abdomen is flat. There is distension.      Tenderness: There is abdominal tenderness. There is no guarding or rebound.      Hernia: A hernia is present.      Comments: Hernia is reducible.   Musculoskeletal:         General: Normal range of motion.   Skin:     General: Skin is dry.   Neurological:      Mental Status: She is alert and oriented to person, place, and time. Mental status is at baseline.       ???????????????????????????????????????????????????????????????  EKG (per my independent interpretation): EKG was sinus rhythm at a normal rate. Intervals was within normal limits.  No ST segment elevation.    ED Course/Medical Decision Making:    ED Course as of 02/04/24 2226   Sun Feb 04, 2024 2011 On my re-assessment, patient reports still having BM's every thirty minutes with extremely loose but non-bloody stools. She denies any nausea, emesis, melena, hematochezia, abdominal pain or constitutional symptoms such as fever or chills. Aside from the frequency of her BM's patient stated feeling much better and will be good to go home in morning but would feel better staying another night given that her drive home is 1 hour and she will likely \"have another accident\". Will plan for discharge in AM with bowel regiment and strict OP follow-up with her primary. [MV]      ED Course User Index  [MV] Teagan Romano PA-C         Diagnoses as of 02/04/24 2226   Constipation, unspecified constipation type        Patient is a 53-year-old female presenting with abdominal pain and poor stool output.  Patient's symptoms are concerning for a obstruction versus constipation.  Will obtain lab work that was in triage remarkable for a " hyponatremia and an in an [place].  This is consistent with patient's history of poor p.o. intake.  CT abdomen pelvis was ordered.  CT abdomen pelvis showed significant stool burden with no signs of obstruction and improvement from prior.  It does show a hernia.  Patient was given a liter of normal saline for the hyponatremia.  Patient had been given pain meds.  GoLytely was ordered for the constipation.  Given the significant electrolyte changes.  Patient will be admitted to the CDU for repeat labs in the morning and p.o. challenge.  Patient also does have a leukocytosis with no fevers no chills no cough or systemic symptoms.  No clear etiology for why there is an Leukocytosis.    External records reviewed: recent inpatient, clinic, and prior ED notes  Diagnostic imaging independently reviewed/interpreted by me (as reflected in MDM) includes: CT abdomen. Lab works  Social Determinants Affecting Care:   Discussion of management with other providers: ED attending,    Prescription Drug Consideration: normal saline, bentyl, zofran and golytely  Escalation of Care: considered CDU admission    Pt was seen and discussed with ED attending     Impression:   Constipation  Hyponatremia      Disposition: admit to CDU        Procedures ? Smart[employer] last updated 2/3/2024 10:20 PM        Afsaneh Sparks MD  Resident  02/04/24 3640

## 2024-02-05 ENCOUNTER — APPOINTMENT (OUTPATIENT)
Dept: CARDIOLOGY | Facility: CLINIC | Age: 54
End: 2024-02-05
Payer: MEDICARE

## 2024-02-05 ENCOUNTER — APPOINTMENT (OUTPATIENT)
Dept: RADIOLOGY | Facility: HOSPITAL | Age: 54
End: 2024-02-05
Payer: MEDICARE

## 2024-02-05 LAB
ALBUMIN SERPL BCP-MCNC: 4.2 G/DL (ref 3.4–5)
ALBUMIN SERPL BCP-MCNC: 4.5 G/DL (ref 3.4–5)
ALP SERPL-CCNC: 485 U/L (ref 33–110)
ALP SERPL-CCNC: 519 U/L (ref 33–110)
ALT SERPL W P-5'-P-CCNC: 81 U/L (ref 7–45)
ALT SERPL W P-5'-P-CCNC: 84 U/L (ref 7–45)
ANION GAP SERPL CALC-SCNC: 12 MMOL/L (ref 10–20)
ANION GAP SERPL CALC-SCNC: 15 MMOL/L (ref 10–20)
AST SERPL W P-5'-P-CCNC: 83 U/L (ref 9–39)
AST SERPL W P-5'-P-CCNC: 92 U/L (ref 9–39)
BASOPHILS # BLD AUTO: 0.06 X10*3/UL (ref 0–0.1)
BASOPHILS NFR BLD AUTO: 0.4 %
BILIRUB SERPL-MCNC: 0.6 MG/DL (ref 0–1.2)
BILIRUB SERPL-MCNC: 0.8 MG/DL (ref 0–1.2)
BUN SERPL-MCNC: 59 MG/DL (ref 6–23)
BUN SERPL-MCNC: 59 MG/DL (ref 6–23)
CALCIUM SERPL-MCNC: 11.1 MG/DL (ref 8.6–10.6)
CALCIUM SERPL-MCNC: 11.3 MG/DL (ref 8.6–10.6)
CHLORIDE SERPL-SCNC: 83 MMOL/L (ref 98–107)
CHLORIDE SERPL-SCNC: 86 MMOL/L (ref 98–107)
CO2 SERPL-SCNC: 35 MMOL/L (ref 21–32)
CO2 SERPL-SCNC: 37 MMOL/L (ref 21–32)
CREAT SERPL-MCNC: 1.31 MG/DL (ref 0.5–1.05)
CREAT SERPL-MCNC: 1.31 MG/DL (ref 0.5–1.05)
EGFRCR SERPLBLD CKD-EPI 2021: 48 ML/MIN/1.73M*2
EGFRCR SERPLBLD CKD-EPI 2021: 48 ML/MIN/1.73M*2
EOSINOPHIL # BLD AUTO: 0.52 X10*3/UL (ref 0–0.7)
EOSINOPHIL NFR BLD AUTO: 3.9 %
ERYTHROCYTE [DISTWIDTH] IN BLOOD BY AUTOMATED COUNT: 13.6 % (ref 11.5–14.5)
GLUCOSE SERPL-MCNC: 113 MG/DL (ref 74–99)
GLUCOSE SERPL-MCNC: 126 MG/DL (ref 74–99)
HCT VFR BLD AUTO: 43.3 % (ref 36–46)
HGB BLD-MCNC: 15.5 G/DL (ref 12–16)
IMM GRANULOCYTES # BLD AUTO: 0.12 X10*3/UL (ref 0–0.7)
IMM GRANULOCYTES NFR BLD AUTO: 0.9 % (ref 0–0.9)
LYMPHOCYTES # BLD AUTO: 2.33 X10*3/UL (ref 1.2–4.8)
LYMPHOCYTES NFR BLD AUTO: 17.4 %
MCH RBC QN AUTO: 30.8 PG (ref 26–34)
MCHC RBC AUTO-ENTMCNC: 35.8 G/DL (ref 32–36)
MCV RBC AUTO: 86 FL (ref 80–100)
MONOCYTES # BLD AUTO: 1.22 X10*3/UL (ref 0.1–1)
MONOCYTES NFR BLD AUTO: 9.1 %
NEUTROPHILS # BLD AUTO: 9.14 X10*3/UL (ref 1.2–7.7)
NEUTROPHILS NFR BLD AUTO: 68.3 %
NRBC BLD-RTO: 0 /100 WBCS (ref 0–0)
PLATELET # BLD AUTO: 263 X10*3/UL (ref 150–450)
POTASSIUM SERPL-SCNC: 2.9 MMOL/L (ref 3.5–5.3)
POTASSIUM SERPL-SCNC: 3.3 MMOL/L (ref 3.5–5.3)
PROT SERPL-MCNC: 7.4 G/DL (ref 6.4–8.2)
PROT SERPL-MCNC: 7.9 G/DL (ref 6.4–8.2)
RBC # BLD AUTO: 5.03 X10*6/UL (ref 4–5.2)
SODIUM SERPL-SCNC: 130 MMOL/L (ref 136–145)
SODIUM SERPL-SCNC: 132 MMOL/L (ref 136–145)
WBC # BLD AUTO: 13.4 X10*3/UL (ref 4.4–11.3)

## 2024-02-05 PROCEDURE — 76705 ECHO EXAM OF ABDOMEN: CPT

## 2024-02-05 PROCEDURE — 96366 THER/PROPH/DIAG IV INF ADDON: CPT | Performed by: STUDENT IN AN ORGANIZED HEALTH CARE EDUCATION/TRAINING PROGRAM

## 2024-02-05 PROCEDURE — G0378 HOSPITAL OBSERVATION PER HR: HCPCS

## 2024-02-05 PROCEDURE — 80053 COMPREHEN METABOLIC PANEL: CPT

## 2024-02-05 PROCEDURE — 96361 HYDRATE IV INFUSION ADD-ON: CPT | Performed by: STUDENT IN AN ORGANIZED HEALTH CARE EDUCATION/TRAINING PROGRAM

## 2024-02-05 PROCEDURE — 76705 ECHO EXAM OF ABDOMEN: CPT | Performed by: RADIOLOGY

## 2024-02-05 PROCEDURE — 80053 COMPREHEN METABOLIC PANEL: CPT | Mod: MUE | Performed by: PHYSICIAN ASSISTANT

## 2024-02-05 PROCEDURE — 2500000001 HC RX 250 WO HCPCS SELF ADMINISTERED DRUGS (ALT 637 FOR MEDICARE OP): Performed by: PHYSICIAN ASSISTANT

## 2024-02-05 PROCEDURE — 2500000001 HC RX 250 WO HCPCS SELF ADMINISTERED DRUGS (ALT 637 FOR MEDICARE OP)

## 2024-02-05 PROCEDURE — 96365 THER/PROPH/DIAG IV INF INIT: CPT | Performed by: STUDENT IN AN ORGANIZED HEALTH CARE EDUCATION/TRAINING PROGRAM

## 2024-02-05 PROCEDURE — 2500000004 HC RX 250 GENERAL PHARMACY W/ HCPCS (ALT 636 FOR OP/ED): Performed by: PHYSICIAN ASSISTANT

## 2024-02-05 PROCEDURE — 2500000004 HC RX 250 GENERAL PHARMACY W/ HCPCS (ALT 636 FOR OP/ED)

## 2024-02-05 PROCEDURE — 36415 COLL VENOUS BLD VENIPUNCTURE: CPT | Performed by: PHYSICIAN ASSISTANT

## 2024-02-05 PROCEDURE — 85025 COMPLETE CBC W/AUTO DIFF WBC: CPT | Performed by: PHYSICIAN ASSISTANT

## 2024-02-05 RX ORDER — SODIUM CHLORIDE 9 MG/ML
125 INJECTION, SOLUTION INTRAVENOUS CONTINUOUS
Status: ACTIVE | OUTPATIENT
Start: 2024-02-05 | End: 2024-02-05

## 2024-02-05 RX ORDER — POTASSIUM CHLORIDE 1.5 G/1.58G
20 POWDER, FOR SOLUTION ORAL 2 TIMES DAILY
Status: DISCONTINUED | OUTPATIENT
Start: 2024-02-05 | End: 2024-02-06 | Stop reason: HOSPADM

## 2024-02-05 RX ORDER — POTASSIUM CHLORIDE 1.5 G/1.58G
20 POWDER, FOR SOLUTION ORAL ONCE
Status: COMPLETED | OUTPATIENT
Start: 2024-02-05 | End: 2024-02-05

## 2024-02-05 RX ORDER — FAMOTIDINE 20 MG/1
40 TABLET, FILM COATED ORAL ONCE
Status: COMPLETED | OUTPATIENT
Start: 2024-02-05 | End: 2024-02-05

## 2024-02-05 RX ORDER — HYDROMORPHONE HYDROCHLORIDE 1 MG/ML
0.5 INJECTION, SOLUTION INTRAMUSCULAR; INTRAVENOUS; SUBCUTANEOUS ONCE
Status: DISCONTINUED | OUTPATIENT
Start: 2024-02-05 | End: 2024-02-05

## 2024-02-05 RX ORDER — DICYCLOMINE HYDROCHLORIDE 10 MG/1
20 CAPSULE ORAL 3 TIMES DAILY
Status: DISCONTINUED | OUTPATIENT
Start: 2024-02-05 | End: 2024-02-06 | Stop reason: HOSPADM

## 2024-02-05 RX ORDER — POTASSIUM CHLORIDE 14.9 MG/ML
20 INJECTION INTRAVENOUS
Status: COMPLETED | OUTPATIENT
Start: 2024-02-05 | End: 2024-02-05

## 2024-02-05 RX ADMIN — ALLOPURINOL 300 MG: 100 TABLET ORAL at 08:43

## 2024-02-05 RX ADMIN — EMPAGLIFLOZIN 10 MG: 10 TABLET, FILM COATED ORAL at 08:52

## 2024-02-05 RX ADMIN — PANTOPRAZOLE SODIUM 40 MG: 40 TABLET, DELAYED RELEASE ORAL at 08:51

## 2024-02-05 RX ADMIN — POTASSIUM CHLORIDE 20 MEQ: 1500 TABLET, EXTENDED RELEASE ORAL at 22:44

## 2024-02-05 RX ADMIN — LEVOTHYROXINE SODIUM 200 MCG: 100 TABLET ORAL at 08:50

## 2024-02-05 RX ADMIN — METOLAZONE 5 MG: 5 TABLET ORAL at 10:47

## 2024-02-05 RX ADMIN — ROSUVASTATIN CALCIUM 40 MG: 40 TABLET, FILM COATED ORAL at 09:00

## 2024-02-05 RX ADMIN — SPIRONOLACTONE 300 MG: 100 TABLET ORAL at 22:47

## 2024-02-05 RX ADMIN — POTASSIUM CHLORIDE 20 MEQ: 1500 TABLET, EXTENDED RELEASE ORAL at 08:50

## 2024-02-05 RX ADMIN — POTASSIUM CHLORIDE 20 MEQ: 1.5 POWDER, FOR SOLUTION ORAL at 13:55

## 2024-02-05 RX ADMIN — TORSEMIDE 100 MG: 100 TABLET ORAL at 10:47

## 2024-02-05 RX ADMIN — METOPROLOL SUCCINATE 50 MG: 50 TABLET, EXTENDED RELEASE ORAL at 08:45

## 2024-02-05 RX ADMIN — DULOXETINE HYDROCHLORIDE 60 MG: 30 CAPSULE, DELAYED RELEASE ORAL at 08:42

## 2024-02-05 RX ADMIN — DICYCLOMINE HYDROCHLORIDE 20 MG: 10 CAPSULE ORAL at 22:43

## 2024-02-05 RX ADMIN — POTASSIUM CHLORIDE 20 MEQ: 1500 TABLET, EXTENDED RELEASE ORAL at 14:11

## 2024-02-05 RX ADMIN — POTASSIUM CHLORIDE 20 MEQ: 14.9 INJECTION, SOLUTION INTRAVENOUS at 14:21

## 2024-02-05 RX ADMIN — POTASSIUM CHLORIDE 20 MEQ: 1.5 POWDER, FOR SOLUTION ORAL at 11:34

## 2024-02-05 RX ADMIN — POTASSIUM CHLORIDE 20 MEQ: 1.5 POWDER, FOR SOLUTION ORAL at 22:45

## 2024-02-05 RX ADMIN — DICYCLOMINE HYDROCHLORIDE 20 MG: 10 CAPSULE ORAL at 15:29

## 2024-02-05 RX ADMIN — SODIUM CHLORIDE 125 ML/HR: 9 INJECTION, SOLUTION INTRAVENOUS at 14:22

## 2024-02-05 RX ADMIN — WARFARIN SODIUM 7.5 MG: 7.5 TABLET ORAL at 22:54

## 2024-02-05 RX ADMIN — LEVOTHYROXINE SODIUM 50 MCG: 50 TABLET ORAL at 08:51

## 2024-02-05 RX ADMIN — FAMOTIDINE 40 MG: 20 TABLET ORAL at 18:48

## 2024-02-05 RX ADMIN — POTASSIUM CHLORIDE 20 MEQ: 14.9 INJECTION, SOLUTION INTRAVENOUS at 11:39

## 2024-02-05 RX ADMIN — BUPROPION HYDROCHLORIDE 150 MG: 150 TABLET, FILM COATED, EXTENDED RELEASE ORAL at 10:48

## 2024-02-05 RX ADMIN — TRAZODONE HYDROCHLORIDE 100 MG: 50 TABLET ORAL at 22:44

## 2024-02-05 ASSESSMENT — PAIN SCALES - GENERAL: PAINLEVEL_OUTOF10: 4

## 2024-02-05 NOTE — PROGRESS NOTES
"Daily Progress Note  [facility] CLINICAL DECISION    Subjective  Roseliase LUÍS Mo has been admitted to the CDU for 30 hours. Serial assessments of the patient's clinical progress include:  On my re-assessment, patient reports still having BM's every thirty minutes with extremely loose but non-bloody stools. She denies any nausea, emesis, melena, hematochezia, abdominal pain or constitutional symptoms such as fever or chills. Aside from the frequency of her BM's patient stated feeling much better and will be good to go home in morning but would feel better staying another night given that her drive home is 1 hour and she will likely \"have another accident\". Will plan for discharge in AM with bowel regiment and strict OP follow-up with her primary.      Objective  PHYSICAL EXAM:   Vital signs reviewed and noted no distress. Afebrile.     General: Patient is morbidly obese, conversant, well-appearing and well-nourished.     Head: Normocephalic, Atraumatic.     Eyes: PERRL, EOMI without scleral icterus. Conjunctiva clear.     ENMT: Hearing grossly intact. MMM. Posterior oropharynx unremarkable. Normal phonation, no stridor, drooling or trismus.     Neck: Supple, full ROM without lymphadenopathy.     Cardiac: Regular rate & rhythm. No murmur, gallops, rubs or extrasystole.     Pulmonary: CTAB with normal effort and good aeration throughout. No accessory muscle usage. No adventitious breath sounds.      Abdomen: Soft, non-tender, non-distended with palapable but reducible ventral hernias.. No masses. No rebound, rigidity or guarding. Normoactive BS     MSK: Full ROM intact. Symmetric muscle bulk without step-offs or gross deformity.     Vascular: Pulses full and equal. No cyanosis, clubbing or pitting LE edema. Capillary refill < 2s     Skin: WWP. Intact without rashes, lesions or discoloration. Turgor is good.     Neuro: CN II-XII intact. Awake, A&Ox3. Speech is fluent. No focal deficits noted.     " Psychiatric: Mood and affect appropriate for situation.    Diagnostic Evaluation:     Labs reviewedNo results found for this or any previous visit (from the past 24 hour(s)).        Imaging CT abdomen pelvis w IV contrast    Result Date: 2/4/2024  STUDY: CT Abdomen and Pelvis with IV Contrast; 2/4/2024 AT 12:59 a.m. INDICATION: Abdominal pain. COMPARISON: XR abdomen 1/11/2024.  CT AP 1/11/2024. ACCESSION NUMBER(S): [id number] ORDERING CLINICIAN: Afsaneh Sparks TECHNIQUE: CT of the abdomen and pelvis was performed.  Contiguous axial images were obtained at 3 mm slice thickness through the abdomen and pelvis. Coronal and sagittal reconstructions at 3 mm slice thickness were performed.  Omnipaque 350 100 mL was administered intravenously.  FINDINGS: LOWER CHEST: No cardiomegaly.  No pericardial effusion.  Lung bases reveals minor atelectasis changes..  ABDOMEN:  LIVER: There is diffuse hepatic steatosis.  There are several periportal nonspecific lymph nodes. BILE DUCTS: No intrahepatic or extrahepatic biliary ductal dilatation.  GALLBLADDER: The gallbladder is unremarkable.. STOMACH: No abnormalities identified.  PANCREAS: No masses or ductal dilatation.  SPLEEN: No splenomegaly or focal splenic lesion.  ADRENAL GLANDS: No thickening or nodules.  KIDNEYS AND URETERS: Kidneys are normal in size and location.  No renal or ureteral calculi.  PELVIS:  BLADDER: No abnormalities identified.  REPRODUCTIVE ORGANS: No abnormalities identified.  BOWEL: No abnormalities identified.  Moderate amount fecal debris is demonstrated throughout the colon.  VESSELS: No abnormalities identified.  Abdominal aorta is normal in caliber.  PERITONEUM/RETROPERITONEUM/LYMPH NODES: No free fluid.  No pneumoperitoneum. No lymphadenopathy.  ABDOMINAL WALL: Reveals a localized small hernia defect containing loop of bowel below the umbilicus.  Previously noted inflammation and associated small bowel obstructive process is improved.  There is  periumbilical and supraumbilical midline abdominal wall hernia..  There is another that contains a loop of small bowel but is not dilated. SOFT TISSUES: No abnormalities identified.  BONES: No acute fracture or aggressive osseous lesion.    Several midline hernia defects at least to contain loops of bowel but without definitive obstructive pattern on today's exam.  The inflammation is also improved.  Additional findings as above.. Signed by Dimitris Murphy DO    XR chest 2 views    Result Date: 2/2/2024  Interpreted By:  Flako Nichole, STUDY: XR CHEST 2 VIEWS   INDICATION: Signs/Symptoms:Dyspnea on excertion.   COMPARISON: None   ACCESSION NUMBER(S): [id number]   ORDERING CLINICIAN: ABHISHEK PIZARRO   FINDINGS: Some patchy basilar opacities are noted bilaterally. This could be multifocal pneumonia or possibly a component of edema.   Heart size upper limits of normal.   No effusion or pneumothorax       Some patchy basilar opacities are noted bilaterally. This could be multifocal pneumonia or possibly a component of edema.   Heart size upper limits of normal.   Signed by: Flako Nichole 2/2/2024 6:05 PM Dictation workstation:   [id number]    BI US breast limited left    Result Date: 2/2/2024  Interpreted By:  Leif Chao, STUDY: BI US BREAST LIMITED LEFT; 2/1/2024 12:36 pm   ACCESSION NUMBER(S): [id number]   ORDERING CLINICIAN: BENITA DE LA TORRE   INDICATION: Signs/Symptoms:ABNORMAL MAMMO.   COMPARISON: Digital mammogram dated 01/26/2024   TECHNIQUE: Multiple grayscale ultrasonographic images were obtained through the left breast in the region of mammographic abnormality.   FINDINGS: In the 4 o'clock position of the left breast, approximately 11 cm from the nipple, there is a somewhat heterogeneous hypoechoic mass identified, measuring up to 9 x 9 x 7 mm. This has a possible hyperechoic hilum and may represent a reactive lymph node.   In the 4 o'clock position of the left breast, approximately 11 cm from the  nipple, there is a well-defined and smoothly marginated intramammary lymph node identified, measuring up to 6 x 6 x 5 mm.       Mass in the left breast, most consistent in appearance with a reactive lymph node. Recommendation is for follow-up examination in 3 months with left breast ultrasound.   BI-RADS CATEGORY: BI-RADS Category:  3 Probably Benign. Recommendation:  Breast Ultrasound in 3 Months. Recommended Date:  3 Months. Laterality:  Left.   Negative or benign mammogram and ultrasound should not preclude further evaluation of a suspicious clinical abnormality.   MACRO: None   Signed by: Leif Chao 2/2/2024 8:39 AM Dictation workstation:   [id number]    BI mammo bilateral screening tomosynthesis    Result Date: 1/29/2024  Interpreted By:  Leif Chao, STUDY: BI MAMMO BILATERAL SCREENING TOMOSYNTHESIS; 1/26/2024 10:21 am   ACCESSION NUMBER(S): [id number]   ORDERING CLINICIAN: BENITA DE LA TORRE   INDICATION: Screening.   COMPARISON: None.   FINDINGS: CC and MLO 2D digital mammograms and digital breast tomosynthesis images were obtained of the bilateral breasts. 3-D volume images were reconstructed in 8 views at an independent workstation as 1 mm slices through the breasts in both the CC and MLO projections.   Density:  The breast tissue is almost entirely fatty.   A rounded mass is seen in the lower outer quadrant of the left breast at medium to posterior depth. No additional mass or focal asymmetry is identified. No suspicious microcalcifications or foci of architectural distortion are seen.   This study was interpreted with CAD.       Mass in the left breast, as above. Further evaluation with ultrasound is recommended.   BI-RADS CATEGORY: BI-RADS Category:  0 Incomplete; Need Additional Imaging Evaluation and/or Prior Mammograms for Comparison. Recommendation:  Ultrasound. Recommended Date:  Immediate. Laterality:  Left.   MACRO: None     Signed by: Leif Chao 1/29/2024 8:42 AM Dictation workstation:    [id number]     XR DEXA bone density    Result Date: 1/26/2024  Interpreted By:  Leif Chao, STUDY: DEXA BONE DENSITY1/26/2024 10:17 am   INDICATION: Signs/Symptoms:screening.  The patient is a 54 y/o  year old F.   COMPARISON: None.   ACCESSION NUMBER(S): [id number]   ORDERING CLINICIAN: BENITA DE LA TORRE   TECHNIQUE: DEXA BONE DENSITY   FINDINGS: LEFT FOREARM Total Bone Mineral Density: 0.787 T-Score 1.7  Z-Score 2.2 UD Bone Mineral Density: 0.525 T-Score 1.2 Z-Score 1.7 MID Bone Mineral Density: Not reported T-Score Not reported Z-Score Not reported 1/3 Bone Mineral Density: 0.991 T-Score 1.2   Z-Score 1.6 Bone Mineral Density change vs baseline:  Not reported Bone Mineral Density change vs previous:  Not reported   RIGHT FOREARM Bone Mineral Density: 0.823 T-Score 2.3   Z-Score 2.8 UD Bone Mineral Density: 0.544 T-Score 1.7 Z-Score 2.1 MID Bone Mineral Density: Not reported T-Score Not reported Z-Score  Not reported 1/3 Bone Mineral Density: 1.056 T-Score 1.9 Z-Score 2.4 Bone Mineral Density change vs baseline:  Not reported Bone Mineral Density change vs previous:  Not reported     World Health Organization (WHO) criteria for post-menopausal,  Women: Normal:         T-score at or above -1 SD Osteopenia:   T-score between -1 and -2.5 SD Osteoporosis: T-score at or below -2.5 SD     10-year Fracture Risk: Major Osteoporotic Fracture  Not reported Hip Fracture                        Not reported   Note:  If no FRAX score is reported, it is because: Some T-score for Spine Total or Hip Total or Femoral Neck at or below -2.5     This exam was performed at [facility] on a [employer] ([place]) Dexa Unit.       DEXA:  According to World Health Organization criteria, classification is normal.   Followup recommended in 2 years or sooner as clinically warranted.   VFA:   All images and detailed analysis are available on the  Radiology PACS.   MACRO: None   Signed by: Leif Chao  1/26/2024 12:26 PM Dictation workstation:   [id number]    FL small bowel series    Result Date: 1/12/2024  Interpreted By:  Leif Chao, STUDY: FL SMALL BOWEL SERIES;  1/12/2024 12:04 pm   INDICATION: Signs/Symptoms:SBO (suspected partial/chronic) in setting of ventral hernia in morbidly obese, high surgical risk patient. Please assess for passage of contrast..   COMPARISON: None.   ACCESSION NUMBER(S): [id number]   ORDERING CLINICIAN: IZABELA CARRILLO   TECHNIQUE: An initial radiograph of the abdomen and pelvis was obtained. Following the administration of contrast Gastrografin, delayed static fluoroscopic images of the abdomen in the posteroanterior projection were obtained at multiple intervals.   FINDINGS: Initial  image demonstrates  a nonspecific nonobstructive bowel gas pattern. An enteric tube is seen with the tip over the mid stomach.   The small bowel is of  normalcaliber with no mucosal thickening appreciated.  A normal feathery appearance is observed to the jejunum.  The normal smooth appearance of the ileum is observed. There is no evidence for annular constricting lesions, large intraluminal mass lesion, or mucosal ulceration.   The terminal ileum and cecum are well visualized and within normal limits. Transit time of contrast to the cecum was identified by  20 minutes.       1.  No evidence of bowel obstruction..     MACRO: None   Signed by: Leif Chao 1/12/2024 12:20 PM Dictation workstation:   [id number]    XR abdomen 1 view    Result Date: 1/11/2024  Interpreted By:  Anitha Saba, STUDY: XR ABDOMEN 1 VIEW; ;  1/11/2024 4:27 am   INDICATION: Signs/Symptoms:NG placement.   COMPARISON: 02/17/2023   ACCESSION NUMBER(S): [id number]   ORDERING CLINICIAN: NELLI PERES   FINDINGS: Single portable radiograph centered on the chest and abdomen. Nasogastric tube crosses below the diaphragm with tip overlying the gastric body. The lung bases are clear. No air-filled dilated bowel in the imaged  abdomen.       The nasogastric tube courses below the diaphragm with tip overlying the gastric body.     MACRO: None   Signed by: Anitha Saba 1/11/2024 4:35 AM Dictation workstation:   [id number]    CT abdomen pelvis w IV contrast    Result Date: 1/11/2024  Interpreted By:  Anitha Saba, STUDY: CT ABDOMEN PELVIS W IV CONTRAST;  1/11/2024 3:30 am   INDICATION: Signs/Symptoms:Abdominal pain.   COMPARISON: 02/18/2023   ACCESSION NUMBER(S): [id number]   ORDERING CLINICIAN: NELLI PERES   TECHNIQUE: Axial CT images of the abdomen and pelvis with coronal and sagittal reconstructed images obtained after intravenous administration of contrast   FINDINGS: LOWER CHEST: Mild bibasilar atelectasis. BONES: No acute osseous abnormality. Diffuse degenerative disc changes. ABDOMINAL WALL: There is a hernia in the mid lower abdominal wall, which contains a segment of small bowel. The bowel proximal to the hernia is dilated and distally decompressed (series 6, image 29 and series 7, image 64). 2 small fat containing midline ventral hernia and additional fat containing hernia through the left rectus abdominus. Nonspecific subcutaneous stranding in the lower abdominal wall, more prominent on the right.   ABDOMEN:   LIVER: Within normal limits. BILE DUCTS: No biliary dilatation. GALLBLADDER: No calcified gallstones. No pericholecystic inflammatory changes. PANCREAS: Within normal limits. SPLEEN: Borderline enlarged, 13.2 cm craniocaudad length. ADRENALS: Within normal limits. KIDNEYS and URETERS: Symmetric renal enhancement. No hydronephrosis or perinephric fluid collection.     VESSELS: No aortic aneurysm. RETROPERITONEUM: No pathologically enlarged retroperitoneal lymph nodes.   PELVIS:   REPRODUCTIVE ORGANS: Hysterectomy. BLADDER: Within normal limits.   BOWEL: No dilated bowel.  Normal appendix. PERITONEUM: No ascites or free air, no fluid collection.       Small-bowel obstruction secondary to a lower abdominal ventral hernia  as detailed above.   Additional fat containing hernias more superiorly in the ventral abdominal wall.   Subcutaneous stranding in the lower abdominal wall. Please correlate clinically to exclude cystitis.   MACRO: None   Signed by: Anitha Saba 1/11/2024 3:44 AM Dictation workstation:   [id number]       Medications:     Scheduled medications      - allopurinol, 300 mg, oral, Daily  buPROPion XL, 150 mg, oral, q AM  DULoxetine, 60 mg, oral, Daily  empagliflozin, 10 mg, oral, Daily  levothyroxine, 200 mcg, oral, Daily  levothyroxine, 50 mcg, oral, Daily before breakfast  metOLazone, 5 mg, oral, Daily  metoprolol succinate XL, 50 mg, oral, Daily  pantoprazole, 40 mg, oral, Daily before breakfast  potassium chloride CR, 20 mEq, oral, TID  rosuvastatin, 40 mg, oral, Daily  spironolactone, 300 mg, oral, BID  torsemide, 100 mg, oral, BID  traZODone, 100 mg, oral, Nightly  warfarin, 7.5 mg, oral, Daily         Continuous medications      - sodium chloride 0.9%, 125 mL/hr, Last Rate: 125 mL/hr (02/04/24 0503)         PRN medications      - PRN medications: ondansetron     Assessment & Plan  Roselia Mo continues to be managed in accordance with the CDU clinical guidelines for Constipation. An update of their clinical problem list included:     1) Constipation    -- Patient no longer constipated, now having extremely loose BM's every 30 minutes    -- Advance diet as tolerated    -- Cont. mIVF    -- Cardiac monitoring, VS per unit protocol    -- Repeat abdominal exams    -- Discharge home on bowel reigment in AM if BM's have normalized        Teagan Romano PA-C  [facility]

## 2024-02-05 NOTE — PROGRESS NOTES
Roselia Mo is a 53 y.o. female on day 0 of admission presenting with Constipation.    Subjective   Patient is a 53-year-old female with history of SBO, diabetes who presented to the ED for constipation, she had recently been admitted for a bowel obstruction.  She has had 2 bowel movements in the last month only.  She did endorse having worsening pain today.    Workup in the ED showed an elevated white blood cell count of 16.2 which was similar to 15.94 days ago, her type and screen showed a positive blood type, lipase was 60, BMP showed a creatinine of 1.49 and a potassium of 3.5 initially, LFTs showed an alk phos of 489, ALT 65, AST 61.  CT showed IMPRESSION:  Several midline hernia defects at least to contain loops of bowel but  without definitive obstructive pattern on today's exam.  The  inflammation is also improved.    There is initial thought that patient was constipated and she was given GoLytely which actually resulted in her having quite a significant amount of diarrhea, she was having bowel movements every 30 minutes and was monitored in the CDU until she was able to improve, upon recheck of labs she was noted to be hypokalemic with a potassium of 2.9, at this point she was given 80 mill equivalents of potassium, (40 oral and 40 IV).  Her CMP did show gradually climbing LFTs, her ALT went from 58-81 and AST went from 52-92, patient states she has somewhat labile LFTs secondary to her fatty liver, denies any chronic Tylenol use.  While  Awaiting potassium infusions patient noted recurrence of her abdominal pain especially in the right upper quadrant, we did order right upper quadrant ultrasound to more definitively assess the gallbladder.  Patient given Bentyl and Pepcid for symptom improvement and did endorse some.           Objective     Physical Exam  Vitals and nursing note reviewed.   Constitutional:       General: She is not in acute distress.     Appearance: Normal appearance. She is obese. She  is not toxic-appearing.   HENT:      Head: Normocephalic and atraumatic.      Nose: Nose normal.   Eyes:      Extraocular Movements: Extraocular movements intact.   Cardiovascular:      Rate and Rhythm: Normal rate and regular rhythm.   Pulmonary:      Effort: Pulmonary effort is normal.   Abdominal:      Palpations: Abdomen is soft.      Tenderness: There is abdominal tenderness in the right upper quadrant. Positive signs include Hopkins's sign.      Comments: Abdominal wall hernias, reducible   Musculoskeletal:         General: Normal range of motion.      Cervical back: Normal range of motion and neck supple.   Skin:     General: Skin is warm and dry.   Neurological:      General: No focal deficit present.      Mental Status: She is alert.   Psychiatric:         Mood and Affect: Mood normal.         Thought Content: Thought content normal.         Last Recorded Vitals  Blood pressure 125/86, pulse 82, temperature 35.7 °C (96.3 °F), temperature source Tympanic, resp. rate 14, SpO2 96 %.  Intake/Output last 3 Shifts:  I/O last 3 completed shifts:  In: -   Out: 800 [Stool:800]    Relevant Results                             Assessment/Plan   Principal Problem:    Constipation  - Improved with Go-Lytely  - Diarrhea improved on its own    Abdominal pain  - Pain worsening in RUQ especially   - RUQ US  - Bentyl and Pepcid administered with some improvement    Transaminitis  - Likely from [facility]  - CMP redraw    Hypokalemia  - 80 mEq administered (40 oral, 40 IV)  - Redraw CMP       I spent 45 minutes in the professional and overall care of this patient.      Dayana Fowler PA-C

## 2024-02-06 VITALS
RESPIRATION RATE: 18 BRPM | OXYGEN SATURATION: 93 % | HEART RATE: 82 BPM | SYSTOLIC BLOOD PRESSURE: 124 MMHG | TEMPERATURE: 98.1 F | DIASTOLIC BLOOD PRESSURE: 73 MMHG

## 2024-02-06 LAB
ALBUMIN SERPL BCP-MCNC: 4.4 G/DL (ref 3.4–5)
ANION GAP SERPL CALC-SCNC: 18 MMOL/L (ref 10–20)
BUN SERPL-MCNC: 48 MG/DL (ref 6–23)
CALCIUM SERPL-MCNC: 11.7 MG/DL (ref 8.6–10.6)
CHLORIDE SERPL-SCNC: 87 MMOL/L (ref 98–107)
CO2 SERPL-SCNC: 31 MMOL/L (ref 21–32)
CREAT SERPL-MCNC: 1.2 MG/DL (ref 0.5–1.05)
EGFRCR SERPLBLD CKD-EPI 2021: 54 ML/MIN/1.73M*2
GLUCOSE SERPL-MCNC: 125 MG/DL (ref 74–99)
HOLD SPECIMEN: NORMAL
PHOSPHATE SERPL-MCNC: 3.4 MG/DL (ref 2.5–4.9)
POTASSIUM SERPL-SCNC: 3.6 MMOL/L (ref 3.5–5.3)
SODIUM SERPL-SCNC: 132 MMOL/L (ref 136–145)

## 2024-02-06 PROCEDURE — G0378 HOSPITAL OBSERVATION PER HR: HCPCS

## 2024-02-06 PROCEDURE — 96361 HYDRATE IV INFUSION ADD-ON: CPT | Performed by: STUDENT IN AN ORGANIZED HEALTH CARE EDUCATION/TRAINING PROGRAM

## 2024-02-06 PROCEDURE — 36415 COLL VENOUS BLD VENIPUNCTURE: CPT

## 2024-02-06 PROCEDURE — 80069 RENAL FUNCTION PANEL: CPT

## 2024-02-06 PROCEDURE — 2500000001 HC RX 250 WO HCPCS SELF ADMINISTERED DRUGS (ALT 637 FOR MEDICARE OP): Performed by: PHYSICIAN ASSISTANT

## 2024-02-06 PROCEDURE — 2500000001 HC RX 250 WO HCPCS SELF ADMINISTERED DRUGS (ALT 637 FOR MEDICARE OP)

## 2024-02-06 PROCEDURE — 2500000004 HC RX 250 GENERAL PHARMACY W/ HCPCS (ALT 636 FOR OP/ED)

## 2024-02-06 RX ORDER — POLYETHYLENE GLYCOL 3350 17 G/17G
17 POWDER, FOR SOLUTION ORAL DAILY
Qty: 510 G | Refills: 0 | Status: SHIPPED | OUTPATIENT
Start: 2024-02-06 | End: 2024-03-07

## 2024-02-06 RX ORDER — FAMOTIDINE 20 MG/1
20 TABLET, FILM COATED ORAL DAILY
Qty: 30 TABLET | Refills: 0 | Status: SHIPPED | OUTPATIENT
Start: 2024-02-06 | End: 2024-03-26 | Stop reason: WASHOUT

## 2024-02-06 RX ORDER — DICYCLOMINE HYDROCHLORIDE 10 MG/1
10 CAPSULE ORAL 4 TIMES DAILY PRN
Qty: 40 CAPSULE | Refills: 0 | Status: SHIPPED | OUTPATIENT
Start: 2024-02-06 | End: 2024-02-21 | Stop reason: ALTCHOICE

## 2024-02-06 RX ORDER — DOCUSATE SODIUM 100 MG/1
100 CAPSULE, LIQUID FILLED ORAL EVERY 12 HOURS
Qty: 60 CAPSULE | Refills: 0 | Status: SHIPPED | OUTPATIENT
Start: 2024-02-06 | End: 2024-03-07

## 2024-02-06 RX ORDER — POTASSIUM CHLORIDE 20 MEQ/1
40 TABLET, EXTENDED RELEASE ORAL ONCE
Status: DISCONTINUED | OUTPATIENT
Start: 2024-02-06 | End: 2024-02-06

## 2024-02-06 RX ADMIN — DULOXETINE HYDROCHLORIDE 60 MG: 30 CAPSULE, DELAYED RELEASE ORAL at 08:32

## 2024-02-06 RX ADMIN — LEVOTHYROXINE SODIUM 50 MCG: 50 TABLET ORAL at 08:32

## 2024-02-06 RX ADMIN — METOLAZONE 5 MG: 5 TABLET ORAL at 10:13

## 2024-02-06 RX ADMIN — EMPAGLIFLOZIN 10 MG: 10 TABLET, FILM COATED ORAL at 08:33

## 2024-02-06 RX ADMIN — METOPROLOL SUCCINATE 50 MG: 50 TABLET, EXTENDED RELEASE ORAL at 08:32

## 2024-02-06 RX ADMIN — ROSUVASTATIN CALCIUM 40 MG: 40 TABLET, FILM COATED ORAL at 08:33

## 2024-02-06 RX ADMIN — ALLOPURINOL 300 MG: 100 TABLET ORAL at 08:31

## 2024-02-06 RX ADMIN — BUPROPION HYDROCHLORIDE 150 MG: 150 TABLET, FILM COATED, EXTENDED RELEASE ORAL at 10:13

## 2024-02-06 RX ADMIN — LEVOTHYROXINE SODIUM 200 MCG: 100 TABLET ORAL at 08:36

## 2024-02-06 RX ADMIN — DICYCLOMINE HYDROCHLORIDE 20 MG: 10 CAPSULE ORAL at 10:13

## 2024-02-06 RX ADMIN — PANTOPRAZOLE SODIUM 40 MG: 40 TABLET, DELAYED RELEASE ORAL at 08:33

## 2024-02-06 RX ADMIN — POTASSIUM CHLORIDE 20 MEQ: 1500 TABLET, EXTENDED RELEASE ORAL at 08:32

## 2024-02-06 RX ADMIN — POTASSIUM CHLORIDE 20 MEQ: 1.5 POWDER, FOR SOLUTION ORAL at 08:33

## 2024-02-06 RX ADMIN — TORSEMIDE 100 MG: 100 TABLET ORAL at 08:30

## 2024-02-06 NOTE — PROGRESS NOTES
Subjective  Roselia Mo is a 53 y.o. female on day 2 of admission presenting with Constipation.   Serial assessments of clinical progress include:  1.)  Patient denies chest pain, shortness of breath, nausea/vomiting, abdominal, headache and migraines.  2.)  Patient remained hemodynamically stable and neurologically intact on the rest of the night.        Objective  VS reviewed  Physical Exam:  GENERAL:  The patient appears nourished and normally developed. Vital signs as documented.     Appearance: Alert, oriented, cooperative, in no acute distress. Well nourished & well hydrated.    HEENT:  Head normocephalic, atraumatic, EOMs intact, PERRLA, Mucous membranes moist. Nares patent without copious rhinorrhea.  Oropharynx moist and clear.  Uvula midline.    Neck: Supple.  No meningismus.  No swelling.  Trachea midline. No lymphadenopathy.    Pulmonary:  Lungs are clear to auscultation, without any respiratory distress.  No wheezing, crackles or rales.  No hypoxia or dyspnea.  Able to speak full sentences, no accessory muscle use.    Cardia:   Regular rate and rhythm. No murmurs, rubs or gallops.  No JVD.    GI:  Soft, non-distended, non-tender, BS positive x 4 quadrants, No rebound or guarding, no peritoneal signs, no CVA tenderness, no masses or organomegaly.    Musculoskeletal: Symmetrical muscle bulk.  No peripheral edema.  Pulses intact distal.  Able to walk.    Integumentary: Warm, dry and intact.  No pallor or jaundice.  No lesions, rashes or open sores.    NEURO:  No obvious neurological deficits, normal sensation and strength bilaterally.  Speech clear and fluent.  Able to follow commands, CN 2-12 intact.    Psych: Appropriate mood and affect.      Relevant Results  Results for orders placed or performed during the hospital encounter of 02/03/24 (from the past 24 hour(s))   Comprehensive metabolic panel   Result Value Ref Range    Glucose 113 (H) 74 - 99 mg/dL    Sodium 132 (L) 136 - 145 mmol/L     Potassium 2.9 (LL) 3.5 - 5.3 mmol/L    Chloride 83 (L) 98 - 107 mmol/L    Bicarbonate 37 (H) 21 - 32 mmol/L    Anion Gap 15 10 - 20 mmol/L    Urea Nitrogen 59 (H) 6 - 23 mg/dL    Creatinine 1.31 (H) 0.50 - 1.05 mg/dL    eGFR 48 (L) >60 mL/min/1.73m*2    Calcium 11.1 (H) 8.6 - 10.6 mg/dL    Albumin 4.2 3.4 - 5.0 g/dL    Alkaline Phosphatase 519 (H) 33 - 110 U/L    Total Protein 7.9 6.4 - 8.2 g/dL    AST 92 (H) 9 - 39 U/L    Bilirubin, Total 0.8 0.0 - 1.2 mg/dL    ALT 81 (H) 7 - 45 U/L   CBC and Auto Differential   Result Value Ref Range    WBC 13.4 (H) 4.4 - 11.3 x10*3/uL    nRBC 0.0 0.0 - 0.0 /100 WBCs    RBC 5.03 4.00 - 5.20 x10*6/uL    Hemoglobin 15.5 12.0 - 16.0 g/dL    Hematocrit 43.3 36.0 - 46.0 %    MCV 86 80 - 100 fL    MCH 30.8 26.0 - 34.0 pg    MCHC 35.8 32.0 - 36.0 g/dL    RDW 13.6 11.5 - 14.5 %    Platelets 263 150 - 450 x10*3/uL    Neutrophils % 68.3 40.0 - 80.0 %    Immature Granulocytes %, Automated 0.9 0.0 - 0.9 %    Lymphocytes % 17.4 13.0 - 44.0 %    Monocytes % 9.1 2.0 - 10.0 %    Eosinophils % 3.9 0.0 - 6.0 %    Basophils % 0.4 0.0 - 2.0 %    Neutrophils Absolute 9.14 (H) 1.20 - 7.70 x10*3/uL    Immature Granulocytes Absolute, Automated 0.12 0.00 - 0.70 x10*3/uL    Lymphocytes Absolute 2.33 1.20 - 4.80 x10*3/uL    Monocytes Absolute 1.22 (H) 0.10 - 1.00 x10*3/uL    Eosinophils Absolute 0.52 0.00 - 0.70 x10*3/uL    Basophils Absolute 0.06 0.00 - 0.10 x10*3/uL   Comprehensive metabolic panel   Result Value Ref Range    Glucose 126 (H) 74 - 99 mg/dL    Sodium 130 (L) 136 - 145 mmol/L    Potassium 3.3 (L) 3.5 - 5.3 mmol/L    Chloride 86 (L) 98 - 107 mmol/L    Bicarbonate 35 (H) 21 - 32 mmol/L    Anion Gap 12 10 - 20 mmol/L    Urea Nitrogen 59 (H) 6 - 23 mg/dL    Creatinine 1.31 (H) 0.50 - 1.05 mg/dL    eGFR 48 (L) >60 mL/min/1.73m*2    Calcium 11.3 (H) 8.6 - 10.6 mg/dL    Albumin 4.5 3.4 - 5.0 g/dL    Alkaline Phosphatase 485 (H) 33 - 110 U/L    Total Protein 7.4 6.4 - 8.2 g/dL    AST 83 (H) 9 - 39  U/L    Bilirubin, Total 0.6 0.0 - 1.2 mg/dL    ALT 84 (H) 7 - 45 U/L       Imaging Results  CT abdomen pelvis w IV contrast    Result Date: 2/4/2024  STUDY: CT Abdomen and Pelvis with IV Contrast; 2/4/2024 AT 12:59 a.m. INDICATION: Abdominal pain. COMPARISON: XR abdomen 1/11/2024.  CT AP 1/11/2024. ACCESSION NUMBER(S): [id number] ORDERING CLINICIAN: Afsaneh Sparks TECHNIQUE: CT of the abdomen and pelvis was performed.  Contiguous axial images were obtained at 3 mm slice thickness through the abdomen and pelvis. Coronal and sagittal reconstructions at 3 mm slice thickness were performed.  Omnipaque 350 100 mL was administered intravenously.  FINDINGS: LOWER CHEST: No cardiomegaly.  No pericardial effusion.  Lung bases reveals minor atelectasis changes..  ABDOMEN:  LIVER: There is diffuse hepatic steatosis.  There are several periportal nonspecific lymph nodes. BILE DUCTS: No intrahepatic or extrahepatic biliary ductal dilatation.  GALLBLADDER: The gallbladder is unremarkable.. STOMACH: No abnormalities identified.  PANCREAS: No masses or ductal dilatation.  SPLEEN: No splenomegaly or focal splenic lesion.  ADRENAL GLANDS: No thickening or nodules.  KIDNEYS AND URETERS: Kidneys are normal in size and location.  No renal or ureteral calculi.  PELVIS:  BLADDER: No abnormalities identified.  REPRODUCTIVE ORGANS: No abnormalities identified.  BOWEL: No abnormalities identified.  Moderate amount fecal debris is demonstrated throughout the colon.  VESSELS: No abnormalities identified.  Abdominal aorta is normal in caliber.  PERITONEUM/RETROPERITONEUM/LYMPH NODES: No free fluid.  No pneumoperitoneum. No lymphadenopathy.  ABDOMINAL WALL: Reveals a localized small hernia defect containing loop of bowel below the umbilicus.  Previously noted inflammation and associated small bowel obstructive process is improved.  There is periumbilical and supraumbilical midline abdominal wall hernia..  There is another that contains a loop  of small bowel but is not dilated. SOFT TISSUES: No abnormalities identified.  BONES: No acute fracture or aggressive osseous lesion.    Several midline hernia defects at least to contain loops of bowel but without definitive obstructive pattern on today's exam.  The inflammation is also improved.  Additional findings as above.. Signed by Dimitris Murphy,     XR chest 2 views    Result Date: 2/2/2024  Interpreted By:  Flako Nichole, STUDY: XR CHEST 2 VIEWS   INDICATION: Signs/Symptoms:Dyspnea on excertion.   COMPARISON: None   ACCESSION NUMBER(S): [id number]   ORDERING CLINICIAN: ABHISHEK PIZARRO   FINDINGS: Some patchy basilar opacities are noted bilaterally. This could be multifocal pneumonia or possibly a component of edema.   Heart size upper limits of normal.   No effusion or pneumothorax       Some patchy basilar opacities are noted bilaterally. This could be multifocal pneumonia or possibly a component of edema.   Heart size upper limits of normal.   Signed by: Flako Nichole 2/2/2024 6:05 PM Dictation workstation:   [id number]    BI US breast limited left    Result Date: 2/2/2024  Interpreted By:  Leif Chao, STUDY: BI US BREAST LIMITED LEFT; 2/1/2024 12:36 pm   ACCESSION NUMBER(S): [id number]   ORDERING CLINICIAN: BENITA DE LA TORRE   INDICATION: Signs/Symptoms:ABNORMAL MAMMO.   COMPARISON: Digital mammogram dated 01/26/2024   TECHNIQUE: Multiple grayscale ultrasonographic images were obtained through the left breast in the region of mammographic abnormality.   FINDINGS: In the 4 o'clock position of the left breast, approximately 11 cm from the nipple, there is a somewhat heterogeneous hypoechoic mass identified, measuring up to 9 x 9 x 7 mm. This has a possible hyperechoic hilum and may represent a reactive lymph node.   In the 4 o'clock position of the left breast, approximately 11 cm from the nipple, there is a well-defined and smoothly marginated intramammary lymph node identified, measuring up to 6  x 6 x 5 mm.       Mass in the left breast, most consistent in appearance with a reactive lymph node. Recommendation is for follow-up examination in 3 months with left breast ultrasound.   BI-RADS CATEGORY: BI-RADS Category:  3 Probably Benign. Recommendation:  Breast Ultrasound in 3 Months. Recommended Date:  3 Months. Laterality:  Left.   Negative or benign mammogram and ultrasound should not preclude further evaluation of a suspicious clinical abnormality.   MACRO: None   Signed by: Leif Chao 2/2/2024 8:39 AM Dictation workstation:   [id number]    BI mammo bilateral screening tomosynthesis    Result Date: 1/29/2024  Interpreted By:  Leif Chao, STUDY: BI MAMMO BILATERAL SCREENING TOMOSYNTHESIS; 1/26/2024 10:21 am   ACCESSION NUMBER(S): [id number]   ORDERING CLINICIAN: BENITA DE LA TORRE   INDICATION: Screening.   COMPARISON: None.   FINDINGS: CC and MLO 2D digital mammograms and digital breast tomosynthesis images were obtained of the bilateral breasts. 3-D volume images were reconstructed in 8 views at an independent workstation as 1 mm slices through the breasts in both the CC and MLO projections.   Density:  The breast tissue is almost entirely fatty.   A rounded mass is seen in the lower outer quadrant of the left breast at medium to posterior depth. No additional mass or focal asymmetry is identified. No suspicious microcalcifications or foci of architectural distortion are seen.   This study was interpreted with CAD.       Mass in the left breast, as above. Further evaluation with ultrasound is recommended.   BI-RADS CATEGORY: BI-RADS Category:  0 Incomplete; Need Additional Imaging Evaluation and/or Prior Mammograms for Comparison. Recommendation:  Ultrasound. Recommended Date:  Immediate. Laterality:  Left.   MACRO: None     Signed by: Leif Chao 1/29/2024 8:42 AM Dictation workstation:   [id number]     XR DEXA bone density    Result Date: 1/26/2024  Interpreted By:  Leif Chao, STUDY: DEXA  BONE DENSITY1/26/2024 10:17 am   INDICATION: Signs/Symptoms:screening.  The patient is a 54 y/o  year old F.   COMPARISON: None.   ACCESSION NUMBER(S): [id number]   ORDERING CLINICIAN: BENITA DE LA TORRE   TECHNIQUE: DEXA BONE DENSITY   FINDINGS: LEFT FOREARM Total Bone Mineral Density: 0.787 T-Score 1.7  Z-Score 2.2 UD Bone Mineral Density: 0.525 T-Score 1.2 Z-Score 1.7 MID Bone Mineral Density: Not reported T-Score Not reported Z-Score Not reported 1/3 Bone Mineral Density: 0.991 T-Score 1.2   Z-Score 1.6 Bone Mineral Density change vs baseline:  Not reported Bone Mineral Density change vs previous:  Not reported   RIGHT FOREARM Bone Mineral Density: 0.823 T-Score 2.3   Z-Score 2.8 UD Bone Mineral Density: 0.544 T-Score 1.7 Z-Score 2.1 MID Bone Mineral Density: Not reported T-Score Not reported Z-Score  Not reported 1/3 Bone Mineral Density: 1.056 T-Score 1.9 Z-Score 2.4 Bone Mineral Density change vs baseline:  Not reported Bone Mineral Density change vs previous:  Not reported     World Health Organization (WHO) criteria for post-menopausal,  Women: Normal:         T-score at or above -1 SD Osteopenia:   T-score between -1 and -2.5 SD Osteoporosis: T-score at or below -2.5 SD     10-year Fracture Risk: Major Osteoporotic Fracture  Not reported Hip Fracture                        Not reported   Note:  If no FRAX score is reported, it is because: Some T-score for Spine Total or Hip Total or Femoral Neck at or below -2.5     This exam was performed at [facility] on a GE Lunar Prodigy Advanced Dexa Unit.       DEXA:  According to World Health Organization criteria, classification is normal.   Followup recommended in 2 years or sooner as clinically warranted.   VFA:   All images and detailed analysis are available on the  Radiology PACS.   MACRO: None   Signed by: Leif Chao 1/26/2024 12:26 PM Dictation workstation:   [id number]    FL small bowel series    Result Date:  1/12/2024  Interpreted By:  Leif Chao, STUDY: FL SMALL BOWEL SERIES;  1/12/2024 12:04 pm   INDICATION: Signs/Symptoms:SBO (suspected partial/chronic) in setting of ventral hernia in morbidly obese, high surgical risk patient. Please assess for passage of contrast..   COMPARISON: None.   ACCESSION NUMBER(S): [id number]   ORDERING CLINICIAN: IZABELA CARRILLO   TECHNIQUE: An initial radiograph of the abdomen and pelvis was obtained. Following the administration of contrast Gastrografin, delayed static fluoroscopic images of the abdomen in the posteroanterior projection were obtained at multiple intervals.   FINDINGS: Initial  image demonstrates  a nonspecific nonobstructive bowel gas pattern. An enteric tube is seen with the tip over the mid stomach.   The small bowel is of  normalcaliber with no mucosal thickening appreciated.  A normal feathery appearance is observed to the jejunum.  The normal smooth appearance of the ileum is observed. There is no evidence for annular constricting lesions, large intraluminal mass lesion, or mucosal ulceration.   The terminal ileum and cecum are well visualized and within normal limits. Transit time of contrast to the cecum was identified by  20 minutes.       1.  No evidence of bowel obstruction..     MACRO: None   Signed by: Leif Chao 1/12/2024 12:20 PM Dictation workstation:   [id number]    XR abdomen 1 view    Result Date: 1/11/2024  Interpreted By:  Anitha Saba, STUDY: XR ABDOMEN 1 VIEW; ;  1/11/2024 4:27 am   INDICATION: Signs/Symptoms:NG placement.   COMPARISON: 02/17/2023   ACCESSION NUMBER(S): [id number]   ORDERING CLINICIAN: NELLI PERES   FINDINGS: Single portable radiograph centered on the chest and abdomen. Nasogastric tube crosses below the diaphragm with tip overlying the gastric body. The lung bases are clear. No air-filled dilated bowel in the imaged abdomen.       The nasogastric tube courses below the diaphragm with tip overlying the gastric body.      MACRO: None   Signed by: Anitha Saba 1/11/2024 4:35 AM Dictation workstation:   [id number]    CT abdomen pelvis w IV contrast    Result Date: 1/11/2024  Interpreted By:  Anitha Saba, STUDY: CT ABDOMEN PELVIS W IV CONTRAST;  1/11/2024 3:30 am   INDICATION: Signs/Symptoms:Abdominal pain.   COMPARISON: 02/18/2023   ACCESSION NUMBER(S): [id number]   ORDERING CLINICIAN: NELLI PERES   TECHNIQUE: Axial CT images of the abdomen and pelvis with coronal and sagittal reconstructed images obtained after intravenous administration of contrast   FINDINGS: LOWER CHEST: Mild bibasilar atelectasis. BONES: No acute osseous abnormality. Diffuse degenerative disc changes. ABDOMINAL WALL: There is a hernia in the mid lower abdominal wall, which contains a segment of small bowel. The bowel proximal to the hernia is dilated and distally decompressed (series 6, image 29 and series 7, image 64). 2 small fat containing midline ventral hernia and additional fat containing hernia through the left rectus abdominus. Nonspecific subcutaneous stranding in the lower abdominal wall, more prominent on the right.   ABDOMEN:   LIVER: Within normal limits. BILE DUCTS: No biliary dilatation. GALLBLADDER: No calcified gallstones. No pericholecystic inflammatory changes. PANCREAS: Within normal limits. SPLEEN: Borderline enlarged, 13.2 cm craniocaudad length. ADRENALS: Within normal limits. KIDNEYS and URETERS: Symmetric renal enhancement. No hydronephrosis or perinephric fluid collection.     VESSELS: No aortic aneurysm. RETROPERITONEUM: No pathologically enlarged retroperitoneal lymph nodes.   PELVIS:   REPRODUCTIVE ORGANS: Hysterectomy. BLADDER: Within normal limits.   BOWEL: No dilated bowel.  Normal appendix. PERITONEUM: No ascites or free air, no fluid collection.       Small-bowel obstruction secondary to a lower abdominal ventral hernia as detailed above.   Additional fat containing hernias more superiorly in the ventral abdominal wall.    Subcutaneous stranding in the lower abdominal wall. Please correlate clinically to exclude cystitis.   MACRO: None   Signed by: Anitha Saba 1/11/2024 3:44 AM Dictation workstation:   [id number]      Medications:  allopurinol, 300 mg, oral, Daily  buPROPion XL, 150 mg, oral, q AM  dicyclomine, 20 mg, oral, TID  DULoxetine, 60 mg, oral, Daily  empagliflozin, 10 mg, oral, Daily  levothyroxine, 200 mcg, oral, Daily  levothyroxine, 50 mcg, oral, Daily before breakfast  metOLazone, 5 mg, oral, Daily  metoprolol succinate XL, 50 mg, oral, Daily  pantoprazole, 40 mg, oral, Daily before breakfast  potassium chloride, 20 mEq, oral, BID  potassium chloride CR, 20 mEq, oral, TID  rosuvastatin, 40 mg, oral, Daily  spironolactone, 300 mg, oral, BID  torsemide, 100 mg, oral, BID  traZODone, 100 mg, oral, Nightly  warfarin, 7.5 mg, oral, Daily       PRN medications: ondansetron     Assessment/Plan     Roselia Mo continues to be managed in accordance with the CDU clinical guidelines for constipation. An update of their clinical problem list included:  1.)  Constipation  -Ultrasound pending  -CMP shows potassium level at 3.3, hepatic enzymes downtrending, AST is 83, ALT is 84, alkaline phosphate is 485, BUN is 59, creatinine is 1.31, EGFR is 48  -Will discharge home with Metamucil          We will observe the patient for the following endpoints:   1.)  Stable vitals  2.)  Symptomatic improvement  3.)  Negative ultrasound    When met, appropriate disposition will be arranged.    Roselia Mo has been admitted to the CDU for 57 hours. I spent 25 minutes in the professional and overall care of this patient   @OBS@    Snow Cardona, APRN-CNP  [facility]  Emergency Department  Extension [phone]

## 2024-02-06 NOTE — DISCHARGE INSTRUCTIONS
Follow up with GI provider of your choosing  - Can call referral line: [phone] to help make appointment  Follow up with PCP within 2 days  Continue bowel regimen. Goal for 1 bowel movement/day. Start with Colace (stool softener) twice a day and miralax once a day. If stools too soft, stop Miralax.   Bentyl up to 4 times a day as needed for abdominal pain. This can make you constipated

## 2024-02-06 NOTE — DISCHARGE SUMMARY
Disposition Note  [facility] CLINICAL DECISION  Patient: Roselia Mo  MRN: [id number]  : 1970  Date of Evaluation: 2/3/2024  ED Provider: Lesly White PA-C      Limitations to history: No  Independent Historian: Yes  External Records Reviewed: ED provider notes, ED imaging      Subjective:    Roselia Mo is a 53 y.o. female has undergone comprehensive diagnostic evaluation and therapeutic management in accordance with the CDU guidelines for constipation. Based on patient's clinical response and diagnostic information during this period of observation, it has been determined that the patient will be discharged.    This patient is a 53-year-old female with past medical history of SBO, diabetes that initially presented to the ED with complaints of constipation.  She was recently admitted for small bowel obstruction.  She notes that she has only had 2 bowel movements over the past month.  She does report having worsening pain upon presentation to the ED.  She reports she is supposed to be following up with the bariatric surgeon for further evaluation of recurrent SBO's.    During ED assessment, patient did show blood work remarkable for hyponatremia and ISRAEL.  Consistent with patient's history of poor p.o. intake.  CT abdomen pelvis showed significant stool burden with no signs of obstruction and improvement from prior.  Does show hernia.  It was determined that the patient was likely experiencing abdominal pain due to the severe constipation.  Patient was admitted to the CDU for repeat labs in the morning, close follow-up with her ISRAEL, and bowel regimen.    While in the ED, patient was started on bowel regimen with GoLytely.  Patient was having significant diarrhea while in the CDU including bowel movements every 30 minutes.  She was taken off of the bowel regimen due to the continued diarrhea.  Repeat lab work does show now hypokalemia, likely due to the continued diarrhea.  Patient  also did receive a right upper quadrant ultrasound due to elevated LFTs which did show SEVILLA.  Patient reports today that the diarrhea has since resolved, though she reports its consistency claylike.  She does report continued abdominal pain though finds relief and taking Bentyl which is reassuring.  Patient feels comfortable going home with close follow-up with bariatric surgery, GI and her PCP.  Repeat RFP showed at discharge:  GLUCOSE 125   SODIUM 132   POTASSIUM 3.6   CHLORIDE 87   Bicarbonate 31   Anion Gap 18   Blood Urea Nitrogen 48   Creatinine 1.20   EGFR 54   Calcium 11.7   PHOSPHORUS 3.4   Albumin 4.4      Hyponatremia and hypocalcemia improving. Hypokalemia resolved. ISRAEL down trending. Encouraged increased oral intake and close follow up with PCP for repeat RFP. Patient is stable and homegoing.     The acute evaluation included:  Orders Placed This Encounter   Procedures    CT abdomen pelvis w IV contrast    US right upper quadrant    Basic metabolic panel    CBC and Auto Differential    Hepatic function panel    Lipase    Type And Screen    BLOOD GAS VENOUS FULL PANEL    Comprehensive metabolic panel    Comprehensive metabolic panel    CBC and Auto Differential    Comprehensive metabolic panel    Renal Function Panel    Referral to Gastroenterology    Adult diet Carb Controlled; 60 gram carb/meal, 30 gram Carb evening snack    Weight on admission    Height on admission    Vital Signs    Activity (specify) No Restrictions    Telemetry monitoring    Notify provider    Pain Assessment    POCT GLUCOSE    POCT GLUCOSE    ECG 12 lead    Electrocardiogram, 12-lead PRN ACS symptoms    Send to CDU    Send to CDU    Initiate observation status       Results for orders placed or performed during the hospital encounter of 02/03/24   Basic metabolic panel   Result Value Ref Range    Glucose 97 74 - 99 mg/dL    Sodium 127 (L) 136 - 145 mmol/L    Potassium 3.5 3.5 - 5.3 mmol/L    Chloride 81 (L) 98 - 107 mmol/L     Bicarbonate 33 (H) 21 - 32 mmol/L    Anion Gap 17 10 - 20 mmol/L    Urea Nitrogen 71 (H) 6 - 23 mg/dL    Creatinine 1.49 (H) 0.50 - 1.05 mg/dL    eGFR 41 (L) >60 mL/min/1.73m*2    Calcium 11.9 (H) 8.6 - 10.6 mg/dL   CBC and Auto Differential   Result Value Ref Range    WBC 16.2 (H) 4.4 - 11.3 x10*3/uL    nRBC 0.0 0.0 - 0.0 /100 WBCs    RBC 5.25 (H) 4.00 - 5.20 x10*6/uL    Hemoglobin 16.2 (H) 12.0 - 16.0 g/dL    Hematocrit 44.4 36.0 - 46.0 %    MCV 85 80 - 100 fL    MCH 30.9 26.0 - 34.0 pg    MCHC 36.5 (H) 32.0 - 36.0 g/dL    RDW 13.4 11.5 - 14.5 %    Platelets 300 150 - 450 x10*3/uL    Neutrophils % 71.6 40.0 - 80.0 %    Immature Granulocytes %, Automated 1.1 (H) 0.0 - 0.9 %    Lymphocytes % 15.0 13.0 - 44.0 %    Monocytes % 10.7 2.0 - 10.0 %    Eosinophils % 1.2 0.0 - 6.0 %    Basophils % 0.4 0.0 - 2.0 %    Neutrophils Absolute 11.60 (H) 1.20 - 7.70 x10*3/uL    Immature Granulocytes Absolute, Automated 0.17 0.00 - 0.70 x10*3/uL    Lymphocytes Absolute 2.43 1.20 - 4.80 x10*3/uL    Monocytes Absolute 1.73 (H) 0.10 - 1.00 x10*3/uL    Eosinophils Absolute 0.19 0.00 - 0.70 x10*3/uL    Basophils Absolute 0.07 0.00 - 0.10 x10*3/uL   Hepatic function panel   Result Value Ref Range    Albumin 4.8 3.4 - 5.0 g/dL    Bilirubin, Total 0.5 0.0 - 1.2 mg/dL    Bilirubin, Direct 0.3 0.0 - 0.3 mg/dL    Alkaline Phosphatase 489 (H) 33 - 110 U/L    ALT 65 (H) 7 - 45 U/L    AST 61 (H) 9 - 39 U/L    Total Protein 8.3 (H) 6.4 - 8.2 g/dL   Lipase   Result Value Ref Range    Lipase 60 9 - 82 U/L   Type And Screen   Result Value Ref Range    ABO TYPE A     Rh TYPE POS     ANTIBODY SCREEN NEG    BLOOD GAS VENOUS FULL PANEL   Result Value Ref Range    POCT pH, Venous 7.51 (H) 7.33 - 7.43 pH    POCT pCO2, Venous 47 41 - 51 mm Hg    POCT pO2, Venous 44 35 - 45 mm Hg    POCT SO2, Venous 70 45 - 75 %    POCT Oxy Hemoglobin, Venous 69.0 45.0 - 75.0 %    POCT Hematocrit Calculated, Venous 48.0 (H) 36.0 - 46.0 %    POCT Sodium, Venous 124 (L) 136 -  145 mmol/L    POCT Potassium, Venous 3.6 3.5 - 5.3 mmol/L    POCT Chloride, Venous 85 (L) 98 - 107 mmol/L    POCT Ionized Calicum, Venous 1.29 1.10 - 1.33 mmol/L    POCT Glucose, Venous 133 (H) 74 - 99 mg/dL    POCT Lactate, Venous 1.3 0.4 - 2.0 mmol/L    POCT Base Excess, Venous 12.5 (H) -2.0 - 3.0 mmol/L    POCT HCO3 Calculated, Venous 37.5 (H) 22.0 - 26.0 mmol/L    POCT Hemoglobin, Venous 15.9 12.0 - 16.0 g/dL    POCT Anion Gap, Venous 5.0 (L) 10.0 - 25.0 mmol/L    Patient Temperature 37.0 degrees Celsius    FiO2 21 %   Comprehensive metabolic panel   Result Value Ref Range    Glucose 153 (H) 74 - 99 mg/dL    Sodium 128 (L) 136 - 145 mmol/L    Potassium 3.2 (L) 3.5 - 5.3 mmol/L    Chloride 81 (L) 98 - 107 mmol/L    Bicarbonate 35 (H) 21 - 32 mmol/L    Anion Gap 15 10 - 20 mmol/L    Urea Nitrogen 70 (H) 6 - 23 mg/dL    Creatinine 1.44 (H) 0.50 - 1.05 mg/dL    eGFR 43 (L) >60 mL/min/1.73m*2    Calcium 11.2 (H) 8.6 - 10.6 mg/dL    Albumin 4.7 3.4 - 5.0 g/dL    Alkaline Phosphatase 451 (H) 33 - 110 U/L    Total Protein 8.1 6.4 - 8.2 g/dL    AST 52 (H) 9 - 39 U/L    Bilirubin, Total 0.7 0.0 - 1.2 mg/dL    ALT 58 (H) 7 - 45 U/L   Comprehensive metabolic panel   Result Value Ref Range    Glucose 113 (H) 74 - 99 mg/dL    Sodium 132 (L) 136 - 145 mmol/L    Potassium 2.9 (LL) 3.5 - 5.3 mmol/L    Chloride 83 (L) 98 - 107 mmol/L    Bicarbonate 37 (H) 21 - 32 mmol/L    Anion Gap 15 10 - 20 mmol/L    Urea Nitrogen 59 (H) 6 - 23 mg/dL    Creatinine 1.31 (H) 0.50 - 1.05 mg/dL    eGFR 48 (L) >60 mL/min/1.73m*2    Calcium 11.1 (H) 8.6 - 10.6 mg/dL    Albumin 4.2 3.4 - 5.0 g/dL    Alkaline Phosphatase 519 (H) 33 - 110 U/L    Total Protein 7.9 6.4 - 8.2 g/dL    AST 92 (H) 9 - 39 U/L    Bilirubin, Total 0.8 0.0 - 1.2 mg/dL    ALT 81 (H) 7 - 45 U/L   CBC and Auto Differential   Result Value Ref Range    WBC 13.4 (H) 4.4 - 11.3 x10*3/uL    nRBC 0.0 0.0 - 0.0 /100 WBCs    RBC 5.03 4.00 - 5.20 x10*6/uL    Hemoglobin 15.5 12.0 - 16.0 g/dL     Hematocrit 43.3 36.0 - 46.0 %    MCV 86 80 - 100 fL    MCH 30.8 26.0 - 34.0 pg    MCHC 35.8 32.0 - 36.0 g/dL    RDW 13.6 11.5 - 14.5 %    Platelets 263 150 - 450 x10*3/uL    Neutrophils % 68.3 40.0 - 80.0 %    Immature Granulocytes %, Automated 0.9 0.0 - 0.9 %    Lymphocytes % 17.4 13.0 - 44.0 %    Monocytes % 9.1 2.0 - 10.0 %    Eosinophils % 3.9 0.0 - 6.0 %    Basophils % 0.4 0.0 - 2.0 %    Neutrophils Absolute 9.14 (H) 1.20 - 7.70 x10*3/uL    Immature Granulocytes Absolute, Automated 0.12 0.00 - 0.70 x10*3/uL    Lymphocytes Absolute 2.33 1.20 - 4.80 x10*3/uL    Monocytes Absolute 1.22 (H) 0.10 - 1.00 x10*3/uL    Eosinophils Absolute 0.52 0.00 - 0.70 x10*3/uL    Basophils Absolute 0.06 0.00 - 0.10 x10*3/uL   Comprehensive metabolic panel   Result Value Ref Range    Glucose 126 (H) 74 - 99 mg/dL    Sodium 130 (L) 136 - 145 mmol/L    Potassium 3.3 (L) 3.5 - 5.3 mmol/L    Chloride 86 (L) 98 - 107 mmol/L    Bicarbonate 35 (H) 21 - 32 mmol/L    Anion Gap 12 10 - 20 mmol/L    Urea Nitrogen 59 (H) 6 - 23 mg/dL    Creatinine 1.31 (H) 0.50 - 1.05 mg/dL    eGFR 48 (L) >60 mL/min/1.73m*2    Calcium 11.3 (H) 8.6 - 10.6 mg/dL    Albumin 4.5 3.4 - 5.0 g/dL    Alkaline Phosphatase 485 (H) 33 - 110 U/L    Total Protein 7.4 6.4 - 8.2 g/dL    AST 83 (H) 9 - 39 U/L    Bilirubin, Total 0.6 0.0 - 1.2 mg/dL    ALT 84 (H) 7 - 45 U/L   POCT GLUCOSE   Result Value Ref Range    POCT Glucose 129 (H) 74 - 99 mg/dL   POCT GLUCOSE   Result Value Ref Range    POCT Glucose 159 (H) 74 - 99 mg/dL       Placed in observation at: 2024 @ 0326         Past History     Past Medical History:   Diagnosis Date    Arthritis     Asthma     CHF (congestive heart failure) (CMS/[facility])     COPD (chronic obstructive pulmonary disease) (CMS/[facility])     Diabetes mellitus (CMS/[facility])     Disease of thyroid gland     Hypertension      Past Surgical History:   Procedure Laterality Date    CARPAL TUNNEL RELEASE Bilateral      SECTION, LOW TRANSVERSE        AND     HERNIA REPAIR      WITH MESH    HYSTERECTOMY      2 PARTIAL    KNEE SURGERY Left     MINISCUS REPAIR     Social History     Socioeconomic History    Marital status:      Spouse name: Not on file    Number of children: Not on file    Years of education: Not on file    Highest education level: Not on file   Occupational History    Not on file   Tobacco Use    Smoking status: Former     Packs/day: 1.00     Years: 37.00     Additional pack years: 0.00     Total pack years: 37.00     Types: Cigarettes     Start date: 1977     Quit date: 2014     Years since quittin.2    Smokeless tobacco: Never    Tobacco comments:     CBD vaping   Vaping Use    Vaping Use: Former   Substance and Sexual Activity    Alcohol use: Yes     Comment: rarely    Drug use: Yes     Frequency: 7.0 times per week     Types: Marijuana     Comment: one or two bowels per day    Sexual activity: Defer   Other Topics Concern    Not on file   Social History Narrative    Not on file     Social Determinants of Health     Financial Resource Strain: Low Risk  (2024)    Overall Financial Resource Strain (CARDIA)     Difficulty of Paying Living Expenses: Not hard at all   Food Insecurity: Patient Declined (2024)    Hunger Vital Sign     Worried About Running Out of Food in the Last Year: Patient declined     Ran Out of Food in the Last Year: Patient declined   Transportation Needs: No Transportation Needs (2024)    PRAPARE - Transportation     Lack of Transportation (Medical): No     Lack of Transportation (Non-Medical): No   Physical Activity: Patient Declined (2024)    Exercise Vital Sign     Days of Exercise per Week: Patient declined     Minutes of Exercise per Session: Patient declined   Stress: Patient Declined (2024)    [employer] [place] [employer] - Occupational Stress Questionnaire     Feeling of Stress : Patient declined   Social Connections: Patient Declined (2024)     Social Connection and Isolation Panel [NHANES]     Frequency of Communication with Friends and Family: Patient declined     Frequency of Social Gatherings with Friends and Family: Patient declined     Attends Spiritism Services: Patient declined     Active Member of Clubs or Organizations: Patient declined     Attends Club or Organization Meetings: Patient declined     Marital Status: Patient declined   Intimate Partner Violence: Patient Declined (1/11/2024)    Humiliation, Afraid, Rape, and Kick questionnaire     Fear of Current or Ex-Partner: Patient declined     Emotionally Abused: Patient declined     Physically Abused: Patient declined     Sexually Abused: Patient declined   Housing Stability: Low Risk  (1/13/2024)    Housing Stability Vital Sign     Unable to Pay for Housing in the Last Year: No     Number of Places Lived in the Last Year: 2     Unstable Housing in the Last Year: No         Medications/Allergies     Previous Medications    - REFIN REGULAR (HUMULIN R U-500) 500 UNIT/ML CONCENTRATED - REFILL FOR PATIENT OWN PUMP    Inject 1 mL (1 each) under the skin if needed. Take as directed per insulin instructions. Use U-500 insulin syringe.    ALBUTEROL 90 MCG/ACTUATION AEROSOL [facility] BREATH ACTIVATED INHALER    Inhale 2 puffs every 4 hours.    ALLOPURINOL (ZYLOPRIM) 300 MG TABLET    Take 1 tablet (300 mg) by mouth once daily.    BUPROPION XL (WELLBUTRIN XL) 150 MG 24 HR TABLET    Take 1 tablet (150 mg) by mouth once daily in the morning. Do not crush, chew, or split.    CETIRIZINE (ZYRTEC) 10 MG CHEWABLE TABLET    Chew 1 tablet (10 mg) once daily.    DICLOFENAC SODIUM (VOLTAREN TOP)    Place 1 Application on the skin once daily as needed (Neuropathy). Applies to the feet for neuropathy.    DULOXETINE (CYMBALTA) 60 MG DR CAPSULE    Take 1 capsule (60 mg) by mouth once daily. Do not crush or chew.    EMPAGLIFLOZIN (JARDIANCE) 10 MG    Take 1 tablet (10 mg) by mouth once daily.    GABAPENTIN (NEURONTIN) 300  MG CAPSULE    Take 3 capsules (900 mg) by mouth 4 times a day.    LEVOTHYROXINE (SYNTHROID, LEVOXYL) 200 MCG TABLET    Take 1 tablet (200 mcg) by mouth once daily.    LEVOTHYROXINE (SYNTHROID, LEVOXYL) 50 MCG TABLET    Take 1 tablet (50 mcg) by mouth once daily in the morning. Take before meals. TAKE WITH 200MG    LUBIPROSTONE (AMITIZA) 24 MCG CAPSULE    Take 1 capsule (24 mcg) by mouth once daily with breakfast.    METOLAZONE (ZAROXOLYN) 5 MG TABLET    Take 1 tablet (5 mg) by mouth once daily.    METOPROLOL SUCCINATE XL (TOPROL-XL) 50 MG 24 HR TABLET    Take 1 tablet (50 mg) by mouth once daily. Do not crush or chew.    MULTIVITAMIN TABLET    Take 1 tablet by mouth once daily.    ONDANSETRON (ZOFRAN) 4 MG TABLET    Take 1 tablet (4 mg) by mouth every 8 hours if needed for nausea or vomiting.    PANTOPRAZOLE (PROTONIX) 40 MG EC TABLET    Take 1 tablet (40 mg) by mouth once daily in the morning. Take before meals. Do not crush, chew, or split.    POTASSIUM CHLORIDE CR 20 MEQ ER TABLET    Take 1 tablet (20 mEq) by mouth 3 times a day. Do not crush or chew.    PROMETHAZINE (PHENERGAN) 25 MG TABLET    Take 1 tablet (25 mg) by mouth every 6 hours if needed for nausea or vomiting.    ROSUVASTATIN (CRESTOR) 40 MG TABLET    Take 1 tablet (40 mg) by mouth once daily.    SPIRONOLACTONE (ALDACTONE) 100 MG TABLET    Take 3 tablets (300 mg) by mouth 2 times a day.    TORSEMIDE (DEMADEX) 100 MG TABLET    Take 1 tablet (100 mg) by mouth 2 times a day.    TRAZODONE (DESYREL) 100 MG TABLET    Take 1 tablet (100 mg) by mouth once daily at bedtime.    WARFARIN (COUMADIN) 5 MG TABLET    Take 1.5 tablets (7.5 mg) by mouth once daily. Take as directed per After Visit Summary.     Allergies   Allergen Reactions    Nickel Diarrhea, GI Upset, Nausea And Vomiting and Rash     Cobalt, white gold    Metformin Diarrhea, Other and Unknown    Dulaglutide Other     Pancreatitis    Palladium Unknown     by allergy testing.    Herbster Rash     Exenatide Other     pancreantitis   pancreantitis    pancreantitis    Sitagliptin Other     panceatitis   panceatitis    panceatitis         Review of Systems  All systems reviewed and otherwise negative, except as stated above in HPI.    Diagnostics reviewed by Lesly White PA-C     Labs:  Results for orders placed or performed during the hospital encounter of 02/03/24   Basic metabolic panel   Result Value Ref Range    Glucose 97 74 - 99 mg/dL    Sodium 127 (L) 136 - 145 mmol/L    Potassium 3.5 3.5 - 5.3 mmol/L    Chloride 81 (L) 98 - 107 mmol/L    Bicarbonate 33 (H) 21 - 32 mmol/L    Anion Gap 17 10 - 20 mmol/L    Urea Nitrogen 71 (H) 6 - 23 mg/dL    Creatinine 1.49 (H) 0.50 - 1.05 mg/dL    eGFR 41 (L) >60 mL/min/1.73m*2    Calcium 11.9 (H) 8.6 - 10.6 mg/dL   CBC and Auto Differential   Result Value Ref Range    WBC 16.2 (H) 4.4 - 11.3 x10*3/uL    nRBC 0.0 0.0 - 0.0 /100 WBCs    RBC 5.25 (H) 4.00 - 5.20 x10*6/uL    Hemoglobin 16.2 (H) 12.0 - 16.0 g/dL    Hematocrit 44.4 36.0 - 46.0 %    MCV 85 80 - 100 fL    MCH 30.9 26.0 - 34.0 pg    MCHC 36.5 (H) 32.0 - 36.0 g/dL    RDW 13.4 11.5 - 14.5 %    Platelets 300 150 - 450 x10*3/uL    Neutrophils % 71.6 40.0 - 80.0 %    Immature Granulocytes %, Automated 1.1 (H) 0.0 - 0.9 %    Lymphocytes % 15.0 13.0 - 44.0 %    Monocytes % 10.7 2.0 - 10.0 %    Eosinophils % 1.2 0.0 - 6.0 %    Basophils % 0.4 0.0 - 2.0 %    Neutrophils Absolute 11.60 (H) 1.20 - 7.70 x10*3/uL    Immature Granulocytes Absolute, Automated 0.17 0.00 - 0.70 x10*3/uL    Lymphocytes Absolute 2.43 1.20 - 4.80 x10*3/uL    Monocytes Absolute 1.73 (H) 0.10 - 1.00 x10*3/uL    Eosinophils Absolute 0.19 0.00 - 0.70 x10*3/uL    Basophils Absolute 0.07 0.00 - 0.10 x10*3/uL   Hepatic function panel   Result Value Ref Range    Albumin 4.8 3.4 - 5.0 g/dL    Bilirubin, Total 0.5 0.0 - 1.2 mg/dL    Bilirubin, Direct 0.3 0.0 - 0.3 mg/dL    Alkaline Phosphatase 489 (H) 33 - 110 U/L    ALT 65 (H) 7 - 45 U/L    AST 61 (H) 9 -  39 U/L    Total Protein 8.3 (H) 6.4 - 8.2 g/dL   Lipase   Result Value Ref Range    Lipase 60 9 - 82 U/L   Type And Screen   Result Value Ref Range    ABO TYPE A     Rh TYPE POS     ANTIBODY SCREEN NEG    BLOOD GAS VENOUS FULL PANEL   Result Value Ref Range    POCT pH, Venous 7.51 (H) 7.33 - 7.43 pH    POCT pCO2, Venous 47 41 - 51 mm Hg    POCT pO2, Venous 44 35 - 45 mm Hg    POCT SO2, Venous 70 45 - 75 %    POCT Oxy Hemoglobin, Venous 69.0 45.0 - 75.0 %    POCT Hematocrit Calculated, Venous 48.0 (H) 36.0 - 46.0 %    POCT Sodium, Venous 124 (L) 136 - 145 mmol/L    POCT Potassium, Venous 3.6 3.5 - 5.3 mmol/L    POCT Chloride, Venous 85 (L) 98 - 107 mmol/L    POCT Ionized Calicum, Venous 1.29 1.10 - 1.33 mmol/L    POCT Glucose, Venous 133 (H) 74 - 99 mg/dL    POCT Lactate, Venous 1.3 0.4 - 2.0 mmol/L    POCT Base Excess, Venous 12.5 (H) -2.0 - 3.0 mmol/L    POCT HCO3 Calculated, Venous 37.5 (H) 22.0 - 26.0 mmol/L    POCT Hemoglobin, Venous 15.9 12.0 - 16.0 g/dL    POCT Anion Gap, Venous 5.0 (L) 10.0 - 25.0 mmol/L    Patient Temperature 37.0 degrees Celsius    FiO2 21 %   Comprehensive metabolic panel   Result Value Ref Range    Glucose 153 (H) 74 - 99 mg/dL    Sodium 128 (L) 136 - 145 mmol/L    Potassium 3.2 (L) 3.5 - 5.3 mmol/L    Chloride 81 (L) 98 - 107 mmol/L    Bicarbonate 35 (H) 21 - 32 mmol/L    Anion Gap 15 10 - 20 mmol/L    Urea Nitrogen 70 (H) 6 - 23 mg/dL    Creatinine 1.44 (H) 0.50 - 1.05 mg/dL    eGFR 43 (L) >60 mL/min/1.73m*2    Calcium 11.2 (H) 8.6 - 10.6 mg/dL    Albumin 4.7 3.4 - 5.0 g/dL    Alkaline Phosphatase 451 (H) 33 - 110 U/L    Total Protein 8.1 6.4 - 8.2 g/dL    AST 52 (H) 9 - 39 U/L    Bilirubin, Total 0.7 0.0 - 1.2 mg/dL    ALT 58 (H) 7 - 45 U/L   Comprehensive metabolic panel   Result Value Ref Range    Glucose 113 (H) 74 - 99 mg/dL    Sodium 132 (L) 136 - 145 mmol/L    Potassium 2.9 (LL) 3.5 - 5.3 mmol/L    Chloride 83 (L) 98 - 107 mmol/L    Bicarbonate 37 (H) 21 - 32 mmol/L    Anion Gap  15 10 - 20 mmol/L    Urea Nitrogen 59 (H) 6 - 23 mg/dL    Creatinine 1.31 (H) 0.50 - 1.05 mg/dL    eGFR 48 (L) >60 mL/min/1.73m*2    Calcium 11.1 (H) 8.6 - 10.6 mg/dL    Albumin 4.2 3.4 - 5.0 g/dL    Alkaline Phosphatase 519 (H) 33 - 110 U/L    Total Protein 7.9 6.4 - 8.2 g/dL    AST 92 (H) 9 - 39 U/L    Bilirubin, Total 0.8 0.0 - 1.2 mg/dL    ALT 81 (H) 7 - 45 U/L   CBC and Auto Differential   Result Value Ref Range    WBC 13.4 (H) 4.4 - 11.3 x10*3/uL    nRBC 0.0 0.0 - 0.0 /100 WBCs    RBC 5.03 4.00 - 5.20 x10*6/uL    Hemoglobin 15.5 12.0 - 16.0 g/dL    Hematocrit 43.3 36.0 - 46.0 %    MCV 86 80 - 100 fL    MCH 30.8 26.0 - 34.0 pg    MCHC 35.8 32.0 - 36.0 g/dL    RDW 13.6 11.5 - 14.5 %    Platelets 263 150 - 450 x10*3/uL    Neutrophils % 68.3 40.0 - 80.0 %    Immature Granulocytes %, Automated 0.9 0.0 - 0.9 %    Lymphocytes % 17.4 13.0 - 44.0 %    Monocytes % 9.1 2.0 - 10.0 %    Eosinophils % 3.9 0.0 - 6.0 %    Basophils % 0.4 0.0 - 2.0 %    Neutrophils Absolute 9.14 (H) 1.20 - 7.70 x10*3/uL    Immature Granulocytes Absolute, Automated 0.12 0.00 - 0.70 x10*3/uL    Lymphocytes Absolute 2.33 1.20 - 4.80 x10*3/uL    Monocytes Absolute 1.22 (H) 0.10 - 1.00 x10*3/uL    Eosinophils Absolute 0.52 0.00 - 0.70 x10*3/uL    Basophils Absolute 0.06 0.00 - 0.10 x10*3/uL   Comprehensive metabolic panel   Result Value Ref Range    Glucose 126 (H) 74 - 99 mg/dL    Sodium 130 (L) 136 - 145 mmol/L    Potassium 3.3 (L) 3.5 - 5.3 mmol/L    Chloride 86 (L) 98 - 107 mmol/L    Bicarbonate 35 (H) 21 - 32 mmol/L    Anion Gap 12 10 - 20 mmol/L    Urea Nitrogen 59 (H) 6 - 23 mg/dL    Creatinine 1.31 (H) 0.50 - 1.05 mg/dL    eGFR 48 (L) >60 mL/min/1.73m*2    Calcium 11.3 (H) 8.6 - 10.6 mg/dL    Albumin 4.5 3.4 - 5.0 g/dL    Alkaline Phosphatase 485 (H) 33 - 110 U/L    Total Protein 7.4 6.4 - 8.2 g/dL    AST 83 (H) 9 - 39 U/L    Bilirubin, Total 0.6 0.0 - 1.2 mg/dL    ALT 84 (H) 7 - 45 U/L   POCT GLUCOSE   Result Value Ref Range    POCT  Glucose 129 (H) 74 - 99 mg/dL   POCT GLUCOSE   Result Value Ref Range    POCT Glucose 159 (H) 74 - 99 mg/dL     Radiographs:  US right upper quadrant   Final Result   Hepatomegaly with hepatic steatosis; otherwise, unremarkable   ultrasound of the right upper quadrant.        I personally reviewed the images/study with Piyush Salamanca MD (Radiology   Resident) and I agree with the findings as stated. This study was   interpreted at [facility],   [place], [place].        MACRO:   None        Signed by: Cortez Carlin 2/6/2024 10:22 AM   Dictation workstation:   [id number]      CT abdomen pelvis w IV contrast   Final Result   Several midline hernia defects at least to contain loops of bowel but   without definitive obstructive pattern on today's exam.  The   inflammation is also improved.  Additional findings as above..   Signed by Dimitris Murphy,               Physical Exam     Visit Vitals  /73 (BP Location: Right arm, Patient Position: Lying)   Pulse 80   Temp 36.7 °C (98.1 °F) (Temporal)   Resp 18   SpO2 (!) 93%   OB Status Menopausal   Smoking Status Former       GENERAL:  The patient appears nourished and normally developed. Vital signs as documented.     HEENT:  Head normocephalic, atraumatic, EOMs intact, PERRLA, Mucous membranes moist. Nares patent without copious rhinorrhea.  No lymphadenopathy.    PULMONARY:  Lungs are clear to auscultation, without any respiratory distress. Able to speak full sentences, no accessory muscle use    CARDIAC:   Normal rate. No murmurs, rubs or gallops    ABDOMEN:  Soft, non-distended, non-tender, BS positive x 4 quadrants, No rebound or guarding, no peritoneal signs, no CVA tenderness, no masses or organomegaly. Easily reducible ventral hernia.     : External exam unremarkable, speculum exam with no discharge, no bleeding, no adnexal tenderness or masses    MUSCULOSKELETAL:   Able to ambulate, Non edematous, with no obvious  deformities. Pulses intact distal    SKIN:   Good color, with no significant rashes.  No pallor.    NEURO:  No obvious neurological deficits, normal sensation and strength bilaterally.  Able to follow commands, NIH 0, CN 2-12 intact.    Psych: Appropriate mood and affect    Consultants  1) None      Impression and Plan    In summary, Roselia Mo has been cared for according to the standard [facility] Clinical Decision Unit observation protocol for Constipation. This extended period of observation was specifically required to determine the need for hospitalization. Prior to discharge from observation, the final physical exam is documented above. I have reviewed the results of the labs and imaging that were performed in the ED as well as the CDU.      Significant events during the course of observation based on the goals of the clinical problem list include:   1) Abdominal pain relief with Bentyl  2) Multiple BMs during admission  3) Resolving electrolyte abnormalities and [place].      Based on the patient's condition and test results, the patient will be Discharged    Date and Time of Disposition.   Discharge: 2/6/2024    Dr. Baeza is the CDU disposition attending.      Discharge Diagnosis  Constipation    Issues Requiring Follow-Up  - Close follow up with GI and PCP  - Patient reports follow up with Bariatric surgeon soon    Discharge Meds     Your medication list        START taking these medications        Instructions Last Dose Given Next Dose Due   dicyclomine 10 mg capsule  Commonly known as: Bentyl      Take 1 capsule (10 mg) by mouth 4 times a day as needed (abdominal cramping).       docusate sodium 100 mg capsule  Commonly known as: Colace      Take 1 capsule (100 mg) by mouth every 12 hours.       famotidine 20 mg tablet  Commonly known as: Pepcid      Take 1 tablet (20 mg) by mouth once daily.       polyethylene glycol 17 gram/dose powder  Commonly known as: Miralax      Take  17 g by mouth once daily.              ASK your doctor about these medications        Instructions Last Dose Given Next Dose Due   - refin regular 500 unit/mL CONCENTRATED - refill for patient own pump  Commonly known as: HumuLIN R U-500           albuterol 90 mcg/actuation aerosol powdr breath activated inhaler           allopurinol 300 mg tablet  Commonly known as: Zyloprim      Take 1 tablet (300 mg) by mouth once daily.       buPROPion  mg 24 hr tablet  Commonly known as: Wellbutrin XL      Take 1 tablet (150 mg) by mouth once daily in the morning. Do not crush, chew, or split.       cetirizine 10 mg chewable tablet  Commonly known as: ZyrTEC           DULoxetine 60 mg DR capsule  Commonly known as: Cymbalta      Take 1 capsule (60 mg) by mouth once daily. Do not crush or chew.       empagliflozin 10 mg  Commonly known as: Jardiance      Take 1 tablet (10 mg) by mouth once daily.       gabapentin 300 mg capsule  Commonly known as: Neurontin           levothyroxine 200 mcg tablet  Commonly known as: Synthroid, Levoxyl           levothyroxine 50 mcg tablet  Commonly known as: Synthroid, Levoxyl           lubiprostone 24 mcg capsule  Commonly known as: Amitiza      Take 1 capsule (24 mcg) by mouth once daily with breakfast.       metOLazone 5 mg tablet  Commonly known as: Zaroxolyn      Take 1 tablet (5 mg) by mouth once daily.       metoprolol succinate XL 50 mg 24 hr tablet  Commonly known as: Toprol-XL           multivitamin tablet           ondansetron 4 mg tablet  Commonly known as: Zofran      Take 1 tablet (4 mg) by mouth every 8 hours if needed for nausea or vomiting.       pantoprazole 40 mg EC tablet  Commonly known as: ProtoNix           potassium chloride CR 20 mEq ER tablet  Commonly known as: Klor-Con M20           promethazine 25 mg tablet  Commonly known as: Phenergan           rosuvastatin 40 mg tablet  Commonly known as: Crestor           spironolactone 100 mg tablet  Commonly known as:  Aldactone           torsemide 100 mg tablet  Commonly known as: Demadex           traZODone 100 mg tablet  Commonly known as: Desyrel           VOLTAREN TOP           warfarin 5 mg tablet  Commonly known as: Coumadin                     Where to Get Your Medications        You can get these medications from any pharmacy    Bring a paper prescription for each of these medications  dicyclomine 10 mg capsule  docusate sodium 100 mg capsule  famotidine 20 mg tablet  polyethylene glycol 17 gram/dose powder         Test Results Pending At Discharge  Pending Labs       Order Current Status    Renal Function Panel Collected (02/06/24 1130)            Outpatient Follow-Up  Future Appointments   Date Time Provider Department [place]   2/8/2024 10:00 AM Sujit Lamb DO [id number] [facility]   2/13/2024  8:00 AM Lara Theodore MD [id number] [facility]   2/13/2024 12:30 PM Ana Knight MD [id number] [facility]   2/20/2024 10:00 AM Ayesha Moss RDN, LD [id number] [facility]   2/27/2024  1:30 PM Pardeep Nichole DO [id number] [facility]   2/29/2024 11:15 AM Sandoval Lewis DO [id number] [facility]         Lesly White PA-C

## 2024-02-07 ENCOUNTER — PATIENT OUTREACH (OUTPATIENT)
Dept: PRIMARY CARE | Facility: CLINIC | Age: 54
End: 2024-02-07
Payer: MEDICARE

## 2024-02-07 LAB
ATRIAL RATE: 89 BPM
P AXIS: 54 DEGREES
P OFFSET: 191 MS
P ONSET: 135 MS
PR INTERVAL: 156 MS
Q ONSET: 213 MS
QRS COUNT: 15 BEATS
QRS DURATION: 76 MS
QT INTERVAL: 374 MS
QTC CALCULATION(BAZETT): 455 MS
QTC FREDERICIA: 426 MS
R AXIS: -39 DEGREES
T AXIS: 26 DEGREES
T OFFSET: 400 MS
VENTRICULAR RATE: 89 BPM

## 2024-02-07 NOTE — PROGRESS NOTES
Discharge Facility: [facility]  Discharge Diagnosis: constipation  Admission Date: 2/3/2024  Discharge Date: 2/6/2024    PCP Appointment Date: 2/8/2024  Specialist Appointment Date: pulmonology 2/8/2024  GASTROENTEROLOGY NEW PATIENT VISIT with Sandoval Lewis DO Thursday February 29 11:15 AM   Hospital Encounter and Summary: Linked   See discharge assessment below for further details    New meds:   dicyclomine 10 mg capsule    docusate sodium 100 mg capsule    famotidine 20 mg tablet    polyethylene glycol 17 gram/dose powder     Engagement  Call Start Time: 0922 (2/7/2024  9:22 AM)    Medications  Medications reviewed with patient/caregiver?: Yes (2/7/2024  9:22 AM)  Is the patient having any side effects they believe may be caused by any medication additions or changes?: No (2/7/2024  9:22 AM)  Does the patient have all medications ordered at discharge?: Yes (2/7/2024  9:22 AM)  Care Management Interventions: No intervention needed (2/7/2024  9:22 AM)  Prescription Comments: new: bentyl, colace, pepcid, miralax (2/7/2024  9:22 AM)  Is the patient taking all medications as directed (includes completed medication regime)?: Yes (2/7/2024  9:22 AM)  Medication Comments: see med list (2/7/2024  9:22 AM)    Appointments  Does the patient have a primary care provider?: Yes (2/7/2024  9:22 AM)  Care Management Interventions: Verified appointment date/time/provider (2/7/2024  9:22 AM)  Has the patient kept scheduled appointments due by today?: Yes (2/7/2024  9:22 AM)  Care Management Interventions: Advised patient to keep appointment (2/7/2024  9:22 AM)    Self Management  What is the home health agency?: none (2/7/2024  9:22 AM)  Has home health visited the patient within 72 hours of discharge?: Not applicable (2/7/2024  9:22 AM)    Patient Teaching  Does the patient have access to their discharge instructions?: Yes (2/7/2024  9:22 AM)  Care Management Interventions: Reviewed instructions with patient (2/7/2024  9:22 AM)  What  is the patient's perception of their health status since discharge?: Improving (2/7/2024  9:22 AM)  Is the patient/caregiver able to teach back the hierarchy of who to call/visit for symptoms/problems? PCP, Specialist, Home Health nurse, Urgent Care, ED, 911: Yes (2/7/2024  9:22 AM)    Wrap Up  Wrap Up Additional Comments: CTS spoke with patient. She stated that she is doing better. Denies any chest pains or shortness of breath. No nausea or vomiting. Patient reports that she has not had a BM since leaving the hospital yesterday 2/6/2024. She had no issues obtaining new medications and she reports taking as directed. Patient is scheduled for a hospital follow up and she is aware of the date and time. No questions or concerns at this time. (2/7/2024  9:22 AM)  Call End Time: 0927 (2/7/2024  9:22 AM)

## 2024-02-08 ENCOUNTER — OFFICE VISIT (OUTPATIENT)
Dept: PRIMARY CARE | Facility: CLINIC | Age: 54
End: 2024-02-08
Payer: MEDICARE

## 2024-02-08 ENCOUNTER — APPOINTMENT (OUTPATIENT)
Dept: PRIMARY CARE | Facility: CLINIC | Age: 54
End: 2024-02-08
Payer: MEDICARE

## 2024-02-08 ENCOUNTER — TELEPHONE (OUTPATIENT)
Dept: PRIMARY CARE | Facility: CLINIC | Age: 54
End: 2024-02-08

## 2024-02-08 VITALS
BODY MASS INDEX: 53.92 KG/M2 | SYSTOLIC BLOOD PRESSURE: 112 MMHG | HEART RATE: 89 BPM | HEIGHT: 62 IN | DIASTOLIC BLOOD PRESSURE: 70 MMHG | OXYGEN SATURATION: 95 % | WEIGHT: 293 LBS

## 2024-02-08 DIAGNOSIS — K59.09 CHRONIC CONSTIPATION: ICD-10-CM

## 2024-02-08 DIAGNOSIS — Z87.898 H/O ABDOMINAL PAIN: ICD-10-CM

## 2024-02-08 DIAGNOSIS — I15.2 HYPERTENSION ASSOCIATED WITH DIABETES (MULTI): Primary | ICD-10-CM

## 2024-02-08 DIAGNOSIS — Z79.4 TYPE 2 DIABETES MELLITUS WITH STAGE 3A CHRONIC KIDNEY DISEASE, WITH LONG-TERM CURRENT USE OF INSULIN (MULTI): ICD-10-CM

## 2024-02-08 DIAGNOSIS — D72.829 LEUKOCYTOSIS, UNSPECIFIED TYPE: ICD-10-CM

## 2024-02-08 DIAGNOSIS — E11.22 TYPE 2 DIABETES MELLITUS WITH STAGE 3A CHRONIC KIDNEY DISEASE, WITH LONG-TERM CURRENT USE OF INSULIN (MULTI): ICD-10-CM

## 2024-02-08 DIAGNOSIS — E66.01 MORBID OBESITY WITH BODY MASS INDEX (BMI) OF 60.0 TO 69.9 IN ADULT (MULTI): ICD-10-CM

## 2024-02-08 DIAGNOSIS — R92.8 ABNORMAL ULTRASOUND OF BREAST: ICD-10-CM

## 2024-02-08 DIAGNOSIS — R93.89 ABNORMAL CXR: ICD-10-CM

## 2024-02-08 DIAGNOSIS — E87.1 HYPONATREMIA: ICD-10-CM

## 2024-02-08 DIAGNOSIS — N18.31 TYPE 2 DIABETES MELLITUS WITH STAGE 3A CHRONIC KIDNEY DISEASE, WITH LONG-TERM CURRENT USE OF INSULIN (MULTI): ICD-10-CM

## 2024-02-08 DIAGNOSIS — E87.6 HYPOKALEMIA: ICD-10-CM

## 2024-02-08 DIAGNOSIS — E11.59 HYPERTENSION ASSOCIATED WITH DIABETES (MULTI): Primary | ICD-10-CM

## 2024-02-08 PROCEDURE — 1036F TOBACCO NON-USER: CPT | Performed by: INTERNAL MEDICINE

## 2024-02-08 PROCEDURE — 3052F HG A1C>EQUAL 8.0%<EQUAL 9.0%: CPT | Performed by: INTERNAL MEDICINE

## 2024-02-08 PROCEDURE — 3074F SYST BP LT 130 MM HG: CPT | Performed by: INTERNAL MEDICINE

## 2024-02-08 PROCEDURE — 3060F POS MICROALBUMINURIA REV: CPT | Performed by: INTERNAL MEDICINE

## 2024-02-08 PROCEDURE — 99214 OFFICE O/P EST MOD 30 MIN: CPT | Performed by: INTERNAL MEDICINE

## 2024-02-08 PROCEDURE — 3078F DIAST BP <80 MM HG: CPT | Performed by: INTERNAL MEDICINE

## 2024-02-08 PROCEDURE — 3008F BODY MASS INDEX DOCD: CPT | Performed by: INTERNAL MEDICINE

## 2024-02-08 ASSESSMENT — ENCOUNTER SYMPTOMS
CARDIOVASCULAR NEGATIVE: 1
ABDOMINAL PAIN: 0
NAUSEA: 0
PALPITATIONS: 0
RHINORRHEA: 0
PSYCHIATRIC NEGATIVE: 1
ABDOMINAL DISTENTION: 0
COUGH: 0
HEADACHES: 0
BACK PAIN: 0
LIGHT-HEADEDNESS: 0
DIZZINESS: 0
CHILLS: 0
FEVER: 0
CONSTIPATION: 1
MUSCULOSKELETAL NEGATIVE: 1
WEAKNESS: 0
WHEEZING: 0
ENDOCRINE NEGATIVE: 1
EYES NEGATIVE: 1
VOMITING: 0
SHORTNESS OF BREATH: 0
NEUROLOGICAL NEGATIVE: 1
DIARRHEA: 0
DYSURIA: 0
AGITATION: 0

## 2024-02-08 NOTE — PROGRESS NOTES
Subjective   Patient ID: Roselia Mo is a 53 y.o. female who presents for Hospital Follow-up (F/U HOSPITAL DISCHARGE 2/6/24 - CONSTIPATION, HYPONATREMIA. PATIENT HASN'T PICKED UP NEW MEDS YET THAT THE HOSPITAL PRESCRIBED - VERY LITTLE BM AND STILL HAS RIGHT ABDOMINAL PAIN. C/O UPPER BACK PAIN - FELL A FEW DAYS PRIOR TO HOSPITAL ADMISSION. ).  HPI  F/U ON MAMMO , BREAST U/S AND BONE DENSITY AND CXR  . F/U AFTER HOSPITAL DISCHARGE FOR CONSTIPATION AFTER HOSPITAL DISCHARGE FOR PARTIAL SMALL BOWEL OBSTRUCTION. HAD ABDOMINAL CT,U/S DONE. CONSTIPATION IS SLOWLY IMPROVING. DENIES HAVING ABDOMINAL PAIN. Pt IS SUPPOSED TO HAVE F/U WITH CARDIO, PULMONOLOGIST ,NEPHROLOGIST , GI, NUTRITIONIST, ENDOCRINOLOGIST. [place] IS ON WHEELCHAIR MOST OF THE TIMES WITH HAVING EXTREME OBESITY AND DOESN'T WALK MUCH. HASN'T STARTED THE PRN MIRALAX AND PRN BENTYL YET ( GIVEN AT HOSPITAL DISCHARGE) .  Review of Systems   Constitutional:  Negative for chills and fever.   HENT: Negative.  Negative for congestion, postnasal drip and rhinorrhea.    Eyes: Negative.  Negative for visual disturbance.   Respiratory:  Negative for cough, shortness of breath and wheezing.    Cardiovascular: Negative.  Negative for chest pain, palpitations and leg swelling.   Gastrointestinal:  Positive for constipation. Negative for abdominal distention, abdominal pain, diarrhea, nausea and vomiting.   Endocrine: Negative.    Genitourinary:  Negative for dysuria and urgency.   Musculoskeletal: Negative.  Negative for back pain.   Skin: Negative.  Negative for rash.   Allergic/Immunologic: Negative for immunocompromised state.   Neurological: Negative.  Negative for dizziness, weakness, light-headedness and headaches.   Psychiatric/Behavioral: Negative.  Negative for agitation.        Objective   Physical Exam  Constitutional:       General: She is not in acute distress.  HENT:      Head: Normocephalic.      Nose: Nose normal.      Mouth/Throat:      Mouth: Mucous  membranes are moist.   Eyes:      Conjunctiva/sclera: Conjunctivae normal.      Pupils: Pupils are equal, round, and reactive to light.   Cardiovascular:      Rate and Rhythm: Normal rate and regular rhythm.      Pulses: Normal pulses.      Heart sounds: Normal heart sounds.   Pulmonary:      Effort: No respiratory distress.      Breath sounds: No wheezing.   Chest:      Chest wall: No tenderness.   Abdominal:      General: Abdomen is flat. Bowel sounds are normal.      Palpations: Abdomen is soft.      Tenderness: There is no abdominal tenderness.   Musculoskeletal:         General: No tenderness. Normal range of motion.      Cervical back: Normal range of motion.   Lymphadenopathy:      Cervical: No cervical adenopathy.   Skin:     General: Skin is warm and dry.      Findings: No rash.   Neurological:      General: No focal deficit present.      Mental Status: She is alert. Mental status is at baseline.   Psychiatric:         Mood and Affect: Mood normal.         Behavior: Behavior normal.         Assessment/Plan   1. Hypertension associated with diabetes (CMS/HCC)  Comprehensive Metabolic Panel      2. Hyponatremia  Comprehensive Metabolic Panel      3. Hypokalemia  Comprehensive Metabolic Panel      4. Type 2 diabetes mellitus with stage 3a chronic kidney disease, with long-term current use of insulin (CMS/[facility])  Comprehensive Metabolic Panel    semaglutide 0.25 mg or 0.5 mg (2 mg/3 mL) pen injector      5. Leukocytosis, unspecified type  Comprehensive Metabolic Panel    CBC and Auto Differential      6. Morbid obesity with body mass index (BMI) of 60.0 to 69.9 in adult (CMS/[facility])  semaglutide 0.25 mg or 0.5 mg (2 mg/3 mL) pen injector      7. Abnormal ultrasound of breast  BI US breast limited left      8. Abnormal CXR        9. Chronic constipation        10. H/O abdominal pain          TEST RESULTS WERE DISCUSSED.  ADVISED TO HAVE LOW FAT AND LOW CALORIE, HIGH K  DIET AND TO LOOSE WEIGHT, DAILY  EXERCISE.  ADVISED TO ADD MORE SALT TO DIET AND HAVE LESS LIQUIDS WITH HAVING HYPONATREMIA.  EXPLAINED WILL DO F/U CXR LATER BUT  CLINICALLY HAS NO EVIDENCE OF PNEUMONIA .  ADVISED FOR B/L LEG ELEVATION PRN. I ENCOURAGED THE PT TO WALK EVEYDAY WITH WALKER TO HELP WITH BOWEL MOVEMENT., TO INCREASE THE FIBER IN DIET.  1800 TRENT ADA  HGA1C GOAL LESS THAN 7  LOSE WT  EXERCISE DAILY    MDM    1) COMPLEXITY: 1 UNDIAGNOSED NEW PROBLEM WITH UNCERTAIN PROGNOSIS  2)DATA: TESTS INTERPRETED AND OR ORDERED, TOOK INDEPENDENT HISTORY OR RECORDS REVIEWED  3)RISK: MODERATE RISK DUE TO NATURE OF MEDICAL CONDITIONS/COMORBIDITY OR MEDICATIONS ORDERED OR SURGICAL OR PROCEDURE REFERRAL, .      HAS F/U.  CANCEL THE 2/13 , KEEP THE 2/21 FOR LABS AND WELLNESS .

## 2024-02-08 NOTE — TELEPHONE ENCOUNTER
I PLACED THE ORDER FOR U/S OF L BREAST TO BE DONE IN 3 MONTHS (PLACED THE ORDER AFTER PT LEFT). PT KNOWS ABOUT THE PLAN FOR U/S.

## 2024-02-09 ENCOUNTER — OFFICE VISIT (OUTPATIENT)
Dept: PULMONOLOGY | Facility: CLINIC | Age: 54
End: 2024-02-09
Payer: MEDICARE

## 2024-02-09 VITALS
RESPIRATION RATE: 18 BRPM | WEIGHT: 293 LBS | HEIGHT: 62 IN | BODY MASS INDEX: 53.92 KG/M2 | DIASTOLIC BLOOD PRESSURE: 83 MMHG | TEMPERATURE: 98 F | OXYGEN SATURATION: 94 % | SYSTOLIC BLOOD PRESSURE: 128 MMHG | HEART RATE: 85 BPM

## 2024-02-09 DIAGNOSIS — E66.01 MORBID OBESITY (MULTI): ICD-10-CM

## 2024-02-09 DIAGNOSIS — G47.33 OBSTRUCTIVE SLEEP APNEA: Primary | ICD-10-CM

## 2024-02-09 DIAGNOSIS — R06.09 DYSPNEA ON EXERTION: ICD-10-CM

## 2024-02-09 PROCEDURE — 1036F TOBACCO NON-USER: CPT | Performed by: INTERNAL MEDICINE

## 2024-02-09 PROCEDURE — 3052F HG A1C>EQUAL 8.0%<EQUAL 9.0%: CPT | Performed by: INTERNAL MEDICINE

## 2024-02-09 PROCEDURE — 99214 OFFICE O/P EST MOD 30 MIN: CPT | Performed by: INTERNAL MEDICINE

## 2024-02-09 PROCEDURE — 3008F BODY MASS INDEX DOCD: CPT | Performed by: INTERNAL MEDICINE

## 2024-02-09 PROCEDURE — 3060F POS MICROALBUMINURIA REV: CPT | Performed by: INTERNAL MEDICINE

## 2024-02-09 PROCEDURE — 3079F DIAST BP 80-89 MM HG: CPT | Performed by: INTERNAL MEDICINE

## 2024-02-09 PROCEDURE — 3074F SYST BP LT 130 MM HG: CPT | Performed by: INTERNAL MEDICINE

## 2024-02-09 NOTE — PROGRESS NOTES
Subjective   Patient ID: Roselia Mo is a 53 y.o. female who presents for Dyspnea and treatment of her obstructive sleep apnea.  HPI  I reviewed with the patient the results of her pulmonary function testing which shows evidence of only some mild restrictive disease with no change post bronchodilators.  Had normal gas transfer.  The pulmonary function test did show some decrease in her weight.  She relates to me that she left [place] about a year ago and never connected with a local DME company through her insurance and has not obtained any new supplies over that period of time.  She is on a Respironics machine that had been recalled.  Review of Systems  Patient denies any problems with fevers, chills rhinitis or sore throat.  Objective   Physical Exam  Patient does not appear dyspneic today in the office.  She was not wearing any oxygen and her saturations were 94%.  On reviewing her chest x-ray that was done on 2/1/2024 patient appears to have some component of edema in the lower lung fields and also fluid seen in the minor fissures.  There is no fluid noted at the costophrenic angles.  Assessment/Plan        Impressions:  1.  Dyspnea at this time appears to be most related to her obesity and fitness.  I do not have any information about pulmonary hypertension status of this patient.  Her pulmonary function study does not indicate a significant problem with obstructive airway disease.  2.  Obstructive sleep apnea.  Recommendations:  1.  Told the patient that she needs to contact her insurance and find out which DME provider locally could fix or replace her CPAP device and is also willing to provide her with new supplies.  2.  I suggest that the patient contact our office when she has been able to remedy the situation with her BiPAP device and obtain the supplies needed.  3.  Patient most likely will need a repeat sleep study unless she can obtain the original study that she had.      This note was transcribed  using the Dragon Dictation system.  There may be grammatical, punctuation, or verbiage errors that occur with voice recognition programs.    Sujit Lamb DO 02/09/24 1:55 PM

## 2024-02-12 ENCOUNTER — APPOINTMENT (OUTPATIENT)
Dept: PULMONOLOGY | Facility: CLINIC | Age: 54
End: 2024-02-12
Payer: MEDICARE

## 2024-02-13 ENCOUNTER — APPOINTMENT (OUTPATIENT)
Dept: PRIMARY CARE | Facility: CLINIC | Age: 54
End: 2024-02-13
Payer: MEDICARE

## 2024-02-13 ENCOUNTER — PATIENT OUTREACH (OUTPATIENT)
Dept: PRIMARY CARE | Facility: CLINIC | Age: 54
End: 2024-02-13

## 2024-02-13 NOTE — PROGRESS NOTES
Call regarding appt. with PCP on 2/8/2024 after hospitalization.  At time of outreach call the patient feels as if their condition has stayed the same since last visit.  Reviewed the PCP appointment with the pt and addressed any questions or concerns.

## 2024-02-16 DIAGNOSIS — N18.31 STAGE 3A CHRONIC KIDNEY DISEASE (MULTI): ICD-10-CM

## 2024-02-18 RX ORDER — METOLAZONE 5 MG/1
5 TABLET ORAL DAILY
Qty: 90 TABLET | Refills: 1 | Status: SHIPPED | OUTPATIENT
Start: 2024-02-18 | End: 2024-03-22 | Stop reason: HOSPADM

## 2024-02-20 ENCOUNTER — APPOINTMENT (OUTPATIENT)
Dept: NUTRITION | Facility: HOSPITAL | Age: 54
End: 2024-02-20
Payer: MEDICARE

## 2024-02-21 ENCOUNTER — OFFICE VISIT (OUTPATIENT)
Dept: PRIMARY CARE | Facility: CLINIC | Age: 54
End: 2024-02-21
Payer: MEDICARE

## 2024-02-21 VITALS
DIASTOLIC BLOOD PRESSURE: 84 MMHG | BODY MASS INDEX: 53.92 KG/M2 | OXYGEN SATURATION: 95 % | WEIGHT: 293 LBS | HEART RATE: 93 BPM | SYSTOLIC BLOOD PRESSURE: 138 MMHG | HEIGHT: 62 IN

## 2024-02-21 DIAGNOSIS — R09.81 SINUS CONGESTION: ICD-10-CM

## 2024-02-21 DIAGNOSIS — G47.33 OBSTRUCTIVE SLEEP APNEA SYNDROME: ICD-10-CM

## 2024-02-21 DIAGNOSIS — Z00.00 ROUTINE GENERAL MEDICAL EXAMINATION AT HEALTH CARE FACILITY: Primary | ICD-10-CM

## 2024-02-21 DIAGNOSIS — Z23 ENCOUNTER FOR VACCINATION: ICD-10-CM

## 2024-02-21 DIAGNOSIS — G57.93 NEUROPATHY OF BOTH FEET: ICD-10-CM

## 2024-02-21 DIAGNOSIS — L29.9 PRURITUS: ICD-10-CM

## 2024-02-21 DIAGNOSIS — R10.9 ABDOMINAL CRAMPS: ICD-10-CM

## 2024-02-21 DIAGNOSIS — I50.32 CHRONIC DIASTOLIC HEART FAILURE (MULTI): ICD-10-CM

## 2024-02-21 DIAGNOSIS — E11.9 TYPE 2 DIABETES MELLITUS WITHOUT COMPLICATION, WITHOUT LONG-TERM CURRENT USE OF INSULIN (MULTI): ICD-10-CM

## 2024-02-21 DIAGNOSIS — R29.898 WEAKNESS OF BOTH ARMS: ICD-10-CM

## 2024-02-21 DIAGNOSIS — K59.00 CONSTIPATION, UNSPECIFIED CONSTIPATION TYPE: ICD-10-CM

## 2024-02-21 DIAGNOSIS — R29.898 WEAKNESS OF BOTH LEGS: ICD-10-CM

## 2024-02-21 DIAGNOSIS — L85.3 DRY SKIN DERMATITIS: ICD-10-CM

## 2024-02-21 PROCEDURE — 1036F TOBACCO NON-USER: CPT | Performed by: INTERNAL MEDICINE

## 2024-02-21 PROCEDURE — 99214 OFFICE O/P EST MOD 30 MIN: CPT | Performed by: INTERNAL MEDICINE

## 2024-02-21 PROCEDURE — G0439 PPPS, SUBSEQ VISIT: HCPCS | Performed by: INTERNAL MEDICINE

## 2024-02-21 PROCEDURE — 1124F ACP DISCUSS-NO DSCNMKR DOCD: CPT | Performed by: INTERNAL MEDICINE

## 2024-02-21 PROCEDURE — 3079F DIAST BP 80-89 MM HG: CPT | Performed by: INTERNAL MEDICINE

## 2024-02-21 PROCEDURE — 99497 ADVNCD CARE PLAN 30 MIN: CPT | Performed by: INTERNAL MEDICINE

## 2024-02-21 PROCEDURE — 3008F BODY MASS INDEX DOCD: CPT | Performed by: INTERNAL MEDICINE

## 2024-02-21 PROCEDURE — 3075F SYST BP GE 130 - 139MM HG: CPT | Performed by: INTERNAL MEDICINE

## 2024-02-21 PROCEDURE — 3052F HG A1C>EQUAL 8.0%<EQUAL 9.0%: CPT | Performed by: INTERNAL MEDICINE

## 2024-02-21 PROCEDURE — 3060F POS MICROALBUMINURIA REV: CPT | Performed by: INTERNAL MEDICINE

## 2024-02-21 RX ORDER — DICYCLOMINE HYDROCHLORIDE 20 MG/1
20 TABLET ORAL 4 TIMES DAILY PRN
Qty: 90 TABLET | Refills: 3 | Status: SHIPPED | OUTPATIENT
Start: 2024-02-21 | End: 2024-03-26 | Stop reason: WASHOUT

## 2024-02-21 RX ORDER — TRIAMCINOLONE ACETONIDE 1 MG/G
OINTMENT TOPICAL 2 TIMES DAILY PRN
Qty: 60 G | Refills: 3 | Status: SHIPPED | OUTPATIENT
Start: 2024-02-21 | End: 2024-06-20

## 2024-02-21 RX ORDER — DOXYCYCLINE 100 MG/1
100 CAPSULE ORAL 2 TIMES DAILY
Qty: 14 CAPSULE | Refills: 0 | Status: SHIPPED | OUTPATIENT
Start: 2024-02-21 | End: 2024-02-28

## 2024-02-21 RX ORDER — ZOSTER VACCINE RECOMBINANT, ADJUVANTED 50 MCG/0.5
0.5 KIT INTRAMUSCULAR SEE ADMIN INSTRUCTIONS
Qty: 0.5 ML | Refills: 1 | Status: SHIPPED | OUTPATIENT
Start: 2024-02-21 | End: 2024-03-22 | Stop reason: HOSPADM

## 2024-02-21 ASSESSMENT — ENCOUNTER SYMPTOMS
PSYCHIATRIC NEGATIVE: 1
SINUS PAIN: 1
RHINORRHEA: 0
NEUROLOGICAL NEGATIVE: 1
CARDIOVASCULAR NEGATIVE: 1
CONSTIPATION: 1
FEVER: 0
VOMITING: 0
DIZZINESS: 0
EYES NEGATIVE: 1
MUSCULOSKELETAL NEGATIVE: 1
ROS GI COMMENTS: ABDOMINAL CRAMPS
HEADACHES: 0
BACK PAIN: 0
DYSURIA: 0
ABDOMINAL DISTENTION: 0
PALPITATIONS: 0
LIGHT-HEADEDNESS: 0
NAUSEA: 0
CHILLS: 0
WEAKNESS: 0
AGITATION: 0
COUGH: 0
ENDOCRINE NEGATIVE: 1
SHORTNESS OF BREATH: 0
WHEEZING: 0
ROS SKIN COMMENTS: PRURITUS
DIARRHEA: 0

## 2024-02-21 ASSESSMENT — PATIENT HEALTH QUESTIONNAIRE - PHQ9
SUM OF ALL RESPONSES TO PHQ9 QUESTIONS 1 AND 2: 2
1. LITTLE INTEREST OR PLEASURE IN DOING THINGS: SEVERAL DAYS
10. IF YOU CHECKED OFF ANY PROBLEMS, HOW DIFFICULT HAVE THESE PROBLEMS MADE IT FOR YOU TO DO YOUR WORK, TAKE CARE OF THINGS AT HOME, OR GET ALONG WITH OTHER PEOPLE: SOMEWHAT DIFFICULT
2. FEELING DOWN, DEPRESSED OR HOPELESS: SEVERAL DAYS

## 2024-02-21 ASSESSMENT — ACTIVITIES OF DAILY LIVING (ADL)
DRESSING: INDEPENDENT
DOING_HOUSEWORK: NEEDS ASSISTANCE
MANAGING_FINANCES: NEEDS ASSISTANCE
BATHING: INDEPENDENT
TAKING_MEDICATION: INDEPENDENT
GROCERY_SHOPPING: NEEDS ASSISTANCE

## 2024-02-21 NOTE — PROGRESS NOTES
"Subjective   Reason for Visit: Roselia Mo is an 53 y.o. female here for a Medicare Wellness visit.     Past Medical, Surgical, and Family History reviewed and updated in chart.         HPI  LAB F/U WHICH WASN'T DONE. CHRONIC CONSTIPATION . HAD BOWEL MOVEMENT ONCE WITHIN LAST 2 WEEKS WITH ABDOMINAL CRAMPS . PRN BENTYL HELPED SOME IN THE PAST,. NEEDS REFILL. S/P RECENT FALL. HAS BURNING SENSATIONS OF FEET. SINUS CONGESTION . CHRONIC PRURITUS, DRY SKIN. NEEDS NEW BIPAP DEVICE WITH HAVING CY. MEDICARE WELLNESS EXAM.  Patient Care Team:  Ana Knight MD as PCP - General (Internal Medicine)  Anitha Serrano CMA as Care Manager (Case Management)     Review of Systems   Constitutional:  Negative for chills and fever.   HENT:  Positive for congestion and sinus pain. Negative for postnasal drip and rhinorrhea.    Eyes: Negative.  Negative for visual disturbance.   Respiratory:  Negative for cough, shortness of breath and wheezing.    Cardiovascular: Negative.  Negative for chest pain, palpitations and leg swelling.   Gastrointestinal:  Positive for constipation. Negative for abdominal distention, diarrhea, nausea and vomiting.        ABDOMINAL CRAMPS    Endocrine: Negative.    Genitourinary:  Negative for dysuria and urgency.   Musculoskeletal: Negative.  Negative for back pain.   Skin:  Negative for rash.        PRURITUS   Allergic/Immunologic: Negative for immunocompromised state.   Neurological: Negative.  Negative for dizziness, weakness, light-headedness and headaches.   Psychiatric/Behavioral: Negative.  Negative for agitation.        Objective   Vitals:  /84   Pulse 93   Ht 1.575 m (5' 2\")   Wt (!) 163 kg (360 lb)   SpO2 95%   BMI 65.84 kg/m²       Physical Exam  Constitutional:       General: She is not in acute distress.  HENT:      Head: Normocephalic.      Nose: Nose normal.      Mouth/Throat:      Mouth: Mucous membranes are moist.   Eyes:      Conjunctiva/sclera: Conjunctivae normal.      " Pupils: Pupils are equal, round, and reactive to light.   Cardiovascular:      Rate and Rhythm: Normal rate and regular rhythm.      Pulses: Normal pulses.      Heart sounds: Normal heart sounds.   Pulmonary:      Effort: No respiratory distress.      Breath sounds: No wheezing.   Chest:      Chest wall: No tenderness.   Abdominal:      General: Abdomen is flat. Bowel sounds are normal.      Palpations: Abdomen is soft.      Tenderness: There is no abdominal tenderness.   Musculoskeletal:         General: No tenderness. Normal range of motion.      Cervical back: Normal range of motion.      Comments: LYMPHEDEMA, 4/5 STRENGTH OF LEGS ,3/5 OF ARMS .   Lymphadenopathy:      Cervical: No cervical adenopathy.   Skin:     General: Skin is warm and dry.      Findings: No rash.   Neurological:      General: No focal deficit present.      Mental Status: She is alert. Mental status is at baseline.   Psychiatric:         Mood and Affect: Mood normal.         Behavior: Behavior normal.         Assessment/Plan   1. Routine general medical examination at health care facility        2. Chronic diastolic heart failure (CMS/[facility])  empagliflozin (Jardiance) 25 mg      3. Type 2 diabetes mellitus without complication, without long-term current use of insulin (CMS/[facility])  empagliflozin (Jardiance) 25 mg    Referral to Podiatry      4. Weakness of both arms  Referral to Physical Therapy      5. Weakness of both legs  Referral to Physical Therapy      6. Neuropathy of both feet  Referral to Podiatry      7. Encounter for vaccination  zoster vaccine-recombinant adjuvanted (Shingrix, PF,) 50 mcg/0.5 mL vaccine      8. Sinus congestion  doxycycline (Vibramycin) 100 mg capsule      9. Constipation, unspecified constipation type  dicyclomine (Bentyl) 20 mg tablet      10. Abdominal cramps  dicyclomine (Bentyl) 20 mg tablet      11. Pruritus  triamcinolone (Kenalog) 0.1 % ointment      12. Dry skin dermatitis  triamcinolone (Kenalog) 0.1 %  ointment      13. Obstructive sleep apnea syndrome  Positive Airway Pressure (PAP) Therapy        ADVISED TO HAVE LOW FAT AND LOW CALORIE DIET AND TO LOOSE WEIGHT, DAILY EXERCISE.  ADVISED TO ELEVATE THE HEAD OF THE BED FOR SLEEP AND TO SLEEP WITH WARM HUMIDIFIER.  ADVISED TO APPLY COLD COMPRESSION TO SKIN PRN FOR ITCHING AND TO AVOID SCRATCHING THE SKIN.  ADVISED TO ADD MORE FIBER TO DIET.  1800 TRENT ADA  HGA1C GOAL LESS THAN 7  LOSE WT  EXERCISE DAILY. ADVISED FOR FALL PRECAUTION.  Advanced Care planning discussed with patient,diagnosis , treatment and prognosis discussed with pt,pt has capacity to make own decision, pt does not have an AD, advised to set one up and bring a copy for the chart.    MDM    1) COMPLEXITY: 1 UNDIAGNOSED NEW PROBLEM WITH UNCERTAIN PROGNOSIS  2)DATA: TESTS INTERPRETED AND OR ORDERED, TOOK INDEPENDENT HISTORY OR RECORDS REVIEWED  3)RISK: MODERATE RISK DUE TO NATURE OF MEDICAL CONDITIONS/COMORBIDITY OR MEDICATIONS ORDERED OR SURGICAL OR PROCEDURE REFERRAL, .      3 weeks

## 2024-02-26 ENCOUNTER — LAB (OUTPATIENT)
Dept: LAB | Facility: LAB | Age: 54
End: 2024-02-26
Payer: MEDICARE

## 2024-02-26 DIAGNOSIS — E11.22 TYPE 2 DIABETES MELLITUS WITH STAGE 3A CHRONIC KIDNEY DISEASE, WITH LONG-TERM CURRENT USE OF INSULIN (MULTI): ICD-10-CM

## 2024-02-26 DIAGNOSIS — N18.31 TYPE 2 DIABETES MELLITUS WITH STAGE 3A CHRONIC KIDNEY DISEASE, WITH LONG-TERM CURRENT USE OF INSULIN (MULTI): ICD-10-CM

## 2024-02-26 DIAGNOSIS — E87.6 HYPOKALEMIA: ICD-10-CM

## 2024-02-26 DIAGNOSIS — I15.2 HYPERTENSION ASSOCIATED WITH DIABETES (MULTI): ICD-10-CM

## 2024-02-26 DIAGNOSIS — Z79.4 TYPE 2 DIABETES MELLITUS WITH STAGE 3A CHRONIC KIDNEY DISEASE, WITH LONG-TERM CURRENT USE OF INSULIN (MULTI): ICD-10-CM

## 2024-02-26 DIAGNOSIS — E11.59 HYPERTENSION ASSOCIATED WITH DIABETES (MULTI): ICD-10-CM

## 2024-02-26 DIAGNOSIS — D72.829 LEUKOCYTOSIS, UNSPECIFIED TYPE: ICD-10-CM

## 2024-02-26 DIAGNOSIS — E87.1 HYPONATREMIA: ICD-10-CM

## 2024-02-26 DIAGNOSIS — N18.31 STAGE 3A CHRONIC KIDNEY DISEASE (MULTI): ICD-10-CM

## 2024-02-26 LAB
ALBUMIN SERPL BCP-MCNC: 4.2 G/DL (ref 3.4–5)
ALP SERPL-CCNC: 653 U/L (ref 33–110)
ALT SERPL W P-5'-P-CCNC: 104 U/L (ref 7–45)
ANION GAP SERPL CALC-SCNC: 18 MMOL/L (ref 10–20)
AST SERPL W P-5'-P-CCNC: 67 U/L (ref 9–39)
BASOPHILS # BLD AUTO: 0.06 X10*3/UL (ref 0–0.1)
BASOPHILS NFR BLD AUTO: 0.4 %
BILIRUB SERPL-MCNC: 0.7 MG/DL (ref 0–1.2)
BUN SERPL-MCNC: 67 MG/DL (ref 6–23)
CALCIUM SERPL-MCNC: 11 MG/DL (ref 8.6–10.3)
CHLORIDE SERPL-SCNC: 82 MMOL/L (ref 98–107)
CO2 SERPL-SCNC: 29 MMOL/L (ref 21–32)
CREAT SERPL-MCNC: 1.8 MG/DL (ref 0.5–1.05)
EGFRCR SERPLBLD CKD-EPI 2021: 33 ML/MIN/1.73M*2
EOSINOPHIL # BLD AUTO: 0.18 X10*3/UL (ref 0–0.7)
EOSINOPHIL NFR BLD AUTO: 1.2 %
ERYTHROCYTE [DISTWIDTH] IN BLOOD BY AUTOMATED COUNT: 13.5 % (ref 11.5–14.5)
GLUCOSE SERPL-MCNC: 294 MG/DL (ref 74–99)
HCT VFR BLD AUTO: 44.4 % (ref 36–46)
HGB BLD-MCNC: 15.5 G/DL (ref 12–16)
IMM GRANULOCYTES # BLD AUTO: 0.22 X10*3/UL (ref 0–0.7)
IMM GRANULOCYTES NFR BLD AUTO: 1.4 % (ref 0–0.9)
LYMPHOCYTES # BLD AUTO: 2.11 X10*3/UL (ref 1.2–4.8)
LYMPHOCYTES NFR BLD AUTO: 13.9 %
MAGNESIUM SERPL-MCNC: 1.99 MG/DL (ref 1.6–2.4)
MCH RBC QN AUTO: 31.1 PG (ref 26–34)
MCHC RBC AUTO-ENTMCNC: 34.9 G/DL (ref 32–36)
MCV RBC AUTO: 89 FL (ref 80–100)
MONOCYTES # BLD AUTO: 1.11 X10*3/UL (ref 0.1–1)
MONOCYTES NFR BLD AUTO: 7.3 %
NEUTROPHILS # BLD AUTO: 11.55 X10*3/UL (ref 1.2–7.7)
NEUTROPHILS NFR BLD AUTO: 75.8 %
NRBC BLD-RTO: 0 /100 WBCS (ref 0–0)
PHOSPHATE SERPL-MCNC: 4.6 MG/DL (ref 2.5–4.9)
PLATELET # BLD AUTO: 340 X10*3/UL (ref 150–450)
POTASSIUM SERPL-SCNC: 4.2 MMOL/L (ref 3.5–5.3)
PROT SERPL-MCNC: 7 G/DL (ref 6.4–8.2)
RBC # BLD AUTO: 4.99 X10*6/UL (ref 4–5.2)
SODIUM SERPL-SCNC: 125 MMOL/L (ref 136–145)
WBC # BLD AUTO: 15.2 X10*3/UL (ref 4.4–11.3)

## 2024-02-26 PROCEDURE — 83970 ASSAY OF PARATHORMONE: CPT

## 2024-02-26 PROCEDURE — 85025 COMPLETE CBC W/AUTO DIFF WBC: CPT

## 2024-02-26 PROCEDURE — 84100 ASSAY OF PHOSPHORUS: CPT

## 2024-02-26 PROCEDURE — 80053 COMPREHEN METABOLIC PANEL: CPT

## 2024-02-26 PROCEDURE — 36415 COLL VENOUS BLD VENIPUNCTURE: CPT

## 2024-02-26 PROCEDURE — 83735 ASSAY OF MAGNESIUM: CPT

## 2024-02-27 LAB — PTH-INTACT SERPL-MCNC: 151.5 PG/ML (ref 18.5–88)

## 2024-02-29 ENCOUNTER — OFFICE VISIT (OUTPATIENT)
Dept: GASTROENTEROLOGY | Facility: CLINIC | Age: 54
End: 2024-02-29
Payer: MEDICARE

## 2024-02-29 VITALS — BODY MASS INDEX: 65.84 KG/M2 | WEIGHT: 293 LBS

## 2024-02-29 DIAGNOSIS — K59.09 OTHER CONSTIPATION: Primary | ICD-10-CM

## 2024-02-29 PROBLEM — K59.04 CHRONIC IDIOPATHIC CONSTIPATION: Status: ACTIVE | Noted: 2024-01-16

## 2024-02-29 PROCEDURE — 3060F POS MICROALBUMINURIA REV: CPT | Performed by: INTERNAL MEDICINE

## 2024-02-29 PROCEDURE — 99204 OFFICE O/P NEW MOD 45 MIN: CPT | Performed by: INTERNAL MEDICINE

## 2024-02-29 PROCEDURE — 1036F TOBACCO NON-USER: CPT | Performed by: INTERNAL MEDICINE

## 2024-02-29 PROCEDURE — 3008F BODY MASS INDEX DOCD: CPT | Performed by: INTERNAL MEDICINE

## 2024-02-29 PROCEDURE — 3052F HG A1C>EQUAL 8.0%<EQUAL 9.0%: CPT | Performed by: INTERNAL MEDICINE

## 2024-02-29 RX ORDER — PLECANATIDE 3 MG/1
3 TABLET ORAL DAILY
Qty: 30 TABLET | Refills: 11 | Status: SHIPPED | OUTPATIENT
Start: 2024-02-29 | End: 2024-04-03 | Stop reason: WASHOUT

## 2024-02-29 RX ORDER — LACTULOSE 10 G/15ML
10 SOLUTION ORAL DAILY
Qty: 1000 ML | Refills: 3 | Status: SHIPPED | OUTPATIENT
Start: 2024-02-29 | End: 2024-04-03 | Stop reason: WASHOUT

## 2024-02-29 RX ORDER — LANOLIN ALCOHOL/MO/W.PET/CERES
400 CREAM (GRAM) TOPICAL 2 TIMES DAILY
Qty: 60 TABLET | Refills: 11 | Status: SHIPPED | OUTPATIENT
Start: 2024-02-29 | End: 2025-02-28

## 2024-02-29 ASSESSMENT — ENCOUNTER SYMPTOMS
ENDOCRINE NEGATIVE: 1
RESPIRATORY NEGATIVE: 1
CARDIOVASCULAR NEGATIVE: 1
EYES NEGATIVE: 1
ABDOMINAL PAIN: 1
NEUROLOGICAL NEGATIVE: 1
NAUSEA: 1
CONSTIPATION: 1
HEMATOLOGIC/LYMPHATIC NEGATIVE: 1
CONSTITUTIONAL NEGATIVE: 1

## 2024-02-29 NOTE — PROGRESS NOTES
"Subjective   Patient ID: Roselia Mo is a 53 y.o. female who presents for Follow-up (Hospital follow up, pt states she has constipation and some bowel blockages. Pt currently taking stool softener in AM ands PM, and MiraLAX last BM was this morning. [place] double dose of the MiraLAX last evening. On occasion will have soft stools from this. Patient states she will have flatulence with stool that comes out but no real \"bowel movement\"  Also taking Amitiza BID).    VIOLETTA Veloz is an unfortunate 53-year-old female with severe obesity diabetes mellitus, congestive heart failure, chronic oxygen dependency peripheral edema and generalized abdominal pain with multipl abdominal wall defects and hernias.  Presents in follow-up from the hospital where she was admitted for small bowel obstruction and obstipation.  At discharge she was placed on Amitiza, MiraLAX and Mariajose-Colace.  She states she is having very ineffective bowel movements states that she will shoot a little marble out if she farts but otherwise has not had a normal bowel movement a years.  She admits to having no rectal bleeding.  States she had a colonoscopy 12 years ago while living in [place] and was found to have several polyps and did not follow-up afterwards.  Patient denies any vomiting or diarrhea at this time.  She is a poorly controlled diabetic stating her average sugar is now over 240 she is trying to adjust to a new pump which did not come with any of her old settings.  She has no support as she is not been able to find an endocrinologist since relocating here from [place] and has not been able to access her family doctor for advice.    She is on chronic oxygen lives 90% of her life in a wheelchair she is denies any family history of colorectal cancer.    Review of Systems   Constitutional: Negative.    HENT: Negative.     Eyes: Negative.    Respiratory: Negative.     Cardiovascular: Negative.    Gastrointestinal:  Positive for abdominal pain, " constipation and nausea.   Endocrine: Negative.    Genitourinary: Negative.    Neurological: Negative.    Hematological: Negative.        Objective   Physical Exam  Vitals and nursing note reviewed.   Constitutional:       Appearance: Normal appearance.   HENT:      Head: Normocephalic.      Mouth/Throat:      Mouth: Mucous membranes are moist.      Pharynx: Oropharynx is clear.   Eyes:      Conjunctiva/sclera: Conjunctivae normal.      Pupils: Pupils are equal, round, and reactive to light.   Cardiovascular:      Pulses: Normal pulses.      Heart sounds: Normal heart sounds.   Pulmonary:      Effort: Pulmonary effort is normal.      Breath sounds: Normal breath sounds.   Abdominal:      General: Abdomen is flat. Bowel sounds are normal.      Palpations: Abdomen is soft.   Musculoskeletal:      Cervical back: Normal range of motion and neck supple.   Skin:     General: Skin is warm and dry.   Neurological:      General: No focal deficit present.      Mental Status: She is alert and oriented to person, place, and time.   Psychiatric:         Behavior: Behavior normal.         Assessment/Plan   Diagnoses and all orders for this visit:  Other constipation  -     lactulose 10 gram/15 mL (15 mL) solution; Take 10 g by mouth once daily.  -     plecanatide (Trulance) tablet tablet; Take 1 tablet (3 mg) by mouth once daily.  -     magnesium oxide (Mag-Ox) 400 mg (241.3 mg magnesium) tablet; Take 1 tablet (400 mg) by mouth 2 times a day.  Other orders  -     Referral to Gastroenterology    Patient has morbid obesity would not be a good candidate for operative repair of her hernias and furthermore needs good control of her diabetes and bowel habits before any surgery could be entertained.  I will place her on lactulose and Trulance after discontinue Amitiza and follow-up with myself in 3 months       Sandoval Lewis DO 02/29/24 12:48 PM

## 2024-03-06 ENCOUNTER — TELEPHONE (OUTPATIENT)
Dept: PRIMARY CARE | Facility: CLINIC | Age: 54
End: 2024-03-06
Payer: MEDICARE

## 2024-03-06 DIAGNOSIS — R29.898 WEAKNESS OF BOTH LEGS: ICD-10-CM

## 2024-03-06 DIAGNOSIS — G57.93 NEUROPATHY OF BOTH FEET: ICD-10-CM

## 2024-03-06 NOTE — TELEPHONE ENCOUNTER
PT CALLED IN HER WHEELCHAIR BROKE AND IS REQUESTING A ORDER FOR A NEW ONE FAXED TO [facility] -[phone]

## 2024-03-13 ENCOUNTER — DOCUMENTATION (OUTPATIENT)
Dept: PHYSICAL THERAPY | Facility: CLINIC | Age: 54
End: 2024-03-13

## 2024-03-13 ENCOUNTER — OFFICE VISIT (OUTPATIENT)
Dept: PRIMARY CARE | Facility: CLINIC | Age: 54
End: 2024-03-13
Payer: MEDICARE

## 2024-03-13 ENCOUNTER — PATIENT OUTREACH (OUTPATIENT)
Dept: PRIMARY CARE | Facility: CLINIC | Age: 54
End: 2024-03-13

## 2024-03-13 ENCOUNTER — TELEPHONE (OUTPATIENT)
Dept: PRIMARY CARE | Facility: CLINIC | Age: 54
End: 2024-03-13

## 2024-03-13 VITALS
WEIGHT: 258.3 LBS | SYSTOLIC BLOOD PRESSURE: 130 MMHG | DIASTOLIC BLOOD PRESSURE: 62 MMHG | HEIGHT: 62 IN | BODY MASS INDEX: 47.53 KG/M2 | OXYGEN SATURATION: 93 % | HEART RATE: 121 BPM

## 2024-03-13 DIAGNOSIS — E11.59 HYPERTENSION ASSOCIATED WITH DIABETES (MULTI): ICD-10-CM

## 2024-03-13 DIAGNOSIS — R94.31 ABNORMAL EKG: ICD-10-CM

## 2024-03-13 DIAGNOSIS — E66.01 MORBID OBESITY WITH BODY MASS INDEX (BMI) OF 60.0 TO 69.9 IN ADULT (MULTI): ICD-10-CM

## 2024-03-13 DIAGNOSIS — R05.9 COUGH IN ADULT: ICD-10-CM

## 2024-03-13 DIAGNOSIS — N18.31 STAGE 3A CHRONIC KIDNEY DISEASE (MULTI): ICD-10-CM

## 2024-03-13 DIAGNOSIS — E11.69 HYPERLIPIDEMIA ASSOCIATED WITH TYPE 2 DIABETES MELLITUS (MULTI): ICD-10-CM

## 2024-03-13 DIAGNOSIS — R00.2 PALPITATIONS: ICD-10-CM

## 2024-03-13 DIAGNOSIS — R93.89 ABNORMAL CXR: Primary | ICD-10-CM

## 2024-03-13 DIAGNOSIS — I15.2 HYPERTENSION ASSOCIATED WITH DIABETES (MULTI): ICD-10-CM

## 2024-03-13 DIAGNOSIS — R11.0 CHRONIC NAUSEA: ICD-10-CM

## 2024-03-13 DIAGNOSIS — D72.829 LEUKOCYTOSIS, UNSPECIFIED TYPE: ICD-10-CM

## 2024-03-13 DIAGNOSIS — J43.9 PULMONARY EMPHYSEMA, UNSPECIFIED EMPHYSEMA TYPE (MULTI): ICD-10-CM

## 2024-03-13 DIAGNOSIS — Z78.9 POOR PHYSICAL HEALTH: ICD-10-CM

## 2024-03-13 DIAGNOSIS — R06.02 SOB (SHORTNESS OF BREATH): ICD-10-CM

## 2024-03-13 DIAGNOSIS — E11.65 TYPE 2 DIABETES MELLITUS WITH HYPERGLYCEMIA, WITH LONG-TERM CURRENT USE OF INSULIN (MULTI): ICD-10-CM

## 2024-03-13 DIAGNOSIS — R00.0 TACHYCARDIA: ICD-10-CM

## 2024-03-13 DIAGNOSIS — Z79.4 TYPE 2 DIABETES MELLITUS WITH HYPERGLYCEMIA, WITH LONG-TERM CURRENT USE OF INSULIN (MULTI): ICD-10-CM

## 2024-03-13 DIAGNOSIS — I89.0 LYMPHEDEMA: ICD-10-CM

## 2024-03-13 DIAGNOSIS — E78.5 HYPERLIPIDEMIA ASSOCIATED WITH TYPE 2 DIABETES MELLITUS (MULTI): ICD-10-CM

## 2024-03-13 PROBLEM — Z87.898 H/O ABDOMINAL PAIN: Status: RESOLVED | Noted: 2024-02-08 | Resolved: 2024-03-13

## 2024-03-13 PROCEDURE — 93000 ELECTROCARDIOGRAM COMPLETE: CPT | Performed by: INTERNAL MEDICINE

## 2024-03-13 PROCEDURE — 3075F SYST BP GE 130 - 139MM HG: CPT | Performed by: INTERNAL MEDICINE

## 2024-03-13 PROCEDURE — 3078F DIAST BP <80 MM HG: CPT | Performed by: INTERNAL MEDICINE

## 2024-03-13 PROCEDURE — 3008F BODY MASS INDEX DOCD: CPT | Performed by: INTERNAL MEDICINE

## 2024-03-13 PROCEDURE — 1036F TOBACCO NON-USER: CPT | Performed by: INTERNAL MEDICINE

## 2024-03-13 PROCEDURE — 99214 OFFICE O/P EST MOD 30 MIN: CPT | Performed by: INTERNAL MEDICINE

## 2024-03-13 PROCEDURE — 3060F POS MICROALBUMINURIA REV: CPT | Performed by: INTERNAL MEDICINE

## 2024-03-13 PROCEDURE — 3052F HG A1C>EQUAL 8.0%<EQUAL 9.0%: CPT | Performed by: INTERNAL MEDICINE

## 2024-03-13 RX ORDER — DOXYCYCLINE 100 MG/1
100 CAPSULE ORAL 2 TIMES DAILY
Qty: 20 CAPSULE | Refills: 0 | Status: SHIPPED | OUTPATIENT
Start: 2024-03-13 | End: 2024-03-22 | Stop reason: ALTCHOICE

## 2024-03-13 ASSESSMENT — PATIENT HEALTH QUESTIONNAIRE - PHQ9
3. TROUBLE FALLING OR STAYING ASLEEP OR SLEEPING TOO MUCH: NEARLY EVERY DAY
1. LITTLE INTEREST OR PLEASURE IN DOING THINGS: NEARLY EVERY DAY
4. FEELING TIRED OR HAVING LITTLE ENERGY: NEARLY EVERY DAY
SUM OF ALL RESPONSES TO PHQ QUESTIONS 1-9: 24
SUM OF ALL RESPONSES TO PHQ9 QUESTIONS 1 AND 2: 6
7. TROUBLE CONCENTRATING ON THINGS, SUCH AS READING THE NEWSPAPER OR WATCHING TELEVISION: NEARLY EVERY DAY
8. MOVING OR SPEAKING SO SLOWLY THAT OTHER PEOPLE COULD HAVE NOTICED. OR THE OPPOSITE, BEING SO FIGETY OR RESTLESS THAT YOU HAVE BEEN MOVING AROUND A LOT MORE THAN USUAL: NEARLY EVERY DAY
9. THOUGHTS THAT YOU WOULD BE BETTER OFF DEAD, OR OF HURTING YOURSELF: SEVERAL DAYS
5. POOR APPETITE OR OVEREATING: NEARLY EVERY DAY
10. IF YOU CHECKED OFF ANY PROBLEMS, HOW DIFFICULT HAVE THESE PROBLEMS MADE IT FOR YOU TO DO YOUR WORK, TAKE CARE OF THINGS AT HOME, OR GET ALONG WITH OTHER PEOPLE: EXTREMELY DIFFICULT
6. FEELING BAD ABOUT YOURSELF - OR THAT YOU ARE A FAILURE OR HAVE LET YOURSELF OR YOUR FAMILY DOWN: MORE THAN HALF THE DAYS
2. FEELING DOWN, DEPRESSED OR HOPELESS: NEARLY EVERY DAY

## 2024-03-13 ASSESSMENT — ENCOUNTER SYMPTOMS
DIZZINESS: 0
CONSTIPATION: 0
COUGH: 1
AGITATION: 0
ENDOCRINE NEGATIVE: 1
HEADACHES: 0
WEAKNESS: 0
VOMITING: 0
BACK PAIN: 0
RHINORRHEA: 0
NAUSEA: 1
DYSURIA: 0
NEUROLOGICAL NEGATIVE: 1
FATIGUE: 1
LIGHT-HEADEDNESS: 0
WHEEZING: 0
PALPITATIONS: 1
CHILLS: 0
MUSCULOSKELETAL NEGATIVE: 1
ABDOMINAL PAIN: 0
EYES NEGATIVE: 1
ABDOMINAL DISTENTION: 0
FEVER: 0
DIARRHEA: 0
PSYCHIATRIC NEGATIVE: 1
SHORTNESS OF BREATH: 1

## 2024-03-13 NOTE — PROGRESS NOTES
Call placed regarding one month post discharge follow up call.  At time of outreach call the patient feels as if their condition has worsened since initial visit with PCP or specialist.  Questions or concerns regarding recovery period addressed at this time. Heading to a PCP visit today 3/13/2024. Patient is not feeling well, has the shakes, having trouble walking and falls.  Reviewed any PCP or specialists progress notes/labs/radiology reports if applicable and addressed any questions or concerns.

## 2024-03-13 NOTE — PROGRESS NOTES
Subjective   Patient ID: Roselia Mo is a 53 y.o. female who presents for Follow-up (3 WK F/U WITH LABS. NO APPETITE, FEELING NAUSEA WHEN EATING, NOTHING BUT SLEEPING IN BED  ).  HPI  Lab F/U. STOPPED TAKING ALL HER MEDICATIONS (EXCEPT THE CAUMADIN) X 3 DAYS SINCE THEY CAUSED NAUSED . HAS PALPITATIONS , FATIGUE. NO ABDOMINAL PAIN .WORSENING CHRONIC SOB X SEVERAL WEEKS WITH PRODUCTIVE COUGH.  Review of Systems   Constitutional:  Positive for fatigue. Negative for chills and fever.   HENT: Negative.  Negative for congestion, postnasal drip and rhinorrhea.    Eyes: Negative.  Negative for visual disturbance.   Respiratory:  Positive for cough and shortness of breath. Negative for wheezing.    Cardiovascular:  Positive for palpitations. Negative for chest pain and leg swelling.   Gastrointestinal:  Positive for nausea. Negative for abdominal distention, abdominal pain, constipation, diarrhea and vomiting.   Endocrine: Negative.    Genitourinary:  Negative for dysuria and urgency.   Musculoskeletal: Negative.  Negative for back pain.   Skin: Negative.  Negative for rash.   Allergic/Immunologic: Negative for immunocompromised state.   Neurological: Negative.  Negative for dizziness, weakness, light-headedness and headaches.   Psychiatric/Behavioral: Negative.  Negative for agitation.        Objective   Physical Exam  Constitutional:       General: She is not in acute distress.  HENT:      Head: Normocephalic.      Nose: Nose normal.      Mouth/Throat:      Mouth: Mucous membranes are moist.   Eyes:      Conjunctiva/sclera: Conjunctivae normal.      Pupils: Pupils are equal, round, and reactive to light.   Cardiovascular:      Rate and Rhythm: Regular rhythm. Tachycardia present.      Pulses: Normal pulses.      Heart sounds: Normal heart sounds.   Pulmonary:      Effort: No respiratory distress.      Breath sounds: No wheezing.      Comments: DISTANT LUNG SOUNDS.  Chest:      Chest wall: No tenderness.   Abdominal:       General: Abdomen is flat. Bowel sounds are normal.      Palpations: Abdomen is soft.      Tenderness: There is no abdominal tenderness.   Musculoskeletal:         General: No tenderness. Normal range of motion.      Cervical back: Normal range of motion.   Lymphadenopathy:      Cervical: No cervical adenopathy.   Skin:     General: Skin is warm and dry.      Findings: No rash.   Neurological:      General: No focal deficit present.      Mental Status: She is alert. Mental status is at baseline.   Psychiatric:         Mood and Affect: Mood normal.         Behavior: Behavior normal.         Assessment/Plan   1. Abnormal CXR  CT chest wo IV contrast    doxycycline (Vibramycin) 100 mg capsule      2. SOB (shortness of breath)  CT chest wo IV contrast    Nuclear Stress Test      3. Cough in adult  CT chest wo IV contrast    doxycycline (Vibramycin) 100 mg capsule      4. Leukocytosis, unspecified type  doxycycline (Vibramycin) 100 mg capsule      5. Tachycardia  ECG 12 Lead    Nuclear Stress Test      6. Palpitations  ECG 12 Lead    Nuclear Stress Test      7. Abnormal EKG  Nuclear Stress Test      8. Hyperlipidemia associated with type 2 diabetes mellitus (CMS/HCC)        9. Hypertension associated with diabetes (CMS/HCC)        10. Poor physical health        11. Type 2 diabetes mellitus with hyperglycemia, with long-term current use of insulin (CMS/HCC)        12. Chronic nausea        13. Stage 3a chronic kidney disease (CMS/HCC)        14. Pulmonary emphysema, unspecified emphysema type (CMS/HCC)        15. Lymphedema        16. Morbid obesity with body mass index (BMI) of 60.0 to 69.9 in adult (CMS/HCC)        TEST RESULTS WERE DISCUSSED. ADVISED TO RESTART THE MEDICATIONS , TO TAKE THEM WITH FOOD TO PREVENT NAUSEA.  ADVISED TO HAVE LOW FAT AND LOW CALORIE DIET AND TO LOOSE WEIGHT, DAILY EXERCISE.  1800 TRENT ADA  HGA1C GOAL LESS THAN 7  LOSE WT  EXERCISE DAILY  MONITOR BP   GOAL BP LOWER THAN 130/80  LOW  SALT  ADVISED TO CUT DOWN CAFFEINE.  PT. WAS INSTRUCTED TO INCREASE FLUID INTAKE ,TAKE TYLENOL 650 MG PO Q6H/PRN FOR PAIN OR FEVER AND TAKE ROBITUSSIN OTC 2 TSP Q 6H/PRN FOR COUGH.  ADVISED TO GO TO ER IF SOB GETS WORSE.   PT HAS MORBID OBESITY WITH LYMPHEDEMA AND CAN'T DO CARDIOLITE STRESS TEST (CAN'T DO THE TREADMILL ).  MDM    1) COMPLEXITY: 1 UNDIAGNOSED NEW PROBLEM WITH UNCERTAIN PROGNOSIS  2)DATA: TESTS INTERPRETED AND OR ORDERED, TOOK INDEPENDENT HISTORY OR RECORDS REVIEWED  3)RISK: MODERATE RISK DUE TO NATURE OF MEDICAL CONDITIONS/COMORBIDITY OR MEDICATIONS ORDERED OR SURGICAL OR PROCEDURE REFERRAL, .     2 WEEK.

## 2024-03-13 NOTE — PROGRESS NOTES
Physical Therapy                 Therapy Communication Note    Patient Name: Roselia Mo  MRN: [id number]  Today's Date: 3/13/2024     Discipline: Physical Therapy    Missed Visit Reason:      Missed Time: No Show    Comment:

## 2024-03-14 DIAGNOSIS — Z79.4 TYPE 2 DIABETES MELLITUS WITH HYPERGLYCEMIA, WITH LONG-TERM CURRENT USE OF INSULIN (MULTI): ICD-10-CM

## 2024-03-14 DIAGNOSIS — E03.9 ACQUIRED HYPOTHYROIDISM: ICD-10-CM

## 2024-03-14 DIAGNOSIS — E11.65 TYPE 2 DIABETES MELLITUS WITH HYPERGLYCEMIA, WITH LONG-TERM CURRENT USE OF INSULIN (MULTI): ICD-10-CM

## 2024-03-14 RX ORDER — LEVOTHYROXINE SODIUM 200 UG/1
200 TABLET ORAL DAILY
Qty: 90 TABLET | Refills: 3 | Status: SHIPPED | OUTPATIENT
Start: 2024-03-14

## 2024-03-14 RX ORDER — LEVOTHYROXINE SODIUM 50 UG/1
50 TABLET ORAL
Qty: 90 TABLET | Refills: 3 | Status: SHIPPED | OUTPATIENT
Start: 2024-03-14

## 2024-03-15 NOTE — TELEPHONE ENCOUNTER
CT INTO REVIEW.    DR. DE LA TORRE SINCE WE ARE DOING A CANDI PLEASE ADD AN ADDENDUM TO OFFICE NOTE ON WHY PATIENT CANNOT PERFORM A CARDIOLITE EXCERSICE STRESS TEST THIS IS NEEDED FOR THE PRIOR AUTHORIZATION WITH INSURANCE.  THANK YOU.    I HAVE SAVED WHAT I HAVE SO FAR IN [employer] TRACKING # [id number] AND CAN GO BACK IN AND CONTINUE WITH AUTH WHEN NOTE IS MODIFIED.

## 2024-03-19 ENCOUNTER — TELEPHONE (OUTPATIENT)
Dept: PRIMARY CARE | Facility: CLINIC | Age: 54
End: 2024-03-19

## 2024-03-19 ENCOUNTER — HOSPITAL ENCOUNTER (OUTPATIENT)
Dept: RADIOLOGY | Facility: HOSPITAL | Age: 54
Discharge: HOME | DRG: 640 | End: 2024-03-19
Payer: MEDICARE

## 2024-03-19 ENCOUNTER — APPOINTMENT (OUTPATIENT)
Dept: RADIOLOGY | Facility: HOSPITAL | Age: 54
DRG: 640 | End: 2024-03-19
Payer: MEDICARE

## 2024-03-19 ENCOUNTER — HOSPITAL ENCOUNTER (INPATIENT)
Facility: HOSPITAL | Age: 54
LOS: 3 days | Discharge: HOME | DRG: 640 | End: 2024-03-22
Attending: INTERNAL MEDICINE | Admitting: INTERNAL MEDICINE
Payer: MEDICARE

## 2024-03-19 ENCOUNTER — OFFICE VISIT (OUTPATIENT)
Dept: PRIMARY CARE | Facility: CLINIC | Age: 54
End: 2024-03-19
Payer: MEDICARE

## 2024-03-19 ENCOUNTER — LAB (OUTPATIENT)
Dept: LAB | Facility: LAB | Age: 54
End: 2024-03-19
Payer: MEDICARE

## 2024-03-19 ENCOUNTER — HOSPITAL ENCOUNTER (OUTPATIENT)
Dept: CARDIOLOGY | Facility: HOSPITAL | Age: 54
Discharge: HOME | DRG: 640 | End: 2024-03-19
Payer: MEDICARE

## 2024-03-19 VITALS
HEIGHT: 62 IN | WEIGHT: 258 LBS | HEART RATE: 106 BPM | BODY MASS INDEX: 47.48 KG/M2 | DIASTOLIC BLOOD PRESSURE: 73 MMHG | SYSTOLIC BLOOD PRESSURE: 111 MMHG

## 2024-03-19 DIAGNOSIS — Z78.9 POOR PHYSICAL HEALTH: ICD-10-CM

## 2024-03-19 DIAGNOSIS — I15.2 HYPERTENSION ASSOCIATED WITH DIABETES (MULTI): ICD-10-CM

## 2024-03-19 DIAGNOSIS — M25.561 ACUTE PAIN OF RIGHT KNEE: ICD-10-CM

## 2024-03-19 DIAGNOSIS — E11.65 TYPE 2 DIABETES MELLITUS WITH HYPERGLYCEMIA, WITH LONG-TERM CURRENT USE OF INSULIN (MULTI): ICD-10-CM

## 2024-03-19 DIAGNOSIS — R60.0 EDEMA LEG: ICD-10-CM

## 2024-03-19 DIAGNOSIS — R25.2 MUSCLE CRAMPS: ICD-10-CM

## 2024-03-19 DIAGNOSIS — I50.32 CHRONIC DIASTOLIC HEART FAILURE (MULTI): ICD-10-CM

## 2024-03-19 DIAGNOSIS — E78.5 HYPERLIPIDEMIA ASSOCIATED WITH TYPE 2 DIABETES MELLITUS (MULTI): ICD-10-CM

## 2024-03-19 DIAGNOSIS — I50.9 CHRONIC CONGESTIVE HEART FAILURE, UNSPECIFIED HEART FAILURE TYPE (MULTI): ICD-10-CM

## 2024-03-19 DIAGNOSIS — R25.1 TREMOR: ICD-10-CM

## 2024-03-19 DIAGNOSIS — Z91.81 STATUS POST FALL: ICD-10-CM

## 2024-03-19 DIAGNOSIS — R42 DIZZINESS: ICD-10-CM

## 2024-03-19 DIAGNOSIS — M25.511 ACUTE PAIN OF RIGHT SHOULDER: Primary | ICD-10-CM

## 2024-03-19 DIAGNOSIS — G47.33 OBSTRUCTIVE SLEEP APNEA SYNDROME: ICD-10-CM

## 2024-03-19 DIAGNOSIS — R56.9 SEIZURE-LIKE ACTIVITY (MULTI): ICD-10-CM

## 2024-03-19 DIAGNOSIS — R29.6 RECURRENT FALLS: ICD-10-CM

## 2024-03-19 DIAGNOSIS — E11.69 HYPERLIPIDEMIA ASSOCIATED WITH TYPE 2 DIABETES MELLITUS (MULTI): ICD-10-CM

## 2024-03-19 DIAGNOSIS — I82.0 HEPATIC VEIN THROMBOSIS (MULTI): ICD-10-CM

## 2024-03-19 DIAGNOSIS — R79.89 ELEVATED LFTS: ICD-10-CM

## 2024-03-19 DIAGNOSIS — L29.9 CHRONIC PRURITUS: ICD-10-CM

## 2024-03-19 DIAGNOSIS — Z79.4 TYPE 2 DIABETES MELLITUS WITH HYPERGLYCEMIA, WITH LONG-TERM CURRENT USE OF INSULIN (MULTI): ICD-10-CM

## 2024-03-19 DIAGNOSIS — E11.59 HYPERTENSION ASSOCIATED WITH DIABETES (MULTI): ICD-10-CM

## 2024-03-19 DIAGNOSIS — M25.511 ACUTE PAIN OF RIGHT SHOULDER: ICD-10-CM

## 2024-03-19 DIAGNOSIS — N17.9 AKI (ACUTE KIDNEY INJURY) (CMS-HCC): ICD-10-CM

## 2024-03-19 DIAGNOSIS — E87.1 HYPONATREMIA: Primary | ICD-10-CM

## 2024-03-19 DIAGNOSIS — R73.9 HYPERGLYCEMIA: ICD-10-CM

## 2024-03-19 DIAGNOSIS — R60.9 EDEMA, UNSPECIFIED TYPE: ICD-10-CM

## 2024-03-19 PROBLEM — R05.9 COUGH: Status: RESOLVED | Noted: 2024-03-19 | Resolved: 2024-03-19

## 2024-03-19 PROBLEM — R00.0 TACHYCARDIA: Status: RESOLVED | Noted: 2024-03-19 | Resolved: 2024-03-19

## 2024-03-19 PROBLEM — R06.02 SHORTNESS OF BREATH: Status: RESOLVED | Noted: 2024-03-19 | Resolved: 2024-03-19

## 2024-03-19 LAB
ALBUMIN SERPL BCP-MCNC: 4 G/DL (ref 3.4–5)
ALBUMIN SERPL BCP-MCNC: 4.3 G/DL (ref 3.4–5)
ALP SERPL-CCNC: 747 U/L (ref 33–110)
ALP SERPL-CCNC: 757 U/L (ref 33–110)
ALT SERPL W P-5'-P-CCNC: 130 U/L (ref 7–45)
ALT SERPL W P-5'-P-CCNC: 148 U/L (ref 7–45)
ANION GAP SERPL CALC-SCNC: 18 MMOL/L (ref 10–20)
ANION GAP SERPL CALC-SCNC: 18 MMOL/L (ref 10–20)
APPEARANCE UR: CLEAR
AST SERPL W P-5'-P-CCNC: 104 U/L (ref 9–39)
AST SERPL W P-5'-P-CCNC: 118 U/L (ref 9–39)
BILIRUB SERPL-MCNC: 0.7 MG/DL (ref 0–1.2)
BILIRUB SERPL-MCNC: 0.7 MG/DL (ref 0–1.2)
BILIRUB UR STRIP.AUTO-MCNC: NEGATIVE MG/DL
BUN SERPL-MCNC: 69 MG/DL (ref 6–23)
BUN SERPL-MCNC: 72 MG/DL (ref 6–23)
CALCIUM SERPL-MCNC: 10.5 MG/DL (ref 8.6–10.3)
CALCIUM SERPL-MCNC: 11.2 MG/DL (ref 8.6–10.3)
CARDIAC TROPONIN I PNL SERPL HS: 18 NG/L (ref 0–13)
CHLORIDE SERPL-SCNC: 82 MMOL/L (ref 98–107)
CHLORIDE SERPL-SCNC: 83 MMOL/L (ref 98–107)
CO2 SERPL-SCNC: 25 MMOL/L (ref 21–32)
CO2 SERPL-SCNC: 31 MMOL/L (ref 21–32)
COLOR UR: YELLOW
CREAT SERPL-MCNC: 1.86 MG/DL (ref 0.5–1.05)
CREAT SERPL-MCNC: 1.95 MG/DL (ref 0.5–1.05)
EGFRCR SERPLBLD CKD-EPI 2021: 30 ML/MIN/1.73M*2
EGFRCR SERPLBLD CKD-EPI 2021: 32 ML/MIN/1.73M*2
ERYTHROCYTE [DISTWIDTH] IN BLOOD BY AUTOMATED COUNT: 14.8 % (ref 11.5–14.5)
ERYTHROCYTE [SEDIMENTATION RATE] IN BLOOD BY WESTERGREN METHOD: 34 MM/H (ref 0–30)
EST. AVERAGE GLUCOSE BLD GHB EST-MCNC: 212 MG/DL
FLUAV RNA RESP QL NAA+PROBE: NOT DETECTED
FLUBV RNA RESP QL NAA+PROBE: NOT DETECTED
GLUCOSE BLD MANUAL STRIP-MCNC: 205 MG/DL (ref 74–99)
GLUCOSE BLD MANUAL STRIP-MCNC: 245 MG/DL (ref 74–99)
GLUCOSE SERPL-MCNC: 268 MG/DL (ref 74–99)
GLUCOSE SERPL-MCNC: 87 MG/DL (ref 74–99)
GLUCOSE UR STRIP.AUTO-MCNC: ABNORMAL MG/DL
HBA1C MFR BLD: 9 %
HCT VFR BLD AUTO: 40.4 % (ref 36–46)
HGB BLD-MCNC: 13.6 G/DL (ref 12–16)
HOLD SPECIMEN: NORMAL
HOLD SPECIMEN: NORMAL
INR PPP: 2 (ref 0.9–1.1)
KETONES UR STRIP.AUTO-MCNC: NEGATIVE MG/DL
LEUKOCYTE ESTERASE UR QL STRIP.AUTO: NEGATIVE
MAGNESIUM SERPL-MCNC: 2.25 MG/DL (ref 1.6–2.4)
MCH RBC QN AUTO: 31.1 PG (ref 26–34)
MCHC RBC AUTO-ENTMCNC: 33.7 G/DL (ref 32–36)
MCV RBC AUTO: 92 FL (ref 80–100)
NITRITE UR QL STRIP.AUTO: NEGATIVE
NRBC BLD-RTO: 0 /100 WBCS (ref 0–0)
PH UR STRIP.AUTO: 6 [PH]
PLATELET # BLD AUTO: 260 X10*3/UL (ref 150–450)
POTASSIUM SERPL-SCNC: 4.2 MMOL/L (ref 3.5–5.3)
POTASSIUM SERPL-SCNC: 4.4 MMOL/L (ref 3.5–5.3)
PROT SERPL-MCNC: 7.2 G/DL (ref 6.4–8.2)
PROT SERPL-MCNC: 7.4 G/DL (ref 6.4–8.2)
PROT UR STRIP.AUTO-MCNC: NEGATIVE MG/DL
PROTHROMBIN TIME: 23 SECONDS (ref 9.8–12.8)
RBC # BLD AUTO: 4.38 X10*6/UL (ref 4–5.2)
RBC # UR STRIP.AUTO: NEGATIVE /UL
SARS-COV-2 RNA RESP QL NAA+PROBE: NOT DETECTED
SODIUM SERPL-SCNC: 121 MMOL/L (ref 136–145)
SODIUM SERPL-SCNC: 128 MMOL/L (ref 136–145)
SP GR UR STRIP.AUTO: 1.01
URATE SERPL-MCNC: 7.5 MG/DL (ref 2.3–6.7)
UROBILINOGEN UR STRIP.AUTO-MCNC: <2 MG/DL
WBC # BLD AUTO: 11.8 X10*3/UL (ref 4.4–11.3)

## 2024-03-19 PROCEDURE — 80053 COMPREHEN METABOLIC PANEL: CPT | Performed by: PHYSICIAN ASSISTANT

## 2024-03-19 PROCEDURE — 3078F DIAST BP <80 MM HG: CPT | Performed by: INTERNAL MEDICINE

## 2024-03-19 PROCEDURE — 82947 ASSAY GLUCOSE BLOOD QUANT: CPT

## 2024-03-19 PROCEDURE — 85652 RBC SED RATE AUTOMATED: CPT

## 2024-03-19 PROCEDURE — 73564 X-RAY EXAM KNEE 4 OR MORE: CPT | Mod: LT

## 2024-03-19 PROCEDURE — 3052F HG A1C>EQUAL 8.0%<EQUAL 9.0%: CPT | Performed by: INTERNAL MEDICINE

## 2024-03-19 PROCEDURE — 99285 EMERGENCY DEPT VISIT HI MDM: CPT | Mod: 25

## 2024-03-19 PROCEDURE — 99223 1ST HOSP IP/OBS HIGH 75: CPT | Performed by: INTERNAL MEDICINE

## 2024-03-19 PROCEDURE — 85027 COMPLETE CBC AUTOMATED: CPT | Performed by: PHYSICIAN ASSISTANT

## 2024-03-19 PROCEDURE — 3060F POS MICROALBUMINURIA REV: CPT | Performed by: INTERNAL MEDICINE

## 2024-03-19 PROCEDURE — 83735 ASSAY OF MAGNESIUM: CPT

## 2024-03-19 PROCEDURE — 99214 OFFICE O/P EST MOD 30 MIN: CPT | Performed by: INTERNAL MEDICINE

## 2024-03-19 PROCEDURE — 84630 ASSAY OF ZINC: CPT

## 2024-03-19 PROCEDURE — 83036 HEMOGLOBIN GLYCOSYLATED A1C: CPT | Performed by: INTERNAL MEDICINE

## 2024-03-19 PROCEDURE — 96361 HYDRATE IV INFUSION ADD-ON: CPT

## 2024-03-19 PROCEDURE — 93005 ELECTROCARDIOGRAM TRACING: CPT

## 2024-03-19 PROCEDURE — 87636 SARSCOV2 & INF A&B AMP PRB: CPT | Performed by: PHYSICIAN ASSISTANT

## 2024-03-19 PROCEDURE — 73030 X-RAY EXAM OF SHOULDER: CPT | Mod: RIGHT SIDE | Performed by: RADIOLOGY

## 2024-03-19 PROCEDURE — 96375 TX/PRO/DX INJ NEW DRUG ADDON: CPT

## 2024-03-19 PROCEDURE — 80053 COMPREHEN METABOLIC PANEL: CPT

## 2024-03-19 PROCEDURE — 71045 X-RAY EXAM CHEST 1 VIEW: CPT | Mod: FOREIGN READ | Performed by: RADIOLOGY

## 2024-03-19 PROCEDURE — 1036F TOBACCO NON-USER: CPT | Performed by: INTERNAL MEDICINE

## 2024-03-19 PROCEDURE — 36415 COLL VENOUS BLD VENIPUNCTURE: CPT | Performed by: PHYSICIAN ASSISTANT

## 2024-03-19 PROCEDURE — 73030 X-RAY EXAM OF SHOULDER: CPT | Mod: RT

## 2024-03-19 PROCEDURE — 73562 X-RAY EXAM OF KNEE 3: CPT | Mod: RIGHT SIDE | Performed by: RADIOLOGY

## 2024-03-19 PROCEDURE — 73564 X-RAY EXAM KNEE 4 OR MORE: CPT | Mod: RT

## 2024-03-19 PROCEDURE — 1200000002 HC GENERAL ROOM WITH TELEMETRY DAILY

## 2024-03-19 PROCEDURE — 84484 ASSAY OF TROPONIN QUANT: CPT | Performed by: PHYSICIAN ASSISTANT

## 2024-03-19 PROCEDURE — 72170 X-RAY EXAM OF PELVIS: CPT | Mod: FOREIGN READ | Performed by: RADIOLOGY

## 2024-03-19 PROCEDURE — 2500000004 HC RX 250 GENERAL PHARMACY W/ HCPCS (ALT 636 FOR OP/ED): Performed by: PHYSICIAN ASSISTANT

## 2024-03-19 PROCEDURE — 81003 URINALYSIS AUTO W/O SCOPE: CPT | Performed by: PHYSICIAN ASSISTANT

## 2024-03-19 PROCEDURE — 72170 X-RAY EXAM OF PELVIS: CPT

## 2024-03-19 PROCEDURE — 71045 X-RAY EXAM CHEST 1 VIEW: CPT

## 2024-03-19 PROCEDURE — 3074F SYST BP LT 130 MM HG: CPT | Performed by: INTERNAL MEDICINE

## 2024-03-19 PROCEDURE — 85610 PROTHROMBIN TIME: CPT | Performed by: PHYSICIAN ASSISTANT

## 2024-03-19 PROCEDURE — 84550 ASSAY OF BLOOD/URIC ACID: CPT | Performed by: INTERNAL MEDICINE

## 2024-03-19 PROCEDURE — 73564 X-RAY EXAM KNEE 4 OR MORE: CPT | Mod: RIGHT SIDE | Performed by: RADIOLOGY

## 2024-03-19 PROCEDURE — 73562 X-RAY EXAM OF KNEE 3: CPT | Mod: RT

## 2024-03-19 PROCEDURE — 96374 THER/PROPH/DIAG INJ IV PUSH: CPT

## 2024-03-19 PROCEDURE — 3008F BODY MASS INDEX DOCD: CPT | Performed by: INTERNAL MEDICINE

## 2024-03-19 RX ORDER — ACETAMINOPHEN 650 MG/1
650 SUPPOSITORY RECTAL EVERY 4 HOURS PRN
Status: DISCONTINUED | OUTPATIENT
Start: 2024-03-19 | End: 2024-03-22 | Stop reason: HOSPADM

## 2024-03-19 RX ORDER — ONDANSETRON HYDROCHLORIDE 2 MG/ML
4 INJECTION, SOLUTION INTRAVENOUS EVERY 8 HOURS PRN
Status: DISCONTINUED | OUTPATIENT
Start: 2024-03-19 | End: 2024-03-22 | Stop reason: HOSPADM

## 2024-03-19 RX ORDER — FENTANYL CITRATE 50 UG/ML
25 INJECTION, SOLUTION INTRAMUSCULAR; INTRAVENOUS ONCE
Status: COMPLETED | OUTPATIENT
Start: 2024-03-19 | End: 2024-03-19

## 2024-03-19 RX ORDER — LEVOTHYROXINE SODIUM 50 UG/1
50 TABLET ORAL
Status: DISCONTINUED | OUTPATIENT
Start: 2024-03-20 | End: 2024-03-22 | Stop reason: HOSPADM

## 2024-03-19 RX ORDER — LORATADINE 10 MG/1
10 TABLET ORAL DAILY
Status: DISCONTINUED | OUTPATIENT
Start: 2024-03-20 | End: 2024-03-22 | Stop reason: HOSPADM

## 2024-03-19 RX ORDER — LACTULOSE 10 G/15ML
10 SOLUTION ORAL DAILY
Status: DISCONTINUED | OUTPATIENT
Start: 2024-03-20 | End: 2024-03-22 | Stop reason: HOSPADM

## 2024-03-19 RX ORDER — LEVOTHYROXINE SODIUM 100 UG/1
200 TABLET ORAL DAILY
Status: DISCONTINUED | OUTPATIENT
Start: 2024-03-20 | End: 2024-03-22 | Stop reason: HOSPADM

## 2024-03-19 RX ORDER — ONDANSETRON 4 MG/1
4 TABLET, ORALLY DISINTEGRATING ORAL EVERY 8 HOURS PRN
Status: DISCONTINUED | OUTPATIENT
Start: 2024-03-19 | End: 2024-03-22 | Stop reason: HOSPADM

## 2024-03-19 RX ORDER — METOPROLOL SUCCINATE 50 MG/1
50 TABLET, EXTENDED RELEASE ORAL DAILY
Status: DISCONTINUED | OUTPATIENT
Start: 2024-03-20 | End: 2024-03-22 | Stop reason: HOSPADM

## 2024-03-19 RX ORDER — DEXTROSE 50 % IN WATER (D50W) INTRAVENOUS SYRINGE
12.5
Status: DISCONTINUED | OUTPATIENT
Start: 2024-03-19 | End: 2024-03-22 | Stop reason: HOSPADM

## 2024-03-19 RX ORDER — BUPROPION HYDROCHLORIDE 150 MG/1
150 TABLET ORAL EVERY MORNING
Status: DISCONTINUED | OUTPATIENT
Start: 2024-03-20 | End: 2024-03-22 | Stop reason: HOSPADM

## 2024-03-19 RX ORDER — SODIUM CHLORIDE 9 MG/ML
50 INJECTION, SOLUTION INTRAVENOUS CONTINUOUS
Status: DISCONTINUED | OUTPATIENT
Start: 2024-03-19 | End: 2024-03-21

## 2024-03-19 RX ORDER — FAMOTIDINE 20 MG/1
20 TABLET, FILM COATED ORAL DAILY
Status: DISCONTINUED | OUTPATIENT
Start: 2024-03-20 | End: 2024-03-22 | Stop reason: HOSPADM

## 2024-03-19 RX ORDER — TRAZODONE HYDROCHLORIDE 100 MG/1
100 TABLET ORAL NIGHTLY
Status: DISCONTINUED | OUTPATIENT
Start: 2024-03-19 | End: 2024-03-22 | Stop reason: HOSPADM

## 2024-03-19 RX ORDER — ACETAMINOPHEN 325 MG/1
650 TABLET ORAL EVERY 4 HOURS PRN
Status: DISCONTINUED | OUTPATIENT
Start: 2024-03-19 | End: 2024-03-22 | Stop reason: HOSPADM

## 2024-03-19 RX ORDER — PANTOPRAZOLE SODIUM 40 MG/1
40 TABLET, DELAYED RELEASE ORAL
Status: DISCONTINUED | OUTPATIENT
Start: 2024-03-20 | End: 2024-03-22 | Stop reason: HOSPADM

## 2024-03-19 RX ORDER — ALLOPURINOL 300 MG/1
300 TABLET ORAL DAILY
Status: DISCONTINUED | OUTPATIENT
Start: 2024-03-20 | End: 2024-03-22 | Stop reason: HOSPADM

## 2024-03-19 RX ORDER — ACETAMINOPHEN 160 MG/5ML
650 SOLUTION ORAL EVERY 4 HOURS PRN
Status: DISCONTINUED | OUTPATIENT
Start: 2024-03-19 | End: 2024-03-22 | Stop reason: HOSPADM

## 2024-03-19 RX ORDER — ONDANSETRON HYDROCHLORIDE 2 MG/ML
4 INJECTION, SOLUTION INTRAVENOUS ONCE
Status: COMPLETED | OUTPATIENT
Start: 2024-03-19 | End: 2024-03-19

## 2024-03-19 RX ORDER — DEXTROSE 50 % IN WATER (D50W) INTRAVENOUS SYRINGE
25
Status: DISCONTINUED | OUTPATIENT
Start: 2024-03-19 | End: 2024-03-22 | Stop reason: HOSPADM

## 2024-03-19 RX ORDER — MAGNESIUM HYDROXIDE 2400 MG/10ML
10 SUSPENSION ORAL DAILY PRN
Status: DISCONTINUED | OUTPATIENT
Start: 2024-03-19 | End: 2024-03-22 | Stop reason: HOSPADM

## 2024-03-19 RX ORDER — GABAPENTIN 300 MG/1
900 CAPSULE ORAL 4 TIMES DAILY
Status: DISCONTINUED | OUTPATIENT
Start: 2024-03-19 | End: 2024-03-22 | Stop reason: HOSPADM

## 2024-03-19 RX ORDER — TRIAMCINOLONE ACETONIDE 1 MG/G
OINTMENT TOPICAL 2 TIMES DAILY PRN
Qty: 60 G | Refills: 0 | Status: SHIPPED | OUTPATIENT
Start: 2024-03-19 | End: 2024-03-26 | Stop reason: SDUPTHER

## 2024-03-19 RX ORDER — WARFARIN 7.5 MG/1
7.5 TABLET ORAL DAILY
Status: DISCONTINUED | OUTPATIENT
Start: 2024-03-20 | End: 2024-03-21

## 2024-03-19 RX ORDER — DULOXETIN HYDROCHLORIDE 60 MG/1
60 CAPSULE, DELAYED RELEASE ORAL DAILY
Status: DISCONTINUED | OUTPATIENT
Start: 2024-03-20 | End: 2024-03-22 | Stop reason: HOSPADM

## 2024-03-19 RX ORDER — LORATADINE 10 MG/1
10 TABLET ORAL DAILY PRN
Qty: 30 TABLET | Refills: 3 | Status: SHIPPED | OUTPATIENT
Start: 2024-03-19 | End: 2024-03-22 | Stop reason: SDUPTHER

## 2024-03-19 RX ADMIN — SODIUM CHLORIDE 500 ML: 9 INJECTION, SOLUTION INTRAVENOUS at 18:33

## 2024-03-19 RX ADMIN — ONDANSETRON 4 MG: 2 INJECTION INTRAMUSCULAR; INTRAVENOUS at 19:04

## 2024-03-19 RX ADMIN — FENTANYL CITRATE 25 MCG: 50 INJECTION, SOLUTION INTRAMUSCULAR; INTRAVENOUS at 19:05

## 2024-03-19 SDOH — SOCIAL STABILITY: SOCIAL INSECURITY: HAS ANYONE EVER THREATENED TO HURT YOUR FAMILY OR YOUR PETS?: YES

## 2024-03-19 SDOH — SOCIAL STABILITY: SOCIAL INSECURITY: WERE YOU ABLE TO COMPLETE ALL THE BEHAVIORAL HEALTH SCREENINGS?: YES

## 2024-03-19 SDOH — SOCIAL STABILITY: SOCIAL INSECURITY: ARE THERE ANY APPARENT SIGNS OF INJURIES/BEHAVIORS THAT COULD BE RELATED TO ABUSE/NEGLECT?: NO

## 2024-03-19 SDOH — SOCIAL STABILITY: SOCIAL INSECURITY: POSSIBLE ABUSE REPORTED TO:: SOCIAL SERVICES

## 2024-03-19 SDOH — SOCIAL STABILITY: SOCIAL INSECURITY: ABUSE: ADULT

## 2024-03-19 SDOH — SOCIAL STABILITY: SOCIAL INSECURITY: DOES ANYONE TRY TO KEEP YOU FROM HAVING/CONTACTING OTHER FRIENDS OR DOING THINGS OUTSIDE YOUR HOME?: YES

## 2024-03-19 SDOH — SOCIAL STABILITY: SOCIAL INSECURITY: HAVE YOU HAD THOUGHTS OF HARMING ANYONE ELSE?: NO

## 2024-03-19 SDOH — SOCIAL STABILITY: SOCIAL INSECURITY: ARE YOU OR HAVE YOU BEEN THREATENED OR ABUSED PHYSICALLY, EMOTIONALLY, OR SEXUALLY BY ANYONE?: YES

## 2024-03-19 SDOH — SOCIAL STABILITY: SOCIAL INSECURITY: DO YOU FEEL UNSAFE GOING BACK TO THE PLACE WHERE YOU ARE LIVING?: NO

## 2024-03-19 SDOH — SOCIAL STABILITY: SOCIAL INSECURITY: DO YOU FEEL ANYONE HAS EXPLOITED OR TAKEN ADVANTAGE OF YOU FINANCIALLY OR OF YOUR PERSONAL PROPERTY?: NO

## 2024-03-19 ASSESSMENT — ACTIVITIES OF DAILY LIVING (ADL)
LACK_OF_TRANSPORTATION: NO
GROOMING: DEPENDENT
HEARING - RIGHT EAR: FUNCTIONAL
DRESSING YOURSELF: DEPENDENT
HEARING - LEFT EAR: FUNCTIONAL
PATIENT'S MEMORY ADEQUATE TO SAFELY COMPLETE DAILY ACTIVITIES?: YES
BATHING: INDEPENDENT
ADEQUATE_TO_COMPLETE_ADL: YES
JUDGMENT_ADEQUATE_SAFELY_COMPLETE_DAILY_ACTIVITIES: YES
WALKS IN HOME: INDEPENDENT
FEEDING YOURSELF: INDEPENDENT
TOILETING: NEEDS ASSISTANCE

## 2024-03-19 ASSESSMENT — PAIN - FUNCTIONAL ASSESSMENT
PAIN_FUNCTIONAL_ASSESSMENT: 0-10
PAIN_FUNCTIONAL_ASSESSMENT: 0-10

## 2024-03-19 ASSESSMENT — LIFESTYLE VARIABLES
AUDIT-C TOTAL SCORE: 0
PRESCIPTION_ABUSE_PAST_12_MONTHS: NO
SKIP TO QUESTIONS 9-10: 1
SUBSTANCE_ABUSE_PAST_12_MONTHS: YES
AUDIT-C TOTAL SCORE: 0
HOW MANY STANDARD DRINKS CONTAINING ALCOHOL DO YOU HAVE ON A TYPICAL DAY: PATIENT DOES NOT DRINK
HOW OFTEN DO YOU HAVE A DRINK CONTAINING ALCOHOL: NEVER
HOW OFTEN DO YOU HAVE 6 OR MORE DRINKS ON ONE OCCASION: NEVER

## 2024-03-19 ASSESSMENT — ENCOUNTER SYMPTOMS
WEAKNESS: 0
TREMORS: 1
COUGH: 0
CARDIOVASCULAR NEGATIVE: 1
BACK PAIN: 0
CHILLS: 0
DIZZINESS: 1
NAUSEA: 0
ARTHRALGIAS: 1
FEVER: 0
EYES NEGATIVE: 1
HEADACHES: 0
DYSURIA: 0
LIGHT-HEADEDNESS: 0
GASTROINTESTINAL NEGATIVE: 1
VOMITING: 0
MYALGIAS: 1
RHINORRHEA: 0
ABDOMINAL PAIN: 0
SHORTNESS OF BREATH: 0
AGITATION: 0
ABDOMINAL DISTENTION: 0
ENDOCRINE NEGATIVE: 1
WHEEZING: 0
PSYCHIATRIC NEGATIVE: 1
PALPITATIONS: 0
CONSTIPATION: 0
DIARRHEA: 0

## 2024-03-19 ASSESSMENT — COGNITIVE AND FUNCTIONAL STATUS - GENERAL
HELP NEEDED FOR BATHING: A LITTLE
MOVING FROM LYING ON BACK TO SITTING ON SIDE OF FLAT BED WITH BEDRAILS: A LOT
MOBILITY SCORE: 17
WALKING IN HOSPITAL ROOM: A LITTLE
DAILY ACTIVITIY SCORE: 18
DRESSING REGULAR UPPER BODY CLOTHING: A LITTLE
TOILETING: A LITTLE
MOVING TO AND FROM BED TO CHAIR: A LITTLE
CLIMB 3 TO 5 STEPS WITH RAILING: A LITTLE
EATING MEALS: A LITTLE
PERSONAL GROOMING: A LITTLE
STANDING UP FROM CHAIR USING ARMS: A LITTLE
PATIENT BASELINE BEDBOUND: NO
DRESSING REGULAR LOWER BODY CLOTHING: A LITTLE
TURNING FROM BACK TO SIDE WHILE IN FLAT BAD: A LITTLE

## 2024-03-19 ASSESSMENT — PATIENT HEALTH QUESTIONNAIRE - PHQ9
1. LITTLE INTEREST OR PLEASURE IN DOING THINGS: NOT AT ALL
1. LITTLE INTEREST OR PLEASURE IN DOING THINGS: NEARLY EVERY DAY
SUM OF ALL RESPONSES TO PHQ9 QUESTIONS 1 AND 2: 0
2. FEELING DOWN, DEPRESSED OR HOPELESS: NOT AT ALL
SUM OF ALL RESPONSES TO PHQ9 QUESTIONS 1 & 2: 6
2. FEELING DOWN, DEPRESSED OR HOPELESS: NEARLY EVERY DAY

## 2024-03-19 ASSESSMENT — PAIN SCALES - GENERAL
PAINLEVEL_OUTOF10: 0 - NO PAIN
PAINLEVEL_OUTOF10: 10 - WORST POSSIBLE PAIN

## 2024-03-19 ASSESSMENT — PAIN DESCRIPTION - PAIN TYPE: TYPE: ACUTE PAIN

## 2024-03-19 ASSESSMENT — PAIN DESCRIPTION - ORIENTATION
ORIENTATION: RIGHT;LEFT
ORIENTATION: POSTERIOR

## 2024-03-19 ASSESSMENT — COLUMBIA-SUICIDE SEVERITY RATING SCALE - C-SSRS
2. HAVE YOU ACTUALLY HAD ANY THOUGHTS OF KILLING YOURSELF?: NO
1. IN THE PAST MONTH, HAVE YOU WISHED YOU WERE DEAD OR WISHED YOU COULD GO TO SLEEP AND NOT WAKE UP?: NO
6. HAVE YOU EVER DONE ANYTHING, STARTED TO DO ANYTHING, OR PREPARED TO DO ANYTHING TO END YOUR LIFE?: NO

## 2024-03-19 ASSESSMENT — PAIN DESCRIPTION - LOCATION
LOCATION: KNEE
LOCATION: HEAD

## 2024-03-19 ASSESSMENT — PAIN DESCRIPTION - DESCRIPTORS: DESCRIPTORS: SORE;SHARP

## 2024-03-19 NOTE — TELEPHONE ENCOUNTER
PRE CERT EEG AND CAROTID DOPPLER. WANTS SCHEDULED ON SAME DAY. SHE IS AWARE TO ONLY SCHEDULE WITH YOU. THANKS

## 2024-03-19 NOTE — TELEPHONE ENCOUNTER
LORATADINE DENIED BY INSURANCE SINCE IT IS AVAILABLE OTC AND INSURANCE WILL NOT PAY FOR OTC PRODUCTS.
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
## 2024-03-19 NOTE — TELEPHONE ENCOUNTER
STRESS IS TWO DAY MARCH 27TH AND 28TH  DOPPLER SCHEDULED MARCH 27  CT SCHEDULED MARCH 28  EEG SCHEDLED 4/2/24 @ 3PM    PATIENT NOTIFIED BY PHONE

## 2024-03-20 ENCOUNTER — APPOINTMENT (OUTPATIENT)
Dept: CARDIOLOGY | Facility: HOSPITAL | Age: 54
DRG: 640 | End: 2024-03-20
Payer: MEDICARE

## 2024-03-20 ENCOUNTER — APPOINTMENT (OUTPATIENT)
Dept: VASCULAR MEDICINE | Facility: HOSPITAL | Age: 54
DRG: 640 | End: 2024-03-20
Payer: MEDICARE

## 2024-03-20 ENCOUNTER — APPOINTMENT (OUTPATIENT)
Dept: NEPHROLOGY | Facility: CLINIC | Age: 54
End: 2024-03-20
Payer: MEDICARE

## 2024-03-20 LAB
ALBUMIN SERPL BCP-MCNC: 3.7 G/DL (ref 3.4–5)
ALP SERPL-CCNC: 665 U/L (ref 33–110)
ALT SERPL W P-5'-P-CCNC: 105 U/L (ref 7–45)
ANION GAP SERPL CALC-SCNC: 15 MMOL/L (ref 10–20)
AORTIC VALVE MEAN GRADIENT: 7 MMHG
AORTIC VALVE PEAK VELOCITY: 1.75 M/S
AST SERPL W P-5'-P-CCNC: 70 U/L (ref 9–39)
ATRIAL RATE: 110 BPM
AV PEAK GRADIENT: 12.3 MMHG
AVA (PEAK VEL): 4.42 CM2
AVA (VTI): 6.07 CM2
BASOPHILS # BLD AUTO: 0.06 X10*3/UL (ref 0–0.1)
BASOPHILS NFR BLD AUTO: 0.6 %
BILIRUB SERPL-MCNC: 0.6 MG/DL (ref 0–1.2)
BUN SERPL-MCNC: 60 MG/DL (ref 6–23)
CALCIUM SERPL-MCNC: 10.3 MG/DL (ref 8.6–10.3)
CHLORIDE SERPL-SCNC: 89 MMOL/L (ref 98–107)
CO2 SERPL-SCNC: 28 MMOL/L (ref 21–32)
CREAT SERPL-MCNC: 1.44 MG/DL (ref 0.5–1.05)
EGFRCR SERPLBLD CKD-EPI 2021: 43 ML/MIN/1.73M*2
EJECTION FRACTION APICAL 4 CHAMBER: 39.8
EJECTION FRACTION: 53 %
EOSINOPHIL # BLD AUTO: 0.13 X10*3/UL (ref 0–0.7)
EOSINOPHIL NFR BLD AUTO: 1.4 %
ERYTHROCYTE [DISTWIDTH] IN BLOOD BY AUTOMATED COUNT: 14.9 % (ref 11.5–14.5)
GLUCOSE BLD MANUAL STRIP-MCNC: 174 MG/DL (ref 74–99)
GLUCOSE BLD MANUAL STRIP-MCNC: 245 MG/DL (ref 74–99)
GLUCOSE BLD MANUAL STRIP-MCNC: 282 MG/DL (ref 74–99)
GLUCOSE BLD MANUAL STRIP-MCNC: 490 MG/DL (ref 74–99)
GLUCOSE SERPL-MCNC: 169 MG/DL (ref 74–99)
HCT VFR BLD AUTO: 39.6 % (ref 36–46)
HGB BLD-MCNC: 13.2 G/DL (ref 12–16)
HOLD SPECIMEN: NORMAL
IMM GRANULOCYTES # BLD AUTO: 0.14 X10*3/UL (ref 0–0.7)
IMM GRANULOCYTES NFR BLD AUTO: 1.5 % (ref 0–0.9)
LEFT VENTRICLE INTERNAL DIMENSION DIASTOLE: 3.68 CM (ref 3.5–6)
LEFT VENTRICULAR OUTFLOW TRACT DIAMETER: 2.4 CM
LYMPHOCYTES # BLD AUTO: 1.41 X10*3/UL (ref 1.2–4.8)
LYMPHOCYTES NFR BLD AUTO: 14.9 %
MCH RBC QN AUTO: 31.1 PG (ref 26–34)
MCHC RBC AUTO-ENTMCNC: 33.3 G/DL (ref 32–36)
MCV RBC AUTO: 93 FL (ref 80–100)
MITRAL VALVE E/A RATIO: 0.82
MONOCYTES # BLD AUTO: 1.04 X10*3/UL (ref 0.1–1)
MONOCYTES NFR BLD AUTO: 11 %
NEUTROPHILS # BLD AUTO: 6.66 X10*3/UL (ref 1.2–7.7)
NEUTROPHILS NFR BLD AUTO: 70.6 %
NRBC BLD-RTO: 0 /100 WBCS (ref 0–0)
P AXIS: 60 DEGREES
P OFFSET: 191 MS
P ONSET: 143 MS
PLATELET # BLD AUTO: 220 X10*3/UL (ref 150–450)
POTASSIUM SERPL-SCNC: 3.8 MMOL/L (ref 3.5–5.3)
PR INTERVAL: 144 MS
PROT SERPL-MCNC: 6.8 G/DL (ref 6.4–8.2)
Q ONSET: 215 MS
QRS COUNT: 18 BEATS
QRS DURATION: 72 MS
QT INTERVAL: 314 MS
QTC CALCULATION(BAZETT): 424 MS
QTC FREDERICIA: 384 MS
R AXIS: -48 DEGREES
RBC # BLD AUTO: 4.24 X10*6/UL (ref 4–5.2)
RIGHT VENTRICLE PEAK SYSTOLIC PRESSURE: 20.6 MMHG
SODIUM SERPL-SCNC: 128 MMOL/L (ref 136–145)
T AXIS: 55 DEGREES
T OFFSET: 372 MS
VENTRICULAR RATE: 110 BPM
WBC # BLD AUTO: 9.4 X10*3/UL (ref 4.4–11.3)

## 2024-03-20 PROCEDURE — 99232 SBSQ HOSP IP/OBS MODERATE 35: CPT | Performed by: NURSE PRACTITIONER

## 2024-03-20 PROCEDURE — 94761 N-INVAS EAR/PLS OXIMETRY MLT: CPT

## 2024-03-20 PROCEDURE — 2500000001 HC RX 250 WO HCPCS SELF ADMINISTERED DRUGS (ALT 637 FOR MEDICARE OP): Performed by: STUDENT IN AN ORGANIZED HEALTH CARE EDUCATION/TRAINING PROGRAM

## 2024-03-20 PROCEDURE — 93971 EXTREMITY STUDY: CPT | Performed by: STUDENT IN AN ORGANIZED HEALTH CARE EDUCATION/TRAINING PROGRAM

## 2024-03-20 PROCEDURE — 2500000004 HC RX 250 GENERAL PHARMACY W/ HCPCS (ALT 636 FOR OP/ED): Performed by: INTERNAL MEDICINE

## 2024-03-20 PROCEDURE — 93306 TTE W/DOPPLER COMPLETE: CPT | Performed by: STUDENT IN AN ORGANIZED HEALTH CARE EDUCATION/TRAINING PROGRAM

## 2024-03-20 PROCEDURE — 93306 TTE W/DOPPLER COMPLETE: CPT

## 2024-03-20 PROCEDURE — 93971 EXTREMITY STUDY: CPT

## 2024-03-20 PROCEDURE — 2500000002 HC RX 250 W HCPCS SELF ADMINISTERED DRUGS (ALT 637 FOR MEDICARE OP, ALT 636 FOR OP/ED): Performed by: INTERNAL MEDICINE

## 2024-03-20 PROCEDURE — 2500000002 HC RX 250 W HCPCS SELF ADMINISTERED DRUGS (ALT 637 FOR MEDICARE OP, ALT 636 FOR OP/ED): Performed by: STUDENT IN AN ORGANIZED HEALTH CARE EDUCATION/TRAINING PROGRAM

## 2024-03-20 PROCEDURE — 1200000002 HC GENERAL ROOM WITH TELEMETRY DAILY

## 2024-03-20 PROCEDURE — 84075 ASSAY ALKALINE PHOSPHATASE: CPT | Performed by: INTERNAL MEDICINE

## 2024-03-20 PROCEDURE — 82947 ASSAY GLUCOSE BLOOD QUANT: CPT

## 2024-03-20 PROCEDURE — 2500000001 HC RX 250 WO HCPCS SELF ADMINISTERED DRUGS (ALT 637 FOR MEDICARE OP): Performed by: NURSE PRACTITIONER

## 2024-03-20 PROCEDURE — 2500000005 HC RX 250 GENERAL PHARMACY W/O HCPCS: Performed by: INTERNAL MEDICINE

## 2024-03-20 PROCEDURE — 85025 COMPLETE CBC W/AUTO DIFF WBC: CPT | Performed by: INTERNAL MEDICINE

## 2024-03-20 PROCEDURE — 99222 1ST HOSP IP/OBS MODERATE 55: CPT | Performed by: INTERNAL MEDICINE

## 2024-03-20 PROCEDURE — 2500000001 HC RX 250 WO HCPCS SELF ADMINISTERED DRUGS (ALT 637 FOR MEDICARE OP): Performed by: INTERNAL MEDICINE

## 2024-03-20 PROCEDURE — 36415 COLL VENOUS BLD VENIPUNCTURE: CPT | Performed by: INTERNAL MEDICINE

## 2024-03-20 RX ORDER — MULTIVIT-MIN/IRON FUM/FOLIC AC 7.5 MG-4
1 TABLET ORAL DAILY
COMMUNITY
End: 2024-04-03 | Stop reason: WASHOUT

## 2024-03-20 RX ORDER — HYDROCODONE BITARTRATE AND ACETAMINOPHEN 5; 325 MG/1; MG/1
1 TABLET ORAL EVERY 6 HOURS PRN
Status: DISCONTINUED | OUTPATIENT
Start: 2024-03-20 | End: 2024-03-22 | Stop reason: HOSPADM

## 2024-03-20 RX ORDER — NYSTATIN 100000 [USP'U]/G
1 POWDER TOPICAL 2 TIMES DAILY
Status: DISCONTINUED | OUTPATIENT
Start: 2024-03-20 | End: 2024-03-22 | Stop reason: HOSPADM

## 2024-03-20 RX ORDER — INSULIN LISPRO 100 [IU]/ML
10 INJECTION, SOLUTION INTRAVENOUS; SUBCUTANEOUS ONCE
Status: COMPLETED | OUTPATIENT
Start: 2024-03-20 | End: 2024-03-20

## 2024-03-20 RX ORDER — MULTIVIT-MIN/IRON FUM/FOLIC AC 7.5 MG-4
1 TABLET ORAL DAILY
Status: DISCONTINUED | OUTPATIENT
Start: 2024-03-20 | End: 2024-03-22 | Stop reason: HOSPADM

## 2024-03-20 RX ORDER — DIPHENHYDRAMINE HCL 25 MG
25 CAPSULE ORAL ONCE
Status: COMPLETED | OUTPATIENT
Start: 2024-03-20 | End: 2024-03-20

## 2024-03-20 RX ORDER — TRAMADOL HYDROCHLORIDE 50 MG/1
50 TABLET ORAL EVERY 6 HOURS PRN
Status: DISCONTINUED | OUTPATIENT
Start: 2024-03-20 | End: 2024-03-22 | Stop reason: HOSPADM

## 2024-03-20 RX ORDER — TRAMADOL HYDROCHLORIDE 50 MG/1
50 TABLET ORAL EVERY 6 HOURS PRN
Status: DISCONTINUED | OUTPATIENT
Start: 2024-03-20 | End: 2024-03-20

## 2024-03-20 RX ADMIN — DIPHENHYDRAMINE HYDROCHLORIDE 25 MG: 25 CAPSULE ORAL at 23:08

## 2024-03-20 RX ADMIN — ACETAMINOPHEN 650 MG: 325 TABLET ORAL at 09:12

## 2024-03-20 RX ADMIN — METOPROLOL SUCCINATE 50 MG: 50 TABLET, EXTENDED RELEASE ORAL at 08:51

## 2024-03-20 RX ADMIN — INSULIN LISPRO 17.3 UNITS: 100 INJECTION, SOLUTION INTRAVENOUS; SUBCUTANEOUS at 17:11

## 2024-03-20 RX ADMIN — WARFARIN SODIUM 7.5 MG: 7.5 TABLET ORAL at 17:11

## 2024-03-20 RX ADMIN — LACTULOSE 10 G: 20 SOLUTION ORAL at 09:13

## 2024-03-20 RX ADMIN — ACETAMINOPHEN 650 MG: 325 TABLET ORAL at 00:07

## 2024-03-20 RX ADMIN — PERFLUTREN 2 ML OF DILUTION: 6.52 INJECTION, SUSPENSION INTRAVENOUS at 05:32

## 2024-03-20 RX ADMIN — GABAPENTIN 900 MG: 300 CAPSULE ORAL at 13:21

## 2024-03-20 RX ADMIN — NYSTATIN 1 APPLICATION: 100000 POWDER TOPICAL at 08:52

## 2024-03-20 RX ADMIN — LORATADINE 10 MG: 10 TABLET ORAL at 00:07

## 2024-03-20 RX ADMIN — SODIUM CHLORIDE 75 ML/HR: 9 INJECTION, SOLUTION INTRAVENOUS at 13:21

## 2024-03-20 RX ADMIN — TRAMADOL HYDROCHLORIDE 50 MG: 50 TABLET, COATED ORAL at 12:18

## 2024-03-20 RX ADMIN — MULTIPLE VITAMINS W/ MINERALS TAB 1 TABLET: TAB at 13:20

## 2024-03-20 RX ADMIN — GABAPENTIN 900 MG: 300 CAPSULE ORAL at 17:11

## 2024-03-20 RX ADMIN — INSULIN LISPRO 1.95 UNITS: 100 INJECTION, SOLUTION INTRAVENOUS; SUBCUTANEOUS at 08:54

## 2024-03-20 RX ADMIN — PANTOPRAZOLE SODIUM 40 MG: 40 TABLET, DELAYED RELEASE ORAL at 06:37

## 2024-03-20 RX ADMIN — INSULIN LISPRO 10 UNITS: 100 INJECTION, SOLUTION INTRAVENOUS; SUBCUTANEOUS at 20:10

## 2024-03-20 RX ADMIN — TRAZODONE HYDROCHLORIDE 100 MG: 100 TABLET ORAL at 20:10

## 2024-03-20 RX ADMIN — SODIUM CHLORIDE 75 ML/HR: 9 INJECTION, SOLUTION INTRAVENOUS at 00:09

## 2024-03-20 RX ADMIN — GABAPENTIN 900 MG: 300 CAPSULE ORAL at 06:35

## 2024-03-20 RX ADMIN — INSULIN LISPRO 5.8 UNITS: 100 INJECTION, SOLUTION INTRAVENOUS; SUBCUTANEOUS at 12:19

## 2024-03-20 RX ADMIN — FAMOTIDINE 20 MG: 20 TABLET, FILM COATED ORAL at 08:52

## 2024-03-20 RX ADMIN — EMPAGLIFLOZIN 25 MG: 25 TABLET, FILM COATED ORAL at 08:52

## 2024-03-20 RX ADMIN — LEVOTHYROXINE SODIUM 200 MCG: 0.1 TABLET ORAL at 06:35

## 2024-03-20 RX ADMIN — TRAZODONE HYDROCHLORIDE 100 MG: 100 TABLET ORAL at 00:07

## 2024-03-20 RX ADMIN — NYSTATIN 1 APPLICATION: 100000 POWDER TOPICAL at 20:10

## 2024-03-20 RX ADMIN — HYDROCODONE BITARTRATE AND ACETAMINOPHEN 1 TABLET: 5; 325 TABLET ORAL at 19:36

## 2024-03-20 RX ADMIN — GABAPENTIN 900 MG: 300 CAPSULE ORAL at 20:10

## 2024-03-20 RX ADMIN — Medication 4 L/MIN: at 00:15

## 2024-03-20 RX ADMIN — GABAPENTIN 900 MG: 300 CAPSULE ORAL at 00:07

## 2024-03-20 RX ADMIN — ALLOPURINOL 300 MG: 300 TABLET ORAL at 08:52

## 2024-03-20 RX ADMIN — DULOXETINE HYDROCHLORIDE 60 MG: 60 CAPSULE, DELAYED RELEASE ORAL at 08:52

## 2024-03-20 RX ADMIN — BUPROPION HYDROCHLORIDE 150 MG: 150 TABLET, FILM COATED, EXTENDED RELEASE ORAL at 08:51

## 2024-03-20 RX ADMIN — LEVOTHYROXINE SODIUM 50 MCG: 0.05 TABLET ORAL at 06:36

## 2024-03-20 ASSESSMENT — COGNITIVE AND FUNCTIONAL STATUS - GENERAL
WALKING IN HOSPITAL ROOM: A LITTLE
MOVING FROM LYING ON BACK TO SITTING ON SIDE OF FLAT BED WITH BEDRAILS: A LITTLE
DRESSING REGULAR UPPER BODY CLOTHING: A LITTLE
DAILY ACTIVITIY SCORE: 18
TURNING FROM BACK TO SIDE WHILE IN FLAT BAD: A LITTLE
MOVING TO AND FROM BED TO CHAIR: A LITTLE
STANDING UP FROM CHAIR USING ARMS: A LITTLE
WALKING IN HOSPITAL ROOM: A LITTLE
CLIMB 3 TO 5 STEPS WITH RAILING: A LOT
CLIMB 3 TO 5 STEPS WITH RAILING: A LOT
TOILETING: A LITTLE
DRESSING REGULAR LOWER BODY CLOTHING: A LITTLE
HELP NEEDED FOR BATHING: A LOT
MOBILITY SCORE: 17
PERSONAL GROOMING: A LITTLE
DRESSING REGULAR UPPER BODY CLOTHING: A LITTLE
STANDING UP FROM CHAIR USING ARMS: A LITTLE
MOVING FROM LYING ON BACK TO SITTING ON SIDE OF FLAT BED WITH BEDRAILS: A LITTLE
MOBILITY SCORE: 17
TOILETING: A LITTLE
DRESSING REGULAR LOWER BODY CLOTHING: A LITTLE
MOVING TO AND FROM BED TO CHAIR: A LITTLE
TURNING FROM BACK TO SIDE WHILE IN FLAT BAD: A LITTLE
DAILY ACTIVITIY SCORE: 18
PERSONAL GROOMING: A LITTLE
HELP NEEDED FOR BATHING: A LOT

## 2024-03-20 ASSESSMENT — PAIN DESCRIPTION - LOCATION
LOCATION: KNEE
LOCATION: SHOULDER
LOCATION: KNEE

## 2024-03-20 ASSESSMENT — ACTIVITIES OF DAILY LIVING (ADL): LACK_OF_TRANSPORTATION: NO

## 2024-03-20 ASSESSMENT — PAIN - FUNCTIONAL ASSESSMENT
PAIN_FUNCTIONAL_ASSESSMENT: 0-10

## 2024-03-20 ASSESSMENT — PAIN SCALES - GENERAL
PAINLEVEL_OUTOF10: 7
PAINLEVEL_OUTOF10: 3
PAINLEVEL_OUTOF10: 0 - NO PAIN
PAINLEVEL_OUTOF10: 3
PAINLEVEL_OUTOF10: 10 - WORST POSSIBLE PAIN

## 2024-03-20 ASSESSMENT — PAIN DESCRIPTION - ORIENTATION
ORIENTATION: RIGHT;LEFT
ORIENTATION: RIGHT
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
## 2024-03-20 NOTE — ED PROVIDER NOTES
"HPI   Chief Complaint   Patient presents with    Syncope     Pt to ED with EMS. Pt had a possible syncopal event after getting out of her car. Pt stood up \"and I knew immediately I was going to fall. My legs started shaking. Next thing I know I was on the ground and my  was trying to pick me up.\" Pt had a recent illness a few weeks ago, has fallen approx 20 times since then. Pt seen today at PCP office for the recent falls and weakness. Pt c/o right knee pain.        Patient presents with multiple falls recently.  States she feels globally weak.  She has been keeping her sugars above 300 because she feels better at this level.  Today she fell onto her knees after a slight dizzy spell.  She is complaining more of right knee pain than anything else.  States she had a prior fall and injured her right shoulder.  She denies any head injury or loss of consciousness.  She states it is more of a near syncopal than a vertigo type symptom.  She denies any nauseous or vomiting.  No chest pain or shortness of breath.  No fever or chills.  Denies any recent illness.      History provided by:  Patient                      Joselito Coma Scale Score: 15                     Patient History   Past Medical History:   Diagnosis Date    Arthritis     Asthma     CHF (congestive heart failure) (CMS/[facility])     COPD (chronic obstructive pulmonary disease) (CMS/[facility])     Cough 2024    Diabetes mellitus (CMS/[facility])     Disease of thyroid gland     Hypertension     Shortness of breath 2024    Tachycardia 2024     Past Surgical History:   Procedure Laterality Date    CARPAL TUNNEL RELEASE Bilateral      SECTION, LOW TRANSVERSE       AND     COLONOSCOPY  2014    [facility] SYSTEM    HERNIA REPAIR      WITH MESH    HYSTERECTOMY      2 PARTIAL    KNEE SURGERY Left     MINISCUS REPAIR     Family History   Problem Relation Name Age of Onset    Blindness Mother      Hypertension Mother      " Hyperlipidemia Mother      Heart disease Mother      Heart failure Father      Hypertension Father      Hyperlipidemia Father      Diabetes Father      Skin cancer Father      Blindness Maternal Grandmother      Hypertension Maternal Grandmother      Hyperlipidemia Maternal Grandmother      Heart failure Maternal Grandmother      Dementia Paternal Grandmother      Heart attack Paternal Grandfather      Hypertension Paternal Grandfather      Hyperlipidemia Paternal Grandfather       Social History     Tobacco Use    Smoking status: Former     Packs/day: 1.00     Years: 37.00     Additional pack years: 0.00     Total pack years: 37.00     Types: Cigarettes     Start date: 1977     Quit date: 2014     Years since quittin.3    Smokeless tobacco: Never    Tobacco comments:     CBD vaping   Vaping Use    Vaping Use: Former   Substance Use Topics    Alcohol use: Yes     Comment: rarely    Drug use: Yes     Frequency: 7.0 times per week     Types: Marijuana     Comment: one or two bowels per day       Physical Exam   ED Triage Vitals [24 1815]   Temperature Heart Rate Respirations BP   36.2 °C (97.2 °F) (!) 109 20 133/83      Pulse Ox Temp Source Heart Rate Source Patient Position   96 % Temporal Monitor Lying      BP Location FiO2 (%)     Left arm --       Physical Exam  Vitals and nursing note reviewed.   Constitutional:       General: She is not in acute distress.     Appearance: Normal appearance. She is morbidly obese. She is not ill-appearing or toxic-appearing.   HENT:      Head: Normocephalic and atraumatic.      Right Ear: External ear normal.      Left Ear: External ear normal.      Nose: Nose normal.      Mouth/Throat:      Lips: Pink. No lesions.      Mouth: Mucous membranes are moist.   Eyes:      General: No scleral icterus.     Conjunctiva/sclera: Conjunctivae normal.   Cardiovascular:      Rate and Rhythm: Regular rhythm. Tachycardia present.      Pulses:           Radial pulses are 2+ on  the right side and 2+ on the left side.        Dorsalis pedis pulses are 2+ on the right side and 2+ on the left side.        Posterior tibial pulses are 2+ on the right side and 2+ on the left side.      Heart sounds: Normal heart sounds.   Pulmonary:      Effort: Pulmonary effort is normal.      Breath sounds: Normal breath sounds and air entry.   Chest:      Chest wall: No tenderness or crepitus.   Abdominal:      General: Bowel sounds are normal. There is no distension.      Palpations: Abdomen is soft.      Tenderness: There is no abdominal tenderness. There is no right CVA tenderness, left CVA tenderness or guarding.   Musculoskeletal:      Cervical back: No tenderness. No spinous process tenderness or muscular tenderness.      Right knee: No crepitus. Tenderness (Global tenderness on exam) present.      Right lower leg: No edema.      Left lower leg: No edema.      Comments: Patient's body habitus heights potential swelling to her right knee.    No midline axial spine tenderness on exam.  No step-off deformity.  Palpating upper extremities did not elicit any areas of tenderness.  Left lower extremity does not have any areas of tenderness as well.  Logrolling the right hip does not elicit any tenderness.  No crepitus noted with motion.   Skin:     General: Skin is warm.      Capillary Refill: Capillary refill takes less than 2 seconds.      Findings: No rash.   Neurological:      General: No focal deficit present.      Mental Status: She is alert and oriented to person, place, and time.      Cranial Nerves: No cranial nerve deficit.      Sensory: No sensory deficit.      Motor: No weakness.   Psychiatric:         Attention and Perception: Attention and perception normal.         Mood and Affect: Mood and affect normal.         Speech: Speech normal.         Behavior: Behavior normal. Behavior is cooperative.         Thought Content: Thought content normal.         Cognition and Memory: Cognition and memory  normal.         Judgment: Judgment normal.         ED Course & MDM   Diagnoses as of 03/19/24 2043   Hyponatremia   ISRAEL (acute kidney injury) (CMS/[facility])   Elevated LFTs   Hyperglycemia       Medical Decision Making  Patient presents with multiple falls recently.  States she feels globally weak.  She has been keeping her sugars above 300 because she feels better at this level.  Today she fell onto her knees after a slight dizzy spell.  She is complaining more of right knee pain than anything else.  States she had a prior fall and injured her right shoulder.  She denies any head injury or loss of consciousness.  She states it is more of a near syncopal than a vertigo type symptom.  She denies any nauseous or vomiting.  No chest pain or shortness of breath.  No fever or chills.  Denies any recent illness.    Ddx: Fracture, contusion, strain, sprain, metabolic, infection, cardiac, pulmonary, other    Will obtain basic workup and plain x-rays for areas of pain.  Radiologist reviewed the x-rays did not show any acute fracture  Lab work is concerning for hyperglycemia and hyponatremia.  Patient given IV fluid bolus  Patient also has a mild ISRAEL when reviewing prior labs.  Patient's elevated LFTs appear to be similar to prior.  Most likely patient's lightheadedness and frequent falls are coming from her hyponatremia.  Recommend admission.  Consulted with the hospitalist for admission with acceptance.  Patient will be admitted in improved and stable condition    Problems Addressed:  ISRAEL (acute kidney injury) (CMS/[facility]): acute illness or injury  Elevated LFTs: chronic illness or injury  Hyperglycemia: acute illness or injury  Hyponatremia: undiagnosed new problem with uncertain prognosis     Details: Treated with IV fluids    Amount and/or Complexity of Data Reviewed  Labs: ordered. Decision-making details documented in ED Course.  Radiology: ordered and independent interpretation performed. Decision-making details documented in  ED Course.  ECG/medicine tests: ordered and independent interpretation performed. Decision-making details documented in ED Course.     Details: Read by myself showing normal sinus tach at a ventricular rate of 110 bpm.  Borderline left axis deviation.  No ST segment elevation.  NV interval of 144 with a QT of 314        Procedure  Procedures     Grazyna Perla PA-C  03/19/24 2043

## 2024-03-20 NOTE — CARE PLAN
"The patient's goals for the shift include  no falls     The clinical goals for the shift include no falls    Pt. Med goals for no falls this shift.  Over the last couple of weeks pt. Reported that she has fallen 20 plus times.  She stated, \"My legs give out and I fall down on my knees.\"  Instructed pt. Not to try to get out of bed until evaluated.  Uses call light for assistance and is using purewick so she does not have to get up due to safety issue, at this time.  Respirations even and unlabored.  At baseline oxygen 4LPM.  Turned and repositioned for comfort.  Tap system underneath pt. Bed alarm on and functioning.  Call light within reach, will continue to monitor.   Problem: Pain  Goal: My pain/discomfort is manageable  Outcome: Progressing     Problem: Safety  Goal: Patient will be injury free during hospitalization  Outcome: Progressing  Goal: I will remain free of falls  Outcome: Progressing     Problem: Daily Care  Goal: Daily care needs are met  Outcome: Progressing     Problem: Psychosocial Needs  Goal: Demonstrates ability to cope with hospitalization/illness  Outcome: Progressing  Goal: Collaborate with me, my family, and caregiver to identify my specific goals  Outcome: Progressing  Flowsheets (Taken 3/19/2024 2253)  Cultural Requests During Hospitalization: no  Spiritual Requests During Hospitalization: no     Problem: Psychosocial Needs  Goal: Collaborate with me, my family, and caregiver to identify my specific goals  Outcome: Progressing  Flowsheets (Taken 3/19/2024 2253)  Cultural Requests During Hospitalization: no  Spiritual Requests During Hospitalization: no     Problem: Discharge Barriers  Goal: My discharge needs are met  Outcome: Progressing       "

## 2024-03-20 NOTE — H&P
History Of Present Illness  Roselia Mo is a 53 y.o. female  Who was brought by the EMS to the emergency room after having a syncopal episode after getting out of the car.  On presentation, blood pressure 111/73, heart rate 106, respiratory rate 20, afebrile, saturation oxygen 96% on room air.  Pertinent findings on blood workup; glucose 268, sodium 121, BUN 72, creatinine 1.95, calcium 10.5, alkaline phosphatase 747, , , INR 2.0, and WBC count 11,800.  Influenza A and COVID-19 came back negative.  Chest x-ray did not show any acute findings.  Patient was given in the emergency room Zofran, fentanyl, and IV fluids and then admitted to the medical service for further investigation and management.  Patient dates medical history to around 6 weeks ago when she said that she had a sinus infection and after that she was having progressively worsening weakness and poor control over her torso.  Every time she would stand up, her legs would start shaking and then she would fall to the ground if she does not hold herself.  She has had more than 20 falls since then without significant injuries.  Denies having any blurry vision or dizziness or shortness of breath or tachypnea or tachycardia.  Today, patient said that as soon as she was standing up from the sitting position in her car, her legs again gave out and she fell to the ground.  She did not hit her head.  She did not pass out.  On the other hand, patient said that she has been itching ever since she got sick.  She has been scratching her arms and her legs.  She never had this before.  She denies having any rash.       ROS  10 systems were reviewed and were negative except for those noted in the history of present illness.    Past Medical History  Past Medical History:   Diagnosis Date    Arthritis     Asthma     CHF (congestive heart failure) (CMS/[facility])     COPD (chronic obstructive pulmonary disease) (CMS/[facility])     Cough 03/19/2024    Diabetes mellitus  (CMS/[facility])     Disease of thyroid gland     Hypertension     Shortness of breath 2024    Tachycardia 2024     Pertinent medical history also documented in my below narrative    Surgical History  Past Surgical History:   Procedure Laterality Date    CARPAL TUNNEL RELEASE Bilateral      SECTION, LOW TRANSVERSE       AND     COLONOSCOPY  2014    [facility]    HERNIA REPAIR      WITH MESH    HYSTERECTOMY      2 PARTIAL    KNEE SURGERY Left     MINISCUS REPAIR      Pertinent surgical history also documented in my below narrative    Social History  She reports that she quit smoking about 9 years ago. Her smoking use included cigarettes. She started smoking about 47 years ago. She has a 37.00 pack-year smoking history. She has never used smokeless tobacco. She reports current alcohol use. She reports current drug use. Frequency: 7.00 times per week. Drug: Marijuana.    Family History  Family History   Problem Relation Name Age of Onset    Blindness Mother      Hypertension Mother      Hyperlipidemia Mother      Heart disease Mother      Heart failure Father      Hypertension Father      Hyperlipidemia Father      Diabetes Father      Skin cancer Father      Blindness Maternal Grandmother      Hypertension Maternal Grandmother      Hyperlipidemia Maternal Grandmother      Heart failure Maternal Grandmother      Dementia Paternal Grandmother      Heart attack Paternal Grandfather      Hypertension Paternal Grandfather      Hyperlipidemia Paternal Grandfather          Allergies  Nickel, Metformin, Dulaglutide, Palladium, Cobalt, Exenatide, and Sitagliptin    Medications Prior to Admission   Medication Sig Dispense Refill Last Dose    - refin regular (HumuLIN R U-500) 500 unit/mL CONCENTRATED - refill for patient own pump Inject 1 mL (1 each) under the skin if needed (VIA INSULIN PUMP). Take as directed per insulin instructions. Use U-500 insulin syringe. 15 mL 11      albuterol 90 mcg/actuation aerosol [facility] breath activated inhaler Inhale 2 puffs every 4 hours.       allopurinol (Zyloprim) 300 mg tablet Take 1 tablet (300 mg) by mouth once daily. 90 tablet 3     buPROPion XL (Wellbutrin XL) 150 mg 24 hr tablet Take 1 tablet (150 mg) by mouth once daily in the morning. Do not crush, chew, or split. 90 tablet 3     cetirizine (ZyrTEC) 10 mg chewable tablet Chew 1 tablet (10 mg) once daily.       diclofenac sodium (VOLTAREN TOP) Place 1 Application on the skin once daily as needed (Neuropathy). Applies to the feet for neuropathy.       dicyclomine (Bentyl) 20 mg tablet Take 1 tablet (20 mg) by mouth 4 times a day as needed (abdominal pain or cramps). 90 tablet 3     doxycycline (Vibramycin) 100 mg capsule Take 1 capsule (100 mg) by mouth 2 times a day for 10 days. Take with at least 8 ounces (large glass) of water, do not lie down for 30 minutes after 20 capsule 0     DULoxetine (Cymbalta) 60 mg DR capsule Take 1 capsule (60 mg) by mouth once daily. Do not crush or chew. 90 capsule 3     empagliflozin (Jardiance) 25 mg Take 1 tablet (25 mg) by mouth once daily. 90 tablet 3     famotidine (Pepcid) 20 mg tablet Take 1 tablet (20 mg) by mouth once daily. 30 tablet 0     gabapentin (Neurontin) 300 mg capsule Take 3 capsules (900 mg) by mouth 4 times a day.       lactulose 10 gram/15 mL (15 mL) solution Take 10 g by mouth once daily. 1000 mL 3     levothyroxine (Synthroid, Levoxyl) 200 mcg tablet Take 1 tablet (200 mcg) by mouth once daily. 90 tablet 3     levothyroxine (Synthroid, Levoxyl) 50 mcg tablet Take 1 tablet (50 mcg) by mouth once daily in the morning. Take before meals. TAKE WITH 200MG 90 tablet 3     loratadine (Claritin) 10 mg tablet Take 1 tablet (10 mg) by mouth once daily as needed for allergies. 30 tablet 3     magnesium oxide (Mag-Ox) 400 mg (241.3 mg magnesium) tablet Take 1 tablet (400 mg) by mouth 2 times a day. 60 tablet 11     metOLazone (Zaroxolyn) 5 mg tablet  "TAKE 1 TABLET BY MOUTH EVERY DAY 90 tablet 1     metoprolol succinate XL (Toprol-XL) 50 mg 24 hr tablet Take 1 tablet (50 mg) by mouth once daily. Do not crush or chew.       multivitamin tablet Take 1 tablet by mouth once daily.       ondansetron (Zofran) 4 mg tablet Take 1 tablet (4 mg) by mouth every 8 hours if needed for nausea or vomiting. 30 tablet 5     pantoprazole (ProtoNix) 40 mg EC tablet Take 1 tablet (40 mg) by mouth once daily in the morning. Take before meals. Do not crush, chew, or split.       plecanatide (Trulance) tablet tablet Take 1 tablet (3 mg) by mouth once daily. 30 tablet 11     potassium chloride CR 20 mEq ER tablet Take 1 tablet (20 mEq) by mouth 3 times a day. Do not crush or chew.       promethazine (Phenergan) 25 mg tablet Take 1 tablet (25 mg) by mouth every 6 hours if needed for nausea or vomiting.       rosuvastatin (Crestor) 40 mg tablet Take 1 tablet (40 mg) by mouth once daily.       spironolactone (Aldactone) 100 mg tablet Take 3 tablets (300 mg) by mouth 2 times a day.       torsemide (Demadex) 100 mg tablet Take 1 tablet (100 mg) by mouth 2 times a day.       traZODone (Desyrel) 100 mg tablet Take 1 tablet (100 mg) by mouth once daily at bedtime.       triamcinolone (Kenalog) 0.1 % ointment Apply topically 2 times a day as needed for irritation or rash. 60 g 3     triamcinolone (Kenalog) 0.1 % ointment Apply topically 2 times a day as needed for irritation or rash. 60 g 0     warfarin (Coumadin) 5 mg tablet Take 1.5 tablets (7.5 mg) by mouth once daily. Take as directed per After Visit Summary.       zoster vaccine-recombinant adjuvanted (Shingrix, PF,) 50 mcg/0.5 mL vaccine Inject 0.5 mL into the muscle see administration instructions. (Patient not taking: Reported on 3/19/2024) 0.5 mL 1         Last Recorded Vitals  Blood pressure 118/82, pulse (!) 109, temperature 36.2 °C (97.2 °F), temperature source Temporal, resp. rate 20, height 1.575 m (5' 2\"), weight 117 kg (258 lb), " SpO2 (!) 92 %.    Physical Exam  Constitutional:       General: She is not in acute distress.     Appearance: She is obese. She is not ill-appearing.      Comments: Awake alert and oriented x3   HENT:      Mouth/Throat:      Pharynx: Oropharynx is clear.   Eyes:      Pupils: Pupils are equal, round, and reactive to light.   Cardiovascular:      Rate and Rhythm: Normal rate and regular rhythm.      Heart sounds: Normal heart sounds.   Pulmonary:      Effort: No respiratory distress.      Breath sounds: Normal breath sounds. No wheezing or rhonchi.   Abdominal:      General: Abdomen is flat. Bowel sounds are normal. There is no distension.      Palpations: Abdomen is soft.      Tenderness: There is no abdominal tenderness.   Musculoskeletal:         General: No swelling.   Skin:     General: Skin is dry.      Comments: There is erythema noted along the right medial thigh.  It is not warm.  Not tender to touch.  No induration.  Patient stated that she has been scratching the area.   Neurological:      General: No focal deficit present.   Psychiatric:         Mood and Affect: Mood normal.         Behavior: Behavior normal.         Thought Content: Thought content normal.         Judgment: Judgment normal.           Relevant Results  Results for orders placed or performed during the hospital encounter of 03/19/24 (from the past 24 hour(s))   Comprehensive metabolic panel   Result Value Ref Range    Glucose 268 (H) 74 - 99 mg/dL    Sodium 121 (L) 136 - 145 mmol/L    Potassium 4.4 3.5 - 5.3 mmol/L    Chloride 82 (L) 98 - 107 mmol/L    Bicarbonate 25 21 - 32 mmol/L    Anion Gap 18 10 - 20 mmol/L    Urea Nitrogen 72 (H) 6 - 23 mg/dL    Creatinine 1.95 (H) 0.50 - 1.05 mg/dL    eGFR 30 (L) >60 mL/min/1.73m*2    Calcium 10.5 (H) 8.6 - 10.3 mg/dL    Albumin 4.0 3.4 - 5.0 g/dL    Alkaline Phosphatase 747 (H) 33 - 110 U/L    Total Protein 7.4 6.4 - 8.2 g/dL     (H) 9 - 39 U/L    Bilirubin, Total 0.7 0.0 - 1.2 mg/dL    ALT  130 (H) 7 - 45 U/L   Troponin I, High Sensitivity   Result Value Ref Range    Troponin I, High Sensitivity 18 (H) 0 - 13 ng/L   CBC   Result Value Ref Range    WBC 11.8 (H) 4.4 - 11.3 x10*3/uL    nRBC 0.0 0.0 - 0.0 /100 WBCs    RBC 4.38 4.00 - 5.20 x10*6/uL    Hemoglobin 13.6 12.0 - 16.0 g/dL    Hematocrit 40.4 36.0 - 46.0 %    MCV 92 80 - 100 fL    MCH 31.1 26.0 - 34.0 pg    MCHC 33.7 32.0 - 36.0 g/dL    RDW 14.8 (H) 11.5 - 14.5 %    Platelets 260 150 - 450 x10*3/uL   Uric Acid   Result Value Ref Range    Uric Acid 7.5 (H) 2.3 - 6.7 mg/dL   Hemoglobin A1c   Result Value Ref Range    Hemoglobin A1C 9.0 (H) see below %    Estimated Average Glucose 212 Not Established mg/dL   Light Blue Top   Result Value Ref Range    Extra Tube Hold for add-ons.    SST TOP   Result Value Ref Range    Extra Tube Hold for add-ons.    Protime-INR   Result Value Ref Range    Protime 23.0 (H) 9.8 - 12.8 seconds    INR 2.0 (H) 0.9 - 1.1   Influenza A, and B PCR   Result Value Ref Range    Flu A Result Not Detected Not Detected    Flu B Result Not Detected Not Detected   Sars-CoV-2 PCR   Result Value Ref Range    Coronavirus 2019, PCR Not Detected Not Detected   Urinalysis with Reflex Culture and Microscopic   Result Value Ref Range    Color, Urine Yellow Straw, Yellow    Appearance, Urine Clear Clear    Specific Gravity, Urine 1.013 1.005 - 1.035    pH, Urine 6.0 5.0, 5.5, 6.0, 6.5, 7.0, 7.5, 8.0    Protein, Urine NEGATIVE NEGATIVE mg/dL    Glucose, Urine >=500 (3+) (A) NEGATIVE mg/dL    Blood, Urine NEGATIVE NEGATIVE    Ketones, Urine NEGATIVE NEGATIVE mg/dL    Bilirubin, Urine NEGATIVE NEGATIVE    Urobilinogen, Urine <2.0 <2.0 mg/dL    Nitrite, Urine NEGATIVE NEGATIVE    Leukocyte Esterase, Urine NEGATIVE NEGATIVE   POCT GLUCOSE   Result Value Ref Range    POCT Glucose 245 (H) 74 - 99 mg/dL   POCT GLUCOSE   Result Value Ref Range    POCT Glucose 205 (H) 74 - 99 mg/dL        XR knee left 4+ views    Result Date: 3/19/2024  STUDY:  Bilateral Knee Radiographs; 03/19/2024 7:43 PM INDICATION: Bilateral knee pain.  COMPARISON: None available.  ACCESSION NUMBER(S): [id number], [id number] ORDERING CLINICIAN: AYLIN SHANNON TECHNIQUE:  Four view(s) of the right knee and four view(s) of the left knee. FINDINGS:  RIGHT KNEE:  There is no displaced fracture.  The alignment is anatomic.  No soft tissue abnormality is seen.  There is no joint effusion. There is moderate degenerative changes near bone-on-bone in the medial quadrant. LEFT KNEE:  There is no displaced fracture.  The alignment is anatomic.  No soft tissue abnormality is seen.  There is no joint effusion. There is moderate degenerative change with near bone-on-bone in the medial compartment.    Moderate degenerative change in both knees with near bone-on-bone in the medial compartments. Signed by Kenan Sharif MD    XR knee right 4+ views    Result Date: 3/19/2024  STUDY: Bilateral Knee Radiographs; 03/19/2024 7:43 PM INDICATION: Bilateral knee pain.  COMPARISON: None available.  ACCESSION NUMBER(S): [id number], [id number] ORDERING CLINICIAN: AYLIN SHANNON TECHNIQUE:  Four view(s) of the right knee and four view(s) of the left knee. FINDINGS:  RIGHT KNEE:  There is no displaced fracture.  The alignment is anatomic.  No soft tissue abnormality is seen.  There is no joint effusion. There is moderate degenerative changes near bone-on-bone in the medial quadrant. LEFT KNEE:  There is no displaced fracture.  The alignment is anatomic.  No soft tissue abnormality is seen.  There is no joint effusion. There is moderate degenerative change with near bone-on-bone in the medial compartment.    Moderate degenerative change in both knees with near bone-on-bone in the medial compartments. Signed by Kenan Sharif MD    XR pelvis 1-2 views    Result Date: 3/19/2024  STUDY: Pelvis Radiographs; 03/19/2024 at 7:35 PM INDICATION: Evaluation status post fall. COMPARISON: Correlation with CT abdomen and  pelvis 02/04/24. XR abdomen, CT abdomen and pelvis 01/11/24. ACCESSION NUMBER(S): [id number] ORDERING CLINICIAN: AYLIN SHANNON TECHNIQUE:  AP view(s) of the pelvis (two images). FINDINGS:  The pelvic ring is intact.  There is no acute fracture.      No evidence for acute injury. Signed by Kenan Sharif MD    XR chest 1 view    Result Date: 3/19/2024  STUDY: Chest Radiograph;  3/19/2024 7:43 PM INDICATION: Syncope and weakness. COMPARISON: 2/1/2024 XR Chest. ACCESSION NUMBER(S): [id number] ORDERING CLINICIAN: ALYIN SHANNON TECHNIQUE:  Frontal chest was obtained at 19:35 hours. FINDINGS: CARDIOMEDIASTINAL SILHOUETTE: Cardiomediastinal silhouette is normal in size and configuration.  LUNGS: Lungs are clear.  ABDOMEN: No remarkable upper abdominal findings.  BONES: No acute osseous changes.    No acute pulmonary pathology. Signed by Kenan Sharif MD        Assessment/Plan     This is a morbidly obese 53-year-old female with a past medical history of diabetes mellitus, diabetic neuropathy, hypertension, obstructive sleep apnea on BiPAP, SEVILLA, Budd-Chiari syndrome, portal vein thrombosis on Coumadin, hepatic vein thrombosis, diastolic congestive heart failure, COPD, small bowel obstruction, constipation, hyperuricemia, chronic low back pain, GERD, irreducible incisional hernia, depression, anxiety, acute pancreatitis, gout, hyperlipidemia, bilateral knee osteoarthritis, and a recently found hyponatremia/renal failure/elevated LFTs who presented to the emergency room for 6 weeks history of frequent falls.  Blood workup showed hyponatremia, renal failure, elevated LFTs, and borderline hypercalcemia.    Admit patient to the inpatient medical service with vital signs and telemetry monitoring.  Unless proven otherwise, patient's frequent falls is most likely secondary to the underlying hyponatremia.  However, I have concerns regarding her elevated LFTs and the acute kidney injury.  Consult nephrology also I will be  holding  Give IV fluids normal saline at rate of 75 cc/hour and repeat BMP tomorrow.  I did try to check orthostatics but could not because patient requires significant assistance to stand up.  Regarding her elevated LFTs, right upper quadrant ultrasound was done last month without showing any significant findings other than the already known liver disease.  I will order an echocardiogram as patient could be having also congestive hepatopathy.  Resume home medications except for the metolazone given the renal failure.  The torsemide and the spironolactone.  I do not have yet acute explanation of the patient's pruritus.  Her bilirubin is not elevated.  For now I will treat it with loratidine 10 mg daily.  Continue home dose Coumadin with daily INR checks  Regarding her diabetes, I checked HbA1c level and it came back 9.0.  I will therefore for start the patient on high-dose insulin sliding scale and then after 24 hours we will decide on whether she needs to be on Lantus or not.  SCDs for DVT prophylaxis in addition to the Coumadin patient is already taking  Patient is full code      (This note was generated with voice recognition software and may contain errors including spelling, grammar, syntax and misrecognition of what was dictated, that are not fully corrected)     Ethan Moss MD

## 2024-03-20 NOTE — CONSULTS
"Reason For Consult  Education - CCD diet    History Of Present Illness  Roselia Mo is a 53 y.o. female presenting with Hyponatremia.     Past Medical History  She has a past medical history of Arthritis, Asthma, CHF (congestive heart failure) (CMS/[facility]), COPD (chronic obstructive pulmonary disease) (CMS/[facility]), Cough (2024), Diabetes mellitus (CMS/[facility]), Disease of thyroid gland, Hypertension, Shortness of breath (2024), and Tachycardia (2024).    Surgical History  She has a past surgical history that includes  section, low transverse; Carpal tunnel release (Bilateral, ); Hysterectomy; Hernia repair; Knee surgery (Left); and Colonoscopy (2014).     Social History  She reports that she quit smoking about 9 years ago. Her smoking use included cigarettes. She started smoking about 47 years ago. She has a 37.00 pack-year smoking history. She has never used smokeless tobacco. She reports current alcohol use. She reports current drug use. Frequency: 7.00 times per week. Drug: Marijuana.         Allergies  Nickel, Metformin, Dulaglutide, Palladium, Cobalt, Exenatide, and Sitagliptin       Last Recorded Vitals  Blood pressure 129/73, pulse 95, temperature 35.9 °C (96.6 °F), temperature source Temporal, resp. rate 20, height 1.62 m (5' 3.78\"), weight (!) 166 kg (365 lb 15.4 oz), SpO2 95 %.    Education:  Patient with food allergies: Nickel, palladium, Cobalt that limits her food choices and limited also by income.  Patient listed some foods that she does avoid that are higher in Nickel as that is the one she focuses on.  Symptoms of intake is diarrhea, gas, burping.      Provided and reviewed written information regarding CCD diet 45 gm CHO - patient knew how to carb count.  Verbalized understanding.  RD contact information provided.     Jadyn Beard RDN, LD    "

## 2024-03-20 NOTE — PROGRESS NOTES
Ray Guerra is a 53 y.o. female on day 1 of admission presenting with Hyponatremia.      Subjective   Pt seen and examined this morning, pt sitting up in bed  She voices that she continues to have right lower leg pain  No signs of distress noted.        Objective     Last Recorded Vitals  /73   Pulse 95   Temp 35.9 °C (96.6 °F) (Temporal)   Resp 20   Wt (!) 166 kg (365 lb 15.4 oz)   SpO2 95%   Intake/Output last 3 Shifts:    Intake/Output Summary (Last 24 hours) at 3/20/2024 1208  Last data filed at 3/20/2024 1100  Gross per 24 hour   Intake 720 ml   Output 3000 ml   Net -2280 ml       Admission Weight  Weight: 117 kg (258 lb) (03/19/24 1815)    Daily Weight  03/19/24 : (!) 166 kg (365 lb 15.4 oz)    Image Results  Transthoracic Echo (TTE) [place] [address]  Phone [phone], Fax [phone]    TRANSTHORACIC ECHOCARDIOGRAM REPORT       Patient Name:      RAY GUERRA       Reading Physician:    [id number] Thompson Burnett MD  Study Date:        3/20/2024             Ordering Provider:    [id number] NIMESH LEMUS  MRN/PID:           [id number]              Fellow:  Accession#:        [id number]          Nurse:                Anitha Agrawal RN  Date of Birth/Age: 1970 / 53 years Sonographer:          Piper Martínez RVT, STEPHANIE  Gender:            F                     Additional Staff:  Height:            157.48 cm             Admit Date:           3/19/2024  Weight:            167.83 kg             Admission Status:     Inpatient -                                                                 Routine  BSA / BMI:         2.48 m2 / 67.67 kg/m2 Department Location:  [address]  Blood Pressure: 141 /80 mmHg    Study Type:     TRANSTHORACIC ECHO (TTE) COMPLETE  Diagnosis/ICD: Chronic diastolic (congestive) heart failure (CHF)-I50.32  Indication:    Congestive Heart Failure  CPT Codes:     Echo Complete w Full Doppler-68424    Patient History:  Pertinent History: No previous echo.    Study Detail: The following Echo studies were performed: 2D, M-Mode, Doppler and                color flow. Technically challenging study due to poor acoustic                windows, patient lying in supine position, body habitus and Wt 370                lbs. Definity used as a contrast agent for endocardial border                definition. Total contrast used for this procedure was 2 mL via IV                push. A bubble study was not performed. The patient was awake.       PHYSICIAN INTERPRETATION:  Left Ventricle: Left ventricular systolic function is normal, with an estimated ejection fraction of 65%. There are no regional wall motion abnormalities. The left ventricular cavity size is normal. Left ventricular diastolic filling was indeterminate.  Left Atrium: The left atrium was not well visualized.  Right Ventricle: The right ventricle was not well visualized. Unable to determine right ventricular systolic function.  Right Atrium: The right atrium was not well visualized.  Aortic Valve: The aortic valve was not well visualized. There is no evidence of aortic valve regurgitation. The peak instantaneous gradient of the aortic valve is 12.2 mmHg. The mean gradient of the aortic valve is 7.0 mmHg.  Mitral Valve: The mitral valve was not well visualized. Mitral valve regurgitation was not assessed.  Tricuspid Valve: The tricuspid valve was not well visualized. Tricuspid regurgitation was not assessed.  Pulmonic Valve: The pulmonic valve is not well visualized. The pulmonic valve regurgitation was not well visualized.  Pericardium: There is no pericardial effusion noted.  Aorta: The aortic root was not well visualized.       CONCLUSIONS:   1. Left  ventricular systolic function is normal with a 65% estimated ejection fraction.   2. Poorly visualized anatomical structures due to suboptimal image quality.    RECOMMENDATIONS:  Technically suboptimal and limited study, therefore accuracy of above interpretation could be substantially diminished. Clinical correlation is advised. Consider additional imaging modalities if clinically indicated.     QUANTITATIVE DATA SUMMARY:  2D MEASUREMENTS:                           Normal Ranges:  Ao Root d:     3.10 cm   (2.0-3.7cm)  LAs:           3.40 cm   (2.7-4.0cm)  IVSd:          1.20 cm   (0.6-1.1cm)  LVPWd:         1.06 cm   (0.6-1.1cm)  LVIDd:         3.68 cm   (3.9-5.9cm)  LVIDs:         2.23 cm  LV Mass Index: 53.8 g/m2  LV % FS        39.4 %    LA VOLUME:                               Normal Ranges:  LA Vol A4C:        45.1 ml   (22+/-6mL/m2)  LA Vol Index A4C:  18.1ml/m2  LA Area A4C:       15.9 cm2  LA Major Axis A4C: 4.8 cm  LA Vol A4C:        43.9 ml    LV SYSTOLIC FUNCTION BY 2D PLANIMETRY (MOD):                      Normal Ranges:  EF-A4C View: 39.8 % (>=55%)  EF-A2C View: 62.3 %  EF-Biplane:  52.5 %    LV DIASTOLIC FUNCTION:                         Normal Ranges:  MV Peak E:    1.03 m/s (0.7-1.2 m/s)  MV Peak A:    1.25 m/s (0.42-0.7 m/s)  E/A Ratio:    0.82     (1.0-2.2)  MV lateral e' 0.09 m/s  MV medial e'  0.05 m/s    MITRAL VALVE:                  Normal Ranges:  MV DT: 176 msec (150-240msec)    AORTIC VALVE:                                     Normal Ranges:  AoV Vmax:                1.75 m/s  (<=1.7m/s)  AoV Peak P.2 mmHg (<20mmHg)  AoV Mean P.0 mmHg  (1.7-11.5mmHg)  LVOT Max Sean:            1.71 m/s  (<=1.1m/s)  AoV VTI:                 28.90 cm  (18-25cm)  LVOT VTI:                38.80 cm  LVOT Diameter:           2.40 cm   (1.8-2.4cm)  AoV Area, VTI:           6.07 cm2  (2.5-5.5cm2)  AoV Area,Vmax:           4.42 cm2  (2.5-4.5cm2)  AoV Dimensionless Index:  1.34    TRICUSPID VALVE/RVSP:                              Normal Ranges:  Peak TR Velocity: 2.10 m/s  RV Syst Pressure: 20.6 mmHg (< 30mmHg)    PULMONIC VALVE:                          Normal Ranges:  PV Accel Time: 49 msec  (>120ms)  PV Max Sean:    1.3 m/s  (0.6-0.9m/s)  PV Max P.7 mmHg       [id number] Thompson Burnett MD  Electronically signed on 3/20/2024 at 10:52:09 AM       ** Final **  ECG 12 lead  Sinus tachycardia  Low voltage QRS  Left anterior fascicular block  Abnormal ECG      Physical Exam  General Appearance: AAO x 3, not in acute distress  Skin: skin color pink, warm, and dry  Eyes : PERRL, EOM's intact  ENT: mucous membranes pink and moist  Head: normocephalic, atraumatic   Respiratory: lungs clear to auscultation anteriorly; no wheezing, rhonchi, or crackles  Heart: regular rate and rhythm. telemetry shows sinus rhythm   Abdomen: Nondistended, positive bowel sounds x4, soft,  nontender, obese  Extremities: right lower ext edema with erythema,   Peripheral pulses: normal x4 extremities  Neuro: alert, coherent and conversant, no focal motor deficits      Relevant Results  Scheduled medications  allopurinol, 300 mg, oral, Daily  buPROPion XL, 150 mg, oral, q AM  DULoxetine, 60 mg, oral, Daily  empagliflozin, 25 mg, oral, Daily  famotidine, 20 mg, oral, Daily  gabapentin, 900 mg, oral, 4x daily  insulin lispro, 0-20 Units, subcutaneous, TID with meals  lactulose, 10 g, oral, Daily  levothyroxine, 200 mcg, oral, Daily  levothyroxine, 50 mcg, oral, Daily before breakfast  loratadine, 10 mg, oral, Daily  metoprolol succinate XL, 50 mg, oral, Daily  nystatin, 1 Application, Topical, BID  oxygen, , inhalation, Continuous - Inhalation  pantoprazole, 40 mg, oral, Daily before breakfast  perflutren protein A microsphere, 0.5 mL, intravenous, Once in imaging  [Held by provider] plecanatide, 3 mg, oral, Daily  sulfur hexafluoride microsphr, 2 mL, intravenous, Once in imaging  traZODone, 100 mg, oral,  Nightly  warfarin, 7.5 mg, oral, Daily      Continuous medications  sodium chloride 0.9%, 75 mL/hr, Last Rate: 75 mL/hr (03/20/24 0009)      PRN medications  PRN medications: acetaminophen **OR** acetaminophen **OR** acetaminophen, dextrose, dextrose, glucagon, insulin lispro **AND** insulin lispro, magnesium hydroxide, ondansetron ODT **OR** ondansetron, traMADol    Results for orders placed or performed during the hospital encounter of 03/19/24 (from the past 24 hour(s))   ECG 12 lead   Result Value Ref Range    Ventricular Rate 110 BPM    Atrial Rate 110 BPM    HI Interval 144 ms    QRS Duration 72 ms    QT Interval 314 ms    QTC Calculation(Bazett) 424 ms    P Axis 60 degrees    R Axis -48 degrees    T Axis 55 degrees    QRS Count 18 beats    Q Onset 215 ms    P Onset 143 ms    P Offset 191 ms    T Offset 372 ms    QTC Fredericia 384 ms   Comprehensive metabolic panel   Result Value Ref Range    Glucose 268 (H) 74 - 99 mg/dL    Sodium 121 (L) 136 - 145 mmol/L    Potassium 4.4 3.5 - 5.3 mmol/L    Chloride 82 (L) 98 - 107 mmol/L    Bicarbonate 25 21 - 32 mmol/L    Anion Gap 18 10 - 20 mmol/L    Urea Nitrogen 72 (H) 6 - 23 mg/dL    Creatinine 1.95 (H) 0.50 - 1.05 mg/dL    eGFR 30 (L) >60 mL/min/1.73m*2    Calcium 10.5 (H) 8.6 - 10.3 mg/dL    Albumin 4.0 3.4 - 5.0 g/dL    Alkaline Phosphatase 747 (H) 33 - 110 U/L    Total Protein 7.4 6.4 - 8.2 g/dL     (H) 9 - 39 U/L    Bilirubin, Total 0.7 0.0 - 1.2 mg/dL     (H) 7 - 45 U/L   Troponin I, High Sensitivity   Result Value Ref Range    Troponin I, High Sensitivity 18 (H) 0 - 13 ng/L   CBC   Result Value Ref Range    WBC 11.8 (H) 4.4 - 11.3 x10*3/uL    nRBC 0.0 0.0 - 0.0 /100 WBCs    RBC 4.38 4.00 - 5.20 x10*6/uL    Hemoglobin 13.6 12.0 - 16.0 g/dL    Hematocrit 40.4 36.0 - 46.0 %    MCV 92 80 - 100 fL    MCH 31.1 26.0 - 34.0 pg    MCHC 33.7 32.0 - 36.0 g/dL    RDW 14.8 (H) 11.5 - 14.5 %    Platelets 260 150 - 450 x10*3/uL   Uric Acid   Result Value Ref  Range    Uric Acid 7.5 (H) 2.3 - 6.7 mg/dL   Hemoglobin A1c   Result Value Ref Range    Hemoglobin A1C 9.0 (H) see below %    Estimated Average Glucose 212 Not Established mg/dL   Light Blue Top   Result Value Ref Range    Extra Tube Hold for add-ons.    SST TOP   Result Value Ref Range    Extra Tube Hold for add-ons.    Protime-INR   Result Value Ref Range    Protime 23.0 (H) 9.8 - 12.8 seconds    INR 2.0 (H) 0.9 - 1.1   Influenza A, and B PCR   Result Value Ref Range    Flu A Result Not Detected Not Detected    Flu B Result Not Detected Not Detected   Sars-CoV-2 PCR   Result Value Ref Range    Coronavirus 2019, PCR Not Detected Not Detected   Urinalysis with Reflex Culture and Microscopic   Result Value Ref Range    Color, Urine Yellow Straw, Yellow    Appearance, Urine Clear Clear    Specific Gravity, Urine 1.013 1.005 - 1.035    pH, Urine 6.0 5.0, 5.5, 6.0, 6.5, 7.0, 7.5, 8.0    Protein, Urine NEGATIVE NEGATIVE mg/dL    Glucose, Urine >=500 (3+) (A) NEGATIVE mg/dL    Blood, Urine NEGATIVE NEGATIVE    Ketones, Urine NEGATIVE NEGATIVE mg/dL    Bilirubin, Urine NEGATIVE NEGATIVE    Urobilinogen, Urine <2.0 <2.0 mg/dL    Nitrite, Urine NEGATIVE NEGATIVE    Leukocyte Esterase, Urine NEGATIVE NEGATIVE   Extra Urine Gray Tube   Result Value Ref Range    Extra Tube Hold for add-ons.    POCT GLUCOSE   Result Value Ref Range    POCT Glucose 245 (H) 74 - 99 mg/dL   POCT GLUCOSE   Result Value Ref Range    POCT Glucose 205 (H) 74 - 99 mg/dL   Comprehensive metabolic panel   Result Value Ref Range    Glucose 169 (H) 74 - 99 mg/dL    Sodium 128 (L) 136 - 145 mmol/L    Potassium 3.8 3.5 - 5.3 mmol/L    Chloride 89 (L) 98 - 107 mmol/L    Bicarbonate 28 21 - 32 mmol/L    Anion Gap 15 10 - 20 mmol/L    Urea Nitrogen 60 (H) 6 - 23 mg/dL    Creatinine 1.44 (H) 0.50 - 1.05 mg/dL    eGFR 43 (L) >60 mL/min/1.73m*2    Calcium 10.3 8.6 - 10.3 mg/dL    Albumin 3.7 3.4 - 5.0 g/dL    Alkaline Phosphatase 665 (H) 33 - 110 U/L    Total  Protein 6.8 6.4 - 8.2 g/dL    AST 70 (H) 9 - 39 U/L    Bilirubin, Total 0.6 0.0 - 1.2 mg/dL     (H) 7 - 45 U/L   CBC and Auto Differential   Result Value Ref Range    WBC 9.4 4.4 - 11.3 x10*3/uL    nRBC 0.0 0.0 - 0.0 /100 WBCs    RBC 4.24 4.00 - 5.20 x10*6/uL    Hemoglobin 13.2 12.0 - 16.0 g/dL    Hematocrit 39.6 36.0 - 46.0 %    MCV 93 80 - 100 fL    MCH 31.1 26.0 - 34.0 pg    MCHC 33.3 32.0 - 36.0 g/dL    RDW 14.9 (H) 11.5 - 14.5 %    Platelets 220 150 - 450 x10*3/uL    Neutrophils % 70.6 40.0 - 80.0 %    Immature Granulocytes %, Automated 1.5 (H) 0.0 - 0.9 %    Lymphocytes % 14.9 13.0 - 44.0 %    Monocytes % 11.0 2.0 - 10.0 %    Eosinophils % 1.4 0.0 - 6.0 %    Basophils % 0.6 0.0 - 2.0 %    Neutrophils Absolute 6.66 1.20 - 7.70 x10*3/uL    Immature Granulocytes Absolute, Automated 0.14 0.00 - 0.70 x10*3/uL    Lymphocytes Absolute 1.41 1.20 - 4.80 x10*3/uL    Monocytes Absolute 1.04 (H) 0.10 - 1.00 x10*3/uL    Eosinophils Absolute 0.13 0.00 - 0.70 x10*3/uL    Basophils Absolute 0.06 0.00 - 0.10 x10*3/uL   Transthoracic Echo (TTE) Complete   Result Value Ref Range    LVOT diam 2.40 cm    MV E/A ratio 0.82     AV pk mariza 1.75 m/s    AV mn grad 7.0 mmHg    LV biplane EF 53 %    LVIDd 3.68 cm    RVSP 20.6 mmHg    Aortic Valve Area by Continuity of Peak Velocity 4.42 cm2    Aortic Valve Area by Continuity of VTI 6.07 cm2    AV pk grad 12.3 mmHg    LV A4C EF 39.8    POCT GLUCOSE   Result Value Ref Range    POCT Glucose 174 (H) 74 - 99 mg/dL   POCT GLUCOSE   Result Value Ref Range    POCT Glucose 245 (H) 74 - 99 mg/dL         Assessment/Plan      Principal Problem:    Hyponatremia    This is a morbidly obese 53-year-old female with a past medical history of diabetes mellitus, diabetic neuropathy, hypertension, obstructive sleep apnea on BiPAP, SEVILLA, Budd-Chiari syndrome, portal vein thrombosis on Coumadin, hepatic vein thrombosis, diastolic congestive heart failure, COPD, small bowel obstruction, constipation,  hyperuricemia, chronic low back pain, GERD, irreducible incisional hernia, depression, anxiety, acute pancreatitis, gout, hyperlipidemia, bilateral knee osteoarthritis, and a recently found hyponatremia/renal failure/elevated LFTs who presented to the emergency room for 6 weeks history of frequent falls. Blood workup showed hyponatremia, renal failure, elevated LFTs, and borderline hypercalcemia.      #Hyponatremia  #Frequent falls  -NA today 128 with continued IVF  -will need to watch closely due to HX of CHF  -Nephrology consulted  -falls at home likely related to NA  -Continue to hold spironolactone, metalozone   -PT/OT consulted     #ISRAEL  #Elevated LFT  -ISRAEL, improved to 1.44 with hold meds and IVF  -Alk phos 665, ALT 1.5, AST 70 continues to improve   -possible medication induced    #HX of portal vein thrombosis  -continue home coumadin  -unsure of pt compliance due to pt doing home checks,   -will monitor for INR 2-3  -pharmacy dosing  -will do DVT scan of RLE          HAYLEY Ruelas-CNP

## 2024-03-20 NOTE — CONSULTS
Reason For Consult  Acute kidney injury and hyponatremia    History Of Present Illness  Roselia Mo is a 53 y.o. female presenting with syncopal event.   She presented to the emergency room secondary to having falls and she also states that she has some syncopal events with some of these falls.  She tells me that she has had over 20 falls.  She states that it is very variable as to how they happen  Her legs get very weak and rubbery and she ends up falling  She has lost consciousness  She also states that after these events happen she has at least 30 seconds of time in which she is confused and not able to figure out really what is going on  She also has times when she cannot remember the event  This a.m. when I met her she is resting comfortably in bed  She states that her right leg has an area that is red and getting more warm  She feels like she also has areas where she is itchy on her skin and her skin will be very itchy at times    Past Medical History  She has a past medical history of Arthritis, Asthma, CHF (congestive heart failure) (CMS/[facility]), COPD (chronic obstructive pulmonary disease) (CMS/[facility]), Cough (2024), Diabetes mellitus (CMS/[facility]), Disease of thyroid gland, Hypertension, Shortness of breath (2024), and Tachycardia (2024).    Surgical History  She has a past surgical history that includes  section, low transverse; Carpal tunnel release (Bilateral, ); Hysterectomy; Hernia repair; Knee surgery (Left); and Colonoscopy (2014).     Social History  She reports that she quit smoking about 9 years ago. Her smoking use included cigarettes. She started smoking about 47 years ago. She has a 37.00 pack-year smoking history. She has never used smokeless tobacco. She reports current alcohol use. She reports current drug use. Frequency: 7.00 times per week. Drug: Marijuana.    Family History  Family History   Problem Relation Name Age of Onset    Blindness Mother       Hypertension Mother      Hyperlipidemia Mother      Heart disease Mother      Heart failure Father      Hypertension Father      Hyperlipidemia Father      Diabetes Father      Skin cancer Father      Blindness Maternal Grandmother      Hypertension Maternal Grandmother      Hyperlipidemia Maternal Grandmother      Heart failure Maternal Grandmother      Dementia Paternal Grandmother      Heart attack Paternal Grandfather      Hypertension Paternal Grandfather      Hyperlipidemia Paternal Grandfather          Allergies  Nickel, Metformin, Dulaglutide, Palladium, Cobalt, Exenatide, and Sitagliptin    Review of Systems  A full 10 point review of systems was obtained is negative except HPI as above     Physical Exam  Physical Exam  Constitutional:       Appearance: Normal appearance. She is obese.   HENT:      Head: Normocephalic and atraumatic.      Right Ear: External ear normal.      Left Ear: External ear normal.      Nose: Nose normal.      Mouth/Throat:      Mouth: Mucous membranes are moist.      Pharynx: Oropharynx is clear.   Eyes:      Extraocular Movements: Extraocular movements intact.      Conjunctiva/sclera: Conjunctivae normal.      Pupils: Pupils are equal, round, and reactive to light.   Cardiovascular:      Rate and Rhythm: Normal rate and regular rhythm.   Pulmonary:      Effort: Pulmonary effort is normal.      Breath sounds: Normal breath sounds.   Abdominal:      General: Abdomen is flat.      Palpations: Abdomen is soft.   Musculoskeletal:         General: Swelling present.      Right lower leg: Edema present.      Left lower leg: Edema present.   Skin:     General: Skin is warm and dry.      Findings: Rash present.   Neurological:      General: No focal deficit present.      Mental Status: She is alert and oriented to person, place, and time.   Psychiatric:         Mood and Affect: Mood normal.         Behavior: Behavior normal.                 I&O 24HR    Intake/Output Summary (Last 24 hours)  "at 3/20/2024 1358  Last data filed at 3/20/2024 1321  Gross per 24 hour   Intake 956 ml   Output 3600 ml   Net -2644 ml       Vitals 24HR  Heart Rate:  []   Temp:  [35.9 °C (96.6 °F)-36.3 °C (97.3 °F)]   Resp:  [18-20]   BP: ()/(61-83)   Height:  [157.5 cm (5' 2\")-162 cm (5' 3.78\")]   Weight:  [117 kg (258 lb)-166 kg (365 lb 15.4 oz)]   SpO2:  [92 %-99 %]       Scheduled medications  allopurinol, 300 mg, oral, Daily  buPROPion XL, 150 mg, oral, q AM  DULoxetine, 60 mg, oral, Daily  empagliflozin, 25 mg, oral, Daily  famotidine, 20 mg, oral, Daily  gabapentin, 900 mg, oral, 4x daily  insulin lispro, 0-20 Units, subcutaneous, TID with meals  lactulose, 10 g, oral, Daily  levothyroxine, 200 mcg, oral, Daily  levothyroxine, 50 mcg, oral, Daily before breakfast  loratadine, 10 mg, oral, Daily  metoprolol succinate XL, 50 mg, oral, Daily  multivitamin with minerals, 1 tablet, oral, Daily  nystatin, 1 Application, Topical, BID  oxygen, , inhalation, Continuous - Inhalation  pantoprazole, 40 mg, oral, Daily before breakfast  perflutren protein A microsphere, 0.5 mL, intravenous, Once in imaging  [Held by provider] plecanatide, 3 mg, oral, Daily  sulfur hexafluoride microsphr, 2 mL, intravenous, Once in imaging  traZODone, 100 mg, oral, Nightly  warfarin, 7.5 mg, oral, Daily      Continuous medications  sodium chloride 0.9%, 75 mL/hr, Last Rate: 75 mL/hr (03/20/24 1321)      PRN medications  PRN medications: acetaminophen **OR** acetaminophen **OR** acetaminophen, dextrose, dextrose, glucagon, insulin lispro **AND** insulin lispro, magnesium hydroxide, ondansetron ODT **OR** ondansetron, traMADol    Relevant Results  Results reviewed     Assessment/Plan     Hyponatremia: Appears hypovolemic  Acute renal failure: Likely prerenal  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  Diastolic CHF  Possible Cardiorenal Syndrome  Morbid Obesity  Lymphedema  HTN  DM II  Right Sided CHF  Pulmonary HTN  CY on CPAP    Plan:  At this time I agree with " IV fluids  Likely she was over diuresed as an outpatient  Once again this is a fairly difficult place as she has right-sided heart failure and has issues with peripheral edema and volume overload and her fluid balance is quite fragile  With her syncopal events we likely are going to have to back off of her diuretic therapy  Her sodium has risen and renal function has improved with normal saline  I would continue this for 24 hours  Thanks for the consult    Principal Problem:    Hyponatremia        Pardeep Nichole, DO

## 2024-03-20 NOTE — PROGRESS NOTES
03/20/24 1055   Discharge Planning   Living Arrangements Spouse/significant other   Support Systems Spouse/significant other   Assistance Needed with ADLs   Type of Residence Private residence   Number of Stairs to Enter Residence 0  (Ramps)   Number of Stairs Within Residence 0   Do you have animals or pets at home? Yes   Type of Animals or Pets 2 dogs and 1 cat   Who is requesting discharge planning? Provider   Home or Post Acute Services None   Patient expects to be discharged to: Home   Does the patient need discharge transport arranged? No   Financial Resource Strain   How hard is it for you to pay for the very basics like food, housing, medical care, and heating? Somewhat   Housing Stability   In the last 12 months, was there a time when you were not able to pay the mortgage or rent on time? Y   In the last 12 months, how many places have you lived? 2   In the last 12 months, was there a time when you did not have a steady place to sleep or slept in a shelter (including now)? Y   Transportation Needs   In the past 12 months, has lack of transportation kept you from medical appointments or from getting medications? no   In the past 12 months, has lack of transportation kept you from meetings, work, or from getting things needed for daily living? No   Patient Choice   Provider Choice list and CMS website (https://medicare.gov/care-compare#search) for post-acute Quality and Resource Measure Data were provided and reviewed with:   (NA)     Met with pt  and significant other at the bedside and verified address, phone number and emergency contact information. PCP is Dr Penn and pharmacy is [facility] denies issues obtaining or affording her medication and she states that she takes it as ordered. Pt is dependent on significant other to transport her everywhere she needs to go. She feels safe with the exception to all the falls she has been having. She is alert and oriented for me at this time. She is on oxygen 4L  continuous however is unable to remember the places name, she knows it is out of [place]. She denies HHC assistance as her significant other is home her he works third shift. She is requesting a Bariatric BSC as her is broken and she can not go up the steps to the only bathroom. CT to follow. Nelda Perez BSN/RN-TCC

## 2024-03-21 LAB
ALBUMIN SERPL BCP-MCNC: 3.5 G/DL (ref 3.4–5)
ANION GAP SERPL CALC-SCNC: 13 MMOL/L (ref 10–20)
BUN SERPL-MCNC: 47 MG/DL (ref 6–23)
CALCIUM SERPL-MCNC: 9.9 MG/DL (ref 8.6–10.3)
CHLORIDE SERPL-SCNC: 89 MMOL/L (ref 98–107)
CO2 SERPL-SCNC: 27 MMOL/L (ref 21–32)
CREAT SERPL-MCNC: 1.45 MG/DL (ref 0.5–1.05)
EGFRCR SERPLBLD CKD-EPI 2021: 43 ML/MIN/1.73M*2
ERYTHROCYTE [DISTWIDTH] IN BLOOD BY AUTOMATED COUNT: 14.8 % (ref 11.5–14.5)
GLUCOSE BLD MANUAL STRIP-MCNC: 273 MG/DL (ref 74–99)
GLUCOSE BLD MANUAL STRIP-MCNC: 355 MG/DL (ref 74–99)
GLUCOSE BLD MANUAL STRIP-MCNC: 374 MG/DL (ref 74–99)
GLUCOSE BLD MANUAL STRIP-MCNC: 394 MG/DL (ref 74–99)
GLUCOSE SERPL-MCNC: 295 MG/DL (ref 74–99)
HCT VFR BLD AUTO: 37.4 % (ref 36–46)
HGB BLD-MCNC: 12.5 G/DL (ref 12–16)
INR PPP: 1.4 (ref 0.9–1.1)
MCH RBC QN AUTO: 31.2 PG (ref 26–34)
MCHC RBC AUTO-ENTMCNC: 33.4 G/DL (ref 32–36)
MCV RBC AUTO: 93 FL (ref 80–100)
NRBC BLD-RTO: 0 /100 WBCS (ref 0–0)
PHOSPHATE SERPL-MCNC: 3.2 MG/DL (ref 2.5–4.9)
PLATELET # BLD AUTO: 221 X10*3/UL (ref 150–450)
POTASSIUM SERPL-SCNC: 4.6 MMOL/L (ref 3.5–5.3)
PROTHROMBIN TIME: 16 SECONDS (ref 9.8–12.8)
RBC # BLD AUTO: 4.01 X10*6/UL (ref 4–5.2)
SODIUM SERPL-SCNC: 124 MMOL/L (ref 136–145)
WBC # BLD AUTO: 6.8 X10*3/UL (ref 4.4–11.3)
ZINC SERPL-MCNC: 80.5 UG/DL (ref 60–120)

## 2024-03-21 PROCEDURE — 2500000001 HC RX 250 WO HCPCS SELF ADMINISTERED DRUGS (ALT 637 FOR MEDICARE OP): Performed by: NURSE PRACTITIONER

## 2024-03-21 PROCEDURE — 94761 N-INVAS EAR/PLS OXIMETRY MLT: CPT

## 2024-03-21 PROCEDURE — 1200000002 HC GENERAL ROOM WITH TELEMETRY DAILY

## 2024-03-21 PROCEDURE — 85027 COMPLETE CBC AUTOMATED: CPT | Performed by: NURSE PRACTITIONER

## 2024-03-21 PROCEDURE — 2500000004 HC RX 250 GENERAL PHARMACY W/ HCPCS (ALT 636 FOR OP/ED): Performed by: NURSE PRACTITIONER

## 2024-03-21 PROCEDURE — 2500000001 HC RX 250 WO HCPCS SELF ADMINISTERED DRUGS (ALT 637 FOR MEDICARE OP): Performed by: INTERNAL MEDICINE

## 2024-03-21 PROCEDURE — 80069 RENAL FUNCTION PANEL: CPT | Performed by: NURSE PRACTITIONER

## 2024-03-21 PROCEDURE — 94761 N-INVAS EAR/PLS OXIMETRY MLT: CPT | Performed by: INTERNAL MEDICINE

## 2024-03-21 PROCEDURE — 2500000002 HC RX 250 W HCPCS SELF ADMINISTERED DRUGS (ALT 637 FOR MEDICARE OP, ALT 636 FOR OP/ED): Performed by: NURSE PRACTITIONER

## 2024-03-21 PROCEDURE — 2500000004 HC RX 250 GENERAL PHARMACY W/ HCPCS (ALT 636 FOR OP/ED): Performed by: INTERNAL MEDICINE

## 2024-03-21 PROCEDURE — 2500000005 HC RX 250 GENERAL PHARMACY W/O HCPCS: Performed by: NURSE PRACTITIONER

## 2024-03-21 PROCEDURE — 85610 PROTHROMBIN TIME: CPT | Performed by: NURSE PRACTITIONER

## 2024-03-21 PROCEDURE — 97165 OT EVAL LOW COMPLEX 30 MIN: CPT | Mod: GO

## 2024-03-21 PROCEDURE — 97161 PT EVAL LOW COMPLEX 20 MIN: CPT | Mod: GP | Performed by: PHYSICAL THERAPIST

## 2024-03-21 PROCEDURE — 2500000002 HC RX 250 W HCPCS SELF ADMINISTERED DRUGS (ALT 637 FOR MEDICARE OP, ALT 636 FOR OP/ED): Performed by: INTERNAL MEDICINE

## 2024-03-21 PROCEDURE — 99233 SBSQ HOSP IP/OBS HIGH 50: CPT | Performed by: INTERNAL MEDICINE

## 2024-03-21 PROCEDURE — 36415 COLL VENOUS BLD VENIPUNCTURE: CPT | Performed by: NURSE PRACTITIONER

## 2024-03-21 PROCEDURE — 82947 ASSAY GLUCOSE BLOOD QUANT: CPT

## 2024-03-21 PROCEDURE — 99233 SBSQ HOSP IP/OBS HIGH 50: CPT | Performed by: NURSE PRACTITIONER

## 2024-03-21 RX ORDER — INSULIN GLARGINE 100 [IU]/ML
30 INJECTION, SOLUTION SUBCUTANEOUS EVERY 24 HOURS
Status: DISCONTINUED | OUTPATIENT
Start: 2024-03-21 | End: 2024-03-21

## 2024-03-21 RX ORDER — INSULIN GLARGINE 100 [IU]/ML
30 INJECTION, SOLUTION SUBCUTANEOUS 2 TIMES DAILY
Status: DISCONTINUED | OUTPATIENT
Start: 2024-03-21 | End: 2024-03-22

## 2024-03-21 RX ORDER — WARFARIN 10 MG/1
10 TABLET ORAL ONCE
Status: COMPLETED | OUTPATIENT
Start: 2024-03-21 | End: 2024-03-21

## 2024-03-21 RX ADMIN — SODIUM CHLORIDE 50 ML/HR: 9 INJECTION, SOLUTION INTRAVENOUS at 06:14

## 2024-03-21 RX ADMIN — TRAZODONE HYDROCHLORIDE 100 MG: 100 TABLET ORAL at 20:33

## 2024-03-21 RX ADMIN — WARFARIN SODIUM 10 MG: 10 TABLET ORAL at 17:37

## 2024-03-21 RX ADMIN — INSULIN LISPRO 12.9 UNITS: 100 INJECTION, SOLUTION INTRAVENOUS; SUBCUTANEOUS at 09:17

## 2024-03-21 RX ADMIN — METOPROLOL SUCCINATE 50 MG: 50 TABLET, EXTENDED RELEASE ORAL at 09:14

## 2024-03-21 RX ADMIN — GABAPENTIN 900 MG: 300 CAPSULE ORAL at 06:14

## 2024-03-21 RX ADMIN — BUPROPION HYDROCHLORIDE 150 MG: 150 TABLET, FILM COATED, EXTENDED RELEASE ORAL at 09:14

## 2024-03-21 RX ADMIN — LEVOTHYROXINE SODIUM 200 MCG: 0.1 TABLET ORAL at 06:14

## 2024-03-21 RX ADMIN — INSULIN GLARGINE 30 UNITS: 100 INJECTION, SOLUTION SUBCUTANEOUS at 09:17

## 2024-03-21 RX ADMIN — INSULIN LISPRO 15.75 UNITS: 100 INJECTION, SOLUTION INTRAVENOUS; SUBCUTANEOUS at 12:39

## 2024-03-21 RX ADMIN — LEVOTHYROXINE SODIUM 50 MCG: 0.05 TABLET ORAL at 06:15

## 2024-03-21 RX ADMIN — HYDROCODONE BITARTRATE AND ACETAMINOPHEN 1 TABLET: 5; 325 TABLET ORAL at 20:01

## 2024-03-21 RX ADMIN — GABAPENTIN 900 MG: 300 CAPSULE ORAL at 12:39

## 2024-03-21 RX ADMIN — LORATADINE 10 MG: 10 TABLET ORAL at 09:14

## 2024-03-21 RX ADMIN — ALLOPURINOL 300 MG: 300 TABLET ORAL at 09:14

## 2024-03-21 RX ADMIN — DULOXETINE HYDROCHLORIDE 60 MG: 60 CAPSULE, DELAYED RELEASE ORAL at 09:14

## 2024-03-21 RX ADMIN — MULTIPLE VITAMINS W/ MINERALS TAB 1 TABLET: TAB at 09:14

## 2024-03-21 RX ADMIN — HYDROCODONE BITARTRATE AND ACETAMINOPHEN 1 TABLET: 5; 325 TABLET ORAL at 09:14

## 2024-03-21 RX ADMIN — EMPAGLIFLOZIN 25 MG: 25 TABLET, FILM COATED ORAL at 09:14

## 2024-03-21 RX ADMIN — LACTULOSE 10 G: 20 SOLUTION ORAL at 09:15

## 2024-03-21 RX ADMIN — GABAPENTIN 900 MG: 300 CAPSULE ORAL at 17:37

## 2024-03-21 RX ADMIN — PANTOPRAZOLE SODIUM 40 MG: 40 TABLET, DELAYED RELEASE ORAL at 06:14

## 2024-03-21 RX ADMIN — INSULIN GLARGINE 30 UNITS: 100 INJECTION, SOLUTION SUBCUTANEOUS at 20:34

## 2024-03-21 RX ADMIN — GABAPENTIN 900 MG: 300 CAPSULE ORAL at 20:33

## 2024-03-21 RX ADMIN — DIPHENHYDRAMINE HYDROCHLORIDE AND LIDOCAINE HYDROCHLORIDE AND ALUMINUM HYDROXIDE AND MAGNESIUM HYDRO 10 ML: KIT at 16:11

## 2024-03-21 RX ADMIN — NYSTATIN 1 APPLICATION: 100000 POWDER TOPICAL at 09:15

## 2024-03-21 RX ADMIN — NYSTATIN 1 APPLICATION: 100000 POWDER TOPICAL at 20:34

## 2024-03-21 RX ADMIN — FAMOTIDINE 20 MG: 20 TABLET, FILM COATED ORAL at 09:14

## 2024-03-21 RX ADMIN — INSULIN LISPRO 17.75 UNITS: 100 INJECTION, SOLUTION INTRAVENOUS; SUBCUTANEOUS at 17:38

## 2024-03-21 ASSESSMENT — COGNITIVE AND FUNCTIONAL STATUS - GENERAL
DAILY ACTIVITIY SCORE: 20
MOVING TO AND FROM BED TO CHAIR: A LITTLE
DRESSING REGULAR UPPER BODY CLOTHING: A LITTLE
TOILETING: A LITTLE
MOVING TO AND FROM BED TO CHAIR: A LITTLE
DRESSING REGULAR LOWER BODY CLOTHING: A LOT
CLIMB 3 TO 5 STEPS WITH RAILING: A LOT
DRESSING REGULAR UPPER BODY CLOTHING: A LITTLE
TOILETING: A LITTLE
STANDING UP FROM CHAIR USING ARMS: A LITTLE
HELP NEEDED FOR BATHING: A LOT
DRESSING REGULAR UPPER BODY CLOTHING: A LITTLE
CLIMB 3 TO 5 STEPS WITH RAILING: A LOT
TOILETING: A LITTLE
TURNING FROM BACK TO SIDE WHILE IN FLAT BAD: A LITTLE
MOVING FROM LYING ON BACK TO SITTING ON SIDE OF FLAT BED WITH BEDRAILS: A LITTLE
WALKING IN HOSPITAL ROOM: A LITTLE
DAILY ACTIVITIY SCORE: 18
MOBILITY SCORE: 19
MOBILITY SCORE: 18
DAILY ACTIVITIY SCORE: 18
WALKING IN HOSPITAL ROOM: A LITTLE
WALKING IN HOSPITAL ROOM: A LOT
MOVING TO AND FROM BED TO CHAIR: A LITTLE
MOVING FROM LYING ON BACK TO SITTING ON SIDE OF FLAT BED WITH BEDRAILS: A LITTLE
DRESSING REGULAR LOWER BODY CLOTHING: A LITTLE
DRESSING REGULAR LOWER BODY CLOTHING: A LOT
CLIMB 3 TO 5 STEPS WITH RAILING: A LOT
MOBILITY SCORE: 18
HELP NEEDED FOR BATHING: A LOT
HELP NEEDED FOR BATHING: A LITTLE

## 2024-03-21 ASSESSMENT — PAIN SCALES - GENERAL
PAINLEVEL_OUTOF10: 8
PAINLEVEL_OUTOF10: 7
PAINLEVEL_OUTOF10: 0 - NO PAIN
PAINLEVEL_OUTOF10: 7
PAINLEVEL_OUTOF10: 7

## 2024-03-21 ASSESSMENT — PAIN - FUNCTIONAL ASSESSMENT
PAIN_FUNCTIONAL_ASSESSMENT: 0-10

## 2024-03-21 ASSESSMENT — PAIN DESCRIPTION - LOCATION
LOCATION: SHOULDER
LOCATION: KNEE

## 2024-03-21 ASSESSMENT — ACTIVITIES OF DAILY LIVING (ADL): BATHING_ASSISTANCE: MODERATE

## 2024-03-21 ASSESSMENT — PAIN DESCRIPTION - ORIENTATION
ORIENTATION: RIGHT;LEFT
ORIENTATION: RIGHT

## 2024-03-21 NOTE — PROGRESS NOTES
"Physical Therapy    Physical Therapy Evaluation    Patient Name: Roselia Mo  MRN: [id number]  Today's Date: 3/21/2024   Time Calculation  Start Time: 0935  Stop Time: 1012  Time Calculation (min): 37 min    Assessment/Plan   Skilled PT intervention is indicated due to patient presents with deficits in bed mobility, transfers, gait, stair negotiation, strength, activity tolerance, and safety awareness.   Patient may benefit from neuro consult or post covid specialist to see if there is a reason her legs are giving out for past 5 weeks post being sick.  Patient may beneift from using FWW to help support herself if legs give out but patient states it won't help bc she will still go straight down.      PT Assessment  PT Assessment Results: Decreased strength, Decreased endurance, Decreased mobility, Decreased safety awareness, Obesity, Pain  Rehab Prognosis: Good  End of Session Communication: Bedside nurse  End of Session Patient Position: Up in chair, Alarm on (feet supported of foot rest)  IP OR SWING BED PT PLAN  Inpatient or Swing Bed: Inpatient  PT Plan  Treatment/Interventions: Bed mobility, Transfer training, Gait training, Strengthening, Endurance training, Therapeutic activity, Therapeutic exercise, Home exercise program  PT Plan: Skilled PT  PT Frequency: 5 times per week  PT Discharge Recommendations: Low intensity level of continued care    Subjective   Patient tearful and upset that doctors are taking away the \"only thing I have left to enjoy....my drinks\". patient tearful that she was not having the falling/legs giving out issue until after she was sick 5 weeks ago which she feels was COVID but noone tested her for it even though she went to dr office with c/o .  States she doesn't see the point of getting up and walking because she can't stand and clean or go and get drinks from fridge \"bc if it's not my breathing, it's my knees hurting , or my fibromyalgia\".  Patient states when her legs give out she " has no warning and she just goes down.  Sates she doesn't see how therapy would help that.  Patient states she has only seen GI and pulmonary care in addtion to PCP.      Current Problem:  Patient Active Problem List   Diagnosis    Acquired hypothyroidism    Anemia    Budd-Chiari syndrome (CMS/HCC)    Portal vein thrombosis    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CMS/HCC)    Chronic diastolic heart failure (CMS/HCC)    Chronic obstructive pulmonary disease (CMS/HCC)    Congestive heart failure (CMS/HCC)    Diabetic polyneuropathy (CMS/HCC)    Hypertension associated with diabetes (CMS/HCC)    Chronic right-sided low back pain with right-sided sciatica    Fibromyalgia    Chronic low back pain    Gastroesophageal reflux disease without esophagitis    Hyperlipidemia associated with type 2 diabetes mellitus (CMS/HCC)    Hypokalemia    Insulin resistance    Irreducible incisional hernia    LPRD (laryngopharyngeal reflux disease)    Magnesium deficiency    Moderate episode of recurrent major depressive disorder (CMS/HCC)    Morbid obesity with body mass index (BMI) of 60.0 to 69.9 in adult (CMS/HCC)    Mycobacterium avium complex (CMS/HCC)    Nonalcoholic fatty liver    Obstructive sleep apnea syndrome    Presence of insulin pump    Pulmonary hypertension (CMS/HCC)    Pulmonary nodule    Stage 3 chronic kidney disease (CMS/HCC)    Type 2 diabetes mellitus (CMS/HCC)    Ventral hernia    Vitamin D deficiency    Anxiety    Leg pain, bilateral    Chronic idiopathic constipation    Chronic nausea    H/O acute pancreatitis    Wheelchair dependent    Lymphedema    Poor physical health    H/O: gout    Hypercalcemia    Leukocytosis    Abnormal CXR    Hyponatremia       General Visit Information:  General  Reason for Referral: impaired mobility;  patient morbidly obese 53-year-old female with a past medical history of diabetes mellitus, diabetic neuropathy, hypertension, obstructive sleep apnea on BiPAP, SEVILLA, Budd-Chiari syndrome, portal vein  thrombosis on Coumadin, hepatic vein thrombosis, diastolic congestive heart failure, COPD, small bowel obstruction, constipation, hyperuricemia, chronic low back pain, GERD, irreducible incisional hernia, depression, anxiety, acute pancreatitis, gout, hyperlipidemia, bilateral knee osteoarthritis, and a recently found hyponatremia/renal failure/elevated LFTs who presented to the emergency room for 6 weeks history of frequent falls. Blood workup showed hyponatremia, renal failure, elevated LFTs, and borderline hypercalcemia.  Referred By: Josephine  Past Medical History Relevant to Rehab: patient reports frequently legs giving out with no warning and dropping to floor  Family/Caregiver Present: No  Co-Treatment: OT  Co-Treatment Reason: co-eval with OT Jadyn to optimize patient safety with mobility  Prior to Session Communication: Bedside nurse, PCT/NA/CTA (nurse states patient was saturated of urine in bed so they got patient up to chair.)  Patient Position Received: Alarm on, Up in chair  General Comment: patient awake in chair on arrival with PCA finishing up in room upon arrival    Home Living:  Home Living  Type of Home: House  Lives With: Spouse  Home Adaptive Equipment: Walker rolling or standard (BSC)  Home Layout: Two level, Bed/bath upstairs  Home Access: Ramped entrance  Home Living Comments: patient states they currently have no running water in home due to well issues.  They are using BSC to toilet    Prior Level of Function:  Prior Function Per Pt/Caregiver Report  Level of Dickenson: Independent with ADLs and functional transfers    Precautions:  Precautions  Medical Precautions: Fall precautions, Oxygen therapy device and L/min (4L)  Precautions Comment: patient not on oxygen on arrival.  placed n.c. at end of session.  patient states she doesn't wear it all the time during day, just when she needs it with /after walking/activities    Vital Signs:     Objective     Pain:  Pain Assessment  Pain  Assessment: 0-10  Pain Score: 7  Pain Location: Knee (bilateral)    Cognition:  Cognition  Overall Cognitive Status: Within Functional Limits    General Assessments:  General Observation  General Observation: patient tearful, crying throughout   Activity Tolerance  Endurance: Decreased tolerance for upright activites     Strength  Strength Comments: LEs >/= 4-/5        Postural Control  Postural Control: Within Functional Limits  Static Sitting Balance  Static Sitting-Balance Support: Bilateral upper extremity supported, Feet supported  Static Sitting-Level of Assistance: Independent  Dynamic Standing Balance  Dynamic Standing-Balance Support: No upper extremity supported  Dynamic Standing-Balance: Turning  Dynamic Standing-Comments: CGA    Functional Assessments:     Bed Mobility  Bed Mobility:  (patient already up to chair.  per nsg patient able to perform bed mobility on her own)  Transfers  Transfer: Yes  Transfer 1  Transfer From 1: Sit to, Stand to  Transfer to 1: Stand, Sit  Technique 1: Sit to stand, Stand to sit  Transfer Level of Assistance 1: Independent  Transfers 2  Transfer From 2: Stand to, Commode-standard to  Transfer to 2: Commode-standard, Stand  Technique 2: Stand to sit, Sit to stand  Transfer Level of Assistance 2: Independent  Ambulation/Gait Training  Ambulation/Gait Training Performed: Yes  Ambulation/Gait Training 1  Surface 1: Level tile  Device 1: No device  Assistance 1: Contact guard, Close supervision  Quality of Gait 1: Wide base of support, Diminished heel strike  Comments/Distance (ft) 1: 6'x2          Outcome Measures:  [facility] Basic Mobility  Turning from your back to your side while in a flat bed without using bedrails: A little  Moving from lying on your back to sitting on the side of a flat bed without using bedrails: A little  Moving to and from bed to chair (including a wheelchair): A little  Standing up from a chair using your arms (e.g. wheelchair or bedside chair): None  To  walk in hospital room: A little  Climbing 3-5 steps with railing: A lot  Basic Mobility - Total Score: 18          Goals:  Encounter Problems       Encounter Problems (Active)       PT Problem       PT Goal 1       Start:  03/21/24    Expected End:  04/03/24       Roselia Mo will be independent with bed mobility for supine to and from sitting EOB without use of rail           PT Goal 2       Start:  03/21/24    Expected End:  04/03/24       Roselia Mo will ambulate 75 ft with least restrictive assistive device , level surface, good balance, steady mod Indep           PT Goal 3       Start:  03/21/24    Expected End:  04/03/24       Patient will participate in 30 min PT session without fatigue            Pain - Adult            Education Documentation  Use of device.  Importance of continuing to ambulate and be mobile to prevent further deconditioning

## 2024-03-21 NOTE — CONSULTS
Heart Failure Clinic was consulted for Heart Failure Clinic screening.    Notes, labs, flowsheets and medications reviewed in EMR.    Admitting Dx: Hponatremia  Cardiac Hx: Chronic Diastolic Heart Failure, DM, Hyperlipidemia, HTN, CKD  Relevant Medications: Jardiance, Metolzaone, Metoprolol Succinate, Crestor, Aldactone, Torsemide, Coumadin  Cardiologist/PCP: Dr. Knight  Last Echo: 3/20/24  EF: 65%  BNP:  33 on 1/12/24, (not drawn this admission)    Met with Roselia at bedside to discuss the Heart Failure Clinic and provide education about managing HF. Patient very tearful this morning, Roselia reports that she just moved to [place] and his been living here for a year. She reports that she has not changed anything with her lifestyle, so she does not understand why all this is happening to her and stated she is overwhelmed with everything going on. Roselia reported that she drinks 12L+ of water per day, due to being so thirsty and feeling like this is the only thing she has control over. Roselia has not seen a cardiologist since moving to [place], Antonia Childress had referred her to [facility] to see Dr. Earl, but Roselia has not been able to make it to her appointments yet. Roselia was informed that once she is established with [facility] we can enroll her into [facility]'s Heart Failure Clinic and help manage her symptoms. Roselia voiced understanding and agreement.

## 2024-03-21 NOTE — PROGRESS NOTES
Roselia Mo is a 53 y.o. female on day 2 of admission presenting with Hyponatremia.      Subjective   Patient seen and examined at the bedside this morning  She is tearful  She is tired of all of her medical issues and problems that she is encountering         Objective          Vitals 24HR  Heart Rate:  [90-93]   Temp:  [36.2 °C (97.2 °F)-36.7 °C (98.1 °F)]   Resp:  [20]   BP: (101-108)/(63-64)   SpO2:  [95 %-99 %]         Intake/Output last 3 Shifts:    Intake/Output Summary (Last 24 hours) at 3/21/2024 1117  Last data filed at 3/21/2024 0900  Gross per 24 hour   Intake 2987 ml   Output 4200 ml   Net -1213 ml       Physical Exam  Constitutional:       Appearance: Normal appearance. She is obese.   HENT:      Head: Normocephalic and atraumatic.      Right Ear: External ear normal.      Left Ear: External ear normal.      Nose: Nose normal.      Mouth/Throat:      Mouth: Mucous membranes are moist.      Pharynx: Oropharynx is clear.   Eyes:      Extraocular Movements: Extraocular movements intact.      Conjunctiva/sclera: Conjunctivae normal.      Pupils: Pupils are equal, round, and reactive to light.   Cardiovascular:      Rate and Rhythm: Normal rate and regular rhythm.   Pulmonary:      Effort: Pulmonary effort is normal.      Breath sounds: Normal breath sounds.   Abdominal:      General: Abdomen is flat.      Palpations: Abdomen is soft.   Skin:     General: Skin is warm and dry.   Neurological:      General: No focal deficit present.      Mental Status: She is alert and oriented to person, place, and time.   Psychiatric:         Behavior: Behavior normal.      Comments: Tearful with obvious depression       Scheduled medications  allopurinol, 300 mg, oral, Daily  buPROPion XL, 150 mg, oral, q AM  DULoxetine, 60 mg, oral, Daily  empagliflozin, 25 mg, oral, Daily  famotidine, 20 mg, oral, Daily  gabapentin, 900 mg, oral, 4x daily  insulin glargine, 30 Units, subcutaneous, q24h  insulin lispro, 0-20 Units,  subcutaneous, TID with meals  lactulose, 10 g, oral, Daily  levothyroxine, 200 mcg, oral, Daily  levothyroxine, 50 mcg, oral, Daily before breakfast  loratadine, 10 mg, oral, Daily  metoprolol succinate XL, 50 mg, oral, Daily  multivitamin with minerals, 1 tablet, oral, Daily  nystatin, 1 Application, Topical, BID  oxygen, , inhalation, Continuous - Inhalation  pantoprazole, 40 mg, oral, Daily before breakfast  perflutren protein A microsphere, 0.5 mL, intravenous, Once in imaging  [Held by provider] plecanatide, 3 mg, oral, Daily  sulfur hexafluoride microsphr, 2 mL, intravenous, Once in imaging  traZODone, 100 mg, oral, Nightly  warfarin, 10 mg, oral, Once      Continuous medications  sodium chloride 0.9%, 50 mL/hr, Last Rate: 50 mL/hr (03/21/24 1117)      PRN medications  PRN medications: acetaminophen **OR** acetaminophen **OR** acetaminophen, dextrose, dextrose, glucagon, HYDROcodone-acetaminophen, insulin lispro **AND** insulin lispro, lidocaine-diphenhydraMINE-Maalox 1:1:1, magnesium hydroxide, ondansetron ODT **OR** ondansetron, traMADol    Relevant Results               Assessment/Plan              Principal Problem:    Hyponatremia    Hyponatremia: Mixed picture with very high fluid intake  Acute renal failure: Improved  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  Diastolic CHF  Possible Cardiorenal Syndrome  Morbid Obesity  Lymphedema  HTN  DM II  Right Sided CHF  Pulmonary HTN  CY on CPAP    Plan:  At this time her sodium has dropped  She is drinking a lot of fluids and fluid restriction was discussed with her at the bedside  She is quite tearful and she is struggling with her overall medical condition at this time  I will discontinue IV fluids  Her renal function has improved  We will have to see how she can do with her fluid restriction  Follow renal function and electrolytes closely  She may benefit from SSRI              Pardeep Nichole, DO

## 2024-03-21 NOTE — CARE PLAN
The patient's goals for the shift include  feel better    The clinical goals for the shift include no falls labs wdl, no sob, no falls

## 2024-03-21 NOTE — PROGRESS NOTES
Care Transitions: Patient reviewed during care round meeting this AM and is not medically ready for discharge today. Met with patient in room. Sitting up in chair. Very tearful and voiced she is upset about fluid restriction and medication changes. Hospitalist discussed reasoning for medical treatment with her. Discussed discharge plans and possible benefit from [facility] for nursing and therapy. Verified she will return home and denies any in home services at this time. States she is in need of a new electric wheelchair. Explained this will have to ordered by her PCP. Denies the need for any spiritual consult today. Care team to follow for any needs. Radha Sorto RN/TCC

## 2024-03-21 NOTE — PROGRESS NOTES
Ray Guerra is a 53 y.o. female on day 2 of admission presenting with Hyponatremia.      Subjective   Pt seen and examined this morning  Pt in no distress, pt lying in bed  Pt explained that she is drinking at least about 12L of water a day        Objective     Last Recorded Vitals  /63 (BP Location: Left arm, Patient Position: Lying)   Pulse 90   Temp 36.7 °C (98.1 °F) (Temporal)   Resp 20   Wt (!) 166 kg (365 lb 15.4 oz)   SpO2 98%   Intake/Output last 3 Shifts:    Intake/Output Summary (Last 24 hours) at 3/21/2024 1155  Last data filed at 3/21/2024 1117  Gross per 24 hour   Intake 3539.5 ml   Output 4200 ml   Net -660.5 ml       Admission Weight  Weight: 117 kg (258 lb) (03/19/24 1815)    Daily Weight  03/19/24 : (!) 166 kg (365 lb 15.4 oz)    Image Results  Lower extremity venous duplex right  Preliminary Cardiology [place] [address]  Phone [phone], Fax [phone]             Preliminary Vascular Lab Report     [facility] LOWER EXTREMITY VENOUS DUPLEX RIGHT       Patient Name:     RAY GUERRA Reading Physician:  [id number] Lenz MD  Study Date:       3/20/2024       Ordering Provider:  [id number] RACHEL GOMEZ  MRN/PID:          [id number]        Fellow:  Accession#:       [id number]    Technologist:       Ozzy Garza RVT  YOB: 1970      Technologist 2:  Gender:           F               Encounter#:         [id number]  Admission Status: Inpatient       Location Performed: [facility]       Diagnosis/ICD: Localized (leg) edema-R60.0  CPT Codes:     56668 Peripheral venous duplex scan for DVT Limited       Pertinent History: LE Edema.       PRELIMINARY CONCLUSIONS:     Right Lower Venous: Posterior tibial and peroneal veins were visualized in segments due to edema and body habitus, therefore, unable to rule out  acute thrombus. Negative for DVT in the remaining vessels.  Left Lower Venous: Left common femoral vein is negative for deep vein thrombus.     Imaging & Doppler Findings:     Right                 Compressible Thrombus        Flow  Distal External Iliac                       Spontaneous/Phasic  CFV                       Yes        None   Spontaneous/Phasic  PFV                       Yes        None  FV Proximal               Yes        None   Spontaneous/Phasic  FV Mid                    Yes        None  FV Distal                 Yes        None  Popliteal                 Yes        None   Spontaneous/Phasic       Left Compress Thrombus        Flow  CFV    Yes      None   Spontaneous/Phasic    VASCULAR PRELIMINARY REPORT  completed by Ozzy Garza RVT on 3/21/2024 at 9:32:12 AM       ** Final **      Physical Exam  General Appearance: AAO x 3, not in acute distress  Skin: skin color pink, warm, and dry  Eyes : PERRL, EOM's intact  ENT: mucous membranes pink and moist  Head: normocephalic, atraumatic   Respiratory: lungs clear to auscultation anteriorly; no wheezing, rhonchi, or crackles  Heart: regular rate and rhythm. telemetry shows sinus rhythm   Abdomen: Nondistended, positive bowel sounds x4, soft,  nontender, obese  Extremities: right lower ext edema with erythema,   Peripheral pulses: normal x4 extremities  Neuro: alert, coherent and conversant, no focal motor deficits         Relevant Results  Scheduled medications  allopurinol, 300 mg, oral, Daily  buPROPion XL, 150 mg, oral, q AM  DULoxetine, 60 mg, oral, Daily  empagliflozin, 25 mg, oral, Daily  famotidine, 20 mg, oral, Daily  gabapentin, 900 mg, oral, 4x daily  insulin glargine, 30 Units, subcutaneous, BID  insulin lispro, 0-20 Units, subcutaneous, TID with meals  lactulose, 10 g, oral, Daily  levothyroxine, 200 mcg, oral, Daily  levothyroxine, 50 mcg, oral, Daily before breakfast  loratadine, 10 mg, oral, Daily  metoprolol succinate XL, 50 mg, oral,  Daily  multivitamin with minerals, 1 tablet, oral, Daily  nystatin, 1 Application, Topical, BID  oxygen, , inhalation, Continuous - Inhalation  pantoprazole, 40 mg, oral, Daily before breakfast  perflutren protein A microsphere, 0.5 mL, intravenous, Once in imaging  [Held by provider] plecanatide, 3 mg, oral, Daily  sulfur hexafluoride microsphr, 2 mL, intravenous, Once in imaging  traZODone, 100 mg, oral, Nightly  warfarin, 10 mg, oral, Once      Continuous medications     PRN medications  PRN medications: acetaminophen **OR** acetaminophen **OR** acetaminophen, dextrose, dextrose, glucagon, HYDROcodone-acetaminophen, insulin lispro **AND** insulin lispro, lidocaine-diphenhydraMINE-Maalox 1:1:1, magnesium hydroxide, ondansetron ODT **OR** ondansetron, traMADol    Results for orders placed or performed during the hospital encounter of 03/19/24 (from the past 24 hour(s))   POCT GLUCOSE   Result Value Ref Range    POCT Glucose 282 (H) 74 - 99 mg/dL   POCT GLUCOSE   Result Value Ref Range    POCT Glucose 490 (H) 74 - 99 mg/dL   Protime-INR   Result Value Ref Range    Protime 16.0 (H) 9.8 - 12.8 seconds    INR 1.4 (H) 0.9 - 1.1   CBC   Result Value Ref Range    WBC 6.8 4.4 - 11.3 x10*3/uL    nRBC 0.0 0.0 - 0.0 /100 WBCs    RBC 4.01 4.00 - 5.20 x10*6/uL    Hemoglobin 12.5 12.0 - 16.0 g/dL    Hematocrit 37.4 36.0 - 46.0 %    MCV 93 80 - 100 fL    MCH 31.2 26.0 - 34.0 pg    MCHC 33.4 32.0 - 36.0 g/dL    RDW 14.8 (H) 11.5 - 14.5 %    Platelets 221 150 - 450 x10*3/uL   Renal function panel   Result Value Ref Range    Glucose 295 (H) 74 - 99 mg/dL    Sodium 124 (L) 136 - 145 mmol/L    Potassium 4.6 3.5 - 5.3 mmol/L    Chloride 89 (L) 98 - 107 mmol/L    Bicarbonate 27 21 - 32 mmol/L    Anion Gap 13 10 - 20 mmol/L    Urea Nitrogen 47 (H) 6 - 23 mg/dL    Creatinine 1.45 (H) 0.50 - 1.05 mg/dL    eGFR 43 (L) >60 mL/min/1.73m*2    Calcium 9.9 8.6 - 10.3 mg/dL    Phosphorus 3.2 2.5 - 4.9 mg/dL    Albumin 3.5 3.4 - 5.0 g/dL   POCT  GLUCOSE   Result Value Ref Range    POCT Glucose 273 (H) 74 - 99 mg/dL   POCT GLUCOSE   Result Value Ref Range    POCT Glucose 394 (H) 74 - 99 mg/dL         Assessment/Plan      Principal Problem:    Hyponatremia    This is a morbidly obese 53-year-old female with a past medical history of diabetes mellitus, diabetic neuropathy, hypertension, obstructive sleep apnea on BiPAP, SEVILLA, Budd-Chiari syndrome, portal vein thrombosis on Coumadin, hepatic vein thrombosis, diastolic congestive heart failure, COPD, small bowel obstruction, constipation, hyperuricemia, chronic low back pain, GERD, irreducible incisional hernia, depression, anxiety, acute pancreatitis, gout, hyperlipidemia, bilateral knee osteoarthritis, and a recently found hyponatremia/renal failure/elevated LFTs who presented to the emergency room for 6 weeks history of frequent falls. Blood workup showed hyponatremia, renal failure, elevated LFTs, and borderline hypercalcemia.       #Hyponatremia  #Frequent falls  -NA today 128 dropped to 124, likely due to excessive fluid intake of over 12L per day  -Pt placed on 2L fluid restriction  -Pt very upset over fluid restriction and demanding more, pt will likely be non-compliant with restriction  -Nephrology consulted  -falls at home likely related to NA  -Continue to hold spironolactone, metalozone   -PT/OT consulted      #ISRAEL  #Elevated LFT  -ISRAEL, improved to 1.44 with hold meds and IVF  -Alk phos 665, ALT 1.5, AST 70 continues to improve   -possible medication induced     #HX of portal vein thrombosis  -continue home coumadin  -unsure of pt compliance due to pt doing home checks, INR 1.4, Pt likely non-compliant with home monitoring   -will monitor for INR 2-3  -pharmacy dosing  -Dvt scan unable to read due to body habitus, likely negative     #DM  -HGBA1c 9.0  -pt takes U500 at home, pump broken and pt manually adjusts pump to dial in what she needs  -pt likely non-compliant with medication as hgba1c  continues to rise as it was 8.3 2/1/24 and now 9.0 3/19/24, pt excessive water intake likely due to non-compliant DM and then leading to Hyponatremia  -BS at 490 last night and running at 273, 394 started lantus 30 bid, do not have U500 here              Anitha Lr, APRN-CNP

## 2024-03-21 NOTE — PROGRESS NOTES
Occupational Therapy    Evaluation    Patient Name: Roselia Mo  MRN: [id number]  Today's Date: 3/21/2024  Time Calculation  Start Time: 0936  Stop Time: 1002  Time Calculation (min): 26 min        Assessment:  OT Assessment: pt presents at [facility] for ADLs.  no further OT recommended  End of Session Communication: Bedside nurse  End of Session Patient Position: Up in chair, Alarm on     Plan:  No Skilled OT: At baseline function  OT Frequency: OT eval only. Pt may benefit from PT due to frequent falls  OT Discharge Recommendations: No further acute OT, No OT needed after discharge  OT Recommended Transfer Status: Independent       Subjective   Current Problem:  1. Hyponatremia        2. [place] (acute kidney injury) (CMS/[facility])        3. Elevated LFTs        4. Hyperglycemia        5. Chronic diastolic heart failure (CMS/[facility])  Transthoracic Echo (TTE) Complete    Transthoracic Echo (TTE) Complete      6. 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CMS/[facility])  Referral to Anticoagulation - Warfarin Monitoring      7. Edema, unspecified type        8. Poor physical health  Lower extremity venous duplex right    Lower extremity venous duplex right      9. Edema leg  Lower extremity venous duplex right    Lower extremity venous duplex right      10. Type 2 diabetes mellitus with hyperglycemia, with long-term current use of insulin (CMS/[facility])  - refin regular (HumuLIN R U-500) 500 unit/mL CONCENTRATED - refill for patient own pump        General:  General  Reason for Referral: patient morbidly obese 53-year-old female with a past medical history of diabetes mellitus, diabetic neuropathy, hypertension, obstructive sleep apnea on BiPAP, SEVILLA, Budd-Chiari syndrome, portal vein thrombosis on Coumadin, hepatic vein thrombosis, diastolic congestive heart failure, COPD, small bowel obstruction, constipation, hyperuricemia, chronic low back pain, GERD, irreducible incisional hernia, depression, anxiety, acute pancreatitis, gout, hyperlipidemia, bilateral  "knee osteoarthritis, and a recently found hyponatremia/renal failure/elevated LFTs who presented to the emergency room for 6 weeks history of frequent falls. Blood workup showed hyponatremia, renal failure, elevated LFTs, and borderline hypercalcemia. pt was drinking 12 liters of fluid per day.  Referred By: oJsephine  Past Medical History Relevant to Rehab: patient reports frequently legs giving out with no warning and dropping to floor  Family/Caregiver Present: No  Co-Treatment: PT  Co-Treatment Reason: maximize pt safety  Prior to Session Communication: Bedside nurse, PCT/NA/CTA  Patient Position Received: Alarm on, Up in chair  General Comment: patient awake in chair on arrival with PCA finishing up in room upon arrival.  pt is tearful and upset that she is on a fluid restriction/salt restriction  Precautions:  Medical Precautions: Fall precautions, Oxygen therapy device and L/min  Precautions Comment: patient not on oxygen on arrival. placed n.c. at end of session. patient states she doesn't wear it all the time during day, just when she needs it with /after walking/activities    Pain:  Pain Assessment  Pain Assessment: 0-10  Pain Score: 8  Pain Location: Knee    Objective   Cognition:  Overall Cognitive Status: Within Functional Limits           Home Living:  Type of Home: House  Lives With: Spouse  Home Adaptive Equipment: Walker rolling or standard  Home Layout: Two level, Bed/bath upstairs  Home Access: Ramped entrance  Home Living Comments: patient states they currently have no running water in home due to well issues.  They are using BSC to toilet.  pt states she does not leave the house, barefoot in home, and sits on a BSC \"while watching tv because they have me on so many water pills\"  pt also reports that she spends a lot of time in bed and there is \"no point\" in walking because she \"can't walk and use my hands at the same time because it's either my breathing or my legs.  I cant get up and go the the " "refrigerator so what is the point\"  Prior Function:  Level of [place]: Independent with ADLs and functional transfers  IADL History:     ADL:  Eating Assistance: Independent  Grooming Assistance: Independent  Bathing Assistance: Moderate  UE Dressing Assistance: Modified independent (Device) (pt does not perform at PLOF)  LE Dressing Assistance: Unable to assess (pt does not perform at PLOF.  reports sitting on BSC and watching TV due to frequent urination)  Toileting Assistance with Device: Modified independent  Functional Assistance: Independent (SBA short distance mobility with no device)  Activity Tolerance:  Endurance: Decreased tolerance for upright activites  Bed Mobility/Transfers:      Transfers  Transfer: Yes  Transfer 1  Transfer From 1: Sit to, Stand to  Transfer to 1: Stand, Sit  Technique 1: Sit to stand, Stand to sit  Transfer Level of Assistance 1: Independent  Transfers 2  Transfer From 2: Stand to, Commode-standard to  Transfer to 2: Commode-standard, Stand  Technique 2: Stand to sit, Sit to stand  Transfer Level of Assistance 2: Independent         Vision:Vision - Basic Assessment  Current Vision: No visual deficits       Strength:  Strength Comments: BUE WNL       Coordination:  Movements are Fluid and Coordinated: Yes       Outcome Measures:[facility] Daily Activity  Putting on and taking off regular lower body clothing: A lot  Bathing (including washing, rinsing, drying): A lot  Putting on and taking off regular upper body clothing: A little  Toileting, which includes using toilet, bedpan or urinal: A little  Taking care of personal grooming such as brushing teeth: None  Eating Meals: None  Daily Activity - Total Score: 18        Education Documentation  No documentation found.  Education Comments  No comments found.                     "

## 2024-03-21 NOTE — NURSING NOTE
Lengthy educational discussion with pt regarding sodium intake, fluid intake and fluid restriction, glucose control, overall self care and healthcare management. Discussed barriers and options to healthier food options and portion control. Suggested when following up with healthcare providers upon discharge, include a dietician to assist moving forward with healthier dietary options and diabetes management.

## 2024-03-22 ENCOUNTER — PHARMACY VISIT (OUTPATIENT)
Dept: PHARMACY | Facility: CLINIC | Age: 54
End: 2024-03-22
Payer: COMMERCIAL

## 2024-03-22 ENCOUNTER — DOCUMENTATION (OUTPATIENT)
Dept: PRIMARY CARE | Facility: CLINIC | Age: 54
End: 2024-03-22
Payer: MEDICARE

## 2024-03-22 VITALS
WEIGHT: 293 LBS | DIASTOLIC BLOOD PRESSURE: 60 MMHG | TEMPERATURE: 96.6 F | BODY MASS INDEX: 50.02 KG/M2 | OXYGEN SATURATION: 99 % | HEIGHT: 64 IN | RESPIRATION RATE: 18 BRPM | SYSTOLIC BLOOD PRESSURE: 117 MMHG | HEART RATE: 91 BPM

## 2024-03-22 PROBLEM — E87.1 HYPONATREMIA: Status: RESOLVED | Noted: 2024-02-08 | Resolved: 2024-03-22

## 2024-03-22 LAB
ALBUMIN SERPL BCP-MCNC: 3.6 G/DL (ref 3.4–5)
ALP SERPL-CCNC: 691 U/L (ref 33–110)
ALT SERPL W P-5'-P-CCNC: 105 U/L (ref 7–45)
ANION GAP SERPL CALC-SCNC: 15 MMOL/L (ref 10–20)
AST SERPL W P-5'-P-CCNC: 77 U/L (ref 9–39)
BILIRUB DIRECT SERPL-MCNC: 0.2 MG/DL (ref 0–0.3)
BILIRUB SERPL-MCNC: 0.6 MG/DL (ref 0–1.2)
BUN SERPL-MCNC: 49 MG/DL (ref 6–23)
CALCIUM SERPL-MCNC: 10.8 MG/DL (ref 8.6–10.3)
CHLORIDE SERPL-SCNC: 93 MMOL/L (ref 98–107)
CO2 SERPL-SCNC: 27 MMOL/L (ref 21–32)
CREAT SERPL-MCNC: 1.24 MG/DL (ref 0.5–1.05)
EGFRCR SERPLBLD CKD-EPI 2021: 52 ML/MIN/1.73M*2
ERYTHROCYTE [DISTWIDTH] IN BLOOD BY AUTOMATED COUNT: 14.7 % (ref 11.5–14.5)
GLUCOSE BLD MANUAL STRIP-MCNC: 309 MG/DL (ref 74–99)
GLUCOSE BLD MANUAL STRIP-MCNC: 316 MG/DL (ref 74–99)
GLUCOSE SERPL-MCNC: 301 MG/DL (ref 74–99)
HCT VFR BLD AUTO: 38 % (ref 36–46)
HGB BLD-MCNC: 12.5 G/DL (ref 12–16)
INR PPP: 1.4 (ref 0.9–1.1)
MCH RBC QN AUTO: 31.5 PG (ref 26–34)
MCHC RBC AUTO-ENTMCNC: 32.9 G/DL (ref 32–36)
MCV RBC AUTO: 96 FL (ref 80–100)
NRBC BLD-RTO: 0 /100 WBCS (ref 0–0)
PHOSPHATE SERPL-MCNC: 3.2 MG/DL (ref 2.5–4.9)
PLATELET # BLD AUTO: 219 X10*3/UL (ref 150–450)
POTASSIUM SERPL-SCNC: 4.8 MMOL/L (ref 3.5–5.3)
PROT SERPL-MCNC: 6.7 G/DL (ref 6.4–8.2)
PROTHROMBIN TIME: 15.7 SECONDS (ref 9.8–12.8)
RBC # BLD AUTO: 3.97 X10*6/UL (ref 4–5.2)
SODIUM SERPL-SCNC: 130 MMOL/L (ref 136–145)
WBC # BLD AUTO: 6.6 X10*3/UL (ref 4.4–11.3)

## 2024-03-22 PROCEDURE — 82248 BILIRUBIN DIRECT: CPT | Performed by: NURSE PRACTITIONER

## 2024-03-22 PROCEDURE — 84100 ASSAY OF PHOSPHORUS: CPT | Performed by: NURSE PRACTITIONER

## 2024-03-22 PROCEDURE — 99233 SBSQ HOSP IP/OBS HIGH 50: CPT | Performed by: INTERNAL MEDICINE

## 2024-03-22 PROCEDURE — RXMED WILLOW AMBULATORY MEDICATION CHARGE

## 2024-03-22 PROCEDURE — 85610 PROTHROMBIN TIME: CPT | Performed by: NURSE PRACTITIONER

## 2024-03-22 PROCEDURE — 82374 ASSAY BLOOD CARBON DIOXIDE: CPT | Performed by: NURSE PRACTITIONER

## 2024-03-22 PROCEDURE — 99231 SBSQ HOSP IP/OBS SF/LOW 25: CPT | Performed by: NURSE PRACTITIONER

## 2024-03-22 PROCEDURE — 2500000002 HC RX 250 W HCPCS SELF ADMINISTERED DRUGS (ALT 637 FOR MEDICARE OP, ALT 636 FOR OP/ED): Performed by: NURSE PRACTITIONER

## 2024-03-22 PROCEDURE — 2500000002 HC RX 250 W HCPCS SELF ADMINISTERED DRUGS (ALT 637 FOR MEDICARE OP, ALT 636 FOR OP/ED): Performed by: INTERNAL MEDICINE

## 2024-03-22 PROCEDURE — 2500000001 HC RX 250 WO HCPCS SELF ADMINISTERED DRUGS (ALT 637 FOR MEDICARE OP): Performed by: NURSE PRACTITIONER

## 2024-03-22 PROCEDURE — 82947 ASSAY GLUCOSE BLOOD QUANT: CPT

## 2024-03-22 PROCEDURE — 2500000001 HC RX 250 WO HCPCS SELF ADMINISTERED DRUGS (ALT 637 FOR MEDICARE OP): Performed by: INTERNAL MEDICINE

## 2024-03-22 PROCEDURE — 85027 COMPLETE CBC AUTOMATED: CPT | Performed by: NURSE PRACTITIONER

## 2024-03-22 PROCEDURE — 36415 COLL VENOUS BLD VENIPUNCTURE: CPT | Performed by: NURSE PRACTITIONER

## 2024-03-22 RX ORDER — INSULIN GLARGINE 100 [IU]/ML
40 INJECTION, SOLUTION SUBCUTANEOUS 2 TIMES DAILY
Status: DISCONTINUED | OUTPATIENT
Start: 2024-03-22 | End: 2024-03-22 | Stop reason: HOSPADM

## 2024-03-22 RX ORDER — WARFARIN 10 MG/1
10 TABLET ORAL ONCE
Status: DISCONTINUED | OUTPATIENT
Start: 2024-03-22 | End: 2024-03-22 | Stop reason: HOSPADM

## 2024-03-22 RX ORDER — NYSTATIN 100000 [USP'U]/G
1 POWDER TOPICAL 2 TIMES DAILY
Qty: 60 G | Refills: 1 | OUTPATIENT
Start: 2024-03-22 | End: 2024-04-08

## 2024-03-22 RX ORDER — INSULIN LISPRO 100 [IU]/ML
10 INJECTION, SOLUTION INTRAVENOUS; SUBCUTANEOUS
Status: DISCONTINUED | OUTPATIENT
Start: 2024-03-22 | End: 2024-03-22 | Stop reason: HOSPADM

## 2024-03-22 RX ADMIN — FAMOTIDINE 20 MG: 20 TABLET, FILM COATED ORAL at 08:28

## 2024-03-22 RX ADMIN — GABAPENTIN 900 MG: 300 CAPSULE ORAL at 06:03

## 2024-03-22 RX ADMIN — LEVOTHYROXINE SODIUM 200 MCG: 0.1 TABLET ORAL at 05:08

## 2024-03-22 RX ADMIN — PANTOPRAZOLE SODIUM 40 MG: 40 TABLET, DELAYED RELEASE ORAL at 06:03

## 2024-03-22 RX ADMIN — LACTULOSE 10 G: 20 SOLUTION ORAL at 08:29

## 2024-03-22 RX ADMIN — NYSTATIN 1 APPLICATION: 100000 POWDER TOPICAL at 08:38

## 2024-03-22 RX ADMIN — INSULIN LISPRO 10 UNITS: 100 INJECTION, SOLUTION INTRAVENOUS; SUBCUTANEOUS at 08:33

## 2024-03-22 RX ADMIN — BUPROPION HYDROCHLORIDE 150 MG: 150 TABLET, FILM COATED, EXTENDED RELEASE ORAL at 08:26

## 2024-03-22 RX ADMIN — DULOXETINE HYDROCHLORIDE 60 MG: 60 CAPSULE, DELAYED RELEASE ORAL at 08:28

## 2024-03-22 RX ADMIN — EMPAGLIFLOZIN 25 MG: 25 TABLET, FILM COATED ORAL at 08:28

## 2024-03-22 RX ADMIN — METOPROLOL SUCCINATE 50 MG: 50 TABLET, EXTENDED RELEASE ORAL at 08:28

## 2024-03-22 RX ADMIN — INSULIN LISPRO 11.25 UNITS: 100 INJECTION, SOLUTION INTRAVENOUS; SUBCUTANEOUS at 08:34

## 2024-03-22 RX ADMIN — ALLOPURINOL 300 MG: 300 TABLET ORAL at 08:27

## 2024-03-22 RX ADMIN — LEVOTHYROXINE SODIUM 50 MCG: 0.05 TABLET ORAL at 05:09

## 2024-03-22 RX ADMIN — MULTIPLE VITAMINS W/ MINERALS TAB 1 TABLET: TAB at 08:27

## 2024-03-22 RX ADMIN — GABAPENTIN 900 MG: 300 CAPSULE ORAL at 12:50

## 2024-03-22 ASSESSMENT — COGNITIVE AND FUNCTIONAL STATUS - GENERAL
MOVING TO AND FROM BED TO CHAIR: A LITTLE
DRESSING REGULAR LOWER BODY CLOTHING: A LITTLE
MOBILITY SCORE: 18
DRESSING REGULAR UPPER BODY CLOTHING: A LITTLE
HELP NEEDED FOR BATHING: A LITTLE
WALKING IN HOSPITAL ROOM: A LOT
STANDING UP FROM CHAIR USING ARMS: A LITTLE
TOILETING: A LITTLE
CLIMB 3 TO 5 STEPS WITH RAILING: A LOT
DAILY ACTIVITIY SCORE: 20

## 2024-03-22 ASSESSMENT — PAIN - FUNCTIONAL ASSESSMENT
PAIN_FUNCTIONAL_ASSESSMENT: 0-10

## 2024-03-22 ASSESSMENT — PAIN SCALES - GENERAL
PAINLEVEL_OUTOF10: 0 - NO PAIN
PAINLEVEL_OUTOF10: 0 - NO PAIN
PAINLEVEL_OUTOF10: 7
PAINLEVEL_OUTOF10: 0 - NO PAIN

## 2024-03-22 NOTE — PROGRESS NOTES
Per medical team, patient is medically appropriate for discharge today.  met with patient and significant other at bedside to review discharge plan and Medicare IMM. Patient confirmed understanding and agreement with same; Plan is for patient to return home today with new services through the  [facility] program, and no Care Transitions needs foreseen. Care Transitions available upon request. LAMBERT Worthington

## 2024-03-22 NOTE — PROGRESS NOTES
Roselia Mo is a 53 y.o. female on day 3 of admission presenting with Hyponatremia.      Subjective   Patient seen and examined at the bedside this morning  She is sleeping comfortably and was easily awakened  Wearing her CPAP  Has been sleeping very well all through the night  Feeling well today       Objective          Vitals 24HR  Heart Rate:  [80-97]   Temp:  [35.9 °C (96.6 °F)-36.9 °C (98.4 °F)]   Resp:  [18-20]   BP: (112-119)/(60-69)   SpO2:  [97 %-99 %]         Intake/Output last 3 Shifts:    Intake/Output Summary (Last 24 hours) at 3/22/2024 0747  Last data filed at 3/22/2024 0730  Gross per 24 hour   Intake 2428.67 ml   Output 4550 ml   Net -2121.33 ml       Physical Exam  Constitutional:       Appearance: Normal appearance. She is obese.   HENT:      Head: Normocephalic and atraumatic.      Right Ear: External ear normal.      Left Ear: External ear normal.      Nose: Nose normal.      Mouth/Throat:      Mouth: Mucous membranes are moist.      Pharynx: Oropharynx is clear.   Eyes:      Extraocular Movements: Extraocular movements intact.      Conjunctiva/sclera: Conjunctivae normal.      Pupils: Pupils are equal, round, and reactive to light.   Cardiovascular:      Rate and Rhythm: Normal rate and regular rhythm.   Pulmonary:      Effort: Pulmonary effort is normal.      Breath sounds: Normal breath sounds.   Abdominal:      General: Abdomen is flat.      Palpations: Abdomen is soft.   Skin:     General: Skin is warm and dry.   Neurological:      General: No focal deficit present.      Mental Status: She is alert and oriented to person, place, and time.   Psychiatric:         Mood and Affect: Mood normal.         Behavior: Behavior normal.       Scheduled medications  allopurinol, 300 mg, oral, Daily  buPROPion XL, 150 mg, oral, q AM  DULoxetine, 60 mg, oral, Daily  empagliflozin, 25 mg, oral, Daily  famotidine, 20 mg, oral, Daily  gabapentin, 900 mg, oral, 4x daily  insulin glargine, 30 Units,  subcutaneous, BID  insulin lispro, 0-20 Units, subcutaneous, TID with meals  lactulose, 10 g, oral, Daily  levothyroxine, 200 mcg, oral, Daily  levothyroxine, 50 mcg, oral, Daily before breakfast  loratadine, 10 mg, oral, Daily  metoprolol succinate XL, 50 mg, oral, Daily  multivitamin with minerals, 1 tablet, oral, Daily  nystatin, 1 Application, Topical, BID  oxygen, , inhalation, Continuous - Inhalation  pantoprazole, 40 mg, oral, Daily before breakfast  perflutren protein A microsphere, 0.5 mL, intravenous, Once in imaging  [Held by provider] plecanatide, 3 mg, oral, Daily  sulfur hexafluoride microsphr, 2 mL, intravenous, Once in imaging  traZODone, 100 mg, oral, Nightly      Continuous medications     PRN medications  PRN medications: acetaminophen **OR** acetaminophen **OR** acetaminophen, dextrose, dextrose, glucagon, HYDROcodone-acetaminophen, insulin lispro **AND** insulin lispro, lidocaine-diphenhydraMINE-Maalox 1:1:1, magnesium hydroxide, ondansetron ODT **OR** ondansetron, traMADol    Relevant Results               Assessment/Plan              Principal Problem:    Hyponatremia    Hyponatremia: Mixed picture with very high fluid intake: Improved  Acute renal failure: Improved  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  With too much diuresis she has decreased preload and acute renal failure with hyponatremia  She easily gets volume overloaded with peripheral edema  For now I would hold diuretics and continue her fluid restriction  She appears much better than yesterday           Pardeep Nichole, DO

## 2024-03-22 NOTE — PROGRESS NOTES
Medication Education     Medication education for Roselia Mo was provided to the patient and family for the following medication(s):  Nystop powder  Spironolactone  Torsemide  Doxycycline  Potassium  Warfarin  Metolazone  OTC antihistamines      Medication education provided by a Pharmacist:  ADR Counseling Medication interactions Dose, frequency, storage How to take and what to do if a dose is missed Proper dose, indication, possible ADRs Refilling the medication  How the medication works and benefits of taking it Importance of compliance Necessary labs and/or other monitoring Any drug interactions (including OTCs and herbvals) and importance of notifying a healthcare provider of any medication changes Potential duration of therapy    Identified potential barriers to education:  None    Method(s) of Education:  Verbal Written materials provided and reviewed    An opportunity to ask questions and receive answers was provided.     Assessment of understanding the patient and family:  2= meets goals/outcomes and Teach back    Additional Notes (if applicable):   - Patient was visibly upset when it was mentioned that she needed to be off of diuretics at least until she is seen by her provider; pharmacist explained that due to her extreme volume intake, it necessitated the need for increased diuretic load, which in turn leads to decreased preload and subsequent acute renal failure. 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

## 2024-03-22 NOTE — PROGRESS NOTES
"Nutrition Note:    Met with patient again regarding food choices.  She has provided a list of foods she avoids due to Nickel content which in excess causes diarrhea.  Upset today regarding fluid restriction - met with patient to obtain dinner order.  Requesting lettuce for hamburger (which she stated she avoids all salad)  \"I can have in a small amount\"   Patient is alert and able to make food choices.  The Nickel is more of an intolerance in large amounts.  Will let patient gauge how much Nickel foods she wants to consume.     Jadyn Beard MS RD  LD  "

## 2024-03-22 NOTE — PROGRESS NOTES
"Music Therapy Note    Roselia Mo was referred by Tegan Valdivia, RN.     Therapy Session  Referral Type: New referral this admission  Visit Type: New visit  Session Start Time: 1415  Session End Time: 1516  Intervention Delivery: In-person  Conflict of Service: None  Number of family members present: 1  Family Present for Session: Spouse/Significant Other  Family Participation: Supportive     Pre-assessment  Pain Score: 7  Stress Level (0-10): 10  Anxiety Level (0-10): 10  Coping Level (0-10): 10  Mood/Affect: Sad/tearful, Calm         Treatment/Interventions  Areas of Focus: Stress reduction, Anxiety reduction  Music Therapy Interventions: Assessment, Live music listening, Empathic listening/validating emotions    Post-assessment  Pain Score: 7  Stress Level (0-10): 3  Anxiety Level (0-10): 1  Coping Level (0-10): 10  Mood/Affect: Calm, Appropriate  Total Session Time (min): 61 minutes    Narrative  Assessment Detail: Pt found sitting up in recliner, awake/alert, with spouse present, upon arrival of music therapist. Pt expressed being on the verge of \"tears\" and receptive to music therapy education and rack card. Pt stating \"I'd love to give it a try\" and requesting session at this time. Pt expressed music preference for country, and contemporary Sikhism. During assessment, pt also shared her health struggles, getting kicked out of her parents house, that her  causes a lot of her stress, and the story of her she got admitted. MT provided listening/supportive presence to help pt release her stress before redirecting pt to begin the music section of the session.  Plan: During music therapy intervention, pt participated by requesting 'My Dhruv' and occasionally sang along and moved her body to the beat of the music. Pt also requested '10,000 Reasons' and '[place]'. MT provided songs via voice and guitar. Pt shared memories elicited by the music and continued to move her body to the beat/sing along " "if she knew the words.  Intervention: Pt responded to music therapy intervention by an increase in positive affect as evidenced by smiling, laughing, and stating \"you put me in the mood\" and \"all I wanted to do was listen to you sing and then fall asleep\". Pt requesting follow-up session. MT explained being off-site on 3/22 but will follow-up Monday if pt is still admitted. Pt and pt's  expressed gratitude for the session.  Follow-up: MT will follow-up as applicable.    Education Documentation  Coping Strategies, taught by PRINCE Alicea at 3/22/2024 11:27 AM.  Learner: Significant Other, Patient  Readiness: Eager  Method: Explanation, Handout  Response: Verbalizes Understanding    Relaxation, taught by PRINCE Alicea at 3/22/2024 11:27 AM.  Learner: Significant Other, Patient  Readiness: Eager  Method: Explanation, Handout  Response: Verbalizes Understanding          Expressive Therapies Note  "

## 2024-03-22 NOTE — CARE PLAN
The patient's goals for the shift include      Problem: Pain  Goal: My pain/discomfort is manageable  Outcome: Progressing     Problem: Safety  Goal: Patient will be injury free during hospitalization  Outcome: Progressing  Goal: I will remain free of falls  Outcome: Progressing     Problem: Daily Care  Goal: Daily care needs are met  Outcome: Progressing     Problem: Psychosocial Needs  Goal: Demonstrates ability to cope with hospitalization/illness  Outcome: Progressing  Goal: Collaborate with me, my family, and caregiver to identify my specific goals  Outcome: Progressing     Problem: Discharge Barriers  Goal: My discharge needs are met  Outcome: Progressing     Problem: Pain - Adult  Goal: Verbalizes/displays adequate comfort level or baseline comfort level  Outcome: Progressing     Problem: Safety - Adult  Goal: Free from fall injury  Outcome: Progressing     Problem: Discharge Planning  Goal: Discharge to home or other facility with appropriate resources  Outcome: Progressing     Problem: Chronic Conditions and Co-morbidities  Goal: Patient's chronic conditions and co-morbidity symptoms are monitored and maintained or improved  Outcome: Progressing     Problem: Skin  Goal: Participates in plan/prevention/treatment measures  Outcome: Progressing  Goal: Prevent/manage excess moisture  Outcome: Progressing  Goal: Prevent/minimize sheer/friction injuries  Outcome: Progressing  Goal: Promote/optimize nutrition  Outcome: Progressing  Goal: Promote skin healing  Outcome: Progressing     Problem: Diabetes  Goal: Achieve decreasing blood glucose levels by end of shift  Outcome: Progressing  Goal: Increase stability of blood glucose readings by end of shift  Outcome: Progressing  Goal: Decrease in ketones present in urine by end of shift  Outcome: Progressing  Goal: Maintain electrolyte levels within acceptable range throughout shift  Outcome: Progressing  Goal: Maintain glucose levels >70mg/dl to <250mg/dl throughout  shift  Outcome: Progressing  Goal: Learn about and adhere to nutrition recommendations by end of shift  Outcome: Progressing  Goal: Vital signs within normal range for age by end of shift  Outcome: Progressing  Goal: Increase self care and/or family involovement by end of shift  Outcome: Progressing  Goal: Receive DSME education by end of shift  Outcome: Progressing     The clinical goals for the shift include Patient will have no falls this shift.    Plans for patient to discharge home after lunch today.

## 2024-03-22 NOTE — CARE PLAN
"  Problem: Pain  Goal: My pain/discomfort is manageable  Outcome: Progressing     Problem: Safety  Goal: Patient will be injury free during hospitalization  Outcome: Progressing  Goal: I will remain free of falls  Outcome: Progressing     Problem: Daily Care  Goal: Daily care needs are met  Outcome: Progressing     Problem: Psychosocial Needs  Goal: Demonstrates ability to cope with hospitalization/illness  Outcome: Progressing  Goal: Collaborate with me, my family, and caregiver to identify my specific goals  Outcome: Progressing     Problem: Chronic Conditions and Co-morbidities  Goal: Patient's chronic conditions and co-morbidity symptoms are monitored and maintained or improved  Outcome: Progressing     Problem: Skin  Goal: Participates in plan/prevention/treatment measures  Outcome: Progressing  Goal: Prevent/manage excess moisture  Outcome: Progressing  Goal: Prevent/minimize sheer/friction injuries  Outcome: Progressing  Goal: Promote/optimize nutrition  Outcome: Progressing  Goal: Promote skin healing  Outcome: Progressing     Problem: Diabetes  Goal: No changes in neurological exam by end of shift  Outcome: Met  Goal: Vital signs within normal range for age by end of shift  Outcome: Progressing   The patient's goals for the shift include      The clinical goals for the shift include no falls    Pt cont to be educated to cont to call for assistance when OOB or when using BSC. Pt compliant at this time and states, \" I understand.\" Call light within pt reach and bed alarm maintained. Encouraged pt to call for any wants or needs. Will cont to monitor.     "

## 2024-03-22 NOTE — DISCHARGE INSTR - OTHER ORDERS
"Hyponatremia    The Basics  Written by the doctors and editors at [facility]  What is hyponatremia? -- Hyponatremia is the medical term for having too little sodium in the blood.  Sodium is a substance called an \"electrolyte.\" Normally, your body has a specific balance of electrolytes. This is important to keep your cells working normally.  When a person has hyponatremia, their body holds on to too much water. This dilutes the amount of sodium in the blood, causing the sodium level to be low.  What causes hyponatremia? -- Hyponatremia happens when the body holds on to too much water. This can happen because of:  ? Certain medical conditions that cause your body to hold on to too much water. These include:  ? Heart failure, a type of heart disease in which the heart cannot pump as well as it should  ? Cirrhosis, a severe form of liver disease  ? SIADH, a condition that happens when your body makes too much of a hormone that helps balance your water level  ? Kidney disease  ? Some medicines, including a diuretic called hydrochlorothiazide (\"HCTZ\"), which makes you urinate a lot  ? Drinking too much water. This can happen to:  ? Athletes who do intense exercise (such as run marathons) and drink too much water  ? People who use the drug ecstasy (ecstasy can make you feel thirsty and drink too much)  ? People who are mentally ill and feel as though they cannot get enough to drink  ? Losing a lot of blood (for example, after an injury)  ? Long-lasting, severe vomiting or diarrhea (this causes your body to lose both water and sodium)  ? Poor diet  What are the symptoms of hyponatremia? -- Symptoms can include:  ? Nausea and vomiting  ? Headache  ? Confusion or trouble thinking clearly  ? Feeling weak or tired  ? Feeling restless or irritable  ? Muscle weakness, spasms, or cramps  ? Seizures or passing out  Hyponatremia can also cause more serious problems, such as brain swelling and nerve damage.  Are there tests for " "hyponatremia? -- Yes. If your doctor or nurse suspects that you have hyponatremia, they will do:  ? Blood tests - These check how much sodium is in your blood.  ? Urine tests - These can show much sodium you are losing in your urine.  You might also need other tests depending on your age, other symptoms, and individual situation.  How is hyponatremia treated? -- That depends on what is causing your hyponatremia. If your hyponatremia is caused by another medical problem, such as heart failure, your doctor will want to treat that, too.  Severe hyponatremia is treated in the hospital.  Treatments might include:  ? Limiting the amount of fluid you drink  ? Eating salt tablets or getting a saltwater solution into a vein (by \"IV\")  When should I call the doctor? -- If you have been treated for hyponatremia, your doctor or nurse should order a blood test to see how the treatment is working.  Call your doctor or nurse if:  ? Your symptoms come back or get worse.  ? You have any new symptoms.  ? You have questions about any of your medicines or how to take care of yourself.  All topics are updated as new evidence becomes available and our peer review process is complete.  This topic retrieved from [employer] on: Feb 26, 2024.  Topic 36841 Version 10.0  Release: 32.2.4 - C32.56  © 2024 UpToDate, Inc. and/or its affiliates. All rights reserved.  Consumer Information Use and Disclaimer  Disclaimer: This generalized information is a limited summary of diagnosis, treatment, and/or medication information. It is not meant to be comprehensive and should be used as a tool to help the user understand and/or assess potential diagnostic and treatment options. It does NOT include all information about conditions, treatments, medications, side effects, or risks that may apply to a specific patient. It is not intended to be medical advice or a substitute for the medical advice, diagnosis, or treatment of a health care provider based on the " health care provider's examination and assessment of a patient's specific and unique circumstances. Patients must speak with a health care provider for complete information about their health, medical questions, and treatment options, including any risks or benefits regarding use of medications. This information does not endorse any treatments or medications as safe, effective, or approved for treating a specific patient. UpToDate, Inc. and its affiliates disclaim any warranty or liability relating to this information or the use thereof.The use of this information is governed by the Terms of Use, available at https://www.w[employer]tila Technologiesuwer.com/en/know/clinical-effectiveness-terms. 2024© UpToDate, Inc. and its affiliates and/or licensors. All rights reserved.  © 2024 UpToDate, Inc. and/or its affiliates. All rights reserved.

## 2024-03-22 NOTE — DISCHARGE SUMMARY
Discharge Diagnosis  Hyponatremia    Issues Requiring Follow-Up  It is recommended that she follow up with Endocrinologist to better manage her DM  It is recommended that she follow up with primary care physician in 1 week for hospital follow up    Discharge Meds     Your medication list        START taking these medications        Instructions Last Dose Given Next Dose Due   nystatin 100,000 unit/gram powder  Commonly known as: Mycostatin      Apply 1 Application topically 2 times a day.       oxygen gas therapy  Commonly known as: O2      Inhale 1 each every 12 hours.              CONTINUE taking these medications        Instructions Last Dose Given Next Dose Due   - refin regular 500 unit/mL CONCENTRATED - refill for patient own pump  Commonly known as: HumuLIN R U-500      Inject 1 mL (1 each) under the skin if needed (VIA INSULIN PUMP). Take as directed per insulin instructions. Use U-500 insulin syringe.       albuterol 90 mcg/actuation aerosol powdr breath activated inhaler           allopurinol 300 mg tablet  Commonly known as: Zyloprim      Take 1 tablet (300 mg) by mouth once daily.       buPROPion  mg 24 hr tablet  Commonly known as: Wellbutrin XL      Take 1 tablet (150 mg) by mouth once daily in the morning. Do not crush, chew, or split.       cetirizine 10 mg tablet  Commonly known as: ZyrTEC           doxycycline 100 mg capsule  Commonly known as: Vibramycin      Take 1 capsule (100 mg) by mouth 2 times a day for 10 days. Take with at least 8 ounces (large glass) of water, do not lie down for 30 minutes after       DULoxetine 60 mg DR capsule  Commonly known as: Cymbalta      Take 1 capsule (60 mg) by mouth once daily. Do not crush or chew.       empagliflozin 25 mg  Commonly known as: Jardiance      Take 1 tablet (25 mg) by mouth once daily.       famotidine 20 mg tablet  Commonly known as: Pepcid      Take 1 tablet (20 mg) by mouth once daily.       gabapentin 300 mg capsule  Commonly known  as: Neurontin           lactulose 10 gram/15 mL (15 mL) solution      Take 10 g by mouth once daily.       levothyroxine 200 mcg tablet  Commonly known as: Synthroid, Levoxyl      Take 1 tablet (200 mcg) by mouth once daily.       levothyroxine 50 mcg tablet  Commonly known as: Synthroid, Levoxyl      Take 1 tablet (50 mcg) by mouth once daily in the morning. Take before meals. TAKE WITH 200MG       loratadine 10 mg tablet  Commonly known as: Claritin      Take 1 tablet (10 mg) by mouth once daily as needed for allergies.       magnesium oxide 400 mg (241.3 mg magnesium) tablet  Commonly known as: Mag-Ox      Take 1 tablet (400 mg) by mouth 2 times a day.       metoprolol succinate XL 50 mg 24 hr tablet  Commonly known as: Toprol-XL           multivitamin with minerals tablet           ondansetron 4 mg tablet  Commonly known as: Zofran      Take 1 tablet (4 mg) by mouth every 8 hours if needed for nausea or vomiting.       pantoprazole 40 mg EC tablet  Commonly known as: ProtoNix           rosuvastatin 40 mg tablet  Commonly known as: Crestor           traZODone 100 mg tablet  Commonly known as: Desyrel           triamcinolone 0.1 % ointment  Commonly known as: Kenalog      Apply topically 2 times a day as needed for irritation or rash.       triamcinolone 0.1 % ointment  Commonly known as: Kenalog      Apply topically 2 times a day as needed for irritation or rash.       Trulance tablet tablet  Generic drug: plecanatide      Take 1 tablet (3 mg) by mouth once daily.       VOLTAREN TOP           warfarin 5 mg tablet  Commonly known as: Coumadin                  STOP taking these medications      metOLazone 5 mg tablet  Commonly known as: Zaroxolyn        potassium chloride CR 20 mEq ER tablet  Commonly known as: Klor-Con M20        Shingrix (PF) 50 mcg/0.5 mL vaccine  Generic drug: zoster vaccine-recombinant adjuvanted        spironolactone 100 mg tablet  Commonly known as: Aldactone        torsemide 100 mg  tablet  Commonly known as: Demadex               ASK your doctor about these medications        Instructions Last Dose Given Next Dose Due   dicyclomine 20 mg tablet  Commonly known as: Bentyl      Take 1 tablet (20 mg) by mouth 4 times a day as needed (abdominal pain or cramps).                 Where to Get Your Medications        These medications were sent to [facility]  [address] [place] [id number]      Hours: 8 AM to 5:30 Mon-Fri, 8 AM to 4 PM Saturday and Sunday Phone: [phone]   - refin regular 500 unit/mL CONCENTRATED - refill for patient own pump  nystatin 100,000 unit/gram powder       Information about where to get these medications is not yet available    Ask your nurse or doctor about these medications  oxygen gas therapy         Test Results Pending At Discharge  Pending Labs       No current pending labs.            Hospital Course  This is a morbidly obese 53-year-old female with a past medical history of diabetes mellitus, diabetic neuropathy, hypertension, obstructive sleep apnea on BiPAP, SEVILLA, Budd-Chiari syndrome, portal vein thrombosis on Coumadin, hepatic vein thrombosis, diastolic congestive heart failure, COPD, small bowel obstruction, constipation, hyperuricemia, chronic low back pain, GERD, irreducible incisional hernia, depression, anxiety, acute pancreatitis, gout, hyperlipidemia, bilateral knee osteoarthritis, and a recently found hyponatremia/renal failure/elevated LFTs who presented to the emergency room for 6 weeks history of frequent falls. Blood workup showed hyponatremia, renal failure, elevated LFTs, and borderline hypercalcemia.     Pt was found to be drinking up to 12L of water or liquids a day, pt NA had dropped while in the hospital when placed on IVF from 128 to 124, once figured out pt drinking this amount of liquid pt placed on 3L fluid restriction and pt NA improved to 130, Pt stopped her spironalactone and metalozone. Pt was seen by Nephrology  and it is recommended that she continue to maintain the fluid restriction, along with not continuing the diuretics at this time. It is recommended that she follow up with Endocrinology as an outpatient for better coverage of her DM. It is recommended that she follow up with primary care physician in 1 week for hospital follow up. Healthy at home referral sent to help with better DM and help to monitor pt fluid restriction and keep pt out of the hospital.     Pertinent Physical Exam At Time of Discharge  Physical Exam  General Appearance: AAO x 3, not in acute distress  Skin: skin color pink, warm, and dry  Eyes : PERRL, EOM's intact  ENT: mucous membranes pink and moist  Head: normocephalic, atraumatic   Respiratory: lungs clear to auscultation anteriorly; no wheezing, rhonchi, or crackles  Heart: regular rate and rhythm. telemetry shows sinus rhythm   Abdomen: Nondistended, positive bowel sounds x4, soft,  nontender, morbidly obese  Extremities: right lower ext edema with erythema,   Peripheral pulses: normal x4 extremities  Neuro: alert, coherent and conversant, no focal motor deficits    Outpatient Follow-Up  Future Appointments   Date Time Provider Department Center   3/27/2024  8:00 AM INÉS NM ADMIN ROOM 2 [facility]   3/27/2024  8:30 AM INÉS STRESS 2 [id number] [facility]   3/27/2024  9:00 AM INÉS NM 2 [facility]   3/27/2024 12:00 PM INÉS VASC 2 [id number] [facility]   3/28/2024  9:00 AM INÉS NM ADMIN ROOM 2 [facility]   3/28/2024  9:15 AM INÉS NM 2 [facility]   3/28/2024 11:00 AM INÉS CT 2 SAMCT [facility]   4/2/2024  1:15 PM Ana Knight MD [id number] [facility]   4/2/2024  3:00 PM INÉS 2E NEURODG EEG EQUIP [id number] [facility]   4/11/2024  2:45 PM Delfin Earl MD [id number] [facility]   4/30/2024 12:15 PM INÉS ULTRASOUND 1 SAMUS [facility]   5/2/2024 11:30 AM Sandoval Lewis DO [id number] [facility]         Anitha Lr, APRN-CNP

## 2024-03-22 NOTE — PROGRESS NOTES
Pharmacy Consult to dose Warfarin for Hepatic Vein Thrombosis with INR goal of 2-3.  Current home dose 7.5 mg daily  Yesterday patient was given 10 mg of warfarin for INR of 1.4.   Today's INR remains at 1.4.  Will repeat dose of 10 mg tonight.  Order placed for INR to be drawn tomorrow.

## 2024-03-23 ENCOUNTER — PATIENT OUTREACH (OUTPATIENT)
Dept: HOME HEALTH SERVICES | Age: 54
End: 2024-03-23
Payer: MEDICARE

## 2024-03-23 SDOH — HEALTH STABILITY: PHYSICAL HEALTH

## 2024-03-23 SDOH — HEALTH STABILITY: PHYSICAL HEALTH: ON AVERAGE, HOW MANY DAYS PER WEEK DO YOU ENGAGE IN MODERATE TO STRENUOUS EXERCISE (LIKE A BRISK WALK)?: 0 DAYS

## 2024-03-23 SDOH — ECONOMIC STABILITY: FOOD INSECURITY: WITHIN THE PAST 12 MONTHS, THE FOOD YOU BOUGHT JUST DIDN'T LAST AND YOU DIDN'T HAVE MONEY TO GET MORE.: NEVER TRUE

## 2024-03-23 SDOH — ECONOMIC STABILITY: GENERAL
WHICH OF THE FOLLOWING WOULD YOU LIKE TO GET CONNECTED TO IN ORDER TO RECEIVE A DISCOUNT OR FOR FREE? (CHOOSE ALL THAT APPLY): NONE OF THESE

## 2024-03-23 SDOH — ECONOMIC STABILITY: GENERAL
WHICH OF THE FOLLOWING DO YOU KNOW HOW TO USE AND HAVE ACCESS TO EVERY DAY? (CHOOSE ALL THAT APPLY): SMARTPHONE WITH CELLULAR DATA PLAN

## 2024-03-23 SDOH — ECONOMIC STABILITY: GENERAL: WOULD YOU LIKE HELP WITH ANY OF THE FOLLOWING NEEDS?: I DONT NEED HELP WITH ANY OF THESE

## 2024-03-23 SDOH — ECONOMIC STABILITY: FOOD INSECURITY: WITHIN THE PAST 12 MONTHS, YOU WORRIED THAT YOUR FOOD WOULD RUN OUT BEFORE YOU GOT MONEY TO BUY MORE.: NEVER TRUE

## 2024-03-23 SDOH — HEALTH STABILITY: PHYSICAL HEALTH: ON AVERAGE, HOW MANY MINUTES DO YOU ENGAGE IN EXERCISE AT THIS LEVEL?: 0 MIN

## 2024-03-23 ASSESSMENT — SOCIAL DETERMINANTS OF HEALTH (SDOH)
IN THE PAST 12 MONTHS, HAS THE ELECTRIC, GAS, OIL, OR WATER COMPANY THREATENED TO SHUT OFF SERVICE IN YOUR HOME?: NO
IN A TYPICAL WEEK, HOW MANY TIMES DO YOU TALK ON THE PHONE WITH FAMILY, FRIENDS, OR NEIGHBORS?: NEVER
DO YOU BELONG TO ANY CLUBS OR ORGANIZATIONS SUCH AS CHURCH GROUPS UNIONS, FRATERNAL OR ATHLETIC GROUPS, OR SCHOOL GROUPS?: YES
HOW HARD IS IT FOR YOU TO PAY FOR THE VERY BASICS LIKE FOOD, HOUSING, MEDICAL CARE, AND HEATING?: NOT HARD AT ALL
ARE YOU MARRIED, WIDOWED, DIVORCED, SEPARATED, NEVER MARRIED, OR LIVING WITH A PARTNER?: PATIENT DECLINED
HOW OFTEN DO YOU ATTENT MEETINGS OF THE CLUB OR ORGANIZATION YOU BELONG TO?: NEVER
WITHIN THE LAST YEAR, HAVE YOU BEEN HUMILIATED OR EMOTIONALLY ABUSED IN OTHER WAYS BY YOUR PARTNER OR EX-PARTNER?: PATIENT DECLINED
HOW OFTEN DO YOU GET TOGETHER WITH FRIENDS OR RELATIVES?: NEVER
WITHIN THE LAST YEAR, HAVE TO BEEN RAPED OR FORCED TO HAVE ANY KIND OF SEXUAL ACTIVITY BY YOUR PARTNER OR EX-PARTNER?: PATIENT DECLINED
WITHIN THE LAST YEAR, HAVE YOU BEEN AFRAID OF YOUR PARTNER OR EX-PARTNER?: PATIENT DECLINED
WITHIN THE LAST YEAR, HAVE YOU BEEN KICKED, HIT, SLAPPED, OR OTHERWISE PHYSICALLY HURT BY YOUR PARTNER OR EX-PARTNER?: PATIENT DECLINED
HOW OFTEN DO YOU ATTEND CHURCH OR RELIGIOUS SERVICES?: NEVER

## 2024-03-23 NOTE — PROGRESS NOTES
[facility] Transitions Assessment  Enrolment completed Pt seems to be in a bad mood. Pt didn't want to do answer the SOCD questionnaires. Pt also didn't want to scheduled initial HHVC. Pt is upset about her health but unreceptive to advice or education. Pt was reminded that someone will be calling her Monday for a daily call ad will scheduled her HHVC then.  Patient's Address:   [address] [place] [address]  **  If this is not the address patient will receive services - alert team and address in EMR**       Patient Contacts:  Extended Emergency Contact Information  Primary Emergency Contact: Karla Luna  Address:             [address]            [place] [address] [facility]  Home Phone: [phone]  Mobile Phone: [phone]  Relation: Parent  Preferred language: English   needed? No                                Patient's Preferred Phone: [phone]  Patient's E-mail: No e-mail address on record

## 2024-03-25 ENCOUNTER — PATIENT OUTREACH (OUTPATIENT)
Dept: HOME HEALTH SERVICES | Age: 54
End: 2024-03-25
Payer: MEDICARE

## 2024-03-25 ENCOUNTER — PATIENT OUTREACH (OUTPATIENT)
Dept: CARE COORDINATION | Facility: CLINIC | Age: 54
End: 2024-03-25
Payer: MEDICARE

## 2024-03-25 NOTE — PROGRESS NOTES
Discharge Facility: [facility]  Discharge Diagnosis: Hyponatremia  Admission Date: 3/19/2024  Discharge Date: 3/22/2024    PCP Appointment Date:  -3/26/2024 1115    Hospital Encounter and Summary: Linked     See discharge assessment below for further details    Engagement  Call Start Time: 0906 (3/25/2024  9:18 AM)    Medications  Medications reviewed with patient/caregiver?: Yes (new prescription reviewed; nystatin) (3/25/2024  9:18 AM)  Is the patient having any side effects they believe may be caused by any medication additions or changes?: No (3/25/2024  9:18 AM)  Does the patient have all medications ordered at discharge?: Yes (3/25/2024  9:18 AM)  Care Management Interventions: No intervention needed (3/25/2024  9:18 AM)  Is the patient taking all medications as directed (includes completed medication regime)?: Yes (3/25/2024  9:18 AM)    Appointments  Does the patient have a primary care provider?: Yes (3/25/2024  9:18 AM)  Care Management Interventions: Verified appointment date/time/provider (3/25/2024  9:18 AM)  Has the patient kept scheduled appointments due by today?: Yes (3/25/2024  9:18 AM)    Self Management  Has home health visited the patient within 72 hours of discharge?: Not applicable (3/25/2024  9:18 AM)  What Durable Medical Equipment (DME) was ordered?: -- (pt states she has all of her oxygen equipment) (3/25/2024  9:18 AM)    Patient Teaching  Does the patient have access to their discharge instructions?: Yes (3/25/2024  9:18 AM)  Care Management Interventions: Reviewed instructions with patient (3/25/2024  9:18 AM)  What is the patient's perception of their health status since discharge?: Worsening (3/25/2024  9:18 AM)  Is the patient/caregiver able to teach back the hierarchy of who to call/visit for symptoms/problems? PCP, Specialist, Home Health nurse, Urgent Care, ED, 911: Yes (3/25/2024  9:18 AM)    Wrap Up  Wrap Up Additional Comments: Pt was admitted to [facility]  3/19-3/22/2024 for hyponatremia. Pt reports she is feeling worse since being discharged. Pt states she is weak and shakey and that she wasn't feeling shakey during admission. Pt reports she is following the fluid restriction and eating 3 small meals a day per instructions. Pt states prior to hospitalization PCP started her on antibiotic and hospital had discontinued- pt would like to know if she can resume antibiotic as it was helping her feel better; will send message to PCP. Pt states she resumed home oxygen at 4L continuously. Pt states she is out of her metoprolol succinate prescription- will notify PCP. Pt scheduled for PCP follow up tomorrow 3/26/2024 1115. Pt denies any other questions, needs, or concerns at this time. She is encouraged to call if questions or needs arise. (3/25/2024  9:18 AM)  Call End Time: 0918 (3/25/2024  9:18 AM)

## 2024-03-25 NOTE — PROGRESS NOTES
Daily call completed. Pt states she is still shaking and having trouble walking. Pt verbalized that she is no longer on abx for her sinus infection and it is getting worse. Pt states she is not taking daily weights yet but will start today. Pt has FUV with PCP tomorrow, cardiology 3/27. Pt assisted in setting up endo appt. Denies any further needs/questions/concerns at this time. Aware of upcoming appts. [facility]   5/26 at 1730.    Bgt 160    Pt Education: appts, bgt readings & fluctuation   Barriers: n  Topics for Daily Review: daily needs, dw, vs, bgt  Pt demonstrates clear understanding: Yes    Daily Weight:  There were no vitals filed for this visit.   Last 3 Weights:  Wt Readings from Last 7 Encounters:   03/19/24 (!) 166 kg (365 lb 15.4 oz)   03/19/24 117 kg (258 lb)   03/13/24 117 kg (258 lb 4.8 oz)   02/29/24 (!) 163 kg (360 lb)   02/21/24 (!) 163 kg (360 lb)   02/09/24 (!) 163 kg (360 lb)   02/08/24 (!) 167 kg (368 lb)       Virtual Visits--Scheduled (Most Recent Date at Top)  Follow up Appointments  Recent Visits  Date Type Provider Dept   03/19/24 Office Visit Ana Knight MD [facility]   03/13/24 Office Visit Ana Knight MD [facility]   Showing recent visits within past 30 days and meeting all other requirements  Future Appointments  Date Type Provider Dept   03/26/24 Appointment Ana Knight MD [facility]   04/02/24 Appointment MD [facility]i [facility]   Showing future appointments within next 90 days and meeting all other requirements       Frequency of RN Calls & Virtual Visits per Team Agreement: [facility] Home Frequency: Daily    Medication issues Addressed (what was done): na    Follow up appointments scheduled by [facility] Staff: endo appt  Referrals made by [facility] staff: na      [facility] Care Transitions Assessment    Patient's Address:   [address] [place] [address]  **  If this is not the address patient will receive services  - alert team and address in EMR**       Patient Contacts:  Extended Emergency Contact Information  Primary Emergency Contact: Karla Luna  Address:             [address]            [address]  Home Phone: [phone]  Mobile Phone: [phone]  Relation: Parent  Preferred language: English   needed? No                                Patient's Preferred Phone: [phone]  Patient's E-mail: No e-mail address on record

## 2024-03-26 ENCOUNTER — TELEPHONE (OUTPATIENT)
Dept: PRIMARY CARE | Facility: CLINIC | Age: 54
End: 2024-03-26

## 2024-03-26 ENCOUNTER — OFFICE VISIT (OUTPATIENT)
Dept: PRIMARY CARE | Facility: CLINIC | Age: 54
End: 2024-03-26
Payer: MEDICARE

## 2024-03-26 ENCOUNTER — PATIENT OUTREACH (OUTPATIENT)
Dept: CARE COORDINATION | Age: 54
End: 2024-03-26

## 2024-03-26 VITALS
HEART RATE: 114 BPM | HEIGHT: 63 IN | DIASTOLIC BLOOD PRESSURE: 74 MMHG | WEIGHT: 293 LBS | SYSTOLIC BLOOD PRESSURE: 128 MMHG | BODY MASS INDEX: 51.91 KG/M2 | OXYGEN SATURATION: 97 %

## 2024-03-26 VITALS — BODY MASS INDEX: 66.96 KG/M2 | WEIGHT: 293 LBS

## 2024-03-26 DIAGNOSIS — M17.0 PRIMARY OSTEOARTHRITIS OF BOTH KNEES: ICD-10-CM

## 2024-03-26 DIAGNOSIS — E83.52 HYPERCALCEMIA: ICD-10-CM

## 2024-03-26 DIAGNOSIS — Z79.4 TYPE 2 DIABETES MELLITUS WITH HYPERGLYCEMIA, WITH LONG-TERM CURRENT USE OF INSULIN (MULTI): ICD-10-CM

## 2024-03-26 DIAGNOSIS — E11.59 HYPERTENSION ASSOCIATED WITH DIABETES (MULTI): ICD-10-CM

## 2024-03-26 DIAGNOSIS — E11.65 TYPE 2 DIABETES MELLITUS WITH HYPERGLYCEMIA, WITH LONG-TERM CURRENT USE OF INSULIN (MULTI): ICD-10-CM

## 2024-03-26 DIAGNOSIS — I89.0 LYMPHEDEMA: ICD-10-CM

## 2024-03-26 DIAGNOSIS — E66.01 MORBID OBESITY WITH BMI OF 60.0-69.9, ADULT (MULTI): ICD-10-CM

## 2024-03-26 DIAGNOSIS — R79.89 ABNORMAL LFTS: ICD-10-CM

## 2024-03-26 DIAGNOSIS — E87.1 HYPONATREMIA: ICD-10-CM

## 2024-03-26 DIAGNOSIS — R93.6 ABNORMAL X-RAY OF HUMERUS: ICD-10-CM

## 2024-03-26 DIAGNOSIS — G43.909 MIGRAINE WITHOUT STATUS MIGRAINOSUS, NOT INTRACTABLE, UNSPECIFIED MIGRAINE TYPE: ICD-10-CM

## 2024-03-26 DIAGNOSIS — R60.1 ANASARCA: Primary | ICD-10-CM

## 2024-03-26 DIAGNOSIS — I15.2 HYPERTENSION ASSOCIATED WITH DIABETES (MULTI): ICD-10-CM

## 2024-03-26 DIAGNOSIS — E11.69 HYPERLIPIDEMIA ASSOCIATED WITH TYPE 2 DIABETES MELLITUS (MULTI): ICD-10-CM

## 2024-03-26 DIAGNOSIS — K90.41 GLUTEN-SENSITIVE ENTEROPATHY: ICD-10-CM

## 2024-03-26 DIAGNOSIS — N18.31 STAGE 3A CHRONIC KIDNEY DISEASE (MULTI): ICD-10-CM

## 2024-03-26 DIAGNOSIS — E78.5 HYPERLIPIDEMIA ASSOCIATED WITH TYPE 2 DIABETES MELLITUS (MULTI): ICD-10-CM

## 2024-03-26 PROBLEM — M25.511 PAIN OF RIGHT SHOULDER REGION: Status: ACTIVE | Noted: 2024-03-26

## 2024-03-26 PROBLEM — R29.6 RECURRENT FALLS: Status: ACTIVE | Noted: 2024-03-26

## 2024-03-26 PROBLEM — L29.9 PRURITUS: Status: ACTIVE | Noted: 2024-03-19

## 2024-03-26 PROBLEM — R42 DIZZINESS: Status: ACTIVE | Noted: 2024-03-26

## 2024-03-26 PROBLEM — M25.569 KNEE PAIN: Status: ACTIVE | Noted: 2023-11-27

## 2024-03-26 PROBLEM — R25.1 TREMOR: Status: ACTIVE | Noted: 2024-03-19

## 2024-03-26 PROBLEM — R25.2 MUSCLE CRAMPS: Status: ACTIVE | Noted: 2024-03-19

## 2024-03-26 PROCEDURE — 3060F POS MICROALBUMINURIA REV: CPT | Performed by: INTERNAL MEDICINE

## 2024-03-26 PROCEDURE — 3052F HG A1C>EQUAL 8.0%<EQUAL 9.0%: CPT | Performed by: INTERNAL MEDICINE

## 2024-03-26 PROCEDURE — 99215 OFFICE O/P EST HI 40 MIN: CPT | Performed by: INTERNAL MEDICINE

## 2024-03-26 PROCEDURE — 3008F BODY MASS INDEX DOCD: CPT | Performed by: INTERNAL MEDICINE

## 2024-03-26 PROCEDURE — 1036F TOBACCO NON-USER: CPT | Performed by: INTERNAL MEDICINE

## 2024-03-26 PROCEDURE — 3074F SYST BP LT 130 MM HG: CPT | Performed by: INTERNAL MEDICINE

## 2024-03-26 PROCEDURE — 3078F DIAST BP <80 MM HG: CPT | Performed by: INTERNAL MEDICINE

## 2024-03-26 RX ORDER — FUROSEMIDE 20 MG/1
20 TABLET ORAL DAILY PRN
Qty: 30 TABLET | Refills: 5 | Status: SHIPPED | OUTPATIENT
Start: 2024-03-26 | End: 2024-04-02 | Stop reason: SDUPTHER

## 2024-03-26 RX ORDER — METOPROLOL SUCCINATE 50 MG/1
50 TABLET, EXTENDED RELEASE ORAL DAILY
Qty: 90 TABLET | Refills: 3 | Status: SHIPPED | OUTPATIENT
Start: 2024-03-26 | End: 2024-05-07 | Stop reason: SDUPTHER

## 2024-03-26 RX ORDER — TOPIRAMATE 25 MG/1
25 TABLET ORAL 2 TIMES DAILY
Qty: 60 TABLET | Refills: 11 | Status: SHIPPED | OUTPATIENT
Start: 2024-03-26

## 2024-03-26 ASSESSMENT — ENCOUNTER SYMPTOMS
ARTHRALGIAS: 1
CONSTIPATION: 0
VOMITING: 0
DIARRHEA: 0
WHEEZING: 0
GASTROINTESTINAL NEGATIVE: 1
HEADACHES: 1
CARDIOVASCULAR NEGATIVE: 1
SHORTNESS OF BREATH: 0
ABDOMINAL PAIN: 0
WEAKNESS: 0
DYSURIA: 0
PSYCHIATRIC NEGATIVE: 1
MYALGIAS: 1
ABDOMINAL DISTENTION: 0
PALPITATIONS: 0
CHILLS: 0
DIZZINESS: 0
AGITATION: 0
FEVER: 0
LIGHT-HEADEDNESS: 0
BACK PAIN: 0
RHINORRHEA: 0
ENDOCRINE NEGATIVE: 1
NAUSEA: 0
COUGH: 0
EYES NEGATIVE: 1

## 2024-03-26 ASSESSMENT — PATIENT HEALTH QUESTIONNAIRE - PHQ9
SUM OF ALL RESPONSES TO PHQ9 QUESTIONS 1 AND 2: 6
1. LITTLE INTEREST OR PLEASURE IN DOING THINGS: NEARLY EVERY DAY
4. FEELING TIRED OR HAVING LITTLE ENERGY: NEARLY EVERY DAY
6. FEELING BAD ABOUT YOURSELF - OR THAT YOU ARE A FAILURE OR HAVE LET YOURSELF OR YOUR FAMILY DOWN: MORE THAN HALF THE DAYS
8. MOVING OR SPEAKING SO SLOWLY THAT OTHER PEOPLE COULD HAVE NOTICED. OR THE OPPOSITE, BEING SO FIGETY OR RESTLESS THAT YOU HAVE BEEN MOVING AROUND A LOT MORE THAN USUAL: MORE THAN HALF THE DAYS
SUM OF ALL RESPONSES TO PHQ QUESTIONS 1-9: 21
9. THOUGHTS THAT YOU WOULD BE BETTER OFF DEAD, OR OF HURTING YOURSELF: MORE THAN HALF THE DAYS
7. TROUBLE CONCENTRATING ON THINGS, SUCH AS READING THE NEWSPAPER OR WATCHING TELEVISION: MORE THAN HALF THE DAYS
3. TROUBLE FALLING OR STAYING ASLEEP OR SLEEPING TOO MUCH: MORE THAN HALF THE DAYS
5. POOR APPETITE OR OVEREATING: MORE THAN HALF THE DAYS
10. IF YOU CHECKED OFF ANY PROBLEMS, HOW DIFFICULT HAVE THESE PROBLEMS MADE IT FOR YOU TO DO YOUR WORK, TAKE CARE OF THINGS AT HOME, OR GET ALONG WITH OTHER PEOPLE: EXTREMELY DIFFICULT
2. FEELING DOWN, DEPRESSED OR HOPELESS: NEARLY EVERY DAY

## 2024-03-26 NOTE — TELEPHONE ENCOUNTER
PLEASE LET HER KNOW I ORDERED 24 HOUR URINE PROTEIN WITH HAVING GENERALIZED EDEMA. JUST CHECK WITH THE LAB IF SHE CAN  GET THE CONTAINER FOR URINE COLLECTION FROM THEM.   ALSO I ADDED XR OF R ELBOW FOR ABNORMAL SHOULDER XR .

## 2024-03-26 NOTE — PROGRESS NOTES
"Initial [facility] Call Note:   Pt stated her swelling is increasing today because her provider took her off her \"water pills.\" Due low sodium, and kidney concerns. Pt stated her face is swollen, legs, and hands. Pt denies breathing difficulties, SOB, chest pain, or any other symptoms now. Dr Jackie Paez explained how they need to get kidneys to baseline, time to recover, and then resume water pills. Pt's glucose pump is not functioning properly, and right now pt is injecting insulin herself until she gets a new pump. Ryan went over medications with pt. Pt does attend a Coumadin Clinic and INR was low 3/22 at 1.4, so ordered to take 10 mg once and resume 7.5 mg Coumadin. Pt will have INR checked 3/29 by medic. Pt has Nuclear medicine tests most of the day tomorrow, carotid artery duplex scan, CT chest, so daily call was scheduled later.   Pt given H@H phone number and told to call if questions or concerns, or if get SOB. Pt told to call 911 if increasing SOB, difficulty breathing, chest pain, dizziness, lightheadedness, these or any unusual symptoms.    Pt Education: Low sodium diet, 3000 ml fluid restriction.    Topics for Daily Review: Fluid restriction, edema, weight management.  Pt demonstrates clear understanding: Yes      Last 3 Weights:  Wt (!) 171 kg (378 lb)   BMI 66.96 kg/m²     Wt Readings from Last 7 Encounters:   03/26/24 (!) 169 kg (372 lb 6.4 oz)   03/19/24 (!) 166 kg (365 lb 15.4 oz)   03/19/24 117 kg (258 lb)   03/13/24 117 kg (258 lb 4.8 oz)   02/29/24 (!) 163 kg (360 lb)   02/21/24 (!) 163 kg (360 lb)   02/09/24 (!) 163 kg (360 lb)       Masimo Device: No   Masimo Clinical Impression: Masimo possibly starting, pt lives [place] to [place].    Virtual Visits--Scheduled (Most Recent Date at Top)  Follow up Appointments  Recent Visits  Date Type Provider Dept   03/19/24 Office Visit [facility] Knight MD [facility]   03/13/24 Office Visit Ana Knight MD [facility]   Showing recent " visits within past 30 days and meeting all other requirements  Today's Visits  Date Type Provider Dept   03/26/24 Appointment [facility]   03[facility]26/24 [facility]   Showing today's visits and meeting all other requirements  Future Appointments  Date Type Provider Dept   04[facility]02[facility]   04/16/24 Appointment Ana Knight MD [facility]   Showing future appointments within next 90 days and meeting all other requirements       Frequency of RN Calls & Virtual Visits per Team Agreement: [facility] Frequency: Daily    Medication issues Addressed (what was done):    Follow up appointments scheduled by [facility] Staff: [facility] 4/3 @1100

## 2024-03-26 NOTE — PROGRESS NOTES
Subjective   Patient ID: Roselai Mo is a 53 y.o. female who presents for Follow-up (Was seen in hospital on Tuesday 03/19, out on Friday. Patient states hospital took her off meds and said she didn't need them. Fell on Monday and hurt both knees and right shoulder. Powered wheelchair broke and needs another power wheelchair. Went through [facility] for last one. Wants an at home sleep study done).  HPI  F/U AFTER HOSPITAL STAY FOR B/L KNEE PAIN ,R SHOULDER PAIN , WORSENING R LEG EDEMA . HAD MULTIPLE XRAYS, LABS , VENOUS DOPPLER AND ECHO DONE .. PT WAS  DX WITH HYPONATREMIA AND ARF ON  CRF.WITH B/L KNEE OA  LASIX AND SPIRONOLACTONE WERE  STOPPED, PT GAINED 6 IBS SINCE THEN. WORSENING LYMPHEDEMA AND FLUID RETENTION. PT CRIED DURING THE VISIT SINCE EDEMA CAUSES PAIN AND DIFFICULTY AMBULATING , EVEN EYEGLASSES BECAME SMALLER FOR FACE BECAUSE OF EDEMA. WORSENING MIGRAINE ,MORE FREUQENT AND MORE SEVERE 5-6/10 , PENDING ENDO VISIT FOR UNCONTROLLED DM2. HAS CHRONIC HYPERCALCEMIA.  Review of Systems   Constitutional:  Negative for chills and fever.   HENT: Negative.  Negative for congestion, postnasal drip and rhinorrhea.    Eyes: Negative.  Negative for visual disturbance.   Respiratory:  Negative for cough, shortness of breath and wheezing.    Cardiovascular: Negative.  Negative for chest pain, palpitations and leg swelling.   Gastrointestinal: Negative.  Negative for abdominal distention, abdominal pain, constipation, diarrhea, nausea and vomiting.   Endocrine: Negative.    Genitourinary:  Negative for dysuria and urgency.   Musculoskeletal:  Positive for arthralgias and myalgias. Negative for back pain.        EDEMA   Skin: Negative.  Negative for rash.   Allergic/Immunologic: Negative for immunocompromised state.   Neurological:  Positive for headaches. Negative for dizziness, weakness and light-headedness.   Psychiatric/Behavioral: Negative.  Negative for agitation.        Objective   Physical Exam  Constitutional:        General: She is not in acute distress.  HENT:      Head: Normocephalic.      Nose: Nose normal.      Mouth/Throat:      Mouth: Mucous membranes are moist.   Eyes:      Conjunctiva/sclera: Conjunctivae normal.      Pupils: Pupils are equal, round, and reactive to light.   Cardiovascular:      Rate and Rhythm: Normal rate and regular rhythm.      Pulses: Normal pulses.      Heart sounds: Normal heart sounds.   Pulmonary:      Effort: No respiratory distress.      Breath sounds: No wheezing.   Chest:      Chest wall: No tenderness.   Abdominal:      General: Abdomen is flat. Bowel sounds are normal.      Palpations: Abdomen is soft.      Tenderness: There is no abdominal tenderness.   Musculoskeletal:         General: Tenderness present. Normal range of motion.      Cervical back: Normal range of motion.      Comments: B/L KNEE TENDERNESS,WITH LYMPHEDEMA , B/L HAND EDEMA WITH OA CHANGES OF FINGERS. FACIAL EDEMA.   Lymphadenopathy:      Cervical: No cervical adenopathy.   Skin:     General: Skin is warm and dry.      Findings: No rash.      Comments: FACIAL EDEMA ,  ANASARCA   Neurological:      General: No focal deficit present.      Mental Status: She is alert. Mental status is at baseline.   Psychiatric:         Mood and Affect: Mood normal.         Behavior: Behavior normal.         Assessment/Plan   1. Anasarca  Protein, urine, 24 hour      2. Migraine without status migrainosus, not intractable, unspecified migraine type  topiramate (Topamax) 25 mg tablet      3. Lymphedema  furosemide (Lasix) 20 mg tablet      4. Hyponatremia  Comprehensive Metabolic Panel      5. Type 2 diabetes mellitus with hyperglycemia, with long-term current use of insulin (CMS/[facility])  Comprehensive Metabolic Panel      6. Stage 3a chronic kidney disease (CMS/[facility])  Comprehensive Metabolic Panel      7. Abnormal LFTs  Comprehensive Metabolic Panel      8. Gluten-sensitive enteropathy        9. Hypercalcemia  Parathyroid Hormone, Intact     Phosphorus      10. Hyperlipidemia associated with type 2 diabetes mellitus (CMS/[facility])        11. Hypertension associated with diabetes (CMS/[facility])  metoprolol succinate XL (Toprol-XL) 50 mg 24 hr tablet      12. Morbid obesity with BMI of 60.0-69.9, adult (CMS/[facility])  Cortisol, Free    ACTH      13. Primary osteoarthritis of both knees        14. Abnormal x-ray of humerus  XR elbow right 3+ views      TEST RESULTS WERE DISCUSSED.  ADVISED TO HAVE LOW FAT AND LOW CALORIE DIET AND TO LOOSE WEIGHT, DAILY EXERCISE.  ADVISED FOR B/L LEG ELEVATION . EXPLAINED THE NEED FOR MORE WORKUP WITH HAVING ANASARCA.   ADVISED TO ELEVATE THE HEAD OF THE BED FOR SLEEP, FALL PRECAUTION.    MDM    1) COMPLEXITY: 1 ACUTE OR CHRONIC ILLNESS OR INJURY THAT POSES THREAT TO LIFE OR BODILY FUNCTION.  2)DATA: TESTS INTERPRETED AND OR ORDERED, TOOK INDEPENDENT HISTORY OR RECORDS REVIEWED, CASE DISCUSSED WITH ANOTHER PROVIDER  3)RISK: HIGH RISK DUE TO NATURE OF MEDICAL CONDITIONS/COMORBIDITY OR MEDICATIONS ORDERED OR SURGICAL OR PROCEDURE REFERRAL, OR REFERRED TO HOSPITAL .     2 weeks

## 2024-03-27 ENCOUNTER — TELEPHONE (OUTPATIENT)
Dept: PRIMARY CARE | Facility: CLINIC | Age: 54
End: 2024-03-27
Payer: MEDICARE

## 2024-03-27 ENCOUNTER — HOSPITAL ENCOUNTER (OUTPATIENT)
Dept: VASCULAR MEDICINE | Facility: HOSPITAL | Age: 54
Discharge: HOME | End: 2024-03-27
Payer: MEDICARE

## 2024-03-27 ENCOUNTER — HOSPITAL ENCOUNTER (OUTPATIENT)
Dept: RADIOLOGY | Facility: HOSPITAL | Age: 54
Discharge: HOME | End: 2024-03-27
Payer: MEDICARE

## 2024-03-27 ENCOUNTER — HOSPITAL ENCOUNTER (OUTPATIENT)
Dept: CARDIOLOGY | Facility: HOSPITAL | Age: 54
Discharge: HOME | End: 2024-03-27
Payer: MEDICARE

## 2024-03-27 ENCOUNTER — PATIENT OUTREACH (OUTPATIENT)
Dept: HOME HEALTH SERVICES | Age: 54
End: 2024-03-27
Payer: MEDICARE

## 2024-03-27 DIAGNOSIS — R06.02 SOB (SHORTNESS OF BREATH): ICD-10-CM

## 2024-03-27 DIAGNOSIS — R55 SYNCOPE AND COLLAPSE: ICD-10-CM

## 2024-03-27 DIAGNOSIS — R00.2 PALPITATIONS: ICD-10-CM

## 2024-03-27 DIAGNOSIS — R94.31 ABNORMAL EKG: ICD-10-CM

## 2024-03-27 DIAGNOSIS — B37.31 VAGINA, CANDIDIASIS: Primary | ICD-10-CM

## 2024-03-27 DIAGNOSIS — R42 DIZZINESS: ICD-10-CM

## 2024-03-27 DIAGNOSIS — R29.6 RECURRENT FALLS: ICD-10-CM

## 2024-03-27 DIAGNOSIS — R00.0 TACHYCARDIA: ICD-10-CM

## 2024-03-27 PROCEDURE — 78452 HT MUSCLE IMAGE SPECT MULT: CPT

## 2024-03-27 PROCEDURE — 93880 EXTRACRANIAL BILAT STUDY: CPT

## 2024-03-27 PROCEDURE — 3430000001 HC RX 343 DIAGNOSTIC RADIOPHARMACEUTICALS: Performed by: INTERNAL MEDICINE

## 2024-03-27 PROCEDURE — 93880 EXTRACRANIAL BILAT STUDY: CPT | Performed by: STUDENT IN AN ORGANIZED HEALTH CARE EDUCATION/TRAINING PROGRAM

## 2024-03-27 PROCEDURE — A9502 TC99M TETROFOSMIN: HCPCS | Performed by: INTERNAL MEDICINE

## 2024-03-27 PROCEDURE — 2500000004 HC RX 250 GENERAL PHARMACY W/ HCPCS (ALT 636 FOR OP/ED): Performed by: INTERNAL MEDICINE

## 2024-03-27 RX ORDER — REGADENOSON 0.08 MG/ML
0.4 INJECTION, SOLUTION INTRAVENOUS ONCE
Status: COMPLETED | OUTPATIENT
Start: 2024-03-27 | End: 2024-03-27

## 2024-03-27 RX ORDER — FLUCONAZOLE 150 MG/1
150 TABLET ORAL ONCE
Qty: 1 TABLET | Refills: 0 | Status: SHIPPED | OUTPATIENT
Start: 2024-03-27 | End: 2024-04-03 | Stop reason: ALTCHOICE

## 2024-03-27 RX ADMIN — REGADENOSON 0.4 MG: 0.08 INJECTION, SOLUTION INTRAVENOUS at 08:08

## 2024-03-27 RX ADMIN — TETROFOSMIN 36 MILLICURIE: 0.23 INJECTION, POWDER, LYOPHILIZED, FOR SOLUTION INTRAVENOUS at 08:09

## 2024-03-27 NOTE — TELEPHONE ENCOUNTER
CALLED AND LET PATIENT KNOW AND SHE VOICED SHE UNDERSTOOD AND SAID WITH EVERYTHING HAPPENING SHE FORGOT TO MENTION THAT SHE GOT A YEAST INFECTION DUE TO HOSPITAL PUTTING THE PURWICK ON AND WAS ASKING FOR SOMETHING TO HELP WITH THAT. PLEASE ADVISE

## 2024-03-28 ENCOUNTER — HOSPITAL ENCOUNTER (EMERGENCY)
Facility: HOSPITAL | Age: 54
Discharge: HOME | End: 2024-03-28
Attending: EMERGENCY MEDICINE
Payer: MEDICARE

## 2024-03-28 ENCOUNTER — PATIENT OUTREACH (OUTPATIENT)
Dept: HOME HEALTH SERVICES | Age: 54
End: 2024-03-28

## 2024-03-28 ENCOUNTER — HOSPITAL ENCOUNTER (OUTPATIENT)
Dept: RADIOLOGY | Facility: HOSPITAL | Age: 54
Discharge: HOME | End: 2024-03-28
Payer: MEDICARE

## 2024-03-28 ENCOUNTER — APPOINTMENT (OUTPATIENT)
Dept: PRIMARY CARE | Facility: CLINIC | Age: 54
End: 2024-03-28
Payer: MEDICARE

## 2024-03-28 ENCOUNTER — HOSPITAL ENCOUNTER (OUTPATIENT)
Dept: CARDIOLOGY | Facility: HOSPITAL | Age: 54
Discharge: HOME | End: 2024-03-28
Payer: MEDICARE

## 2024-03-28 VITALS
TEMPERATURE: 97.4 F | SYSTOLIC BLOOD PRESSURE: 135 MMHG | DIASTOLIC BLOOD PRESSURE: 100 MMHG | BODY MASS INDEX: 53.92 KG/M2 | RESPIRATION RATE: 18 BRPM | HEIGHT: 62 IN | HEART RATE: 80 BPM | WEIGHT: 293 LBS | OXYGEN SATURATION: 94 %

## 2024-03-28 DIAGNOSIS — R93.6 ABNORMAL X-RAY OF HUMERUS: ICD-10-CM

## 2024-03-28 DIAGNOSIS — R06.02 SOB (SHORTNESS OF BREATH): ICD-10-CM

## 2024-03-28 DIAGNOSIS — R05.9 COUGH IN ADULT: ICD-10-CM

## 2024-03-28 DIAGNOSIS — R93.89 ABNORMAL CXR: ICD-10-CM

## 2024-03-28 DIAGNOSIS — R60.9 PERIPHERAL EDEMA: Primary | ICD-10-CM

## 2024-03-28 LAB
ANION GAP SERPL CALC-SCNC: 11 MMOL/L (ref 10–20)
BASOPHILS # BLD AUTO: 0.05 X10*3/UL (ref 0–0.1)
BASOPHILS NFR BLD AUTO: 0.6 %
BNP SERPL-MCNC: 304 PG/ML (ref 0–99)
BUN SERPL-MCNC: 15 MG/DL (ref 6–23)
CALCIUM SERPL-MCNC: 10.1 MG/DL (ref 8.6–10.3)
CARDIAC TROPONIN I PNL SERPL HS: 10 NG/L (ref 0–13)
CARDIAC TROPONIN I PNL SERPL HS: 9 NG/L (ref 0–13)
CHLORIDE SERPL-SCNC: 107 MMOL/L (ref 98–107)
CO2 SERPL-SCNC: 26 MMOL/L (ref 21–32)
CREAT SERPL-MCNC: 0.68 MG/DL (ref 0.5–1.05)
EGFRCR SERPLBLD CKD-EPI 2021: >90 ML/MIN/1.73M*2
EOSINOPHIL # BLD AUTO: 0.16 X10*3/UL (ref 0–0.7)
EOSINOPHIL NFR BLD AUTO: 1.9 %
ERYTHROCYTE [DISTWIDTH] IN BLOOD BY AUTOMATED COUNT: 15.9 % (ref 11.5–14.5)
GLUCOSE SERPL-MCNC: 78 MG/DL (ref 74–99)
HCT VFR BLD AUTO: 40.7 % (ref 36–46)
HGB BLD-MCNC: 12.9 G/DL (ref 12–16)
IMM GRANULOCYTES # BLD AUTO: 0.05 X10*3/UL (ref 0–0.7)
IMM GRANULOCYTES NFR BLD AUTO: 0.6 % (ref 0–0.9)
LYMPHOCYTES # BLD AUTO: 1.79 X10*3/UL (ref 1.2–4.8)
LYMPHOCYTES NFR BLD AUTO: 21.7 %
MCH RBC QN AUTO: 31.6 PG (ref 26–34)
MCHC RBC AUTO-ENTMCNC: 31.7 G/DL (ref 32–36)
MCV RBC AUTO: 100 FL (ref 80–100)
MONOCYTES # BLD AUTO: 0.61 X10*3/UL (ref 0.1–1)
MONOCYTES NFR BLD AUTO: 7.4 %
NEUTROPHILS # BLD AUTO: 5.57 X10*3/UL (ref 1.2–7.7)
NEUTROPHILS NFR BLD AUTO: 67.8 %
NRBC BLD-RTO: 0 /100 WBCS (ref 0–0)
PLATELET # BLD AUTO: 230 X10*3/UL (ref 150–450)
POTASSIUM SERPL-SCNC: 4.7 MMOL/L (ref 3.5–5.3)
RBC # BLD AUTO: 4.08 X10*6/UL (ref 4–5.2)
SODIUM SERPL-SCNC: 139 MMOL/L (ref 136–145)
WBC # BLD AUTO: 8.2 X10*3/UL (ref 4.4–11.3)

## 2024-03-28 PROCEDURE — 99283 EMERGENCY DEPT VISIT LOW MDM: CPT

## 2024-03-28 PROCEDURE — 82565 ASSAY OF CREATININE: CPT | Performed by: EMERGENCY MEDICINE

## 2024-03-28 PROCEDURE — 84484 ASSAY OF TROPONIN QUANT: CPT | Performed by: EMERGENCY MEDICINE

## 2024-03-28 PROCEDURE — 93005 ELECTROCARDIOGRAM TRACING: CPT

## 2024-03-28 PROCEDURE — 85025 COMPLETE CBC W/AUTO DIFF WBC: CPT | Performed by: EMERGENCY MEDICINE

## 2024-03-28 PROCEDURE — 78452 HT MUSCLE IMAGE SPECT MULT: CPT | Performed by: RADIOLOGY

## 2024-03-28 PROCEDURE — 71250 CT THORAX DX C-: CPT | Performed by: RADIOLOGY

## 2024-03-28 PROCEDURE — 73080 X-RAY EXAM OF ELBOW: CPT | Mod: RT

## 2024-03-28 PROCEDURE — 36415 COLL VENOUS BLD VENIPUNCTURE: CPT | Performed by: EMERGENCY MEDICINE

## 2024-03-28 PROCEDURE — 3430000001 HC RX 343 DIAGNOSTIC RADIOPHARMACEUTICALS: Performed by: INTERNAL MEDICINE

## 2024-03-28 PROCEDURE — 83880 ASSAY OF NATRIURETIC PEPTIDE: CPT | Performed by: EMERGENCY MEDICINE

## 2024-03-28 PROCEDURE — 73080 X-RAY EXAM OF ELBOW: CPT | Mod: RIGHT SIDE | Performed by: STUDENT IN AN ORGANIZED HEALTH CARE EDUCATION/TRAINING PROGRAM

## 2024-03-28 PROCEDURE — 71250 CT THORAX DX C-: CPT

## 2024-03-28 PROCEDURE — A9502 TC99M TETROFOSMIN: HCPCS | Performed by: INTERNAL MEDICINE

## 2024-03-28 RX ADMIN — TETROFOSMIN 28 MILLICURIE: 0.23 INJECTION, POWDER, LYOPHILIZED, FOR SOLUTION INTRAVENOUS at 10:05

## 2024-03-28 ASSESSMENT — PAIN - FUNCTIONAL ASSESSMENT: PAIN_FUNCTIONAL_ASSESSMENT: 0-10

## 2024-03-28 ASSESSMENT — PAIN SCALES - GENERAL: PAINLEVEL_OUTOF10: 0 - NO PAIN

## 2024-03-28 NOTE — ED PROVIDER NOTES
HPI   Chief Complaint   Patient presents with    Leg Swelling     Pt states whole body swelling, congestion.  Was in hospital recently and is withholding diuretics         Patient presents secondary to peripheral edema.  Upon further history it is noted the patient was recently discharged from our facility after acute kidney injury, sodium imbalance, and her diuretics were withheld.  It was noted that she was drinking upwards of 12 L of water at that time and her fluid intake was restricted.  Since being discharged she states she has put on 10 pounds and feels that her entire body is becoming edematous.  Presents now to the emergency room to be evaluated as per the direction of her private physicians.      History provided by:  Patient   used: No                        No data recorded                   Patient History   Past Medical History:   Diagnosis Date    Arthritis     Asthma     CHF (congestive heart failure) (CMS/[facility])     COPD (chronic obstructive pulmonary disease) (CMS/[facility])     Cough 2024    Diabetes mellitus (CMS/[facility])     Disease of thyroid gland     Hypertension     Shortness of breath 2024    Tachycardia 2024     Past Surgical History:   Procedure Laterality Date    CARPAL TUNNEL RELEASE Bilateral      SECTION, LOW TRANSVERSE       AND     COLONOSCOPY  2014    [facility]    HERNIA REPAIR      WITH MESH    HYSTERECTOMY      2 PARTIAL    KNEE SURGERY Left     MINISCUS REPAIR     Family History   Problem Relation Name Age of Onset    Blindness Mother      Hypertension Mother      Hyperlipidemia Mother      Heart disease Mother      Heart failure Father      Hypertension Father      Hyperlipidemia Father      Diabetes Father      Skin cancer Father      Blindness Maternal Grandmother      Hypertension Maternal Grandmother      Hyperlipidemia Maternal Grandmother      Heart failure Maternal Grandmother      Dementia Paternal  Grandmother      Heart attack Paternal Grandfather      Hypertension Paternal Grandfather      Hyperlipidemia Paternal Grandfather       Social History     Tobacco Use    Smoking status: Former     Packs/day: 1.00     Years: 37.00     Additional pack years: 0.00     Total pack years: 37.00     Types: Cigarettes     Start date: 1977     Quit date: 2014     Years since quittin.4    Smokeless tobacco: Never    Tobacco comments:     CBD vaping   Vaping Use    Vaping Use: Former   Substance Use Topics    Alcohol use: Yes     Comment: rarely    Drug use: Yes     Frequency: 7.0 times per week     Types: Marijuana     Comment: one or two bowels per day       Physical Exam   ED Triage Vitals [24 1339]   Temperature Heart Rate Respirations BP   36.3 °C (97.4 °F) 90 16 131/52      Pulse Ox Temp Source Heart Rate Source Patient Position   98 % Temporal Monitor Sitting      BP Location FiO2 (%)     Left arm --       Physical Exam  Vitals and nursing note reviewed.   Constitutional:       General: She is not in acute distress.     Appearance: Normal appearance. She is obese. She is not ill-appearing, toxic-appearing or diaphoretic.      Comments: Speaking in full sentences.  Not dyspneic or diaphoretic.   HENT:      Head: Normocephalic and atraumatic.      Nose: Nose normal. No rhinorrhea.   Neck:      Comments: Trachea is midline  Cardiovascular:      Rate and Rhythm: Normal rate and regular rhythm.      Heart sounds: No murmur heard.  Pulmonary:      Effort: Pulmonary effort is normal.      Breath sounds: Normal breath sounds. No wheezing.   Abdominal:      General: Abdomen is flat. Bowel sounds are normal. There is no distension.      Palpations: Abdomen is soft.      Tenderness: There is no abdominal tenderness.   Musculoskeletal:         General: Normal range of motion.      Cervical back: Normal range of motion.      Comments: Difficult to tell if the patient has true lower extremity edema secondary to  body habitus.  No evidence of cellulitic changes.   Skin:     General: Skin is warm and dry.      Findings: No rash.   Neurological:      General: No focal deficit present.      Mental Status: She is alert and oriented to person, place, and time. Mental status is at baseline.   Psychiatric:         Mood and Affect: Mood normal.         Behavior: Behavior normal.         Thought Content: Thought content normal.         Judgment: Judgment normal.         ED Course & MDM   Diagnoses as of 03/28/24 1616   Peripheral edema       Medical Decision Making  Twelve-lead EKG was interpreted by myself and this was noted to contribute directly to patient care.  Study reveals a normal sinus rhythm at 90 bpm, normal axis, normal R wave progression, no acute ischemic changes.    CAT scan results from earlier today were reviewed and are noted to show no acute process    It appears in the EMR the patient's private physician called in a prescription for as needed Lasix.  I discussed the patient case with Dr. Pardeep Nichole who knows the patient from her recent admission and he agrees that as needed Lasix is okay and that the patient is appropriate for discharge with no other new medications or instructions.  Instructed to return to the ER for any other ongoing concerns or worsening symptoms        Procedure  Procedures     Solo Martínez,   03/28/24 1616

## 2024-03-28 NOTE — PROGRESS NOTES
Daily Call Note:   Patient c/o headache, bilateral lower extremity, facial, and hand edema.  Patient was sent to the emergency today by Pcp's nurse.    Pt was discharged from the emergency department with dx of Peripheral edema.  Patient denies sob at this time.     Weight 360 lbs, hospital weight with shoes and leg wraps.     I advised patient to weight herself 1st thing in the morning tomorrow.  Patient advised to contact the [facility] team 24/7 for any questions or concerns.      Pt Education:   Barriers:   Topics for Daily Review:   Pt demonstrates clear understanding:     Daily Weight:  There were no vitals filed for this visit.   Last 3 Weights:  Wt Readings from Last 7 Encounters:   03/28/24 (!) 163 kg (360 lb)   03/26/24 (!) 171 kg (378 lb)   03/26/24 (!) 169 kg (372 lb 6.4 oz)   03/19/24 (!) 166 kg (365 lb 15.4 oz)   03/19/24 117 kg (258 lb)   03/13/24 117 kg (258 lb 4.8 oz)   02/29/24 (!) 163 kg (360 lb)       Masimo Device:    Masimo Clinical Impression:     Virtual Visits--Scheduled (Most Recent Date at Top)  Follow up Appointments  Recent Visits  Date Type Provider Dept   03/26/24 Office Visit Ana Knight MD [facility]   03/19/24 Office Visit Ana Knight MD [facility]   03/13/24 Office Visit Ana Knight MD [facility]   Showing recent visits within past 30 days and meeting all other requirements  Future Appointments  Date Type Provider Dept   04/02/24 Appointment Ana Knight MD [facility]   04/16/24 Appointment Ana Knight MD [facility]   Showing future appointments within next 90 days and meeting all other requirements       Frequency of RN Calls & Virtual Visits per Team Agreement:     Medication issues Addressed (what was done):     Follow up appointments scheduled by [facility] Staff:   Referrals made by [facility] staff:       [facility] Care Transitions Assessment    Patient's Address:   [address] [place] [address]  **  If  this is not the address patient will receive services - alert team and address in EMR**       Patient Contacts:  Extended Emergency Contact Information  Primary Emergency Contact: Karla Luna  Address:             [address]            [address]  Home Phone: [phone]  Mobile Phone: [phone]  Relation: Parent  Preferred language: English   needed? No  Secondary Emergency Contact: RYAN GUERRA  Mobile Phone: [phone]  Relation: Spouse  Preferred language: English   needed? No                                Patient's Preferred Phone: [phone]  Patient's E-mail: No e-mail address on record

## 2024-03-29 LAB
ATRIAL RATE: 98 BPM
P AXIS: 66 DEGREES
P OFFSET: 190 MS
P ONSET: 144 MS
PR INTERVAL: 142 MS
Q ONSET: 215 MS
QRS COUNT: 16 BEATS
QRS DURATION: 68 MS
QT INTERVAL: 328 MS
QTC CALCULATION(BAZETT): 418 MS
QTC FREDERICIA: 386 MS
R AXIS: -11 DEGREES
T AXIS: 17 DEGREES
T OFFSET: 379 MS
VENTRICULAR RATE: 98 BPM

## 2024-03-30 ENCOUNTER — PATIENT OUTREACH (OUTPATIENT)
Dept: CARE COORDINATION | Age: 54
End: 2024-03-30
Payer: MEDICARE

## 2024-03-30 NOTE — PROGRESS NOTES
Daily Call Note:     Daily call not completed, VMB not set up.    Daily Weight:  There were no vitals filed for this visit.   Last 3 Weights:  Wt Readings from Last 7 Encounters:   03/28/24 (!) 163 kg (360 lb)   03/26/24 (!) 171 kg (378 lb)   03/26/24 (!) 169 kg (372 lb 6.4 oz)   03/19/24 (!) 166 kg (365 lb 15.4 oz)   03/19/24 117 kg (258 lb)   03/13/24 117 kg (258 lb 4.8 oz)   02/29/24 (!) 163 kg (360 lb)       Masimo Device: No       Virtual Visits--Scheduled (Most Recent Date at Top)  Follow up Appointments  Recent Visits  Date Type Provider Dept   03/26/24 Office Visit Ana Knight MD [facility]   03/19/24 Office Visit Ana Knight MD [facility]   03/13/24 Office Visit Ana Knight MD [facility]   Showing recent visits within past 30 days and meeting all other requirements  Future Appointments  Date Type Provider Dept   04/02/24 Appointment MD [facility]   04/03/24 Appointment [facility]   04/16/24 Appointment MD [facility]   Showing future appointments within next 90 days and meeting all other requirements       Frequency of RN Calls & Virtual Visits per Team Agreement: [facility] Frequency: Daily    Medication issues Addressed (what was done):     Follow up appointments scheduled by [facility] Staff: 4/3 @1100

## 2024-04-01 ENCOUNTER — PATIENT OUTREACH (OUTPATIENT)
Dept: HOME HEALTH SERVICES | Age: 54
End: 2024-04-01
Payer: MEDICARE

## 2024-04-01 VITALS — BODY MASS INDEX: 70.78 KG/M2 | WEIGHT: 293 LBS

## 2024-04-01 DIAGNOSIS — I50.32 CHRONIC DIASTOLIC HEART FAILURE (MULTI): Primary | ICD-10-CM

## 2024-04-01 DIAGNOSIS — E11.65 TYPE 2 DIABETES MELLITUS WITH HYPERGLYCEMIA, WITH LONG-TERM CURRENT USE OF INSULIN (MULTI): ICD-10-CM

## 2024-04-01 DIAGNOSIS — Z79.4 TYPE 2 DIABETES MELLITUS WITH HYPERGLYCEMIA, WITH LONG-TERM CURRENT USE OF INSULIN (MULTI): ICD-10-CM

## 2024-04-01 DIAGNOSIS — J43.9 PULMONARY EMPHYSEMA, UNSPECIFIED EMPHYSEMA TYPE (MULTI): ICD-10-CM

## 2024-04-01 NOTE — PROGRESS NOTES
Daily call completed. Pt states she is doing alright aside from continuous weight gain. Pt states she is up to 387lb. 385lb yesterday. Pt states she goes to see her PCP tomorrow. Pt has been taking 20mg lasix daily. Pt endorsing increased swelling in BLE, neck and face and increased SOB. States she has been sticking to low sodium diet & fluid restriction. Pt states she has not heard from social work. Pt denies any further needs/questions/concerns. States she doesn't believe there is anything program can do for her today. MD to be made aware of weight gain (pt has been seen in ED for same) and will call pt back with any further direction. Aware of upcoming appts. [facility] 4/3 at 1100.     Bgt 104 am  Bgt 169 during daily call    Pt Education: s/s fluid retention, low sodium diet, weight gain  Barriers: n  Topics for Daily Review: daily needs, dw, vs, bgt  Pt demonstrates clear understanding: Yes    Daily Weight:  There were no vitals filed for this visit.   Last 3 Weights:  Wt Readings from Last 7 Encounters:   03/28/24 (!) 163 kg (360 lb)   03/26/24 (!) 171 kg (378 lb)   03/26/24 (!) 169 kg (372 lb 6.4 oz)   03/19/24 (!) 166 kg (365 lb 15.4 oz)   03/19/24 117 kg (258 lb)   03/13/24 117 kg (258 lb 4.8 oz)   02/29/24 (!) 163 kg (360 lb)         Virtual Visits--Scheduled (Most Recent Date at Top)  Follow up Appointments  Recent Visits  Date Type Provider Dept   03/26/24 Office Visit Ana Knight MD [facility]   03/19/24 Office Visit Ana Knight MD [facility]   03/13/24 Office Visit Ana Knight [facility]   Showing recent visits within past 30 days and meeting all other requirements  Future Appointments  Date Type Provider Dept   04/02/24 Appointment Ana Knight MD [facility]   04/16/24 Appointment Ana Knight MD [facility]   Showing future appointments within next 90 days and meeting all other requirements       Frequency of RN Calls & Virtual  Visits per Team Agreement: [facility] Frequency: Daily    Medication issues Addressed (what was done): annia    Follow up appointments scheduled by [facility] Staff: annia  Referrals made by [facility] staff: annia      [facility] Transitions Assessment    Patient's Address:   [address] [place] [address]  **  If this is not the address patient will receive services - alert team and address in EMR**       Patient Contacts:  Extended Emergency Contact Information  Primary Emergency Contact: Karla Luna  Address: [facility] [address]           [facility]            [address] [facility]  Home Phone: [phone]  Mobile Phone: [phone]  Relation: Parent  Preferred language: English   needed? No  Secondary Emergency Contact: RYAN GUERRA  Mobile Phone: [phone]  Relation: Spouse  Preferred language: English   needed? No                                Patient's Preferred Phone: [phone]  Patient's E-mail: No e-mail address on record

## 2024-04-02 ENCOUNTER — PATIENT OUTREACH (OUTPATIENT)
Dept: HOME HEALTH SERVICES | Age: 54
End: 2024-04-02

## 2024-04-02 ENCOUNTER — LAB (OUTPATIENT)
Dept: LAB | Facility: LAB | Age: 54
End: 2024-04-02
Payer: MEDICARE

## 2024-04-02 ENCOUNTER — TELEPHONE (OUTPATIENT)
Dept: PRIMARY CARE | Facility: CLINIC | Age: 54
End: 2024-04-02

## 2024-04-02 ENCOUNTER — OFFICE VISIT (OUTPATIENT)
Dept: PRIMARY CARE | Facility: CLINIC | Age: 54
End: 2024-04-02
Payer: MEDICARE

## 2024-04-02 ENCOUNTER — HOSPITAL ENCOUNTER (OUTPATIENT)
Dept: NEUROLOGY | Facility: HOSPITAL | Age: 54
Discharge: HOME | End: 2024-04-02
Payer: MEDICARE

## 2024-04-02 VITALS
SYSTOLIC BLOOD PRESSURE: 130 MMHG | DIASTOLIC BLOOD PRESSURE: 78 MMHG | WEIGHT: 293 LBS | HEIGHT: 62 IN | HEART RATE: 94 BPM | BODY MASS INDEX: 53.92 KG/M2

## 2024-04-02 DIAGNOSIS — R79.89 ABNORMAL LFTS: ICD-10-CM

## 2024-04-02 DIAGNOSIS — E87.1 HYPONATREMIA: ICD-10-CM

## 2024-04-02 DIAGNOSIS — Z79.4 TYPE 2 DIABETES MELLITUS WITH HYPERGLYCEMIA, WITH LONG-TERM CURRENT USE OF INSULIN (MULTI): ICD-10-CM

## 2024-04-02 DIAGNOSIS — R45.4 EXCESSIVE ANGER: ICD-10-CM

## 2024-04-02 DIAGNOSIS — E11.65 TYPE 2 DIABETES MELLITUS WITH HYPERGLYCEMIA, WITH LONG-TERM CURRENT USE OF INSULIN (MULTI): ICD-10-CM

## 2024-04-02 DIAGNOSIS — N18.31 STAGE 3A CHRONIC KIDNEY DISEASE (MULTI): ICD-10-CM

## 2024-04-02 DIAGNOSIS — R60.1 ANASARCA: Primary | ICD-10-CM

## 2024-04-02 DIAGNOSIS — E83.52 HYPERCALCEMIA: ICD-10-CM

## 2024-04-02 DIAGNOSIS — J43.9 PULMONARY EMPHYSEMA, UNSPECIFIED EMPHYSEMA TYPE (MULTI): ICD-10-CM

## 2024-04-02 DIAGNOSIS — I50.32 CHRONIC DIASTOLIC HEART FAILURE (MULTI): Primary | ICD-10-CM

## 2024-04-02 DIAGNOSIS — R56.9 SEIZURE-LIKE ACTIVITY (MULTI): ICD-10-CM

## 2024-04-02 DIAGNOSIS — M25.50 MULTIPLE JOINT PAIN: ICD-10-CM

## 2024-04-02 DIAGNOSIS — I15.2 HYPERTENSION ASSOCIATED WITH DIABETES (MULTI): ICD-10-CM

## 2024-04-02 DIAGNOSIS — E11.59 HYPERTENSION ASSOCIATED WITH DIABETES (MULTI): ICD-10-CM

## 2024-04-02 DIAGNOSIS — R29.6 RECURRENT FALLS: ICD-10-CM

## 2024-04-02 DIAGNOSIS — E66.01 MORBID OBESITY WITH BMI OF 60.0-69.9, ADULT (MULTI): ICD-10-CM

## 2024-04-02 DIAGNOSIS — I89.0 LYMPHEDEMA: ICD-10-CM

## 2024-04-02 LAB
ALBUMIN SERPL BCP-MCNC: 3.6 G/DL (ref 3.4–5)
ALP SERPL-CCNC: 470 U/L (ref 33–110)
ALT SERPL W P-5'-P-CCNC: 29 U/L (ref 7–45)
ANION GAP SERPL CALC-SCNC: 10 MMOL/L (ref 10–20)
AST SERPL W P-5'-P-CCNC: 31 U/L (ref 9–39)
BILIRUB SERPL-MCNC: 0.5 MG/DL (ref 0–1.2)
BUN SERPL-MCNC: 10 MG/DL (ref 6–23)
CALCIUM SERPL-MCNC: 9.4 MG/DL (ref 8.6–10.3)
CHLORIDE SERPL-SCNC: 110 MMOL/L (ref 98–107)
CO2 SERPL-SCNC: 26 MMOL/L (ref 21–32)
CREAT SERPL-MCNC: 0.71 MG/DL (ref 0.5–1.05)
EGFRCR SERPLBLD CKD-EPI 2021: >90 ML/MIN/1.73M*2
GLUCOSE SERPL-MCNC: 168 MG/DL (ref 74–99)
PHOSPHATE SERPL-MCNC: 3 MG/DL (ref 2.5–4.9)
POTASSIUM SERPL-SCNC: 4.6 MMOL/L (ref 3.5–5.3)
PROT SERPL-MCNC: 5.7 G/DL (ref 6.4–8.2)
SODIUM SERPL-SCNC: 141 MMOL/L (ref 136–145)

## 2024-04-02 PROCEDURE — 3060F POS MICROALBUMINURIA REV: CPT | Performed by: INTERNAL MEDICINE

## 2024-04-02 PROCEDURE — 1036F TOBACCO NON-USER: CPT | Performed by: INTERNAL MEDICINE

## 2024-04-02 PROCEDURE — 3078F DIAST BP <80 MM HG: CPT | Performed by: INTERNAL MEDICINE

## 2024-04-02 PROCEDURE — 3008F BODY MASS INDEX DOCD: CPT | Performed by: INTERNAL MEDICINE

## 2024-04-02 PROCEDURE — 3075F SYST BP GE 130 - 139MM HG: CPT | Performed by: INTERNAL MEDICINE

## 2024-04-02 PROCEDURE — 95819 EEG AWAKE AND ASLEEP: CPT | Performed by: PSYCHIATRY & NEUROLOGY

## 2024-04-02 PROCEDURE — 95819 EEG AWAKE AND ASLEEP: CPT

## 2024-04-02 PROCEDURE — 99214 OFFICE O/P EST MOD 30 MIN: CPT | Performed by: INTERNAL MEDICINE

## 2024-04-02 PROCEDURE — 80053 COMPREHEN METABOLIC PANEL: CPT

## 2024-04-02 PROCEDURE — 83970 ASSAY OF PARATHORMONE: CPT

## 2024-04-02 PROCEDURE — 82530 CORTISOL FREE: CPT

## 2024-04-02 PROCEDURE — 84100 ASSAY OF PHOSPHORUS: CPT

## 2024-04-02 PROCEDURE — 9420000001 HC RT PATIENT EDUCATION 5 MIN

## 2024-04-02 PROCEDURE — 3052F HG A1C>EQUAL 8.0%<EQUAL 9.0%: CPT | Performed by: INTERNAL MEDICINE

## 2024-04-02 PROCEDURE — 82024 ASSAY OF ACTH: CPT

## 2024-04-02 PROCEDURE — 36415 COLL VENOUS BLD VENIPUNCTURE: CPT

## 2024-04-02 RX ORDER — FUROSEMIDE 20 MG/1
20 TABLET ORAL 2 TIMES DAILY PRN
Qty: 60 TABLET | Refills: 5 | Status: SHIPPED | OUTPATIENT
Start: 2024-04-02 | End: 2024-04-10 | Stop reason: WASHOUT

## 2024-04-02 ASSESSMENT — ENCOUNTER SYMPTOMS
NAUSEA: 0
LIGHT-HEADEDNESS: 0
HEADACHES: 0
VOMITING: 0
ARTHRALGIAS: 1
DIARRHEA: 0
EYES NEGATIVE: 1
COUGH: 0
GASTROINTESTINAL NEGATIVE: 1
PALPITATIONS: 0
RHINORRHEA: 0
ABDOMINAL PAIN: 0
BACK PAIN: 0
FEVER: 0
AGITATION: 0
WHEEZING: 0
ENDOCRINE NEGATIVE: 1
DIZZINESS: 0
CONSTIPATION: 0
ABDOMINAL DISTENTION: 0
SHORTNESS OF BREATH: 0
CHILLS: 0
WEAKNESS: 0
DYSURIA: 0
PSYCHIATRIC NEGATIVE: 1
NEUROLOGICAL NEGATIVE: 1
CARDIOVASCULAR NEGATIVE: 1

## 2024-04-02 NOTE — TELEPHONE ENCOUNTER
PT SAW NEPHROLOGIST (DR JORGE) IN JANUARY ,BUT HAS NO F/U VISIT SCHEDULED (WAS SUPPOSED TO HAVE THE F/U) . PLEASE LET HER KNOW SHE HAS TO SEE HIM SOON FOR EDEMA WITH HAVING CRF AND HYPONATREMIA .

## 2024-04-02 NOTE — PROGRESS NOTES
"Daily Call Note: Daily call completed with patient, she states she is doing okay , c/o weight being up to 409lb, was seen by pcp this afternoon, and she offered patient an additional 20mg of Lasix today and patient declined stating \"that won't do me no good\".  States previous to coming to [place] she was on large dose of Spironalctone and 300mg of Torsemide daily, \"since they changed my medicines I continue to gain weight\" States some sob, with O2 on @ 4L, at the same time patient is out with  going in and out of supermarket. B/P while at pcp office 130/78. [facility] call scheduled for tomorrow 4/3 @ 1100, reminded her she needs to be sure to take call, and then can discuss with physician and pharmacist her concern with the changes in her medication. States am blood glucose was @ 66, she tells me she consumed pudding, fruit juice to bring sugar up. Discussed with her, that that was too much sugar to be consumed in attempt to raise glucose, 4oz of either juice/milk would have been sufficient    Pt Education: diet/ better glucose control , wt increase  Barriers: self knowledge  Topics for Daily Review: wt, rbs  Pt demonstrates clear understanding: Yes    Daily Weight:  There were no vitals filed for this visit.   Last 3 Weights:  Wt Readings from Last 7 Encounters:   04/02/24 (!) 186 kg (409 lb)   04/01/24 (!) 176 kg (387 lb)   03/28/24 (!) 163 kg (360 lb)   03/26/24 (!) 171 kg (378 lb)   03/26/24 (!) 169 kg (372 lb 6.4 oz)   03/19/24 (!) 166 kg (365 lb 15.4 oz)   03/19/24 117 kg (258 lb)       Masimo Device: No   Masimo Clinical Impression: n/a    Virtual Visits--Scheduled (Most Recent Date at Top)  Follow up Appointments  Recent Visits  Date Type Provider Dept   03/26/24 Office Visit Ana Knight MD [facility]   03/19/24 Office Visit Ana Knight MD [facility]   03/13/24 Office Visit Ana Knight MD [facility]   Showing recent visits within past 30 days and meeting all other " requirements  Today's Visits  Date Type Provider Dept   04/02/24 Office Visit Ana Knight MD [facility]   Showing today's visits and meeting all other requirements  Future Appointments  Date Type Provider Dept   04/16/24 Appointment Ana Knight MD [facility]   Showing future appointments within next 90 days and meeting all other requirements       Frequency of RN Calls & Virtual Visits per Team Agreement: [facility] Frequency: Daily    Medication issues Addressed (what was done): need to reevaluate at [facility] call tomorrow    Follow up appointments scheduled by [facility] Staff:   Referrals made by [facility] staff:       [facility] Care Transitions Assessment    Patient's Address:   [address] [place] [id number]  **  If this is not the address patient will receive services - alert team and address in EMR**       Patient Contacts:  Extended Emergency Contact Information  Primary Emergency Contact: Karla Luna  Address: [address] [address] [facility]  Home Phone: [phone]  Mobile Phone: [phone]  Relation: Parent  Preferred language: English   needed? No  Secondary Emergency Contact: RYAN GUERRA  Mobile Phone: [phone]  Relation: Spouse  Preferred language: English   needed? No                                Patient's Preferred Phone: [phone]  Patient's E-mail: No e-mail address on record

## 2024-04-02 NOTE — PROGRESS NOTES
Subjective   Patient ID: Roselia Mo is a 53 y.o. female who presents for Follow-up (2 WK F/U WITH STRESS TEST CT DOPPLER AND XR. DID NOT DO THE LABS OR EEG).  HPI  XR AND STRESS TEST F/U . LABS AND 24 H URINE F/U WHICH WEREN'T DONE WHICH WASN'T DONE.LASIX 20 MG DAILY PRN DIDN'T HELP WITH EDEMA . WEIGHT GAIN ,PT IS ANGRY THAT LASIX WAS STOPPED AFTER HOSPITAL DISCHARGE AND DEMANDS INCREASING THE DOSAGE OF LASIX (LASIX WAS GIVEN AT LAST VISIT ) WITH HAVING GENERALIZED EDEMA AND WEIGHT GAIN .MULTIPLE JOINT PAIN (SHOULDERS /ARMS ) 6/10 ON AND OFF.  Review of Systems   Constitutional:  Negative for chills and fever.        WEIGHT GAIN   HENT: Negative.  Negative for congestion, postnasal drip and rhinorrhea.    Eyes: Negative.  Negative for visual disturbance.   Respiratory:  Negative for cough, shortness of breath and wheezing.    Cardiovascular: Negative.  Negative for chest pain, palpitations and leg swelling.   Gastrointestinal: Negative.  Negative for abdominal distention, abdominal pain, constipation, diarrhea, nausea and vomiting.   Endocrine: Negative.    Genitourinary:  Negative for dysuria and urgency.   Musculoskeletal:  Positive for arthralgias. Negative for back pain.   Skin: Negative.  Negative for rash.   Allergic/Immunologic: Negative for immunocompromised state.   Neurological: Negative.  Negative for dizziness, weakness, light-headedness and headaches.   Psychiatric/Behavioral: Negative.  Negative for agitation.        Objective   Physical Exam  Constitutional:       General: She is not in acute distress.  HENT:      Head: Normocephalic.      Nose: Nose normal.      Mouth/Throat:      Mouth: Mucous membranes are moist.   Eyes:      Conjunctiva/sclera: Conjunctivae normal.      Pupils: Pupils are equal, round, and reactive to light.   Cardiovascular:      Rate and Rhythm: Normal rate and regular rhythm.      Pulses: Normal pulses.      Heart sounds: Normal heart sounds.   Pulmonary:      Effort: No  respiratory distress.      Breath sounds: No wheezing.   Chest:      Chest wall: No tenderness.   Abdominal:      General: Abdomen is flat. Bowel sounds are normal.      Palpations: Abdomen is soft.      Tenderness: There is no abdominal tenderness.   Musculoskeletal:         General: Tenderness present. Normal range of motion.      Cervical back: Normal range of motion.      Comments: GENERALIZED EDEMA   Lymphadenopathy:      Cervical: No cervical adenopathy.   Skin:     General: Skin is warm and dry.      Findings: No rash.   Neurological:      General: No focal deficit present.      Mental Status: She is alert. Mental status is at baseline.   Psychiatric:         Mood and Affect: Mood normal.         Behavior: Behavior normal.         Assessment/Plan   1. Anasarca        2. Type 2 diabetes mellitus with hyperglycemia, with long-term current use of insulin (CMS/[facility])        3. Multiple joint pain  Referral to Orthopaedic Surgery      4. Lymphedema  furosemide (Lasix) 20 mg tablet      5. Hypertension associated with diabetes (CMS/[facility])        6. Stage 3a chronic kidney disease (CMS/[facility])        7. Excessive anger        8. Hyponatremia        TEST RESULTS WERE DISCUSSED.  ADVISED TO APPLY WARM COMPRESSION AND OTC PAIN CREAM PRN FOR PAIN.  PT WAS ANGRY DURING THE VISIT CONSTANTLY COMPLAINED ABOUT EDEMA AND THE NEED FOR INCREASING THE LASIX. PER PT SHE FOLLOWS THE DIET AND FLUID RETENTION CAUSED THE WEIGHT GAIN .WILL INCREASE THE LASIX TO 20 MG BID.   I ADVISE TO DO THE LABS AND 24H URINE, TO SEE THE NEPHROLOGIST SOON.  ADVISED FOR RELAXATION TECHNIQUES AND TO CUT DOWN ON CAFFEINE.  ADVISED TO HAVE LOW FAT AND LOW CALORIE DIET AND TO LOOSE WEIGHT, FALL PRECAUTION.    MDM    1) COMPLEXITY: 1 UNDIAGNOSED NEW PROBLEM WITH UNCERTAIN PROGNOSIS  2)DATA: TESTS INTERPRETED AND OR ORDERED, TOOK INDEPENDENT HISTORY OR RECORDS REVIEWED  3)RISK: MODERATE RISK DUE TO NATURE OF MEDICAL CONDITIONS/COMORBIDITY OR MEDICATIONS ORDERED  OR SURGICAL OR PROCEDURE REFERRAL, .     Has F/U.

## 2024-04-03 ENCOUNTER — PATIENT OUTREACH (OUTPATIENT)
Dept: HOME HEALTH SERVICES | Age: 54
End: 2024-04-03

## 2024-04-03 ENCOUNTER — TELEMEDICINE (OUTPATIENT)
Dept: PHARMACY | Facility: HOSPITAL | Age: 54
End: 2024-04-03
Payer: MEDICARE

## 2024-04-03 VITALS — HEART RATE: 88 BPM | SYSTOLIC BLOOD PRESSURE: 133 MMHG | DIASTOLIC BLOOD PRESSURE: 80 MMHG

## 2024-04-03 DIAGNOSIS — J43.9 PULMONARY EMPHYSEMA, UNSPECIFIED EMPHYSEMA TYPE (MULTI): ICD-10-CM

## 2024-04-03 DIAGNOSIS — E11.65 TYPE 2 DIABETES MELLITUS WITH HYPERGLYCEMIA, WITH LONG-TERM CURRENT USE OF INSULIN (MULTI): ICD-10-CM

## 2024-04-03 DIAGNOSIS — Z79.4 TYPE 2 DIABETES MELLITUS WITH HYPERGLYCEMIA, WITH LONG-TERM CURRENT USE OF INSULIN (MULTI): ICD-10-CM

## 2024-04-03 DIAGNOSIS — I50.32 CHRONIC DIASTOLIC HEART FAILURE (MULTI): ICD-10-CM

## 2024-04-03 LAB — PTH-INTACT SERPL-MCNC: 83.8 PG/ML (ref 18.5–88)

## 2024-04-03 RX ORDER — METOLAZONE 2.5 MG/1
2.5 TABLET ORAL DAILY PRN
COMMUNITY
End: 2024-04-10 | Stop reason: WASHOUT

## 2024-04-03 ASSESSMENT — ENCOUNTER SYMPTOMS: HUNGER: 1

## 2024-04-03 NOTE — PROGRESS NOTES
"Daily Call Note:   [facility] completed with Dr. Del Angel and Ryan.   Patient is shortness of breath.    Patient is wearing 4 liters of oxygen via nasal canula.  Patient does not have a portable pulse oxygen sensor.  Patient states she will get oxygen sensor today.    Blood sugar 68 before meal, denies hypoglycemic symptoms.  Blood sugar  83 after eating  Blood sugar 70 @ noon.  Blood pressure 133 80  HR 88  Dr. Del Angel ordered metolazone 2.5 mg 30  minutes after taking lasix today only.     Task generated for 4/4 to notify Dr. Del Angel of weight   Fluid restriction 1.5 - 2 liters.        Patient did not weigh herself today, yesterday was 409 lb.   Dr. Del Angel aware of significant weight gain.      Patient is taking 7.5 mg of Warfarin daily.    Patient states she has a lot of other appointments and has [facility] care with the [facility].  Advised patient,  INR levels needs monitoring due to daily use of Coumadin.  Dr. Del Angel stated during [facility], we will revisit subject after labs, including INR on Friday, 4/5/24.    Cardiology will need to  put in referral for anticoag clinic. She has her own monitor but will need to establish with a clinic first before can do it herself at home.     Patient is not taking Jardiance.   Taking is not taking Trulance d/t \"explosive\" diarrhea and patient is not taking Lactulose.     Sent secure chat to  Shira  , to contact patient for housing insecurity, financial and stress.    Dr. Del Angel will prescribe Flonase for sinus symptoms.    Patient has humidification with oxygen but is not using it.      [facility] 4/10 @ 11:00    Pt Education:     Barriers:   Topics for Daily Review:   Pt demonstrates clear understanding:     Daily Weight:  There were no vitals filed for this visit.   Last 3 Weights:  Wt Readings from Last 7 Encounters:   04/02/24 (!) 186 kg (409 lb)   04/01/24 (!) 176 kg (387 lb)   03/28/24 (!) 163 kg (360 lb)   03/26/24 (!) 171 kg (378 lb)   03/26/24 (!) 169 kg (372 lb 6.4 oz) "   03/19/24 (!) 166 kg (365 lb 15.4 oz)   03/19/24 117 kg (258 lb)       [place] Device:  OUT OF SERVICE AREA, [place] Clinical Impression:     Virtual Visits--Scheduled (Most Recent Date at Top)  Follow up Appointments  Recent Visits  Date Type Provider Dept   04/02/24 Office Visit Ana Knight MD [facility]   03/26/24 Office Visit Ana Knight MD [facility]   03/19/24 Office Visit Ana Knight MD [facility]   03/13/24 Office Visit Ana Knight MD [facility]   Showing recent visits within past 30 days and meeting all other requirements  Future Appointments  Date Type Provider Dept   04/16/24 Appointment Ana Knight MD [facility]   Showing future appointments within next 90 days and meeting all other requirements       Frequency of RN Calls & Virtual Visits per Team Agreement:     Medication issues Addressed (what was done):     Follow up appointments scheduled by [facility] Staff:   Referrals made by [facility] staff:       [facility] Care Transitions Assessment    Patient's Address:   [address] [place] [id number]  **  If this is not the address patient will receive services - alert team and address in EMR**       Patient Contacts:  Extended Emergency Contact Information  Primary Emergency Contact: Karla Luna  Address: [address]           [place] [address] [place]  Home Phone: [phone]  Mobile Phone: [phone]  Relation: Parent  Preferred language: English   needed? No  Secondary Emergency Contact: RYAN GUERRA  Mobile Phone: [phone]  Relation: Spouse  Preferred language: English   needed? No                                Patient's Preferred Phone: [phone]  Patient's E-mail: No e-mail address on record

## 2024-04-03 NOTE — PROGRESS NOTES
Pharmacy Post-Discharge Visit    Roselia Mo is a 53 y.o. female was referred to Clinical Pharmacy Team to complete a post-discharge medication optimization and monitoring visit.  The patient was referred for their diabetes and CHF management. She is also currently enrolled in our [facility].     Admission Date: 3/19/24  Discharge Date: 3/22/24    Referring Provider: Waylon Blakely DO  PCP: Ana Knight MD - next visit: 4/16      Subjective   Allergies   Allergen Reactions    Nickel Diarrhea, Nausea And Vomiting, Rash and GI Upset     [place]obalt, white gold  Avoids most high Nickel foods not strictly, chooses to continue to use metal utensils.   Avoids green leafy vegetables, tap water, coffee, tea, oatmeal.  Wheat sometimes gives a rash but still eats.    No severe reaction to these foods only if eats too much get diarrhea.     Metformin Diarrhea, Other and Unknown    Dulaglutide Other     Pancreatitis  Does not avoid any foods related    Palladium Unknown     by allergy testing.  Does not avoid any foods related    Cobalt Rash    Exenatide Other     pancreantitis   pancreantitis    pancreantitis    Sitagliptin Other     pancreantitis       [id number] [place] [address]  Phone: [phone] Fax: [phone]      Medication System Management:  Affordability/Accessibility: currently no concerns   Adherence/Organization: no issues       Social History     Social History Narrative    Not on file        Notable Medication changes following discharge:  Start: n/a  Stop: metolazone, K+, spironolactone and torsemide  Change: torsemide switched to lasix prn     Diabetes  She presents for her follow-up diabetic visit. She has type 2 diabetes mellitus. Her disease course has been stable. Hypoglycemia symptoms include hunger. There are no hypoglycemic complications. Risk factors for coronary artery disease include diabetes  mellitus and obesity. Current diabetic treatment includes insulin pump. She is compliant with treatment all of the time. Her weight is increasing rapidly. She is following a generally unhealthy, high salt and high fat/cholesterol diet. When asked about meal planning, she reported none. There is no change in her home blood glucose trend. Her overall blood glucose range is 70-90 mg/dl. An ACE inhibitor/angiotensin II receptor blocker is contraindicated.     CHF ASSESSMENT  Staging:  Most recent ejection fraction: 65%  NYHA Stage: II  ACC/AHA Stage:  n/a    Symptom Assessment:  Weight changes/edema?: Yes - has gained 20 lbs in past week   Dyspnea?: With exertion  Dizziness/syncope/palpitations?: No    Current Regimen:  ARNI/ACEi/ARB: No  Beta Blocker: Yes - metoprolol  MRA: No  SGLT2i: No    Other therapy:  Lasix as needed     Secondary Prevention:  The 10-year ASCVD risk score (Romero HENRIQUEZ, et al., 2019) is: 6.1%    Values used to calculate the score:      Age: 53 years      Sex: Female      Is Non- : No      Diabetic: Yes      Tobacco smoker: No      Systolic Blood Pressure: 130 mmHg      Is BP treated: Yes      HDL Cholesterol: 47 mg/dL      Total Cholesterol: 200 mg/dL    Aspirin 81mg? no  Statin?: Yes - rosuvastatin  HTN?: No     Review of Systems        Objective     There were no vitals taken for this visit.   BP Readings from Last 4 Encounters:   04/02/24 130/78   03/28/24 (!) 135/100   03/26/24 128/74   03/22/24 117/60      There were no vitals filed for this visit.     LAB  Lab Results   Component Value Date    BILITOT 0.5 04/02/2024    CALCIUM 9.4 04/02/2024    CO2 26 04/02/2024     (H) 04/02/2024    CREATININE 0.71 04/02/2024    GLUCOSE 168 (H) 04/02/2024    ALKPHOS 470 (H) 04/02/2024    K 4.6 04/02/2024    PROT 5.7 (L) 04/02/2024     04/02/2024    AST 31 04/02/2024    ALT 29 04/02/2024    BUN 10 04/02/2024    ANIONGAP 10 04/02/2024    MG 2.25 03/19/2024    PHOS 3.0  04/02/2024    ALBUMIN 3.6 04/02/2024    LIPASE 60 02/03/2024    GFRF 81 02/19/2023     Lab Results   Component Value Date    TRIG 485 (H) 02/01/2024    CHOL 200 (H) 02/01/2024    LDLCALC  02/01/2024      Comment:      The calculation of LDL and VLDL are inaccurate when the Triglycerides are greater than 400 mg/dL or when the patient is non-fasting. If LDL measurement is necessary contact the testing laboratory for an alternative LDL assay.                                  Near   Borderline      AGE      Desirable  Optimal    High     High     Very High     0-19 Y     0 - 109     ---    110-129   >/= 130     ----    20-24 Y     0 - 119     ---    120-159   >/= 160     ----      >24 Y     0 -  99   100-129  130-159   160-189     >/=190      HDL 47.0 02/01/2024     Lab Results   Component Value Date    HGBA1C 9.0 (H) 03/19/2024         Current Outpatient Medications   Medication Instructions    - refin regular (HumuLIN R U-500) 500 unit/mL CONCENTRATED - refill for patient own pump 1 each, subcutaneous, As needed, Take as directed per insulin instructions. Use U-500 insulin syringe.    albuterol 180 mcg, inhalation, Every 4 hours PRN    allopurinol (ZYLOPRIM) 300 mg, oral, Daily    buPROPion XL (WELLBUTRIN XL) 150 mg, oral, Every morning, Do not crush, chew, or split.    cetirizine (ZYRTEC) 10 mg, oral, Daily    DULoxetine (CYMBALTA) 60 mg, oral, Daily, Do not crush or chew.    empagliflozin (JARDIANCE) 25 mg, oral, Daily    furosemide (LASIX) 20 mg, oral, 2 times daily PRN    gabapentin (NEURONTIN) 900 mg, oral, 3 times daily    levothyroxine (SYNTHROID, LEVOXYL) 200 mcg, oral, Daily    levothyroxine (SYNTHROID, LEVOXYL) 50 mcg, oral, Daily before breakfast, TAKE WITH 200MG    magnesium oxide (MAG-OX) 400 mg, oral, 2 times daily    metoprolol succinate XL (TOPROL-XL) 50 mg, oral, Daily, Do not crush or chew.    multivitamin with minerals tablet 1 tablet, oral, Daily    nystatin (Mycostatin) 100,000 unit/gram powder 1  Application, Topical, 2 times daily    ondansetron (ZOFRAN) 4 mg, oral, Every 8 hours PRN    oxygen (O2) gas therapy 1 each, inhalation, Every 12 hours    pantoprazole (PROTONIX) 40 mg, oral, Daily before breakfast, Do not crush, chew, or split.    rosuvastatin (CRESTOR) 40 mg, oral, Daily    topiramate (TOPAMAX) 25 mg, oral, 2 times daily    traZODone (DESYREL) 100 mg, oral, Nightly    triamcinolone (Kenalog) 0.1 % ointment Topical, 2 times daily PRN    Trulance 3 mg, oral, Daily    warfarin (COUMADIN) 7.5 mg, oral, Daily, Take as directed per After Visit Summary.        HISTORICAL PHARMACOTHERAPY  DMGDMT --> was taking jardiance but was told to stop   Told to stop torsemide, metolazone, spironolactone and K+ all due to abnormal electrolytes       DRUG INTERACTIONS  None at time of review      Assessment/Plan   CHF  GDMT --> only taking metoprolol currently   Was told to stop jardiance from endo but would benefit from SGLT2i for her chf as well --> will look into test claims for this   Continue lasix 20mg daily   Going to re-start metolazone but at 2.5mg daily due to increased weight gain  Told she needs to limit her fluid intake (max 2L per day)  Also needs to avoid salt as much as possible   Continue daily weights and BP's  Nursing will continue daily calls to help with monitoring weight to see if further adjustments need made   DM  Sugars have all been under 100  Lowest has been 67 and slightly symptomatic   Has insulin pump for her humulin that is programmed and adjusted with her new endocrinologist  Her diet is not the best - a lot of pizza and fast food   Recommend more protein and vegetables   Did not discuss much of her sugars today though because we focused more on her CHF today since a lot of weight gain and swelling   Will look into some weight loss options with her insurance as well     Orders being placed to get repeat blood work to check kidney function and also INR (told her to go Friday when has  endo appointment). Will need cardio to also put in referral for anticoag clinic. She has her own monitor but will need to establish with a clinic first before can do it herself at home.       Follow Up: 1 week     Continue all meds under the continuation of care with the referring provider and clinical pharmacy team.    Ryan Woods, [place]     Verbal consent to manage patient's drug therapy was obtained from the patient. They were informed they may decline to participate or withdraw from participation in pharmacy services at any time.

## 2024-04-04 ENCOUNTER — HOSPITAL ENCOUNTER (OUTPATIENT)
Dept: RADIOLOGY | Facility: CLINIC | Age: 54
Discharge: HOME | End: 2024-04-04
Payer: MEDICARE

## 2024-04-04 ENCOUNTER — PATIENT OUTREACH (OUTPATIENT)
Dept: HOME HEALTH SERVICES | Age: 54
End: 2024-04-04

## 2024-04-04 ENCOUNTER — PATIENT OUTREACH (OUTPATIENT)
Dept: PRIMARY CARE | Facility: CLINIC | Age: 54
End: 2024-04-04

## 2024-04-04 ENCOUNTER — DOCUMENTATION (OUTPATIENT)
Dept: CARE COORDINATION | Age: 54
End: 2024-04-04

## 2024-04-04 ENCOUNTER — OFFICE VISIT (OUTPATIENT)
Dept: ORTHOPEDIC SURGERY | Facility: CLINIC | Age: 54
End: 2024-04-04
Payer: MEDICARE

## 2024-04-04 VITALS
WEIGHT: 293 LBS | SYSTOLIC BLOOD PRESSURE: 148 MMHG | HEART RATE: 102 BPM | BODY MASS INDEX: 76.09 KG/M2 | DIASTOLIC BLOOD PRESSURE: 79 MMHG

## 2024-04-04 DIAGNOSIS — M25.511 CHRONIC RIGHT SHOULDER PAIN: ICD-10-CM

## 2024-04-04 DIAGNOSIS — M79.89 SWELLING OF UPPER ARM: ICD-10-CM

## 2024-04-04 DIAGNOSIS — G89.29 CHRONIC RIGHT SHOULDER PAIN: ICD-10-CM

## 2024-04-04 DIAGNOSIS — M79.601 RIGHT UPPER LIMB PAIN: ICD-10-CM

## 2024-04-04 DIAGNOSIS — M75.81 ROTATOR CUFF TENDONITIS, RIGHT: Primary | ICD-10-CM

## 2024-04-04 LAB — ACTH PLAS-MCNC: 10.9 PG/ML (ref 7.2–63.3)

## 2024-04-04 PROCEDURE — 3060F POS MICROALBUMINURIA REV: CPT | Performed by: NURSE PRACTITIONER

## 2024-04-04 PROCEDURE — 73030 X-RAY EXAM OF SHOULDER: CPT | Mod: RIGHT SIDE | Performed by: RADIOLOGY

## 2024-04-04 PROCEDURE — 3008F BODY MASS INDEX DOCD: CPT | Performed by: NURSE PRACTITIONER

## 2024-04-04 PROCEDURE — 99205 OFFICE O/P NEW HI 60 MIN: CPT | Performed by: NURSE PRACTITIONER

## 2024-04-04 PROCEDURE — 1036F TOBACCO NON-USER: CPT | Performed by: NURSE PRACTITIONER

## 2024-04-04 PROCEDURE — 3052F HG A1C>EQUAL 8.0%<EQUAL 9.0%: CPT | Performed by: NURSE PRACTITIONER

## 2024-04-04 PROCEDURE — 73030 X-RAY EXAM OF SHOULDER: CPT | Mod: RT

## 2024-04-04 ASSESSMENT — ENCOUNTER SYMPTOMS
RESPIRATORY NEGATIVE: 1
PSYCHIATRIC NEGATIVE: 1
ENDOCRINE NEGATIVE: 1
CONSTITUTIONAL NEGATIVE: 1
HEMATOLOGIC/LYMPHATIC NEGATIVE: 1
ARTHRALGIAS: 1

## 2024-04-04 ASSESSMENT — PAIN - FUNCTIONAL ASSESSMENT: PAIN_FUNCTIONAL_ASSESSMENT: 0-10

## 2024-04-04 ASSESSMENT — PAIN SCALES - GENERAL: PAINLEVEL_OUTOF10: 10 - WORST POSSIBLE PAIN

## 2024-04-04 NOTE — PROGRESS NOTES
"Daily Call Note:   Patient did not take blood pressure this morning.  Patient weighed herself doing daily call      Weight 416 lbs. Patient states she is still sob.  Shortness of breath is baseline from yesterday.   Mrs. Mo stated she urinated a lot yesterday.  /79   Notified Ryan Saha, and Dr. Penaloza.     Dr. Del Angel requested an add on Weekly [facility] for weight gain  Saturday, April 6th @ 10:30    Please discuss Low Blood Sugar Readings in the morning during [facility] on 4/6/24 4/4   64 mg/dl morning,   54 mg/dl,  then 194 mg/dl  4/3    68 mg/dl morning,  83 mg/dl,  then 70 mg/dl  Patient c/o feeling \"hot\" with low blood sugar readings.  4/4, sent secure chat to PM provider @ 16:55  UPDATE:  Ryan states insulin pump would have to be adjusted by Endo and to make sure patient has candy for low blood sugar reading.   I spoke with Mrs. Davis, she will discuss with ENDO on 4/10.  Endo is with an outside hospital.      Pt Education:   Barriers:   Topics for Daily Review:   Pt demonstrates clear understanding:     Daily Weight:  There were no vitals filed for this visit.   Last 3 Weights:  Wt Readings from Last 7 Encounters:   04/02/24 (!) 186 kg (409 lb)   04/01/24 (!) 176 kg (387 lb)   03/28/24 (!) 163 kg (360 lb)   03/26/24 (!) 171 kg (378 lb)   03/26/24 (!) 169 kg (372 lb 6.4 oz)   03/19/24 (!) 166 kg (365 lb 15.4 oz)   03/19/24 117 kg (258 lb)       Masimo Device:    [facility] Clinical Impression:     Virtual Visits--Scheduled (Most Recent Date at Top)  Follow up Appointments  Recent Visits  Date Type Provider Dept   04/02/24 Office Visit Ana Knight MD [facility]   03/26/24 Office Visit Ana Knight MD [facility]   03/19/24 Office Visit Ana Knight MD [facility]   03/13/24 Office Visit Ana Knight MD [facility]   Showing recent visits within past 30 days and meeting all other requirements  Future Appointments  Date Type Provider Dept   04/16/24 " [facility]ment [facility]   Showing future appointments within next 90 days and meeting all other requirements       Frequency of RN Calls & Virtual Visits per Team Agreement:     Medication issues Addressed (what was done):     Follow up appointments scheduled by [facility] Staff:   Referrals made by [facility] staff:       [facility] Transitions Assessment    Patient's Address:   [address] [place] [address]  **  If this is not the address patient will receive services - alert team and address in EMR**       Patient Contacts:  Extended Emergency Contact Information  Primary Emergency Contact: Karla Luna  Address: [facility] [address] [place] [address] [facility]  Home Phone: [phone]  Mobile Phone: [phone]  Relation: Parent  Preferred language: English   needed? No  Secondary Emergency Contact: RYAN GUERRA  Mobile Phone: [phone]  Relation: Spouse  Preferred language: English   needed? No                                Patient's Preferred Phone: [phone]  Patient's E-mail: No e-mail address on record

## 2024-04-04 NOTE — PROGRESS NOTES
Subjective    Patient ID: Roselia Mo is a 53 y.o. female.    Chief Complaint: Pain and New Patient Visit of the Right Shoulder (Patient states she has had pain in her right shoulder for about 2 weeks ago since she had a fall at home.)       VIOLETTA Veloz is a pleasant 53-year-old female presenting today for new patient evaluation of right shoulder pain.  Patient states she fell against a wall which was later discovered to be either a syncopal or seizure-like episode.  This occurred on 3/19/2024.  Patient was admitted to the hospital with seizure-like activity, hyponatremia, acute kidney injury, elevated liver enzymes, hyperglycemia.  Initial x-rays performed did not show any fracture of the shoulder.  Patient followed up with her PCP and was referred here for evaluation of multiple joint pain.  [place] with her PCP and nephrologist due to anasarca and adjusting dose of diuretics.    Review of Systems   Constitutional: Negative.    HENT: Negative.     Respiratory: Negative.     Cardiovascular:  Positive for leg swelling.   Endocrine: Negative.    Musculoskeletal:  Positive for arthralgias and gait problem.   Skin: Negative.    Hematological: Negative.    Psychiatric/Behavioral: Negative.         Objective   Right Shoulder Exam     Tenderness   The patient is experiencing tenderness in the biceps tendon.    Range of Motion   Active abduction:  110   External rotation:  30   Forward flexion:  130     Tests   Cross arm: positive  Drop arm: negative  Sulcus: absent    Other   Erythema: absent  Sensation: normal  Pulse: present    Comments:  Symptoms are aggravated with rotator cuff testing, anterior shoulder biceps tendon.  There is also noted that patient has been edema to the mid forearm with slight redness, soreness with palpation and warmth to the site.  Noted prior IV insertion sites x 2 at the AC region.  Patient has good range of motion of the neck in all directions with no symptom aggravation of the  shoulder.  Full range of motion of the elbow is present, however it does aggravate the upper arm and anterior shoulder.  Full range of motion of the wrist with distal motor or sensory intact.  Cap refill at 2 to 3 seconds.      Left Shoulder Exam   Left shoulder exam is normal.             Image Results:     FINDINGS:  RIGHT SHOULDER-AP Y, INTERNAL GRASHEY VIEWS  The glenohumeral joint is normal in width.  No fracture or  dislocation is seen. The AC joint is intact. The distal humerus is  not adequately visualized and appears abnormal. Possibility of a  distal humeral fracture should ruled out. AP and lateral views of the  distal or elbow joint are recommended.      IMPRESSION:  No fracture in the shoulder. Abnormal distal right humerus for  further evaluation is recommended.      FINDINGS:  Four views of the right elbow.      No acute fracture. No dislocation. No joint effusion. Mild  ulnotrochlear, radiocapitellar, and proximal radioulnar  osteoarthrosis.      IMPRESSION:  Mild osteoarthrosis of the elbow.      MACRO:  None      Signed by: Kofi Guallpa 3/30/2024 4:00 PM  Dictation workstation:   [id number]      MACRO:  None      Signed by: Andrey Trotter 3/20/2024 4:06 PM  Dictation workstation:   [id number]    Review of shoulder x-rays at today's visit shows no acute fracture or misalignment.  These images were compared with the prior shoulder and elbow x-ray with reviewed during today's visit.  There is no acute fracture or misalignment noted at the distal humerus or on elbow x-rays.     Contains abnormal data Comprehensive Metabolic Panel  Order: [id number]  Status: Final result       Visible to patient: No (inaccessible in [facility])       Dx: Hyponatremia; Abnormal LFTs; Type 2 d...    0 Result Notes                   Component  Ref Range & Units 2 d ago  (4/2/24) 7 d ago  (3/28/24) 13 d ago  (3/22/24) 13 d ago  (3/22/24) 2 wk ago  (3/21/24) 2 wk ago  (3/20/24) 2 wk ago  (3/19/24)   Glucose  74 - 99  mg/dL 168 High  78 301 High   295 High  169 High  268 High    Sodium  136 - 145 mmol/L 141 139 130 Low   124 Low  128 Low  121 Low  CM   Potassium  3.5 - 5.3 mmol/L 4.6 4.7 CM 4.8  4.6 3.8 4.4   Chloride  98 - 107 mmol/L 110 High  107 93 Low   89 Low  89 Low  82 Low    Bicarbonate  21 - 32 mmol/L 26 26 27  27 28 25   Anion Gap  10 - 20 mmol/L 10 11 15  13 15 18   Urea Nitrogen  6 - 23 mg/dL 10 15 49 High   47 High  60 High  72 High    Creatinine  0.50 - 1.05 mg/dL 0.71 0.68 1.24 High   1.45 High  1.44 High  CM 1.95 High    eGFR  >60 mL/min/1.73m*2 >90 >90 CM 52 Low  CM  43 Low  CM 43 Low  CM 30 Low  CM   Comment: Calculations of estimated GFR are performed using the 2021 CKD-EPI Study Refit equation without the race variable for the IDMS-Traceable creatinine methods.  https://jasn.asnjournals.org/content/early/2021/09/22/ASN.0715020714   Calcium  8.6 - 10.3 mg/dL 9.4 10.1 10.8 High   9.9 10.3 10.5 High    Albumin  3.4 - 5.0 g/dL 3.6   3.6 3.5 3.7 4.0   Alkaline Phosphatase  33 - 110 U/L 470 High    691 High   665 High  747 High    Total Protein  6.4 - 8.2 g/dL 5.7 Low    6.7  6.8 7.4   AST  9 - 39 U/L 31   77 High   70 High  104 High    Bilirubin, Total  0.0 - 1.2 mg/dL 0.5   0.6  0.6 0.7   ALT  7 - 45 U/L 29   105 High  CM  105 High   High  CM   Comment: Patients treated with Sulfasalazine may generate falsely decreased results for ALT.   Resulting Agency [facility]              Specimen Collected: 04/02/24 15:52 Last Resulted: 04/02/24 17:17             Assessment/Plan   Problem List Items Addressed This Visit             ICD-10-CM    Swelling of upper arm - Primary M79.89    Rotator cuff tendonitis, right M75.81     We discussed symptom control with OTC Tylenol and topical pain relievers.  Instructed on 2 grams diclofenac gel 4 times daily.  Patient may also use heat or ice, whichever offers better relief.  Patient to perform gentle range of motion as tolerated.  This time an  outpatient duplex ultrasound of the right upper arm to be obtained to rule out phlebitis and/or DVT.  Plan will be to follow-up here early next week and anticipate cortisone injection for shoulder pain and swelling.  Patient in agreement with plan of care.  This note was generated using Dragon software.  It may contain errors in wording, punctuation or spelling.           Other Visit Diagnoses         Codes    Chronic right shoulder pain     M25.511, G89.29    Relevant Orders    XR shoulder right 2+ views    Right upper limb pain     M79.601    Relevant Orders    Upper extremity venous duplex right

## 2024-04-04 NOTE — PROGRESS NOTES
Discussed the following topics on behalf of the patient:  []  Behavioral Health Assistance     []  Case Management  []   Assistance  []  Digital Equity Assistance  []  Dental Health Assistance  []  Education Assistance  []  Employment Assistance  [x]  Financial Strain Relief Assistance  []  Food Insecurity Assistance  []  Healthcare Coverage Assistance  [x]  Housing Stability Assistance  []  IP Violence Relief Assistance  []  Legal Assistance  []  Physical Activity Assistance  []  Social Connection Assistance  [x]  Stress Relief Assistance   []  Substance Abuse Assistance  []  Transportation Assistance  []  Utility Assistance  []  Other: [insert comment here]    Contacted PT at[facility] to [facility] referral for resource assist:PT stated that she has a home and is not in need of housing. She also shared that she is not in a financial strain or stressful. PT stated that she was feeling well, PT stated she's doing well          PAYAM FerraraW

## 2024-04-04 NOTE — PROGRESS NOTES
No call completed. Patient enrolled in [facility].    Anitha Serrano, [facility]  [facility]  [facility]  [phone]  [email or web address]     Detail Level: Zone

## 2024-04-05 ENCOUNTER — PATIENT OUTREACH (OUTPATIENT)
Dept: HOME HEALTH SERVICES | Age: 54
End: 2024-04-05

## 2024-04-05 ENCOUNTER — DOCUMENTATION (OUTPATIENT)
Dept: HOME HEALTH SERVICES | Age: 54
End: 2024-04-05

## 2024-04-05 ENCOUNTER — HOSPITAL ENCOUNTER (OUTPATIENT)
Dept: VASCULAR MEDICINE | Facility: HOSPITAL | Age: 54
Discharge: HOME | End: 2024-04-05
Payer: MEDICARE

## 2024-04-05 ENCOUNTER — APPOINTMENT (OUTPATIENT)
Dept: ENDOCRINOLOGY | Facility: CLINIC | Age: 54
End: 2024-04-05
Payer: MEDICARE

## 2024-04-05 VITALS — BODY MASS INDEX: 75.35 KG/M2 | WEIGHT: 293 LBS

## 2024-04-05 DIAGNOSIS — R60.1 GENERALIZED EDEMA: ICD-10-CM

## 2024-04-05 DIAGNOSIS — M79.601 RIGHT UPPER LIMB PAIN: ICD-10-CM

## 2024-04-05 PROCEDURE — 93971 EXTREMITY STUDY: CPT | Performed by: INTERNAL MEDICINE

## 2024-04-05 PROCEDURE — 93971 EXTREMITY STUDY: CPT

## 2024-04-05 NOTE — PROGRESS NOTES
"[facility] RN Daily Outreach/Time: 1712  Attendees: Patient/Sweta ANGELA Calle RN  Identified Patient via Full Name and Date of Birth  I am feeling, \"Okay\"  New issues: noted her left breast is swollen and she tried to call her physician/last mammogram 01.26.2024; she noted she had a swollen lymph node/and a follow up on 04.30.2024  She denied fever, pain, just soreness to touch or movement, no color change or temperature change on the surface/messaged Dr. Martienz who will follow up later this afternoon/evening  She is always a bit short breath and  used to it, administering 4 L/Min O2 per nasal cannula and uses BIPAP at night  Lasix 10 mg twice daily  Following fluid restriction of 1.5 to 2 Liters per day  She had bloodwork drawn, but not INR  Has not established with the [facility]  She said she would go to lab tomorrow to have INR drawn  Stressors and Barriers notes it is hard to leave house, to get out of care and she has disabled spouse, father in hospital and possible nursing home, and mother is blind  She says she has her own machine to test INR, but gave no value  She noted her blood glucose this am was 54, she ate and administered her insulin, not symptomatic for hypoglycemia; her only symptom she experiences with hypoglycemia is feeling hot  Did not eat lunch, did not check blood glucose, did not administer her insulin  Will check blood glucose later, when she eats dinner  See education note below    Time  Heart Rate BP SPO2 Blood Glucose Weight   Breakfast 93 153/65 Has not picked up a monitor 54-administered insulin 412 lb/186.88 kg  Was 416 lb on 04.04.2024   Lunch    None-did not eat-did not administer insulin      Future Appointments:   04.10.24: Ortho Surgery  04.10.24: Nephrology  04.11.24: Cardiology  04.16.24: PCP  04.30.24: Radiology  [facility] Weekly Provider Appointment scheduled:       04.06.2024 at 1030 due to weight gain, blood glucose levels being low and advised to discuss the left breast issue       " 04.10.2024 at 1100  RN Daily Outreach this week    Pt Education: importance of INR levels/consistent blood glucose monitoring/daily weights and vital signs/to follow up regarding breast issue  Barriers: stress and family medical conditions, as well as her own,needs reinforcement about INR blood draws  Topics for Daily Review: daily weights, vital signs, insulin/blood glucose levels, and regular meals  Pt demonstrates some understanding: patient needs reinforcement    Daily Weight:  There were no vitals filed for this visit.   Last 3 Weights:  Wt Readings from Last 7 Encounters:   04/04/24 (!) 189 kg (416 lb)   04/02/24 (!) 186 kg (409 lb)   04/01/24 (!) 176 kg (387 lb)   03/28/24 (!) 163 kg (360 lb)   03/26/24 (!) 171 kg (378 lb)   03/26/24 (!) 169 kg (372 lb 6.4 oz)   03/19/24 (!) 166 kg (365 lb 15.4 oz)       Masimo Device: No   Masimo Clinical Impression: N/A    Virtual Visits--Scheduled (Most Recent Date at Top)  Follow up Appointments  Recent Visits  Date Type Provider Dept   04/02/24 Office Visit Ana Knight MD [facility]   03/26/24 Office Visit Ana Knight MD [facility]   03/19/24 Office Visit Ana Knight MD [facility]   03/13/24 Office Visit Ana Knight MD [facility]   Showing recent visits within past 30 days and meeting all other requirements  Future Appointments  Date Type Provider Dept   04/16/24 Appointment Ana Knight MD [facility]   Showing future appointments within next 90 days and meeting all other requirements       Frequency of RN Calls & Virtual Visits per Team Agreement: [facility] Frequency: Daily/[facility] Weekly: 04.06.2024 at 1030 scheduled to address weight gain and blood glucose levels and 04.10.2024 at 1100: regularly scheduled [facility] Weekly    Medication issues Addressed (what was done): none    Follow up appointments scheduled by [facility] Staff: none  Referrals made by [facility] staff: none          Patient's Address:    [address] [place] [address]  **  If this is not the address patient will receive services - alert team and address in EMR**       Patient Contacts:  Extended Emergency Contact Information  Primary Emergency Contact: Karla Luna  Address:             [address] [place] [address] [place]  Home Phone: [phone]  Mobile Phone: [phone]  Relation: Parent  Preferred language: English   needed? No  Secondary Emergency Contact: RYAN GUERRA  Mobile Phone: [phone]  Relation: Spouse  Preferred language: English   needed? No                                Patient's Preferred Phone: [phone]  Patient's E-mail: No e-mail address on record

## 2024-04-05 NOTE — ASSESSMENT & PLAN NOTE
We discussed symptom control with OTC Tylenol and topical pain relievers.  Instructed on 2 grams diclofenac gel 4 times daily.  Patient may also use heat or ice, whichever offers better relief.  Patient to perform gentle range of motion as tolerated.  This time an outpatient duplex ultrasound of the right upper arm to be obtained to rule out phlebitis and/or DVT.  Plan will be to follow-up here early next week and anticipate cortisone injection for shoulder pain and swelling.  Patient in agreement with plan of care.  This note was generated using Dragon software.  It may contain errors in wording, punctuation or spelling.

## 2024-04-06 ENCOUNTER — PATIENT OUTREACH (OUTPATIENT)
Dept: HOME HEALTH SERVICES | Age: 54
End: 2024-04-06

## 2024-04-06 VITALS — WEIGHT: 293 LBS | BODY MASS INDEX: 75.9 KG/M2

## 2024-04-06 NOTE — PROGRESS NOTES
Follow Up to Daily Outreach Call:     Dr. Martinez noted that patient should follow up with Dr. Knight regarding breast symptom and she is aware to follow up and has a follow up Radiology appointment on 04.3-0.24 per her report  She will also discuss symptom during [facility] Weekly Appointment tomorrow    Patient's Address:   [address] [place] [address]  **  If this is not the address patient will receive services - alert team and address in EMR**       Patient Contacts:  Extended Emergency Contact Information  Primary Emergency Contact: Karla Luna  Address:             [address]            [address] of [facility]  Home Phone: [phone]  Mobile Phone: [phone]  Relation: Parent  Preferred language: English   needed? No  Secondary Emergency Contact: RYAN GUERRA  Mobile Phone: [phone]  Relation: Spouse  Preferred language: English   needed? No                                Patient's Preferred Phone: [phone]  Patient's E-mail: No e-mail address on record

## 2024-04-06 NOTE — PROGRESS NOTES
Daily Call Note: Additional HHVC complete with FRANK Quiroga and patient. Patient reports blood sugar this morning was 179. She states she drank between  oz of water yesterday.  Weight increased today to 415 lbs. Patient to continue metolazone 2.5 mg daily. Patient reports she knows why she has gained weight. She states that her left breast is swollen 2-3 x bigger than right breast, and firm and tender. Noticed this within the last week, denies any redness or drainage at the area. Lisa would like a provider to see patient and recommends she follow up with PCP Monday, so she can get correctly referred to needed specialist, patient states she will call office Monday morning and express need to be seen same day. Lab orders placed for patient to have drawn prior to PCP appointment, verbalized understanding. Next HHVC 4/10/24 at 1100. No other questions at this time.    Pt Education: per POC  Barriers: none  Topics for Daily Review: blood sugar   Pt demonstrates clear understanding: Yes    Daily Weight:  There were no vitals filed for this visit.   Last 3 Weights:  Wt Readings from Last 7 Encounters:   04/05/24 (!) 187 kg (411 lb 15.9 oz)   04/04/24 (!) 189 kg (416 lb)   04/02/24 (!) 186 kg (409 lb)   04/01/24 (!) 176 kg (387 lb)   03/28/24 (!) 163 kg (360 lb)   03/26/24 (!) 171 kg (378 lb)   03/26/24 (!) 169 kg (372 lb 6.4 oz)       Masimo Device: No   Masimo Clinical Impression: n/a    Virtual Visits--Scheduled (Most Recent Date at Top)  Follow up Appointments  Recent Visits  Date Type Provider Dept   04/02/24 Office Visit Ana Knight MD [facility]   03/26/24 Office Visit Ana Knight MD [facility]   03/19/24 Office Visit Ana Knight MD [facility]   03/13/24 Office Visit Ana Knight MD [facility]   Showing recent visits within past 30 days and meeting all other requirements  Future Appointments  Date Type Provider Dept   04/16/24 Appointment  [facility]   Showing future appointments within next 90 days and meeting all other requirements       Frequency of RN Calls & Virtual Visits per Team Agreement: [facility] Frequency: Daily    Medication issues Addressed (what was done): none    Follow up appointments scheduled by [facility] Staff: none  Referrals made by [facility] staff: none      [facility] Care Transitions Assessment    Patient's Address:   [address] [place] [address]  **  If this is not the address patient will receive services - alert team and address in EMR**       Patient Contacts:  Extended Emergency Contact Information  Primary Emergency Contact: Karla Luna  Address:             [address]            [place], [place] [id number] [place] [address] of [facility]  Home Phone: [phone]  Mobile Phone: [phone]  Relation: Parent  Preferred language: English   needed? No  Secondary Emergency Contact: RYAN GUERRA  Mobile Phone: [phone]  Relation: Spouse  Preferred language: English   needed? No                                Patient's Preferred Phone: [phone]  Patient's E-mail: No e-mail address on record

## 2024-04-07 ENCOUNTER — HOSPITAL ENCOUNTER (OUTPATIENT)
Dept: CARDIOLOGY | Facility: HOSPITAL | Age: 54
Discharge: HOME | End: 2024-04-07
Payer: MEDICARE

## 2024-04-07 ENCOUNTER — APPOINTMENT (OUTPATIENT)
Dept: RADIOLOGY | Facility: HOSPITAL | Age: 54
End: 2024-04-07
Payer: MEDICARE

## 2024-04-07 ENCOUNTER — HOSPITAL ENCOUNTER (EMERGENCY)
Facility: HOSPITAL | Age: 54
Discharge: HOME | End: 2024-04-07
Payer: MEDICARE

## 2024-04-07 VITALS
HEART RATE: 88 BPM | OXYGEN SATURATION: 96 % | HEIGHT: 62 IN | WEIGHT: 293 LBS | DIASTOLIC BLOOD PRESSURE: 78 MMHG | SYSTOLIC BLOOD PRESSURE: 144 MMHG | RESPIRATION RATE: 18 BRPM | BODY MASS INDEX: 53.92 KG/M2 | TEMPERATURE: 98.3 F

## 2024-04-07 DIAGNOSIS — L03.311 CELLULITIS OF ABDOMINAL WALL: Primary | ICD-10-CM

## 2024-04-07 LAB
ALBUMIN SERPL BCP-MCNC: 3.6 G/DL (ref 3.4–5)
ALP SERPL-CCNC: 314 U/L (ref 33–110)
ALT SERPL W P-5'-P-CCNC: 14 U/L (ref 7–45)
ANION GAP SERPL CALC-SCNC: 10 MMOL/L (ref 10–20)
AST SERPL W P-5'-P-CCNC: 23 U/L (ref 9–39)
BILIRUB SERPL-MCNC: 0.4 MG/DL (ref 0–1.2)
BNP SERPL-MCNC: 210 PG/ML (ref 0–99)
BUN SERPL-MCNC: 9 MG/DL (ref 6–23)
CALCIUM SERPL-MCNC: 9.6 MG/DL (ref 8.6–10.3)
CARDIAC TROPONIN I PNL SERPL HS: 13 NG/L (ref 0–13)
CHLORIDE SERPL-SCNC: 100 MMOL/L (ref 98–107)
CO2 SERPL-SCNC: 33 MMOL/L (ref 21–32)
CORTIS F SERPL-MCNC: 0.34 UG/DL
CREAT SERPL-MCNC: 0.77 MG/DL (ref 0.5–1.05)
EGFRCR SERPLBLD CKD-EPI 2021: >90 ML/MIN/1.73M*2
ERYTHROCYTE [DISTWIDTH] IN BLOOD BY AUTOMATED COUNT: 16 % (ref 11.5–14.5)
GLUCOSE SERPL-MCNC: 218 MG/DL (ref 74–99)
HCT VFR BLD AUTO: 38.5 % (ref 36–46)
HGB BLD-MCNC: 12.1 G/DL (ref 12–16)
MCH RBC QN AUTO: 31.3 PG (ref 26–34)
MCHC RBC AUTO-ENTMCNC: 31.4 G/DL (ref 32–36)
MCV RBC AUTO: 100 FL (ref 80–100)
NRBC BLD-RTO: 0 /100 WBCS (ref 0–0)
PLATELET # BLD AUTO: 256 X10*3/UL (ref 150–450)
POTASSIUM SERPL-SCNC: 3.2 MMOL/L (ref 3.5–5.3)
PROT SERPL-MCNC: 6.3 G/DL (ref 6.4–8.2)
RBC # BLD AUTO: 3.86 X10*6/UL (ref 4–5.2)
SODIUM SERPL-SCNC: 140 MMOL/L (ref 136–145)
WBC # BLD AUTO: 5.9 X10*3/UL (ref 4.4–11.3)

## 2024-04-07 PROCEDURE — 99285 EMERGENCY DEPT VISIT HI MDM: CPT | Mod: 25

## 2024-04-07 PROCEDURE — 2550000001 HC RX 255 CONTRASTS: Performed by: PHYSICIAN ASSISTANT

## 2024-04-07 PROCEDURE — 74177 CT ABD & PELVIS W/CONTRAST: CPT

## 2024-04-07 PROCEDURE — 80053 COMPREHEN METABOLIC PANEL: CPT | Performed by: PHYSICIAN ASSISTANT

## 2024-04-07 PROCEDURE — 74177 CT ABD & PELVIS W/CONTRAST: CPT | Mod: FOREIGN READ | Performed by: RADIOLOGY

## 2024-04-07 PROCEDURE — 93005 ELECTROCARDIOGRAM TRACING: CPT

## 2024-04-07 PROCEDURE — 85027 COMPLETE CBC AUTOMATED: CPT | Performed by: PHYSICIAN ASSISTANT

## 2024-04-07 PROCEDURE — 36415 COLL VENOUS BLD VENIPUNCTURE: CPT | Performed by: PHYSICIAN ASSISTANT

## 2024-04-07 PROCEDURE — 84484 ASSAY OF TROPONIN QUANT: CPT | Performed by: PHYSICIAN ASSISTANT

## 2024-04-07 PROCEDURE — 83880 ASSAY OF NATRIURETIC PEPTIDE: CPT | Performed by: PHYSICIAN ASSISTANT

## 2024-04-07 RX ORDER — DOXYCYCLINE 100 MG/1
100 CAPSULE ORAL 2 TIMES DAILY
Qty: 20 CAPSULE | Refills: 0 | Status: SHIPPED | OUTPATIENT
Start: 2024-04-07 | End: 2024-04-10 | Stop reason: ALTCHOICE

## 2024-04-07 RX ORDER — DOXYCYCLINE HYCLATE 100 MG
100 TABLET ORAL ONCE
Status: DISCONTINUED | OUTPATIENT
Start: 2024-04-07 | End: 2024-04-07 | Stop reason: HOSPADM

## 2024-04-07 RX ADMIN — IOHEXOL 75 ML: 350 INJECTION, SOLUTION INTRAVENOUS at 19:08

## 2024-04-07 ASSESSMENT — PAIN SCALES - GENERAL: PAINLEVEL_OUTOF10: 7

## 2024-04-07 ASSESSMENT — PAIN DESCRIPTION - LOCATION: LOCATION: ABDOMEN

## 2024-04-07 ASSESSMENT — PAIN DESCRIPTION - PAIN TYPE: TYPE: ACUTE PAIN

## 2024-04-07 ASSESSMENT — PAIN DESCRIPTION - ORIENTATION: ORIENTATION: LOWER

## 2024-04-07 ASSESSMENT — PAIN DESCRIPTION - ONSET: ONSET: GRADUAL

## 2024-04-07 ASSESSMENT — COLUMBIA-SUICIDE SEVERITY RATING SCALE - C-SSRS
6. HAVE YOU EVER DONE ANYTHING, STARTED TO DO ANYTHING, OR PREPARED TO DO ANYTHING TO END YOUR LIFE?: NO
1. IN THE PAST MONTH, HAVE YOU WISHED YOU WERE DEAD OR WISHED YOU COULD GO TO SLEEP AND NOT WAKE UP?: NO
2. HAVE YOU ACTUALLY HAD ANY THOUGHTS OF KILLING YOURSELF?: NO

## 2024-04-07 ASSESSMENT — PAIN - FUNCTIONAL ASSESSMENT: PAIN_FUNCTIONAL_ASSESSMENT: 0-10

## 2024-04-07 ASSESSMENT — PAIN DESCRIPTION - FREQUENCY: FREQUENCY: CONSTANT/CONTINUOUS

## 2024-04-07 ASSESSMENT — PAIN DESCRIPTION - DESCRIPTORS: DESCRIPTORS: ACHING

## 2024-04-08 ENCOUNTER — HOSPITAL ENCOUNTER (OUTPATIENT)
Dept: RADIOLOGY | Facility: EXTERNAL LOCATION | Age: 54
Discharge: HOME | End: 2024-04-08

## 2024-04-08 ENCOUNTER — APPOINTMENT (OUTPATIENT)
Dept: CARDIOLOGY | Facility: HOSPITAL | Age: 54
End: 2024-04-08
Payer: MEDICARE

## 2024-04-08 ENCOUNTER — OFFICE VISIT (OUTPATIENT)
Dept: ORTHOPEDIC SURGERY | Facility: CLINIC | Age: 54
End: 2024-04-08
Payer: MEDICARE

## 2024-04-08 ENCOUNTER — APPOINTMENT (OUTPATIENT)
Dept: RADIOLOGY | Facility: HOSPITAL | Age: 54
End: 2024-04-08
Payer: MEDICARE

## 2024-04-08 ENCOUNTER — HOSPITAL ENCOUNTER (EMERGENCY)
Facility: HOSPITAL | Age: 54
Discharge: HOME | End: 2024-04-08
Attending: EMERGENCY MEDICINE
Payer: MEDICARE

## 2024-04-08 VITALS
DIASTOLIC BLOOD PRESSURE: 97 MMHG | HEIGHT: 62 IN | WEIGHT: 293 LBS | BODY MASS INDEX: 53.92 KG/M2 | SYSTOLIC BLOOD PRESSURE: 147 MMHG | RESPIRATION RATE: 18 BRPM | OXYGEN SATURATION: 97 % | HEART RATE: 100 BPM

## 2024-04-08 DIAGNOSIS — L03.311 ABDOMINAL WALL CELLULITIS: ICD-10-CM

## 2024-04-08 DIAGNOSIS — M75.81 ROTATOR CUFF TENDONITIS, RIGHT: Primary | ICD-10-CM

## 2024-04-08 DIAGNOSIS — M25.50 MULTIPLE JOINT PAIN: ICD-10-CM

## 2024-04-08 DIAGNOSIS — B37.2 CANDIDIASIS OF SKIN: ICD-10-CM

## 2024-04-08 DIAGNOSIS — E87.6 HYPOKALEMIA: Primary | ICD-10-CM

## 2024-04-08 LAB
ALBUMIN SERPL BCP-MCNC: 3.7 G/DL (ref 3.4–5)
ALP SERPL-CCNC: 285 U/L (ref 33–110)
ALT SERPL W P-5'-P-CCNC: 13 U/L (ref 7–45)
ANION GAP SERPL CALC-SCNC: 11 MMOL/L (ref 10–20)
APTT PPP: 48 SECONDS (ref 27–38)
AST SERPL W P-5'-P-CCNC: 18 U/L (ref 9–39)
ATRIAL RATE: 95 BPM
BASOPHILS # BLD AUTO: 0.04 X10*3/UL (ref 0–0.1)
BASOPHILS NFR BLD AUTO: 0.6 %
BILIRUB SERPL-MCNC: 0.6 MG/DL (ref 0–1.2)
BNP SERPL-MCNC: 282 PG/ML (ref 0–99)
BUN SERPL-MCNC: 9 MG/DL (ref 6–23)
CALCIUM SERPL-MCNC: 9.7 MG/DL (ref 8.6–10.3)
CARDIAC TROPONIN I PNL SERPL HS: 11 NG/L (ref 0–13)
CARDIAC TROPONIN I PNL SERPL HS: 11 NG/L (ref 0–13)
CHLORIDE SERPL-SCNC: 101 MMOL/L (ref 98–107)
CO2 SERPL-SCNC: 32 MMOL/L (ref 21–32)
CREAT SERPL-MCNC: 0.67 MG/DL (ref 0.5–1.05)
EGFRCR SERPLBLD CKD-EPI 2021: >90 ML/MIN/1.73M*2
EOSINOPHIL # BLD AUTO: 0.11 X10*3/UL (ref 0–0.7)
EOSINOPHIL NFR BLD AUTO: 1.7 %
ERYTHROCYTE [DISTWIDTH] IN BLOOD BY AUTOMATED COUNT: 15.8 % (ref 11.5–14.5)
GLUCOSE SERPL-MCNC: 245 MG/DL (ref 74–99)
HCT VFR BLD AUTO: 39.7 % (ref 36–46)
HGB BLD-MCNC: 12.5 G/DL (ref 12–16)
IMM GRANULOCYTES # BLD AUTO: 0.02 X10*3/UL (ref 0–0.7)
IMM GRANULOCYTES NFR BLD AUTO: 0.3 % (ref 0–0.9)
INR PPP: 2.1 (ref 0.9–1.1)
LYMPHOCYTES # BLD AUTO: 1.14 X10*3/UL (ref 1.2–4.8)
LYMPHOCYTES NFR BLD AUTO: 17.3 %
MAGNESIUM SERPL-MCNC: 1.8 MG/DL (ref 1.6–2.4)
MCH RBC QN AUTO: 30.9 PG (ref 26–34)
MCHC RBC AUTO-ENTMCNC: 31.5 G/DL (ref 32–36)
MCV RBC AUTO: 98 FL (ref 80–100)
MONOCYTES # BLD AUTO: 0.62 X10*3/UL (ref 0.1–1)
MONOCYTES NFR BLD AUTO: 9.4 %
NEUTROPHILS # BLD AUTO: 4.66 X10*3/UL (ref 1.2–7.7)
NEUTROPHILS NFR BLD AUTO: 70.7 %
NRBC BLD-RTO: 0 /100 WBCS (ref 0–0)
P AXIS: 71 DEGREES
P OFFSET: 196 MS
P ONSET: 141 MS
PLATELET # BLD AUTO: 252 X10*3/UL (ref 150–450)
POTASSIUM SERPL-SCNC: 2.9 MMOL/L (ref 3.5–5.3)
PR INTERVAL: 152 MS
PROT SERPL-MCNC: 6.5 G/DL (ref 6.4–8.2)
PROTHROMBIN TIME: 23.4 SECONDS (ref 9.8–12.8)
Q ONSET: 217 MS
QRS COUNT: 15 BEATS
QRS DURATION: 74 MS
QT INTERVAL: 362 MS
QTC CALCULATION(BAZETT): 454 MS
QTC FREDERICIA: 421 MS
R AXIS: 14 DEGREES
RBC # BLD AUTO: 4.05 X10*6/UL (ref 4–5.2)
SODIUM SERPL-SCNC: 141 MMOL/L (ref 136–145)
T AXIS: 25 DEGREES
T OFFSET: 398 MS
VENTRICULAR RATE: 95 BPM
WBC # BLD AUTO: 6.6 X10*3/UL (ref 4.4–11.3)

## 2024-04-08 PROCEDURE — 84484 ASSAY OF TROPONIN QUANT: CPT | Performed by: PHYSICIAN ASSISTANT

## 2024-04-08 PROCEDURE — 96365 THER/PROPH/DIAG IV INF INIT: CPT

## 2024-04-08 PROCEDURE — 80053 COMPREHEN METABOLIC PANEL: CPT | Performed by: PHYSICIAN ASSISTANT

## 2024-04-08 PROCEDURE — 85610 PROTHROMBIN TIME: CPT | Performed by: PHYSICIAN ASSISTANT

## 2024-04-08 PROCEDURE — 96368 THER/DIAG CONCURRENT INF: CPT

## 2024-04-08 PROCEDURE — 96366 THER/PROPH/DIAG IV INF ADDON: CPT

## 2024-04-08 PROCEDURE — 94762 N-INVAS EAR/PLS OXIMTRY CONT: CPT

## 2024-04-08 PROCEDURE — 83880 ASSAY OF NATRIURETIC PEPTIDE: CPT | Performed by: PHYSICIAN ASSISTANT

## 2024-04-08 PROCEDURE — 71045 X-RAY EXAM CHEST 1 VIEW: CPT

## 2024-04-08 PROCEDURE — 1036F TOBACCO NON-USER: CPT | Performed by: NURSE PRACTITIONER

## 2024-04-08 PROCEDURE — 71045 X-RAY EXAM CHEST 1 VIEW: CPT | Performed by: RADIOLOGY

## 2024-04-08 PROCEDURE — 3052F HG A1C>EQUAL 8.0%<EQUAL 9.0%: CPT | Performed by: NURSE PRACTITIONER

## 2024-04-08 PROCEDURE — 93005 ELECTROCARDIOGRAM TRACING: CPT

## 2024-04-08 PROCEDURE — 85025 COMPLETE CBC W/AUTO DIFF WBC: CPT | Performed by: PHYSICIAN ASSISTANT

## 2024-04-08 PROCEDURE — 87185 SC STD ENZYME DETCJ PER NZM: CPT | Mod: SAMLAB | Performed by: PHYSICIAN ASSISTANT

## 2024-04-08 PROCEDURE — 3008F BODY MASS INDEX DOCD: CPT | Performed by: NURSE PRACTITIONER

## 2024-04-08 PROCEDURE — 83735 ASSAY OF MAGNESIUM: CPT | Performed by: PHYSICIAN ASSISTANT

## 2024-04-08 PROCEDURE — 3060F POS MICROALBUMINURIA REV: CPT | Performed by: NURSE PRACTITIONER

## 2024-04-08 PROCEDURE — 2500000002 HC RX 250 W HCPCS SELF ADMINISTERED DRUGS (ALT 637 FOR MEDICARE OP, ALT 636 FOR OP/ED): Performed by: EMERGENCY MEDICINE

## 2024-04-08 PROCEDURE — 99213 OFFICE O/P EST LOW 20 MIN: CPT | Performed by: NURSE PRACTITIONER

## 2024-04-08 PROCEDURE — 2500000005 HC RX 250 GENERAL PHARMACY W/O HCPCS: Performed by: EMERGENCY MEDICINE

## 2024-04-08 PROCEDURE — 99284 EMERGENCY DEPT VISIT MOD MDM: CPT | Mod: 25

## 2024-04-08 PROCEDURE — 85730 THROMBOPLASTIN TIME PARTIAL: CPT | Performed by: PHYSICIAN ASSISTANT

## 2024-04-08 PROCEDURE — 94664 DEMO&/EVAL PT USE INHALER: CPT

## 2024-04-08 PROCEDURE — 36415 COLL VENOUS BLD VENIPUNCTURE: CPT | Performed by: PHYSICIAN ASSISTANT

## 2024-04-08 PROCEDURE — 2500000004 HC RX 250 GENERAL PHARMACY W/ HCPCS (ALT 636 FOR OP/ED): Performed by: EMERGENCY MEDICINE

## 2024-04-08 RX ORDER — CLINDAMYCIN HYDROCHLORIDE 300 MG/1
300 CAPSULE ORAL 4 TIMES DAILY
Qty: 40 CAPSULE | Refills: 0 | Status: SHIPPED | OUTPATIENT
Start: 2024-04-08 | End: 2024-04-18

## 2024-04-08 RX ORDER — CEPHALEXIN 500 MG/1
500 CAPSULE ORAL 4 TIMES DAILY
Qty: 40 CAPSULE | Refills: 0 | Status: SHIPPED | OUTPATIENT
Start: 2024-04-08 | End: 2024-04-17 | Stop reason: ALTCHOICE

## 2024-04-08 RX ORDER — POTASSIUM CHLORIDE 14.9 MG/ML
20 INJECTION INTRAVENOUS ONCE
Status: COMPLETED | OUTPATIENT
Start: 2024-04-08 | End: 2024-04-08

## 2024-04-08 RX ORDER — POTASSIUM CHLORIDE 20 MEQ/1
40 TABLET, EXTENDED RELEASE ORAL ONCE
Status: COMPLETED | OUTPATIENT
Start: 2024-04-08 | End: 2024-04-08

## 2024-04-08 RX ORDER — VANCOMYCIN 2 GRAM/500 ML IN 0.9 % SODIUM CHLORIDE INTRAVENOUS
2000 ONCE
Status: COMPLETED | OUTPATIENT
Start: 2024-04-08 | End: 2024-04-08

## 2024-04-08 RX ORDER — NYSTATIN 100000 [USP'U]/G
1 POWDER TOPICAL 2 TIMES DAILY
Qty: 60 G | Refills: 0 | Status: SHIPPED | OUTPATIENT
Start: 2024-04-08 | End: 2025-04-08

## 2024-04-08 RX ADMIN — POTASSIUM CHLORIDE 40 MEQ: 1500 TABLET, EXTENDED RELEASE ORAL at 16:23

## 2024-04-08 RX ADMIN — Medication 4 L/MIN: at 16:03

## 2024-04-08 RX ADMIN — POTASSIUM CHLORIDE 20 MEQ: 14.9 INJECTION, SOLUTION INTRAVENOUS at 16:26

## 2024-04-08 RX ADMIN — Medication 2000 MG: at 16:50

## 2024-04-08 ASSESSMENT — ENCOUNTER SYMPTOMS
RESPIRATORY NEGATIVE: 1
ENDOCRINE NEGATIVE: 1
ARTHRALGIAS: 1
PSYCHIATRIC NEGATIVE: 1
HEMATOLOGIC/LYMPHATIC NEGATIVE: 1
CONSTITUTIONAL NEGATIVE: 1

## 2024-04-08 ASSESSMENT — PAIN - FUNCTIONAL ASSESSMENT: PAIN_FUNCTIONAL_ASSESSMENT: 0-10

## 2024-04-08 ASSESSMENT — PAIN DESCRIPTION - PROGRESSION: CLINICAL_PROGRESSION: NOT CHANGED

## 2024-04-08 ASSESSMENT — PAIN DESCRIPTION - LOCATION
LOCATION: GENERALIZED
LOCATION: GENERALIZED

## 2024-04-08 ASSESSMENT — PAIN SCALES - GENERAL
PAINLEVEL_OUTOF10: 10 - WORST POSSIBLE PAIN

## 2024-04-08 ASSESSMENT — COLUMBIA-SUICIDE SEVERITY RATING SCALE - C-SSRS
6. HAVE YOU EVER DONE ANYTHING, STARTED TO DO ANYTHING, OR PREPARED TO DO ANYTHING TO END YOUR LIFE?: NO
2. HAVE YOU ACTUALLY HAD ANY THOUGHTS OF KILLING YOURSELF?: NO
1. IN THE PAST MONTH, HAVE YOU WISHED YOU WERE DEAD OR WISHED YOU COULD GO TO SLEEP AND NOT WAKE UP?: NO

## 2024-04-08 NOTE — ED PROVIDER NOTES
HPI   Chief Complaint   Patient presents with    Wound Check     Pt comes in for a wound check. Pt states that she has been swelling since getting out of the hospital 2 wks ago. Pt states that she noticed a wound on her abdomin and that it was leaking       Patient presents with concern for infection to her abdominal wall pannus.  States in the past 2 days she has noticed increased pain and drainage to the area.  She denies any fever or chills.  She cannot physically look at the area and is uncertain whether there is a rash there.  She denies chest pain or shortness of breath.      History provided by:  Patient                      No data recorded                   Patient History   Past Medical History:   Diagnosis Date    Arthritis     Asthma     CHF (congestive heart failure) (CMS/[facility])     COPD (chronic obstructive pulmonary disease) (CMS/[facility])     Cough 2024    Diabetes mellitus (CMS/[facility])     Disease of thyroid gland     Hypertension     Shortness of breath 2024    Tachycardia 2024     Past Surgical History:   Procedure Laterality Date    CARPAL TUNNEL RELEASE Bilateral      SECTION, LOW TRANSVERSE       AND     COLONOSCOPY  2014    [facility]    HERNIA REPAIR      WITH MESH    HYSTERECTOMY      2 PARTIAL    KNEE SURGERY Left     MINISCUS REPAIR     Family History   Problem Relation Name Age of Onset    Blindness Mother      Hypertension Mother      Hyperlipidemia Mother      Heart disease Mother      Heart failure Father      Hypertension Father      Hyperlipidemia Father      Diabetes Father      Skin cancer Father      Blindness Maternal Grandmother      Hypertension Maternal Grandmother      Hyperlipidemia Maternal Grandmother      Heart failure Maternal Grandmother      Dementia Paternal Grandmother      Heart attack Paternal Grandfather      Hypertension Paternal Grandfather      Hyperlipidemia Paternal Grandfather       Social History     Tobacco  Use    Smoking status: Former     Packs/day: 1.00     Years: 37.00     Additional pack years: 0.00     Total pack years: 37.00     Types: Cigarettes     Start date: 1977     Quit date: 2014     Years since quittin.4    Smokeless tobacco: Never    Tobacco comments:     CBD vaping   Vaping Use    Vaping Use: Former   Substance Use Topics    Alcohol use: Yes     Comment: rarely    Drug use: Yes     Frequency: 7.0 times per week     Types: Marijuana     Comment: one or two bowels per day       Physical Exam   ED Triage Vitals [24 1614]   Temperature Heart Rate Respirations BP   36.8 °C (98.3 °F) (!) 104 (!) 24 169/85      Pulse Ox Temp Source Heart Rate Source Patient Position   94 % Oral Monitor Sitting      BP Location FiO2 (%)     Left arm --       Physical Exam  Vitals and nursing note reviewed.   Constitutional:       General: She is not in acute distress.     Appearance: Normal appearance. She is morbidly obese. She is not ill-appearing or toxic-appearing.      Comments: Poor personal hygiene   HENT:      Head: Normocephalic.      Right Ear: External ear normal.      Left Ear: External ear normal.      Nose: Nose normal.      Mouth/Throat:      Lips: Pink. No lesions.      Mouth: Mucous membranes are moist.   Eyes:      General: No scleral icterus.     Conjunctiva/sclera: Conjunctivae normal.   Cardiovascular:      Rate and Rhythm: Normal rate and regular rhythm.      Pulses:           Dorsalis pedis pulses are 2+ on the right side and 2+ on the left side.        Posterior tibial pulses are 2+ on the right side and 2+ on the left side.      Heart sounds: Normal heart sounds.   Pulmonary:      Effort: Pulmonary effort is normal.      Breath sounds: Normal breath sounds. Decreased air movement present.      Comments: On chronic home oxygen  Abdominal:      General: Bowel sounds are normal. There is no distension.      Palpations: Abdomen is soft.      Tenderness: There is no abdominal tenderness.  There is no right CVA tenderness, left CVA tenderness or guarding.       Skin:     General: Skin is warm.      Capillary Refill: Capillary refill takes less than 2 seconds.   Neurological:      General: No focal deficit present.      Mental Status: She is alert and oriented to person, place, and time.      Cranial Nerves: No cranial nerve deficit or facial asymmetry.      Sensory: No sensory deficit.      Motor: No weakness.      Gait: Gait normal.   Psychiatric:         Attention and Perception: Attention and perception normal.         Mood and Affect: Mood is anxious. Affect is angry.         Speech: Speech is rapid and pressured.         Behavior: Behavior is agitated. Behavior is cooperative.         Thought Content: Thought content normal.         Cognition and Memory: Cognition and memory normal.         ED Course & MDM   Diagnoses as of 04/07/24 2017   Cellulitis of abdominal wall       Medical Decision Making  Patient presents with concern for infection to her abdominal wall pannus.  States in the past 2 days she has noticed increased pain and drainage to the area.  She denies any fever or chills.  She cannot physically look at the area and is uncertain whether there is a rash there.  She denies chest pain or shortness of breath.    Ddx: Necrotizing fasciitis, cellulitis, abscess, fungal, other    Obtain labs and CT  No acute findings noted.  Patient's white count is normal.  Her vital signs are normal.  She has not been febrile nor tachycardic.  Patient is stable for discharge with outpatient treatment of doxycycline.  This angered the patient stating that if she lived in [place] she would be immediately admitted with broad-spectrum IV antibiotics awaiting culture of her skin.  Clinically there is no concern for necrotizing fasciitis or significant infection.  CT does not show any loculated collections.  White count is within normal limits and vitals are normal.  I have no reason to keep the patient in the  hospital.  It is reasonable to trial outpatient treatment first with oral doxycycline.  Patient became angry and agitated started yelling making derogatory comments about this hospital.  I stated that if there is something else I could help her with her be more than happy but I will not stand there and be her rest in this manner.  Patient did not have any further suggestions for better treatment.  She is demanding to be admitted.  Therefore patient was discharged from the department yelling at staff.    Amount and/or Complexity of Data Reviewed  Labs: ordered. Decision-making details documented in ED Course.  Radiology: ordered and independent interpretation performed. Decision-making details documented in ED Course.  ECG/medicine tests: ordered and independent interpretation performed. Decision-making details documented in ED Course.     Details: Read by myself showing normal sinus rhythm at a ventricular rate of 95 bpm.  Normal axis.  No ST segment elevation.  RI interval of 152 with a QT of 362    Risk  Prescription drug management.  Decision regarding hospitalization.  Diagnosis or treatment significantly limited by social determinants of health.        Procedure  Procedures     Grazyna Perla PA-C  04/07/24 2017

## 2024-04-08 NOTE — PROGRESS NOTES
Subjective    Patient ID: Roselia Mo is a 53 y.o. female.    Chief Complaint   Patient presents with    Right Shoulder - Follow-up     US RESULTS            HPI  Roselia is a pleasant 53-year-old female presenting today for follow-up visit of right shoulder pain.  Patient states she fell against a wall which was later discovered to be either a syncopal or seizure-like episode.  This occurred on 3/19/2024.  Patient was admitted to the hospital with seizure-like activity, hyponatremia, acute kidney injury, elevated liver enzymes, hyperglycemia.  Initial x-rays performed did not show any fracture of the shoulder.  Patient states shoulder pain continues.  Patient states she has concern for systemic infection.  States she was in the ED yesterday with swelling of the left breast and abdomen.  She also states she is draining fluid from the mid abdominal crease with foul odor.  Denies fever or chills, but reports myalgias similar to illness in the past.    Review of Systems   Constitutional: Negative.    HENT: Negative.     Respiratory: Negative.     Cardiovascular:  Positive for leg swelling.   Endocrine: Negative.    Musculoskeletal:  Positive for arthralgias and gait problem.   Skin: Negative.    Hematological: Negative.    Psychiatric/Behavioral: Negative.         Objective   Right Shoulder Exam     Tenderness   The patient is experiencing tenderness in the biceps tendon.    Range of Motion   Active abduction:  110   External rotation:  30   Forward flexion:  130     Tests   Cross arm: positive  Drop arm: negative  Sulcus: absent    Other   Erythema: absent  Sensation: normal  Pulse: present    Comments:  Symptoms are aggravated with rotator cuff testing, anterior shoulder biceps tendon.  Redness to upper arm has improved.  Full range of motion of the elbow is present, however it does aggravate the upper arm and anterior shoulder.  Full range of motion of the wrist with distal motor or sensory intact.  Cap refill  at 2 to 3 seconds.      Left Shoulder Exam   Left shoulder exam is normal.             Image Results:     FINDINGS:  RIGHT SHOULDER-AP Y, INTERNAL GRASHEY VIEWS  The glenohumeral joint is normal in width.  No fracture or  dislocation is seen. The AC joint is intact. The distal humerus is  not adequately visualized and appears abnormal. Possibility of a  distal humeral fracture should ruled out. AP and lateral views of the  distal or elbow joint are recommended.      IMPRESSION:  No fracture in the shoulder. Abnormal distal right humerus for  further evaluation is recommended.      FINDINGS:  Four views of the right elbow.      No acute fracture. No dislocation. No joint effusion. Mild  ulnotrochlear, radiocapitellar, and proximal radioulnar  osteoarthrosis.      IMPRESSION:  Mild osteoarthrosis of the elbow.      MACRO:  None      Signed by: Kofi Guallpa 3/30/2024 4:00 PM  Dictation workstation:   [id number]      MACRO:  None      Signed by: Andrey Trotter 3/20/2024 4:06 PM  Dictation workstation:   [id number]    Indications  Priority: Routine  RUE SWELLING   Dx: Right upper limb pain [M79.601 (ICD-10-CM)]; Generalized edema [R60.1 (ICD-10-CM)]   Comments: NEEDS TO BE COMPLETED WITHIN IN 48 HOURS FROM TODAY.     PACS Images     Show images for Upper extremity venous duplex right  Interpretation [place] [address]  Phone [phone], Fax [phone]        Vascular Lab Report     VASC US UPPER EXTREMITY VENOUS DUPLEX RIGHT     Patient Name:      RAY GUERRA       Reading Physician:  [id number] Osiel Winn MD, RPVI  Study Date:        4/5/2024              Ordering Provider:  [id number] JOSÉ MANUEL MADISON  MRN/PID:           [id number]              Fellow:  Accession#:        [id number]          Technologist:        Baylee Lawson                                                               RVT/AB  Date of Birth/Age: 1970 / 53 years Technologist 2:  Gender:            F                     Encounter#:         [id number]  Admission Status:  Outpatient            Location Performed: [facility]        Diagnosis/ICD: Generalized edema (anasarca)-R60.1  CPT Codes:     36742 Peripheral venous duplex scan for DVT Limited        CONCLUSIONS:  Right Upper Venous: No evidence of acute deep vein thrombus visualized in the right upper extremity.  Left Upper Venous: The subclavian vein demonstrates a normal spontaneous and phasic flow.     Imaging & Doppler Findings:     Right               Compressible Thrombus        Flow  Internal Jugular        Yes        None   Spontaneous/Phasic  Subclavian              Yes        None   Spontaneous/Phasic  Subclavian Proximal     Yes        None   Spontaneous/Phasic  Subclavian Mid          Yes        None   Spontaneous/Phasic  Subclavian Distal       Yes        None   Spontaneous/Phasic  Axillary                Yes        None       Continuous  Brachial                Yes        None       Continuous  Cephalic                Yes        None  Basilic                 Yes        None       Continuous        Left                Compress Thrombus        Flow  Subclavian Proximal   Yes      None   Spontaneous/Phasic        [id number] Osiel Winn MD, RPVI  Electronically signed by [id number] Osiel Winn MD, RPVI on 4/5/2024 at 8:38:23 AM           ** Final **      Chemistry    Lab Results   Component Value Date/Time     04/07/2024 1647    K 3.2 (L) 04/07/2024 1647     04/07/2024 1647    CO2 33 (H) 04/07/2024 1647    BUN 9 04/07/2024 1647    CREATININE 0.77 04/07/2024 1647    Lab Results   Component Value Date/Time    CALCIUM 9.6 04/07/2024 1647    ALKPHOS 314 (H) 04/07/2024 1647    AST 23 04/07/2024 1647    ALT 14  04/07/2024 1647    BILITOT 0.4 04/07/2024 1647         Lab Results   Component Value Date    WBC 5.9 04/07/2024    HGB 12.1 04/07/2024    HCT 38.5 04/07/2024     04/07/2024     04/07/2024        Assessment/Plan   Problem List Items Addressed This Visit             ICD-10-CM    Rotator cuff tendonitis, right M75.81    Relevant Orders    Point of Care Ultrasound (Completed)     Problem List Items Addressed This Visit             ICD-10-CM    Rotator cuff tendonitis, right - Primary M75.81     We discussed symptom control with OTC Tylenol and topical pain relievers.  Instructed on 2 grams diclofenac gel 4 times daily.  Patient may also use heat or ice, whichever offers better relief.  Continue gentle range of motion as tolerated.  Reviewed findings of duplex ultrasound, DVT present in the right upper extremity.  We did discuss possibility of cortisone for shoulder symptoms, however this is being deferred today due to patient concern for systemic infection.  I am recommending the patient to follow-up either with PCP, home monitoring provider and/or ER regarding her concern for significant overnight weight gain >10#.  Plan will be to follow-up on as needed basis to reassess for cortisone injection for shoulder pain and swelling.  Patient in agreement with plan of care.  This note was generated using Dragon software.  It may contain errors in wording, punctuation or spelling.          Relevant Orders    Point of Care Ultrasound (Completed)

## 2024-04-08 NOTE — ASSESSMENT & PLAN NOTE
We discussed symptom control with OTC Tylenol and topical pain relievers.  Instructed on 2 grams diclofenac gel 4 times daily.  Patient may also use heat or ice, whichever offers better relief.  Continue gentle range of motion as tolerated.  Reviewed findings of duplex ultrasound, DVT present in the right upper extremity.  We did discuss possibility of cortisone for shoulder symptoms, however this is being deferred today due to patient concern for systemic infection.  I am recommending the patient to follow-up either with PCP, home monitoring provider and/or ER regarding her concern for significant overnight weight gain >10#.  Plan will be to follow-up on as needed basis to reassess for cortisone injection for shoulder pain and swelling.  Patient in agreement with plan of care.  This note was generated using Dragon software.  It may contain errors in wording, punctuation or spelling.

## 2024-04-08 NOTE — ED PROVIDER NOTES
HPI   Chief Complaint   Patient presents with    Wound Check     Pt states she was here as a inpatient 3 weeks and and ever since she was discharged she has been swelling up. Also has wounds under her left breast, and abd that is draining and has a smell. She worried about having an infection. Denies fevers. 30lb weight gain over night. Hx of CHF, DM, COPD.  More SOB than normal.        Patient is a 53-year-old female who presents to the emergency department with a chief complaint of evaluation of her abdominal pannus and also evaluation for increased weight gain.  Patient states that she has diffuse edema.  She states that she was seen yesterday in the emergency department in which she was placed on oral antibiotics for potential infection of her pannus.  She had a CT scan of her abdomen pelvis performed yesterday which showed some mild edema otherwise unremarkable.  She denies any fever or chills.  She reports that she has had increased weight gain.  She states that she was recently discharged from the hospital for hyponatremia and was taken off her Lasix.  She states that since she has been off of her Lasix she has had increased fluid retention.  She is chronically on 4 L of oxygen.  She denies any chest pain.                          No data recorded                   Patient History   Past Medical History:   Diagnosis Date    Arthritis     Asthma     CHF (congestive heart failure) (CMS/[facility])     COPD (chronic obstructive pulmonary disease) (CMS/[facility])     Cough 2024    Diabetes mellitus (CMS/[facility])     Disease of thyroid gland     Hypertension     Shortness of breath 2024    Tachycardia 2024     Past Surgical History:   Procedure Laterality Date    CARPAL TUNNEL RELEASE Bilateral      SECTION, LOW TRANSVERSE       AND     COLONOSCOPY  2014    [facility]    HERNIA REPAIR      WITH MESH    HYSTERECTOMY      2 PARTIAL    KNEE SURGERY Left     MINISCUS REPAIR      Family History   Problem Relation Name Age of Onset    Blindness Mother      Hypertension Mother      Hyperlipidemia Mother      Heart disease Mother      Heart failure Father      Hypertension Father      Hyperlipidemia Father      Diabetes Father      Skin cancer Father      Blindness Maternal Grandmother      Hypertension Maternal Grandmother      Hyperlipidemia Maternal Grandmother      Heart failure Maternal Grandmother      Dementia Paternal Grandmother      Heart attack Paternal Grandfather      Hypertension Paternal Grandfather      Hyperlipidemia Paternal Grandfather       Social History     Tobacco Use    Smoking status: Former     Packs/day: 1.00     Years: 37.00     Additional pack years: 0.00     Total pack years: 37.00     Types: Cigarettes     Start date: 1977     Quit date: 2014     Years since quittin.4    Smokeless tobacco: Never    Tobacco comments:     CBD vaping   Vaping Use    Vaping Use: Former   Substance Use Topics    Alcohol use: Yes     Comment: rarely    Drug use: Yes     Frequency: 7.0 times per week     Types: Marijuana     Comment: one or two bowels per day       Physical Exam   ED Triage Vitals [24 1510]   Temp Heart Rate Respirations BP   -- 86 20 169/77      Pulse Ox Temp src Heart Rate Source Patient Position   95 % -- -- Sitting      BP Location FiO2 (%)     Left arm --       Physical Exam  Vitals and nursing note reviewed.   Constitutional:       General: She is not in acute distress.     Appearance: Normal appearance. She is well-developed.   HENT:      Head: Normocephalic and atraumatic.      Nose: No congestion or rhinorrhea.   Eyes:      Extraocular Movements: Extraocular movements intact.      Conjunctiva/sclera: Conjunctivae normal.      Pupils: Pupils are equal, round, and reactive to light.   Neck:      Vascular: No carotid bruit.   Cardiovascular:      Rate and Rhythm: Normal rate and regular rhythm.      Heart sounds: No murmur heard.  Pulmonary:       Effort: Pulmonary effort is normal. No respiratory distress.      Breath sounds: Normal breath sounds. No stridor. No wheezing, rhonchi or rales.   Chest:      Chest wall: No tenderness.   Abdominal:      General: Abdomen is flat. There is no distension.      Palpations: Abdomen is soft. There is no mass.      Tenderness: There is no abdominal tenderness. There is no guarding or rebound.      Hernia: No hernia is present.          Comments: Fold/indentation in abdominal pannus with mild erythema, slightly moist, some satellite lesions, no obvious drainage.  No abscess or fluctuance   Musculoskeletal:         General: No swelling.      Cervical back: Normal range of motion and neck supple. No rigidity or tenderness.   Lymphadenopathy:      Cervical: No cervical adenopathy.   Skin:     General: Skin is warm and dry.      Capillary Refill: Capillary refill takes less than 2 seconds.      Coloration: Skin is not jaundiced or pale.      Findings: No bruising, erythema, lesion or rash.   Neurological:      General: No focal deficit present.      Mental Status: She is alert.   Psychiatric:         Mood and Affect: Mood normal.         ED Course & MDM   Diagnoses as of 04/08/24 1801   Hypokalemia   Candidiasis of skin   Abdominal wall cellulitis       Medical Decision Making  Patient is a 53-year-old female who presents to the emergency department with a chief complaint of skin to her abdominal pannus and also increased edema.  On physical examination patient has erythema noted to her abdominal fold pannus.  There is some slight increased thickening of the skin.  Likely consistent with a candida skin infection along with cellulitis.  Patient had a CT scan of abdomen pelvis yesterday which showed abdominal wall edema.  No other concerning findings.Patient has no leukocytosis.  Initial and repeat troponin are within normal limits.  Patient's potassium was noted to be 2.9.  With she was given IV potassium replacement.  In  addition she was given a dose of IV antibiotics for potential abdominal wall cellulitis.  She will be discharged home with recommended follow-up with primary care physician return for any new or worsening symptoms.  She will be discharged home with a prescription for nystatin powder and oral antibiotics.  She was prescribed doxycycline yesterday which she is adamant that this does not worsen and she was instructed to stop taking this and prescribed antibiotics.  Patient also complains of left breast swelling which is not new.  On exam it is noted that her left breast is slightly larger than the right.  An outpatient ultrasound of the left breast was ordered.  She was instructed to have this performed as an outpatient and to follow-up with primary care physician.  Patient was seen by myself along with attending physician, Dr. Pham    Amount and/or Complexity of Data Reviewed  Labs: ordered. Decision-making details documented in ED Course.  Radiology: ordered. Decision-making details documented in ED Course.  ECG/medicine tests: ordered and independent interpretation performed.        Procedure  ECG 12 Lead    Performed by: Naty Vogt PA-C  Authorized by: Naty Vogt PA-C    Comments:      EKG shows normal sinus rhythm with a rate of 95 bpm.  Occasional PVCs.  Parables 1 3 2 ms and QRS duration is 74 ms.  No ST elevation.  EKG interpretation per myself, Naty Vogt PA-C  04/08/24 1399

## 2024-04-09 ENCOUNTER — PATIENT OUTREACH (OUTPATIENT)
Dept: HOME HEALTH SERVICES | Age: 54
End: 2024-04-09

## 2024-04-09 VITALS — WEIGHT: 293 LBS | BODY MASS INDEX: 75.36 KG/M2

## 2024-04-09 NOTE — PROGRESS NOTES
Daily Call Note: daily outreach call completed with patient, she states she is feeling better today had a visit to ER last evening for c/o  abdominal swelling redness around breast, was treated in ER with IV vancomycin also given Oral antibiotics for home ,also given nystatin for sore area under breasts.states she is feeling better c/o low potassium level while in ER fluids given, had labs drawn , has cardiology, pcp and nephrology, endocrinology appts scheduled , reminded her the importance in keeping these appointments. Reminded of [facility] tomorrow 4/10 @ 1100    Pt Education: self care  Barriers:   Topics for Daily Review: importance of self care  Pt demonstrates clear understanding: Yes    Daily Weight:  There were no vitals filed for this visit.   Last 3 Weights:  Wt Readings from Last 7 Encounters:   04/08/24 (!) 187 kg (412 lb)   04/07/24 (!) 188 kg (414 lb 6.4 oz)   04/06/24 (!) 188 kg (415 lb)   04/05/24 (!) 187 kg (411 lb 15.9 oz)   04/04/24 (!) 189 kg (416 lb)   04/02/24 (!) 186 kg (409 lb)   04/01/24 (!) 176 kg (387 lb)       Masimo Device: No   Masimo Clinical Impression:     Virtual Visits--Scheduled (Most Recent Date at Top)  Follow up Appointments  Recent Visits  Date Type Provider Dept   04/02/24 Office Visit Ana Knight MD [facility]   03/26/24 Office Visit Ana Knight MD [facility]   03/19/24 Office Visit Ana Knight MD [facility]   03/13/24 Office Visit Ana Knight MD [facility]   Showing recent visits within past 30 days and meeting all other requirements  Future Appointments  Date Type Provider Dept   04/16/24 Appointment MD [facility]   Showing future appointments within next 90 days and meeting all other requirements       Frequency of RN Calls & Virtual Visits per Team Agreement: Healthy at Home Frequency: Daily    Medication issues Addressed (what was done): continue to complete all antibiotic  prescriptions    Follow up appointments scheduled by [facility] Staff:   Referrals made by [facility] staff:           Patient's Address:   [address] [place] [id number]  **  If this is not the address patient will receive services - alert team and address in EMR**       Patient Contacts:  Extended Emergency Contact Information  Primary Emergency Contact: Karla Luna  Address:             [address] [place]            [place] [address] [facility]  Home Phone: [phone]  Mobile Phone: [phone]  Relation: Parent  Preferred language: English   needed? No  Secondary Emergency Contact: RYAN GUERRA  Mobile Phone: [phone]  Relation: Spouse  Preferred language: English   needed? No                                Patient's Preferred Phone: [phone]  Patient's E-mail: No e-mail address on record

## 2024-04-10 ENCOUNTER — OFFICE VISIT (OUTPATIENT)
Dept: NEPHROLOGY | Facility: CLINIC | Age: 54
End: 2024-04-10
Payer: MEDICARE

## 2024-04-10 ENCOUNTER — PATIENT OUTREACH (OUTPATIENT)
Dept: HOME HEALTH SERVICES | Age: 54
End: 2024-04-10

## 2024-04-10 ENCOUNTER — TELEMEDICINE (OUTPATIENT)
Dept: PHARMACY | Facility: HOSPITAL | Age: 54
End: 2024-04-10
Payer: MEDICARE

## 2024-04-10 VITALS
HEART RATE: 68 BPM | WEIGHT: 293 LBS | SYSTOLIC BLOOD PRESSURE: 118 MMHG | BODY MASS INDEX: 53.92 KG/M2 | HEIGHT: 62 IN | OXYGEN SATURATION: 98 % | DIASTOLIC BLOOD PRESSURE: 68 MMHG

## 2024-04-10 DIAGNOSIS — I50.812 CHRONIC RIGHT-SIDED CONGESTIVE HEART FAILURE (MULTI): Primary | ICD-10-CM

## 2024-04-10 DIAGNOSIS — E11.65 TYPE 2 DIABETES MELLITUS WITH HYPERGLYCEMIA, WITH LONG-TERM CURRENT USE OF INSULIN (MULTI): ICD-10-CM

## 2024-04-10 DIAGNOSIS — R60.1 ANASARCA: Primary | ICD-10-CM

## 2024-04-10 DIAGNOSIS — Z79.4 TYPE 2 DIABETES MELLITUS WITH HYPERGLYCEMIA, WITH LONG-TERM CURRENT USE OF INSULIN (MULTI): ICD-10-CM

## 2024-04-10 DIAGNOSIS — I50.32 CHRONIC DIASTOLIC HEART FAILURE (MULTI): ICD-10-CM

## 2024-04-10 DIAGNOSIS — K76.0 NONALCOHOLIC FATTY LIVER: ICD-10-CM

## 2024-04-10 DIAGNOSIS — G47.33 OBSTRUCTIVE SLEEP APNEA SYNDROME: ICD-10-CM

## 2024-04-10 PROCEDURE — 3074F SYST BP LT 130 MM HG: CPT | Performed by: INTERNAL MEDICINE

## 2024-04-10 PROCEDURE — 1036F TOBACCO NON-USER: CPT | Performed by: INTERNAL MEDICINE

## 2024-04-10 PROCEDURE — 3078F DIAST BP <80 MM HG: CPT | Performed by: INTERNAL MEDICINE

## 2024-04-10 PROCEDURE — 3060F POS MICROALBUMINURIA REV: CPT | Performed by: INTERNAL MEDICINE

## 2024-04-10 PROCEDURE — 3008F BODY MASS INDEX DOCD: CPT | Performed by: INTERNAL MEDICINE

## 2024-04-10 PROCEDURE — 99214 OFFICE O/P EST MOD 30 MIN: CPT | Performed by: INTERNAL MEDICINE

## 2024-04-10 PROCEDURE — 3052F HG A1C>EQUAL 8.0%<EQUAL 9.0%: CPT | Performed by: INTERNAL MEDICINE

## 2024-04-10 RX ORDER — DOXYCYCLINE 100 MG/1
100 CAPSULE ORAL 2 TIMES DAILY
Qty: 42 CAPSULE | Refills: 0 | Status: SHIPPED | OUTPATIENT
Start: 2024-04-10 | End: 2024-04-17 | Stop reason: ALTCHOICE

## 2024-04-10 RX ORDER — POTASSIUM CHLORIDE 750 MG/1
TABLET, FILM COATED, EXTENDED RELEASE ORAL
COMMUNITY
End: 2024-05-12 | Stop reason: HOSPADM

## 2024-04-10 RX ORDER — BUMETANIDE 1 MG/1
1 TABLET ORAL 2 TIMES DAILY
Qty: 180 TABLET | Refills: 3 | Status: SHIPPED | OUTPATIENT
Start: 2024-04-10 | End: 2024-04-16 | Stop reason: SDUPTHER

## 2024-04-10 ASSESSMENT — ENCOUNTER SYMPTOMS
GASTROINTESTINAL NEGATIVE: 1
MUSCULOSKELETAL NEGATIVE: 1
SHORTNESS OF BREATH: 1
ALLERGIC/IMMUNOLOGIC NEGATIVE: 1
HEMATOLOGIC/LYMPHATIC NEGATIVE: 1
ENDOCRINE NEGATIVE: 1
NEUROLOGICAL NEGATIVE: 1
EYES NEGATIVE: 1
CONSTITUTIONAL NEGATIVE: 1
PSYCHIATRIC NEGATIVE: 1

## 2024-04-10 NOTE — PROGRESS NOTES
Weekly [facility] call completed with Dr boo from pharmacy patient has had a couple ER visits over the past week due to multiple issues including shoulder pain and weakness and abdominal/ breast  issues she is currently on an antibiotic patient states she is having some new jaw discomfort patient states she has the chills all the time she did not check her temp patient is having some issues with getting sleep due to hip and shoulder pain she had a doctors appointment yesterday with an orthopedic surgeon who recommended getting the sores on her belly checked out in the ER glucose  this am 178 FBG she is on an insulin pump patient is concerned about her medications being to much reviewed medications ER visits and recent labs patient has appointment today with nephrology she will address her concerns with them no other medications needs questions and concerns addressed new invite sent for my chart per request next [facility] 4/17 @ 1100 frequency changed to m/w/f     Patient's Address:   [address] [place], [place] [id number]  **  If this is not the address patient will receive services - alert team and address in EMR**       Patient Contacts:  Extended Emergency Contact Information  Primary Emergency Contact: Karla Luna  Address: [facility] [address] [facility]            [address] [facility]  Home Phone: [phone]  Mobile Phone: [phone]  Relation: Parent  Preferred language: English   needed? No  Secondary Emergency Contact: RYAN GUERRA  Mobile Phone: [phone]  Relation: Spouse  Preferred language: English   needed? No                                Patient's Preferred Phone: [phone]  Patient's E-mail: No e-mail address on record

## 2024-04-10 NOTE — PROGRESS NOTES
Subjective   Has a skin infection  Swollen at this time  Having pain all over  States swelling is very bad for her  Lasix is not working at this time    Patient ID: Roselia Mo is a 53 y.o. female who presents for Follow-up (Hospital).  HPI  She is here for follow-up after a recent hospitalization in which I met her for low sodium levels  Blood work was obtained on April 8  She apparently had a ER visit at that time  Metabolic panel at that time shows a sodium level is 141.  Potassium is on the low side at 2.9 glucose elevated at 245 renal function normal  Sodium level has returned to normal and renal function also currently normal  Medications are reviewed she is on insulin allopurinol Lasix gabapentin magnesium metolazone metoprolol potassium a statin Coumadin  Review of Systems   Constitutional: Negative.    HENT: Negative.     Eyes: Negative.    Respiratory:  Positive for shortness of breath.    Cardiovascular:  Positive for leg swelling.   Gastrointestinal: Negative.    Endocrine: Negative.    Genitourinary: Negative.    Musculoskeletal: Negative.    Skin:  Positive for rash.   Allergic/Immunologic: Negative.    Neurological: Negative.    Hematological: Negative.    Psychiatric/Behavioral: Negative.         Objective   Physical Exam  Constitutional:       Appearance: Normal appearance. She is obese.   HENT:      Head: Normocephalic and atraumatic.      Right Ear: External ear normal.      Left Ear: External ear normal.      Nose: Nose normal.      Comments: Nasal cannula in place     Mouth/Throat:      Mouth: Mucous membranes are moist.      Pharynx: Oropharynx is clear.   Eyes:      Extraocular Movements: Extraocular movements intact.      Conjunctiva/sclera: Conjunctivae normal.      Pupils: Pupils are equal, round, and reactive to light.   Cardiovascular:      Rate and Rhythm: Normal rate and regular rhythm.   Pulmonary:      Effort: Pulmonary effort is normal.      Breath sounds: Normal breath sounds.    Abdominal:      General: Abdomen is flat.      Palpations: Abdomen is soft.   Musculoskeletal:         General: Swelling present.      Right lower leg: Edema present.      Left lower leg: Edema present.   Skin:     General: Skin is warm and dry.      Findings: Rash present.   Neurological:      General: No focal deficit present.      Mental Status: She is alert and oriented to person, place, and time.   Psychiatric:         Mood and Affect: Mood normal.         Behavior: Behavior normal.         Assessment/Plan   Problem List Items Addressed This Visit             ICD-10-CM    Chronic diastolic heart failure (CMS/HCC) I50.32    Nonalcoholic fatty liver K76.0    Obstructive sleep apnea syndrome G47.33    Anasarca - Primary R60.1     Stop Lasix and Metolazone  Start On Bumex 1 mg 2x/day  Finish Clindamycin and Keflex  Then Start Doxycycline  Recheck Labs in 1 month         Relevant Medications    bumetanide (Bumex) 1 mg tablet    doxycycline (Vibramycin) 100 mg capsule    Other Relevant Orders    Basic metabolic panel    Follow Up In Nephrology        Hyponatremia: Resolved  Hypokalemia  Diastolic CHF  Morbid Obesity  Lymphedema  HTN  DM II  Right Sided CHF  Pulmonary HTN  CY on CPAP    Pardeep Nichole DO 04/10/24 2:28 PM

## 2024-04-10 NOTE — ASSESSMENT & PLAN NOTE
Stop Lasix and Metolazone  Start On Bumex 1 mg 2x/day  Finish Clindamycin and Keflex  Then Start Doxycycline  Recheck Labs in 1 month

## 2024-04-10 NOTE — PROGRESS NOTES
Pharmacy Post-Discharge Visit - Follow Up     Roselia Mo is a 53 y.o. female was referred to Clinical Pharmacy Team to complete a post-discharge medication optimization and monitoring visit.  The patient was referred for their CHF and diabetes management while also enrolled in [facility]. She has also been in and out of the ED this past week due to a wound check and swelling so she was started on PO antibiotics.       Referring Provider: Rolando Iqbal*  PCP: Ana Knight MD - last visit: 3/26/24, next visit: 4/16      Subjective   Allergies   Allergen Reactions    Nickel Diarrhea, Nausea And Vomiting, Rash and GI Upset     Cobalt, white gold  Avoids most high Nickel foods not strictly, chooses to continue to use metal utensils.   Avoids green leafy vegetables, tap water, coffee, tea, oatmeal.  Wheat sometimes gives a rash but still eats.    No severe reaction to these foods only if eats too much get diarrhea.     Metformin Diarrhea, Other and Unknown    Dulaglutide Other     Pancreatitis  Does not avoid any foods related    Palladium Unknown     by allergy testing.  Does not avoid any foods related    Cobalt Rash    Exenatide Other     pancreantitis   pancreantitis    pancreantitis    Sitagliptin Other     pancreantitis       [id number] [place] [address]  Phone: [phone] Fax: [phone]      Medication System Management:  Affordability/Accessibility: no concerns with current meds she is taking   Adherence/Organization: no concerns       Social History     Social History Narrative    Not on file          HPI  Diabetes  She presents for her follow-up diabetic visit. She has type 2 diabetes mellitus. Her disease course has been stable. Hypoglycemia symptoms include hunger. There are no hypoglycemic complications. Risk factors for coronary artery disease include diabetes mellitus and obesity. Current diabetic treatment includes  insulin pump. She is compliant with treatment all of the time. Her weight is increasing rapidly. She is following a generally unhealthy, high salt and high fat/cholesterol diet. When asked about meal planning, she reported none. There is no change in her home blood glucose trend. Her overall blood glucose range is 70-90 mg/dl. An ACE inhibitor/angiotensin II receptor blocker is contraindicated.      CHF ASSESSMENT  Staging:  Most recent ejection fraction: 65%  NYHA Stage: II  ACC/AHA Stage:  n/a     Symptom Assessment:  Weight changes/edema?: Yes - has gained almost 30 lbs in in 1 month   Dyspnea?: With exertion  Dizziness/syncope/palpitations?: No     Current Regimen:  ARNI/ACEi/ARB: No  Beta Blocker: Yes - metoprolol  MRA: No  SGLT2i: No     Other therapy:  Lasix as needed      Secondary Prevention:  The 10-year ASCVD risk score (Romero HENRIQUEZ, et al., 2019) is: 6.1%    Values used to calculate the score:      Age: 53 years      Sex: Female      Is Non- : No      Diabetic: Yes      Tobacco smoker: No      Systolic Blood Pressure: 130 mmHg      Is BP treated: Yes      HDL Cholesterol: 47 mg/dL      Total Cholesterol: 200 mg/dL     Aspirin 81mg? no  Statin?: Yes - rosuvastatin  HTN?: No     Review of Systems        Objective     There were no vitals taken for this visit.   BP Readings from Last 4 Encounters:   04/08/24 (!) 147/97   04/07/24 144/78   04/04/24 148/79   04/03/24 133/80      There were no vitals filed for this visit.     LAB  Lab Results   Component Value Date    BILITOT 0.6 04/08/2024    CALCIUM 9.7 04/08/2024    CO2 32 04/08/2024     04/08/2024    CREATININE 0.67 04/08/2024    GLUCOSE 245 (H) 04/08/2024    ALKPHOS 285 (H) 04/08/2024    K 2.9 (LL) 04/08/2024    PROT 6.5 04/08/2024     04/08/2024    AST 18 04/08/2024    ALT 13 04/08/2024    BUN 9 04/08/2024    ANIONGAP 11 04/08/2024    MG 1.80 04/08/2024    PHOS 3.0 04/02/2024    ALBUMIN 3.7 04/08/2024    LIPASE 60  02/03/2024    GFRF 81 02/19/2023     Lab Results   Component Value Date    TRIG 485 (H) 02/01/2024    CHOL 200 (H) 02/01/2024    LDLCALC  02/01/2024      Comment:      The calculation of LDL and VLDL are inaccurate when the Triglycerides are greater than 400 mg/dL or when the patient is non-fasting. If LDL measurement is necessary contact the testing laboratory for an alternative LDL assay.                                  Near   Borderline      AGE      Desirable  Optimal    High     High     Very High     0-19 Y     0 - 109     ---    110-129   >/= 130     ----    20-24 Y     0 - 119     ---    120-159   >/= 160     ----      >24 Y     0 -  99   100-129  130-159   160-189     >/=190      HDL 47.0 02/01/2024     Lab Results   Component Value Date    HGBA1C 9.0 (H) 03/19/2024         Current Outpatient Medications   Medication Instructions    - refin regular (HumuLIN R U-500) 500 unit/mL CONCENTRATED - refill for patient own pump 1 each, subcutaneous, As needed, Take as directed per insulin instructions. Use U-500 insulin syringe.    albuterol 180 mcg, inhalation, Every 4 hours PRN    allopurinol (ZYLOPRIM) 300 mg, oral, Daily    buPROPion XL (WELLBUTRIN XL) 150 mg, oral, Every morning, Do not crush, chew, or split.    cephalexin (KEFLEX) 500 mg, oral, 4 times daily    cetirizine (ZYRTEC) 10 mg, oral, Daily    clindamycin (CLEOCIN) 300 mg, oral, 4 times daily    doxycycline (VIBRAMYCIN) 100 mg, oral, 2 times daily, Take with at least 8 ounces (large glass) of water, do not lie down for 30 minutes after    DULoxetine (CYMBALTA) 60 mg, oral, Daily, Do not crush or chew.    fluticasone (Flonase Sensimist) 27.5 mcg/actuation nasal spray 1-2 sprays, Each Nostril, Daily RT    furosemide (LASIX) 20 mg, oral, 2 times daily PRN    gabapentin (NEURONTIN) 900 mg, oral, 3 times daily    levothyroxine (SYNTHROID, LEVOXYL) 200 mcg, oral, Daily    levothyroxine (SYNTHROID, LEVOXYL) 50 mcg, oral, Daily before breakfast, TAKE WITH  200MG    magnesium oxide (MAG-OX) 400 mg, oral, 2 times daily    metOLazone (ZAROXOLYN) 2.5 mg, oral, Daily    metoprolol succinate XL (TOPROL-XL) 50 mg, oral, Daily, Do not crush or chew.    nystatin (Mycostatin) 100,000 unit/gram powder 1 Application, Topical, 2 times daily    ondansetron (ZOFRAN) 4 mg, oral, Every 8 hours PRN    oxygen (O2) gas therapy 1 each, inhalation, Every 12 hours    pantoprazole (PROTONIX) 40 mg, oral, Daily before breakfast, Do not crush, chew, or split.    rosuvastatin (CRESTOR) 40 mg, oral, Daily    topiramate (TOPAMAX) 25 mg, oral, 2 times daily    traZODone (DESYREL) 100 mg, oral, Nightly    triamcinolone (Kenalog) 0.1 % ointment Topical, 2 times daily PRN    warfarin (COUMADIN) 7.5 mg, oral, Daily, Take as directed per After Visit Summary.            Assessment/Plan   Problem List Items Addressed This Visit    None  CHF  GDMT --> only taking metoprolol currently   Was told to stop jardiance from endo but would benefit from SGLT2i for her chf as well --> will look into test claims for this   Continue lasix 20mg daily --> seeing nephrologist today so can discuss increasing her diuretic after being assessed and since kidney function has improved from her admission   Told she needs to limit her fluid intake (max 2L per day)  Also needs to avoid salt as much as possible   Continue daily weights and BP's  Nursing will continue daily calls to help with monitoring weight to see if further adjustments need made   Had to take another dose of metolazone over the weekend   DM  Sugars running a little more elevated this past week  Fasting today was 179  Has insulin pump for her humulin that is programmed and adjusted with her new endocrinologist  Her diet is not the best - a lot of pizza and fast food   Recommend more protein and vegetables   Will look into some weight loss options with her insurance as well     -she was started on PO keflex and clindamycin for 10 days for wound she has on her  belly       Follow Up: 1 week     Continue all meds under the continuation of care with the referring provider and clinical pharmacy team.    Ryan Woods PharmD     Verbal consent to manage patient's drug therapy was obtained from the patient. They were informed they may decline to participate or withdraw from participation in pharmacy services at any time.

## 2024-04-11 ENCOUNTER — OFFICE VISIT (OUTPATIENT)
Dept: CARDIOLOGY | Facility: CLINIC | Age: 54
End: 2024-04-11
Payer: MEDICARE

## 2024-04-11 VITALS
BODY MASS INDEX: 72.98 KG/M2 | DIASTOLIC BLOOD PRESSURE: 68 MMHG | OXYGEN SATURATION: 98 % | HEIGHT: 63 IN | SYSTOLIC BLOOD PRESSURE: 112 MMHG | HEART RATE: 81 BPM

## 2024-04-11 DIAGNOSIS — I50.32 CHRONIC DIASTOLIC HEART FAILURE (MULTI): ICD-10-CM

## 2024-04-11 DIAGNOSIS — I50.9 CHRONIC CONGESTIVE HEART FAILURE, UNSPECIFIED HEART FAILURE TYPE (MULTI): ICD-10-CM

## 2024-04-11 LAB
ATRIAL RATE: 95 BPM
B-LACTAMASE ORGANISM ISLT: POSITIVE
BACTERIA SPEC CULT: ABNORMAL
BACTERIA SPEC CULT: ABNORMAL
GRAM STN SPEC: ABNORMAL
GRAM STN SPEC: ABNORMAL
P AXIS: 60 DEGREES
P OFFSET: 199 MS
P ONSET: 151 MS
PR INTERVAL: 132 MS
Q ONSET: 217 MS
QRS COUNT: 16 BEATS
QRS DURATION: 74 MS
QT INTERVAL: 368 MS
QTC CALCULATION(BAZETT): 462 MS
QTC FREDERICIA: 428 MS
R AXIS: 32 DEGREES
T AXIS: 25 DEGREES
T OFFSET: 401 MS
VENTRICULAR RATE: 95 BPM

## 2024-04-11 PROCEDURE — 99214 OFFICE O/P EST MOD 30 MIN: CPT | Performed by: STUDENT IN AN ORGANIZED HEALTH CARE EDUCATION/TRAINING PROGRAM

## 2024-04-11 PROCEDURE — 3074F SYST BP LT 130 MM HG: CPT | Performed by: STUDENT IN AN ORGANIZED HEALTH CARE EDUCATION/TRAINING PROGRAM

## 2024-04-11 PROCEDURE — 3052F HG A1C>EQUAL 8.0%<EQUAL 9.0%: CPT | Performed by: STUDENT IN AN ORGANIZED HEALTH CARE EDUCATION/TRAINING PROGRAM

## 2024-04-11 PROCEDURE — 1036F TOBACCO NON-USER: CPT | Performed by: STUDENT IN AN ORGANIZED HEALTH CARE EDUCATION/TRAINING PROGRAM

## 2024-04-11 PROCEDURE — 3078F DIAST BP <80 MM HG: CPT | Performed by: STUDENT IN AN ORGANIZED HEALTH CARE EDUCATION/TRAINING PROGRAM

## 2024-04-11 PROCEDURE — 3008F BODY MASS INDEX DOCD: CPT | Performed by: STUDENT IN AN ORGANIZED HEALTH CARE EDUCATION/TRAINING PROGRAM

## 2024-04-11 PROCEDURE — 3060F POS MICROALBUMINURIA REV: CPT | Performed by: STUDENT IN AN ORGANIZED HEALTH CARE EDUCATION/TRAINING PROGRAM

## 2024-04-11 RX ORDER — LUBIPROSTONE 24 UG/1
24 CAPSULE ORAL
COMMUNITY
End: 2024-05-02 | Stop reason: SDUPTHER

## 2024-04-11 RX ORDER — METOLAZONE 5 MG/1
5 TABLET ORAL DAILY
COMMUNITY
End: 2024-04-17 | Stop reason: WASHOUT

## 2024-04-11 NOTE — PROGRESS NOTES
No chief complaint on file.    HPI:  I was requested by Dr. Knight to evaluate this patient in consultation for cardiac assessment.    ..Mrs. Roselia Mo is a 53 y.o. 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 Patient coming for establishment of cardiovascular follow up. She reports that she is currently treating a diffuse infection in her abdomen and L breast due to Staphylococcus. She endorses worsening in her leg edema due to the infection. She complains of chronic shortness of breath. She denies chest pain, lightheadedness, headaches, fever, chills, orthopnea, paroxysmal nocturnal dyspnea or syncope.     EKG shows normal sinus rhythm with no signs of ACS.   The echocardiogram (2024) showed normal LVEF 65% with no wall motion abnormalities.   Nuclear stress test (2024) is normal.     Past Medical History  Past Medical History:   Diagnosis Date    Arthritis     Asthma     CHF (congestive heart failure) (CMS/[facility])     COPD (chronic obstructive pulmonary disease) (CMS/[facility])     Cough 2024    Diabetes mellitus (CMS/[facility])     Disease of thyroid gland     Hypertension     Shortness of breath 2024    Tachycardia 2024       Past Surgical History  Past Surgical History:   Procedure Laterality Date    CARPAL TUNNEL RELEASE Bilateral      SECTION, LOW TRANSVERSE       AND     COLONOSCOPY  2014    [facility]    HERNIA REPAIR      WITH MESH    HYSTERECTOMY      2 PARTIAL    KNEE SURGERY Left     MINISCUS REPAIR       Past Family History  Family History   Problem Relation Name Age  of Onset    Blindness Mother      Hypertension Mother      Hyperlipidemia Mother      Heart disease Mother      Heart failure Father      Hypertension Father      Hyperlipidemia Father      Diabetes Father      Skin cancer Father      Blindness Maternal Grandmother      Hypertension Maternal Grandmother      Hyperlipidemia Maternal Grandmother      Heart failure Maternal Grandmother      Dementia Paternal Grandmother      Heart attack Paternal Grandfather      Hypertension Paternal Grandfather      Hyperlipidemia Paternal Grandfather         Allergy History  Allergies   Allergen Reactions    Dulaglutide Other     Pancreatitis  Does not avoid any foods related    Nickel Diarrhea, Nausea And Vomiting, Rash and GI Upset     [place], white gold  Avoids most high Nickel foods not strictly, chooses to continue to use metal utensils.   Avoids green leafy vegetables, tap water, coffee, tea, oatmeal.  Wheat sometimes gives a rash but still eats.    No severe reaction to these foods only if eats too much get diarrhea.     Metformin Diarrhea, Other and Unknown    Palladium Rash     by allergy testing.  Does not avoid any foods related    Cobalt Rash    Exenatide Other     pancreantitis   pancreantitis    pancreantitis    Sitagliptin Other     pancreantitis       Past Social History  Social History     Socioeconomic History    Marital status:      Spouse name: None    Number of children: None    Years of education: None    Highest education level: None   Occupational History    None   Tobacco Use    Smoking status: Former     Current packs/day: 0.00     Average packs/day: 1 pack/day for 37.8 years (37.8 ttl pk-yrs)     Types: Cigarettes     Start date: 1977     Quit date: 2014     Years since quittin.4    Smokeless tobacco: Never    Tobacco comments:     CBD vaping   Vaping Use    Vaping status: Former   Substance and Sexual Activity    Alcohol use: Yes     Comment: rarely    Drug use: Yes     Frequency: 7.0  times per week     Types: Marijuana     Comment: one or two bowels per day    Sexual activity: Defer   Other Topics Concern    None   Social History Narrative    None     Social Determinants of Health     Financial Resource Strain: Low Risk  (3/23/2024)    Overall Financial Resource Strain (CARDIA)     Difficulty of Paying Living Expenses: Not hard at all   Recent Concern: Financial Resource Strain - Medium Risk (3/20/2024)    Overall Financial Resource Strain (CARDIA)     Difficulty of Paying Living Expenses: Somewhat hard   Food Insecurity: No Food Insecurity (3/23/2024)    Hunger Vital Sign     Worried About Running Out of Food in the Last Year: Never true     Ran Out of Food in the Last Year: Never true   Transportation Needs: No Transportation Needs (3/20/2024)    PRAPARE - Transportation     Lack of Transportation (Medical): No     Lack of Transportation (Non-Medical): No   Physical Activity: Inactive (3/23/2024)    Exercise Vital Sign     Days of Exercise per Week: 0 days     Minutes of Exercise per Session: 0 min   Stress: Stress Concern Present (3/23/2024)    [employer] - Occupational Stress Questionnaire     Feeling of Stress : Very much   Social Connections: Unknown (3/23/2024)    Social Connection and Isolation Panel [NHANES]     Frequency of Communication with Friends and Family: Never     Frequency of Social Gatherings with Friends and Family: Never     Attends Temple Services: Never     Active Member of Clubs or Organizations: Yes     Attends Club or Organization Meetings: Never     Marital Status: Patient declined   Intimate Partner Violence: Patient Declined (3/23/2024)    Humiliation, Afraid, Rape, and Kick questionnaire     Fear of Current or Ex-Partner: Patient declined     Emotionally Abused: Patient declined     Physically Abused: Patient declined     Sexually Abused: Patient declined   Housing Stability: High Risk (3/20/2024)    Housing Stability Vital Sign      "Unable to Pay for Housing in the Last Year: Yes     Number of Places Lived in the Last Year: 2     Unstable Housing in the Last Year: Yes       Social History     Tobacco Use   Smoking Status Former    Current packs/day: 0.00    Average packs/day: 1 pack/day for 37.8 years (37.8 ttl pk-yrs)    Types: Cigarettes    Start date: 1977    Quit date: 2014    Years since quittin.4   Smokeless Tobacco Never   Tobacco Comments    CBD vaping       Review of Systems:  A total of 12 systems have been reviewed and are negative except for the aforementioned findings described in HPI.     Objective Data:  Last Recorded Vitals:  Vitals:    24 1454   BP: 112/68   Pulse: 81   SpO2: 98%   Height: 1.6 m (5' 3\")       Last Labs:  CBC - 2024:  3:39 PM  6.6 12.5 252    39.7      CMP - 2024:  3:39 PM  9.7 6.5 18 --- 0.6   3.0 3.7 13 285      PTT - 2024:  3:39 PM  2.1   23.4 48     TROPHS   Date/Time Value Ref Range Status   2024 04:41 PM 11 0 - 13 ng/L Final   2024 03:39 PM 11 0 - 13 ng/L Final   2024 04:47 PM 13 0 - 13 ng/L Final     BNP   Date/Time Value Ref Range Status   2024 03:39  0 - 99 pg/mL Final   2024 04:47  0 - 99 pg/mL Final     HGBA1C   Date/Time Value Ref Range Status   2024 06:20 PM 9.0 see below % Final   2024 12:31 PM 8.3 see below % Final     LDLCALC   Date/Time Value Ref Range Status   2024 04:50 PM   Final     Comment:     The calculation of LDL and VLDL are inaccurate when the Triglycerides are greater than 400 mg/dL or when the patient is non-fasting. If LDL measurement is necessary contact the testing laboratory for an alternative LDL assay.                                  Near   Borderline      AGE      Desirable  Optimal    High     High     Very High     0-19 Y     0 - 109     ---    110-129   >/= 130     ----    20-24 Y     0 - 119     ---    120-159   >/= 160     ----      >24 Y     0 -  99   100-129  130-159   160-189   "   >/=190       VLDL   Date/Time Value Ref Range Status   02/01/2024 04:50 PM   Final     Comment:     Unable to calculate VLDL.        Patient Medications:  Outpatient Encounter Medications as of 4/11/2024   Medication Sig Dispense Refill    - refin regular (HumuLIN R U-500) 500 unit/mL CONCENTRATED - refill for patient own pump Inject 1 mL (1 each) under the skin if needed (VIA INSULIN PUMP). Take as directed per insulin instructions. Use U-500 insulin syringe. 20 mL 0    albuterol 90 mcg/actuation aerosol powdr breath activated inhaler Inhale 2 puffs every 4 hours if needed for wheezing or shortness of breath.      allopurinol (Zyloprim) 300 mg tablet Take 1 tablet (300 mg) by mouth once daily. 90 tablet 3    bumetanide (Bumex) 1 mg tablet Take 1 tablet (1 mg) by mouth 2 times a day. 180 tablet 3    buPROPion XL (Wellbutrin XL) 150 mg 24 hr tablet Take 1 tablet (150 mg) by mouth once daily in the morning. Do not crush, chew, or split. 90 tablet 3    cephalexin (Keflex) 500 mg capsule Take 1 capsule (500 mg) by mouth 4 times a day for 10 days. 40 capsule 0    cetirizine (ZyrTEC) 10 mg tablet Take 1 tablet (10 mg) by mouth once daily.      clindamycin (Cleocin) 300 mg capsule Take 1 capsule (300 mg) by mouth 4 times a day for 10 days. 40 capsule 0    doxycycline (Vibramycin) 100 mg capsule Take 1 capsule (100 mg) by mouth 2 times a day for 21 days. Take with at least 8 ounces (large glass) of water, do not lie down for 30 minutes after 42 capsule 0    DULoxetine (Cymbalta) 60 mg DR capsule Take 1 capsule (60 mg) by mouth once daily. Do not crush or chew. 90 capsule 3    fluticasone (Flonase Sensimist) 27.5 mcg/actuation nasal spray Administer 1-2 sprays into each nostril once daily. 10 g 0    gabapentin (Neurontin) 300 mg capsule Take 3 capsules (900 mg) by mouth 3 times a day.      levothyroxine (Synthroid, Levoxyl) 200 mcg tablet Take 1 tablet (200 mcg) by mouth once daily. 90 tablet 3    levothyroxine (Synthroid,  Levoxyl) 50 mcg tablet Take 1 tablet (50 mcg) by mouth once daily in the morning. Take before meals. TAKE WITH 200MG 90 tablet 3    lubiprostone (Amitiza) 24 mcg capsule Take 1 capsule (24 mcg) by mouth 2 times a day with meals.      magnesium oxide (Mag-Ox) 400 mg (241.3 mg magnesium) tablet Take 1 tablet (400 mg) by mouth 2 times a day. 60 tablet 11    metOLazone (Zaroxolyn) 5 mg tablet Take 1 tablet (5 mg) by mouth once daily.      metoprolol succinate XL (Toprol-XL) 50 mg 24 hr tablet Take 1 tablet (50 mg) by mouth once daily. Do not crush or chew. 90 tablet 3    nystatin (Mycostatin) 100,000 unit/gram powder Apply 1 Application topically 2 times a day. 60 g 0    oxygen (O2) gas therapy Inhale 1 each every 12 hours.      pantoprazole (ProtoNix) 40 mg EC tablet Take 1 tablet (40 mg) by mouth once daily in the morning. Take before meals. Do not crush, chew, or split.      potassium chloride CR 10 mEq ER tablet Take 2 tablets (20 mEq) by mouth 3 times a day. Do not crush, chew, or split.      rosuvastatin (Crestor) 40 mg tablet Take 1 tablet (40 mg) by mouth once daily.      topiramate (Topamax) 25 mg tablet Take 1 tablet (25 mg) by mouth 2 times a day. 60 tablet 11    warfarin (Coumadin) 5 mg tablet Take 1.5 tablets (7.5 mg) by mouth once daily. Take as directed per After Visit Summary.      ondansetron (Zofran) 4 mg tablet Take 1 tablet (4 mg) by mouth every 8 hours if needed for nausea or vomiting. (Patient not taking: Reported on 4/11/2024) 30 tablet 5    traZODone (Desyrel) 100 mg tablet Take 1 tablet (100 mg) by mouth once daily at bedtime.      triamcinolone (Kenalog) 0.1 % ointment Apply topically 2 times a day as needed for irritation or rash. 60 g 3    [DISCONTINUED] doxycycline (Vibramycin) 100 mg capsule Take 1 capsule (100 mg) by mouth 2 times a day for 10 days. Take with at least 8 ounces (large glass) of water, do not lie down for 30 minutes after 20 capsule 0    [DISCONTINUED] furosemide (Lasix) 20  mg tablet Take 1 tablet (20 mg) by mouth 2 times a day as needed (edema). 60 tablet 5    [DISCONTINUED] metOLazone (Zaroxolyn) 2.5 mg tablet Take 1 tablet (2.5 mg) by mouth once daily as needed.      [DISCONTINUED] nystatin (Mycostatin) 100,000 unit/gram powder Apply 1 Application topically 2 times a day. 60 g 1     No facility-administered encounter medications on file as of 4/11/2024.       Physical Exam:  General: alert, oriented and in no acute distress. Morbid obesity  HEENT: NC/AT; EOMI; PERRLA, external ear is normal  Neck: supple; trachea midline; no masses; no JVD  Chest: diminished breath sounds bilaterally; on home O2  Cardio: regular rhythm, S1S2 normal, no murmurs  Abdomen: Difficult assessment due to obesity  Extremities: Lymphedema bilaterally  Neuro: Grossly intact     Psychiatric: Normal mood and affect     Past Cardiology Results (Last 3 Years):  EKG:  ECG 12 Lead 04/08/2024 (Preliminary)      ECG 12 lead 04/07/2024      ECG 12 lead 03/28/2024      ECG 12 lead 03/19/2024      ECG 12 Lead 03/13/2024      ECG 12 lead 02/04/2024    Echo:  Echo Results:  Transthoracic Echo (TTE) Complete With Contrast 03/20/2024    [place] [address]  Phone [phone], Fax [phone]    TRANSTHORACIC ECHOCARDIOGRAM REPORT      Patient Name:      RAY STALEY CHARLIE Manzanares Physician:    [id number] Thompson Burnett MD  Study Date:        3/20/2024             Ordering Provider:    [id number] NIMESH LEMUS  MRN/PID:           [id number]              Fellow:  Accession#:        [id number]          Nurse:                Anitha Agrawal RN  Date of Birth/Age: 1970 / 53 years Sonographer:          STEPHANIE Ortega RVT  Gender:            F                     Additional Staff:  Height:            157.48 cm             Admit Date:           3/19/2024  Weight:            167.83 kg             Admission Status:     Inpatient -  Routine  BSA / BMI:          2.48 m2 / 67.67 kg/m2 Department Location:  [address]  Blood Pressure: 141 /80 mmHg    Study Type:    TRANSTHORACIC ECHO (TTE) COMPLETE  Diagnosis/ICD: Chronic diastolic (congestive) heart failure (CHF)-I50.32  Indication:    Congestive Heart Failure  CPT Codes:     Echo Complete w Full Doppler-[id number]    Patient History:  Pertinent History: No previous echo.    Study Detail: The following Echo studies were performed: 2D, M-Mode, Doppler and  color flow. Technically challenging study due to poor acoustic  windows, patient lying in supine position, body habitus and Wt 370  lbs. Definity used as a contrast agent for endocardial border  definition. Total contrast used for this procedure was 2 mL via IV  push. A bubble study was not performed. The patient was awake.      PHYSICIAN INTERPRETATION:  Left Ventricle: Left ventricular systolic function is normal, with an estimated ejection fraction of 65%. There are no regional wall motion abnormalities. The left ventricular cavity size is normal. Left ventricular diastolic filling was indeterminate.  Left Atrium: The left atrium was not well visualized.  Right Ventricle: The right ventricle was not well visualized. Unable to determine right ventricular systolic function.  Right Atrium: The right atrium was not well visualized.  Aortic Valve: The aortic valve was not well visualized. There is no evidence of aortic valve regurgitation. The peak instantaneous gradient of the aortic valve is 12.2 mmHg. The mean gradient of the aortic valve is 7.0 mmHg.  Mitral Valve: The mitral valve was not well visualized. Mitral valve regurgitation was not assessed.  Tricuspid Valve: The tricuspid valve was not well visualized. Tricuspid regurgitation was not assessed.  Pulmonic Valve: The pulmonic valve is not well visualized. The pulmonic valve regurgitation was not well visualized.  Pericardium: There is no pericardial effusion noted.  Aorta: The aortic root was not well  visualized.      CONCLUSIONS:  1. Left ventricular systolic function is normal with a 65% estimated ejection fraction.  2. Poorly visualized anatomical structures due to suboptimal image quality.    RECOMMENDATIONS:  Technically suboptimal and limited study, therefore accuracy of above interpretation could be substantially diminished. Clinical correlation is advised. Consider additional imaging modalities if clinically indicated.    QUANTITATIVE DATA SUMMARY:  2D MEASUREMENTS:  Normal Ranges:  Ao Root d:     3.10 cm   (2.0-3.7cm)  LAs:           3.40 cm   (2.7-4.0cm)  IVSd:          1.20 cm   (0.6-1.1cm)  LVPWd:         1.06 cm   (0.6-1.1cm)  LVIDd:         3.68 cm   (3.9-5.9cm)  LVIDs:         2.23 cm  LV Mass Index: 53.8 g/m2  LV % FS        39.4 %    LA VOLUME:  Normal Ranges:  LA Vol A4C:        45.1 ml   (22+/-6mL/m2)  LA Vol Index A4C:  18.1ml/m2  LA Area A4C:       15.9 cm2  LA Major Axis A4C: 4.8 cm  LA Vol A4C:        43.9 ml    LV SYSTOLIC FUNCTION BY 2D PLANIMETRY (MOD):  Normal Ranges:  EF-A4C View: 39.8 % (>=55%)  EF-A2C View: 62.3 %  EF-Biplane:  52.5 %    LV DIASTOLIC FUNCTION:  Normal Ranges:  MV Peak E:    1.03 m/s (0.7-1.2 m/s)  MV Peak A:    1.25 m/s (0.42-0.7 m/s)  E/A Ratio:    0.82     (1.0-2.2)  MV lateral e' 0.09 m/s  MV medial e'  0.05 m/s    MITRAL VALVE:  Normal Ranges:  MV DT: 176 msec (150-240msec)    AORTIC VALVE:  Normal Ranges:  AoV Vmax:                1.75 m/s  (<=1.7m/s)  AoV Peak P.2 mmHg (<20mmHg)  AoV Mean P.0 mmHg  (1.7-11.5mmHg)  LVOT Max Sean:            1.71 m/s  (<=1.1m/s)  AoV VTI:                 28.90 cm  (18-25cm)  LVOT VTI:                38.80 cm  LVOT Diameter:           2.40 cm   (1.8-2.4cm)  AoV Area, VTI:           6.07 cm2  (2.5-5.5cm2)  AoV Area,Vmax:           4.42 cm2  (2.5-4.5cm2)  AoV Dimensionless Index: 1.34    TRICUSPID VALVE/RVSP:  Normal Ranges:  Peak TR Velocity: 2.10 m/s  RV Syst Pressure: 20.6 mmHg (<  30mmHg)    PULMONIC VALVE:  Normal Ranges:  PV Accel Time: 49 msec  (>120ms)  PV Max Sean:    1.3 m/s  (0.6-0.9m/s)  PV Max P.7 mmHg      [id number] Thompson Burnett MD  Electronically signed on 3/20/2024 at 10:52:09 AM        ** Final **     Cath:  No results found for this or any previous visit from the past 1095 days.    CV NCDR CATHPCI V5 COLLECTION FORM   Stress Test:  Nuclear Stress Test 2024    Cardiac Imaging:  No results found for this or any previous visit from the past 1095 days.       Assessment/Plan   ..Mrs. Roselia Mo is a 53 y.o. 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 Patient coming for establishment of cardiovascular follow up. She reports that she is currently treating a diffuse infection in her abdomen and L breast due to Staphylococcus. She endorses worsening in her leg edema due to the infection. She complains of chronic shortness of breath. She denies chest pain, lightheadedness, headaches, fever, chills, orthopnea, paroxysmal nocturnal dyspnea or syncope.       Assessment    # Heart Failure with Preserved LV Systolic function  - EKG shows normal sinus rhythm with no signs of ACS.   - The echocardiogram (2024) showed normal LVEF 65% with no wall motion abnormalities.   - Nuclear stress test (2024) is normal.   - Low sodium diet (2g).  - Fluid restriction.  - Electrolyte control and daily BMP including magnesium.  - General recommendations for heart failure, including low-sodium diet, fluid restriction, daily weight and adherence to medication.   - Patient continues to be volume overloaded  based on her clinical presentation. We suggest to start Bumex 1mg daily.  - Patient is being treated for Staph infection in abdomen and L breast  - Follow up in 3 months.    # COPD  - Keep home oxygen    # Budd-Chiari Sd  - Controlled by PCP.  - Keep Warfarin .  - INR control.    # Hypertension  - Controlled blood pressure.  - Keep current medications with Lisinopril 20mg daily, Metoprolol succinate 50mg daily.  - Patient counseled to keep a healthy lifestyle including regular exercise and low-sodium diet.  - Recommended home blood pressure monitoring.  - Goal of BP < 130/80mmHg.     # Diabetes  - Controlled by PCP.  - Counseled on healthy diet and regular exercises.  - Discussed need for weight loss and the benefits.   - Keep current medications.  - ISS.     # Hyperlipidemia  - Controlled by PCP.  - Keep home medication with Rosuvastatin 40mg daily.  - Counseled on healthy diet and regular exercise.      # Gout  - Controlled by PCP.  - Keep home medication with Allopurinol 300mg daily.  - Counseling.    # Hypothyroidism  - Keep Levothyroxine 50mcg  daily     We have discussed the most common side effects of the prescribed medications, indications, drug interactions, risks, complications, and alternatives of medications/therapeutics were explained and discussed. The patient has been requested to monitor closely for any untoward side effects or complications of medications. The patient has been strongly advised to be compliant with the recommendations, all the questions and concerns have been addressed. The patient has been also instructed to call, to return sooner or to go to the emergency department if symptoms persist or get worsen. The patient voiced understanding and denies any further questions at this time.     This note was transcribed using the Dragon Dictation system. There may be grammatical, punctuation, or verbiage errors that occur with voice recognition programs.    Counseling greater than 50% of visit  regarding all cardiac issues.    Thank you, Dr. Knight, for allowing me to participate in the care of this patient. Please do not hesitate to contact me with any further questions or concerns.    Delfin Earl MD  Cardiology

## 2024-04-12 ENCOUNTER — TELEPHONE (OUTPATIENT)
Dept: PHARMACY | Facility: HOSPITAL | Age: 54
End: 2024-04-12
Payer: MEDICARE

## 2024-04-12 RX ORDER — LUBIPROSTONE 24 UG/1
24 CAPSULE ORAL
Qty: 60 CAPSULE | Refills: 0 | OUTPATIENT
Start: 2024-04-12

## 2024-04-12 NOTE — PROGRESS NOTES
"EDPD Note: Bug-Drug Mismatch    Contacted Mrs./MsAnish Mo regarding a positive Tissue culture that was taken during their recent emergency room visit. I completed education with patient. The patient is not being treated appropriately with Cephalexin 500 mg and Doxycycline 100 mg .     \" Patient presents with concern for infection to her abdominal wall pannus. States in the past 2 days she has noticed increased pain and drainage to the area. She denies any fever or chills. \"    Patient went twice to ED and was prescribed Doxycycline 100 mg, Cephalexin 500 mg and Clindamycin 300 mg.    Based on the tissue culture Cephalexin is inappropriate due to presence Beta-Lactamase positive bacteria, and hence Clindamycin 300 mg 1 PO QID is appropriate and there is no need to take Doxycycline as it wont cover anaerobic Bacteri and would be duplication of therapy.    I just educated the patient to take the Clindamycin for 7 days instead of 10 as her wound is improving as she informed me , and to stop Cephalexin beside not to start Doxycycline 100 mg    Susceptibility data from last 90 days.  Collected Specimen Info Organism   04/08/24 Tissue/Biopsy from Skin/Superficial Abscess Mixed Skin Microorganisms      Mixed Anaerobic Bacteria       Trini Vargas, PharmD  "

## 2024-04-13 ENCOUNTER — PATIENT OUTREACH (OUTPATIENT)
Dept: HOME HEALTH SERVICES | Age: 54
End: 2024-04-13
Payer: MEDICARE

## 2024-04-16 ENCOUNTER — OFFICE VISIT (OUTPATIENT)
Dept: PRIMARY CARE | Facility: CLINIC | Age: 54
End: 2024-04-16
Payer: MEDICARE

## 2024-04-16 ENCOUNTER — PATIENT OUTREACH (OUTPATIENT)
Dept: HOME HEALTH SERVICES | Age: 54
End: 2024-04-16

## 2024-04-16 VITALS
DIASTOLIC BLOOD PRESSURE: 47 MMHG | WEIGHT: 293 LBS | SYSTOLIC BLOOD PRESSURE: 103 MMHG | BODY MASS INDEX: 51.91 KG/M2 | HEIGHT: 63 IN | HEART RATE: 94 BPM | OXYGEN SATURATION: 92 %

## 2024-04-16 DIAGNOSIS — R60.1 ANASARCA: ICD-10-CM

## 2024-04-16 DIAGNOSIS — M25.50 MULTIPLE JOINT PAIN: ICD-10-CM

## 2024-04-16 DIAGNOSIS — E66.01 CLASS 3 SEVERE OBESITY DUE TO EXCESS CALORIES WITH SERIOUS COMORBIDITY AND BODY MASS INDEX (BMI) GREATER THAN OR EQUAL TO 70 IN ADULT (MULTI): Primary | ICD-10-CM

## 2024-04-16 PROCEDURE — 99214 OFFICE O/P EST MOD 30 MIN: CPT | Performed by: INTERNAL MEDICINE

## 2024-04-16 PROCEDURE — 3052F HG A1C>EQUAL 8.0%<EQUAL 9.0%: CPT | Performed by: INTERNAL MEDICINE

## 2024-04-16 PROCEDURE — 3074F SYST BP LT 130 MM HG: CPT | Performed by: INTERNAL MEDICINE

## 2024-04-16 PROCEDURE — 3008F BODY MASS INDEX DOCD: CPT | Performed by: INTERNAL MEDICINE

## 2024-04-16 PROCEDURE — 3078F DIAST BP <80 MM HG: CPT | Performed by: INTERNAL MEDICINE

## 2024-04-16 PROCEDURE — 3060F POS MICROALBUMINURIA REV: CPT | Performed by: INTERNAL MEDICINE

## 2024-04-16 PROCEDURE — 1036F TOBACCO NON-USER: CPT | Performed by: INTERNAL MEDICINE

## 2024-04-16 RX ORDER — PREDNISONE 10 MG/1
10 TABLET ORAL 3 TIMES DAILY
Qty: 9 TABLET | Refills: 0 | Status: SHIPPED | OUTPATIENT
Start: 2024-04-16 | End: 2024-04-19

## 2024-04-16 RX ORDER — BUMETANIDE 1 MG/1
1 TABLET ORAL 3 TIMES DAILY
Start: 2024-04-16 | End: 2024-04-18 | Stop reason: DRUGHIGH

## 2024-04-16 ASSESSMENT — ENCOUNTER SYMPTOMS
ALLERGIC/IMMUNOLOGIC NEGATIVE: 1
BACK PAIN: 0
NEUROLOGICAL NEGATIVE: 1
MUSCULOSKELETAL NEGATIVE: 1
NUMBNESS: 0
FEVER: 0
CONSTITUTIONAL NEGATIVE: 1
LIGHT-HEADEDNESS: 0
PSYCHIATRIC NEGATIVE: 1
CHILLS: 0
CONFUSION: 0
ABDOMINAL DISTENTION: 0
EYES NEGATIVE: 1
AGITATION: 0
WHEEZING: 0
EYE DISCHARGE: 0
NAUSEA: 0
VOMITING: 0
RESPIRATORY NEGATIVE: 1
COUGH: 0
SHORTNESS OF BREATH: 0
HEADACHES: 0
JOINT SWELLING: 0
ABDOMINAL PAIN: 0
ENDOCRINE NEGATIVE: 1
GASTROINTESTINAL NEGATIVE: 1
HEMATOLOGIC/LYMPHATIC NEGATIVE: 1
DYSURIA: 0
CONSTIPATION: 0
PALPITATIONS: 0
ADENOPATHY: 0
NECK STIFFNESS: 0
DIARRHEA: 0

## 2024-04-16 ASSESSMENT — PATIENT HEALTH QUESTIONNAIRE - PHQ9
4. FEELING TIRED OR HAVING LITTLE ENERGY: NEARLY EVERY DAY
5. POOR APPETITE OR OVEREATING: NEARLY EVERY DAY
SUM OF ALL RESPONSES TO PHQ9 QUESTIONS 1 AND 2: 6
3. TROUBLE FALLING OR STAYING ASLEEP OR SLEEPING TOO MUCH: NEARLY EVERY DAY
2. FEELING DOWN, DEPRESSED OR HOPELESS: NEARLY EVERY DAY
10. IF YOU CHECKED OFF ANY PROBLEMS, HOW DIFFICULT HAVE THESE PROBLEMS MADE IT FOR YOU TO DO YOUR WORK, TAKE CARE OF THINGS AT HOME, OR GET ALONG WITH OTHER PEOPLE: EXTREMELY DIFFICULT
8. MOVING OR SPEAKING SO SLOWLY THAT OTHER PEOPLE COULD HAVE NOTICED. OR THE OPPOSITE, BEING SO FIGETY OR RESTLESS THAT YOU HAVE BEEN MOVING AROUND A LOT MORE THAN USUAL: NEARLY EVERY DAY
9. THOUGHTS THAT YOU WOULD BE BETTER OFF DEAD, OR OF HURTING YOURSELF: SEVERAL DAYS
6. FEELING BAD ABOUT YOURSELF - OR THAT YOU ARE A FAILURE OR HAVE LET YOURSELF OR YOUR FAMILY DOWN: NEARLY EVERY DAY
SUM OF ALL RESPONSES TO PHQ QUESTIONS 1-9: 25
1. LITTLE INTEREST OR PLEASURE IN DOING THINGS: NEARLY EVERY DAY
7. TROUBLE CONCENTRATING ON THINGS, SUCH AS READING THE NEWSPAPER OR WATCHING TELEVISION: NEARLY EVERY DAY

## 2024-04-16 NOTE — PROGRESS NOTES
Daily Call Note:   Spoke to patient she states she is going to see PCP right now has appointment today. She states she is still swollen everywhere and hopefully PCP addresses today if not she will consider switching PCP's.  Reports BG now at 1130 173.   Asked about weight and she said scale is acting up, she will get vs and weight at PCP today.   Will discuss how PCP appointment went today on tomorrows [facility].    Pt Education: POC  Barriers: na  Topics for Daily Review: POC  Pt demonstrates clear understanding: Yes    Daily Weight:  There were no vitals filed for this visit.   Last 3 Weights:  Wt Readings from Last 7 Encounters:   04/10/24 (!) 187 kg (412 lb)   04/09/24 (!) 187 kg (412 lb)   04/08/24 (!) 187 kg (412 lb)   04/07/24 (!) 188 kg (414 lb 6.4 oz)   04/06/24 (!) 188 kg (415 lb)   04/05/24 (!) 187 kg (411 lb 15.9 oz)   04/04/24 (!) 189 kg (416 lb)       Masimo Device: No   Masimo Clinical Impression: na    Virtual Visits--Scheduled (Most Recent Date at Top)  Follow up Appointments  Recent Visits  Date Type Provider Dept   04/02/24 Office Visit Ana Knight MD [facility]   03/26/24 Office Visit Ana Knight MD [facility]   03/19/24 Office Visit Ana Knight MD [facility]   Showing recent visits within past 30 days and meeting all other requirements  Today's Visits  Date Type Provider Dept   04/16/24 Appointment Ana Knight MD [facility]   Showing today's visits and meeting all other requirements  Future Appointments  No visits were found meeting these conditions.  Showing future appointments within next 90 days and meeting all other requirements       Frequency of RN Calls & Virtual Visits per Team Agreement: Healthy at Home Frequency: Bi-Weekly    Medication issues Addressed (what was done): na    Follow up appointments scheduled by [facility] Staff: na  Referrals made by [facility] staff: na      [facility] Trans[facility] Assessment    Patient's  Address:   [address] [place] [id number]  **  If this is not the address patient will receive services - alert team and address in EMR**       Patient Contacts:  Extended Emergency Contact Information  Primary Emergency Contact: Karla Luna  Address:             [address] [place] [address] [facility]  Home Phone: [phone]  Mobile Phone: [phone]  Relation: Parent  Preferred language: English   needed? No  Secondary Emergency Contact: RYAN GUERRA  Mobile Phone: [phone]  Relation: Spouse  Preferred language: English   needed? No                                Patient's Preferred Phone: [phone]  Patient's E-mail: No e-mail address on record

## 2024-04-16 NOTE — PROGRESS NOTES
Subjective   Patient ID: Roselia Mo is a 53 y.o. female who presents for Follow-up (2 WK F/U NO CHANGES STILL FEELS TERRIBLE. LEFT BREAST HURTS TO TOUCH.).  Here for fu  Co wt gain        Review of Systems   Constitutional: Negative.  Negative for chills and fever.   HENT: Negative.  Negative for congestion.    Eyes: Negative.  Negative for discharge.   Respiratory: Negative.  Negative for cough, shortness of breath and wheezing.    Cardiovascular:  Positive for leg swelling. Negative for chest pain and palpitations.   Gastrointestinal: Negative.  Negative for abdominal distention, abdominal pain, constipation, diarrhea, nausea and vomiting.   Endocrine: Negative.    Genitourinary: Negative.  Negative for dysuria and urgency.   Musculoskeletal: Negative.  Negative for back pain, joint swelling and neck stiffness.   Skin: Negative.  Negative for rash.   Allergic/Immunologic: Negative.  Negative for immunocompromised state.   Neurological: Negative.  Negative for light-headedness, numbness and headaches.   Hematological: Negative.  Negative for adenopathy.   Psychiatric/Behavioral: Negative.  Negative for agitation, behavioral problems and confusion.    All other systems reviewed and are negative.      Objective   Physical Exam  Vitals reviewed.   Constitutional:       General: She is not in acute distress.     Appearance: She is obese. She is ill-appearing.   HENT:      Head: Normocephalic and atraumatic.      Nose: Nose normal.   Eyes:      Conjunctiva/sclera: Conjunctivae normal.      Pupils: Pupils are equal, round, and reactive to light.   Neck:      Vascular: No carotid bruit.   Cardiovascular:      Rate and Rhythm: Normal rate and regular rhythm.      Pulses: Normal pulses.      Heart sounds:      No gallop.   Pulmonary:      Effort: Pulmonary effort is normal. No respiratory distress.      Breath sounds: Normal breath sounds. No wheezing.   Abdominal:      General: Bowel sounds are normal.       "Palpations: Abdomen is soft.      Tenderness: There is no abdominal tenderness.   Genitourinary:     Comments: Right breast smaller than left breast,  Musculoskeletal:         General: Swelling (anasarca /generalized) present. Normal range of motion.      Cervical back: Normal range of motion. No rigidity.      Right lower leg: Right lower leg edema: lymphedema.      Left lower leg: Edema (lymphedema) present.   Lymphadenopathy:      Cervical: No cervical adenopathy.   Skin:     General: Skin is warm.      Findings: No rash.   Neurological:      General: No focal deficit present.      Mental Status: She is alert and oriented to person, place, and time.   Psychiatric:         Mood and Affect: Mood normal.         Behavior: Behavior normal.       BP (!) 103/47   Pulse 94   Ht 1.6 m (5' 3\")   Wt (!) 199 kg (439 lb 3.2 oz)   SpO2 92%   BMI 77.80 kg/m²    Hemoglobin A1C   Date/Time Value Ref Range Status   03/19/2024 06:20 PM 9.0 (H) see below % Final     Assessment/Plan   Problem List Items Addressed This Visit       Class 3 severe obesity due to excess calories with serious comorbidity and body mass index (BMI) greater than or equal to 70 in adult (Multi) - Primary    Anasarca    Relevant Medications    bumetanide (Bumex) 1 mg tablet    Multiple joint pain    Relevant Medications    predniSONE (Deltasone) 10 mg tablet        Pt has significant chronic disease specially her wt problem , I needs to see her 2 times a week will adjust her diuretics a,d monitor her anasarca, and BP, she is going to need more frequent BW, she understood, bumex was raised to tid for now    Pt has lots of inflammation with her anasarca at her breast and joints will try prednisone      Labs reviewed with pt        MDM    1) COMPLEXITY: 1 OR MORE CHRONIC CONDITION WITH EXACERBATION, OR PROGRESSION OR SIDE EFFECT OF TREATMENT ADDRESSED  2)DATA: TESTS INTERPRETED AND OR ORDERED, TOOK INDEPENDENT HISTORY OR RECORDS REVIEWED  3)RISK: MODERATE " RISK DUE TO NATURE OF MEDICAL CONDITIONS/COMORBIDITY OR MEDICATIONS ORDERED OR SURGICAL OR PROCEDURE REFERRAL, .      Fu 2 days

## 2024-04-17 ENCOUNTER — TELEMEDICINE CLINICAL SUPPORT (OUTPATIENT)
Dept: CARE COORDINATION | Age: 54
End: 2024-04-17
Payer: MEDICARE

## 2024-04-17 ENCOUNTER — TELEPHONE (OUTPATIENT)
Dept: NEPHROLOGY | Facility: CLINIC | Age: 54
End: 2024-04-17

## 2024-04-17 ENCOUNTER — PATIENT OUTREACH (OUTPATIENT)
Dept: HOME HEALTH SERVICES | Age: 54
End: 2024-04-17

## 2024-04-17 ENCOUNTER — TELEMEDICINE (OUTPATIENT)
Dept: PHARMACY | Facility: HOSPITAL | Age: 54
End: 2024-04-17
Payer: MEDICARE

## 2024-04-17 VITALS — SYSTOLIC BLOOD PRESSURE: 141 MMHG | DIASTOLIC BLOOD PRESSURE: 81 MMHG

## 2024-04-17 DIAGNOSIS — E11.65 TYPE 2 DIABETES MELLITUS WITH HYPERGLYCEMIA, WITH LONG-TERM CURRENT USE OF INSULIN (MULTI): ICD-10-CM

## 2024-04-17 DIAGNOSIS — I50.812 CHRONIC RIGHT-SIDED CONGESTIVE HEART FAILURE (MULTI): Primary | ICD-10-CM

## 2024-04-17 DIAGNOSIS — Z79.4 TYPE 2 DIABETES MELLITUS WITH HYPERGLYCEMIA, WITH LONG-TERM CURRENT USE OF INSULIN (MULTI): ICD-10-CM

## 2024-04-17 NOTE — PROGRESS NOTES
Pharmacy Post-Discharge Visit - Follow Up    Roselia Mo is a 53 y.o. female was referred to Clinical Pharmacy Team to complete a post-discharge medication optimization and monitoring visit.  The patient was referred for their diabetes and CHF management while also enrolled in [facility].       Referring Provider: Pardeep Nguyen DO  PCP: Ana Knight MD - last visit: 4/16/24, next visit: 4/18      Subjective   Allergies   Allergen Reactions    Dulaglutide Other     Pancreatitis  Does not avoid any foods related    Nickel Diarrhea, Nausea And Vomiting, Rash and GI Upset     Cobalt, white gold  Avoids most high Nickel foods not strictly, chooses to continue to use metal utensils.   Avoids green leafy vegetables, tap water, coffee, tea, oatmeal.  Wheat sometimes gives a rash but still eats.    No severe reaction to these foods only if eats too much get diarrhea.     Metformin Diarrhea, Other and Unknown    Palladium Rash     by allergy testing.  Does not avoid any foods related    Cobalt Rash    Exenatide Other     pancreantitis   pancreantitis    pancreantitis    Sitagliptin Other     pancreantitis       [id number] [place] [id number]  Phone: [phone] Fax: [phone]      Medication System Management:  Affordability/Accessibility: no concerns   Adherence/Organization: no issues       Social History     Social History Narrative    Not on file          HPI  Diabetes  She presents for her follow-up diabetic visit. She has type 2 diabetes mellitus. Her disease course has been stable. Hypoglycemia symptoms include hunger. There are no hypoglycemic complications. Risk factors for coronary artery disease include diabetes mellitus and obesity. Current diabetic treatment includes insulin pump. She is compliant with treatment all of the time. Her weight is increasing rapidly. She is following a generally unhealthy, high salt and high fat/cholesterol diet.  When asked about meal planning, she reported none. There is no change in her home blood glucose trend. Her overall blood glucose range is 70-90 mg/dl. An ACE inhibitor/angiotensin II receptor blocker is contraindicated.      CHF ASSESSMENT  Staging:  Most recent ejection fraction: 65%  NYHA Stage: II  ACC/AHA Stage:  n/a     Symptom Assessment:  Weight changes/edema?: Yes - she has gained over 20 lbs again in 2 weeks   Dyspnea?: With exertion  Dizziness/syncope/palpitations?: No     Current Regimen:  ARNI/ACEi/ARB: No  Beta Blocker: Yes - metoprolol  MRA: No  SGLT2i: No     Other therapy:  Lasix as needed      Secondary Prevention:  The 10-year ASCVD risk score (Romero HENRIQUEZ, et al., 2019) is: 6.1%    Values used to calculate the score:      Age: 53 years      Sex: Female      Is Non- : No      Diabetic: Yes      Tobacco smoker: No      Systolic Blood Pressure: 130 mmHg      Is BP treated: Yes      HDL Cholesterol: 47 mg/dL      Total Cholesterol: 200 mg/dL     Aspirin 81mg? no  Statin?: Yes - rosuvastatin  HTN?: No     Review of Systems        Objective     There were no vitals taken for this visit.   BP Readings from Last 4 Encounters:   04/16/24 (!) 103/47   04/11/24 112/68   04/10/24 118/68   04/08/24 (!) 147/97      There were no vitals filed for this visit.     LAB  Lab Results   Component Value Date    BILITOT 0.6 04/08/2024    CALCIUM 9.7 04/08/2024    CO2 32 04/08/2024     04/08/2024    CREATININE 0.67 04/08/2024    GLUCOSE 245 (H) 04/08/2024    ALKPHOS 285 (H) 04/08/2024    K 2.9 (LL) 04/08/2024    PROT 6.5 04/08/2024     04/08/2024    AST 18 04/08/2024    ALT 13 04/08/2024    BUN 9 04/08/2024    ANIONGAP 11 04/08/2024    MG 1.80 04/08/2024    PHOS 3.0 04/02/2024    ALBUMIN 3.7 04/08/2024    LIPASE 60 02/03/2024    GFRF 81 02/19/2023     Lab Results   Component Value Date    TRIG 485 (H) 02/01/2024    CHOL 200 (H) 02/01/2024    LDLCALC  02/01/2024      Comment:      The  calculation of LDL and VLDL are inaccurate when the Triglycerides are greater than 400 mg/dL or when the patient is non-fasting. If LDL measurement is necessary contact the testing laboratory for an alternative LDL assay.                                  Near   Borderline      AGE      Desirable  Optimal    High     High     Very High     0-19 Y     0 - 109     ---    110-129   >/= 130     ----    20-24 Y     0 - 119     ---    120-159   >/= 160     ----      >24 Y     0 -  99   100-129  130-159   160-189     >/=190      HDL 47.0 02/01/2024     Lab Results   Component Value Date    HGBA1C 9.0 (H) 03/19/2024         Current Outpatient Medications   Medication Instructions    - refin regular (HumuLIN R U-500) 500 unit/mL CONCENTRATED - refill for patient own pump 1 each, subcutaneous, As needed, Take as directed per insulin instructions. Use U-500 insulin syringe.    albuterol 180 mcg, inhalation, Every 4 hours PRN    allopurinol (ZYLOPRIM) 300 mg, oral, Daily    bumetanide (BUMEX) 1 mg, oral, 3 times daily    buPROPion XL (WELLBUTRIN XL) 150 mg, oral, Every morning, Do not crush, chew, or split.    cephalexin (KEFLEX) 500 mg, oral, 4 times daily    cetirizine (ZYRTEC) 10 mg, oral, Daily    clindamycin (CLEOCIN) 300 mg, oral, 4 times daily    doxycycline (VIBRAMYCIN) 100 mg, oral, 2 times daily, Take with at least 8 ounces (large glass) of water, do not lie down for 30 minutes after    DULoxetine (CYMBALTA) 60 mg, oral, Daily, Do not crush or chew.    fluticasone (Flonase Sensimist) 27.5 mcg/actuation nasal spray 1-2 sprays, Each Nostril, Daily RT    gabapentin (NEURONTIN) 900 mg, oral, 3 times daily    levothyroxine (SYNTHROID, LEVOXYL) 200 mcg, oral, Daily    levothyroxine (SYNTHROID, LEVOXYL) 50 mcg, oral, Daily before breakfast, TAKE WITH 200MG    lubiprostone (AMITIZA) 24 mcg, oral, 2 times daily with meals    magnesium oxide (MAG-OX) 400 mg, oral, 2 times daily    metOLazone (ZAROXOLYN) 5 mg, oral, Daily     metoprolol succinate XL (TOPROL-XL) 50 mg, oral, Daily, Do not crush or chew.    nystatin (Mycostatin) 100,000 unit/gram powder 1 Application, Topical, 2 times daily    ondansetron (ZOFRAN) 4 mg, oral, Every 8 hours PRN    oxygen (O2) gas therapy 1 each, inhalation, Every 12 hours    pantoprazole (PROTONIX) 40 mg, oral, Daily before breakfast, Do not crush, chew, or split.    potassium chloride CR 10 mEq ER tablet 20 mEq, oral, 3 times daily, Do not crush, chew, or split.    predniSONE (DELTASONE) 10 mg, oral, 3 times daily    rosuvastatin (CRESTOR) 40 mg, oral, Daily    topiramate (TOPAMAX) 25 mg, oral, 2 times daily    traZODone (DESYREL) 100 mg, oral, Nightly    triamcinolone (Kenalog) 0.1 % ointment Topical, 2 times daily PRN    warfarin (COUMADIN) 7.5 mg, oral, Daily, Take as directed per After Visit Summary.            Assessment/Plan   Problem List Items Addressed This Visit    None  -she is now seeing her pcp twice weekly due to multiple concerns   -she has gained over 20 lbs in 2 weeks, pcp changed diuretic to bumex 1mg tid and she states she feels like it is not working either (not urinating any more than before)  -pcp also started on a 3 day prednisone course but this can also lead to water retention   -her sugars have been well controlled though with her pump, even despite not having much of an appetite   -she is also taking clinda for a breast cellulitis/rash after cultures came back (stopped doxy and keflex)  -she is pretty frustrated today because she feels like she needs to go to the ED to be diuresed with iv diuretics due to all of her weight gain but every time she goes they do not admit her --> it was recommended though that she does go back as that is probably her best option due to the weight gain and worsening SOB   -she is supposed to have follow up with her pcp tomorrow again so she is going to call him and see what he would like her to do as well   -no med changes today though      Follow  Up: 1 week if not admitted after going to ED     Continue all meds under the continuation of care with the referring provider and clinical pharmacy team.    Ryan Woods PharmD     Verbal consent to manage patient's drug therapy was obtained from the patient. They were informed they may decline to participate or withdraw from participation in pharmacy services at any time.

## 2024-04-17 NOTE — PROGRESS NOTES
Weekly [facility] Call Note:   Spoke to patient, states she does not feel well. Reports not being able to do ADL's without SOB. Pt wearing O2 4L ATC and Bipap HS.   She saw PCP yesterday and they adjusted Bumex and started steroid. States urine output has been the same, no changes.   Discussed weight increase over last two week. She states she is barely tolerating any meals or drink.  States BG have been runny fine.  Reports bp: 103/47 yesterday, today 141/102 and a recheck 141/81  She does not void often, AM and not again until 3-4pm.   States breast area is very swollen, indents 3-4 inches when touched. She verbalizes frustrations with recent med changes and breathing difficulties. States she feels like she knows what she needs and that she is not getting it. Keeps going to ER and not getting admitted for diuresis like she would like.  Does not have pulse ox to check oxygen saturation.  Provider discussed options to be evaluated, she agrees to call Dionisio Childress now and see what he suggests as far as changing medications or hospitalization.   Pt is established with endocrine  Daily and had recent visit. Pt has not yet established with coumadin clinic, but has had INR checked at ED visits.   She will call us back later today with update and next [facility] scheduled.   Pt Education: POC  Barriers: na  Topics for Daily Review: POC  Pt demonstrates clear understanding: Yes    Daily Weight:  There were no vitals filed for this visit.   Last 3 Weights:  Wt Readings from Last 7 Encounters:   04/16/24 (!) 199 kg (439 lb 3.2 oz)   04/10/24 (!) 187 kg (412 lb)   04/09/24 (!) 187 kg (412 lb)   04/08/24 (!) 187 kg (412 lb)   04/07/24 (!) 188 kg (414 lb 6.4 oz)   04/06/24 (!) 188 kg (415 lb)   04/05/24 (!) 187 kg (411 lb 15.9 oz)       Masimo Device: No   Masimo Clinical Impression: na    Virtual Visits--Scheduled (Most Recent Date at Top)  Follow up Appointments  Recent Visits  Date Type Provider Dept   04/16/24 Office Visit Gamal STALEY  MD Noemi [facility]   04/02/24 Office Visit Ana Knight MD [facility]   03/26/24 Office Visit Ana Knight MD [facility]   03/19/24 Office Visit Ana Knight MD [facility]   Showing recent visits within past 30 days and meeting all other requirements  Future Appointments  Date Type Provider Dept   04/18/24 Appointment Gamal Franklin MD [facility]   Showing future appointments within next 90 days and meeting all other requirements       Frequency of RN Calls & Virtual Visits per Team Agreement: Healthy at Home Frequency: Bi-Weekly    Medication issues Addressed (what was done): reviewed    Follow up appointments scheduled by [facility] Staff: annia  Referrals made by [facility] staff: na      [facility] Transitions Assessment    Patient's Address:   [address] [place] [id number]  **  If this is not the address patient will receive services - alert team and address in EMR**       Patient Contacts:  Extended Emergency Contact Information  Primary Emergency Contact: Karla Luna  Address: [address]            [address] [place]  Home Phone: [phone]  Mobile Phone: [phone]  Relation: Parent  Preferred language: English   needed? No  Secondary Emergency Contact: RYAN GUERRA  Mobile Phone: [phone]  Relation: Spouse  Preferred language: English   needed? No                                Patient's Preferred Phone: [phone]  Patient's E-mail: No e-mail address on record

## 2024-04-17 NOTE — PROGRESS NOTES
"Pt called today stating she was advised by [facility] to contact you. She states she is having dyspnea, weight gain. She is up 28 lbs in 2 weeks. She weighs 440 lbs. She was placed on Prednisone and Bumex was increased yesterday per Dr. Franklin. She states she \"does not want to go to ER and then be sent home\". Please advise.   "

## 2024-04-17 NOTE — PROGRESS NOTES
Brief summary of hospitalization or reason for referral    53 year old female with DM, HTN, CY, SEVILLA, Budd-Chiari syndrome, portal vein thrombosis on coumadin, HFpEF, and COPD and was admitted recently for hyponatermia, renal failure, and elevated LFTs. Patient was drinking 12 L of water a day and improved with fluid restriction.     Admission Dx and Associated Referral Dx:  DM, HTN, CY, SEVILLA, Budd-Chiari syndrome, portal vein thrombosis on coumadin, HFpEF, and COPD     Interval Subjective: Patient reports her breathing has not improved. She saw her PCP yesterday who increased her bumex and started on prednisone. Patient reports she is getting short of breath just using her arms. Patient reports she has not been having increased urine output on the increased dose and change to bumex. Patient reports she has low fluid and eating makes her short of breath. Previously on lasix and metolazone but worsening progressing and worse on bumex, she reports that bumex doesn't work for her but other meds were stopped for hyponatermia. Patient is very emotional over her current situation and wants too feel better. She reports her weight is 439 and she has put on a lot of weight. Patient reports is she will call Dr. Nichole and discuss her weight gain and symptoms and keep us updated     Masimo: No  Oxygen: Yes, 4L   CPAP/BIPAP: Yes    Wt Readings from Last 3 Encounters:   04/16/24 (!) 199 kg (439 lb 3.2 oz)   04/10/24 (!) 187 kg (412 lb)   04/09/24 (!) 187 kg (412 lb)     Medications Issues/Refill need  Medication Follow up action needed: follow up on bumex vs lasix and metolazone    Upcoming Visits:  Recent Visits  Date Type Provider Dept   04/16/24 Office Visit Gamal Franklin MD [facility]   04/02/24 Office Visit Ana Knight MD [facility]   03/26/24 Office Visit Ana Knight MD [facility]   03/19/24 Office Visit Ana Knight MD [facility]   Showing recent visits within  past 30 days and meeting all other requirements  Today's Visits  Date Type Provider Dept   04/17/24 [facility]ointment [facility]   Showing today's visits and meeting all other requirements  Future Appointments  Date Type Provider Dept   04/18/24 Appointment Gamal Franklin MD [facility] [place] [id number]   Showing future appointments within next 90 days and meeting all other requirements    Interval or Pertinent Labs/Testing:  Lab Review:   Hemoglobin A1C   Date/Time Value Ref Range Status   03/19/2024 06:20 PM 9.0 (H) see below % Final   02/01/2024 12:31 PM 8.3 (H) see below % Final       Admission on 04/08/2024, Discharged on 04/08/2024   Component Date Value    WBC 04/08/2024 6.6     nRBC 04/08/2024 0.0     RBC 04/08/2024 4.05     Hemoglobin 04/08/2024 12.5     Hematocrit 04/08/2024 39.7     MCV 04/08/2024 98     MCH 04/08/2024 30.9     MCHC 04/08/2024 31.5 (L)     RDW 04/08/2024 15.8 (H)     Platelets 04/08/2024 252     Neutrophils % 04/08/2024 70.7     Immature Granulocytes %,* 04/08/2024 0.3     Lymphocytes % 04/08/2024 17.3     Monocytes % 04/08/2024 9.4     Eosinophils % 04/08/2024 1.7     Basophils % 04/08/2024 0.6     Neutrophils Absolute 04/08/2024 4.66     Immature Granulocytes Ab* 04/08/2024 0.02     Lymphocytes Absolute 04/08/2024 1.14 (L)     Monocytes Absolute 04/08/2024 0.62     Eosinophils Absolute 04/08/2024 0.11     Basophils Absolute 04/08/2024 0.04     Glucose 04/08/2024 245 (H)     Sodium 04/08/2024 141     Potassium 04/08/2024 2.9 (LL)     Chloride 04/08/2024 101     Bicarbonate 04/08/2024 32     Anion Gap 04/08/2024 11     Urea Nitrogen 04/08/2024 9     Creatinine 04/08/2024 0.67     eGFR 04/08/2024 >90     Calcium 04/08/2024 9.7     Albumin 04/08/2024 3.7     Alkaline Phosphatase 04/08/2024 285 (H)     Total Protein 04/08/2024 6.5     AST 04/08/2024 18     Bilirubin, Total 04/08/2024 0.6     ALT 04/08/2024 13     Magnesium 04/08/2024 1.80     aPTT 04/08/2024 48 (H)      Protime 04/08/2024 23.4 (H)     INR 04/08/2024 2.1 (H)     BNP 04/08/2024 282 (H)     Ventricular Rate 04/08/2024 95     Atrial Rate 04/08/2024 95     GA Interval 04/08/2024 132     QRS Duration 04/08/2024 74     QT Interval 04/08/2024 368     QTC Calculation(Bazett) 04/08/2024 462     P Axis 04/08/2024 60     R Whigham 04/08/2024 32     T Whigham 04/08/2024 25     QRS Count 04/08/2024 16     Q Onset 04/08/2024 217     P Onset 04/08/2024 151     P Offset 04/08/2024 199     T Offset 04/08/2024 401     QTC Fredericia 04/08/2024 428     Troponin I, High Sensiti* 04/08/2024 11     Tissue/Wound Culture/Sme* 04/08/2024 (4+) Abundant Mixed Skin Microorganisms     Tissue/Wound Culture/Sme* 04/08/2024 (4+) Abundant Mixed Anaerobic Bacteria     Beta Lactamase (Cefinase) 04/08/2024 Positive     Gram Stain 04/08/2024 No polymorphonuclear leukocytes seen (A)     Gram Stain 04/08/2024 (4+) Abundant Mixed Gram positive and Gram negative bacteria (A)     Troponin I, High Sensiti* 04/08/2024 11    Admission on 04/07/2024, Discharged on 04/07/2024   Component Date Value    Ventricular Rate 04/07/2024 95     Atrial Rate 04/07/2024 95     GA Interval 04/07/2024 152     QRS Duration 04/07/2024 74     QT Interval 04/07/2024 362     QTC Calculation(Bazett) 04/07/2024 454     P Axis 04/07/2024 71     R Whigham 04/07/2024 14     T Axis 04/07/2024 25     QRS Count 04/07/2024 15     Q Onset 04/07/2024 217     P Onset 04/07/2024 141     P Offset 04/07/2024 196     T Offset 04/07/2024 398     QTC Fredericia 04/07/2024 421     Glucose 04/07/2024 218 (H)     Sodium 04/07/2024 140     Potassium 04/07/2024 3.2 (L)     Chloride 04/07/2024 100     Bicarbonate 04/07/2024 33 (H)     Anion Gap 04/07/2024 10     Urea Nitrogen 04/07/2024 9     Creatinine 04/07/2024 0.77     eGFR 04/07/2024 >90     Calcium 04/07/2024 9.6     Albumin 04/07/2024 3.6     Alkaline Phosphatase 04/07/2024 314 (H)     Total Protein 04/07/2024 6.3 (L)     AST 04/07/2024 23     Bilirubin,  Total 04/07/2024 0.4     ALT 04/07/2024 14     Troponin I, High Sensiti* 04/07/2024 13     WBC 04/07/2024 5.9     nRBC 04/07/2024 0.0     RBC 04/07/2024 3.86 (L)     Hemoglobin 04/07/2024 12.1     Hematocrit 04/07/2024 38.5     MCV 04/07/2024 100     MCH 04/07/2024 31.3     MCHC 04/07/2024 31.4 (L)     RDW 04/07/2024 16.0 (H)     Platelets 04/07/2024 256     BNP 04/07/2024 210 (H)    Lab on 04/02/2024   Component Date Value    Glucose 04/02/2024 168 (H)     Sodium 04/02/2024 141     Potassium 04/02/2024 4.6     Chloride 04/02/2024 110 (H)     Bicarbonate 04/02/2024 26     Anion Gap 04/02/2024 10     Urea Nitrogen 04/02/2024 10     Creatinine 04/02/2024 0.71     eGFR 04/02/2024 >90     Calcium 04/02/2024 9.4     Albumin 04/02/2024 3.6     Alkaline Phosphatase 04/02/2024 470 (H)     Total Protein 04/02/2024 5.7 (L)     AST 04/02/2024 31     Bilirubin, Total 04/02/2024 0.5     ALT 04/02/2024 29     Parathyroid Hormone, Int* 04/02/2024 83.8     Phosphorus 04/02/2024 3.0     Cortisol Free, Ser 04/02/2024 0.34     Adrenocorticotropic Horm* 04/02/2024 10.9    Admission on 03/28/2024, Discharged on 03/28/2024   Component Date Value    WBC 03/28/2024 8.2     nRBC 03/28/2024 0.0     RBC 03/28/2024 4.08     Hemoglobin 03/28/2024 12.9     Hematocrit 03/28/2024 40.7     MCV 03/28/2024 100     MCH 03/28/2024 31.6     MCHC 03/28/2024 31.7 (L)     RDW 03/28/2024 15.9 (H)     Platelets 03/28/2024 230     Neutrophils % 03/28/2024 67.8     Immature Granulocytes %,* 03/28/2024 0.6     Lymphocytes % 03/28/2024 21.7     Monocytes % 03/28/2024 7.4     Eosinophils % 03/28/2024 1.9     Basophils % 03/28/2024 0.6     Neutrophils Absolute 03/28/2024 5.57     Immature Granulocytes Ab* 03/28/2024 0.05     Lymphocytes Absolute 03/28/2024 1.79     Monocytes Absolute 03/28/2024 0.61     Eosinophils Absolute 03/28/2024 0.16     Basophils Absolute 03/28/2024 0.05     Glucose 03/28/2024 78     Sodium 03/28/2024 139     Potassium 03/28/2024 4.7      Chloride 03/28/2024 107     Bicarbonate 03/28/2024 26     Anion Gap 03/28/2024 11     Urea Nitrogen 03/28/2024 15     Creatinine 03/28/2024 0.68     eGFR 03/28/2024 >90     Calcium 03/28/2024 10.1     Ventricular Rate 03/28/2024 98     Atrial Rate 03/28/2024 98     OH Interval 03/28/2024 142     QRS Duration 03/28/2024 68     QT Interval 03/28/2024 328     QTC Calculation(Bazett) 03/28/2024 418     P Axis 03/28/2024 66     R Matlock 03/28/2024 -11     T Matlock 03/28/2024 17     QRS Count 03/28/2024 16     Q Onset 03/28/2024 215     P Onset 03/28/2024 144     P Offset 03/28/2024 190     T Offset 03/28/2024 379     QTC Fredericia 03/28/2024 386     BNP 03/28/2024 304 (H)     Troponin I, High Sensiti* 03/28/2024 10     Troponin I, High Sensiti* 03/28/2024 9    Admission on 03/19/2024, Discharged on 03/22/2024   Component Date Value    Ventricular Rate 03/19/2024 110     Atrial Rate 03/19/2024 110     OH Interval 03/19/2024 144     QRS Duration 03/19/2024 72     QT Interval 03/19/2024 314     QTC Calculation(Bazett) 03/19/2024 424     P Axis 03/19/2024 60     R Axis 03/19/2024 -48     T Axis 03/19/2024 55     QRS Count 03/19/2024 18     Q Onset 03/19/2024 215     P Onset 03/19/2024 143     P Offset 03/19/2024 191     T Offset 03/19/2024 372     QTC Fredericia 03/19/2024 384     Glucose 03/19/2024 268 (H)     Sodium 03/19/2024 121 (L)     Potassium 03/19/2024 4.4     Chloride 03/19/2024 82 (L)     Bicarbonate 03/19/2024 25     Anion Gap 03/19/2024 18     Urea Nitrogen 03/19/2024 72 (H)     Creatinine 03/19/2024 1.95 (H)     eGFR 03/19/2024 30 (L)     Calcium 03/19/2024 10.5 (H)     Albumin 03/19/2024 4.0     Alkaline Phosphatase 03/19/2024 747 (H)     Total Protein 03/19/2024 7.4     AST 03/19/2024 104 (H)     Bilirubin, Total 03/19/2024 0.7     ALT 03/19/2024 130 (H)     Troponin I, High Sensiti* 03/19/2024 18 (H)     WBC 03/19/2024 11.8 (H)     nRBC 03/19/2024 0.0     RBC 03/19/2024 4.38     Hemoglobin 03/19/2024 13.6      Hematocrit 03/19/2024 40.4     MCV 03/19/2024 92     MCH 03/19/2024 31.1     MCHC 03/19/2024 33.7     RDW 03/19/2024 14.8 (H)     Platelets 03/19/2024 260     Flu A Result 03/19/2024 Not Detected     Flu B Result 03/19/2024 Not Detected     Coronavirus 2019, PCR 03/19/2024 Not Detected     Color, Urine 03/19/2024 Yellow     Appearance, Urine 03/19/2024 Clear     Specific Gravity, Urine 03/19/2024 1.013     pH, Urine 03/19/2024 6.0     Protein, Urine 03/19/2024 NEGATIVE     Glucose, Urine 03/19/2024 >=500 (3+) (A)     Blood, Urine 03/19/2024 NEGATIVE     Ketones, Urine 03/19/2024 NEGATIVE     Bilirubin, Urine 03/19/2024 NEGATIVE     Urobilinogen, Urine 03/19/2024 <2.0     Nitrite, Urine 03/19/2024 NEGATIVE     Leukocyte Esterase, Urine 03/19/2024 NEGATIVE     Extra Tube 03/19/2024 Hold for add-ons.     Extra Tube 03/19/2024 Hold for add-ons.     Extra Tube 03/19/2024 Hold for add-ons.     Protime 03/19/2024 23.0 (H)     INR 03/19/2024 2.0 (H)     POCT Glucose 03/19/2024 245 (H)     Uric Acid 03/19/2024 7.5 (H)     Hemoglobin A1C 03/19/2024 9.0 (H)     Estimated Average Glucose 03/19/2024 212     POCT Glucose 03/19/2024 205 (H)     LVOT diam 03/20/2024 2.40     MV E/A ratio 03/20/2024 0.82     AV pk mariza 03/20/2024 1.75     AV mn grad 03/20/2024 7.0     LV EF 03/20/2024 53     LVIDd 03/20/2024 3.68     RVSP 03/20/2024 20.6     Aortic Valve Area by Con* 03/20/2024 4.42     Aortic Valve Area by Con* 03/20/2024 6.07     AV pk grad 03/20/2024 12.3     LV A4C EF 03/20/2024 39.8     Glucose 03/20/2024 169 (H)     Sodium 03/20/2024 128 (L)     Potassium 03/20/2024 3.8     Chloride 03/20/2024 89 (L)     Bicarbonate 03/20/2024 28     Anion Gap 03/20/2024 15     Urea Nitrogen 03/20/2024 60 (H)     Creatinine 03/20/2024 1.44 (H)     eGFR 03/20/2024 43 (L)     Calcium 03/20/2024 10.3     Albumin 03/20/2024 3.7     Alkaline Phosphatase 03/20/2024 665 (H)     Total Protein 03/20/2024 6.8     AST 03/20/2024 70 (H)     Bilirubin,  Total 03/20/2024 0.6     ALT 03/20/2024 105 (H)     WBC 03/20/2024 9.4     nRBC 03/20/2024 0.0     RBC 03/20/2024 4.24     Hemoglobin 03/20/2024 13.2     Hematocrit 03/20/2024 39.6     MCV 03/20/2024 93     MCH 03/20/2024 31.1     MCHC 03/20/2024 33.3     RDW 03/20/2024 14.9 (H)     Platelets 03/20/2024 220     Neutrophils % 03/20/2024 70.6     Immature Granulocytes %,* 03/20/2024 1.5 (H)     Lymphocytes % 03/20/2024 14.9     Monocytes % 03/20/2024 11.0     Eosinophils % 03/20/2024 1.4     Basophils % 03/20/2024 0.6     Neutrophils Absolute 03/20/2024 6.66     Immature Granulocytes Ab* 03/20/2024 0.14     Lymphocytes Absolute 03/20/2024 1.41     Monocytes Absolute 03/20/2024 1.04 (H)     Eosinophils Absolute 03/20/2024 0.13     Basophils Absolute 03/20/2024 0.06     POCT Glucose 03/20/2024 174 (H)     POCT Glucose 03/20/2024 245 (H)     POCT Glucose 03/20/2024 282 (H)     Protime 03/21/2024 16.0 (H)     INR 03/21/2024 1.4 (H)     WBC 03/21/2024 6.8     nRBC 03/21/2024 0.0     RBC 03/21/2024 4.01     Hemoglobin 03/21/2024 12.5     Hematocrit 03/21/2024 37.4     MCV 03/21/2024 93     MCH 03/21/2024 31.2     MCHC 03/21/2024 33.4     RDW 03/21/2024 14.8 (H)     Platelets 03/21/2024 221     Glucose 03/21/2024 295 (H)     Sodium 03/21/2024 124 (L)     Potassium 03/21/2024 4.6     Chloride 03/21/2024 89 (L)     Bicarbonate 03/21/2024 27     Anion Gap 03/21/2024 13     Urea Nitrogen 03/21/2024 47 (H)     Creatinine 03/21/2024 1.45 (H)     eGFR 03/21/2024 43 (L)     Calcium 03/21/2024 9.9     Phosphorus 03/21/2024 3.2     Albumin 03/21/2024 3.5     POCT Glucose 03/20/2024 490 (H)     POCT Glucose 03/21/2024 273 (H)     POCT Glucose 03/21/2024 394 (H)     POCT Glucose 03/21/2024 374 (H)     Protime 03/22/2024 15.7 (H)     INR 03/22/2024 1.4 (H)     WBC 03/22/2024 6.6     nRBC 03/22/2024 0.0     RBC 03/22/2024 3.97 (L)     Hemoglobin 03/22/2024 12.5     Hematocrit 03/22/2024 38.0     MCV 03/22/2024 96     MCH 03/22/2024 31.5      MCHC 03/22/2024 32.9     RDW 03/22/2024 14.7 (H)     Platelets 03/22/2024 219     Albumin 03/22/2024 3.6     Bilirubin, Total 03/22/2024 0.6     Bilirubin, Direct 03/22/2024 0.2     Alkaline Phosphatase 03/22/2024 691 (H)     ALT 03/22/2024 105 (H)     AST 03/22/2024 77 (H)     Total Protein 03/22/2024 6.7     Phosphorus 03/22/2024 3.2     Glucose 03/22/2024 301 (H)     Sodium 03/22/2024 130 (L)     Potassium 03/22/2024 4.8     Chloride 03/22/2024 93 (L)     Bicarbonate 03/22/2024 27     Anion Gap 03/22/2024 15     Urea Nitrogen 03/22/2024 49 (H)     Creatinine 03/22/2024 1.24 (H)     eGFR 03/22/2024 52 (L)     Calcium 03/22/2024 10.8 (H)     POCT Glucose 03/21/2024 355 (H)     POCT Glucose 03/22/2024 316 (H)     POCT Glucose 03/22/2024 309 (H)    Lab on 03/19/2024   Component Date Value    Glucose 03/19/2024 87     Sodium 03/19/2024 128 (L)     Potassium 03/19/2024 4.2     Chloride 03/19/2024 83 (L)     Bicarbonate 03/19/2024 31     Anion Gap 03/19/2024 18     Urea Nitrogen 03/19/2024 69 (H)     Creatinine 03/19/2024 1.86 (H)     eGFR 03/19/2024 32 (L)     Calcium 03/19/2024 11.2 (H)     Albumin 03/19/2024 4.3     Alkaline Phosphatase 03/19/2024 757 (H)     Total Protein 03/19/2024 7.2     AST 03/19/2024 118 (H)     Bilirubin, Total 03/19/2024 0.7     ALT 03/19/2024 148 (H)     Magnesium 03/19/2024 2.25     Zinc, Serum or Plasma 03/19/2024 80.5     Sedimentation Rate 03/19/2024 34 (H)    Lab on 02/26/2024   Component Date Value    Glucose 02/26/2024 294 (H)     Sodium 02/26/2024 125 (L)     Potassium 02/26/2024 4.2     Chloride 02/26/2024 82 (L)     Bicarbonate 02/26/2024 29     Anion Gap 02/26/2024 18     Urea Nitrogen 02/26/2024 67 (H)     Creatinine 02/26/2024 1.80 (H)     eGFR 02/26/2024 33 (L)     Calcium 02/26/2024 11.0 (H)     Albumin 02/26/2024 4.2     Alkaline Phosphatase 02/26/2024 653 (H)     Total Protein 02/26/2024 7.0     AST 02/26/2024 67 (H)     Bilirubin, Total 02/26/2024 0.7     ALT 02/26/2024  104 (H)     Parathyroid Hormone, Int* 02/26/2024 151.5 (H)     Magnesium 02/26/2024 1.99     Phosphorus 02/26/2024 4.6     WBC 02/26/2024 15.2 (H)     nRBC 02/26/2024 0.0     RBC 02/26/2024 4.99     Hemoglobin 02/26/2024 15.5     Hematocrit 02/26/2024 44.4     MCV 02/26/2024 89     MCH 02/26/2024 31.1     MCHC 02/26/2024 34.9     RDW 02/26/2024 13.5     Platelets 02/26/2024 340     Neutrophils % 02/26/2024 75.8     Immature Granulocytes %,* 02/26/2024 1.4 (H)     Lymphocytes % 02/26/2024 13.9     Monocytes % 02/26/2024 7.3     Eosinophils % 02/26/2024 1.2     Basophils % 02/26/2024 0.4     Neutrophils Absolute 02/26/2024 11.55 (H)     Immature Granulocytes Ab* 02/26/2024 0.22     Lymphocytes Absolute 02/26/2024 2.11     Monocytes Absolute 02/26/2024 1.11 (H)     Eosinophils Absolute 02/26/2024 0.18     Basophils Absolute 02/26/2024 0.06         SDOH Concerns & Interventions:     Assessment/Plan  HFpEF  - Patient previously on Lasix 20mg qday, metolazone 2.5mg qday; which she reported was working well for her  - her nephrologist discontinued this regiment due to hyponatremia and started pt on bumex 1mg BID  - when seen by her PCP the frequency was increased to 3 times a day and she was started on prednisone for her inflammation under breast, we discussed how steroids can increase fluid retention  -  continue fluid restriction.   - Patient remains on 4L but reports dyspnea with exertion and massive weight gain, she is very frustrated with her current situation and wants to feel improved  - she reports she will call her Nephrologist and update the team     Abdominal wall cellulitis  - Seen in ER on 4/7 and 4/8. On Keflex, clindamycin, nystatin powder.   - F/u on culture shows sensitivity to clindamycin  - PCP started prednisone but hesitant to continue as her fluid status appears more concerning    SEVILLA/Budd Chiari syndrome  - on coumadin  - had INR 2.1 on 4/8,   - follow up on warfarin clinic     Diabetes Mellitus  -  Continue insulin pump   - f/u with her regular endocrinologist.     Right breast edema  - She is not scheduled for another mammogram until 4/30/24  - outpatient breast ultrasound and will have pt f/u with pcp concerning breast ultrasound and follow up.     Teleconference performed with Bessie Long from nursing and Ryan Woods from pharmacy.    Waylon Blakely  Internal Medicine

## 2024-04-18 ENCOUNTER — LAB (OUTPATIENT)
Dept: LAB | Facility: LAB | Age: 54
End: 2024-04-18
Payer: MEDICARE

## 2024-04-18 ENCOUNTER — PATIENT OUTREACH (OUTPATIENT)
Dept: HOME HEALTH SERVICES | Age: 54
End: 2024-04-18

## 2024-04-18 ENCOUNTER — OFFICE VISIT (OUTPATIENT)
Dept: NEPHROLOGY | Facility: CLINIC | Age: 54
End: 2024-04-18
Payer: MEDICARE

## 2024-04-18 ENCOUNTER — OFFICE VISIT (OUTPATIENT)
Dept: PRIMARY CARE | Facility: CLINIC | Age: 54
End: 2024-04-18
Payer: MEDICARE

## 2024-04-18 VITALS
SYSTOLIC BLOOD PRESSURE: 163 MMHG | BODY MASS INDEX: 51.91 KG/M2 | DIASTOLIC BLOOD PRESSURE: 82 MMHG | HEIGHT: 63 IN | WEIGHT: 293 LBS | HEART RATE: 98 BPM

## 2024-04-18 VITALS
HEIGHT: 63 IN | DIASTOLIC BLOOD PRESSURE: 68 MMHG | BODY MASS INDEX: 51.91 KG/M2 | OXYGEN SATURATION: 96 % | WEIGHT: 293 LBS | HEART RATE: 78 BPM | SYSTOLIC BLOOD PRESSURE: 122 MMHG

## 2024-04-18 DIAGNOSIS — I15.2 HYPERTENSION ASSOCIATED WITH DIABETES (MULTI): Primary | ICD-10-CM

## 2024-04-18 DIAGNOSIS — I48.92 ATRIAL FIB/FLUTTER, TRANSIENT (MULTI): ICD-10-CM

## 2024-04-18 DIAGNOSIS — E78.5 HYPERLIPIDEMIA ASSOCIATED WITH TYPE 2 DIABETES MELLITUS (MULTI): ICD-10-CM

## 2024-04-18 DIAGNOSIS — N18.31 STAGE 3A CHRONIC KIDNEY DISEASE (MULTI): ICD-10-CM

## 2024-04-18 DIAGNOSIS — I15.2 HYPERTENSION ASSOCIATED WITH DIABETES (MULTI): ICD-10-CM

## 2024-04-18 DIAGNOSIS — I50.32 CHRONIC DIASTOLIC HEART FAILURE (MULTI): ICD-10-CM

## 2024-04-18 DIAGNOSIS — I50.32 CHRONIC DIASTOLIC HEART FAILURE (MULTI): Primary | ICD-10-CM

## 2024-04-18 DIAGNOSIS — E11.59 HYPERTENSION ASSOCIATED WITH DIABETES (MULTI): Primary | ICD-10-CM

## 2024-04-18 DIAGNOSIS — E11.69 HYPERLIPIDEMIA ASSOCIATED WITH TYPE 2 DIABETES MELLITUS (MULTI): ICD-10-CM

## 2024-04-18 DIAGNOSIS — R60.1 ANASARCA: ICD-10-CM

## 2024-04-18 DIAGNOSIS — E87.6 HYPOKALEMIA: Chronic | ICD-10-CM

## 2024-04-18 DIAGNOSIS — I48.91 ATRIAL FIB/FLUTTER, TRANSIENT (MULTI): ICD-10-CM

## 2024-04-18 DIAGNOSIS — E87.1 HYPONATREMIA: ICD-10-CM

## 2024-04-18 DIAGNOSIS — Z79.4 TYPE 2 DIABETES MELLITUS WITH HYPERGLYCEMIA, WITH LONG-TERM CURRENT USE OF INSULIN (MULTI): ICD-10-CM

## 2024-04-18 DIAGNOSIS — E11.59 HYPERTENSION ASSOCIATED WITH DIABETES (MULTI): ICD-10-CM

## 2024-04-18 DIAGNOSIS — E11.65 TYPE 2 DIABETES MELLITUS WITH HYPERGLYCEMIA, WITH LONG-TERM CURRENT USE OF INSULIN (MULTI): ICD-10-CM

## 2024-04-18 DIAGNOSIS — I89.0 LYMPHEDEMA: ICD-10-CM

## 2024-04-18 LAB
ANION GAP SERPL CALC-SCNC: 10 MMOL/L (ref 10–20)
BUN SERPL-MCNC: 13 MG/DL (ref 6–23)
CALCIUM SERPL-MCNC: 10 MG/DL (ref 8.6–10.3)
CHLORIDE SERPL-SCNC: 106 MMOL/L (ref 98–107)
CO2 SERPL-SCNC: 31 MMOL/L (ref 21–32)
CREAT SERPL-MCNC: 0.78 MG/DL (ref 0.5–1.05)
EGFRCR SERPLBLD CKD-EPI 2021: 90 ML/MIN/1.73M*2
GLUCOSE SERPL-MCNC: 109 MG/DL (ref 74–99)
INR PPP: 2.1 (ref 0.9–1.1)
MAGNESIUM SERPL-MCNC: 2.07 MG/DL (ref 1.6–2.4)
POTASSIUM SERPL-SCNC: 3.8 MMOL/L (ref 3.5–5.3)
PROTHROMBIN TIME: 23.8 SECONDS (ref 9.8–12.8)
SODIUM SERPL-SCNC: 143 MMOL/L (ref 136–145)

## 2024-04-18 PROCEDURE — 3078F DIAST BP <80 MM HG: CPT | Performed by: CLINICAL NURSE SPECIALIST

## 2024-04-18 PROCEDURE — 85610 PROTHROMBIN TIME: CPT

## 2024-04-18 PROCEDURE — 3079F DIAST BP 80-89 MM HG: CPT | Performed by: INTERNAL MEDICINE

## 2024-04-18 PROCEDURE — 3060F POS MICROALBUMINURIA REV: CPT | Performed by: INTERNAL MEDICINE

## 2024-04-18 PROCEDURE — 99213 OFFICE O/P EST LOW 20 MIN: CPT | Performed by: CLINICAL NURSE SPECIALIST

## 2024-04-18 PROCEDURE — 3008F BODY MASS INDEX DOCD: CPT | Performed by: CLINICAL NURSE SPECIALIST

## 2024-04-18 PROCEDURE — 99212 OFFICE O/P EST SF 10 MIN: CPT | Performed by: INTERNAL MEDICINE

## 2024-04-18 PROCEDURE — 1036F TOBACCO NON-USER: CPT | Performed by: CLINICAL NURSE SPECIALIST

## 2024-04-18 PROCEDURE — 36415 COLL VENOUS BLD VENIPUNCTURE: CPT

## 2024-04-18 PROCEDURE — 3060F POS MICROALBUMINURIA REV: CPT | Performed by: CLINICAL NURSE SPECIALIST

## 2024-04-18 PROCEDURE — 1036F TOBACCO NON-USER: CPT | Performed by: INTERNAL MEDICINE

## 2024-04-18 PROCEDURE — 83735 ASSAY OF MAGNESIUM: CPT

## 2024-04-18 PROCEDURE — 3052F HG A1C>EQUAL 8.0%<EQUAL 9.0%: CPT | Performed by: INTERNAL MEDICINE

## 2024-04-18 PROCEDURE — 3077F SYST BP >= 140 MM HG: CPT | Performed by: INTERNAL MEDICINE

## 2024-04-18 PROCEDURE — 3008F BODY MASS INDEX DOCD: CPT | Performed by: INTERNAL MEDICINE

## 2024-04-18 PROCEDURE — 3052F HG A1C>EQUAL 8.0%<EQUAL 9.0%: CPT | Performed by: CLINICAL NURSE SPECIALIST

## 2024-04-18 PROCEDURE — 3074F SYST BP LT 130 MM HG: CPT | Performed by: CLINICAL NURSE SPECIALIST

## 2024-04-18 PROCEDURE — 80048 BASIC METABOLIC PNL TOTAL CA: CPT

## 2024-04-18 RX ORDER — BUMETANIDE 1 MG/1
1 TABLET ORAL 4 TIMES DAILY
Qty: 120 TABLET | Refills: 11 | Status: SHIPPED | OUTPATIENT
Start: 2024-04-18 | End: 2024-04-23 | Stop reason: DRUGHIGH

## 2024-04-18 ASSESSMENT — ENCOUNTER SYMPTOMS
AGITATION: 0
ENDOCRINE NEGATIVE: 1
PSYCHIATRIC NEGATIVE: 1
SHORTNESS OF BREATH: 1
WHEEZING: 0
ABDOMINAL DISTENTION: 0
NECK STIFFNESS: 0
COUGH: 0
HEADACHES: 0
PALPITATIONS: 0
PSYCHIATRIC NEGATIVE: 1
JOINT SWELLING: 0
FEVER: 0
NEUROLOGICAL NEGATIVE: 1
BACK PAIN: 0
CONFUSION: 0
CONSTITUTIONAL NEGATIVE: 1
DIARRHEA: 0
GASTROINTESTINAL NEGATIVE: 1
ADENOPATHY: 0
NAUSEA: 0
NEUROLOGICAL NEGATIVE: 1
ABDOMINAL PAIN: 0
DYSURIA: 0
HEMATOLOGIC/LYMPHATIC NEGATIVE: 1
CONSTIPATION: 0
CHILLS: 0
EYES NEGATIVE: 1
CONSTITUTIONAL NEGATIVE: 1
ENDOCRINE NEGATIVE: 1
ALLERGIC/IMMUNOLOGIC NEGATIVE: 1
MUSCULOSKELETAL NEGATIVE: 1
GASTROINTESTINAL NEGATIVE: 1
NUMBNESS: 0
EYE DISCHARGE: 0
MUSCULOSKELETAL NEGATIVE: 1
LIGHT-HEADEDNESS: 0
VOMITING: 0
SHORTNESS OF BREATH: 1

## 2024-04-18 ASSESSMENT — PATIENT HEALTH QUESTIONNAIRE - PHQ9
1. LITTLE INTEREST OR PLEASURE IN DOING THINGS: NOT AT ALL
2. FEELING DOWN, DEPRESSED OR HOPELESS: NOT AT ALL
SUM OF ALL RESPONSES TO PHQ9 QUESTIONS 1 AND 2: 0

## 2024-04-18 NOTE — PROGRESS NOTES
Subjective   Patient ID: Roselia Mo is a 53 y.o. female who presents for Weight Gain (Gained 28 lbs in 2 weeks) and Shortness of Breath.  Patient being seen in follow-up secondary to fluid volume overload    Most recent labs completed on April 8 show glucose 245  Sodium 141, potassium 2.9, chloride 101, bicarb 32  Renal function with a BUN of 9 and creatinine of 0.67  Magnesium 1.8    She has been complaining of increase in edema, this is predominantly in her left breast, lower extremities and lower abdomen  She did go to see her primary care physician who increased her Bumex to 3 times a day along with her potassium to 3 times a day, he also placed her on prednisone for 3 days  She continues to complain of weight gain and increase in shortness of breath  She has not been able to weigh yourself at home secondary to the increase in her weight    Shortness of Breath  Associated symptoms include leg swelling.       Review of Systems   Constitutional: Negative.    Respiratory:  Positive for shortness of breath.    Cardiovascular:  Positive for leg swelling.   Gastrointestinal: Negative.    Endocrine: Negative.    Genitourinary: Negative.    Musculoskeletal: Negative.    Skin: Negative.    Neurological: Negative.    Psychiatric/Behavioral: Negative.         Objective   Physical Exam  Vitals reviewed.   Constitutional:       Appearance: She is obese.   HENT:      Head: Normocephalic.   Cardiovascular:      Rate and Rhythm: Normal rate and regular rhythm.   Pulmonary:      Effort: Pulmonary effort is normal.      Breath sounds: Normal breath sounds.   Abdominal:      Palpations: Abdomen is soft.      Comments: Edema of her lower abdomen, skin is tight   Musculoskeletal:      Right lower leg: Edema (Generalized) present.      Left lower leg: Edema (Generalized) present.   Skin:     General: Skin is warm and dry.      Comments: Left breast swollen, skin is very tight   Neurological:      Mental Status: She is alert and  oriented to person, place, and time.   Psychiatric:         Mood and Affect: Mood normal.         Behavior: Behavior normal.         Assessment/Plan   Problem List Items Addressed This Visit             ICD-10-CM    Chronic diastolic heart failure (Multi) - Primary I50.32    Relevant Orders    Basic metabolic panel    Hypertension associated with diabetes (Multi) E11.59, I15.2    Hyperlipidemia associated with type 2 diabetes mellitus (Multi) E11.69, E78.5    Hypokalemia (Chronic) E87.6     Last potassium level 2.9, will repeat labs today         Stage 3 chronic kidney disease (Multi) N18.30    Relevant Orders    Basic metabolic panel    Magnesium    Type 2 diabetes mellitus (Multi) E11.9       Hypokalemia  Diastolic CHF  Morbid Obesity  Lymphedema  HTN  DM II  Right Sided CHF  Pulmonary HTN  CY on CPAP       HAYLEY Wagoner-ANNABELLA, DNP 04/18/24 1:36 PM

## 2024-04-18 NOTE — PROGRESS NOTES
Subjective   Patient ID: Roselia Mo is a 53 y.o. female who presents for Follow-up (2 day ffu per dr alonso).  Here for fu , the bumex started working urinating better, saw dr gamboa today the bumex was raised to 4 a day          Review of Systems   Constitutional: Negative.  Negative for chills and fever.   HENT: Negative.  Negative for congestion.    Eyes: Negative.  Negative for discharge.   Respiratory:  Positive for shortness of breath (but better). Negative for cough and wheezing.    Cardiovascular:  Positive for leg swelling. Negative for chest pain and palpitations.   Gastrointestinal: Negative.  Negative for abdominal distention, abdominal pain, constipation, diarrhea, nausea and vomiting.   Endocrine: Negative.    Genitourinary: Negative.  Negative for dysuria and urgency.   Musculoskeletal: Negative.  Negative for back pain, joint swelling and neck stiffness.   Skin: Negative.  Negative for rash.   Allergic/Immunologic: Negative.  Negative for immunocompromised state.   Neurological: Negative.  Negative for light-headedness, numbness and headaches.   Hematological: Negative.  Negative for adenopathy.   Psychiatric/Behavioral: Negative.  Negative for agitation, behavioral problems and confusion.    All other systems reviewed and are negative.      Objective   Physical Exam  Vitals reviewed.   Constitutional:       General: She is not in acute distress.     Appearance: She is obese. She is ill-appearing (chronically).   HENT:      Head: Normocephalic and atraumatic.      Nose: Nose normal.   Eyes:      Conjunctiva/sclera: Conjunctivae normal.      Pupils: Pupils are equal, round, and reactive to light.   Neck:      Vascular: No carotid bruit.   Cardiovascular:      Rate and Rhythm: Normal rate and regular rhythm.      Pulses: Normal pulses.      Heart sounds:      No gallop.   Pulmonary:      Effort: Pulmonary effort is normal. No respiratory distress.      Breath sounds: Normal breath sounds. No  "wheezing.   Abdominal:      General: Bowel sounds are normal.      Palpations: Abdomen is soft.      Tenderness: There is no abdominal tenderness.   Musculoskeletal:         General: Normal range of motion.      Cervical back: Normal range of motion. No rigidity.      Comments: B/l lymphedema   Lymphadenopathy:      Cervical: No cervical adenopathy.   Skin:     General: Skin is warm.      Findings: No rash.   Neurological:      General: No focal deficit present.      Mental Status: She is alert and oriented to person, place, and time.   Psychiatric:         Mood and Affect: Mood normal.         Behavior: Behavior normal.       /82 (BP Location: Left arm, Patient Position: Sitting)   Pulse 98   Ht 1.6 m (5' 3\")   Wt (!) 199 kg (439 lb)   BMI 77.77 kg/m²    Hemoglobin A1C   Date/Time Value Ref Range Status   03/19/2024 06:20 PM 9.0 (H) see below % Final     Assessment/Plan   Problem List Items Addressed This Visit       Chronic diastolic heart failure (Multi)    Relevant Orders    Basic Metabolic Panel    Hypertension associated with diabetes (Multi) - Primary    Relevant Orders    Basic Metabolic Panel    Stage 3 chronic kidney disease (Multi)    Relevant Orders    Basic Metabolic Panel    Lymphedema    Relevant Orders    Basic Metabolic Panel    Anasarca    Relevant Medications    bumetanide (Bumex) 1 mg tablet        Fu Tuesday      HTN addressed as follow:    MONITOR BP   GOAL BP LOWER THAN 130/80  LOW SALT  EXERCISE DAILY      With bmp  "

## 2024-04-18 NOTE — ASSESSMENT & PLAN NOTE
Edema of the lower extremities, lower abdominal wall and left breast, will increase Bumex to 2 mg twice a day, will increase potassium to 20 mill equivalents 4 times a day, will repeat labs today, I will call her with these results, she will stay at the higher dose of Bumex over the weekend and will reevaluate on Monday, I have asked her to decrease her fluid intake and to weigh herself daily

## 2024-04-18 NOTE — PROGRESS NOTES
Daily Call Note:  [facility] daily call complete.  Pt audibly SOB on call.  Pt has Nephrology appt today at 1:30pm today.    Pt has not established w INR clinic.  Requested pt call [facility] w updates after appt, pt in agreement.    Verified upcoming [facility]  appt.     Pt Education: POC   Barriers:  compliance   Topics for Daily Review:   Pt demonstrates clear understanding: Yes    Daily Weight:  There were no vitals filed for this visit.   Last 3 Weights:  Wt Readings from Last 7 Encounters:   04/16/24 (!) 199 kg (439 lb 3.2 oz)   04/10/24 (!) 187 kg (412 lb)   04/09/24 (!) 187 kg (412 lb)   04/08/24 (!) 187 kg (412 lb)   04/07/24 (!) 188 kg (414 lb 6.4 oz)   04/06/24 (!) 188 kg (415 lb)   04/05/24 (!) 187 kg (411 lb 15.9 oz)       Masimo Device: No   Masimo Clinical Impression:     Virtual Visits--Scheduled (Most Recent Date at Top)  Follow up Appointments  Recent Visits  Date Type Provider Dept   04/16/24 Office Visit Gamal Franklin MD [facility]   04/02/24 Office Visit Ana Knight MD [facility]   03/26/24 Office Visit Ana Knight MD [facility]   03/19/24 Office Visit Ana Knight MD [facility]   Showing recent visits within past 30 days and meeting all other requirements  Today's Visits  Date Type Provider Dept   04/18/24 Appointment Gamal Franklin MD [facility]   Showing today's visits and meeting all other requirements  Future Appointments  No visits were found meeting these conditions.  Showing future appointments within next 90 days and meeting all other requirements       Frequency of RN Calls & Virtual Visits per Team Agreement: Healthy at Home Frequency: Bi-Weekly    Medication issues Addressed (what was done):     Follow up appointments scheduled by [facility] Staff:   Referrals made by [facility] staff:       [facility] Transitions Assessment    Patient's Address:   [address] [place] [id number]  **  If this is not the address  patient will receive services - alert team and address in EMR**       Patient Contacts:  Extended Emergency Contact Information  Primary Emergency Contact: Karla Luna  Address: [address]  Home Phone: [phone]  Mobile Phone: [phone]  Relation: Parent  Preferred language: English   needed? No  Secondary Emergency Contact: RYAN GUERRA  Mobile Phone: [phone]  Relation: Spouse  Preferred language: English   needed? No                                Patient's Preferred Phone: [phone]  Patient's E-mail: No e-mail address on record

## 2024-04-19 NOTE — TELEPHONE ENCOUNTER
Abdomen , soft, nontender, nondistended , no guarding or rigidity , no masses palpable , normal bowel sounds , Liver and Spleen , no hepatomegaly present , no hepatosplenomegaly , liver nontender , spleen not palpable Medication refill request

## 2024-04-20 ENCOUNTER — PATIENT OUTREACH (OUTPATIENT)
Dept: HOME HEALTH SERVICES | Age: 54
End: 2024-04-20
Payer: MEDICARE

## 2024-04-20 NOTE — PROGRESS NOTES
"Daily call completed. Pt states her stomach has \"broken open and started to leak again\". Endorses clear fluid leaking, denies bleeding from the wound but states if you rub on the broken skin it will start to bleed. Pt states she is still taking the antibiotics given to her from ED visit. States the swelling in her L breast has worsened. Endorsing pain throughout her body. Pt states nephrologist upped her dose to 4 pills of bumex (4 mg total daily). Pt states her blood sugar was \"bottomed out\" this morning, didn't check the number. States she ate some combos and it brought it back up. Pt still in bed at this time and has not obtained a daily weight/vitals. Advised to obtain needed information and log it for next daily call, and to call us back today if anything is out of normal limits or any excessive weight gain. Pt verbalizes understanding and states she is doing okay today. Aware of upcoming appts. [facility] 4/24 at 1000 where pt is set to graduate.         Pt Education: obtaining daily weight/vitals  Barriers: na  Topics for Daily Review: dw, vs, daily needs, wounds & drainage  Pt demonstrates clear understanding: Yes    Daily Weight:  There were no vitals filed for this visit.   Last 3 Weights:  Wt Readings from Last 7 Encounters:   04/18/24 (!) 199 kg (439 lb)   04/18/24 (!) 199 kg (439 lb)   04/16/24 (!) 199 kg (439 lb 3.2 oz)   04/10/24 (!) 187 kg (412 lb)   04/09/24 (!) 187 kg (412 lb)   04/08/24 (!) 187 kg (412 lb)   04/07/24 (!) 188 kg (414 lb 6.4 oz)       Masimo Device: No   Masimo Clinical Impression: na    Virtual Visits--Scheduled (Most Recent Date at Top)  Follow up Appointments  Recent Visits  Date Type Provider Dept   04/18/24 Office Visit Gamal Franklin MD [facility]   04/16/24 Office Visit Gamal Franklin MD [facility]   04/02/24 Office Visit Ana Knight MD [facility]   03/26/24 Office Visit Ana Knight MD Do [facility]   Showing recent visits " within past 30 days and meeting all other requirements  Future Appointments  Date Type Provider Dept   04/23/24 Appointment Gamal Franklin MD Do [facility]   Showing future appointments within next 90 days and meeting all other requirements       Frequency of RN Calls & Virtual Visits per Team Agreement: Healthy at Home Frequency: Bi-Weekly    Medication issues Addressed (what was done): na    Follow up appointments scheduled by [facility] Staff: na  Referrals made by [facility] staff: na      [facility] Transitions Assessment    Patient's Address:   [address] [place] [id number]  **  If this is not the address patient will receive services - alert team and address in EMR**       Patient Contacts:  Extended Emergency Contact Information  Primary Emergency Contact: Karla Luna  Address: [facility] [address]           [address] [facility] [address]           [address] [facility]  Home Phone: [phone]  Mobile Phone: [phone]  Relation: Parent  Preferred language: English   needed? No  Secondary Emergency Contact: RYAN GUERRA  Mobile Phone: [phone]  Relation: Spouse  Preferred language: English   needed? No                                Patient's Preferred Phone: [phone]  Patient's E-mail: No e-mail address on record

## 2024-04-23 ENCOUNTER — PATIENT OUTREACH (OUTPATIENT)
Dept: HOME HEALTH SERVICES | Age: 54
End: 2024-04-23

## 2024-04-23 ENCOUNTER — OFFICE VISIT (OUTPATIENT)
Dept: PRIMARY CARE | Facility: CLINIC | Age: 54
End: 2024-04-23
Payer: MEDICARE

## 2024-04-23 ENCOUNTER — LAB (OUTPATIENT)
Dept: LAB | Facility: LAB | Age: 54
End: 2024-04-23
Payer: MEDICARE

## 2024-04-23 VITALS
WEIGHT: 293 LBS | BODY MASS INDEX: 51.91 KG/M2 | HEIGHT: 63 IN | SYSTOLIC BLOOD PRESSURE: 142 MMHG | DIASTOLIC BLOOD PRESSURE: 86 MMHG | HEART RATE: 79 BPM

## 2024-04-23 DIAGNOSIS — L03.311 CELLULITIS OF ABDOMINAL WALL: ICD-10-CM

## 2024-04-23 DIAGNOSIS — E66.01 CLASS 3 SEVERE OBESITY DUE TO EXCESS CALORIES WITH SERIOUS COMORBIDITY AND BODY MASS INDEX (BMI) GREATER THAN OR EQUAL TO 70 IN ADULT (MULTI): ICD-10-CM

## 2024-04-23 DIAGNOSIS — I82.0 HEPATIC VEIN THROMBOSIS (MULTI): Primary | ICD-10-CM

## 2024-04-23 DIAGNOSIS — I15.2 HYPERTENSION ASSOCIATED WITH DIABETES (MULTI): ICD-10-CM

## 2024-04-23 DIAGNOSIS — I82.0 BUDD-CHIARI SYNDROME (MULTI): ICD-10-CM

## 2024-04-23 DIAGNOSIS — R60.1 ANASARCA: ICD-10-CM

## 2024-04-23 DIAGNOSIS — E11.59 HYPERTENSION ASSOCIATED WITH DIABETES (MULTI): ICD-10-CM

## 2024-04-23 DIAGNOSIS — N18.31 STAGE 3A CHRONIC KIDNEY DISEASE (MULTI): ICD-10-CM

## 2024-04-23 DIAGNOSIS — I89.0 LYMPHEDEMA: ICD-10-CM

## 2024-04-23 DIAGNOSIS — I50.32 CHRONIC DIASTOLIC HEART FAILURE (MULTI): ICD-10-CM

## 2024-04-23 LAB
ALBUMIN SERPL BCP-MCNC: 3.6 G/DL (ref 3.4–5)
ALP SERPL-CCNC: 250 U/L (ref 33–110)
ALT SERPL W P-5'-P-CCNC: 17 U/L (ref 7–45)
ANION GAP SERPL CALC-SCNC: 10 MMOL/L (ref 10–20)
AST SERPL W P-5'-P-CCNC: 22 U/L (ref 9–39)
BILIRUB DIRECT SERPL-MCNC: 0.1 MG/DL (ref 0–0.3)
BILIRUB SERPL-MCNC: 0.5 MG/DL (ref 0–1.2)
BNP SERPL-MCNC: 196 PG/ML (ref 0–99)
BUN SERPL-MCNC: 11 MG/DL (ref 6–23)
CALCIUM SERPL-MCNC: 9.8 MG/DL (ref 8.6–10.3)
CHLORIDE SERPL-SCNC: 106 MMOL/L (ref 98–107)
CO2 SERPL-SCNC: 32 MMOL/L (ref 21–32)
CREAT SERPL-MCNC: 0.66 MG/DL (ref 0.5–1.05)
EGFRCR SERPLBLD CKD-EPI 2021: >90 ML/MIN/1.73M*2
GLUCOSE SERPL-MCNC: 99 MG/DL (ref 74–99)
POTASSIUM SERPL-SCNC: 3.6 MMOL/L (ref 3.5–5.3)
PROT SERPL-MCNC: 5.9 G/DL (ref 6.4–8.2)
SODIUM SERPL-SCNC: 144 MMOL/L (ref 136–145)

## 2024-04-23 PROCEDURE — 3077F SYST BP >= 140 MM HG: CPT | Performed by: INTERNAL MEDICINE

## 2024-04-23 PROCEDURE — 3079F DIAST BP 80-89 MM HG: CPT | Performed by: INTERNAL MEDICINE

## 2024-04-23 PROCEDURE — 99214 OFFICE O/P EST MOD 30 MIN: CPT | Performed by: INTERNAL MEDICINE

## 2024-04-23 PROCEDURE — 3052F HG A1C>EQUAL 8.0%<EQUAL 9.0%: CPT | Performed by: INTERNAL MEDICINE

## 2024-04-23 PROCEDURE — 80053 COMPREHEN METABOLIC PANEL: CPT

## 2024-04-23 PROCEDURE — 82248 BILIRUBIN DIRECT: CPT

## 2024-04-23 PROCEDURE — 36415 COLL VENOUS BLD VENIPUNCTURE: CPT

## 2024-04-23 PROCEDURE — 1036F TOBACCO NON-USER: CPT | Performed by: INTERNAL MEDICINE

## 2024-04-23 PROCEDURE — 3060F POS MICROALBUMINURIA REV: CPT | Performed by: INTERNAL MEDICINE

## 2024-04-23 PROCEDURE — 83880 ASSAY OF NATRIURETIC PEPTIDE: CPT

## 2024-04-23 PROCEDURE — 3008F BODY MASS INDEX DOCD: CPT | Performed by: INTERNAL MEDICINE

## 2024-04-23 RX ORDER — WARFARIN SODIUM 5 MG/1
TABLET ORAL
Qty: 45 TABLET | Refills: 11 | Status: SHIPPED | OUTPATIENT
Start: 2024-04-23 | End: 2024-06-10 | Stop reason: HOSPADM

## 2024-04-23 RX ORDER — CLOTRIMAZOLE AND BETAMETHASONE DIPROPIONATE 10; .64 MG/G; MG/G
1 CREAM TOPICAL 2 TIMES DAILY
Qty: 45 G | Refills: 0 | Status: SHIPPED | OUTPATIENT
Start: 2024-04-23 | End: 2024-05-07 | Stop reason: SDUPTHER

## 2024-04-23 RX ORDER — BUMETANIDE 1 MG/1
TABLET ORAL
Qty: 120 TABLET | Refills: 11 | Status: SHIPPED | OUTPATIENT
Start: 2024-04-23 | End: 2024-05-01 | Stop reason: SDUPTHER

## 2024-04-23 ASSESSMENT — ENCOUNTER SYMPTOMS
DYSURIA: 0
GASTROINTESTINAL NEGATIVE: 1
CHILLS: 0
CONSTITUTIONAL NEGATIVE: 1
PALPITATIONS: 0
ENDOCRINE NEGATIVE: 1
NEUROLOGICAL NEGATIVE: 1
ADENOPATHY: 0
VOMITING: 0
MUSCULOSKELETAL NEGATIVE: 1
NAUSEA: 0
BACK PAIN: 0
AGITATION: 0
NECK STIFFNESS: 0
ABDOMINAL PAIN: 0
PSYCHIATRIC NEGATIVE: 1
LIGHT-HEADEDNESS: 0
ABDOMINAL DISTENTION: 0
EYES NEGATIVE: 1
RESPIRATORY NEGATIVE: 1
HEADACHES: 0
CONSTIPATION: 0
ALLERGIC/IMMUNOLOGIC NEGATIVE: 1
FEVER: 0
DIARRHEA: 0
WHEEZING: 0
COUGH: 0
CONFUSION: 0
CARDIOVASCULAR NEGATIVE: 1
HEMATOLOGIC/LYMPHATIC NEGATIVE: 1
SHORTNESS OF BREATH: 0
JOINT SWELLING: 0
NUMBNESS: 0
EYE DISCHARGE: 0

## 2024-04-23 ASSESSMENT — PATIENT HEALTH QUESTIONNAIRE - PHQ9
1. LITTLE INTEREST OR PLEASURE IN DOING THINGS: NOT AT ALL
SUM OF ALL RESPONSES TO PHQ9 QUESTIONS 1 AND 2: 0
2. FEELING DOWN, DEPRESSED OR HOPELESS: NOT AT ALL

## 2024-04-23 NOTE — PROGRESS NOTES
Subjective   Patient ID: Roselia Mo is a 53 y.o. female who presents for Follow-up (1 WK FU LAB).  HERE FOR FU REPORTS `11 POUND WT LOSS FEELS BETTER        Review of Systems   Constitutional: Negative.  Negative for chills and fever.   HENT: Negative.  Negative for congestion.    Eyes: Negative.  Negative for discharge.   Respiratory: Negative.  Negative for cough, shortness of breath and wheezing.    Cardiovascular: Negative.  Negative for chest pain, palpitations and leg swelling.   Gastrointestinal: Negative.  Negative for abdominal distention, abdominal pain, constipation, diarrhea, nausea and vomiting.   Endocrine: Negative.    Genitourinary: Negative.  Negative for dysuria and urgency.   Musculoskeletal: Negative.  Negative for back pain, joint swelling and neck stiffness.   Skin: Negative.  Negative for rash.   Allergic/Immunologic: Negative.  Negative for immunocompromised state.   Neurological: Negative.  Negative for light-headedness, numbness and headaches.   Hematological: Negative.  Negative for adenopathy.   Psychiatric/Behavioral: Negative.  Negative for agitation, behavioral problems and confusion.    All other systems reviewed and are negative.      Objective   Physical Exam  Vitals reviewed.   Constitutional:       General: She is not in acute distress.     Appearance: She is obese.   HENT:      Head: Normocephalic and atraumatic.      Nose: Nose normal.   Eyes:      Conjunctiva/sclera: Conjunctivae normal.      Pupils: Pupils are equal, round, and reactive to light.   Neck:      Vascular: No carotid bruit.   Cardiovascular:      Rate and Rhythm: Normal rate and regular rhythm.      Pulses: Normal pulses.      Heart sounds:      No gallop.   Pulmonary:      Effort: Pulmonary effort is normal. No respiratory distress.      Breath sounds: Normal breath sounds. No wheezing.   Abdominal:      General: Bowel sounds are normal.      Palpations: Abdomen is soft.      Tenderness: There is no  "abdominal tenderness.   Musculoskeletal:         General: Normal range of motion.      Cervical back: Normal range of motion. No rigidity.   Lymphadenopathy:      Cervical: No cervical adenopathy.   Skin:     General: Skin is warm.      Findings: Erythema (under abdominal fold tender localized on the right side) and rash present.   Neurological:      General: No focal deficit present.      Mental Status: She is alert and oriented to person, place, and time.   Psychiatric:         Mood and Affect: Mood normal.         Behavior: Behavior normal.       /86 (BP Location: Left arm, Patient Position: Sitting)   Pulse 79   Ht 1.6 m (5' 3\")   Wt (!) 199 kg (439 lb)   BMI 77.77 kg/m²    Hemoglobin A1C   Date/Time Value Ref Range Status   03/19/2024 06:20 PM 9.0 (H) see below % Final     Assessment/Plan   Problem List Items Addressed This Visit       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Multi) - Primary    Relevant Medications    warfarin (Coumadin) 5 mg tablet    Class 3 severe obesity due to excess calories with serious comorbidity and body mass index (BMI) greater than or equal to 70 in adult (Multi)    Anasarca    Relevant Medications    bumetanide (Bumex) 1 mg tablet     Other Visit Diagnoses       Cellulitis of abdominal wall        Relevant Medications    clotrimazole-betamethasone (Lotrisone) cream           Labs reviewed with pt      INCREASE BUMEX 3 IN AM 1 IN PM    HAS RX FOR DOXY 100 MG BID  TO TAKE X 1 WEEK( FROM DR JORGE)      HTN addressed as follow:    MONITOR BP   GOAL BP LOWER THAN 130/80  LOW SALT  EXERCISE DAILY      Slowly improving    Reev 1 week        "

## 2024-04-23 NOTE — PROGRESS NOTES
Daily Call Note:     Daily call completed with the patient.  She states that she went to her doc yesterday because her wound had opened back up and is infected. She was put on Doxycyline, and Clotrimazole cream which she states the cream wasn't at her pharmacy so she has to go to another.  Both meds are to be taken for a week. She is to follow up with the doc in a week to be re-evaluated.  Her kidney doc also increased her Bumex.  She is to take 3 tabs (1 mg) in the AM, and 2 tabs (1mg) in the PM.  She states her weight is down and currently at 428 lbs.  She states her BS was 101 this morning, and weight was 142/80.  She endorses continued pain in her hand, which if it continues will get an x-ray.  Pt had no questions or concerns during the call.  Pt aware of upcoming appts.      Pt Education:   Barriers:   Topics for Daily Review:   Pt demonstrates clear understanding:     Daily Weight:  There were no vitals filed for this visit.   Last 3 Weights:  Wt Readings from Last 7 Encounters:   04/23/24 (!) 199 kg (439 lb)   04/18/24 (!) 199 kg (439 lb)   04/18/24 (!) 199 kg (439 lb)   04/16/24 (!) 199 kg (439 lb 3.2 oz)   04/10/24 (!) 187 kg (412 lb)   04/09/24 (!) 187 kg (412 lb)   04/08/24 (!) 187 kg (412 lb)       Masimo Device:   Masimo Clinical Impression:     Virtual Visits--Scheduled (Most Recent Date at Top)  Follow up Appointments  Recent Visits  Date Type Provider Dept   04/18/24 Office Visit Gamal Franklin MD [facility]   04/16/24 Office Visit Gamal Franklin MD [facility]   04/08/24 Appointment [facility] 2E CR NONV1 ECG RESOURCE [facility] 2e Cr Nonv1   04/02/24 Office Visit Ana Knight MD [facility]   03/26/24 Office Visit Ana Knight MD [facility]   Showing recent visits within past 30 days and meeting all other requirements  Today's Visits  Date Type Provider Dept   04/23/24 Office Visit Gamal Franklin MD Do [facility]   Showing today's visits and  meeting all other requirements  Future Appointments  Date Type Provider Dept   05/01/24 [facility]ment [facility]   Showing future appointments within next 90 days and meeting all other requirements       Frequency of RN Calls & Virtual Visits per Team Agreement:     Medication issues Addressed (what was done):     Follow up appointments scheduled by [facility] Staff:   Referrals made by [facility] staff:       [facility] Care Transitions Assessment    Patient's Address:   [address] [place] [id number]  **  If this is not the address patient will receive services - alert team and address in EMR**       Patient Contacts:  Extended Emergency Contact Information  Primary Emergency Contact: Karla Luna  Address: [facility] [address]           [address] [facility]            [address] of [facility]  Home Phone: [phone]  Mobile Phone: [phone]  Relation: Parent  Preferred language: English   needed? No  Secondary Emergency Contact: RYAN GUERRA  Mobile Phone: [phone]  Relation: Spouse  Preferred language: English   needed? No                                Patient's Preferred Phone: [phone]  Patient's E-mail: No e-mail address on record

## 2024-04-24 ENCOUNTER — PATIENT OUTREACH (OUTPATIENT)
Dept: HOME HEALTH SERVICES | Age: 54
End: 2024-04-24

## 2024-04-24 NOTE — PROGRESS NOTES
Weekly [facility] Call Note:   No answer x4 for [facility] call.   Pt Education: POC  Barriers: na  Topics for Daily Review: POC  Pt demonstrates clear understanding: Yes    Daily Weight:  There were no vitals filed for this visit.   Last 3 Weights:  Wt Readings from Last 7 Encounters:   04/23/24 (!) 199 kg (439 lb)   04/18/24 (!) 199 kg (439 lb)   04/18/24 (!) 199 kg (439 lb)   04/16/24 (!) 199 kg (439 lb 3.2 oz)   04/10/24 (!) 187 kg (412 lb)   04/09/24 (!) 187 kg (412 lb)   04/08/24 (!) 187 kg (412 lb)       Masimo Device: No   Masimo Clinical Impression: na    Virtual Visits--Scheduled (Most Recent Date at Top)  Follow up Appointments  Recent Visits  Date Type Provider Dept   04/23/24 Office Visit Gamal Franklin MD [facility]   04/18/24 Office Visit Gamal Franklin MD [facility]   04/16/24 Office Visit Gamal Franklin MD [facility]   04/08/24 Appointment [facility] 2E CR NONV1 ECG RESOURCE [facility] 2e Cr Nonv1   04/02/24 Office Visit Ana Knight MD [facility]   03/26/24 Office Visit Ana Knight MD [facility]   Showing recent visits within past 30 days and meeting all other requirements  Future Appointments  Date Type Provider Dept   05/01/24 Appointment Gamal Franklin MD [facility]   Showing future appointments within next 90 days and meeting all other requirements       Frequency of RN Calls & Virtual Visits per Team Agreement: Healthy at Home Frequency: Bi-Weekly    Medication issues Addressed (what was done): na    Follow up appointments scheduled by [facility] Staff: na  Referrals made by [facility] staff: annia

## 2024-04-25 ENCOUNTER — PATIENT OUTREACH (OUTPATIENT)
Dept: HOME HEALTH SERVICES | Age: 54
End: 2024-04-25
Payer: MEDICARE

## 2024-04-25 VITALS
WEIGHT: 293 LBS | DIASTOLIC BLOOD PRESSURE: 80 MMHG | SYSTOLIC BLOOD PRESSURE: 148 MMHG | HEART RATE: 80 BPM | BODY MASS INDEX: 74.05 KG/M2

## 2024-04-25 NOTE — PROGRESS NOTES
Daily Call Note:   Daily call completed. Patient states she is doing well. States she is taking medications as prescribed (doxycycline, Lotrisone cream) for her wound and is tolerating the Bumex increase. States more frequent urination. She denies fever, but states has some chills. Patient states still experiencing left hand pain (denies injury) and will have a xray done sometime next week. Patient's VS: 148/80, HR 80, weight 418 lb, . Patient's next [facility] scheduled for 4/27 @ 1430. She states they are taking her father to the hospital today and she is unsure if she'll be available for the call. Patient advised to call with any questions or concerns.   Pt Education:   Barriers: None  Topics for Daily Review:   Pt demonstrates clear understanding: Yes    Daily Weight:  Vitals:    04/25/24 1300   Weight: (!) 190 kg (418 lb)      Last 3 Weights:  Wt Readings from Last 7 Encounters:   04/25/24 (!) 190 kg (418 lb)   04/23/24 (!) 199 kg (439 lb)   04/18/24 (!) 199 kg (439 lb)   04/18/24 (!) 199 kg (439 lb)   04/16/24 (!) 199 kg (439 lb 3.2 oz)   04/10/24 (!) 187 kg (412 lb)   04/09/24 (!) 187 kg (412 lb)       Masimo Device: No   Masimo Clinical Impression: N/A    Virtual Visits--Scheduled (Most Recent Date at Top)  Follow up Appointments  Recent Visits  Date Type Provider Dept   04/23/24 Office Visit Gamal Franklin MD [facility]   04/18/24 Office Visit Gamal Franklin MD [facility]   04/16/24 Office Visit Gamal Franklin MD [facility]   04/08/24 Appointment [facility] 2E CR NONV1 ECG RESOURCE [facility] 2e Cr Nonv1   04/02/24 Office Visit Ana Knight MD [facility]   03/26/24 Office Visit Ana Knight MD [facility]   Showing recent visits within past 30 days and meeting all other requirements  Future Appointments  Date Type Provider Dept   05/01/24 Appointment Gamal Franklin MD Do [facility]   Showing future appointments within next 90 days and  meeting all other requirements       Frequency of RN Calls & Virtual Visits per Team Agreement: Healthy at Home Frequency: Bi-Weekly    Medication issues Addressed (what was done): None     Follow up appointments scheduled by [facility] Staff: None  Referrals made by [facility] staff: None

## 2024-04-26 ENCOUNTER — ANTICOAGULATION - WARFARIN VISIT (OUTPATIENT)
Dept: PHARMACY | Facility: HOSPITAL | Age: 54
End: 2024-04-26
Payer: MEDICARE

## 2024-04-26 DIAGNOSIS — I82.0 HEPATIC VEIN THROMBOSIS (MULTI): ICD-10-CM

## 2024-04-26 DIAGNOSIS — I82.0 BUDD-CHIARI SYNDROME (MULTI): ICD-10-CM

## 2024-04-26 DIAGNOSIS — I81 PORTAL VEIN THROMBOSIS: Primary | ICD-10-CM

## 2024-04-26 LAB
POC INR: 2.2
POC PROTHROMBIN TIME: NORMAL

## 2024-04-26 PROCEDURE — 99211 OFF/OP EST MAY X REQ PHY/QHP: CPT | Performed by: PHARMACIST

## 2024-04-26 PROCEDURE — 85610 PROTHROMBIN TIME: CPT | Mod: QW

## 2024-04-26 NOTE — PROGRESS NOTES
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     Pt current location in clinic.     Current anticoagulant: Warfarin    Time since last visit: 1 weeks    Last INR: 2.1 on warfarin 52.5 mg in the previous week. No changes were made at that time.    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 She has been told the clot has dissolved but the thought is the VTE was provoked by being in a wheelchair so warfarin has been continued.    She has had multiple hospitalizations recently and over the last few years.    Overall she has tolerated warfarin well, has not had interruptions.       Last Creatinine:   Lab Results   Component Value Date    CREATININE 0.66 04/23/2024     Last hemoglobin/hematocrit:  Lab Results   Component Value Date    HGB 12.5 04/08/2024     Lab Results   Component Value Date    HCT 39.7 04/08/2024       Current INR: 2.2 is therapeutic for goal range of 2.0-3.0 and is reflective of 52.5 mg in the previous week prior to visit.    Patient denies any missed doses.  Patient denies any medication changes, diet changes, or OTC/herbal supplement changes since last visit.  Patient denies any adverse reactions or barriers.  Patient denies any CP/SOB, fatigue, bleeding or bruising since last visit.   Patient denies any change in alcohol or tobacco use since last visit.   Patient denies any upcoming medical or dental procedures.    Plan:  Patient was instructed to continue with current warfarin dose.  INR follow up will occur in 4 weeks.  Patient was instructed to maintain consistent vegetable intake, to monitor for any bruising or bleeding, and to call with any medication changes or concerns.    Pt handout given with above information    Damien Dsouza, PharmD

## 2024-04-27 ENCOUNTER — PATIENT OUTREACH (OUTPATIENT)
Dept: HOME HEALTH SERVICES | Age: 54
End: 2024-04-27

## 2024-04-27 ENCOUNTER — TELEMEDICINE (OUTPATIENT)
Dept: CARE COORDINATION | Age: 54
End: 2024-04-27
Payer: MEDICARE

## 2024-04-27 DIAGNOSIS — I50.32 CHRONIC DIASTOLIC HEART FAILURE (MULTI): Primary | ICD-10-CM

## 2024-04-27 DIAGNOSIS — E11.65 TYPE 2 DIABETES MELLITUS WITH HYPERGLYCEMIA, WITH LONG-TERM CURRENT USE OF INSULIN (MULTI): ICD-10-CM

## 2024-04-27 DIAGNOSIS — Z79.4 TYPE 2 DIABETES MELLITUS WITH HYPERGLYCEMIA, WITH LONG-TERM CURRENT USE OF INSULIN (MULTI): ICD-10-CM

## 2024-04-27 DIAGNOSIS — I82.0 BUDD-CHIARI SYNDROME (MULTI): ICD-10-CM

## 2024-04-27 NOTE — PROGRESS NOTES
Brief summary of hospitalization or reason for referral  Admission Dx and Associated Referral Dx: DM, HTN, CY, SEVILLA, Budd-Chiari syndrome, portal vein thrombosis on coumadin, HFpEF, and COPD      53 year old female with DM, HTN, CY, SEVILLA, Budd-Chiari syndrome, portal vein thrombosis on coumadin, HFpEF, and COPD and was admitted recently for hyponatermia, renal failure, and elevated LFTs. Patient was drinking 12 L of water a day and improved with fluid restriction.     Interval Subjective:   Fluid retention has improved but still having some BLE edema and dyspnea on exertion. Now up to bumex 3 mg in the AM and 2 mg in the PM. States she has last 30 lbs in the last 2 weeks.     Now on doxycycline for abdominal wall infection. She can't tell if it's improving much because of all the swelling on her abdomen.     Masimo: No  Oxygen: Yes, 4L   CPAP/BIPAP: No    Wt Readings from Last 3 Encounters:   04/25/24 (!) 190 kg (418 lb)   04/23/24 (!) 199 kg (439 lb)   04/18/24 (!) 199 kg (439 lb)     Today's weight: 410 lb  Today's glucose: 79  Medications Issues/Refill need  Medication Follow up action needed: None    Requested Prescriptions      No prescriptions requested or ordered in this encounter       Upcoming Visits:  Recent Visits  Date Type Provider Dept   04/24/24 Appointment [facility]   04/23/24 Office Visit Gamal Franklin MD [facility]   04/18/24 Office Visit Gamal Franklin MD [facility]   04/16/24 Office Visit Gamal Franklin MD [facility]   04/08/24 Appointment N[facility]NA[facility] 2E CR NONV1 ECG RESOURCE [facility] 2e Cr Nonv1   04/02/24 Office Visit Ana Knight MD Do [facility]   Showing recent visits within past 30 days and meeting all other requirements  Today's Visits  Date Type Provider Dept   04/27/24 Appointment [facility]   Showing today's visits and meeting all other requirements  Future Appointments  Date  Type Provider Dept   05/01/24 Appointment Gamal Franklin MD Do [facility]   Showing future appointments within next 90 days and meeting all other requirements      Interval or Pertinent Labs/Testing:  Lab Review:   Hemoglobin A1C   Date/Time Value Ref Range Status   03/19/2024 06:20 PM 9.0 (H) see below % Final   02/01/2024 12:31 PM 8.3 (H) see below % Final        SDOH Concerns & Interventions: None    Assessment/Plan  HFpEF  - Patient previously on Lasix 20mg qday, metolazone 2.5mg qday; which she reported was working well for her  -- her nephrologist discontinued this regiment due to hyponatremia and started pt on bumex - now on 3 mg in AM and 2 mg in PM  -  continue fluid restriction.   - Patient remains on 4L but reports dyspnea with exertion and edema, continues to lose weight    Abdominal wall cellulitis  - Seen in ER on 4/7 and 4/8. On Keflex, clindamycin, nystatin powder.   - F/u on culture shows sensitivity to clindamycin  - Now on doxycycline     SEVILLA/Budd Chiari syndrome  - on coumadin  - had INR 2.1 on 4/8,   - follow up on warfarin clinic     Diabetes Mellitus  - Continue insulin pump   - f/u with her regular endocrinologist.     Right breast edema  - She is scheduled for breast ultrasound on 4/30/24    Patient is appropriate to graduate from [facility]    Phone call completed with myself, patient, and Emi Radford, HAYLEY-CNP   [facility]

## 2024-04-27 NOTE — PROGRESS NOTES
Daily Call Note: [facility] Graduation call completed- on call patient and Aurea Radford CNP.    Patient feels she is ready to graduate from the program. States she has lost 30 lbs. She stated that she still has some SOB secondary to water retention. Prior to this episode she had been able to go without oxygen supplementation during the day, but now has to use the supplementation as before during the day intermittently (using at night) - she reports left breast is engorged and abdomen is still painful. He abdomen is too large to rest on her lap. She spreads her legs to allow the abdomen to fall through, and then it becomes trapped when she tries to stand, which causes pain. Nevertheless she feels she is still improving with the improvement from diuresis.    Patient confirmed that she is being followed by the coumadin clinic monthly, and was seen in clinic yesterday.    Pt Education: confirmed with patient that she is aware that medications, foods and supplements may interfere with warfarin therapy, and that it is being addressed with her follow up visits, which she confirmed.   Barriers: none  Topics for Daily Review: coumadin therapy and follow up - infection management of abdomen and breast. Follow up appointments - patient confirmed she is aware of upcoming follow up appointments. Also, should follow up issues arise that can be assisted by [facility] program, the patient is aware she can contact nurse for assistance.  Pt demonstrates clear understanding: Yes    Daily Weight:  There were no vitals filed for this visit.   Last 3 Weights:  Wt Readings from Last 7 Encounters:   04/25/24 (!) 190 kg (418 lb)   04/23/24 (!) 199 kg (439 lb)   04/18/24 (!) 199 kg (439 lb)   04/18/24 (!) 199 kg (439 lb)   04/16/24 (!) 199 kg (439 lb 3.2 oz)   04/10/24 (!) 187 kg (412 lb)   04/09/24 (!) 187 kg (412 lb)       Masimo Device: No   Masimo Clinical Impression:     Virtual Visits--Scheduled (Most Recent Date at Top)  Follow up  Appointments  Recent Visits  Date Type Provider Dept   04/23/24 Office Visit Gamal Franklin MD [facility]   04/18/24 Office Visit Gamal Franklin MD [facility]   04/16/24 Office Visit Gamal Franklin MD [facility]   04/08/24 Appointment NATANAEL 2E CR NONV1 ECG RESOURCE [facility] 2e Cr Nonv1   04/02/24 Office Visit Ana Knight MD [facility]   Showing recent visits within past 30 days and meeting all other requirements  Future Appointments  Date Type Provider Dept   05/01/24 Appointment Gamal Franklin MD [facility]   Showing future appointments within next 90 days and meeting all other requirements       Frequency of RN Calls & Virtual Visits per Team Agreement: [facility] Frequency: Graduating for the program today    Medication issues Addressed (what was done): antibiotic therapy for treatment of abdominal and right breast infection    Follow up appointments scheduled by [facility] Staff: annia  Referrals made by [facility] staff: annia

## 2024-04-30 ENCOUNTER — HOSPITAL ENCOUNTER (OUTPATIENT)
Dept: RADIOLOGY | Facility: HOSPITAL | Age: 54
Discharge: HOME | End: 2024-04-30
Payer: MEDICARE

## 2024-04-30 ENCOUNTER — TELEPHONE (OUTPATIENT)
Dept: PRIMARY CARE | Facility: CLINIC | Age: 54
End: 2024-04-30
Payer: MEDICARE

## 2024-04-30 ENCOUNTER — PATIENT OUTREACH (OUTPATIENT)
Dept: PRIMARY CARE | Facility: CLINIC | Age: 54
End: 2024-04-30
Payer: MEDICARE

## 2024-04-30 DIAGNOSIS — R92.8 ABNORMAL ULTRASOUND OF BREAST: ICD-10-CM

## 2024-04-30 DIAGNOSIS — Z79.899 MEDICATION MANAGEMENT: Primary | ICD-10-CM

## 2024-04-30 PROCEDURE — 76642 ULTRASOUND BREAST LIMITED: CPT | Mod: LT

## 2024-04-30 PROCEDURE — 76642 ULTRASOUND BREAST LIMITED: CPT | Mod: LEFT SIDE | Performed by: RADIOLOGY

## 2024-04-30 RX ORDER — CITALOPRAM 20 MG/1
20 TABLET, FILM COATED ORAL DAILY
Status: ON HOLD | COMMUNITY
Start: 2024-04-21 | End: 2024-06-10 | Stop reason: ENTERED-IN-ERROR

## 2024-04-30 NOTE — PROGRESS NOTES
Successful outreach to patient regarding hospitalization as patient continues TCM program.   At time of outreach call the patient feels as if their condition has stayed the same. Belly is still infected. Sees PCP tomorrow. Graduated from [facility] at home. Questions or concerns addressed at this time with patient.   Provided contact information to patient if any further non-emergent needs arise.

## 2024-05-01 ENCOUNTER — OFFICE VISIT (OUTPATIENT)
Dept: PRIMARY CARE | Facility: CLINIC | Age: 54
End: 2024-05-01
Payer: MEDICARE

## 2024-05-01 ENCOUNTER — TELEPHONE (OUTPATIENT)
Dept: PRIMARY CARE | Facility: CLINIC | Age: 54
End: 2024-05-01

## 2024-05-01 ENCOUNTER — LAB (OUTPATIENT)
Dept: LAB | Facility: LAB | Age: 54
End: 2024-05-01
Payer: MEDICARE

## 2024-05-01 VITALS
WEIGHT: 293 LBS | SYSTOLIC BLOOD PRESSURE: 138 MMHG | BODY MASS INDEX: 72.63 KG/M2 | HEART RATE: 68 BPM | DIASTOLIC BLOOD PRESSURE: 82 MMHG | OXYGEN SATURATION: 90 %

## 2024-05-01 DIAGNOSIS — R60.1 ANASARCA: Primary | ICD-10-CM

## 2024-05-01 DIAGNOSIS — Z79.899 MEDICATION MANAGEMENT: ICD-10-CM

## 2024-05-01 DIAGNOSIS — E66.01 CLASS 3 SEVERE OBESITY DUE TO EXCESS CALORIES WITH SERIOUS COMORBIDITY AND BODY MASS INDEX (BMI) GREATER THAN OR EQUAL TO 70 IN ADULT (MULTI): ICD-10-CM

## 2024-05-01 DIAGNOSIS — G47.33 OBSTRUCTIVE SLEEP APNEA SYNDROME: ICD-10-CM

## 2024-05-01 LAB
ANION GAP SERPL CALC-SCNC: 11 MMOL/L (ref 10–20)
BUN SERPL-MCNC: 14 MG/DL (ref 6–23)
CALCIUM SERPL-MCNC: 9.8 MG/DL (ref 8.6–10.3)
CHLORIDE SERPL-SCNC: 103 MMOL/L (ref 98–107)
CO2 SERPL-SCNC: 31 MMOL/L (ref 21–32)
CREAT SERPL-MCNC: 0.72 MG/DL (ref 0.5–1.05)
EGFRCR SERPLBLD CKD-EPI 2021: >90 ML/MIN/1.73M*2
GLUCOSE SERPL-MCNC: 336 MG/DL (ref 74–99)
POTASSIUM SERPL-SCNC: 3.9 MMOL/L (ref 3.5–5.3)
SODIUM SERPL-SCNC: 141 MMOL/L (ref 136–145)

## 2024-05-01 PROCEDURE — 1036F TOBACCO NON-USER: CPT | Performed by: INTERNAL MEDICINE

## 2024-05-01 PROCEDURE — 3075F SYST BP GE 130 - 139MM HG: CPT | Performed by: INTERNAL MEDICINE

## 2024-05-01 PROCEDURE — 3008F BODY MASS INDEX DOCD: CPT | Performed by: INTERNAL MEDICINE

## 2024-05-01 PROCEDURE — 3079F DIAST BP 80-89 MM HG: CPT | Performed by: INTERNAL MEDICINE

## 2024-05-01 PROCEDURE — 3060F POS MICROALBUMINURIA REV: CPT | Performed by: INTERNAL MEDICINE

## 2024-05-01 PROCEDURE — 36415 COLL VENOUS BLD VENIPUNCTURE: CPT

## 2024-05-01 PROCEDURE — 80048 BASIC METABOLIC PNL TOTAL CA: CPT

## 2024-05-01 PROCEDURE — 3052F HG A1C>EQUAL 8.0%<EQUAL 9.0%: CPT | Performed by: INTERNAL MEDICINE

## 2024-05-01 PROCEDURE — 99214 OFFICE O/P EST MOD 30 MIN: CPT | Performed by: INTERNAL MEDICINE

## 2024-05-01 RX ORDER — BUMETANIDE 1 MG/1
TABLET ORAL
Qty: 180 TABLET | Refills: 11 | Status: SHIPPED | OUTPATIENT
Start: 2024-05-01 | End: 2024-05-12 | Stop reason: HOSPADM

## 2024-05-01 ASSESSMENT — ENCOUNTER SYMPTOMS
LIGHT-HEADEDNESS: 0
GASTROINTESTINAL NEGATIVE: 1
WHEEZING: 0
EYES NEGATIVE: 1
NEUROLOGICAL NEGATIVE: 1
ABDOMINAL PAIN: 0
DYSURIA: 0
EYE DISCHARGE: 0
MUSCULOSKELETAL NEGATIVE: 1
PSYCHIATRIC NEGATIVE: 1
CONSTIPATION: 0
CHILLS: 0
AGITATION: 0
ABDOMINAL DISTENTION: 0
VOMITING: 0
ADENOPATHY: 0
NECK STIFFNESS: 0
COUGH: 0
BACK PAIN: 0
NAUSEA: 0
JOINT SWELLING: 0
FEVER: 0
ENDOCRINE NEGATIVE: 1
NUMBNESS: 0
RESPIRATORY NEGATIVE: 1
CONFUSION: 0
PALPITATIONS: 0
HEMATOLOGIC/LYMPHATIC NEGATIVE: 1
CONSTITUTIONAL NEGATIVE: 1
SHORTNESS OF BREATH: 0
ALLERGIC/IMMUNOLOGIC NEGATIVE: 1
DIARRHEA: 0
HEADACHES: 0

## 2024-05-01 ASSESSMENT — PATIENT HEALTH QUESTIONNAIRE - PHQ9
SUM OF ALL RESPONSES TO PHQ9 QUESTIONS 1 AND 2: 0
1. LITTLE INTEREST OR PLEASURE IN DOING THINGS: NOT AT ALL
2. FEELING DOWN, DEPRESSED OR HOPELESS: NOT AT ALL

## 2024-05-01 NOTE — TELEPHONE ENCOUNTER
Pre cert in lab sleep study. Pt is aware that she is only to schedule with [facility] sleep lab.

## 2024-05-01 NOTE — PROGRESS NOTES
Subjective   Patient ID: Roselia Mo is a 53 y.o. female who presents for Follow-up (1 week labs ).  Here for diuretic adjustment labs, feels better  LOST GOOD AMOUNT OF WT        Review of Systems   Constitutional: Negative.  Negative for chills and fever.   HENT: Negative.  Negative for congestion.    Eyes: Negative.  Negative for discharge.   Respiratory: Negative.  Negative for cough, shortness of breath and wheezing.    Cardiovascular:  Positive for leg swelling. Negative for chest pain and palpitations.   Gastrointestinal: Negative.  Negative for abdominal distention, abdominal pain, constipation, diarrhea, nausea and vomiting.   Endocrine: Negative.    Genitourinary: Negative.  Negative for dysuria and urgency.   Musculoskeletal: Negative.  Negative for back pain, joint swelling and neck stiffness.   Skin: Negative.  Negative for rash.   Allergic/Immunologic: Negative.  Negative for immunocompromised state.   Neurological: Negative.  Negative for light-headedness, numbness and headaches.   Hematological: Negative.  Negative for adenopathy.   Psychiatric/Behavioral: Negative.  Negative for agitation, behavioral problems and confusion.    All other systems reviewed and are negative.      Objective   Physical Exam  Vitals reviewed.   Constitutional:       General: She is not in acute distress.     Appearance: She is obese.   HENT:      Head: Normocephalic and atraumatic.      Nose: Nose normal.   Eyes:      Conjunctiva/sclera: Conjunctivae normal.      Pupils: Pupils are equal, round, and reactive to light.   Neck:      Vascular: No carotid bruit.   Cardiovascular:      Rate and Rhythm: Normal rate and regular rhythm.      Pulses: Normal pulses.      Heart sounds:      No gallop.   Pulmonary:      Effort: Pulmonary effort is normal. No respiratory distress.      Breath sounds: Normal breath sounds. No wheezing.   Abdominal:      General: Bowel sounds are normal.      Palpations: Abdomen is soft.       Tenderness: There is no abdominal tenderness.   Musculoskeletal:         General: Normal range of motion.      Cervical back: Normal range of motion. No rigidity.      Right lower leg: Edema (LYMPHEDEMA BETTER) present.      Left lower leg: Edema (LYMPHEDEMA BETTER) present.   Lymphadenopathy:      Cervical: No cervical adenopathy.   Skin:     General: Skin is warm.      Findings: No rash.   Neurological:      General: No focal deficit present.      Mental Status: She is alert and oriented to person, place, and time.   Psychiatric:         Mood and Affect: Mood normal.         Behavior: Behavior normal.       /82 (BP Location: Right arm, Patient Position: Sitting)   Pulse 68   Wt (!) 186 kg (410 lb)   SpO2 90%   BMI 72.63 kg/m²    Hemoglobin A1C   Date/Time Value Ref Range Status   03/19/2024 06:20 PM 9.0 (H) see below % Final     Assessment/Plan   Problem List Items Addressed This Visit       Class 3 severe obesity due to excess calories with serious comorbidity and body mass index (BMI) greater than or equal to 70 in adult (Multi)    Relevant Orders    In-Center Sleep Study    Power mobility device    Obstructive sleep apnea syndrome    Relevant Orders    Positive Airway Pressure (PAP) Therapy    In-Center Sleep Study    Power mobility device    Anasarca - Primary    Relevant Medications    bumetanide (Bumex) 1 mg tablet    Other Relevant Orders    Basic Metabolic Panel    Power mobility device      Labs reviewed with pt    Clinically improving    HAD BW TODAY, PENDING    PT HAS EXTREM MORBID OBESITY  PT HAS MULTIPLE CHRONIC MEDICAL CONDITIONS AS ABOVE  SHE IS VERY LIMITED IN MOBILITY   WALKER AND A CAN NOT ENOUGH FOR HER MOBILITY DUE TO EXTREM WT  MANUAL WC NOT EFFECT DUE TO EXTREM WEIGHT AND DIFFICULTY TO ROLL AND MOVE  SHE CAN SAFELY OPERATE A HOVEROUND  DUE TO HER BODY STATUS SHE MIGHT HAVE DIFFICULTY USING A SCOOTER  A MOBILITY DEVICE SUCH A HOVEROUND WOULD HELP HER ADL.    Fu 1 week bw

## 2024-05-02 ENCOUNTER — OFFICE VISIT (OUTPATIENT)
Dept: GASTROENTEROLOGY | Facility: CLINIC | Age: 54
End: 2024-05-02
Payer: MEDICARE

## 2024-05-02 VITALS — HEIGHT: 63 IN | WEIGHT: 293 LBS | BODY MASS INDEX: 51.91 KG/M2

## 2024-05-02 DIAGNOSIS — K90.41 GLUTEN-SENSITIVE ENTEROPATHY: ICD-10-CM

## 2024-05-02 DIAGNOSIS — K76.0 NONALCOHOLIC FATTY LIVER: ICD-10-CM

## 2024-05-02 DIAGNOSIS — R79.89 ABNORMAL LFTS: ICD-10-CM

## 2024-05-02 DIAGNOSIS — K21.9 GASTROESOPHAGEAL REFLUX DISEASE WITHOUT ESOPHAGITIS: Primary | ICD-10-CM

## 2024-05-02 DIAGNOSIS — K59.04 CHRONIC IDIOPATHIC CONSTIPATION: ICD-10-CM

## 2024-05-02 DIAGNOSIS — I81 PORTAL VEIN THROMBOSIS: ICD-10-CM

## 2024-05-02 DIAGNOSIS — K43.0: ICD-10-CM

## 2024-05-02 PROCEDURE — 3008F BODY MASS INDEX DOCD: CPT | Performed by: INTERNAL MEDICINE

## 2024-05-02 PROCEDURE — 99214 OFFICE O/P EST MOD 30 MIN: CPT | Performed by: INTERNAL MEDICINE

## 2024-05-02 PROCEDURE — 3060F POS MICROALBUMINURIA REV: CPT | Performed by: INTERNAL MEDICINE

## 2024-05-02 PROCEDURE — 3052F HG A1C>EQUAL 8.0%<EQUAL 9.0%: CPT | Performed by: INTERNAL MEDICINE

## 2024-05-02 RX ORDER — LUBIPROSTONE 24 UG/1
24 CAPSULE ORAL
Qty: 180 CAPSULE | Refills: 3 | Status: SHIPPED | OUTPATIENT
Start: 2024-05-02

## 2024-05-02 ASSESSMENT — ENCOUNTER SYMPTOMS
NEUROLOGICAL NEGATIVE: 1
EYES NEGATIVE: 1
CONSTITUTIONAL NEGATIVE: 1
RESPIRATORY NEGATIVE: 1
ENDOCRINE NEGATIVE: 1
HEMATOLOGIC/LYMPHATIC NEGATIVE: 1
NAUSEA: 1
CARDIOVASCULAR NEGATIVE: 1
ABDOMINAL PAIN: 1

## 2024-05-02 NOTE — PROGRESS NOTES
Subjective   Patient ID: Roselia Mo is a 53 y.o. female who presents for Follow-up (Patient states that she is taking lactulose but after multiple er visits/ hospital admissions was taken off of trulance and mag oxide. Patient states that the trulance was causing severe diarrhea. ).  VIOLETTA Bryan is an unfortunate 53-year-old female with multiple medical problems including Budd-Chiari syndrome hepatic vein thrombosis steatohepatitis diabetes mellitus, CHF, right heart failure and obstructive sleep apnea morbid obesity she presents today for follow-up.  Was unable to tolerate Trulance because of significant diarrhea was placed on Amitiza by her family doctor and did help.  She was unable to obtain refills from them.    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  Infection is largely in her abdominal skin folds and under her left breast.  She admits that her left breast is tender and the skin is now thickened.  A breast ultrasound showed no dominant mass in March.    She has appointment with her treatment team tomorrow regarding same.    Review of Systems   Constitutional: Negative.    HENT: Negative.     Eyes: Negative.    Respiratory: Negative.     Cardiovascular: Negative.    Gastrointestinal:  Positive for abdominal pain and nausea.   Endocrine: Negative.    Genitourinary: Negative.    Neurological: Negative.    Hematological: Negative.        Objective   Physical Exam  Vitals and nursing note reviewed.   Constitutional:       Appearance: Normal appearance.   HENT:      Head: Normocephalic.      Mouth/Throat:      Mouth: Mucous membranes are moist.      Pharynx: Oropharynx is clear.   Eyes:      Conjunctiva/sclera: Conjunctivae normal.      Pupils: Pupils are equal, round, and reactive to light.   Cardiovascular:      Rate and Rhythm: Normal rate and regular rhythm.      Heart sounds: Normal heart sounds.   Pulmonary:       Effort: Pulmonary effort is normal.      Breath sounds: Normal breath sounds.   Abdominal:      General: Abdomen is flat. Bowel sounds are normal.      Palpations: Abdomen is soft.   Musculoskeletal:         General: Normal range of motion.      Cervical back: Normal range of motion and neck supple.   Skin:     General: Skin is warm and dry.   Neurological:      General: No focal deficit present.      Mental Status: She is alert and oriented to person, place, and time.   Psychiatric:         Behavior: Behavior normal.         Assessment/Plan   Problem List Items Addressed This Visit       Portal vein thrombosis    Gastroesophageal reflux disease without esophagitis - Primary    Irreducible incisional hernia    Nonalcoholic fatty liver    Chronic idiopathic constipation    Relevant Medications    lubiprostone (Amitiza) 24 mcg capsule    Gluten-sensitive enteropathy    Abnormal LFTs     I will reach out to her nephrologist regarding her recurrent skin infection.  Will resume Amitiza 24 mcg twice daily and follow-up in 4 to 6 months         Sandoval Lewis DO 05/02/24 12:53 PM

## 2024-05-07 ENCOUNTER — OFFICE VISIT (OUTPATIENT)
Dept: PRIMARY CARE | Facility: CLINIC | Age: 54
End: 2024-05-07
Payer: MEDICARE

## 2024-05-07 VITALS — HEART RATE: 71 BPM | SYSTOLIC BLOOD PRESSURE: 169 MMHG | DIASTOLIC BLOOD PRESSURE: 78 MMHG

## 2024-05-07 DIAGNOSIS — L03.311 CELLULITIS OF ABDOMINAL WALL: Primary | ICD-10-CM

## 2024-05-07 DIAGNOSIS — E11.59 HYPERTENSION ASSOCIATED WITH DIABETES (MULTI): ICD-10-CM

## 2024-05-07 DIAGNOSIS — I15.2 HYPERTENSION ASSOCIATED WITH DIABETES (MULTI): ICD-10-CM

## 2024-05-07 DIAGNOSIS — E11.65 TYPE 2 DIABETES MELLITUS WITH HYPERGLYCEMIA, WITH LONG-TERM CURRENT USE OF INSULIN (MULTI): ICD-10-CM

## 2024-05-07 DIAGNOSIS — Z79.4 TYPE 2 DIABETES MELLITUS WITH HYPERGLYCEMIA, WITH LONG-TERM CURRENT USE OF INSULIN (MULTI): ICD-10-CM

## 2024-05-07 DIAGNOSIS — B37.2 CANDIDAL DERMATITIS: ICD-10-CM

## 2024-05-07 DIAGNOSIS — Z91.89 POOR HEALTH: ICD-10-CM

## 2024-05-07 PROCEDURE — 3052F HG A1C>EQUAL 8.0%<EQUAL 9.0%: CPT | Performed by: INTERNAL MEDICINE

## 2024-05-07 PROCEDURE — 3078F DIAST BP <80 MM HG: CPT | Performed by: INTERNAL MEDICINE

## 2024-05-07 PROCEDURE — 1036F TOBACCO NON-USER: CPT | Performed by: INTERNAL MEDICINE

## 2024-05-07 PROCEDURE — 99214 OFFICE O/P EST MOD 30 MIN: CPT | Performed by: INTERNAL MEDICINE

## 2024-05-07 PROCEDURE — 3008F BODY MASS INDEX DOCD: CPT | Performed by: INTERNAL MEDICINE

## 2024-05-07 PROCEDURE — 3060F POS MICROALBUMINURIA REV: CPT | Performed by: INTERNAL MEDICINE

## 2024-05-07 PROCEDURE — 3077F SYST BP >= 140 MM HG: CPT | Performed by: INTERNAL MEDICINE

## 2024-05-07 RX ORDER — METOPROLOL SUCCINATE 50 MG/1
50 TABLET, EXTENDED RELEASE ORAL 2 TIMES DAILY
Qty: 90 TABLET | Refills: 3 | Status: SHIPPED | OUTPATIENT
Start: 2024-05-07 | End: 2025-05-07

## 2024-05-07 RX ORDER — CLOTRIMAZOLE AND BETAMETHASONE DIPROPIONATE 10; .64 MG/G; MG/G
1 CREAM TOPICAL 2 TIMES DAILY
Qty: 45 G | Refills: 3 | Status: SHIPPED | OUTPATIENT
Start: 2024-05-07 | End: 2024-05-14

## 2024-05-07 RX ORDER — AMOXICILLIN AND CLAVULANATE POTASSIUM 875; 125 MG/1; MG/1
875 TABLET, FILM COATED ORAL 2 TIMES DAILY
Qty: 20 TABLET | Refills: 0 | Status: ON HOLD | OUTPATIENT
Start: 2024-05-07 | End: 2024-05-12

## 2024-05-07 ASSESSMENT — ENCOUNTER SYMPTOMS
ALLERGIC/IMMUNOLOGIC NEGATIVE: 1
ABDOMINAL PAIN: 0
NAUSEA: 0
EYES NEGATIVE: 1
EYE DISCHARGE: 0
COUGH: 0
ADENOPATHY: 0
BACK PAIN: 0
ENDOCRINE NEGATIVE: 1
NECK STIFFNESS: 0
SHORTNESS OF BREATH: 0
CONSTIPATION: 0
PALPITATIONS: 0
CHILLS: 0
CARDIOVASCULAR NEGATIVE: 1
VOMITING: 0
WHEEZING: 0
RESPIRATORY NEGATIVE: 1
HEADACHES: 0
AGITATION: 0
HEMATOLOGIC/LYMPHATIC NEGATIVE: 1
CONSTITUTIONAL NEGATIVE: 1
JOINT SWELLING: 0
GASTROINTESTINAL NEGATIVE: 1
FEVER: 0
ABDOMINAL DISTENTION: 0
CONFUSION: 0
LIGHT-HEADEDNESS: 0
DIARRHEA: 0
MUSCULOSKELETAL NEGATIVE: 1
NEUROLOGICAL NEGATIVE: 1
DYSURIA: 0
PSYCHIATRIC NEGATIVE: 1
NUMBNESS: 0

## 2024-05-07 ASSESSMENT — PATIENT HEALTH QUESTIONNAIRE - PHQ9
2. FEELING DOWN, DEPRESSED OR HOPELESS: NOT AT ALL
1. LITTLE INTEREST OR PLEASURE IN DOING THINGS: NOT AT ALL
SUM OF ALL RESPONSES TO PHQ9 QUESTIONS 1 AND 2: 0

## 2024-05-07 NOTE — PROGRESS NOTES
Subjective   Patient ID: Roselia Mo is a 53 y.o. female who presents for Follow-up (1 WK FU LAB AND BREAST US).  HERE FOR FU WITH LABS US BREAST  CO ERYTHEMA SOME BELOW RIGHT ABDOMINAL FOLD  RASH UNDER LEFT BREATS          Review of Systems   Constitutional: Negative.  Negative for chills and fever.   HENT: Negative.  Negative for congestion.    Eyes: Negative.  Negative for discharge.   Respiratory: Negative.  Negative for cough, shortness of breath and wheezing.    Cardiovascular: Negative.  Negative for chest pain, palpitations and leg swelling.   Gastrointestinal: Negative.  Negative for abdominal distention, abdominal pain, constipation, diarrhea, nausea and vomiting.   Endocrine: Negative.    Genitourinary: Negative.  Negative for dysuria and urgency.   Musculoskeletal: Negative.  Negative for back pain, joint swelling and neck stiffness.   Skin: Negative.  Negative for rash.   Allergic/Immunologic: Negative.  Negative for immunocompromised state.   Neurological: Negative.  Negative for light-headedness, numbness and headaches.   Hematological: Negative.  Negative for adenopathy.   Psychiatric/Behavioral: Negative.  Negative for agitation, behavioral problems and confusion.    All other systems reviewed and are negative.      Objective   Physical Exam  Vitals reviewed.   Constitutional:       General: She is not in acute distress.     Appearance: She is obese. She is ill-appearing (CHRONICALLY).   HENT:      Head: Normocephalic and atraumatic.      Nose: Nose normal.   Eyes:      Conjunctiva/sclera: Conjunctivae normal.      Pupils: Pupils are equal, round, and reactive to light.   Neck:      Vascular: No carotid bruit.   Cardiovascular:      Rate and Rhythm: Normal rate and regular rhythm.      Pulses: Normal pulses.      Heart sounds:      No gallop.   Pulmonary:      Effort: Pulmonary effort is normal. No respiratory distress.      Breath sounds: Normal breath sounds. No wheezing.   Abdominal:       General: Bowel sounds are normal.      Palpations: Abdomen is soft.      Tenderness: There is no abdominal tenderness.   Musculoskeletal:         General: Normal range of motion.      Cervical back: Normal range of motion. No rigidity.   Lymphadenopathy:      Cervical: No cervical adenopathy.   Skin:     General: Skin is warm.      Findings: Erythema (SKIN ULCER UNDER ABDOMINAL FOLD ON THE RIGHT SIDE TENDER WARM WITH MILD DISCHARGE) and rash (UNDER BOTH BREAST) present.   Neurological:      General: No focal deficit present.      Mental Status: She is alert and oriented to person, place, and time.   Psychiatric:         Mood and Affect: Mood normal.         Behavior: Behavior normal.       /78 (BP Location: Right arm, Patient Position: Sitting)   Pulse 71    Hemoglobin A1C   Date/Time Value Ref Range Status   03/19/2024 06:20 PM 9.0 (H) see below % Final     Assessment/Plan   Problem List Items Addressed This Visit       Hypertension associated with diabetes (Multi)    Relevant Medications    metoprolol succinate XL (Toprol-XL) 50 mg 24 hr tablet    Type 2 diabetes mellitus (Multi)    Relevant Medications    empagliflozin (Jardiance) 10 mg    Poor health     Other Visit Diagnoses       Cellulitis of abdominal wall    -  Primary    Relevant Medications    amoxicillin-pot clavulanate (Augmentin) 875-125 mg tablet    clotrimazole-betamethasone (Lotrisone) cream    Candidal dermatitis        Relevant Medications    clotrimazole-betamethasone (Lotrisone) cream             Labs reviewed with pt    US BILL PT    Diabetes Mellitus/IFG addressed as follow:    1800 [address] ADA  HGA1C GOAL LESS THAN 7  LOSE WT  EXERCISE DAILY  ( HAS BEEN OFF INSULIN FOR A FEW  DAYS BACK ON IT NOW)      HTN addressed as follow:    MONITOR BP   GOAL BP LOWER THAN 130/80  LOW SALT  EXERCISE DAILY      MDM    1) COMPLEXITY: 1 OR MORE CHRONIC CONDITION WITH EXACERBATION, OR PROGRESSION OR SIDE EFFECT OF TREATMENT ADDRESSED  2)DATA: TESTS  INTERPRETED AND OR ORDERED, TOOK INDEPENDENT HISTORY OR RECORDS REVIEWED  3)RISK: MODERATE RISK DUE TO NATURE OF MEDICAL CONDITIONS/COMORBIDITY OR MEDICATIONS ORDERED OR SURGICAL OR PROCEDURE REFERRAL, .

## 2024-05-08 ENCOUNTER — APPOINTMENT (OUTPATIENT)
Dept: RADIOLOGY | Facility: HOSPITAL | Age: 54
DRG: 602 | End: 2024-05-08
Payer: MEDICARE

## 2024-05-08 ENCOUNTER — APPOINTMENT (OUTPATIENT)
Dept: PRIMARY CARE | Facility: CLINIC | Age: 54
End: 2024-05-08
Payer: MEDICARE

## 2024-05-08 ENCOUNTER — APPOINTMENT (OUTPATIENT)
Dept: CARDIOLOGY | Facility: HOSPITAL | Age: 54
DRG: 602 | End: 2024-05-08
Payer: MEDICARE

## 2024-05-08 ENCOUNTER — OFFICE VISIT (OUTPATIENT)
Dept: NEPHROLOGY | Facility: CLINIC | Age: 54
End: 2024-05-08
Payer: MEDICARE

## 2024-05-08 ENCOUNTER — HOSPITAL ENCOUNTER (INPATIENT)
Facility: HOSPITAL | Age: 54
LOS: 4 days | Discharge: HOME | DRG: 602 | End: 2024-05-12
Attending: HOSPITALIST | Admitting: HOSPITALIST
Payer: MEDICARE

## 2024-05-08 VITALS — SYSTOLIC BLOOD PRESSURE: 132 MMHG | DIASTOLIC BLOOD PRESSURE: 70 MMHG | HEART RATE: 68 BPM

## 2024-05-08 DIAGNOSIS — R26.2 DIFFICULTY WALKING: ICD-10-CM

## 2024-05-08 DIAGNOSIS — N18.2 CKD (CHRONIC KIDNEY DISEASE) STAGE 2, GFR 60-89 ML/MIN: Primary | ICD-10-CM

## 2024-05-08 DIAGNOSIS — L03.311 CELLULITIS, ABDOMINAL WALL: ICD-10-CM

## 2024-05-08 DIAGNOSIS — E87.6 HYPOKALEMIA: Chronic | ICD-10-CM

## 2024-05-08 DIAGNOSIS — L03.311 CELLULITIS OF ABDOMINAL WALL: ICD-10-CM

## 2024-05-08 DIAGNOSIS — I50.32 CHRONIC DIASTOLIC HEART FAILURE (MULTI): ICD-10-CM

## 2024-05-08 DIAGNOSIS — R60.1 ANASARCA: ICD-10-CM

## 2024-05-08 DIAGNOSIS — R07.9 CHEST PAIN, UNSPECIFIED TYPE: ICD-10-CM

## 2024-05-08 DIAGNOSIS — Z91.89 POOR HEALTH: ICD-10-CM

## 2024-05-08 DIAGNOSIS — I89.0 LYMPHEDEMA: ICD-10-CM

## 2024-05-08 DIAGNOSIS — L03.90 CELLULITIS: Primary | ICD-10-CM

## 2024-05-08 LAB
ALBUMIN SERPL BCP-MCNC: 3.9 G/DL (ref 3.4–5)
ALP SERPL-CCNC: 262 U/L (ref 33–110)
ALT SERPL W P-5'-P-CCNC: 21 U/L (ref 7–45)
ANION GAP SERPL CALC-SCNC: 12 MMOL/L (ref 10–20)
APTT PPP: 42 SECONDS (ref 27–38)
AST SERPL W P-5'-P-CCNC: 33 U/L (ref 9–39)
BASOPHILS # BLD AUTO: 0.04 X10*3/UL (ref 0–0.1)
BASOPHILS NFR BLD AUTO: 0.5 %
BILIRUB SERPL-MCNC: 0.7 MG/DL (ref 0–1.2)
BNP SERPL-MCNC: 199 PG/ML (ref 0–99)
BUN SERPL-MCNC: 13 MG/DL (ref 6–23)
CALCIUM SERPL-MCNC: 9.8 MG/DL (ref 8.6–10.3)
CARDIAC TROPONIN I PNL SERPL HS: 4 NG/L (ref 0–13)
CARDIAC TROPONIN I PNL SERPL HS: 5 NG/L (ref 0–13)
CHLORIDE SERPL-SCNC: 100 MMOL/L (ref 98–107)
CO2 SERPL-SCNC: 30 MMOL/L (ref 21–32)
CREAT SERPL-MCNC: 0.67 MG/DL (ref 0.5–1.05)
EGFRCR SERPLBLD CKD-EPI 2021: >90 ML/MIN/1.73M*2
EOSINOPHIL # BLD AUTO: 0.26 X10*3/UL (ref 0–0.7)
EOSINOPHIL NFR BLD AUTO: 3.2 %
ERYTHROCYTE [DISTWIDTH] IN BLOOD BY AUTOMATED COUNT: 13.9 % (ref 11.5–14.5)
GLUCOSE BLD MANUAL STRIP-MCNC: 171 MG/DL (ref 74–99)
GLUCOSE SERPL-MCNC: 235 MG/DL (ref 74–99)
HCT VFR BLD AUTO: 41.3 % (ref 36–46)
HGB BLD-MCNC: 13.1 G/DL (ref 12–16)
IMM GRANULOCYTES # BLD AUTO: 0.02 X10*3/UL (ref 0–0.7)
IMM GRANULOCYTES NFR BLD AUTO: 0.2 % (ref 0–0.9)
INR PPP: 1.8 (ref 0.9–1.1)
LACTATE SERPL-SCNC: 1.4 MMOL/L (ref 0.4–2)
LYMPHOCYTES # BLD AUTO: 1.36 X10*3/UL (ref 1.2–4.8)
LYMPHOCYTES NFR BLD AUTO: 16.6 %
MAGNESIUM SERPL-MCNC: 1.78 MG/DL (ref 1.6–2.4)
MCH RBC QN AUTO: 30.8 PG (ref 26–34)
MCHC RBC AUTO-ENTMCNC: 31.7 G/DL (ref 32–36)
MCV RBC AUTO: 97 FL (ref 80–100)
MONOCYTES # BLD AUTO: 0.62 X10*3/UL (ref 0.1–1)
MONOCYTES NFR BLD AUTO: 7.6 %
NEUTROPHILS # BLD AUTO: 5.89 X10*3/UL (ref 1.2–7.7)
NEUTROPHILS NFR BLD AUTO: 71.9 %
NRBC BLD-RTO: 0 /100 WBCS (ref 0–0)
PLATELET # BLD AUTO: 217 X10*3/UL (ref 150–450)
POTASSIUM SERPL-SCNC: 3.8 MMOL/L (ref 3.5–5.3)
PROT SERPL-MCNC: 6.3 G/DL (ref 6.4–8.2)
PROTHROMBIN TIME: 20.8 SECONDS (ref 9.8–12.8)
RBC # BLD AUTO: 4.25 X10*6/UL (ref 4–5.2)
SODIUM SERPL-SCNC: 138 MMOL/L (ref 136–145)
WBC # BLD AUTO: 8.2 X10*3/UL (ref 4.4–11.3)

## 2024-05-08 PROCEDURE — 1200000002 HC GENERAL ROOM WITH TELEMETRY DAILY

## 2024-05-08 PROCEDURE — 93005 ELECTROCARDIOGRAM TRACING: CPT

## 2024-05-08 PROCEDURE — 94664 DEMO&/EVAL PT USE INHALER: CPT

## 2024-05-08 PROCEDURE — 99223 1ST HOSP IP/OBS HIGH 75: CPT | Performed by: HOSPITALIST

## 2024-05-08 PROCEDURE — 84484 ASSAY OF TROPONIN QUANT: CPT | Performed by: PHYSICIAN ASSISTANT

## 2024-05-08 PROCEDURE — 84132 ASSAY OF SERUM POTASSIUM: CPT | Mod: 91 | Performed by: PHYSICIAN ASSISTANT

## 2024-05-08 PROCEDURE — 71275 CT ANGIOGRAPHY CHEST: CPT | Performed by: RADIOLOGY

## 2024-05-08 PROCEDURE — 74177 CT ABD & PELVIS W/CONTRAST: CPT | Performed by: RADIOLOGY

## 2024-05-08 PROCEDURE — 71045 X-RAY EXAM CHEST 1 VIEW: CPT

## 2024-05-08 PROCEDURE — 99214 OFFICE O/P EST MOD 30 MIN: CPT | Performed by: INTERNAL MEDICINE

## 2024-05-08 PROCEDURE — 71045 X-RAY EXAM CHEST 1 VIEW: CPT | Performed by: RADIOLOGY

## 2024-05-08 PROCEDURE — 2500000005 HC RX 250 GENERAL PHARMACY W/O HCPCS: Performed by: HOSPITALIST

## 2024-05-08 PROCEDURE — 74177 CT ABD & PELVIS W/CONTRAST: CPT

## 2024-05-08 PROCEDURE — 83880 ASSAY OF NATRIURETIC PEPTIDE: CPT | Performed by: PHYSICIAN ASSISTANT

## 2024-05-08 PROCEDURE — 2500000004 HC RX 250 GENERAL PHARMACY W/ HCPCS (ALT 636 FOR OP/ED): Performed by: PHYSICIAN ASSISTANT

## 2024-05-08 PROCEDURE — 2500000006 HC RX 250 W HCPCS SELF ADMINISTERED DRUGS (ALT 637 FOR ALL PAYERS): Mod: MUE | Performed by: PHARMACIST

## 2024-05-08 PROCEDURE — 85610 PROTHROMBIN TIME: CPT | Performed by: PHYSICIAN ASSISTANT

## 2024-05-08 PROCEDURE — 3060F POS MICROALBUMINURIA REV: CPT | Performed by: INTERNAL MEDICINE

## 2024-05-08 PROCEDURE — 83605 ASSAY OF LACTIC ACID: CPT | Performed by: PHYSICIAN ASSISTANT

## 2024-05-08 PROCEDURE — 3078F DIAST BP <80 MM HG: CPT | Performed by: INTERNAL MEDICINE

## 2024-05-08 PROCEDURE — 2500000001 HC RX 250 WO HCPCS SELF ADMINISTERED DRUGS (ALT 637 FOR MEDICARE OP): Performed by: HOSPITALIST

## 2024-05-08 PROCEDURE — 36415 COLL VENOUS BLD VENIPUNCTURE: CPT | Performed by: PHYSICIAN ASSISTANT

## 2024-05-08 PROCEDURE — 96374 THER/PROPH/DIAG INJ IV PUSH: CPT

## 2024-05-08 PROCEDURE — 94762 N-INVAS EAR/PLS OXIMTRY CONT: CPT

## 2024-05-08 PROCEDURE — 3052F HG A1C>EQUAL 8.0%<EQUAL 9.0%: CPT | Performed by: INTERNAL MEDICINE

## 2024-05-08 PROCEDURE — 82947 ASSAY GLUCOSE BLOOD QUANT: CPT

## 2024-05-08 PROCEDURE — 2500000004 HC RX 250 GENERAL PHARMACY W/ HCPCS (ALT 636 FOR OP/ED): Performed by: HOSPITALIST

## 2024-05-08 PROCEDURE — 83735 ASSAY OF MAGNESIUM: CPT | Performed by: PHYSICIAN ASSISTANT

## 2024-05-08 PROCEDURE — 85025 COMPLETE CBC W/AUTO DIFF WBC: CPT | Performed by: PHYSICIAN ASSISTANT

## 2024-05-08 PROCEDURE — 2500000005 HC RX 250 GENERAL PHARMACY W/O HCPCS: Performed by: PHYSICIAN ASSISTANT

## 2024-05-08 PROCEDURE — 3075F SYST BP GE 130 - 139MM HG: CPT | Performed by: INTERNAL MEDICINE

## 2024-05-08 PROCEDURE — 9420000001 HC RT PATIENT EDUCATION 5 MIN

## 2024-05-08 PROCEDURE — 99285 EMERGENCY DEPT VISIT HI MDM: CPT | Mod: 25

## 2024-05-08 PROCEDURE — 87040 BLOOD CULTURE FOR BACTERIA: CPT | Mod: SAMLAB | Performed by: PHYSICIAN ASSISTANT

## 2024-05-08 PROCEDURE — 71275 CT ANGIOGRAPHY CHEST: CPT

## 2024-05-08 PROCEDURE — 3008F BODY MASS INDEX DOCD: CPT | Performed by: INTERNAL MEDICINE

## 2024-05-08 PROCEDURE — 2500000006 HC RX 250 W HCPCS SELF ADMINISTERED DRUGS (ALT 637 FOR ALL PAYERS): Mod: MUE | Performed by: HOSPITALIST

## 2024-05-08 PROCEDURE — 2550000001 HC RX 255 CONTRASTS: Performed by: PHYSICIAN ASSISTANT

## 2024-05-08 PROCEDURE — 1036F TOBACCO NON-USER: CPT | Performed by: INTERNAL MEDICINE

## 2024-05-08 PROCEDURE — 85730 THROMBOPLASTIN TIME PARTIAL: CPT | Performed by: PHYSICIAN ASSISTANT

## 2024-05-08 RX ORDER — MORPHINE SULFATE 2 MG/ML
1 INJECTION, SOLUTION INTRAMUSCULAR; INTRAVENOUS EVERY 4 HOURS PRN
Status: DISCONTINUED | OUTPATIENT
Start: 2024-05-08 | End: 2024-05-12 | Stop reason: HOSPADM

## 2024-05-08 RX ORDER — WARFARIN 2 MG/1
1 TABLET ORAL DAILY
Status: DISCONTINUED | OUTPATIENT
Start: 2024-05-09 | End: 2024-05-08

## 2024-05-08 RX ORDER — BUPROPION HYDROCHLORIDE 150 MG/1
150 TABLET ORAL EVERY MORNING
Status: DISCONTINUED | OUTPATIENT
Start: 2024-05-09 | End: 2024-05-12 | Stop reason: HOSPADM

## 2024-05-08 RX ORDER — TRAZODONE HYDROCHLORIDE 100 MG/1
100 TABLET ORAL NIGHTLY
Status: DISCONTINUED | OUTPATIENT
Start: 2024-05-08 | End: 2024-05-12 | Stop reason: HOSPADM

## 2024-05-08 RX ORDER — POLYETHYLENE GLYCOL 3350 17 G/17G
17 POWDER, FOR SOLUTION ORAL DAILY
Status: DISCONTINUED | OUTPATIENT
Start: 2024-05-08 | End: 2024-05-12 | Stop reason: HOSPADM

## 2024-05-08 RX ORDER — DEXTROSE 50 % IN WATER (D50W) INTRAVENOUS SYRINGE
25
Status: DISCONTINUED | OUTPATIENT
Start: 2024-05-08 | End: 2024-05-12 | Stop reason: HOSPADM

## 2024-05-08 RX ORDER — FUROSEMIDE 10 MG/ML
40 INJECTION INTRAMUSCULAR; INTRAVENOUS EVERY 12 HOURS
Status: DISCONTINUED | OUTPATIENT
Start: 2024-05-08 | End: 2024-05-09

## 2024-05-08 RX ORDER — LEVOTHYROXINE SODIUM 50 UG/1
50 TABLET ORAL
Status: DISCONTINUED | OUTPATIENT
Start: 2024-05-09 | End: 2024-05-12 | Stop reason: HOSPADM

## 2024-05-08 RX ORDER — NYSTATIN 100000 [USP'U]/G
1 POWDER TOPICAL 2 TIMES DAILY
Status: DISCONTINUED | OUTPATIENT
Start: 2024-05-08 | End: 2024-05-12 | Stop reason: HOSPADM

## 2024-05-08 RX ORDER — VANCOMYCIN 2 GRAM/500 ML IN 0.9 % SODIUM CHLORIDE INTRAVENOUS
2000 ONCE
Status: COMPLETED | OUTPATIENT
Start: 2024-05-08 | End: 2024-05-08

## 2024-05-08 RX ORDER — NYSTATIN 100000 [USP'U]/G
1 POWDER TOPICAL 2 TIMES DAILY
Status: DISCONTINUED | OUTPATIENT
Start: 2024-05-08 | End: 2024-05-08 | Stop reason: SDUPTHER

## 2024-05-08 RX ORDER — TOPIRAMATE 50 MG/1
25 TABLET, FILM COATED ORAL 2 TIMES DAILY
Status: DISCONTINUED | OUTPATIENT
Start: 2024-05-08 | End: 2024-05-12 | Stop reason: HOSPADM

## 2024-05-08 RX ORDER — WARFARIN 7.5 MG/1
7.5 TABLET ORAL DAILY
Status: DISCONTINUED | OUTPATIENT
Start: 2024-05-08 | End: 2024-05-12

## 2024-05-08 RX ORDER — ONDANSETRON 4 MG/1
4 TABLET, ORALLY DISINTEGRATING ORAL EVERY 8 HOURS PRN
Status: DISCONTINUED | OUTPATIENT
Start: 2024-05-08 | End: 2024-05-12 | Stop reason: HOSPADM

## 2024-05-08 RX ORDER — VANCOMYCIN HYDROCHLORIDE 1 G/20ML
INJECTION, POWDER, LYOPHILIZED, FOR SOLUTION INTRAVENOUS DAILY PRN
Status: DISCONTINUED | OUTPATIENT
Start: 2024-05-08 | End: 2024-05-12

## 2024-05-08 RX ORDER — ONDANSETRON HYDROCHLORIDE 2 MG/ML
4 INJECTION, SOLUTION INTRAVENOUS EVERY 8 HOURS PRN
Status: DISCONTINUED | OUTPATIENT
Start: 2024-05-08 | End: 2024-05-12 | Stop reason: HOSPADM

## 2024-05-08 RX ORDER — ACETAMINOPHEN 160 MG/5ML
650 SOLUTION ORAL EVERY 4 HOURS PRN
Status: DISCONTINUED | OUTPATIENT
Start: 2024-05-08 | End: 2024-05-12 | Stop reason: HOSPADM

## 2024-05-08 RX ORDER — ACETAMINOPHEN 325 MG/1
650 TABLET ORAL EVERY 4 HOURS PRN
Status: DISCONTINUED | OUTPATIENT
Start: 2024-05-08 | End: 2024-05-12 | Stop reason: HOSPADM

## 2024-05-08 RX ORDER — DULOXETIN HYDROCHLORIDE 60 MG/1
60 CAPSULE, DELAYED RELEASE ORAL DAILY
Status: DISCONTINUED | OUTPATIENT
Start: 2024-05-08 | End: 2024-05-12 | Stop reason: HOSPADM

## 2024-05-08 RX ORDER — INSULIN LISPRO 100 [IU]/ML
0-5 INJECTION, SOLUTION INTRAVENOUS; SUBCUTANEOUS
Status: DISCONTINUED | OUTPATIENT
Start: 2024-05-09 | End: 2024-05-12 | Stop reason: HOSPADM

## 2024-05-08 RX ORDER — LEVOTHYROXINE SODIUM 100 UG/1
200 TABLET ORAL DAILY
Status: DISCONTINUED | OUTPATIENT
Start: 2024-05-08 | End: 2024-05-12 | Stop reason: HOSPADM

## 2024-05-08 RX ORDER — LANOLIN ALCOHOL/MO/W.PET/CERES
400 CREAM (GRAM) TOPICAL 2 TIMES DAILY
Status: DISCONTINUED | OUTPATIENT
Start: 2024-05-08 | End: 2024-05-12 | Stop reason: HOSPADM

## 2024-05-08 RX ORDER — ACETAMINOPHEN 650 MG/1
650 SUPPOSITORY RECTAL EVERY 4 HOURS PRN
Status: DISCONTINUED | OUTPATIENT
Start: 2024-05-08 | End: 2024-05-12 | Stop reason: HOSPADM

## 2024-05-08 RX ORDER — TOPIRAMATE 50 MG/1
TABLET, FILM COATED ORAL
Status: DISPENSED
Start: 2024-05-08 | End: 2024-05-09

## 2024-05-08 RX ORDER — METOPROLOL SUCCINATE 50 MG/1
50 TABLET, EXTENDED RELEASE ORAL 2 TIMES DAILY
Status: DISCONTINUED | OUTPATIENT
Start: 2024-05-08 | End: 2024-05-12 | Stop reason: HOSPADM

## 2024-05-08 RX ORDER — ALLOPURINOL 300 MG/1
300 TABLET ORAL DAILY
Status: DISCONTINUED | OUTPATIENT
Start: 2024-05-08 | End: 2024-05-12 | Stop reason: HOSPADM

## 2024-05-08 RX ORDER — IPRATROPIUM BROMIDE AND ALBUTEROL SULFATE 2.5; .5 MG/3ML; MG/3ML
3 SOLUTION RESPIRATORY (INHALATION) 4 TIMES DAILY PRN
Status: DISCONTINUED | OUTPATIENT
Start: 2024-05-08 | End: 2024-05-11

## 2024-05-08 RX ORDER — BACITRACIN 500 [USP'U]/G
OINTMENT TOPICAL 3 TIMES DAILY
Status: DISCONTINUED | OUTPATIENT
Start: 2024-05-08 | End: 2024-05-12 | Stop reason: HOSPADM

## 2024-05-08 RX ORDER — DEXTROSE 50 % IN WATER (D50W) INTRAVENOUS SYRINGE
12.5
Status: DISCONTINUED | OUTPATIENT
Start: 2024-05-08 | End: 2024-05-12 | Stop reason: HOSPADM

## 2024-05-08 RX ORDER — ROSUVASTATIN CALCIUM 20 MG/1
40 TABLET, COATED ORAL DAILY
Status: DISCONTINUED | OUTPATIENT
Start: 2024-05-08 | End: 2024-05-12 | Stop reason: HOSPADM

## 2024-05-08 RX ORDER — PANTOPRAZOLE SODIUM 40 MG/1
40 TABLET, DELAYED RELEASE ORAL 2 TIMES DAILY
Status: DISCONTINUED | OUTPATIENT
Start: 2024-05-08 | End: 2024-05-12 | Stop reason: HOSPADM

## 2024-05-08 RX ORDER — POTASSIUM CHLORIDE 20 MEQ/1
20 TABLET, EXTENDED RELEASE ORAL DAILY
Status: DISCONTINUED | OUTPATIENT
Start: 2024-05-08 | End: 2024-05-10

## 2024-05-08 RX ADMIN — Medication 4 L/MIN: at 22:56

## 2024-05-08 RX ADMIN — Medication 2000 MG: at 18:44

## 2024-05-08 RX ADMIN — Medication 4 L/MIN: at 13:56

## 2024-05-08 RX ADMIN — TRAZODONE HYDROCHLORIDE 100 MG: 100 TABLET ORAL at 21:28

## 2024-05-08 RX ADMIN — PIPERACILLIN SODIUM AND TAZOBACTAM SODIUM 3.38 G: 3; .375 INJECTION, SOLUTION INTRAVENOUS at 21:28

## 2024-05-08 RX ADMIN — WARFARIN SODIUM 7.5 MG: 7.5 TABLET ORAL at 21:28

## 2024-05-08 RX ADMIN — IOHEXOL 68 ML: 350 INJECTION, SOLUTION INTRAVENOUS at 15:05

## 2024-05-08 RX ADMIN — PANTOPRAZOLE SODIUM 40 MG: 40 TABLET, DELAYED RELEASE ORAL at 21:28

## 2024-05-08 RX ADMIN — FUROSEMIDE 40 MG: 10 INJECTION, SOLUTION INTRAMUSCULAR; INTRAVENOUS at 21:28

## 2024-05-08 RX ADMIN — Medication 400 MG: at 21:28

## 2024-05-08 RX ADMIN — POTASSIUM CHLORIDE 20 MEQ: 1500 TABLET, EXTENDED RELEASE ORAL at 21:28

## 2024-05-08 RX ADMIN — MORPHINE SULFATE 1 MG: 2 INJECTION, SOLUTION INTRAMUSCULAR; INTRAVENOUS at 21:50

## 2024-05-08 RX ADMIN — METOPROLOL SUCCINATE 50 MG: 50 TABLET, EXTENDED RELEASE ORAL at 21:29

## 2024-05-08 RX ADMIN — TOPIRAMATE 25 MG: 50 TABLET ORAL at 21:47

## 2024-05-08 RX ADMIN — NYSTATIN 1 APPLICATION: 100000 POWDER TOPICAL at 21:28

## 2024-05-08 SDOH — SOCIAL STABILITY: SOCIAL INSECURITY: HAS ANYONE EVER THREATENED TO HURT YOUR FAMILY OR YOUR PETS?: NO

## 2024-05-08 SDOH — SOCIAL STABILITY: SOCIAL INSECURITY: DOES ANYONE TRY TO KEEP YOU FROM HAVING/CONTACTING OTHER FRIENDS OR DOING THINGS OUTSIDE YOUR HOME?: NO

## 2024-05-08 SDOH — SOCIAL STABILITY: SOCIAL INSECURITY: DO YOU FEEL UNSAFE GOING BACK TO THE PLACE WHERE YOU ARE LIVING?: NO

## 2024-05-08 SDOH — SOCIAL STABILITY: SOCIAL INSECURITY: ARE YOU OR HAVE YOU BEEN THREATENED OR ABUSED PHYSICALLY, EMOTIONALLY, OR SEXUALLY BY ANYONE?: NO

## 2024-05-08 SDOH — SOCIAL STABILITY: SOCIAL INSECURITY: HAVE YOU HAD ANY THOUGHTS OF HARMING ANYONE ELSE?: NO

## 2024-05-08 SDOH — SOCIAL STABILITY: SOCIAL INSECURITY: DO YOU FEEL ANYONE HAS EXPLOITED OR TAKEN ADVANTAGE OF YOU FINANCIALLY OR OF YOUR PERSONAL PROPERTY?: NO

## 2024-05-08 SDOH — SOCIAL STABILITY: SOCIAL INSECURITY: ARE THERE ANY APPARENT SIGNS OF INJURIES/BEHAVIORS THAT COULD BE RELATED TO ABUSE/NEGLECT?: NO

## 2024-05-08 SDOH — SOCIAL STABILITY: SOCIAL INSECURITY: HAVE YOU HAD THOUGHTS OF HARMING ANYONE ELSE?: NO

## 2024-05-08 SDOH — SOCIAL STABILITY: SOCIAL INSECURITY: ABUSE: ADULT

## 2024-05-08 ASSESSMENT — ENCOUNTER SYMPTOMS
EYE PAIN: 0
COLOR CHANGE: 0
MUSCULOSKELETAL NEGATIVE: 1
COUGH: 0
SORE THROAT: 0
EYES NEGATIVE: 1
FEVER: 0
CONSTITUTIONAL NEGATIVE: 1
WOUND: 1
SHORTNESS OF BREATH: 0
ALLERGIC/IMMUNOLOGIC NEGATIVE: 1
PALPITATIONS: 0
SEIZURES: 0
ENDOCRINE NEGATIVE: 1
ABDOMINAL PAIN: 0
RESPIRATORY NEGATIVE: 1
NEUROLOGICAL NEGATIVE: 1
HEMATURIA: 0
HEMATOLOGIC/LYMPHATIC NEGATIVE: 1
VOMITING: 0
DYSURIA: 0
ARTHRALGIAS: 0
GASTROINTESTINAL NEGATIVE: 1
CHILLS: 0
BACK PAIN: 0
PSYCHIATRIC NEGATIVE: 1

## 2024-05-08 ASSESSMENT — PAIN DESCRIPTION - FREQUENCY: FREQUENCY: CONSTANT/CONTINUOUS

## 2024-05-08 ASSESSMENT — LIFESTYLE VARIABLES
AUDIT-C TOTAL SCORE: 0
HOW MANY STANDARD DRINKS CONTAINING ALCOHOL DO YOU HAVE ON A TYPICAL DAY: PATIENT DOES NOT DRINK
HOW OFTEN DO YOU HAVE A DRINK CONTAINING ALCOHOL: NEVER
AUDIT-C TOTAL SCORE: 0
HOW OFTEN DO YOU HAVE 6 OR MORE DRINKS ON ONE OCCASION: NEVER
PRESCIPTION_ABUSE_PAST_12_MONTHS: NO
SKIP TO QUESTIONS 9-10: 1
SUBSTANCE_ABUSE_PAST_12_MONTHS: NO

## 2024-05-08 ASSESSMENT — COGNITIVE AND FUNCTIONAL STATUS - GENERAL
TOILETING: A LOT
DRESSING REGULAR LOWER BODY CLOTHING: A LOT
STANDING UP FROM CHAIR USING ARMS: A LITTLE
CLIMB 3 TO 5 STEPS WITH RAILING: A LOT
PATIENT BASELINE BEDBOUND: NO
TURNING FROM BACK TO SIDE WHILE IN FLAT BAD: A LITTLE
DAILY ACTIVITIY SCORE: 15
WALKING IN HOSPITAL ROOM: A LOT
MOVING TO AND FROM BED TO CHAIR: A LITTLE
DRESSING REGULAR UPPER BODY CLOTHING: A LOT
PERSONAL GROOMING: A LITTLE
MOBILITY SCORE: 17
HELP NEEDED FOR BATHING: A LOT

## 2024-05-08 ASSESSMENT — PAIN - FUNCTIONAL ASSESSMENT
PAIN_FUNCTIONAL_ASSESSMENT: 0-10

## 2024-05-08 ASSESSMENT — ACTIVITIES OF DAILY LIVING (ADL)
TOILETING: NEEDS ASSISTANCE
DRESSING YOURSELF: NEEDS ASSISTANCE
WALKS IN HOME: NEEDS ASSISTANCE
BATHING: DEPENDENT
HEARING - RIGHT EAR: FUNCTIONAL
JUDGMENT_ADEQUATE_SAFELY_COMPLETE_DAILY_ACTIVITIES: YES
ADEQUATE_TO_COMPLETE_ADL: YES
GROOMING: NEEDS ASSISTANCE
ASSISTIVE_DEVICE: WHEELCHAIR
FEEDING YOURSELF: INDEPENDENT
HEARING - LEFT EAR: FUNCTIONAL
PATIENT'S MEMORY ADEQUATE TO SAFELY COMPLETE DAILY ACTIVITIES?: YES

## 2024-05-08 ASSESSMENT — PAIN SCALES - GENERAL
PAINLEVEL_OUTOF10: 9
PAINLEVEL_OUTOF10: 10 - WORST POSSIBLE PAIN
PAINLEVEL_OUTOF10: 7

## 2024-05-08 ASSESSMENT — PAIN DESCRIPTION - DESCRIPTORS: DESCRIPTORS: ACHING

## 2024-05-08 ASSESSMENT — PAIN DESCRIPTION - LOCATION
LOCATION: SHOULDER
LOCATION: GENERALIZED

## 2024-05-08 ASSESSMENT — PATIENT HEALTH QUESTIONNAIRE - PHQ9
SUM OF ALL RESPONSES TO PHQ9 QUESTIONS 1 & 2: 0
1. LITTLE INTEREST OR PLEASURE IN DOING THINGS: NOT AT ALL
2. FEELING DOWN, DEPRESSED OR HOPELESS: NOT AT ALL

## 2024-05-08 ASSESSMENT — PAIN DESCRIPTION - ONSET: ONSET: GRADUAL

## 2024-05-08 ASSESSMENT — HEART SCORE
TROPONIN: LESS THAN OR EQUAL TO NORMAL LIMIT
HISTORY: SLIGHTLY SUSPICIOUS
RISK FACTORS: >2 RISK FACTORS OR HX OF ATHEROSCLEROTIC DISEASE
AGE: 45-64

## 2024-05-08 ASSESSMENT — PAIN DESCRIPTION - PAIN TYPE: TYPE: ACUTE PAIN

## 2024-05-08 ASSESSMENT — PAIN DESCRIPTION - ORIENTATION: ORIENTATION: RIGHT

## 2024-05-08 ASSESSMENT — PAIN DESCRIPTION - PROGRESSION: CLINICAL_PROGRESSION: GRADUALLY WORSENING

## 2024-05-08 NOTE — ED PROVIDER NOTES
Patient is a 53-year-old female who presents to the emergency room with a chief complaint of redness to her lower abdomen, below her pannus and in her inguinal region along with below her breast.  Patient has been on oral antibiotics, has been taking multiple doses of doxycycline with minimal improvement of her symptoms.  She was sent over by Dr. Nichole for further evaluation.  In addition patient reports that she developed chest pain.  She states that yesterday evening she had pain in her chest that has been intermittent throughout the day.  No shortness of breath.  No fever or chills.  No cough.           Review of Systems   Constitutional:  Negative for chills and fever.   HENT:  Negative for ear pain and sore throat.    Eyes:  Negative for pain and visual disturbance.   Respiratory:  Negative for cough and shortness of breath.    Cardiovascular:  Positive for chest pain. Negative for palpitations.   Gastrointestinal:  Negative for abdominal pain and vomiting.   Genitourinary:  Negative for dysuria and hematuria.   Musculoskeletal:  Negative for arthralgias and back pain.   Skin:  Negative for color change and rash.   Neurological:  Negative for seizures and syncope.   All other systems reviewed and are negative.       Physical Exam  Vitals and nursing note reviewed.   Constitutional:       General: She is not in acute distress.     Appearance: Normal appearance. She is well-developed.   HENT:      Head: Normocephalic and atraumatic.   Eyes:      Conjunctiva/sclera: Conjunctivae normal.   Cardiovascular:      Rate and Rhythm: Normal rate and regular rhythm.      Heart sounds: No murmur heard.  Pulmonary:      Effort: Pulmonary effort is normal. No respiratory distress.      Breath sounds: Normal breath sounds. No stridor. No wheezing or rhonchi.   Chest:      Comments: Erythema noted below left breast  Abdominal:      General: There is no distension.      Palpations: Abdomen is soft. There is no mass.       Tenderness: There is no abdominal tenderness.      Hernia: No hernia is present.      Comments: Patient has erythema with an abraded area to below her right pannus.  There is also some mild erythema to bilateral inguinal regions   Musculoskeletal:         General: No swelling, tenderness, deformity or signs of injury.      Cervical back: Neck supple.   Skin:     General: Skin is warm and dry.      Capillary Refill: Capillary refill takes less than 2 seconds.   Neurological:      General: No focal deficit present.      Mental Status: She is alert.   Psychiatric:         Mood and Affect: Mood normal.          Labs Reviewed   CBC WITH AUTO DIFFERENTIAL - Abnormal       Result Value    WBC 8.2      nRBC 0.0      RBC 4.25      Hemoglobin 13.1      Hematocrit 41.3      MCV 97      MCH 30.8      MCHC 31.7 (*)     RDW 13.9      Platelets 217      Neutrophils % 71.9      Immature Granulocytes %, Automated 0.2      Lymphocytes % 16.6      Monocytes % 7.6      Eosinophils % 3.2      Basophils % 0.5      Neutrophils Absolute 5.89      Immature Granulocytes Absolute, Automated 0.02      Lymphocytes Absolute 1.36      Monocytes Absolute 0.62      Eosinophils Absolute 0.26      Basophils Absolute 0.04     COMPREHENSIVE METABOLIC PANEL - Abnormal    Glucose 235 (*)     Sodium 138      Potassium 3.8      Chloride 100      Bicarbonate 30      Anion Gap 12      Urea Nitrogen 13      Creatinine 0.67      eGFR >90      Calcium 9.8      Albumin 3.9      Alkaline Phosphatase 262 (*)     Total Protein 6.3 (*)     AST 33      Bilirubin, Total 0.7      ALT 21     APTT - Abnormal    aPTT 42 (*)     Narrative:     The APTT is no longer used for monitoring Unfractionated Heparin Therapy. For monitoring Heparin Therapy, use the Heparin Assay.   PROTIME-INR - Abnormal    Protime 20.8 (*)     INR 1.8 (*)    B-TYPE NATRIURETIC PEPTIDE - Abnormal     (*)     Narrative:        <100 pg/mL - Heart failure unlikely  100-299 pg/mL - Intermediate  probability of acute heart                  failure exacerbation. Correlate with clinical                  context and patient history.    >=300 pg/mL - Heart Failure likely. Correlate with clinical                  context and patient history.    BNP testing is performed using different testing methodology at [facility] ([facility]) than at other system [facility]. Direct result comparisons should only be made within the same method.      MAGNESIUM - Normal    Magnesium 1.78     SERIAL TROPONIN-INITIAL - Normal    Troponin I, High Sensitivity 4      Narrative:     Less than 99th percentile of normal range cutoff-  Female and children under 18 years old <14 ng/L; Male <21 ng/L: Negative  Repeat testing should be performed if clinically indicated.     Female and children under 18 years old 14-50 ng/L; Male 21-50 ng/L:  Consistent with possible cardiac damage and possible increased clinical   risk. Serial measurements may help to assess extent of myocardial damage.     >50 ng/L: Consistent with cardiac damage, increased clinical risk and  myocardial infarction. Serial measurements may help assess extent of   myocardial damage.      NOTE: Children less than 1 year old may have higher baseline troponin   levels and results should be interpreted in conjunction with the overall   clinical context.     NOTE: Troponin I testing is performed using a different   testing methodology at [facility] than at other   [facility]. Direct result comparisons should only   be made within the same method.   SERIAL TROPONIN, 1 HOUR - Normal    Troponin I, High Sensitivity 5      Narrative:     Less than 99th percentile of normal range cutoff-  Female and children under 18 years old <14 ng/L; Male <21 ng/L: Negative  Repeat testing should be performed if clinically indicated.     Female and children under 18 years old 14-50 ng/L; Male 21-50 ng/L:  Consistent with possible cardiac damage and possible increased clinical    risk. Serial measurements may help to assess extent of myocardial damage.     >50 ng/L: Consistent with cardiac damage, increased clinical risk and  myocardial infarction. Serial measurements may help assess extent of   myocardial damage.      NOTE: Children less than 1 year old may have higher baseline troponin   levels and results should be interpreted in conjunction with the overall   clinical context.     NOTE: Troponin I testing is performed using a different   testing methodology at [facility] than at other   [facility]. Direct result comparisons should only   be made within the same method.   LACTATE - Normal    Lactate 1.4      Narrative:     Venipuncture immediately after or during the administration of Metamizole may lead to falsely low results. Testing should be performed immediately  prior to Metamizole dosing.   BLOOD CULTURE   BLOOD CULTURE   TROPONIN SERIES- (INITIAL, 1 HR)    Narrative:     The following orders were created for panel order Troponin I Series, High Sensitivity (0, 1 HR).  Procedure                               Abnormality         Status                     ---------                               -----------         ------                     Troponin I, High Sensiti...[[id number]]  Normal              Final result               Troponin, High Sensitivi...[[id number]]  Normal              Final result                 Please view results for these tests on the individual orders.        CT abdomen pelvis w IV contrast   Final Result   1.  The left lateral and anterior left abdominal wall skin surfaces   and adjacent portions of the subcutaneous tissues were not included   on the images due to patient body habitus. The visualized   subcutaneous tissues in this region and to the right of midline have   diffuse edema/inflammation with no localized fluid collection or   tract from the peritoneal cavity superficially.   2. The anterior abdominal wall hernias are similar to  the prior exam.             MACRO:   None        Signed by: Hudson Ivey 5/8/2024 3:45 PM   Dictation workstation:   [id number]      CT angio chest for pulmonary embolism   Final Result   1. No pulmonary embolus is noted.        2. The lung bases have linear areas of atelectasis/fibrosis in a few   patchy areas of ground-glass opacification which may represent edema   and/or inflammation.        3. The left breast skin and adjacent subcutaneous tissues have mild   edema/inflammation. No localized collection is noted.        MACRO:   None        Signed by: Hudson Ivey 5/8/2024 3:49 PM   Dictation workstation:   [id number]      XR chest 1 view   Final Result   1. Persistent bibasilar effusions and/or  infiltrates and/or scarring   for which follow-up is recommended.                  MACRO:   None        Signed by: Omar Guallpa 5/8/2024 2:08 PM   Dictation workstation:   [id number]           ECG 12 Lead    Performed by: Naty Vogt PA-C  Authorized by: FRANK Elliott    Comments:      EKG shows sinus rhythm with a rate of 77 bpm.  PVCs.  OR interval is 144 ms and QRS duration is 76 ms.  EKG interpretation per myself, Naty Vogt       Medical Decision Making  Patient is a 53-year-old female who presents to the emergency department for complaints of failed outpatient cellulitis along with chest pain.   Patient was sent in by Dr. Nichole as she has been treated for cellulitis multiple times as an outpatient with no complete resolution.  It was felt that IV antibiotics were indicated.  In addition patient also reports that she has had left-sided chest pain intermittent with onset yesterday evening.  Cardiac workup was unremarkable.  Patient was seen and evaluated by myself along with attending physician, Dr. Pham who agrees with inpatient treatment.    Heart Score: 3    Amount and/or Complexity of Data Reviewed  Labs: ordered. Decision-making details documented in ED Course.  Radiology:  ordered. Decision-making details documented in ED Course.    Risk  Decision regarding hospitalization.         Diagnoses as of 05/08/24 1812   Cellulitis, abdominal wall   Cellulitis   Chest pain, unspecified type                    Naty Vogt PA-C  05/08/24 1812

## 2024-05-08 NOTE — PROGRESS NOTES
Subjective   She states her infection is bad  Doxycycline did not help  She has draining.  Has s hole in the left abdomen that darins from a previosu drain site.    Patient ID: Roselia Mo is a 53 y.o. female who presents for Follow-up (1 month ck/Review labs 5/1).  HPI  She is here for follow-up secondary to chronic kidney disease with hypokalemia  Also has underlying hypertension  Labs were last repeated on May 1  Basic metabolic panel shows a glucose of 336 sodium 141 potassium 3.9 bicarb 31 BUN 14 with a creatinine of 1.72 calcium is 9.8  Medications are reviewed she is currently on allopurinol Bumex Jardiance gabapentin levothyroxine metoprolol magnesium potassium a statin Coumadin  Blood pressure here in the office today is 137/70  Review of Systems   Constitutional: Negative.    HENT: Negative.     Eyes: Negative.    Respiratory: Negative.     Cardiovascular:  Positive for leg swelling.   Gastrointestinal: Negative.    Endocrine: Negative.    Genitourinary: Negative.    Musculoskeletal: Negative.    Skin:  Positive for rash and wound.   Allergic/Immunologic: Negative.    Neurological: Negative.    Hematological: Negative.    Psychiatric/Behavioral: Negative.         Objective   Physical Exam  Constitutional:       Appearance: Normal appearance.   HENT:      Head: Normocephalic and atraumatic.      Right Ear: External ear normal.      Left Ear: External ear normal.      Nose: Nose normal.      Mouth/Throat:      Mouth: Mucous membranes are moist.      Pharynx: Oropharynx is clear.   Eyes:      Extraocular Movements: Extraocular movements intact.      Conjunctiva/sclera: Conjunctivae normal.      Pupils: Pupils are equal, round, and reactive to light.   Cardiovascular:      Rate and Rhythm: Normal rate and regular rhythm.   Pulmonary:      Effort: Pulmonary effort is normal.      Breath sounds: Normal breath sounds.   Abdominal:      General: Abdomen is flat.      Palpations: Abdomen is soft.    Musculoskeletal:         General: Swelling present.      Right lower leg: Edema present.      Left lower leg: Edema present.      Comments: Legs wrapped to the knees   Skin:     General: Skin is warm and dry.      Findings: Erythema, lesion and rash present.      Comments: Small hole on the left lower abdomen  Breast is swollne  Very red in the folds of her broin     Neurological:      General: No focal deficit present.      Mental Status: She is alert and oriented to person, place, and time.   Psychiatric:         Mood and Affect: Mood normal.         Behavior: Behavior normal.         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  She has a chronic infection and on exam she has a hole on her left abdomen that appears to probably be a fistula.  She has a mesh in her abdomen  Her groins bilaterally are very red and inflamed and weeping  I think she likely needs infectious disease chronically.  I have called and spoken with Dr. Pham  I am going to send her to the emergency room and see if there is any possibility of getting her set up with infectious disease.  Imaging may be helpful in case there is a fistula as above on exam and it seems that there may be a fistula on the left side of the abdominal wall         Pardeep Nichole DO 05/08/24 12:57 PM

## 2024-05-09 LAB
ALBUMIN SERPL BCP-MCNC: 3.6 G/DL (ref 3.4–5)
ALP SERPL-CCNC: 244 U/L (ref 33–110)
ALT SERPL W P-5'-P-CCNC: 20 U/L (ref 7–45)
ANION GAP SERPL CALC-SCNC: 9 MMOL/L (ref 10–20)
AST SERPL W P-5'-P-CCNC: 37 U/L (ref 9–39)
BILIRUB SERPL-MCNC: 0.5 MG/DL (ref 0–1.2)
BUN SERPL-MCNC: 12 MG/DL (ref 6–23)
CALCIUM SERPL-MCNC: 9.2 MG/DL (ref 8.6–10.3)
CHLORIDE SERPL-SCNC: 104 MMOL/L (ref 98–107)
CO2 SERPL-SCNC: 31 MMOL/L (ref 21–32)
CREAT SERPL-MCNC: 0.65 MG/DL (ref 0.5–1.05)
EGFRCR SERPLBLD CKD-EPI 2021: >90 ML/MIN/1.73M*2
ERYTHROCYTE [DISTWIDTH] IN BLOOD BY AUTOMATED COUNT: 13.7 % (ref 11.5–14.5)
GLUCOSE BLD MANUAL STRIP-MCNC: 167 MG/DL (ref 74–99)
GLUCOSE BLD MANUAL STRIP-MCNC: 171 MG/DL (ref 74–99)
GLUCOSE BLD MANUAL STRIP-MCNC: 224 MG/DL (ref 74–99)
GLUCOSE BLD MANUAL STRIP-MCNC: 72 MG/DL (ref 74–99)
GLUCOSE SERPL-MCNC: 75 MG/DL (ref 74–99)
HCT VFR BLD AUTO: 39.3 % (ref 36–46)
HGB BLD-MCNC: 12.1 G/DL (ref 12–16)
INR PPP: 1.7 (ref 0.9–1.1)
MCH RBC QN AUTO: 30.4 PG (ref 26–34)
MCHC RBC AUTO-ENTMCNC: 30.8 G/DL (ref 32–36)
MCV RBC AUTO: 99 FL (ref 80–100)
NRBC BLD-RTO: 0 /100 WBCS (ref 0–0)
PLATELET # BLD AUTO: 198 X10*3/UL (ref 150–450)
POTASSIUM SERPL-SCNC: 3.1 MMOL/L (ref 3.5–5.3)
PROT SERPL-MCNC: 5.7 G/DL (ref 6.4–8.2)
PROTHROMBIN TIME: 19.6 SECONDS (ref 9.8–12.8)
RBC # BLD AUTO: 3.98 X10*6/UL (ref 4–5.2)
SODIUM SERPL-SCNC: 141 MMOL/L (ref 136–145)
VANCOMYCIN SERPL-MCNC: 20.1 UG/ML (ref 5–20)
WBC # BLD AUTO: 7.1 X10*3/UL (ref 4.4–11.3)

## 2024-05-09 PROCEDURE — 36415 COLL VENOUS BLD VENIPUNCTURE: CPT | Performed by: PHARMACIST

## 2024-05-09 PROCEDURE — 85610 PROTHROMBIN TIME: CPT | Performed by: PHARMACIST

## 2024-05-09 PROCEDURE — 82947 ASSAY GLUCOSE BLOOD QUANT: CPT

## 2024-05-09 PROCEDURE — 99222 1ST HOSP IP/OBS MODERATE 55: CPT | Performed by: INTERNAL MEDICINE

## 2024-05-09 PROCEDURE — 94761 N-INVAS EAR/PLS OXIMETRY MLT: CPT

## 2024-05-09 PROCEDURE — 99233 SBSQ HOSP IP/OBS HIGH 50: CPT | Performed by: HOSPITALIST

## 2024-05-09 PROCEDURE — 85027 COMPLETE CBC AUTOMATED: CPT | Performed by: HOSPITALIST

## 2024-05-09 PROCEDURE — 2500000001 HC RX 250 WO HCPCS SELF ADMINISTERED DRUGS (ALT 637 FOR MEDICARE OP): Performed by: HOSPITALIST

## 2024-05-09 PROCEDURE — 2500000002 HC RX 250 W HCPCS SELF ADMINISTERED DRUGS (ALT 637 FOR MEDICARE OP, ALT 636 FOR OP/ED): Performed by: HOSPITALIST

## 2024-05-09 PROCEDURE — 2500000006 HC RX 250 W HCPCS SELF ADMINISTERED DRUGS (ALT 637 FOR ALL PAYERS): Mod: MUE | Performed by: HOSPITALIST

## 2024-05-09 PROCEDURE — 99222 1ST HOSP IP/OBS MODERATE 55: CPT | Performed by: STUDENT IN AN ORGANIZED HEALTH CARE EDUCATION/TRAINING PROGRAM

## 2024-05-09 PROCEDURE — 2500000004 HC RX 250 GENERAL PHARMACY W/ HCPCS (ALT 636 FOR OP/ED): Performed by: INTERNAL MEDICINE

## 2024-05-09 PROCEDURE — 97165 OT EVAL LOW COMPLEX 30 MIN: CPT | Mod: GO

## 2024-05-09 PROCEDURE — 1200000002 HC GENERAL ROOM WITH TELEMETRY DAILY

## 2024-05-09 PROCEDURE — 5A09357 ASSISTANCE WITH RESPIRATORY VENTILATION, LESS THAN 24 CONSECUTIVE HOURS, CONTINUOUS POSITIVE AIRWAY PRESSURE: ICD-10-PCS | Performed by: HOSPITALIST

## 2024-05-09 PROCEDURE — 2500000004 HC RX 250 GENERAL PHARMACY W/ HCPCS (ALT 636 FOR OP/ED): Performed by: HOSPITALIST

## 2024-05-09 PROCEDURE — 80202 ASSAY OF VANCOMYCIN: CPT | Performed by: PHARMACIST

## 2024-05-09 PROCEDURE — 97161 PT EVAL LOW COMPLEX 20 MIN: CPT | Mod: GP | Performed by: PHYSICAL THERAPIST

## 2024-05-09 PROCEDURE — 80053 COMPREHEN METABOLIC PANEL: CPT | Performed by: HOSPITALIST

## 2024-05-09 PROCEDURE — 2500000005 HC RX 250 GENERAL PHARMACY W/O HCPCS: Performed by: HOSPITALIST

## 2024-05-09 PROCEDURE — 2500000004 HC RX 250 GENERAL PHARMACY W/ HCPCS (ALT 636 FOR OP/ED): Performed by: PHARMACIST

## 2024-05-09 RX ORDER — POTASSIUM CHLORIDE 20 MEQ/1
20 TABLET, EXTENDED RELEASE ORAL ONCE
Status: COMPLETED | OUTPATIENT
Start: 2024-05-09 | End: 2024-05-09

## 2024-05-09 RX ORDER — POTASSIUM CHLORIDE 20 MEQ/1
40 TABLET, EXTENDED RELEASE ORAL ONCE
Status: DISCONTINUED | OUTPATIENT
Start: 2024-05-09 | End: 2024-05-09

## 2024-05-09 RX ORDER — WARFARIN 10 MG/1
10 TABLET ORAL ONCE
Status: COMPLETED | OUTPATIENT
Start: 2024-05-09 | End: 2024-05-09

## 2024-05-09 RX ADMIN — BUPROPION HYDROCHLORIDE 150 MG: 150 TABLET, FILM COATED, EXTENDED RELEASE ORAL at 10:26

## 2024-05-09 RX ADMIN — PIPERACILLIN SODIUM AND TAZOBACTAM SODIUM 3.38 G: 3; .375 INJECTION, SOLUTION INTRAVENOUS at 02:44

## 2024-05-09 RX ADMIN — LEVOTHYROXINE SODIUM 50 MCG: 0.05 TABLET ORAL at 06:18

## 2024-05-09 RX ADMIN — EMPAGLIFLOZIN 10 MG: 10 TABLET, FILM COATED ORAL at 10:26

## 2024-05-09 RX ADMIN — DULOXETINE HYDROCHLORIDE 60 MG: 60 CAPSULE, DELAYED RELEASE ORAL at 10:26

## 2024-05-09 RX ADMIN — NYSTATIN 1 APPLICATION: 100000 POWDER TOPICAL at 20:20

## 2024-05-09 RX ADMIN — Medication 400 MG: at 20:18

## 2024-05-09 RX ADMIN — Medication 4 L/MIN: at 23:06

## 2024-05-09 RX ADMIN — PANTOPRAZOLE SODIUM 40 MG: 40 TABLET, DELAYED RELEASE ORAL at 20:19

## 2024-05-09 RX ADMIN — LEVOTHYROXINE SODIUM 200 MCG: 0.1 TABLET ORAL at 10:26

## 2024-05-09 RX ADMIN — FUROSEMIDE 40 MG: 10 INJECTION, SOLUTION INTRAMUSCULAR; INTRAVENOUS at 10:28

## 2024-05-09 RX ADMIN — VANCOMYCIN HYDROCHLORIDE 1 G: 1 INJECTION, POWDER, LYOPHILIZED, FOR SOLUTION INTRAVENOUS at 18:51

## 2024-05-09 RX ADMIN — PANTOPRAZOLE SODIUM 40 MG: 40 TABLET, DELAYED RELEASE ORAL at 10:41

## 2024-05-09 RX ADMIN — PIPERACILLIN SODIUM AND TAZOBACTAM SODIUM 3.38 G: 3; .375 INJECTION, SOLUTION INTRAVENOUS at 10:48

## 2024-05-09 RX ADMIN — METOPROLOL SUCCINATE 50 MG: 50 TABLET, EXTENDED RELEASE ORAL at 20:18

## 2024-05-09 RX ADMIN — ALLOPURINOL 300 MG: 300 TABLET ORAL at 10:26

## 2024-05-09 RX ADMIN — MORPHINE SULFATE 1 MG: 2 INJECTION, SOLUTION INTRAMUSCULAR; INTRAVENOUS at 12:09

## 2024-05-09 RX ADMIN — ROSUVASTATIN CALCIUM 40 MG: 20 TABLET, FILM COATED ORAL at 10:42

## 2024-05-09 RX ADMIN — TOPIRAMATE 25 MG: 50 TABLET ORAL at 11:53

## 2024-05-09 RX ADMIN — TRAZODONE HYDROCHLORIDE 100 MG: 100 TABLET ORAL at 20:18

## 2024-05-09 RX ADMIN — Medication 400 MG: at 10:26

## 2024-05-09 RX ADMIN — METOPROLOL SUCCINATE 50 MG: 50 TABLET, EXTENDED RELEASE ORAL at 10:26

## 2024-05-09 RX ADMIN — VANCOMYCIN HYDROCHLORIDE 1 G: 1 INJECTION, POWDER, LYOPHILIZED, FOR SOLUTION INTRAVENOUS at 11:56

## 2024-05-09 RX ADMIN — PIPERACILLIN SODIUM AND TAZOBACTAM SODIUM 3.38 G: 3; .375 INJECTION, SOLUTION INTRAVENOUS at 17:25

## 2024-05-09 RX ADMIN — Medication 4 L/MIN: at 06:49

## 2024-05-09 RX ADMIN — Medication 4 L/MIN: at 18:50

## 2024-05-09 RX ADMIN — POTASSIUM CHLORIDE 20 MEQ: 1500 TABLET, EXTENDED RELEASE ORAL at 10:26

## 2024-05-09 RX ADMIN — POTASSIUM CHLORIDE 20 MEQ: 1500 TABLET, EXTENDED RELEASE ORAL at 10:41

## 2024-05-09 RX ADMIN — WARFARIN SODIUM 10 MG: 10 TABLET ORAL at 17:20

## 2024-05-09 RX ADMIN — TOPIRAMATE 25 MG: 50 TABLET ORAL at 20:19

## 2024-05-09 RX ADMIN — FUROSEMIDE 10 MG/HR: 10 INJECTION, SOLUTION INTRAMUSCULAR; INTRAVENOUS at 17:23

## 2024-05-09 RX ADMIN — ACETAMINOPHEN 650 MG: 325 TABLET ORAL at 02:54

## 2024-05-09 RX ADMIN — INSULIN LISPRO 1 UNITS: 100 INJECTION, SOLUTION INTRAVENOUS; SUBCUTANEOUS at 17:30

## 2024-05-09 RX ADMIN — VANCOMYCIN HYDROCHLORIDE 1 G: 1 INJECTION, POWDER, LYOPHILIZED, FOR SOLUTION INTRAVENOUS at 03:33

## 2024-05-09 ASSESSMENT — COGNITIVE AND FUNCTIONAL STATUS - GENERAL
TOILETING: A LOT
DAILY ACTIVITIY SCORE: 18
CLIMB 3 TO 5 STEPS WITH RAILING: A LOT
HELP NEEDED FOR BATHING: A LOT
MOVING FROM LYING ON BACK TO SITTING ON SIDE OF FLAT BED WITH BEDRAILS: A LOT
WALKING IN HOSPITAL ROOM: A LITTLE
DAILY ACTIVITIY SCORE: 18
TOILETING: A LOT
MOVING TO AND FROM BED TO CHAIR: A LITTLE
STANDING UP FROM CHAIR USING ARMS: A LITTLE
STANDING UP FROM CHAIR USING ARMS: A LITTLE
MOVING FROM LYING ON BACK TO SITTING ON SIDE OF FLAT BED WITH BEDRAILS: A LOT
HELP NEEDED FOR BATHING: A LOT
MOBILITY SCORE: 15
TURNING FROM BACK TO SIDE WHILE IN FLAT BAD: A LOT
WALKING IN HOSPITAL ROOM: A LITTLE
CLIMB 3 TO 5 STEPS WITH RAILING: A LITTLE
TURNING FROM BACK TO SIDE WHILE IN FLAT BAD: A LOT
DRESSING REGULAR LOWER BODY CLOTHING: A LOT
MOBILITY SCORE: 16
DRESSING REGULAR LOWER BODY CLOTHING: A LOT
MOVING TO AND FROM BED TO CHAIR: A LITTLE

## 2024-05-09 ASSESSMENT — PAIN SCALES - GENERAL
PAINLEVEL_OUTOF10: 7
PAINLEVEL_OUTOF10: 0 - NO PAIN
PAINLEVEL_OUTOF10: 7
PAINLEVEL_OUTOF10: 3
PAINLEVEL_OUTOF10: 0 - NO PAIN
PAINLEVEL_OUTOF10: 2
PAINLEVEL_OUTOF10: 7

## 2024-05-09 ASSESSMENT — ACTIVITIES OF DAILY LIVING (ADL)
ADL_ASSISTANCE: NEEDS ASSISTANCE
BATHING_ASSISTANCE: MODERATE
ADL_ASSISTANCE: NEEDS ASSISTANCE

## 2024-05-09 ASSESSMENT — PAIN - FUNCTIONAL ASSESSMENT
PAIN_FUNCTIONAL_ASSESSMENT: 0-10

## 2024-05-09 ASSESSMENT — PAIN DESCRIPTION - LOCATION: LOCATION: OTHER (COMMENT)

## 2024-05-09 NOTE — PROGRESS NOTES
"Roselia Mo is a 53 y.o. female on day 1 of admission presenting with Cellulitis.    Subjective   Patient does not feel any better or worse at least  Denies chest pain at rest  Denies shortness of breath at rest  Gets winded and tired upon ambulation  Severely obese  Abdominal wall wound infection being treated with IV antibiotics   Denies fever chills nausea vomiting at this time    Objective     Physical Exam  General Appearance: AAO x 3, morbidly obese  Skin: Erythema under left breast, also skin abrasion in right lower abdomen with yellowish discoloration but no active purulent discharge.  Also erythema in bilateral inguinal areas.  Slight tenderness, no significant warmth.  No active bleeding or drainage.  Eyes : PERRL, EOM's intact  ENT: mucous membranes pink and moist  Neck: normocephalic  Respiratory: lungs clear to auscultation anteriorly; no wheezing, rhonchi, or crackles.  Diminished at bases  Heart: regular rate and rhythm.   Abdomen: Nondistended, positive bowel sounds x4, soft,  nontender  Extremities: 3+ lower extremity edema bilaterally, wrapped in Ace  Peripheral pulses: normal x4 extremities  Neuro: alert, coherent and conversant, no focal motor deficits  Last Recorded Vitals  Blood pressure 129/65, pulse 69, temperature 36.3 °C (97.3 °F), temperature source Temporal, resp. rate 18, height 1.6 m (5' 2.99\"), weight (!) 210 kg (462 lb 15.5 oz), SpO2 97%.  Intake/Output last 3 Shifts:  I/O last 3 completed shifts:  In: 200 (1 mL/kg) [P.O.:200]  Out: 2201 (10.5 mL/kg) [Urine:2200 (0.3 mL/kg/hr); Stool:1]  Weight: 210 kg     Relevant Results    Scheduled medications  allopurinol, 300 mg, oral, Daily  bacitracin, , Topical, TID  buPROPion XL, 150 mg, oral, q AM  DULoxetine, 60 mg, oral, Daily  empagliflozin, 10 mg, oral, Daily  insulin lispro, 0-5 Units, subcutaneous, TID with meals  levothyroxine, 200 mcg, oral, Daily  levothyroxine, 50 mcg, oral, Daily before breakfast  magnesium oxide, 400 mg, " oral, BID  metoprolol succinate XL, 50 mg, oral, BID  nystatin, 1 Application, Topical, BID  pantoprazole, 40 mg, oral, BID  piperacillin-tazobactam, 3.375 g, intravenous, q6h  polyethylene glycol, 17 g, oral, Daily  potassium chloride CR, 20 mEq, oral, Daily  rosuvastatin, 40 mg, oral, Daily  topiramate, 25 mg, oral, BID  traZODone, 100 mg, oral, Nightly  vancomycin (Vancocin) 1 g in dextrose 5% 250 mL IV, 1,000 mg, intravenous, q8h  warfarin, 10 mg, oral, Once  [Held by provider] warfarin, 7.5 mg, oral, Daily      Continuous medications  furosemide, 10 mg/hr      PRN medications  PRN medications: acetaminophen **OR** acetaminophen **OR** acetaminophen, acetaminophen **OR** acetaminophen **OR** acetaminophen, dextrose, dextrose, glucagon, glucagon, ipratropium-albuteroL, morphine, morphine, ondansetron ODT **OR** ondansetron, oxygen, vancomycin          Results for orders placed or performed during the hospital encounter of 05/08/24 (from the past 24 hour(s))   CBC and Auto Differential   Result Value Ref Range    WBC 8.2 4.4 - 11.3 x10*3/uL    nRBC 0.0 0.0 - 0.0 /100 WBCs    RBC 4.25 4.00 - 5.20 x10*6/uL    Hemoglobin 13.1 12.0 - 16.0 g/dL    Hematocrit 41.3 36.0 - 46.0 %    MCV 97 80 - 100 fL    MCH 30.8 26.0 - 34.0 pg    MCHC 31.7 (L) 32.0 - 36.0 g/dL    RDW 13.9 11.5 - 14.5 %    Platelets 217 150 - 450 x10*3/uL    Neutrophils % 71.9 40.0 - 80.0 %    Immature Granulocytes %, Automated 0.2 0.0 - 0.9 %    Lymphocytes % 16.6 13.0 - 44.0 %    Monocytes % 7.6 2.0 - 10.0 %    Eosinophils % 3.2 0.0 - 6.0 %    Basophils % 0.5 0.0 - 2.0 %    Neutrophils Absolute 5.89 1.20 - 7.70 x10*3/uL    Immature Granulocytes Absolute, Automated 0.02 0.00 - 0.70 x10*3/uL    Lymphocytes Absolute 1.36 1.20 - 4.80 x10*3/uL    Monocytes Absolute 0.62 0.10 - 1.00 x10*3/uL    Eosinophils Absolute 0.26 0.00 - 0.70 x10*3/uL    Basophils Absolute 0.04 0.00 - 0.10 x10*3/uL   Comprehensive Metabolic Panel   Result Value Ref Range    Glucose 235  (H) 74 - 99 mg/dL    Sodium 138 136 - 145 mmol/L    Potassium 3.8 3.5 - 5.3 mmol/L    Chloride 100 98 - 107 mmol/L    Bicarbonate 30 21 - 32 mmol/L    Anion Gap 12 10 - 20 mmol/L    Urea Nitrogen 13 6 - 23 mg/dL    Creatinine 0.67 0.50 - 1.05 mg/dL    eGFR >90 >60 mL/min/1.73m*2    Calcium 9.8 8.6 - 10.3 mg/dL    Albumin 3.9 3.4 - 5.0 g/dL    Alkaline Phosphatase 262 (H) 33 - 110 U/L    Total Protein 6.3 (L) 6.4 - 8.2 g/dL    AST 33 9 - 39 U/L    Bilirubin, Total 0.7 0.0 - 1.2 mg/dL    ALT 21 7 - 45 U/L   Magnesium   Result Value Ref Range    Magnesium 1.78 1.60 - 2.40 mg/dL   aPTT   Result Value Ref Range    aPTT 42 (H) 27 - 38 seconds   Protime-INR   Result Value Ref Range    Protime 20.8 (H) 9.8 - 12.8 seconds    INR 1.8 (H) 0.9 - 1.1   B-type natriuretic peptide   Result Value Ref Range     (H) 0 - 99 pg/mL   Troponin I, High Sensitivity, Initial   Result Value Ref Range    Troponin I, High Sensitivity 4 0 - 13 ng/L   Troponin, High Sensitivity, 1 Hour   Result Value Ref Range    Troponin I, High Sensitivity 5 0 - 13 ng/L   Lactate   Result Value Ref Range    Lactate 1.4 0.4 - 2.0 mmol/L   Blood Culture    Specimen: Peripheral Venipuncture; Blood culture   Result Value Ref Range    Blood Culture Loaded on Instrument - Culture in progress    Blood Culture    Specimen: Peripheral Venipuncture; Blood culture   Result Value Ref Range    Blood Culture Loaded on Instrument - Culture in progress    POCT GLUCOSE   Result Value Ref Range    POCT Glucose 171 (H) 74 - 99 mg/dL   CBC   Result Value Ref Range    WBC 7.1 4.4 - 11.3 x10*3/uL    nRBC 0.0 0.0 - 0.0 /100 WBCs    RBC 3.98 (L) 4.00 - 5.20 x10*6/uL    Hemoglobin 12.1 12.0 - 16.0 g/dL    Hematocrit 39.3 36.0 - 46.0 %    MCV 99 80 - 100 fL    MCH 30.4 26.0 - 34.0 pg    MCHC 30.8 (L) 32.0 - 36.0 g/dL    RDW 13.7 11.5 - 14.5 %    Platelets 198 150 - 450 x10*3/uL   Comprehensive metabolic panel   Result Value Ref Range    Glucose 75 74 - 99 mg/dL    Sodium 141 136  - 145 mmol/L    Potassium 3.1 (L) 3.5 - 5.3 mmol/L    Chloride 104 98 - 107 mmol/L    Bicarbonate 31 21 - 32 mmol/L    Anion Gap 9 (L) 10 - 20 mmol/L    Urea Nitrogen 12 6 - 23 mg/dL    Creatinine 0.65 0.50 - 1.05 mg/dL    eGFR >90 >60 mL/min/1.73m*2    Calcium 9.2 8.6 - 10.3 mg/dL    Albumin 3.6 3.4 - 5.0 g/dL    Alkaline Phosphatase 244 (H) 33 - 110 U/L    Total Protein 5.7 (L) 6.4 - 8.2 g/dL    AST 37 9 - 39 U/L    Bilirubin, Total 0.5 0.0 - 1.2 mg/dL    ALT 20 7 - 45 U/L   Vancomycin   Result Value Ref Range    Vancomycin 20.1 (H) 5.0 - 20.0 ug/mL   Protime-INR   Result Value Ref Range    Protime 19.6 (H) 9.8 - 12.8 seconds    INR 1.7 (H) 0.9 - 1.1   POCT GLUCOSE   Result Value Ref Range    POCT Glucose 72 (L) 74 - 99 mg/dL   POCT GLUCOSE   Result Value Ref Range    POCT Glucose 167 (H) 74 - 99 mg/dL           All data reviewed by me independently           Assessment/Plan   Principal Problem:    Cellulitis    53-year-old female with history of  Severe obesity  Stage III chronic kidney disease  Chronic lymphedema  Hyponatremia  Migraine  Anasarca  Hypertension  Hyperlipidemia  Probably metabolic syndrome  Osteoarthritis  Possible obesity hypoventilation syndrome/sleep apnea  Type 2 diabetes mellitus  Portal vein thrombosis on Coumadin  Possible COPD  GERD  Hernia  Anxiety depression  Gout  SEVILLA  Budd-Chiari syndrome  Diastolic CHF  Right-sided CHF, pulmonary hypertension  Presented with  Abdominal wall cellulitis especially right lower abdominal fold, failed outpatient antibiotics  Worsening lymphedema lower extremity bilaterally  Volume overload  Chest pain to rule out ACS  Hypokalemia  Rash with infected small wound on right abdomen  Left abdomen has small pore that drains at times, unclear etiology does not seem to be cyst or sebaceous cyst or lipoma or lump or sinus tract or fistulous tract confirmed.  Plan  Cardiopulmonary monitoring  Lasix drip, potassium supplement  Strict I's and O's, follow urine  output  Daily CBC BMP  On vancomycin and Zosyn IV  Follow cultures  May consider ID consultation if no clinical progress, nystatin and bacitracin  Rash slightly improved   Wound on right abdominal fold is not actively draining any purulent material  Supportive care symptomatic management  Continue education counseling  Morphine IV for moderate to severe pain  Tylenol for fever or mild pain  Encourage weight loss  Local wound care  Synthroid for hypothyroidism  Continue psych medicines for depression anxiety  Jardiance 10 mg daily for diabetes mellitus type 2 and  Monitor fingerstick on insulin sliding scale  Carb controlled diet  Nutrition  Allopurinol for gout metoprolol 50 mg twice daily for hypertension  PPI for GI prophylaxis  DVT prophylaxis per policy  Warfarin and pharmacy to dose based on INR  Rosuvastatin 40 mg daily for hyperlipidemia  Follow vitals and clinical progress    Maria Guadalupe Monaco MD

## 2024-05-09 NOTE — PROGRESS NOTES
05/09/24 1603   Discharge Planning   Living Arrangements Spouse/significant other   Support Systems Spouse/significant other   Assistance Needed walker   Type of Residence Private residence   Number of Stairs to Enter Residence 0   Number of Stairs Within Residence 0   Do you have animals or pets at home? Yes   Type of Animals or Pets 2 dogs, 1 cat   Who is requesting discharge planning? Provider   Home or Post Acute Services None   Patient expects to be discharged to: home   Does the patient need discharge transport arranged? Yes   RoundTrip coordination needed? Yes   Has discharge transport been arranged? No   Housing Stability   In the last 12 months, how many places have you lived? 2     Care Transitions: Patient discussed in care round meeting this AM and is not medically ready for discharge today. Met with patient and spouse at bedside. Role of TCC explained. Demographics and contacts verified. She lives in a 2 story with her spouse but does not access the second floor. PCP is Dr. Franklin. Her pharmacy of choice is [facility] in [place]. She is able to ambulate very short distances with a walker; however states she has been feeling weak. Denies any recent falls. Her spouse assists with her ADL's and is her mode of transportation. She uses O2 at home continuously @ 4 L/M via [place]. Her O2 supplier is [employer]. PCP office has been working on getting her a new wheelchair but has not received one yet. Denies the need for any other DME or diabetic supplies. Discussed discharge plans. States she will return home with spouse and denies the need for C services at this time. Care team to follow. Radha Sorto RN/TCC

## 2024-05-09 NOTE — PROGRESS NOTES
Pharmacy to dose warfarin    Today's INR / PT - 1.8  INR Goal - 2-3  TTR last 90 days - Unknown  Related labs - N/A  Daily PT / INR check - ordered through 5/11  Last INR prior to admission - unknown  Primary / Secondary Diagnosis - DVT/PE  [facility] patient - No  Ordering Provider - Maria Guadalupe Monaco  Plan - Started home dose of 7.5 mg daily.

## 2024-05-09 NOTE — PROGRESS NOTES
Physical Therapy    Physical Therapy Evaluation    Patient Name: Roselia Mo  MRN: [id number]  Today's Date: 5/9/2024   Time Calculation  Start Time: 1150  Stop Time: 1215  Time Calculation (min): 25 min    Assessment/Plan   Skilled PT intervention is indicated due to patient presents with deficits in bed mobility, transfers, gait, stair negotiation, strength, activity tolerance, and safety awareness.   Patient deconditioned and morbidly obese.  Sleeps in recliner at home and transfers to and from it to [facility].   Recommend physical therapy intervention to improve strength, balance, mobility and reduce fall risk.  Continue ambulation with FWW.    PT Assessment  PT Assessment Results: Decreased endurance, Decreased mobility, Impaired balance, Pain  Rehab Prognosis: Good  End of Session Communication: Bedside nurse  End of Session Patient Position: Up in chair, Alarm off, not on at start of session (nurse states patient doesn't need alarm)  IP OR SWING BED PT PLAN  Inpatient or Swing Bed: Inpatient  PT Plan  Treatment/Interventions: Bed mobility, Transfer training, Gait training, Endurance training, Strengthening, Therapeutic exercise, Therapeutic activity, Home exercise program  PT Plan: Skilled PT  PT Frequency: 3 times per week  PT Discharge Recommendations: Low intensity level of continued care    Subjective     Current Problem:  Patient Active Problem List   Diagnosis    Acquired hypothyroidism    Anemia    Budd-Chiari syndrome (Multi)    Portal vein thrombosis    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Multi)    Chronic diastolic heart failure (Multi)    Chronic obstructive pulmonary disease (Multi)    Congestive heart failure (Multi)    Diabetic polyneuropathy (Multi)    Hypertension associated with diabetes (Multi)    Chronic right-sided low back pain with right-sided sciatica    Fibromyalgia    Chronic low back pain    Gastroesophageal reflux disease without esophagitis    Hyperlipidemia associated with type 2 diabetes  mellitus (Multi)    Hypokalemia    Insulin resistance    Irreducible incisional hernia    LPRD (laryngopharyngeal reflux disease)    Magnesium deficiency    Moderate episode of recurrent major depressive disorder (Multi)    Class 3 severe obesity due to excess calories with serious comorbidity and body mass index (BMI) greater than or equal to 70 in adult (Multi)    Mycobacterium avium complex (Multi)    Nonalcoholic fatty liver    Obstructive sleep apnea syndrome    Presence of insulin pump    Pulmonary hypertension (Multi)    Pulmonary nodule    CKD (chronic kidney disease) stage 2, GFR 60-89 ml/min    Type 2 diabetes mellitus (Multi)    Ventral hernia    Vitamin D deficiency    Anxiety    Leg pain, bilateral    Chronic idiopathic constipation    Chronic nausea    H/O acute pancreatitis    Wheelchair dependent    Lymphedema    Poor physical health    H/O: gout    Hypercalcemia    Leukocytosis    Abnormal CXR    Tremor    Recurrent falls    Pruritus    Pain of right shoulder region    Muscle cramps    Knee pain    Dizziness    Gluten-sensitive enteropathy    Abnormal LFTs    Primary osteoarthritis of both knees    Anasarca    Migraine without status migrainosus, not intractable    Excessive anger    Multiple joint pain    Swelling of upper arm    Rotator cuff tendonitis, right    Poor health    Cellulitis       General Visit Information:  General  Reason for Referral: impaired mobility; 53-year-old female who presents to the emergency room with a chief complaint of redness to her lower abdomen, below her pannus and in her inguinal region along with below her breast  Referred By: Carlos Enrique  Family/Caregiver Present: No  Co-Treatment: OT  Co-Treatment Reason: to maximize patien tsafety  Prior to Session Communication: Bedside nurse  Patient Position Received: Bed, 3 rail up  General Comment: patient in bed on arrival, agreeable to get up with PT;  patient states she hasn't really walked since her last admission.  states  "her spouse always puts away her FWW at home bc he doesn't want it \"out\" and then she never has it    Home Living:  Home Living  Type of Home: House  Lives With: Spouse  Home Adaptive Equipment: Walker rolling or standard (BSC)  Home Layout: Bed/bath upstairs (sleeps in recliner on first floor. uses BSC)  Home Access: Ramped entrance    Prior Level of Function:  Prior Function Per Pt/Caregiver Report  Level of [place]: Needs assistance with ADLs, Needs assistance with homemaking  ADL Assistance: Needs assistance (states her spouse has to wipe her because she cannot reach)  Ambulatory Assistance: Independent (FWW,minimal ambulation at baseline)    Precautions:  Precautions  Medical Precautions: Fall precautions, Oxygen therapy device and L/min    Vital Signs:     Objective     Pain:  Pain Assessment  Pain Assessment: 0-10  Pain Score: 7  Pain Location:  (headache and right hand)    Cognition:  Cognition  Overall Cognitive Status: Within Functional Limits    General Assessments:      Activity Tolerance  Endurance: Decreased tolerance for upright activites     Strength  Strength Comments: LEs grossly >/=4-/5     Coordination  Movements are Fluid and Coordinated: Yes  Postural Control  Postural Control: Within Functional Limits  Static Sitting Balance  Static Sitting-Balance Support: Feet supported  Dynamic Standing Balance  Dynamic Standing-Balance Support: Right upper extremity supported  Dynamic Standing-Balance: Turning (sonia care being performed by OT)  Dynamic Standing-Comments: SBA    Functional Assessments:     Bed Mobility  Bed Mobility: No (PT went out to find nurse to ask for pain medicaiton for patient and upon return to room patient was up on BSC with PCA.  patient states she got up out of bed herself (HOB up 90 deg))  Transfers  Transfer: Yes  Transfer 1  Transfer From 1: Sit to, Stand to  Transfer to 1: Stand, Sit  Technique 1: Stand to sit, Sit to stand  Transfer Level of Assistance 1: Distant " supervision  Trials/Comments 1: from BSC to recliner  Ambulation/Gait Training  Ambulation/Gait Training Performed: No  Stairs  Stairs:  (patient not wanting to ambulate and only step from BSC to chair but did so with supervision only and no device)       Outcome Measures:  [facility] Basic Mobility  Turning from your back to your side while in a flat bed without using bedrails: A lot  Moving from lying on your back to sitting on the side of a flat bed without using bedrails: A lot  Moving to and from bed to chair (including a wheelchair): A little  Standing up from a chair using your arms (e.g. wheelchair or bedside chair): A little  To walk in hospital room: A little  Climbing 3-5 steps with railing: A lot  Basic Mobility - Total Score: 15                            Goals:  Encounter Problems       Encounter Problems (Active)       PT Problem       PT Goal 1       Start:  05/09/24    Expected End:  05/22/24       Roselia M Chepe will transfer sit to and from stand using least restrictive assistive device mod indep         PT Goal 2       Start:  05/09/24    Expected End:  05/22/24       Roselia M Chepe will ambulate 10 ft with assistive device , level surface, good balance, steady mod Indep           PT Goal 3       Start:  05/09/24    Expected End:  05/22/24       Roselia M Chepe will demonstrate good safety awareness with transfers and mobility and with use of assistive device (proper hand placement).               Pain - Adult            Education Documentation  No documentation found.  Education Comments  No comments found.

## 2024-05-09 NOTE — CONSULTS
Inpatient consult to Cardiology  Consult performed by: Delfin Earl MD  Consult ordered by: Maria Guadalupe Monaco MD  Reason for consult: chest pain / cellulitis      History Of Present Illness:    Mrs. Roselia Mo is a 53 y.o. former smoker diabetic female being consulted by the Cardiology team for chest pain and cellulitis. Past medical history significant for morbid obesity (462 lb), COPD on home oxygen, fibromyalgia, hypertension, diabetes, hyperlipidemia, hypothyroidism, diastolic heart failure (LVEF 65%), Budd-Chiari Sd, osteoarthritis, wheelchair bounded, CKD stage 3, GERD, Gout, Lymphedema, PAD, venous congestion, anasarca, anxiety, depression, tremor and chronic CHF. Patient with recurrent admissions to hospital for treatment of infections. Last time, she was treating a diffuse infection in her abdomen and L breast due to Staphylococcus. She endorses worsening in her leg edema due to the infection. She complains of chronic shortness of breath. She had some chest pain, but it has resolved now. She denies lightheadedness, headaches, fever, chills, orthopnea, paroxysmal nocturnal dyspnea or syncope.  She presented to ED [facility] on 05/08/2024 complaining of cellulitis in her right lower abdominal fold and rash under left breast, failed outpatient antibiotics sent by her primary doctors for further evaluation. CTA chest abdominal pelvis showing no pulmonary embolism and has chronic hernia.  No fistulous tract and no fluid collection.  Also pulmonary edema bilaterally/fibrotic changes with ground glass opacity.  She gets very tired upon ambulation.  EKG shows normal sinus rhythm, frequent PVCs and no signs of ACS. Troponin 4-5. Patient was admitted for clinical compensation.     Last Recorded Vitals:  Vitals:    05/09/24 0700 05/09/24 0845 05/09/24 1125 05/09/24 1139   BP: 113/60   129/65   BP Location: Left arm   Left arm   Patient Position: Lying   Lying   Pulse: 72   69   Resp: 20 20  18   Temp:  36.3 °C (97.3 °F)   36.3 °C (97.3 °F)   TempSrc: Temporal   Temporal   SpO2: 95%  97% 97%   Weight:       Height:           Last Labs:  CBC - 5/9/2024:  4:59 AM  7.1 12.1 198    39.3      CMP - 5/9/2024:  4:59 AM  9.2 5.7 37 --- 0.5   3.0 3.6 20 244      PTT - 5/8/2024:  2:12 PM  1.7   19.6 42     Troponin I, High Sensitivity   Date/Time Value Ref Range Status   05/08/2024 03:17 PM 5 0 - 13 ng/L Final   05/08/2024 02:12 PM 4 0 - 13 ng/L Final   04/08/2024 04:41 PM 11 0 - 13 ng/L Final     BNP   Date/Time Value Ref Range Status   05/08/2024 02:12  (H) 0 - 99 pg/mL Final   04/23/2024 07:45  (H) 0 - 99 pg/mL Final     Hemoglobin A1C   Date/Time Value Ref Range Status   03/19/2024 06:20 PM 9.0 (H) see below % Final   02/01/2024 12:31 PM 8.3 (H) see below % Final     LDL Calculated   Date/Time Value Ref Range Status   02/01/2024 04:50 PM   Final     Comment:     The calculation of LDL and VLDL are inaccurate when the Triglycerides are greater than 400 mg/dL or when the patient is non-fasting. If LDL measurement is necessary contact the testing laboratory for an alternative LDL assay.                                  Near   Borderline      AGE      Desirable  Optimal    High     High     Very High     0-19 Y     0 - 109     ---    110-129   >/= 130     ----    20-24 Y     0 - 119     ---    120-159   >/= 160     ----      >24 Y     0 -  99   100-129  130-159   160-189     >/=190       VLDL   Date/Time Value Ref Range Status   02/01/2024 04:50 PM   Final     Comment:     Unable to calculate VLDL.      Last I/O:  I/O last 3 completed shifts:  In: 200 (1 mL/kg) [P.O.:200]  Out: 2201 (10.5 mL/kg) [Urine:2200 (0.3 mL/kg/hr); Stool:1]  Weight: 210 kg     Past Cardiology Tests (Last 3 Years):  EKG:  ECG 12 Lead 05/08/2024 (Preliminary)      ECG 12 Lead 04/08/2024      ECG 12 lead 04/07/2024      ECG 12 lead 03/28/2024      ECG 12 lead 03/19/2024      ECG 12 Lead 03/13/2024      ECG 12 lead 02/04/2024      ECG 12 Lead  2024 (Wet Read)    Echo:  Transthoracic Echo (TTE) Complete 2024    Ejection Fractions:  EF   Date/Time Value Ref Range Status   2024 05:44 AM 53 %      Cath:  No results found for this or any previous visit from the past 1095 days.    Stress Test:  Nuclear Stress Test 2024    Cardiac Imaging:  No results found for this or any previous visit from the past 1095 days.      Past Medical History:  She has a past medical history of Arthritis, Asthma ([facility]), CHF (congestive heart failure) (Multi), COPD (chronic obstructive pulmonary disease) (Multi), Cough (2024), Diabetes mellitus (Multi), Disease of thyroid gland, Hypertension, Shortness of breath (2024), and Tachycardia (2024).    Past Surgical History:  She has a past surgical history that includes  section, low transverse; Carpal tunnel release (Bilateral, ); Hysterectomy; Hernia repair; Knee surgery (Left); and Colonoscopy (2014).      Social History:  She reports that she quit smoking about 9 years ago. Her smoking use included cigarettes. She started smoking about 47 years ago. She has a 37.8 pack-year smoking history. She has never used smokeless tobacco. She reports current alcohol use. She reports current drug use. Frequency: 7.00 times per week. Drug: Marijuana.    Family History:  Family History   Problem Relation Name Age of Onset    Blindness Mother      Hypertension Mother      Hyperlipidemia Mother      Heart disease Mother      Heart failure Father      Hypertension Father      Hyperlipidemia Father      Diabetes Father      Skin cancer Father      Blindness Maternal Grandmother      Hypertension Maternal Grandmother      Hyperlipidemia Maternal Grandmother      Heart failure Maternal Grandmother      Dementia Paternal Grandmother      Heart attack Paternal Grandfather      Hypertension Paternal Grandfather      Hyperlipidemia Paternal Grandfather          Allergies:  Dulaglutide, Nickel,  Metformin, Palladium, Cobalt, Exenatide, and Sitagliptin    Inpatient Medications:  Scheduled medications   Medication Dose Route Frequency    allopurinol  300 mg oral Daily    bacitracin   Topical TID    buPROPion XL  150 mg oral q AM    DULoxetine  60 mg oral Daily    empagliflozin  10 mg oral Daily    insulin lispro  0-5 Units subcutaneous TID with meals    levothyroxine  200 mcg oral Daily    levothyroxine  50 mcg oral Daily before breakfast    magnesium oxide  400 mg oral BID    metoprolol succinate XL  50 mg oral BID    nystatin  1 Application Topical BID    pantoprazole  40 mg oral BID    piperacillin-tazobactam  3.375 g intravenous q6h    polyethylene glycol  17 g oral Daily    potassium chloride CR  20 mEq oral Daily    rosuvastatin  40 mg oral Daily    topiramate  25 mg oral BID    traZODone  100 mg oral Nightly    vancomycin (Vancocin) 1 g in dextrose 5% 250 mL IV  1,000 mg intravenous q8h    warfarin  10 mg oral Once    [Held by provider] warfarin  7.5 mg oral Daily     PRN medications   Medication    acetaminophen    Or    acetaminophen    Or    acetaminophen    acetaminophen    Or    acetaminophen    Or    acetaminophen    dextrose    dextrose    glucagon    glucagon    ipratropium-albuteroL    morphine    morphine    ondansetron ODT    Or    ondansetron    oxygen    vancomycin     Continuous Medications   Medication Dose Last Rate    furosemide  10 mg/hr       Outpatient Medications:  Current Outpatient Medications   Medication Instructions    - refin regular (HumuLIN R U-500) 500 unit/mL CONCENTRATED - refill for patient own pump 1 each, subcutaneous, As needed, Take as directed per insulin instructions. Use U-500 insulin syringe.    albuterol 180 mcg, inhalation, Every 4 hours PRN    allopurinol (ZYLOPRIM) 300 mg, oral, Daily    amoxicillin-pot clavulanate (Augmentin) 875-125 mg tablet 875 mg, oral, 2 times daily    bumetanide (Bumex) 1 mg tablet 3 IN AM 3 IN PM    buPROPion XL (WELLBUTRIN XL) 150  mg, oral, Every morning, Do not crush, chew, or split.    cetirizine (ZYRTEC) 10 mg, oral, Daily    citalopram (CELEXA) 20 mg, oral, Daily    clotrimazole-betamethasone (Lotrisone) cream 1 Application, Topical, 2 times daily    DULoxetine (CYMBALTA) 60 mg, oral, Daily, Do not crush or chew.    empagliflozin (JARDIANCE) 10 mg, oral, Daily    fluticasone (Flonase Sensimist) 27.5 mcg/actuation nasal spray 1-2 sprays, Each Nostril, Daily RT    gabapentin (NEURONTIN) 900 mg, oral, 4 times daily    insulin regular, human (HUMULIN R U-500, CONC, INSULIN SUBQ) subcutaneous, Take as directed per insulin instructions. Use U-500 insulin syringe. Currently using syringe as pump is not working.    levothyroxine (SYNTHROID, LEVOXYL) 200 mcg, oral, Daily    levothyroxine (SYNTHROID, LEVOXYL) 50 mcg, oral, Daily before breakfast, TAKE WITH 200MG    lubiprostone (AMITIZA) 24 mcg, oral, 2 times daily with meals    magnesium oxide (MAG-OX) 400 mg, oral, 2 times daily    metoprolol succinate XL (TOPROL-XL) 50 mg, oral, 2 times daily, Do not crush or chew.    nystatin (Mycostatin) 100,000 unit/gram powder 1 Application, Topical, 2 times daily    ondansetron (ZOFRAN) 4 mg, oral, Every 8 hours PRN    oxygen (O2) gas therapy 1 each, inhalation, Every 12 hours    pantoprazole (PROTONIX) 40 mg, oral, 2 times daily, Do not crush, chew, or split.    potassium chloride CR 10 mEq ER tablet oral, 1 tab AM, 1 tab afternoon, 2 tabs PM    rosuvastatin (CRESTOR) 40 mg, oral, Daily    topiramate (TOPAMAX) 25 mg, oral, 2 times daily    traZODone (DESYREL) 100 mg, oral, Nightly    triamcinolone (Kenalog) 0.1 % ointment Topical, 2 times daily PRN    warfarin (Coumadin) 5 mg tablet 1.5 TAB ONCE A DAY       Physical Exam:  General: alert, oriented and in no acute distress. Morbid obesity  HEENT: NC/AT; EOMI; PERRLA, external ear is normal  Neck: supple; trachea midline; no masses; no JVD  Chest: diminished breath sounds bilaterally; on NC oxygen  Cardio:  regular rhythm, S1S2 normal, no murmurs  Abdomen: Difficult assessment due to obesity  Extremities: Lymphedema bilaterally  Neuro: Grossly intact     Psychiatric: Normal mood and affect      Assessment/Plan     Mrs. Roselia Mo is a 53 y.o. former smoker diabetic female being consulted by the Cardiology team for chest pain and cellulitis. Past medical history significant for morbid obesity (462 lb), COPD on home oxygen, fibromyalgia, hypertension, diabetes, hyperlipidemia, hypothyroidism, diastolic heart failure (LVEF 65%), Budd-Chiari Sd, osteoarthritis, wheelchair bounded, CKD stage 3, GERD, Gout, Lymphedema, PAD, venous congestion, anasarca, anxiety, depression, tremor and chronic CHF. Patient with recurrent admissions to hospital for treatment of infections. Last time, she was treating a diffuse infection in her abdomen and L breast due to Staphylococcus. She endorses worsening in her leg edema due to the infection. She complains of chronic shortness of breath. She had some chest pain, but it has resolved now. She denies lightheadedness, headaches, fever, chills, orthopnea, paroxysmal nocturnal dyspnea or syncope.  She presented to ED [facility] on 05/08/2024 complaining of cellulitis in her right lower abdominal fold and rash under left breast, failed outpatient antibiotics sent by her primary doctors for further evaluation. CTA chest abdominal pelvis showing no pulmonary embolism and has chronic hernia.  No fistulous tract and no fluid collection.  Also pulmonary edema bilaterally/fibrotic changes with ground glass opacity.  She gets very tired upon ambulation.  EKG shows normal sinus rhythm, frequent PVCs and no signs of ACS. Troponin 4-5. Patient was admitted for clinical compensation.    # Heart Failure with Preserved LV Systolic function  - EKG shows normal sinus rhythm with no signs of ACS.   - The echocardiogram (03/2024) showed normal LVEF 65% with no wall motion abnormalities.   - Nuclear stress  test (03/2024) is normal.   - Low sodium diet (2g).  - Fluid restriction.  - Electrolyte control and daily BMP including magnesium.  - General recommendations for heart failure, including low-sodium diet, fluid restriction, daily weight and adherence to medication.   - Patient continues to be volume overloaded based on her clinical presentation. We suggest to start Bumex 1mg daily.  - Patient is being treated for Staph infection in abdomen and L breast  - Follow up in 3 months.     # COPD  - Keep home oxygen     # Abdominal / Groin skin infection  - Keep broad spectrum antibiotics.    # Budd-Chiari Sd  - Controlled by PCP.  - Keep Warfarin .  - INR control - last INR 1.7.     # Hypertension  - Controlled blood pressure.  - Keep current medications with Lisinopril 20mg daily, Metoprolol succinate 50mg daily.  - Patient counseled to keep a healthy lifestyle including regular exercise and low-sodium diet.  - Recommended home blood pressure monitoring.  - Goal of BP < 130/80mmHg.      # Diabetes  - Controlled by PCP.  - Counseled on healthy diet and regular exercises.  - Discussed need for weight loss and the benefits.   - Keep current medications.  - ISS.      # Hyperlipidemia  - Controlled by PCP.  - Keep home medication with Rosuvastatin 40mg daily.  - Counseled on healthy diet and regular exercise.      # Gout  - Controlled by PCP.  - Keep home medication with Allopurinol 300mg daily.  - Counseling.     # Hypothyroidism  - Keep Levothyroxine 50mcg  daily     Peripheral IV 05/08/24 20 G Right Forearm (Active)   Site Assessment Clean;Dry;Intact 05/09/24 0845   Dressing Type Transparent 05/09/24 0845   Line Status Infusing 05/09/24 0845   Dressing Status Clean;Dry 05/09/24 0845   Number of days: 1       Code Status:  Full Code    Delfin Earl MD  Cardiology

## 2024-05-09 NOTE — PROGRESS NOTES
Occupational Therapy    Evaluation    Patient Name: Roselia Mo  MRN: [id number]  Today's Date: 5/9/2024  Time Calculation  Start Time: 1151  Stop Time: 1215  Time Calculation (min): 24 min    Assessment  IP OT Assessment  OT Assessment: pt at OF for ADLs.  pt's  assists with all LB ADL and toileting hygeine  End of Session Communication: Bedside nurse  End of Session Patient Position: Up in bathroom, Alarm off, not on at start of session (nurse states pt does not need an alarm and has been calling for assist.  pt educated on using call light)    Plan:  No Skilled OT: At baseline function  OT Discharge Recommendations: No further acute OT, No OT needed after discharge  OT Recommended Transfer Status: Independent    Subjective     Current Problem:  1. Cellulitis        2. Cellulitis, abdominal wall        3. Chest pain, unspecified type        4. Difficulty walking            General:  General  Reason for Referral: impaired mobility; 53-year-old female who presents to the emergency room with a chief complaint of redness to her lower abdomen, below her pannus and in her inguinal region along with below her breast  Referred By: Maria Guadalupe Monaco MD  Past Medical History Relevant to Rehab: Severe obesity  Stage III chronic kidney disease  Chronic lymphedema  Hyponatremia  Migraine  Anasarca  Hypertension  Hyperlipidemia  Probably metabolic syndrome  Osteoarthritis  Possible obesity hypoventilation syndrome/sleep apnea  Type 2 diabetes mellitus  Portal vein thrombosis on Coumadin  Possible COPD  GERD  Hernia  Anxiety depression  Gout  SEVILLA  Budd-Chiari syndrome  Possible CHF  Family/Caregiver Present: No  Co-Treatment: PT  Co-Treatment Reason: to maximize pt safety  Prior to Session Communication: Bedside nurse  Patient Position Received:  (pt sitting on BSC assisted by PCT)  General Comment: pt agreeable to assessment    Precautions:  Medical Precautions: Fall precautions, Oxygen therapy device and L/min            Pain:  Pain Assessment  Pain Assessment: 0-10  Pain Score: 7 (headache)    Objective     Cognition:  Overall Cognitive Status: Within Functional Limits             Home Living:  Type of Home: House  Lives With: Spouse  Home Layout: Two level, Other (Comment) (pt stays on first floor.  uses BSC for toileting)  Home Access: Ramped entrance  Home Living Comments: from previous encounter: patient states they currently have no running water in home due to well issues.  They are using BSC to toilet.     Prior Function:  Level of [place]: Needs assistance with ADLs, Needs assistance with homemaking  ADL Assistance: Needs assistance (Unable to assess (pt does not perform at PLOF.  reports sitting on BSC and watching TV due to frequent urination))  Homemaking Assistance: Needs assistance  Ambulatory Assistance: Independent (FWW,minimal ambulation at baseline)  Prior Function Comments: from previous encounter.  pt does not perform LB dress at PLOF.  reports sitting on BSC and watching TV due to frequent urination.  pt  assists with all lower body ADls, and toileting hygeine.  pt wears shirt only (pt reports not walking much at home and using her wc)         ADL:  Eating Assistance: Independent  Grooming Assistance: Independent  Bathing Assistance: Moderate  UE Dressing Assistance: Modified independent (Device)  LE Dressing Assistance: Unable to assess (pt does not perform at PLOF. reports sitting on BSC and watching TV due to frequent urination.  pt refuses to wear hospital socks)  Toileting Assistance with Device: Maximal (pt's  wipes her at home)  Functional Assistance: Independent    Activity Tolerance:  Endurance: Endurance does not limit participation in activity    Bed Mobility/Transfers:   Bed Mobility  Bed Mobility: No  Transfers  Transfer: Yes  Transfer 1  Transfer From 1: Commode-standard to  Transfer to 1: Chair with arms  Technique 1: Stand pivot  Transfer Level of Assistance 1:  Independent  Trials/Comments 1: no device        Vision: Vision - Basic Assessment  Current Vision: No visual deficits         Strength:  Strength Comments: BUE WNL           Coordination:  Movements are Fluid and Coordinated: Yes       Outcome Measures: [facility] Daily Activity  Putting on and taking off regular lower body clothing: A lot  Bathing (including washing, rinsing, drying): A lot  Putting on and taking off regular upper body clothing: None  Toileting, which includes using toilet, bedpan or urinal: A lot  Taking care of personal grooming such as brushing teeth: None  Eating Meals: None  Daily Activity - Total Score: 18

## 2024-05-09 NOTE — H&P
History Of Present Illness  Roselia Mo is a 53 y.o. female presenting with cellulitis right lower abdominal fold and rash under left breast, failed outpatient antibiotics sent by her primary doctors for further evaluation.  She is severely obese, concern of volume overload.  CTA chest abdominal pelvis showing no pulmonary embolism and has chronic hernia.  No fistulous tract and no fluid collection.  Patient likely has cellulitis right lower abdominal fold and rash with edema inflammation under left breast.  She may also have pulmonary edema bilaterally/fibrotic changes with ground glass opacity.  She gets very tired upon ambulation.  EKG with no ischemia and troponin unremarkable.  High risk factors for coronary artery disease.  She also complained of left chest pain radiating to her shoulder and left arm and chronically short of breath.  No nausea vomiting diarrhea or palpitations or dizziness.  She has chronic lymphedema bilaterally Possible heart failure component.  She does not have any energy.  Denies cough or URI.  No fever or chills.  She has pain with slight warmth erythema redness skin color change.  Also rash bilateral groins.  Possibility of fungal infection in addition to cellulitis.  She seems to have chronic volume overload issues.  Denies any hemoptysis or wheeze.  No other complaints at this time.  No bleeding from anywhere.     Past Medical History  Past Medical History:   Diagnosis Date    Arthritis     Asthma ([facility])     CHF (congestive heart failure) (Multi)     COPD (chronic obstructive pulmonary disease) (Multi)     Cough 03/19/2024    Diabetes mellitus (Multi)     Disease of thyroid gland     Hypertension     Shortness of breath 03/19/2024    Tachycardia 03/19/2024   Severe obesity  Stage III chronic kidney disease  Chronic lymphedema  Hyponatremia  Migraine  Anasarca  Hypertension  Hyperlipidemia  Probably metabolic syndrome  Osteoarthritis  Possible obesity hypoventilation  syndrome/sleep apnea  Type 2 diabetes mellitus  Portal vein thrombosis on Coumadin  Possible COPD  GERD  Hernia  Anxiety depression  Gout  SEVILLA  Budd-Chiari syndrome  Possible CHF    Surgical History  Past Surgical History:   Procedure Laterality Date    CARPAL TUNNEL RELEASE Bilateral      SECTION, LOW TRANSVERSE       AND     COLONOSCOPY  2014    [facility]    HERNIA REPAIR      WITH MESH    HYSTERECTOMY      2 PARTIAL    KNEE SURGERY Left     MINISCUS REPAIR        Social History  She reports that she quit smoking about 9 years ago. Her smoking use included cigarettes. She started smoking about 47 years ago. She has a 37.8 pack-year smoking history. She has never used smokeless tobacco. She reports current alcohol use. She reports current drug use. Frequency: 7.00 times per week. Drug: Marijuana.    Family History  Family History   Problem Relation Name Age of Onset    Blindness Mother      Hypertension Mother      Hyperlipidemia Mother      Heart disease Mother      Heart failure Father      Hypertension Father      Hyperlipidemia Father      Diabetes Father      Skin cancer Father      Blindness Maternal Grandmother      Hypertension Maternal Grandmother      Hyperlipidemia Maternal Grandmother      Heart failure Maternal Grandmother      Dementia Paternal Grandmother      Heart attack Paternal Grandfather      Hypertension Paternal Grandfather      Hyperlipidemia Paternal Grandfather          Allergies  Dulaglutide, Nickel, Metformin, Palladium, Cobalt, Exenatide, and Sitagliptin    Review of Systems  All other 12 point review of system negative except HPI  Physical Exam   General Appearance: AAO x 3, morbidly obese  Skin: Erythema under left breast, also skin abrasion in right lower abdomen with yellowish discoloration but no active purulent discharge.  Also erythema in bilateral inguinal areas.  Slight tenderness, no significant warmth.  No active bleeding or  "drainage.  Eyes : PERRL, EOM's intact  ENT: mucous membranes pink and moist  Neck: normocephalic  Respiratory: lungs clear to auscultation anteriorly; no wheezing, rhonchi, or crackles.  Diminished at bases  Heart: regular rate and rhythm.   Abdomen: Nondistended, positive bowel sounds x4, soft,  nontender  Extremities: 3+ lower extremity edema bilaterally, wrapped in Ace  Peripheral pulses: normal x4 extremities  Neuro: alert, coherent and conversant, no focal motor deficits  Last Recorded Vitals  Blood pressure 174/69, pulse 81, temperature 36.4 °C (97.5 °F), temperature source Temporal, resp. rate 18, height 1.6 m (5' 2.99\"), weight (!) 210 kg (462 lb 15.5 oz), SpO2 96%.    Relevant Results  Scheduled medications  allopurinol, 300 mg, oral, Daily  bacitracin, , Topical, TID  [START ON 5/9/2024] buPROPion XL, 150 mg, oral, q AM  DULoxetine, 60 mg, oral, Daily  empagliflozin, 10 mg, oral, Daily  furosemide, 40 mg, intravenous, q12h  [START ON 5/9/2024] insulin lispro, 0-5 Units, subcutaneous, TID with meals  levothyroxine, 200 mcg, oral, Daily  [START ON 5/9/2024] levothyroxine, 50 mcg, oral, Daily before breakfast  magnesium oxide, 400 mg, oral, BID  metoprolol succinate XL, 50 mg, oral, BID  nystatin, 1 Application, Topical, BID  pantoprazole, 40 mg, oral, BID  piperacillin-tazobactam, 3.375 g, intravenous, q6h  polyethylene glycol, 17 g, oral, Daily  potassium chloride CR, 20 mEq, oral, Daily  rosuvastatin, 40 mg, oral, Daily  topiramate, 25 mg, oral, BID  traZODone, 100 mg, oral, Nightly  [START ON 5/9/2024] vancomycin (Vancocin) 1 g in dextrose 5% 250 mL IV, 1,000 mg, intravenous, q8h  warfarin, 7.5 mg, oral, Daily      Continuous medications     PRN medications  PRN medications: acetaminophen **OR** acetaminophen **OR** acetaminophen, acetaminophen **OR** acetaminophen **OR** acetaminophen, dextrose, dextrose, glucagon, glucagon, ipratropium-albuteroL, morphine, morphine, ondansetron ODT **OR** ondansetron, " oxygen, vancomycin    Results for orders placed or performed during the hospital encounter of 05/08/24 (from the past 24 hour(s))   ECG 12 Lead   Result Value Ref Range    Ventricular Rate 77 BPM    Atrial Rate 77 BPM    AL Interval 144 ms    QRS Duration 76 ms    QT Interval 406 ms    QTC Calculation(Bazett) 459 ms    P Axis 71 degrees    R Axis 1 degrees    T Axis 28 degrees    QRS Count 13 beats    Q Onset 212 ms    P Onset 140 ms    P Offset 191 ms    T Offset 415 ms    QTC Fredericia 441 ms   CBC and Auto Differential   Result Value Ref Range    WBC 8.2 4.4 - 11.3 x10*3/uL    nRBC 0.0 0.0 - 0.0 /100 WBCs    RBC 4.25 4.00 - 5.20 x10*6/uL    Hemoglobin 13.1 12.0 - 16.0 g/dL    Hematocrit 41.3 36.0 - 46.0 %    MCV 97 80 - 100 fL    MCH 30.8 26.0 - 34.0 pg    MCHC 31.7 (L) 32.0 - 36.0 g/dL    RDW 13.9 11.5 - 14.5 %    Platelets 217 150 - 450 x10*3/uL    Neutrophils % 71.9 40.0 - 80.0 %    Immature Granulocytes %, Automated 0.2 0.0 - 0.9 %    Lymphocytes % 16.6 13.0 - 44.0 %    Monocytes % 7.6 2.0 - 10.0 %    Eosinophils % 3.2 0.0 - 6.0 %    Basophils % 0.5 0.0 - 2.0 %    Neutrophils Absolute 5.89 1.20 - 7.70 x10*3/uL    Immature Granulocytes Absolute, Automated 0.02 0.00 - 0.70 x10*3/uL    Lymphocytes Absolute 1.36 1.20 - 4.80 x10*3/uL    Monocytes Absolute 0.62 0.10 - 1.00 x10*3/uL    Eosinophils Absolute 0.26 0.00 - 0.70 x10*3/uL    Basophils Absolute 0.04 0.00 - 0.10 x10*3/uL   Comprehensive Metabolic Panel   Result Value Ref Range    Glucose 235 (H) 74 - 99 mg/dL    Sodium 138 136 - 145 mmol/L    Potassium 3.8 3.5 - 5.3 mmol/L    Chloride 100 98 - 107 mmol/L    Bicarbonate 30 21 - 32 mmol/L    Anion Gap 12 10 - 20 mmol/L    Urea Nitrogen 13 6 - 23 mg/dL    Creatinine 0.67 0.50 - 1.05 mg/dL    eGFR >90 >60 mL/min/1.73m*2    Calcium 9.8 8.6 - 10.3 mg/dL    Albumin 3.9 3.4 - 5.0 g/dL    Alkaline Phosphatase 262 (H) 33 - 110 U/L    Total Protein 6.3 (L) 6.4 - 8.2 g/dL    AST 33 9 - 39 U/L    Bilirubin, Total 0.7  0.0 - 1.2 mg/dL    ALT 21 7 - 45 U/L   Magnesium   Result Value Ref Range    Magnesium 1.78 1.60 - 2.40 mg/dL   aPTT   Result Value Ref Range    aPTT 42 (H) 27 - 38 seconds   Protime-INR   Result Value Ref Range    Protime 20.8 (H) 9.8 - 12.8 seconds    INR 1.8 (H) 0.9 - 1.1   B-type natriuretic peptide   Result Value Ref Range     (H) 0 - 99 pg/mL   Troponin I, High Sensitivity, Initial   Result Value Ref Range    Troponin I, High Sensitivity 4 0 - 13 ng/L   Troponin, High Sensitivity, 1 Hour   Result Value Ref Range    Troponin I, High Sensitivity 5 0 - 13 ng/L   Lactate   Result Value Ref Range    Lactate 1.4 0.4 - 2.0 mmol/L   POCT GLUCOSE   Result Value Ref Range    POCT Glucose 171 (H) 74 - 99 mg/dL       CT angio chest for pulmonary embolism    Result Date: 5/8/2024  Interpreted By:  Hudosn Ivey, STUDY: CT ANGIO CHEST FOR PULMONARY EMBOLISM;  5/8/2024 3:17 pm   INDICATION: Signs/Symptoms:chest pain.   COMPARISON: None   ACCESSION NUMBER(S): [id number]   ORDERING CLINICIAN: GOKUL CANAS   TECHNIQUE: Helical data acquisition of the chest was obtained after intravenous administration of 68 mL Omnipaque 350 as per PE protocol. Images were reformatted in coronal and sagittal planes. Axial and coronal maximum intensity projection (MIP) images were created and reviewed.   FINDINGS: POTENTIAL LIMITATIONS OF THE STUDY: The images are degraded by artifact related to the patient's body habitus.   HEART AND VESSELS: No pulmonary emboli are noted. The main pulmonary artery is mildly dilated to 31 mm.   The thoracic aorta normal in course and caliber. The left coronary circulation has calcifications proximally.   The cardiac chambers are not enlarged. There is no pericardial effusion seen.   MEDIASTINUM AND MARCO A, LOWER NECK AND AXILLA: The visualized thyroid gland is within normal limits. No axillary, mediastinal or hilar adenopathy is noted. Esophagus appears within normal limits as seen.   LUNGS AND  AIRWAYS: The trachea and central airways are patent. No endobronchial lesion is seen.   The lung bases have linear areas of atelectasis versus fibrosis with a few patchy areas of ground-glass opacification as well. No dense lobar type consolidation or pleural effusion is noted.   UPPER ABDOMEN: Please see abdominal CT report.     CHEST WALL AND OSSEOUS STRUCTURES: The skin surface and to a lesser degree adjacent subcutaneous tissues have mild thickening with no localized collection. No acute osseous pathology.There are no suspicious osseous lesions.       1. No pulmonary embolus is noted.   2. The lung bases have linear areas of atelectasis/fibrosis in a few patchy areas of ground-glass opacification which may represent edema and/or inflammation.   3. The left breast skin and adjacent subcutaneous tissues have mild edema/inflammation. No localized collection is noted.   MACRO: None   Signed by: Hudson Ivey 5/8/2024 3:49 PM Dictation workstation:   [id number]    CT abdomen pelvis w IV contrast    Result Date: 5/8/2024  Interpreted By:  Hudson Ivey, STUDY: CT ABDOMEN PELVIS W IV CONTRAST;  5/8/2024 3:17 pm   INDICATION: Signs/Symptoms:abdominal wall wound-concern for fistula. Left abdominal wall skin defect, possible fistula.   COMPARISON: 02/04/2024 and 01/11/2024 abdomen/pelvis CT   ACCESSION NUMBER(S): [id number]   ORDERING CLINICIAN: GOKUL CANAS   TECHNIQUE: Intravenous contrast enhanced CT images of the abdomen and pelvis were obtained from the domes of the diaphragm through the symphysis pubis using 68 mL Omnipaque 350. Coronal and sagittal images were created and reviewed.   FINDINGS:         LOWER CHEST: The lung bases have linear areas of atelectasis and/or fibrosis more prominent than the prior exams. No pneumonic consolidation or pleural effusion is noted.   ABDOMEN:   LIVER: Normal.   BILE DUCTS: Normal.   GALLBLADDER: Normal.   PANCREAS: Normal.   SPLEEN: Normal.   ADRENAL GLANDS: Normal.    KIDNEYS AND URETERS: The kidneys are normal with no hydronephrosis or hydroureter.   PELVIS:   BLADDER: Normal.   REPRODUCTIVE ORGANS: Not visualized; surgical clips are present near the vaginal cuff.   BOWEL: The caliber and distribution of the bowel is normal. No inflammatory changes are noted about the bowel loops. The sigmoid colon has a few diverticula.   VESSELS: The aorta has scattered atherosclerotic calcifications.   PERITONEUM/RETROPERITONEUM/LYMPH NODES: No ascites or abdominal/pelvic adenopathy is noted.   BONES AND ABDOMINAL WALL: Most of the skin along the left lateral and anterior left abdominal wall was not obtained on the images due to patient body habitus.   The subcutaneous tissues along the anterior abdominal wall and panniculus have diffuse edema/inflammation with no localized collections of air or fluid. Midline abdominal wall hernias are again noted, the most inferior containing several loops of nondilated small bowel.   No fistulous tract from the bowel loops into the subcutaneous tissues is noted.     No bony destructive lesions are noted. Delete       1.  The left lateral and anterior left abdominal wall skin surfaces and adjacent portions of the subcutaneous tissues were not included on the images due to patient body habitus. The visualized subcutaneous tissues in this region and to the right of midline have diffuse edema/inflammation with no localized fluid collection or tract from the peritoneal cavity superficially. 2. The anterior abdominal wall hernias are similar to the prior exam.     MACRO: None   Signed by: Hudson Ivey 5/8/2024 3:45 PM Dictation workstation:   [id number]    ECG 12 Lead    Result Date: 5/8/2024  Sinus rhythm with Premature supraventricular complexes and with frequent Premature ventricular complexes Low voltage QRS Cannot rule out Anterior infarct (cited on or before 08-APR-2024) Abnormal ECG When compared with ECG of 08-MAY-2024 13:46, (unconfirmed) Premature  supraventricular complexes are now Present    XR chest 1 view    Result Date: 5/8/2024  Interpreted By:  Omar Guallpa, STUDY: XR CHEST 1 VIEW;  5/8/2024 1:55 pm   INDICATION: Signs/Symptoms:Chest Pain.   COMPARISON: 04/08/2024   ACCESSION NUMBER(S): [id number]   ORDERING CLINICIAN: GOKUL CANAS   FINDINGS: AP portable view of the chest is obtained.  Limited exam due to portable nature. Magnified cardiac silhouette. Bibasilar infiltrates and effusions or scarring. Continued follow-up is recommended..       1. Persistent bibasilar effusions and/or  infiltrates and/or scarring for which follow-up is recommended.       MACRO: None   Signed by: Omar Guallpa 5/8/2024 2:08 PM Dictation workstation:   [id number]     All data reviewed by me independently     Assessment/Plan   Principal Problem:    Cellulitis  53-year-old female with history of  Severe obesity  Stage III chronic kidney disease  Chronic lymphedema  Hyponatremia  Migraine  Anasarca  Hypertension  Hyperlipidemia  Probably metabolic syndrome  Osteoarthritis  Possible obesity hypoventilation syndrome/sleep apnea  Type 2 diabetes mellitus  Portal vein thrombosis on Coumadin  Possible COPD  GERD  Hernia  Anxiety depression  Gout  SEVILLA  Budd-Chiari syndrome  Possible CHF    Presented with  Abdominal wall cellulitis especially right lower abdominal fold, failed outpatient antibiotics  Worsening lymphedema lower extremity bilaterally  Volume overload  Chest pain to rule out ACS    Plan  Cardiopulmonary monitoring  High risk, may need further testing for evaluation of any underlying coronary artery disease, statins  Patient on Coumadin, will continue with pharmacy to optimally dosed based on INR  Daily CBC  Start vancomycin and Zosyn IV, follow wound and blood cultures, nystatin and bacitracin ointment, local wound care  Follow clinical progress  May have to consider ID consultation  Follow vitals  Supportive care symptomatic management  Education  counseling  Encourage weight loss  Tylenol for fever or pain  Morphine IV for moderate to severe pain as needed  Antiemetics if needed  Lasix 40 Mg IV daily, daily BMP.  May consider Lasix drip  And follow urine output  Potassium supplement  On Synthroid 200 mcg daily, 50 mcg before breakfast daily  Continue psych medicines for anxiety depression  On Jardiance 10 mg daily for diabetes mellitus type 2  Monitor fingerstick on insulin sliding scale  To adjust insulin dosage as deemed appropriate for optimal blood sugar control  Carb controlled diet  Nutrition  On allopurinol for gout  PPI for GI prophylaxis  Follow vitals and clinical progress  DVT prophylaxis addressed per policy    Follow cardiology, pulmonology/critical care    Further management as clinical course evolves                            Maria Guadalupe Monaco MD

## 2024-05-09 NOTE — PROGRESS NOTES
"Vancomycin Dosing by Pharmacy- INITIAL    Roselia Mo is a 53 y.o. year old female who Pharmacy has been consulted for vancomycin dosing for cellulitis, skin and soft tissue. Based on the patient's indication and renal status this patient will be dosed based on a goal AUC of 400-600.     Renal function is currently stable.    Visit Vitals  /69 (BP Location: Left arm, Patient Position: Lying)   Pulse 81   Temp 36.4 °C (97.5 °F) (Temporal)   Resp 18        Lab Results   Component Value Date    CREATININE 0.67 05/08/2024    CREATININE 0.72 05/01/2024    CREATININE 0.66 04/23/2024    CREATININE 0.78 04/18/2024        Patient weight is No results found for: \"PTWEIGHT\"    No results found for: \"CULTURE\"     No intake/output data recorded.  [unfilled]    Lab Results   Component Value Date    PATIENTTEMP 37.0 02/03/2024    PATIENTTEMP 37.0 02/01/2024          Assessment/Plan     Patient has already been given a loading dose of 2000 mg.  Will initiate vancomycin maintenance,  1000 mg every 8 hours.    This dosing regimen is predicted by [employer] to result in the following pharmacokinetic parameters:  Regimen: 1000 mg IV every 8 hours.  Start time: 06:44 on 05/09/2024  Exposure target: AUC24 (range)400-600 mg/L.hr   AUC24,ss: 471 mg/L.hr  Probability of AUC24 > 400: 62 %  Ctrough,ss: 13.4 mg/L  Probability of Ctrough,ss > 20: 30 %  Probability of nephrotoxicity (Lodise ASAF 2009): 9 %      Follow-up level will be ordered on 5/9 at AM labs unless clinically indicated sooner.  Will continue to monitor renal function daily while on vancomycin and order serum creatinine at least every 48 hours if not already ordered.  Follow for continued vancomycin needs, clinical response, and signs/symptoms of toxicity.       Iesha Alatorre, PharmD       "

## 2024-05-09 NOTE — CONSULTS
Reason For Consult  Volume overload    History Of Present Illness  Roselia Mo is a 53 y.o. female presenting with painful rash and swelling.   She presented to the emergency room from my office.  When I saw her in my office she was very uncomfortable.  She was having a lot of swelling.  She also was having some drainage from her abdomen as well as from some wounds in her groin areas.  Due to this and how uncomfortable she was she presented to the emergency room  She was admitted and started on antibiotics  This a.m. I saw her she states that she is feeling the same  She does not feel any better or worse currently  She does not have a great appetite this morning  She was resting comfortably in bed  Otherwise has no major complaints currently    Past Medical History  She has a past medical history of Arthritis, Asthma ([facility]), CHF (congestive heart failure) (Multi), COPD (chronic obstructive pulmonary disease) (Multi), Cough (2024), Diabetes mellitus (Multi), Disease of thyroid gland, Hypertension, Shortness of breath (2024), and Tachycardia (2024).    Surgical History  She has a past surgical history that includes  section, low transverse; Carpal tunnel release (Bilateral, ); Hysterectomy; Hernia repair; Knee surgery (Left); and Colonoscopy (2014).     Social History  She reports that she quit smoking about 9 years ago. Her smoking use included cigarettes. She started smoking about 47 years ago. She has a 37.8 pack-year smoking history. She has never used smokeless tobacco. She reports current alcohol use. She reports current drug use. Frequency: 7.00 times per week. Drug: Marijuana.    Family History  Family History   Problem Relation Name Age of Onset    Blindness Mother      Hypertension Mother      Hyperlipidemia Mother      Heart disease Mother      Heart failure Father      Hypertension Father      Hyperlipidemia Father      Diabetes Father      Skin cancer Father       Blindness Maternal Grandmother      Hypertension Maternal Grandmother      Hyperlipidemia Maternal Grandmother      Heart failure Maternal Grandmother      Dementia Paternal Grandmother      Heart attack Paternal Grandfather      Hypertension Paternal Grandfather      Hyperlipidemia Paternal Grandfather          Allergies  Dulaglutide, Nickel, Metformin, Palladium, Cobalt, Exenatide, and Sitagliptin    Review of Systems  A full 10 point review of systems was obtained is negative except HPI as above     Physical Exam  Physical Exam  Constitutional:       Appearance: Normal appearance. She is obese.   HENT:      Head: Normocephalic and atraumatic.      Right Ear: External ear normal.      Left Ear: External ear normal.      Nose: Nose normal.      Mouth/Throat:      Mouth: Mucous membranes are moist.      Pharynx: Oropharynx is clear.   Eyes:      Extraocular Movements: Extraocular movements intact.      Conjunctiva/sclera: Conjunctivae normal.      Pupils: Pupils are equal, round, and reactive to light.   Cardiovascular:      Rate and Rhythm: Normal rate and regular rhythm.   Pulmonary:      Effort: Pulmonary effort is normal.      Breath sounds: Normal breath sounds.   Abdominal:      General: Abdomen is flat.      Palpations: Abdomen is soft.   Musculoskeletal:         General: Swelling present.      Right lower leg: Edema present.      Left lower leg: Edema present.   Skin:     General: Skin is warm and dry.      Findings: Erythema, lesion and rash present.      Comments: She has a small area on her left abdominal wall that is a small hole that does drain at times  Her inguinal folds are very red and erythematous     Neurological:      General: No focal deficit present.      Mental Status: She is alert and oriented to person, place, and time.   Psychiatric:         Mood and Affect: Mood normal.         Behavior: Behavior normal.                 I&O 24HR    Intake/Output Summary (Last 24 hours) at 5/9/2024  "1059  Last data filed at 5/9/2024 1048  Gross per 24 hour   Intake 200 ml   Output 2801 ml   Net -2601 ml       Vitals 24HR  Heart Rate:  [56-81]   Temp:  [36.3 °C (97.3 °F)-36.6 °C (97.8 °F)]   Resp:  [16-24]   BP: (113-174)/(60-86)   Height:  [160 cm (5' 2.99\")-160 cm (5' 3\")]   Weight:  [186 kg (410 lb)-210 kg (462 lb 15.5 oz)]   SpO2:  [95 %-98 %]     No current facility-administered medications on file prior to encounter.     Current Outpatient Medications on File Prior to Encounter   Medication Sig Dispense Refill    allopurinol (Zyloprim) 300 mg tablet Take 1 tablet (300 mg) by mouth once daily. 90 tablet 3    amoxicillin-pot clavulanate (Augmentin) 875-125 mg tablet Take 1 tablet (875 mg) by mouth 2 times a day for 10 days. 20 tablet 0    bumetanide (Bumex) 1 mg tablet 3 IN AM 3 IN  tablet 11    buPROPion XL (Wellbutrin XL) 150 mg 24 hr tablet Take 1 tablet (150 mg) by mouth once daily in the morning. Do not crush, chew, or split. 90 tablet 3    cetirizine (ZyrTEC) 10 mg tablet Take 1 tablet (10 mg) by mouth once daily.      DULoxetine (Cymbalta) 60 mg DR capsule Take 1 capsule (60 mg) by mouth once daily. Do not crush or chew. 90 capsule 3    empagliflozin (Jardiance) 10 mg Take 1 tablet (10 mg) by mouth once daily. 100 tablet 3    gabapentin (Neurontin) 300 mg capsule Take 3 capsules (900 mg) by mouth 4 times a day.      insulin regular, human (HUMULIN R U-500, CONC, INSULIN SUBQ) Inject under the skin. Take as directed per insulin instructions. Use U-500 insulin syringe. Currently using syringe as pump is not working.      levothyroxine (Synthroid, Levoxyl) 200 mcg tablet Take 1 tablet (200 mcg) by mouth once daily. 90 tablet 3    levothyroxine (Synthroid, Levoxyl) 50 mcg tablet Take 1 tablet (50 mcg) by mouth once daily in the morning. Take before meals. TAKE WITH 200MG 90 tablet 3    lubiprostone (Amitiza) 24 mcg capsule Take 1 capsule (24 mcg) by mouth 2 times a day with meals. 180 capsule 3    " metoprolol succinate XL (Toprol-XL) 50 mg 24 hr tablet Take 1 tablet (50 mg) by mouth 2 times a day. Do not crush or chew. 90 tablet 3    pantoprazole (ProtoNix) 40 mg EC tablet Take 1 tablet (40 mg) by mouth 2 times a day. Do not crush, chew, or split.      potassium chloride CR 10 mEq ER tablet Take by mouth. 1 tab AM, 1 tab afternoon, 2 tabs PM      rosuvastatin (Crestor) 40 mg tablet Take 1 tablet (40 mg) by mouth once daily.      topiramate (Topamax) 25 mg tablet Take 1 tablet (25 mg) by mouth 2 times a day. 60 tablet 11    traZODone (Desyrel) 100 mg tablet Take 1 tablet (100 mg) by mouth once daily at bedtime.      warfarin (Coumadin) 5 mg tablet 1.5 TAB ONCE A DAY 45 tablet 11    - refin regular (HumuLIN R U-500) 500 unit/mL CONCENTRATED - refill for patient own pump Inject 1 mL (1 each) under the skin if needed (VIA INSULIN PUMP). Take as directed per insulin instructions. Use U-500 insulin syringe. 20 mL 0    albuterol 90 mcg/actuation aerosol powdr breath activated inhaler Inhale 2 puffs every 4 hours if needed for wheezing or shortness of breath.      citalopram (CeleXA) 20 mg tablet Take 1 tablet (20 mg) by mouth once daily.      clotrimazole-betamethasone (Lotrisone) cream Apply 1 Application topically 2 times a day for 7 days. 45 g 3    fluticasone (Flonase Sensimist) 27.5 mcg/actuation nasal spray Administer 1-2 sprays into each nostril once daily. 10 g 0    magnesium oxide (Mag-Ox) 400 mg (241.3 mg magnesium) tablet Take 1 tablet (400 mg) by mouth 2 times a day. 60 tablet 11    nystatin (Mycostatin) 100,000 unit/gram powder Apply 1 Application topically 2 times a day. 60 g 0    ondansetron (Zofran) 4 mg tablet Take 1 tablet (4 mg) by mouth every 8 hours if needed for nausea or vomiting. 30 tablet 5    oxygen (O2) gas therapy Inhale 1 each every 12 hours.      triamcinolone (Kenalog) 0.1 % ointment Apply topically 2 times a day as needed for irritation or rash. 60 g 3    [DISCONTINUED]  clotrimazole-betamethasone (Lotrisone) cream Apply 1 Application topically 2 times a day for 7 days. 45 g 0    [DISCONTINUED] metoprolol succinate XL (Toprol-XL) 50 mg 24 hr tablet Take 1 tablet (50 mg) by mouth once daily. Do not crush or chew. 90 tablet 3         Relevant Results  Labs reviewed     Assessment/Plan     Peripheral edema with volume overload  Hypokalemia  Diastolic CHF  Morbid Obesity  Lymphedema  HTN  DM II  Right Sided CHF  Pulmonary HTN  CY on CPAP  CKD stage II  Rash with small wound on left abdomen    Plan:  At this time she is being treated with empiric antibiotics vancomycin and Zosyn  Vanco to be dosed by pharmacy  Potassium is being replaced  I am going to change her Lasix to a drip  We will start the drip at 10 mg/h  Follow strict ins and outs  We will try to diurese her and see how she does  Thanks for the consult    Principal Problem:    Cellulitis          Pardeep Nichole, DO

## 2024-05-10 LAB
ANION GAP SERPL CALC-SCNC: 12 MMOL/L (ref 10–20)
BUN SERPL-MCNC: 10 MG/DL (ref 6–23)
CALCIUM SERPL-MCNC: 9 MG/DL (ref 8.6–10.3)
CHLORIDE SERPL-SCNC: 97 MMOL/L (ref 98–107)
CO2 SERPL-SCNC: 29 MMOL/L (ref 21–32)
CREAT SERPL-MCNC: 0.75 MG/DL (ref 0.5–1.05)
EGFRCR SERPLBLD CKD-EPI 2021: >90 ML/MIN/1.73M*2
ERYTHROCYTE [DISTWIDTH] IN BLOOD BY AUTOMATED COUNT: 13.6 % (ref 11.5–14.5)
GLUCOSE BLD MANUAL STRIP-MCNC: 189 MG/DL (ref 74–99)
GLUCOSE BLD MANUAL STRIP-MCNC: 206 MG/DL (ref 74–99)
GLUCOSE BLD MANUAL STRIP-MCNC: 212 MG/DL (ref 74–99)
GLUCOSE BLD MANUAL STRIP-MCNC: 253 MG/DL (ref 74–99)
GLUCOSE SERPL-MCNC: 330 MG/DL (ref 74–99)
HCT VFR BLD AUTO: 39.4 % (ref 36–46)
HGB BLD-MCNC: 12.1 G/DL (ref 12–16)
INR PPP: 2.1 (ref 0.9–1.1)
MCH RBC QN AUTO: 30 PG (ref 26–34)
MCHC RBC AUTO-ENTMCNC: 30.7 G/DL (ref 32–36)
MCV RBC AUTO: 98 FL (ref 80–100)
NRBC BLD-RTO: 0 /100 WBCS (ref 0–0)
PLATELET # BLD AUTO: 198 X10*3/UL (ref 150–450)
POTASSIUM SERPL-SCNC: 3.3 MMOL/L (ref 3.5–5.3)
PROTHROMBIN TIME: 23.8 SECONDS (ref 9.8–12.8)
RBC # BLD AUTO: 4.03 X10*6/UL (ref 4–5.2)
SODIUM SERPL-SCNC: 135 MMOL/L (ref 136–145)
WBC # BLD AUTO: 7.9 X10*3/UL (ref 4.4–11.3)

## 2024-05-10 PROCEDURE — 2500000001 HC RX 250 WO HCPCS SELF ADMINISTERED DRUGS (ALT 637 FOR MEDICARE OP): Performed by: HOSPITALIST

## 2024-05-10 PROCEDURE — 2500000006 HC RX 250 W HCPCS SELF ADMINISTERED DRUGS (ALT 637 FOR ALL PAYERS): Mod: MUE | Performed by: HOSPITALIST

## 2024-05-10 PROCEDURE — 1200000002 HC GENERAL ROOM WITH TELEMETRY DAILY

## 2024-05-10 PROCEDURE — 82374 ASSAY BLOOD CARBON DIOXIDE: CPT | Performed by: INTERNAL MEDICINE

## 2024-05-10 PROCEDURE — 2500000006 HC RX 250 W HCPCS SELF ADMINISTERED DRUGS (ALT 637 FOR ALL PAYERS): Mod: MUE | Performed by: INTERNAL MEDICINE

## 2024-05-10 PROCEDURE — 82947 ASSAY GLUCOSE BLOOD QUANT: CPT

## 2024-05-10 PROCEDURE — 85610 PROTHROMBIN TIME: CPT | Performed by: PHARMACIST

## 2024-05-10 PROCEDURE — 2500000004 HC RX 250 GENERAL PHARMACY W/ HCPCS (ALT 636 FOR OP/ED): Performed by: PHARMACIST

## 2024-05-10 PROCEDURE — 94761 N-INVAS EAR/PLS OXIMETRY MLT: CPT

## 2024-05-10 PROCEDURE — 36415 COLL VENOUS BLD VENIPUNCTURE: CPT | Performed by: HOSPITALIST

## 2024-05-10 PROCEDURE — 2500000004 HC RX 250 GENERAL PHARMACY W/ HCPCS (ALT 636 FOR OP/ED): Performed by: HOSPITALIST

## 2024-05-10 PROCEDURE — 2500000002 HC RX 250 W HCPCS SELF ADMINISTERED DRUGS (ALT 637 FOR MEDICARE OP, ALT 636 FOR OP/ED): Performed by: HOSPITALIST

## 2024-05-10 PROCEDURE — 85027 COMPLETE CBC AUTOMATED: CPT | Performed by: HOSPITALIST

## 2024-05-10 PROCEDURE — 99233 SBSQ HOSP IP/OBS HIGH 50: CPT | Performed by: HOSPITALIST

## 2024-05-10 PROCEDURE — 2500000005 HC RX 250 GENERAL PHARMACY W/O HCPCS: Performed by: HOSPITALIST

## 2024-05-10 PROCEDURE — 2500000001 HC RX 250 WO HCPCS SELF ADMINISTERED DRUGS (ALT 637 FOR MEDICARE OP): Performed by: INTERNAL MEDICINE

## 2024-05-10 PROCEDURE — 99233 SBSQ HOSP IP/OBS HIGH 50: CPT | Performed by: INTERNAL MEDICINE

## 2024-05-10 RX ORDER — OXYCODONE HYDROCHLORIDE 5 MG/1
5 TABLET ORAL EVERY 6 HOURS PRN
Status: DISCONTINUED | OUTPATIENT
Start: 2024-05-10 | End: 2024-05-12 | Stop reason: HOSPADM

## 2024-05-10 RX ORDER — POTASSIUM CHLORIDE 20 MEQ/1
20 TABLET, EXTENDED RELEASE ORAL 2 TIMES DAILY
Status: DISCONTINUED | OUTPATIENT
Start: 2024-05-10 | End: 2024-05-12 | Stop reason: HOSPADM

## 2024-05-10 RX ORDER — INSULIN GLARGINE 100 [IU]/ML
15 INJECTION, SOLUTION SUBCUTANEOUS EVERY 24 HOURS
Status: DISCONTINUED | OUTPATIENT
Start: 2024-05-10 | End: 2024-05-10

## 2024-05-10 RX ORDER — INSULIN GLARGINE 100 [IU]/ML
10 INJECTION, SOLUTION SUBCUTANEOUS EVERY 24 HOURS
Status: DISCONTINUED | OUTPATIENT
Start: 2024-05-10 | End: 2024-05-12 | Stop reason: HOSPADM

## 2024-05-10 RX ADMIN — DULOXETINE HYDROCHLORIDE 60 MG: 60 CAPSULE, DELAYED RELEASE ORAL at 11:40

## 2024-05-10 RX ADMIN — METOPROLOL SUCCINATE 50 MG: 50 TABLET, EXTENDED RELEASE ORAL at 21:10

## 2024-05-10 RX ADMIN — PANTOPRAZOLE SODIUM 40 MG: 40 TABLET, DELAYED RELEASE ORAL at 11:40

## 2024-05-10 RX ADMIN — PIPERACILLIN SODIUM AND TAZOBACTAM SODIUM 3.38 G: 3; .375 INJECTION, SOLUTION INTRAVENOUS at 12:18

## 2024-05-10 RX ADMIN — NYSTATIN 1 APPLICATION: 100000 POWDER TOPICAL at 21:00

## 2024-05-10 RX ADMIN — BACITRACIN: 500 OINTMENT TOPICAL at 21:00

## 2024-05-10 RX ADMIN — ALLOPURINOL 300 MG: 300 TABLET ORAL at 11:39

## 2024-05-10 RX ADMIN — EMPAGLIFLOZIN 10 MG: 10 TABLET, FILM COATED ORAL at 11:41

## 2024-05-10 RX ADMIN — INSULIN LISPRO 2 UNITS: 100 INJECTION, SOLUTION INTRAVENOUS; SUBCUTANEOUS at 12:20

## 2024-05-10 RX ADMIN — Medication 4 L/MIN: at 06:29

## 2024-05-10 RX ADMIN — PIPERACILLIN SODIUM AND TAZOBACTAM SODIUM 3.38 G: 3; .375 INJECTION, SOLUTION INTRAVENOUS at 00:04

## 2024-05-10 RX ADMIN — OXYCODONE HYDROCHLORIDE 5 MG: 5 TABLET ORAL at 21:10

## 2024-05-10 RX ADMIN — VANCOMYCIN HYDROCHLORIDE 1 G: 1 INJECTION, POWDER, LYOPHILIZED, FOR SOLUTION INTRAVENOUS at 13:20

## 2024-05-10 RX ADMIN — MORPHINE SULFATE 1 MG: 2 INJECTION, SOLUTION INTRAMUSCULAR; INTRAVENOUS at 16:10

## 2024-05-10 RX ADMIN — LEVOTHYROXINE SODIUM 200 MCG: 0.1 TABLET ORAL at 12:31

## 2024-05-10 RX ADMIN — PANTOPRAZOLE SODIUM 40 MG: 40 TABLET, DELAYED RELEASE ORAL at 21:10

## 2024-05-10 RX ADMIN — Medication 400 MG: at 11:41

## 2024-05-10 RX ADMIN — MORPHINE SULFATE 1 MG: 2 INJECTION, SOLUTION INTRAMUSCULAR; INTRAVENOUS at 11:35

## 2024-05-10 RX ADMIN — Medication 400 MG: at 21:10

## 2024-05-10 RX ADMIN — ACETAMINOPHEN 650 MG: 325 TABLET ORAL at 13:21

## 2024-05-10 RX ADMIN — BUPROPION HYDROCHLORIDE 150 MG: 150 TABLET, FILM COATED, EXTENDED RELEASE ORAL at 11:40

## 2024-05-10 RX ADMIN — TOPIRAMATE 25 MG: 50 TABLET ORAL at 21:10

## 2024-05-10 RX ADMIN — POTASSIUM CHLORIDE 20 MEQ: 1500 TABLET, EXTENDED RELEASE ORAL at 21:10

## 2024-05-10 RX ADMIN — Medication 4 L/MIN: at 18:00

## 2024-05-10 RX ADMIN — ROSUVASTATIN CALCIUM 40 MG: 20 TABLET, FILM COATED ORAL at 11:42

## 2024-05-10 RX ADMIN — TOPIRAMATE 25 MG: 50 TABLET ORAL at 11:51

## 2024-05-10 RX ADMIN — INSULIN GLARGINE 10 UNITS: 100 INJECTION, SOLUTION SUBCUTANEOUS at 17:18

## 2024-05-10 RX ADMIN — METOPROLOL SUCCINATE 50 MG: 50 TABLET, EXTENDED RELEASE ORAL at 11:41

## 2024-05-10 RX ADMIN — VANCOMYCIN HYDROCHLORIDE 1 G: 1 INJECTION, POWDER, LYOPHILIZED, FOR SOLUTION INTRAVENOUS at 18:41

## 2024-05-10 RX ADMIN — VANCOMYCIN HYDROCHLORIDE 1 G: 1 INJECTION, POWDER, LYOPHILIZED, FOR SOLUTION INTRAVENOUS at 03:26

## 2024-05-10 RX ADMIN — Medication 4 L/MIN: at 14:18

## 2024-05-10 RX ADMIN — PIPERACILLIN SODIUM AND TAZOBACTAM SODIUM 3.38 G: 3; .375 INJECTION, SOLUTION INTRAVENOUS at 17:17

## 2024-05-10 RX ADMIN — INSULIN LISPRO 2 UNITS: 100 INJECTION, SOLUTION INTRAVENOUS; SUBCUTANEOUS at 17:18

## 2024-05-10 RX ADMIN — WARFARIN SODIUM 7.5 MG: 7.5 TABLET ORAL at 17:11

## 2024-05-10 RX ADMIN — POTASSIUM CHLORIDE 20 MEQ: 1500 TABLET, EXTENDED RELEASE ORAL at 11:39

## 2024-05-10 RX ADMIN — LEVOTHYROXINE SODIUM 50 MCG: 0.05 TABLET ORAL at 06:02

## 2024-05-10 RX ADMIN — PIPERACILLIN SODIUM AND TAZOBACTAM SODIUM 3.38 G: 3; .375 INJECTION, SOLUTION INTRAVENOUS at 05:41

## 2024-05-10 RX ADMIN — TRAZODONE HYDROCHLORIDE 100 MG: 100 TABLET ORAL at 21:10

## 2024-05-10 ASSESSMENT — PAIN SCALES - GENERAL
PAINLEVEL_OUTOF10: 7
PAINLEVEL_OUTOF10: 6
PAINLEVEL_OUTOF10: 6
PAINLEVEL_OUTOF10: 0 - NO PAIN

## 2024-05-10 ASSESSMENT — COGNITIVE AND FUNCTIONAL STATUS - GENERAL
WALKING IN HOSPITAL ROOM: A LITTLE
DRESSING REGULAR UPPER BODY CLOTHING: A LITTLE
DRESSING REGULAR LOWER BODY CLOTHING: A LITTLE
DAILY ACTIVITIY SCORE: 19
MOVING TO AND FROM BED TO CHAIR: A LITTLE
PERSONAL GROOMING: A LITTLE
MOBILITY SCORE: 20
TOILETING: A LITTLE
CLIMB 3 TO 5 STEPS WITH RAILING: A LITTLE
HELP NEEDED FOR BATHING: A LITTLE
STANDING UP FROM CHAIR USING ARMS: A LITTLE

## 2024-05-10 ASSESSMENT — PAIN DESCRIPTION - ORIENTATION: ORIENTATION: RIGHT

## 2024-05-10 ASSESSMENT — PAIN - FUNCTIONAL ASSESSMENT
PAIN_FUNCTIONAL_ASSESSMENT: 0-10

## 2024-05-10 ASSESSMENT — PAIN DESCRIPTION - LOCATION: LOCATION: SHOULDER

## 2024-05-10 NOTE — PROGRESS NOTES
"Roselia Mo is a 53 y.o. female on day 2 of admission presenting with Cellulitis.    Subjective   Patient wearing BiPAP at the time.  States that her symptoms improving somewhat  Denies nausea vomiting  Denies chest pain  No significant shortness of breath at rest  Neck swelling somewhat improving       Objective     Physical Exam  General Appearance: AAO x 3, morbidly obese  Skin: Erythema under left breast, also skin abrasion in right lower abdomen with yellowish discoloration but no active purulent discharge.  Also erythema in bilateral inguinal areas.  Slight tenderness, no significant warmth.  No active bleeding or drainage.  Eyes : PERRL, EOM's intact  ENT: mucous membranes pink and moist  Neck: normocephalic  Respiratory: lungs clear to auscultation anteriorly; no wheezing, rhonchi, or crackles.  Diminished at bases  Heart: regular rate and rhythm.   Abdomen: Nondistended, positive bowel sounds x4, soft,  nontender  Extremities: 3+ lower extremity edema bilaterally, wrapped in Ace  Peripheral pulses: normal x4 extremities  Neuro: alert, coherent and conversant, no focal motor deficits  Last Recorded Vitals  Blood pressure 122/71, pulse 74, temperature 36 °C (96.8 °F), temperature source Temporal, resp. rate 17, height 1.6 m (5' 2.99\"), weight (!) 182 kg (402 lb 5.4 oz), SpO2 98%.  Intake/Output last 3 Shifts:  I/O last 3 completed shifts:  In: 512.3 (2.8 mL/kg) [P.O.:500; I.V.:12.3 (0.1 mL/kg)]  Out: 6176 (33.8 mL/kg) [Urine:6175 (0.9 mL/kg/hr); Stool:1]  Weight: 182.5 kg     Relevant Results    Scheduled medications  allopurinol, 300 mg, oral, Daily  bacitracin, , Topical, TID  buPROPion XL, 150 mg, oral, q AM  DULoxetine, 60 mg, oral, Daily  empagliflozin, 10 mg, oral, Daily  insulin lispro, 0-5 Units, subcutaneous, TID with meals  levothyroxine, 200 mcg, oral, Daily  levothyroxine, 50 mcg, oral, Daily before breakfast  magnesium oxide, 400 mg, oral, BID  metoprolol succinate XL, 50 mg, oral, " BID  nystatin, 1 Application, Topical, BID  pantoprazole, 40 mg, oral, BID  piperacillin-tazobactam, 3.375 g, intravenous, q6h  polyethylene glycol, 17 g, oral, Daily  potassium chloride CR, 20 mEq, oral, BID  rosuvastatin, 40 mg, oral, Daily  topiramate, 25 mg, oral, BID  traZODone, 100 mg, oral, Nightly  vancomycin (Vancocin) 1 g in dextrose 5% 250 mL IV, 1,000 mg, intravenous, q8h  warfarin, 7.5 mg, oral, Daily      Continuous medications  furosemide, 10 mg/hr, Last Rate: 10 mg/hr (05/09/24 1723)      PRN medications  PRN medications: acetaminophen **OR** acetaminophen **OR** acetaminophen, acetaminophen **OR** acetaminophen **OR** acetaminophen, dextrose, dextrose, glucagon, glucagon, ipratropium-albuteroL, morphine, morphine, ondansetron ODT **OR** ondansetron, oxygen, vancomycin    Results for orders placed or performed during the hospital encounter of 05/08/24 (from the past 24 hour(s))   POCT GLUCOSE   Result Value Ref Range    POCT Glucose 224 (H) 74 - 99 mg/dL   Protime-INR   Result Value Ref Range    Protime 23.8 (H) 9.8 - 12.8 seconds    INR 2.1 (H) 0.9 - 1.1   Basic Metabolic Panel   Result Value Ref Range    Glucose 330 (H) 74 - 99 mg/dL    Sodium 135 (L) 136 - 145 mmol/L    Potassium 3.3 (L) 3.5 - 5.3 mmol/L    Chloride 97 (L) 98 - 107 mmol/L    Bicarbonate 29 21 - 32 mmol/L    Anion Gap 12 10 - 20 mmol/L    Urea Nitrogen 10 6 - 23 mg/dL    Creatinine 0.75 0.50 - 1.05 mg/dL    eGFR >90 >60 mL/min/1.73m*2    Calcium 9.0 8.6 - 10.3 mg/dL   CBC   Result Value Ref Range    WBC 7.9 4.4 - 11.3 x10*3/uL    nRBC 0.0 0.0 - 0.0 /100 WBCs    RBC 4.03 4.00 - 5.20 x10*6/uL    Hemoglobin 12.1 12.0 - 16.0 g/dL    Hematocrit 39.4 36.0 - 46.0 %    MCV 98 80 - 100 fL    MCH 30.0 26.0 - 34.0 pg    MCHC 30.7 (L) 32.0 - 36.0 g/dL    RDW 13.6 11.5 - 14.5 %    Platelets 198 150 - 450 x10*3/uL   POCT GLUCOSE   Result Value Ref Range    POCT Glucose 189 (H) 74 - 99 mg/dL   POCT GLUCOSE   Result Value Ref Range    POCT Glucose  212 (H) 74 - 99 mg/dL   POCT GLUCOSE   Result Value Ref Range    POCT Glucose 206 (H) 74 - 99 mg/dL                            Assessment/Plan   Principal Problem:    Cellulitis    53-year-old female with history of  Severe obesity  Stage III chronic kidney disease  Chronic lymphedema  Hyponatremia  Migraine  Anasarca  Hypertension  Hyperlipidemia  Probably metabolic syndrome  Osteoarthritis  Possible obesity hypoventilation syndrome/sleep apnea  Type 2 diabetes mellitus  Portal vein thrombosis on Coumadin  Possible COPD  GERD  Hernia  Anxiety depression  Gout  SEVILLA  Budd-Chiari syndrome  Diastolic CHF  Right-sided CHF, pulmonary hypertension  Presented with  Abdominal wall cellulitis especially right lower abdominal fold, failed outpatient antibiotics  Worsening lymphedema lower extremity bilaterally  Volume overload  Chest pain to rule out ACS  Hypokalemia  Rash with infected small wound on right abdomen  Left abdomen has small pore that drains at times, unclear etiology does not seem to be cyst or sebaceous cyst or lipoma or lump or sinus tract or fistulous tract confirmed.  Plan  Responding well to Lasix drip  Replace potassium for hypokalemia optimally  Continue pulmonary monitoring  BMP check daily and urine output with I's and O's  On vancomycin and Zosyn IV  Follow cultures  Continue statin and bacitracin locally  Local wound care and may have to set up wound care center referral for this patient, discussed with case management  Education counseling  Supportive care  Encourage weight loss again  Synthroid for hypothyroidism  Continue psych medicines for depression anxiety  On Jardiance 10 mg daily and monitor fingerstick on insulin sliding scale  Start Lantus 10 units subcu daily and adjust accordingly based on blood sugars trend  Carb controlled diet  Nutrition  On metoprolol 50 mg twice daily for hypertension  Rosuvastatin 40 mg daily for hyperlipidemia  PPI for GI prophylaxis  DVT prophylaxis per policy  on warfarin and pharmacy to dose to keep INR 2-3  Showing some clinical improvement and continue to monitor at this time  CPAP/BiPAP at night or when sleeping       Maria Guadalupe Monaco MD

## 2024-05-10 NOTE — PROGRESS NOTES
Care Transitions: Patient reviewed in care round meeting this AM. ADOD 24 hours. Met with patient and  at bedside. Discharge plan remains to return home with , denies needs or HHC at this time. Patient is agreeable to a referral to a wound center and would like to use [facility]. Referral sent; wound center to call patient to schedule outpatient appt. IMM reviewed, patient signed and copy provided. Voiced understanding. Original placed in chart. Care team to follow. Radha Sorto RN/TCC

## 2024-05-10 NOTE — PROGRESS NOTES
Roselia Mo is a 53 y.o. female on day 2 of admission presenting with Cellulitis.      Subjective   Doing well.  Was irritated in the  am due to no sleep.  Having a lot of Urine outptu.  3975 out in last 24 hours.  Weight is down.         Objective          Vitals 24HR  Heart Rate:  [69-85]   Temp:  [36.2 °C (97.1 °F)-36.3 °C (97.3 °F)]   Resp:  [16-20]   BP: (117-166)/(65-83)   Weight:  [182 kg (402 lb 5.4 oz)]   SpO2:  [90 %-100 %]         Intake/Output last 3 Shifts:    Intake/Output Summary (Last 24 hours) at 5/10/2024 0835  Last data filed at 5/10/2024 0801  Gross per 24 hour   Intake 312.3 ml   Output 4425 ml   Net -4112.7 ml       Physical Exam  Constitutional:       Appearance: Normal appearance. She is obese.   HENT:      Head: Normocephalic and atraumatic.      Right Ear: External ear normal.      Left Ear: External ear normal.      Nose: Nose normal.      Comments: CPAP Mask     Mouth/Throat:      Mouth: Mucous membranes are moist.      Pharynx: Oropharynx is clear.   Eyes:      Extraocular Movements: Extraocular movements intact.      Conjunctiva/sclera: Conjunctivae normal.      Pupils: Pupils are equal, round, and reactive to light.   Cardiovascular:      Rate and Rhythm: Normal rate and regular rhythm.   Pulmonary:      Effort: Pulmonary effort is normal.      Breath sounds: Normal breath sounds.   Abdominal:      General: Abdomen is flat.      Palpations: Abdomen is soft.      Comments: Small spot on left abdomen  Centrally obese with + Abdominal wall edema   Musculoskeletal:         General: Swelling present.      Right lower leg: Edema present.      Left lower leg: Edema present.   Skin:     General: Skin is warm and dry.   Neurological:      General: No focal deficit present.      Mental Status: She is alert and oriented to person, place, and time.   Psychiatric:         Mood and Affect: Mood normal.         Behavior: Behavior normal.     Scheduled medications  allopurinol, 300 mg, oral,  Daily  bacitracin, , Topical, TID  buPROPion XL, 150 mg, oral, q AM  DULoxetine, 60 mg, oral, Daily  empagliflozin, 10 mg, oral, Daily  insulin lispro, 0-5 Units, subcutaneous, TID with meals  levothyroxine, 200 mcg, oral, Daily  levothyroxine, 50 mcg, oral, Daily before breakfast  magnesium oxide, 400 mg, oral, BID  metoprolol succinate XL, 50 mg, oral, BID  nystatin, 1 Application, Topical, BID  pantoprazole, 40 mg, oral, BID  piperacillin-tazobactam, 3.375 g, intravenous, q6h  polyethylene glycol, 17 g, oral, Daily  potassium chloride CR, 20 mEq, oral, Daily  rosuvastatin, 40 mg, oral, Daily  topiramate, 25 mg, oral, BID  traZODone, 100 mg, oral, Nightly  vancomycin (Vancocin) 1 g in dextrose 5% 250 mL IV, 1,000 mg, intravenous, q8h  warfarin, 7.5 mg, oral, Daily      Continuous medications  furosemide, 10 mg/hr, Last Rate: 10 mg/hr (05/09/24 1723)      PRN medications  PRN medications: acetaminophen **OR** acetaminophen **OR** acetaminophen, acetaminophen **OR** acetaminophen **OR** acetaminophen, dextrose, dextrose, glucagon, glucagon, ipratropium-albuteroL, morphine, morphine, ondansetron ODT **OR** ondansetron, oxygen, vancomycin      Relevant Results               Assessment/Plan              Principal Problem:    Cellulitis    Peripheral edema with volume overload  Hypokalemia  Diastolic CHF  Morbid Obesity  Lymphedema  HTN  DM II  Right Sided CHF  Pulmonary HTN  CY on CPAP  CKD stage II  Rash with small wound on left abdomen     Plan:  Good response to Loop diuretic  Continue for today.  Increase Potassium to BID  ABX as she is on.  Follow labs.               Pardeep Nichole DO

## 2024-05-10 NOTE — PROGRESS NOTES
Physical Therapy                 Therapy Communication Note    Patient Name: Roselia Mo  MRN: [id number]  Today's Date: 5/10/2024     Discipline: Physical Therapy    Missed Visit Reason: Missed Visit Reason: Patient refused    Missed Time: Attempt    Comment: She reports therapy is not needed.

## 2024-05-10 NOTE — PROGRESS NOTES
"Nutrition Initial Assessment:   Nutrition Assessment    Reason for Assessment: Dietitian discretion    Patient is a 53 y.o. female presenting with   Principal Problem:    Cellulitis     Peripheral edema with volume overload  Hypokalemia  Diastolic CHF  Morbid Obesity  Lymphedema  HTN  DM II  Right Sided CHF  Pulmonary HTN  CY on CPAP  CKD stage II  Rash with small wound on left abdomen    5/10/24 patient and  seen - stated not eating very well today but ate last night.  Stated able to order meals per Nickel and Cobalt allergies.  Stated blood sugars have been elevated.  Last A1C 9.0.  Was educated on DM diet last admission.     Nutrition History:  Energy Intake: Fair 50-75 %  Food and Nutrient History: decreased appetite today  Vitamin/Herbal Supplement Use: None  Food Allergies/Intolerances:   [place], Nickel  GI Symptoms: None  Oral Problems: None       Anthropometrics:  Height: 160 cm (5' 2.99\")   Weight: (!) 182 kg (402 lb 5.4 oz)   BMI (Calculated): 71.29  IBW/kg (Dietitian Calculated): 52.3 kg  Percent of IBW: 347 %  Adjusted Body Weight (kg): 85 kg    Weight History:   Daily Weight  05/10/24 : (!) 182 kg (402 lb 5.4 oz)  05/02/24 : (!) 186 kg (410 lb)  05/01/24 : (!) 186 kg (410 lb)  04/25/24 : (!) 190 kg (418 lb)  04/23/24 : (!) 199 kg (439 lb)  04/18/24 : (!) 199 kg (439 lb)  04/18/24 : (!) 199 kg (439 lb)  04/16/24 : (!) 199 kg (439 lb 3.2 oz)  04/10/24 : (!) 187 kg (412 lb)  04/09/24 : (!) 187 kg (412 lb)     Weight Change %:  Weight History / % Weight Change: gain 30# in 1 mo  Significant Weight Loss: No  Significant Weight Gain: Fluid related    Nutrition Focused Physical Exam Findings:    Physical Findings:  Skin: Positive (PI lower abdomen)    Nutrition Significant Labs:  BG POCT trend:   Results from last 7 days   Lab Units 05/10/24  1140 05/10/24  0809 05/09/24 2011 05/09/24  1633 05/09/24  1156   POCT GLUCOSE mg/dL 212* 189* 224* 171* 167*    , Renal Lab Trend:   Results from last 7 days " "  Lab Units 05/10/24  0533 05/09/24  0459 05/08/24  1412   POTASSIUM mmol/L 3.3* 3.1* 3.8   SODIUM mmol/L 135* 141 138   MAGNESIUM mg/dL  --   --  1.78   EGFR mL/min/1.73m*2 >90 >90 >90   BUN mg/dL 10 12 13   CREATININE mg/dL 0.75 0.65 0.67    , Vit D:   Lab Results   Component Value Date    VITD25 34 02/01/2024    , Iron Panel: No results found for: \"IRON\", \"TIBC\", \"FERRITIN\"     Nutrition Specific Medications:  Scheduled medications  allopurinol, 300 mg, oral, Daily  empagliflozin, 10 mg, oral, Daily  insulin lispro, 0-5 Units, subcutaneous, TID with meals  magnesium oxide, 400 mg, oral, BID  pantoprazole, 40 mg, oral, BID  polyethylene glycol, 17 g, oral, Daily  potassium chloride CR, 20 mEq, oral, BID  vancomycin (Vancocin) 1 g in dextrose 5% 250 mL IV, 1,000 mg, intravenous, q8h  warfarin, 7.5 mg, oral, Daily      Continuous medications  furosemide, 10 mg/hr, Last Rate: 10 mg/hr (05/09/24 1723)      I/O:   Last BM Date: 05/09/24;      Dietary Orders (From admission, onward)       Start     Ordered    05/10/24 1215  Adult diet Carb Controlled; 75 gram carb/meal, 45 gram Carb evening snack  Diet effective now        Question Answer Comment   Diet type Carb Controlled    Carb diet selection: 75 gram carb/meal, 45 gram Carb evening snack        05/10/24 1214                     Estimated Needs:   Total Energy Estimated Needs (kCal): 2800 kCal  Method for Estimating Needs: 30-35 kcal/kg Adjusted IBW 85 kg = 1890-5762 kcal  Total Protein Estimated Needs (g): 111 g  Method for Estimating Needs: 1.25-1.5 gm/kg adjusted IBW = 106-128 gm  Total Fluid Estimated Needs (mL): 2975 mL  Method for Estimating Needs: or per provider        Nutrition Diagnosis   Malnutrition Diagnosis  Patient has Malnutrition Diagnosis: No    Nutrition Diagnosis  Patient has Nutrition Diagnosis: Yes  Diagnosis Status (1): New  Nutrition Diagnosis 1: Obese  Related to (1): excess calorie intake  As Evidenced by (1): BMI 71.29 kg/m2  Additional " Nutrition Diagnosis: Diagnosis 3  Diagnosis Status (2): New  Nutrition Diagnosis 2: Increased nutrient needs  Related to (2): healing process  As Evidenced by (2): PI abdomen  Diagnosis Status (3): New  Nutrition Diagnosis 3: Altered nutrition related to laboratory values  Related to (3): diabetes  As Evidenced by (3): elevated blood glucose       Nutrition Interventions/Recommendations         Nutrition Prescription:  Individualized Nutrition Prescription Provided for : Oral nutrition        Nutrition Interventions:   Interventions: Meals and snacks, Vitamin supplement therapy, Mineral supplement therapy  Meals and Snacks: Carbohydrate-modified diet  Goal: CCD diet  Vitamin Supplement Therapy: C  Goal: Recommend MVI, vit C 500 mg  Mineral Supplement Therapy: Zinc  Goal: Recommend Zinc 10 mg/day    Collaboration and Referral of Nutrition Care: Team meeting involving nutrition professional  Goal: IDT meeting    Nutrition Education:  Meal selection with food intolerances. Educated on DM diet last admission.     Nutrition Monitoring and Evaluation   Food/Nutrient Related History Monitoring  Monitoring and Evaluation Plan: Amount of food  Amount of Food: Estimated amout of food  Criteria: >75% meals    Body Composition/Growth/Weight History  Monitoring and Evaluation Plan: Weight  Weight: Measured weight  Criteria: Loss with diuresis    Biochemical Data, Medical Tests and Procedures  Monitoring and Evaluation Plan: Glucose/endocrine profile, Electrolyte/renal panel  Electrolyte and Renal Panel: BUN, Creatinine, Potassium, Sodium  Criteria: WNL  Glucose/Endocrine Profile: Glucose, casual  Criteria: 100-140 mg/dl    Nutrition Focused Physical Findings  Monitoring and Evaluation Plan: Skin  Skin: Impaired wound healing  Criteria: Signs of healing       Time Spent/Follow-up Reminder:   Time Spent (min): 30 minutes  Last Date of Nutrition Visit: 05/10/24  Nutrition Follow-Up Needed?: 5-7 days  Follow up Comment: po, labs,  wounds    Jadyn Beard, BRANDONN, LD

## 2024-05-10 NOTE — PROGRESS NOTES
Pharmacy Consult to dose warfarin for history of DVT/PE.  Goal INR 2-3.  Yesterday patient was given 10 mg warfarin for subtherapeutic INR.  Today's INR 2.1.  Will resume home dose of warfarin 7.5 mg daily.  Repeat INR order already placed for tomorrow.

## 2024-05-10 NOTE — CONSULTS
CHF Clinic Nurse was consulted for CHF Clinic screening.    Notes, labs, flowsheets and medications reviewed in EMR.    Admitting Dx: Cellulitis of Abdominal Fold  Cardiac Hx: CKD, HTN, Lymphedema, DMII, CHF, Hyperlipidemia   Relevant Medications: Lasix, Jardiance, Toprol, Crestor   Cardiologist/PCP: Dr Earl, Next appt 7/9/24  Last Echo: 3/19/2024   EF: 65%  BNP: 199 5/8/2024    Patient is an active patient with Dr. Earl, she last seen him in the office on April 11th, 2024 and he was consulted this visit. No referral made to the [facility].

## 2024-05-10 NOTE — PROGRESS NOTES
"Vancomycin Dosing by Pharmacy- FOLLOW UP    Roselia Mo is a 53 y.o. year old female who Pharmacy has been consulted for vancomycin dosing for cellulitis, skin and soft tissue. Based on the patient's indication and renal status this patient is being dosed based on a goal AUC of 400-600.     Renal function is currently stable.    Current vancomycin dose: 1000 mg given every 8 hours    Most recent random level: 20.1 mcg/mL    Visit Vitals  /83 (BP Location: Left arm, Patient Position: Sitting)   Pulse 85   Temp 36.2 °C (97.2 °F) (Temporal)   Resp 19        Lab Results   Component Value Date    CREATININE 0.75 05/10/2024    CREATININE 0.65 05/09/2024    CREATININE 0.67 05/08/2024    CREATININE 0.72 05/01/2024        Patient weight is No results found for: \"PTWEIGHT\"    No results found for: \"CULTURE\"     I/O last 3 completed shifts:  In: 512.3 (2.8 mL/kg) [P.O.:500; I.V.:12.3 (0.1 mL/kg)]  Out: 6176 (33.8 mL/kg) [Urine:6175 (0.9 mL/kg/hr); Stool:1]  Weight: 182.5 kg   [unfilled]    Lab Results   Component Value Date    PATIENTTEMP 37.0 02/03/2024    PATIENTTEMP 37.0 02/01/2024        Assessment/Plan    Within goal AUC range. Continue current vancomycin regimen.    Note that currently diuresing with Lasix - Volume status is changing.  The next level will be obtained on 5/11 at 0600. May be obtained sooner if clinically indicated.   Will continue to monitor renal function daily while on vancomycin and order serum creatinine at least every 48 hours if not already ordered.  Follow for continued vancomycin needs, clinical response, and signs/symptoms of toxicity.       Becki PPAA Friday Peace Silva           "

## 2024-05-11 LAB
ANION GAP SERPL CALC-SCNC: 13 MMOL/L (ref 10–20)
ATRIAL RATE: 77 BPM
BUN SERPL-MCNC: 14 MG/DL (ref 6–23)
CALCIUM SERPL-MCNC: 9.7 MG/DL (ref 8.6–10.3)
CHLORIDE SERPL-SCNC: 96 MMOL/L (ref 98–107)
CO2 SERPL-SCNC: 31 MMOL/L (ref 21–32)
CREAT SERPL-MCNC: 0.87 MG/DL (ref 0.5–1.05)
EGFRCR SERPLBLD CKD-EPI 2021: 79 ML/MIN/1.73M*2
ERYTHROCYTE [DISTWIDTH] IN BLOOD BY AUTOMATED COUNT: 13.7 % (ref 11.5–14.5)
GLUCOSE BLD MANUAL STRIP-MCNC: 190 MG/DL (ref 74–99)
GLUCOSE BLD MANUAL STRIP-MCNC: 217 MG/DL (ref 74–99)
GLUCOSE BLD MANUAL STRIP-MCNC: 223 MG/DL (ref 74–99)
GLUCOSE BLD MANUAL STRIP-MCNC: 235 MG/DL (ref 74–99)
GLUCOSE SERPL-MCNC: 223 MG/DL (ref 74–99)
HCT VFR BLD AUTO: 43.4 % (ref 36–46)
HGB BLD-MCNC: 13.5 G/DL (ref 12–16)
INR PPP: 2.2 (ref 0.9–1.1)
MCH RBC QN AUTO: 30 PG (ref 26–34)
MCHC RBC AUTO-ENTMCNC: 31.1 G/DL (ref 32–36)
MCV RBC AUTO: 96 FL (ref 80–100)
NRBC BLD-RTO: 0 /100 WBCS (ref 0–0)
P AXIS: 71 DEGREES
P OFFSET: 191 MS
P ONSET: 140 MS
PLATELET # BLD AUTO: 219 X10*3/UL (ref 150–450)
POTASSIUM SERPL-SCNC: 3.2 MMOL/L (ref 3.5–5.3)
PR INTERVAL: 144 MS
PROTHROMBIN TIME: 25.4 SECONDS (ref 9.8–12.8)
Q ONSET: 212 MS
QRS COUNT: 13 BEATS
QRS DURATION: 76 MS
QT INTERVAL: 406 MS
QTC CALCULATION(BAZETT): 459 MS
QTC FREDERICIA: 441 MS
R AXIS: 1 DEGREES
RBC # BLD AUTO: 4.5 X10*6/UL (ref 4–5.2)
SODIUM SERPL-SCNC: 137 MMOL/L (ref 136–145)
T AXIS: 28 DEGREES
T OFFSET: 415 MS
VANCOMYCIN SERPL-MCNC: 16 UG/ML (ref 5–20)
VANCOMYCIN SERPL-MCNC: 25.1 UG/ML (ref 5–20)
VANCOMYCIN TROUGH SERPL-MCNC: 27.5 UG/ML (ref 5–20)
VENTRICULAR RATE: 77 BPM
WBC # BLD AUTO: 9.2 X10*3/UL (ref 4.4–11.3)

## 2024-05-11 PROCEDURE — 85610 PROTHROMBIN TIME: CPT | Performed by: PHARMACIST

## 2024-05-11 PROCEDURE — 94761 N-INVAS EAR/PLS OXIMETRY MLT: CPT

## 2024-05-11 PROCEDURE — 99233 SBSQ HOSP IP/OBS HIGH 50: CPT | Performed by: INTERNAL MEDICINE

## 2024-05-11 PROCEDURE — 80202 ASSAY OF VANCOMYCIN: CPT | Performed by: HOSPITALIST

## 2024-05-11 PROCEDURE — 2500000002 HC RX 250 W HCPCS SELF ADMINISTERED DRUGS (ALT 637 FOR MEDICARE OP, ALT 636 FOR OP/ED): Performed by: HOSPITALIST

## 2024-05-11 PROCEDURE — 2500000001 HC RX 250 WO HCPCS SELF ADMINISTERED DRUGS (ALT 637 FOR MEDICARE OP): Performed by: INTERNAL MEDICINE

## 2024-05-11 PROCEDURE — 94640 AIRWAY INHALATION TREATMENT: CPT

## 2024-05-11 PROCEDURE — 36415 COLL VENOUS BLD VENIPUNCTURE: CPT | Performed by: PHARMACIST

## 2024-05-11 PROCEDURE — 2500000004 HC RX 250 GENERAL PHARMACY W/ HCPCS (ALT 636 FOR OP/ED): Performed by: PHARMACIST

## 2024-05-11 PROCEDURE — 2500000004 HC RX 250 GENERAL PHARMACY W/ HCPCS (ALT 636 FOR OP/ED): Performed by: HOSPITALIST

## 2024-05-11 PROCEDURE — 2500000004 HC RX 250 GENERAL PHARMACY W/ HCPCS (ALT 636 FOR OP/ED): Performed by: INTERNAL MEDICINE

## 2024-05-11 PROCEDURE — 1200000002 HC GENERAL ROOM WITH TELEMETRY DAILY

## 2024-05-11 PROCEDURE — 82947 ASSAY GLUCOSE BLOOD QUANT: CPT

## 2024-05-11 PROCEDURE — 85027 COMPLETE CBC AUTOMATED: CPT | Performed by: HOSPITALIST

## 2024-05-11 PROCEDURE — 2500000005 HC RX 250 GENERAL PHARMACY W/O HCPCS: Performed by: HOSPITALIST

## 2024-05-11 PROCEDURE — 80202 ASSAY OF VANCOMYCIN: CPT | Mod: 91,MUE | Performed by: PHARMACIST

## 2024-05-11 PROCEDURE — 2500000001 HC RX 250 WO HCPCS SELF ADMINISTERED DRUGS (ALT 637 FOR MEDICARE OP): Performed by: HOSPITALIST

## 2024-05-11 PROCEDURE — 2500000006 HC RX 250 W HCPCS SELF ADMINISTERED DRUGS (ALT 637 FOR ALL PAYERS): Performed by: HOSPITALIST

## 2024-05-11 PROCEDURE — 2500000006 HC RX 250 W HCPCS SELF ADMINISTERED DRUGS (ALT 637 FOR ALL PAYERS): Performed by: INTERNAL MEDICINE

## 2024-05-11 PROCEDURE — 80048 BASIC METABOLIC PNL TOTAL CA: CPT | Performed by: INTERNAL MEDICINE

## 2024-05-11 PROCEDURE — 99232 SBSQ HOSP IP/OBS MODERATE 35: CPT | Performed by: HOSPITALIST

## 2024-05-11 RX ORDER — TORSEMIDE 20 MG/1
40 TABLET ORAL
Status: DISCONTINUED | OUTPATIENT
Start: 2024-05-11 | End: 2024-05-12 | Stop reason: HOSPADM

## 2024-05-11 RX ORDER — POTASSIUM CHLORIDE 20 MEQ/1
40 TABLET, EXTENDED RELEASE ORAL ONCE
Status: COMPLETED | OUTPATIENT
Start: 2024-05-11 | End: 2024-05-11

## 2024-05-11 RX ADMIN — PANTOPRAZOLE SODIUM 40 MG: 40 TABLET, DELAYED RELEASE ORAL at 09:18

## 2024-05-11 RX ADMIN — PIPERACILLIN SODIUM AND TAZOBACTAM SODIUM 3.38 G: 3; .375 INJECTION, SOLUTION INTRAVENOUS at 11:39

## 2024-05-11 RX ADMIN — WARFARIN SODIUM 7.5 MG: 7.5 TABLET ORAL at 17:54

## 2024-05-11 RX ADMIN — INSULIN GLARGINE 10 UNITS: 100 INJECTION, SOLUTION SUBCUTANEOUS at 17:55

## 2024-05-11 RX ADMIN — METOPROLOL SUCCINATE 50 MG: 50 TABLET, EXTENDED RELEASE ORAL at 09:18

## 2024-05-11 RX ADMIN — INSULIN LISPRO 2 UNITS: 100 INJECTION, SOLUTION INTRAVENOUS; SUBCUTANEOUS at 17:56

## 2024-05-11 RX ADMIN — PIPERACILLIN SODIUM AND TAZOBACTAM SODIUM 3.38 G: 3; .375 INJECTION, SOLUTION INTRAVENOUS at 05:55

## 2024-05-11 RX ADMIN — FUROSEMIDE 10 MG/HR: 10 INJECTION, SOLUTION INTRAMUSCULAR; INTRAVENOUS at 01:38

## 2024-05-11 RX ADMIN — ROSUVASTATIN CALCIUM 40 MG: 20 TABLET, FILM COATED ORAL at 09:17

## 2024-05-11 RX ADMIN — ALLOPURINOL 300 MG: 300 TABLET ORAL at 09:18

## 2024-05-11 RX ADMIN — INSULIN LISPRO 1 UNITS: 100 INJECTION, SOLUTION INTRAVENOUS; SUBCUTANEOUS at 12:11

## 2024-05-11 RX ADMIN — Medication 21 PERCENT: at 11:43

## 2024-05-11 RX ADMIN — DULOXETINE HYDROCHLORIDE 60 MG: 60 CAPSULE, DELAYED RELEASE ORAL at 09:18

## 2024-05-11 RX ADMIN — OXYCODONE HYDROCHLORIDE 5 MG: 5 TABLET ORAL at 20:19

## 2024-05-11 RX ADMIN — METOPROLOL SUCCINATE 50 MG: 50 TABLET, EXTENDED RELEASE ORAL at 20:05

## 2024-05-11 RX ADMIN — OXYCODONE HYDROCHLORIDE 5 MG: 5 TABLET ORAL at 09:29

## 2024-05-11 RX ADMIN — POTASSIUM CHLORIDE 20 MEQ: 1500 TABLET, EXTENDED RELEASE ORAL at 20:06

## 2024-05-11 RX ADMIN — PIPERACILLIN SODIUM AND TAZOBACTAM SODIUM 3.38 G: 3; .375 INJECTION, SOLUTION INTRAVENOUS at 01:19

## 2024-05-11 RX ADMIN — TOPIRAMATE 25 MG: 50 TABLET ORAL at 09:17

## 2024-05-11 RX ADMIN — LEVOTHYROXINE SODIUM 50 MCG: 0.05 TABLET ORAL at 05:56

## 2024-05-11 RX ADMIN — TORSEMIDE 40 MG: 20 TABLET ORAL at 17:55

## 2024-05-11 RX ADMIN — INSULIN LISPRO 2 UNITS: 100 INJECTION, SOLUTION INTRAVENOUS; SUBCUTANEOUS at 09:30

## 2024-05-11 RX ADMIN — EMPAGLIFLOZIN 10 MG: 10 TABLET, FILM COATED ORAL at 09:18

## 2024-05-11 RX ADMIN — PANTOPRAZOLE SODIUM 40 MG: 40 TABLET, DELAYED RELEASE ORAL at 20:06

## 2024-05-11 RX ADMIN — Medication 21 PERCENT: at 06:52

## 2024-05-11 RX ADMIN — Medication 4 L/MIN: at 18:35

## 2024-05-11 RX ADMIN — LEVOTHYROXINE SODIUM 200 MCG: 0.1 TABLET ORAL at 05:55

## 2024-05-11 RX ADMIN — TOPIRAMATE 25 MG: 50 TABLET ORAL at 20:05

## 2024-05-11 RX ADMIN — PIPERACILLIN SODIUM AND TAZOBACTAM SODIUM 3.38 G: 3; .375 INJECTION, SOLUTION INTRAVENOUS at 23:39

## 2024-05-11 RX ADMIN — PIPERACILLIN SODIUM AND TAZOBACTAM SODIUM 3.38 G: 3; .375 INJECTION, SOLUTION INTRAVENOUS at 17:55

## 2024-05-11 RX ADMIN — Medication 400 MG: at 20:05

## 2024-05-11 RX ADMIN — TRAZODONE HYDROCHLORIDE 100 MG: 100 TABLET ORAL at 20:06

## 2024-05-11 RX ADMIN — POTASSIUM CHLORIDE 40 MEQ: 1500 TABLET, EXTENDED RELEASE ORAL at 09:18

## 2024-05-11 RX ADMIN — VANCOMYCIN HYDROCHLORIDE 1 G: 1 INJECTION, POWDER, LYOPHILIZED, FOR SOLUTION INTRAVENOUS at 20:02

## 2024-05-11 RX ADMIN — POTASSIUM CHLORIDE 20 MEQ: 1500 TABLET, EXTENDED RELEASE ORAL at 09:18

## 2024-05-11 RX ADMIN — MORPHINE SULFATE 1 MG: 2 INJECTION, SOLUTION INTRAMUSCULAR; INTRAVENOUS at 02:32

## 2024-05-11 RX ADMIN — BUPROPION HYDROCHLORIDE 150 MG: 150 TABLET, FILM COATED, EXTENDED RELEASE ORAL at 09:18

## 2024-05-11 RX ADMIN — VANCOMYCIN HYDROCHLORIDE 1 G: 1 INJECTION, POWDER, LYOPHILIZED, FOR SOLUTION INTRAVENOUS at 02:32

## 2024-05-11 RX ADMIN — VANCOMYCIN HYDROCHLORIDE 1 G: 1 INJECTION, POWDER, LYOPHILIZED, FOR SOLUTION INTRAVENOUS at 11:39

## 2024-05-11 RX ADMIN — ACETAMINOPHEN 650 MG: 325 TABLET ORAL at 05:55

## 2024-05-11 RX ADMIN — Medication 400 MG: at 09:17

## 2024-05-11 SDOH — ECONOMIC STABILITY: INCOME INSECURITY: HOW HARD IS IT FOR YOU TO PAY FOR THE VERY BASICS LIKE FOOD, HOUSING, MEDICAL CARE, AND HEATING?: NOT HARD AT ALL

## 2024-05-11 SDOH — ECONOMIC STABILITY: HOUSING INSECURITY
IN THE LAST 12 MONTHS, WAS THERE A TIME WHEN YOU DID NOT HAVE A STEADY PLACE TO SLEEP OR SLEPT IN A SHELTER (INCLUDING NOW)?: YES

## 2024-05-11 SDOH — ECONOMIC STABILITY: INCOME INSECURITY: IN THE LAST 12 MONTHS, WAS THERE A TIME WHEN YOU WERE NOT ABLE TO PAY THE MORTGAGE OR RENT ON TIME?: YES

## 2024-05-11 SDOH — ECONOMIC STABILITY: TRANSPORTATION INSECURITY
IN THE PAST 12 MONTHS, HAS LACK OF TRANSPORTATION KEPT YOU FROM MEETINGS, WORK, OR FROM GETTING THINGS NEEDED FOR DAILY LIVING?: NO

## 2024-05-11 SDOH — ECONOMIC STABILITY: HOUSING INSECURITY: IN THE LAST 12 MONTHS, HOW MANY PLACES HAVE YOU LIVED?: 2

## 2024-05-11 SDOH — ECONOMIC STABILITY: TRANSPORTATION INSECURITY
IN THE PAST 12 MONTHS, HAS THE LACK OF TRANSPORTATION KEPT YOU FROM MEDICAL APPOINTMENTS OR FROM GETTING MEDICATIONS?: NO

## 2024-05-11 ASSESSMENT — COGNITIVE AND FUNCTIONAL STATUS - GENERAL
DAILY ACTIVITIY SCORE: 20
DRESSING REGULAR LOWER BODY CLOTHING: A LITTLE
DRESSING REGULAR UPPER BODY CLOTHING: A LITTLE
TURNING FROM BACK TO SIDE WHILE IN FLAT BAD: A LITTLE
MOBILITY SCORE: 18
CLIMB 3 TO 5 STEPS WITH RAILING: A LOT
MOVING TO AND FROM BED TO CHAIR: A LITTLE
TOILETING: A LITTLE
WALKING IN HOSPITAL ROOM: A LITTLE
HELP NEEDED FOR BATHING: A LITTLE
STANDING UP FROM CHAIR USING ARMS: A LITTLE

## 2024-05-11 ASSESSMENT — PAIN - FUNCTIONAL ASSESSMENT
PAIN_FUNCTIONAL_ASSESSMENT: 0-10

## 2024-05-11 ASSESSMENT — PAIN SCALES - GENERAL
PAINLEVEL_OUTOF10: 7
PAINLEVEL_OUTOF10: 3
PAINLEVEL_OUTOF10: 7
PAINLEVEL_OUTOF10: 7
PAINLEVEL_OUTOF10: 3
PAINLEVEL_OUTOF10: 3
PAINLEVEL_OUTOF10: 0 - NO PAIN
PAINLEVEL_OUTOF10: 10 - WORST POSSIBLE PAIN

## 2024-05-11 ASSESSMENT — PAIN DESCRIPTION - ORIENTATION
ORIENTATION: RIGHT
ORIENTATION: RIGHT

## 2024-05-11 ASSESSMENT — PAIN DESCRIPTION - DESCRIPTORS
DESCRIPTORS: ACHING
DESCRIPTORS: ACHING;DISCOMFORT
DESCRIPTORS: ACHING;DISCOMFORT
DESCRIPTORS: ACHING

## 2024-05-11 ASSESSMENT — PAIN DESCRIPTION - LOCATION
LOCATION: SHOULDER
LOCATION: SHOULDER

## 2024-05-11 NOTE — CARE PLAN
The patient's goals for the shift include pain control    The clinical goals for the shift include Patient states acceptable pain level by end of shift.      Problem: Pain - Adult  Goal: Verbalizes/displays adequate comfort level or baseline comfort level  Outcome: Progressing     Problem: Safety - Adult  Goal: Free from fall injury  Outcome: Progressing     Problem: Discharge Planning  Goal: Discharge to home or other facility with appropriate resources  Outcome: Progressing     Problem: Chronic Conditions and Co-morbidities  Goal: Patient's chronic conditions and co-morbidity symptoms are monitored and maintained or improved  Outcome: Progressing     Problem: Diabetes  Goal: Achieve decreasing blood glucose levels by end of shift  Outcome: Progressing  Goal: Increase stability of blood glucose readings by end of shift  Outcome: Progressing  Goal: Decrease in ketones present in urine by end of shift  Outcome: Progressing  Goal: Maintain electrolyte levels within acceptable range throughout shift  Outcome: Progressing  Goal: Maintain glucose levels >70mg/dl to <250mg/dl throughout shift  Outcome: Progressing  Goal: No changes in neurological exam by end of shift  Outcome: Progressing  Goal: Learn about and adhere to nutrition recommendations by end of shift  Outcome: Progressing  Goal: Vital signs within normal range for age by end of shift  Outcome: Progressing  Goal: Increase self care and/or family involovement by end of shift  Outcome: Progressing  Goal: Receive DSME education by end of shift  Outcome: Progressing     Problem: Pain  Goal: Takes deep breaths with improved pain control throughout the shift  Outcome: Progressing  Goal: Turns in bed with improved pain control throughout the shift  Outcome: Progressing  Goal: Walks with improved pain control throughout the shift  Outcome: Progressing  Goal: Performs ADL's with improved pain control throughout shift  Outcome: Progressing  Goal: Participates in PT with  improved pain control throughout the shift  Outcome: Progressing  Goal: Free from opioid side effects throughout the shift  Outcome: Progressing  Goal: Free from acute confusion related to pain meds throughout the shift  Outcome: Progressing     Problem: Skin  Goal: Decreased wound size/increased tissue granulation at next dressing change  Outcome: Progressing  Goal: Participates in plan/prevention/treatment measures  Outcome: Progressing  Goal: Prevent/manage excess moisture  Outcome: Progressing  Goal: Prevent/minimize sheer/friction injuries  Outcome: Progressing  Goal: Promote/optimize nutrition  Outcome: Progressing  Goal: Promote skin healing  Outcome: Progressing     Problem: Fall/Injury  Goal: Not fall by end of shift  Outcome: Progressing  Goal: Be free from injury by end of the shift  Outcome: Progressing  Goal: Verbalize understanding of personal risk factors for fall in the hospital  Outcome: Progressing  Goal: Verbalize understanding of risk factor reduction measures to prevent injury from fall in the home  Outcome: Progressing  Goal: Use assistive devices by end of the shift  Outcome: Progressing  Goal: Pace activities to prevent fatigue by end of the shift  Outcome: Progressing     Problem: Nutrition  Goal: Oral intake greater 75%  Outcome: Progressing  Goal: Adequate PO fluid intake  Outcome: Progressing  Goal: BG  mg/dL  Outcome: Progressing  Goal: Lab values WNL  Outcome: Progressing  Goal: Electrolytes WNL  Outcome: Progressing  Goal: Promote healing  Outcome: Progressing  Goal: Reduce weight from edema/fluid  Outcome: Progressing

## 2024-05-11 NOTE — PROGRESS NOTES
Pharmacy to dose warfarin     Today's INR - 2.2  INR Goal - 2-3  Related labs - n/a  Daily PT / INR check - Will order INR for 5/12, 5/13, and 5/14  Last INR prior to admission - unknown  Primary / Secondary Diagnosis - DVT/PE  Ordering Provider - Dr. Monaco  Plan - Will continue with home dose of 7.5 mg po daily

## 2024-05-11 NOTE — PROGRESS NOTES
Roselia Mo is a 53 y.o. female on day 3 of admission presenting with Cellulitis.      Subjective   Patient seen and examined at the bedside this morning  She is sitting comfortably in the bedside chair  She ate all her breakfast  She has continued to diurese quite well  She is feeling much better at this time       Objective          Vitals 24HR  Heart Rate:  [71-83]   Temp:  [36 °C (96.8 °F)-36.4 °C (97.5 °F)]   Resp:  [17-20]   BP: (108-124)/(62-77)   SpO2:  [90 %-99 %]         Intake/Output last 3 Shifts:    Intake/Output Summary (Last 24 hours) at 5/11/2024 1159  Last data filed at 5/11/2024 0900  Gross per 24 hour   Intake 2889.95 ml   Output 4450 ml   Net -1560.05 ml       Physical Exam  Constitutional:       Appearance: Normal appearance. She is obese.   HENT:      Head: Normocephalic and atraumatic.      Right Ear: External ear normal.      Left Ear: External ear normal.      Nose: Nose normal.      Comments: CPAP Mask     Mouth/Throat:      Mouth: Mucous membranes are moist.      Pharynx: Oropharynx is clear.   Eyes:      Extraocular Movements: Extraocular movements intact.      Conjunctiva/sclera: Conjunctivae normal.      Pupils: Pupils are equal, round, and reactive to light.   Cardiovascular:      Rate and Rhythm: Normal rate and regular rhythm.   Pulmonary:      Effort: Pulmonary effort is normal.      Breath sounds: Normal breath sounds.   Abdominal:      General: Abdomen is flat.      Palpations: Abdomen is soft.      Comments: Small spot on left abdomen  Centrally obese with + Abdominal wall edema   Musculoskeletal:         General: Swelling present.      Right lower leg: Edema present.      Left lower leg: Edema present.   Skin:     General: Skin is warm and dry.   Neurological:      General: No focal deficit present.      Mental Status: She is alert and oriented to person, place, and time.   Psychiatric:         Mood and Affect: Mood normal.         Behavior: Behavior normal.         Relevant  Results               Assessment/Plan              Principal Problem:    Cellulitis    Peripheral edema with volume overload  Hypokalemia  Diastolic CHF  Morbid Obesity  Lymphedema  HTN  DM II  Right Sided CHF  Pulmonary HTN  CY on CPAP  CKD stage II  Rash with small wound on left abdomen    Plan:  She is looking much improved at this time with aggressive diuresis  I will go ahead and stop the Lasix drip  We will put her on torsemide 40 mg twice a day for now and see how she does  Hopefully can get discharged tomorrow           Pardeep Nichole, DO

## 2024-05-11 NOTE — SIGNIFICANT EVENT
Pt sitting up in chair not ready to put her cpap on for the night.  I did make sure the water was filled and machine was ready to go

## 2024-05-11 NOTE — PROGRESS NOTES
"Roselia Mo is a 53 y.o. female on day 3 of admission presenting with Cellulitis.    Subjective   Patient states that she is feeling better and improving clinically  Denies any worsening shortness of breath and somewhat improving  Denies fever chills or nausea vomiting  Denies chest pain  Denies any bleeding  Leg swelling improving         Objective     Physical Exam  General Appearance: AAO x 3, morbidly obese  Skin: Erythema under left breast, also skin abrasion in right lower abdomen with yellowish discoloration but no active purulent discharge.  Also erythema in bilateral inguinal areas.  Slight tenderness, no significant warmth.  No active bleeding or drainage.  Eyes : PERRL, EOM's intact  ENT: mucous membranes pink and moist  Neck: normocephalic  Respiratory: lungs clear to auscultation anteriorly; no wheezing, rhonchi, or crackles.  Diminished at bases  Heart: regular rate and rhythm.   Abdomen: Nondistended, positive bowel sounds x4, soft,  nontender  Extremities: 3+ lower extremity edema bilaterally, wrapped in Ace  Peripheral pulses: normal x4 extremities  Neuro: alert, coherent and conversant, no focal motor deficits  Last Recorded Vitals  Blood pressure 113/71, pulse 70, temperature 36.7 °C (98.1 °F), resp. rate 20, height 1.6 m (5' 2.99\"), weight (!) 182 kg (402 lb 5.4 oz), SpO2 97%.  Intake/Output last 3 Shifts:  I/O last 3 completed shifts:  In: 3202.3 (17.5 mL/kg) [P.O.:920; I.V.:32.3 (0.2 mL/kg); IV Piggyback:2250]  Out: 7075 (38.8 mL/kg) [Urine:7075 (1.1 mL/kg/hr)]  Weight: 182.5 kg     Relevant Results    Scheduled medications  allopurinol, 300 mg, oral, Daily  bacitracin, , Topical, TID  buPROPion XL, 150 mg, oral, q AM  DULoxetine, 60 mg, oral, Daily  empagliflozin, 10 mg, oral, Daily  insulin glargine, 10 Units, subcutaneous, q24h  insulin lispro, 0-5 Units, subcutaneous, TID with meals  levothyroxine, 200 mcg, oral, Daily  levothyroxine, 50 mcg, oral, Daily before breakfast  magnesium " oxide, 400 mg, oral, BID  metoprolol succinate XL, 50 mg, oral, BID  nystatin, 1 Application, Topical, BID  pantoprazole, 40 mg, oral, BID  piperacillin-tazobactam, 3.375 g, intravenous, q6h  polyethylene glycol, 17 g, oral, Daily  potassium chloride CR, 20 mEq, oral, BID  rosuvastatin, 40 mg, oral, Daily  topiramate, 25 mg, oral, BID  torsemide, 40 mg, oral, BID with meals  traZODone, 100 mg, oral, Nightly  vancomycin (Vancocin) 1 g in dextrose 5% 250 mL IV, 1,000 mg, intravenous, q8h  warfarin, 7.5 mg, oral, Daily      Continuous medications     PRN medications  PRN medications: acetaminophen **OR** acetaminophen **OR** acetaminophen, acetaminophen **OR** acetaminophen **OR** acetaminophen, dextrose, dextrose, glucagon, glucagon, morphine, morphine, ondansetron ODT **OR** ondansetron, oxyCODONE, oxygen, vancomycin    Results for orders placed or performed during the hospital encounter of 05/08/24 (from the past 24 hour(s))   POCT GLUCOSE   Result Value Ref Range    POCT Glucose 253 (H) 74 - 99 mg/dL   Protime-INR   Result Value Ref Range    Protime 25.4 (H) 9.8 - 12.8 seconds    INR 2.2 (H) 0.9 - 1.1   Vancomycin, Trough   Result Value Ref Range    Vancomycin, Trough 27.5 (HH) 5.0 - 20.0 ug/mL   Basic Metabolic Panel   Result Value Ref Range    Glucose 223 (H) 74 - 99 mg/dL    Sodium 137 136 - 145 mmol/L    Potassium 3.2 (L) 3.5 - 5.3 mmol/L    Chloride 96 (L) 98 - 107 mmol/L    Bicarbonate 31 21 - 32 mmol/L    Anion Gap 13 10 - 20 mmol/L    Urea Nitrogen 14 6 - 23 mg/dL    Creatinine 0.87 0.50 - 1.05 mg/dL    eGFR 79 >60 mL/min/1.73m*2    Calcium 9.7 8.6 - 10.3 mg/dL   CBC   Result Value Ref Range    WBC 9.2 4.4 - 11.3 x10*3/uL    nRBC 0.0 0.0 - 0.0 /100 WBCs    RBC 4.50 4.00 - 5.20 x10*6/uL    Hemoglobin 13.5 12.0 - 16.0 g/dL    Hematocrit 43.4 36.0 - 46.0 %    MCV 96 80 - 100 fL    MCH 30.0 26.0 - 34.0 pg    MCHC 31.1 (L) 32.0 - 36.0 g/dL    RDW 13.7 11.5 - 14.5 %    Platelets 219 150 - 450 x10*3/uL    Vancomycin   Result Value Ref Range    Vancomycin 25.1 (H) 5.0 - 20.0 ug/mL   POCT GLUCOSE   Result Value Ref Range    POCT Glucose 217 (H) 74 - 99 mg/dL   Vancomycin   Result Value Ref Range    Vancomycin 16.0 5.0 - 20.0 ug/mL   POCT GLUCOSE   Result Value Ref Range    POCT Glucose 190 (H) 74 - 99 mg/dL   POCT GLUCOSE   Result Value Ref Range    POCT Glucose 223 (H) 74 - 99 mg/dL                            Assessment/Plan   Principal Problem:    Cellulitis      53-year-old female with history of  Severe obesity  Stage III chronic kidney disease  Chronic lymphedema  Hyponatremia  Migraine  Anasarca  Hypertension  Hyperlipidemia  Probably metabolic syndrome  Osteoarthritis  Possible obesity hypoventilation syndrome/sleep apnea  Type 2 diabetes mellitus  Portal vein thrombosis on Coumadin  Possible COPD  GERD  Hernia  Anxiety depression  Gout  SEVILLA  Budd-Chiari syndrome  Diastolic CHF  Right-sided CHF, pulmonary hypertension  Presented with  Abdominal wall cellulitis especially right lower abdominal fold, failed outpatient antibiotics  Worsening lymphedema lower extremity bilaterally  Volume overload  Chest pain to rule out ACS  Hypokalemia  Rash with infected small wound on right abdomen  Left abdomen has small pore that drains at times, unclear etiology does not seem to be cyst or sebaceous cyst or lipoma or lump or sinus tract or fistulous tract confirmed.  Plan  S/p Lasix drip and now switched to torsemide 40 mg twice daily  Responding well clinically  Improving overall  Daily BMP  Follow urine output  Continue vancomycin and Zosyn IV  Follow cultures  and bacitracin, local wound care  Education counseling  Encourage weight loss  Synthroid for hypothyroidism  Continue psych medicines for depression anxiety  On Jardiance 10 mg daily  Lantus 10 units subcu daily  Insulin sliding scale and adjust based on blood sugars trending  Carb controlled diet  Nutrition  Metoprolol 50 mg twice daily for  hypertension  Rosuvastatin 40 mg daily for hyperlipidemia  PPI for GI prophylaxis  DVT prophylaxis addressed per policy on warfarin currently and pharmacy dosing based on INR  CPAP/BiPAP while sleeping or at night  Possible DC tomorrow if stable  Supplement potassium for hypokalemia  Continue current medicines          Maria Guadalupe Monaco MD

## 2024-05-11 NOTE — PROGRESS NOTES
Physical Therapy                 Therapy Communication Note    Patient Name: Roselia Mo  MRN: [id number]  Today's Date: 5/11/2024     Discipline: Physical Therapy    Missed Visit Reason:  Patient refused P.T. treatment today.    Missed Time: Attempt    Comment:

## 2024-05-11 NOTE — PROGRESS NOTES
05/11/24 8957   Discharge Planning   Living Arrangements Spouse/significant other   Support Systems Spouse/significant other   Patient expects to be discharged to: home with [facility] follow up.     Met pt in room, role of TCC explained, permission given to speak in front of . Per care rounds pt ADOD 24-48h. Pt confirms her plan remains to go home and to follow up with [facility]. Referral has been made and [facility] will call pt. Pt denies other needs. CT will follow.

## 2024-05-12 ENCOUNTER — PHARMACY VISIT (OUTPATIENT)
Dept: PHARMACY | Facility: CLINIC | Age: 54
End: 2024-05-12
Payer: COMMERCIAL

## 2024-05-12 VITALS
RESPIRATION RATE: 20 BRPM | WEIGHT: 293 LBS | TEMPERATURE: 98.1 F | HEIGHT: 63 IN | DIASTOLIC BLOOD PRESSURE: 68 MMHG | HEART RATE: 69 BPM | OXYGEN SATURATION: 97 % | SYSTOLIC BLOOD PRESSURE: 122 MMHG | BODY MASS INDEX: 51.91 KG/M2

## 2024-05-12 PROBLEM — L03.90 CELLULITIS: Status: RESOLVED | Noted: 2024-05-08 | Resolved: 2024-05-12

## 2024-05-12 LAB
ANION GAP SERPL CALC-SCNC: 13 MMOL/L (ref 10–20)
BUN SERPL-MCNC: 16 MG/DL (ref 6–23)
CALCIUM SERPL-MCNC: 8.9 MG/DL (ref 8.6–10.3)
CHLORIDE SERPL-SCNC: 100 MMOL/L (ref 98–107)
CO2 SERPL-SCNC: 28 MMOL/L (ref 21–32)
CREAT SERPL-MCNC: 0.77 MG/DL (ref 0.5–1.05)
EGFRCR SERPLBLD CKD-EPI 2021: >90 ML/MIN/1.73M*2
ERYTHROCYTE [DISTWIDTH] IN BLOOD BY AUTOMATED COUNT: 13.2 % (ref 11.5–14.5)
GLUCOSE BLD MANUAL STRIP-MCNC: 188 MG/DL (ref 74–99)
GLUCOSE BLD MANUAL STRIP-MCNC: 206 MG/DL (ref 74–99)
GLUCOSE SERPL-MCNC: 232 MG/DL (ref 74–99)
HCT VFR BLD AUTO: 35.9 % (ref 36–46)
HGB BLD-MCNC: 11.5 G/DL (ref 12–16)
INR PPP: 2.6 (ref 0.9–1.1)
MCH RBC QN AUTO: 30.3 PG (ref 26–34)
MCHC RBC AUTO-ENTMCNC: 32 G/DL (ref 32–36)
MCV RBC AUTO: 95 FL (ref 80–100)
NRBC BLD-RTO: 0 /100 WBCS (ref 0–0)
PLATELET # BLD AUTO: 184 X10*3/UL (ref 150–450)
POTASSIUM SERPL-SCNC: 3.2 MMOL/L (ref 3.5–5.3)
PROTHROMBIN TIME: 29.5 SECONDS (ref 9.8–12.8)
RBC # BLD AUTO: 3.79 X10*6/UL (ref 4–5.2)
SODIUM SERPL-SCNC: 138 MMOL/L (ref 136–145)
WBC # BLD AUTO: 7.2 X10*3/UL (ref 4.4–11.3)

## 2024-05-12 PROCEDURE — 2500000004 HC RX 250 GENERAL PHARMACY W/ HCPCS (ALT 636 FOR OP/ED): Performed by: PHARMACIST

## 2024-05-12 PROCEDURE — 94761 N-INVAS EAR/PLS OXIMETRY MLT: CPT

## 2024-05-12 PROCEDURE — RXMED WILLOW AMBULATORY MEDICATION CHARGE

## 2024-05-12 PROCEDURE — 2500000001 HC RX 250 WO HCPCS SELF ADMINISTERED DRUGS (ALT 637 FOR MEDICARE OP): Performed by: HOSPITALIST

## 2024-05-12 PROCEDURE — 2500000001 HC RX 250 WO HCPCS SELF ADMINISTERED DRUGS (ALT 637 FOR MEDICARE OP): Performed by: INTERNAL MEDICINE

## 2024-05-12 PROCEDURE — 2500000006 HC RX 250 W HCPCS SELF ADMINISTERED DRUGS (ALT 637 FOR ALL PAYERS): Mod: MUE | Performed by: HOSPITALIST

## 2024-05-12 PROCEDURE — 85610 PROTHROMBIN TIME: CPT | Performed by: HOSPITALIST

## 2024-05-12 PROCEDURE — 82947 ASSAY GLUCOSE BLOOD QUANT: CPT

## 2024-05-12 PROCEDURE — 80048 BASIC METABOLIC PNL TOTAL CA: CPT | Performed by: INTERNAL MEDICINE

## 2024-05-12 PROCEDURE — 2500000004 HC RX 250 GENERAL PHARMACY W/ HCPCS (ALT 636 FOR OP/ED): Performed by: HOSPITALIST

## 2024-05-12 PROCEDURE — 2500000006 HC RX 250 W HCPCS SELF ADMINISTERED DRUGS (ALT 637 FOR ALL PAYERS): Performed by: INTERNAL MEDICINE

## 2024-05-12 PROCEDURE — 99233 SBSQ HOSP IP/OBS HIGH 50: CPT | Performed by: INTERNAL MEDICINE

## 2024-05-12 PROCEDURE — 99239 HOSP IP/OBS DSCHRG MGMT >30: CPT | Performed by: HOSPITALIST

## 2024-05-12 PROCEDURE — 85027 COMPLETE CBC AUTOMATED: CPT | Performed by: HOSPITALIST

## 2024-05-12 PROCEDURE — 36415 COLL VENOUS BLD VENIPUNCTURE: CPT | Performed by: HOSPITALIST

## 2024-05-12 PROCEDURE — 2500000005 HC RX 250 GENERAL PHARMACY W/O HCPCS: Performed by: HOSPITALIST

## 2024-05-12 RX ORDER — POTASSIUM CHLORIDE 20 MEQ/1
40 TABLET, EXTENDED RELEASE ORAL ONCE
Status: DISCONTINUED | OUTPATIENT
Start: 2024-05-12 | End: 2024-05-12

## 2024-05-12 RX ORDER — POTASSIUM CHLORIDE 20 MEQ/1
20 TABLET, EXTENDED RELEASE ORAL ONCE
Status: COMPLETED | OUTPATIENT
Start: 2024-05-12 | End: 2024-05-12

## 2024-05-12 RX ORDER — POTASSIUM CHLORIDE 750 MG/1
40 TABLET, FILM COATED, EXTENDED RELEASE ORAL 2 TIMES DAILY
Qty: 240 TABLET | Refills: 0 | Status: SHIPPED | OUTPATIENT
Start: 2024-05-12 | End: 2024-06-10 | Stop reason: HOSPADM

## 2024-05-12 RX ORDER — FLUCONAZOLE 100 MG/1
TABLET ORAL
Status: DISCONTINUED
Start: 2024-05-12 | End: 2024-05-12 | Stop reason: WASHOUT

## 2024-05-12 RX ORDER — AMOXICILLIN AND CLAVULANATE POTASSIUM 875; 125 MG/1; MG/1
1 TABLET, FILM COATED ORAL EVERY 12 HOURS SCHEDULED
Status: DISCONTINUED | OUTPATIENT
Start: 2024-05-12 | End: 2024-05-12 | Stop reason: HOSPADM

## 2024-05-12 RX ORDER — FLUCONAZOLE 150 MG/1
150 TABLET ORAL ONCE
Status: COMPLETED | OUTPATIENT
Start: 2024-05-12 | End: 2024-05-12

## 2024-05-12 RX ORDER — TORSEMIDE 20 MG/1
40 TABLET ORAL
Qty: 120 TABLET | Refills: 0 | Status: SHIPPED | OUTPATIENT
Start: 2024-05-12 | End: 2024-06-10 | Stop reason: HOSPADM

## 2024-05-12 RX ORDER — WARFARIN SODIUM 5 MG/1
5 TABLET ORAL ONCE
Status: DISCONTINUED | OUTPATIENT
Start: 2024-05-12 | End: 2024-05-12 | Stop reason: HOSPADM

## 2024-05-12 RX ORDER — AMOXICILLIN AND CLAVULANATE POTASSIUM 875; 125 MG/1; MG/1
875 TABLET, FILM COATED ORAL 2 TIMES DAILY
Qty: 20 TABLET | Refills: 0 | Status: SHIPPED | OUTPATIENT
Start: 2024-05-12 | End: 2024-05-22

## 2024-05-12 RX ADMIN — Medication 400 MG: at 09:51

## 2024-05-12 RX ADMIN — BUPROPION HYDROCHLORIDE 150 MG: 150 TABLET, FILM COATED, EXTENDED RELEASE ORAL at 09:51

## 2024-05-12 RX ADMIN — METOPROLOL SUCCINATE 50 MG: 50 TABLET, EXTENDED RELEASE ORAL at 09:51

## 2024-05-12 RX ADMIN — TOPIRAMATE 25 MG: 50 TABLET ORAL at 09:51

## 2024-05-12 RX ADMIN — OXYCODONE HYDROCHLORIDE 5 MG: 5 TABLET ORAL at 09:58

## 2024-05-12 RX ADMIN — ALLOPURINOL 300 MG: 300 TABLET ORAL at 09:51

## 2024-05-12 RX ADMIN — PANTOPRAZOLE SODIUM 40 MG: 40 TABLET, DELAYED RELEASE ORAL at 09:51

## 2024-05-12 RX ADMIN — EMPAGLIFLOZIN 10 MG: 10 TABLET, FILM COATED ORAL at 09:51

## 2024-05-12 RX ADMIN — INSULIN LISPRO 2 UNITS: 100 INJECTION, SOLUTION INTRAVENOUS; SUBCUTANEOUS at 12:07

## 2024-05-12 RX ADMIN — POTASSIUM CHLORIDE 20 MEQ: 1500 TABLET, EXTENDED RELEASE ORAL at 12:07

## 2024-05-12 RX ADMIN — Medication 4 L/MIN: at 11:33

## 2024-05-12 RX ADMIN — DULOXETINE HYDROCHLORIDE 60 MG: 60 CAPSULE, DELAYED RELEASE ORAL at 09:52

## 2024-05-12 RX ADMIN — Medication 4 L/MIN: at 06:00

## 2024-05-12 RX ADMIN — FLUCONAZOLE 150 MG: 150 TABLET ORAL at 06:29

## 2024-05-12 RX ADMIN — AMOXICILLIN AND CLAVULANATE POTASSIUM 1 TABLET: 875; 125 TABLET, FILM COATED ORAL at 14:27

## 2024-05-12 RX ADMIN — Medication 4 L/MIN: at 00:00

## 2024-05-12 RX ADMIN — LEVOTHYROXINE SODIUM 200 MCG: 0.1 TABLET ORAL at 06:28

## 2024-05-12 RX ADMIN — INSULIN LISPRO 1 UNITS: 100 INJECTION, SOLUTION INTRAVENOUS; SUBCUTANEOUS at 09:51

## 2024-05-12 RX ADMIN — LEVOTHYROXINE SODIUM 50 MCG: 0.05 TABLET ORAL at 06:29

## 2024-05-12 RX ADMIN — VANCOMYCIN HYDROCHLORIDE 1 G: 1 INJECTION, POWDER, LYOPHILIZED, FOR SOLUTION INTRAVENOUS at 03:31

## 2024-05-12 RX ADMIN — TORSEMIDE 40 MG: 20 TABLET ORAL at 09:51

## 2024-05-12 RX ADMIN — POTASSIUM CHLORIDE 20 MEQ: 1500 TABLET, EXTENDED RELEASE ORAL at 09:51

## 2024-05-12 RX ADMIN — ROSUVASTATIN CALCIUM 40 MG: 20 TABLET, FILM COATED ORAL at 09:51

## 2024-05-12 RX ADMIN — PIPERACILLIN SODIUM AND TAZOBACTAM SODIUM 3.38 G: 3; .375 INJECTION, SOLUTION INTRAVENOUS at 06:27

## 2024-05-12 ASSESSMENT — PAIN SCALES - GENERAL
PAINLEVEL_OUTOF10: 6
PAINLEVEL_OUTOF10: 0 - NO PAIN

## 2024-05-12 ASSESSMENT — PAIN DESCRIPTION - LOCATION: LOCATION: SHOULDER

## 2024-05-12 ASSESSMENT — PAIN - FUNCTIONAL ASSESSMENT
PAIN_FUNCTIONAL_ASSESSMENT: 0-10
PAIN_FUNCTIONAL_ASSESSMENT: 0-10

## 2024-05-12 ASSESSMENT — COGNITIVE AND FUNCTIONAL STATUS - GENERAL
CLIMB 3 TO 5 STEPS WITH RAILING: A LOT
DAILY ACTIVITIY SCORE: 20
MOVING TO AND FROM BED TO CHAIR: A LITTLE
TOILETING: A LITTLE
HELP NEEDED FOR BATHING: A LITTLE
WALKING IN HOSPITAL ROOM: A LITTLE
DRESSING REGULAR LOWER BODY CLOTHING: A LITTLE
TURNING FROM BACK TO SIDE WHILE IN FLAT BAD: A LITTLE
DRESSING REGULAR UPPER BODY CLOTHING: A LITTLE
MOBILITY SCORE: 18
STANDING UP FROM CHAIR USING ARMS: A LITTLE

## 2024-05-12 ASSESSMENT — PAIN DESCRIPTION - ORIENTATION: ORIENTATION: RIGHT

## 2024-05-12 NOTE — DISCHARGE SUMMARY
Discharge Diagnosis  Cellulitis    Issues Requiring Follow-Up  Check volume status and daily weight, baseline 395 and continue medicines as prescribed.  If weight goes above 395 then get immediate appointment with Dr. Nichole otherwise follow-up in a week time  Follow PCP for ongoing reconciliation  Discharge Meds     Your medication list        START taking these medications        Instructions Last Dose Given Next Dose Due   potassium chloride 20 mEq packet  Commonly known as: Klor-Con  Replaces: potassium chloride CR 10 mEq ER tablet      Take 40 mEq by mouth 2 times a day.       torsemide 40 mg tablet      Take 40 mg by mouth 2 times a day with meals.              CONTINUE taking these medications        Instructions Last Dose Given Next Dose Due   HUMULIN R U-500 (CONC) INSULIN SUBQ           - refin regular 500 unit/mL CONCENTRATED - refill for patient own pump  Commonly known as: HumuLIN R U-500      Inject 1 mL (1 each) under the skin if needed (VIA INSULIN PUMP). Take as directed per insulin instructions. Use U-500 insulin syringe.       albuterol 90 mcg/actuation aerosol powdr breath activated inhaler           allopurinol 300 mg tablet  Commonly known as: Zyloprim      Take 1 tablet (300 mg) by mouth once daily.       amoxicillin-pot clavulanate 875-125 mg tablet  Commonly known as: Augmentin      Take 1 tablet (875 mg) by mouth 2 times a day for 10 days.       buPROPion  mg 24 hr tablet  Commonly known as: Wellbutrin XL      Take 1 tablet (150 mg) by mouth once daily in the morning. Do not crush, chew, or split.       cetirizine 10 mg tablet  Commonly known as: ZyrTEC           citalopram 20 mg tablet  Commonly known as: CeleXA           clotrimazole-betamethasone cream  Commonly known as: Lotrisone      Apply 1 Application topically 2 times a day for 7 days.       DULoxetine 60 mg DR capsule  Commonly known as: Cymbalta      Take 1 capsule (60 mg) by mouth once daily. Do not crush or chew.        empagliflozin 10 mg  Commonly known as: Jardiance      Take 1 tablet (10 mg) by mouth once daily.       Flonase Sensimist 27.5 mcg/actuation nasal spray  Generic drug: fluticasone      Administer 1-2 sprays into each nostril once daily.       gabapentin 300 mg capsule  Commonly known as: Neurontin           levothyroxine 200 mcg tablet  Commonly known as: Synthroid, Levoxyl      Take 1 tablet (200 mcg) by mouth once daily.       levothyroxine 50 mcg tablet  Commonly known as: Synthroid, Levoxyl      Take 1 tablet (50 mcg) by mouth once daily in the morning. Take before meals. TAKE WITH 200MG       lubiprostone 24 mcg capsule  Commonly known as: Amitiza      Take 1 capsule (24 mcg) by mouth 2 times a day with meals.       magnesium oxide 400 mg (241.3 mg magnesium) tablet  Commonly known as: Mag-Ox      Take 1 tablet (400 mg) by mouth 2 times a day.       metoprolol succinate XL 50 mg 24 hr tablet  Commonly known as: Toprol-XL      Take 1 tablet (50 mg) by mouth 2 times a day. Do not crush or chew.       nystatin 100,000 unit/gram powder  Commonly known as: Mycostatin      Apply 1 Application topically 2 times a day.       ondansetron 4 mg tablet  Commonly known as: Zofran      Take 1 tablet (4 mg) by mouth every 8 hours if needed for nausea or vomiting.       oxygen gas therapy  Commonly known as: O2      Inhale 1 each every 12 hours.       pantoprazole 40 mg EC tablet  Commonly known as: ProtoNix           rosuvastatin 40 mg tablet  Commonly known as: Crestor           topiramate 25 mg tablet  Commonly known as: Topamax      Take 1 tablet (25 mg) by mouth 2 times a day.       traZODone 100 mg tablet  Commonly known as: Desyrel           triamcinolone 0.1 % ointment  Commonly known as: Kenalog      Apply topically 2 times a day as needed for irritation or rash.       warfarin 5 mg tablet  Commonly known as: Coumadin      Take as directed. If you are unsure how to take this medication, talk to your nurse or  doctor.  Original instructions: 1.5 TAB ONCE A DAY              STOP taking these medications      bumetanide 1 mg tablet  Commonly known as: Bumex        potassium chloride CR 10 mEq ER tablet  Commonly known as: Klor-Con  Replaced by: potassium chloride 20 mEq packet                  Where to Get Your Medications        These medications were sent to [facility]  [address] [place] [id number]      Hours: 8 AM to 5:30 Mon-Fri, 8 AM to 4 PM Saturday and Sunday Phone: [phone]   amoxicillin-pot clavulanate 875-125 mg tablet  potassium chloride 20 mEq packet  torsemide 40 mg tablet         Test Results Pending At Discharge  Pending Labs       Order Current Status    Blood Culture Preliminary result    Blood Culture Preliminary result            Hospital Course       53-year-old female with history of  Severe obesity  Stage III chronic kidney disease  Chronic lymphedema  Hyponatremia  Migraine  Anasarca  Hypertension  Hyperlipidemia  Probably metabolic syndrome  Osteoarthritis  Possible obesity hypoventilation syndrome/sleep apnea  Type 2 diabetes mellitus  Portal vein thrombosis on Coumadin  Possible COPD  GERD  Hernia  Anxiety depression  Gout  SEVILLA  Budd-Chiari syndrome  Diastolic CHF  Right-sided CHF, pulmonary hypertension  Presented with  Abdominal wall cellulitis especially right lower abdominal fold, failed outpatient antibiotics  Worsening lymphedema lower extremity bilaterally  Volume overload  Chest pain to rule out ACS  Hypokalemia  Rash with infected small wound on right abdomen  Left abdomen has small pore that drains at times, unclear etiology does not seem to be cyst or sebaceous cyst or lipoma or lump or sinus tract or fistulous tract confirmed.    Patient was treated with Lasix drip and volume status improved optimizing urine output and anasarca/lymphedema improved.  Watched serial BMP and I's and O's accordingly.  Patient denies any symptoms of dyspnea currently.  Seems to be  at baseline and is very excited to go home today.  We are switching to torsemide 40 mg twice daily while she responded well at this time.  However due to above multiple medical comorbidities and noncompliance possibly high risk of readmission and clinical deterioration.  We are closely following up with patient and nephrology recommended to check daily weights and it was advised to call immediately if noticeable weight increase as tolerated.  Patient will also finish Augmentin course and discussed with pharmacy.  She did not have enough time to see response and here we gave her vancomycin as well as Zosyn IV, responding well clinically.  Case management also sending wound care referral for wound center and closely watch it.  Encourage weight loss.  Discussed with PCP her options.  Continue insulin.  High risk of cardiac events as well CVA so medication compliance emphasized.  Continue Synthroid for fatigue and hypothyroidism.  Diet exercise emphasized.  She remains on CPAP/BiPAP when sleeping or at night.  Will continue torsemide 40 mg twice daily and KCl 40 mill equivalent twice daily for hypokalemia.  At this time hemodynamically stable, volume status optimized and maintaining vital saturations at baseline, eagerly waiting to go home.  Patient did respond to IV antibiotics and hence can switch to oral antibiotics.  She really required IV antibiotics for clinical improvement and anticipate resolution to take place gradually but high risk of reinfection or deterioration due to risk factors and questionable noncompliance  Also continue with Coumadin and maintain INR 2-3 optimally  Time to dc > 35 mins     Pertinent Physical Exam At Time of Discharge  Physical Exam  General Appearance: AAO x 3, morbidly obese  Skin: Erythema under left breast, also skin abrasion in right lower abdomen with yellowish discoloration but no active purulent discharge.  Also erythema in bilateral inguinal areas.  Slight tenderness, no  significant warmth.  No active bleeding or drainage.  Eyes : PERRL, EOM's intact  ENT: mucous membranes pink and moist  Neck: normocephalic  Respiratory: lungs clear to auscultation anteriorly; no wheezing, rhonchi, or crackles.  Diminished at bases  Heart: regular rate and rhythm.   Abdomen: Nondistended, positive bowel sounds x4, soft,  nontender  Extremities: 2+ lower extremity edema bilaterally, wrapped in Ace  Peripheral pulses: normal x4 extremities  Neuro: alert, coherent and conversant, no focal motor deficits  Last Recorded Vitals  Outpatient Follow-Up  Future Appointments   Date Time Provider Department Center   5/15/2024  4:30 PM Gamal Franklin MD [id number] [facility]   5/24/2024  1:00 PM [id number] PHARM ACOAG PHARMACIST [id number] [facility]   6/4/2024  8:30 PM SLEEP LAB [facility] [id number] ROOM 2 [id number] [facility]   7/9/2024  1:15 PM Delfin Earl MD [id number] [facility]   8/8/2024  2:00 PM Sandoval Lewis DO [id number] [facility]         Maria Guadalupe Monaco MD

## 2024-05-12 NOTE — DISCHARGE INSTR - OTHER ORDERS
"Fluid restriction    The Basics  Written by the doctors and editors at [facility]  Why do I need to restrict fluids? -- Most people are able to drink as much fluid as they want. But some people have health problems that cause their body to hold onto too much fluid. This causes swelling when fluid collects in the small spaces around tissues and organs inside the body. Another word for swelling is \"edema.\"  \"Fluid restriction\" means limiting how much fluid you take in through foods and drinks. This might be 1 way that your doctor treats your health problem.  To restrict fluids, reduce the amount of fluids you drink in a day. You might also have to limit foods that have a lot of fluids in them. Your doctor might ask you to keep a record of how much you drink each day. This will help them know if your body is getting rid of the right amount of fluid.  How much fluid should I have? -- Your doctor will talk with you about the amount of fluids you are allowed each day. This is based on things like your health problem and body size, and how much urine your body makes.  Your doctor might suggest that you work with a \"dietitian.\" A dietitian is an expert on food and eating.  What are fluids? -- Fluids include all drinks and any food that becomes a liquid at room temperature. Examples include:  ? All drinks including water, soda, juice, tea, and coffee  ? Flavored water  ? Sport and energy drinks  ? Alcohol  ? Ice and freezer pops  ? Milkshakes and smoothies  ? Soups and broth  Which foods count as fluids? -- Some kinds of foods have \"hidden liquids\" in them. You might also need to count these toward the total amount of fluid that you are allowed each day. Examples of foods with \"hidden liquids\" or a lot of water include:  ? Fruits - Watermelon, honeydew melon, cantaloupe, oranges, peaches, grapes, apples, pineapple, grapefruit, kiwi, strawberries, starfruit, blackberries, blueberries, raspberries.  ? Vegetables - Cucumber, " zucchini, celery, iceberg lettuce, Ben lettuce, tomatoes, bell peppers, spinach, kale, broccoli, carrots, radish, potatoes, eggplant, jicama, cauliflower.  How do I limit the amount of fluid that I take in? -- Talk to your doctor about the amount of fluids you are allowed each day. Then, spread the fluids that you are allowed throughout the day. Many people try to have their fluids with meals and snacks.  Some other tips to help you limit your fluids:  ? Drain fluids from canned or cooked fruits and vegetables before eating.  ? Choose a water bottle with markings for volume to keep track of your fluid intake. Pour drinks into small cups. This helps limit how much you drink. Take small sips at a time.  ? Take your medicines when you have your allowed drinks.  What can I do to help with thirst?  ? Suck on hard candy, lollipops, or mints. Chewing gum or sucking on a lemon slice can also help with a dry mouth.  ? Brush your teeth. Gargle with a cold mouthwash.  ? Rinse your mouth with water, but do not swallow.  ? Use a mouth moisturizer to help with chapped lips.  ? Cold drinks might help with thirst better than hot ones.  ? Suck on ice or frozen fruit. Count this as part of your fluids.  ? Distract yourself and try to keep busy so you are not thinking about your thirst.  All topics are updated as new evidence becomes available and our peer review process is complete.  This topic retrieved from [employer] on: Mar 13, 2024.  Topic 258164 Version 1.0  Release: 32.2.4 - C32.71  © 2024 UpToDate, Inc. and/or its affiliates. All rights reserved.  Consumer Information Use and Disclaimer  Disclaimer: This generalized information is a limited summary of diagnosis, treatment, and/or medication information. It is not meant to be comprehensive and should be used as a tool to help the user understand and/or assess potential diagnostic and treatment options. It does NOT include all information about conditions, treatments,  medications, side effects, or risks that may apply to a specific patient. It is not intended to be medical advice or a substitute for the medical advice, diagnosis, or treatment of a health care provider based on the health care provider's examination and assessment of a patient's specific and unique circumstances. Patients must speak with a health care provider for complete information about their health, medical questions, and treatment options, including any risks or benefits regarding use of medications. This information does not endorse any treatments or medications as safe, effective, or approved for treating a specific patient. UpToDate, Inc. and its affiliates disclaim any warranty or liability relating to this information or the use thereof.The use of this information is governed by the Terms of Use, available at https://www.[employer].com/en/know/clinical-effectiveness-terms. 2024© UpToDate, Inc. and its affiliates and/or licensors. All rights reserved.  © 2024 UpToDate, Inc. and/or its affiliates. All rights reserved.

## 2024-05-12 NOTE — PROGRESS NOTES
Physical Therapy                 Therapy Communication Note    Patient Name: Roselia Mo  MRN: [id number]  Today's Date: 5/12/2024     Discipline: Physical Therapy    Missed Visit Reason:  Patient refused treatment, stating she got orders for discharge from the hospital and does not want P.T. today.    Missed Time: Attempt    Comment:

## 2024-05-12 NOTE — PROGRESS NOTES
Vancomycin Dosing by Pharmacy- FOLLOW UP    Roselia Mo is a 53 y.o. year old female who Pharmacy has been consulted for vancomycin dosing for cellulitis, skin and soft tissue. Based on the patient's indication and renal status this patient is being dosed based on a goal AUC of 400-600.     Renal function is currently stable.    Current vancomycin dose: 1000 mg given every 8 hours        Visit Vitals  /68   Pulse 69   Temp 36.7 °C (98.1 °F)   Resp 20        Lab Results   Component Value Date    CREATININE 0.77 05/12/2024    CREATININE 0.87 05/11/2024    CREATININE 0.75 05/10/2024    CREATININE 0.65 05/09/2024        Patient weight is 182 KG        I/O last 3 completed shifts:  In: 3992.3 (21.9 mL/kg) [P.O.:1012; I.V.:30.3 (0.2 mL/kg); IV Piggyback:2950]  Out: 5425 (29.7 mL/kg) [Urine:5425 (0.8 mL/kg/hr)]  Weight: 182.5 kg   [unfilled]    Lab Results   Component Value Date    PATIENTTEMP 37.0 02/03/2024    PATIENTTEMP 37.0 02/01/2024        Assessment/Plan    Within goal AUC range. Continue current vancomycin regimen.      The next level will be ordered if there is a change in renal function.  Will continue to monitor renal function daily while on vancomycin and order serum creatinine at least every 48 hours if not already ordered.  Follow for continued vancomycin needs, clinical response, and signs/symptoms of toxicity.       Sammie Calle, [facility]

## 2024-05-12 NOTE — PROGRESS NOTES
Pharmacy Consult for Warfarin (Coumadin) Management - Daily Progress Note     Labs  POC INR   Date Value Ref Range Status   04/26/2024 2.20  Final     INR   Date Value Ref Range Status   05/12/2024 2.6 (H) 0.9 - 1.1 Final   05/11/2024 2.2 (H) 0.9 - 1.1 Final   05/10/2024 2.1 (H) 0.9 - 1.1 Final     Review  Warfarin Indication: Deep vein thrombosis, Pulmonary embolism  Target INR: 2 - 3    Orders placed per pharmacy consult. Pharmacy will continue to monitor and adjust therapy as needed.     Will change dose today to 5 mg x 1. Patient is currently receiving antibiotics which may be affecting INR levels.     Sammie Calle, [facility]

## 2024-05-12 NOTE — PROGRESS NOTES
Roselia oM is a 53 y.o. female on day 4 of admission presenting with Cellulitis.      Subjective   Patient seen and examined at the bedside  She is sitting comfortably in the bedside chair  She still has swelling but she does appear to be improved  She is in much better spirits and feeling well       Objective     Last Recorded Vitals  /68   Pulse 69   Temp 36.7 °C (98.1 °F)   Resp 20   Wt (!) 179 kg (395 lb 9.6 oz)   SpO2 96%   Intake/Output last 3 Shifts:    Intake/Output Summary (Last 24 hours) at 5/12/2024 1013  Last data filed at 5/12/2024 0900  Gross per 24 hour   Intake 1722.33 ml   Output 4225 ml   Net -2502.67 ml       Admission Weight  Weight: (!) 186 kg (410 lb) (05/08/24 1329)    Daily Weight  05/12/24 : (!) 179 kg (395 lb 9.6 oz)    Image Results  ECG 12 Lead  Sinus rhythm with Premature supraventricular complexes and with frequent Premature ventricular complexes  Low voltage QRS  Cannot rule out Anterior infarct (cited on or before 08-APR-2024)  Abnormal ECG  When compared with ECG of 08-MAY-2024 13:46, (unconfirmed)  Premature supraventricular complexes are now Present  See ED provider note for full interpretation and clinical correlation  Confirmed by Naty Vogt ([id number]) on 5/11/2024 5:35:56 PM      Physical Exam  Constitutional:       Appearance: Normal appearance. She is obese.   HENT:      Head: Normocephalic and atraumatic.      Right Ear: External ear normal.      Left Ear: External ear normal.      Nose: Nose normal.      Mouth/Throat:      Mouth: Mucous membranes are moist.      Pharynx: Oropharynx is clear.   Eyes:      Extraocular Movements: Extraocular movements intact.      Conjunctiva/sclera: Conjunctivae normal.      Pupils: Pupils are equal, round, and reactive to light.   Cardiovascular:      Rate and Rhythm: Normal rate and regular rhythm.   Pulmonary:      Effort: Pulmonary effort is normal.      Breath sounds: Normal breath sounds.   Abdominal:      General:  Abdomen is flat.      Palpations: Abdomen is soft.      Comments: Small spot on left abdomen  Centrally obese with + Abdominal wall edema   Musculoskeletal:         General: Swelling present.      Right lower leg: Edema present.      Left lower leg: Edema present.   Skin:     General: Skin is warm and dry.   Neurological:      General: No focal deficit present.      Mental Status: She is alert and oriented to person, place, and time.   Psychiatric:         Mood and Affect: Mood normal.         Behavior: Behavior normal.       Scheduled medications  allopurinol, 300 mg, oral, Daily  bacitracin, , Topical, TID  buPROPion XL, 150 mg, oral, q AM  DULoxetine, 60 mg, oral, Daily  empagliflozin, 10 mg, oral, Daily  insulin glargine, 10 Units, subcutaneous, q24h  insulin lispro, 0-5 Units, subcutaneous, TID with meals  levothyroxine, 200 mcg, oral, Daily  levothyroxine, 50 mcg, oral, Daily before breakfast  magnesium oxide, 400 mg, oral, BID  metoprolol succinate XL, 50 mg, oral, BID  nystatin, 1 Application, Topical, BID  pantoprazole, 40 mg, oral, BID  piperacillin-tazobactam, 3.375 g, intravenous, q6h  polyethylene glycol, 17 g, oral, Daily  potassium chloride CR, 20 mEq, oral, BID  potassium chloride CR, 40 mEq, oral, Once  rosuvastatin, 40 mg, oral, Daily  topiramate, 25 mg, oral, BID  torsemide, 40 mg, oral, BID with meals  traZODone, 100 mg, oral, Nightly  vancomycin (Vancocin) 1 g in dextrose 5% 250 mL IV, 1,000 mg, intravenous, q8h  warfarin, 5 mg, oral, Once      Continuous medications     PRN medications  PRN medications: acetaminophen **OR** acetaminophen **OR** acetaminophen, acetaminophen **OR** acetaminophen **OR** acetaminophen, dextrose, dextrose, glucagon, glucagon, morphine, morphine, ondansetron ODT **OR** ondansetron, oxyCODONE, oxygen, vancomycin    Relevant Results               Assessment/Plan                  Principal Problem:    Cellulitis    Peripheral edema with volume  overload  Hypokalemia  Diastolic CHF  Morbid Obesity  Lymphedema  HTN  DM II  Right Sided CHF  Pulmonary HTN  CY on CPAP  CKD stage II  Rash with small wound on left abdomen    Plan:  From my standpoint she can be discharged home  I would recommend the followin.  Weigh daily and record weight.  At this time we will make 395 her baseline as long as she continues to stay at 395 or less we will continue torsemide 40 mg twice a day with potassium 40 mEq twice a day  2.  If her weight goes up or if she notices more swelling she will call me immediately  Follow-up in my office in a week  Please call with any further issues or needs            Pardeep Nichole, DO

## 2024-05-13 ENCOUNTER — DOCUMENTATION (OUTPATIENT)
Dept: PRIMARY CARE | Facility: CLINIC | Age: 54
End: 2024-05-13
Payer: MEDICARE

## 2024-05-13 LAB
BACTERIA BLD AEROBE CULT: ABNORMAL
BACTERIA BLD CULT: ABNORMAL
BACTERIA BLD CULT: NORMAL
GRAM STN SPEC: ABNORMAL
GRAM STN SPEC: ABNORMAL

## 2024-05-14 ENCOUNTER — PATIENT OUTREACH (OUTPATIENT)
Dept: PRIMARY CARE | Facility: CLINIC | Age: 54
End: 2024-05-14
Payer: MEDICARE

## 2024-05-14 NOTE — PROGRESS NOTES
Discharge Facility: [facility]  Discharge Diagnosis: cellulitis  Admission Date: 5/8/2024  Discharge Date: 5/12/2024    PCP Appointment Date: 5/15/2024  Specialist Appointment Date:    -sleep lab 6/4/2024  -cardiology 7/9/2024  -GI 8/8/2024  Hospital Encounter and Summary: Linked     Issues Requiring Follow-Up  Check volume status and daily weight, baseline 395 and continue medicines as prescribed.  If weight goes above 395 then get immediate appointment with Dr. Nichole otherwise follow-up in a week time  Follow PCP for ongoing reconciliation    START taking:   potassium chloride (Klor-Con) This replaces a similar medication. See the full medication list for instructions.   torsemide     STOP taking:   bumetanide 1 mg tablet (Bumex)   potassium chloride CR 10 mEq ER tablet (Klor-Con) Replaced by a similar medication.    Two attempts were made to reach patient within two business days after discharge. Voicemail left with contact information for patient to call back with any non-emergent questions or concerns.

## 2024-05-20 ENCOUNTER — HOSPITAL ENCOUNTER (OUTPATIENT)
Dept: HOSPITAL 100 - WC | Age: 54
LOS: 11 days | Discharge: HOME | End: 2024-05-31
Payer: MEDICARE

## 2024-05-20 VITALS — BODY MASS INDEX: 75.8 KG/M2

## 2024-05-20 VITALS
DIASTOLIC BLOOD PRESSURE: 74 MMHG | SYSTOLIC BLOOD PRESSURE: 146 MMHG | RESPIRATION RATE: 22 BRPM | HEART RATE: 78 BPM | TEMPERATURE: 97.5 F

## 2024-05-20 DIAGNOSIS — J44.9: ICD-10-CM

## 2024-05-20 DIAGNOSIS — E03.9: ICD-10-CM

## 2024-05-20 DIAGNOSIS — Z79.01: ICD-10-CM

## 2024-05-20 DIAGNOSIS — L30.4: ICD-10-CM

## 2024-05-20 DIAGNOSIS — Z79.890: ICD-10-CM

## 2024-05-20 DIAGNOSIS — Z79.899: ICD-10-CM

## 2024-05-20 DIAGNOSIS — E11.22: ICD-10-CM

## 2024-05-20 DIAGNOSIS — Z79.4: ICD-10-CM

## 2024-05-20 DIAGNOSIS — L98.492: ICD-10-CM

## 2024-05-20 DIAGNOSIS — N18.30: ICD-10-CM

## 2024-05-20 DIAGNOSIS — E66.01: ICD-10-CM

## 2024-05-20 DIAGNOSIS — I50.9: ICD-10-CM

## 2024-05-20 DIAGNOSIS — Z87.891: ICD-10-CM

## 2024-05-20 DIAGNOSIS — E11.622: Primary | ICD-10-CM

## 2024-05-20 PROCEDURE — 11042 DBRDMT SUBQ TIS 1ST 20SQCM/<: CPT

## 2024-05-20 NOTE — PCM.WC.HP
History of Present Illness
Date of Service: 24
Chief Complaint: Intertrigo pannus ulcer
History of Wound: 53 year old female presents with an ulcer on her right lower abdomen/pannus ulcer that she states started 6 weeks ago.  She was admitted to [facility] for cellulitis 2 weeks ago. She is finishing up antibiotics she was given 
after leaving the hospital. She was referred here from her hospital stay in [place]. 
She has a history of Type 2 IDDM, COPD, CHF, CKD stage 3, Migraine, hypothyroidism and a previous pannus ulcer where she was treated here. 
She is on Warfarin. 
She has been cleaning the wound with soap and water and keeping a dry wash cloth over the wound to absorb the drainage.  She changes the wash cloth every couple days. 
She comes in today for further evaluation and treatment.  

Progress of Wound: 
She has an ulcer on her right lower abdomen/pannus area.  There is non viable tissue present. 

[facility]
Medical History (Reviewed 24 @ 09:56 by Bailey Haile NP, NP-C)

Portal vein thrombosis
Hypokalemia
GERD (gastroesophageal reflux disease)
Depression
Hypothyroidism
Gout
Iron deficiency
Migraine
COPD (chronic obstructive pulmonary disease)
Asthma
CHF (congestive heart failure)
Diabetes
Fecal impaction of colon
COSTA (nonalcoholic steatohepatitis)
Hx of small bowel obstruction


Home Medications

?Medication ?Instructions ?Recorded ?Last Taken ?Type
ergocalciferol (vitamin D2) 1,250 50,000 unit PO Q7D 14 Unknown History
mcg (50,000 unit) capsule (Vitamin    
D2)    
pantoprazole 40 mg granules 40 mg PO BID 14 Unknown History
delayed-release for susp in packet    
(Protonix)    
potassium gluconate 550 mg (90 mg) 90 mg PO BID 14 Unknown History
tablet    
fluoxetine 60 mg tablet 60 mg PO DAILY 23 History
gabapentin 400 mg capsule 400 mg PO TID 23 Unknown History
insulin regular hum U-500 conc 500 25 unit subcut ONCE 23 Unknown History
unit/mL subcutaneous soln (Humulin    
R U-500 (Concentrated) Insulin)    
levothyroxine 125 mcg capsule 125 mcg PO DAILY 23 Unknown History
metoprolol succinate 50 mg 50 mg PO DAILY 23 Unknown History
tablet,extended release 24 hr    
spironolactone 100 mg tablet 300 mg PO BID 23 Unknown History
trazodone 100 mg tablet 100 mg PO DAILY 23 Unknown History
warfarin 5 mg tablet 5 mg PO QMWF 23 Unknown History
levofloxacin 500 mg tablet 500 mg PO DAILY 14 days #14 tabs 23 Unknown Rx
oxycodone-acetaminophen 5 mg-325 1 tab PO Q6H 3 days #12 tabs 10/31/23 Unknown Rx
mg tablet (Percocet)    
albuterol sulfate 90 mcg/actuation inhalation 23 Unknown History
aerosol inhaler    
citalopram 20 mg tablet mg 23 Unknown History
cyclobenzaprine 5 mg tablet mg 23 Unknown History
gabapentin 300 mg capsule mg 23 Unknown History
levothyroxine 200 mcg tablet mcg 23 Unknown History
(Synthroid)    
levothyroxine 50 mcg tablet mcg 23 Unknown History
(Synthroid)    
pantoprazole 40 mg tablet,delayed mg PO 23 Unknown History
release    
rosuvastatin 40 mg tablet mg 23 Unknown History
tramadol 50 mg tablet mg 23 Unknown History
amoxicillin 875 mg-potassium 1 tab PO BID 24 Unknown History
clavulanate 125 mg tablet    




Allergy/AdvReac Type Severity Reaction Status Date / Time
dulaglutide (From Trulicity) Allergy Mild pancreatiti Verified 24 14:13
   s  
c[place]alt Allergy  Rash Verified 24 14:13
Environmental Allergies: Allergy  Rash Verified 24 14:13
Uncoded     
metformin Allergy  Diarrhea Verified 23 00:28
nickel Allergy  Rash Verified 24 14:13


Family History (Reviewed 24 @ 09:56 by Bailey Haile NP, NP-C)
Mother
 Heart disease
 Arthritis
 Glaucoma
 Hypertension
 HLD (hyperlipidemia)
Father
 Heart disease
 Hypertension
 HLD (hyperlipidemia)


Surgical History (Reviewed 24 @ 09:56 by Bailey Haile NP, NP-C)

H/O knee surgery
History of carpal tunnel release
H/O hernia repair
H/O: hysterectomy
Previous  section


Social History (Reviewed 24 @ 09:56 by Bailey Haile NP, NP-C)
Smoking Status:  Former smoker 
alcohol intake:  never 
substance use type:  marijuana 



ROS
Constitutional
Constitutional: Reports fatigue; Denies fever(s)
Eyes
Eyes: Reports none
ENT
HEENT: Reports none
Cardiovascular
Cardiovascular: Denies chest pain or dyspnea
Respiratory/Chest
Respiratory/Chest: Reports dyspnea; Denies cough
Gastrointestinal
Gastrointestinal: Reports none
Genitourinary
Genitourinary: Reports systems reviewed and no addt'l complaints, except as documented
Musculoskeletal
Musculoskeletal: Reports joint pain and joint stiffness
Integumentary
Integumentary: Reports skin ulcer
Neurologic
Neurologic: Reports systems reviewed and no addt'l complaints, except as documented
Psychiatric
Psychiatric: Reports systems reviewed and no addt'l complaints, except as documented
Endocrine
Endocrinology: Reports systems reviewed and no addt'l complaints, except as documented

Vital Signs
Vital Signs
Vital Signs: 


 24
14:01
Temperature 97.5 F L
Temperature Source Temporal
Pulse Rate 78
Respiratory Rate 22 H
Blood Pressure 146/74 H
Blood Pressure Mean 98
Blood Pressure Source Monitor

Weight

Weight:                        414 lb 11.419 oz                                  
Body Mass Index (BMI)          75.8                                              




Physical Exam
Const
alert, oriented x3 and no apparent distress
General Appearance: cooperative
HEENT
normocephalic
Head and Scalp: atraumatic
Eyes
General Eye: normal appearance of both eyes
Neck
full ROM
Lymph
Lymphatic: no lymphedema noted
Resp
normal respiratory effort, normal air movement and clear to auscultation bilaterally
Effort and Inspection: able to speak in complete sentences
Cardio
regular rate and regular rhythm
GI
soft to palpation and non-tender
Back/Spine
normal ROM
Extremity
full ROM and normal capillary refill
Skin
Wound Narrative: 
Right lower abdomen/panus ulcer is pink.  There is some non viable tissue present. No erythema surrounding tissue. 
Neuro
oriented x3
Psych
mental status grossly normal, thought process normal and cooperative

Debridement Note
Debridement Note
No debridement was completed: No debridement was completed today (Her insurance will not cover a debridement)
Post-Debridement Measurements and Additional Note: 
Post-Debridement Measurements/Treatment

 - Nurse 1 - General Ulcer Assessment               Start:  24 14:01
Freq:                                                 Status: Active        
Protocol:  DIANNA                                                        
Activity Type Activity Date Activity User E-sign Co-sign Detail
Recorded Client Recorded Date Recorded By   
Document 24 14:01 DL   
[id number] 24 14:09 DL   

  24
  14:01
 - Today's Visit Information 
Type of service Initial Visit
Arrival Mode Wheelchair
Transfer Assistance Manual
Transfer Assist (Other) x2
Patient Identification Verified (Name & Yes
) 
Patient Requires Transmission-Based No
Precautions 
Finger Stick Blood Sugar(mg/dl) (if 220
indicated): 
Blood Sugar Stated by
 Patient
Height and Weight 
Height 5 ft 2 in
Weight 414 lb 11.419
 oz
Weight in Pounds 414.7 lbs
Body Mass Index (BMI) 75.8
BMI Classification Obese
BSA - [place] 2.61
Vital Signs 
Temperature (97.8 F-99.1 F) 97.5 F L
Temperature Source Temporal
Pulse Rate () 78
Pulse Location Monitor
Respiratory Rate (12-18) 22 H
Respiratory rate source Observation
Blood Pressure (90//80) 146/74 H
Blood Pressure Mean 98
Source Monitor
Pain Scale: 0-10 Numeric 
Is Patient Pain Free? Yes
Communication Assessment 
Preferred language English
Able to Read Yes
Able to Write No
Communication Tools None
Right Hearing Abillity Normal
Left Hearing Abillity Normal
Visual Assistive Devices Glasses
Teaching Assessment 
Preferences Verbal,Written,
 Demonstration
Barriers to Learning None
Readiness To Learn Fair
Willingness to Engage in Self Management Med
Activies 
Readiness to Engage in Self Management Med
Activities 
Anxiety Level Anxious
Cooperation Cooperative
Perception Coherent
Interest in Health Problem Asks Questions
Education Importance Acknowledges
 Need
Does Patient Smoke tobacco or other No
substances 
Smoking Status Former smoker
Is Patient Diabetic Yes
Functional Assessment 
Recent Decline in Ability to Perform Denies Any
 Declines
Culture/Orthodox/ 
Cultural/Orthodox Needs that may affect No
Treatment Plan 
Would you allow our hospital  to No
meet you for the purpose of spiritual/ 
emotional support? 
 to contact place of [facility] orthodox No
Teaching: Wound Center 
Discharge Instructions 
 -Person Taught Patient
Dressing Your Wound 
 -Person Taught Patient
*Welcome to the Wound Center 
 -Person Taught Patient

WC - Nurse 1 - General Ulcer Measurement              Start:  24 14:01
Freq:                                                 Status: Active        
Protocol:                                                                   
Activity Type Activity Date Activity User E-sign Co-sign Detail
Recorded Client Recorded Date Recorded By   
Document 24 14:01 DL   
[email or web address] 24 14:09 DL   

  24
  14:01
Wound Center Nurse 1 
#2 Lower ABD 
 -Current Size (cm) - Length 5
 -Current Size (cm) - Width 2.8
 -Current Size (cm) - Depth 0.1
 -Total Square Cm 14.0
 -Photo Taken Yes
 -Classification - Thickness Full Thickness
 without Exposed
 Support
 Structure
 -Exudate Amt Medium
 -Exudate Type Serosanguineous
 -Wound Margin Distinct,
 Outline
 Attached
 -Granulation Amt Large (%)
 -Granulation Quality Red
 -Necrosis Amt Small (1-33%)
 -Necrotic Tissue Type Adherent Slough
 -Structure Exposed N/A
 -Texture (Noemi-wound Skin Appearance) Scarring
 -Moisture (Noemi-wound Skin Appearance) No Abnormality
 -Color (Noemi-wound Skin Appearance) No Abnormality
 -Temperature (Noemi-wound Skin No Abnormality
Appearance) (Pt Warm)
 -Tenderness on Palpation (Noemi-wound No
Skin Appearance) 
 -Ulcer Cleansing Soap and Water
 -Foul Odor after Cleansing No
 -Anesthetic Used 5% Lidocaine
 Gel

WC - Nurse 2 - General Ulcer CM Notes                 Start:  24 14:01
Freq:                                                 Status: Active        
Protocol:                                                                   
Activity Type Activity Date Activity User E-sign Co-sign Detail
Recorded Client Recorded Date Recorded By   
Document 24 14:37 DS   
[id number] 24 14:42 DS   
Edit Result 24 14:37 DS   (1)
[id number] 24 15:45 DS   

(1) #2 Lower ABD                           
    - Correct Side, Site, Position     No  => Yes
    - Correct Procedure                No  => Yes
    - Procedure Performed              No  => Yes
    - Type of Procedure                    => Debridement
    - Clinical Debridement                 => Subcutaneous
    - Tissue Removed                       => Subcutaneous
    - Bleeding Controlled with             => Pressure
    - Treatment Response                   => Procedure
                                           => Tolerated Well
    - Debridement - Subq,  20sq cm      => Yes
  24
  14:37
Wound Center Nurse 2 
 -Time 14:37
 -Correct Patient Yes
 -Correct Side, Site, Position Yes
 -Correct Procedure Yes
 -Procedure Performed Yes
 -Type of Procedure Debridement
 -Clinical Debridement Subcutaneous
 -Tissue Removed Subcutaneous
 -Post Debridement (cm) - Length 4.5
 -Post Debridement (cm) - Width 3.0
 -Post Debridement (cm) - Depth 0.1
 -Total Square (Post) (cm) 13.50
 -Area of Debridement (cm) - Length 4.5
 -Area of Debridement (cm) - Width 3.0
 -Total Square (Area) (cm) 13.50
 -Tunneling No
 -Undermining/Tunneling No
 -Circular Undermining No
 -Wound/Ulcer Outcome Not Healed
 -Ulcer Cleansing Rinsed/
 Irrigated with
 Saline
 -Bleeding Controlled with Pressure
 -Treatment Response Procedure
 Tolerated Well
 -Debridement - Subq, 1st 20sq cm Yes
Pain Scale: 0-10 Numeric 
Is Patient Pain Free? Yes

WC - Nurse 3 - General Ulcer D/C NN                   Start:  24 14:01
Freq:                                                 Status: Active        
Protocol:                                                                   
Activity Type Activity Date Activity User E-sign Co-sign Detail
Recorded Client Recorded Date Recorded By   
Document 24 15:04 MAILE   
[id number] 24 15:05 MAILE   

  24
  15:04
Wound Care Center Nurse 3 
#2 Lower ABD 
 -Ulcer Cleansing Rinsed/
 Irrigated with
 Saline
 -Foul Odor after Cleansing No
 -Primary Dressing Applied Mepilex Border,
 Promogran
 Ivory Matter
 -Mepilex Border 1
 -Promogran Ivory Matter 1
Pain Scale: 0-10 Numeric 
Is Patient Pain Free? Yes
WC - Visit Discharge 
Discharge Condition Stable
Ambulatory Status Wheelchair
Transportation Private Auto
Accompanied by 
Medication Reconcilliation completed & Yes
provided to patient/care provider 
Clinical Summary of Care Provided Yes




Charges/Coding
Visit Charges
Office Visits / Consults: 24633 OV L3 Est 20min

Assessment/Plan
Assessment/Plan
(1) Skin ulcer of abdomen with fat layer exposed: 
CODE(S):       L98.492 - Non-pressure chronic ulcer of skin of other sites with fat layer exposed
(2) Anticoagulant long-term use: 
CODE(S):       Z79.01 - Long term (current) use of anticoagulants
(3) Diabetes: 
CODE(S):       E11.9 - Type 2 diabetes mellitus without complications
(4) Morbid obesity with BMI of 60.0-69.9, adult: 
CODE(S):       E66.01 - Morbid (severe) obesity due to excess calories; Z68.44 - Body mass index [BMI] 60.0-69.9, adult
PLAN: 
Plan
Patient evaluated at the wound healing center.
Unable to do a debridement due to insurance purposes.  Wiped the ulcer with moistened gauze.  
She is still on antibiotics from being hospitalized, therefore a wound culture was not obtained. 

Wound care - Ivory covered by ABD daily after washing with soap and water.

Follow up 2 weeks due to the holiday next week.

## 2024-05-20 NOTE — HP.PCM_ITS
History of Present Illness    
Date of Service: 24    
Chief Complaint: Intertrigo pannus ulcer    
History of Wound: 53 year old female presents with an ulcer on her right lower   
abdomen/pannus ulcer that she states started 6 weeks ago.  She was admitted to   
[facility] for cellulitis 2 weeks ago. She is finishing up antibiotics she  
was given after leaving the hospital. She was referred here from her hospital   
stay in [place].     
She has a history of Type 2 IDDM, COPD, CHF, CKD stage 3, Migraine, hypoth  
yroidism and a previous pannus ulcer where she was treated here.     
She is on Warfarin.     
She has been cleaning the wound with soap and water and keeping a dry wash cloth  
over the wound to absorb the drainage.  She changes the wash cloth every couple   
days.     
She comes in today for further evaluation and treatment.      
    
Progress of Wound:     
She has an ulcer on her right lower abdomen/pannus area.  There is non viable   
tissue present.     
    
[facility]    
Medical History (Reviewed 24 @ 09:56 by Bailey Haile NP, NP-C)    
    
Portal vein thrombosis    
Hypokalemia    
GERD (gastroesophageal reflux disease)    
Depression    
Hypothyroidism    
Gout    
Iron deficiency    
Migraine    
COPD (chronic obstructive pulmonary disease)    
Asthma    
CHF (congestive heart failure)    
Diabetes    
Fecal impaction of colon    
COSTA (nonalcoholic steatohepatitis)    
Hx of small bowel obstruction    
    
    
                                Home Medications    
    
    
    
?Medication ?Instructions ?Recorded ?Last Taken ?Type    
     
ergocalciferol (vitamin D2) 1,250 50,000 unit PO Q7D 14 Unknown History    
    
mcg (50,000 unit) capsule (Vitamin        
    
D2)        
     
pantoprazole 40 mg granules 40 mg PO BID 14 Unknown History    
    
delayed-release for susp in packet        
    
(Protonix)        
     
potassium gluconate 550 mg (90 mg) 90 mg PO BID 14 Unknown History    
    
tablet        
     
fluoxetine 60 mg tablet 60 mg PO DAILY 23 History    
     
gabapentin 400 mg capsule 400 mg PO TID 23 Unknown History    
     
insulin regular hum U-500 conc 500 25 unit subcut ONCE 23 Unknown History    
    
unit/mL subcutaneous soln (Humulin        
    
R U-500 (Concentrated) Insulin)        
     
levothyroxine 125 mcg capsule 125 mcg PO DAILY 23 Unknown History    
     
metoprolol succinate 50 mg 50 mg PO DAILY 23 Unknown History    
    
tablet,extended release 24 hr        
     
spironolactone 100 mg tablet 300 mg PO BID 23 Unknown History    
     
trazodone 100 mg tablet 100 mg PO DAILY 23 Unknown History    
     
warfarin 5 mg tablet 5 mg PO QMWF 23 Unknown History    
     
levofloxacin 500 mg tablet 500 mg PO DAILY 14 days #14 tabs 23 Unknown Rx    
     
oxycodone-acetaminophen 5 mg-325 1 tab PO Q6H 3 days #12 tabs 10/31/23 Unknown   
Rx    
    
mg tablet (Percocet)        
     
albuterol sulfate 90 mcg/actuation inhalation 23 Unknown History    
    
aerosol inhaler        
     
citalopram 20 mg tablet mg 23 Unknown History    
     
cyclobenzaprine 5 mg tablet mg 23 Unknown History    
     
gabapentin 300 mg capsule mg 23 Unknown History    
     
levothyroxine 200 mcg tablet mcg 23 Unknown History    
    
(Synthroid)        
     
levothyroxine 50 mcg tablet mcg 23 Unknown History    
    
(Synthroid)        
     
pantoprazole 40 mg tablet,delayed mg PO 23 Unknown History    
    
release        
     
rosuvastatin 40 mg tablet mg 23 Unknown History    
     
tramadol 50 mg tablet mg 23 Unknown History    
     
amoxicillin 875 mg-potassium 1 tab PO BID 24 Unknown History    
    
clavulanate 125 mg tablet        
    
    
    
                                            
    
    
    
Allergy/AdvReac Type Severity Reaction Status Date / Time    
     
dulaglutide (From Trulicity) Allergy Mild pancreatiti Verified 24 14:13    
    
   s      
     
cobalt Allergy  Rash Verified 24 14:13    
     
Environmental Allergies: Allergy  Rash Verified 24 14:13    
    
Uncoded         
     
metformin Allergy  Diarrhea Verified 23 00:28    
     
nickel Allergy  Rash Verified 24 14:13    
    
    
    
Family History (Reviewed 24 @ 09:56 by Bailey Haile NP, NP-C)    
Mother   Heart disease    
   Arthritis    
   Glaucoma    
   Hypertension    
   HLD (hyperlipidemia)    
Father   Heart disease    
   Hypertension    
   HLD (hyperlipidemia)    
    
    
Surgical History (Reviewed 24 @ 09:56 by Bailey Haile NP, NP-C)    
    
H/O knee surgery    
History of carpal tunnel release    
H/O hernia repair    
H/O: hysterectomy    
Previous  section    
    
    
Social History (Reviewed 24 @ 09:56 by Bailey Haile NP, NP-C)    
Smoking Status:  Former smoker     
alcohol intake:  never     
substance use type:  marijuana     
    
    
    
ROS    
Constitutional    
Constitutional: Reports fatigue; Denies fever(s)    
Eyes    
Eyes: Reports none    
ENT    
HEENT: Reports none    
Cardiovascular    
Cardiovascular: Denies chest pain or dyspnea    
Respiratory/Chest    
Respiratory/Chest: Reports dyspnea; Denies cough    
Gastrointestinal    
Gastrointestinal: Reports none    
Genitourinary    
Genitourinary: Reports systems reviewed and no addt'l complaints, except as   
documented    
Musculoskeletal    
Musculoskeletal: Reports joint pain and joint stiffness    
Integumentary    
Integumentary: Reports skin ulcer    
Neurologic    
Neurologic: Reports systems reviewed and no addt'l complaints, except as   
documented    
Psychiatric    
Psychiatric: Reports systems reviewed and no addt'l complaints, except as   
documented    
Endocrine    
Endocrinology: Reports systems reviewed and no addt'l complaints, except as   
documented    
    
Vital Signs    
Vital Signs    
Vital Signs:     
                                            
    
    
    
 24    
14:01    
     
Temperature 97.5 F L    
     
Temperature Source Temporal    
     
Pulse Rate 78    
     
Respiratory Rate 22 H    
     
Blood Pressure 146/74 H    
     
Blood Pressure Mean 98    
     
Blood Pressure Source Monitor    
    
    
                                     Weight    
    
    
    
Weight:                        414 lb 11.419 oz                                   
    
     
Body Mass Index (BMI)          75.8                                               
    
    
    
    
    
    
Physical Exam    
Const    
alert, oriented x3 and no apparent distress    
General Appearance: cooperative    
HEENT    
normocephalic    
Head and Scalp: atraumatic    
Eyes    
General Eye: normal appearance of both eyes    
Neck    
full ROM    
Lymph    
Lymphatic: no lymphedema noted    
Resp    
normal respiratory effort, normal air movement and clear to auscultation   
bilaterally    
Effort and Inspection: able to speak in complete sentences    
Cardio    
regular rate and regular rhythm    
GI    
soft to palpation and non-tender    
Back/Spine    
normal ROM    
Extremity    
full ROM and normal capillary refill    
Skin    
Wound Narrative:     
Right lower abdomen/panus ulcer is pink.  There is some non viable tissue   
present. No erythema surrounding tissue.     
Neuro    
oriented x3    
Psych    
mental status grossly normal, thought process normal and cooperative    
    
Debridement Note    
Debridement Note    
No debridement was completed: No debridement was completed today (Her insurance   
will not cover a debridement)    
Post-Debridement Measurements and Additional Note:     
Post-Debridement Measurements/Treatment    
    
 - Nurse 1 - General Ulcer Assessment               Start:  24 14:01    
Freq:                                                 Status: Active            
Protocol:  DIANNA                                                            
    
    
Activity Type Activity Date Activity User E-sign Co-sign Detail    
    
Recorded Client Recorded Date Recorded By      
     
Document 24 14:01 DL       
    
10.10.25.7 24 14:09 DL       
    
    
    
    
  24    
    
  14:01    
     
 - Today's Visit Information     
     
Type of service Initial Visit    
     
Arrival Mode Wheelchair    
     
Transfer Assistance Manual    
     
Transfer Assist (Other) x2    
     
Patient Identification Verified (Name & Yes    
    
)     
     
Patient Requires Transmission-Based No    
    
Precautions     
     
Finger Stick Blood Sugar(mg/dl) (if 220    
    
indicated):     
     
Blood Sugar Stated by    
    
 Patient    
     
Height and Weight     
     
Height 5 ft 2 in    
     
Weight 414 lb 11.419    
    
 oz    
     
Weight in Pounds 414.7 lbs    
     
Body Mass Index (BMI) 75.8    
     
BMI Classification Obese    
     
BSA - [place] 2.61    
     
Vital Signs     
     
Temperature (97.8 F-99.1 F) 97.5 F L    
     
Temperature Source Temporal    
     
Pulse Rate () 78    
     
Pulse Location Monitor    
     
Respiratory Rate (12-18) 22 H    
     
Respiratory rate source Observation    
     
Blood Pressure (90//80) 146/74 H    
     
Blood Pressure Mean 98    
     
Source Monitor    
     
Pain Scale: 0-10 Numeric     
     
Is Patient Pain Free? Yes    
     
Communication Assessment     
     
Preferred language English    
     
Able to Read Yes    
     
Able to Write No    
     
Communication Tools None    
     
Right Hearing Abillity Normal    
     
Left Hearing Abillity Normal    
     
Visual Assistive Devices Glasses    
     
Teaching Assessment     
     
Preferences Verbal,Written,    
    
 Demonstration    
     
Barriers to Learning None    
     
Readiness To Learn Fair    
     
Willingness to Engage in Self Management Med    
    
Activies     
     
Readiness to Engage in Self Management Med    
    
Activities     
     
Anxiety Level Anxious    
     
Cooperation Cooperative    
     
Perception Coherent    
     
Interest in Health Problem Asks Questions    
     
Education Importance Acknowledges    
    
 Need    
     
Does Patient Smoke tobacco or other No    
    
substances     
     
Smoking Status Former smoker    
     
Is Patient Diabetic Yes    
     
Functional Assessment     
     
Recent Decline in Ability to Perform Denies Any    
    
 Declines    
     
Culture/Quaker/     
     
Cultural/Quaker Needs that may affect No    
    
Treatment Plan     
     
Would you allow our hospital  to No    
    
meet you for the purpose of spiritual/     
    
emotional support?     
     
 to contact place of [facility] No    
     
Teaching: Wound Center     
     
Discharge Instructions     
     
 -Person Taught Patient    
     
Dressing Your Wound     
     
 -Person Taught Patient    
     
*Welcome to the Wound Center     
     
 -Person Taught Patient    
    
    
WC - Nurse 1 - General Ulcer Measurement              Start:  24 14:01    
Freq:                                                 Status: Active            
Protocol:                                                                       
    
    
Activity Type Activity Date Activity User E-sign Co-sign Detail    
    
Recorded Client Recorded Date Recorded By      
     
Document 24 14:01 DL       
    
10.10.25.7 24 14:09 DL       
    
    
    
    
  24    
    
  14:01    
     
Wound Center Nurse 1     
     
#2 Lower ABD     
     
 -Current Size (cm) - Length 5    
     
 -Current Size (cm) - Width 2.8    
     
 -Current Size (cm) - Depth 0.1    
     
 -Total Square Cm 14.0    
     
 -Photo Taken Yes    
     
 -Classification - Thickness Full Thickness    
    
 without Exposed    
    
 Support    
    
 Structure

## 2024-05-21 ENCOUNTER — TELEPHONE (OUTPATIENT)
Dept: NEPHROLOGY | Facility: CLINIC | Age: 54
End: 2024-05-21
Payer: MEDICARE

## 2024-05-21 ENCOUNTER — PATIENT OUTREACH (OUTPATIENT)
Dept: PRIMARY CARE | Facility: CLINIC | Age: 54
End: 2024-05-21
Payer: MEDICARE

## 2024-05-21 NOTE — PROGRESS NOTES
Pt called to report that her weight is now up to 410 lbs. She stated she was supposed to call if her weight went up and she should have called last week but didn't.   \She did schedule an appt for 5/29. Does she need labs prior to appt?

## 2024-05-22 ENCOUNTER — LAB (OUTPATIENT)
Dept: LAB | Facility: LAB | Age: 54
End: 2024-05-22
Payer: MEDICARE

## 2024-05-22 ENCOUNTER — OFFICE VISIT (OUTPATIENT)
Dept: PRIMARY CARE | Facility: CLINIC | Age: 54
End: 2024-05-22
Payer: MEDICARE

## 2024-05-22 VITALS — SYSTOLIC BLOOD PRESSURE: 129 MMHG | DIASTOLIC BLOOD PRESSURE: 58 MMHG | HEART RATE: 80 BPM

## 2024-05-22 DIAGNOSIS — Z78.9 POOR PHYSICAL HEALTH: ICD-10-CM

## 2024-05-22 DIAGNOSIS — N18.2 CKD (CHRONIC KIDNEY DISEASE) STAGE 2, GFR 60-89 ML/MIN: ICD-10-CM

## 2024-05-22 DIAGNOSIS — D64.9 ANEMIA, UNSPECIFIED TYPE: ICD-10-CM

## 2024-05-22 DIAGNOSIS — L03.311 CELLULITIS OF ABDOMINAL WALL: Primary | ICD-10-CM

## 2024-05-22 LAB
ANION GAP SERPL CALC-SCNC: 13 MMOL/L (ref 10–20)
BUN SERPL-MCNC: 19 MG/DL (ref 6–23)
CALCIUM SERPL-MCNC: 9.7 MG/DL (ref 8.6–10.3)
CHLORIDE SERPL-SCNC: 104 MMOL/L (ref 98–107)
CO2 SERPL-SCNC: 27 MMOL/L (ref 21–32)
CREAT SERPL-MCNC: 0.78 MG/DL (ref 0.5–1.05)
EGFRCR SERPLBLD CKD-EPI 2021: 90 ML/MIN/1.73M*2
GLUCOSE SERPL-MCNC: 219 MG/DL (ref 74–99)
POTASSIUM SERPL-SCNC: 3.7 MMOL/L (ref 3.5–5.3)
SODIUM SERPL-SCNC: 140 MMOL/L (ref 136–145)

## 2024-05-22 PROCEDURE — 99214 OFFICE O/P EST MOD 30 MIN: CPT | Performed by: INTERNAL MEDICINE

## 2024-05-22 PROCEDURE — 3008F BODY MASS INDEX DOCD: CPT | Performed by: INTERNAL MEDICINE

## 2024-05-22 PROCEDURE — 3074F SYST BP LT 130 MM HG: CPT | Performed by: INTERNAL MEDICINE

## 2024-05-22 PROCEDURE — 3052F HG A1C>EQUAL 8.0%<EQUAL 9.0%: CPT | Performed by: INTERNAL MEDICINE

## 2024-05-22 PROCEDURE — 36415 COLL VENOUS BLD VENIPUNCTURE: CPT

## 2024-05-22 PROCEDURE — 80048 BASIC METABOLIC PNL TOTAL CA: CPT

## 2024-05-22 PROCEDURE — 3078F DIAST BP <80 MM HG: CPT | Performed by: INTERNAL MEDICINE

## 2024-05-22 PROCEDURE — 1036F TOBACCO NON-USER: CPT | Performed by: INTERNAL MEDICINE

## 2024-05-22 PROCEDURE — 3060F POS MICROALBUMINURIA REV: CPT | Performed by: INTERNAL MEDICINE

## 2024-05-22 RX ORDER — AMOXICILLIN AND CLAVULANATE POTASSIUM 875; 125 MG/1; MG/1
875 TABLET, FILM COATED ORAL 2 TIMES DAILY
Qty: 14 TABLET | Refills: 0 | Status: SHIPPED | OUTPATIENT
Start: 2024-05-22 | End: 2024-06-05 | Stop reason: ALTCHOICE

## 2024-05-22 ASSESSMENT — ENCOUNTER SYMPTOMS
AGITATION: 0
SHORTNESS OF BREATH: 1
NECK STIFFNESS: 0
COUGH: 0
NUMBNESS: 0
DIARRHEA: 0
DYSURIA: 0
GASTROINTESTINAL NEGATIVE: 1
PALPITATIONS: 0
ADENOPATHY: 0
HEADACHES: 0
CHILLS: 0
LIGHT-HEADEDNESS: 0
FEVER: 0
WHEEZING: 0
CONSTIPATION: 0
NEUROLOGICAL NEGATIVE: 1
CARDIOVASCULAR NEGATIVE: 1
MUSCULOSKELETAL NEGATIVE: 1
NAUSEA: 0
CONSTITUTIONAL NEGATIVE: 1
BACK PAIN: 0
WOUND: 1
EYES NEGATIVE: 1
ALLERGIC/IMMUNOLOGIC NEGATIVE: 1
ENDOCRINE NEGATIVE: 1
EYE DISCHARGE: 0
JOINT SWELLING: 0
VOMITING: 0
PSYCHIATRIC NEGATIVE: 1
ABDOMINAL PAIN: 0
HEMATOLOGIC/LYMPHATIC NEGATIVE: 1
CONFUSION: 0
ABDOMINAL DISTENTION: 0

## 2024-05-22 NOTE — PROGRESS NOTES
Subjective   Patient ID: Roselia Mo is a 53 y.o. female who presents for Follow-up (HOSPITAL DISCHARGE FOR CELLULITIS).  HERE FOR AFTER DC FROM HOSPITAL FOR CELLULITIS  STIL HAS WOUND ON THE RIGHT ABDOMINAL FOLD    DR JORGE ADJUSTING HER DIAURETICS, CHANGED TO TURESEMIDE        Review of Systems   Constitutional: Negative.  Negative for chills and fever.   HENT: Negative.  Negative for congestion.    Eyes: Negative.  Negative for discharge.   Respiratory:  Positive for shortness of breath (WITH EXERTION). Negative for cough and wheezing.    Cardiovascular: Negative.  Negative for chest pain, palpitations and leg swelling.   Gastrointestinal: Negative.  Negative for abdominal distention, abdominal pain, constipation, diarrhea, nausea and vomiting.   Endocrine: Negative.    Genitourinary: Negative.  Negative for dysuria and urgency.   Musculoskeletal: Negative.  Negative for back pain, joint swelling and neck stiffness.   Skin:  Positive for wound. Negative for rash.   Allergic/Immunologic: Negative.  Negative for immunocompromised state.   Neurological: Negative.  Negative for light-headedness, numbness and headaches.   Hematological: Negative.  Negative for adenopathy.   Psychiatric/Behavioral: Negative.  Negative for agitation, behavioral problems and confusion.    All other systems reviewed and are negative.      Objective   Physical Exam  Vitals reviewed.   Constitutional:       General: She is not in acute distress.     Appearance: She is obese. She is ill-appearing (CHRONIC).   HENT:      Head: Normocephalic and atraumatic.      Nose: Nose normal.   Eyes:      Conjunctiva/sclera: Conjunctivae normal.      Pupils: Pupils are equal, round, and reactive to light.   Neck:      Vascular: No carotid bruit.   Cardiovascular:      Rate and Rhythm: Normal rate and regular rhythm.      Pulses: Normal pulses.      Heart sounds:      No gallop.   Pulmonary:      Effort: Pulmonary effort is normal. No respiratory  distress.      Breath sounds: Normal breath sounds. No wheezing.   Abdominal:      General: Bowel sounds are normal.      Palpations: Abdomen is soft.      Tenderness: There is no abdominal tenderness.   Musculoskeletal:         General: Normal range of motion.      Cervical back: Normal range of motion. No rigidity.      Comments: B/L LYMPHEDEMA   Lymphadenopathy:      Cervical: No cervical adenopathy.   Skin:     General: Skin is warm.      Findings: No rash.      Comments: SMALLABDOMINAL WALL WOUND, CLEAN BASE   Neurological:      General: No focal deficit present.      Mental Status: She is alert and oriented to person, place, and time.   Psychiatric:         Mood and Affect: Mood normal.         Behavior: Behavior normal.       /58 (BP Location: Left arm, Patient Position: Sitting)   Pulse 80    Hemoglobin A1C   Date/Time Value Ref Range Status   03/19/2024 06:20 PM 9.0 (H) see below % Final     Assessment/Plan   Problem List Items Addressed This Visit       Anemia    Relevant Orders    CBC and Auto Differential    Vitamin B12    Methylmalonic Acid    Folate    Iron and TIBC    Ferritin    Comprehensive Metabolic Panel    Poor physical health     Other Visit Diagnoses       Cellulitis of abdominal wall    -  Primary    Relevant Medications    amoxicillin-pot clavulanate (Augmentin) 875-125 mg tablet    Other Relevant Orders    Referral to Infectious Disease    CBC and Auto Differential             Labs reviewed with pt    WILL CONT. ABX FOR ANOTHER WEEK AUGMENTIN    FU 3 WEEKS

## 2024-05-22 NOTE — WC
I have been trying to get ahold of patient and her  who is patient's next of kin to ask her a wound related question but the phones have been disconnected.  Tatyana from office was able to obtain patient's mother's number who is listed as a 
contact and was able to talk to her about needing to get in touch with patient.  She said patient was suppose to give us their new numbers on Monday.  She will reach out to Cathie to have her contact me.  Thanked her for her time.

## 2024-05-23 ENCOUNTER — OFFICE VISIT (OUTPATIENT)
Dept: NEPHROLOGY | Facility: CLINIC | Age: 54
End: 2024-05-23
Payer: MEDICARE

## 2024-05-23 ENCOUNTER — LAB (OUTPATIENT)
Dept: LAB | Facility: LAB | Age: 54
End: 2024-05-23
Payer: MEDICARE

## 2024-05-23 VITALS
SYSTOLIC BLOOD PRESSURE: 138 MMHG | WEIGHT: 293 LBS | DIASTOLIC BLOOD PRESSURE: 86 MMHG | BODY MASS INDEX: 72.72 KG/M2 | HEART RATE: 85 BPM | OXYGEN SATURATION: 96 %

## 2024-05-23 DIAGNOSIS — E83.52 HYPERCALCEMIA: ICD-10-CM

## 2024-05-23 DIAGNOSIS — N18.2 CKD (CHRONIC KIDNEY DISEASE) STAGE 2, GFR 60-89 ML/MIN: ICD-10-CM

## 2024-05-23 DIAGNOSIS — N18.2 CKD (CHRONIC KIDNEY DISEASE) STAGE 2, GFR 60-89 ML/MIN: Primary | ICD-10-CM

## 2024-05-23 LAB — URATE SERPL-MCNC: 7.4 MG/DL (ref 2.3–6.7)

## 2024-05-23 PROCEDURE — 1036F TOBACCO NON-USER: CPT | Performed by: INTERNAL MEDICINE

## 2024-05-23 PROCEDURE — 3008F BODY MASS INDEX DOCD: CPT | Performed by: INTERNAL MEDICINE

## 2024-05-23 PROCEDURE — 3052F HG A1C>EQUAL 8.0%<EQUAL 9.0%: CPT | Performed by: INTERNAL MEDICINE

## 2024-05-23 PROCEDURE — 99214 OFFICE O/P EST MOD 30 MIN: CPT | Performed by: INTERNAL MEDICINE

## 2024-05-23 PROCEDURE — 84550 ASSAY OF BLOOD/URIC ACID: CPT

## 2024-05-23 PROCEDURE — 3075F SYST BP GE 130 - 139MM HG: CPT | Performed by: INTERNAL MEDICINE

## 2024-05-23 PROCEDURE — 36415 COLL VENOUS BLD VENIPUNCTURE: CPT

## 2024-05-23 PROCEDURE — 3060F POS MICROALBUMINURIA REV: CPT | Performed by: INTERNAL MEDICINE

## 2024-05-23 PROCEDURE — 3079F DIAST BP 80-89 MM HG: CPT | Performed by: INTERNAL MEDICINE

## 2024-05-23 RX ORDER — METOLAZONE 5 MG/1
5 TABLET ORAL
Qty: 10 TABLET | Refills: 11 | Status: SHIPPED | OUTPATIENT
Start: 2024-05-23 | End: 2024-06-10 | Stop reason: HOSPADM

## 2024-05-23 ASSESSMENT — ENCOUNTER SYMPTOMS
CONSTITUTIONAL NEGATIVE: 1
MUSCULOSKELETAL NEGATIVE: 1
ENDOCRINE NEGATIVE: 1
RESPIRATORY NEGATIVE: 1
PSYCHIATRIC NEGATIVE: 1
NEUROLOGICAL NEGATIVE: 1
GASTROINTESTINAL NEGATIVE: 1
ALLERGIC/IMMUNOLOGIC NEGATIVE: 1
HEMATOLOGIC/LYMPHATIC NEGATIVE: 1
EYES NEGATIVE: 1
CARDIOVASCULAR NEGATIVE: 1

## 2024-05-23 NOTE — PROGRESS NOTES
Subjective   She has swelling in her hands  Says her hand hurts  Not urinating that much  Weight is up by 15 lbs.      Patient ID: Roselia Mo is a 53 y.o. female who presents for Follow-up (Hospital follow-up).  HPI  She is here for follow-up after a recent hospitalization in which she was hospitalized with massive volume overload.  Her weight when she left the hospital was 395 pounds.  She is up to 410 pounds.  She was to continue torsemide 40 mg twice a day  During her hospitalization we diuresed her massively.  She had blood work completed yesterday.  Her metabolic panel shows a glucose of 219 electrolytes look normal renal function looks normal  Medications are reviewed she is on Augmentin Jardiance gabapentin insulin magnesium metoprolol potassium a statin torsemide also on Coumadin  Review of Systems   Constitutional: Negative.    HENT: Negative.     Eyes: Negative.    Respiratory: Negative.     Cardiovascular: Negative.    Gastrointestinal: Negative.    Endocrine: Negative.    Genitourinary: Negative.    Musculoskeletal: Negative.    Skin: Negative.    Allergic/Immunologic: Negative.    Neurological: Negative.    Hematological: Negative.    Psychiatric/Behavioral: Negative.         Objective   Physical Exam  Constitutional:       Appearance: Normal appearance. She is obese.   HENT:      Head: Normocephalic and atraumatic.      Right Ear: External ear normal.      Left Ear: External ear normal.      Nose: Nose normal.      Mouth/Throat:      Mouth: Mucous membranes are moist.      Pharynx: Oropharynx is clear.   Eyes:      Extraocular Movements: Extraocular movements intact.      Conjunctiva/sclera: Conjunctivae normal.      Pupils: Pupils are equal, round, and reactive to light.   Cardiovascular:      Rate and Rhythm: Normal rate and regular rhythm.   Pulmonary:      Effort: Pulmonary effort is normal.      Breath sounds: Normal breath sounds.   Abdominal:      General: Abdomen is flat.      Palpations:  Abdomen is soft.   Skin:     General: Skin is warm and dry.   Neurological:      General: No focal deficit present.      Mental Status: She is alert and oriented to person, place, and time.   Psychiatric:         Mood and Affect: Mood normal.         Behavior: Behavior normal.         Assessment/Plan   Problem List Items Addressed This Visit             ICD-10-CM    CKD (chronic kidney disease) stage 2, GFR 60-89 ml/min - Primary N18.2    Relevant Medications    metOLazone (Zaroxolyn) 5 mg tablet    Other Relevant Orders    Uric acid    Follow Up In Nephrology    Hypercalcemia E83.52   Plan:   Start Metolazone  Will see ID  Will see how she responds  Check labs in 2 weeks.   Peripheral edema with volume overload  Hypokalemia  Diastolic CHF  Morbid Obesity  Lymphedema  HTN  DM II  Right Sided CHF  Pulmonary HTN  CY on CPAP  CKD stage II  Rash with small wound on left abdomen       Pardeep Nichole DO 05/23/24 11:23 AM

## 2024-05-24 ENCOUNTER — PATIENT OUTREACH (OUTPATIENT)
Dept: PRIMARY CARE | Facility: CLINIC | Age: 54
End: 2024-05-24
Payer: MEDICARE

## 2024-05-24 ENCOUNTER — ANTICOAGULATION - WARFARIN VISIT (OUTPATIENT)
Dept: PHARMACY | Facility: HOSPITAL | Age: 54
End: 2024-05-24
Payer: MEDICARE

## 2024-05-24 DIAGNOSIS — I82.0 HEPATIC VEIN THROMBOSIS (MULTI): ICD-10-CM

## 2024-05-24 DIAGNOSIS — I81 PORTAL VEIN THROMBOSIS: Primary | ICD-10-CM

## 2024-05-24 DIAGNOSIS — I82.0 BUDD-CHIARI SYNDROME (MULTI): ICD-10-CM

## 2024-05-24 LAB
POC INR: 3.4
POC PROTHROMBIN TIME: NORMAL

## 2024-05-24 PROCEDURE — 99211 OFF/OP EST MAY X REQ PHY/QHP: CPT | Performed by: PHARMACIST

## 2024-05-24 PROCEDURE — 85610 PROTHROMBIN TIME: CPT | Mod: QW

## 2024-05-24 NOTE — PROGRESS NOTES
Unable to reach patient for call back after patient's follow up appointment with PCP.  5/22/2024  M with call back number for patient to call if needed   If no voicemail available call attempts x 2 were made to contact the patient to assist with any questions or concerns patient may have.

## 2024-05-24 NOTE — PROGRESS NOTES
Pt enrolled in [facility] for management of Portal vein thrombosis [I81].     Pt current location in clinic.     Current anticoagulant: Warfarin    Time since last visit: 4 weeks    Last INR: 2.2 on warfarin 52.5 mg in the previous week. No changes were made at that time.    Since that time, she was seen and admitted to this facility for edema and cellulitis after stopping diuretics. INR was 1.8 on admission but came back up with 10mg x1 dose. INR was 2.6 on DC. Was discharged on Augmentin, has finished this.    Last Creatinine:   Lab Results   Component Value Date    CREATININE 0.78 05/22/2024     Last hemoglobin/hematocrit:  Lab Results   Component Value Date    HGB 11.5 (L) 05/12/2024     Lab Results   Component Value Date    HCT 35.9 (L) 05/12/2024       Current INR: 3.4 is SUPRAtherapeutic for goal range of 2.0-3.0 and is reflective of 52.5 mg in the previous week prior to visit.    Patient denies any missed doses.  Patient denies any diet changes, or OTC/herbal supplement changes since last visit - will be starting metolazone to see if that helps with edema. Fluid level is lower than on admission but still elevated. She has been having joint pain that may be gout or something else, she has been taking acetaminophen at 3-4 grams daily with limited benefit so she added ibuprofen recently. She thinks she still has cellulitis.  Patient denies any adverse reactions or barriers.  Patient denies any CP/SOB, fatigue, bleeding or bruising since last visit.   Patient denies any change in alcohol or tobacco use since last visit.   Patient denies any upcoming medical or dental procedures.    Plan:  Patient was instructed to  skip warfarin today, then start 47.5 mg weekly .  INR follow up will occur in 2 weeks.  Stop ibuprofen  Patient was instructed to maintain consistent vegetable intake, to monitor for any bruising or bleeding, and to call with any medication changes or concerns.    Pt handout given with above  information    Damien Dsouza, PharmD

## 2024-05-29 ENCOUNTER — APPOINTMENT (OUTPATIENT)
Dept: NEPHROLOGY | Facility: CLINIC | Age: 54
End: 2024-05-29
Payer: MEDICARE

## 2024-06-04 ENCOUNTER — APPOINTMENT (OUTPATIENT)
Dept: SLEEP MEDICINE | Facility: HOSPITAL | Age: 54
End: 2024-06-04
Payer: MEDICARE

## 2024-06-04 ENCOUNTER — APPOINTMENT (OUTPATIENT)
Dept: SLEEP MEDICINE | Facility: CLINIC | Age: 54
End: 2024-06-04
Payer: MEDICARE

## 2024-06-05 ENCOUNTER — TELEPHONE (OUTPATIENT)
Dept: NEPHROLOGY | Facility: CLINIC | Age: 54
End: 2024-06-05

## 2024-06-05 ENCOUNTER — ANTICOAGULATION - WARFARIN VISIT (OUTPATIENT)
Dept: PHARMACY | Facility: HOSPITAL | Age: 54
End: 2024-06-05
Payer: MEDICARE

## 2024-06-05 ENCOUNTER — TELEPHONE (OUTPATIENT)
Dept: PRIMARY CARE | Facility: CLINIC | Age: 54
End: 2024-06-05

## 2024-06-05 ENCOUNTER — OFFICE VISIT (OUTPATIENT)
Dept: PRIMARY CARE | Facility: CLINIC | Age: 54
End: 2024-06-05
Payer: MEDICARE

## 2024-06-05 ENCOUNTER — LAB (OUTPATIENT)
Dept: LAB | Facility: LAB | Age: 54
End: 2024-06-05
Payer: MEDICARE

## 2024-06-05 ENCOUNTER — HOSPITAL ENCOUNTER (INPATIENT)
Facility: HOSPITAL | Age: 54
LOS: 5 days | Discharge: HOME | End: 2024-06-10
Attending: EMERGENCY MEDICINE | Admitting: HOSPITALIST
Payer: MEDICARE

## 2024-06-05 VITALS
BODY MASS INDEX: 53.92 KG/M2 | HEIGHT: 62 IN | HEART RATE: 88 BPM | SYSTOLIC BLOOD PRESSURE: 147 MMHG | WEIGHT: 293 LBS | DIASTOLIC BLOOD PRESSURE: 84 MMHG

## 2024-06-05 DIAGNOSIS — E87.6 HYPOKALEMIA: ICD-10-CM

## 2024-06-05 DIAGNOSIS — Z79.899 MEDICATION MANAGEMENT: ICD-10-CM

## 2024-06-05 DIAGNOSIS — L03.311 CELLULITIS OF ABDOMINAL WALL: ICD-10-CM

## 2024-06-05 DIAGNOSIS — I81 PORTAL VEIN THROMBOSIS: Primary | ICD-10-CM

## 2024-06-05 DIAGNOSIS — R60.1 ANASARCA: ICD-10-CM

## 2024-06-05 DIAGNOSIS — D64.9 ANEMIA, UNSPECIFIED TYPE: ICD-10-CM

## 2024-06-05 DIAGNOSIS — I89.0 LYMPHEDEMA: ICD-10-CM

## 2024-06-05 DIAGNOSIS — Z00.00 HEALTHCARE MAINTENANCE: Primary | ICD-10-CM

## 2024-06-05 DIAGNOSIS — E87.6 HYPOKALEMIA: Primary | ICD-10-CM

## 2024-06-05 DIAGNOSIS — I81 PVT (PORTAL VEIN THROMBOSIS): ICD-10-CM

## 2024-06-05 DIAGNOSIS — I82.0 HEPATIC VEIN THROMBOSIS (MULTI): ICD-10-CM

## 2024-06-05 DIAGNOSIS — N17.9 AKI (ACUTE KIDNEY INJURY) (CMS-HCC): ICD-10-CM

## 2024-06-05 DIAGNOSIS — N18.31 STAGE 3A CHRONIC KIDNEY DISEASE (MULTI): ICD-10-CM

## 2024-06-05 DIAGNOSIS — E87.1 HYPONATREMIA: ICD-10-CM

## 2024-06-05 PROBLEM — D72.829 LEUKOCYTOSIS: Status: RESOLVED | Noted: 2024-02-08 | Resolved: 2024-06-05

## 2024-06-05 PROBLEM — M25.569 KNEE PAIN: Status: RESOLVED | Noted: 2023-11-27 | Resolved: 2024-06-05

## 2024-06-05 PROBLEM — L29.9 PRURITUS: Status: RESOLVED | Noted: 2024-03-19 | Resolved: 2024-06-05

## 2024-06-05 PROBLEM — Z91.89 POOR HEALTH: Status: RESOLVED | Noted: 2024-05-07 | Resolved: 2024-06-05

## 2024-06-05 PROBLEM — M25.50 MULTIPLE JOINT PAIN: Status: RESOLVED | Noted: 2024-04-02 | Resolved: 2024-06-05

## 2024-06-05 PROBLEM — R25.2 MUSCLE CRAMPS: Status: RESOLVED | Noted: 2024-03-19 | Resolved: 2024-06-05

## 2024-06-05 LAB
ALBUMIN SERPL BCP-MCNC: 4.4 G/DL (ref 3.4–5)
ALBUMIN SERPL BCP-MCNC: 4.7 G/DL (ref 3.4–5)
ALP SERPL-CCNC: 345 U/L (ref 33–110)
ALP SERPL-CCNC: 357 U/L (ref 33–110)
ALT SERPL W P-5'-P-CCNC: 16 U/L (ref 7–45)
ALT SERPL W P-5'-P-CCNC: 16 U/L (ref 7–45)
AMMONIA PLAS-SCNC: 48 UMOL/L (ref 16–53)
ANION GAP SERPL CALC-SCNC: 19 MMOL/L (ref 10–20)
ANION GAP SERPL CALC-SCNC: ABNORMAL MMOL/L
APPEARANCE UR: CLEAR
AST SERPL W P-5'-P-CCNC: 36 U/L (ref 9–39)
AST SERPL W P-5'-P-CCNC: 37 U/L (ref 9–39)
BASOPHILS # BLD AUTO: 0.02 X10*3/UL (ref 0–0.1)
BASOPHILS # BLD AUTO: 0.03 X10*3/UL (ref 0–0.1)
BASOPHILS NFR BLD AUTO: 0.2 %
BASOPHILS NFR BLD AUTO: 0.3 %
BILIRUB DIRECT SERPL-MCNC: 0.2 MG/DL (ref 0–0.3)
BILIRUB SERPL-MCNC: 0.8 MG/DL (ref 0–1.2)
BILIRUB SERPL-MCNC: 1.1 MG/DL (ref 0–1.2)
BILIRUB UR STRIP.AUTO-MCNC: NEGATIVE MG/DL
BUN SERPL-MCNC: 42 MG/DL (ref 6–23)
BUN SERPL-MCNC: 44 MG/DL (ref 6–23)
CALCIUM SERPL-MCNC: 10.3 MG/DL (ref 8.6–10.3)
CALCIUM SERPL-MCNC: 10.6 MG/DL (ref 8.6–10.3)
CHLORIDE SERPL-SCNC: 72 MMOL/L (ref 98–107)
CHLORIDE SERPL-SCNC: 73 MMOL/L (ref 98–107)
CO2 SERPL-SCNC: 45 MMOL/L (ref 21–32)
CO2 SERPL-SCNC: >45 MMOL/L (ref 21–32)
COLOR UR: NORMAL
CREAT SERPL-MCNC: 1.2 MG/DL (ref 0.5–1.05)
CREAT SERPL-MCNC: 1.6 MG/DL (ref 0.5–1.05)
EGFRCR SERPLBLD CKD-EPI 2021: 38 ML/MIN/1.73M*2
EGFRCR SERPLBLD CKD-EPI 2021: 54 ML/MIN/1.73M*2
EOSINOPHIL # BLD AUTO: 0.05 X10*3/UL (ref 0–0.7)
EOSINOPHIL # BLD AUTO: 0.1 X10*3/UL (ref 0–0.7)
EOSINOPHIL NFR BLD AUTO: 0.5 %
EOSINOPHIL NFR BLD AUTO: 0.9 %
ERYTHROCYTE [DISTWIDTH] IN BLOOD BY AUTOMATED COUNT: 13.4 % (ref 11.5–14.5)
ERYTHROCYTE [DISTWIDTH] IN BLOOD BY AUTOMATED COUNT: 13.5 % (ref 11.5–14.5)
FERRITIN SERPL-MCNC: 324 NG/ML (ref 8–150)
FOLATE SERPL-MCNC: 7 NG/ML
GLUCOSE BLD MANUAL STRIP-MCNC: 130 MG/DL (ref 74–99)
GLUCOSE SERPL-MCNC: 254 MG/DL (ref 74–99)
GLUCOSE SERPL-MCNC: 260 MG/DL (ref 74–99)
GLUCOSE UR STRIP.AUTO-MCNC: NORMAL MG/DL
HCT VFR BLD AUTO: 41.5 % (ref 36–46)
HCT VFR BLD AUTO: 44.5 % (ref 36–46)
HGB BLD-MCNC: 14 G/DL (ref 12–16)
HGB BLD-MCNC: 14.8 G/DL (ref 12–16)
HOLD SPECIMEN: NORMAL
IMM GRANULOCYTES # BLD AUTO: 0.03 X10*3/UL (ref 0–0.7)
IMM GRANULOCYTES # BLD AUTO: 0.03 X10*3/UL (ref 0–0.7)
IMM GRANULOCYTES NFR BLD AUTO: 0.3 % (ref 0–0.9)
IMM GRANULOCYTES NFR BLD AUTO: 0.3 % (ref 0–0.9)
IRON SATN MFR SERPL: 18 % (ref 25–45)
IRON SERPL-MCNC: 79 UG/DL (ref 35–150)
KETONES UR STRIP.AUTO-MCNC: NEGATIVE MG/DL
LEUKOCYTE ESTERASE UR QL STRIP.AUTO: NEGATIVE
LIPASE SERPL-CCNC: 29 U/L (ref 9–82)
LYMPHOCYTES # BLD AUTO: 1.18 X10*3/UL (ref 1.2–4.8)
LYMPHOCYTES # BLD AUTO: 1.54 X10*3/UL (ref 1.2–4.8)
LYMPHOCYTES NFR BLD AUTO: 10.5 %
LYMPHOCYTES NFR BLD AUTO: 13.9 %
MAGNESIUM SERPL-MCNC: 2.61 MG/DL (ref 1.6–2.4)
MCH RBC QN AUTO: 29.5 PG (ref 26–34)
MCH RBC QN AUTO: 29.9 PG (ref 26–34)
MCHC RBC AUTO-ENTMCNC: 33.3 G/DL (ref 32–36)
MCHC RBC AUTO-ENTMCNC: 33.7 G/DL (ref 32–36)
MCV RBC AUTO: 89 FL (ref 80–100)
MCV RBC AUTO: 89 FL (ref 80–100)
MONOCYTES # BLD AUTO: 0.94 X10*3/UL (ref 0.1–1)
MONOCYTES # BLD AUTO: 1.05 X10*3/UL (ref 0.1–1)
MONOCYTES NFR BLD AUTO: 8.4 %
MONOCYTES NFR BLD AUTO: 9.5 %
NEUTROPHILS # BLD AUTO: 8.39 X10*3/UL (ref 1.2–7.7)
NEUTROPHILS # BLD AUTO: 8.95 X10*3/UL (ref 1.2–7.7)
NEUTROPHILS NFR BLD AUTO: 75.6 %
NEUTROPHILS NFR BLD AUTO: 79.6 %
NITRITE UR QL STRIP.AUTO: NEGATIVE
NRBC BLD-RTO: 0 /100 WBCS (ref 0–0)
NRBC BLD-RTO: 0 /100 WBCS (ref 0–0)
PH UR STRIP.AUTO: 6.5 [PH]
PHOSPHATE SERPL-MCNC: 2.7 MG/DL (ref 2.5–4.9)
PLATELET # BLD AUTO: 232 X10*3/UL (ref 150–450)
PLATELET # BLD AUTO: 265 X10*3/UL (ref 150–450)
POC INR: 2.1
POC PROTHROMBIN TIME: NORMAL
POTASSIUM SERPL-SCNC: 1.9 MMOL/L (ref 3.5–5.3)
POTASSIUM SERPL-SCNC: 1.9 MMOL/L (ref 3.5–5.3)
PROT SERPL-MCNC: 7.1 G/DL (ref 6.4–8.2)
PROT SERPL-MCNC: 7.7 G/DL (ref 6.4–8.2)
PROT UR STRIP.AUTO-MCNC: NEGATIVE MG/DL
RBC # BLD AUTO: 4.69 X10*6/UL (ref 4–5.2)
RBC # BLD AUTO: 5.01 X10*6/UL (ref 4–5.2)
RBC # UR STRIP.AUTO: NEGATIVE /UL
SODIUM SERPL-SCNC: 131 MMOL/L (ref 136–145)
SODIUM SERPL-SCNC: 134 MMOL/L (ref 136–145)
SP GR UR STRIP.AUTO: 1.01
TIBC SERPL-MCNC: 451 UG/DL (ref 240–445)
UIBC SERPL-MCNC: 372 UG/DL (ref 110–370)
UROBILINOGEN UR STRIP.AUTO-MCNC: NORMAL MG/DL
VIT B12 SERPL-MCNC: 571 PG/ML (ref 211–911)
WBC # BLD AUTO: 11.1 X10*3/UL (ref 4.4–11.3)
WBC # BLD AUTO: 11.2 X10*3/UL (ref 4.4–11.3)

## 2024-06-05 PROCEDURE — 82947 ASSAY GLUCOSE BLOOD QUANT: CPT

## 2024-06-05 PROCEDURE — 2500000001 HC RX 250 WO HCPCS SELF ADMINISTERED DRUGS (ALT 637 FOR MEDICARE OP): Performed by: HOSPITALIST

## 2024-06-05 PROCEDURE — 2500000001 HC RX 250 WO HCPCS SELF ADMINISTERED DRUGS (ALT 637 FOR MEDICARE OP)

## 2024-06-05 PROCEDURE — 82140 ASSAY OF AMMONIA: CPT | Performed by: HOSPITALIST

## 2024-06-05 PROCEDURE — 82248 BILIRUBIN DIRECT: CPT | Performed by: EMERGENCY MEDICINE

## 2024-06-05 PROCEDURE — 94664 DEMO&/EVAL PT USE INHALER: CPT

## 2024-06-05 PROCEDURE — 3077F SYST BP >= 140 MM HG: CPT | Performed by: INTERNAL MEDICINE

## 2024-06-05 PROCEDURE — 2500000002 HC RX 250 W HCPCS SELF ADMINISTERED DRUGS (ALT 637 FOR MEDICARE OP, ALT 636 FOR OP/ED): Mod: MUE | Performed by: EMERGENCY MEDICINE

## 2024-06-05 PROCEDURE — 82728 ASSAY OF FERRITIN: CPT

## 2024-06-05 PROCEDURE — 83550 IRON BINDING TEST: CPT

## 2024-06-05 PROCEDURE — 84100 ASSAY OF PHOSPHORUS: CPT | Performed by: HOSPITALIST

## 2024-06-05 PROCEDURE — 83735 ASSAY OF MAGNESIUM: CPT | Performed by: EMERGENCY MEDICINE

## 2024-06-05 PROCEDURE — 3060F POS MICROALBUMINURIA REV: CPT | Performed by: INTERNAL MEDICINE

## 2024-06-05 PROCEDURE — 99211 OFF/OP EST MAY X REQ PHY/QHP: CPT

## 2024-06-05 PROCEDURE — 2500000004 HC RX 250 GENERAL PHARMACY W/ HCPCS (ALT 636 FOR OP/ED): Performed by: EMERGENCY MEDICINE

## 2024-06-05 PROCEDURE — 2500000005 HC RX 250 GENERAL PHARMACY W/O HCPCS: Performed by: EMERGENCY MEDICINE

## 2024-06-05 PROCEDURE — 2500000005 HC RX 250 GENERAL PHARMACY W/O HCPCS: Performed by: HOSPITALIST

## 2024-06-05 PROCEDURE — 99223 1ST HOSP IP/OBS HIGH 75: CPT | Performed by: HOSPITALIST

## 2024-06-05 PROCEDURE — 3008F BODY MASS INDEX DOCD: CPT | Performed by: INTERNAL MEDICINE

## 2024-06-05 PROCEDURE — 83690 ASSAY OF LIPASE: CPT | Performed by: EMERGENCY MEDICINE

## 2024-06-05 PROCEDURE — 2500000004 HC RX 250 GENERAL PHARMACY W/ HCPCS (ALT 636 FOR OP/ED): Performed by: HOSPITALIST

## 2024-06-05 PROCEDURE — 80053 COMPREHEN METABOLIC PANEL: CPT

## 2024-06-05 PROCEDURE — 9420000001 HC RT PATIENT EDUCATION 5 MIN

## 2024-06-05 PROCEDURE — 99215 OFFICE O/P EST HI 40 MIN: CPT | Performed by: INTERNAL MEDICINE

## 2024-06-05 PROCEDURE — 85025 COMPLETE CBC W/AUTO DIFF WBC: CPT

## 2024-06-05 PROCEDURE — 83921 ORGANIC ACID SINGLE QUANT: CPT

## 2024-06-05 PROCEDURE — 82607 VITAMIN B-12: CPT

## 2024-06-05 PROCEDURE — 81003 URINALYSIS AUTO W/O SCOPE: CPT | Performed by: EMERGENCY MEDICINE

## 2024-06-05 PROCEDURE — 82746 ASSAY OF FOLIC ACID SERUM: CPT

## 2024-06-05 PROCEDURE — 1200000002 HC GENERAL ROOM WITH TELEMETRY DAILY

## 2024-06-05 PROCEDURE — 1036F TOBACCO NON-USER: CPT | Performed by: INTERNAL MEDICINE

## 2024-06-05 PROCEDURE — 96366 THER/PROPH/DIAG IV INF ADDON: CPT | Mod: 59

## 2024-06-05 PROCEDURE — 85025 COMPLETE CBC W/AUTO DIFF WBC: CPT | Performed by: EMERGENCY MEDICINE

## 2024-06-05 PROCEDURE — 36415 COLL VENOUS BLD VENIPUNCTURE: CPT | Performed by: EMERGENCY MEDICINE

## 2024-06-05 PROCEDURE — 3052F HG A1C>EQUAL 8.0%<EQUAL 9.0%: CPT | Performed by: INTERNAL MEDICINE

## 2024-06-05 PROCEDURE — 85610 PROTHROMBIN TIME: CPT | Mod: QW

## 2024-06-05 PROCEDURE — 99285 EMERGENCY DEPT VISIT HI MDM: CPT | Mod: 25

## 2024-06-05 PROCEDURE — 2500000002 HC RX 250 W HCPCS SELF ADMINISTERED DRUGS (ALT 637 FOR MEDICARE OP, ALT 636 FOR OP/ED): Performed by: HOSPITALIST

## 2024-06-05 PROCEDURE — 96365 THER/PROPH/DIAG IV INF INIT: CPT | Mod: 59

## 2024-06-05 PROCEDURE — 92950 HEART/LUNG RESUSCITATION CPR: CPT | Performed by: EMERGENCY MEDICINE

## 2024-06-05 PROCEDURE — 3079F DIAST BP 80-89 MM HG: CPT | Performed by: INTERNAL MEDICINE

## 2024-06-05 PROCEDURE — 83540 ASSAY OF IRON: CPT

## 2024-06-05 RX ORDER — CITALOPRAM 20 MG/1
20 TABLET, FILM COATED ORAL DAILY
Status: DISCONTINUED | OUTPATIENT
Start: 2024-06-05 | End: 2024-06-06 | Stop reason: CLARIF

## 2024-06-05 RX ORDER — TOPIRAMATE 50 MG/1
TABLET, FILM COATED ORAL
Status: COMPLETED
Start: 2024-06-05 | End: 2024-06-05

## 2024-06-05 RX ORDER — POTASSIUM CHLORIDE 29.8 MG/ML
40 INJECTION INTRAVENOUS EVERY 6 HOURS
Status: DISCONTINUED | OUTPATIENT
Start: 2024-06-05 | End: 2024-06-05

## 2024-06-05 RX ORDER — TOPIRAMATE 50 MG/1
25 TABLET, FILM COATED ORAL 2 TIMES DAILY
Status: DISCONTINUED | OUTPATIENT
Start: 2024-06-05 | End: 2024-06-10 | Stop reason: HOSPADM

## 2024-06-05 RX ORDER — ROSUVASTATIN CALCIUM 20 MG/1
40 TABLET, COATED ORAL DAILY
Status: DISCONTINUED | OUTPATIENT
Start: 2024-06-06 | End: 2024-06-10 | Stop reason: HOSPADM

## 2024-06-05 RX ORDER — ONDANSETRON HYDROCHLORIDE 2 MG/ML
4 INJECTION, SOLUTION INTRAVENOUS EVERY 6 HOURS PRN
Status: DISCONTINUED | OUTPATIENT
Start: 2024-06-05 | End: 2024-06-10 | Stop reason: HOSPADM

## 2024-06-05 RX ORDER — FLUTICASONE PROPIONATE 50 MCG
1 SPRAY, SUSPENSION (ML) NASAL 2 TIMES DAILY
Status: DISCONTINUED | OUTPATIENT
Start: 2024-06-05 | End: 2024-06-10 | Stop reason: HOSPADM

## 2024-06-05 RX ORDER — METOPROLOL SUCCINATE 50 MG/1
50 TABLET, EXTENDED RELEASE ORAL 2 TIMES DAILY
Status: DISCONTINUED | OUTPATIENT
Start: 2024-06-06 | End: 2024-06-10 | Stop reason: HOSPADM

## 2024-06-05 RX ORDER — ACETAMINOPHEN 500 MG
5 TABLET ORAL NIGHTLY PRN
Status: DISCONTINUED | OUTPATIENT
Start: 2024-06-05 | End: 2024-06-10 | Stop reason: HOSPADM

## 2024-06-05 RX ORDER — LEVOTHYROXINE SODIUM 50 UG/1
50 TABLET ORAL
Status: DISCONTINUED | OUTPATIENT
Start: 2024-06-06 | End: 2024-06-10 | Stop reason: HOSPADM

## 2024-06-05 RX ORDER — POTASSIUM CHLORIDE 14.9 MG/ML
20 INJECTION INTRAVENOUS
Status: COMPLETED | OUTPATIENT
Start: 2024-06-05 | End: 2024-06-05

## 2024-06-05 RX ORDER — ALLOPURINOL 300 MG/1
300 TABLET ORAL DAILY
Status: DISCONTINUED | OUTPATIENT
Start: 2024-06-05 | End: 2024-06-10 | Stop reason: HOSPADM

## 2024-06-05 RX ORDER — DULOXETIN HYDROCHLORIDE 60 MG/1
60 CAPSULE, DELAYED RELEASE ORAL DAILY
Status: DISCONTINUED | OUTPATIENT
Start: 2024-06-05 | End: 2024-06-10 | Stop reason: HOSPADM

## 2024-06-05 RX ORDER — GABAPENTIN 300 MG/1
900 CAPSULE ORAL 4 TIMES DAILY
Status: DISCONTINUED | OUTPATIENT
Start: 2024-06-05 | End: 2024-06-10 | Stop reason: HOSPADM

## 2024-06-05 RX ORDER — ENOXAPARIN SODIUM 100 MG/ML
60 INJECTION SUBCUTANEOUS EVERY 12 HOURS SCHEDULED
Status: CANCELLED | OUTPATIENT
Start: 2024-06-05

## 2024-06-05 RX ORDER — SODIUM CHLORIDE 9 MG/ML
45 INJECTION, SOLUTION INTRAVENOUS CONTINUOUS
Status: DISCONTINUED | OUTPATIENT
Start: 2024-06-05 | End: 2024-06-06

## 2024-06-05 RX ORDER — POTASSIUM CHLORIDE 20 MEQ/1
60 TABLET, EXTENDED RELEASE ORAL EVERY 6 HOURS
Status: COMPLETED | OUTPATIENT
Start: 2024-06-05 | End: 2024-06-06

## 2024-06-05 RX ORDER — DEXTROSE 50 % IN WATER (D50W) INTRAVENOUS SYRINGE
25
Status: DISCONTINUED | OUTPATIENT
Start: 2024-06-05 | End: 2024-06-10 | Stop reason: HOSPADM

## 2024-06-05 RX ORDER — INSULIN LISPRO 100 [IU]/ML
0-10 INJECTION, SOLUTION INTRAVENOUS; SUBCUTANEOUS
Status: DISCONTINUED | OUTPATIENT
Start: 2024-06-06 | End: 2024-06-10 | Stop reason: HOSPADM

## 2024-06-05 RX ORDER — ACETAMINOPHEN 500 MG
500 TABLET ORAL EVERY 6 HOURS PRN
Status: ON HOLD | COMMUNITY

## 2024-06-05 RX ORDER — LEVOTHYROXINE SODIUM 100 UG/1
200 TABLET ORAL DAILY
Status: DISCONTINUED | OUTPATIENT
Start: 2024-06-06 | End: 2024-06-10 | Stop reason: HOSPADM

## 2024-06-05 RX ORDER — DEXTROSE 50 % IN WATER (D50W) INTRAVENOUS SYRINGE
12.5
Status: DISCONTINUED | OUTPATIENT
Start: 2024-06-05 | End: 2024-06-10 | Stop reason: HOSPADM

## 2024-06-05 RX ORDER — TRAZODONE HYDROCHLORIDE 50 MG/1
100 TABLET ORAL NIGHTLY
Status: DISCONTINUED | OUTPATIENT
Start: 2024-06-05 | End: 2024-06-05

## 2024-06-05 RX ORDER — AMOXICILLIN 250 MG
2 CAPSULE ORAL 2 TIMES DAILY
Status: DISCONTINUED | OUTPATIENT
Start: 2024-06-05 | End: 2024-06-10 | Stop reason: HOSPADM

## 2024-06-05 RX ORDER — WARFARIN 7.5 MG/1
7.5 TABLET ORAL DAILY
Status: DISCONTINUED | OUTPATIENT
Start: 2024-06-06 | End: 2024-06-07

## 2024-06-05 RX ORDER — ACETAMINOPHEN 325 MG/1
650 TABLET ORAL EVERY 6 HOURS PRN
Status: DISCONTINUED | OUTPATIENT
Start: 2024-06-05 | End: 2024-06-10 | Stop reason: HOSPADM

## 2024-06-05 RX ORDER — BUPROPION HYDROCHLORIDE 150 MG/1
150 TABLET ORAL EVERY MORNING
Status: DISCONTINUED | OUTPATIENT
Start: 2024-06-06 | End: 2024-06-10 | Stop reason: HOSPADM

## 2024-06-05 RX ORDER — PANTOPRAZOLE SODIUM 40 MG/1
40 TABLET, DELAYED RELEASE ORAL 2 TIMES DAILY
Status: DISCONTINUED | OUTPATIENT
Start: 2024-06-05 | End: 2024-06-10 | Stop reason: HOSPADM

## 2024-06-05 RX ORDER — LORATADINE 10 MG/1
10 TABLET ORAL DAILY
Status: DISCONTINUED | OUTPATIENT
Start: 2024-06-05 | End: 2024-06-10 | Stop reason: HOSPADM

## 2024-06-05 RX ORDER — POTASSIUM CHLORIDE 20 MEQ/1
40 TABLET, EXTENDED RELEASE ORAL DAILY
Status: DISCONTINUED | OUTPATIENT
Start: 2024-06-05 | End: 2024-06-05

## 2024-06-05 RX ADMIN — Medication 2 L/MIN: at 21:05

## 2024-06-05 RX ADMIN — SENNOSIDES AND DOCUSATE SODIUM 2 TABLET: 50; 8.6 TABLET ORAL at 23:31

## 2024-06-05 RX ADMIN — TOPIRAMATE 25 MG: 50 TABLET ORAL at 23:32

## 2024-06-05 RX ADMIN — SODIUM CHLORIDE 60 ML/HR: 9 INJECTION, SOLUTION INTRAVENOUS at 22:51

## 2024-06-05 RX ADMIN — POTASSIUM CHLORIDE 40 MEQ: 1500 TABLET, EXTENDED RELEASE ORAL at 18:21

## 2024-06-05 RX ADMIN — POTASSIUM CHLORIDE 60 MEQ: 1500 TABLET, EXTENDED RELEASE ORAL at 22:51

## 2024-06-05 RX ADMIN — POTASSIUM CHLORIDE 20 MEQ: 14.9 INJECTION, SOLUTION INTRAVENOUS at 18:21

## 2024-06-05 RX ADMIN — GABAPENTIN 900 MG: 300 CAPSULE ORAL at 22:50

## 2024-06-05 RX ADMIN — POTASSIUM CHLORIDE 20 MEQ: 14.9 INJECTION, SOLUTION INTRAVENOUS at 20:33

## 2024-06-05 RX ADMIN — Medication 3 L/MIN: at 22:30

## 2024-06-05 SDOH — SOCIAL STABILITY: SOCIAL INSECURITY: DO YOU FEEL UNSAFE GOING BACK TO THE PLACE WHERE YOU ARE LIVING?: NO

## 2024-06-05 SDOH — SOCIAL STABILITY: SOCIAL INSECURITY: HAVE YOU HAD ANY THOUGHTS OF HARMING ANYONE ELSE?: NO

## 2024-06-05 SDOH — SOCIAL STABILITY: SOCIAL INSECURITY: ARE YOU OR HAVE YOU BEEN THREATENED OR ABUSED PHYSICALLY, EMOTIONALLY, OR SEXUALLY BY ANYONE?: NO

## 2024-06-05 SDOH — SOCIAL STABILITY: SOCIAL INSECURITY: WERE YOU ABLE TO COMPLETE ALL THE BEHAVIORAL HEALTH SCREENINGS?: YES

## 2024-06-05 SDOH — SOCIAL STABILITY: SOCIAL INSECURITY: HAS ANYONE EVER THREATENED TO HURT YOUR FAMILY OR YOUR PETS?: NO

## 2024-06-05 SDOH — SOCIAL STABILITY: SOCIAL INSECURITY: DO YOU FEEL ANYONE HAS EXPLOITED OR TAKEN ADVANTAGE OF YOU FINANCIALLY OR OF YOUR PERSONAL PROPERTY?: NO

## 2024-06-05 SDOH — SOCIAL STABILITY: SOCIAL INSECURITY: DOES ANYONE TRY TO KEEP YOU FROM HAVING/CONTACTING OTHER FRIENDS OR DOING THINGS OUTSIDE YOUR HOME?: NO

## 2024-06-05 SDOH — SOCIAL STABILITY: SOCIAL INSECURITY: ARE THERE ANY APPARENT SIGNS OF INJURIES/BEHAVIORS THAT COULD BE RELATED TO ABUSE/NEGLECT?: NO

## 2024-06-05 SDOH — SOCIAL STABILITY: SOCIAL INSECURITY: HAVE YOU HAD THOUGHTS OF HARMING ANYONE ELSE?: NO

## 2024-06-05 SDOH — SOCIAL STABILITY: SOCIAL INSECURITY: ABUSE: ADULT

## 2024-06-05 ASSESSMENT — COGNITIVE AND FUNCTIONAL STATUS - GENERAL
TOILETING: A LITTLE
STANDING UP FROM CHAIR USING ARMS: A LITTLE
MOVING TO AND FROM BED TO CHAIR: A LITTLE
DAILY ACTIVITIY SCORE: 20
PATIENT BASELINE BEDBOUND: NO
DRESSING REGULAR UPPER BODY CLOTHING: A LITTLE
MOBILITY SCORE: 18
DRESSING REGULAR LOWER BODY CLOTHING: A LITTLE
HELP NEEDED FOR BATHING: A LITTLE
WALKING IN HOSPITAL ROOM: A LITTLE
CLIMB 3 TO 5 STEPS WITH RAILING: TOTAL

## 2024-06-05 ASSESSMENT — ENCOUNTER SYMPTOMS
RESPIRATORY NEGATIVE: 1
JOINT SWELLING: 0
DYSURIA: 0
SHORTNESS OF BREATH: 0
SHORTNESS OF BREATH: 0
MUSCULOSKELETAL NEGATIVE: 1
FATIGUE: 1
NAUSEA: 0
ABDOMINAL DISTENTION: 0
COUGH: 0
DYSURIA: 0
ARTHRALGIAS: 0
NECK STIFFNESS: 0
HEMATOLOGIC/LYMPHATIC NEGATIVE: 1
CONSTIPATION: 0
GASTROINTESTINAL NEGATIVE: 1
NEUROLOGICAL NEGATIVE: 1
WHEEZING: 0
DECREASED CONCENTRATION: 1
ENDOCRINE NEGATIVE: 1
WEAKNESS: 1
LIGHT-HEADEDNESS: 0
PSYCHIATRIC NEGATIVE: 1
CHILLS: 0
EYE DISCHARGE: 0
CHILLS: 0
ACTIVITY CHANGE: 1
FEVER: 0
CONFUSION: 1
HEMATOLOGIC/LYMPHATIC NEGATIVE: 1
ABDOMINAL PAIN: 0
MYALGIAS: 0
ADENOPATHY: 0
HEADACHES: 0
COUGH: 0
NUMBNESS: 0
APPETITE CHANGE: 1
VOMITING: 0
AGITATION: 0
PALPITATIONS: 0
NAUSEA: 0
FEVER: 0
DIZZINESS: 0
VOMITING: 0
EYES NEGATIVE: 1
BACK PAIN: 0
ALLERGIC/IMMUNOLOGIC NEGATIVE: 1
DIARRHEA: 0
PALPITATIONS: 0
CARDIOVASCULAR NEGATIVE: 1

## 2024-06-05 ASSESSMENT — LIFESTYLE VARIABLES
HOW OFTEN DO YOU HAVE 6 OR MORE DRINKS ON ONE OCCASION: NEVER
AUDIT-C TOTAL SCORE: 0
SKIP TO QUESTIONS 9-10: 1
AUDIT-C TOTAL SCORE: 0
HOW MANY STANDARD DRINKS CONTAINING ALCOHOL DO YOU HAVE ON A TYPICAL DAY: PATIENT DOES NOT DRINK
HOW OFTEN DO YOU HAVE A DRINK CONTAINING ALCOHOL: NEVER

## 2024-06-05 ASSESSMENT — PAIN DESCRIPTION - PAIN TYPE: TYPE: CHRONIC PAIN

## 2024-06-05 ASSESSMENT — ACTIVITIES OF DAILY LIVING (ADL)
LACK_OF_TRANSPORTATION: NO
PATIENT'S MEMORY ADEQUATE TO SAFELY COMPLETE DAILY ACTIVITIES?: YES
ASSISTIVE_DEVICE: WHEELCHAIR
GROOMING: NEEDS ASSISTANCE
BATHING: NEEDS ASSISTANCE
HEARING - RIGHT EAR: FUNCTIONAL
DRESSING YOURSELF: NEEDS ASSISTANCE
JUDGMENT_ADEQUATE_SAFELY_COMPLETE_DAILY_ACTIVITIES: YES
TOILETING: NEEDS ASSISTANCE
WALKS IN HOME: NEEDS ASSISTANCE
FEEDING YOURSELF: INDEPENDENT
ADEQUATE_TO_COMPLETE_ADL: YES
HEARING - LEFT EAR: FUNCTIONAL

## 2024-06-05 ASSESSMENT — PAIN DESCRIPTION - LOCATION: LOCATION: HAND

## 2024-06-05 ASSESSMENT — PATIENT HEALTH QUESTIONNAIRE - PHQ9
2. FEELING DOWN, DEPRESSED OR HOPELESS: NOT AT ALL
1. LITTLE INTEREST OR PLEASURE IN DOING THINGS: NOT AT ALL
SUM OF ALL RESPONSES TO PHQ9 QUESTIONS 1 & 2: 0

## 2024-06-05 ASSESSMENT — COLUMBIA-SUICIDE SEVERITY RATING SCALE - C-SSRS
6. HAVE YOU EVER DONE ANYTHING, STARTED TO DO ANYTHING, OR PREPARED TO DO ANYTHING TO END YOUR LIFE?: NO
1. IN THE PAST MONTH, HAVE YOU WISHED YOU WERE DEAD OR WISHED YOU COULD GO TO SLEEP AND NOT WAKE UP?: NO
1. IN THE PAST MONTH, HAVE YOU WISHED YOU WERE DEAD OR WISHED YOU COULD GO TO SLEEP AND NOT WAKE UP?: NO
2. HAVE YOU ACTUALLY HAD ANY THOUGHTS OF KILLING YOURSELF?: NO
6. HAVE YOU EVER DONE ANYTHING, STARTED TO DO ANYTHING, OR PREPARED TO DO ANYTHING TO END YOUR LIFE?: NO
2. HAVE YOU ACTUALLY HAD ANY THOUGHTS OF KILLING YOURSELF?: NO

## 2024-06-05 ASSESSMENT — PAIN SCALES - GENERAL
PAINLEVEL_OUTOF10: 10 - WORST POSSIBLE PAIN
PAINLEVEL_OUTOF10: 5 - MODERATE PAIN

## 2024-06-05 ASSESSMENT — PAIN - FUNCTIONAL ASSESSMENT
PAIN_FUNCTIONAL_ASSESSMENT: 0-10
PAIN_FUNCTIONAL_ASSESSMENT: 0-10

## 2024-06-05 NOTE — TELEPHONE ENCOUNTER
LAB CALLED WITH CRITICAL LAB RESULTS.   POTASSIUM = 1.9  BICARB = 45  DR. PINA NOTIFIED VERBALLY AND ADVISED FOR PATIENT TO GO TO ER IMMEDIATELY. PATIENT NOTIFIED AND UNDERSTOOD.

## 2024-06-05 NOTE — ED PROVIDER NOTES
Chief Complaint: LOW POTASSIUM    Patient sent to the emergency department because of potassium 1.9.  She apparently is been restarted on some diuretics because of excessive fluid retention.  She was sent in by her primary care physician for further evaluation she denies any chest pain or shortness of breath no nausea or vomiting otherwise she complains of very minimal weakness           Review of Systems   Constitutional:  Positive for fatigue. Negative for chills and fever.   HENT: Negative.     Respiratory:  Negative for cough and shortness of breath.    Cardiovascular:  Negative for chest pain and palpitations.   Gastrointestinal:  Negative for nausea and vomiting.   Genitourinary:  Negative for dysuria.   Musculoskeletal:  Negative for arthralgias and myalgias.   Skin:  Negative for rash.   Neurological:  Positive for weakness. Negative for dizziness.   Hematological: Negative.    Psychiatric/Behavioral: Negative.     All other systems reviewed and are negative.       Physical Exam  Constitutional:       General: She is not in acute distress.     Appearance: She is obese. She is not toxic-appearing.   HENT:      Head: Normocephalic.      Nose: Nose normal.      Mouth/Throat:      Pharynx: Oropharynx is clear.   Eyes:      Conjunctiva/sclera: Conjunctivae normal.      Pupils: Pupils are equal, round, and reactive to light.   Cardiovascular:      Rate and Rhythm: Normal rate.      Pulses: Normal pulses.      Heart sounds: No murmur heard.  Pulmonary:      Effort: Pulmonary effort is normal.   Abdominal:      General: There is no distension.      Tenderness: There is no abdominal tenderness.      Comments: Is protuberant but nontender   Musculoskeletal:         General: No swelling or tenderness. Normal range of motion.      Cervical back: Normal range of motion and neck supple. No rigidity or tenderness.      Right lower leg: No edema.      Left lower leg: No edema.   Skin:     General: Skin is warm and dry.       Findings: No erythema.   Neurological:      General: No focal deficit present.      Mental Status: She is alert and oriented to person, place, and time.      Sensory: No sensory deficit.      Motor: No weakness.   Psychiatric:         Mood and Affect: Mood normal.          Labs Reviewed   CBC WITH AUTO DIFFERENTIAL - Abnormal       Result Value    WBC 11.1      nRBC 0.0      RBC 4.69      Hemoglobin 14.0      Hematocrit 41.5      MCV 89      MCH 29.9      MCHC 33.7      RDW 13.4      Platelets 232      Neutrophils % 75.6      Immature Granulocytes %, Automated 0.3      Lymphocytes % 13.9      Monocytes % 9.5      Eosinophils % 0.5      Basophils % 0.2      Neutrophils Absolute 8.39 (*)     Immature Granulocytes Absolute, Automated 0.03      Lymphocytes Absolute 1.54      Monocytes Absolute 1.05 (*)     Eosinophils Absolute 0.05      Basophils Absolute 0.02     BASIC METABOLIC PANEL   MAGNESIUM   LIPASE   HEPATIC FUNCTION PANEL   URINALYSIS WITH REFLEX CULTURE AND MICROSCOPIC    Narrative:     The following orders were created for panel order Urinalysis with Reflex Culture and Microscopic.  Procedure                               Abnormality         Status                     ---------                               -----------         ------                     Urinalysis with Reflex C...[[id number]]                                                 Extra Urine Gray Tube[[id number]]                                                         Please view results for these tests on the individual orders.   URINALYSIS WITH REFLEX CULTURE AND MICROSCOPIC   EXTRA URINE GRAY TUBE   GREEN TOP        No orders to display        Procedures     Medical Decision Making  Patient's labs show potassium 1.9 today these were repeated she was ordered 40 mg potassium intravenously over 4 hours as well as 40 mg potassium p.o. her potassium will be patient was signed out to Dr. Garay incoming emergency physician at this time pending repeat  potassium level    Amount and/or Complexity of Data Reviewed  ECG/medicine tests: independent interpretation performed.     Details: Of lead EKG showed sinus rhythm at 79/min prolonged QT interval but no acute ST segment elevation or depressions         Diagnoses as of 06/05/24 1822   Hypokalemia                    Jono Machado MD  06/05/24 1825

## 2024-06-05 NOTE — PROGRESS NOTES
Pt enrolled in [facility] for management of Portal vein thrombosis [I81].     Pt current location in clinic.     Current anticoagulant: Warfarin    Time since last visit: 2 weeks    Last INR: 3.40 on warfarin 37.5 mg in the previous week. Pt was instructed to hold 1 dose and start 47.5 mg weekly at last visit.    Last Creatinine:   Lab Results   Component Value Date    CREATININE 0.78 05/22/2024     Last hemoglobin/hematocrit:  Lab Results   Component Value Date    HGB 11.5 (L) 05/12/2024     Lab Results   Component Value Date    HCT 35.9 (L) 05/12/2024       Current INR: 2.10 is therapeutic for goal range of 2.0-3.0 and is reflective of 47.5 mg in the previous week prior to visit.    Patient denies any missed doses.  Patient noted ~30 lb. Weight loss since she started her diuretics, but is still feeling thirsty throughout the day; lab review noted hyponatremia on most recent blood work - does have follow-up with Dr. Nichole 6/6 to address origins of her deficiency   Patient denies any medication changes, diet changes, or OTC/herbal supplement changes since last visit.  Patient denies any adverse reactions or barriers.  Patient denies any CP/SOB, fatigue, bleeding or bruising since last visit.   Patient denies any change in alcohol or tobacco use since last visit.   Patient denies any upcoming medical or dental procedures.    Plan:  Patient was instructed to continue with current warfarin dose.  INR follow up will occur in 3 weeks.  Patient was instructed to maintain consistent vegetable intake, to monitor for any bruising or bleeding, and to call with any medication changes or concerns.    Pt handout given with above information    Jono Pabon, StephanieD BCPS

## 2024-06-05 NOTE — PROGRESS NOTES
Pt called in stating she believes her sodium is low again. She is really shaky, her legs are giving out from under her and this is keeping her up all night. She states these are the same symptoms she had when her sodium dropped in the past.   She does see Dr. Franklin this PM but she wanted to discuss this with Dr. Nichole beforehand.

## 2024-06-05 NOTE — PROGRESS NOTES
Subjective   Patient ID: Roselia Mo is a 53 y.o. female who presents for Follow-up (2 WEEK F/U LABS (JUST HAD LABS DRAWN TODAY - RESULTS PENDING). C/O LEG WEAKNESS, DECREASED APPETITE WITH NAUSEA).  HERE FOR FU  CO WEAKNESS DECREASED APPETITE FEELS SOME CONFUSION, HAD BW TODAY WAITING FOR RESULT      LOST GOOD AMOUNT OF WT(WATER) WITH TORESMIDE AND ZAROXLYN        Review of Systems   Constitutional:  Positive for activity change and appetite change. Negative for chills and fever.   HENT: Negative.  Negative for congestion.    Eyes: Negative.  Negative for discharge.   Respiratory: Negative.  Negative for cough, shortness of breath and wheezing.    Cardiovascular: Negative.  Negative for chest pain, palpitations and leg swelling.   Gastrointestinal: Negative.  Negative for abdominal distention, abdominal pain, constipation, diarrhea, nausea and vomiting.   Endocrine: Negative.    Genitourinary: Negative.  Negative for dysuria and urgency.   Musculoskeletal: Negative.  Negative for back pain, joint swelling and neck stiffness.   Skin: Negative.  Negative for rash.   Allergic/Immunologic: Negative.  Negative for immunocompromised state.   Neurological: Negative.  Negative for light-headedness, numbness and headaches.   Hematological: Negative.  Negative for adenopathy.   Psychiatric/Behavioral:  Positive for confusion and decreased concentration. Negative for agitation and behavioral problems.    All other systems reviewed and are negative.      Objective   Physical Exam  Vitals reviewed.   Constitutional:       General: She is not in acute distress.     Appearance: She is obese. She is ill-appearing (CHRONICALLY).   HENT:      Head: Normocephalic and atraumatic.      Nose: Nose normal.   Eyes:      Conjunctiva/sclera: Conjunctivae normal.      Pupils: Pupils are equal, round, and reactive to light.   Neck:      Vascular: No carotid bruit.   Cardiovascular:      Rate and Rhythm: Normal rate and regular rhythm.      " Pulses: Normal pulses.      Heart sounds:      No gallop.   Pulmonary:      Effort: Pulmonary effort is normal. No respiratory distress.      Breath sounds: Normal breath sounds. No wheezing.   Abdominal:      General: Bowel sounds are normal.      Palpations: Abdomen is soft.      Tenderness: There is no abdominal tenderness.   Musculoskeletal:         General: Normal range of motion.      Cervical back: Normal range of motion. No rigidity.      Right lower leg: Edema (MILD) present.      Left lower leg: Edema (MILD) present.   Lymphadenopathy:      Cervical: No cervical adenopathy.   Skin:     General: Skin is warm.      Findings: No rash.      Comments: ABDOMINAL FOLD WOUND GETTING BETTER NO DISCHARGE , SMALL   Neurological:      General: No focal deficit present.      Mental Status: She is alert and oriented to person, place, and time.   Psychiatric:         Mood and Affect: Mood normal.         Behavior: Behavior normal.       /84   Pulse 88   Ht 1.575 m (5' 2\")   Wt (!) 165 kg (363 lb 14.4 oz)   BMI 66.56 kg/m²    Hemoglobin A1C   Date/Time Value Ref Range Status   03/19/2024 06:20 PM 9.0 (H) see below % Final     Assessment/Plan   Problem List Items Addressed This Visit       Hypokalemia (Chronic)    Stage 3a chronic kidney disease (Multi)     Other Visit Diagnoses       Healthcare maintenance    -  Primary    Medication management        Relevant Orders    Opiate/Opioid/Benzo Prescription Compliance    Basic Metabolic Panel             BMP PENDING, PT WAS DC BUT WAS TOLD WILL CALL WITH THE RESULT SOON      POTASSIUM 1.9(CRITICAL)    LABS RECEIVED AND REVIEWED PT WAS CALLED , HER K VERY LOW ADVISED TO GO TO ER IMMEDIATELY    CONTRACT FOR GABAPENTIN DONE    CASE DISCUSSED WITH ER PROVIDER, MIGHT NEED HOSPITALIZATION    MDM    1) COMPLEXITY: 1 OR MORE CHRONIC ILLNESS WITH SEVERE EXACERBATION, PROGRESSION OR SIDE EFFECTS TO TREATMENT  2)DATA: TESTS INTERPRETED AND OR ORDERED, TOOK INDEPENDENT HISTORY " OR RECORDS REVIEWED, CASE DISCUSSED WITH ANOTHER PROVIDER  3)RISK: HIGH RISK DUE TO NATURE OF MEDICAL CONDITIONS/COMORBIDITY OR MEDICATIONS ORDERED OR SURGICAL OR PROCEDURE REFERRAL, OR REFERRED TO HOSPITAL .      FU AFTER DC WITH BW FOR FU

## 2024-06-06 ENCOUNTER — APPOINTMENT (OUTPATIENT)
Dept: NEPHROLOGY | Facility: CLINIC | Age: 54
End: 2024-06-06
Payer: MEDICARE

## 2024-06-06 ENCOUNTER — APPOINTMENT (OUTPATIENT)
Dept: CARDIOLOGY | Facility: HOSPITAL | Age: 54
DRG: 641 | End: 2024-06-06
Payer: MEDICARE

## 2024-06-06 LAB
ALBUMIN SERPL BCP-MCNC: 4.5 G/DL (ref 3.4–5)
ALP SERPL-CCNC: 338 U/L (ref 33–110)
ALT SERPL W P-5'-P-CCNC: 15 U/L (ref 7–45)
ANION GAP SERPL CALC-SCNC: 13 MMOL/L (ref 10–20)
ANION GAP SERPL CALC-SCNC: ABNORMAL MMOL/L
AST SERPL W P-5'-P-CCNC: 32 U/L (ref 9–39)
ATRIAL RATE: 79 BPM
BILIRUB SERPL-MCNC: 0.8 MG/DL (ref 0–1.2)
BUN SERPL-MCNC: 28 MG/DL (ref 6–23)
BUN SERPL-MCNC: 39 MG/DL (ref 6–23)
CALCIUM SERPL-MCNC: 10.4 MG/DL (ref 8.6–10.3)
CALCIUM SERPL-MCNC: 10.6 MG/DL (ref 8.6–10.3)
CHLORIDE SERPL-SCNC: 78 MMOL/L (ref 98–107)
CHLORIDE SERPL-SCNC: 84 MMOL/L (ref 98–107)
CHLORIDE UR-SCNC: 31 MMOL/L
CHLORIDE/CREATININE (MMOL/G) IN URINE: 48 MMOL/G CREAT (ref 38–318)
CO2 SERPL-SCNC: 42 MMOL/L (ref 21–32)
CO2 SERPL-SCNC: >45 MMOL/L (ref 21–32)
CREAT SERPL-MCNC: 0.85 MG/DL (ref 0.5–1.05)
CREAT SERPL-MCNC: 1.09 MG/DL (ref 0.5–1.05)
CREAT UR-MCNC: 64.6 MG/DL (ref 20–320)
EGFRCR SERPLBLD CKD-EPI 2021: 60 ML/MIN/1.73M*2
EGFRCR SERPLBLD CKD-EPI 2021: 81 ML/MIN/1.73M*2
ERYTHROCYTE [DISTWIDTH] IN BLOOD BY AUTOMATED COUNT: 13.4 % (ref 11.5–14.5)
GLUCOSE BLD MANUAL STRIP-MCNC: 226 MG/DL (ref 74–99)
GLUCOSE BLD MANUAL STRIP-MCNC: 291 MG/DL (ref 74–99)
GLUCOSE BLD MANUAL STRIP-MCNC: 296 MG/DL (ref 74–99)
GLUCOSE BLD MANUAL STRIP-MCNC: 305 MG/DL (ref 74–99)
GLUCOSE SERPL-MCNC: 177 MG/DL (ref 74–99)
GLUCOSE SERPL-MCNC: 280 MG/DL (ref 74–99)
HCT VFR BLD AUTO: 45.3 % (ref 36–46)
HGB BLD-MCNC: 14.8 G/DL (ref 12–16)
HOLD SPECIMEN: NORMAL
HOLD SPECIMEN: NORMAL
INR PPP: 1.9 (ref 0.9–1.1)
MAGNESIUM SERPL-MCNC: 2.74 MG/DL (ref 1.6–2.4)
MAGNESIUM SERPL-MCNC: 2.82 MG/DL (ref 1.6–2.4)
MCH RBC QN AUTO: 29.7 PG (ref 26–34)
MCHC RBC AUTO-ENTMCNC: 32.7 G/DL (ref 32–36)
MCV RBC AUTO: 91 FL (ref 80–100)
NRBC BLD-RTO: 0 /100 WBCS (ref 0–0)
P AXIS: 58 DEGREES
P OFFSET: 183 MS
P ONSET: 121 MS
PLATELET # BLD AUTO: 260 X10*3/UL (ref 150–450)
POTASSIUM SERPL-SCNC: 2.1 MMOL/L (ref 3.5–5.3)
POTASSIUM SERPL-SCNC: 2.8 MMOL/L (ref 3.5–5.3)
POTASSIUM SERPL-SCNC: 2.9 MMOL/L (ref 3.5–5.3)
PR INTERVAL: 168 MS
PROT SERPL-MCNC: 7.9 G/DL (ref 6.4–8.2)
PROTHROMBIN TIME: 22.2 SECONDS (ref 9.8–12.8)
Q ONSET: 205 MS
QRS COUNT: 13 BEATS
QRS DURATION: 104 MS
QT INTERVAL: 558 MS
QTC CALCULATION(BAZETT): 639 MS
QTC FREDERICIA: 611 MS
R AXIS: -7 DEGREES
RBC # BLD AUTO: 4.99 X10*6/UL (ref 4–5.2)
SODIUM SERPL-SCNC: 136 MMOL/L (ref 136–145)
SODIUM SERPL-SCNC: 137 MMOL/L (ref 136–145)
T AXIS: 34 DEGREES
T OFFSET: 484 MS
URATE SERPL-MCNC: 12.8 MG/DL (ref 2.3–6.7)
VENTRICULAR RATE: 79 BPM
WBC # BLD AUTO: 10.4 X10*3/UL (ref 4.4–11.3)

## 2024-06-06 PROCEDURE — 5A09357 ASSISTANCE WITH RESPIRATORY VENTILATION, LESS THAN 24 CONSECUTIVE HOURS, CONTINUOUS POSITIVE AIRWAY PRESSURE: ICD-10-PCS | Performed by: HOSPITALIST

## 2024-06-06 PROCEDURE — 1200000002 HC GENERAL ROOM WITH TELEMETRY DAILY

## 2024-06-06 PROCEDURE — 80048 BASIC METABOLIC PNL TOTAL CA: CPT | Mod: CCI | Performed by: INTERNAL MEDICINE

## 2024-06-06 PROCEDURE — 94761 N-INVAS EAR/PLS OXIMETRY MLT: CPT

## 2024-06-06 PROCEDURE — 93010 ELECTROCARDIOGRAM REPORT: CPT | Performed by: STUDENT IN AN ORGANIZED HEALTH CARE EDUCATION/TRAINING PROGRAM

## 2024-06-06 PROCEDURE — 36415 COLL VENOUS BLD VENIPUNCTURE: CPT | Performed by: HOSPITALIST

## 2024-06-06 PROCEDURE — 2500000001 HC RX 250 WO HCPCS SELF ADMINISTERED DRUGS (ALT 637 FOR MEDICARE OP)

## 2024-06-06 PROCEDURE — 85027 COMPLETE CBC AUTOMATED: CPT | Performed by: HOSPITALIST

## 2024-06-06 PROCEDURE — 80053 COMPREHEN METABOLIC PANEL: CPT | Performed by: HOSPITALIST

## 2024-06-06 PROCEDURE — 2500000001 HC RX 250 WO HCPCS SELF ADMINISTERED DRUGS (ALT 637 FOR MEDICARE OP): Performed by: HOSPITALIST

## 2024-06-06 PROCEDURE — 84132 ASSAY OF SERUM POTASSIUM: CPT | Performed by: INTERNAL MEDICINE

## 2024-06-06 PROCEDURE — 2500000004 HC RX 250 GENERAL PHARMACY W/ HCPCS (ALT 636 FOR OP/ED): Performed by: HOSPITALIST

## 2024-06-06 PROCEDURE — 2500000005 HC RX 250 GENERAL PHARMACY W/O HCPCS: Performed by: EMERGENCY MEDICINE

## 2024-06-06 PROCEDURE — 84132 ASSAY OF SERUM POTASSIUM: CPT | Performed by: HOSPITALIST

## 2024-06-06 PROCEDURE — 2500000005 HC RX 250 GENERAL PHARMACY W/O HCPCS: Performed by: HOSPITALIST

## 2024-06-06 PROCEDURE — 84550 ASSAY OF BLOOD/URIC ACID: CPT | Performed by: HOSPITALIST

## 2024-06-06 PROCEDURE — 82947 ASSAY GLUCOSE BLOOD QUANT: CPT | Mod: 91

## 2024-06-06 PROCEDURE — 36415 COLL VENOUS BLD VENIPUNCTURE: CPT | Performed by: INTERNAL MEDICINE

## 2024-06-06 PROCEDURE — 83735 ASSAY OF MAGNESIUM: CPT | Performed by: HOSPITALIST

## 2024-06-06 PROCEDURE — 99222 1ST HOSP IP/OBS MODERATE 55: CPT | Performed by: INTERNAL MEDICINE

## 2024-06-06 PROCEDURE — 93005 ELECTROCARDIOGRAM TRACING: CPT

## 2024-06-06 PROCEDURE — 99291 CRITICAL CARE FIRST HOUR: CPT | Performed by: HOSPITALIST

## 2024-06-06 PROCEDURE — 2500000002 HC RX 250 W HCPCS SELF ADMINISTERED DRUGS (ALT 637 FOR MEDICARE OP, ALT 636 FOR OP/ED): Mod: MUE | Performed by: INTERNAL MEDICINE

## 2024-06-06 PROCEDURE — 82570 ASSAY OF URINE CREATININE: CPT | Mod: SAMLAB | Performed by: HOSPITALIST

## 2024-06-06 PROCEDURE — 99233 SBSQ HOSP IP/OBS HIGH 50: CPT | Performed by: HOSPITALIST

## 2024-06-06 PROCEDURE — 2500000002 HC RX 250 W HCPCS SELF ADMINISTERED DRUGS (ALT 637 FOR MEDICARE OP, ALT 636 FOR OP/ED): Performed by: HOSPITALIST

## 2024-06-06 PROCEDURE — 82436 ASSAY OF URINE CHLORIDE: CPT | Mod: SAMLAB | Performed by: HOSPITALIST

## 2024-06-06 PROCEDURE — 85610 PROTHROMBIN TIME: CPT | Performed by: HOSPITALIST

## 2024-06-06 RX ORDER — POTASSIUM CHLORIDE 20 MEQ/1
40 TABLET, EXTENDED RELEASE ORAL ONCE
Status: COMPLETED | OUTPATIENT
Start: 2024-06-06 | End: 2024-06-06

## 2024-06-06 RX ORDER — ESCITALOPRAM OXALATE 10 MG/1
10 TABLET ORAL DAILY
Status: DISCONTINUED | OUTPATIENT
Start: 2024-06-06 | End: 2024-06-10 | Stop reason: HOSPADM

## 2024-06-06 RX ORDER — NYSTATIN 100000 [USP'U]/G
1 POWDER TOPICAL 2 TIMES DAILY
Status: DISCONTINUED | OUTPATIENT
Start: 2024-06-06 | End: 2024-06-10 | Stop reason: HOSPADM

## 2024-06-06 RX ORDER — POTASSIUM CHLORIDE 20 MEQ/1
60 TABLET, EXTENDED RELEASE ORAL ONCE
Status: COMPLETED | OUTPATIENT
Start: 2024-06-06 | End: 2024-06-06

## 2024-06-06 RX ORDER — POTASSIUM CHLORIDE 1.5 G/1.58G
60 POWDER, FOR SOLUTION ORAL ONCE
Status: COMPLETED | OUTPATIENT
Start: 2024-06-06 | End: 2024-06-06

## 2024-06-06 RX ORDER — POTASSIUM CHLORIDE 14.9 MG/ML
20 INJECTION INTRAVENOUS
Status: DISCONTINUED | OUTPATIENT
Start: 2024-06-06 | End: 2024-06-06

## 2024-06-06 RX ORDER — TOPIRAMATE 50 MG/1
TABLET, FILM COATED ORAL
Status: COMPLETED
Start: 2024-06-06 | End: 2024-06-06

## 2024-06-06 RX ADMIN — INSULIN LISPRO 4 UNITS: 100 INJECTION, SOLUTION INTRAVENOUS; SUBCUTANEOUS at 08:41

## 2024-06-06 RX ADMIN — SENNOSIDES AND DOCUSATE SODIUM 2 TABLET: 50; 8.6 TABLET ORAL at 08:49

## 2024-06-06 RX ADMIN — DULOXETINE HYDROCHLORIDE 60 MG: 60 CAPSULE, DELAYED RELEASE ORAL at 08:49

## 2024-06-06 RX ADMIN — GABAPENTIN 900 MG: 300 CAPSULE ORAL at 06:03

## 2024-06-06 RX ADMIN — Medication 2 L/MIN: at 11:03

## 2024-06-06 RX ADMIN — ACETAMINOPHEN 650 MG: 325 TABLET ORAL at 13:12

## 2024-06-06 RX ADMIN — BUPROPION HYDROCHLORIDE 150 MG: 150 TABLET, FILM COATED, EXTENDED RELEASE ORAL at 08:49

## 2024-06-06 RX ADMIN — LEVOTHYROXINE SODIUM 200 MCG: 0.1 TABLET ORAL at 08:52

## 2024-06-06 RX ADMIN — ALLOPURINOL 300 MG: 300 TABLET ORAL at 08:49

## 2024-06-06 RX ADMIN — TOPIRAMATE 25 MG: 50 TABLET ORAL at 21:08

## 2024-06-06 RX ADMIN — LORATADINE 10 MG: 10 TABLET ORAL at 08:49

## 2024-06-06 RX ADMIN — TOPIRAMATE 25 MG: 50 TABLET ORAL at 21:10

## 2024-06-06 RX ADMIN — POTASSIUM CHLORIDE 20 MEQ: 14.9 INJECTION, SOLUTION INTRAVENOUS at 08:45

## 2024-06-06 RX ADMIN — GABAPENTIN 900 MG: 300 CAPSULE ORAL at 21:08

## 2024-06-06 RX ADMIN — POTASSIUM CHLORIDE 40 MEQ: 1500 TABLET, EXTENDED RELEASE ORAL at 12:59

## 2024-06-06 RX ADMIN — Medication 5 L/MIN: at 07:00

## 2024-06-06 RX ADMIN — Medication 2 L/MIN: at 08:00

## 2024-06-06 RX ADMIN — METOPROLOL SUCCINATE 50 MG: 50 TABLET, EXTENDED RELEASE ORAL at 08:50

## 2024-06-06 RX ADMIN — Medication 4 L/MIN: at 23:45

## 2024-06-06 RX ADMIN — INSULIN LISPRO 6 UNITS: 100 INJECTION, SOLUTION INTRAVENOUS; SUBCUTANEOUS at 12:15

## 2024-06-06 RX ADMIN — TOPIRAMATE 25 MG: 50 TABLET ORAL at 10:24

## 2024-06-06 RX ADMIN — PANTOPRAZOLE SODIUM 40 MG: 40 TABLET, DELAYED RELEASE ORAL at 08:51

## 2024-06-06 RX ADMIN — WARFARIN SODIUM 7.5 MG: 7.5 TABLET ORAL at 17:19

## 2024-06-06 RX ADMIN — SODIUM CHLORIDE 60 ML/HR: 9 INJECTION, SOLUTION INTRAVENOUS at 07:40

## 2024-06-06 RX ADMIN — EMPAGLIFLOZIN 10 MG: 10 TABLET, FILM COATED ORAL at 08:50

## 2024-06-06 RX ADMIN — INSULIN LISPRO 6 UNITS: 100 INJECTION, SOLUTION INTRAVENOUS; SUBCUTANEOUS at 17:19

## 2024-06-06 RX ADMIN — POTASSIUM CHLORIDE 60 MEQ: 1500 TABLET, EXTENDED RELEASE ORAL at 04:05

## 2024-06-06 RX ADMIN — ROSUVASTATIN CALCIUM 40 MG: 20 TABLET, FILM COATED ORAL at 09:14

## 2024-06-06 RX ADMIN — SENNOSIDES AND DOCUSATE SODIUM 2 TABLET: 50; 8.6 TABLET ORAL at 21:10

## 2024-06-06 RX ADMIN — POTASSIUM CHLORIDE 60 MEQ: 1.5 POWDER, FOR SOLUTION ORAL at 08:43

## 2024-06-06 RX ADMIN — NYSTATIN 1 APPLICATION: 100000 POWDER TOPICAL at 21:08

## 2024-06-06 RX ADMIN — GABAPENTIN 900 MG: 300 CAPSULE ORAL at 12:16

## 2024-06-06 RX ADMIN — Medication 5 L/MIN: at 00:00

## 2024-06-06 RX ADMIN — ESCITALOPRAM OXALATE 10 MG: 10 TABLET ORAL at 08:49

## 2024-06-06 RX ADMIN — PANTOPRAZOLE SODIUM 40 MG: 40 TABLET, DELAYED RELEASE ORAL at 21:06

## 2024-06-06 RX ADMIN — Medication 2 L/MIN: at 18:12

## 2024-06-06 RX ADMIN — METOPROLOL SUCCINATE 50 MG: 50 TABLET, EXTENDED RELEASE ORAL at 21:08

## 2024-06-06 RX ADMIN — LEVOTHYROXINE SODIUM 50 MCG: 0.05 TABLET ORAL at 06:03

## 2024-06-06 RX ADMIN — GABAPENTIN 900 MG: 300 CAPSULE ORAL at 17:19

## 2024-06-06 RX ADMIN — POTASSIUM CHLORIDE 60 MEQ: 1500 TABLET, EXTENDED RELEASE ORAL at 18:23

## 2024-06-06 ASSESSMENT — PAIN SCALES - GENERAL
PAINLEVEL_OUTOF10: 5 - MODERATE PAIN
PAINLEVEL_OUTOF10: 0 - NO PAIN
PAINLEVEL_OUTOF10: 5 - MODERATE PAIN
PAINLEVEL_OUTOF10: 0 - NO PAIN

## 2024-06-06 ASSESSMENT — COGNITIVE AND FUNCTIONAL STATUS - GENERAL
CLIMB 3 TO 5 STEPS WITH RAILING: A LOT
STANDING UP FROM CHAIR USING ARMS: A LITTLE
HELP NEEDED FOR BATHING: A LITTLE
DAILY ACTIVITIY SCORE: 20
MOVING TO AND FROM BED TO CHAIR: A LITTLE
MOBILITY SCORE: 19
DRESSING REGULAR UPPER BODY CLOTHING: A LITTLE
TOILETING: A LITTLE
WALKING IN HOSPITAL ROOM: A LITTLE
DRESSING REGULAR LOWER BODY CLOTHING: A LITTLE

## 2024-06-06 ASSESSMENT — PAIN - FUNCTIONAL ASSESSMENT
PAIN_FUNCTIONAL_ASSESSMENT: 0-10

## 2024-06-06 NOTE — PROGRESS NOTES
Roselia Mo is a 53 y.o. female on day 1 of admission presenting with Hypokalemia.      Subjective   And seen in room, bedside..  Tired to the bedside.  Continues to be mildly short of breath.  General weak.  Usually prevents by herself at home into the wheelchair.  Denies any chest pain, cough, fevers or chills.  She does mention she lost 30 pounds in the last 2 weeks after starting on metolazone.  Was having issues with her sodium in the past.  Denies any cramps in the lower legs.  No dysuria or hematuria.  No other complaints.  Per nursing she had prolonged QTc interval, denied any chest pain or arrhythmia/palpitation.      Objective     Last Recorded Vitals  /69   Pulse 92   Temp 36.4 °C (97.5 °F) (Temporal)   Resp 22   Wt (!) 168 kg (370 lb 9.5 oz)   SpO2 92%   Intake/Output last 3 Shifts:    Intake/Output Summary (Last 24 hours) at 6/6/2024 0906  Last data filed at 6/6/2024 0600  Gross per 24 hour   Intake 1949 ml   Output 3000 ml   Net -1051 ml       Admission Weight  Weight: (!) 165 kg (363 lb 12.1 oz) (06/05/24 1716)    Daily Weight  06/05/24 : (!) 168 kg (370 lb 9.5 oz)    Image Results  ECG 12 Lead  Sinus rhythm with Premature supraventricular complexes and with frequent Premature ventricular complexes  Low voltage QRS  Cannot rule out Anterior infarct (cited on or before 08-APR-2024)  Abnormal ECG  When compared with ECG of 08-MAY-2024 13:46, (unconfirmed)  Premature supraventricular complexes are now Present  See ED provider note for full interpretation and clinical correlation  Confirmed by Naty Vogt ([id number]) on 5/11/2024 5:35:56 PM      Physical Exam  General: Awake, alert, oriented x3, no distress, cooperative.  Morbidly obese  HEENT: EOM intact, PERRLA.  Neck: Supple, no JVD, no masses, no lymphadenopathy.  Chest: Diminished breath sounds bilateral because of body habitus, otherwise clear, no wheezes, no crackles, no rhonchi.  Heart: Regular rate and rhythm, S1-S2 normal, no  murmur, no gallops.  Abdomen: Soft, nontender, no organomegaly, no ascites, no guarding or rigidity, morbidly obese.  Musculoskeletal: Normal, atraumatic, no obvious deformities. Trace leg edema, no clubbing or cyanosis.  Negative Homans' sign.  Neurological: Alert and oriented x3, cranial nerves are intact, normal power and tone of 4 limbs.  Skin: No lesions, no skin rash.  Warm and dry.  Psych: Appropriate mood and behavior.  Assessment/Plan   This patient currently has cardiac telemetry ordered; if you would like to modify or discontinue the telemetry order, click here to go to the orders activity to modify/discontinue the order.    Principal Problem:    Hypokalemia  Severe hypokalemia, secondary due to diuretics.  Hypercalcemia, resolved.  Prolonged QTc interval.repeat normal.  History of portal vein/hepatic vein thrombosis on Coumadin.  Chronic diastolic heart failure.  Acute renal failure on chronic kidney disease stage II  Severe peripheral edema  Chronic respiratory failure, on 2 L of oxygen at home.  Type 2 diabetes  History of COPD  Hyperuricemia, 12.8  Recent admission for MRSA cellulitis of abdominal wall  Generalized weakness    Plan    Started on Oral potassium 60 meq once.  Recheck potassium.  Stop fluids.  Renal consulted for acute renal failure, severe peripheral edema.  Hold diuretics,  Further management with nephrology.  Resume home medications.  Insulin SS.  CPAP bedtime.  Hold Trazodone for Qtc interval.  Labs in AM.    DVT prophylax on Coumadin.  CODE STATUS full.  Disposition in am.    Total  time spent 35 minutes.    Eduardo Lyles MD

## 2024-06-06 NOTE — CONSULTS
Reason For Consult  Acute kidney injury    History Of Present Illness  Roselia Mo is a 53 y.o. female presenting with abnormal lab results.  She presented to the emergency room with abnormal lab results.  She was found to have a low potassium level  She called my office stating she was not feeling well and we advised her to get labs done  We have been trying to adjust her diuretics as an outpatient to try to get her swelling down  She states that she has lost quite a bit of weight however here this morning she states that she still has more and she feels like she is swollen again already    Past Medical History  She has a past medical history of Arthritis, Asthma ([facility]), CHF (congestive heart failure) (Multi), CKD (chronic kidney disease) stage 3, GFR 30-59 ml/min (Multi), COPD (chronic obstructive pulmonary disease) (Multi), Cough (2024), Diabetes mellitus (Multi), Disease of thyroid gland, Hypertension, Lymphedema, Pulmonary HTN (Multi), Shortness of breath (2024), and Tachycardia (2024).    Surgical History  She has a past surgical history that includes  section, low transverse; Carpal tunnel release (Bilateral, ); Hysterectomy; Hernia repair; Knee surgery (Left); and Colonoscopy (2014).     Social History  She reports that she quit smoking about 9 years ago. Her smoking use included cigarettes. She started smoking about 47 years ago. She has a 37.8 pack-year smoking history. She has been exposed to tobacco smoke. She has never used smokeless tobacco. She reports current alcohol use. She reports current drug use. Frequency: 7.00 times per week. Drug: Marijuana.    Family History  Family History   Problem Relation Name Age of Onset    Blindness Mother      Hypertension Mother      Hyperlipidemia Mother      Heart disease Mother      Heart failure Father      Hypertension Father      Hyperlipidemia Father      Diabetes Father      Skin cancer Father      Blindness  "Maternal Grandmother      Hypertension Maternal Grandmother      Hyperlipidemia Maternal Grandmother      Heart failure Maternal Grandmother      Dementia Paternal Grandmother      Heart attack Paternal Grandfather      Hypertension Paternal Grandfather      Hyperlipidemia Paternal Grandfather          Allergies  Dulaglutide, Nickel, Metformin, Palladium, Cobalt, Exenatide, and Sitagliptin    Review of Systems  A full 10 point review of systems was obtained is negative except HPI as above     Physical Exam  Physical Exam  Constitutional:       Appearance: Normal appearance. She is obese.   HENT:      Head: Normocephalic and atraumatic.      Right Ear: External ear normal.      Left Ear: External ear normal.      Nose: Nose normal.      Mouth/Throat:      Mouth: Mucous membranes are moist.      Pharynx: Oropharynx is clear.   Eyes:      Extraocular Movements: Extraocular movements intact.      Conjunctiva/sclera: Conjunctivae normal.      Pupils: Pupils are equal, round, and reactive to light.   Cardiovascular:      Rate and Rhythm: Normal rate and regular rhythm.   Pulmonary:      Effort: Pulmonary effort is normal.      Breath sounds: Normal breath sounds.   Abdominal:      General: Abdomen is flat.      Palpations: Abdomen is soft.   Skin:     General: Skin is warm and dry.   Neurological:      General: No focal deficit present.      Mental Status: She is alert and oriented to person, place, and time.   Psychiatric:         Mood and Affect: Mood normal.         Behavior: Behavior normal.                 I&O 24HR    Intake/Output Summary (Last 24 hours) at 6/6/2024 1155  Last data filed at 6/6/2024 0915  Gross per 24 hour   Intake 2608.52 ml   Output 3700 ml   Net -1091.48 ml       Vitals 24HR  Heart Rate:  [79-92]   Temp:  [36.1 °C (97 °F)-37.4 °C (99.3 °F)]   Resp:  [17-28]   BP: (110-169)/()   Height:  [157.5 cm (5' 2\")]   Weight:  [165 kg (363 lb 12.1 oz)-168 kg (370 lb 9.5 oz)]   SpO2:  [92 %-98 %] "         Relevant Results  Results reviewed     Assessment/Plan       Acute renal failure  Hypokalemia  Peripheral edema with volume overload that looks quite stable at this time  Diastolic CHF  Morbid Obesity  Lymphedema  HTN  DM II  Right Sided CHF  Pulmonary HTN  CY on CPAP  CKD stage II    Plan:  At this time her renal function is already improved  We will continue to replace potassium  The difficult issue is going to be managing her volume issues  Even today she states that she is feeling swollen although by my exam she feels quite euvolemic  It is hard to get her expectations normalized.  We will work to get her potassium normalized  Discontinue IV fluids and use oral potassium replacement as IV potassium is causing him discomfort    Going forward we are not going to be able to diurese her as aggressively as we tried.  Once again this is going to come back to trying to temper her expectations but her bicarb is very high her uric acid is very high and she will not be able to tolerate this kind of diuresis.  Unfortunately as I stated she states even today that she feels swollen and she needs more fluid taken off but this is really not going to be possible with medications  Thanks for the consult    Principal Problem:    Hypokalemia            Pardeep Nichole, DO

## 2024-06-06 NOTE — H&P
HISTORY AND PHYSICAL EXAMINATION    Roselia Mo  1970  53 y.o.  [id number]    24  10:05 PM    CHIEF COMPLAINT: Weakness.    HISTORY OF PRESENT ILLNESS:  This is a 53 years old female patient presented to the emergency room because of weakness.  Her symptoms started around 3 days ago, both legs has been giving up and she has been dropping objects from her hands.  She feels weak and fatigued over the last few days.  She mentioned that she cannot hold objects in both hands because of muscle weakness.  She mentioned that she was started on metolazone around 3 weeks ago and she has been on torsemide for long time.  Also, she was started on Topamax for migraine around 2 months ago.  She denied diarrhea, nausea or vomiting.  She denied fever or chills.  She denied worsening shortness of breath.  She does with oxygen at home at 4 L at baseline.  In the emergency department, her vitals are stable, afebrile, was on oxygen at 3 L.  Routine blood work remarkable for sodium of 131, potassium is 1.9, chloride 73, serum bicarb is more than 45, BUN is 44, creatinine is 1.60.  CBC was unremarkable.  Serum magnesium was 2.61.  Lipase was normal.  Urinalysis showed no evidence of UTI.  INR is 2.1.  EKG revealed normal sinus rhythm, no evidence of acute schema changes, QTc was prolonged at 639 ms.  She is being admitted for acute severe symptomatic hypokalemia with hypochloremic metabolic alkalosis.    Past Medical History:   Diagnosis Date    Arthritis     Asthma ([facility])     CHF (congestive heart failure) (Multi)     COPD (chronic obstructive pulmonary disease) (Multi)     Cough 2024    Diabetes mellitus (Multi)     Disease of thyroid gland     Hypertension     Shortness of breath 2024    Tachycardia 2024     Past Surgical History:   Procedure Laterality Date    CARPAL TUNNEL RELEASE Bilateral      SECTION, LOW TRANSVERSE       AND     COLONOSCOPY  2014    [facility]  SYSTEM    HERNIA REPAIR      WITH MESH    HYSTERECTOMY      2 PARTIAL    KNEE SURGERY Left     MINISCUS REPAIR     Family History   Problem Relation Name Age of Onset    Blindness Mother      Hypertension Mother      Hyperlipidemia Mother      Heart disease Mother      Heart failure Father      Hypertension Father      Hyperlipidemia Father      Diabetes Father      Skin cancer Father      Blindness Maternal Grandmother      Hypertension Maternal Grandmother      Hyperlipidemia Maternal Grandmother      Heart failure Maternal Grandmother      Dementia Paternal Grandmother      Heart attack Paternal Grandfather      Hypertension Paternal Grandfather      Hyperlipidemia Paternal Grandfather       Social History     Socioeconomic History    Marital status:      Spouse name: Not on file    Number of children: Not on file    Years of education: Not on file    Highest education level: Not on file   Occupational History    Not on file   Tobacco Use    Smoking status: Former     Current packs/day: 0.00     Average packs/day: 1 pack/day for 37.8 years (37.8 ttl pk-yrs)     Types: Cigarettes     Start date: 1977     Quit date: 2014     Years since quittin.6    Smokeless tobacco: Never    Tobacco comments:     CBD vaping   Vaping Use    Vaping status: Former   Substance and Sexual Activity    Alcohol use: Yes     Comment: rarely    Drug use: Yes     Frequency: 7.0 times per week     Types: Marijuana     Comment: one or two bowels per day    Sexual activity: Defer   Other Topics Concern    Not on file   Social History Narrative    Not on file     Social Determinants of Health     Financial Resource Strain: Low Risk  (2024)    Overall Financial Resource Strain (CARDIA)     Difficulty of Paying Living Expenses: Not hard at all   Recent Concern: Financial Resource Strain - Medium Risk (3/20/2024)    Overall Financial Resource Strain (CARDIA)     Difficulty of Paying Living Expenses: Somewhat hard   Food  Insecurity: No Food Insecurity (3/23/2024)    Hunger Vital Sign     Worried About Running Out of Food in the Last Year: Never true     Ran Out of Food in the Last Year: Never true   Transportation Needs: No Transportation Needs (5/11/2024)    PRAPARE - Transportation     Lack of Transportation (Medical): No     Lack of Transportation (Non-Medical): No   Physical Activity: Inactive (3/23/2024)    Exercise Vital Sign     Days of Exercise per Week: 0 days     Minutes of Exercise per Session: 0 min   Stress: Stress Concern Present (3/23/2024)    [employer] - Occupational Stress Questionnaire     Feeling of Stress : Very much   Social Connections: Unknown (3/23/2024)    Social Connection and Isolation Panel [NHANES]     Frequency of Communication with Friends and Family: Never     Frequency of Social Gatherings with Friends and Family: Never     Attends Yazdanism Services: Never     Active Member of Clubs or Organizations: Yes     Attends Club or Organization Meetings: Never     Marital Status: Patient declined   Intimate Partner Violence: Patient Declined (3/23/2024)    Humiliation, Afraid, Rape, and Kick questionnaire     Fear of Current or Ex-Partner: Patient declined     Emotionally Abused: Patient declined     Physically Abused: Patient declined     Sexually Abused: Patient declined   Housing Stability: High Risk (5/11/2024)    Housing Stability Vital Sign     Unable to Pay for Housing in the Last Year: Yes     Number of Places Lived in the Last Year: 2     Unstable Housing in the Last Year: Yes     Review of systems:  Constitutional: Positive for weakness, fatigue, negative for chills, diaphoresis and fever.   HENT:  Negative for congestion, ear discharge, ear pain, hearing loss, rhinorrhea and sneezing.    Eyes:  Negative for pain, discharge and itching.   Respiratory: Negative for cough, chest tightness, shortness of breath. Negative for apnea, choking, wheezing and stridor.     Cardiovascular:  Negative for chest pain, palpitations and leg swelling.   Gastrointestinal: Positive for constipation, negative for abdominal distention, abdominal pain, blood in stool,  diarrhea and nausea.   Endocrine: Negative for cold intolerance, heat intolerance and polydipsia.   Genitourinary:  Negative for difficulty urinating, dysuria, flank pain, frequency and hematuria.   Musculoskeletal:  Negative for arthralgias, back pain and gait problem.   Neurological:  Negative for dizziness, seizures, syncope, facial asymmetry, speech difficulty and headaches.   Psychiatric/Behavioral:  Negative for behavioral problems, confusion and hallucinations.     Vital signs:  Visit Vitals  /75   Pulse 81   Temp 37.4 °C (99.3 °F) (Oral)   Resp 20     Physical examination:  General: Awake, alert, oriented x3, no distress, cooperative.  HEENT: EOM intact, PERRLA.  Neck: Supple, no JVD, no masses, no lymphadenopathy.  Chest: Diminished breath sounds bilateral because of body habitus, otherwise clear, no wheezes, no crackles, no rhonchi.  Heart: Regular rate and rhythm, S1-S2 normal, no murmur, no gallops.  Abdomen: Soft, nontender, no organomegaly, no ascites, no guarding or rigidity, morbidly obese.  Neurological: Alert and oriented x3, cranial nerves are intact, normal power and tone of 4 limbs.  Skin: No lesions, no skin rash.  Warm and dry.  Musculoskeletal: Normal, atraumatic, no obvious deformities.  Legs: Trace leg edema, no clubbing or cyanosis.  Psych: Appropriate mood and behavior.    LABS:  Lab Results   Component Value Date    WBC 11.1 06/05/2024    HGB 14.0 06/05/2024    HCT 41.5 06/05/2024    MCV 89 06/05/2024     06/05/2024     Lab Results   Component Value Date    GLUCOSE 254 (H) 06/05/2024    CALCIUM 10.3 06/05/2024     (L) 06/05/2024    K 1.9 (LL) 06/05/2024    CO2 >45 (HH) 06/05/2024    CL 73 (L) 06/05/2024    BUN 44 (H) 06/05/2024    CREATININE 1.60 (H) 06/05/2024     Lab Results    Component Value Date    ALT 16 06/05/2024    AST 36 06/05/2024    ALKPHOS 345 (H) 06/05/2024    BILITOT 0.8 06/05/2024     Assessment/Plan   Principal Problem:    Hypokalemia    #1 acute on chronic severe symptomatic hypokalemia/hypochloremic metabolic alkalosis: Likely due to Zaroxolyn in combination with torsemide.  Also, Topamax can cause hypokalemia but with metabolic acidosis.  Serum magnesium is normal.  Plan: Admit to telemetry floor, bedrest, replace potassium with p.o. potassium chloride 60 minutes evident x 2, check serum phosphorus, uric acid, serum ammonia, consult nephrology, hold torsemide and Zaroxolyn, repeat CBC and BMP tomorrow morning, PT OT evaluation and treatment.    #2 acute kidney injury on top of stage II CKD: Admission creatinine is 1.64, BUN is 44.  This is likely because of diuretics.  Plan for very gentle IV fluids, consult for as above, repeat BMP tomorrow morning.    #3 prolonged QTc: EKG reviewed, revealed normal sinus rhythm, QTc was 630 ms.  The only medication that can cause QTc prolongation is trazodone, plan to hold it and repeat EKG tomorrow morning.    #4 type 2 diabetes mellitus: ADA diet, Accu-Cheks, continue home doses of insulin, sliding scale.    #5 history of portal vein/hepatic vein thrombosis: On Coumadin.  INR is 2.1, therapeutic.  Plan to repeat INR tomorrow morning.    #5 chronic respiratory failure: Multifactorial secondary to morbid obesity, obstructive sleep apnea, COPD and chronic diastolic CHF.  No acute exacerbation of COPD or CHF.    #6 chronic diastolic CHF: Compensated, patient will need minimal IV fluids, hold diuretics as above.    #7 COPD: Without exacerbation, on oxygen at 3 L, she is at 4 L at baseline.    #8 DVT prophylaxis: Continue Coumadin, INR therapeutic.    #9 CODE STATUS: Full code, verified with the patient, agreeable to CPR, chest compressions, intubation and mechanical ventilation.    Braden Kaplan MD  06/05/24

## 2024-06-06 NOTE — CONSULTS
"Nutrition Initial Assessment:   Nutrition Assessment    Reason for Assessment: Admission nursing screening    Patient is a 53 y.o. female presenting with   Acute renal failure  Hypokalemia  Peripheral edema with volume overload that looks quite stable at this time  Diastolic CHF  Morbid Obesity  Lymphedema  HTN  DM II  Right Sided CHF  Pulmonary HTN  CY on CPAP  CKD stage II    6/6/24 patient seen - known from prior admissions.  Known requests of foods she avoids due to Nickel and Colbalt allergy.      Nutrition History:  Energy Intake: Good > 75 %  Food Allergies/Intolerances:   colbalt, Nickel  GI Symptoms: None  Oral Problems: None       Anthropometrics:  Height: 157.5 cm (5' 2\")   Weight: (!) 168 kg (370 lb 9.5 oz)   BMI (Calculated): 67.77  IBW/kg (Dietitian Calculated): 52.2 kg  Percent of IBW: 322 %  Adjusted Body Weight (kg): 81 kg    Weight History:   Daily Weight  06/05/24 : (!) 168 kg (370 lb 9.5 oz)  06/05/24 : (!) 165 kg (363 lb 14.4 oz)  05/23/24 : (!) 186 kg (410 lb 6.4 oz)  05/12/24 : (!) 179 kg (395 lb 9.6 oz)  05/02/24 : (!) 186 kg (410 lb)  05/01/24 : (!) 186 kg (410 lb)  04/25/24 : (!) 190 kg (418 lb)  04/23/24 : (!) 199 kg (439 lb)  04/18/24 : (!) 199 kg (439 lb)  04/18/24 : (!) 199 kg (439 lb)     Weight Change %:  Weight History / % Weight Change: loss 35# in 2 weeks with diuresis  Significant Weight Loss: No  Significant Weight Gain: Fluid related    Nutrition Focused Physical Exam Findings:    Subcutaneous Fat Loss:   Orbital Fat Pads: Well nourished (slightly bulging fat pads)  Buccal Fat Pads: Well nourished (full, rounded cheeks)  Triceps: Well nourished (ample fat tissue)  Ribs: Well nourished (chest is full, ribs do not show, slight to no protrusion of the iliac crest)  Muscle Wasting:  Temporalis: Well nourished (well-defined muscle)  Pectoralis (Clavicular Region): Well nourished (clavicle not visible)  Deltoid/Trapezius: Well nourished (rounded appearance at arm, shoulder, " neck)  Interosseous: Well nourished (muscle bulges)  Trapezius/Infraspinatus/Supraspinatus (Scapular Region): Well nourished (bones not prominent, muscle taut)  Quadriceps: Well nourished (well developed, well rounded)  Gastrocnemius: Well nourished (well developed bulbous muscle)  Edema:  Edema: +2 mild  Edema Location: BLE  Physical Findings:  Skin: Positive (Stage 2 abdomen)    Nutrition Significant Labs:  CBC Trend:   Results from last 7 days   Lab Units 06/06/24  0412 06/05/24  1747 06/05/24  1240   WBC AUTO x10*3/uL 10.4 11.1 11.2   RBC AUTO x10*6/uL 4.99 4.69 5.01   HEMOGLOBIN g/dL 14.8 14.0 14.8   HEMATOCRIT % 45.3 41.5 44.5   MCV fL 91 89 89   PLATELETS AUTO x10*3/uL 260 232 265    , BG POCT trend:   Results from last 7 days   Lab Units 06/06/24  1126 06/06/24  0712 06/05/24  2157   POCT GLUCOSE mg/dL 296* 226* 130*    , Renal Lab Trend:   Results from last 7 days   Lab Units 06/06/24  1219 06/06/24  0412 06/05/24  2256 06/05/24  1747 06/05/24  1240 06/05/24  1240   POTASSIUM mmol/L 2.9* 2.1*  --  1.9*  --  1.9*   PHOSPHORUS mg/dL  --   --  2.7  --   --   --    SODIUM mmol/L  --  137  --  131*  --  134*   MAGNESIUM mg/dL  --  2.74*  --  2.61*   < >  --    EGFR mL/min/1.73m*2  --  60*  --  38*  --  54*   BUN mg/dL  --  39*  --  44*  --  42*   CREATININE mg/dL  --  1.09*  --  1.60*  --  1.20*    < > = values in this interval not displayed.        Nutrition Specific Medications:  Scheduled medications  insulin lispro, 0-10 Units, subcutaneous, TID  pantoprazole, 40 mg, oral, BID  sennosides-docusate sodium, 2 tablet, oral, BID  warfarin, 7.5 mg, oral, Daily      I/O:   Last BM Date:  (PTA); Stool Appearance: Unable to assess (06/06/24 0000)    Dietary Orders (From admission, onward)       Start     Ordered    06/06/24 1121  Adult diet Cardiac, Carb Controlled; 75 gram carb/meal, 45 gram Carb evening snack; 70 gm fat; 2 - 3 grams Sodium  Diet effective now        Question Answer Comment   Diet type Cardiac     Diet type Carb Controlled    Carb diet selection: 75 gram carb/meal, 45 gram Carb evening snack    Fat restriction: 70 gm fat    Sodium restriction: 2 - 3 grams Sodium        06/06/24 1120                     Estimated Needs:   Total Energy Estimated Needs (kCal): 1848 kCal  Method for Estimating Needs: 11-14 kcal/kg [id number] kcal  Total Protein Estimated Needs (g): 78 g  Method for Estimating Needs: 1.5 gm/kg IBW     Method for Estimating Needs: per nephrology        Nutrition Diagnosis   Malnutrition Diagnosis  Patient has Malnutrition Diagnosis: No    Nutrition Diagnosis  Patient has Nutrition Diagnosis: Yes  Diagnosis Status (1): New  Nutrition Diagnosis 1: Obese  Related to (1): fluid overload, excess calorie intake  As Evidenced by (1): BMI 67.78 kg/m2  Additional Nutrition Diagnosis: Diagnosis 2  Diagnosis Status (2): New  Nutrition Diagnosis 2: Altered nutrition related to laboratory values  Related to (2): diabetes, diuresis  As Evidenced by (2): [id number], K 2.1, P 2.6, Ca 10.4       Nutrition Interventions/Recommendations         Nutrition Prescription:  Individualized Nutrition Prescription Provided for : Oral nutrition        Nutrition Interventions:   Interventions: Meals and snacks  Meals and Snacks: Carbohydrate-modified diet, Mineral-modified diet  Goal: CCD low sodium diet    Collaboration and Referral of Nutrition Care: Team meeting involving nutrition professional  Goal: IDT meeting    Nutrition Education: Previously educated CCD low sodium diet       Nutrition Monitoring and Evaluation        Body Composition/Growth/Weight History  Monitoring and Evaluation Plan: BMI  Body Mass: Body mass index (BMI)  Criteria: Would benefit from slow weight loss toward healthy BMI    Biochemical Data, Medical Tests and Procedures  Monitoring and Evaluation Plan: Electrolyte/renal panel, Glucose/endocrine profile  Electrolyte and Renal Panel: Potassium, Sodium, Phosphorus, Calcium, serum  Criteria:  WNL  Glucose/Endocrine Profile: Glucose, casual  Criteria: 100-140 mg/dl    Nutrition Focused Physical Findings  Monitoring and Evaluation Plan: Skin  Skin: Impaired wound healing  Criteria: Helaing pressure injury       Time Spent/Follow-up Reminder:   Time Spent (min): 30 minutes  Last Date of Nutrition Visit: 06/06/24  Nutrition Follow-Up Needed?: 7-10 days      Jadyn Beard RDN, LD

## 2024-06-06 NOTE — CARE PLAN
"  Problem: Nutrition  Goal: Oral intake greater than 50%  Outcome: Progressing  Goal: Adequate PO fluid intake  Outcome: Progressing  Goal: BG  mg/dL  Outcome: Progressing     Problem: Pain - Adult  Goal: Verbalizes/displays adequate comfort level or baseline comfort level  Outcome: Progressing     Problem: Safety - Adult  Goal: Free from fall injury  Outcome: Progressing     Problem: Diabetes  Goal: Maintain glucose levels >70mg/dl to <250mg/dl throughout shift  Outcome: Progressing  Goal: No changes in neurological exam by end of shift  Outcome: Progressing  Goal: Vital signs within normal range for age by end of shift  Outcome: Progressing     Problem: Skin  Goal: Participates in plan/prevention/treatment measures  Outcome: Progressing  Goal: Prevent/manage excess moisture  Outcome: Progressing  Goal: Prevent/minimize sheer/friction injuries  Outcome: Progressing  Goal: Promote/optimize nutrition  Outcome: Progressing  Goal: Promote skin healing  Outcome: Progressing     Problem: Pain  Goal: Takes deep breaths with improved pain control throughout the shift  Outcome: Progressing  Goal: Turns in bed with improved pain control throughout the shift  Outcome: Progressing  Goal: Walks with improved pain control throughout the shift  Outcome: Progressing  Goal: Performs ADL's with improved pain control throughout shift  Outcome: Progressing     Problem: Pain  Goal: Free from acute confusion related to pain meds throughout the shift  Outcome: Met    The clinical goals for the shift include To rest through night    Pt cont to rest through night. Pt cont to maintained Heart rhythm Sinus/BP stable/Cont pulse ox maintained/ Call light within pt reach/Enc to call for needs and wants/Pt educated to call for assistance when OOB to BSC/ Pt states, \" I understand.\" Call light within pt reach. Bed alarm maintained. Will cont to monitor,    "

## 2024-06-06 NOTE — ED PROVIDER NOTES
Emergency Medicine Transition of Care Note.    I received Roselia Mo in signout from Dr. Machado.  Please see the previous ED provider note for all HPI, PE and MDM up to the time of signout at 1900. This is in addition to the primary record.  The patient was given oral potassium but continues to complain of some mild weakness.  Patient will be admitted for further diagnostic studies and treatments.    In brief Roselia Mo is an 53 y.o. female presenting for   Chief Complaint   Patient presents with    abnormal labs     Pt was sent here d/t K of 1.9. c/o weakness, tremors and irritation x several days.      At the time of signout we were awaiting: labs    Diagnoses as of 06/05/24 2017   Hypokalemia   ISRAEL (acute kidney injury) (CMS-HCC)     Labs Reviewed   CBC WITH AUTO DIFFERENTIAL - Abnormal       Result Value    WBC 11.1      nRBC 0.0      RBC 4.69      Hemoglobin 14.0      Hematocrit 41.5      MCV 89      MCH 29.9      MCHC 33.7      RDW 13.4      Platelets 232      Neutrophils % 75.6      Immature Granulocytes %, Automated 0.3      Lymphocytes % 13.9      Monocytes % 9.5      Eosinophils % 0.5      Basophils % 0.2      Neutrophils Absolute 8.39 (*)     Immature Granulocytes Absolute, Automated 0.03      Lymphocytes Absolute 1.54      Monocytes Absolute 1.05 (*)     Eosinophils Absolute 0.05      Basophils Absolute 0.02     BASIC METABOLIC PANEL - Abnormal    Glucose 254 (*)     Sodium 131 (*)     Potassium 1.9 (*)     Chloride 73 (*)     Bicarbonate >45 (*)     Anion Gap        Urea Nitrogen 44 (*)     Creatinine 1.60 (*)     eGFR 38 (*)     Calcium 10.3     MAGNESIUM - Abnormal    Magnesium 2.61 (*)    HEPATIC FUNCTION PANEL - Abnormal    Albumin 4.4      Bilirubin, Total 0.8      Bilirubin, Direct 0.2      Alkaline Phosphatase 345 (*)     ALT 16      AST 36      Total Protein 7.7     LIPASE - Normal    Lipase 29      Narrative:     Venipuncture immediately after or during the administration of  Metamizole may lead to falsely low results. Testing should be performed immediately prior to Metamizole dosing.   URINALYSIS WITH REFLEX CULTURE AND MICROSCOPIC - Normal    Color, Urine Light-Yellow      Appearance, Urine Clear      Specific Gravity, Urine 1.011      pH, Urine 6.5      Protein, Urine NEGATIVE      Glucose, Urine Normal      Blood, Urine NEGATIVE      Ketones, Urine NEGATIVE      Bilirubin, Urine NEGATIVE      Urobilinogen, Urine Normal      Nitrite, Urine NEGATIVE      Leukocyte Esterase, Urine NEGATIVE     GREEN TOP    Extra Tube Hold for add-ons.     URINALYSIS WITH REFLEX CULTURE AND MICROSCOPIC    Narrative:     The following orders were created for panel order Urinalysis with Reflex Culture and Microscopic.  Procedure                               Abnormality         Status                     ---------                               -----------         ------                     Urinalysis with Reflex C...[[id number]]  Normal              Final result               Extra Urine Gray Tube[[id number]]                            In process                   Please view results for these tests on the individual orders.   EXTRA URINE GRAY TUBE         Medical Decision Making  admission    Final diagnoses:   [E87.6] Hypokalemia   [N17.9] ISRAEL (acute kidney injury) (CMS-[facility])           Procedure  Procedures    Ryan Garay, DO Ryan Garay DO  06/05/24 2018

## 2024-06-06 NOTE — PROGRESS NOTES
Care Transitions: Patient reviewed in care round meeting this AM. ADOD 48 hours. Attempted to meet with patient at bedside, however she is resting with eyes closed. ADOD 20/18. She is from home with . Will assess discharge needs when patient is awake. Care team to follow. Radha Sorto RN/TCC

## 2024-06-06 NOTE — PROGRESS NOTES
Pharmacy to dose warfarin     Today's INR - 1.9  INR Goal - 2-3  Related labs - n/a  Daily PT / INR check - ordered x 3 days with AM labs  Last INR prior to admission - 2.1  Primary / Secondary Diagnosis - Portal Vein Thrombosis  [facility] patient   - yes  Ordering Provider - Dr. Lyles  Plan - Patient is seen in the [facility]. Doses prior to admission are 7.5 mg all days except 5mg on Mon and Thu. Due to the INR being at 1.9 today 6/6, will leave the order of 7.5 mg daily for now. Will continue to monitor INR and adjust doses as needed.

## 2024-06-07 ENCOUNTER — APPOINTMENT (OUTPATIENT)
Dept: CARDIOLOGY | Facility: HOSPITAL | Age: 54
DRG: 641 | End: 2024-06-07
Payer: MEDICARE

## 2024-06-07 ENCOUNTER — APPOINTMENT (OUTPATIENT)
Dept: PHARMACY | Facility: HOSPITAL | Age: 54
End: 2024-06-07
Payer: MEDICARE

## 2024-06-07 LAB
ANION GAP SERPL CALC-SCNC: 12 MMOL/L (ref 10–20)
ANION GAP SERPL CALC-SCNC: 16 MMOL/L (ref 10–20)
ATRIAL RATE: 90 BPM
ATRIAL RATE: 92 BPM
BASOPHILS # BLD AUTO: 0.03 X10*3/UL (ref 0–0.1)
BASOPHILS NFR BLD AUTO: 0.4 %
BUN SERPL-MCNC: 23 MG/DL (ref 6–23)
BUN SERPL-MCNC: 25 MG/DL (ref 6–23)
CALCIUM SERPL-MCNC: 10.1 MG/DL (ref 8.6–10.3)
CALCIUM SERPL-MCNC: 11 MG/DL (ref 8.6–10.3)
CHLORIDE SERPL-SCNC: 85 MMOL/L (ref 98–107)
CHLORIDE SERPL-SCNC: 86 MMOL/L (ref 98–107)
CO2 SERPL-SCNC: 35 MMOL/L (ref 21–32)
CO2 SERPL-SCNC: 39 MMOL/L (ref 21–32)
CREAT SERPL-MCNC: 0.71 MG/DL (ref 0.5–1.05)
CREAT SERPL-MCNC: 0.73 MG/DL (ref 0.5–1.05)
EGFRCR SERPLBLD CKD-EPI 2021: >90 ML/MIN/1.73M*2
EGFRCR SERPLBLD CKD-EPI 2021: >90 ML/MIN/1.73M*2
EOSINOPHIL # BLD AUTO: 0.18 X10*3/UL (ref 0–0.7)
EOSINOPHIL NFR BLD AUTO: 2.3 %
ERYTHROCYTE [DISTWIDTH] IN BLOOD BY AUTOMATED COUNT: 13.3 % (ref 11.5–14.5)
GLUCOSE BLD MANUAL STRIP-MCNC: 264 MG/DL (ref 74–99)
GLUCOSE BLD MANUAL STRIP-MCNC: 266 MG/DL (ref 74–99)
GLUCOSE BLD MANUAL STRIP-MCNC: 270 MG/DL (ref 74–99)
GLUCOSE BLD MANUAL STRIP-MCNC: 300 MG/DL (ref 74–99)
GLUCOSE SERPL-MCNC: 266 MG/DL (ref 74–99)
GLUCOSE SERPL-MCNC: 267 MG/DL (ref 74–99)
HCT VFR BLD AUTO: 41.7 % (ref 36–46)
HGB BLD-MCNC: 13.4 G/DL (ref 12–16)
IMM GRANULOCYTES # BLD AUTO: 0.03 X10*3/UL (ref 0–0.7)
IMM GRANULOCYTES NFR BLD AUTO: 0.4 % (ref 0–0.9)
INR PPP: 1.7 (ref 0.9–1.1)
LYMPHOCYTES # BLD AUTO: 1.33 X10*3/UL (ref 1.2–4.8)
LYMPHOCYTES NFR BLD AUTO: 17.2 %
MCH RBC QN AUTO: 29.4 PG (ref 26–34)
MCHC RBC AUTO-ENTMCNC: 32.1 G/DL (ref 32–36)
MCV RBC AUTO: 91 FL (ref 80–100)
MONOCYTES # BLD AUTO: 0.83 X10*3/UL (ref 0.1–1)
MONOCYTES NFR BLD AUTO: 10.7 %
NEUTROPHILS # BLD AUTO: 5.34 X10*3/UL (ref 1.2–7.7)
NEUTROPHILS NFR BLD AUTO: 69 %
NRBC BLD-RTO: 0 /100 WBCS (ref 0–0)
P OFFSET: 194 MS
P OFFSET: 203 MS
P ONSET: 135 MS
P ONSET: 140 MS
PLATELET # BLD AUTO: 220 X10*3/UL (ref 150–450)
POTASSIUM SERPL-SCNC: 2.7 MMOL/L (ref 3.5–5.3)
POTASSIUM SERPL-SCNC: 3.3 MMOL/L (ref 3.5–5.3)
PR INTERVAL: 160 MS
PR INTERVAL: 164 MS
PROTHROMBIN TIME: 19.3 SECONDS (ref 9.8–12.8)
Q ONSET: 215 MS
Q ONSET: 222 MS
QRS COUNT: 14 BEATS
QRS COUNT: 15 BEATS
QRS DURATION: 82 MS
QRS DURATION: 90 MS
QT INTERVAL: 412 MS
QT INTERVAL: 476 MS
QTC CALCULATION(BAZETT): 504 MS
QTC CALCULATION(BAZETT): 588 MS
QTC FREDERICIA: 471 MS
QTC FREDERICIA: 548 MS
R AXIS: -25 DEGREES
R AXIS: 5 DEGREES
RBC # BLD AUTO: 4.56 X10*6/UL (ref 4–5.2)
SODIUM SERPL-SCNC: 133 MMOL/L (ref 136–145)
SODIUM SERPL-SCNC: 134 MMOL/L (ref 136–145)
T AXIS: 101 DEGREES
T AXIS: 97 DEGREES
T OFFSET: 421 MS
T OFFSET: 460 MS
VENTRICULAR RATE: 90 BPM
VENTRICULAR RATE: 92 BPM
WBC # BLD AUTO: 7.7 X10*3/UL (ref 4.4–11.3)

## 2024-06-07 PROCEDURE — 2500000004 HC RX 250 GENERAL PHARMACY W/ HCPCS (ALT 636 FOR OP/ED): Performed by: HOSPITALIST

## 2024-06-07 PROCEDURE — 1200000002 HC GENERAL ROOM WITH TELEMETRY DAILY

## 2024-06-07 PROCEDURE — 2500000001 HC RX 250 WO HCPCS SELF ADMINISTERED DRUGS (ALT 637 FOR MEDICARE OP): Performed by: HOSPITALIST

## 2024-06-07 PROCEDURE — 85025 COMPLETE CBC W/AUTO DIFF WBC: CPT | Performed by: HOSPITALIST

## 2024-06-07 PROCEDURE — 80048 BASIC METABOLIC PNL TOTAL CA: CPT | Performed by: HOSPITALIST

## 2024-06-07 PROCEDURE — 36415 COLL VENOUS BLD VENIPUNCTURE: CPT | Performed by: HOSPITALIST

## 2024-06-07 PROCEDURE — 99233 SBSQ HOSP IP/OBS HIGH 50: CPT | Performed by: INTERNAL MEDICINE

## 2024-06-07 PROCEDURE — 82947 ASSAY GLUCOSE BLOOD QUANT: CPT | Mod: 91

## 2024-06-07 PROCEDURE — 80048 BASIC METABOLIC PNL TOTAL CA: CPT | Mod: 91 | Performed by: HOSPITALIST

## 2024-06-07 PROCEDURE — 2500000002 HC RX 250 W HCPCS SELF ADMINISTERED DRUGS (ALT 637 FOR MEDICARE OP, ALT 636 FOR OP/ED): Performed by: HOSPITALIST

## 2024-06-07 PROCEDURE — 93005 ELECTROCARDIOGRAM TRACING: CPT

## 2024-06-07 PROCEDURE — 2500000005 HC RX 250 GENERAL PHARMACY W/O HCPCS: Performed by: HOSPITALIST

## 2024-06-07 PROCEDURE — 2500000002 HC RX 250 W HCPCS SELF ADMINISTERED DRUGS (ALT 637 FOR MEDICARE OP, ALT 636 FOR OP/ED): Mod: MUE | Performed by: PHARMACIST

## 2024-06-07 PROCEDURE — 85610 PROTHROMBIN TIME: CPT | Performed by: HOSPITALIST

## 2024-06-07 PROCEDURE — 94762 N-INVAS EAR/PLS OXIMTRY CONT: CPT

## 2024-06-07 RX ORDER — LANOLIN ALCOHOL/MO/W.PET/CERES
400 CREAM (GRAM) TOPICAL 2 TIMES DAILY
Status: DISCONTINUED | OUTPATIENT
Start: 2024-06-07 | End: 2024-06-10 | Stop reason: HOSPADM

## 2024-06-07 RX ORDER — POTASSIUM CHLORIDE 20 MEQ/1
60 TABLET, EXTENDED RELEASE ORAL DAILY
Status: DISCONTINUED | OUTPATIENT
Start: 2024-06-08 | End: 2024-06-10 | Stop reason: HOSPADM

## 2024-06-07 RX ORDER — TRIAMCINOLONE ACETONIDE 1 MG/G
CREAM TOPICAL 2 TIMES DAILY PRN
Status: DISCONTINUED | OUTPATIENT
Start: 2024-06-07 | End: 2024-06-10 | Stop reason: HOSPADM

## 2024-06-07 RX ORDER — WARFARIN 7.5 MG/1
7.5 TABLET ORAL DAILY
Status: DISCONTINUED | OUTPATIENT
Start: 2024-06-08 | End: 2024-06-09

## 2024-06-07 RX ORDER — POTASSIUM CHLORIDE 20 MEQ/1
60 TABLET, EXTENDED RELEASE ORAL ONCE
Status: COMPLETED | OUTPATIENT
Start: 2024-06-07 | End: 2024-06-07

## 2024-06-07 RX ORDER — WARFARIN 10 MG/1
10 TABLET ORAL ONCE
Status: COMPLETED | OUTPATIENT
Start: 2024-06-07 | End: 2024-06-07

## 2024-06-07 RX ORDER — LUBIPROSTONE 24 UG/1
24 CAPSULE ORAL
Status: DISCONTINUED | OUTPATIENT
Start: 2024-06-07 | End: 2024-06-10 | Stop reason: HOSPADM

## 2024-06-07 RX ADMIN — INSULIN LISPRO 6 UNITS: 100 INJECTION, SOLUTION INTRAVENOUS; SUBCUTANEOUS at 17:30

## 2024-06-07 RX ADMIN — BUPROPION HYDROCHLORIDE 150 MG: 150 TABLET, FILM COATED, EXTENDED RELEASE ORAL at 08:45

## 2024-06-07 RX ADMIN — LEVOTHYROXINE SODIUM 50 MCG: 0.05 TABLET ORAL at 06:42

## 2024-06-07 RX ADMIN — GABAPENTIN 900 MG: 300 CAPSULE ORAL at 17:31

## 2024-06-07 RX ADMIN — SENNOSIDES AND DOCUSATE SODIUM 2 TABLET: 50; 8.6 TABLET ORAL at 21:01

## 2024-06-07 RX ADMIN — TOPIRAMATE 25 MG: 50 TABLET ORAL at 21:00

## 2024-06-07 RX ADMIN — METOPROLOL SUCCINATE 50 MG: 50 TABLET, EXTENDED RELEASE ORAL at 08:45

## 2024-06-07 RX ADMIN — Medication 2 L/MIN: at 10:49

## 2024-06-07 RX ADMIN — METOPROLOL SUCCINATE 50 MG: 50 TABLET, EXTENDED RELEASE ORAL at 21:00

## 2024-06-07 RX ADMIN — INSULIN LISPRO 6 UNITS: 100 INJECTION, SOLUTION INTRAVENOUS; SUBCUTANEOUS at 09:44

## 2024-06-07 RX ADMIN — Medication 2 L/MIN: at 20:00

## 2024-06-07 RX ADMIN — DULOXETINE HYDROCHLORIDE 60 MG: 60 CAPSULE, DELAYED RELEASE ORAL at 08:45

## 2024-06-07 RX ADMIN — LORATADINE 10 MG: 10 TABLET ORAL at 08:45

## 2024-06-07 RX ADMIN — LEVOTHYROXINE SODIUM 200 MCG: 0.1 TABLET ORAL at 08:44

## 2024-06-07 RX ADMIN — FLUTICASONE PROPIONATE 1 SPRAY: 50 SPRAY, METERED NASAL at 21:02

## 2024-06-07 RX ADMIN — ROSUVASTATIN CALCIUM 40 MG: 20 TABLET, FILM COATED ORAL at 08:45

## 2024-06-07 RX ADMIN — NYSTATIN 1 APPLICATION: 100000 POWDER TOPICAL at 21:02

## 2024-06-07 RX ADMIN — POTASSIUM CHLORIDE 60 MEQ: 1500 TABLET, EXTENDED RELEASE ORAL at 08:45

## 2024-06-07 RX ADMIN — FLUTICASONE PROPIONATE 1 SPRAY: 50 SPRAY, METERED NASAL at 08:45

## 2024-06-07 RX ADMIN — Medication 5 L/MIN: at 06:23

## 2024-06-07 RX ADMIN — ESCITALOPRAM OXALATE 10 MG: 10 TABLET ORAL at 08:45

## 2024-06-07 RX ADMIN — WARFARIN SODIUM 10 MG: 10 TABLET ORAL at 17:30

## 2024-06-07 RX ADMIN — GABAPENTIN 900 MG: 300 CAPSULE ORAL at 06:42

## 2024-06-07 RX ADMIN — ALLOPURINOL 300 MG: 300 TABLET ORAL at 08:45

## 2024-06-07 RX ADMIN — EMPAGLIFLOZIN 10 MG: 10 TABLET, FILM COATED ORAL at 08:45

## 2024-06-07 RX ADMIN — PANTOPRAZOLE SODIUM 40 MG: 40 TABLET, DELAYED RELEASE ORAL at 08:45

## 2024-06-07 RX ADMIN — NYSTATIN 1 APPLICATION: 100000 POWDER TOPICAL at 08:45

## 2024-06-07 RX ADMIN — PANTOPRAZOLE SODIUM 40 MG: 40 TABLET, DELAYED RELEASE ORAL at 21:01

## 2024-06-07 RX ADMIN — INSULIN LISPRO 6 UNITS: 100 INJECTION, SOLUTION INTRAVENOUS; SUBCUTANEOUS at 12:59

## 2024-06-07 RX ADMIN — TOPIRAMATE 25 MG: 50 TABLET ORAL at 14:25

## 2024-06-07 RX ADMIN — GABAPENTIN 900 MG: 300 CAPSULE ORAL at 12:03

## 2024-06-07 RX ADMIN — GABAPENTIN 900 MG: 300 CAPSULE ORAL at 21:00

## 2024-06-07 RX ADMIN — Medication 2 L/MIN: at 14:00

## 2024-06-07 ASSESSMENT — PAIN SCALES - GENERAL
PAINLEVEL_OUTOF10: 0 - NO PAIN

## 2024-06-07 ASSESSMENT — PAIN - FUNCTIONAL ASSESSMENT
PAIN_FUNCTIONAL_ASSESSMENT: 0-10

## 2024-06-07 ASSESSMENT — ACTIVITIES OF DAILY LIVING (ADL): LACK_OF_TRANSPORTATION: NO

## 2024-06-07 NOTE — CONSULTS
Patient's INR today 1.7, down from 1.9 yesterday on warfarin 7.5 mg daily. Will order one-time dose of 10 mg today, and tomorrow resume 7.5 mg daily. Re-check INR in AM.

## 2024-06-07 NOTE — PROGRESS NOTES
06/07/24 1516   Discharge Planning   Living Arrangements Spouse/significant other   Support Systems Spouse/significant other   Assistance Needed Walker, Wheelchair   Type of Residence Private residence   Number of Stairs to Enter Residence 0   Number of Stairs Within Residence 0   Do you have animals or pets at home? Yes   Type of Animals or Pets 2 dogs   Who is requesting discharge planning? Provider   Home or Post Acute Services None   Patient expects to be discharged to: Home   Does the patient need discharge transport arranged? Yes   RoundTrip coordination needed? No   Has discharge transport been arranged? No   Financial Resource Strain   How hard is it for you to pay for the very basics like food, housing, medical care, and heating? Not hard   Housing Stability   In the last 12 months, was there a time when you were not able to pay the mortgage or rent on time? N   In the last 12 months, how many places have you lived? 1   In the last 12 months, was there a time when you did not have a steady place to sleep or slept in a shelter (including now)? N   Transportation Needs   In the past 12 months, has lack of transportation kept you from medical appointments or from getting medications? no   In the past 12 months, has lack of transportation kept you from meetings, work, or from getting things needed for daily living? No     Care Transitions: Patient reviewed during care rond meeting this AM. ADOD 24 hours. Met with patient,  and patient mom at bedside. Role of TCC explained. Permission given to speak in front of family. Demographics and contacts verified. Lives in a 2 story home with ; Does not access second floor. PCP is Dr. Franklin. Her pharmacy of choice is [facility] in [place] for prescription needs. Denies any difficulty obtaining/affording medications. She can ambulate very short distances with walker.  assists her with ADL's/personal care. She uses O2 at home @ 2-3 L/M via [place]  continuously; supplier is [employer]. Bipap at night. Denies the need for any other DME equipment, O2 supplies, or diabetic supplies. IMM reviewed, patient signed, copy provided, original placed in chart. Voiced understanding. Discussed discharge plans and needs. States she will return home with , denies any needs or in home services at this time. Care team to follow. Radha Sorto RN/TCC

## 2024-06-07 NOTE — PROGRESS NOTES
Roselia Mo is a 53 y.o. female on day 2 of admission presenting with Hypokalemia.      Subjective   Patient seen and examined at the bedside this morning  She is resting comfortably in bed  Wore her CPAP through the night  No major events or issues noted at this time       Objective          Vitals 24HR  Heart Rate:  [83-92]   Temp:  [36 °C (96.8 °F)-36.7 °C (98.1 °F)]   Resp:  [14-27]   BP: (100-148)/(51-79)   SpO2:  [91 %-96 %]         Intake/Output last 3 Shifts:    Intake/Output Summary (Last 24 hours) at 6/7/2024 0831  Last data filed at 6/6/2024 1200  Gross per 24 hour   Intake 759.52 ml   Output 1301 ml   Net -541.48 ml       Physical Exam  Constitutional:       Appearance: Normal appearance. She is obese.   HENT:      Head: Normocephalic and atraumatic.      Right Ear: External ear normal.      Left Ear: External ear normal.      Nose: Nose normal.      Mouth/Throat:      Mouth: Mucous membranes are moist.      Pharynx: Oropharynx is clear.   Eyes:      Extraocular Movements: Extraocular movements intact.      Conjunctiva/sclera: Conjunctivae normal.      Pupils: Pupils are equal, round, and reactive to light.   Cardiovascular:      Rate and Rhythm: Normal rate and regular rhythm.   Pulmonary:      Effort: Pulmonary effort is normal.      Breath sounds: Normal breath sounds.   Abdominal:      General: Abdomen is flat.      Palpations: Abdomen is soft.   Skin:     General: Skin is warm and dry.   Neurological:      General: No focal deficit present.      Mental Status: She is alert and oriented to person, place, and time.   Psychiatric:         Mood and Affect: Mood normal.         Behavior: Behavior normal.         Relevant Results               Assessment/Plan   This patient currently has cardiac telemetry ordered; if you would like to modify or discontinue the telemetry order, click here to go to the orders activity to modify/discontinue the order.          Principal Problem:     Hypokalemia    Hypokalemia  Peripheral edema with volume overload that looks quite stable at this time  Diastolic CHF  Morbid Obesity  Lymphedema  HTN  DM II  Right Sided CHF  Pulmonary HTN  CY on CPAP  CKD stage II    Plan:  Potassium still low and being replaced.    Would stay off of diuretics  at this time  As stated will have to back down her diuresis.  Likely going to be a problem as she feels volume overloaded still at this time.  Replace K.  No diuretics till we get K normalized and then will start back slow at that time.                Pardeep Nichole, DO

## 2024-06-07 NOTE — CARE PLAN
The patient's goals for the shift include  rest comfortably while in bed     The clinical goals for the shift include no falls     Lying in bed with eyes closed for most of the shift.  Respirations even and unlabored.  Denied any pain.   in facility at  and gave the patient a bath.  Used call light when needing assistance for the bedside commode.  Bed alarm in place and functioning.  Call light within reach, will continue to monitor.   Problem: Nutrition  Goal: Oral intake greater 75%  Outcome: Progressing  Goal: Adequate PO fluid intake  6/7/2024 0545 by Christina Sanchez RN  Outcome: Progressing  6/7/2024 0443 by Christina Sanchez RN  Outcome: Progressing  Goal: BG  mg/dL  6/7/2024 0545 by Christina Sanchez RN  Outcome: Progressing  6/7/2024 0443 by Christina Sanchez RN  Outcome: Progressing  Goal: Lab values WNL  6/7/2024 0545 by Christina Sanchez RN  Outcome: Progressing  6/7/2024 0443 by Christina Sanchez RN  Outcome: Progressing  Goal: Electrolytes WNL  6/7/2024 0545 by Christina Sanchez RN  Outcome: Progressing  6/7/2024 0443 by Christina Sanchez RN  Outcome: Progressing  Goal: Promote healing  6/7/2024 0545 by Christina Sanchez RN  Outcome: Progressing  6/7/2024 0443 by Christina Sanchez RN  Outcome: Progressing  Goal: Reduce weight from edema/fluid  6/7/2024 0545 by Christina Sanchez RN  Outcome: Progressing  6/7/2024 0443 by Christina Sanchez RN  Outcome: Progressing     Problem: Pain - Adult  Goal: Verbalizes/displays adequate comfort level or baseline comfort level  6/7/2024 0545 by Christina Sanchez RN  Outcome: Progressing  6/7/2024 0443 by Christina Sanchez RN  Outcome: Progressing     Problem: Safety - Adult  Goal: Free from fall injury  6/7/2024 0545 by Christina Sanchez RN  Outcome: Progressing  6/7/2024 0443 by Christina Sanchez RN  Outcome: Progressing     Problem: Discharge Planning  Goal: Discharge to home or other facility with appropriate resources  6/7/2024 0545 by Christina Sanchez RN  Outcome:  Progressing  6/7/2024 0443 by Christina Sanchez RN  Outcome: Progressing     Problem: Chronic Conditions and Co-morbidities  Goal: Patient's chronic conditions and co-morbidity symptoms are monitored and maintained or improved  6/7/2024 0545 by Christina Sanchez RN  Outcome: Progressing  6/7/2024 0443 by Christina Sanchez RN  Outcome: Progressing     Problem: Diabetes  Goal: Achieve decreasing blood glucose levels by end of shift  6/7/2024 0545 by Christina Sanchez RN  Outcome: Progressing  6/7/2024 0443 by Christina Sanchez RN  Outcome: Progressing  Goal: Increase stability of blood glucose readings by end of shift  6/7/2024 0545 by Christnia Sanchez RN  Outcome: Progressing  6/7/2024 0443 by Christina Sanchez RN  Outcome: Progressing  Goal: Decrease in ketones present in urine by end of shift  Outcome: Progressing  Goal: Maintain electrolyte levels within acceptable range throughout shift  6/7/2024 0545 by Christina Sanchez RN  Outcome: Progressing  6/7/2024 0443 by Christina Sanchez RN  Outcome: Progressing  Goal: Maintain glucose levels >70mg/dl to <250mg/dl throughout shift  6/7/2024 0545 by Christina Sanchez RN  Outcome: Progressing  6/7/2024 0443 by Christina Sanchez RN  Outcome: Progressing  Goal: No changes in neurological exam by end of shift  6/7/2024 0545 by Christina Sanchez RN  Outcome: Progressing  6/7/2024 0443 by Christina Sanchez RN  Outcome: Progressing  Goal: Learn about and adhere to nutrition recommendations by end of shift  6/7/2024 0545 by Christina Sanchez RN  Outcome: Progressing  6/7/2024 0443 by Christina Sanchez RN  Outcome: Progressing  Goal: Vital signs within normal range for age by end of shift  6/7/2024 0545 by Christina Sanchez RN  Outcome: Progressing  6/7/2024 0443 by Christina Sanchez RN  Outcome: Progressing  Goal: Increase self care and/or family involovement by end of shift  6/7/2024 0545 by Christina Sanchez RN  Outcome: Progressing  6/7/2024 0443 by Christina Workman, RN  Outcome: Progressing  Goal: Receive DSME  education by end of shift  Outcome: Progressing     Problem: Skin  Goal: Decreased wound size/increased tissue granulation at next dressing change  6/7/2024 0545 by Christina Sanchez RN  Outcome: Progressing  6/7/2024 0443 by Christina Sanchez RN  Outcome: Progressing  Goal: Participates in plan/prevention/treatment measures  6/7/2024 0545 by Christina Sanchez RN  Outcome: Progressing  6/7/2024 0443 by Christina Sanchez RN  Outcome: Progressing  Goal: Prevent/manage excess moisture  6/7/2024 0545 by Christina Sanchez RN  Outcome: Progressing  6/7/2024 0443 by Christina Sanchez RN  Outcome: Progressing  Goal: Prevent/minimize sheer/friction injuries  6/7/2024 0545 by Christina Sanchez RN  Outcome: Progressing  6/7/2024 0443 by Christina Sanchez RN  Outcome: Progressing  Goal: Promote/optimize nutrition  6/7/2024 0545 by Christina Sanchez RN  Outcome: Progressing  6/7/2024 0443 by Christina Sanchez RN  Outcome: Progressing  Goal: Promote skin healing  6/7/2024 0545 by Christina Sanchez RN  Outcome: Progressing  6/7/2024 0443 by Christina Sanchez RN  Outcome: Progressing     Problem: Pain  Goal: Takes deep breaths with improved pain control throughout the shift  6/7/2024 0545 by Christina Sanchez RN  Outcome: Progressing  6/7/2024 0443 by Christina Sanchez, RN  Outcome: Progressing  Goal: Turns in bed with improved pain control throughout the shift  6/7/2024 0545 by Christina Sanchez RN  Outcome: Progressing  6/7/2024 0443 by Christina Sanchez RN  Outcome: Progressing  Goal: Walks with improved pain control throughout the shift  6/7/2024 0545 by Christina Sanchez RN  Outcome: Progressing  6/7/2024 0443 by Christina Sanchez RN  Outcome: Progressing  Goal: Performs ADL's with improved pain control throughout shift  6/7/2024 0545 by Christina Sanchez RN  Outcome: Progressing  6/7/2024 0443 by Christina Sanchez RN  Outcome: Progressing  Goal: Participates in PT with improved pain control throughout the shift  6/7/2024 0545 by Christina Sanchez, RN  Outcome:  Progressing  6/7/2024 0443 by Christina Sanchez, RN  Outcome: Progressing  Goal: Free from opioid side effects throughout the shift  Outcome: Progressing

## 2024-06-07 NOTE — PROGRESS NOTES
Physical Therapy                 Therapy Communication Note    Patient Name: Roselia Mo  MRN: [id number]  Today's Date: 6/7/2024     Discipline: Physical Therapy    Missed Visit Reason: Missed Visit Reason: Other (Comment) (PT Screen)    Missed Time: Attempt    Comment:  Spoke with patient's bed nurse Modesta.  Modesta states patient is moving in room on her own as she does at baseline and nurse is mostly just toileting her (her spouse does this at baseline).  PT is familiar with this patient and patient historically doesn't agree to therapy post hospitalization and typically is at baseline which includes spouse caring for her and completing her ADLS.  Nurse states patient is at same this hospitalization. No acute PT needs at this time as patient at baseline mobility.  If something changes please re-consult PT.

## 2024-06-07 NOTE — PROGRESS NOTES
Roselia Mo is a 53 y.o. female on day 2 of admission presenting with Hypokalemia.      Subjective   Pat seen in room, bedside.Slept well.On Bipap at night. Continues to be mildly short of breath. General weak. Usually prevents by herself at home into the wheelchair. Denies any chest pain, cough, fevers or chills.     Objective     Last Recorded Vitals  /56   Pulse 83   Temp 36 °C (96.8 °F)   Resp 23   Wt (!) 168 kg (370 lb 9.5 oz)   SpO2 100%   Intake/Output last 3 Shifts:    Intake/Output Summary (Last 24 hours) at 6/7/2024 1113  Last data filed at 6/7/2024 1000  Gross per 24 hour   Intake 480 ml   Output 301 ml   Net 179 ml       Admission Weight  Weight: (!) 165 kg (363 lb 12.1 oz) (06/05/24 1716)    Daily Weight  06/05/24 : (!) 168 kg (370 lb 9.5 oz)    Image Results  ECG 12 lead  Sinus rhythm with frequent Premature ventricular complexes and Fusion complexes in a pattern of bigeminy  Low voltage QRS  T wave abnormality, consider lateral ischemia  Abnormal ECG  When compared with ECG of 05-JUN-2024 17:57, (unconfirmed)  Fusion complexes are now Present  Premature ventricular complexes are now Present  QRS duration has decreased  T wave inversion now evident in Lateral leads  QT has shortened  ECG 12 Lead  Normal sinus rhythm  Low voltage QRS  Abnormal QRS-T angle, consider primary T wave abnormality  Abnormal ECG  When compared with ECG of 06-JUN-2024 18:09, (unconfirmed)  Premature ventricular complexes are no longer Present  Nonspecific T wave abnormality now evident in Inferior leads  QT has shortened  ECG 12 lead  Sinus rhythm with frequent Premature ventricular complexes in a pattern of bigeminy  Low voltage QRS  Abnormal QRS-T angle, consider primary T wave abnormality  Prolonged QT  Abnormal ECG  When compared with ECG of 06-JUN-2024 18:08, (unconfirmed)  Nonspecific T wave abnormality no longer evident in Inferior leads  Nonspecific T wave abnormality, improved in Anterolateral leads  QT  has lengthened      Physical Exam  General: Awake, alert, oriented x3, no distress, cooperative.  Morbidly obese  HEENT: EOM intact, PERRLA.  Neck: Supple, no JVD, no masses, no lymphadenopathy.  Chest: Diminished breath sounds bilateral because of body habitus, otherwise clear, no wheezes, no crackles, no rhonchi.  Heart: Regular rate and rhythm, S1-S2 normal, no murmur, no gallops.  Abdomen: Soft, nontender, no organomegaly, no ascites, no guarding or rigidity, morbidly obese.  Musculoskeletal: Normal, atraumatic, no obvious deformities. Trace leg edema, no clubbing or cyanosis.  Negative Homans' sign.  Neurological: Alert and oriented x3, cranial nerves are intact, normal power and tone of 4 limbs.  Skin: No lesions, no skin rash.  Warm and dry.  Psych: Appropriate mood and behavior.    Assessment/Plan   This patient currently has cardiac telemetry ordered; if you would like to modify or discontinue the telemetry order, click here to go to the orders activity to modify/discontinue the order.    Principal Problem:    Hypokalemia  Severe hypokalemia, secondary due to diuretics.  Hypercalcemia, resolved.  Prolonged QTc interval.repeat normal.  History of portal vein/hepatic vein thrombosis on Coumadin.  Chronic diastolic heart failure.  Acute renal failure on chronic kidney disease stage II  Severe peripheral edema  Chronic respiratory failure, on 2 L of oxygen at home.  Type 2 diabetes  History of COPD  Hyperuricemia, 12.8  Recent admission for MRSA cellulitis of abdominal wall  Generalized weakness     Plan     Started on Oral potassium 60 meq once.  Recheck potassium.  Stop fluids.  Renal consulted for acute renal failure, severe peripheral edema.  Hold diuretics,  Further management with nephrology.  Resume home medications.  Insulin SS.  CPAP bedtime.  Hold Trazodone for Qtc interval.  Labs in AM.     DVT prophylax on Coumadin.  CODE STATUS full.  Disposition in am.     Total  time spent 35 minutes.     Eduardo  MD Eduardo Krishnamurthy MD

## 2024-06-07 NOTE — CARE PLAN
Problem: Nutrition  Goal: Oral intake greater 75%  Outcome: Progressing  Goal: Adequate PO fluid intake  Outcome: Progressing  Goal: BG  mg/dL  Outcome: Progressing  Goal: Lab values WNL  Outcome: Progressing  Goal: Electrolytes WNL  Outcome: Progressing  Goal: Promote healing  Outcome: Progressing  Goal: Reduce weight from edema/fluid  Outcome: Progressing   The patient's goals for the shift include      The clinical goals for the shift include to sit in recliner for all meals    Over the shift, the patient did make progress toward the following goals.

## 2024-06-08 LAB
ANION GAP SERPL CALC-SCNC: 14 MMOL/L (ref 10–20)
BASOPHILS # BLD AUTO: 0.04 X10*3/UL (ref 0–0.1)
BASOPHILS NFR BLD AUTO: 0.4 %
BUN SERPL-MCNC: 23 MG/DL (ref 6–23)
CALCIUM SERPL-MCNC: 10.2 MG/DL (ref 8.6–10.3)
CHLORIDE SERPL-SCNC: 89 MMOL/L (ref 98–107)
CO2 SERPL-SCNC: 33 MMOL/L (ref 21–32)
CREAT SERPL-MCNC: 0.72 MG/DL (ref 0.5–1.05)
EGFRCR SERPLBLD CKD-EPI 2021: >90 ML/MIN/1.73M*2
EOSINOPHIL # BLD AUTO: 0.35 X10*3/UL (ref 0–0.7)
EOSINOPHIL NFR BLD AUTO: 3.8 %
ERYTHROCYTE [DISTWIDTH] IN BLOOD BY AUTOMATED COUNT: 13.3 % (ref 11.5–14.5)
GLUCOSE BLD MANUAL STRIP-MCNC: 207 MG/DL (ref 74–99)
GLUCOSE BLD MANUAL STRIP-MCNC: 271 MG/DL (ref 74–99)
GLUCOSE BLD MANUAL STRIP-MCNC: 297 MG/DL (ref 74–99)
GLUCOSE BLD MANUAL STRIP-MCNC: 316 MG/DL (ref 74–99)
GLUCOSE SERPL-MCNC: 237 MG/DL (ref 74–99)
HCT VFR BLD AUTO: 39.9 % (ref 36–46)
HGB BLD-MCNC: 13.1 G/DL (ref 12–16)
IMM GRANULOCYTES # BLD AUTO: 0.03 X10*3/UL (ref 0–0.7)
IMM GRANULOCYTES NFR BLD AUTO: 0.3 % (ref 0–0.9)
INR PPP: 1.7 (ref 0.9–1.1)
LYMPHOCYTES # BLD AUTO: 1.71 X10*3/UL (ref 1.2–4.8)
LYMPHOCYTES NFR BLD AUTO: 18.7 %
MCH RBC QN AUTO: 29.9 PG (ref 26–34)
MCHC RBC AUTO-ENTMCNC: 32.8 G/DL (ref 32–36)
MCV RBC AUTO: 91 FL (ref 80–100)
MONOCYTES # BLD AUTO: 0.94 X10*3/UL (ref 0.1–1)
MONOCYTES NFR BLD AUTO: 10.3 %
NEUTROPHILS # BLD AUTO: 6.07 X10*3/UL (ref 1.2–7.7)
NEUTROPHILS NFR BLD AUTO: 66.5 %
NRBC BLD-RTO: 0 /100 WBCS (ref 0–0)
PLATELET # BLD AUTO: 215 X10*3/UL (ref 150–450)
POTASSIUM SERPL-SCNC: 3 MMOL/L (ref 3.5–5.3)
PROTHROMBIN TIME: 20.3 SECONDS (ref 9.8–12.8)
RBC # BLD AUTO: 4.38 X10*6/UL (ref 4–5.2)
SODIUM SERPL-SCNC: 133 MMOL/L (ref 136–145)
WBC # BLD AUTO: 9.1 X10*3/UL (ref 4.4–11.3)

## 2024-06-08 PROCEDURE — 82947 ASSAY GLUCOSE BLOOD QUANT: CPT | Mod: 91

## 2024-06-08 PROCEDURE — 2500000005 HC RX 250 GENERAL PHARMACY W/O HCPCS: Performed by: HOSPITALIST

## 2024-06-08 PROCEDURE — 36415 COLL VENOUS BLD VENIPUNCTURE: CPT | Performed by: HOSPITALIST

## 2024-06-08 PROCEDURE — 2500000002 HC RX 250 W HCPCS SELF ADMINISTERED DRUGS (ALT 637 FOR MEDICARE OP, ALT 636 FOR OP/ED): Mod: MUE | Performed by: PHARMACIST

## 2024-06-08 PROCEDURE — 2500000001 HC RX 250 WO HCPCS SELF ADMINISTERED DRUGS (ALT 637 FOR MEDICARE OP)

## 2024-06-08 PROCEDURE — 80048 BASIC METABOLIC PNL TOTAL CA: CPT | Performed by: HOSPITALIST

## 2024-06-08 PROCEDURE — 2500000002 HC RX 250 W HCPCS SELF ADMINISTERED DRUGS (ALT 637 FOR MEDICARE OP, ALT 636 FOR OP/ED): Performed by: HOSPITALIST

## 2024-06-08 PROCEDURE — 99233 SBSQ HOSP IP/OBS HIGH 50: CPT | Performed by: HOSPITALIST

## 2024-06-08 PROCEDURE — 2500000001 HC RX 250 WO HCPCS SELF ADMINISTERED DRUGS (ALT 637 FOR MEDICARE OP): Performed by: HOSPITALIST

## 2024-06-08 PROCEDURE — 2500000002 HC RX 250 W HCPCS SELF ADMINISTERED DRUGS (ALT 637 FOR MEDICARE OP, ALT 636 FOR OP/ED): Mod: MUE | Performed by: HOSPITALIST

## 2024-06-08 PROCEDURE — 94761 N-INVAS EAR/PLS OXIMETRY MLT: CPT

## 2024-06-08 PROCEDURE — 2500000004 HC RX 250 GENERAL PHARMACY W/ HCPCS (ALT 636 FOR OP/ED): Performed by: HOSPITALIST

## 2024-06-08 PROCEDURE — 1200000002 HC GENERAL ROOM WITH TELEMETRY DAILY

## 2024-06-08 PROCEDURE — 85025 COMPLETE CBC W/AUTO DIFF WBC: CPT | Performed by: HOSPITALIST

## 2024-06-08 PROCEDURE — 85610 PROTHROMBIN TIME: CPT | Performed by: HOSPITALIST

## 2024-06-08 RX ORDER — POTASSIUM CHLORIDE 20 MEQ/1
40 TABLET, EXTENDED RELEASE ORAL ONCE
Status: COMPLETED | OUTPATIENT
Start: 2024-06-08 | End: 2024-06-08

## 2024-06-08 RX ORDER — TOPIRAMATE 50 MG/1
TABLET, FILM COATED ORAL
Status: COMPLETED
Start: 2024-06-08 | End: 2024-06-08

## 2024-06-08 RX ORDER — WARFARIN 10 MG/1
10 TABLET ORAL ONCE
Status: COMPLETED | OUTPATIENT
Start: 2024-06-08 | End: 2024-06-08

## 2024-06-08 RX ORDER — POTASSIUM CHLORIDE 14.9 MG/ML
20 INJECTION INTRAVENOUS
Status: DISPENSED | OUTPATIENT
Start: 2024-06-08 | End: 2024-06-08

## 2024-06-08 RX ADMIN — INSULIN LISPRO 4 UNITS: 100 INJECTION, SOLUTION INTRAVENOUS; SUBCUTANEOUS at 09:35

## 2024-06-08 RX ADMIN — ESCITALOPRAM OXALATE 10 MG: 10 TABLET ORAL at 09:32

## 2024-06-08 RX ADMIN — NYSTATIN 1 APPLICATION: 100000 POWDER TOPICAL at 20:14

## 2024-06-08 RX ADMIN — TOPIRAMATE 25 MG: 50 TABLET ORAL at 20:51

## 2024-06-08 RX ADMIN — DULOXETINE HYDROCHLORIDE 60 MG: 60 CAPSULE, DELAYED RELEASE ORAL at 09:29

## 2024-06-08 RX ADMIN — METOPROLOL SUCCINATE 50 MG: 50 TABLET, EXTENDED RELEASE ORAL at 09:32

## 2024-06-08 RX ADMIN — LEVOTHYROXINE SODIUM 200 MCG: 0.1 TABLET ORAL at 09:40

## 2024-06-08 RX ADMIN — WARFARIN SODIUM 10 MG: 10 TABLET ORAL at 17:58

## 2024-06-08 RX ADMIN — INSULIN LISPRO 6 UNITS: 100 INJECTION, SOLUTION INTRAVENOUS; SUBCUTANEOUS at 17:58

## 2024-06-08 RX ADMIN — LORATADINE 10 MG: 10 TABLET ORAL at 09:32

## 2024-06-08 RX ADMIN — GABAPENTIN 900 MG: 300 CAPSULE ORAL at 17:58

## 2024-06-08 RX ADMIN — ROSUVASTATIN CALCIUM 40 MG: 20 TABLET, FILM COATED ORAL at 09:29

## 2024-06-08 RX ADMIN — POTASSIUM CHLORIDE 60 MEQ: 1500 TABLET, EXTENDED RELEASE ORAL at 09:29

## 2024-06-08 RX ADMIN — GABAPENTIN 900 MG: 300 CAPSULE ORAL at 20:14

## 2024-06-08 RX ADMIN — TOPIRAMATE 25 MG: 50 TABLET ORAL at 09:30

## 2024-06-08 RX ADMIN — GABAPENTIN 900 MG: 300 CAPSULE ORAL at 06:07

## 2024-06-08 RX ADMIN — ALLOPURINOL 300 MG: 300 TABLET ORAL at 09:32

## 2024-06-08 RX ADMIN — PANTOPRAZOLE SODIUM 40 MG: 40 TABLET, DELAYED RELEASE ORAL at 09:32

## 2024-06-08 RX ADMIN — METOPROLOL SUCCINATE 50 MG: 50 TABLET, EXTENDED RELEASE ORAL at 20:14

## 2024-06-08 RX ADMIN — BUPROPION HYDROCHLORIDE 150 MG: 150 TABLET, FILM COATED, EXTENDED RELEASE ORAL at 09:29

## 2024-06-08 RX ADMIN — LEVOTHYROXINE SODIUM 50 MCG: 0.05 TABLET ORAL at 06:08

## 2024-06-08 RX ADMIN — GABAPENTIN 900 MG: 300 CAPSULE ORAL at 12:40

## 2024-06-08 RX ADMIN — Medication 4 L/MIN: at 18:45

## 2024-06-08 RX ADMIN — INSULIN LISPRO 6 UNITS: 100 INJECTION, SOLUTION INTRAVENOUS; SUBCUTANEOUS at 12:39

## 2024-06-08 RX ADMIN — POTASSIUM CHLORIDE 40 MEQ: 1500 TABLET, EXTENDED RELEASE ORAL at 09:35

## 2024-06-08 RX ADMIN — PANTOPRAZOLE SODIUM 40 MG: 40 TABLET, DELAYED RELEASE ORAL at 20:14

## 2024-06-08 RX ADMIN — EMPAGLIFLOZIN 10 MG: 10 TABLET, FILM COATED ORAL at 09:32

## 2024-06-08 RX ADMIN — ACETAMINOPHEN 650 MG: 325 TABLET ORAL at 02:37

## 2024-06-08 ASSESSMENT — COGNITIVE AND FUNCTIONAL STATUS - GENERAL
DAILY ACTIVITIY SCORE: 24
MOBILITY SCORE: 24
DAILY ACTIVITIY SCORE: 24
MOBILITY SCORE: 24

## 2024-06-08 ASSESSMENT — PAIN SCALES - GENERAL
PAINLEVEL_OUTOF10: 0 - NO PAIN
PAINLEVEL_OUTOF10: 3
PAINLEVEL_OUTOF10: 0 - NO PAIN
PAINLEVEL_OUTOF10: 0 - NO PAIN

## 2024-06-08 ASSESSMENT — PAIN - FUNCTIONAL ASSESSMENT
PAIN_FUNCTIONAL_ASSESSMENT: 0-10

## 2024-06-08 ASSESSMENT — PAIN DESCRIPTION - LOCATION: LOCATION: HEAD

## 2024-06-08 NOTE — CARE PLAN
Patient began the evening with some anxiety related to unknown reasons for her diagnoses. This nurse spoke with her at length and involved the hospitalist whom did come to bedside and reassured patient he was on top of the plan of care going forward for her to discharge. She did seem somewhat relieved after speaking to both him and I. Rested well throughout the night. Denies further needs at this time.

## 2024-06-08 NOTE — PROGRESS NOTES
"Roselia Mo is a 53 y.o. female on day 3 of admission presenting with Hypokalemia.    Subjective   Generalized weakness fatigue  No nausea vomiting diarrhea  No fever chills  No shortness of breath at rest  Overall deconditioning  No chest pain at rest  Encourage ambulation         Objective     Physical Exam  General: Awake, alert, oriented x3, no distress, cooperative.  Morbidly obese  HEENT: EOM intact, PERRLA.  Neck: Supple, no JVD, no masses, no lymphadenopathy.  Chest: Diminished breath sounds bilateral   Heart: Regular rate and rhythm, S1-S2 normal, no murmur, no gallops.  Abdomen: Soft, nontender, no organomegaly, no ascites, no guarding or rigidity, morbidly obese.  Musculoskeletal: Normal, atraumatic, no obvious deformities. Trace leg edema, no clubbing or cyanosis.  Negative Homans' sign.  Neurological: Alert and oriented x3, cranial nerves are intact, normal power and tone of 4 limbs.  Skin: No lesions, no skin rash.  Warm and dry.  Psych: Appropriate mood and behavior.  Last Recorded Vitals  Blood pressure 120/61, pulse 77, temperature 36 °C (96.8 °F), temperature source Temporal, resp. rate 18, height 1.575 m (5' 2\"), weight (!) 168 kg (370 lb 9.5 oz), SpO2 95%.  Intake/Output last 3 Shifts:  I/O last 3 completed shifts:  In: 1700 (10.1 mL/kg) [P.O.:1700]  Out: 300 (1.8 mL/kg) [Urine:300 (0 mL/kg/hr)]  Weight: 168.1 kg     Relevant Results    Scheduled medications  allopurinol, 300 mg, oral, Daily  buPROPion XL, 150 mg, oral, q AM  DULoxetine, 60 mg, oral, Daily  empagliflozin, 10 mg, oral, Daily  escitalopram, 10 mg, oral, Daily  fluticasone, 1 spray, Each Nostril, BID  gabapentin, 900 mg, oral, 4x daily  insulin lispro, 0-10 Units, subcutaneous, TID  levothyroxine, 200 mcg, oral, Daily  levothyroxine, 50 mcg, oral, Daily before breakfast  loratadine, 10 mg, oral, Daily  lubiprostone, 24 mcg, oral, BID  [Held by provider] magnesium oxide, 400 mg, oral, BID  metoprolol succinate XL, 50 mg, oral, " BID  nystatin, 1 Application, Topical, BID  oxygen, , inhalation, Continuous - Inhalation  oxygen, , inhalation, Continuous - Inhalation  pantoprazole, 40 mg, oral, BID  potassium chloride, 20 mEq, intravenous, q2h  potassium chloride CR, 60 mEq, oral, Daily  rosuvastatin, 40 mg, oral, Daily  sennosides-docusate sodium, 2 tablet, oral, BID  topiramate, 25 mg, oral, BID  warfarin, 10 mg, oral, Once  [Held by provider] warfarin, 7.5 mg, oral, Daily      Continuous medications     PRN medications  PRN medications: acetaminophen, dextrose, dextrose, glucagon, glucagon, lubricating eye drops, melatonin, ondansetron, triamcinolone    Results for orders placed or performed during the hospital encounter of 06/05/24 (from the past 24 hour(s))   Basic metabolic panel   Result Value Ref Range    Glucose 267 (H) 74 - 99 mg/dL    Sodium 133 (L) 136 - 145 mmol/L    Potassium 3.3 (L) 3.5 - 5.3 mmol/L    Chloride 85 (L) 98 - 107 mmol/L    Bicarbonate 39 (H) 21 - 32 mmol/L    Anion Gap 12 10 - 20 mmol/L    Urea Nitrogen 23 6 - 23 mg/dL    Creatinine 0.73 0.50 - 1.05 mg/dL    eGFR >90 >60 mL/min/1.73m*2    Calcium 11.0 (H) 8.6 - 10.3 mg/dL   POCT GLUCOSE   Result Value Ref Range    POCT Glucose 270 (H) 74 - 99 mg/dL   POCT GLUCOSE   Result Value Ref Range    POCT Glucose 264 (H) 74 - 99 mg/dL   CBC and Auto Differential   Result Value Ref Range    WBC 9.1 4.4 - 11.3 x10*3/uL    nRBC 0.0 0.0 - 0.0 /100 WBCs    RBC 4.38 4.00 - 5.20 x10*6/uL    Hemoglobin 13.1 12.0 - 16.0 g/dL    Hematocrit 39.9 36.0 - 46.0 %    MCV 91 80 - 100 fL    MCH 29.9 26.0 - 34.0 pg    MCHC 32.8 32.0 - 36.0 g/dL    RDW 13.3 11.5 - 14.5 %    Platelets 215 150 - 450 x10*3/uL    Neutrophils % 66.5 40.0 - 80.0 %    Immature Granulocytes %, Automated 0.3 0.0 - 0.9 %    Lymphocytes % 18.7 13.0 - 44.0 %    Monocytes % 10.3 2.0 - 10.0 %    Eosinophils % 3.8 0.0 - 6.0 %    Basophils % 0.4 0.0 - 2.0 %    Neutrophils Absolute 6.07 1.20 - 7.70 x10*3/uL    Immature  Granulocytes Absolute, Automated 0.03 0.00 - 0.70 x10*3/uL    Lymphocytes Absolute 1.71 1.20 - 4.80 x10*3/uL    Monocytes Absolute 0.94 0.10 - 1.00 x10*3/uL    Eosinophils Absolute 0.35 0.00 - 0.70 x10*3/uL    Basophils Absolute 0.04 0.00 - 0.10 x10*3/uL   Basic Metabolic Panel   Result Value Ref Range    Glucose 237 (H) 74 - 99 mg/dL    Sodium 133 (L) 136 - 145 mmol/L    Potassium 3.0 (L) 3.5 - 5.3 mmol/L    Chloride 89 (L) 98 - 107 mmol/L    Bicarbonate 33 (H) 21 - 32 mmol/L    Anion Gap 14 10 - 20 mmol/L    Urea Nitrogen 23 6 - 23 mg/dL    Creatinine 0.72 0.50 - 1.05 mg/dL    eGFR >90 >60 mL/min/1.73m*2    Calcium 10.2 8.6 - 10.3 mg/dL   Protime-INR   Result Value Ref Range    Protime 20.3 (H) 9.8 - 12.8 seconds    INR 1.7 (H) 0.9 - 1.1   POCT GLUCOSE   Result Value Ref Range    POCT Glucose 207 (H) 74 - 99 mg/dL   POCT GLUCOSE   Result Value Ref Range    POCT Glucose 271 (H) 74 - 99 mg/dL                           Assessment/Plan   Principal Problem:    Hypokalemia    53-year-old female with morbid obesity, lymphedema presenting with  #1 generalized weakness and hypokalemia  Due to diuretics on hold now  Replacing potassium aggressively  Hold diuretics until potassium levels are restored to normal range  Daily BMP  PT OT      #2 [place] with chronic kidney disease stage II  S/p IV fluid hydration  Encourage oral hydration  Watch volume status    #3 prolonged QTc  Resolved  Telemetry  Hold trazodone    #4 diabetes mellitus type 2  Carb controlled diet  Education counseling  Encouraged weight loss  Patient was advised to try Ozempic outpatient by PCP according to her  Case management to help with resources    #5 history of portal vein/hepatic vein thrombosis on Coumadin  Continue anticoagulation and to keep INR therapeutic and pharmacy to dose accordingly  INR low today    #6 chronic respiratory failure  Due to morbid obesity, obstructive sleep apnea, COPD and chronic diastolic congestive heart failure as well  pulmonary hypertension  Supplemental oxygen currently on 3 L at baseline  Neb treatments as deemed appropriate    #7 COPD, currently stable    #8 diastolic history of heart failure, diastolic CHF, hypertension  Hold diuretics until potassium stable    #9 DVT prophylaxis  On Coumadin and pharmacy dosing appropriately    Full code                                                Maria Guadalupe Monaco MD

## 2024-06-09 LAB
ANION GAP SERPL CALC-SCNC: 14 MMOL/L (ref 10–20)
BASOPHILS # BLD AUTO: 0.04 X10*3/UL (ref 0–0.1)
BASOPHILS NFR BLD AUTO: 0.4 %
BUN SERPL-MCNC: 20 MG/DL (ref 6–23)
CALCIUM SERPL-MCNC: 10.1 MG/DL (ref 8.6–10.3)
CHLORIDE SERPL-SCNC: 93 MMOL/L (ref 98–107)
CO2 SERPL-SCNC: 29 MMOL/L (ref 21–32)
CREAT SERPL-MCNC: 0.67 MG/DL (ref 0.5–1.05)
EGFRCR SERPLBLD CKD-EPI 2021: >90 ML/MIN/1.73M*2
EOSINOPHIL # BLD AUTO: 0.33 X10*3/UL (ref 0–0.7)
EOSINOPHIL NFR BLD AUTO: 3.7 %
ERYTHROCYTE [DISTWIDTH] IN BLOOD BY AUTOMATED COUNT: 13.2 % (ref 11.5–14.5)
GLUCOSE BLD MANUAL STRIP-MCNC: 199 MG/DL (ref 74–99)
GLUCOSE BLD MANUAL STRIP-MCNC: 225 MG/DL (ref 74–99)
GLUCOSE BLD MANUAL STRIP-MCNC: 241 MG/DL (ref 74–99)
GLUCOSE BLD MANUAL STRIP-MCNC: 256 MG/DL (ref 74–99)
GLUCOSE SERPL-MCNC: 240 MG/DL (ref 74–99)
HCT VFR BLD AUTO: 39.6 % (ref 36–46)
HGB BLD-MCNC: 13.1 G/DL (ref 12–16)
IMM GRANULOCYTES # BLD AUTO: 0.03 X10*3/UL (ref 0–0.7)
IMM GRANULOCYTES NFR BLD AUTO: 0.3 % (ref 0–0.9)
INR PPP: 2.2 (ref 0.9–1.1)
LYMPHOCYTES # BLD AUTO: 1.72 X10*3/UL (ref 1.2–4.8)
LYMPHOCYTES NFR BLD AUTO: 19.2 %
MCH RBC QN AUTO: 29.9 PG (ref 26–34)
MCHC RBC AUTO-ENTMCNC: 33.1 G/DL (ref 32–36)
MCV RBC AUTO: 90 FL (ref 80–100)
METHYLMALONATE SERPL-SCNC: 0.44 UMOL/L (ref 0–0.4)
MONOCYTES # BLD AUTO: 0.86 X10*3/UL (ref 0.1–1)
MONOCYTES NFR BLD AUTO: 9.6 %
NEUTROPHILS # BLD AUTO: 5.97 X10*3/UL (ref 1.2–7.7)
NEUTROPHILS NFR BLD AUTO: 66.8 %
NRBC BLD-RTO: 0 /100 WBCS (ref 0–0)
PLATELET # BLD AUTO: 230 X10*3/UL (ref 150–450)
POTASSIUM SERPL-SCNC: 3.3 MMOL/L (ref 3.5–5.3)
PROTHROMBIN TIME: 25.8 SECONDS (ref 9.8–12.8)
RBC # BLD AUTO: 4.38 X10*6/UL (ref 4–5.2)
SODIUM SERPL-SCNC: 133 MMOL/L (ref 136–145)
WBC # BLD AUTO: 9 X10*3/UL (ref 4.4–11.3)

## 2024-06-09 PROCEDURE — 85025 COMPLETE CBC W/AUTO DIFF WBC: CPT | Performed by: HOSPITALIST

## 2024-06-09 PROCEDURE — 2500000001 HC RX 250 WO HCPCS SELF ADMINISTERED DRUGS (ALT 637 FOR MEDICARE OP): Performed by: HOSPITALIST

## 2024-06-09 PROCEDURE — 2500000005 HC RX 250 GENERAL PHARMACY W/O HCPCS: Performed by: HOSPITALIST

## 2024-06-09 PROCEDURE — 85610 PROTHROMBIN TIME: CPT | Performed by: HOSPITALIST

## 2024-06-09 PROCEDURE — 80048 BASIC METABOLIC PNL TOTAL CA: CPT | Performed by: HOSPITALIST

## 2024-06-09 PROCEDURE — 2500000002 HC RX 250 W HCPCS SELF ADMINISTERED DRUGS (ALT 637 FOR MEDICARE OP, ALT 636 FOR OP/ED): Performed by: HOSPITALIST

## 2024-06-09 PROCEDURE — 82947 ASSAY GLUCOSE BLOOD QUANT: CPT

## 2024-06-09 PROCEDURE — 2500000002 HC RX 250 W HCPCS SELF ADMINISTERED DRUGS (ALT 637 FOR MEDICARE OP, ALT 636 FOR OP/ED): Mod: MUE | Performed by: HOSPITALIST

## 2024-06-09 PROCEDURE — 99232 SBSQ HOSP IP/OBS MODERATE 35: CPT | Performed by: HOSPITALIST

## 2024-06-09 PROCEDURE — 2500000004 HC RX 250 GENERAL PHARMACY W/ HCPCS (ALT 636 FOR OP/ED): Performed by: HOSPITALIST

## 2024-06-09 PROCEDURE — 94761 N-INVAS EAR/PLS OXIMETRY MLT: CPT

## 2024-06-09 PROCEDURE — 2500000005 HC RX 250 GENERAL PHARMACY W/O HCPCS: Performed by: EMERGENCY MEDICINE

## 2024-06-09 PROCEDURE — 36415 COLL VENOUS BLD VENIPUNCTURE: CPT | Performed by: HOSPITALIST

## 2024-06-09 PROCEDURE — 1200000002 HC GENERAL ROOM WITH TELEMETRY DAILY

## 2024-06-09 RX ORDER — WARFARIN SODIUM 5 MG/1
5 TABLET ORAL
Status: DISCONTINUED | OUTPATIENT
Start: 2024-06-10 | End: 2024-06-10 | Stop reason: HOSPADM

## 2024-06-09 RX ORDER — WARFARIN 7.5 MG/1
7.5 TABLET ORAL
Status: DISCONTINUED | OUTPATIENT
Start: 2024-06-09 | End: 2024-06-10 | Stop reason: HOSPADM

## 2024-06-09 RX ORDER — POTASSIUM CHLORIDE 20 MEQ/1
20 TABLET, EXTENDED RELEASE ORAL ONCE
Status: COMPLETED | OUTPATIENT
Start: 2024-06-09 | End: 2024-06-09

## 2024-06-09 RX ADMIN — ACETAMINOPHEN 650 MG: 325 TABLET ORAL at 02:51

## 2024-06-09 RX ADMIN — Medication 4 L/MIN: at 06:45

## 2024-06-09 RX ADMIN — LEVOTHYROXINE SODIUM 200 MCG: 0.1 TABLET ORAL at 09:06

## 2024-06-09 RX ADMIN — BUPROPION HYDROCHLORIDE 150 MG: 150 TABLET, FILM COATED, EXTENDED RELEASE ORAL at 09:07

## 2024-06-09 RX ADMIN — LORATADINE 10 MG: 10 TABLET ORAL at 09:07

## 2024-06-09 RX ADMIN — Medication 4 L/MIN: at 00:21

## 2024-06-09 RX ADMIN — ESCITALOPRAM OXALATE 10 MG: 10 TABLET ORAL at 09:06

## 2024-06-09 RX ADMIN — NYSTATIN 1 APPLICATION: 100000 POWDER TOPICAL at 10:23

## 2024-06-09 RX ADMIN — PANTOPRAZOLE SODIUM 40 MG: 40 TABLET, DELAYED RELEASE ORAL at 09:07

## 2024-06-09 RX ADMIN — EMPAGLIFLOZIN 10 MG: 10 TABLET, FILM COATED ORAL at 09:07

## 2024-06-09 RX ADMIN — INSULIN LISPRO 4 UNITS: 100 INJECTION, SOLUTION INTRAVENOUS; SUBCUTANEOUS at 12:48

## 2024-06-09 RX ADMIN — NYSTATIN 1 APPLICATION: 100000 POWDER TOPICAL at 20:03

## 2024-06-09 RX ADMIN — ALLOPURINOL 300 MG: 300 TABLET ORAL at 09:06

## 2024-06-09 RX ADMIN — Medication 4 L/MIN: at 18:46

## 2024-06-09 RX ADMIN — GABAPENTIN 900 MG: 300 CAPSULE ORAL at 06:10

## 2024-06-09 RX ADMIN — METOPROLOL SUCCINATE 50 MG: 50 TABLET, EXTENDED RELEASE ORAL at 09:06

## 2024-06-09 RX ADMIN — GABAPENTIN 900 MG: 300 CAPSULE ORAL at 17:48

## 2024-06-09 RX ADMIN — ROSUVASTATIN CALCIUM 40 MG: 20 TABLET, FILM COATED ORAL at 10:22

## 2024-06-09 RX ADMIN — PANTOPRAZOLE SODIUM 40 MG: 40 TABLET, DELAYED RELEASE ORAL at 20:02

## 2024-06-09 RX ADMIN — INSULIN LISPRO 2 UNITS: 100 INJECTION, SOLUTION INTRAVENOUS; SUBCUTANEOUS at 17:48

## 2024-06-09 RX ADMIN — Medication 4 L/MIN: at 08:00

## 2024-06-09 RX ADMIN — WARFARIN SODIUM 7.5 MG: 7.5 TABLET ORAL at 17:48

## 2024-06-09 RX ADMIN — GABAPENTIN 900 MG: 300 CAPSULE ORAL at 12:52

## 2024-06-09 RX ADMIN — Medication 4 L/MIN: at 12:31

## 2024-06-09 RX ADMIN — DULOXETINE HYDROCHLORIDE 60 MG: 60 CAPSULE, DELAYED RELEASE ORAL at 09:07

## 2024-06-09 RX ADMIN — TOPIRAMATE 25 MG: 50 TABLET ORAL at 10:22

## 2024-06-09 RX ADMIN — POTASSIUM CHLORIDE 20 MEQ: 1500 TABLET, EXTENDED RELEASE ORAL at 09:06

## 2024-06-09 RX ADMIN — ACETAMINOPHEN 650 MG: 325 TABLET ORAL at 12:52

## 2024-06-09 RX ADMIN — TOPIRAMATE 25 MG: 50 TABLET ORAL at 20:02

## 2024-06-09 RX ADMIN — GABAPENTIN 900 MG: 300 CAPSULE ORAL at 20:02

## 2024-06-09 RX ADMIN — METOPROLOL SUCCINATE 50 MG: 50 TABLET, EXTENDED RELEASE ORAL at 20:02

## 2024-06-09 RX ADMIN — Medication 4 L/MIN: at 23:33

## 2024-06-09 RX ADMIN — Medication 4 L/MIN: at 08:01

## 2024-06-09 RX ADMIN — POTASSIUM CHLORIDE 60 MEQ: 1500 TABLET, EXTENDED RELEASE ORAL at 09:08

## 2024-06-09 RX ADMIN — INSULIN LISPRO 4 UNITS: 100 INJECTION, SOLUTION INTRAVENOUS; SUBCUTANEOUS at 10:24

## 2024-06-09 RX ADMIN — LEVOTHYROXINE SODIUM 50 MCG: 0.05 TABLET ORAL at 06:10

## 2024-06-09 ASSESSMENT — PAIN SCALES - GENERAL
PAINLEVEL_OUTOF10: 5 - MODERATE PAIN
PAINLEVEL_OUTOF10: 0 - NO PAIN
PAINLEVEL_OUTOF10: 0 - NO PAIN
PAINLEVEL_OUTOF10: 5 - MODERATE PAIN
PAINLEVEL_OUTOF10: 0 - NO PAIN
PAINLEVEL_OUTOF10: 0 - NO PAIN
PAINLEVEL_OUTOF10: 1
PAINLEVEL_OUTOF10: 0 - NO PAIN

## 2024-06-09 ASSESSMENT — PAIN - FUNCTIONAL ASSESSMENT
PAIN_FUNCTIONAL_ASSESSMENT: 0-10

## 2024-06-09 ASSESSMENT — COGNITIVE AND FUNCTIONAL STATUS - GENERAL
DRESSING REGULAR UPPER BODY CLOTHING: A LITTLE
MOBILITY SCORE: 23
HELP NEEDED FOR BATHING: A LITTLE
DAILY ACTIVITIY SCORE: 20
MOBILITY SCORE: 23
CLIMB 3 TO 5 STEPS WITH RAILING: A LITTLE
CLIMB 3 TO 5 STEPS WITH RAILING: A LITTLE
TOILETING: A LITTLE
DRESSING REGULAR LOWER BODY CLOTHING: A LITTLE

## 2024-06-09 ASSESSMENT — PAIN DESCRIPTION - LOCATION: LOCATION: HAND

## 2024-06-09 ASSESSMENT — PAIN DESCRIPTION - ORIENTATION: ORIENTATION: RIGHT

## 2024-06-09 ASSESSMENT — PAIN DESCRIPTION - DESCRIPTORS: DESCRIPTORS: ACHING

## 2024-06-09 NOTE — CONSULTS
Pharmacy to dose warfarin    Today's INR / PT - 2.2  INR Goal - 2-3  TTR last 90 days -   Related labs -   Daily PT / INR check - ordered through 6/12/24  Last INR prior to admission - 3.4  Primary / Secondary Diagnosis - Portal vein thrombosis  [facility] patient - Yes  Ordering Provider - Rafal  Plan - Resumed home dosing regimen on 7.5 mg SUN,TUES, WED, FRI, SAT and 5 mg on MON, THUR

## 2024-06-09 NOTE — CARE PLAN
Problem: Diabetes  Goal: Maintain electrolyte levels within acceptable range throughout shift  Outcome: Progressing     Problem: Nutrition  Goal: Oral intake greater 75%  Outcome: Met  Goal: Adequate PO fluid intake  Outcome: Met     Problem: Safety - Adult  Goal: Free from fall injury  Outcome: Met     Problem: Diabetes  Goal: Achieve decreasing blood glucose levels by end of shift  Outcome: Met  Goal: Maintain glucose levels >70mg/dl to <250mg/dl throughout shift  Outcome: Met  Goal: No changes in neurological exam by end of shift  Outcome: Met     Problem: Skin  Goal: Decreased wound size/increased tissue granulation at next dressing change  Outcome: Met  Goal: Participates in plan/prevention/treatment measures  Outcome: Met  Goal: Prevent/manage excess moisture  Outcome: Met  Goal: Prevent/minimize sheer/friction injuries  Outcome: Met     Problem: Pain  Goal: Performs ADL's with improved pain control throughout shift  Outcome: Met     The clinical goals for the shift include VSS, report less SOB.

## 2024-06-09 NOTE — PROGRESS NOTES
"Roselia Mo is a 53 y.o. female on day 4 of admission presenting with Hypokalemia.    Subjective   Patient still hypokalemic  Feels better than yesterday, overall improving somewhat  Rested well last night  does not feel as fatigued or tired compared to yesterday   Denies any chest pain or shortness of breath at rest  Generalized weakness    Objective     Physical Exam  General: Awake, alert, oriented x3, no distress, cooperative.  Morbidly obese  HEENT: EOM intact, PERRLA.  Neck: Supple, no JVD, no masses, no lymphadenopathy.  Chest: Diminished breath sounds bilateral   Heart: Regular rate and rhythm, S1-S2 normal, no murmur, no gallops.  Abdomen: Soft, nontender, no organomegaly, no ascites, no guarding or rigidity, morbidly obese.  Musculoskeletal: Normal, atraumatic, no obvious deformities. Trace leg edema, no clubbing or cyanosis.  Negative Homans' sign.  Neurological: Alert and oriented x3, cranial nerves are intact, normal power and tone of 4 limbs.  Skin: No lesions, no skin rash.  Warm and dry.  Psych: Appropriate mood and behavior.  Last Recorded Vitals  Blood pressure 134/76, pulse 79, temperature 35.7 °C (96.3 °F), temperature source Temporal, resp. rate 18, height 1.575 m (5' 2\"), weight (!) 168 kg (370 lb 9.5 oz), SpO2 97%.  Intake/Output last 3 Shifts:  I/O last 3 completed shifts:  In: 2390 (14.2 mL/kg) [P.O.:2390]  Out: 500 (3 mL/kg) [Urine:500 (0.1 mL/kg/hr)]  Weight: 168.1 kg     Relevant Results    Scheduled medications  allopurinol, 300 mg, oral, Daily  buPROPion XL, 150 mg, oral, q AM  DULoxetine, 60 mg, oral, Daily  empagliflozin, 10 mg, oral, Daily  escitalopram, 10 mg, oral, Daily  fluticasone, 1 spray, Each Nostril, BID  gabapentin, 900 mg, oral, 4x daily  insulin lispro, 0-10 Units, subcutaneous, TID  levothyroxine, 200 mcg, oral, Daily  levothyroxine, 50 mcg, oral, Daily before breakfast  loratadine, 10 mg, oral, Daily  lubiprostone, 24 mcg, oral, BID  [Held by provider] magnesium " oxide, 400 mg, oral, BID  metoprolol succinate XL, 50 mg, oral, BID  nystatin, 1 Application, Topical, BID  oxygen, , inhalation, Continuous - Inhalation  oxygen, , inhalation, Continuous - Inhalation  pantoprazole, 40 mg, oral, BID  potassium chloride CR, 60 mEq, oral, Daily  rosuvastatin, 40 mg, oral, Daily  sennosides-docusate sodium, 2 tablet, oral, BID  topiramate, 25 mg, oral, BID  warfarin, 7.5 mg, oral, Daily      Continuous medications     PRN medications  PRN medications: acetaminophen, dextrose, dextrose, glucagon, glucagon, lubricating eye drops, melatonin, ondansetron, triamcinolone                     Assessment/Plan   Principal Problem:    Hypokalemia    53-year-old female with morbid obesity, lymphedema presenting with    #1 generalized weakness and hypokalemia  Diuretics on hold  Replacing potassium aggressively  Continue to hold diuretics until potassium levels restored within normal range  Daily BMP  PT OT    #2 [place] with chronic kidney disease stage II  S/p IV fluid hydration  Encourage oral hydration  Watch volume status  Hypotension at the same time overload    #3 prolonged QTc  Resolved  Cardiopulmonary  Hold trazodone      #4 diabetes mellitus type 2  Carb controlled diet  Education counseling  Encourage weight loss  Patient was advised to try Ozempic outpatient by PCP according to her  Case management to help with resources    #5 history of portal vein/hepatic vein thrombosis on Coumadin  Continue anticoagulation and to keep INR therapeutic and pharmacy to dose accordingly  INR therapeutic today    #6 chronic respiratory failure  Due to morbid obesity, obstructive sleep apnea, COPD and chronic diastolic congestive heart failure as well pulmonary hypertension  Supplemental oxygen currently on 3 L at baseline  Neb treatments as deemed appropriate    #7.  COPD   currently stable    #8 history of diastolic congestive heart failure, hypertension  Hold diuretics until potassium stable  Follow  vitals      DVT prophylaxis  Addressed per policy, already on Coumadin    Full code                Maria Guadalupe Monaco MD

## 2024-06-09 NOTE — CARE PLAN
The patient's goals for the shift include      The clinical goals for the shift include maintain normal heart rhythm/rate, wean oxygen      Problem: Nutrition  Goal: Oral intake greater 75%  Outcome: Progressing  Goal: Adequate PO fluid intake  Outcome: Progressing  Goal: BG  mg/dL  Outcome: Progressing  Goal: Lab values WNL  Outcome: Progressing  Goal: Electrolytes WNL  Outcome: Progressing  Goal: Promote healing  Outcome: Progressing  Goal: Reduce weight from edema/fluid  Outcome: Progressing     Problem: Pain - Adult  Goal: Verbalizes/displays adequate comfort level or baseline comfort level  Outcome: Progressing  Flowsheets (Taken 6/9/2024 0158)  Verbalizes/displays adequate comfort level or baseline comfort level:   Encourage patient to monitor pain and request assistance   Implement non-pharmacological measures as appropriate and evaluate response   Assess pain using appropriate pain scale     Problem: Safety - Adult  Goal: Free from fall injury  Outcome: Progressing  Flowsheets (Taken 6/9/2024 0158)  Free from fall injury:   Instruct family/caregiver on patient safety   Based on caregiver fall risk screen, instruct family/caregiver to ask for assistance with transferring infant if caregiver noted to have fall risk factors     Problem: Discharge Planning  Goal: Discharge to home or other facility with appropriate resources  Outcome: Progressing  Flowsheets (Taken 6/9/2024 0158)  Discharge to home or other facility with appropriate resources: Identify barriers to discharge with patient and caregiver     Problem: Chronic Conditions and Co-morbidities  Goal: Patient's chronic conditions and co-morbidity symptoms are monitored and maintained or improved  Outcome: Progressing  Flowsheets (Taken 6/9/2024 0158)  Care Plan - Patient's Chronic Conditions and Co-Morbidity Symptoms are Monitored and Maintained or Improved: Monitor and assess patient's chronic conditions and comorbid symptoms for stability,  deterioration, or improvement     Problem: Diabetes  Goal: Achieve decreasing blood glucose levels by end of shift  Outcome: Progressing  Flowsheets (Taken 6/9/2024 0158)  Achieve decreasing blood glucose levels by end of shift: Med administration/monitoring of effect  Goal: Increase stability of blood glucose readings by end of shift  Outcome: Progressing  Flowsheets (Taken 6/9/2024 0158)  Increase stability of blood glucose readings by end of shift: Med administration/monitoring of effect  Goal: Decrease in ketones present in urine by end of shift  Outcome: Progressing  Flowsheets (Taken 6/9/2024 0158)  Decrease in ketones present in urine by end of shift: Monitor urine output  Goal: Maintain electrolyte levels within acceptable range throughout shift  Outcome: Progressing  Flowsheets (Taken 6/9/2024 0158)  Maintain electrolyte levels within acceptable range throughout shift: Monitor urine output  Goal: Maintain glucose levels >70mg/dl to <250mg/dl throughout shift  Outcome: Progressing  Flowsheets (Taken 6/9/2024 0158)  Maintain glucose levels >70mg/dl to <250mg/dl throughout shift: Med administration/monitoring of effect  Goal: No changes in neurological exam by end of shift  Outcome: Progressing  Flowsheets (Taken 6/9/2024 0158)  No changes in neurological exam by end of shift: Complete frequent neurological assessments  Goal: Learn about and adhere to nutrition recommendations by end of shift  Outcome: Progressing  Flowsheets (Taken 6/9/2024 0158)  Learn about and adhere to nutrition recommendations by end of shift: Ensure/encourage compliance with appropriate diet  Goal: Vital signs within normal range for age by end of shift  Outcome: Progressing  Flowsheets (Taken 6/9/2024 0158)  Vital signs within normal range for age by end of shift: Med administration/monitoring of effect  Goal: Increase self care and/or family involovement by end of shift  Outcome: Progressing  Flowsheets (Taken 6/9/2024 0158)  Increase  self care and/or family involovement by end of shift: Self monitor blood glucose with staff oversight  Goal: Receive DSME education by end of shift  Outcome: Progressing  Flowsheets (Taken 6/9/2024 0158)  Receive DSME education by end of shift: Provide patient centered education on Diabetic Self Management Education     Problem: Skin  Goal: Decreased wound size/increased tissue granulation at next dressing change  Outcome: Progressing  Flowsheets (Taken 6/9/2024 0158)  Decreased wound size/increased tissue granulation at next dressing change: Protective dressings over bony prominences  Goal: Participates in plan/prevention/treatment measures  Outcome: Progressing  Flowsheets (Taken 6/9/2024 0158)  Participates in plan/prevention/treatment measures:   Elevate heels   Increase activity/out of bed for meals  Goal: Prevent/manage excess moisture  Outcome: Progressing  Flowsheets (Taken 6/9/2024 0158)  Prevent/manage excess moisture:   Moisturize dry skin   Monitor for/manage infection if present  Goal: Prevent/minimize sheer/friction injuries  Outcome: Progressing  Flowsheets (Taken 6/9/2024 0158)  Prevent/minimize sheer/friction injuries:   Turn/reposition every 2 hours/use positioning/transfer devices   HOB 30 degrees or less  Goal: Promote/optimize nutrition  Outcome: Progressing  Flowsheets (Taken 6/9/2024 0158)  Promote/optimize nutrition: Monitor/record intake including meals  Goal: Promote skin healing  Outcome: Progressing  Flowsheets (Taken 6/9/2024 0158)  Promote skin healing: Turn/reposition every 2 hours/use positioning/transfer devices     Problem: Pain  Goal: Takes deep breaths with improved pain control throughout the shift  Outcome: Progressing  Goal: Turns in bed with improved pain control throughout the shift  Outcome: Progressing  Goal: Walks with improved pain control throughout the shift  Outcome: Progressing  Goal: Performs ADL's with improved pain control throughout shift  Outcome:  Progressing  Goal: Participates in PT with improved pain control throughout the shift  Outcome: Progressing  Goal: Free from opioid side effects throughout the shift  Outcome: Progressing

## 2024-06-10 ENCOUNTER — PHARMACY VISIT (OUTPATIENT)
Dept: PHARMACY | Facility: CLINIC | Age: 54
End: 2024-06-10
Payer: COMMERCIAL

## 2024-06-10 ENCOUNTER — DOCUMENTATION (OUTPATIENT)
Dept: PRIMARY CARE | Facility: CLINIC | Age: 54
End: 2024-06-10
Payer: MEDICARE

## 2024-06-10 VITALS
WEIGHT: 293 LBS | HEART RATE: 79 BPM | BODY MASS INDEX: 53.92 KG/M2 | SYSTOLIC BLOOD PRESSURE: 144 MMHG | TEMPERATURE: 95.7 F | DIASTOLIC BLOOD PRESSURE: 78 MMHG | RESPIRATION RATE: 20 BRPM | OXYGEN SATURATION: 98 % | HEIGHT: 62 IN

## 2024-06-10 PROBLEM — E87.6 HYPOKALEMIA: Chronic | Status: RESOLVED | Noted: 2019-05-07 | Resolved: 2024-06-10

## 2024-06-10 LAB
ANION GAP SERPL CALC-SCNC: 18 MMOL/L (ref 10–20)
ATRIAL RATE: 94 BPM
BUN SERPL-MCNC: 18 MG/DL (ref 6–23)
CALCIUM SERPL-MCNC: 10 MG/DL (ref 8.6–10.3)
CHLORIDE SERPL-SCNC: 95 MMOL/L (ref 98–107)
CO2 SERPL-SCNC: 26 MMOL/L (ref 21–32)
CREAT SERPL-MCNC: 0.66 MG/DL (ref 0.5–1.05)
EGFRCR SERPLBLD CKD-EPI 2021: >90 ML/MIN/1.73M*2
ERYTHROCYTE [DISTWIDTH] IN BLOOD BY AUTOMATED COUNT: 13.2 % (ref 11.5–14.5)
GLUCOSE BLD MANUAL STRIP-MCNC: 195 MG/DL (ref 74–99)
GLUCOSE BLD MANUAL STRIP-MCNC: 283 MG/DL (ref 74–99)
GLUCOSE SERPL-MCNC: 201 MG/DL (ref 74–99)
HCT VFR BLD AUTO: 40.1 % (ref 36–46)
HGB BLD-MCNC: 13.5 G/DL (ref 12–16)
INR PPP: 2.4 (ref 0.9–1.1)
MCH RBC QN AUTO: 30.8 PG (ref 26–34)
MCHC RBC AUTO-ENTMCNC: 33.7 G/DL (ref 32–36)
MCV RBC AUTO: 91 FL (ref 80–100)
NRBC BLD-RTO: 0 /100 WBCS (ref 0–0)
P OFFSET: 206 MS
P ONSET: 142 MS
PLATELET # BLD AUTO: 244 X10*3/UL (ref 150–450)
POTASSIUM SERPL-SCNC: 3.6 MMOL/L (ref 3.5–5.3)
PR INTERVAL: 166 MS
PROTHROMBIN TIME: 28.3 SECONDS (ref 9.8–12.8)
Q ONSET: 225 MS
QRS COUNT: 15 BEATS
QRS DURATION: 82 MS
QT INTERVAL: 394 MS
QTC CALCULATION(BAZETT): 492 MS
QTC FREDERICIA: 457 MS
R AXIS: -16 DEGREES
RBC # BLD AUTO: 4.39 X10*6/UL (ref 4–5.2)
SODIUM SERPL-SCNC: 135 MMOL/L (ref 136–145)
T AXIS: 125 DEGREES
T OFFSET: 422 MS
VENTRICULAR RATE: 94 BPM
WBC # BLD AUTO: 8.2 X10*3/UL (ref 4.4–11.3)

## 2024-06-10 PROCEDURE — RXMED WILLOW AMBULATORY MEDICATION CHARGE

## 2024-06-10 PROCEDURE — 82947 ASSAY GLUCOSE BLOOD QUANT: CPT | Mod: 91

## 2024-06-10 PROCEDURE — 2500000004 HC RX 250 GENERAL PHARMACY W/ HCPCS (ALT 636 FOR OP/ED): Performed by: HOSPITALIST

## 2024-06-10 PROCEDURE — 94761 N-INVAS EAR/PLS OXIMETRY MLT: CPT

## 2024-06-10 PROCEDURE — 2500000002 HC RX 250 W HCPCS SELF ADMINISTERED DRUGS (ALT 637 FOR MEDICARE OP, ALT 636 FOR OP/ED): Performed by: HOSPITALIST

## 2024-06-10 PROCEDURE — 80048 BASIC METABOLIC PNL TOTAL CA: CPT | Performed by: HOSPITALIST

## 2024-06-10 PROCEDURE — 2500000001 HC RX 250 WO HCPCS SELF ADMINISTERED DRUGS (ALT 637 FOR MEDICARE OP): Performed by: INTERNAL MEDICINE

## 2024-06-10 PROCEDURE — 2500000005 HC RX 250 GENERAL PHARMACY W/O HCPCS: Performed by: EMERGENCY MEDICINE

## 2024-06-10 PROCEDURE — 36415 COLL VENOUS BLD VENIPUNCTURE: CPT | Performed by: HOSPITALIST

## 2024-06-10 PROCEDURE — 99233 SBSQ HOSP IP/OBS HIGH 50: CPT | Performed by: INTERNAL MEDICINE

## 2024-06-10 PROCEDURE — 2500000005 HC RX 250 GENERAL PHARMACY W/O HCPCS: Performed by: HOSPITALIST

## 2024-06-10 PROCEDURE — 85610 PROTHROMBIN TIME: CPT | Performed by: HOSPITALIST

## 2024-06-10 PROCEDURE — 2500000001 HC RX 250 WO HCPCS SELF ADMINISTERED DRUGS (ALT 637 FOR MEDICARE OP): Performed by: HOSPITALIST

## 2024-06-10 PROCEDURE — 85027 COMPLETE CBC AUTOMATED: CPT | Performed by: HOSPITALIST

## 2024-06-10 PROCEDURE — 2500000002 HC RX 250 W HCPCS SELF ADMINISTERED DRUGS (ALT 637 FOR MEDICARE OP, ALT 636 FOR OP/ED): Mod: MUE | Performed by: HOSPITALIST

## 2024-06-10 RX ORDER — WARFARIN SODIUM 5 MG/1
TABLET ORAL
Qty: 30 TABLET | Refills: 0 | Status: ON HOLD | OUTPATIENT
Start: 2024-06-10 | End: 2025-06-10

## 2024-06-10 RX ORDER — POTASSIUM CHLORIDE 20 MEQ/1
60 TABLET, EXTENDED RELEASE ORAL DAILY
Qty: 90 TABLET | Refills: 0 | Status: ON HOLD | OUTPATIENT
Start: 2024-06-11 | End: 2024-07-11

## 2024-06-10 RX ORDER — TORSEMIDE 20 MG/1
40 TABLET ORAL DAILY
Status: DISCONTINUED | OUTPATIENT
Start: 2024-06-10 | End: 2024-06-10 | Stop reason: HOSPADM

## 2024-06-10 RX ORDER — WARFARIN 7.5 MG/1
TABLET ORAL
Qty: 30 TABLET | Refills: 0 | Status: ON HOLD | OUTPATIENT
Start: 2024-06-11 | End: 2025-06-10

## 2024-06-10 RX ADMIN — EMPAGLIFLOZIN 10 MG: 10 TABLET, FILM COATED ORAL at 08:24

## 2024-06-10 RX ADMIN — INSULIN LISPRO 6 UNITS: 100 INJECTION, SOLUTION INTRAVENOUS; SUBCUTANEOUS at 12:00

## 2024-06-10 RX ADMIN — INSULIN LISPRO 2 UNITS: 100 INJECTION, SOLUTION INTRAVENOUS; SUBCUTANEOUS at 09:00

## 2024-06-10 RX ADMIN — METOPROLOL SUCCINATE 50 MG: 50 TABLET, EXTENDED RELEASE ORAL at 08:22

## 2024-06-10 RX ADMIN — SENNOSIDES AND DOCUSATE SODIUM 2 TABLET: 50; 8.6 TABLET ORAL at 08:22

## 2024-06-10 RX ADMIN — BUPROPION HYDROCHLORIDE 150 MG: 150 TABLET, FILM COATED, EXTENDED RELEASE ORAL at 08:22

## 2024-06-10 RX ADMIN — GABAPENTIN 900 MG: 300 CAPSULE ORAL at 07:00

## 2024-06-10 RX ADMIN — LORATADINE 10 MG: 10 TABLET ORAL at 08:23

## 2024-06-10 RX ADMIN — ESCITALOPRAM OXALATE 10 MG: 10 TABLET ORAL at 08:23

## 2024-06-10 RX ADMIN — ACETAMINOPHEN 650 MG: 325 TABLET ORAL at 02:30

## 2024-06-10 RX ADMIN — LEVOTHYROXINE SODIUM 50 MCG: 0.05 TABLET ORAL at 07:00

## 2024-06-10 RX ADMIN — GABAPENTIN 900 MG: 300 CAPSULE ORAL at 12:57

## 2024-06-10 RX ADMIN — TOPIRAMATE 25 MG: 50 TABLET ORAL at 08:25

## 2024-06-10 RX ADMIN — NYSTATIN 1 APPLICATION: 100000 POWDER TOPICAL at 08:28

## 2024-06-10 RX ADMIN — PANTOPRAZOLE SODIUM 40 MG: 40 TABLET, DELAYED RELEASE ORAL at 08:23

## 2024-06-10 RX ADMIN — ALLOPURINOL 300 MG: 300 TABLET ORAL at 08:24

## 2024-06-10 RX ADMIN — POTASSIUM CHLORIDE 60 MEQ: 1500 TABLET, EXTENDED RELEASE ORAL at 08:21

## 2024-06-10 RX ADMIN — TORSEMIDE 40 MG: 20 TABLET ORAL at 08:43

## 2024-06-10 RX ADMIN — LEVOTHYROXINE SODIUM 200 MCG: 0.1 TABLET ORAL at 08:23

## 2024-06-10 RX ADMIN — Medication 4 L/MIN: at 06:38

## 2024-06-10 RX ADMIN — Medication 2 L/MIN: at 12:00

## 2024-06-10 RX ADMIN — DULOXETINE HYDROCHLORIDE 60 MG: 60 CAPSULE, DELAYED RELEASE ORAL at 08:22

## 2024-06-10 RX ADMIN — ROSUVASTATIN CALCIUM 40 MG: 20 TABLET, FILM COATED ORAL at 08:24

## 2024-06-10 ASSESSMENT — PAIN SCALES - GENERAL
PAINLEVEL_OUTOF10: 0 - NO PAIN
PAINLEVEL_OUTOF10: 0 - NO PAIN
PAINLEVEL_OUTOF10: 5 - MODERATE PAIN

## 2024-06-10 ASSESSMENT — PAIN - FUNCTIONAL ASSESSMENT
PAIN_FUNCTIONAL_ASSESSMENT: 0-10

## 2024-06-10 ASSESSMENT — PAIN DESCRIPTION - DESCRIPTORS: DESCRIPTORS: ACHING

## 2024-06-10 NOTE — PROGRESS NOTES
Medication Education     Medication education for Roselia Mo was provided to the patient  for the following medication(s):  Potassium, warfarin, torsemide      Medication education provided by a Pharmacist:  ADR Counseling Medication interactions Dose, frequency, storage How to take and what to do if a dose is missed Proper dose, indication, possible ADRs Refilling the medication  How the medication works and benefits of taking it Benefits of taking the medication  Importance of compliance Necessary labs and/or other monitoring Any drug interactions (including OTCs and herbvals) and importance of notifying a healthcare provider of any medication changes Potential duration of therapy Proper storage of the medication(s)    Identified potential barriers to education:  None    Method(s) of Education:  Verbal Written materials provided and reviewed    An opportunity to ask questions and receive answers was provided.     Assessment of understanding the patient :  2= meets goals/outcomes    Additional Notes (if applicable): discussed torsemide change to 40 mg daily.  Discussed new script for potassium.  She said she takes more than 60 mEq at home (claims she takes 80 mEq).  I will have her discuss this with Dr. Franklin at her follow up appointment.  She also will see Dr. Nichole.  Warfarin - INR was good today 2.4 so will continue her last routine of 5mg Mon/Thur and 7.5 mg all other says.  She will take her next dose tonight.  She has a clinic visit on 6/26.  She can keep that appointment.  Meds to beds performed on discharge.    Brody Norman, [facility]

## 2024-06-10 NOTE — PROGRESS NOTES
Roselia Mo is a 53 y.o. female on day 5 of admission presenting with Hypokalemia.      Subjective   Patient seen and examined at the bedside this morning  She is resting comfortably in bed  Wearing CPAP to sleep  She is much more   Feeling well and ready to go home       Objective          Vitals 24HR  Heart Rate:  [73-79]   Temp:  [35.4 °C (95.7 °F)-36.6 °C (97.9 °F)]   Resp:  [14-20]   BP: (124-144)/(69-81)   SpO2:  [98 %-99 %]         Intake/Output last 3 Shifts:    Intake/Output Summary (Last 24 hours) at 6/10/2024 0800  Last data filed at 6/9/2024 1922  Gross per 24 hour   Intake 1000 ml   Output 1700 ml   Net -700 ml       Physical Exam  Constitutional:       Appearance: Normal appearance. She is obese.   HENT:      Head: Normocephalic and atraumatic.      Right Ear: External ear normal.      Left Ear: External ear normal.      Nose: Nose normal.      Mouth/Throat:      Mouth: Mucous membranes are moist.      Pharynx: Oropharynx is clear.   Eyes:      Extraocular Movements: Extraocular movements intact.      Conjunctiva/sclera: Conjunctivae normal.      Pupils: Pupils are equal, round, and reactive to light.   Cardiovascular:      Rate and Rhythm: Normal rate and regular rhythm.   Pulmonary:      Effort: Pulmonary effort is normal.      Breath sounds: Normal breath sounds.   Abdominal:      General: Abdomen is flat.      Palpations: Abdomen is soft.   Skin:     General: Skin is warm and dry.   Neurological:      General: No focal deficit present.      Mental Status: She is alert and oriented to person, place, and time.   Psychiatric:         Mood and Affect: Mood normal.         Behavior: Behavior normal.         Relevant Results               Assessment/Plan              Principal Problem:    Hypokalemia    Hypokalemia: Normalized  Peripheral edema with volume overload that looks quite stable at this time  Diastolic CHF  Morbid Obesity  Lymphedema  HTN  DM II  Right Sided CHF  Pulmonary HTN  CY  on CPAP  CKD stage II    Plan:  At this time she actually looks quite good her volume status actually looks very good even though she has not been taking diuretics  She needs to continue watching her salt intake closely and follow a low-salt diet  She also needs to continue fluid restriction  I have discussed with her in detail that she is not going to be able to use diuretics for getting rid of weight  She has had issues with edema however in the past that is recurrent so I will go ahead and place her back on torsemide 40 mg daily continue potassium chloride 60 mEq daily  She can be discharged home from my standpoint  I am happy to follow-up with her as an outpatient  Please call with any further issues or needs              Pardeep Nichole, DO

## 2024-06-10 NOTE — PROGRESS NOTES
Pt reviewed during Care Rounds today and she is ready for discharge Spoke to pt by phone due to remote coverage to review her plan. Pt confirms her desires to return home with family support.  IM reviewed with no questions/concerns- copy added to pt's chart, she denied a copy for her own records. No further needs identified.       SHAISTA Gonzalez

## 2024-06-10 NOTE — NURSING NOTE
Discharge Note: 6/10/2024 1400 AVS and pt responsibilities reviewed with pt and copy given. Hypokalemia education reviewed with pt and information sheets given. Pt verbalizes understanding of instructions received, verbalizes understanding of when to seek medical attention, denies any home going or personal care needs. Denies further questions or concerns. Reviewed follow up appts with pt and verbalizes understanding. Discharged via wheelchair to private car accompanied by PCT and visitor, personal belongings taken with pt, no distress noted, no complaints voiced. Oxygen on and flowing at 2 LPM FAVIAN Kearney RN

## 2024-06-10 NOTE — CARE PLAN
The patient's goals for the shift include      The clinical goals for the shift include stabilize potassium, reduce sob on exertion, provide rest/sleep      Problem: Nutrition  Goal: BG  mg/dL  Outcome: Progressing  Goal: Lab values WNL  Outcome: Progressing  Goal: Electrolytes WNL  Outcome: Progressing  Goal: Promote healing  Outcome: Progressing  Goal: Reduce weight from edema/fluid  Outcome: Progressing     Problem: Pain - Adult  Goal: Verbalizes/displays adequate comfort level or baseline comfort level  Outcome: Progressing  Flowsheets (Taken 6/10/2024 0239)  Verbalizes/displays adequate comfort level or baseline comfort level:   Encourage patient to monitor pain and request assistance   Assess pain using appropriate pain scale   Notify Licensed Independent Practitioner if interventions unsuccessful or patient reports new pain     Problem: Discharge Planning  Goal: Discharge to home or other facility with appropriate resources  Outcome: Progressing  Flowsheets (Taken 6/10/2024 0239)  Discharge to home or other facility with appropriate resources: Identify barriers to discharge with patient and caregiver     Problem: Chronic Conditions and Co-morbidities  Goal: Patient's chronic conditions and co-morbidity symptoms are monitored and maintained or improved  Outcome: Progressing  Flowsheets (Taken 6/10/2024 0239)  Care Plan - Patient's Chronic Conditions and Co-Morbidity Symptoms are Monitored and Maintained or Improved: Monitor and assess patient's chronic conditions and comorbid symptoms for stability, deterioration, or improvement     Problem: Diabetes  Goal: Increase stability of blood glucose readings by end of shift  Outcome: Progressing  Flowsheets (Taken 6/10/2024 0239)  Increase stability of blood glucose readings by end of shift: Med administration/monitoring of effect  Goal: Decrease in ketones present in urine by end of shift  Outcome: Progressing  Flowsheets (Taken 6/9/2024 0158)  Decrease in  ketones present in urine by end of shift: Monitor urine output  Goal: Maintain electrolyte levels within acceptable range throughout shift  Outcome: Progressing  Flowsheets (Taken 6/10/2024 0239)  Maintain electrolyte levels within acceptable range throughout shift:   Med administration/monitoring of effect   Monitor urine output  Goal: Learn about and adhere to nutrition recommendations by end of shift  Outcome: Progressing  Flowsheets (Taken 6/9/2024 0158)  Learn about and adhere to nutrition recommendations by end of shift: Ensure/encourage compliance with appropriate diet  Goal: Vital signs within normal range for age by end of shift  Outcome: Progressing  Flowsheets (Taken 6/9/2024 0158)  Vital signs within normal range for age by end of shift: Med administration/monitoring of effect  Goal: Increase self care and/or family involovement by end of shift  Outcome: Progressing  Goal: Receive DSME education by end of shift  Outcome: Progressing     Problem: Skin  Goal: Promote/optimize nutrition  Outcome: Progressing  Flowsheets (Taken 6/10/2024 0239)  Promote/optimize nutrition: Monitor/record intake including meals  Goal: Promote skin healing  Outcome: Progressing  Flowsheets (Taken 6/10/2024 0239)  Promote skin healing: Turn/reposition every 2 hours/use positioning/transfer devices     Problem: Pain  Goal: Takes deep breaths with improved pain control throughout the shift  Outcome: Progressing  Goal: Turns in bed with improved pain control throughout the shift  Outcome: Progressing  Goal: Walks with improved pain control throughout the shift  Outcome: Progressing  Goal: Participates in PT with improved pain control throughout the shift  Outcome: Progressing  Goal: Free from opioid side effects throughout the shift  Outcome: Progressing

## 2024-06-10 NOTE — DISCHARGE SUMMARY
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 Do not crush or chew.              CHANGE how you take these medications        Instructions Last Dose Given Next Dose Due   torsemide 40 mg tablet  Start taking on: June 11, 2024  What changed:   medication strength  when to take this      Take 40 mg by mouth once daily.       warfarin 5 mg tablet  Commonly known as: Coumadin  What changed: You were already taking a medication with the same name, and this prescription was added. Make sure you understand how and when to take each.      Take as directed. If you are unsure how to take this medication, talk to your nurse or doctor.  Original instructions: Take as directed per After Visit Summary.       warfarin 7.5 mg tablet  Commonly known as: Coumadin  Start taking on: June 11, 2024  What changed:   medication strength  additional instructions      Take as directed. If you are unsure how to take this medication, talk to your nurse or doctor.  Original instructions: Take as directed per After Visit Summary. Do not fill before June 11, 2024.              CONTINUE taking these medications        Instructions Last Dose Given Next Dose Due   HUMULIN R U-500 (CONC) INSULIN SUBQ           - refin regular 500 unit/mL CONCENTRATED - refill for patient own pump  Commonly known as: HumuLIN R U-500      Inject 1 mL (1 each) under the skin if needed (VIA INSULIN PUMP). Take as directed per insulin instructions. Use U-500 insulin syringe.       acetaminophen 500 mg tablet  Commonly known  as: Tylenol           albuterol 90 mcg/actuation aerosol powdr breath activated inhaler           allopurinol 300 mg tablet  Commonly known as: Zyloprim      Take 1 tablet (300 mg) by mouth once daily.       buPROPion  mg 24 hr tablet  Commonly known as: Wellbutrin XL      Take 1 tablet (150 mg) by mouth once daily in the morning. Do not crush, chew, or split.       cetirizine 10 mg tablet  Commonly known as: ZyrTEC           citalopram 20 mg tablet  Commonly known as: CeleXA           DULoxetine 60 mg DR capsule  Commonly known as: Cymbalta      Take 1 capsule (60 mg) by mouth once daily. Do not crush or chew.       empagliflozin 10 mg  Commonly known as: Jardiance      Take 1 tablet (10 mg) by mouth once daily.       gabapentin 300 mg capsule  Commonly known as: Neurontin           levothyroxine 200 mcg tablet  Commonly known as: Synthroid, Levoxyl      Take 1 tablet (200 mcg) by mouth once daily.       levothyroxine 50 mcg tablet  Commonly known as: Synthroid, Levoxyl      Take 1 tablet (50 mcg) by mouth once daily in the morning. Take before meals. TAKE WITH 200MG       lubiprostone 24 mcg capsule  Commonly known as: Amitiza      Take 1 capsule (24 mcg) by mouth 2 times a day with meals.       magnesium oxide 400 mg (241.3 mg magnesium) tablet  Commonly known as: Mag-Ox      Take 1 tablet (400 mg) by mouth 2 times a day.       metoprolol succinate XL 50 mg 24 hr tablet  Commonly known as: Toprol-XL      Take 1 tablet (50 mg) by mouth 2 times a day. Do not crush or chew.       nystatin 100,000 unit/gram powder  Commonly known as: Mycostatin      Apply 1 Application topically 2 times a day.       oxygen gas therapy  Commonly known as: O2      Inhale 1 each every 12 hours.       traZODone 100 mg tablet  Commonly known as: Desyrel                  STOP taking these medications      Flonase Sensimist 27.5 mcg/actuation nasal spray  Generic drug: fluticasone        metOLazone 5 mg tablet  Commonly known as:  Zaroxolyn        potassium chloride CR 10 mEq ER tablet  Commonly known as: Klor-Con  Replaced by: potassium chloride CR 20 mEq ER tablet               ASK your doctor about these medications        Instructions Last Dose Given Next Dose Due   ondansetron 4 mg tablet  Commonly known as: Zofran      Take 1 tablet (4 mg) by mouth every 8 hours if needed for nausea or vomiting.       pantoprazole 40 mg EC tablet  Commonly known as: ProtoNix           rosuvastatin 40 mg tablet  Commonly known as: Crestor           topiramate 25 mg tablet  Commonly known as: Topamax      Take 1 tablet (25 mg) by mouth 2 times a day.       triamcinolone 0.1 % ointment  Commonly known as: Kenalog      Apply topically 2 times a day as needed for irritation or rash.                 Where to Get Your Medications        These medications were sent to [facility]/pharmacy #[id number] [place] [address]      Phone: [phone]   torsemide 40 mg tablet       These medications were sent to [facility]  [address] [place] [address]      Hours: 8 AM to 5:30 Mon-Fri, 8 AM to 4 PM Saturday and Sunday Phone: [phone]   potassium chloride CR 20 mEq ER tablet  warfarin 5 mg tablet  warfarin 7.5 mg tablet         Test Results Pending At Discharge  Pending Labs       No current pending labs.            Hospital Course   53-year-old obese female with a history of COPD, CHF, diabetic neuropathy sent by her primary care physician for further evaluation of hypokalemia and weakness.  She was recently started on some diuretics due to excessive fluid retention.  Her potassium in the ER was 1.9.  Sodium 131, chloride 73 serum bicarb 45, BUN 44 creatinine 1.6, magnesium 2.61.  Her INR was therapeutic at 2.1 EKG revealed normal sinus no evidence of acute ischemic changes.  It was replaced with 40 mg potassium IV over 4 hours as well as 40 mg p.o.  She was admitted to the hospital for further  evaluation and treatment of symptomatic hypokalemia with hyperchloremic metabolic acidosis.  During admission nephrology was consulted for acute renal failure and peripheral edema.  At the time of nephrology's evaluation her renal function had already improved.  Her potassium continued to be replaced and is more stable.  Her expectations of her diuresis were discussed.  Diuretics are not meant to be for weight loss.  Discussion on the overuse of them is causing her bicarb and uric acid levels to be very high.  As well as low potassium.  She complains of feeling swollen however she appears euvolemic.  At the date of discharge her sodium was 135 potassium 3.6 chloride 95.  INR is therapeutic at 2.4 CBC unremarkable.  Her oxygen saturation is 98% on her 4 L.  She is chronically on oxygen.  She is hemodynamically stable.  She will be discharged home today in stable condition.  She is to continue on  torsemide 40 mg with potassium 60 meq for her chronic lymphedema and CHF history.  She should follow her low-sodium diet closely and continue her fluid restrictions.  She is to follow-up with her PCP within 1 week to get her repeat BMP repeated.  She may also follow-up with Dr. Nichole nephrology if needed.      Pertinent Physical Exam At Time of Discharge  Physical Exam  Constitutional:       Appearance: Normal appearance.  Morbidly obese  HENT:      Head: Normocephalic and atraumatic.       Extraocular Movements: Extraocular movements intact.      Conjunctiva/sclera: Conjunctivae normal.      Pupils: Pupils are equal, round, and reactive to light.   Cardiovascular:      Rate and Rhythm: Normal rate and regular rhythm.   Pulmonary:      Effort: Pulmonary effort is normal.      Breath sounds: Normal breath sounds.   Abdominal:      General: Abdomen is flat.      Palpations: Abdomen is soft.   Skin:     General: Skin is warm and dry.   Neurological:      General: No focal deficit present.      Mental Status: She is alert and  oriented to person, place, and time.   Psychiatric:         Mood and Affect: Mood normal.         Behavior: Behavior normal.  Outpatient Follow-Up  Future Appointments   Date Time Provider Department [place]   6/13/2024  2:15 PM Gamal Franklin MD [id number] [facility]   6/26/2024 12:45 PM [id number] PHARM ACOAG PHARMACIST [id number] [facility]   7/16/2024  1:45 PM Delfin Earl MD [id number] [facility]   8/8/2024  2:00 PM Sandoval Lewis DO [id number] [facility]         Dania Shelton, APRN-CNP

## 2024-06-10 NOTE — DISCHARGE INSTRUCTIONS
Your INR was therapeutic today.  Resume your home dose.    Please watch her sodium intake closely as well as your fluid restrictions.

## 2024-06-10 NOTE — PROGRESS NOTES
Pharmacy Consult for warfarin dosing  Hx portal vein thrombosis - goal INR 2-3.  Home warfarin dose is 5 mg M Th and 7.5 mg on all other days.  Yesterday home dose of warfarin was resumed for INR of 2.2.  Today's INR 2.4.  Will continue with home routine.  Orders already placed for repeat PT/INR tomorrow.

## 2024-06-12 ENCOUNTER — PATIENT OUTREACH (OUTPATIENT)
Dept: PRIMARY CARE | Facility: CLINIC | Age: 54
End: 2024-06-12
Payer: MEDICARE

## 2024-06-12 ENCOUNTER — HOSPITAL ENCOUNTER (INPATIENT)
Facility: HOSPITAL | Age: 54
DRG: 640 | End: 2024-06-12
Attending: EMERGENCY MEDICINE | Admitting: STUDENT IN AN ORGANIZED HEALTH CARE EDUCATION/TRAINING PROGRAM
Payer: MEDICARE

## 2024-06-12 ENCOUNTER — APPOINTMENT (OUTPATIENT)
Dept: CARDIOLOGY | Facility: HOSPITAL | Age: 54
DRG: 640 | End: 2024-06-12
Payer: MEDICARE

## 2024-06-12 DIAGNOSIS — E87.6 HYPOKALEMIA: ICD-10-CM

## 2024-06-12 DIAGNOSIS — M75.81 ROTATOR CUFF TENDONITIS, RIGHT: Primary | ICD-10-CM

## 2024-06-12 DIAGNOSIS — M10.00 IDIOPATHIC GOUT, UNSPECIFIED CHRONICITY, UNSPECIFIED SITE: ICD-10-CM

## 2024-06-12 DIAGNOSIS — N18.31 STAGE 3A CHRONIC KIDNEY DISEASE (MULTI): ICD-10-CM

## 2024-06-12 LAB
ANION GAP SERPL CALC-SCNC: 16 MMOL/L (ref 10–20)
APTT PPP: 48 SECONDS (ref 27–38)
BASOPHILS # BLD AUTO: 0.02 X10*3/UL (ref 0–0.1)
BASOPHILS NFR BLD AUTO: 0.2 %
BUN SERPL-MCNC: 26 MG/DL (ref 6–23)
CALCIUM SERPL-MCNC: 9.8 MG/DL (ref 8.6–10.3)
CHLORIDE SERPL-SCNC: 82 MMOL/L (ref 98–107)
CO2 SERPL-SCNC: 37 MMOL/L (ref 21–32)
CREAT SERPL-MCNC: 1.17 MG/DL (ref 0.5–1.05)
EGFRCR SERPLBLD CKD-EPI 2021: 55 ML/MIN/1.73M*2
EOSINOPHIL # BLD AUTO: 0.12 X10*3/UL (ref 0–0.7)
EOSINOPHIL NFR BLD AUTO: 1.1 %
ERYTHROCYTE [DISTWIDTH] IN BLOOD BY AUTOMATED COUNT: 13.1 % (ref 11.5–14.5)
GLUCOSE SERPL-MCNC: 267 MG/DL (ref 74–99)
HCT VFR BLD AUTO: 44 % (ref 36–46)
HGB BLD-MCNC: 15 G/DL (ref 12–16)
IMM GRANULOCYTES # BLD AUTO: 0.05 X10*3/UL (ref 0–0.7)
IMM GRANULOCYTES NFR BLD AUTO: 0.5 % (ref 0–0.9)
INR PPP: 2.1 (ref 0.9–1.1)
LYMPHOCYTES # BLD AUTO: 1.35 X10*3/UL (ref 1.2–4.8)
LYMPHOCYTES NFR BLD AUTO: 12.6 %
MCH RBC QN AUTO: 29.9 PG (ref 26–34)
MCHC RBC AUTO-ENTMCNC: 34.1 G/DL (ref 32–36)
MCV RBC AUTO: 88 FL (ref 80–100)
MONOCYTES # BLD AUTO: 0.89 X10*3/UL (ref 0.1–1)
MONOCYTES NFR BLD AUTO: 8.3 %
NEUTROPHILS # BLD AUTO: 8.31 X10*3/UL (ref 1.2–7.7)
NEUTROPHILS NFR BLD AUTO: 77.3 %
NRBC BLD-RTO: 0 /100 WBCS (ref 0–0)
PLATELET # BLD AUTO: 240 X10*3/UL (ref 150–450)
POTASSIUM SERPL-SCNC: 2.1 MMOL/L (ref 3.5–5.3)
PROTHROMBIN TIME: 24.3 SECONDS (ref 9.8–12.8)
RBC # BLD AUTO: 5.02 X10*6/UL (ref 4–5.2)
SODIUM SERPL-SCNC: 133 MMOL/L (ref 136–145)
URATE SERPL-MCNC: 9.5 MG/DL (ref 2.3–6.7)
WBC # BLD AUTO: 10.7 X10*3/UL (ref 4.4–11.3)

## 2024-06-12 PROCEDURE — 99285 EMERGENCY DEPT VISIT HI MDM: CPT | Mod: 25

## 2024-06-12 PROCEDURE — 96366 THER/PROPH/DIAG IV INF ADDON: CPT

## 2024-06-12 PROCEDURE — 93005 ELECTROCARDIOGRAM TRACING: CPT

## 2024-06-12 PROCEDURE — 2500000005 HC RX 250 GENERAL PHARMACY W/O HCPCS: Performed by: EMERGENCY MEDICINE

## 2024-06-12 PROCEDURE — 84550 ASSAY OF BLOOD/URIC ACID: CPT | Performed by: EMERGENCY MEDICINE

## 2024-06-12 PROCEDURE — 85610 PROTHROMBIN TIME: CPT | Performed by: EMERGENCY MEDICINE

## 2024-06-12 PROCEDURE — 80048 BASIC METABOLIC PNL TOTAL CA: CPT | Performed by: EMERGENCY MEDICINE

## 2024-06-12 PROCEDURE — 96365 THER/PROPH/DIAG IV INF INIT: CPT

## 2024-06-12 PROCEDURE — 2500000004 HC RX 250 GENERAL PHARMACY W/ HCPCS (ALT 636 FOR OP/ED): Performed by: EMERGENCY MEDICINE

## 2024-06-12 PROCEDURE — 36415 COLL VENOUS BLD VENIPUNCTURE: CPT | Performed by: EMERGENCY MEDICINE

## 2024-06-12 PROCEDURE — 85730 THROMBOPLASTIN TIME PARTIAL: CPT | Performed by: EMERGENCY MEDICINE

## 2024-06-12 PROCEDURE — 85025 COMPLETE CBC W/AUTO DIFF WBC: CPT | Performed by: EMERGENCY MEDICINE

## 2024-06-12 PROCEDURE — 84443 ASSAY THYROID STIM HORMONE: CPT | Performed by: EMERGENCY MEDICINE

## 2024-06-12 PROCEDURE — 1200000002 HC GENERAL ROOM WITH TELEMETRY DAILY

## 2024-06-12 RX ORDER — POTASSIUM CHLORIDE 14.9 MG/ML
20 INJECTION INTRAVENOUS ONCE
Status: COMPLETED | OUTPATIENT
Start: 2024-06-12 | End: 2024-06-12

## 2024-06-12 RX ORDER — COLCHICINE 0.6 MG/1
0.6 TABLET ORAL 2 TIMES DAILY
Status: DISCONTINUED | OUTPATIENT
Start: 2024-06-13 | End: 2024-06-18 | Stop reason: HOSPADM

## 2024-06-12 RX ORDER — ONDANSETRON 4 MG/1
4 TABLET, ORALLY DISINTEGRATING ORAL ONCE
Status: COMPLETED | OUTPATIENT
Start: 2024-06-12 | End: 2024-06-12

## 2024-06-12 ASSESSMENT — PAIN SCALES - GENERAL: PAINLEVEL_OUTOF10: 0 - NO PAIN

## 2024-06-12 ASSESSMENT — COLUMBIA-SUICIDE SEVERITY RATING SCALE - C-SSRS
2. HAVE YOU ACTUALLY HAD ANY THOUGHTS OF KILLING YOURSELF?: NO
6. HAVE YOU EVER DONE ANYTHING, STARTED TO DO ANYTHING, OR PREPARED TO DO ANYTHING TO END YOUR LIFE?: NO
1. IN THE PAST MONTH, HAVE YOU WISHED YOU WERE DEAD OR WISHED YOU COULD GO TO SLEEP AND NOT WAKE UP?: NO

## 2024-06-12 ASSESSMENT — PAIN - FUNCTIONAL ASSESSMENT: PAIN_FUNCTIONAL_ASSESSMENT: 0-10

## 2024-06-12 NOTE — ED PROVIDER NOTES
Patient is a 53-year-old female presents with concerns that her potassium is low.  She was just discharged from this facility 2 days ago after admission for hypokalemia due to diuretic use.  She is currently on furosemide.  Her symptoms include some dizziness nausea and generalized weakness.  Denies any chest pain.  No fevers or chills.  No abdominal pain.         Review of Systems     Physical Exam  Vitals and nursing note reviewed.   Constitutional:       General: She is not in acute distress.     Appearance: She is well-developed.   HENT:      Head: Normocephalic and atraumatic.   Eyes:      Conjunctiva/sclera: Conjunctivae normal.   Cardiovascular:      Rate and Rhythm: Normal rate and regular rhythm.      Heart sounds: No murmur heard.  Pulmonary:      Effort: Pulmonary effort is normal. No respiratory distress.      Breath sounds: Normal breath sounds.   Abdominal:      Palpations: Abdomen is soft.      Tenderness: There is no abdominal tenderness.   Musculoskeletal:         General: No swelling.      Cervical back: Neck supple.   Skin:     General: Skin is warm and dry.      Capillary Refill: Capillary refill takes less than 2 seconds.   Neurological:      Mental Status: She is alert.   Psychiatric:         Mood and Affect: Mood normal.          Labs Reviewed   CBC WITH AUTO DIFFERENTIAL - Abnormal       Result Value    WBC 10.7      nRBC 0.0      RBC 5.02      Hemoglobin 15.0      Hematocrit 44.0      MCV 88      MCH 29.9      MCHC 34.1      RDW 13.1      Platelets 240      Neutrophils % 77.3      Immature Granulocytes %, Automated 0.5      Lymphocytes % 12.6      Monocytes % 8.3      Eosinophils % 1.1      Basophils % 0.2      Neutrophils Absolute 8.31 (*)     Immature Granulocytes Absolute, Automated 0.05      Lymphocytes Absolute 1.35      Monocytes Absolute 0.89      Eosinophils Absolute 0.12      Basophils Absolute 0.02     BASIC METABOLIC PANEL - Abnormal    Glucose 267 (*)     Sodium 133 (*)      Potassium 2.1 (*)     Chloride 82 (*)     Bicarbonate 37 (*)     Anion Gap 16      Urea Nitrogen 26 (*)     Creatinine 1.17 (*)     eGFR 55 (*)     Calcium 9.8     URIC ACID - Abnormal    Uric Acid 9.5 (*)    PROTIME-INR - Abnormal    Protime 24.3 (*)     INR 2.1 (*)    APTT - Abnormal    aPTT 48 (*)     Narrative:     The APTT is no longer used for monitoring Unfractionated Heparin Therapy. For monitoring Heparin Therapy, use the Heparin Assay.        No orders to display        Procedures     Medical Decision Making  Patient is a 53-year-old female presents with concerns that her potassium is low.  She was just discharged from this facility 2 days ago after admission for hypokalemia due to diuretic use.  She is currently on torsemide. Her symptoms include some dizziness nausea and generalized weakness.  Patient's potassium is 2.1 and her uric acid level is elevated consistent with gout.  Started 20 mEq IV potassium here in the emergency department and will admit to telemetry.    DDx: Gout, hypokalemia, anemia         Diagnoses as of 06/12/24 2240   Hypokalemia   Idiopathic gout, unspecified chronicity, unspecified site                    Dioni Sanchez MD  06/12/24 3630

## 2024-06-12 NOTE — PROGRESS NOTES
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 See the full medication list for instructions.     CHANGE how you take:   -torsemide   -warfarin (Coumadin)      STOP taking:   -Flonase Sensimist 27.5 mcg/actuation nasal spray (fluticasone)  -metOLazone 5 mg tablet (Zaroxolyn)   -potassium chloride CR 10 mEq ER tablet (Klor-Con) Replaced by a similar medication.     Two attempts were made to reach patient within two business days after discharge. Voicemail left with contact information for patient to call back with any non-emergent questions or concerns.

## 2024-06-13 ENCOUNTER — APPOINTMENT (OUTPATIENT)
Dept: CARDIOLOGY | Facility: HOSPITAL | Age: 54
DRG: 640 | End: 2024-06-13
Payer: MEDICARE

## 2024-06-13 ENCOUNTER — APPOINTMENT (OUTPATIENT)
Dept: PRIMARY CARE | Facility: CLINIC | Age: 54
End: 2024-06-13
Payer: MEDICARE

## 2024-06-13 LAB
ANION GAP SERPL CALC-SCNC: 14 MMOL/L (ref 10–20)
ANION GAP SERPL CALC-SCNC: 15 MMOL/L (ref 10–20)
ATRIAL RATE: 93 BPM
ATRIAL RATE: 98 BPM
BUN SERPL-MCNC: 25 MG/DL (ref 6–23)
BUN SERPL-MCNC: 26 MG/DL (ref 6–23)
CALCIUM SERPL-MCNC: 9.8 MG/DL (ref 8.6–10.3)
CALCIUM SERPL-MCNC: 9.9 MG/DL (ref 8.6–10.3)
CARDIAC TROPONIN I PNL SERPL HS: 9 NG/L (ref 0–13)
CARDIAC TROPONIN I PNL SERPL HS: 9 NG/L (ref 0–13)
CHLORIDE SERPL-SCNC: 84 MMOL/L (ref 98–107)
CHLORIDE SERPL-SCNC: 85 MMOL/L (ref 98–107)
CO2 SERPL-SCNC: 36 MMOL/L (ref 21–32)
CO2 SERPL-SCNC: 40 MMOL/L (ref 21–32)
CREAT SERPL-MCNC: 1 MG/DL (ref 0.5–1.05)
CREAT SERPL-MCNC: 1.13 MG/DL (ref 0.5–1.05)
EGFRCR SERPLBLD CKD-EPI 2021: 58 ML/MIN/1.73M*2
EGFRCR SERPLBLD CKD-EPI 2021: 67 ML/MIN/1.73M*2
GLUCOSE BLD MANUAL STRIP-MCNC: 188 MG/DL (ref 74–99)
GLUCOSE BLD MANUAL STRIP-MCNC: 330 MG/DL (ref 74–99)
GLUCOSE BLD MANUAL STRIP-MCNC: 342 MG/DL (ref 74–99)
GLUCOSE BLD MANUAL STRIP-MCNC: 357 MG/DL (ref 74–99)
GLUCOSE BLD MANUAL STRIP-MCNC: 44 MG/DL (ref 74–99)
GLUCOSE BLD MANUAL STRIP-MCNC: 90 MG/DL (ref 74–99)
GLUCOSE SERPL-MCNC: 155 MG/DL (ref 74–99)
GLUCOSE SERPL-MCNC: 308 MG/DL (ref 74–99)
HOLD SPECIMEN: NORMAL
HOLD SPECIMEN: NORMAL
INR PPP: 2.1 (ref 0.9–1.1)
OSMOLALITY SERPL: 296 MOSM/KG (ref 280–300)
P AXIS: 71 DEGREES
P OFFSET: 190 MS
P OFFSET: 198 MS
P ONSET: 126 MS
P ONSET: 129 MS
POTASSIUM SERPL-SCNC: 2 MMOL/L (ref 3.5–5.3)
POTASSIUM SERPL-SCNC: 3.1 MMOL/L (ref 3.5–5.3)
PR INTERVAL: 170 MS
PR INTERVAL: 174 MS
PROTHROMBIN TIME: 24.1 SECONDS (ref 9.8–12.8)
Q ONSET: 213 MS
Q ONSET: 214 MS
QRS COUNT: 15 BEATS
QRS COUNT: 16 BEATS
QRS DURATION: 82 MS
QRS DURATION: 84 MS
QT INTERVAL: 380 MS
QT INTERVAL: 434 MS
QTC CALCULATION(BAZETT): 472 MS
QTC CALCULATION(BAZETT): 554 MS
QTC FREDERICIA: 440 MS
QTC FREDERICIA: 511 MS
R AXIS: -27 DEGREES
R AXIS: 43 DEGREES
SODIUM SERPL-SCNC: 132 MMOL/L (ref 136–145)
SODIUM SERPL-SCNC: 137 MMOL/L (ref 136–145)
T AXIS: 104 DEGREES
T AXIS: 52 DEGREES
T OFFSET: 404 MS
T OFFSET: 430 MS
TSH SERPL-ACNC: 0.42 MIU/L (ref 0.44–3.98)
VENTRICULAR RATE: 93 BPM
VENTRICULAR RATE: 98 BPM

## 2024-06-13 PROCEDURE — 83935 ASSAY OF URINE OSMOLALITY: CPT | Mod: SAMLAB | Performed by: INTERNAL MEDICINE

## 2024-06-13 PROCEDURE — 1200000002 HC GENERAL ROOM WITH TELEMETRY DAILY

## 2024-06-13 PROCEDURE — 80048 BASIC METABOLIC PNL TOTAL CA: CPT | Mod: 91 | Performed by: HOSPITALIST

## 2024-06-13 PROCEDURE — 99222 1ST HOSP IP/OBS MODERATE 55: CPT | Performed by: INTERNAL MEDICINE

## 2024-06-13 PROCEDURE — 36415 COLL VENOUS BLD VENIPUNCTURE: CPT | Performed by: STUDENT IN AN ORGANIZED HEALTH CARE EDUCATION/TRAINING PROGRAM

## 2024-06-13 PROCEDURE — 83930 ASSAY OF BLOOD OSMOLALITY: CPT | Mod: SAMLAB | Performed by: INTERNAL MEDICINE

## 2024-06-13 PROCEDURE — 2500000002 HC RX 250 W HCPCS SELF ADMINISTERED DRUGS (ALT 637 FOR MEDICARE OP, ALT 636 FOR OP/ED)

## 2024-06-13 PROCEDURE — 99291 CRITICAL CARE FIRST HOUR: CPT | Performed by: STUDENT IN AN ORGANIZED HEALTH CARE EDUCATION/TRAINING PROGRAM

## 2024-06-13 PROCEDURE — 99233 SBSQ HOSP IP/OBS HIGH 50: CPT | Performed by: HOSPITALIST

## 2024-06-13 PROCEDURE — 2500000001 HC RX 250 WO HCPCS SELF ADMINISTERED DRUGS (ALT 637 FOR MEDICARE OP): Performed by: STUDENT IN AN ORGANIZED HEALTH CARE EDUCATION/TRAINING PROGRAM

## 2024-06-13 PROCEDURE — 2500000002 HC RX 250 W HCPCS SELF ADMINISTERED DRUGS (ALT 637 FOR MEDICARE OP, ALT 636 FOR OP/ED): Mod: MUE | Performed by: HOSPITALIST

## 2024-06-13 PROCEDURE — 2500000005 HC RX 250 GENERAL PHARMACY W/O HCPCS: Performed by: STUDENT IN AN ORGANIZED HEALTH CARE EDUCATION/TRAINING PROGRAM

## 2024-06-13 PROCEDURE — 2500000004 HC RX 250 GENERAL PHARMACY W/ HCPCS (ALT 636 FOR OP/ED): Performed by: STUDENT IN AN ORGANIZED HEALTH CARE EDUCATION/TRAINING PROGRAM

## 2024-06-13 PROCEDURE — 93010 ELECTROCARDIOGRAM REPORT: CPT | Performed by: STUDENT IN AN ORGANIZED HEALTH CARE EDUCATION/TRAINING PROGRAM

## 2024-06-13 PROCEDURE — 85610 PROTHROMBIN TIME: CPT | Performed by: STUDENT IN AN ORGANIZED HEALTH CARE EDUCATION/TRAINING PROGRAM

## 2024-06-13 PROCEDURE — 2500000001 HC RX 250 WO HCPCS SELF ADMINISTERED DRUGS (ALT 637 FOR MEDICARE OP): Performed by: HOSPITALIST

## 2024-06-13 PROCEDURE — 82947 ASSAY GLUCOSE BLOOD QUANT: CPT | Mod: 91

## 2024-06-13 PROCEDURE — 93005 ELECTROCARDIOGRAM TRACING: CPT

## 2024-06-13 PROCEDURE — 82570 ASSAY OF URINE CREATININE: CPT | Mod: SAMLAB | Performed by: INTERNAL MEDICINE

## 2024-06-13 PROCEDURE — 94762 N-INVAS EAR/PLS OXIMTRY CONT: CPT

## 2024-06-13 PROCEDURE — 84484 ASSAY OF TROPONIN QUANT: CPT | Performed by: HOSPITALIST

## 2024-06-13 PROCEDURE — 36415 COLL VENOUS BLD VENIPUNCTURE: CPT | Performed by: HOSPITALIST

## 2024-06-13 PROCEDURE — 2500000002 HC RX 250 W HCPCS SELF ADMINISTERED DRUGS (ALT 637 FOR MEDICARE OP, ALT 636 FOR OP/ED): Performed by: STUDENT IN AN ORGANIZED HEALTH CARE EDUCATION/TRAINING PROGRAM

## 2024-06-13 PROCEDURE — 80048 BASIC METABOLIC PNL TOTAL CA: CPT | Performed by: HOSPITALIST

## 2024-06-13 PROCEDURE — 9420000001 HC RT PATIENT EDUCATION 5 MIN

## 2024-06-13 PROCEDURE — 94664 DEMO&/EVAL PT USE INHALER: CPT

## 2024-06-13 PROCEDURE — 2500000004 HC RX 250 GENERAL PHARMACY W/ HCPCS (ALT 636 FOR OP/ED): Performed by: HOSPITALIST

## 2024-06-13 PROCEDURE — 2500000005 HC RX 250 GENERAL PHARMACY W/O HCPCS: Performed by: HOSPITALIST

## 2024-06-13 RX ORDER — LEVOTHYROXINE SODIUM 50 UG/1
50 TABLET ORAL
Status: DISCONTINUED | OUTPATIENT
Start: 2024-06-13 | End: 2024-06-18 | Stop reason: HOSPADM

## 2024-06-13 RX ORDER — DEXTROSE 50 % IN WATER (D50W) INTRAVENOUS SYRINGE
25
Status: DISCONTINUED | OUTPATIENT
Start: 2024-06-12 | End: 2024-06-13

## 2024-06-13 RX ORDER — ALBUTEROL SULFATE 90 UG/1
2 AEROSOL, METERED RESPIRATORY (INHALATION) EVERY 6 HOURS PRN
Status: DISCONTINUED | OUTPATIENT
Start: 2024-06-13 | End: 2024-06-13

## 2024-06-13 RX ORDER — POLYETHYLENE GLYCOL 3350 17 G/17G
17 POWDER, FOR SOLUTION ORAL DAILY
Status: DISCONTINUED | OUTPATIENT
Start: 2024-06-13 | End: 2024-06-18 | Stop reason: HOSPADM

## 2024-06-13 RX ORDER — LEVOTHYROXINE SODIUM 100 UG/1
200 TABLET ORAL DAILY
Status: DISCONTINUED | OUTPATIENT
Start: 2024-06-13 | End: 2024-06-18 | Stop reason: HOSPADM

## 2024-06-13 RX ORDER — INSULIN GLARGINE 100 [IU]/ML
35 INJECTION, SOLUTION SUBCUTANEOUS EVERY 24 HOURS
Status: DISCONTINUED | OUTPATIENT
Start: 2024-06-13 | End: 2024-06-18 | Stop reason: HOSPADM

## 2024-06-13 RX ORDER — LORATADINE 10 MG/1
10 TABLET ORAL DAILY
Status: DISCONTINUED | OUTPATIENT
Start: 2024-06-13 | End: 2024-06-18 | Stop reason: HOSPADM

## 2024-06-13 RX ORDER — POTASSIUM CHLORIDE 20 MEQ/1
40 TABLET, EXTENDED RELEASE ORAL ONCE
Status: COMPLETED | OUTPATIENT
Start: 2024-06-13 | End: 2024-06-13

## 2024-06-13 RX ORDER — BUPROPION HYDROCHLORIDE 150 MG/1
150 TABLET ORAL EVERY MORNING
Status: DISCONTINUED | OUTPATIENT
Start: 2024-06-13 | End: 2024-06-18 | Stop reason: HOSPADM

## 2024-06-13 RX ORDER — ACETAMINOPHEN 650 MG/1
650 SUPPOSITORY RECTAL EVERY 4 HOURS PRN
Status: DISCONTINUED | OUTPATIENT
Start: 2024-06-13 | End: 2024-06-18 | Stop reason: HOSPADM

## 2024-06-13 RX ORDER — METOPROLOL SUCCINATE 50 MG/1
50 TABLET, EXTENDED RELEASE ORAL 2 TIMES DAILY
Status: DISCONTINUED | OUTPATIENT
Start: 2024-06-13 | End: 2024-06-18 | Stop reason: HOSPADM

## 2024-06-13 RX ORDER — LUBIPROSTONE 24 UG/1
24 CAPSULE ORAL
Status: DISCONTINUED | OUTPATIENT
Start: 2024-06-13 | End: 2024-06-18 | Stop reason: HOSPADM

## 2024-06-13 RX ORDER — POTASSIUM CHLORIDE 14.9 MG/ML
20 INJECTION INTRAVENOUS
Status: DISCONTINUED | OUTPATIENT
Start: 2024-06-13 | End: 2024-06-13

## 2024-06-13 RX ORDER — ONDANSETRON HYDROCHLORIDE 2 MG/ML
4 INJECTION, SOLUTION INTRAVENOUS EVERY 8 HOURS PRN
Status: DISCONTINUED | OUTPATIENT
Start: 2024-06-13 | End: 2024-06-18 | Stop reason: HOSPADM

## 2024-06-13 RX ORDER — LANOLIN ALCOHOL/MO/W.PET/CERES
400 CREAM (GRAM) TOPICAL 2 TIMES DAILY
Status: DISCONTINUED | OUTPATIENT
Start: 2024-06-13 | End: 2024-06-18 | Stop reason: HOSPADM

## 2024-06-13 RX ORDER — LIDOCAINE 560 MG/1
1 PATCH PERCUTANEOUS; TOPICAL; TRANSDERMAL DAILY
Status: DISCONTINUED | OUTPATIENT
Start: 2024-06-13 | End: 2024-06-18 | Stop reason: HOSPADM

## 2024-06-13 RX ORDER — DULOXETIN HYDROCHLORIDE 60 MG/1
60 CAPSULE, DELAYED RELEASE ORAL DAILY
Status: DISCONTINUED | OUTPATIENT
Start: 2024-06-13 | End: 2024-06-18 | Stop reason: HOSPADM

## 2024-06-13 RX ORDER — ONDANSETRON 4 MG/1
4 TABLET, ORALLY DISINTEGRATING ORAL EVERY 8 HOURS PRN
Status: DISCONTINUED | OUTPATIENT
Start: 2024-06-13 | End: 2024-06-18 | Stop reason: HOSPADM

## 2024-06-13 RX ORDER — OXYCODONE HYDROCHLORIDE 5 MG/1
5 TABLET ORAL EVERY 6 HOURS PRN
Status: DISCONTINUED | OUTPATIENT
Start: 2024-06-13 | End: 2024-06-18 | Stop reason: HOSPADM

## 2024-06-13 RX ORDER — ATORVASTATIN CALCIUM 80 MG/1
80 TABLET, FILM COATED ORAL NIGHTLY
Status: DISCONTINUED | OUTPATIENT
Start: 2024-06-13 | End: 2024-06-18 | Stop reason: HOSPADM

## 2024-06-13 RX ORDER — GABAPENTIN 300 MG/1
900 CAPSULE ORAL 4 TIMES DAILY
Status: DISCONTINUED | OUTPATIENT
Start: 2024-06-13 | End: 2024-06-18 | Stop reason: HOSPADM

## 2024-06-13 RX ORDER — SODIUM CHLORIDE AND POTASSIUM CHLORIDE 150; 900 MG/100ML; MG/100ML
100 INJECTION, SOLUTION INTRAVENOUS CONTINUOUS
Status: DISCONTINUED | OUTPATIENT
Start: 2024-06-13 | End: 2024-06-17

## 2024-06-13 RX ORDER — SODIUM CHLORIDE AND POTASSIUM CHLORIDE 150; 900 MG/100ML; MG/100ML
INJECTION, SOLUTION INTRAVENOUS
Status: DISPENSED
Start: 2024-06-13 | End: 2024-06-13

## 2024-06-13 RX ORDER — WARFARIN 7.5 MG/1
7.5 TABLET ORAL
Status: DISCONTINUED | OUTPATIENT
Start: 2024-06-14 | End: 2024-06-18 | Stop reason: HOSPADM

## 2024-06-13 RX ORDER — ALBUTEROL SULFATE 0.83 MG/ML
2.5 SOLUTION RESPIRATORY (INHALATION) 2 TIMES DAILY PRN
Status: DISCONTINUED | OUTPATIENT
Start: 2024-06-13 | End: 2024-06-18 | Stop reason: HOSPADM

## 2024-06-13 RX ORDER — PANTOPRAZOLE SODIUM 40 MG/1
40 TABLET, DELAYED RELEASE ORAL 2 TIMES DAILY
Status: DISCONTINUED | OUTPATIENT
Start: 2024-06-13 | End: 2024-06-18 | Stop reason: HOSPADM

## 2024-06-13 RX ORDER — ACETAMINOPHEN 325 MG/1
650 TABLET ORAL EVERY 4 HOURS PRN
Status: DISCONTINUED | OUTPATIENT
Start: 2024-06-13 | End: 2024-06-18 | Stop reason: HOSPADM

## 2024-06-13 RX ORDER — ACETAMINOPHEN 160 MG/5ML
650 SOLUTION ORAL EVERY 4 HOURS PRN
Status: DISCONTINUED | OUTPATIENT
Start: 2024-06-13 | End: 2024-06-18 | Stop reason: HOSPADM

## 2024-06-13 RX ORDER — MECLIZINE HYDROCHLORIDE 25 MG/1
25 TABLET ORAL 3 TIMES DAILY PRN
Status: DISCONTINUED | OUTPATIENT
Start: 2024-06-13 | End: 2024-06-18 | Stop reason: HOSPADM

## 2024-06-13 RX ORDER — POTASSIUM CHLORIDE 20 MEQ/1
60 TABLET, EXTENDED RELEASE ORAL DAILY
Status: DISCONTINUED | OUTPATIENT
Start: 2024-06-13 | End: 2024-06-17

## 2024-06-13 RX ORDER — WARFARIN 2 MG/1
6 TABLET ORAL DAILY
Status: DISCONTINUED | OUTPATIENT
Start: 2024-06-13 | End: 2024-06-13 | Stop reason: DRUGHIGH

## 2024-06-13 RX ORDER — WARFARIN SODIUM 5 MG/1
5 TABLET ORAL
Status: DISCONTINUED | OUTPATIENT
Start: 2024-06-13 | End: 2024-06-18 | Stop reason: HOSPADM

## 2024-06-13 RX ORDER — DEXTROSE 50 % IN WATER (D50W) INTRAVENOUS SYRINGE
25
Status: DISCONTINUED | OUTPATIENT
Start: 2024-06-13 | End: 2024-06-18 | Stop reason: HOSPADM

## 2024-06-13 RX ORDER — DEXTROSE 50 % IN WATER (D50W) INTRAVENOUS SYRINGE
12.5
Status: DISCONTINUED | OUTPATIENT
Start: 2024-06-12 | End: 2024-06-13

## 2024-06-13 RX ORDER — NYSTATIN 100000 [USP'U]/G
1 POWDER TOPICAL 2 TIMES DAILY
Status: DISCONTINUED | OUTPATIENT
Start: 2024-06-13 | End: 2024-06-18 | Stop reason: HOSPADM

## 2024-06-13 RX ORDER — DEXTROSE 50 % IN WATER (D50W) INTRAVENOUS SYRINGE
12.5
Status: DISCONTINUED | OUTPATIENT
Start: 2024-06-13 | End: 2024-06-18 | Stop reason: HOSPADM

## 2024-06-13 RX ORDER — INSULIN LISPRO 100 [IU]/ML
0-20 INJECTION, SOLUTION INTRAVENOUS; SUBCUTANEOUS
Status: DISCONTINUED | OUTPATIENT
Start: 2024-06-13 | End: 2024-06-18 | Stop reason: HOSPADM

## 2024-06-13 RX ORDER — ALLOPURINOL 300 MG/1
300 TABLET ORAL DAILY
Status: DISCONTINUED | OUTPATIENT
Start: 2024-06-13 | End: 2024-06-18 | Stop reason: HOSPADM

## 2024-06-13 RX ORDER — MORPHINE SULFATE 2 MG/ML
1 INJECTION, SOLUTION INTRAMUSCULAR; INTRAVENOUS ONCE
Status: COMPLETED | OUTPATIENT
Start: 2024-06-13 | End: 2024-06-13

## 2024-06-13 RX ORDER — TRAZODONE HYDROCHLORIDE 50 MG/1
100 TABLET ORAL NIGHTLY
Status: DISCONTINUED | OUTPATIENT
Start: 2024-06-13 | End: 2024-06-18 | Stop reason: HOSPADM

## 2024-06-13 SDOH — SOCIAL STABILITY: SOCIAL INSECURITY: ABUSE: ADULT

## 2024-06-13 SDOH — SOCIAL STABILITY: SOCIAL INSECURITY: HAS ANYONE EVER THREATENED TO HURT YOUR FAMILY OR YOUR PETS?: NO

## 2024-06-13 SDOH — SOCIAL STABILITY: SOCIAL INSECURITY: POSSIBLE ABUSE REPORTED TO:: OTHER (COMMENT)

## 2024-06-13 SDOH — SOCIAL STABILITY: SOCIAL INSECURITY: DO YOU FEEL UNSAFE GOING BACK TO THE PLACE WHERE YOU ARE LIVING?: NO

## 2024-06-13 SDOH — SOCIAL STABILITY: SOCIAL INSECURITY: ARE THERE ANY APPARENT SIGNS OF INJURIES/BEHAVIORS THAT COULD BE RELATED TO ABUSE/NEGLECT?: NO

## 2024-06-13 SDOH — SOCIAL STABILITY: SOCIAL INSECURITY: HAVE YOU HAD THOUGHTS OF HARMING ANYONE ELSE?: NO

## 2024-06-13 SDOH — SOCIAL STABILITY: SOCIAL INSECURITY: ARE YOU OR HAVE YOU BEEN THREATENED OR ABUSED PHYSICALLY, EMOTIONALLY, OR SEXUALLY BY ANYONE?: NO

## 2024-06-13 SDOH — SOCIAL STABILITY: SOCIAL INSECURITY: WERE YOU ABLE TO COMPLETE ALL THE BEHAVIORAL HEALTH SCREENINGS?: YES

## 2024-06-13 SDOH — SOCIAL STABILITY: SOCIAL INSECURITY: DO YOU FEEL ANYONE HAS EXPLOITED OR TAKEN ADVANTAGE OF YOU FINANCIALLY OR OF YOUR PERSONAL PROPERTY?: NO

## 2024-06-13 SDOH — SOCIAL STABILITY: SOCIAL INSECURITY: DOES ANYONE TRY TO KEEP YOU FROM HAVING/CONTACTING OTHER FRIENDS OR DOING THINGS OUTSIDE YOUR HOME?: NO

## 2024-06-13 SDOH — HEALTH STABILITY: MENTAL HEALTH: EXPERIENCED ANY OF THE FOLLOWING LIFE EVENTS: OTHER (COMMENT)

## 2024-06-13 SDOH — SOCIAL STABILITY: SOCIAL INSECURITY: HAVE YOU HAD ANY THOUGHTS OF HARMING ANYONE ELSE?: NO

## 2024-06-13 ASSESSMENT — ACTIVITIES OF DAILY LIVING (ADL)
PATIENT'S MEMORY ADEQUATE TO SAFELY COMPLETE DAILY ACTIVITIES?: YES
HEARING - LEFT EAR: FUNCTIONAL
GROOMING: NEEDS ASSISTANCE
ASSISTIVE_DEVICE: WHEELCHAIR
FEEDING YOURSELF: INDEPENDENT
LACK_OF_TRANSPORTATION: NO
LACK_OF_TRANSPORTATION: NO
JUDGMENT_ADEQUATE_SAFELY_COMPLETE_DAILY_ACTIVITIES: YES
ADEQUATE_TO_COMPLETE_ADL: YES
HEARING - RIGHT EAR: FUNCTIONAL
DRESSING YOURSELF: NEEDS ASSISTANCE
TOILETING: NEEDS ASSISTANCE
WALKS IN HOME: NEEDS ASSISTANCE
BATHING: NEEDS ASSISTANCE

## 2024-06-13 ASSESSMENT — COGNITIVE AND FUNCTIONAL STATUS - GENERAL
DRESSING REGULAR LOWER BODY CLOTHING: A LITTLE
HELP NEEDED FOR BATHING: A LITTLE
CLIMB 3 TO 5 STEPS WITH RAILING: A LOT
MOVING FROM LYING ON BACK TO SITTING ON SIDE OF FLAT BED WITH BEDRAILS: A LITTLE
PERSONAL GROOMING: A LITTLE
DAILY ACTIVITIY SCORE: 19
TOILETING: A LITTLE
CLIMB 3 TO 5 STEPS WITH RAILING: A LOT
DRESSING REGULAR LOWER BODY CLOTHING: A LITTLE
MOBILITY SCORE: 19
WALKING IN HOSPITAL ROOM: A LOT
WALKING IN HOSPITAL ROOM: A LITTLE
MOBILITY SCORE: 20
DRESSING REGULAR UPPER BODY CLOTHING: A LITTLE
DAILY ACTIVITIY SCORE: 19
TURNING FROM BACK TO SIDE WHILE IN FLAT BAD: A LITTLE
PERSONAL GROOMING: A LITTLE
TOILETING: A LITTLE
PATIENT BASELINE BEDBOUND: NO
HELP NEEDED FOR BATHING: A LITTLE
DRESSING REGULAR UPPER BODY CLOTHING: A LITTLE

## 2024-06-13 ASSESSMENT — LIFESTYLE VARIABLES
SUBSTANCE_ABUSE_PAST_12_MONTHS: NO
HOW OFTEN DO YOU HAVE 6 OR MORE DRINKS ON ONE OCCASION: NEVER
SKIP TO QUESTIONS 9-10: 1
AUDIT-C TOTAL SCORE: 0
HOW OFTEN DO YOU HAVE A DRINK CONTAINING ALCOHOL: NEVER
PRESCIPTION_ABUSE_PAST_12_MONTHS: NO
AUDIT-C TOTAL SCORE: 0
HOW MANY STANDARD DRINKS CONTAINING ALCOHOL DO YOU HAVE ON A TYPICAL DAY: PATIENT DOES NOT DRINK

## 2024-06-13 ASSESSMENT — PAIN SCALES - GENERAL
PAINLEVEL_OUTOF10: 8
PAINLEVEL_OUTOF10: 2
PAINLEVEL_OUTOF10: 8
PAINLEVEL_OUTOF10: 5 - MODERATE PAIN
PAINLEVEL_OUTOF10: 0 - NO PAIN
PAINLEVEL_OUTOF10: 8

## 2024-06-13 ASSESSMENT — PAIN DESCRIPTION - LOCATION
LOCATION: BACK
LOCATION: CHEST

## 2024-06-13 ASSESSMENT — PATIENT HEALTH QUESTIONNAIRE - PHQ9
1. LITTLE INTEREST OR PLEASURE IN DOING THINGS: NOT AT ALL
2. FEELING DOWN, DEPRESSED OR HOPELESS: NOT AT ALL
SUM OF ALL RESPONSES TO PHQ9 QUESTIONS 1 & 2: 0

## 2024-06-13 ASSESSMENT — PAIN SCALES - PAIN ASSESSMENT IN ADVANCED DEMENTIA (PAINAD)
TOTALSCORE: MEDICATION (SEE MAR)

## 2024-06-13 ASSESSMENT — PAIN - FUNCTIONAL ASSESSMENT
PAIN_FUNCTIONAL_ASSESSMENT: 0-10

## 2024-06-13 ASSESSMENT — PAIN DESCRIPTION - ORIENTATION
ORIENTATION: MID
ORIENTATION: LEFT

## 2024-06-13 NOTE — H&P
History Of Present Illness  Roselia Mo is a 53-year-old female with past medical history of COPD, CHF, diabetes type 2, CKD, polyneuropathy, hepatic vein thrombosis on Coumadin and chronic lymphedema on Lasix who was recently admitted and treated for hypokalemia and was discharged 2 days ago presented to the ER with dizziness, nausea and generalized weakness.  Was found to be hypokalemic again, denies any fevers, chills, chest pain or trouble breathing.  Her potassium was 2.1 at presentation.  Was given IV potassium in the ER and admitted to the hospital.  INR was 2.1 on admission.  Creatinine was 1.17.  Patient says that she started having symptoms of hypokalemia on exam she had some ringing in her ears felt heavy, dizzy and weak.  Denies any flulike illness any fever or chills no diarrhea says that she has been taking torsemide as prescribed and has been taking potassium supplementation and 80 mEq daily.  Past Medical History  Past Medical History:   Diagnosis Date    Arthritis     Asthma ([facility])     CHF (congestive heart failure) (Multi)     CKD (chronic kidney disease) stage 3, GFR 30-59 ml/min (Multi)     COPD (chronic obstructive pulmonary disease) (Multi)     Cough 2024    Diabetes mellitus (Multi)     Disease of thyroid gland     Hypertension     Lymphedema     Pulmonary HTN (Multi)     Shortness of breath 2024    Tachycardia 2024       Surgical History  Past Surgical History:   Procedure Laterality Date    CARPAL TUNNEL RELEASE Bilateral      SECTION, LOW TRANSVERSE       AND     COLONOSCOPY  2014    [facility]    HERNIA REPAIR      WITH MESH    HYSTERECTOMY      2 PARTIAL    KNEE SURGERY Left     MINISCUS REPAIR        Social History  She reports that she quit smoking about 9 years ago. Her smoking use included cigarettes. She started smoking about 47 years ago. She has a 37.8 pack-year smoking history. She has been exposed to tobacco smoke.  "She has never used smokeless tobacco. She reports current alcohol use. She reports current drug use. Frequency: 7.00 times per week. Drug: Marijuana.    Family History  Family History   Problem Relation Name Age of Onset    Blindness Mother      Hypertension Mother      Hyperlipidemia Mother      Heart disease Mother      Heart failure Father      Hypertension Father      Hyperlipidemia Father      Diabetes Father      Skin cancer Father      Blindness Maternal Grandmother      Hypertension Maternal Grandmother      Hyperlipidemia Maternal Grandmother      Heart failure Maternal Grandmother      Dementia Paternal Grandmother      Heart attack Paternal Grandfather      Hypertension Paternal Grandfather      Hyperlipidemia Paternal Grandfather          Allergies  Dulaglutide, Nickel, Metformin, Palladium, Cobalt, Exenatide, and Sitagliptin    Review of Systems  As per HPI, comprehensive review of systems performed  Physical Exam  Constitutional:       Appearance: She is obese.   HENT:      Head: Normocephalic.   Eyes:      Extraocular Movements: Extraocular movements intact.   Cardiovascular:      Rate and Rhythm: Normal rate and regular rhythm.   Pulmonary:      Effort: Pulmonary effort is normal.      Breath sounds: No wheezing.   Abdominal:      General: There is distension.      Palpations: Abdomen is soft.   Musculoskeletal:      Cervical back: Rigidity present.      Right lower leg: Edema present.      Left lower leg: Edema present.   Neurological:      Mental Status: She is alert. Mental status is at baseline.   Psychiatric:         Mood and Affect: Mood normal.          Last Recorded Vitals  Blood pressure 140/79, pulse 98, temperature 36.4 °C (97.6 °F), resp. rate 18, height 1.575 m (5' 2\"), weight (!) 152 kg (336 lb), SpO2 97%.    Relevant Results             Assessment/Plan   Principal Problem:    Hypokalemia      53-year-old female with past medical history of COPD, CHF, diabetes type 2, CKD, " polyneuropathy, hepatic vein thrombosis on Coumadin and chronic lymphedema on loop diuretics admitted with recurrent hypokalemia    She was given potassium in the ER  I will put her on normal saline with potassium supplementation  Observe on telemetry  Continue potassium 60 mEq daily  Recheck potassium in the morning  Consult nephrology  Hold diuretics May need the dose to be adjusted  Continue Coumadin, do daily INR  Continue regular psych meds  Zofran for nausea  Continue allopurinol for gout  Monitor creatinine  Supportive care, symptomatic treatment  DVT prophylaxis   Continue levothyroxine for hypothyroidism, will check a TSH level  Insulin for diabetes, monitor blood sugars  Colchicine for gout flare      Baldemar Lew MD

## 2024-06-13 NOTE — CONSULTS
Reason For Consult  Hypokalemia    History Of Present Illness  Roselia Mo is a 53 y.o. female presenting with feeling very tired and weak worn out.  She presented back to the emergency room with overall feeling unwell.  States that her symptoms came back that she had previously she felt weak she felt very tired and fatigued.  She was recently here in the hospital with hypokalemia and was discharged after this normalized  This is fairly new for her.  She has not had issues with her potassium in the past but recently this has been a recurring issue  This a.m. she is resting comfortably in bed  Has no swelling  Wearing her CPAP  Past Medical History  She has a past medical history of Arthritis, Asthma ([facility]), CHF (congestive heart failure) (Multi), CKD (chronic kidney disease) stage 3, GFR 30-59 ml/min (Multi), COPD (chronic obstructive pulmonary disease) (Multi), Cough (2024), Diabetes mellitus (Multi), Disease of thyroid gland, Hypertension, Lymphedema, Pulmonary HTN (Multi), Shortness of breath (2024), and Tachycardia (2024).    Surgical History  She has a past surgical history that includes  section, low transverse; Carpal tunnel release (Bilateral, ); Hysterectomy; Hernia repair; Knee surgery (Left); and Colonoscopy (2014).     Social History  She reports that she quit smoking about 9 years ago. Her smoking use included cigarettes. She started smoking about 47 years ago. She has a 37.8 pack-year smoking history. She has been exposed to tobacco smoke. She has never used smokeless tobacco. She reports current alcohol use. She reports current drug use. Frequency: 7.00 times per week. Drug: Marijuana.    Family History  Family History   Problem Relation Name Age of Onset    Blindness Mother      Hypertension Mother      Hyperlipidemia Mother      Heart disease Mother      Heart failure Father      Hypertension Father      Hyperlipidemia Father      Diabetes Father      Skin  "cancer Father      Blindness Maternal Grandmother      Hypertension Maternal Grandmother      Hyperlipidemia Maternal Grandmother      Heart failure Maternal Grandmother      Dementia Paternal Grandmother      Heart attack Paternal Grandfather      Hypertension Paternal Grandfather      Hyperlipidemia Paternal Grandfather          Allergies  Dulaglutide, Nickel, Metformin, Palladium, Cobalt, Exenatide, and Sitagliptin    Review of Systems  A full 10 point review of systems was obtained is negative except HPI as above     Physical Exam  Physical Exam  Constitutional:       Appearance: Normal appearance. She is obese.   HENT:      Head: Normocephalic and atraumatic.      Right Ear: External ear normal.      Left Ear: External ear normal.      Nose: Nose normal.      Mouth/Throat:      Mouth: Mucous membranes are moist.      Pharynx: Oropharynx is clear.   Eyes:      Extraocular Movements: Extraocular movements intact.      Conjunctiva/sclera: Conjunctivae normal.      Pupils: Pupils are equal, round, and reactive to light.   Cardiovascular:      Rate and Rhythm: Normal rate and regular rhythm.   Pulmonary:      Effort: Pulmonary effort is normal.      Breath sounds: Normal breath sounds.   Abdominal:      General: Abdomen is flat.      Palpations: Abdomen is soft.   Skin:     General: Skin is warm and dry.   Neurological:      General: No focal deficit present.      Mental Status: She is alert and oriented to person, place, and time.   Psychiatric:         Mood and Affect: Mood normal.         Behavior: Behavior normal.                 I&O 24HR    Intake/Output Summary (Last 24 hours) at 6/13/2024 0947  Last data filed at 6/13/2024 0905  Gross per 24 hour   Intake 720 ml   Output 900 ml   Net -180 ml       Vitals 24HR  Heart Rate:  []   Temp:  [36 °C (96.8 °F)-37 °C (98.6 °F)]   Resp:  [16-31]   BP: (112-140)/(69-88)   Height:  [157.5 cm (5' 2\")]   Weight:  [152 kg (336 lb)-161 kg (354 lb 3.2 oz)]   SpO2:  [94 " %-98 %]     Scheduled medications  allopurinol, 300 mg, oral, Daily  atorvastatin, 80 mg, oral, Nightly  buPROPion XL, 150 mg, oral, q AM  colchicine, 0.6 mg, oral, BID  DULoxetine, 60 mg, oral, Daily  empagliflozin, 10 mg, oral, Daily  gabapentin, 900 mg, oral, 4x daily  insulin glargine, 35 Units, subcutaneous, q24h  insulin lispro, 0-20 Units, subcutaneous, TID  levothyroxine, 200 mcg, oral, Daily  levothyroxine, 50 mcg, oral, Daily before breakfast  loratadine, 10 mg, oral, Daily  lubiprostone, 24 mcg, oral, BID  magnesium oxide, 400 mg, oral, BID  metoprolol succinate XL, 50 mg, oral, BID  nystatin, 1 Application, Topical, BID  oxygen, , inhalation, Continuous - Inhalation  pantoprazole, 40 mg, oral, BID  polyethylene glycol, 17 g, oral, Daily  potassium chloride CR, 60 mEq, oral, Daily  potassium chloride in 0.9%NaCl, , ,   traZODone, 100 mg, oral, Nightly  warfarin, 5 mg, oral, Once per day on Monday Thursday  [START ON 6/14/2024] warfarin, 7.5 mg, oral, Once per day on Sunday Tuesday Wednesday Friday Saturday      Continuous medications  potassium chloride in 0.9%NaCl, 100 mL/hr, Last Rate: 100 mL/hr (06/13/24 0148)      PRN medications  PRN medications: acetaminophen **OR** acetaminophen **OR** acetaminophen, acetaminophen **OR** acetaminophen **OR** acetaminophen, albuterol, benzocaine-menthol, dextrose, dextrose, glucagon, glucagon, meclizine, ondansetron ODT **OR** ondansetron, oxyCODONE, potassium chloride in 0.9%NaCl      Relevant Results  Results reviewed     Assessment/Plan       Hypokalemia  Metabolic alkalosis  Diastolic CHF  Morbid Obesity  Lymphedema  HTN  DM II  Right Sided CHF  Pulmonary HTN  CY on CPAP  CKD stage II    Plan:  At this time we will get a TT KG and see if she is wasting potassium from the renal standpoint  I would continue replacing her potassium as she is getting and get it normalized  We need to stay away from diuretics at this time  She actually has no edema  currently      Principal Problem:    Hypokalemia          Pardeep Nichole, DO

## 2024-06-13 NOTE — PROGRESS NOTES
06/13/24 0953   Discharge Planning   Living Arrangements Spouse/significant other   Support Systems Spouse/significant other   Assistance Needed Spouse assists with chores and bathing and dressing pt as well as transport   Type of Residence Private residence   Number of Stairs to Enter Residence 0   Number of Stairs Within Residence 0   Do you have animals or pets at home? Yes   Type of Animals or Pets 2 dogs   Who is requesting discharge planning? Patient   Home or Post Acute Services None   Patient expects to be discharged to: Home   Does the patient need discharge transport arranged? No   Financial Resource Strain   How hard is it for you to pay for the very basics like food, housing, medical care, and heating? Not hard   Housing Stability   In the last 12 months, was there a time when you were not able to pay the mortgage or rent on time? N   In the last 12 months, how many places have you lived? 1   In the last 12 months, was there a time when you did not have a steady place to sleep or slept in a shelter (including now)? N   Transportation Needs   In the past 12 months, has lack of transportation kept you from medical appointments or from getting medications? no   In the past 12 months, has lack of transportation kept you from meetings, work, or from getting things needed for daily living? No     Care Transitions: Spoke with pt via phone due to working remote and verified address, phone number and emergency contact information. PCP is Dr Franklin last seen in May and preferred    pharmacy is [facility].  Pt uses a walker, BSC and wheelchair. Pt wears oxygen at 4L 24/7 and it is provided by [employer], as well as a Bi-Pap at night. Pt is a diabetic and checks her blood sugar 3-4 times a day. Pt ststaes has some difficulty affording medication as money is tight right now,  trying to get disability and they are working on getting medicaid. Pt lives at home with  and denies needs and feels safe. Discharge  plan is home with her  and declines all services at this time. [facility] 19/20. ADOD is 48 hrs. CT to follow. Nelda Perez BSN/RN-TCC

## 2024-06-13 NOTE — NURSING NOTE
"COPD Education    Patient Characteristics: Pt. Sitting up in chair on 4L NC no distress noted.   Comorbidities:                                            Exacerbation last year:                    Moderate: >= 2 exacerbations or >= 1 exacerbation leading to hospitalization    Spirometry: yes Date: 2/5/24            FVC: 2.21   (  72 %) FEV1: 1.65   (  67 %)  FEV1/FVC: 75   (  93 %)    Uses of Oxygen/CPAP/BIPAP:  Pt. Wears 4L NC at home ATC, she also wears a BIPAP nightly. 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                                                                Smoking status:  Smoker: Former      PPY: 37     Quit date: 1997     Smoking cessation counseling:     Booklet given:             Pulmonary physician:  NO, pt. Saw one in the past and she states \"They told me as far as they were concerned I didn't need to come back\".                    Name:                   Date last seen:                                     Pharmacotherapy:   Maintenance medications   LABA, LAMA, LABA/LAMA, ICS/LABA, ICS/LAMA, ICS/LABA/LAMA,   Name of the medication:  Albuterol rescue inhaler (ptAnish Summers uses).    Prednisone: No Theophylline: No  Roflumilast: No  Macrolides: No     If not on maintenance meds:  Consider LAMA or LAMA/LABA   Consider ICS (if peripheral eosinophilia >300cells/microl, COPD exacerbation requiring hospitalization, >= 2 moderated COPD exacerbation, History of or concomitant asthma)    If low inspiratory force or challenges with inhalers   Consider Nebulizers   Consider RESPIMAT      COPD Education   COPD patient education book: Yes, COPD Action Plan     Inhaled medication teach-back: Yes     KARLEY, other videos:      Patient demonstrated understanding/teach back method: Yes         Home Oxygen Evaluation: Pt. Is at baseline for home oxygen                                                  Pulmonary Rehabilitation referral:  Already attended:       When:                                Info in COPD " patient education book              Vaccines                    Influenza:          Pneumococcal:       COVID 19:      Pertussis:                    Recommendations:   Pt. Is not currently here for her COPD/Breathing. Pt. Does not have any needs/concerns at this time.

## 2024-06-13 NOTE — PROGRESS NOTES
Spiritual Care Visit    Clinical Encounter Type  Visited With: Patient  Routine Visit: Follow-up  Continue Visiting: Yes  Referral To:     Hoahaoism Encounters  Hoahaoism Needs: Sacred text, Literature, Spiritual care brochure, Prayer    Values/Beliefs  Cultural Requests During Hospitalization: Prayer and scripture  Spiritual Requests During Hospitalization: Prayer and Scripture    Sacramental Encounters  Communion Given Indicator: No    Patient Spiritual Care Encounters  Suffering Severity: Substantial  Fear Level: Substantial  Feelings of Loneliness: Moderate  Feelings of Hopelessness: Moderate  Coping: Sometimes demonstrated  Social Interaction: Participates in daily activites    Family Spiritual Care Encounters  Family Coping: Open/discussion  Family Participation in Care: Often demonstrated  Family Support During Treatment: Sometimes demonstrated  Caregiver-Patient Relationship: Not compromised         PC-7 Assessment (Level of Unmet Needs)  Existential Struggle: Some  Spiritual/Hoahaoism Struggle: Substantial  Legacy: Substantial  Relationships: Substantial  Fear of Death/Dying: Substantial  Values/Medical Decision Making: Substantial  Ritual/Other: Substantial  PC-7 Score: 13         Taxonomy  Intended Effects: Aligning care plan with patient's values, Build relationship of care and support, Convey a calming presence, De-esclate emotionally charged situations, Demonstrate caring and concern, Establish rapport and connectedness, Brooke affirmation, Helping someone feel comforted, Journeying with someone in the grief process, Mending broken relationships, Preserve dignity and respect  Methods: Explore quality of life  Interventions: Active listening

## 2024-06-13 NOTE — CONSULTS
Inpatient consult to Cardiology  Consult performed by: Delfin Earl MD  Consult ordered by: Maria Guadalupe Monaco MD  Reason for consult: EKG changes      History Of Present Illness:    Mrs. Roselia Mo is a 53 y.o. former smoker diabetic female being consulted by the Cardiology team for EKG changes. 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 She had a recent admission for hypokalemia and was discharged, returned after only 2 days complaining of dizziness, nausea and generalized weakness. She was found to be hypokalemic again,  her potassium was 2.1 at presentation. She complained also of ringing in her ears felt heavy, dizzy and weakness . She denied chest pain, shortness of breath, palpitations, fever, chills, orthopnea, paroxysmal nocturnal dyspnea or syncope. In the ER, her potassium was 2.1 at presentation.  Was given IV potassium in the ER and admitted to the hospital.  INR was 2.1 on admission.  Creatinine was 1.17. EKG showed normal sinus rhythm with no signs of ACS; prolonged QT. She was admitted for clinical compensation.     Last Recorded Vitals:  Vitals:    06/13/24 1100 06/13/24 1134 06/13/24 1200 06/13/24 1500   BP:    118/86   BP Location:    Right arm   Patient Position:    Sitting   Pulse: 89  96 99   Resp: (!) 30  21    Temp:    36 °C (96.8 °F)   TempSrc:    Temporal   SpO2:  98%  98%   Weight:       Height:           Last Labs:  CBC - 6/12/2024:  8:22 PM  10.7 15.0 240    44.0      CMP - 6/13/2024:  5:37 AM  9.8 7.9 32 --- 0.8   2.7 4.5 15 338      PTT - 6/12/2024:  9:46 PM  2.1   24.1 48     Troponin I,  High Sensitivity   Date/Time Value Ref Range Status   06/13/2024 03:58 PM 9 0 - 13 ng/L Final   06/13/2024 11:44 AM 9 0 - 13 ng/L Final   05/08/2024 03:17 PM 5 0 - 13 ng/L Final     BNP   Date/Time Value Ref Range Status   05/08/2024 02:12  (H) 0 - 99 pg/mL Final   04/23/2024 07:45  (H) 0 - 99 pg/mL Final     Hemoglobin A1C   Date/Time Value Ref Range Status   03/19/2024 06:20 PM 9.0 (H) see below % Final   02/01/2024 12:31 PM 8.3 (H) see below % Final     LDL Calculated   Date/Time Value Ref Range Status   02/01/2024 04:50 PM   Final     Comment:     The calculation of LDL and VLDL are inaccurate when the Triglycerides are greater than 400 mg/dL or when the patient is non-fasting. If LDL measurement is necessary contact the testing laboratory for an alternative LDL assay.                                  Near   Borderline      AGE      Desirable  Optimal    High     High     Very High     0-19 Y     0 - 109     ---    110-129   >/= 130     ----    20-24 Y     0 - 119     ---    120-159   >/= 160     ----      >24 Y     0 -  99   100-129  130-159   160-189     >/=190       VLDL   Date/Time Value Ref Range Status   02/01/2024 04:50 PM   Final     Comment:     Unable to calculate VLDL.      Last I/O:  I/O last 3 completed shifts:  In: - (0 mL/kg)   Out: 900 (5.6 mL/kg) [Urine:900 (0.2 mL/kg/hr)]  Weight: 160.7 kg     Past Cardiology Tests (Last 3 Years):  EKG:  ECG 12 lead 06/12/2024 (Preliminary)      ECG 12 Lead 06/06/2024      ECG 12 lead 06/06/2024      ECG 12 lead 06/06/2024      ECG 12 lead  (Preliminary)      ECG 12 Lead 05/08/2024      ECG 12 Lead 04/08/2024      ECG 12 lead 04/07/2024      ECG 12 lead 03/28/2024      ECG 12 lead 03/19/2024      ECG 12 Lead 03/13/2024      ECG 12 lead 02/04/2024      ECG 12 Lead 02/01/2024 (Wet Read)    Echo:  Transthoracic Echo (TTE) Complete 03/20/2024    Ejection Fractions:  EF   Date/Time Value Ref Range Status   03/20/2024 05:44 AM 53 %      Cath:  No results  found for this or any previous visit from the past 1095 days.    Stress Test:  Nuclear Stress Test 2024    Cardiac Imaging:  No results found for this or any previous visit from the past 1095 days.      Past Medical History:  She has a past medical history of Arthritis, Asthma ([facility]), CHF (congestive heart failure) (Multi), CKD (chronic kidney disease) stage 3, GFR 30-59 ml/min (Multi), COPD (chronic obstructive pulmonary disease) (Multi), Cough (2024), Diabetes mellitus (Multi), Disease of thyroid gland, Hypertension, Lymphedema, Pulmonary HTN (Multi), Shortness of breath (2024), and Tachycardia (2024).    Past Surgical History:  She has a past surgical history that includes  section, low transverse; Carpal tunnel release (Bilateral, ); Hysterectomy; Hernia repair; Knee surgery (Left); and Colonoscopy (2014).      Social History:  She reports that she quit smoking about 9 years ago. Her smoking use included cigarettes. She started smoking about 47 years ago. She has a 37.8 pack-year smoking history. She has been exposed to tobacco smoke. She has never used smokeless tobacco. She reports current alcohol use. She reports current drug use. Frequency: 7.00 times per week. Drug: Marijuana.    Family History:  Family History   Problem Relation Name Age of Onset    Blindness Mother      Hypertension Mother      Hyperlipidemia Mother      Heart disease Mother      Heart failure Father      Hypertension Father      Hyperlipidemia Father      Diabetes Father      Skin cancer Father      Blindness Maternal Grandmother      Hypertension Maternal Grandmother      Hyperlipidemia Maternal Grandmother      Heart failure Maternal Grandmother      Dementia Paternal Grandmother      Heart attack Paternal Grandfather      Hypertension Paternal Grandfather      Hyperlipidemia Paternal Grandfather          Allergies:  Dulaglutide, Nickel, Metformin, Palladium, Cobalt, Exenatide, and  Sitagliptin    Inpatient Medications:  Scheduled medications   Medication Dose Route Frequency    allopurinol  300 mg oral Daily    atorvastatin  80 mg oral Nightly    buPROPion XL  150 mg oral q AM    colchicine  0.6 mg oral BID    DULoxetine  60 mg oral Daily    empagliflozin  10 mg oral Daily    gabapentin  900 mg oral 4x daily    insulin glargine  35 Units subcutaneous q24h    insulin lispro  0-20 Units subcutaneous TID    levothyroxine  200 mcg oral Daily    levothyroxine  50 mcg oral Daily before breakfast    lidocaine  1 patch transdermal Daily    loratadine  10 mg oral Daily    lubiprostone  24 mcg oral BID    magnesium oxide  400 mg oral BID    metoprolol succinate XL  50 mg oral BID    nystatin  1 Application Topical BID    oxygen   inhalation Continuous - Inhalation    pantoprazole  40 mg oral BID    polyethylene glycol  17 g oral Daily    potassium chloride CR  60 mEq oral Daily    traZODone  100 mg oral Nightly    warfarin  5 mg oral Once per day on Monday Thursday    [START ON 6/14/2024] warfarin  7.5 mg oral Once per day on Sunday Tuesday Wednesday Friday Saturday     PRN medications   Medication    acetaminophen    Or    acetaminophen    Or    acetaminophen    acetaminophen    Or    acetaminophen    Or    acetaminophen    albuterol    benzocaine-menthol    dextrose    dextrose    glucagon    glucagon    meclizine    ondansetron ODT    Or    ondansetron    oxyCODONE     Continuous Medications   Medication Dose Last Rate    potassium chloride in 0.9%NaCl  100 mL/hr 100 mL/hr (06/13/24 1232)     Outpatient Medications:  Current Outpatient Medications   Medication Instructions    - refin regular (HumuLIN R U-500) 500 unit/mL CONCENTRATED - refill for patient own pump 1 each, subcutaneous, As needed, Take as directed per insulin instructions. Use U-500 insulin syringe.    acetaminophen (TYLENOL) 500 mg, oral, Every 6 hours PRN    albuterol 180 mcg, inhalation, Every 4 hours PRN    allopurinol (ZYLOPRIM)  300 mg, oral, Daily    buPROPion XL (WELLBUTRIN XL) 150 mg, oral, Every morning, Do not crush, chew, or split.    cetirizine (ZYRTEC) 10 mg, oral, Daily    DULoxetine (CYMBALTA) 60 mg, oral, Daily, Do not crush or chew.    empagliflozin (JARDIANCE) 10 mg, oral, Daily    gabapentin (NEURONTIN) 900 mg, oral, 4 times daily    insulin regular, human (HUMULIN R U-500, CONC, INSULIN SUBQ) subcutaneous, Take as directed per insulin instructions. Use U-500 insulin syringe. Currently using syringe as pump is not working.    levothyroxine (SYNTHROID, LEVOXYL) 200 mcg, oral, Daily    levothyroxine (SYNTHROID, LEVOXYL) 50 mcg, oral, Daily before breakfast, TAKE WITH 200MG    lubiprostone (AMITIZA) 24 mcg, oral, 2 times daily (morning and late afternoon)    magnesium oxide (MAG-OX) 400 mg, oral, 2 times daily    metoprolol succinate XL (TOPROL-XL) 50 mg, oral, 2 times daily, Do not crush or chew.    nystatin (Mycostatin) 100,000 unit/gram powder 1 Application, Topical, 2 times daily    ondansetron (ZOFRAN) 4 mg, oral, Every 8 hours PRN    oxygen (O2) gas therapy 1 each, inhalation, Every 12 hours    pantoprazole (PROTONIX) 40 mg, oral, 2 times daily, Do not crush, chew, or split.    potassium chloride CR (Klor-Con M20) 20 mEq ER tablet 60 mEq, oral, Daily, Do not crush or chew.    rosuvastatin (CRESTOR) 40 mg, oral, Daily    topiramate (TOPAMAX) 25 mg, oral, 2 times daily    torsemide 40 mg, oral, Daily    traZODone (DESYREL) 100 mg, oral, Nightly    triamcinolone (Kenalog) 0.1 % ointment Topical, 2 times daily PRN    warfarin (Coumadin) 5 mg tablet Take as directed per After Visit Summary.    warfarin (Coumadin) 7.5 mg tablet Take as directed per After Visit Summary.       Physical Exam:  General: alert, oriented and in no acute distress. Morbid obesity  HEENT: NC/AT; EOMI; PERRLA, external ear is normal  Neck: supple; trachea midline; no masses; no JVD  Chest: diminished breath sounds bilaterally; on NC oxygen  Cardio: regular  rhythm, S1S2 normal, no murmurs  Abdomen: Difficult assessment due to obesity  Extremities: Lymphedema bilaterally  Neuro: Grossly intact     Psychiatric: Normal mood and affect      Assessment/Plan   Mrs. Roselia Mo is a 53 y.o. former smoker diabetic female being consulted by the Cardiology team for EKG changes. 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 She had a recent admission for hypokalemia and was discharged, returned after only 2 days complaining of dizziness, nausea and generalized weakness. She was found to be hypokalemic again,  her potassium was 2.1 at presentation. She complained also of ringing in her ears felt heavy, dizzy and weakness . She denied chest pain, shortness of breath, palpitations, fever, chills, orthopnea, paroxysmal nocturnal dyspnea or syncope. In the ER, her potassium was 2.1 at presentation.  Was given IV potassium in the ER and admitted to the hospital.  INR was 2.1 on admission.  Creatinine was 1.17. EKG showed normal sinus rhythm with no signs of ACS; prolonged QT. She was admitted for clinical compensation.    Assessment    # Hypokalemia  - She was found to be hypokalemic again,  her potassium was 2.1 at presentation.  - EKG showed normal sinus rhythm with no signs of ACS; prolonged QT.  - New EKG with improvement in Qtc after potassium replacement.  - Would suggest to capitalize on potassium repletement.    # Heart Failure with Preserved LV Systolic function  - EKG shows normal sinus rhythm with no signs of ACS. Prolonged QT.   - The echocardiogram (03/2024) showed normal LVEF 65% with no  wall motion abnormalities.   - Nuclear stress test (03/2024) is normal.   - Low sodium diet (2g).  - Fluid restriction.  - Electrolyte control and daily BMP including magnesium.  - General recommendations for heart failure, including low-sodium diet, fluid restriction, daily weight and adherence to medication.   - Patient continues to be volume overloaded based on her clinical presentation. We suggest to start Bumex 1mg daily.  - Patient previously being treated for Staph infection in abdomen and L breast     # COPD  - Keep home oxygen     # Abdominal / Groin skin infection  - Keep broad spectrum antibiotics.     # Budd-Chiari Sd  - Controlled by PCP.  - Keep Warfarin .  - INR control.     # Hypertension  - Controlled blood pressure.  - Keep current medications with Lisinopril 20mg daily, Metoprolol succinate 50mg daily.  - Patient counseled to keep a healthy lifestyle including regular exercise and low-sodium diet.  - Recommended home blood pressure monitoring.  - Goal of BP < 130/80mmHg.      # Diabetes  - Counseled on healthy diet and regular exercises.  - Discussed need for weight loss and the benefits.   - Keep current medications.  - ISS.      # Hyperlipidemia  - Keep home medication with Rosuvastatin 40mg daily.  - Counseled on healthy diet and regular exercise.      # Gout  - Keep home medication with Allopurinol 300mg daily.  - Counseling.     # Hypothyroidism  - Keep Levothyroxine 250mcg  daily     This critically ill patient continues to be at-risk for clinically significant deterioration / failure due to the above mentioned dysfunctional, unstable organ systems.  I have personally identified and managed all complex critical care issues to prevent aforementioned clinical deterioration.  Critical care time is spent at bedside and/or the immediate area and has included, but is not limited to, the review of diagnostic tests, labs, radiographs, serial assessments of hemodynamics, respiratory status,  ventilatory management, and family updates.  Time spent in procedures and teaching are reported separately.    Critical care time: 65 minutes     Peripheral IV 06/12/24 18 G 1.88 cm Left;Upper;Anterior Arm (Active)   Site Assessment Clean;Dry;Intact 06/13/24 1200   Dressing Type Transparent 06/13/24 1200   Line Status Infusing 06/13/24 1200   Dressing Status Clean;Dry 06/13/24 1200   Number of days: 1     Code Status:  Full Code    Delfin Earl MD  Cardiology

## 2024-06-13 NOTE — PROGRESS NOTES
"Roselia Mo is a 53 y.o. female on day 1 of admission presenting with Hypokalemia.    Subjective   Patient complains of generalized weakness fatigue and tired  Denies nausea vomiting diarrhea or fever chills  Denies any chest pain or shortness of breath at rest  Denies any unusual leg swelling from her baseline  No headache or focal weakness  No blurry vision, slight pressure in the eye but no floaters or flashes of light  Upon waking up  No URI  Denies migraine  No stress or tension headache  No neck pain  Hypokalemia   No visual changes    Objective     Physical Exam  General Appearance: AAO x 3, severe obesity  Skin: skin color pink, warm, and dry; no suspicious rashes or lesions  Eyes : PERRL, EOM's intact  ENT: mucous membranes pink and moist  Neck: normocephalic  Respiratory: lungs clear to auscultation anteriorly; no wheezing, rhonchi, or crackles.   Heart: regular rate and rhythm.  Abdomen: Mildly distended, positive bowel sounds x4, soft,  nontender  Extremities: Bilateral lower extremity edema   Peripheral pulses: normal x4 extremities  Neuro: alert, coherent and conversant, no focal motor deficits  Last Recorded Vitals  Blood pressure 132/74, pulse 87, temperature 36 °C (96.8 °F), temperature source Temporal, resp. rate 20, height 1.575 m (5' 2\"), weight (!) 161 kg (354 lb 3.2 oz), SpO2 96%.  Intake/Output last 3 Shifts:  I/O last 3 completed shifts:  In: - (0 mL/kg)   Out: 900 (5.6 mL/kg) [Urine:900 (0.2 mL/kg/hr)]  Weight: 160.7 kg     Relevant Results    Scheduled medications  allopurinol, 300 mg, oral, Daily  atorvastatin, 80 mg, oral, Nightly  buPROPion XL, 150 mg, oral, q AM  colchicine, 0.6 mg, oral, BID  DULoxetine, 60 mg, oral, Daily  empagliflozin, 10 mg, oral, Daily  gabapentin, 900 mg, oral, 4x daily  insulin glargine, 35 Units, subcutaneous, q24h  insulin lispro, 0-20 Units, subcutaneous, TID  levothyroxine, 200 mcg, oral, Daily  levothyroxine, 50 mcg, oral, Daily before " breakfast  loratadine, 10 mg, oral, Daily  lubiprostone, 24 mcg, oral, BID  magnesium oxide, 400 mg, oral, BID  metoprolol succinate XL, 50 mg, oral, BID  nystatin, 1 Application, Topical, BID  oxygen, , inhalation, Continuous - Inhalation  pantoprazole, 40 mg, oral, BID  polyethylene glycol, 17 g, oral, Daily  potassium chloride CR, 40 mEq, oral, Once  potassium chloride CR, 60 mEq, oral, Daily  potassium chloride in 0.9%NaCl, , ,   traZODone, 100 mg, oral, Nightly  warfarin, 5 mg, oral, Once per day on Monday Thursday  [START ON 6/14/2024] warfarin, 7.5 mg, oral, Once per day on Sunday Tuesday Wednesday Friday Saturday      Continuous medications  potassium chloride in 0.9%NaCl, 100 mL/hr, Last Rate: 100 mL/hr (06/13/24 0148)      PRN medications  PRN medications: acetaminophen **OR** acetaminophen **OR** acetaminophen, acetaminophen **OR** acetaminophen **OR** acetaminophen, albuterol, benzocaine-menthol, dextrose, dextrose, glucagon, glucagon, ondansetron ODT **OR** ondansetron, potassium chloride in 0.9%NaCl  Results for orders placed or performed during the hospital encounter of 06/12/24 (from the past 24 hour(s))   CBC and Auto Differential   Result Value Ref Range    WBC 10.7 4.4 - 11.3 x10*3/uL    nRBC 0.0 0.0 - 0.0 /100 WBCs    RBC 5.02 4.00 - 5.20 x10*6/uL    Hemoglobin 15.0 12.0 - 16.0 g/dL    Hematocrit 44.0 36.0 - 46.0 %    MCV 88 80 - 100 fL    MCH 29.9 26.0 - 34.0 pg    MCHC 34.1 32.0 - 36.0 g/dL    RDW 13.1 11.5 - 14.5 %    Platelets 240 150 - 450 x10*3/uL    Neutrophils % 77.3 40.0 - 80.0 %    Immature Granulocytes %, Automated 0.5 0.0 - 0.9 %    Lymphocytes % 12.6 13.0 - 44.0 %    Monocytes % 8.3 2.0 - 10.0 %    Eosinophils % 1.1 0.0 - 6.0 %    Basophils % 0.2 0.0 - 2.0 %    Neutrophils Absolute 8.31 (H) 1.20 - 7.70 x10*3/uL    Immature Granulocytes Absolute, Automated 0.05 0.00 - 0.70 x10*3/uL    Lymphocytes Absolute 1.35 1.20 - 4.80 x10*3/uL    Monocytes Absolute 0.89 0.10 - 1.00 x10*3/uL     Eosinophils Absolute 0.12 0.00 - 0.70 x10*3/uL    Basophils Absolute 0.02 0.00 - 0.10 x10*3/uL   Basic metabolic panel   Result Value Ref Range    Glucose 267 (H) 74 - 99 mg/dL    Sodium 133 (L) 136 - 145 mmol/L    Potassium 2.1 (LL) 3.5 - 5.3 mmol/L    Chloride 82 (L) 98 - 107 mmol/L    Bicarbonate 37 (H) 21 - 32 mmol/L    Anion Gap 16 10 - 20 mmol/L    Urea Nitrogen 26 (H) 6 - 23 mg/dL    Creatinine 1.17 (H) 0.50 - 1.05 mg/dL    eGFR 55 (L) >60 mL/min/1.73m*2    Calcium 9.8 8.6 - 10.3 mg/dL   Uric acid   Result Value Ref Range    Uric Acid 9.5 (H) 2.3 - 6.7 mg/dL   TSH   Result Value Ref Range    Thyroid Stimulating Hormone 0.42 (L) 0.44 - 3.98 mIU/L   ECG 12 lead   Result Value Ref Range    Ventricular Rate 98 BPM    Atrial Rate 98 BPM    MO Interval 174 ms    QRS Duration 84 ms    QT Interval 434 ms    QTC Calculation(Bazett) 554 ms    P Axis 71 degrees    R Axis 43 degrees    T Axis 52 degrees    QRS Count 16 beats    Q Onset 213 ms    P Onset 126 ms    P Offset 190 ms    T Offset 430 ms    QTC Fredericia 511 ms   Protime-INR   Result Value Ref Range    Protime 24.3 (H) 9.8 - 12.8 seconds    INR 2.1 (H) 0.9 - 1.1   APTT   Result Value Ref Range    aPTT 48 (H) 27 - 38 seconds   POCT GLUCOSE   Result Value Ref Range    POCT Glucose 44 (L) 74 - 99 mg/dL   POCT GLUCOSE   Result Value Ref Range    POCT Glucose 90 74 - 99 mg/dL   Protime-INR   Result Value Ref Range    Protime 24.1 (H) 9.8 - 12.8 seconds    INR 2.1 (H) 0.9 - 1.1   Green Top   Result Value Ref Range    Extra Tube Hold for add-ons.    Lavender Top   Result Value Ref Range    Extra Tube Hold for add-ons.    Basic metabolic panel   Result Value Ref Range    Glucose 155 (H) 74 - 99 mg/dL    Sodium 137 136 - 145 mmol/L    Potassium 2.0 (LL) 3.5 - 5.3 mmol/L    Chloride 85 (L) 98 - 107 mmol/L    Bicarbonate 40 (HH) 21 - 32 mmol/L    Anion Gap 14 10 - 20 mmol/L    Urea Nitrogen 25 (H) 6 - 23 mg/dL    Creatinine 1.00 0.50 - 1.05 mg/dL    eGFR 67 >60  mL/min/1.73m*2    Calcium 9.8 8.6 - 10.3 mg/dL   POCT GLUCOSE   Result Value Ref Range    POCT Glucose 188 (H) 74 - 99 mg/dL          ECG 12 lead    Result Date: 6/13/2024  Normal sinus rhythm Prolonged QT Abnormal ECG When compared with ECG of 06-JUN-2024 19:45, QRS axis Shifted right QRS voltage has increased QT has lengthened                    Assessment/Plan   Principal Problem:    Hypokalemia  53-year-old female with history of  CHF  COPD  Diabetes mellitus type 2  Chronic kidney disease  Polyneuropathy  Hepatic vein thrombosis on Coumadin  Chronic edema on loop diuretics  Presented with recurrent hypokalemia  Metabolic alkalosis  Plan  Cardiopulmonary monitoring  Watch volume status and fluid balance as well fix electrolytes accordingly  Hold IV diuretics  Replace potassium aggressively  Also encourage weight loss by trying Ozempic or other weight loss medicines of consideration and look into bariatric surgery after checking with PCP since this was planned out previously however patient could not proceed  due to insurance barriers  Case management to help with resources and find out a way to affordability cost   Follow nephrology  Continue Coumadin, daily INR checks via pharmacy  On psych medicines  Supportive care symptomatic management  Allopurinol for gout  Colchicine for flare  Daily CBC BMP and monitor urine output  DVT prophylaxis addressed  On Synthroid for hypothyroidism  Insulin for diabetes control  Jardiance,Carb controlled diet  Continue magnesium supplement  On metoprolol 50 mg p.o. twice daily for hypertension  Statins for hyperlipidemia  Wellbutrin  Supplemental oxygen as needed  CPAP at night  PPI for GI prophylaxis  Continue current home medicines  On Neurontin for neuropathy  Local skin care on nystatin  Neb treatments as needed                        Maria Guadalupe Monaco MD

## 2024-06-14 LAB
ANION GAP SERPL CALC-SCNC: 15 MMOL/L (ref 10–20)
ATRIAL RATE: 79 BPM
BASOPHILS # BLD AUTO: 0.03 X10*3/UL (ref 0–0.1)
BASOPHILS NFR BLD AUTO: 0.4 %
BUN SERPL-MCNC: 21 MG/DL (ref 6–23)
CALCIUM SERPL-MCNC: 9.6 MG/DL (ref 8.6–10.3)
CHLORIDE SERPL-SCNC: 90 MMOL/L (ref 98–107)
CO2 SERPL-SCNC: 35 MMOL/L (ref 21–32)
CREAT SERPL-MCNC: 0.76 MG/DL (ref 0.5–1.05)
CREAT UR-MCNC: 52 MG/DL (ref 20–320)
EGFRCR SERPLBLD CKD-EPI 2021: >90 ML/MIN/1.73M*2
EOSINOPHIL # BLD AUTO: 0.23 X10*3/UL (ref 0–0.7)
EOSINOPHIL NFR BLD AUTO: 3.4 %
ERYTHROCYTE [DISTWIDTH] IN BLOOD BY AUTOMATED COUNT: 13.2 % (ref 11.5–14.5)
GLUCOSE BLD MANUAL STRIP-MCNC: 269 MG/DL (ref 74–99)
GLUCOSE BLD MANUAL STRIP-MCNC: 293 MG/DL (ref 74–99)
GLUCOSE BLD MANUAL STRIP-MCNC: 327 MG/DL (ref 74–99)
GLUCOSE BLD MANUAL STRIP-MCNC: 353 MG/DL (ref 74–99)
GLUCOSE SERPL-MCNC: 250 MG/DL (ref 74–99)
HCT VFR BLD AUTO: 41.9 % (ref 36–46)
HGB BLD-MCNC: 13.7 G/DL (ref 12–16)
IMM GRANULOCYTES # BLD AUTO: 0.03 X10*3/UL (ref 0–0.7)
IMM GRANULOCYTES NFR BLD AUTO: 0.4 % (ref 0–0.9)
INR PPP: 1.9 (ref 0.9–1.1)
LYMPHOCYTES # BLD AUTO: 1.29 X10*3/UL (ref 1.2–4.8)
LYMPHOCYTES NFR BLD AUTO: 19.1 %
MCH RBC QN AUTO: 29.4 PG (ref 26–34)
MCHC RBC AUTO-ENTMCNC: 32.7 G/DL (ref 32–36)
MCV RBC AUTO: 90 FL (ref 80–100)
MONOCYTES # BLD AUTO: 0.68 X10*3/UL (ref 0.1–1)
MONOCYTES NFR BLD AUTO: 10.1 %
NEUTROPHILS # BLD AUTO: 4.49 X10*3/UL (ref 1.2–7.7)
NEUTROPHILS NFR BLD AUTO: 66.6 %
NRBC BLD-RTO: 0 /100 WBCS (ref 0–0)
OSMOLALITY UR: 436 MOSM/KG (ref 200–1200)
P AXIS: 58 DEGREES
P OFFSET: 183 MS
P ONSET: 121 MS
PLATELET # BLD AUTO: 213 X10*3/UL (ref 150–450)
POTASSIUM SERPL-SCNC: 2.6 MMOL/L (ref 3.5–5.3)
POTASSIUM UR-SCNC: 26 MMOL/L
POTASSIUM/CREAT UR-RTO: 50 MMOL/G CREAT
PR INTERVAL: 168 MS
PROTHROMBIN TIME: 22.7 SECONDS (ref 9.8–12.8)
Q ONSET: 205 MS
QRS COUNT: 13 BEATS
QRS DURATION: 104 MS
QT INTERVAL: 558 MS
QTC CALCULATION(BAZETT): 639 MS
QTC FREDERICIA: 611 MS
R AXIS: -7 DEGREES
RBC # BLD AUTO: 4.66 X10*6/UL (ref 4–5.2)
SODIUM SERPL-SCNC: 137 MMOL/L (ref 136–145)
T AXIS: 34 DEGREES
T OFFSET: 484 MS
VENTRICULAR RATE: 79 BPM
WBC # BLD AUTO: 6.8 X10*3/UL (ref 4.4–11.3)

## 2024-06-14 PROCEDURE — 2500000004 HC RX 250 GENERAL PHARMACY W/ HCPCS (ALT 636 FOR OP/ED): Performed by: STUDENT IN AN ORGANIZED HEALTH CARE EDUCATION/TRAINING PROGRAM

## 2024-06-14 PROCEDURE — 99233 SBSQ HOSP IP/OBS HIGH 50: CPT | Performed by: HOSPITALIST

## 2024-06-14 PROCEDURE — 2500000001 HC RX 250 WO HCPCS SELF ADMINISTERED DRUGS (ALT 637 FOR MEDICARE OP): Performed by: STUDENT IN AN ORGANIZED HEALTH CARE EDUCATION/TRAINING PROGRAM

## 2024-06-14 PROCEDURE — 36415 COLL VENOUS BLD VENIPUNCTURE: CPT | Performed by: STUDENT IN AN ORGANIZED HEALTH CARE EDUCATION/TRAINING PROGRAM

## 2024-06-14 PROCEDURE — 2500000005 HC RX 250 GENERAL PHARMACY W/O HCPCS: Performed by: HOSPITALIST

## 2024-06-14 PROCEDURE — 2500000002 HC RX 250 W HCPCS SELF ADMINISTERED DRUGS (ALT 637 FOR MEDICARE OP, ALT 636 FOR OP/ED): Performed by: HOSPITALIST

## 2024-06-14 PROCEDURE — 2500000001 HC RX 250 WO HCPCS SELF ADMINISTERED DRUGS (ALT 637 FOR MEDICARE OP): Performed by: HOSPITALIST

## 2024-06-14 PROCEDURE — 76937 US GUIDE VASCULAR ACCESS: CPT

## 2024-06-14 PROCEDURE — 2500000005 HC RX 250 GENERAL PHARMACY W/O HCPCS: Performed by: STUDENT IN AN ORGANIZED HEALTH CARE EDUCATION/TRAINING PROGRAM

## 2024-06-14 PROCEDURE — 2500000002 HC RX 250 W HCPCS SELF ADMINISTERED DRUGS (ALT 637 FOR MEDICARE OP, ALT 636 FOR OP/ED): Mod: MUE | Performed by: STUDENT IN AN ORGANIZED HEALTH CARE EDUCATION/TRAINING PROGRAM

## 2024-06-14 PROCEDURE — 85610 PROTHROMBIN TIME: CPT | Performed by: STUDENT IN AN ORGANIZED HEALTH CARE EDUCATION/TRAINING PROGRAM

## 2024-06-14 PROCEDURE — 82374 ASSAY BLOOD CARBON DIOXIDE: CPT | Performed by: STUDENT IN AN ORGANIZED HEALTH CARE EDUCATION/TRAINING PROGRAM

## 2024-06-14 PROCEDURE — 2500000004 HC RX 250 GENERAL PHARMACY W/ HCPCS (ALT 636 FOR OP/ED): Performed by: INTERNAL MEDICINE

## 2024-06-14 PROCEDURE — 85025 COMPLETE CBC W/AUTO DIFF WBC: CPT | Performed by: STUDENT IN AN ORGANIZED HEALTH CARE EDUCATION/TRAINING PROGRAM

## 2024-06-14 PROCEDURE — 94762 N-INVAS EAR/PLS OXIMTRY CONT: CPT

## 2024-06-14 PROCEDURE — 99291 CRITICAL CARE FIRST HOUR: CPT | Performed by: STUDENT IN AN ORGANIZED HEALTH CARE EDUCATION/TRAINING PROGRAM

## 2024-06-14 PROCEDURE — 1200000002 HC GENERAL ROOM WITH TELEMETRY DAILY

## 2024-06-14 PROCEDURE — 2500000004 HC RX 250 GENERAL PHARMACY W/ HCPCS (ALT 636 FOR OP/ED)

## 2024-06-14 PROCEDURE — 2500000002 HC RX 250 W HCPCS SELF ADMINISTERED DRUGS (ALT 637 FOR MEDICARE OP, ALT 636 FOR OP/ED): Mod: MUE

## 2024-06-14 PROCEDURE — 82947 ASSAY GLUCOSE BLOOD QUANT: CPT | Mod: 91

## 2024-06-14 RX ORDER — POTASSIUM CHLORIDE 20 MEQ/1
40 TABLET, EXTENDED RELEASE ORAL ONCE
Status: COMPLETED | OUTPATIENT
Start: 2024-06-14 | End: 2024-06-14

## 2024-06-14 RX ORDER — FLUCONAZOLE 150 MG/1
150 TABLET ORAL ONCE
Status: COMPLETED | OUTPATIENT
Start: 2024-06-14 | End: 2024-06-14

## 2024-06-14 RX ORDER — MORPHINE SULFATE 2 MG/ML
2 INJECTION, SOLUTION INTRAMUSCULAR; INTRAVENOUS ONCE
Status: COMPLETED | OUTPATIENT
Start: 2024-06-14 | End: 2024-06-14

## 2024-06-14 RX ORDER — LACTULOSE 10 G/15ML
20 SOLUTION ORAL DAILY
Status: DISCONTINUED | OUTPATIENT
Start: 2024-06-14 | End: 2024-06-18 | Stop reason: HOSPADM

## 2024-06-14 RX ORDER — SODIUM CHLORIDE AND POTASSIUM CHLORIDE 150; 900 MG/100ML; MG/100ML
INJECTION, SOLUTION INTRAVENOUS
Status: COMPLETED
Start: 2024-06-14 | End: 2024-06-14

## 2024-06-14 ASSESSMENT — PAIN - FUNCTIONAL ASSESSMENT
PAIN_FUNCTIONAL_ASSESSMENT: 0-10

## 2024-06-14 ASSESSMENT — PAIN DESCRIPTION - ORIENTATION: ORIENTATION: RIGHT

## 2024-06-14 ASSESSMENT — PAIN SCALES - GENERAL
PAINLEVEL_OUTOF10: 6
PAINLEVEL_OUTOF10: 7
PAINLEVEL_OUTOF10: 10 - WORST POSSIBLE PAIN
PAINLEVEL_OUTOF10: 0 - NO PAIN
PAINLEVEL_OUTOF10: 0 - NO PAIN
PAINLEVEL_OUTOF10: 4
PAINLEVEL_OUTOF10: 0 - NO PAIN

## 2024-06-14 ASSESSMENT — COGNITIVE AND FUNCTIONAL STATUS - GENERAL
DRESSING REGULAR UPPER BODY CLOTHING: A LITTLE
CLIMB 3 TO 5 STEPS WITH RAILING: A LOT
TOILETING: A LITTLE
MOBILITY SCORE: 18
DRESSING REGULAR LOWER BODY CLOTHING: A LOT
PERSONAL GROOMING: A LITTLE
DAILY ACTIVITIY SCORE: 17
STANDING UP FROM CHAIR USING ARMS: A LITTLE
HELP NEEDED FOR BATHING: A LOT
WALKING IN HOSPITAL ROOM: A LITTLE
MOVING TO AND FROM BED TO CHAIR: A LITTLE
TURNING FROM BACK TO SIDE WHILE IN FLAT BAD: A LITTLE

## 2024-06-14 ASSESSMENT — PAIN DESCRIPTION - LOCATION
LOCATION: FOOT
LOCATION: BACK

## 2024-06-14 ASSESSMENT — PAIN SCALES - PAIN ASSESSMENT IN ADVANCED DEMENTIA (PAINAD): TOTALSCORE: MEDICATION (SEE MAR)

## 2024-06-14 NOTE — CARE PLAN
Problem: Fall/Injury  Goal: Not fall by end of shift  Outcome: Progressing  Goal: Be free from injury by end of the shift  Outcome: Progressing  Goal: Verbalize understanding of personal risk factors for fall in the hospital  Outcome: Progressing  Goal: Verbalize understanding of risk factor reduction measures to prevent injury from fall in the home  Outcome: Progressing  Goal: Use assistive devices by end of the shift  Outcome: Progressing  Goal: Pace activities to prevent fatigue by end of the shift  Outcome: Progressing     Problem: Respiratory  Goal: Clear secretions with interventions this shift  Outcome: Progressing  Goal: Minimize anxiety/maximize coping throughout shift  Outcome: Progressing  Goal: Minimal/no exertional discomfort or dyspnea this shift  Outcome: Progressing  Goal: No signs of respiratory distress (eg. Use of accessory muscles. Peds grunting)  Outcome: Progressing  Goal: Patent airway maintained this shift  Outcome: Progressing  Goal: Tolerate mechanical ventilation evidenced by VS/agitation level this shift  Outcome: Progressing  Goal: Tolerate pulmonary toileting this shift  Outcome: Progressing  Goal: Verbalize decreased shortness of breath this shift  Outcome: Progressing  Goal: Wean oxygen to maintain O2 saturation per order/standard this shift  Outcome: Progressing  Goal: Increase self care and/or family involvement in next 24 hours  Outcome: Progressing     Problem: Diabetes  Goal: Achieve decreasing blood glucose levels by end of shift  Outcome: Progressing  Goal: Increase stability of blood glucose readings by end of shift  Outcome: Progressing  Goal: Decrease in ketones present in urine by end of shift  Outcome: Progressing  Goal: Maintain electrolyte levels within acceptable range throughout shift  Outcome: Progressing  Goal: Maintain glucose levels >70mg/dl to <250mg/dl throughout shift  Outcome: Progressing  Goal: No changes in neurological exam by end of shift  Outcome:  Progressing  Goal: Learn about and adhere to nutrition recommendations by end of shift  Outcome: Progressing  Goal: Vital signs within normal range for age by end of shift  Outcome: Progressing  Goal: Increase self care and/or family involovement by end of shift  Outcome: Progressing  Goal: Receive DSME education by end of shift  Outcome: Progressing     Problem: Pain - Adult  Goal: Verbalizes/displays adequate comfort level or baseline comfort level  Outcome: Progressing     Problem: Safety - Adult  Goal: Free from fall injury  Outcome: Progressing     Problem: Discharge Planning  Goal: Discharge to home or other facility with appropriate resources  Outcome: Progressing     Problem: Chronic Conditions and Co-morbidities  Goal: Patient's chronic conditions and co-morbidity symptoms are monitored and maintained or improved  Outcome: Progressing   The patient's goals for the shift include feel better    The clinical goals for the shift include feel better    Pt potassium levels slowly improving. Labs drawn this morning and results pending at this time.

## 2024-06-14 NOTE — PROGRESS NOTES
Pharmacy Consult for Warfarin (Coumadin) Management - Daily Progress Note     No acute bleeding or bruising issues from overnight; H/H has remained stable during admission    Warfarin Dosing History  warfarin  5 mg Mon/Thurs, 7.5 mg AOD    Labs  POC INR   Date Value Ref Range Status   06/05/2024 2.10  Final   05/24/2024 3.40  Final   04/26/2024 2.20  Final     INR   Date Value Ref Range Status   06/14/2024 1.9 (H) 0.9 - 1.1 Final   06/13/2024 2.1 (H) 0.9 - 1.1 Final   06/12/2024 2.1 (H) 0.9 - 1.1 Final     Review  Warfarin Indication: Other (Comment)  Specify Other Indication for Warfarin: portal vein thrombosis  Target INR: 2 - 3    Current anticoagulant therapy appropriate. No changes made. Pharmacy will continue to monitor and adjust therapy as needed.     Jono Pabon, PharmD BCPS

## 2024-06-14 NOTE — PROGRESS NOTES
"Roselia Mo is a 53 y.o. female on day 2 of admission presenting with Hypokalemia.    Subjective   Generalized weakness and fatigue  Denies nausea vomiting diarrhea or fever chills  No chest pain or shortness of breath at rest  No worsening leg swelling from usual baseline  No headache or focal weakness  No blurry vision  No headache  No neck pain  Hypokalemia present  No visual changes  Feels slightly better than yesterday clinically per lab workup still abnormal       Objective     Physical Exam  General Appearance: AAO x 3, severe obesity  Skin: skin color pink, warm, and dry; no suspicious rashes or lesions  Eyes : PERRL, EOM's intact  ENT: mucous membranes pink and moist  Neck: normocephalic  Respiratory: lungs clear to auscultation anteriorly; no wheezing, rhonchi, or crackles.   Heart: regular rate and rhythm.  Abdomen: Mildly distended, positive bowel sounds x4, soft,  nontender  Extremities: Bilateral lower extremity edema   Peripheral pulses: normal x4 extremities  Neuro: alert, coherent and conversant, no focal motor deficits  Last Recorded Vitals  Blood pressure (!) 113/91, pulse 78, temperature 36.7 °C (98.1 °F), temperature source Temporal, resp. rate 17, height 1.575 m (5' 2\"), weight (!) 161 kg (354 lb 3.2 oz), SpO2 100%.  Intake/Output last 3 Shifts:  I/O last 3 completed shifts:  In: 5710.3 (35.5 mL/kg) [P.O.:4140; I.V.:1570.3 (9.8 mL/kg)]  Out: 4410 (27.4 mL/kg) [Urine:4410 (0.8 mL/kg/hr)]  Weight: 160.7 kg     Relevant Results       Scheduled medications  allopurinol, 300 mg, oral, Daily  atorvastatin, 80 mg, oral, Nightly  buPROPion XL, 150 mg, oral, q AM  colchicine, 0.6 mg, oral, BID  DULoxetine, 60 mg, oral, Daily  empagliflozin, 10 mg, oral, Daily  gabapentin, 900 mg, oral, 4x daily  insulin glargine, 35 Units, subcutaneous, q24h  insulin lispro, 0-20 Units, subcutaneous, TID  lactulose, 20 g, oral, Daily  levothyroxine, 200 mcg, oral, Daily  levothyroxine, 50 mcg, oral, Daily before " breakfast  lidocaine, 1 patch, transdermal, Daily  loratadine, 10 mg, oral, Daily  lubiprostone, 24 mcg, oral, BID  magnesium oxide, 400 mg, oral, BID  metoprolol succinate XL, 50 mg, oral, BID  nystatin, 1 Application, Topical, BID  oxygen, , inhalation, Continuous - Inhalation  pantoprazole, 40 mg, oral, BID  polyethylene glycol, 17 g, oral, Daily  potassium chloride CR, 60 mEq, oral, Daily  traZODone, 100 mg, oral, Nightly  warfarin, 5 mg, oral, Once per day on Monday Thursday  warfarin, 7.5 mg, oral, Once per day on Sunday Tuesday Wednesday Friday Saturday      Continuous medications  potassium chloride in 0.9%NaCl, 100 mL/hr, Last Rate: 100 mL/hr (06/14/24 1026)      PRN medications  PRN medications: acetaminophen **OR** acetaminophen **OR** acetaminophen, acetaminophen **OR** acetaminophen **OR** acetaminophen, albuterol, benzocaine-menthol, dextrose, dextrose, glucagon, glucagon, meclizine, ondansetron ODT **OR** ondansetron, oxyCODONE    Results for orders placed or performed during the hospital encounter of 06/12/24 (from the past 24 hour(s))   Basic metabolic panel   Result Value Ref Range    Glucose 308 (H) 74 - 99 mg/dL    Sodium 132 (L) 136 - 145 mmol/L    Potassium 3.1 (L) 3.5 - 5.3 mmol/L    Chloride 84 (L) 98 - 107 mmol/L    Bicarbonate 36 (H) 21 - 32 mmol/L    Anion Gap 15 10 - 20 mmol/L    Urea Nitrogen 26 (H) 6 - 23 mg/dL    Creatinine 1.13 (H) 0.50 - 1.05 mg/dL    eGFR 58 (L) >60 mL/min/1.73m*2    Calcium 9.9 8.6 - 10.3 mg/dL   POCT GLUCOSE   Result Value Ref Range    POCT Glucose 330 (H) 74 - 99 mg/dL   Basic metabolic panel   Result Value Ref Range    Glucose 250 (H) 74 - 99 mg/dL    Sodium 137 136 - 145 mmol/L    Potassium 2.6 (LL) 3.5 - 5.3 mmol/L    Chloride 90 (L) 98 - 107 mmol/L    Bicarbonate 35 (H) 21 - 32 mmol/L    Anion Gap 15 10 - 20 mmol/L    Urea Nitrogen 21 6 - 23 mg/dL    Creatinine 0.76 0.50 - 1.05 mg/dL    eGFR >90 >60 mL/min/1.73m*2    Calcium 9.6 8.6 - 10.3 mg/dL   CBC and  Auto Differential   Result Value Ref Range    WBC 6.8 4.4 - 11.3 x10*3/uL    nRBC 0.0 0.0 - 0.0 /100 WBCs    RBC 4.66 4.00 - 5.20 x10*6/uL    Hemoglobin 13.7 12.0 - 16.0 g/dL    Hematocrit 41.9 36.0 - 46.0 %    MCV 90 80 - 100 fL    MCH 29.4 26.0 - 34.0 pg    MCHC 32.7 32.0 - 36.0 g/dL    RDW 13.2 11.5 - 14.5 %    Platelets 213 150 - 450 x10*3/uL    Neutrophils % 66.6 40.0 - 80.0 %    Immature Granulocytes %, Automated 0.4 0.0 - 0.9 %    Lymphocytes % 19.1 13.0 - 44.0 %    Monocytes % 10.1 2.0 - 10.0 %    Eosinophils % 3.4 0.0 - 6.0 %    Basophils % 0.4 0.0 - 2.0 %    Neutrophils Absolute 4.49 1.20 - 7.70 x10*3/uL    Immature Granulocytes Absolute, Automated 0.03 0.00 - 0.70 x10*3/uL    Lymphocytes Absolute 1.29 1.20 - 4.80 x10*3/uL    Monocytes Absolute 0.68 0.10 - 1.00 x10*3/uL    Eosinophils Absolute 0.23 0.00 - 0.70 x10*3/uL    Basophils Absolute 0.03 0.00 - 0.10 x10*3/uL   Protime-INR   Result Value Ref Range    Protime 22.7 (H) 9.8 - 12.8 seconds    INR 1.9 (H) 0.9 - 1.1   POCT GLUCOSE   Result Value Ref Range    POCT Glucose 269 (H) 74 - 99 mg/dL   POCT GLUCOSE   Result Value Ref Range    POCT Glucose 327 (H) 74 - 99 mg/dL   POCT GLUCOSE   Result Value Ref Range    POCT Glucose 293 (H) 74 - 99 mg/dL         ECG 12 Lead    Result Date: 6/13/2024  Normal sinus rhythm Low voltage QRS Nonspecific T wave abnormality Prolonged QT Abnormal ECG When compared with ECG of 13-JUN-2024 09:38, (unconfirmed) Premature supraventricular complexes are no longer Present Questionable change in QRS duration Confirmed by Delfin Booth ([id number]) on 6/13/2024 5:14:09 PM    ECG 12 lead    Result Date: 6/13/2024  Normal sinus rhythm Prolonged QT Abnormal ECG When compared with ECG of 06-JUN-2024 19:45, QRS axis Shifted right QRS voltage has increased QT has lengthened                Assessment/Plan   Principal Problem:    Hypokalemia  53-year-old female with history of  CHF  COPD  Diabetes mellitus type 2  Chronic kidney  disease  Polyneuropathy  Hepatic vein thrombosis on Coumadin  Chronic edema on loop diuretics  Presented with recurrent hypokalemia  Metabolic alkalosis  Plan  Cardiopulmonary monitoring  QT interval improved after ongoing potassium supplementation  Continue to replace potassium aggressively for hypokalemia  Nephrology following, evaluate potassium wasting  Hold IV diuretics, currently not in volume overload  Continue Coumadin with daily INR checks per pharmacy  Psych medicines  Supportive care symptomatic management stent allopurinol for gout  Colchicine for flareup  Daily CBC BMP and monitor urine output  DVT prophylaxis addressed  On Synthroid for hypothyroidism  Insulin for diabetes control, follow fingerstick  Jardiance, carb controlled diet  Continue magnesium supplement  On metoprolol 50 mg p.o. twice daily for hypertension  Status for hyperlipidemia  Wellbutrin for anxiety  Supplemental oxygen as required  CPAP at night  PPI for GI prophylaxis  Neurontin for neuropathy  Continue current home medicines  Local skin care  Neb treatments  Lactulose for constipation  Healthy diet exercise and encouraged weight loss                      Maria Guadalupe Monaco MD

## 2024-06-14 NOTE — PROGRESS NOTES
Subjective Data:  Patient reports feeling well, no new adverse events overnight. Patient denies any chest pain, shortness of breath, palpitations, dizziness or syncope. Patient is hemodynamically stable.     Overnight Events:    No     Objective Data:  Last Recorded Vitals:  Vitals:    06/14/24 0627 06/14/24 0700 06/14/24 0800 06/14/24 1137   BP:  137/69 133/70    BP Location:  Left arm     Patient Position:  Lying     Pulse:  80 83    Resp:  20     Temp:  36.3 °C (97.3 °F)     TempSrc:  Temporal     SpO2: 97% 98% 97% 99%   Weight:       Height:           Last Labs:  CBC - 6/14/2024:  4:33 AM  6.8 13.7 213    41.9      CMP - 6/14/2024:  4:33 AM  9.6 7.9 32 --- 0.8   2.7 4.5 15 338      PTT - 6/12/2024:  9:46 PM  1.9   22.7 48     TROPHS   Date/Time Value Ref Range Status   06/13/2024 03:58 PM 9 0 - 13 ng/L Final   06/13/2024 11:44 AM 9 0 - 13 ng/L Final   05/08/2024 03:17 PM 5 0 - 13 ng/L Final     BNP   Date/Time Value Ref Range Status   05/08/2024 02:12  0 - 99 pg/mL Final   04/23/2024 07:45  0 - 99 pg/mL Final     HGBA1C   Date/Time Value Ref Range Status   03/19/2024 06:20 PM 9.0 see below % Final   02/01/2024 12:31 PM 8.3 see below % Final     LDLCALC   Date/Time Value Ref Range Status   02/01/2024 04:50 PM   Final     Comment:     The calculation of LDL and VLDL are inaccurate when the Triglycerides are greater than 400 mg/dL or when the patient is non-fasting. If LDL measurement is necessary contact the testing laboratory for an alternative LDL assay.                                  Near   Borderline      AGE      Desirable  Optimal    High     High     Very High     0-19 Y     0 - 109     ---    110-129   >/= 130     ----    20-24 Y     0 - 119     ---    120-159   >/= 160     ----      >24 Y     0 -  99   100-129  130-159   160-189     >/=190       VLDL   Date/Time Value Ref Range Status   02/01/2024 04:50 PM   Final     Comment:     Unable to calculate VLDL.      Last I/O:  I/O last 3  completed shifts:  In: 5710.3 (35.5 mL/kg) [P.O.:4140; I.V.:1570.3 (9.8 mL/kg)]  Out: 4410 (27.4 mL/kg) [Urine:4410 (0.8 mL/kg/hr)]  Weight: 160.7 kg     Past Cardiology Tests (Last 3 Years):  EKG:  ECG 12 Lead 06/13/2024      ECG 12 lead 06/12/2024 (Preliminary)      ECG 12 Lead 06/06/2024      ECG 12 lead 06/06/2024      ECG 12 lead 06/06/2024      ECG 12 lead  (Preliminary)      ECG 12 Lead 05/08/2024      ECG 12 Lead 04/08/2024      ECG 12 lead 04/07/2024      ECG 12 lead 03/28/2024      ECG 12 lead 03/19/2024      ECG 12 Lead 03/13/2024      ECG 12 lead 02/04/2024      ECG 12 Lead 02/01/2024 (Wet Read)    Echo:  Transthoracic Echo (TTE) Complete 03/20/2024    Ejection Fractions:  EF   Date/Time Value Ref Range Status   03/20/2024 05:44 AM 53 %      Cath:  No results found for this or any previous visit from the past 1095 days.    Stress Test:  Nuclear Stress Test 03/28/2024    Cardiac Imaging:  No results found for this or any previous visit from the past 1095 days.      Inpatient Medications:  Scheduled medications   Medication Dose Route Frequency    allopurinol  300 mg oral Daily    atorvastatin  80 mg oral Nightly    buPROPion XL  150 mg oral q AM    colchicine  0.6 mg oral BID    DULoxetine  60 mg oral Daily    empagliflozin  10 mg oral Daily    gabapentin  900 mg oral 4x daily    insulin glargine  35 Units subcutaneous q24h    insulin lispro  0-20 Units subcutaneous TID    lactulose  20 g oral Daily    levothyroxine  200 mcg oral Daily    levothyroxine  50 mcg oral Daily before breakfast    lidocaine  1 patch transdermal Daily    loratadine  10 mg oral Daily    [Held by provider] lubiprostone  24 mcg oral BID    magnesium oxide  400 mg oral BID    metoprolol succinate XL  50 mg oral BID    nystatin  1 Application Topical BID    oxygen   inhalation Continuous - Inhalation    pantoprazole  40 mg oral BID    polyethylene glycol  17 g oral Daily    potassium chloride CR  60 mEq oral Daily    traZODone  100 mg  oral Nightly    warfarin  5 mg oral Once per day on Monday Thursday    warfarin  7.5 mg oral Once per day on Sunday Tuesday Wednesday Friday Saturday     PRN medications   Medication    acetaminophen    Or    acetaminophen    Or    acetaminophen    acetaminophen    Or    acetaminophen    Or    acetaminophen    albuterol    benzocaine-menthol    dextrose    dextrose    glucagon    glucagon    meclizine    ondansetron ODT    Or    ondansetron    oxyCODONE     Continuous Medications   Medication Dose Last Rate    potassium chloride in 0.9%NaCl  100 mL/hr 100 mL/hr (06/14/24 1026)       Physical Exam:  General: alert, oriented and in no acute distress. Morbid obesity  HEENT: NC/AT; EOMI; PERRLA, external ear is normal  Neck: supple; trachea midline; no masses; no JVD  Chest: diminished breath sounds bilaterally; on NC oxygen  Cardio: regular rhythm, S1S2 normal, no murmurs  Abdomen: Difficult assessment due to obesity  Extremities: Lymphedema bilaterally  Neuro: Grossly intact     Psychiatric: Normal mood and affect      Assessment/Plan     Mrs. Roselia Mo is a 53 y.o. former smoker diabetic female being consulted by the Cardiology team for EKG changes. 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 She had a recent admission for hypokalemia and was discharged, returned after only 2 days complaining of dizziness, nausea and generalized weakness. She was found to be hypokalemic again,  her potassium was 2.1 at presentation. She complained also of ringing in her ears felt heavy, dizzy and weakness . She denied chest pain, shortness of  breath, palpitations, fever, chills, orthopnea, paroxysmal nocturnal dyspnea or syncope. In the ER, her potassium was 2.1 at presentation.  Was given IV potassium in the ER and admitted to the hospital.  INR was 2.1 on admission.  Creatinine was 1.17. EKG showed normal sinus rhythm with no signs of ACS; prolonged QT. She was admitted for clinical compensation.     Assessment     # Hypokalemia  - She was found to be hypokalemic again,  her potassium was 2.1 at presentation.  - EKG showed normal sinus rhythm with no signs of ACS; prolonged QT.  - New EKG with improvement in Qtc after potassium replacement.  - Would suggest to capitalize on potassium repletement.  - Would suggest to start her on Spironolactone per Nephrology assessment.     # Heart Failure with Preserved LV Systolic function  - EKG shows normal sinus rhythm with no signs of ACS. Prolonged QT.   - The echocardiogram (03/2024) showed normal LVEF 65% with no wall motion abnormalities.   - Nuclear stress test (03/2024) is normal.   - Low sodium diet (2g).  - Fluid restriction.  - Electrolyte control and daily BMP including magnesium.  - General recommendations for heart failure, including low-sodium diet, fluid restriction, daily weight and adherence to medication.   - Patient continues to be volume overloaded based on her clinical presentation. We suggest to start Bumex 1mg daily.  - Patient previously being treated for Staph infection in abdomen and L breast     # COPD  - Keep home oxygen     # Abdominal / Groin skin infection  - Keep broad spectrum antibiotics.     # Budd-Chiángel Sd  - Controlled by PCP.  - Keep Warfarin .  - INR control.     # Hypertension  - Controlled blood pressure.  - Keep current medications with Lisinopril 20mg daily, Metoprolol succinate 50mg daily.  - Patient counseled to keep a healthy lifestyle including regular exercise and low-sodium diet.  - Recommended home blood pressure monitoring.  - Goal of BP < 130/80mmHg.      #  Diabetes  - Counseled on healthy diet and regular exercises.  - Discussed need for weight loss and the benefits.   - Keep current medications.  - ISS.      # Hyperlipidemia  - Keep home medication with Rosuvastatin 40mg daily.  - Counseled on healthy diet and regular exercise.      # Gout  - Keep home medication with Allopurinol 300mg daily.  - Counseling.     # Hypothyroidism  - Keep Levothyroxine 250mcg  daily      This critically ill patient continues to be at-risk for clinically significant deterioration / failure due to the above mentioned dysfunctional, unstable organ systems.  I have personally identified and managed all complex critical care issues to prevent aforementioned clinical deterioration.  Critical care time is spent at bedside and/or the immediate area and has included, but is not limited to, the review of diagnostic tests, labs, radiographs, serial assessments of hemodynamics, respiratory status, ventilatory management, and family updates.  Time spent in procedures and teaching are reported separately.     Critical care time: 60 minutes     Peripheral IV 06/12/24 18 G 1.88 cm Left;Upper;Anterior Arm (Active)   Site Assessment Clean;Dry;Intact 06/14/24 0800   Dressing Type Transparent 06/14/24 0800   Line Status Flushed;Saline locked 06/14/24 0800   Dressing Status Clean;Dry 06/14/24 0800   Number of days: 2       Peripheral IV 06/14/24 16 G Right;Upper;Ventral Arm (Active)   Site Assessment Clean;Dry;Intact 06/14/24 0800   Dressing Type Transparent 06/14/24 0800   Line Status Flushed;Infusing 06/14/24 0800   Dressing Status Clean;Dry 06/14/24 0800   Number of days: 0       Code Status:  Full Code    Delfin Earl MD   Cardiology

## 2024-06-14 NOTE — PROGRESS NOTES
06/14/24 1209   Discharge Planning   Patient expects to be discharged to: home- no needs     REMOTE COVERAGE- per care rounds pt to be in hospital through the weekend. Called into pt room, role of TCC explained. Pt confirms plan to be home no needs. CT will follow.

## 2024-06-14 NOTE — PROGRESS NOTES
"Nutrition Initial Assessment:   Nutrition Assessment    Reason for Assessment: Dietitian discretion    Patient is a 53 y.o. female presenting with hypokalemia    6/14/24 patient seen - stated was eating well at home after discharge.  Has not had a BM in 2 weeks has Miralax ordered and informed MD.  Was instructed on DM diet previous admission.  85# weight loss in 2 mo with diuresis leading to low K.   Nutrition History:  Energy Intake: Good > 75 %  Food and Nutrient History: Good appetite  Food Allergies/Intolerances:   Nickel - patient chooses what foods she can tolerate.  Side effect is diarrhea.  GI Symptoms: Constipation  Oral Problems: None       Anthropometrics:  Height: 157.5 cm (5' 2\")   Weight: (!) 161 kg (354 lb 3.2 oz)   BMI (Calculated): 64.77  IBW/kg (Dietitian Calculated): 50 kg  Percent of IBW: 322 %  Adjusted Body Weight (kg): 78 kg    Weight History:   Daily Weight  06/13/24 : (!) 161 kg (354 lb 3.2 oz)  06/05/24 : (!) 168 kg (370 lb 9.5 oz)  06/05/24 : (!) 165 kg (363 lb 14.4 oz)  05/23/24 : (!) 186 kg (410 lb 6.4 oz)  05/12/24 : (!) 179 kg (395 lb 9.6 oz)  05/02/24 : (!) 186 kg (410 lb)  05/01/24 : (!) 186 kg (410 lb)  04/25/24 : (!) 190 kg (418 lb)  04/23/24 : (!) 199 kg (439 lb)  04/18/24 : (!) 199 kg (439 lb)     Weight Change %:  Weight History / % Weight Change: loss 85# 19% in 2 months with aggressive diuresis  Significant Weight Loss: No  Significant Weight Gain: Fluid related    Nutrition Focused Physical Exam Findings:  defer: not indicated  Physical Findings:  History of abdominal wound    Nutrition Significant Labs:  BG POCT trend:   Results from last 7 days   Lab Units 06/14/24  1104 06/14/24  0708 06/13/24  1928 06/13/24  1621 06/13/24  1114   POCT GLUCOSE mg/dL 327* 269* 330* 342* 357*    , Renal Lab Trend:   Results from last 7 days   Lab Units 06/14/24  0433 06/13/24  1708 06/13/24  0537 06/12/24 2022   POTASSIUM mmol/L 2.6* 3.1* 2.0* 2.1*   SODIUM mmol/L 137 132* 137 133*   EGFR " mL/min/1.73m*2 >90 58* 67 55*   BUN mg/dL 21 26* 25* 26*   CREATININE mg/dL 0.76 1.13* 1.00 1.17*        Nutrition Specific Medications:  Scheduled medications    empagliflozin, 10 mg, oral, Daily  insulin glargine, 35 Units, subcutaneous, q24h  insulin lispro, 0-20 Units, subcutaneous, TID  lactulose, 20 g, oral, Daily  magnesium oxide, 400 mg, oral, BID  pantoprazole, 40 mg, oral, BID  polyethylene glycol, 17 g, oral, Daily  potassium chloride CR, 60 mEq, oral, Daily  warfarin, 5 mg, oral, Once per day on Monday Thursday  warfarin, 7.5 mg, oral, Once per day on Sunday Tuesday Wednesday Friday Saturday    I/O:   Last BM Date:  (PTA); Stool Appearance: Unable to assess (06/14/24 0800)    Dietary Orders (From admission, onward)       Start     Ordered    06/13/24 0012  Adult diet Regular, Carb Controlled; 75 gram carb/meal, 45 gram Carb evening snack  Diet effective now        Question Answer Comment   Diet type Regular    Diet type Carb Controlled    Carb diet selection: 75 gram carb/meal, 45 gram Carb evening snack        06/13/24 0011                     Estimated Needs:   Total Energy Estimated Needs (kCal): 2600 kCal  Method for Estimating Needs: [place] [address] [place]  Total Protein Estimated Needs (g): 75 g  Method for Estimating Needs: 1.5-2 gm/kg IBW =  gm  Total Fluid Estimated Needs (mL): 2600 mL  Method for Estimating Needs: 1 ml/kcal        Nutrition Diagnosis   Malnutrition Diagnosis  Patient has Malnutrition Diagnosis: No    Nutrition Diagnosis  Patient has Nutrition Diagnosis: Yes  Diagnosis Status (1): New  Nutrition Diagnosis 1: Obese  Related to (1): excess calorie intake, fluid overload  As Evidenced by (1): BMI 64.78 kg/m2  Additional Nutrition Diagnosis: Diagnosis 2       Nutrition Interventions/Recommendations         Nutrition Prescription:  Individualized Nutrition Prescription Provided for : Oral nutrition        Nutrition Interventions:   Interventions: Meals and snacks  Meals and Snacks:  Carbohydrate-modified diet  Goal: CCD diet of diabetes control    Collaboration and Referral of Nutrition Care: Team meeting involving nutrition professional, Collaboration by nutrition professional with other providers  Goal: IDT meeting, Dr Monaco    Nutrition Education:  Previously instructed on diabetic low sodium diet    Nutrition Monitoring and Evaluation   Food/Nutrient Related History Monitoring  Monitoring and Evaluation Plan: Amount of food  Amount of Food: Estimated amout of food  Criteria: Consume >75% meals    Body Composition/Growth/Weight History  Monitoring and Evaluation Plan: BMI  Body Mass: Body mass index (BMI)  Criteria: Slow weight loss toward healthy BMI    Biochemical Data, Medical Tests and Procedures  Monitoring and Evaluation Plan: Glucose/endocrine profile, Electrolyte/renal panel  Electrolyte and Renal Panel: Potassium  Criteria: WNL  Glucose/Endocrine Profile: Glucose, casual  Criteria: 100-140 mg/dl      Time Spent/Follow-up Reminder:   Time Spent (min): 30 minutes  Last Date of Nutrition Visit: 06/14/24  Nutrition Follow-Up Needed?: 7-10 days        Jadyn Beard RDN, LD

## 2024-06-15 LAB
ANION GAP SERPL CALC-SCNC: 10 MMOL/L (ref 10–20)
BASOPHILS # BLD AUTO: 0.03 X10*3/UL (ref 0–0.1)
BASOPHILS # BLD AUTO: 0.03 X10*3/UL (ref 0–0.1)
BASOPHILS NFR BLD AUTO: 0.4 %
BASOPHILS NFR BLD AUTO: 0.5 %
BUN SERPL-MCNC: 17 MG/DL (ref 6–23)
CALCIUM SERPL-MCNC: 9.8 MG/DL (ref 8.6–10.3)
CHLORIDE SERPL-SCNC: 96 MMOL/L (ref 98–107)
CO2 SERPL-SCNC: 35 MMOL/L (ref 21–32)
CREAT SERPL-MCNC: 0.63 MG/DL (ref 0.5–1.05)
EGFRCR SERPLBLD CKD-EPI 2021: >90 ML/MIN/1.73M*2
EOSINOPHIL # BLD AUTO: 0.21 X10*3/UL (ref 0–0.7)
EOSINOPHIL # BLD AUTO: 0.28 X10*3/UL (ref 0–0.7)
EOSINOPHIL NFR BLD AUTO: 3.1 %
EOSINOPHIL NFR BLD AUTO: 4.3 %
ERYTHROCYTE [DISTWIDTH] IN BLOOD BY AUTOMATED COUNT: 13.2 % (ref 11.5–14.5)
ERYTHROCYTE [DISTWIDTH] IN BLOOD BY AUTOMATED COUNT: 13.3 % (ref 11.5–14.5)
GLUCOSE BLD MANUAL STRIP-MCNC: 243 MG/DL (ref 74–99)
GLUCOSE BLD MANUAL STRIP-MCNC: 259 MG/DL (ref 74–99)
GLUCOSE BLD MANUAL STRIP-MCNC: 278 MG/DL (ref 74–99)
GLUCOSE BLD MANUAL STRIP-MCNC: 299 MG/DL (ref 74–99)
GLUCOSE SERPL-MCNC: 277 MG/DL (ref 74–99)
HCT VFR BLD AUTO: 38.5 % (ref 36–46)
HCT VFR BLD AUTO: 40 % (ref 36–46)
HGB BLD-MCNC: 12.3 G/DL (ref 12–16)
HGB BLD-MCNC: 12.8 G/DL (ref 12–16)
IMM GRANULOCYTES # BLD AUTO: 0.03 X10*3/UL (ref 0–0.7)
IMM GRANULOCYTES # BLD AUTO: 0.03 X10*3/UL (ref 0–0.7)
IMM GRANULOCYTES NFR BLD AUTO: 0.4 % (ref 0–0.9)
IMM GRANULOCYTES NFR BLD AUTO: 0.5 % (ref 0–0.9)
INR PPP: 2 (ref 0.9–1.1)
LYMPHOCYTES # BLD AUTO: 1.16 X10*3/UL (ref 1.2–4.8)
LYMPHOCYTES # BLD AUTO: 1.51 X10*3/UL (ref 1.2–4.8)
LYMPHOCYTES NFR BLD AUTO: 17.3 %
LYMPHOCYTES NFR BLD AUTO: 23 %
MAGNESIUM SERPL-MCNC: 2.32 MG/DL (ref 1.6–2.4)
MCH RBC QN AUTO: 29.2 PG (ref 26–34)
MCH RBC QN AUTO: 29.5 PG (ref 26–34)
MCHC RBC AUTO-ENTMCNC: 31.9 G/DL (ref 32–36)
MCHC RBC AUTO-ENTMCNC: 32 G/DL (ref 32–36)
MCV RBC AUTO: 91 FL (ref 80–100)
MCV RBC AUTO: 92 FL (ref 80–100)
MONOCYTES # BLD AUTO: 0.66 X10*3/UL (ref 0.1–1)
MONOCYTES # BLD AUTO: 0.71 X10*3/UL (ref 0.1–1)
MONOCYTES NFR BLD AUTO: 10 %
MONOCYTES NFR BLD AUTO: 10.6 %
NEUTROPHILS # BLD AUTO: 4.06 X10*3/UL (ref 1.2–7.7)
NEUTROPHILS # BLD AUTO: 4.58 X10*3/UL (ref 1.2–7.7)
NEUTROPHILS NFR BLD AUTO: 61.7 %
NEUTROPHILS NFR BLD AUTO: 68.2 %
NRBC BLD-RTO: 0 /100 WBCS (ref 0–0)
NRBC BLD-RTO: 0 /100 WBCS (ref 0–0)
PLATELET # BLD AUTO: 196 X10*3/UL (ref 150–450)
PLATELET # BLD AUTO: 215 X10*3/UL (ref 150–450)
POTASSIUM SERPL-SCNC: 3.6 MMOL/L (ref 3.5–5.3)
PROTHROMBIN TIME: 23.3 SECONDS (ref 9.8–12.8)
RBC # BLD AUTO: 4.17 X10*6/UL (ref 4–5.2)
RBC # BLD AUTO: 4.38 X10*6/UL (ref 4–5.2)
SODIUM SERPL-SCNC: 137 MMOL/L (ref 136–145)
WBC # BLD AUTO: 6.6 X10*3/UL (ref 4.4–11.3)
WBC # BLD AUTO: 6.7 X10*3/UL (ref 4.4–11.3)

## 2024-06-15 PROCEDURE — 2500000002 HC RX 250 W HCPCS SELF ADMINISTERED DRUGS (ALT 637 FOR MEDICARE OP, ALT 636 FOR OP/ED): Performed by: STUDENT IN AN ORGANIZED HEALTH CARE EDUCATION/TRAINING PROGRAM

## 2024-06-15 PROCEDURE — 85610 PROTHROMBIN TIME: CPT

## 2024-06-15 PROCEDURE — 80048 BASIC METABOLIC PNL TOTAL CA: CPT | Performed by: STUDENT IN AN ORGANIZED HEALTH CARE EDUCATION/TRAINING PROGRAM

## 2024-06-15 PROCEDURE — 99232 SBSQ HOSP IP/OBS MODERATE 35: CPT | Performed by: STUDENT IN AN ORGANIZED HEALTH CARE EDUCATION/TRAINING PROGRAM

## 2024-06-15 PROCEDURE — 83735 ASSAY OF MAGNESIUM: CPT | Performed by: INTERNAL MEDICINE

## 2024-06-15 PROCEDURE — 2500000002 HC RX 250 W HCPCS SELF ADMINISTERED DRUGS (ALT 637 FOR MEDICARE OP, ALT 636 FOR OP/ED): Mod: MUE

## 2024-06-15 PROCEDURE — 2500000001 HC RX 250 WO HCPCS SELF ADMINISTERED DRUGS (ALT 637 FOR MEDICARE OP): Performed by: HOSPITALIST

## 2024-06-15 PROCEDURE — 2500000001 HC RX 250 WO HCPCS SELF ADMINISTERED DRUGS (ALT 637 FOR MEDICARE OP): Performed by: STUDENT IN AN ORGANIZED HEALTH CARE EDUCATION/TRAINING PROGRAM

## 2024-06-15 PROCEDURE — 2500000004 HC RX 250 GENERAL PHARMACY W/ HCPCS (ALT 636 FOR OP/ED): Performed by: STUDENT IN AN ORGANIZED HEALTH CARE EDUCATION/TRAINING PROGRAM

## 2024-06-15 PROCEDURE — 85025 COMPLETE CBC W/AUTO DIFF WBC: CPT | Performed by: STUDENT IN AN ORGANIZED HEALTH CARE EDUCATION/TRAINING PROGRAM

## 2024-06-15 PROCEDURE — 82947 ASSAY GLUCOSE BLOOD QUANT: CPT | Mod: 91

## 2024-06-15 PROCEDURE — 36415 COLL VENOUS BLD VENIPUNCTURE: CPT | Performed by: STUDENT IN AN ORGANIZED HEALTH CARE EDUCATION/TRAINING PROGRAM

## 2024-06-15 PROCEDURE — 1200000002 HC GENERAL ROOM WITH TELEMETRY DAILY

## 2024-06-15 PROCEDURE — 2500000005 HC RX 250 GENERAL PHARMACY W/O HCPCS: Performed by: STUDENT IN AN ORGANIZED HEALTH CARE EDUCATION/TRAINING PROGRAM

## 2024-06-15 PROCEDURE — 2500000005 HC RX 250 GENERAL PHARMACY W/O HCPCS: Performed by: HOSPITALIST

## 2024-06-15 PROCEDURE — 85025 COMPLETE CBC W/AUTO DIFF WBC: CPT | Mod: 91 | Performed by: STUDENT IN AN ORGANIZED HEALTH CARE EDUCATION/TRAINING PROGRAM

## 2024-06-15 ASSESSMENT — PAIN SCALES - GENERAL
PAINLEVEL_OUTOF10: 7
PAINLEVEL_OUTOF10: 0 - NO PAIN

## 2024-06-15 ASSESSMENT — PAIN - FUNCTIONAL ASSESSMENT
PAIN_FUNCTIONAL_ASSESSMENT: 0-10

## 2024-06-15 ASSESSMENT — PAIN DESCRIPTION - ORIENTATION: ORIENTATION: LEFT

## 2024-06-15 NOTE — PROGRESS NOTES
"Roselia Mo is a 53 y.o. female on day 3 of admission presenting with Hypokalemia.    Subjective   Patient seen at bedside.  She is getting about upset about changing doses of her Lasix.  She has swelling in her legs.  She was better when she was on spironolactone.  Starting on metolazone puts  her into weakness episodes.  She is better today    Objective     Physical Exam  General Appearance: AAO x 3, severe obesity  Skin: skin color pink, warm, and dry; no suspicious rashes or lesions  Eyes : PERRL, EOM's intact  ENT: mucous membranes pink and moist  Neck: normocephalic  Respiratory: lungs clear to auscultation anteriorly; no wheezing, rhonchi, or crackles.   Heart: regular rate and rhythm.  Abdomen: Mildly distended, positive bowel sounds x4, soft,  nontender  Extremities: Bilateral lower extremity edema 2+  Peripheral pulses: normal x4 extremities  Neuro: alert, coherent and conversant, no focal motor deficits  Last Recorded Vitals  Blood pressure 116/60, pulse 76, temperature 36.4 °C (97.5 °F), temperature source Temporal, resp. rate 23, height 1.575 m (5' 2\"), weight (!) 161 kg (354 lb 3.2 oz), SpO2 98%.  Intake/Output last 3 Shifts:  I/O last 3 completed shifts:  In: 5310 (33.1 mL/kg) [P.O.:3680; I.V.:1630 (10.1 mL/kg)]  Out: 4600 (28.6 mL/kg) [Urine:4600 (0.8 mL/kg/hr)]  Weight: 160.7 kg     Relevant Results       Scheduled medications  allopurinol, 300 mg, oral, Daily  atorvastatin, 80 mg, oral, Nightly  buPROPion XL, 150 mg, oral, q AM  colchicine, 0.6 mg, oral, BID  DULoxetine, 60 mg, oral, Daily  empagliflozin, 10 mg, oral, Daily  gabapentin, 900 mg, oral, 4x daily  insulin glargine, 35 Units, subcutaneous, q24h  insulin lispro, 0-20 Units, subcutaneous, TID  lactulose, 20 g, oral, Daily  levothyroxine, 200 mcg, oral, Daily  levothyroxine, 50 mcg, oral, Daily before breakfast  lidocaine, 1 patch, transdermal, Daily  loratadine, 10 mg, oral, Daily  lubiprostone, 24 mcg, oral, BID  magnesium oxide, 400 " mg, oral, BID  metoprolol succinate XL, 50 mg, oral, BID  nystatin, 1 Application, Topical, BID  oxygen, , inhalation, Continuous - Inhalation  pantoprazole, 40 mg, oral, BID  polyethylene glycol, 17 g, oral, Daily  potassium chloride CR, 60 mEq, oral, Daily  traZODone, 100 mg, oral, Nightly  warfarin, 5 mg, oral, Once per day on Monday Thursday  warfarin, 7.5 mg, oral, Once per day on Sunday Tuesday Wednesday Friday Saturday      Continuous medications  potassium chloride in 0.9%NaCl, 100 mL/hr, Last Rate: Stopped (06/15/24 1432)      PRN medications  PRN medications: acetaminophen **OR** acetaminophen **OR** acetaminophen, acetaminophen **OR** acetaminophen **OR** acetaminophen, albuterol, benzocaine-menthol, dextrose, dextrose, glucagon, glucagon, meclizine, ondansetron ODT **OR** ondansetron, oxyCODONE    Results for orders placed or performed during the hospital encounter of 06/12/24 (from the past 24 hour(s))   POCT GLUCOSE   Result Value Ref Range    POCT Glucose 293 (H) 74 - 99 mg/dL   POCT GLUCOSE   Result Value Ref Range    POCT Glucose 353 (H) 74 - 99 mg/dL   Basic metabolic panel   Result Value Ref Range    Glucose 277 (H) 74 - 99 mg/dL    Sodium 137 136 - 145 mmol/L    Potassium 3.6 3.5 - 5.3 mmol/L    Chloride 96 (L) 98 - 107 mmol/L    Bicarbonate 35 (H) 21 - 32 mmol/L    Anion Gap 10 10 - 20 mmol/L    Urea Nitrogen 17 6 - 23 mg/dL    Creatinine 0.63 0.50 - 1.05 mg/dL    eGFR >90 >60 mL/min/1.73m*2    Calcium 9.8 8.6 - 10.3 mg/dL   CBC and Auto Differential   Result Value Ref Range    WBC 6.6 4.4 - 11.3 x10*3/uL    nRBC 0.0 0.0 - 0.0 /100 WBCs    RBC 4.38 4.00 - 5.20 x10*6/uL    Hemoglobin 12.8 12.0 - 16.0 g/dL    Hematocrit 40.0 36.0 - 46.0 %    MCV 91 80 - 100 fL    MCH 29.2 26.0 - 34.0 pg    MCHC 32.0 32.0 - 36.0 g/dL    RDW 13.2 11.5 - 14.5 %    Platelets 215 150 - 450 x10*3/uL    Neutrophils % 61.7 40.0 - 80.0 %    Immature Granulocytes %, Automated 0.5 0.0 - 0.9 %    Lymphocytes % 23.0 13.0 -  44.0 %    Monocytes % 10.0 2.0 - 10.0 %    Eosinophils % 4.3 0.0 - 6.0 %    Basophils % 0.5 0.0 - 2.0 %    Neutrophils Absolute 4.06 1.20 - 7.70 x10*3/uL    Immature Granulocytes Absolute, Automated 0.03 0.00 - 0.70 x10*3/uL    Lymphocytes Absolute 1.51 1.20 - 4.80 x10*3/uL    Monocytes Absolute 0.66 0.10 - 1.00 x10*3/uL    Eosinophils Absolute 0.28 0.00 - 0.70 x10*3/uL    Basophils Absolute 0.03 0.00 - 0.10 x10*3/uL   Protime-INR   Result Value Ref Range    Protime 23.3 (H) 9.8 - 12.8 seconds    INR 2.0 (H) 0.9 - 1.1   Magnesium   Result Value Ref Range    Magnesium 2.32 1.60 - 2.40 mg/dL   POCT GLUCOSE   Result Value Ref Range    POCT Glucose 243 (H) 74 - 99 mg/dL   POCT GLUCOSE   Result Value Ref Range    POCT Glucose 299 (H) 74 - 99 mg/dL         No results found.  .             Assessment/Plan         53-year-old female with history of heart failure with preserved ejection fraction,COPD ,Diabetes mellitus type 2 ,Chronic kidney disease Polyneuropathy ,Hepatic vein thrombosis on Coumadin ,Chronic edema on loop diuretics presented with recurrent hypokalemia and Metabolic alkalosis      Plan  Patient hypokalemia improved.  Metabolic alkalosis improved.  Prolonged QT improved  Currently IV diuretics is held.  Refer to nephrology for diuretics  Echo shows normal LVEF, continue low-salt fluid restriction.  Per cardiology start on Bumex 1 mg daily.  Will review with nephrology  Continue antihypertensives lisinopril and metoprolol  Continue insulin sliding scale  Continue rosuvastatin  Continue allopurinol  Currently on  Continue home oxygen  Continue warfarin for Budd-Chiari  Continue allopurinol and colchicine  Continue antianxiety Wellbutrin  Continue Neurontin for neuropathy  Continue lactulose for constipation  Repeat of electrolytes  CPAP at night    Plan to DC tomorrow    DVT prophylaxis: Warfarin  GI prophylaxis: PPI    Total time spent 35 minutes          Fazal Adhikari MD

## 2024-06-15 NOTE — CARE PLAN
Problem: Fall/Injury  Goal: Not fall by end of shift  Outcome: Progressing  Goal: Be free from injury by end of the shift  Outcome: Progressing  Goal: Verbalize understanding of personal risk factors for fall in the hospital  Outcome: Progressing  Goal: Verbalize understanding of risk factor reduction measures to prevent injury from fall in the home  Outcome: Progressing  Goal: Use assistive devices by end of the shift  Outcome: Progressing  Goal: Pace activities to prevent fatigue by end of the shift  Outcome: Progressing     Problem: Respiratory  Goal: Clear secretions with interventions this shift  Outcome: Progressing  Goal: Minimize anxiety/maximize coping throughout shift  Outcome: Progressing  Goal: Minimal/no exertional discomfort or dyspnea this shift  Outcome: Progressing  Goal: No signs of respiratory distress (eg. Use of accessory muscles. Peds grunting)  Outcome: Progressing  Goal: Patent airway maintained this shift  Outcome: Progressing  Goal: Tolerate mechanical ventilation evidenced by VS/agitation level this shift  Outcome: Progressing  Goal: Tolerate pulmonary toileting this shift  Outcome: Progressing  Goal: Verbalize decreased shortness of breath this shift  Outcome: Progressing  Goal: Wean oxygen to maintain O2 saturation per order/standard this shift  Outcome: Progressing  Goal: Increase self care and/or family involvement in next 24 hours  Outcome: Progressing     Problem: Diabetes  Goal: Achieve decreasing blood glucose levels by end of shift  Outcome: Progressing  Goal: Increase stability of blood glucose readings by end of shift  Outcome: Progressing  Goal: Decrease in ketones present in urine by end of shift  Outcome: Progressing  Goal: Maintain electrolyte levels within acceptable range throughout shift  Outcome: Progressing  Goal: Maintain glucose levels >70mg/dl to <250mg/dl throughout shift  Outcome: Progressing  Goal: No changes in neurological exam by end of shift  Outcome:  Progressing  Goal: Learn about and adhere to nutrition recommendations by end of shift  Outcome: Progressing  Goal: Vital signs within normal range for age by end of shift  Outcome: Progressing  Goal: Increase self care and/or family involovement by end of shift  Outcome: Progressing  Goal: Receive DSME education by end of shift  Outcome: Progressing     Problem: Pain - Adult  Goal: Verbalizes/displays adequate comfort level or baseline comfort level  Outcome: Progressing     Problem: Safety - Adult  Goal: Free from fall injury  Outcome: Progressing     Problem: Discharge Planning  Goal: Discharge to home or other facility with appropriate resources  Outcome: Progressing     Problem: Chronic Conditions and Co-morbidities  Goal: Patient's chronic conditions and co-morbidity symptoms are monitored and maintained or improved  Outcome: Progressing   The patient's goals for the shift include feel better    The clinical goals for the shift include feel better    Pt labs drawn this morning and results pending for potassium and magnesium levels.

## 2024-06-15 NOTE — CARE PLAN
Problem: Fall/Injury  Goal: Not fall by end of shift  Outcome: Progressing  Goal: Be free from injury by end of the shift  Outcome: Progressing  Goal: Verbalize understanding of personal risk factors for fall in the hospital  Outcome: Progressing  Goal: Verbalize understanding of risk factor reduction measures to prevent injury from fall in the home  Outcome: Progressing  Goal: Use assistive devices by end of the shift  Outcome: Progressing  Goal: Pace activities to prevent fatigue by end of the shift  Outcome: Progressing     Problem: Respiratory  Goal: Clear secretions with interventions this shift  Outcome: Progressing  Goal: Minimize anxiety/maximize coping throughout shift  Outcome: Progressing  Goal: Minimal/no exertional discomfort or dyspnea this shift  Outcome: Progressing  Goal: No signs of respiratory distress (eg. Use of accessory muscles. Peds grunting)  Outcome: Progressing  Goal: Patent airway maintained this shift  Outcome: Progressing  Goal: Tolerate mechanical ventilation evidenced by VS/agitation level this shift  Outcome: Progressing  Goal: Tolerate pulmonary toileting this shift  Outcome: Progressing  Goal: Verbalize decreased shortness of breath this shift  Outcome: Progressing  Goal: Wean oxygen to maintain O2 saturation per order/standard this shift  Outcome: Progressing  Goal: Increase self care and/or family involvement in next 24 hours  Outcome: Progressing     Problem: Diabetes  Goal: Achieve decreasing blood glucose levels by end of shift  Outcome: Progressing  Goal: Increase stability of blood glucose readings by end of shift  Outcome: Progressing  Goal: Decrease in ketones present in urine by end of shift  Outcome: Progressing  Goal: Maintain electrolyte levels within acceptable range throughout shift  Outcome: Progressing  Goal: Maintain glucose levels >70mg/dl to <250mg/dl throughout shift  Outcome: Progressing  Goal: No changes in neurological exam by end of shift  Outcome:  Progressing  Goal: Learn about and adhere to nutrition recommendations by end of shift  Outcome: Progressing  Goal: Vital signs within normal range for age by end of shift  Outcome: Progressing  Goal: Increase self care and/or family involovement by end of shift  Outcome: Progressing  Goal: Receive DSME education by end of shift  Outcome: Progressing     Problem: Pain - Adult  Goal: Verbalizes/displays adequate comfort level or baseline comfort level  Outcome: Progressing     Problem: Safety - Adult  Goal: Free from fall injury  Outcome: Progressing     Problem: Discharge Planning  Goal: Discharge to home or other facility with appropriate resources  Outcome: Progressing     Problem: Chronic Conditions and Co-morbidities  Goal: Patient's chronic conditions and co-morbidity symptoms are monitored and maintained or improved  Outcome: Progressing   The patient's goals for the shift include feel better    The clinical goals for the shift include pain management, improve potassium levels

## 2024-06-16 VITALS
WEIGHT: 293 LBS | SYSTOLIC BLOOD PRESSURE: 104 MMHG | RESPIRATION RATE: 18 BRPM | TEMPERATURE: 96.6 F | HEART RATE: 78 BPM | DIASTOLIC BLOOD PRESSURE: 60 MMHG | OXYGEN SATURATION: 98 % | HEIGHT: 62 IN | BODY MASS INDEX: 53.92 KG/M2

## 2024-06-16 LAB
ANION GAP SERPL CALC-SCNC: 10 MMOL/L (ref 10–20)
BASOPHILS # BLD AUTO: 0.03 X10*3/UL (ref 0–0.1)
BASOPHILS NFR BLD AUTO: 0.6 %
BUN SERPL-MCNC: 14 MG/DL (ref 6–23)
CALCIUM SERPL-MCNC: 9.5 MG/DL (ref 8.6–10.3)
CHLORIDE SERPL-SCNC: 100 MMOL/L (ref 98–107)
CO2 SERPL-SCNC: 30 MMOL/L (ref 21–32)
CREAT SERPL-MCNC: 0.57 MG/DL (ref 0.5–1.05)
EGFRCR SERPLBLD CKD-EPI 2021: >90 ML/MIN/1.73M*2
EOSINOPHIL # BLD AUTO: 0.23 X10*3/UL (ref 0–0.7)
EOSINOPHIL NFR BLD AUTO: 4.6 %
ERYTHROCYTE [DISTWIDTH] IN BLOOD BY AUTOMATED COUNT: 13.3 % (ref 11.5–14.5)
GLUCOSE BLD MANUAL STRIP-MCNC: 203 MG/DL (ref 74–99)
GLUCOSE BLD MANUAL STRIP-MCNC: 224 MG/DL (ref 74–99)
GLUCOSE BLD MANUAL STRIP-MCNC: 228 MG/DL (ref 74–99)
GLUCOSE BLD MANUAL STRIP-MCNC: 248 MG/DL (ref 74–99)
GLUCOSE SERPL-MCNC: 226 MG/DL (ref 74–99)
HCT VFR BLD AUTO: 38.9 % (ref 36–46)
HGB BLD-MCNC: 12 G/DL (ref 12–16)
IMM GRANULOCYTES # BLD AUTO: 0.02 X10*3/UL (ref 0–0.7)
IMM GRANULOCYTES NFR BLD AUTO: 0.4 % (ref 0–0.9)
INR PPP: 2.1 (ref 0.9–1.1)
LYMPHOCYTES # BLD AUTO: 1.54 X10*3/UL (ref 1.2–4.8)
LYMPHOCYTES NFR BLD AUTO: 30.6 %
MCH RBC QN AUTO: 29.1 PG (ref 26–34)
MCHC RBC AUTO-ENTMCNC: 30.8 G/DL (ref 32–36)
MCV RBC AUTO: 94 FL (ref 80–100)
MONOCYTES # BLD AUTO: 0.47 X10*3/UL (ref 0.1–1)
MONOCYTES NFR BLD AUTO: 9.3 %
NEUTROPHILS # BLD AUTO: 2.74 X10*3/UL (ref 1.2–7.7)
NEUTROPHILS NFR BLD AUTO: 54.5 %
NRBC BLD-RTO: 0 /100 WBCS (ref 0–0)
PLATELET # BLD AUTO: 193 X10*3/UL (ref 150–450)
POTASSIUM SERPL-SCNC: 4 MMOL/L (ref 3.5–5.3)
PROTHROMBIN TIME: 24.9 SECONDS (ref 9.8–12.8)
RBC # BLD AUTO: 4.12 X10*6/UL (ref 4–5.2)
SODIUM SERPL-SCNC: 136 MMOL/L (ref 136–145)
WBC # BLD AUTO: 5 X10*3/UL (ref 4.4–11.3)

## 2024-06-16 PROCEDURE — 1200000002 HC GENERAL ROOM WITH TELEMETRY DAILY

## 2024-06-16 PROCEDURE — 82947 ASSAY GLUCOSE BLOOD QUANT: CPT

## 2024-06-16 PROCEDURE — 85025 COMPLETE CBC W/AUTO DIFF WBC: CPT | Performed by: STUDENT IN AN ORGANIZED HEALTH CARE EDUCATION/TRAINING PROGRAM

## 2024-06-16 PROCEDURE — 36415 COLL VENOUS BLD VENIPUNCTURE: CPT | Performed by: STUDENT IN AN ORGANIZED HEALTH CARE EDUCATION/TRAINING PROGRAM

## 2024-06-16 PROCEDURE — 82374 ASSAY BLOOD CARBON DIOXIDE: CPT | Performed by: STUDENT IN AN ORGANIZED HEALTH CARE EDUCATION/TRAINING PROGRAM

## 2024-06-16 PROCEDURE — 2500000005 HC RX 250 GENERAL PHARMACY W/O HCPCS: Performed by: STUDENT IN AN ORGANIZED HEALTH CARE EDUCATION/TRAINING PROGRAM

## 2024-06-16 PROCEDURE — 2500000001 HC RX 250 WO HCPCS SELF ADMINISTERED DRUGS (ALT 637 FOR MEDICARE OP): Performed by: HOSPITALIST

## 2024-06-16 PROCEDURE — 2500000001 HC RX 250 WO HCPCS SELF ADMINISTERED DRUGS (ALT 637 FOR MEDICARE OP): Performed by: STUDENT IN AN ORGANIZED HEALTH CARE EDUCATION/TRAINING PROGRAM

## 2024-06-16 PROCEDURE — 94761 N-INVAS EAR/PLS OXIMETRY MLT: CPT

## 2024-06-16 PROCEDURE — 2500000002 HC RX 250 W HCPCS SELF ADMINISTERED DRUGS (ALT 637 FOR MEDICARE OP, ALT 636 FOR OP/ED): Performed by: STUDENT IN AN ORGANIZED HEALTH CARE EDUCATION/TRAINING PROGRAM

## 2024-06-16 PROCEDURE — 99232 SBSQ HOSP IP/OBS MODERATE 35: CPT | Performed by: STUDENT IN AN ORGANIZED HEALTH CARE EDUCATION/TRAINING PROGRAM

## 2024-06-16 PROCEDURE — 85610 PROTHROMBIN TIME: CPT

## 2024-06-16 PROCEDURE — 2500000005 HC RX 250 GENERAL PHARMACY W/O HCPCS: Performed by: HOSPITALIST

## 2024-06-16 PROCEDURE — 2500000004 HC RX 250 GENERAL PHARMACY W/ HCPCS (ALT 636 FOR OP/ED): Performed by: STUDENT IN AN ORGANIZED HEALTH CARE EDUCATION/TRAINING PROGRAM

## 2024-06-16 PROCEDURE — 2500000002 HC RX 250 W HCPCS SELF ADMINISTERED DRUGS (ALT 637 FOR MEDICARE OP, ALT 636 FOR OP/ED): Mod: MUE

## 2024-06-16 ASSESSMENT — PAIN - FUNCTIONAL ASSESSMENT
PAIN_FUNCTIONAL_ASSESSMENT: 0-10

## 2024-06-16 ASSESSMENT — COGNITIVE AND FUNCTIONAL STATUS - GENERAL
CLIMB 3 TO 5 STEPS WITH RAILING: A LITTLE
WALKING IN HOSPITAL ROOM: A LITTLE
MOBILITY SCORE: 22

## 2024-06-16 ASSESSMENT — PAIN DESCRIPTION - LOCATION
LOCATION: HAND
LOCATION: LEG

## 2024-06-16 ASSESSMENT — PAIN SCALES - GENERAL
PAINLEVEL_OUTOF10: 0 - NO PAIN
PAINLEVEL_OUTOF10: 0 - NO PAIN
PAINLEVEL_OUTOF10: 6
PAINLEVEL_OUTOF10: 2
PAINLEVEL_OUTOF10: 0 - NO PAIN
PAINLEVEL_OUTOF10: 6
PAINLEVEL_OUTOF10: 0 - NO PAIN

## 2024-06-16 ASSESSMENT — PAIN SCALES - PAIN ASSESSMENT IN ADVANCED DEMENTIA (PAINAD): TOTALSCORE: MEDICATION (SEE MAR)

## 2024-06-16 ASSESSMENT — PAIN DESCRIPTION - ORIENTATION
ORIENTATION: LOWER;RIGHT;LEFT
ORIENTATION: RIGHT;LEFT

## 2024-06-16 ASSESSMENT — PAIN DESCRIPTION - DESCRIPTORS: DESCRIPTORS: DULL;ACHING

## 2024-06-16 NOTE — PROGRESS NOTES
Pt is on home BiPap of 17/13 with 4L O2 bleed in.  Pt unit looks to be in good shape with no issues.

## 2024-06-16 NOTE — CARE PLAN
The patient's goals for the shift include feel better    The clinical goals for the shift include potassium wnl by eos    Over the shift, the patient did not make progress toward the following goals. Barriers to progression include diuretics. Recommendations to address these barriers include correct dosing and diuretics chosen.

## 2024-06-16 NOTE — CARE PLAN
Problem: Fall/Injury  Goal: Be free from injury by end of the shift  Outcome: Met     Problem: Respiratory  Goal: No signs of respiratory distress (eg. Use of accessory muscles. Peds grunting)  Outcome: Met     Problem: Pain - Adult  Goal: Verbalizes/displays adequate comfort level or baseline comfort level  Outcome: Met   The patient's goals for the shift include feel better    The clinical goals for the shift include pain management, improve potassium levels

## 2024-06-16 NOTE — PROGRESS NOTES
"Roselia Mo is a 53 y.o. female on day 4 of admission presenting with Hypokalemia.    Subjective   Patient seen and examined at bedside.  He complaining of swelling on the hands and neck.  She would like to see the nephrologist.  She is also very tired all day long    Objective     Physical Exam  General Appearance: AAO x 3, severe obesity  Skin: skin color pink, warm, and dry; no suspicious rashes or lesions  Eyes : PERRL, EOM's intact  ENT: mucous membranes pink and moist  Neck: normocephalic  Respiratory: lungs clear to auscultation anteriorly; no wheezing, rhonchi, or crackles.   Heart: regular rate and rhythm.  Abdomen: Mildly distended, positive bowel sounds x4, soft,  nontender  Extremities: Bilateral lower extremity edema 2+  Peripheral pulses: normal x4 extremities  Neuro: alert, coherent and conversant, no focal motor deficits  Last Recorded Vitals  Blood pressure 137/81, pulse 78, temperature 36.5 °C (97.7 °F), temperature source Temporal, resp. rate 13, height 1.575 m (5' 2\"), weight (!) 168 kg (370 lb 6 oz), SpO2 100%.  Intake/Output last 3 Shifts:  I/O last 3 completed shifts:  In: 4830 (28.8 mL/kg) [P.O.:2650; I.V.:2180 (13 mL/kg)]  Out: 600 (3.6 mL/kg) [Urine:600 (0.1 mL/kg/hr)]  Weight: 168 kg     Relevant Results       Scheduled medications  allopurinol, 300 mg, oral, Daily  atorvastatin, 80 mg, oral, Nightly  buPROPion XL, 150 mg, oral, q AM  colchicine, 0.6 mg, oral, BID  DULoxetine, 60 mg, oral, Daily  empagliflozin, 10 mg, oral, Daily  gabapentin, 900 mg, oral, 4x daily  insulin glargine, 35 Units, subcutaneous, q24h  insulin lispro, 0-20 Units, subcutaneous, TID  lactulose, 20 g, oral, Daily  levothyroxine, 200 mcg, oral, Daily  levothyroxine, 50 mcg, oral, Daily before breakfast  lidocaine, 1 patch, transdermal, Daily  loratadine, 10 mg, oral, Daily  lubiprostone, 24 mcg, oral, BID  magnesium oxide, 400 mg, oral, BID  metoprolol succinate XL, 50 mg, oral, BID  nystatin, 1 Application, " Topical, BID  oxygen, , inhalation, Continuous - Inhalation  pantoprazole, 40 mg, oral, BID  polyethylene glycol, 17 g, oral, Daily  potassium chloride CR, 60 mEq, oral, Daily  traZODone, 100 mg, oral, Nightly  warfarin, 5 mg, oral, Once per day on Monday Thursday  warfarin, 7.5 mg, oral, Once per day on Sunday Tuesday Wednesday Friday Saturday      Continuous medications  potassium chloride in 0.9%NaCl, 100 mL/hr, Last Rate: Stopped (06/15/24 1432)      PRN medications  PRN medications: acetaminophen **OR** acetaminophen **OR** acetaminophen, acetaminophen **OR** acetaminophen **OR** acetaminophen, albuterol, benzocaine-menthol, dextrose, dextrose, glucagon, glucagon, meclizine, ondansetron ODT **OR** ondansetron, oxyCODONE    Results for orders placed or performed during the hospital encounter of 06/12/24 (from the past 24 hour(s))   CBC and Auto Differential   Result Value Ref Range    WBC 6.7 4.4 - 11.3 x10*3/uL    nRBC 0.0 0.0 - 0.0 /100 WBCs    RBC 4.17 4.00 - 5.20 x10*6/uL    Hemoglobin 12.3 12.0 - 16.0 g/dL    Hematocrit 38.5 36.0 - 46.0 %    MCV 92 80 - 100 fL    MCH 29.5 26.0 - 34.0 pg    MCHC 31.9 (L) 32.0 - 36.0 g/dL    RDW 13.3 11.5 - 14.5 %    Platelets 196 150 - 450 x10*3/uL    Neutrophils % 68.2 40.0 - 80.0 %    Immature Granulocytes %, Automated 0.4 0.0 - 0.9 %    Lymphocytes % 17.3 13.0 - 44.0 %    Monocytes % 10.6 2.0 - 10.0 %    Eosinophils % 3.1 0.0 - 6.0 %    Basophils % 0.4 0.0 - 2.0 %    Neutrophils Absolute 4.58 1.20 - 7.70 x10*3/uL    Immature Granulocytes Absolute, Automated 0.03 0.00 - 0.70 x10*3/uL    Lymphocytes Absolute 1.16 (L) 1.20 - 4.80 x10*3/uL    Monocytes Absolute 0.71 0.10 - 1.00 x10*3/uL    Eosinophils Absolute 0.21 0.00 - 0.70 x10*3/uL    Basophils Absolute 0.03 0.00 - 0.10 x10*3/uL   POCT GLUCOSE   Result Value Ref Range    POCT Glucose 259 (H) 74 - 99 mg/dL   POCT GLUCOSE   Result Value Ref Range    POCT Glucose 278 (H) 74 - 99 mg/dL   Basic metabolic panel   Result Value  Ref Range    Glucose 226 (H) 74 - 99 mg/dL    Sodium 136 136 - 145 mmol/L    Potassium 4.0 3.5 - 5.3 mmol/L    Chloride 100 98 - 107 mmol/L    Bicarbonate 30 21 - 32 mmol/L    Anion Gap 10 10 - 20 mmol/L    Urea Nitrogen 14 6 - 23 mg/dL    Creatinine 0.57 0.50 - 1.05 mg/dL    eGFR >90 >60 mL/min/1.73m*2    Calcium 9.5 8.6 - 10.3 mg/dL   CBC and Auto Differential   Result Value Ref Range    WBC 5.0 4.4 - 11.3 x10*3/uL    nRBC 0.0 0.0 - 0.0 /100 WBCs    RBC 4.12 4.00 - 5.20 x10*6/uL    Hemoglobin 12.0 12.0 - 16.0 g/dL    Hematocrit 38.9 36.0 - 46.0 %    MCV 94 80 - 100 fL    MCH 29.1 26.0 - 34.0 pg    MCHC 30.8 (L) 32.0 - 36.0 g/dL    RDW 13.3 11.5 - 14.5 %    Platelets 193 150 - 450 x10*3/uL    Neutrophils % 54.5 40.0 - 80.0 %    Immature Granulocytes %, Automated 0.4 0.0 - 0.9 %    Lymphocytes % 30.6 13.0 - 44.0 %    Monocytes % 9.3 2.0 - 10.0 %    Eosinophils % 4.6 0.0 - 6.0 %    Basophils % 0.6 0.0 - 2.0 %    Neutrophils Absolute 2.74 1.20 - 7.70 x10*3/uL    Immature Granulocytes Absolute, Automated 0.02 0.00 - 0.70 x10*3/uL    Lymphocytes Absolute 1.54 1.20 - 4.80 x10*3/uL    Monocytes Absolute 0.47 0.10 - 1.00 x10*3/uL    Eosinophils Absolute 0.23 0.00 - 0.70 x10*3/uL    Basophils Absolute 0.03 0.00 - 0.10 x10*3/uL   Protime-INR   Result Value Ref Range    Protime 24.9 (H) 9.8 - 12.8 seconds    INR 2.1 (H) 0.9 - 1.1   POCT GLUCOSE   Result Value Ref Range    POCT Glucose 224 (H) 74 - 99 mg/dL   POCT GLUCOSE   Result Value Ref Range    POCT Glucose 248 (H) 74 - 99 mg/dL         No results found.  .             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  Nephrology to review tomorrow  Patient hypokalemia improved.   Metabolic alkalosis improved.  Prolonged QT improved  Currently IV diuretics is held.  Refer to nephrology for diuretics  Echo shows normal LVEF, continue low-salt fluid restriction.  Per cardiology start on Bumex 1 mg daily.  Continue antihypertensives lisinopril and metoprolol  Continue insulin sliding scale  Continue rosuvastatin  Continue allopurinol  Currently on  Continue home oxygen  Continue warfarin for Budd-Chiari  Continue allopurinol and colchicine  Continue antianxiety Wellbutrin  Continue Neurontin for neuropathy  Continue lactulose for constipation  Repeat of electrolytes  CPAP at night    Plan to DC tomorrow    DVT prophylaxis: Warfarin  GI prophylaxis: PPI    Total time spent 35 minutes          Fazal Adhikari MD

## 2024-06-17 LAB
ANION GAP SERPL CALC-SCNC: 12 MMOL/L (ref 10–20)
BUN SERPL-MCNC: 13 MG/DL (ref 6–23)
CALCIUM SERPL-MCNC: 9.5 MG/DL (ref 8.6–10.3)
CHLORIDE SERPL-SCNC: 101 MMOL/L (ref 98–107)
CO2 SERPL-SCNC: 28 MMOL/L (ref 21–32)
CREAT SERPL-MCNC: 0.51 MG/DL (ref 0.5–1.05)
EGFRCR SERPLBLD CKD-EPI 2021: >90 ML/MIN/1.73M*2
ERYTHROCYTE [DISTWIDTH] IN BLOOD BY AUTOMATED COUNT: 13.3 % (ref 11.5–14.5)
GLUCOSE BLD MANUAL STRIP-MCNC: 182 MG/DL (ref 74–99)
GLUCOSE BLD MANUAL STRIP-MCNC: 192 MG/DL (ref 74–99)
GLUCOSE BLD MANUAL STRIP-MCNC: 250 MG/DL (ref 74–99)
GLUCOSE BLD MANUAL STRIP-MCNC: 276 MG/DL (ref 74–99)
GLUCOSE SERPL-MCNC: 207 MG/DL (ref 74–99)
HCT VFR BLD AUTO: 37.5 % (ref 36–46)
HGB BLD-MCNC: 11.9 G/DL (ref 12–16)
INR PPP: 2.1 (ref 0.9–1.1)
MCH RBC QN AUTO: 29.5 PG (ref 26–34)
MCHC RBC AUTO-ENTMCNC: 31.7 G/DL (ref 32–36)
MCV RBC AUTO: 93 FL (ref 80–100)
NRBC BLD-RTO: 0 /100 WBCS (ref 0–0)
PLATELET # BLD AUTO: 202 X10*3/UL (ref 150–450)
POTASSIUM SERPL-SCNC: 3.9 MMOL/L (ref 3.5–5.3)
PROTHROMBIN TIME: 24.3 SECONDS (ref 9.8–12.8)
RBC # BLD AUTO: 4.03 X10*6/UL (ref 4–5.2)
SODIUM SERPL-SCNC: 137 MMOL/L (ref 136–145)
WBC # BLD AUTO: 6.2 X10*3/UL (ref 4.4–11.3)

## 2024-06-17 PROCEDURE — 85027 COMPLETE CBC AUTOMATED: CPT | Performed by: STUDENT IN AN ORGANIZED HEALTH CARE EDUCATION/TRAINING PROGRAM

## 2024-06-17 PROCEDURE — 2500000001 HC RX 250 WO HCPCS SELF ADMINISTERED DRUGS (ALT 637 FOR MEDICARE OP): Performed by: STUDENT IN AN ORGANIZED HEALTH CARE EDUCATION/TRAINING PROGRAM

## 2024-06-17 PROCEDURE — 99233 SBSQ HOSP IP/OBS HIGH 50: CPT | Performed by: INTERNAL MEDICINE

## 2024-06-17 PROCEDURE — 2500000005 HC RX 250 GENERAL PHARMACY W/O HCPCS: Performed by: STUDENT IN AN ORGANIZED HEALTH CARE EDUCATION/TRAINING PROGRAM

## 2024-06-17 PROCEDURE — 2500000002 HC RX 250 W HCPCS SELF ADMINISTERED DRUGS (ALT 637 FOR MEDICARE OP, ALT 636 FOR OP/ED)

## 2024-06-17 PROCEDURE — 94761 N-INVAS EAR/PLS OXIMETRY MLT: CPT

## 2024-06-17 PROCEDURE — 82947 ASSAY GLUCOSE BLOOD QUANT: CPT

## 2024-06-17 PROCEDURE — 80048 BASIC METABOLIC PNL TOTAL CA: CPT | Performed by: STUDENT IN AN ORGANIZED HEALTH CARE EDUCATION/TRAINING PROGRAM

## 2024-06-17 PROCEDURE — 2500000001 HC RX 250 WO HCPCS SELF ADMINISTERED DRUGS (ALT 637 FOR MEDICARE OP): Performed by: INTERNAL MEDICINE

## 2024-06-17 PROCEDURE — 85610 PROTHROMBIN TIME: CPT | Performed by: PHARMACIST

## 2024-06-17 PROCEDURE — 99232 SBSQ HOSP IP/OBS MODERATE 35: CPT | Performed by: STUDENT IN AN ORGANIZED HEALTH CARE EDUCATION/TRAINING PROGRAM

## 2024-06-17 PROCEDURE — 2500000004 HC RX 250 GENERAL PHARMACY W/ HCPCS (ALT 636 FOR OP/ED): Performed by: STUDENT IN AN ORGANIZED HEALTH CARE EDUCATION/TRAINING PROGRAM

## 2024-06-17 PROCEDURE — 2500000005 HC RX 250 GENERAL PHARMACY W/O HCPCS: Performed by: HOSPITALIST

## 2024-06-17 PROCEDURE — 99291 CRITICAL CARE FIRST HOUR: CPT | Performed by: STUDENT IN AN ORGANIZED HEALTH CARE EDUCATION/TRAINING PROGRAM

## 2024-06-17 PROCEDURE — 36415 COLL VENOUS BLD VENIPUNCTURE: CPT | Performed by: PHARMACIST

## 2024-06-17 PROCEDURE — 1100000001 HC PRIVATE ROOM DAILY

## 2024-06-17 PROCEDURE — 2500000002 HC RX 250 W HCPCS SELF ADMINISTERED DRUGS (ALT 637 FOR MEDICARE OP, ALT 636 FOR OP/ED): Performed by: STUDENT IN AN ORGANIZED HEALTH CARE EDUCATION/TRAINING PROGRAM

## 2024-06-17 RX ORDER — AMILORIDE HYDROCHLORIDE 5 MG/1
10 TABLET ORAL 2 TIMES DAILY
Status: DISCONTINUED | OUTPATIENT
Start: 2024-06-17 | End: 2024-06-18 | Stop reason: HOSPADM

## 2024-06-17 ASSESSMENT — COGNITIVE AND FUNCTIONAL STATUS - GENERAL
WALKING IN HOSPITAL ROOM: A LITTLE
MOBILITY SCORE: 22
CLIMB 3 TO 5 STEPS WITH RAILING: A LITTLE

## 2024-06-17 ASSESSMENT — PAIN SCALES - GENERAL
PAINLEVEL_OUTOF10: 0 - NO PAIN

## 2024-06-17 ASSESSMENT — PAIN - FUNCTIONAL ASSESSMENT
PAIN_FUNCTIONAL_ASSESSMENT: 0-10

## 2024-06-17 NOTE — PROGRESS NOTES
"Roselia Mo is a 53 y.o. female on day 5 of admission presenting with Hypokalemia.    Subjective   Patient seen and examined at bedside.  She was seen.  She had 1 dose of today    Objective     Physical Exam  General Appearance: AAO x 3, severe obesity  Skin: skin color pink, warm, and dry; no suspicious rashes or lesions  Eyes : PERRL, EOM's intact  ENT: mucous membranes pink and moist  Neck: normocephalic  Respiratory: lungs clear to auscultation anteriorly; no wheezing, rhonchi, or crackles.   Heart: regular rate and rhythm.  Abdomen: Mildly distended, positive bowel sounds x4, soft,  nontender  Extremities: Bilateral lower extremity edema 2+  Peripheral pulses: normal x4 extremities  Neuro: alert, coherent and conversant, no focal motor deficits  Last Recorded Vitals  Blood pressure 137/57, pulse 72, temperature 35.7 °C (96.3 °F), temperature source Temporal, resp. rate 19, height 1.575 m (5' 2\"), weight (!) 168 kg (370 lb 6 oz), SpO2 95%.  Intake/Output last 3 Shifts:  I/O last 3 completed shifts:  In: 3420 (20.4 mL/kg) [P.O.:3420]  Out: 400 (2.4 mL/kg) [Urine:400 (0.1 mL/kg/hr)]  Weight: 168 kg     Relevant Results       Scheduled medications  allopurinol, 300 mg, oral, Daily  aMILoride, 10 mg, oral, BID  atorvastatin, 80 mg, oral, Nightly  buPROPion XL, 150 mg, oral, q AM  colchicine, 0.6 mg, oral, BID  DULoxetine, 60 mg, oral, Daily  empagliflozin, 10 mg, oral, Daily  gabapentin, 900 mg, oral, 4x daily  insulin glargine, 35 Units, subcutaneous, q24h  insulin lispro, 0-20 Units, subcutaneous, TID  lactulose, 20 g, oral, Daily  levothyroxine, 200 mcg, oral, Daily  levothyroxine, 50 mcg, oral, Daily before breakfast  lidocaine, 1 patch, transdermal, Daily  loratadine, 10 mg, oral, Daily  lubiprostone, 24 mcg, oral, BID  magnesium oxide, 400 mg, oral, BID  metoprolol succinate XL, 50 mg, oral, BID  nystatin, 1 Application, Topical, BID  oxygen, , inhalation, Continuous - Inhalation  pantoprazole, 40 mg, " oral, BID  polyethylene glycol, 17 g, oral, Daily  traZODone, 100 mg, oral, Nightly  warfarin, 5 mg, oral, Once per day on Monday Thursday  warfarin, 7.5 mg, oral, Once per day on Sunday Tuesday Wednesday Friday Saturday      Continuous medications       PRN medications  PRN medications: acetaminophen **OR** acetaminophen **OR** acetaminophen, acetaminophen **OR** acetaminophen **OR** acetaminophen, albuterol, benzocaine-menthol, dextrose, dextrose, glucagon, glucagon, meclizine, ondansetron ODT **OR** ondansetron, oxyCODONE    Results for orders placed or performed during the hospital encounter of 06/12/24 (from the past 24 hour(s))   POCT GLUCOSE   Result Value Ref Range    POCT Glucose 203 (H) 74 - 99 mg/dL   POCT GLUCOSE   Result Value Ref Range    POCT Glucose 228 (H) 74 - 99 mg/dL   Protime-INR   Result Value Ref Range    Protime 24.3 (H) 9.8 - 12.8 seconds    INR 2.1 (H) 0.9 - 1.1   CBC   Result Value Ref Range    WBC 6.2 4.4 - 11.3 x10*3/uL    nRBC 0.0 0.0 - 0.0 /100 WBCs    RBC 4.03 4.00 - 5.20 x10*6/uL    Hemoglobin 11.9 (L) 12.0 - 16.0 g/dL    Hematocrit 37.5 36.0 - 46.0 %    MCV 93 80 - 100 fL    MCH 29.5 26.0 - 34.0 pg    MCHC 31.7 (L) 32.0 - 36.0 g/dL    RDW 13.3 11.5 - 14.5 %    Platelets 202 150 - 450 x10*3/uL   Basic Metabolic Panel   Result Value Ref Range    Glucose 207 (H) 74 - 99 mg/dL    Sodium 137 136 - 145 mmol/L    Potassium 3.9 3.5 - 5.3 mmol/L    Chloride 101 98 - 107 mmol/L    Bicarbonate 28 21 - 32 mmol/L    Anion Gap 12 10 - 20 mmol/L    Urea Nitrogen 13 6 - 23 mg/dL    Creatinine 0.51 0.50 - 1.05 mg/dL    eGFR >90 >60 mL/min/1.73m*2    Calcium 9.5 8.6 - 10.3 mg/dL   POCT GLUCOSE   Result Value Ref Range    POCT Glucose 182 (H) 74 - 99 mg/dL   POCT GLUCOSE   Result Value Ref Range    POCT Glucose 250 (H) 74 - 99 mg/dL         No results found.  .             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    Patient started on Amoride today.  Nephrology following the patient.  Once the patient is found on the road she can be discharged  Patient hypokalemia improved.  Metabolic alkalosis improved.  Prolonged QT improved  Currently IV diuretics is held.  Refer to nephrology for diuretics  Echo shows normal LVEF, continue low-salt fluid restriction.  Per cardiology start on Bumex 1 mg daily(not started).  Continue antihypertensives lisinopril and metoprolol  Continue insulin sliding scale  Continue rosuvastatin  Continue allopurinol to 10  Currently on  Continue home oxygen  Continue warfarin for Budd-Chiari  Continue allopurinol and colchicine  Continue antianxiety Wellbutrin  Continue Neurontin for neuropathy  Continue lactulose for constipation  Repeat of electrolytes  CPAP at night    Plan to 24-48 hrs     DVT prophylaxis: Warfarin  GI prophylaxis: PPI    Total time spent 35 minutes          Fazal Adhikari MD

## 2024-06-17 NOTE — PROGRESS NOTES
Pt reviewed during Care Rounds today and she is not ready for discharge; ADOD is 24 hours pending Dr. Nichole consult.  Care Transitions to follow up with pt tomorrow to confirm the discharge plan of home with family support. Care Transitions will continue to follow.       SHAISTA Gonzalez

## 2024-06-17 NOTE — PROGRESS NOTES
Subjective Data:  Patient reports feeling well, no new adverse events overnight. Patient denies any chest pain, shortness of breath, palpitations, dizziness or syncope. Patient is hemodynamically stable.     Overnight Events:    No     Objective Data:  Last Recorded Vitals:  Vitals:    06/17/24 0036 06/17/24 0700 06/17/24 0711 06/17/24 0942   BP:  122/64     BP Location:  Left arm     Patient Position:  Lying     Pulse:       Resp:       Temp:  36 °C (96.8 °F)     TempSrc:  Temporal     SpO2: 99% 99% 98% 96%   Weight:       Height:           Last Labs:  CBC - 6/17/2024:  3:48 AM  6.2 11.9 202    37.5      CMP - 6/17/2024:  3:48 AM  9.5 7.9 32 --- 0.8   2.7 4.5 15 338      PTT - 6/12/2024:  9:46 PM  2.1   24.3 48     TROPHS   Date/Time Value Ref Range Status   06/13/2024 03:58 PM 9 0 - 13 ng/L Final   06/13/2024 11:44 AM 9 0 - 13 ng/L Final   05/08/2024 03:17 PM 5 0 - 13 ng/L Final     BNP   Date/Time Value Ref Range Status   05/08/2024 02:12  0 - 99 pg/mL Final   04/23/2024 07:45  0 - 99 pg/mL Final     HGBA1C   Date/Time Value Ref Range Status   03/19/2024 06:20 PM 9.0 see below % Final   02/01/2024 12:31 PM 8.3 see below % Final     LDLCALC   Date/Time Value Ref Range Status   02/01/2024 04:50 PM   Final     Comment:     The calculation of LDL and VLDL are inaccurate when the Triglycerides are greater than 400 mg/dL or when the patient is non-fasting. If LDL measurement is necessary contact the testing laboratory for an alternative LDL assay.                                  Near   Borderline      AGE      Desirable  Optimal    High     High     Very High     0-19 Y     0 - 109     ---    110-129   >/= 130     ----    20-24 Y     0 - 119     ---    120-159   >/= 160     ----      >24 Y     0 -  99   100-129  130-159   160-189     >/=190       VLDL   Date/Time Value Ref Range Status   02/01/2024 04:50 PM   Final     Comment:     Unable to calculate VLDL.      Last I/O:  I/O last 3 completed shifts:  In:  3420 (20.4 mL/kg) [P.O.:3420]  Out: 400 (2.4 mL/kg) [Urine:400 (0.1 mL/kg/hr)]  Weight: 168 kg     Past Cardiology Tests (Last 3 Years):  EKG:  ECG 12 Lead 06/13/2024      ECG 12 lead 06/12/2024 (Preliminary)      ECG 12 Lead 06/06/2024      ECG 12 lead 06/06/2024      ECG 12 lead 06/06/2024      ECG 12 lead       ECG 12 Lead 05/08/2024      ECG 12 Lead 04/08/2024      ECG 12 lead 04/07/2024      ECG 12 lead 03/28/2024      ECG 12 lead 03/19/2024      ECG 12 Lead 03/13/2024      ECG 12 lead 02/04/2024      ECG 12 Lead 02/01/2024 (Wet Read)    Echo:  Transthoracic Echo (TTE) Complete 03/20/2024    Ejection Fractions:  EF   Date/Time Value Ref Range Status   03/20/2024 05:44 AM 53 %      Cath:  No results found for this or any previous visit from the past 1095 days.    Stress Test:  Nuclear Stress Test 03/28/2024    Cardiac Imaging:  No results found for this or any previous visit from the past 1095 days.      Inpatient Medications:  Scheduled medications   Medication Dose Route Frequency    allopurinol  300 mg oral Daily    aMILoride  10 mg oral BID    atorvastatin  80 mg oral Nightly    buPROPion XL  150 mg oral q AM    colchicine  0.6 mg oral BID    DULoxetine  60 mg oral Daily    empagliflozin  10 mg oral Daily    gabapentin  900 mg oral 4x daily    insulin glargine  35 Units subcutaneous q24h    insulin lispro  0-20 Units subcutaneous TID    lactulose  20 g oral Daily    levothyroxine  200 mcg oral Daily    levothyroxine  50 mcg oral Daily before breakfast    lidocaine  1 patch transdermal Daily    loratadine  10 mg oral Daily    lubiprostone  24 mcg oral BID    magnesium oxide  400 mg oral BID    metoprolol succinate XL  50 mg oral BID    nystatin  1 Application Topical BID    oxygen   inhalation Continuous - Inhalation    pantoprazole  40 mg oral BID    polyethylene glycol  17 g oral Daily    traZODone  100 mg oral Nightly    warfarin  5 mg oral Once per day on Monday Thursday    warfarin  7.5 mg oral Once per  day on Sunday Tuesday Wednesday Friday Saturday     PRN medications   Medication    acetaminophen    Or    acetaminophen    Or    acetaminophen    acetaminophen    Or    acetaminophen    Or    acetaminophen    albuterol    benzocaine-menthol    dextrose    dextrose    glucagon    glucagon    meclizine    ondansetron ODT    Or    ondansetron    oxyCODONE     Continuous Medications   Medication Dose Last Rate       Physical Exam:  General: alert, oriented and in no acute distress. Morbid obesity  HEENT: NC/AT; EOMI; PERRLA, external ear is normal  Neck: supple; trachea midline; no masses; no JVD  Chest: diminished breath sounds bilaterally; on NC oxygen  Cardio: regular rhythm, S1S2 normal, no murmurs  Abdomen: Difficult assessment due to obesity  Extremities: Lymphedema bilaterally  Neuro: Grossly intact     Psychiatric: Normal mood and affect      Assessment/Plan     Mrs. Roselia Mo is a 53 y.o. former smoker diabetic female being consulted by the Cardiology team for EKG changes. 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 She had a recent admission for hypokalemia and was discharged, returned after only 2 days complaining of dizziness, nausea and generalized weakness. She was found to be hypokalemic again,  her potassium was 2.1 at presentation. She complained also of ringing in her ears felt heavy, dizzy and weakness . She denied chest pain, shortness of breath, palpitations, fever, chills, orthopnea, paroxysmal nocturnal dyspnea or syncope. In the ER, her potassium was 2.1 at presentation.  Was given IV potassium in the ER and  admitted to the hospital.  INR was 2.1 on admission.  Creatinine was 1.17. EKG showed normal sinus rhythm with no signs of ACS; prolonged QT. She was admitted for clinical compensation.     Assessment     # Hypokalemia  - She was found to be hypokalemic again,  her potassium was 2.1 at presentation.  - EKG showed normal sinus rhythm with no signs of ACS; prolonged QT.  - New EKG with improvement in Qtc after potassium replacement.  - Would suggest to capitalize on potassium repletement.  - Nephrology follow up.     # Heart Failure with Preserved LV Systolic function  - EKG shows normal sinus rhythm with no signs of ACS. Prolonged QT.   - The echocardiogram (03/2024) showed normal LVEF 65% with no wall motion abnormalities.   - Nuclear stress test (03/2024) is normal.   - Low sodium diet (2g).  - Fluid restriction.  - Electrolyte control and daily BMP including magnesium.  - General recommendations for heart failure, including low-sodium diet, fluid restriction, daily weight and adherence to medication.   - Patient continues to be volume overloaded based on her clinical presentation. We suggest to start Bumex 1mg daily.  - Patient previously being treated for Staph infection in abdomen and L breast     # COPD  - Keep home oxygen     # Abdominal / Groin skin infection  - Keep broad spectrum antibiotics.     # Budd-Chiari Sd  - Controlled by PCP.  - Keep Warfarin .  - INR control.     # Hypertension  - Controlled blood pressure.  - Keep current medications with Lisinopril 20mg daily, Metoprolol succinate 50mg daily.  - Patient counseled to keep a healthy lifestyle including regular exercise and low-sodium diet.  - Recommended home blood pressure monitoring.  - Goal of BP < 130/80mmHg.      # Diabetes  - Counseled on healthy diet and regular exercises.  - Discussed need for weight loss and the benefits.   - Keep current medications.  - ISS.      # Hyperlipidemia  - Keep home medication with Rosuvastatin 40mg daily.  -  Counseled on healthy diet and regular exercise.      # Gout  - Keep home medication with Allopurinol 300mg daily.  - Counseling.     # Hypothyroidism  - Keep Levothyroxine 250mcg  daily      This critically ill patient continues to be at-risk for clinically significant deterioration / failure due to the above mentioned dysfunctional, unstable organ systems.  I have personally identified and managed all complex critical care issues to prevent aforementioned clinical deterioration.  Critical care time is spent at bedside and/or the immediate area and has included, but is not limited to, the review of diagnostic tests, labs, radiographs, serial assessments of hemodynamics, respiratory status, ventilatory management, and family updates.  Time spent in procedures and teaching are reported separately.     Critical care time: 55 minutes     Peripheral IV 06/14/24 20 G Right;Posterior Forearm (Active)   Site Assessment Clean;Dry;Intact 06/17/24 0800   Dressing Type Transparent 06/17/24 0800   Line Status Saline locked 06/17/24 0800   Dressing Status Clean;Dry 06/17/24 0800   Number of days: 3       Code Status:  Full Code    Delfin Earl MD  Cardiology

## 2024-06-17 NOTE — PROGRESS NOTES
Spiritual Care Visit    Clinical Encounter Type  Routine Visit: Follow-up  Continue Visiting: Yes  Referral From: Patient    Sikh Encounters  Sikh Needs: Sacred text, Spiritual care brochure, Prayer         Sacramental Encounters  Communion Given Indicator: No    Patient Spiritual Care Encounters  Child Adaptation to Hospital: Consistently demonstrated  Suffering Severity: Mild  Fear Level: Mild  Feelings of Loneliness: Good  Feelings of Hopelessness: Good  Coping: Often demonstrated  Social Interaction: Cooperates in daily activities    Family Spiritual Care Encounters  Family Coping: Other (Comment)  Family Participation in Care: Never demonstrated  Family Support During Treatment: Never demonstrated  Caregiver-Patient Relationship: Not compromised         PC-7 Assessment (Level of Unmet Needs)  Existential Struggle: Some  Spiritual/Sikh Struggle: Substantial  Legacy: Some  Relationships: Some  Fear of Death/Dying: Some  Values/Medical Decision Making: Some  Ritual/Other: Some  PC-7 Score: 8    SDAT (Spiritual Distress Assessment Tool)  Need for Life Balance: Some evidence of unmet spiritual need  Need for Connection: Some evidence of unmet spiritual need  Need for Values Acknowledgement: Some evidence of unmet spiritual need  Need to Maintain Control: Some evidence of unmet spiritual need  Need to Maintain Identity: Some evidence of unmet spiritual need  SDAT Score: 5  SDAT Average Score: 1    Taxonomy  Intended Effects: Aligning care plan with patient's values, Build relationship of care and support, Convey a calming presence, Demonstrate caring and concern, Establish rapport and connectedness, Helping someone feel comforted, Promote sense of peace  Methods: Assist with finding purpose, Demonstrate acceptance  Interventions: Active listening, Ask guided questions, Ask questions to bring forth feelings

## 2024-06-17 NOTE — PROGRESS NOTES
Roselia Mo is a 53 y.o. female on day 5 of admission presenting with Hypokalemia.      Subjective   Patient seen and examined at the bedside  She is sitting comfortably in the bedside chair  She feels very fatigued  Has swelling increasing         Objective          Vitals 24HR  Heart Rate:  [76-82]   Temp:  [35.9 °C (96.6 °F)-36.4 °C (97.5 °F)]   Resp:  [18-31]   BP: (104-127)/(60-94)   Weight:  [168 kg (370 lb 6 oz)]   SpO2:  [95 %-99 %]         Intake/Output last 3 Shifts:    Intake/Output Summary (Last 24 hours) at 6/17/2024 1153  Last data filed at 6/17/2024 0846  Gross per 24 hour   Intake 2600 ml   Output 600 ml   Net 2000 ml       Physical Exam  Constitutional:       Appearance: Normal appearance. She is obese.   HENT:      Head: Normocephalic and atraumatic.      Right Ear: External ear normal.      Left Ear: External ear normal.      Nose: Nose normal.      Mouth/Throat:      Mouth: Mucous membranes are moist.      Pharynx: Oropharynx is clear.   Eyes:      Extraocular Movements: Extraocular movements intact.      Conjunctiva/sclera: Conjunctivae normal.      Pupils: Pupils are equal, round, and reactive to light.   Cardiovascular:      Rate and Rhythm: Normal rate and regular rhythm.   Pulmonary:      Effort: Pulmonary effort is normal.      Breath sounds: Normal breath sounds.   Abdominal:      General: Abdomen is flat.      Palpations: Abdomen is soft.   Musculoskeletal:         General: Swelling present.      Right lower leg: Edema present.      Left lower leg: Edema present.   Skin:     General: Skin is warm and dry.   Neurological:      General: No focal deficit present.      Mental Status: She is alert and oriented to person, place, and time.   Psychiatric:         Mood and Affect: Mood normal.         Behavior: Behavior normal.       Scheduled medications  allopurinol, 300 mg, oral, Daily  atorvastatin, 80 mg, oral, Nightly  buPROPion XL, 150 mg, oral, q AM  colchicine, 0.6 mg, oral,  BID  DULoxetine, 60 mg, oral, Daily  empagliflozin, 10 mg, oral, Daily  gabapentin, 900 mg, oral, 4x daily  insulin glargine, 35 Units, subcutaneous, q24h  insulin lispro, 0-20 Units, subcutaneous, TID  lactulose, 20 g, oral, Daily  levothyroxine, 200 mcg, oral, Daily  levothyroxine, 50 mcg, oral, Daily before breakfast  lidocaine, 1 patch, transdermal, Daily  loratadine, 10 mg, oral, Daily  lubiprostone, 24 mcg, oral, BID  magnesium oxide, 400 mg, oral, BID  metoprolol succinate XL, 50 mg, oral, BID  nystatin, 1 Application, Topical, BID  oxygen, , inhalation, Continuous - Inhalation  pantoprazole, 40 mg, oral, BID  polyethylene glycol, 17 g, oral, Daily  potassium chloride CR, 60 mEq, oral, Daily  traZODone, 100 mg, oral, Nightly  warfarin, 5 mg, oral, Once per day on Monday Thursday  warfarin, 7.5 mg, oral, Once per day on Sunday Tuesday Wednesday Friday Saturday      Continuous medications     PRN medications  PRN medications: acetaminophen **OR** acetaminophen **OR** acetaminophen, acetaminophen **OR** acetaminophen **OR** acetaminophen, albuterol, benzocaine-menthol, dextrose, dextrose, glucagon, glucagon, meclizine, ondansetron ODT **OR** ondansetron, oxyCODONE    Relevant Results               Assessment/Plan   This patient currently has cardiac telemetry ordered; if you would like to modify or discontinue the telemetry order, click here to go to the orders activity to modify/discontinue the order.          Principal Problem:    Hypokalemia    Hypokalemia with potassium wasting with TT KG of 9  Metabolic alkalosis  Diastolic CHF  Morbid Obesity  Lymphedema  HTN  DM II  Right Sided CHF  Pulmonary HTN  CY on CPAP  CKD stage II    Plan:  At this time she has evidence of urinary potassium wasting  She is also having increase of her swelling  I will start her on amiloride  We will see how she tolerates this and if it increases her urinary output  We will stop potassium replacement we will see how she does with  amiloride and then depending on how things go she may need potassium replacement diuretics as well due to her massive volume overload     First we will see how she responds to amiloride         Pardeep Nichole, DO

## 2024-06-17 NOTE — PROGRESS NOTES
Pharmacy Consult for Warfarin (Coumadin) Management - Daily Progress Note     No acute bleeding or bruising issues noted from overnight; H/H has remained stable during admission    Warfarin Dosing History  warfarin  5 mg Mon/Thurs, 7.5 mg AOD (home dose)    INR stable for past several draws; has remained on home dose pattern since admission    Labs  POC INR   Date Value Ref Range Status   06/05/2024 2.10  Final   05/24/2024 3.40  Final   04/26/2024 2.20  Final     INR   Date Value Ref Range Status   06/17/2024 2.1 (H) 0.9 - 1.1 Final   06/16/2024 2.1 (H) 0.9 - 1.1 Final   06/15/2024 2.0 (H) 0.9 - 1.1 Final     Review  Warfarin Indication: Other (Comment)  Specify Other Indication for Warfarin: portal vein thrombosis  Target INR: 2 - 3     Current anticoagulant therapy appropriate. No changes made. Pharmacy will continue to monitor and adjust therapy as needed.     Jono Pabon, PharmD BCPS

## 2024-06-17 NOTE — CARE PLAN
The patient's goals for the shift include feel better.    The clinical goals for the shift include labs WNL, diuretics addressed.    Problem: Fall/Injury  Goal: Not fall by end of shift  Outcome: Met     Problem: Respiratory  Goal: Patent airway maintained this shift  Outcome: Met  Goal: Wean oxygen to maintain O2 saturation per order/standard this shift  Outcome: Met  Goal: Increase self care and/or family involvement in next 24 hours  Outcome: Met     Problem: Diabetes  Goal: Maintain electrolyte levels within acceptable range throughout shift  Outcome: Met  Goal: Maintain glucose levels >70mg/dl to <250mg/dl throughout shift  Outcome: Met

## 2024-06-17 NOTE — CARE PLAN
Problem: Fall/Injury  Goal: Verbalize understanding of personal risk factors for fall in the hospital  Outcome: Met     Problem: Respiratory  Goal: Minimize anxiety/maximize coping throughout shift  Outcome: Met     Problem: Safety - Adult  Goal: Free from fall injury  Outcome: Met   The patient's goals for the shift include feel better    The clinical goals for the shift include potassium wnl by eos  Goal met

## 2024-06-18 ENCOUNTER — PHARMACY VISIT (OUTPATIENT)
Dept: PHARMACY | Facility: CLINIC | Age: 54
End: 2024-06-18
Payer: COMMERCIAL

## 2024-06-18 VITALS
HEIGHT: 62 IN | OXYGEN SATURATION: 97 % | WEIGHT: 293 LBS | HEART RATE: 72 BPM | RESPIRATION RATE: 18 BRPM | DIASTOLIC BLOOD PRESSURE: 60 MMHG | SYSTOLIC BLOOD PRESSURE: 133 MMHG | TEMPERATURE: 98.4 F | BODY MASS INDEX: 53.92 KG/M2

## 2024-06-18 DIAGNOSIS — N18.31 STAGE 3A CHRONIC KIDNEY DISEASE (MULTI): ICD-10-CM

## 2024-06-18 LAB
ANION GAP SERPL CALC-SCNC: 11 MMOL/L (ref 10–20)
BUN SERPL-MCNC: 13 MG/DL (ref 6–23)
CALCIUM SERPL-MCNC: 9.9 MG/DL (ref 8.6–10.3)
CHLORIDE SERPL-SCNC: 103 MMOL/L (ref 98–107)
CO2 SERPL-SCNC: 26 MMOL/L (ref 21–32)
CREAT SERPL-MCNC: 0.51 MG/DL (ref 0.5–1.05)
EGFRCR SERPLBLD CKD-EPI 2021: >90 ML/MIN/1.73M*2
GLUCOSE BLD MANUAL STRIP-MCNC: 166 MG/DL (ref 74–99)
GLUCOSE BLD MANUAL STRIP-MCNC: 231 MG/DL (ref 74–99)
GLUCOSE SERPL-MCNC: 188 MG/DL (ref 74–99)
HOLD SPECIMEN: NORMAL
INR PPP: 1.9 (ref 0.9–1.1)
POTASSIUM SERPL-SCNC: 4.4 MMOL/L (ref 3.5–5.3)
PROTHROMBIN TIME: 22.4 SECONDS (ref 9.8–12.8)
SODIUM SERPL-SCNC: 136 MMOL/L (ref 136–145)

## 2024-06-18 PROCEDURE — 2500000001 HC RX 250 WO HCPCS SELF ADMINISTERED DRUGS (ALT 637 FOR MEDICARE OP): Performed by: INTERNAL MEDICINE

## 2024-06-18 PROCEDURE — 99233 SBSQ HOSP IP/OBS HIGH 50: CPT | Performed by: INTERNAL MEDICINE

## 2024-06-18 PROCEDURE — 2500000002 HC RX 250 W HCPCS SELF ADMINISTERED DRUGS (ALT 637 FOR MEDICARE OP, ALT 636 FOR OP/ED): Performed by: STUDENT IN AN ORGANIZED HEALTH CARE EDUCATION/TRAINING PROGRAM

## 2024-06-18 PROCEDURE — 2500000004 HC RX 250 GENERAL PHARMACY W/ HCPCS (ALT 636 FOR OP/ED): Performed by: STUDENT IN AN ORGANIZED HEALTH CARE EDUCATION/TRAINING PROGRAM

## 2024-06-18 PROCEDURE — RXMED WILLOW AMBULATORY MEDICATION CHARGE

## 2024-06-18 PROCEDURE — 36415 COLL VENOUS BLD VENIPUNCTURE: CPT | Performed by: PHARMACIST

## 2024-06-18 PROCEDURE — 82947 ASSAY GLUCOSE BLOOD QUANT: CPT | Mod: 91

## 2024-06-18 PROCEDURE — 80048 BASIC METABOLIC PNL TOTAL CA: CPT | Performed by: INTERNAL MEDICINE

## 2024-06-18 PROCEDURE — 85610 PROTHROMBIN TIME: CPT | Performed by: PHARMACIST

## 2024-06-18 PROCEDURE — 99291 CRITICAL CARE FIRST HOUR: CPT | Performed by: STUDENT IN AN ORGANIZED HEALTH CARE EDUCATION/TRAINING PROGRAM

## 2024-06-18 PROCEDURE — 2500000005 HC RX 250 GENERAL PHARMACY W/O HCPCS: Performed by: STUDENT IN AN ORGANIZED HEALTH CARE EDUCATION/TRAINING PROGRAM

## 2024-06-18 PROCEDURE — 2500000005 HC RX 250 GENERAL PHARMACY W/O HCPCS: Performed by: HOSPITALIST

## 2024-06-18 PROCEDURE — 36415 COLL VENOUS BLD VENIPUNCTURE: CPT | Performed by: STUDENT IN AN ORGANIZED HEALTH CARE EDUCATION/TRAINING PROGRAM

## 2024-06-18 PROCEDURE — 2500000001 HC RX 250 WO HCPCS SELF ADMINISTERED DRUGS (ALT 637 FOR MEDICARE OP): Performed by: STUDENT IN AN ORGANIZED HEALTH CARE EDUCATION/TRAINING PROGRAM

## 2024-06-18 PROCEDURE — 99239 HOSP IP/OBS DSCHRG MGMT >30: CPT | Performed by: STUDENT IN AN ORGANIZED HEALTH CARE EDUCATION/TRAINING PROGRAM

## 2024-06-18 PROCEDURE — 2500000001 HC RX 250 WO HCPCS SELF ADMINISTERED DRUGS (ALT 637 FOR MEDICARE OP): Performed by: HOSPITALIST

## 2024-06-18 PROCEDURE — 94761 N-INVAS EAR/PLS OXIMETRY MLT: CPT

## 2024-06-18 RX ORDER — LIDOCAINE 50 MG/G
1 PATCH TOPICAL DAILY
Qty: 30 PATCH | Refills: 0 | Status: SHIPPED | OUTPATIENT
Start: 2024-06-18

## 2024-06-18 RX ORDER — AMILORIDE HYDROCHLORIDE 5 MG/1
10 TABLET ORAL 2 TIMES DAILY
Qty: 60 TABLET | Refills: 1 | Status: SHIPPED | OUTPATIENT
Start: 2024-06-18 | End: 2024-06-18

## 2024-06-18 RX ORDER — AMILORIDE HYDROCHLORIDE 5 MG/1
10 TABLET ORAL 2 TIMES DAILY
Qty: 360 TABLET | Refills: 1 | Status: SHIPPED | OUTPATIENT
Start: 2024-06-18

## 2024-06-18 RX ORDER — AMILORIDE HYDROCHLORIDE 5 MG/1
10 TABLET ORAL 2 TIMES DAILY
Qty: 60 TABLET | Refills: 1 | OUTPATIENT
Start: 2024-06-18

## 2024-06-18 ASSESSMENT — PAIN SCALES - GENERAL
PAINLEVEL_OUTOF10: 0 - NO PAIN
PAINLEVEL_OUTOF10: 6
PAINLEVEL_OUTOF10: 6
PAINLEVEL_OUTOF10: 3
PAINLEVEL_OUTOF10: 0 - NO PAIN
PAINLEVEL_OUTOF10: 0 - NO PAIN

## 2024-06-18 ASSESSMENT — PAIN - FUNCTIONAL ASSESSMENT
PAIN_FUNCTIONAL_ASSESSMENT: 0-10

## 2024-06-18 ASSESSMENT — COGNITIVE AND FUNCTIONAL STATUS - GENERAL
WALKING IN HOSPITAL ROOM: A LITTLE
MOBILITY SCORE: 22
CLIMB 3 TO 5 STEPS WITH RAILING: A LITTLE

## 2024-06-18 ASSESSMENT — PAIN DESCRIPTION - ORIENTATION
ORIENTATION: RIGHT
ORIENTATION: RIGHT

## 2024-06-18 ASSESSMENT — PAIN DESCRIPTION - LOCATION: LOCATION: SHOULDER

## 2024-06-18 NOTE — DISCHARGE SUMMARY
Discharge Diagnosis  Hypokalemia    Issues Requiring Follow-Up  Follow-up with nephrology    Discharge Meds     Your medication list        START taking these medications        Instructions Last Dose Given Next Dose Due   aMILoride 5 mg tablet  Commonly known as: Midamor      Take 2 tablets (10 mg) by mouth 2 times a day.              CONTINUE taking these medications        Instructions Last Dose Given Next Dose Due   HUMULIN R U-500 (CONC) INSULIN SUBQ           - refin regular 500 unit/mL CONCENTRATED - refill for patient own pump  Commonly known as: HumuLIN R U-500      Inject 1 mL (1 each) under the skin if needed (VIA INSULIN PUMP). Take as directed per insulin instructions. Use U-500 insulin syringe.       acetaminophen 500 mg tablet  Commonly known as: Tylenol           albuterol 90 mcg/actuation aerosol powdr breath activated inhaler           allopurinol 300 mg tablet  Commonly known as: Zyloprim      Take 1 tablet (300 mg) by mouth once daily.       buPROPion  mg 24 hr tablet  Commonly known as: Wellbutrin XL      Take 1 tablet (150 mg) by mouth once daily in the morning. Do not crush, chew, or split.       cetirizine 10 mg tablet  Commonly known as: ZyrTEC           DULoxetine 60 mg DR capsule  Commonly known as: Cymbalta      Take 1 capsule (60 mg) by mouth once daily. Do not crush or chew.       empagliflozin 10 mg  Commonly known as: Jardiance      Take 1 tablet (10 mg) by mouth once daily.       gabapentin 300 mg capsule  Commonly known as: Neurontin           levothyroxine 200 mcg tablet  Commonly known as: Synthroid, Levoxyl      Take 1 tablet (200 mcg) by mouth once daily.       levothyroxine 50 mcg tablet  Commonly known as: Synthroid, Levoxyl      Take 1 tablet (50 mcg) by mouth once daily in the morning. Take before meals. TAKE WITH 200MG       lubiprostone 24 mcg capsule  Commonly known as: Amitiza      Take 1 capsule (24 mcg) by mouth 2 times a day with meals.       magnesium oxide 400  mg (241.3 mg magnesium) tablet  Commonly known as: Mag-Ox      Take 1 tablet (400 mg) by mouth 2 times a day.       metoprolol succinate XL 50 mg 24 hr tablet  Commonly known as: Toprol-XL      Take 1 tablet (50 mg) by mouth 2 times a day. Do not crush or chew.       nystatin 100,000 unit/gram powder  Commonly known as: Mycostatin      Apply 1 Application topically 2 times a day.       oxygen gas therapy  Commonly known as: O2      Inhale 1 each every 12 hours.       pantoprazole 40 mg EC tablet  Commonly known as: ProtoNix           potassium chloride CR 20 mEq ER tablet  Commonly known as: Klor-Con M20      Take 3 tablets (60 mEq) by mouth once daily. Do not crush or chew.       rosuvastatin 40 mg tablet  Commonly known as: Crestor           topiramate 25 mg tablet  Commonly known as: Topamax      Take 1 tablet (25 mg) by mouth 2 times a day.       traZODone 100 mg tablet  Commonly known as: Desyrel           triamcinolone 0.1 % ointment  Commonly known as: Kenalog      Apply topically 2 times a day as needed for irritation or rash.       warfarin 5 mg tablet  Commonly known as: Coumadin      Take as directed. If you are unsure how to take this medication, talk to your nurse or doctor.  Original instructions: Take as directed per After Visit Summary.       warfarin 7.5 mg tablet  Commonly known as: Coumadin      Take as directed. If you are unsure how to take this medication, talk to your nurse or doctor.  Original instructions: Take as directed per After Visit Summary. Do not fill before June 11, 2024.              STOP taking these medications      torsemide 40 mg tablet               ASK your doctor about these medications        Instructions Last Dose Given Next Dose Due   ondansetron 4 mg tablet  Commonly known as: Zofran      Take 1 tablet (4 mg) by mouth every 8 hours if needed for nausea or vomiting.                 Where to Get Your Medications        These medications were sent to [facility]/pharmacy #[id number] -  [address] [address]      Phone: [phone]   aMILoride 5 mg tablet         Test Results Pending At Discharge  Pending Labs       No current pending labs.            Hospital Course   Roselia Mo is a 53-year-old female with past medical history of COPD, CHF, diabetes type 2, CKD, polyneuropathy, hepatic vein thrombosis on Coumadin and chronic lymphedema on Lasix who was recently admitted and treated for hypokalemia and was discharged 2 days ago presented to the ER with dizziness, nausea and generalized weakness.  Was found to be hypokalemic again, denies any fevers, chills, chest pain or trouble breathing.  Her potassium was 2.1 at presentation.  Was given IV potassium in the ER and admitted to the hospital.  INR was 2.1 on admission.  Creatinine was 1.17.  Patient says that she started having symptoms of hypokalemia on exam she had some ringing in her ears felt heavy, dizzy and weak.  Denies any flulike illness any fever or chills no diarrhea says that she has been taking torsemide as prescribed and has been taking potassium supplementation and 80 mEq daily.     During the course in the hospital patient was started on potassium replacement.  Diuretics were held.  Other medications were continued.  Nephrology was consulted.Per nephrology recommendation patient was investigated on potassium loss..  A TT KG was obtained and potassium has been replaced.  Patient was given diuretics in the meantime.  Patient says in the past she has been tried on the diuretics and nothing is working.  Per nephrology recommendation patient was placed she had a good.  Patient follow-up with nephrology for management of diuretics.  Patient vital signs remained stable.  During the hospitalization and she was discharged home in satisfactory condition    Pertinent Physical Exam At Time of Discharge  Physical Exam  General Appearance: AAO x 3, severe obesity  Skin: skin color pink,  warm, and dry; no suspicious rashes or lesions  Eyes : PERRL, EOM's intact  ENT: mucous membranes pink and moist  Neck: normocephalic  Respiratory: lungs clear to auscultation anteriorly; no wheezing, rhonchi, or crackles.   Heart: regular rate and rhythm.  Abdomen: Mildly distended, positive bowel sounds x4, soft,  nontender  Extremities: Bilateral lower extremity edema 1+  Peripheral pulses: normal x4 extremities  Neuro: alert, coherent and conversant, no focal motor deficits  Outpatient Follow-Up  Future Appointments   Date Time Provider Department [place]   6/26/2024 12:45 PM [id number] PHARM [facility] [id number] [facility]   7/16/2024  1:45 PM Delfin Earl MD [id number] [facility]   8/8/2024  2:00 PM Sandoval Lewis DO [id number] [facility]         Fazal Adhikari MD

## 2024-06-18 NOTE — CARE PLAN
The patient's goals for the shift include feel better    The clinical goals for the shift include labs WNL, diuretics addressed

## 2024-06-18 NOTE — PROGRESS NOTES
Medication Education     Medication education for Roselia Mo was provided to the patient and family for the following medication(s):  Amiloride  Lidoderm patches    Medication education provided by a Pharmacist:  ADR Counseling Medication interactions Dose, frequency, storage How to take and what to do if a dose is missed Proper dose, indication, possible ADRs Refilling the medication  How the medication works and benefits of taking it Benefits of taking the medication  Importance of compliance Any drug interactions (including OTCs and herbvals) and importance of notifying a healthcare provider of any medication changes Potential duration of therapy Use of birth control measures (if applicable) Proper storage of the medication(s)    Identified potential barriers to education:  None    Method(s) of Education:  Verbal Written materials provided and reviewed    An opportunity to ask questions and receive answers was provided.     Assessment of understanding the patient and family:  2= meets goals/outcomes    Additional Notes (if applicable):   Counseled pt to stop zofran and torsemide m2b provided payment deferred.   Marco Antonio Fields III

## 2024-06-18 NOTE — CARE PLAN
Problem: Fall/Injury  Goal: Verbalize understanding of risk factor reduction measures to prevent injury from fall in the home  Outcome: Progressing  Goal: Use assistive devices by end of the shift  Outcome: Progressing  Goal: Pace activities to prevent fatigue by end of the shift  Outcome: Progressing     Problem: Respiratory  Goal: Clear secretions with interventions this shift  Outcome: Progressing  Goal: Minimal/no exertional discomfort or dyspnea this shift  Outcome: Progressing  Goal: Tolerate mechanical ventilation evidenced by VS/agitation level this shift  Outcome: Progressing  Goal: Tolerate pulmonary toileting this shift  Outcome: Progressing  Goal: Verbalize decreased shortness of breath this shift  Outcome: Progressing     Problem: Diabetes  Goal: Achieve decreasing blood glucose levels by end of shift  Outcome: Progressing  Goal: Increase stability of blood glucose readings by end of shift  Outcome: Progressing  Goal: Decrease in ketones present in urine by end of shift  Outcome: Progressing  Goal: No changes in neurological exam by end of shift  Outcome: Progressing  Goal: Learn about and adhere to nutrition recommendations by end of shift  Outcome: Progressing  Goal: Vital signs within normal range for age by end of shift  Outcome: Progressing  Goal: Increase self care and/or family involovement by end of shift  Outcome: Progressing  Goal: Receive DSME education by end of shift  Outcome: Progressing     Problem: Discharge Planning  Goal: Discharge to home or other facility with appropriate resources  Outcome: Progressing     Problem: Chronic Conditions and Co-morbidities  Goal: Patient's chronic conditions and co-morbidity symptoms are monitored and maintained or improved  Outcome: Progressing     Problem: Pain  Goal: Takes deep breaths with improved pain control throughout the shift  Outcome: Progressing  Goal: Turns in bed with improved pain control throughout the shift  Outcome:  Progressing  Goal: Walks with improved pain control throughout the shift  Outcome: Progressing  Goal: Performs ADL's with improved pain control throughout shift  Outcome: Progressing  Goal: Participates in PT with improved pain control throughout the shift  Outcome: Progressing  Goal: Free from opioid side effects throughout the shift  Outcome: Progressing  Goal: Free from acute confusion related to pain meds throughout the shift  Outcome: Progressing   The patient's goals for the shift include feel better    The clinical goals for the shift include labs WNL, diuretics addressed    Pt taking Amiloride. Lab results pending this morning.

## 2024-06-18 NOTE — PROGRESS NOTES
Roselia Mo is a 53 y.o. female on day 6 of admission presenting with Hypokalemia.      Subjective   Patient seen and examined at the bedside this morning  She is sitting comfortably in the bedside chair  Continues to complain of worsening edema  Otherwise feeling okay       Objective          Vitals 24HR  Heart Rate:  [70-74]   Temp:  [35.7 °C (96.3 °F)-36.9 °C (98.4 °F)]   Resp:  [18-20]   BP: (133-148)/(57-71)   Weight:  [169 kg (372 lb 2.2 oz)]   SpO2:  [95 %-100 %]         Intake/Output last 3 Shifts:    Intake/Output Summary (Last 24 hours) at 6/18/2024 1024  Last data filed at 6/18/2024 0956  Gross per 24 hour   Intake 2760 ml   Output 3475 ml   Net -715 ml       Physical Exam  Constitutional:       Appearance: Normal appearance. She is obese.   HENT:      Head: Normocephalic and atraumatic.      Right Ear: External ear normal.      Left Ear: External ear normal.      Nose: Nose normal.      Mouth/Throat:      Mouth: Mucous membranes are moist.      Pharynx: Oropharynx is clear.   Eyes:      Extraocular Movements: Extraocular movements intact.      Conjunctiva/sclera: Conjunctivae normal.      Pupils: Pupils are equal, round, and reactive to light.   Cardiovascular:      Rate and Rhythm: Normal rate and regular rhythm.   Pulmonary:      Effort: Pulmonary effort is normal.      Breath sounds: Normal breath sounds.   Abdominal:      General: Abdomen is flat.      Palpations: Abdomen is soft.   Musculoskeletal:         General: Swelling present.      Right lower leg: Edema present.      Left lower leg: Edema present.   Skin:     General: Skin is warm and dry.   Neurological:      General: No focal deficit present.      Mental Status: She is alert and oriented to person, place, and time.   Psychiatric:         Mood and Affect: Mood normal.         Behavior: Behavior normal.         Relevant Results               Assessment/Plan              Principal Problem:    Hypokalemia    Hypokalemia with potassium  wasting with TT KG of 9  Metabolic alkalosis  Diastolic CHF  Morbid Obesity  Lymphedema  HTN  DM II  Right Sided CHF  Pulmonary HTN  CY on CPAP  CKD stage II    Plan:  At this time her potassium looks good.  We will continue her on amiloride 10 mg twice a day  She is to continue to follow her weight closely and watch her edema  Follow-up with me in the office next week to see how she is doing to see if we need to make any further adjustments  Please call with any further issues or needs           Pardeep Nichole, DO

## 2024-06-18 NOTE — NURSING NOTE
Discharge Note: 6/18/2024 1500 Discharged via wheelchair to private car accompanied by PCT, personal belongings taken with pt, no distress noted, no complaints voiced. Oxygen on and flowing at 4 LPM FAVIAN Kearney RN

## 2024-06-18 NOTE — PROGRESS NOTES
Subjective Data:  Patient reports feeling well, no new adverse events overnight. Patient denies any chest pain, shortness of breath, palpitations, dizziness or syncope. Patient is hemodynamically stable.     Overnight Events:    No     Objective Data:  Last Recorded Vitals:  Vitals:    06/18/24 0400 06/18/24 0621 06/18/24 0712 06/18/24 0906   BP: 148/58  133/60    BP Location:   Left arm    Patient Position:   Lying    Pulse: 74  70 72   Resp: 20  18    Temp: 36.4 °C (97.5 °F)  36.9 °C (98.4 °F)    TempSrc: Temporal  Temporal    SpO2: 99% 100% 100%    Weight:       Height:           Last Labs:  CBC - 6/17/2024:  3:48 AM  6.2 11.9 202    37.5      CMP - 6/18/2024:  3:42 AM  9.9 7.9 32 --- 0.8   2.7 4.5 15 338      PTT - 6/12/2024:  9:46 PM  1.9   22.4 48     TROPHS   Date/Time Value Ref Range Status   06/13/2024 03:58 PM 9 0 - 13 ng/L Final   06/13/2024 11:44 AM 9 0 - 13 ng/L Final   05/08/2024 03:17 PM 5 0 - 13 ng/L Final     BNP   Date/Time Value Ref Range Status   05/08/2024 02:12  0 - 99 pg/mL Final   04/23/2024 07:45  0 - 99 pg/mL Final     HGBA1C   Date/Time Value Ref Range Status   03/19/2024 06:20 PM 9.0 see below % Final   02/01/2024 12:31 PM 8.3 see below % Final     LDLCALC   Date/Time Value Ref Range Status   02/01/2024 04:50 PM   Final     Comment:     The calculation of LDL and VLDL are inaccurate when the Triglycerides are greater than 400 mg/dL or when the patient is non-fasting. If LDL measurement is necessary contact the testing laboratory for an alternative LDL assay.                                  Near   Borderline      AGE      Desirable  Optimal    High     High     Very High     0-19 Y     0 - 109     ---    110-129   >/= 130     ----    20-24 Y     0 - 119     ---    120-159   >/= 160     ----      >24 Y     0 -  99   100-129  130-159   160-189     >/=190       VLDL   Date/Time Value Ref Range Status   02/01/2024 04:50 PM   Final     Comment:     Unable to calculate VLDL.       Last I/O:  I/O last 3 completed shifts:  In: 2060 (12.2 mL/kg) [P.O.:2060]  Out: 3100 (18.4 mL/kg) [Urine:3100 (0.5 mL/kg/hr)]  Weight: 168.8 kg     Past Cardiology Tests (Last 3 Years):  EKG:  ECG 12 Lead 06/13/2024      ECG 12 lead 06/12/2024 (Preliminary)      ECG 12 Lead 06/06/2024      ECG 12 lead 06/06/2024      ECG 12 lead 06/06/2024      ECG 12 lead       ECG 12 Lead 05/08/2024      ECG 12 Lead 04/08/2024      ECG 12 lead 04/07/2024      ECG 12 lead 03/28/2024      ECG 12 lead 03/19/2024      ECG 12 Lead 03/13/2024      ECG 12 lead 02/04/2024      ECG 12 Lead 02/01/2024 (Wet Read)    Echo:  Transthoracic Echo (TTE) Complete 03/20/2024    Ejection Fractions:  EF   Date/Time Value Ref Range Status   03/20/2024 05:44 AM 53 %      Cath:  No results found for this or any previous visit from the past 1095 days.    Stress Test:  Nuclear Stress Test 03/28/2024    Cardiac Imaging:  No results found for this or any previous visit from the past 1095 days.      Inpatient Medications:  Scheduled medications   Medication Dose Route Frequency    allopurinol  300 mg oral Daily    aMILoride  10 mg oral BID    atorvastatin  80 mg oral Nightly    buPROPion XL  150 mg oral q AM    colchicine  0.6 mg oral BID    DULoxetine  60 mg oral Daily    empagliflozin  10 mg oral Daily    gabapentin  900 mg oral 4x daily    insulin glargine  35 Units subcutaneous q24h    insulin lispro  0-20 Units subcutaneous TID    lactulose  20 g oral Daily    levothyroxine  200 mcg oral Daily    levothyroxine  50 mcg oral Daily before breakfast    lidocaine  1 patch transdermal Daily    loratadine  10 mg oral Daily    lubiprostone  24 mcg oral BID    magnesium oxide  400 mg oral BID    metoprolol succinate XL  50 mg oral BID    nystatin  1 Application Topical BID    oxygen   inhalation Continuous - Inhalation    pantoprazole  40 mg oral BID    polyethylene glycol  17 g oral Daily    traZODone  100 mg oral Nightly    warfarin  5 mg oral Once per day  on Monday Thursday    warfarin  7.5 mg oral Once per day on Sunday Tuesday Wednesday Friday Saturday     PRN medications   Medication    acetaminophen    Or    acetaminophen    Or    acetaminophen    acetaminophen    Or    acetaminophen    Or    acetaminophen    albuterol    benzocaine-menthol    dextrose    dextrose    glucagon    glucagon    meclizine    ondansetron ODT    Or    ondansetron    oxyCODONE     Continuous Medications   Medication Dose Last Rate       Physical Exam:  General: alert, oriented and in no acute distress. Morbid obesity  HEENT: NC/AT; EOMI; PERRLA, external ear is normal  Neck: supple; trachea midline; no masses; no JVD  Chest: diminished breath sounds bilaterally; on NC oxygen  Cardio: regular rhythm, S1S2 normal, no murmurs  Abdomen: Difficult assessment due to obesity  Extremities: Lymphedema bilaterally  Neuro: Grossly intact     Psychiatric: Normal mood and affect      Assessment/Plan     Mrs. Roselia oM is a 53 y.o. former smoker diabetic female being consulted by the Cardiology team for EKG changes. 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 She had a recent admission for hypokalemia and was discharged, returned after only 2 days complaining of dizziness, nausea and generalized weakness. She was found to be hypokalemic again,  her potassium was 2.1 at presentation. She complained also of ringing in her ears felt heavy, dizzy and weakness . She denied chest pain, shortness of breath, palpitations, fever, chills, orthopnea, paroxysmal nocturnal dyspnea or syncope. In the ER, her potassium was 2.1 at  presentation.  Was given IV potassium in the ER and admitted to the hospital.  INR was 2.1 on admission.  Creatinine was 1.17. EKG showed normal sinus rhythm with no signs of ACS; prolonged QT. She was admitted for clinical compensation.     Assessment     # Hypokalemia  - She was found to be hypokalemic again,  her potassium was 2.1 at presentation.  - EKG showed normal sinus rhythm with no signs of ACS; prolonged QT.  - New EKG with improvement in Qtc after potassium replacement.  - Would suggest to capitalize on potassium repletement.  - Nephrology follow up.     # Heart Failure with Preserved LV Systolic function  - EKG shows normal sinus rhythm with no signs of ACS. Prolonged QT.   - The echocardiogram (03/2024) showed normal LVEF 65% with no wall motion abnormalities.   - Nuclear stress test (03/2024) is normal.   - Low sodium diet (2g).  - Fluid restriction.  - Electrolyte control and daily BMP including magnesium.  - General recommendations for heart failure, including low-sodium diet, fluid restriction, daily weight and adherence to medication.   - Patient continues to be volume overloaded based on her clinical presentation. We suggest to start Bumex 1mg daily.  - Patient previously being treated for Staph infection in abdomen and L breast     # COPD  - Keep home oxygen     # Abdominal / Groin skin infection  - Keep broad spectrum antibiotics.     # Budd-Chiari Sd  - Controlled by PCP.  - Keep Warfarin .  - INR control.     # Hypertension  - Controlled blood pressure.  - Keep current medications with Lisinopril 20mg daily, Metoprolol succinate 50mg daily.  - Patient counseled to keep a healthy lifestyle including regular exercise and low-sodium diet.  - Recommended home blood pressure monitoring.  - Goal of BP < 130/80mmHg.      # Diabetes  - Counseled on healthy diet and regular exercises.  - Discussed need for weight loss and the benefits.   - Keep current medications.  - ISS.      # Hyperlipidemia  -  Keep home medication with Rosuvastatin 40mg daily.  - Counseled on healthy diet and regular exercise.      # Gout  - Keep home medication with Allopurinol 300mg daily.  - Counseling.     # Hypothyroidism  - Keep Levothyroxine 250mcg  daily      This critically ill patient continues to be at-risk for clinically significant deterioration / failure due to the above mentioned dysfunctional, unstable organ systems.  I have personally identified and managed all complex critical care issues to prevent aforementioned clinical deterioration.  Critical care time is spent at bedside and/or the immediate area and has included, but is not limited to, the review of diagnostic tests, labs, radiographs, serial assessments of hemodynamics, respiratory status, ventilatory management, and family updates.  Time spent in procedures and teaching are reported separately.     Critical care time: 50 minutes     Peripheral IV 06/14/24 20 G Right;Posterior Forearm (Active)   Site Assessment Clean;Dry;Intact 06/18/24 1000   Dressing Type Transparent 06/18/24 1000   Line Status Saline locked 06/18/24 1000   Dressing Status Clean;Occlusive;Dry 06/18/24 1000   Number of days: 4       Code Status:  Full Code    Delfin Earl MD  Cardiology

## 2024-06-18 NOTE — PROGRESS NOTES
06/18/24 1027   Discharge Planning   Patient expects to be discharged to: home- no needs     REMOTE COVERAGE- called into pt room, role of TCC explained. Pt confirms her plan is home and she is without needs. CT will follow.

## 2024-06-18 NOTE — NURSING NOTE
Discharge Note: 6/18/2024 2032 AVS and pt responsibilities reviewed with pt and copy given. Hypokalemia education reviewed with pt and information sheets given. Pt verbalizes understanding of instructions received, verbalizes understanding of when to seek medical attention, denies any home going or personal care needs. Denies further questions or concerns. Reviewed follow up appts with pt and verbalizes understanding. Christel WOOD

## 2024-06-19 DIAGNOSIS — N18.2 CKD (CHRONIC KIDNEY DISEASE) STAGE 2, GFR 60-89 ML/MIN: ICD-10-CM

## 2024-06-19 DIAGNOSIS — M79.7 FIBROMYALGIA: ICD-10-CM

## 2024-06-19 RX ORDER — TRAZODONE HYDROCHLORIDE 100 MG/1
100 TABLET ORAL NIGHTLY
Qty: 90 TABLET | Refills: 3 | Status: SHIPPED | OUTPATIENT
Start: 2024-06-19

## 2024-06-20 ENCOUNTER — TELEPHONE (OUTPATIENT)
Dept: NEPHROLOGY | Facility: CLINIC | Age: 54
End: 2024-06-20
Payer: MEDICARE

## 2024-06-20 ENCOUNTER — PATIENT OUTREACH (OUTPATIENT)
Dept: PRIMARY CARE | Facility: CLINIC | Age: 54
End: 2024-06-20
Payer: MEDICARE

## 2024-06-20 LAB
ATRIAL RATE: 98 BPM
P AXIS: 71 DEGREES
P OFFSET: 190 MS
P ONSET: 126 MS
PR INTERVAL: 174 MS
Q ONSET: 213 MS
QRS COUNT: 16 BEATS
QRS DURATION: 84 MS
QT INTERVAL: 434 MS
QTC CALCULATION(BAZETT): 554 MS
QTC FREDERICIA: 511 MS
R AXIS: 43 DEGREES
T AXIS: 52 DEGREES
T OFFSET: 430 MS
VENTRICULAR RATE: 98 BPM

## 2024-06-20 NOTE — PROGRESS NOTES
Pt called states she has bilateral edema in legs. She is wondering if she should increase her water pill? Please advise. She does have follow up appt on 6/27/24.

## 2024-06-20 NOTE — PROGRESS NOTES
Discharge Facility: [facility]  Discharge Diagnosis: Hypokalemia   Admission Date: 6/12/2024  Discharge Date: 6/18/2024    PCP Appointment Date: 7/1/2024    Specialist Appointment Date:   -coag clinic 6/26/2024  -nephrology 6/27/2024  -cardiology 7/16/2024  -GI 8/8/2024    Hospital Encounter and Summary: Linked     START taking:   aMILoride (Midamor)      STOP taking:   ondansetron 4 mg tablet (Zofran)   torsemide 40 mg tablet     Two attempts were made to reach patient within two business days after discharge. Voicemail left with contact information for patient to call back with any non-emergent questions or concerns.

## 2024-06-26 ENCOUNTER — ANTICOAGULATION - WARFARIN VISIT (OUTPATIENT)
Dept: PHARMACY | Facility: HOSPITAL | Age: 54
End: 2024-06-26
Payer: MEDICARE

## 2024-06-26 ENCOUNTER — LAB (OUTPATIENT)
Dept: LAB | Facility: LAB | Age: 54
End: 2024-06-26
Payer: MEDICARE

## 2024-06-26 DIAGNOSIS — I82.0 HEPATIC VEIN THROMBOSIS (MULTI): ICD-10-CM

## 2024-06-26 DIAGNOSIS — N18.2 CKD (CHRONIC KIDNEY DISEASE) STAGE 2, GFR 60-89 ML/MIN: ICD-10-CM

## 2024-06-26 DIAGNOSIS — I81 PORTAL VEIN THROMBOSIS: Primary | ICD-10-CM

## 2024-06-26 LAB
ANION GAP SERPL CALC-SCNC: 9 MMOL/L (ref 10–20)
BUN SERPL-MCNC: 10 MG/DL (ref 6–23)
CALCIUM SERPL-MCNC: 9.2 MG/DL (ref 8.6–10.3)
CHLORIDE SERPL-SCNC: 109 MMOL/L (ref 98–107)
CO2 SERPL-SCNC: 24 MMOL/L (ref 21–32)
CREAT SERPL-MCNC: 0.6 MG/DL (ref 0.5–1.05)
EGFRCR SERPLBLD CKD-EPI 2021: >90 ML/MIN/1.73M*2
GLUCOSE SERPL-MCNC: 316 MG/DL (ref 74–99)
POC INR: 1.4
POC PROTHROMBIN TIME: NORMAL
POTASSIUM SERPL-SCNC: 4.4 MMOL/L (ref 3.5–5.3)
SODIUM SERPL-SCNC: 138 MMOL/L (ref 136–145)

## 2024-06-26 PROCEDURE — 99211 OFF/OP EST MAY X REQ PHY/QHP: CPT | Performed by: PHARMACIST

## 2024-06-26 PROCEDURE — 36415 COLL VENOUS BLD VENIPUNCTURE: CPT

## 2024-06-26 PROCEDURE — 80048 BASIC METABOLIC PNL TOTAL CA: CPT

## 2024-06-26 PROCEDURE — 85610 PROTHROMBIN TIME: CPT | Mod: QW

## 2024-06-26 NOTE — PROGRESS NOTES
Pt enrolled in [facility] for management of Portal vein thrombosis [I81].     Pt current location in clinic.     Current anticoagulant: Warfarin    Time since last visit: 3 weeks    Last INR: 2.1 on warfarin 47.5 mg in the previous week. No changes were made at that time.    Last Creatinine:   Lab Results   Component Value Date    CREATININE 0.51 06/18/2024     Last hemoglobin/hematocrit:  Lab Results   Component Value Date    HGB 11.9 (L) 06/17/2024     Lab Results   Component Value Date    HCT 37.5 06/17/2024       Current INR: 1.4 is SUBtherapeutic for goal range of 2.0-3.0 and is reflective of 47.5 mg in the previous week prior to visit.    Patient denies any missed doses.  Pt notes she was in the hospital for 12 days and was started on amiloride, which could INR.    Patient denies any medication changes, diet changes, or OTC/herbal supplement changes since last visit.  Patient denies any adverse reactions or barriers.  Patient denies any CP/SOB, fatigue, bleeding or bruising since last visit.   Patient denies any change in alcohol or tobacco use since last visit.   Patient denies any upcoming medical or dental procedures.    Plan:  Patient was instructed to  take 10 mg x 1 then increase dose slightly .  INR follow up will occur in 2 weeks.  Patient was instructed to maintain consistent vegetable intake, to monitor for any bruising or bleeding, and to call with any medication changes or concerns.    Pt handout given with above information    Gabriela Mckeon, StephanieD  P:[phone]  F:[phone]

## 2024-06-27 ENCOUNTER — APPOINTMENT (OUTPATIENT)
Dept: NEPHROLOGY | Facility: CLINIC | Age: 54
End: 2024-06-27
Payer: MEDICARE

## 2024-06-27 VITALS
DIASTOLIC BLOOD PRESSURE: 76 MMHG | HEIGHT: 62 IN | HEART RATE: 70 BPM | SYSTOLIC BLOOD PRESSURE: 124 MMHG | BODY MASS INDEX: 53.92 KG/M2 | WEIGHT: 293 LBS

## 2024-06-27 DIAGNOSIS — I15.2 HYPERTENSION ASSOCIATED WITH DIABETES (MULTI): ICD-10-CM

## 2024-06-27 DIAGNOSIS — E11.59 HYPERTENSION ASSOCIATED WITH DIABETES (MULTI): ICD-10-CM

## 2024-06-27 DIAGNOSIS — E87.6 HYPOKALEMIA: ICD-10-CM

## 2024-06-27 DIAGNOSIS — N18.31 STAGE 3A CHRONIC KIDNEY DISEASE (MULTI): ICD-10-CM

## 2024-06-27 DIAGNOSIS — E78.5 HYPERLIPIDEMIA ASSOCIATED WITH TYPE 2 DIABETES MELLITUS (MULTI): ICD-10-CM

## 2024-06-27 DIAGNOSIS — I50.32 CHRONIC DIASTOLIC HEART FAILURE (MULTI): Primary | ICD-10-CM

## 2024-06-27 DIAGNOSIS — E11.69 HYPERLIPIDEMIA ASSOCIATED WITH TYPE 2 DIABETES MELLITUS (MULTI): ICD-10-CM

## 2024-06-27 DIAGNOSIS — N18.2 CKD (CHRONIC KIDNEY DISEASE) STAGE 2, GFR 60-89 ML/MIN: ICD-10-CM

## 2024-06-27 PROCEDURE — 3078F DIAST BP <80 MM HG: CPT | Performed by: INTERNAL MEDICINE

## 2024-06-27 PROCEDURE — 3060F POS MICROALBUMINURIA REV: CPT | Performed by: INTERNAL MEDICINE

## 2024-06-27 PROCEDURE — 99214 OFFICE O/P EST MOD 30 MIN: CPT | Performed by: INTERNAL MEDICINE

## 2024-06-27 PROCEDURE — 1036F TOBACCO NON-USER: CPT | Performed by: INTERNAL MEDICINE

## 2024-06-27 PROCEDURE — 3008F BODY MASS INDEX DOCD: CPT | Performed by: INTERNAL MEDICINE

## 2024-06-27 PROCEDURE — 3052F HG A1C>EQUAL 8.0%<EQUAL 9.0%: CPT | Performed by: INTERNAL MEDICINE

## 2024-06-27 PROCEDURE — 3074F SYST BP LT 130 MM HG: CPT | Performed by: INTERNAL MEDICINE

## 2024-06-27 RX ORDER — AMILORIDE HYDROCHLORIDE 5 MG/1
10 TABLET ORAL 2 TIMES DAILY
Qty: 360 TABLET | Refills: 3 | Status: SHIPPED | OUTPATIENT
Start: 2024-06-27 | End: 2025-06-27

## 2024-06-27 ASSESSMENT — ENCOUNTER SYMPTOMS
CONSTITUTIONAL NEGATIVE: 1
HEMATOLOGIC/LYMPHATIC NEGATIVE: 1
ENDOCRINE NEGATIVE: 1
RESPIRATORY NEGATIVE: 1
ALLERGIC/IMMUNOLOGIC NEGATIVE: 1
NEUROLOGICAL NEGATIVE: 1
PSYCHIATRIC NEGATIVE: 1
EYES NEGATIVE: 1
MUSCULOSKELETAL NEGATIVE: 1
GASTROINTESTINAL NEGATIVE: 1

## 2024-06-27 NOTE — PROGRESS NOTES
Subjective   She feesl like she is swollen  Otherwise feeling well    Patient ID: Roselia Mo is a 53 y.o. female who presents for No chief complaint on file..  HPI  She is here for follow-up after a recent hospitalization she has had multiple hospitalizations with hypokalemia.  We did a TT KG and her TT KG was 9 leading and pointing towards urinary wasting of potassium  She does have issues with volume overload and peripheral edema  Her labs were drawn yesterday  Her metabolic panel shows a potassium of 4.4 glucose was 316 BUN 10 with a creatinine of 1.6 bicarb is 24    Medications are reviewed she was discharged on amiloride also on allopurinol Jardiance insulin gabapentin levothyroxine magnesium metoprolol a PPI potassium and a statin  Review of Systems   Constitutional: Negative.    HENT: Negative.     Eyes: Negative.    Respiratory: Negative.     Cardiovascular:  Positive for leg swelling.   Gastrointestinal: Negative.    Endocrine: Negative.    Genitourinary: Negative.    Musculoskeletal: Negative.    Skin: Negative.    Allergic/Immunologic: Negative.    Neurological: Negative.    Hematological: Negative.    Psychiatric/Behavioral: Negative.         Objective   Physical Exam  Constitutional:       Appearance: Normal appearance. She is obese.   HENT:      Head: Normocephalic and atraumatic.      Right Ear: External ear normal.      Left Ear: External ear normal.      Nose: Nose normal.      Mouth/Throat:      Mouth: Mucous membranes are moist.      Pharynx: Oropharynx is clear.   Eyes:      Extraocular Movements: Extraocular movements intact.      Conjunctiva/sclera: Conjunctivae normal.      Pupils: Pupils are equal, round, and reactive to light.   Cardiovascular:      Rate and Rhythm: Normal rate and regular rhythm.   Pulmonary:      Effort: Pulmonary effort is normal.      Breath sounds: Normal breath sounds.   Abdominal:      General: Abdomen is flat.      Palpations: Abdomen is soft.   Musculoskeletal:          General: Swelling present.      Left lower leg: Edema present.      Comments: Very minimal Edema   Skin:     General: Skin is warm and dry.   Neurological:      General: No focal deficit present.      Mental Status: She is alert and oriented to person, place, and time.   Psychiatric:         Mood and Affect: Mood normal.         Behavior: Behavior normal.         Assessment/Plan   Problem List Items Addressed This Visit             ICD-10-CM    Chronic diastolic heart failure (Multi) - Primary I50.32    Relevant Orders    Basic metabolic panel    Follow Up In Nephrology    Hypertension associated with diabetes (Multi) E11.59, I15.2    Hyperlipidemia associated with type 2 diabetes mellitus (Multi) E11.69, E78.5    CKD (chronic kidney disease) stage 2, GFR 60-89 ml/min N18.2    Relevant Medications    aMILoride (Midamor) 5 mg tablet    Stage 3a chronic kidney disease (Multi) N18.31    Relevant Medications    aMILoride (Midamor) 5 mg tablet    Hypokalemia E87.6    Relevant Medications    aMILoride (Midamor) 5 mg tablet   Plan:  Labs look great  Continue Amiloride  Recheck labs in 3 months.       Hypokalemia with potassium wasting with TT KG of 9  Metabolic alkalosis  Diastolic CHF  Morbid Obesity  Lymphedema  HTN  DM II  Right Sided CHF  Pulmonary HTN  CY on CPAP  CKD stage II    Pardeep Nichole DO 06/27/24 11:14 AM

## 2024-07-01 ENCOUNTER — APPOINTMENT (OUTPATIENT)
Dept: PRIMARY CARE | Facility: CLINIC | Age: 54
End: 2024-07-01
Payer: MEDICARE

## 2024-07-01 VITALS — SYSTOLIC BLOOD PRESSURE: 142 MMHG | DIASTOLIC BLOOD PRESSURE: 75 MMHG | HEART RATE: 67 BPM

## 2024-07-01 DIAGNOSIS — E11.59 HYPERTENSION ASSOCIATED WITH DIABETES (MULTI): ICD-10-CM

## 2024-07-01 DIAGNOSIS — Z79.4 TYPE 2 DIABETES MELLITUS WITH HYPERGLYCEMIA, WITH LONG-TERM CURRENT USE OF INSULIN (MULTI): ICD-10-CM

## 2024-07-01 DIAGNOSIS — E11.65 TYPE 2 DIABETES MELLITUS WITH HYPERGLYCEMIA, WITH LONG-TERM CURRENT USE OF INSULIN (MULTI): ICD-10-CM

## 2024-07-01 DIAGNOSIS — I50.32 CHRONIC DIASTOLIC HEART FAILURE (MULTI): ICD-10-CM

## 2024-07-01 DIAGNOSIS — M25.531 RIGHT WRIST PAIN: Primary | ICD-10-CM

## 2024-07-01 DIAGNOSIS — I50.812 CHRONIC RIGHT-SIDED CONGESTIVE HEART FAILURE (MULTI): ICD-10-CM

## 2024-07-01 DIAGNOSIS — E66.01 CLASS 3 SEVERE OBESITY DUE TO EXCESS CALORIES WITH SERIOUS COMORBIDITY AND BODY MASS INDEX (BMI) GREATER THAN OR EQUAL TO 70 IN ADULT (MULTI): ICD-10-CM

## 2024-07-01 DIAGNOSIS — I15.2 HYPERTENSION ASSOCIATED WITH DIABETES (MULTI): ICD-10-CM

## 2024-07-01 PROCEDURE — 3078F DIAST BP <80 MM HG: CPT | Performed by: INTERNAL MEDICINE

## 2024-07-01 PROCEDURE — 3052F HG A1C>EQUAL 8.0%<EQUAL 9.0%: CPT | Performed by: INTERNAL MEDICINE

## 2024-07-01 PROCEDURE — 3008F BODY MASS INDEX DOCD: CPT | Performed by: INTERNAL MEDICINE

## 2024-07-01 PROCEDURE — 3060F POS MICROALBUMINURIA REV: CPT | Performed by: INTERNAL MEDICINE

## 2024-07-01 PROCEDURE — 99214 OFFICE O/P EST MOD 30 MIN: CPT | Performed by: INTERNAL MEDICINE

## 2024-07-01 PROCEDURE — 1036F TOBACCO NON-USER: CPT | Performed by: INTERNAL MEDICINE

## 2024-07-01 PROCEDURE — 3077F SYST BP >= 140 MM HG: CPT | Performed by: INTERNAL MEDICINE

## 2024-07-01 RX ORDER — DICLOFENAC SODIUM 10 MG/G
2 GEL TOPICAL 4 TIMES DAILY
Qty: 100 G | Refills: 1 | Status: SHIPPED | OUTPATIENT
Start: 2024-07-01

## 2024-07-01 ASSESSMENT — ENCOUNTER SYMPTOMS
NUMBNESS: 0
CONFUSION: 0
CARDIOVASCULAR NEGATIVE: 1
RESPIRATORY NEGATIVE: 1
LIGHT-HEADEDNESS: 0
HEADACHES: 0
GASTROINTESTINAL NEGATIVE: 1
BACK PAIN: 0
COUGH: 0
DIARRHEA: 0
NAUSEA: 0
PALPITATIONS: 0
ARTHRALGIAS: 1
ALLERGIC/IMMUNOLOGIC NEGATIVE: 1
ABDOMINAL DISTENTION: 0
NECK STIFFNESS: 0
SHORTNESS OF BREATH: 0
WHEEZING: 0
AGITATION: 0
ADENOPATHY: 0
DYSURIA: 0
ENDOCRINE NEGATIVE: 1
EYE DISCHARGE: 0
VOMITING: 0
PSYCHIATRIC NEGATIVE: 1
CONSTITUTIONAL NEGATIVE: 1
ABDOMINAL PAIN: 0
NEUROLOGICAL NEGATIVE: 1
FEVER: 0
EYES NEGATIVE: 1
CONSTIPATION: 0
HEMATOLOGIC/LYMPHATIC NEGATIVE: 1
JOINT SWELLING: 0
CHILLS: 0

## 2024-07-01 NOTE — PROGRESS NOTES
Subjective   Patient ID: Roselia Mo is a 53 y.o. female who presents for Hospital Follow-up (Hospital discharge  for CHF).  Here for fu after dc for hypokalemia CHF    Co right wrist pain, chronic        Review of Systems   Constitutional: Negative.  Negative for chills and fever.   HENT: Negative.  Negative for congestion.    Eyes: Negative.  Negative for discharge.   Respiratory: Negative.  Negative for cough, shortness of breath and wheezing.    Cardiovascular: Negative.  Negative for chest pain, palpitations and leg swelling.   Gastrointestinal: Negative.  Negative for abdominal distention, abdominal pain, constipation, diarrhea, nausea and vomiting.   Endocrine: Negative.    Genitourinary: Negative.  Negative for dysuria and urgency.   Musculoskeletal:  Positive for arthralgias. Negative for back pain, joint swelling and neck stiffness.   Skin: Negative.  Negative for rash.   Allergic/Immunologic: Negative.  Negative for immunocompromised state.   Neurological: Negative.  Negative for light-headedness, numbness and headaches.   Hematological: Negative.  Negative for adenopathy.   Psychiatric/Behavioral: Negative.  Negative for agitation, behavioral problems and confusion.    All other systems reviewed and are negative.      Objective   Physical Exam  Vitals reviewed.   Constitutional:       General: She is not in acute distress.     Appearance: She is obese. She is ill-appearing (chronically).   HENT:      Head: Normocephalic and atraumatic.      Nose: Nose normal.   Eyes:      Conjunctiva/sclera: Conjunctivae normal.      Pupils: Pupils are equal, round, and reactive to light.   Neck:      Vascular: No carotid bruit.   Cardiovascular:      Rate and Rhythm: Normal rate and regular rhythm.      Pulses: Normal pulses.      Heart sounds:      No gallop.   Pulmonary:      Effort: Pulmonary effort is normal. No respiratory distress.      Breath sounds: Normal breath sounds. No wheezing.   Abdominal:       General: Bowel sounds are normal.      Palpations: Abdomen is soft.      Tenderness: There is no abdominal tenderness.   Musculoskeletal:         General: Swelling and tenderness (right wrist) present. Normal range of motion.      Cervical back: Normal range of motion. No rigidity.      Right lower leg: Edema (mild) present.      Left lower leg: Edema (mild) present.   Lymphadenopathy:      Cervical: No cervical adenopathy.   Skin:     General: Skin is warm.      Findings: No rash.   Neurological:      General: No focal deficit present.      Mental Status: She is alert and oriented to person, place, and time.   Psychiatric:         Mood and Affect: Mood normal.         Behavior: Behavior normal.       /75 (BP Location: Left arm, Patient Position: Sitting)   Pulse 67    Hemoglobin A1C   Date/Time Value Ref Range Status   03/19/2024 06:20 PM 9.0 (H) see below % Final     Assessment/Plan   Problem List Items Addressed This Visit       Chronic diastolic heart failure (Multi)    Relevant Orders    Referral to Advanced Heart Failure Program    Congestive heart failure (Multi)    Hypertension associated with diabetes (Multi)    Class 3 severe obesity due to excess calories with serious comorbidity and body mass index (BMI) greater than or equal to 70 in adult (Multi)    Type 2 diabetes mellitus (Multi)     Other Visit Diagnoses       Right wrist pain    -  Primary    Relevant Medications    diclofenac sodium (Voltaren) 1 % gel    Other Relevant Orders    XR wrist right 1-2 views             Diabetes Mellitus/IFG addressed as follow:    1800 [address] ADA  HGA1C GOAL LESS THAN 7  LOSE WT  EXERCISE DAILY        HTN addressed as follow:    MONITOR BP   GOAL BP LOWER THAN 130/80  LOW SALT  EXERCISE DAILY      Labs reviewed with pt    Sees dr hameed for her dm she adjust her pump  She is waiting dexcom sensors    Labs reviewed with pt        Fu 2 weeks with xr wrist bs log

## 2024-07-03 ENCOUNTER — TELEPHONE (OUTPATIENT)
Dept: PRIMARY CARE | Facility: CLINIC | Age: 54
End: 2024-07-03
Payer: MEDICARE

## 2024-07-03 NOTE — TELEPHONE ENCOUNTER
Pt insurance called her DME company is [employer] phone [phone] she is requesting order for powerchair

## 2024-07-04 ENCOUNTER — HOSPITAL ENCOUNTER (EMERGENCY)
Facility: HOSPITAL | Age: 54
Discharge: HOME | End: 2024-07-04
Attending: EMERGENCY MEDICINE
Payer: MEDICARE

## 2024-07-04 ENCOUNTER — HOSPITAL ENCOUNTER (OUTPATIENT)
Dept: CARDIOLOGY | Facility: HOSPITAL | Age: 54
Discharge: HOME | End: 2024-07-04
Payer: MEDICARE

## 2024-07-04 VITALS
RESPIRATION RATE: 20 BRPM | SYSTOLIC BLOOD PRESSURE: 131 MMHG | HEIGHT: 62 IN | DIASTOLIC BLOOD PRESSURE: 73 MMHG | BODY MASS INDEX: 53.92 KG/M2 | WEIGHT: 293 LBS | OXYGEN SATURATION: 97 % | TEMPERATURE: 97.8 F | HEART RATE: 85 BPM

## 2024-07-04 DIAGNOSIS — I49.8 VENTRICULAR BIGEMINY: ICD-10-CM

## 2024-07-04 DIAGNOSIS — R42 DIZZINESS: Primary | ICD-10-CM

## 2024-07-04 LAB
ANION GAP SERPL CALC-SCNC: 11 MMOL/L (ref 10–20)
BASOPHILS # BLD AUTO: 0.04 X10*3/UL (ref 0–0.1)
BASOPHILS NFR BLD AUTO: 0.5 %
BUN SERPL-MCNC: 9 MG/DL (ref 6–23)
CALCIUM SERPL-MCNC: 9.8 MG/DL (ref 8.6–10.3)
CHLORIDE SERPL-SCNC: 107 MMOL/L (ref 98–107)
CO2 SERPL-SCNC: 25 MMOL/L (ref 21–32)
CREAT SERPL-MCNC: 0.68 MG/DL (ref 0.5–1.05)
EGFRCR SERPLBLD CKD-EPI 2021: >90 ML/MIN/1.73M*2
EOSINOPHIL # BLD AUTO: 0.14 X10*3/UL (ref 0–0.7)
EOSINOPHIL NFR BLD AUTO: 1.9 %
ERYTHROCYTE [DISTWIDTH] IN BLOOD BY AUTOMATED COUNT: 13.6 % (ref 11.5–14.5)
GLUCOSE SERPL-MCNC: 211 MG/DL (ref 74–99)
HCT VFR BLD AUTO: 41.8 % (ref 36–46)
HGB BLD-MCNC: 12.8 G/DL (ref 12–16)
IMM GRANULOCYTES # BLD AUTO: 0.01 X10*3/UL (ref 0–0.7)
IMM GRANULOCYTES NFR BLD AUTO: 0.1 % (ref 0–0.9)
LYMPHOCYTES # BLD AUTO: 1.45 X10*3/UL (ref 1.2–4.8)
LYMPHOCYTES NFR BLD AUTO: 19.5 %
MCH RBC QN AUTO: 28.5 PG (ref 26–34)
MCHC RBC AUTO-ENTMCNC: 30.6 G/DL (ref 32–36)
MCV RBC AUTO: 93 FL (ref 80–100)
MONOCYTES # BLD AUTO: 0.63 X10*3/UL (ref 0.1–1)
MONOCYTES NFR BLD AUTO: 8.5 %
NEUTROPHILS # BLD AUTO: 5.15 X10*3/UL (ref 1.2–7.7)
NEUTROPHILS NFR BLD AUTO: 69.5 %
NRBC BLD-RTO: 0 /100 WBCS (ref 0–0)
PLATELET # BLD AUTO: 247 X10*3/UL (ref 150–450)
POTASSIUM SERPL-SCNC: 3.8 MMOL/L (ref 3.5–5.3)
RBC # BLD AUTO: 4.49 X10*6/UL (ref 4–5.2)
SODIUM SERPL-SCNC: 139 MMOL/L (ref 136–145)
WBC # BLD AUTO: 7.4 X10*3/UL (ref 4.4–11.3)

## 2024-07-04 PROCEDURE — 2500000005 HC RX 250 GENERAL PHARMACY W/O HCPCS: Performed by: EMERGENCY MEDICINE

## 2024-07-04 PROCEDURE — 36415 COLL VENOUS BLD VENIPUNCTURE: CPT | Performed by: EMERGENCY MEDICINE

## 2024-07-04 PROCEDURE — 85025 COMPLETE CBC W/AUTO DIFF WBC: CPT | Performed by: EMERGENCY MEDICINE

## 2024-07-04 PROCEDURE — 99283 EMERGENCY DEPT VISIT LOW MDM: CPT

## 2024-07-04 PROCEDURE — 93005 ELECTROCARDIOGRAM TRACING: CPT

## 2024-07-04 PROCEDURE — 80048 BASIC METABOLIC PNL TOTAL CA: CPT | Performed by: EMERGENCY MEDICINE

## 2024-07-04 PROCEDURE — 94760 N-INVAS EAR/PLS OXIMETRY 1: CPT

## 2024-07-04 ASSESSMENT — COLUMBIA-SUICIDE SEVERITY RATING SCALE - C-SSRS
1. IN THE PAST MONTH, HAVE YOU WISHED YOU WERE DEAD OR WISHED YOU COULD GO TO SLEEP AND NOT WAKE UP?: NO
2. HAVE YOU ACTUALLY HAD ANY THOUGHTS OF KILLING YOURSELF?: NO
6. HAVE YOU EVER DONE ANYTHING, STARTED TO DO ANYTHING, OR PREPARED TO DO ANYTHING TO END YOUR LIFE?: NO

## 2024-07-04 ASSESSMENT — PAIN - FUNCTIONAL ASSESSMENT: PAIN_FUNCTIONAL_ASSESSMENT: 0-10

## 2024-07-04 ASSESSMENT — PAIN SCALES - GENERAL
PAINLEVEL_OUTOF10: 0 - NO PAIN
PAINLEVEL_OUTOF10: 0 - NO PAIN

## 2024-07-05 ENCOUNTER — PATIENT OUTREACH (OUTPATIENT)
Dept: PRIMARY CARE | Facility: CLINIC | Age: 54
End: 2024-07-05
Payer: MEDICARE

## 2024-07-05 LAB
ATRIAL RATE: 79 BPM
ATRIAL RATE: 79 BPM
P AXIS: 78 DEGREES
P AXIS: 81 DEGREES
P OFFSET: 203 MS
P OFFSET: 204 MS
P ONSET: 143 MS
P ONSET: 144 MS
PR INTERVAL: 162 MS
PR INTERVAL: 174 MS
Q ONSET: 224 MS
Q ONSET: 231 MS
QRS COUNT: 13 BEATS
QRS COUNT: 13 BEATS
QRS DURATION: 54 MS
QRS DURATION: 72 MS
QT INTERVAL: 370 MS
QT INTERVAL: 374 MS
QTC CALCULATION(BAZETT): 424 MS
QTC CALCULATION(BAZETT): 428 MS
QTC FREDERICIA: 405 MS
QTC FREDERICIA: 410 MS
R AXIS: 123 DEGREES
R AXIS: 70 DEGREES
T AXIS: 56 DEGREES
T AXIS: 96 DEGREES
T OFFSET: 411 MS
T OFFSET: 416 MS
VENTRICULAR RATE: 79 BPM
VENTRICULAR RATE: 79 BPM

## 2024-07-05 NOTE — ED PROVIDER NOTES
53-year-old female presents with a chief complaint of dizziness.  It is described as lightheaded and intermittent for the past 4 days.  She does have a history of hypokalemia and they have been adjusting her medications.  When her potassium drops she has these dizzy spells.  She has no chest pain or difficulty breathing.  Denies room spinning.         Review of Systems     Physical Exam  Vitals and nursing note reviewed.   Constitutional:       General: She is not in acute distress.     Appearance: She is well-developed.   HENT:      Head: Normocephalic and atraumatic.   Eyes:      Conjunctiva/sclera: Conjunctivae normal.   Cardiovascular:      Rate and Rhythm: Normal rate and regular rhythm.      Heart sounds: No murmur heard.  Pulmonary:      Effort: Pulmonary effort is normal. No respiratory distress.      Breath sounds: Normal breath sounds.   Abdominal:      Palpations: Abdomen is soft.      Tenderness: There is no abdominal tenderness.   Musculoskeletal:         General: No swelling.      Cervical back: Neck supple.   Skin:     General: Skin is warm and dry.      Capillary Refill: Capillary refill takes less than 2 seconds.   Neurological:      Mental Status: She is alert.   Psychiatric:         Mood and Affect: Mood normal.          Labs Reviewed   CBC WITH AUTO DIFFERENTIAL - Abnormal       Result Value    WBC 7.4      nRBC 0.0      RBC 4.49      Hemoglobin 12.8      Hematocrit 41.8      MCV 93      MCH 28.5      MCHC 30.6 (*)     RDW 13.6      Platelets 247      Neutrophils % 69.5      Immature Granulocytes %, Automated 0.1      Lymphocytes % 19.5      Monocytes % 8.5      Eosinophils % 1.9      Basophils % 0.5      Neutrophils Absolute 5.15      Immature Granulocytes Absolute, Automated 0.01      Lymphocytes Absolute 1.45      Monocytes Absolute 0.63      Eosinophils Absolute 0.14      Basophils Absolute 0.04     BASIC METABOLIC PANEL - Abnormal    Glucose 211 (*)     Sodium 139      Potassium 3.8       Chloride 107      Bicarbonate 25      Anion Gap 11      Urea Nitrogen 9      Creatinine 0.68      eGFR >90      Calcium 9.8          No orders to display        Procedures     Medical Decision Making  53-year-old female presents with a chief complaint of dizziness.  It is described as lightheaded and intermittent for the past 4 days.  She does have a history of hypokalemia and they have been adjusting her medications.  When her potassium drops she has these dizzy spells.  She has no chest pain or difficulty breathing.  Denies room spinning.  On arrival IV access was obtained patient was placed on a monitor.  Patient is not hypokalemic with potassium of 3.8.  Initial EKG demonstrated PVCs in a bigeminy pattern and the repeat EKG demonstrated normal sinus rhythm with occasional PVCs and not in a bigeminy pattern.  Patient is encouraged to follow-up with her cardiologist.  Patient declined IV fluids.    DDx: Electrolyte abnormality, cardiac dysrhythmia, vertigo, dehydration    Amount and/or Complexity of Data Reviewed  ECG/medicine tests: independent interpretation performed.     Details: Sinus rhythm rate of 79 normal axis, frequent PVCs in a bigeminy pattern, no ST elevation or depression.    Repeat EKG demonstrates a sinus rhythm rate of 79 narrow complex normal axis and occasional PVCs not in the bigeminy pattern.          Diagnoses as of 07/04/24 2204   Dizziness   Ventricular bigeminy                    Dioni Sanchez MD  07/04/24 2204

## 2024-07-09 ENCOUNTER — APPOINTMENT (OUTPATIENT)
Dept: CARDIOLOGY | Facility: CLINIC | Age: 54
End: 2024-07-09
Payer: MEDICARE

## 2024-07-09 DIAGNOSIS — I50.812 CHRONIC RIGHT-SIDED CONGESTIVE HEART FAILURE (MULTI): ICD-10-CM

## 2024-07-09 DIAGNOSIS — E66.01 CLASS 3 SEVERE OBESITY DUE TO EXCESS CALORIES WITH SERIOUS COMORBIDITY AND BODY MASS INDEX (BMI) GREATER THAN OR EQUAL TO 70 IN ADULT (MULTI): ICD-10-CM

## 2024-07-09 DIAGNOSIS — Z99.3 WHEELCHAIR DEPENDENT: ICD-10-CM

## 2024-07-09 DIAGNOSIS — J43.9 PULMONARY EMPHYSEMA, UNSPECIFIED EMPHYSEMA TYPE (MULTI): Primary | ICD-10-CM

## 2024-07-10 ENCOUNTER — ANTICOAGULATION - WARFARIN VISIT (OUTPATIENT)
Dept: PHARMACY | Facility: HOSPITAL | Age: 54
End: 2024-07-10
Payer: MEDICARE

## 2024-07-10 ENCOUNTER — HOSPITAL ENCOUNTER (OUTPATIENT)
Dept: RADIOLOGY | Facility: HOSPITAL | Age: 54
Discharge: HOME | End: 2024-07-10
Payer: MEDICARE

## 2024-07-10 ENCOUNTER — LAB (OUTPATIENT)
Dept: LAB | Facility: LAB | Age: 54
End: 2024-07-10
Payer: MEDICARE

## 2024-07-10 DIAGNOSIS — I81 PORTAL VEIN THROMBOSIS: Primary | ICD-10-CM

## 2024-07-10 DIAGNOSIS — Z79.899 MEDICATION MANAGEMENT: ICD-10-CM

## 2024-07-10 DIAGNOSIS — M25.531 RIGHT WRIST PAIN: ICD-10-CM

## 2024-07-10 DIAGNOSIS — I82.0 HEPATIC VEIN THROMBOSIS (MULTI): ICD-10-CM

## 2024-07-10 LAB
ANION GAP SERPL CALC-SCNC: 13 MMOL/L (ref 10–20)
BUN SERPL-MCNC: 12 MG/DL (ref 6–23)
CALCIUM SERPL-MCNC: 9.9 MG/DL (ref 8.6–10.3)
CHLORIDE SERPL-SCNC: 106 MMOL/L (ref 98–107)
CO2 SERPL-SCNC: 25 MMOL/L (ref 21–32)
CREAT SERPL-MCNC: 0.68 MG/DL (ref 0.5–1.05)
EGFRCR SERPLBLD CKD-EPI 2021: >90 ML/MIN/1.73M*2
GLUCOSE SERPL-MCNC: 175 MG/DL (ref 74–99)
POC INR: 1.3
POC PROTHROMBIN TIME: NORMAL
POTASSIUM SERPL-SCNC: 4.5 MMOL/L (ref 3.5–5.3)
SODIUM SERPL-SCNC: 139 MMOL/L (ref 136–145)

## 2024-07-10 PROCEDURE — 36415 COLL VENOUS BLD VENIPUNCTURE: CPT

## 2024-07-10 PROCEDURE — 80368 SEDATIVE HYPNOTICS: CPT

## 2024-07-10 PROCEDURE — 73100 X-RAY EXAM OF WRIST: CPT | Mod: RT

## 2024-07-10 PROCEDURE — 80365 DRUG SCREENING OXYCODONE: CPT

## 2024-07-10 PROCEDURE — 73100 X-RAY EXAM OF WRIST: CPT | Mod: RIGHT SIDE | Performed by: RADIOLOGY

## 2024-07-10 PROCEDURE — 80354 DRUG SCREENING FENTANYL: CPT

## 2024-07-10 PROCEDURE — 80373 DRUG SCREENING TRAMADOL: CPT

## 2024-07-10 PROCEDURE — 80361 OPIATES 1 OR MORE: CPT

## 2024-07-10 PROCEDURE — 80346 BENZODIAZEPINES1-12: CPT

## 2024-07-10 PROCEDURE — 82570 ASSAY OF URINE CREATININE: CPT

## 2024-07-10 PROCEDURE — 80048 BASIC METABOLIC PNL TOTAL CA: CPT

## 2024-07-10 PROCEDURE — 80358 DRUG SCREENING METHADONE: CPT

## 2024-07-10 PROCEDURE — 85610 PROTHROMBIN TIME: CPT | Mod: QW

## 2024-07-10 PROCEDURE — 99211 OFF/OP EST MAY X REQ PHY/QHP: CPT | Performed by: PHARMACIST

## 2024-07-10 PROCEDURE — 80307 DRUG TEST PRSMV CHEM ANLYZR: CPT

## 2024-07-10 NOTE — PROGRESS NOTES
Pt enrolled in [facility] for management of Portal vein thrombosis [I81].     Pt current location in clinic.     Current anticoagulant: Warfarin    Time since last visit: 2 weeks    Last INR: 1.4 on warfarin 47.5 mg in the previous week. Pt was instructed to take 10 mg x 1 then continue with no changes at last visit.    Last Creatinine:   Lab Results   Component Value Date    CREATININE 0.68 07/04/2024     Last hemoglobin/hematocrit:  Lab Results   Component Value Date    HGB 12.8 07/04/2024     Lab Results   Component Value Date    HCT 41.8 07/04/2024       Current INR: 1.3 is SUBtherapeutic for goal range of 2.0-3.0 and is reflective of 47.5 mg in the previous week prior to visit.    Patient denies any missed doses.  Patient denies any medication changes, diet changes, or OTC/herbal supplement changes since last visit.  Patient denies any adverse reactions or barriers.  Patient denies any CP/SOB, fatigue, bleeding or bruising since last visit.   Pt notes she was in the ED and was diagnosed with PVC on her EKG.  Pt notes she did not have any med changes yet but is seeing her cardiologist on 7/16 (Dr Hyman).    Patient denies any change in alcohol or tobacco use since last visit.   Pt notes she recently started using the diclofenac gel for hand swelling and arthritis.    Patient denies any upcoming medical or dental procedures.    Plan:  Patient was instructed to  increase dose to  .  INR follow up will occur in 2 weeks.  Patient was instructed to maintain consistent vegetable intake, to monitor for any bruising or bleeding, and to call with any medication changes or concerns.    Pt handout given with above information    Gabriela Mckeon, PharmD  P:[phone]  F:[phone]

## 2024-07-11 ENCOUNTER — TELEPHONE (OUTPATIENT)
Dept: PRIMARY CARE | Facility: CLINIC | Age: 54
End: 2024-07-11
Payer: MEDICARE

## 2024-07-11 DIAGNOSIS — E11.42 DIABETIC POLYNEUROPATHY ASSOCIATED WITH TYPE 2 DIABETES MELLITUS (MULTI): Primary | ICD-10-CM

## 2024-07-11 LAB
AMPHETAMINES UR QL SCN: NORMAL
BARBITURATES UR QL SCN: NORMAL
BZE UR QL SCN: NORMAL
CANNABINOIDS UR QL SCN: NORMAL
CREAT UR-MCNC: 206.1 MG/DL (ref 20–320)
PCP UR QL SCN: NORMAL

## 2024-07-11 PROCEDURE — RXMED WILLOW AMBULATORY MEDICATION CHARGE

## 2024-07-11 RX ORDER — GABAPENTIN 300 MG/1
900 CAPSULE ORAL 3 TIMES DAILY
Qty: 270 CAPSULE | Refills: 2 | Status: SHIPPED | OUTPATIENT
Start: 2024-07-11 | End: 2024-10-09

## 2024-07-12 ENCOUNTER — PHARMACY VISIT (OUTPATIENT)
Dept: PHARMACY | Facility: CLINIC | Age: 54
End: 2024-07-12
Payer: COMMERCIAL

## 2024-07-12 LAB
1OH-MIDAZOLAM UR CFM-MCNC: <25 NG/ML
6MAM UR CFM-MCNC: <25 NG/ML
7AMINOCLONAZEPAM UR CFM-MCNC: <25 NG/ML
A-OH ALPRAZ UR CFM-MCNC: <25 NG/ML
ALPRAZ UR CFM-MCNC: <25 NG/ML
CHLORDIAZEP UR CFM-MCNC: <25 NG/ML
CLONAZEPAM UR CFM-MCNC: <25 NG/ML
CODEINE UR CFM-MCNC: <50 NG/ML
DIAZEPAM UR CFM-MCNC: <25 NG/ML
EDDP UR CFM-MCNC: <25 NG/ML
FENTANYL UR CFM-MCNC: <2.5 NG/ML
HYDROCODONE CTO UR CFM-MCNC: <25 NG/ML
HYDROMORPHONE UR CFM-MCNC: <25 NG/ML
LORAZEPAM UR CFM-MCNC: <25 NG/ML
METHADONE UR CFM-MCNC: <25 NG/ML
MIDAZOLAM UR CFM-MCNC: <25 NG/ML
MORPHINE UR CFM-MCNC: <50 NG/ML
NORDIAZEPAM UR CFM-MCNC: <25 NG/ML
NORFENTANYL UR CFM-MCNC: <2.5 NG/ML
NORHYDROCODONE UR CFM-MCNC: <25 NG/ML
NOROXYCODONE UR CFM-MCNC: <25 NG/ML
NORTRAMADOL UR-MCNC: <50 NG/ML
OXAZEPAM UR CFM-MCNC: <25 NG/ML
OXYCODONE UR CFM-MCNC: <25 NG/ML
OXYMORPHONE UR CFM-MCNC: <25 NG/ML
TEMAZEPAM UR CFM-MCNC: <25 NG/ML
TRAMADOL UR CFM-MCNC: <50 NG/ML
ZOLPIDEM UR CFM-MCNC: <25 NG/ML
ZOLPIDEM UR-MCNC: <25 NG/ML

## 2024-07-15 ENCOUNTER — PHARMACY VISIT (OUTPATIENT)
Dept: PHARMACY | Facility: CLINIC | Age: 54
End: 2024-07-15
Payer: COMMERCIAL

## 2024-07-15 ENCOUNTER — APPOINTMENT (OUTPATIENT)
Dept: PRIMARY CARE | Facility: CLINIC | Age: 54
End: 2024-07-15
Payer: MEDICARE

## 2024-07-15 VITALS
DIASTOLIC BLOOD PRESSURE: 80 MMHG | SYSTOLIC BLOOD PRESSURE: 160 MMHG | HEIGHT: 62 IN | HEART RATE: 70 BPM | WEIGHT: 293 LBS | BODY MASS INDEX: 53.92 KG/M2

## 2024-07-15 DIAGNOSIS — Z79.4 TYPE 2 DIABETES MELLITUS WITH HYPERGLYCEMIA, WITH LONG-TERM CURRENT USE OF INSULIN (MULTI): ICD-10-CM

## 2024-07-15 DIAGNOSIS — M77.9 TENDONITIS: Primary | ICD-10-CM

## 2024-07-15 DIAGNOSIS — I15.2 HYPERTENSION ASSOCIATED WITH DIABETES (MULTI): ICD-10-CM

## 2024-07-15 DIAGNOSIS — E11.59 HYPERTENSION ASSOCIATED WITH DIABETES (MULTI): ICD-10-CM

## 2024-07-15 DIAGNOSIS — E11.65 TYPE 2 DIABETES MELLITUS WITH HYPERGLYCEMIA, WITH LONG-TERM CURRENT USE OF INSULIN (MULTI): ICD-10-CM

## 2024-07-15 PROCEDURE — 20550 NJX 1 TENDON SHEATH/LIGAMENT: CPT | Performed by: INTERNAL MEDICINE

## 2024-07-15 PROCEDURE — 3008F BODY MASS INDEX DOCD: CPT | Performed by: INTERNAL MEDICINE

## 2024-07-15 PROCEDURE — RXMED WILLOW AMBULATORY MEDICATION CHARGE

## 2024-07-15 PROCEDURE — 3060F POS MICROALBUMINURIA REV: CPT | Performed by: INTERNAL MEDICINE

## 2024-07-15 PROCEDURE — 1036F TOBACCO NON-USER: CPT | Performed by: INTERNAL MEDICINE

## 2024-07-15 PROCEDURE — 3079F DIAST BP 80-89 MM HG: CPT | Performed by: INTERNAL MEDICINE

## 2024-07-15 PROCEDURE — 3052F HG A1C>EQUAL 8.0%<EQUAL 9.0%: CPT | Performed by: INTERNAL MEDICINE

## 2024-07-15 PROCEDURE — 3077F SYST BP >= 140 MM HG: CPT | Performed by: INTERNAL MEDICINE

## 2024-07-15 PROCEDURE — 99214 OFFICE O/P EST MOD 30 MIN: CPT | Performed by: INTERNAL MEDICINE

## 2024-07-15 RX ORDER — AMLODIPINE BESYLATE 5 MG/1
5 TABLET ORAL DAILY
Qty: 30 TABLET | Refills: 11 | Status: SHIPPED | OUTPATIENT
Start: 2024-07-15 | End: 2025-07-15

## 2024-07-15 RX ORDER — TRIAMCINOLONE ACETONIDE 40 MG/ML
20 INJECTION, SUSPENSION INTRA-ARTICULAR; INTRAMUSCULAR ONCE
Status: COMPLETED | OUTPATIENT
Start: 2024-07-15 | End: 2024-07-15

## 2024-07-15 ASSESSMENT — ENCOUNTER SYMPTOMS
HEMATOLOGIC/LYMPHATIC NEGATIVE: 1
ABDOMINAL PAIN: 0
ALLERGIC/IMMUNOLOGIC NEGATIVE: 1
CHILLS: 0
LIGHT-HEADEDNESS: 0
ENDOCRINE NEGATIVE: 1
COUGH: 0
HEADACHES: 0
FEVER: 0
CARDIOVASCULAR NEGATIVE: 1
RESPIRATORY NEGATIVE: 1
NECK STIFFNESS: 0
PSYCHIATRIC NEGATIVE: 1
CONSTIPATION: 0
NAUSEA: 0
DIARRHEA: 0
NUMBNESS: 0
EYES NEGATIVE: 1
BACK PAIN: 0
DYSURIA: 0
PALPITATIONS: 0
SHORTNESS OF BREATH: 0
NEUROLOGICAL NEGATIVE: 1
WHEEZING: 0
ABDOMINAL DISTENTION: 0
ARTHRALGIAS: 1
GASTROINTESTINAL NEGATIVE: 1
AGITATION: 0
CONFUSION: 0
EYE DISCHARGE: 0
ADENOPATHY: 0
CONSTITUTIONAL NEGATIVE: 1
VOMITING: 0
JOINT SWELLING: 0

## 2024-07-15 NOTE — PROGRESS NOTES
Subjective   Patient ID: Roselia Mo is a 53 y.o. female who presents for Follow-up (2 wk fu wrist xray).  Here for fu after wrist xr  Still has wrist pain both now  Right more, voltaren helped a little        Review of Systems   Constitutional: Negative.  Negative for chills and fever.   HENT: Negative.  Negative for congestion.    Eyes: Negative.  Negative for discharge.   Respiratory: Negative.  Negative for cough, shortness of breath and wheezing.    Cardiovascular: Negative.  Negative for chest pain, palpitations and leg swelling.   Gastrointestinal: Negative.  Negative for abdominal distention, abdominal pain, constipation, diarrhea, nausea and vomiting.   Endocrine: Negative.    Genitourinary: Negative.  Negative for dysuria and urgency.   Musculoskeletal:  Positive for arthralgias. Negative for back pain, joint swelling and neck stiffness.   Skin: Negative.  Negative for rash.   Allergic/Immunologic: Negative.  Negative for immunocompromised state.   Neurological: Negative.  Negative for light-headedness, numbness and headaches.   Hematological: Negative.  Negative for adenopathy.   Psychiatric/Behavioral: Negative.  Negative for agitation, behavioral problems and confusion.    All other systems reviewed and are negative.      Objective   Physical Exam  Vitals reviewed.   Constitutional:       General: She is not in acute distress.     Appearance: She is obese. She is ill-appearing (chronically).   HENT:      Head: Normocephalic and atraumatic.      Nose: Nose normal.   Eyes:      Conjunctiva/sclera: Conjunctivae normal.      Pupils: Pupils are equal, round, and reactive to light.   Neck:      Vascular: No carotid bruit.   Cardiovascular:      Rate and Rhythm: Normal rate and regular rhythm.      Pulses: Normal pulses.      Heart sounds:      No gallop.   Pulmonary:      Effort: Pulmonary effort is normal. No respiratory distress.      Breath sounds: Normal breath sounds. No wheezing.   Abdominal:       "General: Bowel sounds are normal.      Palpations: Abdomen is soft.      Tenderness: There is no abdominal tenderness.   Musculoskeletal:         General: Tenderness (right snuff box) present. Normal range of motion.      Cervical back: Normal range of motion. No rigidity.   Lymphadenopathy:      Cervical: No cervical adenopathy.   Skin:     General: Skin is warm.      Findings: No rash.   Neurological:      General: No focal deficit present.      Mental Status: She is alert and oriented to person, place, and time.   Psychiatric:         Mood and Affect: Mood normal.         Behavior: Behavior normal.       /80 (BP Location: Left arm, Patient Position: Sitting)   Pulse 70   Ht 1.575 m (5' 2\")   Wt (!) 176 kg (388 lb)   BMI 70.97 kg/m²    Hemoglobin A1C   Date/Time Value Ref Range Status   03/19/2024 06:20 PM 9.0 (H) see below % Final     Assessment/Plan   Problem List Items Addressed This Visit       Hypertension associated with diabetes (Multi)    Relevant Medications    amLODIPine (Norvasc) 5 mg tablet    Other Relevant Orders    Comprehensive Metabolic Panel    CBC and Auto Differential    Type 2 diabetes mellitus (Multi)    Relevant Orders    Comprehensive Metabolic Panel    Hemoglobin A1C     Other Visit Diagnoses       Tendonitis    -  Primary    Relevant Medications    triamcinolone acetonide (Kenalog-40) injection 20 mg (Completed)    Other Relevant Orders    Comprehensive Metabolic Panel    CBC and Auto Differential               HTN addressed as follow:    MONITOR BP   GOAL BP LOWER THAN 130/80  LOW SALT  EXERCISE DAILY    Labs reviewed with pt    UNDER STERILE CONDITION 20 MG KENOLOG IN 0.5 ML LIDOCAINE 1% INJECTED IN THE RIGHT SNUFF BOX, PT DID FINE NO COMPLICATIONS HAPPENED.    FU 1 MO           "

## 2024-07-16 ENCOUNTER — APPOINTMENT (OUTPATIENT)
Dept: CARDIOLOGY | Facility: CLINIC | Age: 54
End: 2024-07-16
Payer: MEDICARE

## 2024-07-16 VITALS
DIASTOLIC BLOOD PRESSURE: 62 MMHG | HEART RATE: 66 BPM | WEIGHT: 293 LBS | OXYGEN SATURATION: 94 % | BODY MASS INDEX: 53.92 KG/M2 | SYSTOLIC BLOOD PRESSURE: 116 MMHG | HEIGHT: 62 IN

## 2024-07-16 DIAGNOSIS — L40.9 PSORIASIS: ICD-10-CM

## 2024-07-16 DIAGNOSIS — I10 HYPERTENSION, UNSPECIFIED TYPE: Primary | ICD-10-CM

## 2024-07-16 DIAGNOSIS — E78.5 HYPERLIPIDEMIA, UNSPECIFIED HYPERLIPIDEMIA TYPE: ICD-10-CM

## 2024-07-16 PROCEDURE — 3008F BODY MASS INDEX DOCD: CPT | Performed by: STUDENT IN AN ORGANIZED HEALTH CARE EDUCATION/TRAINING PROGRAM

## 2024-07-16 PROCEDURE — 3074F SYST BP LT 130 MM HG: CPT | Performed by: STUDENT IN AN ORGANIZED HEALTH CARE EDUCATION/TRAINING PROGRAM

## 2024-07-16 PROCEDURE — 3078F DIAST BP <80 MM HG: CPT | Performed by: STUDENT IN AN ORGANIZED HEALTH CARE EDUCATION/TRAINING PROGRAM

## 2024-07-16 PROCEDURE — 3060F POS MICROALBUMINURIA REV: CPT | Performed by: STUDENT IN AN ORGANIZED HEALTH CARE EDUCATION/TRAINING PROGRAM

## 2024-07-16 PROCEDURE — 3052F HG A1C>EQUAL 8.0%<EQUAL 9.0%: CPT | Performed by: STUDENT IN AN ORGANIZED HEALTH CARE EDUCATION/TRAINING PROGRAM

## 2024-07-16 PROCEDURE — 1036F TOBACCO NON-USER: CPT | Performed by: STUDENT IN AN ORGANIZED HEALTH CARE EDUCATION/TRAINING PROGRAM

## 2024-07-16 PROCEDURE — 99214 OFFICE O/P EST MOD 30 MIN: CPT | Performed by: STUDENT IN AN ORGANIZED HEALTH CARE EDUCATION/TRAINING PROGRAM

## 2024-07-16 RX ORDER — POLYETHYLENE GLYCOL 3350 17 G/17G
17 POWDER, FOR SOLUTION ORAL ONCE AS NEEDED
COMMUNITY

## 2024-07-16 NOTE — PROGRESS NOTES
Chief Complaint   Patient presents with    3 month f/u     HPI:  I was requested by Dr. Knight to evaluate this patient in consultation for cardiac assessment.     ..Mrs. Roselia Mo is a 53 y.o. 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 Patient coming for establishment of cardiovascular follow up. She reports that she is currently treating a diffuse infection in her abdomen and L breast due to Staphylococcus. She endorses worsening in her leg edema due to the infection. She complains of chronic shortness of breath. She denies chest pain, lightheadedness, headaches, fever, chills, orthopnea, paroxysmal nocturnal dyspnea or syncope.     EKG shows normal sinus rhythm with no signs of ACS.   The echocardiogram (03/2024) showed normal LVEF 65% with no wall motion abnormalities.   Nuclear stress test (03/2024) is normal.     Patient was admitted on 06/2024 for r chest pain and cellulitis. Patient with recurrent admissions to hospital for treatment of infections. Last time, she was treating a diffuse infection in her abdomen and L breast due to Staphylococcus. She endorses worsening in her leg edema due to the infection. She complains of chronic shortness of breath. She had some chest pain, but it has resolved now. She denies lightheadedness, headaches, fever, chills, orthopnea, paroxysmal nocturnal dyspnea or syncope.  She presented to ED [facility] on 05/08/2024 complaining of cellulitis in her right lower abdominal fold and rash under left breast, failed outpatient antibiotics sent by her primary doctors for further evaluation.  CTA chest abdominal pelvis showing no pulmonary embolism and has chronic hernia.  No fistulous tract and no fluid collection.  Also pulmonary edema bilaterally/fibrotic changes with ground glass opacity.  She gets very tired upon ambulation.  EKG shows normal sinus rhythm, frequent PVCs and no signs of ACS. Troponin 4-5. Patient was admitted for clinical compensation. She was discharged uneventfully.    Past Medical History  Past Medical History:   Diagnosis Date    Arthritis     Asthma ([facility])     CHF (congestive heart failure) (Multi)     CKD (chronic kidney disease) stage 3, GFR 30-59 ml/min (Multi)     COPD (chronic obstructive pulmonary disease) (Multi)     Cough 2024    Diabetes mellitus (Multi)     Disease of thyroid gland     Hypertension     Lymphedema     Pulmonary HTN (Multi)     Shortness of breath 2024    Tachycardia 2024       Past Surgical History  Past Surgical History:   Procedure Laterality Date    CARPAL TUNNEL RELEASE Bilateral      SECTION, LOW TRANSVERSE       AND     [facility]  [facility]      WITH MESH    HYSTERECTOMY      2 PARTIAL    KNEE SURGERY Left     MINISCUS REPAIR       Past Family History  Family History   Problem Relation Name Age of Onset    Blindness Mother      Hypertension Mother      Hyperlipidemia Mother      Heart disease Mother      Heart failure Father      Hypertension Father      Hyperlipidemia Father      Diabetes Father      Skin cancer Father      Blindness Maternal Grandmother      Hypertension Maternal Grandmother      Hyperlipidemia Maternal Grandmother      Heart failure Maternal Grandmother      Dementia Paternal Grandmother      Heart attack Paternal Grandfather      Hypertension Paternal Grandfather      Hyperlipidemia Paternal Grandfather         Allergy History  Allergies   Allergen Reactions    Dulaglutide Other     Pancreatitis  Does not avoid any foods related    Nickel  Diarrhea, Nausea And Vomiting, Rash and GI Upset     Cobalt, white gold  Avoids most high Nickel foods not strictly, chooses to continue to use metal utensils.   Avoids green leafy vegetables, tap water, coffee, tea, oatmeal.  Wheat sometimes gives a rash but still eats.    No severe reaction to these foods only if eats too much get diarrhea.     Metformin Diarrhea, Other and Unknown    Palladium Rash     by allergy testing.  Does not avoid any foods related    Cobalt Rash    Exenatide Other     pancreantitis   pancreantitis    pancreantitis    Sitagliptin Other     pancreantitis       Past Social History  Social History     Socioeconomic History    Marital status:    Tobacco Use    Smoking status: Former     Current packs/day: 0.00     Average packs/day: 1 pack/day for 37.8 years (37.8 ttl pk-yrs)     Types: Cigarettes     Start date: 1977     Quit date: 2014     Years since quittin.7     Passive exposure: Past    Smokeless tobacco: Never    Tobacco comments:     CBD vaping   Vaping Use    Vaping status: Former   Substance and Sexual Activity    Alcohol use: Not Currently     Comment: rarely    Drug use: Not Currently     Frequency: 7.0 times per week     Types: Marijuana     Comment: one or two bowels per day    Sexual activity: Defer     Social Determinants of Health     Financial Resource Strain: Low Risk  (2024)    Overall Financial Resource Strain (CARDIA)     Difficulty of Paying Living Expenses: Not hard at all   Recent Concern: Financial Resource Strain - Medium Risk (3/20/2024)    Overall Financial Resource Strain (CARDIA)     Difficulty of Paying Living Expenses: Somewhat hard   Food Insecurity: No Food Insecurity (3/23/2024)    Hunger Vital Sign     Worried About Running Out of Food in the Last Year: Never true     Ran Out of Food in the Last Year: Never true   Transportation Needs: No Transportation Needs (2024)    PRAPARE - Transportation     Lack of Transportation  (Medical): No     Lack of Transportation (Non-Medical): No   Physical Activity: Inactive (3/23/2024)    Exercise Vital Sign     Days of Exercise per Week: 0 days     Minutes of Exercise per Session: 0 min   Stress: Stress Concern Present (3/23/2024)    [employer] - Occupational Stress Questionnaire     Feeling of Stress : Very much   Social Connections: Unknown (3/23/2024)    Social Connection and Isolation Panel [NHANES]     Frequency of Communication with Friends and Family: Never     Frequency of Social Gatherings with Friends and Family: Never     Attends Confucianist Services: Never     Active Member of Clubs or Organizations: Yes     Attends Club or Organization Meetings: Never     Marital Status: Patient declined   Intimate Partner Violence: Patient Declined (3/23/2024)    Humiliation, Afraid, Rape, and Kick questionnaire     Fear of Current or Ex-Partner: Patient declined     Emotionally Abused: Patient declined     Physically Abused: Patient declined     Sexually Abused: Patient declined   Housing Stability: Low Risk  (2024)    Housing Stability Vital Sign     Unable to Pay for Housing in the Last Year: No     Number of Places Lived in the Last Year: 1     Unstable Housing in the Last Year: No   Recent Concern: Housing Stability - High Risk (2024)    Housing Stability Vital Sign     Unable to Pay for Housing in the Last Year: Yes     Number of Places Lived in the Last Year: 2     Unstable Housing in the Last Year: Yes       Social History     Tobacco Use   Smoking Status Former    Current packs/day: 0.00    Average packs/day: 1 pack/day for 37.8 years (37.8 ttl pk-yrs)    Types: Cigarettes    Start date: 1977    Quit date: 2014    Years since quittin.7    Passive exposure: Past   Smokeless Tobacco Never   Tobacco Comments    CBD vaping       Objective Data:  Last Recorded Vitals:  Vitals:    24 1333   BP: 116/62   Pulse: 66   SpO2: 94%   Weight: (!)  "168 kg (370 lb 3.2 oz)   Height: 1.575 m (5' 2\")       Last Labs:  CBC - 7/4/2024:  8:43 PM  7.4 12.8 247    41.8      CMP - 7/10/2024:  1:07 PM  9.9 7.9 32 --- 0.8   2.7 4.5 15 338      PTT - 6/12/2024:  9:46 PM  1.30   22.4 48     TROPHS   Date/Time Value Ref Range Status   06/13/2024 03:58 PM 9 0 - 13 ng/L Final   06/13/2024 11:44 AM 9 0 - 13 ng/L Final   05/08/2024 03:17 PM 5 0 - 13 ng/L Final     BNP   Date/Time Value Ref Range Status   05/08/2024 02:12  0 - 99 pg/mL Final   04/23/2024 07:45  0 - 99 pg/mL Final     HGBA1C   Date/Time Value Ref Range Status   03/19/2024 06:20 PM 9.0 see below % Final   02/01/2024 12:31 PM 8.3 see below % Final     LDLCALC   Date/Time Value Ref Range Status   02/01/2024 04:50 PM   Final     Comment:     The calculation of LDL and VLDL are inaccurate when the Triglycerides are greater than 400 mg/dL or when the patient is non-fasting. If LDL measurement is necessary contact the testing laboratory for an alternative LDL assay.                                  Near   Borderline      AGE      Desirable  Optimal    High     High     Very High     0-19 Y     0 - 109     ---    110-129   >/= 130     ----    20-24 Y     0 - 119     ---    120-159   >/= 160     ----      >24 Y     0 -  99   100-129  130-159   160-189     >/=190       VLDL   Date/Time Value Ref Range Status   02/01/2024 04:50 PM   Final     Comment:     Unable to calculate VLDL.        Patient Medications:  Outpatient Encounter Medications as of 7/16/2024   Medication Sig Dispense Refill    acetaminophen (Tylenol) 500 mg tablet Take 1 tablet (500 mg) by mouth every 6 hours if needed for mild pain (1 - 3).      albuterol 90 mcg/actuation aerosol powdr breath activated inhaler Inhale 2 puffs every 4 hours if needed for wheezing or shortness of breath.      allopurinol (Zyloprim) 300 mg tablet Take 1 tablet (300 mg) by mouth once daily. 90 tablet 3    aMILoride (Midamor) 5 mg tablet Take 2 tablets (10 mg) by mouth " 2 times a day. 360 tablet 3    amLODIPine (Norvasc) 5 mg tablet Take 1 tablet (5 mg) by mouth once daily. 30 tablet 11    buPROPion XL (Wellbutrin XL) 150 mg 24 hr tablet Take 1 tablet (150 mg) by mouth once daily in the morning. Do not crush, chew, or split. 90 tablet 3    cetirizine (ZyrTEC) 10 mg tablet Take 1 tablet (10 mg) by mouth once daily.      diclofenac sodium (Voltaren) 1 % gel Apply 2.25 inches (2 g) topically 4 times a day. 100 g 1    DULoxetine (Cymbalta) 60 mg DR capsule Take 1 capsule (60 mg) by mouth once daily. Do not crush or chew. 90 capsule 3    empagliflozin (Jardiance) 10 mg Take 1 tablet (10 mg) by mouth once daily. 100 tablet 3    gabapentin (Neurontin) 300 mg capsule Take 3 capsules (900 mg) by mouth 3 times a day. 270 capsule 2    insulin regular, human (HUMULIN R U-500, CONC, INSULIN SUBQ) Inject under the skin. Take as directed per insulin instructions. Use U-500 insulin syringe. Currently using syringe as pump is not working.      levothyroxine (Synthroid, Levoxyl) 200 mcg tablet Take 1 tablet (200 mcg) by mouth once daily. 90 tablet 3    levothyroxine (Synthroid, Levoxyl) 50 mcg tablet Take 1 tablet (50 mcg) by mouth once daily in the morning. Take before meals. TAKE WITH 200MG 90 tablet 3    lidocaine (Lidoderm) 5 % patch Place 1 patch over 12 hours on the skin once daily. Apply to painful area 12 hours per day, remove for 12 hours. 30 patch 0    lubiprostone (Amitiza) 24 mcg capsule Take 1 capsule (24 mcg) by mouth 2 times a day with meals. (Patient taking differently: Take 1 capsule (24 mcg) by mouth 2 times a day as needed.) 180 capsule 3    magnesium oxide (Mag-Ox) 400 mg (241.3 mg magnesium) tablet Take 1 tablet (400 mg) by mouth 2 times a day. 60 tablet 11    metoprolol succinate XL (Toprol-XL) 50 mg 24 hr tablet Take 1 tablet (50 mg) by mouth 2 times a day. Do not crush or chew. 90 tablet 3    nystatin (Mycostatin) 100,000 unit/gram powder Apply 1 Application topically 2 times  a day. 60 g 0    oxygen (O2) gas therapy Inhale 1 each every 12 hours.      pantoprazole (ProtoNix) 40 mg EC tablet Take 1 tablet (40 mg) by mouth 2 times a day. Do not crush, chew, or split.      polyethylene glycol (Glycolax, Miralax) 17 gram packet Take 17 g by mouth 1 time if needed.      rosuvastatin (Crestor) 40 mg tablet Take 1 tablet (40 mg) by mouth once daily.      topiramate (Topamax) 25 mg tablet Take 1 tablet (25 mg) by mouth 2 times a day. 60 tablet 11    traZODone (Desyrel) 100 mg tablet Take 1 tablet (100 mg) by mouth once daily at bedtime. 90 tablet 3    warfarin (Coumadin) 5 mg tablet Take as directed per After Visit Summary. 30 tablet 0    warfarin (Coumadin) 7.5 mg tablet Take as directed per After Visit Summary. Do not fill before June 11, 2024. 30 tablet 0    - refin regular (HumuLIN R U-500) 500 unit/mL CONCENTRATED - refill for patient own pump Inject 1 mL (1 each) under the skin if needed (VIA INSULIN PUMP). Take as directed per insulin instructions. Use U-500 insulin syringe. (Patient not taking: Reported on 7/16/2024) 20 mL 0    [DISCONTINUED] gabapentin (Neurontin) 300 mg capsule Take 3 capsules (900 mg) by mouth 4 times a day.       No facility-administered encounter medications on file as of 7/16/2024.       Physical Exam:  General: alert, oriented and in no acute distress. Morbid obesity  HEENT: NC/AT; EOMI; PERRLA, external ear is normal  Neck: supple; trachea midline; no masses; no JVD  Chest: diminished breath sounds bilaterally; on home O2  Cardio: regular rhythm, S1S2 normal, no murmurs  Abdomen: Difficult assessment due to obesity  Extremities: Lymphedema bilaterally  Neuro: Grossly intact     Psychiatric: Normal mood and affect    Past Cardiology Results (Last 3 Years):  EKG:  ECG 12 lead 07/04/2024      ECG 12 lead 07/04/2024      ECG 12 Lead 06/13/2024      ECG 12 lead 06/12/2024      ECG 12 Lead 06/06/2024      ECG 12 lead 06/06/2024      ECG 12 lead 06/06/2024      ECG 12  lead       ECG 12 Lead 05/08/2024      ECG 12 Lead 04/08/2024      ECG 12 lead 04/07/2024      ECG 12 lead 03/28/2024      ECG 12 lead 03/19/2024      ECG 12 Lead 03/13/2024      ECG 12 lead 02/04/2024      ECG 12 Lead 02/01/2024 (Wet Read)    Echo:  Echo Results:  Transthoracic Echo (TTE) Complete With Contrast 03/20/2024    [place] [address]  Phone [phone], Fax [phone]    TRANSTHORACIC ECHOCARDIOGRAM REPORT      Patient Name:      RAY GUERRA       Reading Physician:    [id number] Thompson Burnett MD  Study Date:        3/20/2024             Ordering Provider:    [id number] NIMESH LEMUS  MRN/PID:           [id number]              Fellow:  Accession#:        [id number]          Nurse:                Anitha Agrawal RN  Date of Birth/Age: 1970 / 53 years Sonographer:          STEPHANIE Ortega RVT  Gender:            F                     Additional Staff:  Height:            157.48 cm             Admit Date:           3/19/2024  Weight:            167.83 kg             Admission Status:     Inpatient -  Routine  BSA / BMI:         2.48 m2 / 67.67 kg/m2 Department Location:  [address]  Blood Pressure: 141 /80 mmHg    Study Type:    TRANSTHORACIC ECHO (TTE) COMPLETE  Diagnosis/ICD: Chronic diastolic (congestive) heart failure (CHF)-I50.32  Indication:    Congestive Heart Failure  CPT Codes:     Echo Complete w Full Doppler-[id number]    Patient History:  Pertinent History: No previous echo.    Study Detail: The following Echo studies were performed: 2D, M-Mode, Doppler and  color flow. Technically challenging study due to poor acoustic  windows, patient lying in supine position, body habitus and Wt 370  lbs. Definity used as a contrast agent for endocardial border  definition. Total contrast used for this procedure was 2 mL via IV  push. A bubble study was not performed. The patient was awake.      PHYSICIAN INTERPRETATION:  Left Ventricle:  Left ventricular systolic function is normal, with an estimated ejection fraction of 65%. There are no regional wall motion abnormalities. The left ventricular cavity size is normal. Left ventricular diastolic filling was indeterminate.  Left Atrium: The left atrium was not well visualized.  Right Ventricle: The right ventricle was not well visualized. Unable to determine right ventricular systolic function.  Right Atrium: The right atrium was not well visualized.  Aortic Valve: The aortic valve was not well visualized. There is no evidence of aortic valve regurgitation. The peak instantaneous gradient of the aortic valve is 12.2 mmHg. The mean gradient of the aortic valve is 7.0 mmHg.  Mitral Valve: The mitral valve was not well visualized. Mitral valve regurgitation was not assessed.  Tricuspid Valve: The tricuspid valve was not well visualized. Tricuspid regurgitation was not assessed.  Pulmonic Valve: The pulmonic valve is not well visualized. The pulmonic valve regurgitation was not well visualized.  Pericardium: There is no pericardial effusion noted.  Aorta: The aortic root was not well visualized.      CONCLUSIONS:  1. Left ventricular systolic function is normal with a 65% estimated ejection fraction.  2. Poorly visualized anatomical structures due to suboptimal image quality.    RECOMMENDATIONS:  Technically suboptimal and limited study, therefore accuracy of above interpretation could be substantially diminished. Clinical correlation is advised. Consider additional imaging modalities if clinically indicated.    QUANTITATIVE DATA SUMMARY:  2D MEASUREMENTS:  Normal Ranges:  Ao Root d:     3.10 cm   (2.0-3.7cm)  LAs:           3.40 cm   (2.7-4.0cm)  IVSd:          1.20 cm   (0.6-1.1cm)  LVPWd:         1.06 cm   (0.6-1.1cm)  LVIDd:         3.68 cm   (3.9-5.9cm)  LVIDs:         2.23 cm  LV Mass Index: 53.8 g/m2  LV % FS        39.4 %    LA VOLUME:  Normal Ranges:  LA Vol A4C:        45.1 ml   (22+/-6mL/m2)  LA  Vol Index A4C:  18.1ml/m2  LA Area A4C:       15.9 cm2  LA Major Axis A4C: 4.8 cm  LA Vol A4C:        43.9 ml    LV SYSTOLIC FUNCTION BY 2D PLANIMETRY (MOD):  Normal Ranges:  EF-A4C View: 39.8 % (>=55%)  EF-A2C View: 62.3 %  EF-Biplane:  52.5 %    LV DIASTOLIC FUNCTION:  Normal Ranges:  MV Peak E:    1.03 m/s (0.7-1.2 m/s)  MV Peak A:    1.25 m/s (0.42-0.7 m/s)  E/A Ratio:    0.82     (1.0-2.2)  MV lateral e' 0.09 m/s  MV medial e'  0.05 m/s    MITRAL VALVE:  Normal Ranges:  MV DT: 176 msec (150-240msec)    AORTIC VALVE:  Normal Ranges:  AoV Vmax:                1.75 m/s  (<=1.7m/s)  AoV Peak P.2 mmHg (<20mmHg)  AoV Mean P.0 mmHg  (1.7-11.5mmHg)  LVOT Max Sean:            1.71 m/s  (<=1.1m/s)  AoV VTI:                 28.90 cm  (18-25cm)  LVOT VTI:                38.80 cm  LVOT Diameter:           2.40 cm   (1.8-2.4cm)  AoV Area, VTI:           6.07 cm2  (2.5-5.5cm2)  AoV Area,Vmax:           4.42 cm2  (2.5-4.5cm2)  AoV Dimensionless Index: 1.34    TRICUSPID VALVE/RVSP:  Normal Ranges:  Peak TR Velocity: 2.10 m/s  RV Syst Pressure: 20.6 mmHg (< 30mmHg)    PULMONIC VALVE:  Normal Ranges:  PV Accel Time: 49 msec  (>120ms)  PV Max Sean:    1.3 m/s  (0.6-0.9m/s)  PV Max P.7 mmHg      [id number] Thompson Burnett MD  Electronically signed on 3/20/2024 at 10:52:09 AM        ** Final **     Cath:  No results found for this or any previous visit from the past 1095 days.    CV NCDR CATHPCI V5 COLLECTION FORM   Stress Test:  Nuclear Stress Test 2024    Cardiac Imaging:  No results found for this or any previous visit from the past 1095 days.       Assessment/Plan     ..Mrs. Roselia Mo is a 53 y.o. year old former smoker diabetic female patient with past medical history significant for morbid obesity (412 lb), COPD on home oxygen, fibromyalgia, hypertension, diabetes, hyperlipidemia, hypothyroidism, diastolic heart failure (LVEF 65%), Budd-Chiari Sd, osteoarthritis, wheelchair  bounded, CKD stage 3, GERD, Gout, Lymphedema, PAD, venous congestion, anasarca, anxiety, depression, tremor, coming for assessment of chronic CHF. Patient coming for establishment of cardiovascular follow up. She reports that she is currently treating a diffuse infection in her abdomen and L breast due to Staphylococcus. She endorses worsening in her leg edema due to the infection. She complains of chronic shortness of breath. She denies chest pain, lightheadedness, headaches, fever, chills, orthopnea, paroxysmal nocturnal dyspnea or syncope.      # Heart Failure with Preserved LV Systolic function  - EKG shows normal sinus rhythm with no signs of ACS.   - The echocardiogram (03/2024) showed normal LVEF 65% with no wall motion abnormalities.   - Nuclear stress test (03/2024) is normal.   - Low sodium diet (2g).  - Fluid restriction.  - Electrolyte control and daily BMP including magnesium.  - General recommendations for heart failure, including low-sodium diet, fluid restriction, daily weight and adherence to medication.   - Patient continues to be volume overloaded based on her clinical presentation. We suggest to start Bumex 1mg daily.  - Patient was being treated for Staph infection in abdomen and L breast  - Was admitted for cellulitis in 06/2024.  - Follow up in heart failure clinic.  - Follow up in 6 months.     # COPD  - Keep home oxygen     # Abdominal / Groin skin infection  - Keep broad spectrum antibiotics.     # [place] [address]  - Controlled by PCP.  - Keep Warfarin .  - INR control - last INR 1.7.     # Hypertension  - Controlled blood pressure.  - Keep current medications with Amiloride, Amlodipine 5mg daily, Metoprolol succinate 50mg BID.  - Patient counseled to keep a healthy lifestyle including regular exercise and low-sodium diet.  - Recommended home blood pressure monitoring.  - Goal of BP < 130/80mmHg.      # Diabetes  - Controlled by PCP.  - Counseled on healthy diet and regular exercises.  -  Discussed need for weight loss and the benefits.   - Keep current medications.  - ISS.      # Hyperlipidemia  - Controlled by PCP.  - Keep home medication with Rosuvastatin 40mg daily.  - Counseled on healthy diet and regular exercise.      # Gout  - Controlled by PCP.  - Keep home medication with Allopurinol 300mg daily.  - Counseling.     # Hypothyroidism  - Keep Levothyroxine 50mcg  daily     # Psoriasis  - Follow up with PCP / Dermatology    We have discussed the most common side effects of the prescribed medications, indications, drug interactions, risks, complications, and alternatives of medications/therapeutics were explained and discussed. The patient has been requested to monitor closely for any untoward side effects or complications of medications. The patient has been strongly advised to be compliant with the recommendations, all the questions and concerns have been addressed. The patient has been also instructed to call, to return sooner or to go to the emergency department if symptoms persist or get worsen. The patient voiced understanding and denies any further questions at this time.      This note was transcribed using the Dragon Dictation system. There may be grammatical, punctuation, or verbiage errors that occur with voice recognition programs.    Counseling greater than 50% of visit regarding all cardiac issues.    Thank you, Dr. Franklin, for allowing me to participate in the care of this patient. Please do not hesitate to contact me with any further questions or concerns.    Delfin Earl MD  Cardiology

## 2024-07-24 ENCOUNTER — ANTICOAGULATION - WARFARIN VISIT (OUTPATIENT)
Dept: PHARMACY | Facility: HOSPITAL | Age: 54
End: 2024-07-24
Payer: MEDICARE

## 2024-07-24 ENCOUNTER — OFFICE VISIT (OUTPATIENT)
Dept: PRIMARY CARE | Facility: CLINIC | Age: 54
End: 2024-07-24
Payer: MEDICARE

## 2024-07-24 ENCOUNTER — TELEPHONE (OUTPATIENT)
Dept: PRIMARY CARE | Facility: CLINIC | Age: 54
End: 2024-07-24

## 2024-07-24 ENCOUNTER — LAB (OUTPATIENT)
Dept: LAB | Facility: LAB | Age: 54
End: 2024-07-24
Payer: MEDICARE

## 2024-07-24 VITALS
WEIGHT: 293 LBS | DIASTOLIC BLOOD PRESSURE: 74 MMHG | HEART RATE: 70 BPM | SYSTOLIC BLOOD PRESSURE: 136 MMHG | HEIGHT: 62 IN | BODY MASS INDEX: 53.92 KG/M2

## 2024-07-24 DIAGNOSIS — I10 ESSENTIAL (PRIMARY) HYPERTENSION: ICD-10-CM

## 2024-07-24 DIAGNOSIS — I81 PORTAL VEIN THROMBOSIS: Primary | ICD-10-CM

## 2024-07-24 DIAGNOSIS — M25.50 MULTIPLE JOINT PAIN: ICD-10-CM

## 2024-07-24 DIAGNOSIS — M25.432 EDEMA OF BOTH WRISTS: ICD-10-CM

## 2024-07-24 DIAGNOSIS — M25.531 RIGHT WRIST PAIN: Primary | ICD-10-CM

## 2024-07-24 DIAGNOSIS — E11.65 TYPE 2 DIABETES MELLITUS WITH HYPERGLYCEMIA (MULTI): Primary | ICD-10-CM

## 2024-07-24 DIAGNOSIS — M25.431 EDEMA OF BOTH WRISTS: ICD-10-CM

## 2024-07-24 DIAGNOSIS — S63.004A: ICD-10-CM

## 2024-07-24 DIAGNOSIS — L40.9 PSORIASIS: ICD-10-CM

## 2024-07-24 DIAGNOSIS — I82.0 HEPATIC VEIN THROMBOSIS (MULTI): ICD-10-CM

## 2024-07-24 DIAGNOSIS — E78.2 MIXED HYPERLIPIDEMIA: ICD-10-CM

## 2024-07-24 DIAGNOSIS — Z79.899 OTHER LONG TERM (CURRENT) DRUG THERAPY: ICD-10-CM

## 2024-07-24 DIAGNOSIS — S51.801A: ICD-10-CM

## 2024-07-24 LAB
EST. AVERAGE GLUCOSE BLD GHB EST-MCNC: 174 MG/DL
HBA1C MFR BLD: 7.7 %
INR IN PPP BY COAGULATION ASSAY EXTERNAL: 2.6
PROTHROMBIN TIME (PT) IN PPP BY COAGULATION ASSAY EXTERNAL: NORMAL

## 2024-07-24 PROCEDURE — 36415 COLL VENOUS BLD VENIPUNCTURE: CPT

## 2024-07-24 PROCEDURE — 3008F BODY MASS INDEX DOCD: CPT | Performed by: INTERNAL MEDICINE

## 2024-07-24 PROCEDURE — 83036 HEMOGLOBIN GLYCOSYLATED A1C: CPT

## 2024-07-24 PROCEDURE — 1036F TOBACCO NON-USER: CPT | Performed by: INTERNAL MEDICINE

## 2024-07-24 PROCEDURE — 82570 ASSAY OF URINE CREATININE: CPT

## 2024-07-24 PROCEDURE — 3075F SYST BP GE 130 - 139MM HG: CPT | Performed by: INTERNAL MEDICINE

## 2024-07-24 PROCEDURE — 99211 OFF/OP EST MAY X REQ PHY/QHP: CPT | Performed by: PHARMACIST

## 2024-07-24 PROCEDURE — 3078F DIAST BP <80 MM HG: CPT | Performed by: INTERNAL MEDICINE

## 2024-07-24 PROCEDURE — 82043 UR ALBUMIN QUANTITATIVE: CPT

## 2024-07-24 PROCEDURE — 3052F HG A1C>EQUAL 8.0%<EQUAL 9.0%: CPT | Performed by: INTERNAL MEDICINE

## 2024-07-24 PROCEDURE — 3060F POS MICROALBUMINURIA REV: CPT | Performed by: INTERNAL MEDICINE

## 2024-07-24 PROCEDURE — 99214 OFFICE O/P EST MOD 30 MIN: CPT | Performed by: INTERNAL MEDICINE

## 2024-07-24 RX ORDER — METHYLPREDNISOLONE 4 MG/1
TABLET ORAL
Qty: 21 TABLET | Refills: 0 | Status: SHIPPED | OUTPATIENT
Start: 2024-07-24 | End: 2024-07-31

## 2024-07-24 RX ORDER — CEPHALEXIN 500 MG/1
500 CAPSULE ORAL 2 TIMES DAILY
Qty: 14 CAPSULE | Refills: 0 | Status: SHIPPED | OUTPATIENT
Start: 2024-07-24 | End: 2024-07-31

## 2024-07-24 RX ORDER — ARM BRACE
1 EACH MISCELLANEOUS DAILY
Qty: 1 EACH | Refills: 0 | Status: SHIPPED | OUTPATIENT
Start: 2024-07-24

## 2024-07-24 ASSESSMENT — ENCOUNTER SYMPTOMS
DIZZINESS: 0
WEAKNESS: 0
COUGH: 0
WHEEZING: 0
EYES NEGATIVE: 1
LIGHT-HEADEDNESS: 0
ABDOMINAL PAIN: 0
NAUSEA: 0
ROS SKIN COMMENTS: PER HPI
DYSURIA: 0
ABDOMINAL DISTENTION: 0
SHORTNESS OF BREATH: 0
AGITATION: 0
CARDIOVASCULAR NEGATIVE: 1
GASTROINTESTINAL NEGATIVE: 1
BACK PAIN: 0
FEVER: 0
PALPITATIONS: 0
PSYCHIATRIC NEGATIVE: 1
RHINORRHEA: 0
HEADACHES: 0
DIARRHEA: 0
VOMITING: 0
CHILLS: 0
ENDOCRINE NEGATIVE: 1
ARTHRALGIAS: 1
NEUROLOGICAL NEGATIVE: 1
CONSTIPATION: 0

## 2024-07-24 NOTE — PROGRESS NOTES
Pt enrolled in [facility] for management of Portal vein thrombosis [I81].     Pt current location in clinic.     Current anticoagulant: Warfarin    Time since last visit: 2weeks    Last INR: 1.30 on warfarin 55 mg in the previous week. Pt was instructed to increase TWD to 57.5 at last visit.    Last Creatinine:   Lab Results   Component Value Date    CREATININE 0.68 07/10/2024     Last hemoglobin/hematocrit:  Lab Results   Component Value Date    HGB 12.8 07/04/2024     Lab Results   Component Value Date    HCT 41.8 07/04/2024       Current INR: 2.6 is therapeutic for goal range of 2.0-3.0 and is reflective of 27.5 mg in the previous week prior to visit.    Patient denies any missed doses. All last week but Monday and friday  Patient denies any medication changes, diet changes, or OTC/herbal supplement changes since last visit. Started keflex, methylprednisolone.   Patient denies any adverse reactions or barriers.  Patient denies any CP/SOB, fatigue, bleeding or bruising since last visit.   Patient denies any change in alcohol or tobacco use since last visit.   Patient denies any upcoming medical or dental procedures.    Plan:  Patient was instructed to  take 7.5mg every day .  INR follow up will occur in 1 weeks.  Patient was instructed to maintain consistent vegetable intake, to monitor for any bruising or bleeding, and to call with any medication changes or concerns.    Pt handout given with above information    JOHANN LUJAN  P:[phone]  F:[phone]

## 2024-07-24 NOTE — PROGRESS NOTES
Subjective   Patient ID: Roselia Mo is a 53 y.o. female who presents for Follow-up (C/O RT WRIST PAIN. UNABLE TO USE/MOVE THAT HAND).  HPI  R WRIST pain 10/10 X 10-12 DAYS WITH HAVING REMOTE INJURY TO WRIST. PAIN RADIATES TO 4TH AND 5TH FINGERS . HAD KENALOG INJECTION TO R WRIST 9 DAYS AGO WHICH HELPED FOR 3-4 DAYS ,AND PAIN CAME BACK . R WRIST EDEMA IS STILL PRESENT .BUT IT'S LESS COMPARED TO LAST VISIT. CHRONIC MULTIPLE JOINTS PAIN 5/10 ,WORSENING PSORIASIS.   Review of Systems   Constitutional:  Negative for chills and fever.   HENT: Negative.  Negative for congestion, postnasal drip and rhinorrhea.    Eyes: Negative.  Negative for visual disturbance.   Respiratory:  Negative for cough, shortness of breath and wheezing.    Cardiovascular: Negative.  Negative for chest pain, palpitations and leg swelling.   Gastrointestinal: Negative.  Negative for abdominal distention, abdominal pain, constipation, diarrhea, nausea and vomiting.   Endocrine: Negative.    Genitourinary:  Negative for dysuria and urgency.   Musculoskeletal:  Positive for arthralgias. Negative for back pain.   Skin:  Negative for rash.        PER HPI   Allergic/Immunologic: Negative for immunocompromised state.   Neurological: Negative.  Negative for dizziness, weakness, light-headedness and headaches.   Psychiatric/Behavioral: Negative.  Negative for agitation.        Objective   Physical Exam  Constitutional:       General: She is not in acute distress.  HENT:      Head: Normocephalic.      Nose: Nose normal.      Mouth/Throat:      Mouth: Mucous membranes are moist.   Eyes:      Conjunctiva/sclera: Conjunctivae normal.      Pupils: Pupils are equal, round, and reactive to light.   Cardiovascular:      Rate and Rhythm: Normal rate and regular rhythm.      Pulses: Normal pulses.      Heart sounds: Normal heart sounds.   Pulmonary:      Effort: No respiratory distress.      Breath sounds: No wheezing.   Chest:      Chest wall: No tenderness.    Abdominal:      General: Abdomen is flat. Bowel sounds are normal.      Palpations: Abdomen is soft.      Tenderness: There is no abdominal tenderness.   Musculoskeletal:         General: Tenderness present. Normal range of motion.      Cervical back: Normal range of motion.      Comments: R WRIST TENDERNESS WITH 2+ EDEMA, DECREASED RANGE OF MOTION.   Lymphadenopathy:      Cervical: No cervical adenopathy.   Skin:     General: Skin is warm and dry.      Findings: No rash.      Comments: PSORIASIS PATCHES OF ELBOWS , FACE AND SCALP.   Neurological:      General: No focal deficit present.      Mental Status: She is alert. Mental status is at baseline.   Psychiatric:         Mood and Affect: Mood normal.         Behavior: Behavior normal.         Assessment/Plan   1. Right wrist pain  arm brace (Wrist Brace Large) mis    methylPREDNISolone (Medrol Dospak) 4 mg tablets    Referral to Rheumatology    cephalexin (Keflex) 500 mg capsule    EMG & nerve conduction    CANCELED: EMG & nerve conduction      2. Avulsion of forearm and wrist, right, initial encounter  arm brace (Wrist Brace Large) mis      3. Psoriasis  Referral to Rheumatology      4. Multiple joint pain  Referral to Rheumatology      5. Edema of both wrists  cephalexin (Keflex) 500 mg capsule      6. Numbness and tingling of right hand  EMG & nerve conduction      7. Numbness and tingling in right hand        X-RAY WAS DISCUSSED   ADVISED TO APPLY COLD COMPRESSION AND OTC PAIN CREAM PRN FOR PAIN, TO ELEVATE THE R HAND/WRIST, TO WEAR BRACE DURING THE DAY, OFF AT NIGHT.  ADVISED TO SEE HER ORTHO SOON. WILL START A COURSE OF KEFLEX FOR SKIN/WRIST INFLAMMATION.  ADVISED TO HAVE LOW FAT AND LOW CALORIE DIET AND TO LOOSE WEIGHT, FALL PRECAUTION.    MDM    1) COMPLEXITY: 1 UNDIAGNOSED NEW PROBLEM WITH UNCERTAIN PROGNOSIS  2)DATA: TESTS INTERPRETED AND OR ORDERED, TOOK INDEPENDENT HISTORY OR RECORDS REVIEWED  3)RISK: MODERATE RISK DUE TO NATURE OF MEDICAL  CONDITIONS/COMORBIDITY OR MEDICATIONS ORDERED OR SURGICAL OR PROCEDURE REFERRAL, .     HAS F/U.  ADDENDUM 8/1  PT HAS TENDERNESS OF MEDIAL ANTERIOR R WIRST WITH TINGLING AND NUMBNESS . WILL DO EMG TO R/O CARPAL TUNNEL.  ADDENDUM: 8/5   EMG IS RE-ORDERED FOR R UPPER EXTREMITY.

## 2024-07-25 LAB
CREAT UR-MCNC: 39.7 MG/DL (ref 20–320)
MICROALBUMIN UR-MCNC: <7 MG/L
MICROALBUMIN/CREAT UR: NORMAL MG/G{CREAT}

## 2024-07-25 NOTE — TELEPHONE ENCOUNTER
INSURANCE WILL NOT COVER EMG WITH EITHER WRIST PAIN, AVULTION NOR EDEMA.  WE TRIED EVERYTHING IN NOTE TO GET THROUGH.  PLEASE ADVISE

## 2024-07-30 ENCOUNTER — HOSPITAL ENCOUNTER (OUTPATIENT)
Dept: RADIOLOGY | Facility: CLINIC | Age: 54
Discharge: HOME | End: 2024-07-30
Payer: MEDICARE

## 2024-07-30 ENCOUNTER — LAB (OUTPATIENT)
Dept: LAB | Facility: LAB | Age: 54
End: 2024-07-30
Payer: MEDICARE

## 2024-07-30 ENCOUNTER — ANTICOAGULATION - WARFARIN VISIT (OUTPATIENT)
Dept: PHARMACY | Facility: HOSPITAL | Age: 54
End: 2024-07-30
Payer: MEDICARE

## 2024-07-30 ENCOUNTER — APPOINTMENT (OUTPATIENT)
Dept: ORTHOPEDIC SURGERY | Facility: CLINIC | Age: 54
End: 2024-07-30
Payer: MEDICARE

## 2024-07-30 DIAGNOSIS — I81 PORTAL VEIN THROMBOSIS: ICD-10-CM

## 2024-07-30 DIAGNOSIS — M25.531 RIGHT WRIST PAIN: ICD-10-CM

## 2024-07-30 DIAGNOSIS — I82.0 HEPATIC VEIN THROMBOSIS (MULTI): Primary | ICD-10-CM

## 2024-07-30 DIAGNOSIS — M25.531 RIGHT WRIST PAIN: Primary | ICD-10-CM

## 2024-07-30 DIAGNOSIS — M25.431 WRIST SWELLING, RIGHT: ICD-10-CM

## 2024-07-30 PROBLEM — R20.0 NUMBNESS AND TINGLING IN RIGHT HAND: Status: ACTIVE | Noted: 2024-07-30

## 2024-07-30 PROBLEM — R20.2 NUMBNESS AND TINGLING IN RIGHT HAND: Status: ACTIVE | Noted: 2024-07-30

## 2024-07-30 LAB
INR IN PPP BY COAGULATION ASSAY EXTERNAL: 2
PROTHROMBIN TIME (PT) IN PPP BY COAGULATION ASSAY EXTERNAL: NORMAL
URATE SERPL-MCNC: 3.4 MG/DL (ref 2.3–6.7)

## 2024-07-30 PROCEDURE — 73110 X-RAY EXAM OF WRIST: CPT | Mod: RT

## 2024-07-30 PROCEDURE — 84550 ASSAY OF BLOOD/URIC ACID: CPT

## 2024-07-30 PROCEDURE — 73110 X-RAY EXAM OF WRIST: CPT | Mod: RIGHT SIDE | Performed by: RADIOLOGY

## 2024-07-30 PROCEDURE — 36415 COLL VENOUS BLD VENIPUNCTURE: CPT

## 2024-07-30 PROCEDURE — 99214 OFFICE O/P EST MOD 30 MIN: CPT | Performed by: NURSE PRACTITIONER

## 2024-07-30 PROCEDURE — 3051F HG A1C>EQUAL 7.0%<8.0%: CPT | Performed by: NURSE PRACTITIONER

## 2024-07-30 PROCEDURE — 3062F POS MACROALBUMINURIA REV: CPT | Performed by: NURSE PRACTITIONER

## 2024-07-30 PROCEDURE — 99211 OFF/OP EST MAY X REQ PHY/QHP: CPT | Performed by: PHARMACIST

## 2024-07-30 ASSESSMENT — PAIN DESCRIPTION - DESCRIPTORS: DESCRIPTORS: BURNING;THROBBING

## 2024-07-30 ASSESSMENT — PAIN - FUNCTIONAL ASSESSMENT: PAIN_FUNCTIONAL_ASSESSMENT: 0-10

## 2024-07-30 NOTE — PROGRESS NOTES
Subjective    Patient ID: Roselia Mo is a 53 y.o. female.    Chief Complaint: Pain of the Right Wrist    Right Wrist     Roselia is a 53-year-old female presenting today for new problem evaluation of the R wrist pain  Tylenol and brace help  Pain   Cortisone inj 2 weeks ago to R wrist per PCP, helped for about 1-2 days  Dependent position aggravates sx, relieving factors include elevation, wrist brace and not moving it  Diclofenac minimal improvement  RH dominant   Completed Medrol dose pack today with some improvement    Review of Systems   Constitutional: Negative.    HENT: Negative.     Respiratory: Negative.     Cardiovascular: Negative.    Endocrine: Negative.    Musculoskeletal:  Positive for arthralgias.   Skin: Negative.    Neurological: Negative.    Hematological: Negative.    Psychiatric/Behavioral: Negative.          Objective   Right Hand Exam     Tenderness   The patient is experiencing tenderness in the dorsal area (Ulnar aspect greater than radial).    Range of Motion   Wrist   Extension:  30   Flexion:  40   Pronation:  normal   Supination:  50     Tests   Finkelstein's test: negative    Other   Erythema: absent  Sensation: normal  Pulse: present    Comments:  Patient with painful range of motion in all directions, mild swelling at distal forearm and towards the ulnar aspect noted. Skin is pink, warm, dry and intact   Patient states pain radiates through hand and all fingers.  Painful range of motion of all fingers is present near full ROM.  Distal sensation is intact with cap refill at 2 to 3 seconds.            Image Results:  Independent review of x-ray imaging shows degenerative changes in the appearance of the proximal carpal row noted, no acute fracture.  Await radiology report.    Lab Results   Component Value Date    HGBA1C 7.7 (H) 07/24/2024      Lab Results   Component Value Date    URICACID 3.4 07/30/2024      Assessment/Plan   Encounter Diagnoses:  Problem List Items Addressed This  Visit             ICD-10-CM    Wrist swelling, right M25.431    Right wrist pain - Primary M25.531     We discussed sx control with completing steroid as written, Tylenol prn  Continue to use the Voltaren gel 4 times daily, Velcro wrist brace with activity and as needed at nighttime.  Frequent rest, ice and elevation  Non weight bearing  DDx includes gout, tendinitis  Outpatient order for uric acid level placed, if this is a positive finding will call in colchicine for gout control.  Keep referral and scheduled appointment with rheumatology as scheduled  F/U here in 2 to 3 weeks, sooner for changes or concerns  This note was generated using Dragon software.  It may contain errors in wording, punctuation or spelling.          Relevant Orders    XR wrist right 3+ views    Uric Acid (Completed)

## 2024-07-30 NOTE — PROGRESS NOTES
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     Pt current location in clinic.     Current anticoagulant: Warfarin    Time since last visit: 1 weeks    Last INR: 2.6 on warfarin 57.5 mg in the previous week. No changes were made at that time.    Last Creatinine:   Lab Results   Component Value Date    CREATININE 0.68 07/10/2024     Last hemoglobin/hematocrit:  Lab Results   Component Value Date    HGB 12.8 07/04/2024     Lab Results   Component Value Date    HCT 41.8 07/04/2024       Current INR: 2.0 is therapeutic for goal range of 2.0-3.0 and is reflective of 52.5 mg in the previous week prior to visit.    Patient denies any missed doses.  Patient denies any medication changes, diet changes, or OTC/herbal supplement changes since last visit. -pt is on last day of keflex and methylprednisolone.   Patient denies any adverse reactions or barriers.  Patient denies any CP/SOB, fatigue, bleeding or bruising since last visit.   Patient denies any change in alcohol or tobacco use since last visit.   Patient denies any upcoming medical or dental procedures.    Plan:  Patient was instructed to continue with current warfarin dose.  INR follow up will occur in 2 weeks.  Patient was instructed to maintain consistent vegetable intake, to monitor for any bruising or bleeding, and to call with any medication changes or concerns.    Pt handout given with above information    JOHANN LUJAN  P:[phone]  F:[phone]

## 2024-07-31 PROBLEM — M25.531 RIGHT WRIST PAIN: Status: ACTIVE | Noted: 2024-07-31

## 2024-07-31 PROBLEM — M25.431 WRIST SWELLING, RIGHT: Status: ACTIVE | Noted: 2024-07-31

## 2024-07-31 ASSESSMENT — ENCOUNTER SYMPTOMS
ENDOCRINE NEGATIVE: 1
HEMATOLOGIC/LYMPHATIC NEGATIVE: 1
CARDIOVASCULAR NEGATIVE: 1
NEUROLOGICAL NEGATIVE: 1
RESPIRATORY NEGATIVE: 1
ARTHRALGIAS: 1
PSYCHIATRIC NEGATIVE: 1
CONSTITUTIONAL NEGATIVE: 1

## 2024-07-31 NOTE — ASSESSMENT & PLAN NOTE
We discussed sx control with completing steroid as written, Tylenol prn  Continue to use the Voltaren gel 4 times daily, Velcro wrist brace with activity and as needed at nighttime.  Frequent rest, ice and elevation  Non weight bearing  DDx includes gout, tendinitis  Outpatient order for uric acid level placed, if this is a positive finding will call in colchicine for gout control.  Keep referral and scheduled appointment with rheumatology as scheduled  F/U here in 2 to 3 weeks, sooner for changes or concerns  This note was generated using Dragon software.  It may contain errors in wording, punctuation or spelling.

## 2024-08-01 ENCOUNTER — TELEPHONE (OUTPATIENT)
Dept: PRIMARY CARE | Facility: CLINIC | Age: 54
End: 2024-08-01
Payer: MEDICARE

## 2024-08-01 DIAGNOSIS — M19.031 ARTHRITIS OF WRIST, RIGHT: Primary | ICD-10-CM

## 2024-08-01 DIAGNOSIS — M25.431 WRIST SWELLING, RIGHT: ICD-10-CM

## 2024-08-01 NOTE — TELEPHONE ENCOUNTER
ARLYN WITH [facility] CALLED THE GOT THE EMG ORDER BUT IT NEEDS TO BE MORE SPECIFIC AS THERE THE AREA BEING TESTED THEN DATED AND INITIALED. ALSO MEDICARE DOES NOT COVER RIGHT WRIST PAIN. SHE SAID THEY MAY COVER NUMBNESS/TINGLING OR CARPAL TUNNEL SYNDROME.

## 2024-08-05 ENCOUNTER — PATIENT OUTREACH (OUTPATIENT)
Dept: PRIMARY CARE | Facility: CLINIC | Age: 54
End: 2024-08-05
Payer: MEDICARE

## 2024-08-05 PROCEDURE — RXMED WILLOW AMBULATORY MEDICATION CHARGE

## 2024-08-08 ENCOUNTER — APPOINTMENT (OUTPATIENT)
Dept: GASTROENTEROLOGY | Facility: CLINIC | Age: 54
End: 2024-08-08
Payer: MEDICARE

## 2024-08-09 ENCOUNTER — PHARMACY VISIT (OUTPATIENT)
Dept: PHARMACY | Facility: CLINIC | Age: 54
End: 2024-08-09
Payer: COMMERCIAL

## 2024-08-14 ENCOUNTER — ANTICOAGULATION - WARFARIN VISIT (OUTPATIENT)
Dept: PHARMACY | Facility: HOSPITAL | Age: 54
End: 2024-08-14
Payer: MEDICARE

## 2024-08-14 ENCOUNTER — APPOINTMENT (OUTPATIENT)
Dept: PRIMARY CARE | Facility: CLINIC | Age: 54
End: 2024-08-14
Payer: MEDICARE

## 2024-08-14 ENCOUNTER — CLINICAL SUPPORT (OUTPATIENT)
Dept: CARDIAC REHAB | Facility: HOSPITAL | Age: 54
End: 2024-08-14
Payer: MEDICARE

## 2024-08-14 VITALS — DIASTOLIC BLOOD PRESSURE: 91 MMHG | SYSTOLIC BLOOD PRESSURE: 161 MMHG | HEART RATE: 70 BPM

## 2024-08-14 DIAGNOSIS — E11.59 HYPERTENSION ASSOCIATED WITH DIABETES (MULTI): ICD-10-CM

## 2024-08-14 DIAGNOSIS — L40.9 PSORIASIS: ICD-10-CM

## 2024-08-14 DIAGNOSIS — E11.65 TYPE 2 DIABETES MELLITUS WITH HYPERGLYCEMIA, WITH LONG-TERM CURRENT USE OF INSULIN (MULTI): Primary | ICD-10-CM

## 2024-08-14 DIAGNOSIS — I81 PORTAL VEIN THROMBOSIS: Primary | ICD-10-CM

## 2024-08-14 DIAGNOSIS — I50.32 CHRONIC DIASTOLIC HEART FAILURE (MULTI): Primary | ICD-10-CM

## 2024-08-14 DIAGNOSIS — Z79.4 TYPE 2 DIABETES MELLITUS WITH HYPERGLYCEMIA, WITH LONG-TERM CURRENT USE OF INSULIN (MULTI): Primary | ICD-10-CM

## 2024-08-14 DIAGNOSIS — I82.0 HEPATIC VEIN THROMBOSIS (MULTI): ICD-10-CM

## 2024-08-14 DIAGNOSIS — I15.2 HYPERTENSION ASSOCIATED WITH DIABETES (MULTI): ICD-10-CM

## 2024-08-14 LAB
ANION GAP SERPL CALC-SCNC: 9 MMOL/L (ref 10–20)
BNP SERPL-MCNC: 165 PG/ML (ref 0–99)
BUN SERPL-MCNC: 12 MG/DL (ref 6–23)
CALCIUM SERPL-MCNC: 9.7 MG/DL (ref 8.6–10.3)
CHLORIDE SERPL-SCNC: 105 MMOL/L (ref 98–107)
CO2 SERPL-SCNC: 27 MMOL/L (ref 21–32)
CREAT SERPL-MCNC: 0.66 MG/DL (ref 0.5–1.05)
EGFRCR SERPLBLD CKD-EPI 2021: >90 ML/MIN/1.73M*2
GLUCOSE SERPL-MCNC: 227 MG/DL (ref 74–99)
POC INR: 2
POC PROTHROMBIN TIME: NORMAL
POTASSIUM SERPL-SCNC: 4.1 MMOL/L (ref 3.5–5.3)
SODIUM SERPL-SCNC: 137 MMOL/L (ref 136–145)

## 2024-08-14 PROCEDURE — 3062F POS MACROALBUMINURIA REV: CPT | Performed by: INTERNAL MEDICINE

## 2024-08-14 PROCEDURE — 83880 ASSAY OF NATRIURETIC PEPTIDE: CPT

## 2024-08-14 PROCEDURE — 85610 PROTHROMBIN TIME: CPT | Mod: QW

## 2024-08-14 PROCEDURE — 1036F TOBACCO NON-USER: CPT | Performed by: INTERNAL MEDICINE

## 2024-08-14 PROCEDURE — 3051F HG A1C>EQUAL 7.0%<8.0%: CPT | Performed by: INTERNAL MEDICINE

## 2024-08-14 PROCEDURE — 99214 OFFICE O/P EST MOD 30 MIN: CPT | Performed by: INTERNAL MEDICINE

## 2024-08-14 PROCEDURE — 3080F DIAST BP >= 90 MM HG: CPT | Performed by: INTERNAL MEDICINE

## 2024-08-14 PROCEDURE — 99211 OFF/OP EST MAY X REQ PHY/QHP: CPT | Performed by: PHARMACIST

## 2024-08-14 PROCEDURE — 36415 COLL VENOUS BLD VENIPUNCTURE: CPT

## 2024-08-14 PROCEDURE — 3077F SYST BP >= 140 MM HG: CPT | Performed by: INTERNAL MEDICINE

## 2024-08-14 PROCEDURE — 99214 OFFICE O/P EST MOD 30 MIN: CPT

## 2024-08-14 PROCEDURE — 80048 BASIC METABOLIC PNL TOTAL CA: CPT

## 2024-08-14 RX ORDER — BLOOD-GLUCOSE TRANSMITTER
EACH MISCELLANEOUS
Qty: 1 EACH | Refills: 0 | Status: SHIPPED | OUTPATIENT
Start: 2024-08-14

## 2024-08-14 RX ORDER — AMLODIPINE BESYLATE 10 MG/1
10 TABLET ORAL DAILY
Qty: 30 TABLET | Refills: 11 | Status: SHIPPED | OUTPATIENT
Start: 2024-08-14 | End: 2025-08-14

## 2024-08-14 RX ORDER — BLOOD-GLUCOSE SENSOR
EACH MISCELLANEOUS
Qty: 1 EACH | Refills: 0 | Status: SHIPPED | OUTPATIENT
Start: 2024-08-14

## 2024-08-14 ASSESSMENT — ENCOUNTER SYMPTOMS
BACK PAIN: 0
NEUROLOGICAL NEGATIVE: 1
CONSTITUTIONAL NEGATIVE: 1
HEADACHES: 0
PALPITATIONS: 0
NUMBNESS: 0
VOMITING: 0
MUSCULOSKELETAL NEGATIVE: 1
ADENOPATHY: 0
CONFUSION: 0
NAUSEA: 0
DIARRHEA: 0
EYE DISCHARGE: 0
CARDIOVASCULAR NEGATIVE: 1
AGITATION: 0
COUGH: 0
WHEEZING: 0
ALLERGIC/IMMUNOLOGIC NEGATIVE: 1
CHILLS: 0
CONSTIPATION: 0
ABDOMINAL PAIN: 0
ABDOMINAL DISTENTION: 0
FEVER: 0
NECK STIFFNESS: 0
JOINT SWELLING: 0
GASTROINTESTINAL NEGATIVE: 1
ENDOCRINE NEGATIVE: 1
PSYCHIATRIC NEGATIVE: 1
HEMATOLOGIC/LYMPHATIC NEGATIVE: 1
EYES NEGATIVE: 1
DYSURIA: 0
SHORTNESS OF BREATH: 0
LIGHT-HEADEDNESS: 0
RESPIRATORY NEGATIVE: 1

## 2024-08-14 ASSESSMENT — PATIENT HEALTH QUESTIONNAIRE - PHQ9
2. FEELING DOWN, DEPRESSED OR HOPELESS: NOT AT ALL
SUM OF ALL RESPONSES TO PHQ9 QUESTIONS 1 AND 2: 0
1. LITTLE INTEREST OR PLEASURE IN DOING THINGS: NOT AT ALL

## 2024-08-14 NOTE — PROGRESS NOTES
Subjective   Patient ID: Roselia Mo is a 53 y.o. female who presents for Follow-up (1 MO FU NO LAB DONE).  HERE FOR FU FEELS GOOD TODAY            Review of Systems   Constitutional: Negative.  Negative for chills and fever.   HENT: Negative.  Negative for congestion.    Eyes: Negative.  Negative for discharge.   Respiratory: Negative.  Negative for cough, shortness of breath and wheezing.    Cardiovascular: Negative.  Negative for chest pain, palpitations and leg swelling.   Gastrointestinal: Negative.  Negative for abdominal distention, abdominal pain, constipation, diarrhea, nausea and vomiting.   Endocrine: Negative.    Genitourinary: Negative.  Negative for dysuria and urgency.   Musculoskeletal: Negative.  Negative for back pain, joint swelling and neck stiffness.   Skin: Negative.  Negative for rash.   Allergic/Immunologic: Negative.  Negative for immunocompromised state.   Neurological: Negative.  Negative for light-headedness, numbness and headaches.   Hematological: Negative.  Negative for adenopathy.   Psychiatric/Behavioral: Negative.  Negative for agitation, behavioral problems and confusion.    All other systems reviewed and are negative.      Objective   Physical Exam  Vitals reviewed.   Constitutional:       General: She is not in acute distress.     Appearance: She is obese. She is ill-appearing (chronically).   HENT:      Head: Normocephalic and atraumatic.      Nose: Nose normal.   Eyes:      Conjunctiva/sclera: Conjunctivae normal.      Pupils: Pupils are equal, round, and reactive to light.   Neck:      Vascular: No carotid bruit.   Cardiovascular:      Rate and Rhythm: Normal rate and regular rhythm.      Pulses: Normal pulses.      Heart sounds:      No gallop.   Pulmonary:      Effort: Pulmonary effort is normal. No respiratory distress.      Breath sounds: Normal breath sounds. No wheezing.   Abdominal:      General: Bowel sounds are normal.      Palpations: Abdomen is soft.       Tenderness: There is no abdominal tenderness.   Musculoskeletal:         General: Normal range of motion.      Cervical back: Normal range of motion. No rigidity.   Lymphadenopathy:      Cervical: No cervical adenopathy.   Skin:     General: Skin is warm.      Findings: Lesion (PSORIASIS PLAQUES ON SCALP) present. No rash.   Neurological:      General: No focal deficit present.      Mental Status: She is alert and oriented to person, place, and time.   Psychiatric:         Mood and Affect: Mood normal.         Behavior: Behavior normal.       BP (!) 161/91 (BP Location: Right arm, Patient Position: Sitting)   Pulse 70    Hemoglobin A1C   Date/Time Value Ref Range Status   07/24/2024 12:36 PM 7.7 (H) see below % Final     Assessment/Plan   Problem List Items Addressed This Visit       Hypertension associated with diabetes (Multi)    Relevant Medications    amLODIPine (Norvasc) 10 mg tablet    Type 2 diabetes mellitus (Multi) - Primary    Relevant Medications    Dexcom G6 Sensor device    Dexcom G6 Transmitter device    Psoriasis    Relevant Orders    Referral to Dermatology          HTN addressed as follow:    MONITOR BP   GOAL BP LOWER THAN 130/80  LOW SALT  EXERCISE DAILY    Diabetes Mellitus/IFG addressed as follow:    [address] ADA  HGA1C GOAL LESS THAN 7  LOSE WT  EXERCISE DAILY        URIC ACID 3.4    FU 1 MO BP

## 2024-08-14 NOTE — PROGRESS NOTES
"Roselia arrived via wheelchair to the [facility] for her first scheduled visit. Roselia states she is feeling \" pretty good, my weight keeps decreasing. Dr. Nichole has found a water pill for me that doesn't lower my potassium and I'm losing weight\". Roselia reports exertional shortness of breath, denies increased shortness of breath, PND, or Orthopnea. Roselia uses BiPAP nightly as ordered. Eating and sleeping without distress. Home medications reviewed. A BMP and BNP were drawn today. The plan for Roselia is to continue current medication regimen and return in 1 month for INR and Op labs. Roselia was provided with written education regarding clinic hours, location and phone number, \"green, yellow and red zones,\" and general heart failure information. Roselia was provided with written and verbal education on monitoring daily weights, 2000 mg Sodium Diet, monitoring symptoms, and taking medications as prescribed.   Reviewed signs and symptoms of increased heart failure and when to call for help. Patient verbalizes understanding for: Low sodium diet: 2000 mg daily of sodium, follow-up appointment with [facility], weighing self daily, medications dose and schedule, and signs of increased heart failure.      Vitals:  BP: 132/77           HR:74            Resp:20           SPO2:  93% on room air      Weight: 362.5lbs                         Lung Sounds: clear    Edema: +2 bl legs    JVD: absent    Labs: BMP BNP     Medications Administered: none    Next Appointment Date: September 10, 2024@ 2pm    Note in EMR for treating Physician: Dr. Earl    In-House Supervising Physician: Dr. Monaco    "

## 2024-08-14 NOTE — PATIENT INSTRUCTIONS
Continue medications at same dose    Diet  2000 mg (2 gram) sodium diet-Do not add salt to foods or cook with salt. Check labels for Sodium (Na)     Resources  [facility] [phone] Monday - Friday 7:00 am - 3:00 pm  Websites: [email or web address]; American Heart Association: www.heart.org    Special Instructions:  If you smoke-Quit!  If you are not able to contact your doctor and need immediate medical attention, call 911  If you are not able to keep you're scheduled appointment at the [facility], call [phone]  Watch for and report to your doctor all warning signs of Heart Failure (increased difficulty with breathing, 3-5 pound weight gain in one week or less, increased swelling, increased tiredness)  Weigh yourself each day at the same time with the same amount of clothes on. Record your weight log. Bring it with your to each visit

## 2024-08-14 NOTE — PROGRESS NOTES
Pt enrolled in [facility] for management of Portal vein thrombosis [I81].     Pt current location in clinic.     Current anticoagulant: Warfarin    Time since last visit: 2 weeks    Last INR: 2.0 on warfarin 52.5 mg in the previous week. No changes were made at that time.    Last Creatinine:   Lab Results   Component Value Date    CREATININE 0.68 07/10/2024     Last hemoglobin/hematocrit:  Lab Results   Component Value Date    HGB 12.8 07/04/2024     Lab Results   Component Value Date    HCT 41.8 07/04/2024       Current INR: 2.0 is therapeutic for goal range of 2.0-3.0 and is reflective of 52.5 mg in the previous week prior to visit.    Patient denies any missed doses.  Patient denies any medication changes, diet changes, or OTC/herbal supplement changes since last visit.  Patient denies any adverse reactions or barriers.  Patient denies any CP/SOB, fatigue, bleeding or bruising since last visit.   Patient denies any change in alcohol or tobacco use since last visit.   Patient denies any upcoming medical or dental procedures.    Plan:  Patient was instructed to continue with current warfarin dose.  INR follow up will occur in 4 weeks.  Patient was instructed to maintain consistent vegetable intake, to monitor for any bruising or bleeding, and to call with any medication changes or concerns.    Pt handout given with above information    Brody Norman RPh  P:[phone]  F:[phone]

## 2024-08-20 ENCOUNTER — APPOINTMENT (OUTPATIENT)
Dept: ORTHOPEDIC SURGERY | Facility: CLINIC | Age: 54
End: 2024-08-20
Payer: MEDICARE

## 2024-08-21 ENCOUNTER — TELEPHONE (OUTPATIENT)
Dept: PRIMARY CARE | Facility: CLINIC | Age: 54
End: 2024-08-21
Payer: MEDICARE

## 2024-08-21 NOTE — TELEPHONE ENCOUNTER
PT CALLED AND ASKED IF YOU CAN SEND IN A NEW SCRIPT FOR OZEMPIC ALONG WITH HER JARDIANCE SINCE SHE HAS GOTTEN MEDICAID WITH HER MEDICARE NOW AND WANTS TO SEE IF ITLL COVER MORE

## 2024-08-23 DIAGNOSIS — E11.65 TYPE 2 DIABETES MELLITUS WITH HYPERGLYCEMIA, WITH LONG-TERM CURRENT USE OF INSULIN (MULTI): ICD-10-CM

## 2024-08-23 DIAGNOSIS — Z79.4 TYPE 2 DIABETES MELLITUS WITH HYPERGLYCEMIA, WITH LONG-TERM CURRENT USE OF INSULIN (MULTI): ICD-10-CM

## 2024-08-27 ENCOUNTER — OFFICE VISIT (OUTPATIENT)
Dept: PRIMARY CARE | Facility: CLINIC | Age: 54
End: 2024-08-27
Payer: MEDICARE

## 2024-08-27 VITALS — SYSTOLIC BLOOD PRESSURE: 125 MMHG | DIASTOLIC BLOOD PRESSURE: 69 MMHG | HEART RATE: 66 BPM

## 2024-08-27 DIAGNOSIS — M10.9 GOUTY ARTHRITIS: Primary | ICD-10-CM

## 2024-08-27 DIAGNOSIS — Z79.899 MEDICATION MANAGEMENT: ICD-10-CM

## 2024-08-27 PROCEDURE — 96372 THER/PROPH/DIAG INJ SC/IM: CPT | Performed by: INTERNAL MEDICINE

## 2024-08-27 PROCEDURE — 3051F HG A1C>EQUAL 7.0%<8.0%: CPT | Performed by: INTERNAL MEDICINE

## 2024-08-27 PROCEDURE — 3062F POS MACROALBUMINURIA REV: CPT | Performed by: INTERNAL MEDICINE

## 2024-08-27 PROCEDURE — 3074F SYST BP LT 130 MM HG: CPT | Performed by: INTERNAL MEDICINE

## 2024-08-27 PROCEDURE — 1036F TOBACCO NON-USER: CPT | Performed by: INTERNAL MEDICINE

## 2024-08-27 PROCEDURE — 99214 OFFICE O/P EST MOD 30 MIN: CPT | Performed by: INTERNAL MEDICINE

## 2024-08-27 PROCEDURE — 3078F DIAST BP <80 MM HG: CPT | Performed by: INTERNAL MEDICINE

## 2024-08-27 RX ORDER — FLUCONAZOLE 150 MG/1
150 TABLET ORAL ONCE
Qty: 1 TABLET | Refills: 0 | Status: SHIPPED | OUTPATIENT
Start: 2024-08-27 | End: 2024-08-27

## 2024-08-27 RX ORDER — PREDNISONE 10 MG/1
10 TABLET ORAL 3 TIMES DAILY
Qty: 15 TABLET | Refills: 0 | Status: SHIPPED | OUTPATIENT
Start: 2024-08-27 | End: 2024-09-01

## 2024-08-27 RX ORDER — AMOXICILLIN AND CLAVULANATE POTASSIUM 875; 125 MG/1; MG/1
875 TABLET, FILM COATED ORAL 2 TIMES DAILY
Qty: 14 TABLET | Refills: 0 | Status: SHIPPED | OUTPATIENT
Start: 2024-08-27 | End: 2024-09-03

## 2024-08-27 ASSESSMENT — ENCOUNTER SYMPTOMS
CONSTITUTIONAL NEGATIVE: 1
CONFUSION: 0
ADENOPATHY: 0
COUGH: 0
ENDOCRINE NEGATIVE: 1
WHEEZING: 0
JOINT SWELLING: 0
HEMATOLOGIC/LYMPHATIC NEGATIVE: 1
NAUSEA: 0
FEVER: 0
EYES NEGATIVE: 1
HEADACHES: 0
NUMBNESS: 0
DIARRHEA: 0
CHILLS: 0
PSYCHIATRIC NEGATIVE: 1
SHORTNESS OF BREATH: 1
BACK PAIN: 0
ABDOMINAL PAIN: 0
VOMITING: 0
LIGHT-HEADEDNESS: 0
ALLERGIC/IMMUNOLOGIC NEGATIVE: 1
GASTROINTESTINAL NEGATIVE: 1
NEUROLOGICAL NEGATIVE: 1
CONSTIPATION: 0
ARTHRALGIAS: 1
DYSURIA: 0
AGITATION: 0
PALPITATIONS: 0
EYE DISCHARGE: 0
CARDIOVASCULAR NEGATIVE: 1
ABDOMINAL DISTENTION: 0
NECK STIFFNESS: 0

## 2024-08-27 NOTE — PROGRESS NOTES
Subjective   Patient ID: Roselia Mo is a 53 y.o. female who presents for Follow-up (Pt co gout right foot and left toe).  Right foot pain x 4 days  No trauma  Pain severe sharp        Review of Systems   Constitutional: Negative.  Negative for chills and fever.   HENT: Negative.  Negative for congestion.    Eyes: Negative.  Negative for discharge.   Respiratory:  Positive for shortness of breath (withexertion). Negative for cough and wheezing.    Cardiovascular: Negative.  Negative for chest pain, palpitations and leg swelling.   Gastrointestinal: Negative.  Negative for abdominal distention, abdominal pain, constipation, diarrhea, nausea and vomiting.   Endocrine: Negative.    Genitourinary: Negative.  Negative for dysuria and urgency.   Musculoskeletal:  Positive for arthralgias. Negative for back pain, joint swelling and neck stiffness.   Skin: Negative.  Negative for rash.   Allergic/Immunologic: Negative.  Negative for immunocompromised state.   Neurological: Negative.  Negative for light-headedness, numbness and headaches.   Hematological: Negative.  Negative for adenopathy.   Psychiatric/Behavioral: Negative.  Negative for agitation, behavioral problems and confusion.    All other systems reviewed and are negative.      Objective   Physical Exam  Vitals reviewed.   Constitutional:       General: She is not in acute distress.     Appearance: She is obese. She is ill-appearing (chronically).   HENT:      Head: Normocephalic and atraumatic.      Nose: Nose normal.   Eyes:      Conjunctiva/sclera: Conjunctivae normal.      Pupils: Pupils are equal, round, and reactive to light.   Neck:      Vascular: No carotid bruit.   Cardiovascular:      Rate and Rhythm: Normal rate and regular rhythm.      Pulses: Normal pulses.      Heart sounds:      No gallop.   Pulmonary:      Effort: Pulmonary effort is normal. No respiratory distress.      Breath sounds: Normal breath sounds. No wheezing.   Abdominal:      General:  Bowel sounds are normal.      Palpations: Abdomen is soft.      Tenderness: There is no abdominal tenderness.   Musculoskeletal:         General: Swelling (whole right foot) and tenderness (severe, warmness no rash) present. Normal range of motion.      Cervical back: Normal range of motion. No rigidity.      Right lower leg: Edema present.      Left lower leg: Edema present.   Lymphadenopathy:      Cervical: No cervical adenopathy.   Skin:     General: Skin is warm.      Findings: No rash.   Neurological:      General: No focal deficit present.      Mental Status: She is alert and oriented to person, place, and time.   Psychiatric:         Mood and Affect: Mood normal.         Behavior: Behavior normal.       /69 (BP Location: Left arm, Patient Position: Sitting)   Pulse 66    Hemoglobin A1C   Date/Time Value Ref Range Status   07/24/2024 12:36 PM 7.7 (H) see below % Final     Assessment/Plan   Problem List Items Addressed This Visit    None  Visit Diagnoses       Gouty arthritis    -  Primary    Relevant Medications    dexAMETHasone (Decadron) injection 4 mg (Completed)    predniSONE (Deltasone) 10 mg tablet    amoxicillin-pot clavulanate (Augmentin) 875-125 mg tablet    Other Relevant Orders    XR foot right 1-2 views    Medication management        Relevant Medications    fluconazole (Diflucan) 150 mg tablet             If worse if not better go to ER    Tylenol otc prn    Diflucan in case of yeast inf.      MDM    1) COMPLEXITY: 1 OR MORE CHRONIC CONDITION WITH EXACERBATION, OR PROGRESSION OR SIDE EFFECT OF TREATMENT ADDRESSED  2)DATA: TESTS INTERPRETED AND OR ORDERED, TOOK INDEPENDENT HISTORY OR RECORDS REVIEWED  3)RISK: MODERATE RISK DUE TO NATURE OF MEDICAL CONDITIONS/COMORBIDITY OR MEDICATIONS ORDERED OR SURGICAL OR PROCEDURE REFERRAL, .      Fu 1 week

## 2024-09-04 ENCOUNTER — PATIENT OUTREACH (OUTPATIENT)
Dept: PRIMARY CARE | Facility: CLINIC | Age: 54
End: 2024-09-04

## 2024-09-04 ENCOUNTER — APPOINTMENT (OUTPATIENT)
Dept: PRIMARY CARE | Facility: CLINIC | Age: 54
End: 2024-09-04
Payer: MEDICARE

## 2024-09-04 VITALS — SYSTOLIC BLOOD PRESSURE: 141 MMHG | HEART RATE: 60 BPM | DIASTOLIC BLOOD PRESSURE: 74 MMHG

## 2024-09-04 DIAGNOSIS — E11.59 HYPERTENSION ASSOCIATED WITH DIABETES (MULTI): ICD-10-CM

## 2024-09-04 DIAGNOSIS — I15.2 HYPERTENSION ASSOCIATED WITH DIABETES (MULTI): ICD-10-CM

## 2024-09-04 DIAGNOSIS — M10.9 GOUTY ARTHRITIS: Primary | ICD-10-CM

## 2024-09-04 PROCEDURE — 1036F TOBACCO NON-USER: CPT | Performed by: INTERNAL MEDICINE

## 2024-09-04 PROCEDURE — 3078F DIAST BP <80 MM HG: CPT | Performed by: INTERNAL MEDICINE

## 2024-09-04 PROCEDURE — 99213 OFFICE O/P EST LOW 20 MIN: CPT | Performed by: INTERNAL MEDICINE

## 2024-09-04 PROCEDURE — 3051F HG A1C>EQUAL 7.0%<8.0%: CPT | Performed by: INTERNAL MEDICINE

## 2024-09-04 PROCEDURE — 3062F POS MACROALBUMINURIA REV: CPT | Performed by: INTERNAL MEDICINE

## 2024-09-04 PROCEDURE — 3077F SYST BP >= 140 MM HG: CPT | Performed by: INTERNAL MEDICINE

## 2024-09-04 ASSESSMENT — ENCOUNTER SYMPTOMS
MUSCULOSKELETAL NEGATIVE: 1
CHILLS: 0
NUMBNESS: 0
SHORTNESS OF BREATH: 1
CONSTITUTIONAL NEGATIVE: 1
NAUSEA: 0
ADENOPATHY: 0
BACK PAIN: 0
CARDIOVASCULAR NEGATIVE: 1
CONSTIPATION: 0
FEVER: 0
NEUROLOGICAL NEGATIVE: 1
COUGH: 0
ENDOCRINE NEGATIVE: 1
HEMATOLOGIC/LYMPHATIC NEGATIVE: 1
NECK STIFFNESS: 0
ABDOMINAL DISTENTION: 0
VOMITING: 0
PSYCHIATRIC NEGATIVE: 1
LIGHT-HEADEDNESS: 0
EYE DISCHARGE: 0
AGITATION: 0
HEADACHES: 0
CONFUSION: 0
PALPITATIONS: 0
ABDOMINAL PAIN: 0
EYES NEGATIVE: 1
DYSURIA: 0
WHEEZING: 0
JOINT SWELLING: 0
DIARRHEA: 0
GASTROINTESTINAL NEGATIVE: 1
ALLERGIC/IMMUNOLOGIC NEGATIVE: 1

## 2024-09-04 ASSESSMENT — PATIENT HEALTH QUESTIONNAIRE - PHQ9
SUM OF ALL RESPONSES TO PHQ9 QUESTIONS 1 AND 2: 0
2. FEELING DOWN, DEPRESSED OR HOPELESS: NOT AT ALL
1. LITTLE INTEREST OR PLEASURE IN DOING THINGS: NOT AT ALL

## 2024-09-04 NOTE — PROGRESS NOTES
Subjective   Patient ID: Roselia Mo is a 53 y.o. female who presents for Follow-up (Fu gout infection pt states no better).  Here for fu  The foot and toe pain and swelling resolved        Review of Systems   Constitutional: Negative.  Negative for chills and fever.   HENT: Negative.  Negative for congestion.    Eyes: Negative.  Negative for discharge.   Respiratory:  Positive for shortness of breath (with exertion). Negative for cough and wheezing.    Cardiovascular: Negative.  Negative for chest pain, palpitations and leg swelling.   Gastrointestinal: Negative.  Negative for abdominal distention, abdominal pain, constipation, diarrhea, nausea and vomiting.   Endocrine: Negative.    Genitourinary: Negative.  Negative for dysuria and urgency.   Musculoskeletal: Negative.  Negative for back pain, joint swelling and neck stiffness.   Skin: Negative.  Negative for rash.   Allergic/Immunologic: Negative.  Negative for immunocompromised state.   Neurological: Negative.  Negative for light-headedness, numbness and headaches.   Hematological: Negative.  Negative for adenopathy.   Psychiatric/Behavioral: Negative.  Negative for agitation, behavioral problems and confusion.    All other systems reviewed and are negative.      Objective   Physical Exam  Vitals reviewed.   Constitutional:       General: She is not in acute distress.     Appearance: She is obese. She is ill-appearing (chronicallt).   HENT:      Head: Normocephalic and atraumatic.      Nose: Nose normal.   Eyes:      Conjunctiva/sclera: Conjunctivae normal.      Pupils: Pupils are equal, round, and reactive to light.   Neck:      Vascular: No carotid bruit.   Cardiovascular:      Rate and Rhythm: Normal rate and regular rhythm.      Pulses: Normal pulses.      Heart sounds:      No gallop.   Pulmonary:      Effort: Pulmonary effort is normal. No respiratory distress.      Breath sounds: Normal breath sounds. No wheezing.   Abdominal:      General: Bowel  sounds are normal.      Palpations: Abdomen is soft.      Tenderness: There is no abdominal tenderness.   Musculoskeletal:         General: No swelling or tenderness. Normal range of motion.      Cervical back: Normal range of motion. No rigidity.   Lymphadenopathy:      Cervical: No cervical adenopathy.   Skin:     General: Skin is warm.      Findings: No rash.   Neurological:      General: No focal deficit present.      Mental Status: She is alert and oriented to person, place, and time.   Psychiatric:         Mood and Affect: Mood normal.         Behavior: Behavior normal.       /74 (BP Location: Right arm, Patient Position: Sitting)   Pulse 60    Hemoglobin A1C   Date/Time Value Ref Range Status   07/24/2024 12:36 PM 7.7 (H) see below % Final     Assessment/Plan   Problem List Items Addressed This Visit       Hypertension associated with diabetes (Multi)     Other Visit Diagnoses       Gouty arthritis    -  Primary             Clinically improved      HTN addressed as follow:    MONITOR BP   GOAL BP LOWER THAN 130/80  LOW SALT  EXERCISE DAILY    Reports some wt gain explained probably due to steroid willreeval next visit    Diabetes Mellitus/IFG addressed as follow:    [address]  LOSE WT  EXERCISE DAILY        Fu before

## 2024-09-09 DIAGNOSIS — I82.0 HEPATIC VEIN THROMBOSIS (MULTI): Primary | ICD-10-CM

## 2024-09-09 DIAGNOSIS — I81 PORTAL VEIN THROMBOSIS: ICD-10-CM

## 2024-09-10 ENCOUNTER — CLINICAL SUPPORT (OUTPATIENT)
Dept: CARDIAC REHAB | Facility: HOSPITAL | Age: 54
End: 2024-09-10
Payer: MEDICARE

## 2024-09-10 ENCOUNTER — HOSPITAL ENCOUNTER (OUTPATIENT)
Dept: CARDIOLOGY | Facility: HOSPITAL | Age: 54
Discharge: HOME | End: 2024-09-10
Payer: MEDICARE

## 2024-09-10 VITALS
OXYGEN SATURATION: 97 % | SYSTOLIC BLOOD PRESSURE: 130 MMHG | DIASTOLIC BLOOD PRESSURE: 78 MMHG | BODY MASS INDEX: 70.89 KG/M2 | RESPIRATION RATE: 18 BRPM | HEART RATE: 65 BPM | WEIGHT: 293 LBS

## 2024-09-10 DIAGNOSIS — I50.32 CHRONIC DIASTOLIC HEART FAILURE (MULTI): Primary | ICD-10-CM

## 2024-09-10 DIAGNOSIS — I49.3 PVC (PREMATURE VENTRICULAR CONTRACTION): ICD-10-CM

## 2024-09-10 DIAGNOSIS — R00.2 PALPITATIONS: ICD-10-CM

## 2024-09-10 LAB
ANION GAP SERPL CALC-SCNC: 8 MMOL/L (ref 10–20)
BUN SERPL-MCNC: 12 MG/DL (ref 6–23)
CALCIUM SERPL-MCNC: 10 MG/DL (ref 8.6–10.3)
CHLORIDE SERPL-SCNC: 108 MMOL/L (ref 98–107)
CO2 SERPL-SCNC: 28 MMOL/L (ref 21–32)
CREAT SERPL-MCNC: 0.75 MG/DL (ref 0.5–1.05)
EGFRCR SERPLBLD CKD-EPI 2021: >90 ML/MIN/1.73M*2
GLUCOSE SERPL-MCNC: 123 MG/DL (ref 74–99)
POC INR: 1.2
POC PROTHROMBIN TIME: NORMAL
POTASSIUM SERPL-SCNC: 4.4 MMOL/L (ref 3.5–5.3)
SODIUM SERPL-SCNC: 140 MMOL/L (ref 136–145)

## 2024-09-10 PROCEDURE — 80048 BASIC METABOLIC PNL TOTAL CA: CPT

## 2024-09-10 PROCEDURE — 9420000001 HC RT PATIENT EDUCATION 5 MIN

## 2024-09-10 PROCEDURE — 99213 OFFICE O/P EST LOW 20 MIN: CPT | Mod: 25

## 2024-09-10 PROCEDURE — 36415 COLL VENOUS BLD VENIPUNCTURE: CPT

## 2024-09-10 PROCEDURE — 96374 THER/PROPH/DIAG INJ IV PUSH: CPT

## 2024-09-10 PROCEDURE — 93242 EXT ECG>48HR<7D RECORDING: CPT

## 2024-09-10 PROCEDURE — 2500000004 HC RX 250 GENERAL PHARMACY W/ HCPCS (ALT 636 FOR OP/ED): Performed by: INTERNAL MEDICINE

## 2024-09-10 RX ORDER — FUROSEMIDE 10 MG/ML
40 INJECTION INTRAMUSCULAR; INTRAVENOUS ONCE
Status: COMPLETED | OUTPATIENT
Start: 2024-09-10 | End: 2024-09-10

## 2024-09-10 NOTE — PATIENT INSTRUCTIONS
Continue medications at same dose    Diet  2000 mg (2 gram) sodium diet-Do not add salt to foods or cook with salt. Check labels for Sodium (Na)     Resources  [facility] [phone] Monday - Friday 7:00 am - 3:00 pm  Websites: [email or web address]; American Heart Association: www.heart.org    Special Instructions:  If you smoke-Quit!  If you are not able to contact your doctor and need immediate medical attention, call 911  If you are not able to keep you're scheduled appointment at the [facility] Failure Clinic, call [phone]  Watch for and report to your doctor all warning signs of Heart Failure (increased difficulty with breathing, 3-5 pound weight gain in one week or less, increased swelling, increased tiredness)  Weigh yourself each day at the same time with the same amount of clothes on. Record your weight log. Bring it with your to each visit

## 2024-09-10 NOTE — PROGRESS NOTES
Pt enrolled in [facility] for management of Portal vein thrombosis [I81].     Pt current location [facility] clinic. Rest of [facility] visit completed by RN per clinic policy.     Current anticoagulant: Warfarin    Time since last visit: 4 weeks    Last INR: 2.0 on warfarin 52.5 mg in the previous week. No changes were made at that time.    Last Creatinine:   Lab Results   Component Value Date    CREATININE 0.66 08/14/2024     Last hemoglobin/hematocrit:  Lab Results   Component Value Date    HGB 12.8 07/04/2024     Lab Results   Component Value Date    HCT 41.8 07/04/2024       Current INR: 1.2 is SUBtherapeutic for goal range of 2.0-3.0 and is reflective of 45 mg in the previous week prior to visit with usual dose of 52.5mg weekly but at least one missed dose this week.    Missed a dose last week.  Patient denies any diet changes, or OTC/herbal supplement changes since last visit - was on Augmentin and pred, finished last week.  Patient denies any adverse reactions or barriers.  Patient denies any CP/SOB, fatigue, bleeding or bruising since last visit.   Patient denies any change in alcohol or tobacco use since last visit.   Patient denies any upcoming medical or dental procedures.    Plan:  Patient was instructed to  take 10mg for 2 days, then resume usual dosing.  INR follow up will occur in 1 weeks.  Patient was instructed to maintain consistent vegetable intake, to monitor for any bruising or bleeding, and to call with any medication changes or concerns.    Pt handout given with above information    Damien Dsouza PharmD  P:[phone]  F:[phone]

## 2024-09-10 NOTE — PROGRESS NOTES
"Roselia arrived via wheelchair to the [facility] Failure Clinic for his scheduled visit and INR check. Roselia states she is feeling \"more swollen, like when I was in the hospital and had an infection \". She denies increased shortness of breath, PND, or Orthopnea. Uses Bipap nightly. Eating and sleeping without distress. Home medications reviewed. While in clinic Roselia heart rate was ranging from 38-65, Roselia reports that she occasionally will feel dizzy and lightheaded. INR was 1.2, results were called to pharmacist Ramesh in the coumadin clinic. A BMP was drawn today and results were called to Dr. Nichole. Dr Nichole instructed for Roselia to receive 40mg IV lasix and increase Amiloride to 20mg bid. Evonne Yan DNP notified of heart rates and instructed Roselia to start taking Metoprolol 50mg daily and a 7 day montior. Roselia will take 10mg of Coumadin today 9/10/24 and tomorrow 9/11/24 and then resume normal dose of 7.5mg daily. Therse will follow up in 1 week for INR and BMP. IV started, 40mg IV Lasix administered, IV removed. Reviewed signs and symptoms of increased heart failure and when to call for help. Patient verbalizes understanding for: Low sodium diet: 2000 mg daily of sodium, follow-up appointment with [facility], weighing self daily, medications dose and schedule, and signs of increased heart failure.      Vitals:  BP: 130/78           HR:  38-65          Resp:20           SPO2: 95% on room air       Weight: 387.6lbs                         Previous Weight:  362.5lbs        Lung Sounds: clear    Edema: +3 bl lower legs    JVD: absent     Labs: BMP    Medications Administered/ titration: IV lasix 40mg, increase Amiloride to 20mg bid, decrease Metoprolol Succinate 50mg daily    Next Appointment Date: September 17, 2024@ 2pm    Note in EMR for treating Physician: Dr. Earl    In-House Supervising Physician: Dr. Monaco  "

## 2024-09-11 ENCOUNTER — ANTICOAGULATION - WARFARIN VISIT (OUTPATIENT)
Dept: PHARMACY | Facility: HOSPITAL | Age: 54
End: 2024-09-11
Payer: MEDICARE

## 2024-09-11 ENCOUNTER — APPOINTMENT (OUTPATIENT)
Dept: CARDIOLOGY | Facility: HOSPITAL | Age: 54
End: 2024-09-11
Payer: MEDICARE

## 2024-09-11 ENCOUNTER — TELEPHONE (OUTPATIENT)
Dept: PRIMARY CARE | Facility: CLINIC | Age: 54
End: 2024-09-11

## 2024-09-11 DIAGNOSIS — I82.0 HEPATIC VEIN THROMBOSIS (MULTI): ICD-10-CM

## 2024-09-11 DIAGNOSIS — I81 PORTAL VEIN THROMBOSIS: Primary | ICD-10-CM

## 2024-09-11 PROCEDURE — 85610 PROTHROMBIN TIME: CPT | Mod: QW

## 2024-09-11 NOTE — TELEPHONE ENCOUNTER
PT WAS REFERRED TO DR CHUN HER INSURANCE DOES NOT COVER. SO SHE WILL CALL HER INSURANCE AND LET US KNOW WHO IS IN NETWORK WITH HER INSURANCE AND WE WILL REFER HER OUT

## 2024-09-13 DIAGNOSIS — M79.605 LEG PAIN, BILATERAL: ICD-10-CM

## 2024-09-13 DIAGNOSIS — M79.604 LEG PAIN, BILATERAL: ICD-10-CM

## 2024-09-13 DIAGNOSIS — F33.1 MODERATE EPISODE OF RECURRENT MAJOR DEPRESSIVE DISORDER (MULTI): ICD-10-CM

## 2024-09-13 RX ORDER — DULOXETIN HYDROCHLORIDE 60 MG/1
60 CAPSULE, DELAYED RELEASE ORAL DAILY
Qty: 90 CAPSULE | Refills: 0 | Status: SHIPPED | OUTPATIENT
Start: 2024-09-13

## 2024-09-13 NOTE — TELEPHONE ENCOUNTER
PATIENT CALLED AND LEFT A MESSAGE ON OUR VOICEMAIL. SHE HAD ALL HER MEDS TRANSFERRED FROM [facility] TO [facility] BUT SOMEHOW HER DULOXETINE REFILLS GOT LOST ALONG THE WAY AND SHE HAS NOW BEEN WITHOUT HER MED FOR THE LAST WEEK. REQUESTING REFILL TO BE SENT TO [facility] TODAY IF POSSIBLE.

## 2024-09-17 ENCOUNTER — APPOINTMENT (OUTPATIENT)
Dept: CARDIAC REHAB | Facility: HOSPITAL | Age: 54
End: 2024-09-17
Payer: MEDICARE

## 2024-09-17 ENCOUNTER — APPOINTMENT (OUTPATIENT)
Dept: PRIMARY CARE | Facility: CLINIC | Age: 54
End: 2024-09-17
Payer: MEDICARE

## 2024-09-17 ENCOUNTER — ANTICOAGULATION - WARFARIN VISIT (OUTPATIENT)
Dept: PHARMACY | Facility: HOSPITAL | Age: 54
End: 2024-09-17

## 2024-09-17 VITALS
HEART RATE: 65 BPM | DIASTOLIC BLOOD PRESSURE: 64 MMHG | BODY MASS INDEX: 70.93 KG/M2 | WEIGHT: 293 LBS | OXYGEN SATURATION: 95 % | SYSTOLIC BLOOD PRESSURE: 122 MMHG

## 2024-09-17 VITALS — DIASTOLIC BLOOD PRESSURE: 76 MMHG | SYSTOLIC BLOOD PRESSURE: 149 MMHG | HEART RATE: 66 BPM

## 2024-09-17 DIAGNOSIS — E11.59 HYPERTENSION ASSOCIATED WITH DIABETES (MULTI): ICD-10-CM

## 2024-09-17 DIAGNOSIS — I50.32 CHRONIC DIASTOLIC HEART FAILURE: Primary | ICD-10-CM

## 2024-09-17 DIAGNOSIS — E66.01 CLASS 3 SEVERE OBESITY DUE TO EXCESS CALORIES WITH SERIOUS COMORBIDITY AND BODY MASS INDEX (BMI) GREATER THAN OR EQUAL TO 70 IN ADULT: ICD-10-CM

## 2024-09-17 DIAGNOSIS — E87.6 HYPOKALEMIA: ICD-10-CM

## 2024-09-17 DIAGNOSIS — E66.01 CLASS 3 SEVERE OBESITY DUE TO EXCESS CALORIES WITH SERIOUS COMORBIDITY AND BODY MASS INDEX (BMI) GREATER THAN OR EQUAL TO 70 IN ADULT: Primary | ICD-10-CM

## 2024-09-17 DIAGNOSIS — I50.812 CHRONIC RIGHT-SIDED CONGESTIVE HEART FAILURE: ICD-10-CM

## 2024-09-17 DIAGNOSIS — N18.31 STAGE 3A CHRONIC KIDNEY DISEASE (MULTI): ICD-10-CM

## 2024-09-17 DIAGNOSIS — I81 PORTAL VEIN THROMBOSIS: Primary | ICD-10-CM

## 2024-09-17 DIAGNOSIS — I15.2 HYPERTENSION ASSOCIATED WITH DIABETES (MULTI): ICD-10-CM

## 2024-09-17 DIAGNOSIS — I82.0 HEPATIC VEIN THROMBOSIS (MULTI): ICD-10-CM

## 2024-09-17 LAB
ANION GAP SERPL CALC-SCNC: 10 MMOL/L (ref 10–20)
BUN SERPL-MCNC: 15 MG/DL (ref 6–23)
CALCIUM SERPL-MCNC: 9.4 MG/DL (ref 8.6–10.3)
CHLORIDE SERPL-SCNC: 110 MMOL/L (ref 98–107)
CO2 SERPL-SCNC: 24 MMOL/L (ref 21–32)
CREAT SERPL-MCNC: 0.69 MG/DL (ref 0.5–1.05)
EGFRCR SERPLBLD CKD-EPI 2021: >90 ML/MIN/1.73M*2
GLUCOSE SERPL-MCNC: 168 MG/DL (ref 74–99)
INR IN PPP BY COAGULATION ASSAY EXTERNAL: 1.3
POTASSIUM SERPL-SCNC: 4.3 MMOL/L (ref 3.5–5.3)
PROTHROMBIN TIME (PT) IN PPP BY COAGULATION ASSAY EXTERNAL: NORMAL
SODIUM SERPL-SCNC: 140 MMOL/L (ref 136–145)

## 2024-09-17 PROCEDURE — 36415 COLL VENOUS BLD VENIPUNCTURE: CPT

## 2024-09-17 PROCEDURE — 99214 OFFICE O/P EST MOD 30 MIN: CPT | Performed by: INTERNAL MEDICINE

## 2024-09-17 PROCEDURE — 80048 BASIC METABOLIC PNL TOTAL CA: CPT

## 2024-09-17 PROCEDURE — 1036F TOBACCO NON-USER: CPT | Performed by: INTERNAL MEDICINE

## 2024-09-17 PROCEDURE — 3078F DIAST BP <80 MM HG: CPT | Performed by: INTERNAL MEDICINE

## 2024-09-17 PROCEDURE — 3062F POS MACROALBUMINURIA REV: CPT | Performed by: INTERNAL MEDICINE

## 2024-09-17 PROCEDURE — 99212 OFFICE O/P EST SF 10 MIN: CPT

## 2024-09-17 PROCEDURE — 3051F HG A1C>EQUAL 7.0%<8.0%: CPT | Performed by: INTERNAL MEDICINE

## 2024-09-17 PROCEDURE — 3077F SYST BP >= 140 MM HG: CPT | Performed by: INTERNAL MEDICINE

## 2024-09-17 RX ORDER — METOPROLOL SUCCINATE 50 MG/1
50 TABLET, EXTENDED RELEASE ORAL DAILY
Qty: 30 TABLET | Refills: 11 | Status: SHIPPED | OUTPATIENT
Start: 2024-09-17 | End: 2025-09-17

## 2024-09-17 RX ORDER — FUROSEMIDE 20 MG/1
20 TABLET ORAL DAILY
Qty: 30 TABLET | Refills: 11 | Status: SHIPPED | OUTPATIENT
Start: 2024-09-17 | End: 2025-09-17

## 2024-09-17 RX ORDER — AMILORIDE HYDROCHLORIDE 5 MG/1
20 TABLET ORAL 2 TIMES DAILY
Qty: 720 TABLET | Refills: 3 | Status: SHIPPED | OUTPATIENT
Start: 2024-09-17 | End: 2025-09-17

## 2024-09-17 ASSESSMENT — ENCOUNTER SYMPTOMS
SHORTNESS OF BREATH: 0
EYES NEGATIVE: 1
JOINT SWELLING: 0
BACK PAIN: 0
ABDOMINAL DISTENTION: 0
NECK STIFFNESS: 0
PALPITATIONS: 0
FEVER: 0
HEADACHES: 0
LIGHT-HEADEDNESS: 0
DYSURIA: 0
RESPIRATORY NEGATIVE: 1
NAUSEA: 0
PSYCHIATRIC NEGATIVE: 1
EYE DISCHARGE: 0
ABDOMINAL PAIN: 0
CHILLS: 0
ADENOPATHY: 0
VOMITING: 0
NUMBNESS: 0
WHEEZING: 0
CONSTIPATION: 0
AGITATION: 0
CONFUSION: 0
NEUROLOGICAL NEGATIVE: 1
ENDOCRINE NEGATIVE: 1
MUSCULOSKELETAL NEGATIVE: 1
DIARRHEA: 0
GASTROINTESTINAL NEGATIVE: 1
ALLERGIC/IMMUNOLOGIC NEGATIVE: 1
CONSTITUTIONAL NEGATIVE: 1
CARDIOVASCULAR NEGATIVE: 1
HEMATOLOGIC/LYMPHATIC NEGATIVE: 1
COUGH: 0

## 2024-09-17 NOTE — PATIENT INSTRUCTIONS
Continue medications at same dose    Diet  2000 mg (2 gram) sodium diet-Do not add salt to foods or cook with salt. Check labels for Sodium (Na)     Resources  [facility] Failure Clinic [phone] Monday - Friday 7:00 am - 3:00 pm  Websites: [email or web address]; American Heart Association: www.heart.org    Special Instructions:  If you smoke-Quit!  If you are not able to contact your doctor and need immediate medical attention, call 911  If you are not able to keep you're scheduled appointment at the Heart Failure Clinic, call [phone]  Watch for and report to your doctor all warning signs of Heart Failure (increased difficulty with breathing, 3-5 pound weight gain in one week or less, increased swelling, increased tiredness)  Weigh yourself each day at the same time with the same amount of clothes on. Record your weight log. Bring it with your to each visit

## 2024-09-17 NOTE — PROGRESS NOTES
Subjective   Patient ID: Roselia Mo is a 53 y.o. female who presents for Follow-up (1 mo fu gout).  Here for fu  Co retaining fluid and wt gain        Review of Systems   Constitutional: Negative.  Negative for chills and fever.   HENT: Negative.  Negative for congestion.    Eyes: Negative.  Negative for discharge.   Respiratory: Negative.  Negative for cough, shortness of breath and wheezing.    Cardiovascular: Negative.  Negative for chest pain, palpitations and leg swelling.   Gastrointestinal: Negative.  Negative for abdominal distention, abdominal pain, constipation, diarrhea, nausea and vomiting.   Endocrine: Negative.    Genitourinary: Negative.  Negative for dysuria and urgency.   Musculoskeletal: Negative.  Negative for back pain, joint swelling and neck stiffness.   Skin: Negative.  Negative for rash.   Allergic/Immunologic: Negative.  Negative for immunocompromised state.   Neurological: Negative.  Negative for light-headedness, numbness and headaches.   Hematological: Negative.  Negative for adenopathy.   Psychiatric/Behavioral: Negative.  Negative for agitation, behavioral problems and confusion.    All other systems reviewed and are negative.      Objective   Physical Exam  Vitals reviewed.   Constitutional:       General: She is not in acute distress.     Appearance: She is obese. She is ill-appearing (chronically).   HENT:      Head: Normocephalic and atraumatic.      Nose: Nose normal.   Eyes:      Conjunctiva/sclera: Conjunctivae normal.      Pupils: Pupils are equal, round, and reactive to light.   Neck:      Vascular: No carotid bruit.   Cardiovascular:      Rate and Rhythm: Normal rate and regular rhythm.      Pulses: Normal pulses.      Heart sounds:      No gallop.   Pulmonary:      Effort: Pulmonary effort is normal. No respiratory distress.      Breath sounds: Normal breath sounds. No wheezing.   Abdominal:      General: Bowel sounds are normal.      Palpations: Abdomen is soft.       Tenderness: There is no abdominal tenderness.   Musculoskeletal:         General: Normal range of motion.      Cervical back: Normal range of motion. No rigidity.      Comments: B/l lymphedema /mild   Lymphadenopathy:      Cervical: No cervical adenopathy.   Skin:     General: Skin is warm.      Findings: No rash.   Neurological:      General: No focal deficit present.      Mental Status: She is alert and oriented to person, place, and time.   Psychiatric:         Mood and Affect: Mood normal.         Behavior: Behavior normal.       /76 (BP Location: Left arm, Patient Position: Sitting)   Pulse 66    Hemoglobin A1C   Date/Time Value Ref Range Status   07/24/2024 12:36 PM 7.7 (H) see below % Final     Assessment/Plan   Problem List Items Addressed This Visit       Congestive heart failure (Multi)    Relevant Medications    metoprolol succinate XL (Toprol-XL) 50 mg 24 hr tablet    furosemide (Lasix) 20 mg tablet    Other Relevant Orders    Basic Metabolic Panel    Hypertension associated with diabetes (Multi)    Relevant Medications    metoprolol succinate XL (Toprol-XL) 50 mg 24 hr tablet    furosemide (Lasix) 20 mg tablet    Other Relevant Orders    Basic Metabolic Panel    Class 3 severe obesity due to excess calories with serious comorbidity and body mass index (BMI) greater than or equal to 70 in adult (Multi) - Primary    Relevant Orders    Basic Metabolic Panel    Stage 3a chronic kidney disease (Multi)    Relevant Medications    aMILoride (Midamor) 5 mg tablet    Other Relevant Orders    Basic Metabolic Panel    Hypokalemia    Relevant Medications    aMILoride (Midamor) 5 mg tablet    Other Relevant Orders    Basic Metabolic Panel        Metoprolol was decreased at hospital and amiloride was increased  HTN addressed as follow:    MONITOR BP   GOAL BP LOWER THAN 130/80  LOW SALT  EXERCISE DAILY    Fu 1 mo bw BP( lasix added)

## 2024-09-17 NOTE — PROGRESS NOTES
Pt enrolled in [facility] for management of Portal vein thrombosis [I81].     Pt current location [facility] clinic. Rest of [facility] visit completed by RN per clinic policy.     Current anticoagulant: Warfarin    Time since last visit: 1 weeks    Last INR: 1.2 on warfarin 45 mg in the previous week. Pt was instructed to take 10mg for 2 days, then resume usual dosing at last visit.    Last Creatinine:   Lab Results   Component Value Date    CREATININE 0.75 09/10/2024     Last hemoglobin/hematocrit:  Lab Results   Component Value Date    HGB 12.8 07/04/2024     Lab Results   Component Value Date    HCT 41.8 07/04/2024       Current INR: 1.3 is SUBtherapeutic for goal range of 2.0-3.0 and is reflective of 57.5 mg in the previous week prior to visit.    Dosing confirmed with patient  Patient denies any missed doses.  Patient denies any medication changes, diet changes, or OTC/herbal supplement changes since last visit.  Increased her amiloride at last heart failure visit  Decreased metoprolol at last heart failure visit  Patient denies any adverse reactions or barriers.  Patient denies any CP/SOB, fatigue, bleeding or bruising since last visit.   Patient denies any change in alcohol or tobacco use since last visit.   Patient denies any upcoming medical or dental procedures.    Plan:  Patient was instructed to  take 10 mg today and tomorrow then return to normal regimen of 7.5 mg daily .  INR follow up will occur in 1 weeks.  Patient was instructed to maintain consistent vegetable intake, to monitor for any bruising or bleeding, and to call with any medication changes or concerns.    Pt handout given with above information    Gordo Lu, PharmD  P:[phone]  F:[phone]

## 2024-09-17 NOTE — PROGRESS NOTES
"Roselia arrived via wheelchair to the [facility] Failure Clinic for her scheduled visit for follow up and INR check. Theres states she is feeling \"about the same, my swelling in my feet are a little better, but my legs are the same\". She denies increased shortness of breath, PND, or Orthopnea. Uses Bipap nightly. Eating and sleeping without distress. Home medications reviewed. A BMP was drawn today and results. INR was 1.3, results were called to Gordo in the coumadin clinic. The plan for Jihan is to take Coumadin 10mg today 9/17/24 and tomorrow 9/18/24 and return in 1 week for INR and BMP. Reviewed signs and symptoms of increased heart failure and when to call for help. Patient verbalizes understanding for: Low sodium diet: 2000 mg daily of sodium, follow-up appointment with [facility], weighing self daily, medications dose and schedule, and signs of increased heart failure.      Vitals:  BP: 122/64           HR:41-70            Resp: 20          SPO2: 95% on 4L       Weight: 387.8lbs                         Previous Weight: 387.6lbs         Lung Sounds: clear    Edema: +3 bl legs, +1 bl feet    JVD: absent     Labs: BMP    Medications Administered: none    Next Appointment Date: September 24, 2024@ 2pm    Note in EMR for treating Physician: Dr. Earl    In-House Supervising Physician: Dr. Adhikari    "

## 2024-09-24 ENCOUNTER — APPOINTMENT (OUTPATIENT)
Dept: NEPHROLOGY | Facility: CLINIC | Age: 54
End: 2024-09-24
Payer: MEDICARE

## 2024-09-24 ENCOUNTER — APPOINTMENT (OUTPATIENT)
Dept: CARDIAC REHAB | Facility: HOSPITAL | Age: 54
End: 2024-09-24
Payer: MEDICARE

## 2024-10-02 ENCOUNTER — APPOINTMENT (OUTPATIENT)
Dept: NEPHROLOGY | Facility: CLINIC | Age: 54
End: 2024-10-02
Payer: MEDICARE

## 2024-10-02 ENCOUNTER — LAB (OUTPATIENT)
Dept: LAB | Facility: LAB | Age: 54
End: 2024-10-02
Payer: MEDICARE

## 2024-10-02 ENCOUNTER — ANTICOAGULATION - WARFARIN VISIT (OUTPATIENT)
Dept: PHARMACY | Facility: HOSPITAL | Age: 54
End: 2024-10-02

## 2024-10-02 ENCOUNTER — CLINICAL SUPPORT (OUTPATIENT)
Dept: CARDIAC REHAB | Facility: HOSPITAL | Age: 54
End: 2024-10-02
Payer: MEDICARE

## 2024-10-02 VITALS
WEIGHT: 293 LBS | BODY MASS INDEX: 64.38 KG/M2 | HEART RATE: 80 BPM | SYSTOLIC BLOOD PRESSURE: 146 MMHG | DIASTOLIC BLOOD PRESSURE: 76 MMHG | OXYGEN SATURATION: 96 %

## 2024-10-02 VITALS
SYSTOLIC BLOOD PRESSURE: 122 MMHG | BODY MASS INDEX: 53.92 KG/M2 | HEART RATE: 82 BPM | HEIGHT: 62 IN | OXYGEN SATURATION: 95 % | WEIGHT: 293 LBS | DIASTOLIC BLOOD PRESSURE: 80 MMHG

## 2024-10-02 DIAGNOSIS — I50.32 CHRONIC DIASTOLIC CONGESTIVE HEART FAILURE: ICD-10-CM

## 2024-10-02 DIAGNOSIS — E11.59 HYPERTENSION ASSOCIATED WITH DIABETES (MULTI): ICD-10-CM

## 2024-10-02 DIAGNOSIS — I50.32 CHRONIC DIASTOLIC HEART FAILURE: ICD-10-CM

## 2024-10-02 DIAGNOSIS — I82.0 HEPATIC VEIN THROMBOSIS (MULTI): ICD-10-CM

## 2024-10-02 DIAGNOSIS — N18.2 CKD (CHRONIC KIDNEY DISEASE) STAGE 2, GFR 60-89 ML/MIN: Primary | ICD-10-CM

## 2024-10-02 DIAGNOSIS — I15.2 HYPERTENSION ASSOCIATED WITH DIABETES (MULTI): ICD-10-CM

## 2024-10-02 DIAGNOSIS — E87.6 HYPOKALEMIA: ICD-10-CM

## 2024-10-02 DIAGNOSIS — I81 PORTAL VEIN THROMBOSIS: Primary | ICD-10-CM

## 2024-10-02 LAB
ANION GAP SERPL CALC-SCNC: 12 MMOL/L (ref 10–20)
BUN SERPL-MCNC: 9 MG/DL (ref 6–23)
CALCIUM SERPL-MCNC: 9.8 MG/DL (ref 8.6–10.3)
CHLORIDE SERPL-SCNC: 105 MMOL/L (ref 98–107)
CO2 SERPL-SCNC: 25 MMOL/L (ref 21–32)
CREAT SERPL-MCNC: 0.62 MG/DL (ref 0.5–1.05)
EGFRCR SERPLBLD CKD-EPI 2021: >90 ML/MIN/1.73M*2
GLUCOSE SERPL-MCNC: 219 MG/DL (ref 74–99)
INR IN PPP BY COAGULATION ASSAY EXTERNAL: 2
POTASSIUM SERPL-SCNC: 4.2 MMOL/L (ref 3.5–5.3)
PROTHROMBIN TIME (PT) IN PPP BY COAGULATION ASSAY EXTERNAL: NORMAL
SODIUM SERPL-SCNC: 138 MMOL/L (ref 136–145)

## 2024-10-02 PROCEDURE — 3008F BODY MASS INDEX DOCD: CPT | Performed by: INTERNAL MEDICINE

## 2024-10-02 PROCEDURE — 36415 COLL VENOUS BLD VENIPUNCTURE: CPT

## 2024-10-02 PROCEDURE — 1036F TOBACCO NON-USER: CPT | Performed by: INTERNAL MEDICINE

## 2024-10-02 PROCEDURE — 3074F SYST BP LT 130 MM HG: CPT | Performed by: INTERNAL MEDICINE

## 2024-10-02 PROCEDURE — 3051F HG A1C>EQUAL 7.0%<8.0%: CPT | Performed by: INTERNAL MEDICINE

## 2024-10-02 PROCEDURE — 80048 BASIC METABOLIC PNL TOTAL CA: CPT

## 2024-10-02 PROCEDURE — 99213 OFFICE O/P EST LOW 20 MIN: CPT | Performed by: INTERNAL MEDICINE

## 2024-10-02 PROCEDURE — 3079F DIAST BP 80-89 MM HG: CPT | Performed by: INTERNAL MEDICINE

## 2024-10-02 PROCEDURE — 99212 OFFICE O/P EST SF 10 MIN: CPT

## 2024-10-02 PROCEDURE — 3062F POS MACROALBUMINURIA REV: CPT | Performed by: INTERNAL MEDICINE

## 2024-10-02 ASSESSMENT — ENCOUNTER SYMPTOMS
HEMATOLOGIC/LYMPHATIC NEGATIVE: 1
ALLERGIC/IMMUNOLOGIC NEGATIVE: 1
CONSTITUTIONAL NEGATIVE: 1
EYES NEGATIVE: 1
MUSCULOSKELETAL NEGATIVE: 1
COLOR CHANGE: 1
GASTROINTESTINAL NEGATIVE: 1
PSYCHIATRIC NEGATIVE: 1
NEUROLOGICAL NEGATIVE: 1
ENDOCRINE NEGATIVE: 1
RESPIRATORY NEGATIVE: 1

## 2024-10-02 NOTE — PROGRESS NOTES
Subjective   She feels like she has an infection of her breast.  She gained 25 lbs due to stomach swelling  She feels like stomach is better    Patient ID: Roselia Mo is a 53 y.o. female who presents for Follow-up (3 month ck/Review labs ).  HPI  She is here for follow-up secondary to hypokalemia with potassium wasting with metabolic alkalosis.  She has been on amiloride.  Her labs were completed today.  They are currently in process and I do not have them back as of yet  On the 17th her labs were completed and at that time her potassium was 4.3 renal function looks good electrolytes look very good bicarb 24 so about 2 weeks ago labs look quite good  Medications are reviewed she is on the amiloride also on amlodipine she has Lasix listed along with insulin.    Review of Systems   Constitutional: Negative.    HENT: Negative.     Eyes: Negative.    Respiratory: Negative.     Cardiovascular:  Positive for leg swelling.   Gastrointestinal: Negative.    Endocrine: Negative.    Genitourinary: Negative.    Musculoskeletal: Negative.    Skin:  Positive for color change and rash.   Allergic/Immunologic: Negative.    Neurological: Negative.    Hematological: Negative.    Psychiatric/Behavioral: Negative.         Objective   Physical Exam  Constitutional:       Appearance: Normal appearance. She is obese.   HENT:      Head: Normocephalic and atraumatic.      Right Ear: External ear normal.      Left Ear: External ear normal.      Nose: Nose normal.      Mouth/Throat:      Mouth: Mucous membranes are moist.      Pharynx: Oropharynx is clear.   Eyes:      Extraocular Movements: Extraocular movements intact.      Conjunctiva/sclera: Conjunctivae normal.      Pupils: Pupils are equal, round, and reactive to light.   Cardiovascular:      Rate and Rhythm: Normal rate and regular rhythm.   Pulmonary:      Effort: Pulmonary effort is normal.      Breath sounds: Normal breath sounds.   Abdominal:      General: Abdomen is flat.       Palpations: Abdomen is soft.   Skin:     General: Skin is warm and dry.      Findings: Erythema and rash present.   Neurological:      General: No focal deficit present.      Mental Status: She is alert and oriented to person, place, and time.   Psychiatric:         Mood and Affect: Mood normal.         Behavior: Behavior normal.         Assessment/Plan   Problem List Items Addressed This Visit             ICD-10-CM    Chronic diastolic heart failure I50.32    Hypertension associated with diabetes (Multi) E11.59, I15.2    CKD (chronic kidney disease) stage 2, GFR 60-89 ml/min - Primary N18.2    Relevant Orders    Follow Up In Nephrology    Comprehensive metabolic panel    Hypokalemia E87.6   Plan:  Stay on Amiloride  Renal function is good  Call with issues  F/U in 6 months.     Hypokalemia with potassium wasting with TT KG of 9  Metabolic alkalosis  Diastolic CHF  Morbid Obesity  Lymphedema  HTN  DM II  Right Sided CHF  Pulmonary HTN  CY on CPAP  CKD stage II       Pardeep Nichole DO 10/02/24 1:09 PM

## 2024-10-02 NOTE — PROGRESS NOTES
Pt enrolled in [facility] for management of Portal vein thrombosis [I81].     Pt current location [facility] clinic. Rest of [facility] visit completed by RN per clinic policy.     Current anticoagulant: Warfarin    Time since last visit: 2 weeks    Last INR: 1.3 on warfarin 50 mg in the previous week. Pt was instructed to  take 10 mg today and tomorrow then return to normal regimen of 7.5 mg daily at last visit.    Last Creatinine:   Lab Results   Component Value Date    CREATININE 0.69 09/17/2024     Last hemoglobin/hematocrit:  Lab Results   Component Value Date    HGB 12.8 07/04/2024     Lab Results   Component Value Date    HCT 41.8 07/04/2024       Current INR: 2.0 is therapeutic for goal range of 2.0-3.0 and is reflective of 52.5 mg in the previous week prior to visit.    Dosing confirmed with patient  Patient denies any missed doses.  Patient denies any medication changes, diet changes, or OTC/herbal supplement changes since last visit.  Patient denies any adverse reactions or barriers.  Patient denies any CP/SOB, fatigue, bleeding or bruising since last visit.   Patient denies any change in alcohol or tobacco use since last visit.   Patient denies any upcoming medical or dental procedures.    Plan:  Patient was instructed to continue with current warfarin dose of 7.5 mg daily.  INR follow up will occur in 3 weeks.  Patient was instructed to maintain consistent vegetable intake, to monitor for any bruising or bleeding, and to call with any medication changes or concerns.    Gordo Lu, PharmD  P:[phone]  F:[phone]

## 2024-10-02 NOTE — PATIENT INSTRUCTIONS
Continue medications at same dose    Diet  2000 mg (2 gram) sodium diet-Do not add salt to foods or cook with salt. Check labels for Sodium (Na)     Resources  [facility] Failure Clinic [phone] Monday - Friday 7:00 am - 3:00 pm  Websites: [email or web address]; American Heart Association: www.heart.org    Special Instructions:  If you smoke-Quit!  If you are not able to contact your doctor and need immediate medical attention, call 911  If you are not able to keep you're scheduled appointment at the Heart Failure Clinic, call [phone]  Watch for and report to your doctor all warning signs of Heart Failure (increased difficulty with breathing, 3-5 pound weight gain in one week or less, increased swelling, increased tiredness)  Weigh yourself each day at the same time with the same amount of clothes on. Record your weight log. Bring it with your to each visit

## 2024-10-02 NOTE — PROGRESS NOTES
"Roselia arrived ambulatory to the Heart Failure Clinic for a walk in  visit and INR check. Roselia states she is feeling \" good, my swelling in my belly has gone down and my legs and I can breath better, I do have some redness of my breast tho \". She denies increased shortness of breath, PND, or Orthopnea. Uses CPAP nightly. Eating and sleeping without distress. Home medications reviewed. Roselia is down 35lbs, she reports she has cut the salt out of her diet. OP labs were drawn and unremarkable. INR was 2.0. Results called to the Pharmacist in the Coumadin Clinic. The plan for Willow is to continue medication regimen and continue Coumadin 7.5mg daily. Roselia will follow up in 3 weeks for an INR. Recommeded Theres call her PCP or go to urgent care for the redness to her breast. Reviewed signs and symptoms of increased heart failure and when to call for help. Patient verbalizes understanding for: Low sodium diet: 2000 mg daily of sodium, follow-up appointment with [facility], weighing self daily, medications dose and schedule, and signs of increased heart failure.      Vitals:  BP: 146/76           HR: 80           Resp: 18          SPO2: 96% on room air       Weight: 352lbs                         Previous Weight:  387.8lbs        Lung Sounds: clear    Edema: none    JVD: absent    Labs: OP labs    Medications Administered: none    Next Appointment Date: October 23, 2024@ 1pm    Note in EMR for treating Physician:     In-House Supervising Physician: Dr. Martinez  "

## 2024-10-03 DIAGNOSIS — I49.3 VENTRICULAR ECTOPY: Primary | ICD-10-CM

## 2024-10-03 RX ORDER — METOPROLOL SUCCINATE 25 MG/1
25 TABLET, EXTENDED RELEASE ORAL DAILY
Qty: 90 TABLET | Refills: 3 | Status: SHIPPED | OUTPATIENT
Start: 2024-10-03 | End: 2025-10-03

## 2024-10-14 DIAGNOSIS — I81 PVT (PORTAL VEIN THROMBOSIS): ICD-10-CM

## 2024-10-14 DIAGNOSIS — Z87.39 H/O: GOUT: ICD-10-CM

## 2024-10-14 RX ORDER — WARFARIN SODIUM 5 MG/1
TABLET ORAL
Qty: 90 TABLET | Refills: 3 | Status: SHIPPED | OUTPATIENT
Start: 2024-10-14 | End: 2025-10-14

## 2024-10-14 RX ORDER — ALLOPURINOL 300 MG/1
300 TABLET ORAL DAILY
Qty: 90 TABLET | Refills: 3 | Status: SHIPPED | OUTPATIENT
Start: 2024-10-14

## 2024-10-17 ENCOUNTER — APPOINTMENT (OUTPATIENT)
Dept: PRIMARY CARE | Facility: CLINIC | Age: 54
End: 2024-10-17
Payer: MEDICARE

## 2024-10-17 VITALS
SYSTOLIC BLOOD PRESSURE: 132 MMHG | HEART RATE: 78 BPM | HEIGHT: 62 IN | DIASTOLIC BLOOD PRESSURE: 70 MMHG | BODY MASS INDEX: 53.92 KG/M2 | WEIGHT: 293 LBS

## 2024-10-17 DIAGNOSIS — M25.511 PAIN OF RIGHT SHOULDER REGION: ICD-10-CM

## 2024-10-17 DIAGNOSIS — M79.604 LEG PAIN, BILATERAL: ICD-10-CM

## 2024-10-17 DIAGNOSIS — Z79.4 TYPE 2 DIABETES MELLITUS WITH HYPERGLYCEMIA, WITH LONG-TERM CURRENT USE OF INSULIN: ICD-10-CM

## 2024-10-17 DIAGNOSIS — E11.65 TYPE 2 DIABETES MELLITUS WITH HYPERGLYCEMIA, WITH LONG-TERM CURRENT USE OF INSULIN: ICD-10-CM

## 2024-10-17 DIAGNOSIS — M79.605 LEG PAIN, BILATERAL: ICD-10-CM

## 2024-10-17 DIAGNOSIS — E11.42 DIABETIC POLYNEUROPATHY ASSOCIATED WITH TYPE 2 DIABETES MELLITUS (MULTI): ICD-10-CM

## 2024-10-17 DIAGNOSIS — E66.813 CLASS 3 SEVERE OBESITY DUE TO EXCESS CALORIES WITH SERIOUS COMORBIDITY AND BODY MASS INDEX (BMI) GREATER THAN OR EQUAL TO 70 IN ADULT: Primary | ICD-10-CM

## 2024-10-17 DIAGNOSIS — E66.01 CLASS 3 SEVERE OBESITY DUE TO EXCESS CALORIES WITH SERIOUS COMORBIDITY AND BODY MASS INDEX (BMI) GREATER THAN OR EQUAL TO 70 IN ADULT: Primary | ICD-10-CM

## 2024-10-17 DIAGNOSIS — F33.1 MODERATE EPISODE OF RECURRENT MAJOR DEPRESSIVE DISORDER: ICD-10-CM

## 2024-10-17 PROCEDURE — 3075F SYST BP GE 130 - 139MM HG: CPT | Performed by: INTERNAL MEDICINE

## 2024-10-17 PROCEDURE — 3008F BODY MASS INDEX DOCD: CPT | Performed by: INTERNAL MEDICINE

## 2024-10-17 PROCEDURE — 3051F HG A1C>EQUAL 7.0%<8.0%: CPT | Performed by: INTERNAL MEDICINE

## 2024-10-17 PROCEDURE — 99214 OFFICE O/P EST MOD 30 MIN: CPT | Performed by: INTERNAL MEDICINE

## 2024-10-17 PROCEDURE — 1036F TOBACCO NON-USER: CPT | Performed by: INTERNAL MEDICINE

## 2024-10-17 PROCEDURE — 3062F POS MACROALBUMINURIA REV: CPT | Performed by: INTERNAL MEDICINE

## 2024-10-17 PROCEDURE — 3078F DIAST BP <80 MM HG: CPT | Performed by: INTERNAL MEDICINE

## 2024-10-17 RX ORDER — DULOXETIN HYDROCHLORIDE 60 MG/1
60 CAPSULE, DELAYED RELEASE ORAL 2 TIMES DAILY
Qty: 60 CAPSULE | Refills: 11 | Status: SHIPPED | OUTPATIENT
Start: 2024-10-17 | End: 2025-10-17

## 2024-10-17 RX ORDER — GABAPENTIN 300 MG/1
900 CAPSULE ORAL 3 TIMES DAILY
Qty: 270 CAPSULE | Refills: 2 | Status: SHIPPED | OUTPATIENT
Start: 2024-10-17 | End: 2025-01-15

## 2024-10-17 RX ORDER — LIDOCAINE 50 MG/G
1 PATCH TOPICAL DAILY
Qty: 30 PATCH | Refills: 11 | Status: SHIPPED | OUTPATIENT
Start: 2024-10-17 | End: 2025-10-17

## 2024-10-17 ASSESSMENT — ENCOUNTER SYMPTOMS
EYES NEGATIVE: 1
ENDOCRINE NEGATIVE: 1
ARTHRALGIAS: 1
VOMITING: 0
CONFUSION: 0
GASTROINTESTINAL NEGATIVE: 1
LIGHT-HEADEDNESS: 0
EYE DISCHARGE: 0
CHILLS: 0
RESPIRATORY NEGATIVE: 1
PALPITATIONS: 0
JOINT SWELLING: 0
ALLERGIC/IMMUNOLOGIC NEGATIVE: 1
ADENOPATHY: 0
SHORTNESS OF BREATH: 0
NUMBNESS: 0
CONSTIPATION: 0
DIARRHEA: 0
CARDIOVASCULAR NEGATIVE: 1
ABDOMINAL DISTENTION: 0
WHEEZING: 0
HEADACHES: 0
NAUSEA: 0
NEUROLOGICAL NEGATIVE: 1
AGITATION: 0
FEVER: 0
BACK PAIN: 0
DYSURIA: 0
COUGH: 0
CONSTITUTIONAL NEGATIVE: 1
NECK STIFFNESS: 0
HEMATOLOGIC/LYMPHATIC NEGATIVE: 1
PSYCHIATRIC NEGATIVE: 1
ABDOMINAL PAIN: 0

## 2024-10-17 NOTE — PROGRESS NOTES
Subjective   Patient ID: Roselia Mo is a 53 y.o. female who presents for Follow-up (1 mo fu no lab done pt increased her gabapentin to 4 times daily).  HERE FOR FU    Co gabapentin 900 tid not enough has to raise to qid FOR HER LEG DISCOMFORT        Review of Systems   Constitutional: Negative.  Negative for chills and fever.   HENT: Negative.  Negative for congestion.    Eyes: Negative.  Negative for discharge.   Respiratory: Negative.  Negative for cough, shortness of breath and wheezing.    Cardiovascular: Negative.  Negative for chest pain, palpitations and leg swelling.   Gastrointestinal: Negative.  Negative for abdominal distention, abdominal pain, constipation, diarrhea, nausea and vomiting.   Endocrine: Negative.    Genitourinary: Negative.  Negative for dysuria and urgency.   Musculoskeletal:  Positive for arthralgias. Negative for back pain, joint swelling and neck stiffness.   Skin: Negative.  Negative for rash.   Allergic/Immunologic: Negative.  Negative for immunocompromised state.   Neurological: Negative.  Negative for light-headedness, numbness and headaches.   Hematological: Negative.  Negative for adenopathy.   Psychiatric/Behavioral: Negative.  Negative for agitation, behavioral problems and confusion.    All other systems reviewed and are negative.      Objective   Physical Exam  Vitals reviewed.   Constitutional:       General: She is not in acute distress.     Appearance: She is obese. She is ill-appearing (chronically).   HENT:      Head: Normocephalic and atraumatic.      Nose: Nose normal.   Eyes:      Conjunctiva/sclera: Conjunctivae normal.      Pupils: Pupils are equal, round, and reactive to light.   Neck:      Vascular: No carotid bruit.   Cardiovascular:      Rate and Rhythm: Normal rate and regular rhythm.      Pulses: Normal pulses.      Heart sounds:      No gallop.   Pulmonary:      Effort: Pulmonary effort is normal. No respiratory distress.      Breath sounds: Normal breath  "sounds. No wheezing.   Abdominal:      General: Bowel sounds are normal.      Palpations: Abdomen is soft.      Tenderness: There is no abdominal tenderness.   Musculoskeletal:         General: Normal range of motion.      Cervical back: Normal range of motion. No rigidity.   Lymphadenopathy:      Cervical: No cervical adenopathy.   Skin:     General: Skin is warm.   Neurological:      General: No focal deficit present.      Mental Status: She is alert and oriented to person, place, and time.   Psychiatric:         Mood and Affect: Mood normal.         Behavior: Behavior normal.       /70   Pulse 78   Ht 1.575 m (5' 2\")   Wt (!) 160 kg (352 lb)   BMI 64.38 kg/m²    Hemoglobin A1C   Date/Time Value Ref Range Status   07/24/2024 12:36 PM 7.7 (H) see below % Final     Assessment/Plan   Problem List Items Addressed This Visit       Diabetic polyneuropathy (Multi)    Relevant Medications    gabapentin (Neurontin) 300 mg capsule    Moderate episode of recurrent major depressive disorder    Relevant Medications    DULoxetine (Cymbalta) 60 mg DR capsule    Class 3 severe obesity due to excess calories with serious comorbidity and body mass index (BMI) greater than or equal to 70 in adult - Primary    Relevant Medications    semaglutide 0.25 mg or 0.5 mg (2 mg/3 mL) pen injector (Start on 10/20/2024)    Type 2 diabetes mellitus    Relevant Medications    semaglutide 0.25 mg or 0.5 mg (2 mg/3 mL) pen injector (Start on 10/20/2024)    Leg pain, bilateral    Relevant Medications    DULoxetine (Cymbalta) 60 mg DR capsule    Pain of right shoulder region    Relevant Medications    lidocaine (Lidoderm) 5 % patch          HTN addressed as follow:    MONITOR BP   GOAL BP LOWER THAN 130/80  LOW SALT  EXERCISE DAILY    Diabetes Mellitus/IFG addressed as follow:    1800 TRENT ADA  HGA1C GOAL LESS THAN 7  LOSE WT  EXERCISE DAILY    Labs reviewed with pt    \      Will cont clari same and increase dulaxetine    OARRS HAS BEEN " REVIEWED AND IS CONSISTENT WITH PRESCRIBED MEDICATIONS, CONSIDERED THE RISK OF ABUSE, DEPENDENCE, ADDICTION AND DIVERSION, MEDICATION IS FELT TO BE CLINICALLY APPROPRIATE ON THE DOCUMENTED DIAGNOSIS        FU 1 MO BW

## 2024-10-23 ENCOUNTER — APPOINTMENT (OUTPATIENT)
Dept: CARDIAC REHAB | Facility: HOSPITAL | Age: 54
End: 2024-10-23
Payer: MEDICARE

## 2024-10-24 ENCOUNTER — CLINICAL SUPPORT (OUTPATIENT)
Dept: CARDIAC REHAB | Facility: HOSPITAL | Age: 54
End: 2024-10-24
Payer: MEDICARE

## 2024-10-24 ENCOUNTER — ANTICOAGULATION - WARFARIN VISIT (OUTPATIENT)
Dept: PHARMACY | Facility: HOSPITAL | Age: 54
End: 2024-10-24
Payer: MEDICARE

## 2024-10-24 VITALS
HEART RATE: 84 BPM | OXYGEN SATURATION: 96 % | DIASTOLIC BLOOD PRESSURE: 79 MMHG | RESPIRATION RATE: 20 BRPM | BODY MASS INDEX: 61.82 KG/M2 | WEIGHT: 293 LBS | SYSTOLIC BLOOD PRESSURE: 130 MMHG

## 2024-10-24 DIAGNOSIS — I81 PORTAL VEIN THROMBOSIS: Primary | ICD-10-CM

## 2024-10-24 DIAGNOSIS — I82.0 HEPATIC VEIN THROMBOSIS (MULTI): ICD-10-CM

## 2024-10-24 DIAGNOSIS — I50.32 CHRONIC DIASTOLIC HEART FAILURE: ICD-10-CM

## 2024-10-24 LAB
POC INR: 2
POC PROTHROMBIN TIME: NORMAL

## 2024-10-24 PROCEDURE — 99212 OFFICE O/P EST SF 10 MIN: CPT

## 2024-10-24 PROCEDURE — 85610 PROTHROMBIN TIME: CPT | Mod: QW | Performed by: PHARMACIST

## 2024-10-24 NOTE — PROGRESS NOTES
Pt enrolled in [facility] for management of Portal vein thrombosis [I81].     Pt current location [facility] clinic. Rest of [facility] visit completed by RN per clinic policy.     Current anticoagulant: Warfarin    Time since last visit: 3 weeks    Last INR: 3.0 on warfarin 52.5 mg in the previous week. No changes were made at that time.    Last Creatinine:   Lab Results   Component Value Date    CREATININE 0.62 10/02/2024     Last hemoglobin/hematocrit:  Lab Results   Component Value Date    HGB 12.8 07/04/2024     Lab Results   Component Value Date    HCT 41.8 07/04/2024       Current INR: 2.0 is therapeutic for goal range of 2.0-3.0 and is reflective of 52.5 mg in the previous week prior to visit.    Dosing confirmed with patient  Patient denies any missed doses.  Patient denies any medication changes, diet changes, or OTC/herbal supplement changes since last visit.  Patient denies any adverse reactions or barriers.  Patient denies any CP/SOB, fatigue, bleeding or bruising since last visit.   Patient denies any change in alcohol or tobacco use since last visit.   Patient denies any upcoming medical or dental procedures.    Plan:  Patient was instructed to continue with current warfarin dose.  INR follow up will occur in 4 weeks.  Patient was instructed to maintain consistent vegetable intake, to monitor for any bruising or bleeding, and to call with any medication changes or concerns.    Pt handout given with above information    Damien Dsouza, PharmD  P:[phone]  F:[phone]

## 2024-10-24 NOTE — PATIENT INSTRUCTIONS
Continue medications at same dose    Diet  2000 mg (2 gram) sodium diet-Do not add salt to foods or cook with salt. Check labels for Sodium (Na)     Resources  [facility] [phone] Monday - Friday 7:00 am - 3:00 pm  Websites: www.[employer]; American Heart Association: www.heart.org    Special Instructions:  If you smoke-Quit!  If you are not able to contact your doctor and need immediate medical attention, call 911  If you are not able to keep you're scheduled appointment at the [facility], call [phone]  Watch for and report to your doctor all warning signs of Heart Failure (increased difficulty with breathing, 3-5 pound weight gain in one week or less, increased swelling, increased tiredness)  Weigh yourself each day at the same time with the same amount of clothes on. Record your weight log. Bring it with your to each visit

## 2024-10-24 NOTE — PROGRESS NOTES
"Roselia arrived via electric wheelchair to the [facility] for her scheduled visit and INR check. Roselia states she is feeling \" ok \". She denies increased shortness of breath, PND, or Orthopnea.Eating and sleeping without distress. Home medications reviewed. Roselia reports that recently her duloxetine and semaglutide were increased. INR was 2.0. Results were called to Pharmacist Ramesh. The plan for Iván is to continue current Coumadin regimen of 7.5mg daily ans will return in 4 weeks. Reviewed signs and symptoms of increased heart failure and when to call for help. Patient verbalizes understanding for: Low sodium diet: 2000 mg daily of sodium, follow-up appointment with [facility], weighing self daily, medications dose and schedule, and signs of increased heart failure.      Vitals:  BP: 130/79           HR: 84           Resp: 20          SPO2: 96% on 4L       Weight: 338lbs                         Previous Weight:   352lbs       Lung Sounds: clear    Edema: +1 bl legs    JVD: absent     Labs: OP labs previously drawn    Medications Administered: none    Next Appointment Date: November 21, 2024@ 1pm    Note in EMR for treating Physician: Dr. Earl    In-House Supervising Physician: Dr. Adhikari  "

## 2024-11-18 ENCOUNTER — TELEPHONE (OUTPATIENT)
Dept: PRIMARY CARE | Facility: CLINIC | Age: 54
End: 2024-11-18

## 2024-11-18 ENCOUNTER — APPOINTMENT (OUTPATIENT)
Dept: PRIMARY CARE | Facility: CLINIC | Age: 54
End: 2024-11-18
Payer: MEDICARE

## 2024-11-18 VITALS — SYSTOLIC BLOOD PRESSURE: 113 MMHG | DIASTOLIC BLOOD PRESSURE: 75 MMHG | HEART RATE: 88 BPM

## 2024-11-18 DIAGNOSIS — E11.59 HYPERTENSION ASSOCIATED WITH DIABETES (MULTI): ICD-10-CM

## 2024-11-18 DIAGNOSIS — B37.9 YEAST INFECTION: ICD-10-CM

## 2024-11-18 DIAGNOSIS — I15.2 HYPERTENSION ASSOCIATED WITH DIABETES (MULTI): ICD-10-CM

## 2024-11-18 DIAGNOSIS — Z79.4 TYPE 2 DIABETES MELLITUS WITH HYPERGLYCEMIA, WITH LONG-TERM CURRENT USE OF INSULIN: ICD-10-CM

## 2024-11-18 DIAGNOSIS — E11.65 TYPE 2 DIABETES MELLITUS WITH HYPERGLYCEMIA, WITH LONG-TERM CURRENT USE OF INSULIN: ICD-10-CM

## 2024-11-18 DIAGNOSIS — E55.9 VITAMIN D DEFICIENCY: ICD-10-CM

## 2024-11-18 DIAGNOSIS — E03.9 ACQUIRED HYPOTHYROIDISM: ICD-10-CM

## 2024-11-18 DIAGNOSIS — K59.00 CONSTIPATION, UNSPECIFIED CONSTIPATION TYPE: ICD-10-CM

## 2024-11-18 DIAGNOSIS — K63.5 POLYP OF COLON, UNSPECIFIED PART OF COLON, UNSPECIFIED TYPE: Primary | ICD-10-CM

## 2024-11-18 PROCEDURE — 1036F TOBACCO NON-USER: CPT | Performed by: INTERNAL MEDICINE

## 2024-11-18 PROCEDURE — 3062F POS MACROALBUMINURIA REV: CPT | Performed by: INTERNAL MEDICINE

## 2024-11-18 PROCEDURE — 3078F DIAST BP <80 MM HG: CPT | Performed by: INTERNAL MEDICINE

## 2024-11-18 PROCEDURE — 99214 OFFICE O/P EST MOD 30 MIN: CPT | Performed by: INTERNAL MEDICINE

## 2024-11-18 PROCEDURE — 3051F HG A1C>EQUAL 7.0%<8.0%: CPT | Performed by: INTERNAL MEDICINE

## 2024-11-18 PROCEDURE — 3074F SYST BP LT 130 MM HG: CPT | Performed by: INTERNAL MEDICINE

## 2024-11-18 RX ORDER — FLUCONAZOLE 100 MG/1
100 TABLET ORAL DAILY
Qty: 10 TABLET | Refills: 0 | Status: SHIPPED | OUTPATIENT
Start: 2024-11-18 | End: 2024-11-28

## 2024-11-18 RX ORDER — NYSTATIN 1MM UNIT
POWDER (EA) MISCELLANEOUS
Qty: 1 EACH | Refills: 2 | Status: SHIPPED | OUTPATIENT
Start: 2024-11-18

## 2024-11-18 RX ORDER — POLYETHYLENE GLYCOL 3350 17 G/17G
17 POWDER, FOR SOLUTION ORAL DAILY
Qty: 30 PACKET | Refills: 11 | Status: SHIPPED | OUTPATIENT
Start: 2024-11-18 | End: 2025-11-18

## 2024-11-18 ASSESSMENT — ENCOUNTER SYMPTOMS
ENDOCRINE NEGATIVE: 1
ABDOMINAL PAIN: 0
PALPITATIONS: 0
CHILLS: 0
FEVER: 0
DYSURIA: 0
CARDIOVASCULAR NEGATIVE: 1
ADENOPATHY: 0
ABDOMINAL DISTENTION: 0
EYES NEGATIVE: 1
AGITATION: 0
CONSTITUTIONAL NEGATIVE: 1
VOMITING: 0
RESPIRATORY NEGATIVE: 1
NUMBNESS: 0
WHEEZING: 0
COUGH: 0
BACK PAIN: 0
DIARRHEA: 0
JOINT SWELLING: 0
SHORTNESS OF BREATH: 0
CONSTIPATION: 0
LIGHT-HEADEDNESS: 0
EYE DISCHARGE: 0
HEADACHES: 0
ALLERGIC/IMMUNOLOGIC NEGATIVE: 1
MUSCULOSKELETAL NEGATIVE: 1
NAUSEA: 0
PSYCHIATRIC NEGATIVE: 1
GASTROINTESTINAL NEGATIVE: 1
NECK STIFFNESS: 0
CONFUSION: 0
NEUROLOGICAL NEGATIVE: 1
HEMATOLOGIC/LYMPHATIC NEGATIVE: 1

## 2024-11-18 NOTE — TELEPHONE ENCOUNTER
Ramesh patient was started on diflucan today Dr. Franklin requesting INR to be checked in 3 days will you call her

## 2024-11-18 NOTE — PROGRESS NOTES
Subjective   Patient ID: Roselia Mo is a 53 y.o. female who presents for Follow-up (1 mo no lab done).  Here for fu  Feels ok  Co constipation  Has not had BW    Co off and on rash in the skin fold wants refill on nystatin      Co recurrence of vaginal yeast infection        Review of Systems   Constitutional: Negative.  Negative for chills and fever.   HENT: Negative.  Negative for congestion.    Eyes: Negative.  Negative for discharge.   Respiratory: Negative.  Negative for cough, shortness of breath and wheezing.    Cardiovascular: Negative.  Negative for chest pain, palpitations and leg swelling.   Gastrointestinal: Negative.  Negative for abdominal distention, abdominal pain, constipation, diarrhea, nausea and vomiting.   Endocrine: Negative.    Genitourinary: Negative.  Negative for dysuria and urgency.   Musculoskeletal: Negative.  Negative for back pain, joint swelling and neck stiffness.   Skin: Negative.  Negative for rash.   Allergic/Immunologic: Negative.  Negative for immunocompromised state.   Neurological: Negative.  Negative for light-headedness, numbness and headaches.   Hematological: Negative.  Negative for adenopathy.   Psychiatric/Behavioral: Negative.  Negative for agitation, behavioral problems and confusion.    All other systems reviewed and are negative.      Objective   Physical Exam  Vitals reviewed.   Constitutional:       General: She is not in acute distress.     Appearance: She is obese.   HENT:      Head: Normocephalic and atraumatic.      Nose: Nose normal.   Eyes:      Conjunctiva/sclera: Conjunctivae normal.      Pupils: Pupils are equal, round, and reactive to light.   Neck:      Vascular: No carotid bruit.   Cardiovascular:      Rate and Rhythm: Normal rate and regular rhythm.      Pulses: Normal pulses.      Heart sounds:      No gallop.   Pulmonary:      Effort: Pulmonary effort is normal. No respiratory distress.      Breath sounds: Normal breath sounds. No wheezing.    Abdominal:      General: Bowel sounds are normal.      Palpations: Abdomen is soft.      Tenderness: There is no abdominal tenderness.   Musculoskeletal:         General: Normal range of motion.      Cervical back: Normal range of motion. No rigidity.   Lymphadenopathy:      Cervical: No cervical adenopathy.   Skin:     General: Skin is warm.      Findings: Rash (psoriiasis on the face and scalp see derm) present.   Neurological:      General: No focal deficit present.      Mental Status: She is alert and oriented to person, place, and time.   Psychiatric:         Mood and Affect: Mood normal.         Behavior: Behavior normal.       /75 (BP Location: Left arm, Patient Position: Sitting)   Pulse 88    Hemoglobin A1C   Date/Time Value Ref Range Status   07/24/2024 12:36 PM 7.7 (H) see below % Final     Assessment/Plan   Problem List Items Addressed This Visit       Acquired hypothyroidism    Relevant Orders    Comprehensive Metabolic Panel    Hypertension associated with diabetes (Multi)    Relevant Orders    Comprehensive Metabolic Panel    Type 2 diabetes mellitus    Relevant Medications    semaglutide (OZEMPIC) 1 mg/dose (4 mg/3 mL) pen injector    Other Relevant Orders    Comprehensive Metabolic Panel    Hemoglobin A1C    Lipid Panel    Vitamin D deficiency    Relevant Orders    Comprehensive Metabolic Panel     Other Visit Diagnoses       Polyp of colon, unspecified part of colon, unspecified type    -  Primary    Relevant Orders    Colonoscopy Screening; High Risk Patient    Comprehensive Metabolic Panel    Constipation, unspecified constipation type        Relevant Medications    polyethylene glycol (Glycolax, Miralax) 17 gram packet    Other Relevant Orders    Comprehensive Metabolic Panel    Yeast infection        Relevant Medications    fluconazole (Diflucan) 100 mg tablet    nystatin, bulk, 1 million unit powder            Increase fiber    Diabetes Mellitus/IFG addressed as follow:    1800 TRENT  ADA  HGA1C GOAL LESS THAN 7  LOSE WT  EXERCISE DAILY      HTN addressed as follow:    MONITOR BP   GOAL BP LOWER THAN 130/80  LOW SALT  EXERCISE DAILY       Fu  2 mo    Coumadin clinic to check INR in 2-3 days since on diflucan, message sent.

## 2024-11-20 DIAGNOSIS — F33.1 MODERATE EPISODE OF RECURRENT MAJOR DEPRESSIVE DISORDER: ICD-10-CM

## 2024-11-20 DIAGNOSIS — K21.9 GASTROESOPHAGEAL REFLUX DISEASE WITHOUT ESOPHAGITIS: ICD-10-CM

## 2024-11-20 RX ORDER — PANTOPRAZOLE SODIUM 40 MG/1
40 TABLET, DELAYED RELEASE ORAL 2 TIMES DAILY
Qty: 180 TABLET | Refills: 3 | Status: SHIPPED | OUTPATIENT
Start: 2024-11-20

## 2024-11-20 RX ORDER — BUPROPION HYDROCHLORIDE 150 MG/1
150 TABLET ORAL EVERY MORNING
Qty: 90 TABLET | Refills: 3 | Status: SHIPPED | OUTPATIENT
Start: 2024-11-20

## 2024-11-21 ENCOUNTER — CLINICAL SUPPORT (OUTPATIENT)
Dept: CARDIAC REHAB | Facility: HOSPITAL | Age: 54
End: 2024-11-21
Payer: MEDICARE

## 2024-11-21 ENCOUNTER — ANTICOAGULATION - WARFARIN VISIT (OUTPATIENT)
Dept: PHARMACY | Facility: HOSPITAL | Age: 54
End: 2024-11-21
Payer: MEDICARE

## 2024-11-21 VITALS
DIASTOLIC BLOOD PRESSURE: 68 MMHG | HEART RATE: 88 BPM | WEIGHT: 293 LBS | SYSTOLIC BLOOD PRESSURE: 114 MMHG | BODY MASS INDEX: 61.46 KG/M2 | RESPIRATION RATE: 20 BRPM | OXYGEN SATURATION: 97 %

## 2024-11-21 DIAGNOSIS — I82.0 HEPATIC VEIN THROMBOSIS (MULTI): ICD-10-CM

## 2024-11-21 DIAGNOSIS — I81 PORTAL VEIN THROMBOSIS: Primary | ICD-10-CM

## 2024-11-21 LAB
POC INR: 2.7
POC PROTHROMBIN TIME: NORMAL

## 2024-11-21 PROCEDURE — 99211 OFF/OP EST MAY X REQ PHY/QHP: CPT

## 2024-11-21 PROCEDURE — 85610 PROTHROMBIN TIME: CPT | Mod: QW

## 2024-11-21 NOTE — PROGRESS NOTES
Pt enrolled in [facility] for management of Portal vein thrombosis [I81].     Pt current location [facility] clinic. Rest of [facility] visit completed by RN per clinic policy.     Current anticoagulant: Warfarin    Time since last visit: 4 weeks    Last INR: 2.0 on warfarin 52.5 mg in the previous week. No changes were made at that time.    Last Creatinine:   Lab Results   Component Value Date    CREATININE 0.62 10/02/2024     Last hemoglobin/hematocrit:  Lab Results   Component Value Date    HGB 12.8 07/04/2024     Lab Results   Component Value Date    HCT 41.8 07/04/2024       Current INR: 2.7 is therapeutic for goal range of 2.0-3.0 and is reflective of 52.5 mg in the previous week prior to visit.    Dosing confirmed with patient  Patient missed roughly 3 doses not sure when or how many exactly  Fluconazole started on 11/18 until 11/28  Patient denies any adverse reactions or barriers.  Patient denies any CP/SOB, fatigue, bleeding or bruising since last visit.   Patient denies any change in alcohol or tobacco use since last visit.   Patient denies any upcoming medical or dental procedures.    Plan:  Patient was instructed to  Take 5 mg today .  INR follow up will occur in 1 weeks.  Patient was instructed to maintain consistent vegetable intake, to monitor for any bruising or bleeding, and to call with any medication changes or concerns.    Pt handout given with above information    Mannie Cooley, PharmD  P:[phone]  F:[phone]    
0 = understands/communicates without difficulty

## 2024-11-21 NOTE — PATIENT INSTRUCTIONS
Continue medications at same dose, take 5mg of Coumadin today and then resume the 7.5mg daily and follow up with the [facility] on November 26th, 2024 @ 2pm     Patient verbalizes understanding for:  Low sodium diet-1500-2000mg daily of sodium  Fluid Restriction  Follow-up appointment with [facility]  Weighing self daily   Medications dose and schedule  Signs of increased heart failure  Activity Recommendations     Diet  2000 mg (2 gram) sodium diet-Do not add salt to foods or cook with salt. Check labels for Sodium (Na)     Resources  [facility] [phone] Monday - Friday 7:00 am - 3:00 pm  Websites: www.[employer]; American Heart Association: www.heart.org    Special Instructions:  If you smoke-Quit!  If you are not able to contact your doctor and need immediate medical attention, call 911  If you are not able to keep you're scheduled appointment at the [facility], call [phone]  Watch for and report to your doctor all warning signs of Heart Failure (increased difficulty with breathing, 3-5 pound weight gain in one week or less, increased swelling, increased tiredness)  Weigh yourself each day at the same time with the same amount of clothes on. Record your weight log. Bring it with your to each visit

## 2024-11-21 NOTE — PROGRESS NOTES
"Roselia arrived by wheelchair to the [facility] for her scheduled visit for an INR check. States she is feeling \"not too bad\". Home medications reviewed and she was started on Diflucan November 18th for 10days. Her INR today is 2.7, consulted with the Coumadin Clinic Pharmacist who instructed to have her take 5mg today and then resume the 7.5mg daily with a follow up IN in a week. Roselia verbalized understanding. We reviewed signs and symptoms of increased heart failure and when to call for help.     Vitals:  BP: 114/68           HR: 88          Resp: 20        SPO2: 97% on 4L       Next Appointment Date: November 26th, 2024 @ 2pm    Note in EMR for Treating Physician: Evonne Yan DNP    In-House Supervising Physician: Dr. Hartley     "

## 2024-11-26 ENCOUNTER — CLINICAL SUPPORT (OUTPATIENT)
Dept: CARDIAC REHAB | Facility: HOSPITAL | Age: 54
End: 2024-11-26
Payer: MEDICARE

## 2024-11-26 ENCOUNTER — ANTICOAGULATION - WARFARIN VISIT (OUTPATIENT)
Dept: PHARMACY | Facility: HOSPITAL | Age: 54
End: 2024-11-26
Payer: MEDICARE

## 2024-11-26 VITALS
HEART RATE: 92 BPM | RESPIRATION RATE: 20 BRPM | WEIGHT: 293 LBS | SYSTOLIC BLOOD PRESSURE: 136 MMHG | OXYGEN SATURATION: 96 % | BODY MASS INDEX: 59.04 KG/M2 | DIASTOLIC BLOOD PRESSURE: 80 MMHG

## 2024-11-26 DIAGNOSIS — I81 PORTAL VEIN THROMBOSIS: ICD-10-CM

## 2024-11-26 DIAGNOSIS — I81 PORTAL VEIN THROMBOSIS: Primary | ICD-10-CM

## 2024-11-26 DIAGNOSIS — I82.0 HEPATIC VEIN THROMBOSIS (MULTI): ICD-10-CM

## 2024-11-26 LAB
POC INR: 2.6
POC PROTHROMBIN TIME: NORMAL

## 2024-11-26 PROCEDURE — 85610 PROTHROMBIN TIME: CPT | Mod: QW

## 2024-11-26 PROCEDURE — 99211 OFF/OP EST MAY X REQ PHY/QHP: CPT

## 2024-11-26 NOTE — PROGRESS NOTES
Pt enrolled in [facility] for management of Portal vein thrombosis [I81].     Pt current location [facility] clinic. Rest of [facility] visit completed by RN per clinic policy.     Current anticoagulant: Warfarin    Time since last visit: 5 days    Last INR: 2.7 on warfarin 45 mg in the previous week. Pt was instructed to Take 5 mg x1 due to starting on fluconazole at last visit.    Last Creatinine:   Lab Results   Component Value Date    CREATININE 0.62 10/02/2024     Last hemoglobin/hematocrit:  Lab Results   Component Value Date    HGB 12.8 07/04/2024     Lab Results   Component Value Date    HCT 41.8 07/04/2024       Current INR: 2.6 is therapeutic for goal range of 2.0-3.0 and is reflective of 45 mg in the previous week prior to visit.    Dosing confirmed with patient  Patient did state that instead of taking 5 mg on Thu she skipped the dose completely  3 more days fluconazole  Patient denies any adverse reactions or barriers.  Patient denies any CP/SOB, fatigue, bleeding or bruising since last visit.   Patient denies any change in alcohol or tobacco use since last visit.   Patient denies any upcoming medical or dental procedures.    Plan:  Patient was instructed to continue with current warfarin dose.  INR follow up will occur in 1 weeks.  Patient was instructed to maintain consistent vegetable intake, to monitor for any bruising or bleeding, and to call with any medication changes or concerns.    Pt handout given with above information    Mannie Cooley, PharmD  P:[phone]  F:[phone]

## 2024-11-26 NOTE — PROGRESS NOTES
Roselia arrived Via wheelchair to the [facility] for his scheduled visit for INR check. She states she is feeling good. Home medications reviewed. Roselia continues on Fluconazole and has 3 more days left. INR was 2.6 results called to Pharmacist Mannie in Coumadin clinic. The plan is to continue current dose of Coumadin 7.5mg daily and follow up in 1 week.     Vitals:  BP: 136/80           HR: 92          Resp: 20         SPO2:96% on 4L         Next Appointment Date: December 3, 2024 @ 2pm    Note in EMR for Treating Physician: Dr. Earl    In-House Supervising Physician:Dr. Adhikari

## 2024-11-26 NOTE — PATIENT INSTRUCTIONS
Continue medications at same dose    Diet  2000 mg (2 gram) sodium diet-Do not add salt to foods or cook with salt. Check labels for Sodium (Na)     Resources  [facility] [phone] Monday - Friday 7:00 am - 3:00 pm  Websites: [email or web address]; American Heart Association: www.heart.org    Special Instructions:  If you smoke-Quit!  If you are not able to contact your doctor and need immediate medical attention, call 911  If you are not able to keep you're scheduled appointment at the [facility], call [phone]  Watch for and report to your doctor all warning signs of Heart Failure (increased difficulty with breathing, 3-5 pound weight gain in one week or less, increased swelling, increased tiredness)  Weigh yourself each day at the same time with the same amount of clothes on. Record your weight log. Bring it with your to each visit

## 2024-12-03 ENCOUNTER — CLINICAL SUPPORT (OUTPATIENT)
Dept: CARDIAC REHAB | Facility: HOSPITAL | Age: 54
End: 2024-12-03
Payer: MEDICARE

## 2024-12-03 ENCOUNTER — ANTICOAGULATION - WARFARIN VISIT (OUTPATIENT)
Dept: PHARMACY | Facility: HOSPITAL | Age: 54
End: 2024-12-03
Payer: MEDICARE

## 2024-12-03 VITALS
OXYGEN SATURATION: 97 % | RESPIRATION RATE: 20 BRPM | HEART RATE: 98 BPM | SYSTOLIC BLOOD PRESSURE: 144 MMHG | DIASTOLIC BLOOD PRESSURE: 82 MMHG

## 2024-12-03 DIAGNOSIS — I82.0 HEPATIC VEIN THROMBOSIS (MULTI): ICD-10-CM

## 2024-12-03 DIAGNOSIS — I50.32 CHRONIC DIASTOLIC HEART FAILURE: ICD-10-CM

## 2024-12-03 DIAGNOSIS — I81 PORTAL VEIN THROMBOSIS: Primary | ICD-10-CM

## 2024-12-03 LAB
POC INR: 2.1
POC PROTHROMBIN TIME: NORMAL

## 2024-12-03 PROCEDURE — 85610 PROTHROMBIN TIME: CPT | Mod: QW | Performed by: PHARMACIST

## 2024-12-03 PROCEDURE — G0166 EXTRNL COUNTERPULSE, PER TX: HCPCS

## 2024-12-03 PROCEDURE — 99211 OFF/OP EST MAY X REQ PHY/QHP: CPT

## 2024-12-03 NOTE — PATIENT INSTRUCTIONS
Continue medications at same dose and continue Coumadin at 7.5mg daily with a follow up INR on December 23rd, 2024 @ 9am at the [facility].     Patient verbalizes understanding for:  Low sodium diet-1500-2000mg daily of sodium  Fluid Restriction  Follow-up appointment with [facility]  Weighing self daily   Medications dose and schedule  Signs of increased heart failure  Activity Recommendations     Diet  2000 mg (2 gram) sodium diet-Do not add salt to foods or cook with salt. Check labels for Sodium (Na)     Resources  [facility] [phone] Monday - Friday 7:00 am - 3:00 pm  Websites: www.[employer]; American Heart Association: www.heart.org    Special Instructions:  If you smoke-Quit!  If you are not able to contact your doctor and need immediate medical attention, call 911  If you are not able to keep you're scheduled appointment at the [facility], call [phone]  Watch for and report to your doctor all warning signs of Heart Failure (increased difficulty with breathing, 3-5 pound weight gain in one week or less, increased swelling, increased tiredness)  Weigh yourself each day at the same time with the same amount of clothes on. Record your weight log. Bring it with your to each visit

## 2024-12-03 NOTE — PROGRESS NOTES
"Roselia arrived by wheelchair to the [facility] for her scheduled visit for an INR check with her  present. States she is feeling \" really good\". Home medications reviewed without any changes. Her INR today is 2.1, consulted with Ramesh the Pharmacist in Coumadin clinic and he instructed to continue the 7.5mg daily Coumadin dose and follow up with the [facility] in 3 weeks for a INR check. Roselia verbalized understanding.     Vitals:  BP: 144/82           HR: 98          Resp: 20         SPO2: 97% on 4L        Next Appointment Date: December 23rd, 2024 @ 9am      Note in EMR for Treating Physician: Evonne Yan DNP    In-House Supervising Physician: Dr. Wander Moss     "

## 2024-12-03 NOTE — PROGRESS NOTES
Pt enrolled in [facility] for management of Portal vein thrombosis [I81].     Pt current location [facility] clinic. Rest of [facility] visit completed by RN per clinic policy.     Current anticoagulant: Warfarin    Time since last visit: 1 weeks    Last INR: 2.6 on warfarin 52.5 mg in the previous week. No changes were made at that time.    Last Creatinine:   Lab Results   Component Value Date    CREATININE 0.62 10/02/2024     Last hemoglobin/hematocrit:  Lab Results   Component Value Date    HGB 12.8 07/04/2024     Lab Results   Component Value Date    HCT 41.8 07/04/2024       Current INR: 2.1 is therapeutic for goal range of 2.0-3.0 and is reflective of 52.5 mg in the previous week prior to visit.    Dosing confirmed with patient  Patient denies any missed doses.  Patient denies any medication changes, diet changes, or OTC/herbal supplement changes since last visit.  Patient denies any adverse reactions or barriers.  Patient denies any CP/SOB, fatigue, bleeding or bruising since last visit.   Patient denies any change in alcohol or tobacco use since last visit.   Patient denies any upcoming medical or dental procedures.    Plan:  Patient was instructed to continue with current warfarin dose.  INR follow up will occur in 4 weeks.  Patient was instructed to maintain consistent vegetable intake, to monitor for any bruising or bleeding, and to call with any medication changes or concerns.    Pt handout given with above information    Damien Dsouza, PharmD  P:[phone]  F:[phone]

## 2024-12-18 DIAGNOSIS — E78.5 HYPERLIPIDEMIA ASSOCIATED WITH TYPE 2 DIABETES MELLITUS (MULTI): ICD-10-CM

## 2024-12-18 DIAGNOSIS — E11.69 HYPERLIPIDEMIA ASSOCIATED WITH TYPE 2 DIABETES MELLITUS (MULTI): ICD-10-CM

## 2024-12-18 RX ORDER — ROSUVASTATIN CALCIUM 40 MG/1
40 TABLET, COATED ORAL DAILY
Qty: 30 TABLET | Refills: 0 | Status: SHIPPED | OUTPATIENT
Start: 2024-12-18

## 2024-12-23 ENCOUNTER — ANTICOAGULATION - WARFARIN VISIT (OUTPATIENT)
Dept: PHARMACY | Facility: HOSPITAL | Age: 54
End: 2024-12-23
Payer: MEDICARE

## 2024-12-23 ENCOUNTER — CLINICAL SUPPORT (OUTPATIENT)
Dept: CARDIAC REHAB | Facility: HOSPITAL | Age: 54
End: 2024-12-23
Payer: MEDICARE

## 2024-12-23 VITALS
HEART RATE: 84 BPM | SYSTOLIC BLOOD PRESSURE: 143 MMHG | OXYGEN SATURATION: 98 % | RESPIRATION RATE: 20 BRPM | DIASTOLIC BLOOD PRESSURE: 84 MMHG

## 2024-12-23 DIAGNOSIS — I82.0 HEPATIC VEIN THROMBOSIS (MULTI): ICD-10-CM

## 2024-12-23 DIAGNOSIS — I82.0 BUDD-CHIARI SYNDROME (MULTI): ICD-10-CM

## 2024-12-23 DIAGNOSIS — I81 PORTAL VEIN THROMBOSIS: Primary | ICD-10-CM

## 2024-12-23 DIAGNOSIS — I81 PORTAL VEIN THROMBOSIS: ICD-10-CM

## 2024-12-23 LAB
POC INR: 1.9
POC PROTHROMBIN TIME: NORMAL

## 2024-12-23 PROCEDURE — 99211 OFF/OP EST MAY X REQ PHY/QHP: CPT

## 2024-12-23 PROCEDURE — 85610 PROTHROMBIN TIME: CPT | Mod: QW | Performed by: PHARMACIST

## 2024-12-23 NOTE — PROGRESS NOTES
Iván arrived ambulatory to the [facility] for her scheduled visit for INR check. Roselia states she is feeling good. Home medications reviewed. Roselia reports that she did miss a dose of Coumadin last week. INR was 1.9  results called to Pharmacist Ramesh in Coumadin clinic. The plan is for Roselia to continue Coumadin 7.5mg daily and follow up in 2 weeks.     Vitals:  BP:143/82            HR: 84          Resp: 20         SPO2:98% on room air         Next Appointment Date: January 7, 2025@ 10am    Note in EMR for Treating Physician: Dr. Earl    In-House Supervising Physician:Dr. Hartley

## 2024-12-23 NOTE — PROGRESS NOTES
Pt enrolled in [facility] for management of Portal vein thrombosis [I81].     Pt current location [facility] clinic. Rest of [facility] visit completed by RN per clinic policy.     Current anticoagulant: Warfarin    Time since last visit: 3 weeks    Last INR: 2.1 on warfarin 52.5 mg in the previous week. No changes were made at that time.    Last Creatinine:   Lab Results   Component Value Date    CREATININE 0.62 10/02/2024     Last hemoglobin/hematocrit:  Lab Results   Component Value Date    HGB 12.8 07/04/2024     Lab Results   Component Value Date    HCT 41.8 07/04/2024       Current INR: 1.9 is SUBtherapeutic for goal range of 2.0-3.0 and is reflective of 52.5 mg in the previous week prior to visit.    Dosing confirmed with patient  Patient thinks that she missed a dose last week but is only about 50% confident that she missed  Patient denies any medication changes, diet changes, or OTC/herbal supplement changes since last visit.  Patient denies any adverse reactions or barriers.  Patient denies any CP/SOB, fatigue, bleeding or bruising since last visit.   Patient denies any change in alcohol or tobacco use since last visit.   Patient denies any upcoming medical or dental procedures.    Plan:  Patient was instructed to continue with current warfarin dose.  INR follow up will occur in 4 weeks.  Patient was instructed to maintain consistent vegetable intake, to monitor for any bruising or bleeding, and to call with any medication changes or concerns.    Pt handout given with above information    Damien Dsouza, PharmD  P:[phone]  F:[phone]

## 2024-12-23 NOTE — PATIENT INSTRUCTIONS
Continue medications at same dose    Diet  2000 mg (2 gram) sodium diet-Do not add salt to foods or cook with salt. Check labels for Sodium (Na)     Resources  [facility] [phone] Monday - Friday 7:00 am - 3:00 pm  Websites: www.[employer].[place]; American Heart Association: www.heart.org    Special Instructions:  If you smoke-Quit!  If you are not able to contact your doctor and need immediate medical attention, call 911  If you are not able to keep you're scheduled appointment at the [facility] Failure Clinic, call [phone]  Watch for and report to your doctor all warning signs of Heart Failure (increased difficulty with breathing, 3-5 pound weight gain in one week or less, increased swelling, increased tiredness)  Weigh yourself each day at the same time with the same amount of clothes on. Record your weight log. Bring it with your to each visit

## 2025-01-07 ENCOUNTER — APPOINTMENT (OUTPATIENT)
Dept: CARDIAC REHAB | Facility: HOSPITAL | Age: 55
End: 2025-01-07
Payer: MEDICARE

## 2025-01-09 ENCOUNTER — APPOINTMENT (OUTPATIENT)
Dept: CARDIAC REHAB | Facility: HOSPITAL | Age: 55
End: 2025-01-09
Payer: MEDICARE

## 2025-01-09 DIAGNOSIS — M79.7 FIBROMYALGIA: ICD-10-CM

## 2025-01-09 RX ORDER — TRAZODONE HYDROCHLORIDE 100 MG/1
100 TABLET ORAL NIGHTLY
Qty: 90 TABLET | Refills: 3 | Status: SHIPPED | OUTPATIENT
Start: 2025-01-09

## 2025-01-14 ENCOUNTER — APPOINTMENT (OUTPATIENT)
Dept: CARDIOLOGY | Facility: CLINIC | Age: 55
End: 2025-01-14
Payer: MEDICARE

## 2025-01-16 ENCOUNTER — APPOINTMENT (OUTPATIENT)
Dept: CARDIAC REHAB | Facility: HOSPITAL | Age: 55
End: 2025-01-16
Payer: MEDICARE

## 2025-01-16 ENCOUNTER — APPOINTMENT (OUTPATIENT)
Dept: PRIMARY CARE | Facility: CLINIC | Age: 55
End: 2025-01-16
Payer: MEDICARE

## 2025-01-16 DIAGNOSIS — E11.69 HYPERLIPIDEMIA ASSOCIATED WITH TYPE 2 DIABETES MELLITUS (MULTI): ICD-10-CM

## 2025-01-16 DIAGNOSIS — E78.5 HYPERLIPIDEMIA ASSOCIATED WITH TYPE 2 DIABETES MELLITUS (MULTI): ICD-10-CM

## 2025-01-16 RX ORDER — ROSUVASTATIN CALCIUM 40 MG/1
40 TABLET, COATED ORAL DAILY
Qty: 30 TABLET | Refills: 0 | Status: SHIPPED | OUTPATIENT
Start: 2025-01-16

## 2025-01-23 ENCOUNTER — APPOINTMENT (OUTPATIENT)
Dept: CARDIAC REHAB | Facility: HOSPITAL | Age: 55
End: 2025-01-23
Payer: MEDICARE

## 2025-01-29 ENCOUNTER — APPOINTMENT (OUTPATIENT)
Dept: CARDIAC REHAB | Facility: HOSPITAL | Age: 55
End: 2025-01-29
Payer: MEDICARE

## 2025-01-30 ENCOUNTER — CLINICAL SUPPORT (OUTPATIENT)
Dept: CARDIAC REHAB | Facility: HOSPITAL | Age: 55
End: 2025-01-30
Payer: MEDICARE

## 2025-01-30 ENCOUNTER — ANTICOAGULATION - WARFARIN VISIT (OUTPATIENT)
Dept: PHARMACY | Facility: HOSPITAL | Age: 55
End: 2025-01-30
Payer: MEDICARE

## 2025-01-30 VITALS
RESPIRATION RATE: 20 BRPM | SYSTOLIC BLOOD PRESSURE: 132 MMHG | DIASTOLIC BLOOD PRESSURE: 79 MMHG | HEART RATE: 78 BPM | OXYGEN SATURATION: 97 %

## 2025-01-30 DIAGNOSIS — I82.0 HEPATIC VEIN THROMBOSIS (MULTI): ICD-10-CM

## 2025-01-30 DIAGNOSIS — I81 PORTAL VEIN THROMBOSIS: Primary | ICD-10-CM

## 2025-01-30 DIAGNOSIS — I50.32 CHRONIC DIASTOLIC HEART FAILURE: Primary | ICD-10-CM

## 2025-01-30 LAB
ANION GAP SERPL CALC-SCNC: 15 MMOL/L (ref 10–20)
BUN SERPL-MCNC: 11 MG/DL (ref 6–23)
CALCIUM SERPL-MCNC: 9.8 MG/DL (ref 8.6–10.3)
CHLORIDE SERPL-SCNC: 105 MMOL/L (ref 98–107)
CO2 SERPL-SCNC: 23 MMOL/L (ref 21–32)
CREAT SERPL-MCNC: 0.62 MG/DL (ref 0.5–1.05)
EGFRCR SERPLBLD CKD-EPI 2021: >90 ML/MIN/1.73M*2
GLUCOSE SERPL-MCNC: 189 MG/DL (ref 74–99)
POC INR: 2.2
POC PROTHROMBIN TIME: NORMAL
POTASSIUM SERPL-SCNC: 3.9 MMOL/L (ref 3.5–5.3)
SODIUM SERPL-SCNC: 139 MMOL/L (ref 136–145)

## 2025-01-30 PROCEDURE — 36415 COLL VENOUS BLD VENIPUNCTURE: CPT

## 2025-01-30 PROCEDURE — 80048 BASIC METABOLIC PNL TOTAL CA: CPT

## 2025-01-30 PROCEDURE — 85610 PROTHROMBIN TIME: CPT | Mod: QW | Performed by: PHARMACIST

## 2025-01-30 PROCEDURE — 99212 OFFICE O/P EST SF 10 MIN: CPT

## 2025-01-30 NOTE — PATIENT INSTRUCTIONS
Continue medications at same dose    Diet  2000 mg (2 gram) sodium diet-Do not add salt to foods or cook with salt. Check labels for Sodium (Na)     Resources  [facility] Failure Clinic [phone] Monday - Friday 7:00 am - 3:00 pm  Websites: [email or web address]; American Heart Association: www.heart.org    Special Instructions:  If you smoke-Quit!  If you are not able to contact your doctor and need immediate medical attention, call 911  If you are not able to keep you're scheduled appointment at the [facility] Failure Clinic, call [phone]  Watch for and report to your doctor all warning signs of Heart Failure (increased difficulty with breathing, 3-5 pound weight gain in one week or less, increased swelling, increased tiredness)  Weigh yourself each day at the same time with the same amount of clothes on. Record your weight log. Bring it with your to each visit

## 2025-01-30 NOTE — PROGRESS NOTES
"Roselia arrived via wheelchair to the [facility] for her scheduled visit and INR check. Roselia states she is feeling \"  good \". She Denies increased shortness of breath, PND, or Orthopnea. Eating and sleeping without distress. Home medications reviewed. A BMP was drawn today. INR was 2.2. Results called to Ramesh in the Coumadin Clinic. The plan for Roselia is to continue current Coumadin dose of 7.5mg daily and follow up in 4 weeks. Reviewed signs and symptoms of increased heart failure and when to call for help. Patient verbalizes understanding for: Low sodium diet: 2000 mg daily of sodium, follow-up appointment with [facility], weighing self daily, medications dose and schedule, and signs of increased heart failure.      Vitals:  BP:  132/79          HR: 78           Resp: 20         SPO2:97% on room air        Weight: 297lb s                         Previous Weight: 214.2lbs     97    Lung Sounds: clear    Edema: +1 bl legs with compression socks    JVD: absent     Labs: BMP    Medications Administered: none    Next Appointment Date: February 27, 2025@ 10am    Note in EMR for treating Physician: Dr. Earl    In-House Supervising Physician: Dr. Hartley  "

## 2025-01-30 NOTE — PROGRESS NOTES
Pt enrolled in [facility] for management of Portal vein thrombosis [I81].     Pt current location [facility] clinic. Rest of [facility] visit completed by RN per clinic policy.     Current anticoagulant: Warfarin    Time since last visit: 5 weeks    Last INR: 1.9 on warfarin 52.5 mg in the previous week. No changes were made at that time.    Last Creatinine:   Lab Results   Component Value Date    CREATININE 0.62 10/02/2024     Last hemoglobin/hematocrit:  Lab Results   Component Value Date    HGB 12.8 07/04/2024     Lab Results   Component Value Date    HCT 41.8 07/04/2024       Current INR: 2.2 is therapeutic for goal range of 2.0-3.0 and is reflective of 52.5 mg in the previous week prior to visit.    Dosing confirmed with patient  Patient denies any missed doses.  Patient denies any medication changes, diet changes, or OTC/herbal supplement changes since last visit.  Patient denies any adverse reactions or barriers.  Patient denies any CP/SOB, fatigue, bleeding or bruising since last visit.   Patient denies any change in alcohol or tobacco use since last visit.   Patient denies any upcoming medical or dental procedures.    Plan:  Patient was instructed to continue with current warfarin dose.  INR follow up will occur in 4 weeks.  Patient was instructed to maintain consistent vegetable intake, to monitor for any bruising or bleeding, and to call with any medication changes or concerns.    Pt handout given with above information    Damien Dsouza, PharmD  P:[phone]  F:[phone]

## 2025-02-20 DIAGNOSIS — E11.69 HYPERLIPIDEMIA ASSOCIATED WITH TYPE 2 DIABETES MELLITUS (MULTI): ICD-10-CM

## 2025-02-20 DIAGNOSIS — E78.5 HYPERLIPIDEMIA ASSOCIATED WITH TYPE 2 DIABETES MELLITUS (MULTI): ICD-10-CM

## 2025-02-21 ENCOUNTER — APPOINTMENT (OUTPATIENT)
Dept: OPERATING ROOM | Facility: HOSPITAL | Age: 55
End: 2025-02-21
Payer: MEDICARE

## 2025-02-21 RX ORDER — ROSUVASTATIN CALCIUM 40 MG/1
40 TABLET, COATED ORAL DAILY
Qty: 30 TABLET | Refills: 0 | Status: SHIPPED | OUTPATIENT
Start: 2025-02-21

## 2025-02-27 ENCOUNTER — APPOINTMENT (OUTPATIENT)
Dept: CARDIAC REHAB | Facility: HOSPITAL | Age: 55
End: 2025-02-27
Payer: MEDICARE

## 2025-04-01 ENCOUNTER — APPOINTMENT (OUTPATIENT)
Dept: NEPHROLOGY | Facility: CLINIC | Age: 55
End: 2025-04-01
Payer: MEDICARE

## 2025-04-01 ENCOUNTER — ANTICOAGULATION - WARFARIN VISIT (OUTPATIENT)
Dept: PHARMACY | Facility: HOSPITAL | Age: 55
End: 2025-04-01

## 2025-04-01 ENCOUNTER — CLINICAL SUPPORT (OUTPATIENT)
Dept: CARDIAC REHAB | Facility: HOSPITAL | Age: 55
End: 2025-04-01
Payer: MEDICARE

## 2025-04-01 VITALS
WEIGHT: 278.8 LBS | HEIGHT: 62 IN | HEART RATE: 48 BPM | DIASTOLIC BLOOD PRESSURE: 78 MMHG | BODY MASS INDEX: 51.3 KG/M2 | SYSTOLIC BLOOD PRESSURE: 132 MMHG

## 2025-04-01 VITALS
BODY MASS INDEX: 50.74 KG/M2 | SYSTOLIC BLOOD PRESSURE: 120 MMHG | DIASTOLIC BLOOD PRESSURE: 75 MMHG | OXYGEN SATURATION: 97 % | RESPIRATION RATE: 20 BRPM | WEIGHT: 277.4 LBS | HEART RATE: 50 BPM

## 2025-04-01 DIAGNOSIS — E87.6 HYPOKALEMIA: Primary | ICD-10-CM

## 2025-04-01 DIAGNOSIS — N18.2 CKD (CHRONIC KIDNEY DISEASE) STAGE 2, GFR 60-89 ML/MIN: ICD-10-CM

## 2025-04-01 DIAGNOSIS — I82.0 HEPATIC VEIN THROMBOSIS (MULTI): ICD-10-CM

## 2025-04-01 DIAGNOSIS — I81 PORTAL VEIN THROMBOSIS: Primary | ICD-10-CM

## 2025-04-01 DIAGNOSIS — E11.59 HYPERTENSION ASSOCIATED WITH DIABETES: ICD-10-CM

## 2025-04-01 DIAGNOSIS — I15.2 HYPERTENSION ASSOCIATED WITH DIABETES: ICD-10-CM

## 2025-04-01 DIAGNOSIS — I50.32 CHRONIC DIASTOLIC HEART FAILURE: ICD-10-CM

## 2025-04-01 DIAGNOSIS — I50.32 CHRONIC DIASTOLIC CONGESTIVE HEART FAILURE: ICD-10-CM

## 2025-04-01 LAB
ALBUMIN SERPL BCP-MCNC: 4.2 G/DL (ref 3.4–5)
ALP SERPL-CCNC: 285 U/L (ref 33–110)
ALT SERPL W P-5'-P-CCNC: 17 U/L (ref 7–45)
ANION GAP SERPL CALC-SCNC: 13 MMOL/L (ref 10–20)
AST SERPL W P-5'-P-CCNC: 23 U/L (ref 9–39)
BILIRUB SERPL-MCNC: 0.5 MG/DL (ref 0–1.2)
BUN SERPL-MCNC: 13 MG/DL (ref 6–23)
CALCIUM SERPL-MCNC: 10.1 MG/DL (ref 8.6–10.3)
CHLORIDE SERPL-SCNC: 106 MMOL/L (ref 98–107)
CO2 SERPL-SCNC: 21 MMOL/L (ref 21–32)
CREAT SERPL-MCNC: 0.65 MG/DL (ref 0.5–1.05)
EGFRCR SERPLBLD CKD-EPI 2021: >90 ML/MIN/1.73M*2
GLUCOSE SERPL-MCNC: 293 MG/DL (ref 74–99)
INR IN PPP BY COAGULATION ASSAY EXTERNAL: 2.9
POTASSIUM SERPL-SCNC: 4.3 MMOL/L (ref 3.5–5.3)
PROT SERPL-MCNC: 6.4 G/DL (ref 6.4–8.2)
PROTHROMBIN TIME (PT) IN PPP BY COAGULATION ASSAY EXTERNAL: NORMAL
SODIUM SERPL-SCNC: 136 MMOL/L (ref 136–145)

## 2025-04-01 PROCEDURE — 36415 COLL VENOUS BLD VENIPUNCTURE: CPT

## 2025-04-01 PROCEDURE — 99213 OFFICE O/P EST LOW 20 MIN: CPT | Performed by: INTERNAL MEDICINE

## 2025-04-01 PROCEDURE — 1036F TOBACCO NON-USER: CPT | Performed by: INTERNAL MEDICINE

## 2025-04-01 PROCEDURE — 80053 COMPREHEN METABOLIC PANEL: CPT

## 2025-04-01 PROCEDURE — 3078F DIAST BP <80 MM HG: CPT | Performed by: INTERNAL MEDICINE

## 2025-04-01 PROCEDURE — 3075F SYST BP GE 130 - 139MM HG: CPT | Performed by: INTERNAL MEDICINE

## 2025-04-01 PROCEDURE — 99212 OFFICE O/P EST SF 10 MIN: CPT

## 2025-04-01 PROCEDURE — 3008F BODY MASS INDEX DOCD: CPT | Performed by: INTERNAL MEDICINE

## 2025-04-01 ASSESSMENT — ENCOUNTER SYMPTOMS
ALLERGIC/IMMUNOLOGIC NEGATIVE: 1
CONSTITUTIONAL NEGATIVE: 1
CARDIOVASCULAR NEGATIVE: 1
GASTROINTESTINAL NEGATIVE: 1
ENDOCRINE NEGATIVE: 1
NEUROLOGICAL NEGATIVE: 1
PSYCHIATRIC NEGATIVE: 1
HEMATOLOGIC/LYMPHATIC NEGATIVE: 1
RESPIRATORY NEGATIVE: 1
EYES NEGATIVE: 1
MUSCULOSKELETAL NEGATIVE: 1

## 2025-04-01 NOTE — PATIENT INSTRUCTIONS
Continue medications at same dose    Diet  2000 mg (2 gram) sodium diet-Do not add salt to foods or cook with salt. Check labels for Sodium (Na)     Resources  [facility] Failure Clinic [phone] Monday - Friday 7:00 am - 3:00 pm  Websites: [email or web address]; American Heart Association: www.heart.org    Special Instructions:  If you smoke-Quit!  If you are not able to contact your doctor and need immediate medical attention, call 911  If you are not able to keep you're scheduled appointment at the Heart Failure Clinic, call [phone]  Watch for and report to your doctor all warning signs of Heart Failure (increased difficulty with breathing, 3-5 pound weight gain in one week or less, increased swelling, increased tiredness)  Weigh yourself each day at the same time with the same amount of clothes on. Record your weight log. Bring it with your to each visit

## 2025-04-01 NOTE — PROGRESS NOTES
Pt enrolled in [facility] for management of Portal vein thrombosis [I81].     Pt current location [facility] clinic. Rest of [facility] visit completed by RN per clinic policy.     Current anticoagulant: Warfarin    Time since last visit: 8 days    Last INR: 2.2 on warfarin 52.5 mg in the previous week. No changes were made at that time.    Last Creatinine:   Lab Results   Component Value Date    CREATININE 0.62 01/30/2025     Last hemoglobin/hematocrit:  Lab Results   Component Value Date    HGB 12.8 07/04/2024     Lab Results   Component Value Date    HCT 41.8 07/04/2024       Current INR: 2.9 is therapeutic for goal range of 2.0-3.0 and is reflective of 52.5 mg in the previous week prior to visit.    Dosing confirmed with patient  Patient denies any missed doses.  Pt notes she missed 2 doses last week but made up for one of them.  She's also lost about 20 pounds since last visit.  She has not been in since she couldn't find a ride.    Patient denies any medication changes, diet changes, or OTC/herbal supplement changes since last visit.  Patient denies any adverse reactions or barriers.  Patient denies any CP/SOB, fatigue, bleeding or bruising since last visit.   Patient denies any change in alcohol or tobacco use since last visit.   Patient denies any upcoming medical or dental procedures.    Plan:  Patient was instructed to continue with current warfarin dose.  INR follow up will occur in 2 weeks.  Patient was instructed to maintain consistent vegetable intake, to monitor for any bruising or bleeding, and to call with any medication changes or concerns.    Pt handout given with above information    Gabriela Mckeon, StehpanieD  P:[phone]  F:[phone]

## 2025-04-01 NOTE — PROGRESS NOTES
Subjective   She was put on Furosemide.  She is takign the amiloride.    K is good  No sweling  Has lost 100 lbs.    Patient ID: Roselia Mo is a 54 y.o. female who presents for Follow-up (6 months/Review labs ).  HPI  Follow-up secondary to hypokalemia follow-up secondary to hypokalemia with potassium wasting and metabolic alkalosis  Labs were drawn today.  Her blood work shows a potassium of 4.3 glucose 293 the rest of the metabolic panel looks pretty good  Medications are reviewed she is on amiloride for the potassium wasting.  She also has Lasix listed.  She has had issues in the past with volume overload and peripheral edema her blood pressure is 132/78  Review of Systems   Constitutional: Negative.    HENT: Negative.     Eyes: Negative.    Respiratory: Negative.     Cardiovascular: Negative.    Gastrointestinal: Negative.    Endocrine: Negative.    Genitourinary: Negative.    Musculoskeletal: Negative.    Skin: Negative.    Allergic/Immunologic: Negative.    Neurological: Negative.    Hematological: Negative.    Psychiatric/Behavioral: Negative.         Objective   Physical Exam  Constitutional:       Appearance: Normal appearance. She is obese.   HENT:      Head: Normocephalic and atraumatic.      Right Ear: External ear normal.      Left Ear: External ear normal.      Nose: Nose normal.      Mouth/Throat:      Mouth: Mucous membranes are moist.      Pharynx: Oropharynx is clear.   Eyes:      Extraocular Movements: Extraocular movements intact.      Conjunctiva/sclera: Conjunctivae normal.      Pupils: Pupils are equal, round, and reactive to light.   Cardiovascular:      Rate and Rhythm: Normal rate and regular rhythm.   Pulmonary:      Effort: Pulmonary effort is normal.      Breath sounds: Normal breath sounds.   Abdominal:      General: Abdomen is flat.      Palpations: Abdomen is soft.   Skin:     General: Skin is warm and dry.   Neurological:      General: No focal deficit present.      Mental  Status: She is alert and oriented to person, place, and time.   Psychiatric:         Mood and Affect: Mood normal.         Behavior: Behavior normal.         Assessment/Plan   Problem List Items Addressed This Visit             ICD-10-CM    Chronic diastolic heart failure I50.32    Hypertension associated with diabetes E11.59, I15.2    Hypokalemia - Primary E87.6    Relevant Orders    Follow Up In Nephrology    Basic metabolic panel    Albumin-Creatinine Ratio, Urine Random    Urinalysis with Reflex Microscopic   Plan:   Potassium is good  On K sparign and wasting diuretic but she looks great so will just leave  BP is great.  Swelling is good  Has lost 100 lbs on Ozempic.  F/U in 1 year.  Call with issues.       Hypokalemia with potassium wasting with TT KG of 9  Metabolic alkalosis  Diastolic CHF  Morbid Obesity  Lymphedema  HTN  DM II  Right Sided CHF  Pulmonary HTN  CY on CPAP  CKD stage II       Pardeep Nichole DO 04/01/25 2:14 PM

## 2025-04-01 NOTE — PROGRESS NOTES
"Roselia arrived via wheelcahir to the [facility] Failure Clinic for her scheduled visit and INR check. Roselia states she is feeling \"pretty good \". Roselia reports she is down more weight. Denies increased shortness of breath, PND, or Orthopnea. Uses CPAP nightly. Eating and sleeping without distress. Home medications reviewed. INR was 2.9. Roselia reports that she did miss 2 days of her coumadin last week but did double up  one day. OP labs was drawn today. INR results were called to Pharmacist Gabriela in the coumadin clinic. The plan is for Roselia is to continue current dose of Coumadin 7.5mg daily and return in 2 weeks. Reviewed signs and symptoms of increased heart failure and when to call for help. Patient verbalizes understanding for: Low sodium diet: 2000 mg daily of sodium, follow-up appointment with [facility], weighing self daily, medications dose and schedule, and signs of increased heart failure.      Vitals:  BP: 120/75           HR: 50           Resp:20           SPO2: 97% on room air       Weight: 277.4lbs                         Previous Weight: 297.0lbs         Lung Sounds: clear    Edema: +1 bl legs    JVD: absent     Labs: OP labs    Medications Administered: none    Next Appointment Date: April 15, 2025@ 2pm    Note in EMR for treating Physician: Dr. Earl    In-House Supervising Physician: Dr. Monaco  "

## 2025-04-04 DIAGNOSIS — E03.9 ACQUIRED HYPOTHYROIDISM: ICD-10-CM

## 2025-04-06 RX ORDER — LEVOTHYROXINE SODIUM 200 UG/1
TABLET ORAL
Qty: 90 TABLET | Refills: 0 | Status: SHIPPED | OUTPATIENT
Start: 2025-04-06

## 2025-04-15 ENCOUNTER — CLINICAL SUPPORT (OUTPATIENT)
Dept: CARDIAC REHAB | Facility: HOSPITAL | Age: 55
End: 2025-04-15
Payer: MEDICARE

## 2025-04-15 ENCOUNTER — ANTICOAGULATION - WARFARIN VISIT (OUTPATIENT)
Dept: PHARMACY | Facility: HOSPITAL | Age: 55
End: 2025-04-15
Payer: MEDICARE

## 2025-04-15 VITALS
WEIGHT: 272.8 LBS | OXYGEN SATURATION: 96 % | DIASTOLIC BLOOD PRESSURE: 80 MMHG | SYSTOLIC BLOOD PRESSURE: 120 MMHG | BODY MASS INDEX: 49.9 KG/M2 | RESPIRATION RATE: 20 BRPM | HEART RATE: 88 BPM

## 2025-04-15 DIAGNOSIS — I81 PORTAL VEIN THROMBOSIS: Primary | ICD-10-CM

## 2025-04-15 DIAGNOSIS — I82.0 HEPATIC VEIN THROMBOSIS (MULTI): ICD-10-CM

## 2025-04-15 LAB
POC INR: 1.6
POC PROTHROMBIN TIME: NORMAL

## 2025-04-15 PROCEDURE — 85610 PROTHROMBIN TIME: CPT | Mod: QW | Performed by: PHARMACIST

## 2025-04-15 PROCEDURE — 99211 OFF/OP EST MAY X REQ PHY/QHP: CPT | Performed by: STUDENT IN AN ORGANIZED HEALTH CARE EDUCATION/TRAINING PROGRAM

## 2025-04-15 NOTE — PROGRESS NOTES
Pt enrolled in [facility] for management of Portal vein thrombosis [I81].     Pt current location [facility] clinic. Rest of [facility] visit completed by RN per clinic policy.     Current anticoagulant: Warfarin    Time since last visit: 2 weeks    Last INR: 2.9 on warfarin 52.5 mg in the previous week. No changes were made at that time.    Last Creatinine:   Lab Results   Component Value Date    CREATININE 0.65 04/01/2025     Last hemoglobin/hematocrit:  Lab Results   Component Value Date    HGB 12.8 07/04/2024     Lab Results   Component Value Date    HCT 41.8 07/04/2024       Current INR: 1.6 is SUBtherapeutic for goal range of 2.0-3.0 and is reflective of planned dose of 52.5 mg in the previous week prior to visit but she notes 2-3 missed doses in the last week.    Dosing confirmed with patient  Notes 2-3 missed doses last week  Patient denies any medication changes, diet changes, or OTC/herbal supplement changes since last visit.  Patient denies any adverse reactions or barriers.  Patient denies any CP/SOB, fatigue, bleeding or bruising since last visit.   Patient denies any change in alcohol or tobacco use since last visit.   Patient denies any upcoming medical or dental procedures.    Plan:  Patient was instructed to take 10mg x2, then resume usual dosing.  INR follow up will occur in 2 weeks.  Patient was instructed to maintain consistent vegetable intake, to monitor for any bruising or bleeding, and to call with any medication changes or concerns.    Pt handout given with above information    Damien Dsouza, Danuta  P:[phone]  F:[phone]

## 2025-04-15 NOTE — PROGRESS NOTES
"Roselia arrived by wheelchair to the [facility] Failure Clinic for her scheduled visit for an INR check with her sister present. States she is feeling \"really good\". Home medications reviewed without any changes. Her INR today is 1.6, Roselia stated she forgot to take her Coumadin 3 different days over the last couple weeks, informed Ramesh the pharmacist in the Coumadin Clinic of her INR and missed doses, he instructed to have Roselia take 10mg today and tomorrow and then resume 7.5mg daily with a follow up INR in 2 weeks with the [facility]. Roselia verbalized understanding.     Vitals:  BP: 120/80           HR: 88          Resp: 20         SPO2: 96%        Next Appointment Date: April 29th, 2025 @ 1pm    Note in EMR for Treating Physician: Dr. Earl    In-House Supervising Physician: Dr. Hartley      "

## 2025-04-15 NOTE — PATIENT INSTRUCTIONS
Continue medications at same dose, take 10mg today and tomorrow, then 7.5mg daily with a follow up INR on April 29th, 2025 @1pm with the Heart Failure Clinic.     Patient verbalizes understanding for:  Low sodium diet-1500-2000mg daily of sodium  Fluid Restriction  Follow-up appointment with [facility]  Weighing self daily   Medications dose and schedule  Signs of increased heart failure  Activity Recommendations     Diet  2000 mg (2 gram) sodium diet-Do not add salt to foods or cook with salt. Check labels for Sodium (Na)     Resources  [facility] Failure Clinic [phone] Monday - Friday 7:00 am - 3:00 pm  Websites: www.[employer]; American Heart Association: www.heart.org    Special Instructions:  If you smoke-Quit!  If you are not able to contact your doctor and need immediate medical attention, call 911  If you are not able to keep you're scheduled appointment at the Heart Failure Clinic, call [phone]  Watch for and report to your doctor all warning signs of Heart Failure (increased difficulty with breathing, 3-5 pound weight gain in one week or less, increased swelling, increased tiredness)  Weigh yourself each day at the same time with the same amount of clothes on. Record your weight log. Bring it with your to each visit

## 2025-04-18 DIAGNOSIS — G43.909 MIGRAINE WITHOUT STATUS MIGRAINOSUS, NOT INTRACTABLE, UNSPECIFIED MIGRAINE TYPE: ICD-10-CM

## 2025-04-18 RX ORDER — TOPIRAMATE 25 MG/1
25 TABLET ORAL 2 TIMES DAILY
Qty: 60 TABLET | Refills: 0 | Status: SHIPPED | OUTPATIENT
Start: 2025-04-18

## 2025-04-28 DIAGNOSIS — E11.69 HYPERLIPIDEMIA ASSOCIATED WITH TYPE 2 DIABETES MELLITUS: ICD-10-CM

## 2025-04-28 DIAGNOSIS — E78.5 HYPERLIPIDEMIA ASSOCIATED WITH TYPE 2 DIABETES MELLITUS: ICD-10-CM

## 2025-04-28 RX ORDER — ROSUVASTATIN CALCIUM 40 MG/1
40 TABLET, COATED ORAL DAILY
Qty: 30 TABLET | Refills: 0 | Status: SHIPPED | OUTPATIENT
Start: 2025-04-28

## 2025-04-29 ENCOUNTER — CLINICAL SUPPORT (OUTPATIENT)
Dept: CARDIAC REHAB | Facility: HOSPITAL | Age: 55
End: 2025-04-29
Payer: MEDICARE

## 2025-04-29 ENCOUNTER — ANTICOAGULATION - WARFARIN VISIT (OUTPATIENT)
Dept: PHARMACY | Facility: HOSPITAL | Age: 55
End: 2025-04-29

## 2025-04-29 VITALS
OXYGEN SATURATION: 97 % | WEIGHT: 277.8 LBS | BODY MASS INDEX: 50.81 KG/M2 | RESPIRATION RATE: 20 BRPM | DIASTOLIC BLOOD PRESSURE: 78 MMHG | HEART RATE: 89 BPM | SYSTOLIC BLOOD PRESSURE: 108 MMHG

## 2025-04-29 DIAGNOSIS — I82.0 HEPATIC VEIN THROMBOSIS (MULTI): ICD-10-CM

## 2025-04-29 DIAGNOSIS — I81 PORTAL VEIN THROMBOSIS: ICD-10-CM

## 2025-04-29 DIAGNOSIS — I81 PORTAL VEIN THROMBOSIS: Primary | ICD-10-CM

## 2025-04-29 LAB
INR IN PPP BY COAGULATION ASSAY EXTERNAL: 2.4
PROTHROMBIN TIME (PT) IN PPP BY COAGULATION ASSAY EXTERNAL: NORMAL

## 2025-04-29 PROCEDURE — 99211 OFF/OP EST MAY X REQ PHY/QHP: CPT

## 2025-04-29 NOTE — PROGRESS NOTES
Pt enrolled in [facility] for management of Portal vein thrombosis [I81].     Pt current location [facility] clinic. Rest of [facility] visit completed by RN per clinic policy.     Current anticoagulant: Warfarin    Time since last visit: 2 weeks    Last INR: 1.6 on warfarin 37.5 mg in the previous week. Pt was instructed to take 10mg x2, then resume usual dosing at last visit.    Last Creatinine:   Lab Results   Component Value Date    CREATININE 0.65 04/01/2025     Last hemoglobin/hematocrit:  Lab Results   Component Value Date    HGB 12.8 07/04/2024     Lab Results   Component Value Date    HCT 41.8 07/04/2024       Current INR: 2.4 is therapeutic for goal range of 2.0-3.0 and is reflective of 47.5 mg in the previous week prior to visit.    Dosing confirmed with patient  Patient denies any missed doses.  Patient denies any medication changes, diet changes, or OTC/herbal supplement changes since last visit.  Patient denies any adverse reactions or barriers.  Patient denies any CP/SOB, fatigue, bleeding or bruising since last visit.   Patient denies any change in alcohol or tobacco use since last visit.   Patient denies any upcoming medical or dental procedures.    Patient was previously therapeutic at this dose. Her last INR was subtherapeutic, but this was explainable as she missed 3 doses of warfarin. Her INR was therapeutic today, but she did miss her dose yesterday. Therefore, will have her boost with 10 mg today. Since she was therapeutic today and last subtherapeutic INR was explainable, will have her follow up at her normal 4 weeks.    Plan:  Patient was instructed to take 10 mg today then continue with current warfarin dose of 7.5 mg daily.  INR follow up will occur in 4 weeks.  Patient was instructed to maintain consistent vegetable intake, to monitor for any bruising or bleeding, and to call with any medication changes or concerns.      Gordo Lu, StephanieD  P:[phone]  F:[phone]

## 2025-04-29 NOTE — PROGRESS NOTES
Roselia arrived ambulatory to the [facility] Clinic for her scheduled visit for INR check. Roselia states she is feeling good. Home medications reviewed. Roselia reports she did miss a dose of her Coumadin yesterday. INR was 2.4 results called to Pharmacist Nadja in Coumadin clinic. The plan is for Roselia to take double Coumadin today and then resume 7.5mg daily and follow up in 4 weeks.     Vitals:  BP:  108/78          HR:  89         Resp: 20         SPO2: 97% on room air        Next Appointment Date:May 28, 2025@ 1pm    Note in EMR for Treating Physician: Evonne Yan DNP    In-House Supervising Physician:Dr. Monaco

## 2025-04-29 NOTE — PATIENT INSTRUCTIONS
Diet  2000 mg (2 gram) sodium diet-Do not add salt to foods or cook with salt. Check labels for Sodium (Na)     Resources  Heart Failure Clinic [phone] Monday - Friday 7:00 am - 3:00 pm  Websites: [email or web address]; American Heart Association: www.heart.org    Special Instructions:  If you smoke-Quit!  If you are not able to contact your doctor and need immediate medical attention, call 911  If you are not able to keep you're scheduled appointment at the Heart Failure Clinic, call [phone]  Watch for and report to your doctor all warning signs of Heart Failure (increased difficulty with breathing, 3-5 pound weight gain in one week or less, increased swelling, increased tiredness)  Weigh yourself each day at the same time with the same amount of clothes on. Record your weight log. Bring it with your to each visit

## 2025-04-30 DIAGNOSIS — E03.9 ACQUIRED HYPOTHYROIDISM: ICD-10-CM

## 2025-04-30 RX ORDER — LEVOTHYROXINE SODIUM 50 UG/1
50 TABLET ORAL
Qty: 90 TABLET | Refills: 3 | Status: SHIPPED | OUTPATIENT
Start: 2025-04-30

## 2025-05-08 DIAGNOSIS — E11.65 TYPE 2 DIABETES MELLITUS WITH HYPERGLYCEMIA, WITH LONG-TERM CURRENT USE OF INSULIN: ICD-10-CM

## 2025-05-08 DIAGNOSIS — Z79.4 TYPE 2 DIABETES MELLITUS WITH HYPERGLYCEMIA, WITH LONG-TERM CURRENT USE OF INSULIN: ICD-10-CM

## 2025-05-08 RX ORDER — EMPAGLIFLOZIN 10 MG/1
10 TABLET, FILM COATED ORAL DAILY
Qty: 90 TABLET | Refills: 0 | Status: SHIPPED | OUTPATIENT
Start: 2025-05-08

## 2025-05-14 ENCOUNTER — ANESTHESIA EVENT (OUTPATIENT)
Dept: OPERATING ROOM | Facility: HOSPITAL | Age: 55
End: 2025-05-14

## 2025-05-14 RX ORDER — ONDANSETRON HYDROCHLORIDE 2 MG/ML
4 INJECTION, SOLUTION INTRAVENOUS EVERY 12 HOURS PRN
OUTPATIENT
Start: 2025-05-14

## 2025-05-14 RX ORDER — MIDAZOLAM HYDROCHLORIDE 1 MG/ML
2 INJECTION INTRAMUSCULAR; INTRAVENOUS ONCE AS NEEDED
OUTPATIENT
Start: 2025-05-14

## 2025-05-14 RX ORDER — SODIUM CHLORIDE, SODIUM LACTATE, POTASSIUM CHLORIDE, CALCIUM CHLORIDE 600; 310; 30; 20 MG/100ML; MG/100ML; MG/100ML; MG/100ML
125 INJECTION, SOLUTION INTRAVENOUS CONTINUOUS
OUTPATIENT
Start: 2025-05-14 | End: 2025-05-15

## 2025-05-14 RX ORDER — SODIUM CHLORIDE, SODIUM LACTATE, POTASSIUM CHLORIDE, CALCIUM CHLORIDE 600; 310; 30; 20 MG/100ML; MG/100ML; MG/100ML; MG/100ML
100 INJECTION, SOLUTION INTRAVENOUS CONTINUOUS
OUTPATIENT
Start: 2025-05-14 | End: 2025-05-15

## 2025-05-16 ENCOUNTER — ANESTHESIA (OUTPATIENT)
Dept: OPERATING ROOM | Facility: HOSPITAL | Age: 55
End: 2025-05-16

## 2025-05-16 ENCOUNTER — APPOINTMENT (OUTPATIENT)
Dept: OPERATING ROOM | Facility: HOSPITAL | Age: 55
End: 2025-05-16
Payer: MEDICARE

## 2025-05-23 DIAGNOSIS — E11.69 HYPERLIPIDEMIA ASSOCIATED WITH TYPE 2 DIABETES MELLITUS: ICD-10-CM

## 2025-05-23 DIAGNOSIS — E78.5 HYPERLIPIDEMIA ASSOCIATED WITH TYPE 2 DIABETES MELLITUS: ICD-10-CM

## 2025-05-27 RX ORDER — ROSUVASTATIN CALCIUM 40 MG/1
40 TABLET, COATED ORAL DAILY
Qty: 30 TABLET | Refills: 0 | Status: SHIPPED | OUTPATIENT
Start: 2025-05-27

## 2025-05-28 ENCOUNTER — ANTICOAGULATION - WARFARIN VISIT (OUTPATIENT)
Dept: PHARMACY | Facility: HOSPITAL | Age: 55
End: 2025-05-28

## 2025-05-28 ENCOUNTER — APPOINTMENT (OUTPATIENT)
Dept: PRIMARY CARE | Facility: CLINIC | Age: 55
End: 2025-05-28
Payer: MEDICARE

## 2025-05-28 ENCOUNTER — CLINICAL SUPPORT (OUTPATIENT)
Dept: CARDIAC REHAB | Facility: HOSPITAL | Age: 55
End: 2025-05-28
Payer: MEDICARE

## 2025-05-28 VITALS
HEART RATE: 68 BPM | BODY MASS INDEX: 49.66 KG/M2 | OXYGEN SATURATION: 95 % | DIASTOLIC BLOOD PRESSURE: 66 MMHG | SYSTOLIC BLOOD PRESSURE: 102 MMHG | WEIGHT: 271.5 LBS | RESPIRATION RATE: 20 BRPM

## 2025-05-28 DIAGNOSIS — I82.0 HEPATIC VEIN THROMBOSIS (MULTI): ICD-10-CM

## 2025-05-28 DIAGNOSIS — I15.2 HYPERTENSION ASSOCIATED WITH DIABETES: ICD-10-CM

## 2025-05-28 DIAGNOSIS — K63.5 POLYP OF COLON, UNSPECIFIED PART OF COLON, UNSPECIFIED TYPE: ICD-10-CM

## 2025-05-28 DIAGNOSIS — I81 PORTAL VEIN THROMBOSIS: ICD-10-CM

## 2025-05-28 DIAGNOSIS — I81 PORTAL VEIN THROMBOSIS: Primary | ICD-10-CM

## 2025-05-28 DIAGNOSIS — E11.65 TYPE 2 DIABETES MELLITUS WITH HYPERGLYCEMIA, WITH LONG-TERM CURRENT USE OF INSULIN: ICD-10-CM

## 2025-05-28 DIAGNOSIS — K59.00 CONSTIPATION, UNSPECIFIED CONSTIPATION TYPE: ICD-10-CM

## 2025-05-28 DIAGNOSIS — Z79.4 TYPE 2 DIABETES MELLITUS WITH HYPERGLYCEMIA, WITH LONG-TERM CURRENT USE OF INSULIN: ICD-10-CM

## 2025-05-28 DIAGNOSIS — E11.59 HYPERTENSION ASSOCIATED WITH DIABETES: ICD-10-CM

## 2025-05-28 DIAGNOSIS — E55.9 VITAMIN D DEFICIENCY: ICD-10-CM

## 2025-05-28 DIAGNOSIS — I50.32 CHRONIC DIASTOLIC HEART FAILURE: ICD-10-CM

## 2025-05-28 DIAGNOSIS — E03.9 ACQUIRED HYPOTHYROIDISM: ICD-10-CM

## 2025-05-28 LAB
ALBUMIN SERPL BCP-MCNC: 4.2 G/DL (ref 3.4–5)
ALP SERPL-CCNC: 229 U/L (ref 33–110)
ALT SERPL W P-5'-P-CCNC: 14 U/L (ref 7–45)
ANION GAP SERPL CALC-SCNC: 14 MMOL/L (ref 10–20)
AST SERPL W P-5'-P-CCNC: 22 U/L (ref 9–39)
BILIRUB SERPL-MCNC: 0.6 MG/DL (ref 0–1.2)
BUN SERPL-MCNC: 15 MG/DL (ref 6–23)
CALCIUM SERPL-MCNC: 9.6 MG/DL (ref 8.6–10.3)
CHLORIDE SERPL-SCNC: 104 MMOL/L (ref 98–107)
CHOLEST SERPL-MCNC: 187 MG/DL (ref 0–199)
CHOLESTEROL/HDL RATIO: 4.3
CO2 SERPL-SCNC: 23 MMOL/L (ref 21–32)
CREAT SERPL-MCNC: 0.8 MG/DL (ref 0.5–1.05)
EGFRCR SERPLBLD CKD-EPI 2021: 87 ML/MIN/1.73M*2
EST. AVERAGE GLUCOSE BLD GHB EST-MCNC: 186 MG/DL
GLUCOSE SERPL-MCNC: 274 MG/DL (ref 74–99)
HBA1C MFR BLD: 8.1 % (ref ?–5.7)
HDLC SERPL-MCNC: 44 MG/DL
INR IN PPP BY COAGULATION ASSAY EXTERNAL: 2.3
LDLC SERPL CALC-MCNC: 92 MG/DL
NON HDL CHOLESTEROL: 143 MG/DL (ref 0–149)
POTASSIUM SERPL-SCNC: 4 MMOL/L (ref 3.5–5.3)
PROT SERPL-MCNC: 6.2 G/DL (ref 6.4–8.2)
PROTHROMBIN TIME (PT) IN PPP BY COAGULATION ASSAY EXTERNAL: NORMAL
SODIUM SERPL-SCNC: 137 MMOL/L (ref 136–145)
TRIGL SERPL-MCNC: 257 MG/DL (ref 0–149)
VLDL: 51 MG/DL (ref 0–40)

## 2025-05-28 PROCEDURE — 36415 COLL VENOUS BLD VENIPUNCTURE: CPT

## 2025-05-28 PROCEDURE — 80053 COMPREHEN METABOLIC PANEL: CPT

## 2025-05-28 PROCEDURE — 80061 LIPID PANEL: CPT

## 2025-05-28 PROCEDURE — 83036 HEMOGLOBIN GLYCOSYLATED A1C: CPT | Mod: SAMLAB

## 2025-05-28 PROCEDURE — 99212 OFFICE O/P EST SF 10 MIN: CPT

## 2025-05-28 PROCEDURE — 85610 PROTHROMBIN TIME: CPT

## 2025-05-28 NOTE — PROGRESS NOTES
"Roselia arrived via wheelchair to the [facility] Clinic for his scheduled visit and INR. Roselia states she is feeling \" ok \". She denies increased shortness of breath, PND, or Orthopnea. Eating and sleeping without distress. Home medications reviewed. OP labs were drawn today. INR was 2.3. The plan for Roselia is to continue current Coumadin Dose of 7.5mg daily. Roselia will follow up in 4 weeks. Reviewed signs and symptoms of increased heart failure and when to call for help. Patient verbalizes understanding for: Low sodium diet: 2000 mg daily of sodium, follow-up appointment with [facility], weighing self daily, medications dose and schedule, and signs of increased heart failure.      Vitals:  BP: 102/66           HR:68            Resp: 20          SPO2: 95% on room air       Weight: 271.5lbs                         Previous Weight:  277.8lbs        Lung Sounds: clear    Edema:  +1 bl legs    JVD: absent     Labs: OP labs    Medications Administered: none    Next Appointment Date: June 25, 2025@ 1pm    Note in EMR for treating Physician: Evonne Yan DNP     In-House Supervising Physician: Dr. Monaco  "

## 2025-05-28 NOTE — PATIENT INSTRUCTIONS
Continue medications at same dose    Diet  2000 mg (2 gram) sodium diet-Do not add salt to foods or cook with salt. Check labels for Sodium (Na)     Resources  [facility] Clinic [phone] Monday - Friday 7:00 am - 3:00 pm  Websites: www.[email or web address]; American Heart Association: www.heart.org    Special Instructions:  If you smoke-Quit!  If you are not able to contact your doctor and need immediate medical attention, call 911  If you are not able to keep you're scheduled appointment at the Heart Failure Clinic, call [phone]  Watch for and report to your doctor all warning signs of Heart Failure (increased difficulty with breathing, 3-5 pound weight gain in one week or less, increased swelling, increased tiredness)  Weigh yourself each day at the same time with the same amount of clothes on. Record your weight log. Bring it with your to each visit

## 2025-05-28 NOTE — PROGRESS NOTES
Pt enrolled in [facility] for management of Portal vein thrombosis [I81].     Pt current location [facility] clinic. Rest of [facility] visit completed by RN per clinic policy.     Current anticoagulant: Warfarin    Time since last visit: 4 weeks    Last INR: 2.40 on warfarin 52.5 mg in the previous week. No changes were made at that time.    Last Creatinine:   Lab Results   Component Value Date    CREATININE 0.65 04/01/2025     Last hemoglobin/hematocrit:  Lab Results   Component Value Date    HGB 12.8 07/04/2024     Lab Results   Component Value Date    HCT 41.8 07/04/2024       Current INR: 2.30 is therapeutic for goal range of 2.0-3.0 and is reflective of 52.5 mg in the previous week prior to visit.    Dosing confirmed with patient  Patient denies any missed doses.  Patient denies any medication changes, diet changes, or OTC/herbal supplement changes since last visit.  Patient denies any adverse reactions or barriers.  Patient denies any CP/SOB, fatigue, bleeding or bruising since last visit.   Patient denies any change in alcohol or tobacco use since last visit.   Patient denies any upcoming medical or dental procedures.    Plan:  Patient was instructed to continue with current warfarin dose.  INR follow up will occur in 4 weeks.  Patient was instructed to maintain consistent vegetable intake, to monitor for any bruising or bleeding, and to call with any medication changes or concerns.    [facility] nurse to relay above information to patient @ [facility] visit    Stephanie UribeD BCPS  P:[phone]  F:[phone]

## 2025-06-11 ENCOUNTER — APPOINTMENT (OUTPATIENT)
Dept: PRIMARY CARE | Facility: CLINIC | Age: 55
End: 2025-06-11
Payer: MEDICARE

## 2025-06-11 VITALS
BODY MASS INDEX: 45.45 KG/M2 | HEART RATE: 50 BPM | DIASTOLIC BLOOD PRESSURE: 87 MMHG | HEIGHT: 62 IN | WEIGHT: 247 LBS | SYSTOLIC BLOOD PRESSURE: 148 MMHG

## 2025-06-11 DIAGNOSIS — E11.59 HYPERTENSION ASSOCIATED WITH DIABETES: ICD-10-CM

## 2025-06-11 DIAGNOSIS — F33.1 MODERATE EPISODE OF RECURRENT MAJOR DEPRESSIVE DISORDER: ICD-10-CM

## 2025-06-11 DIAGNOSIS — I15.2 HYPERTENSION ASSOCIATED WITH DIABETES: ICD-10-CM

## 2025-06-11 DIAGNOSIS — J42 CHRONIC BRONCHITIS, UNSPECIFIED CHRONIC BRONCHITIS TYPE (MULTI): ICD-10-CM

## 2025-06-11 DIAGNOSIS — N18.31 STAGE 3A CHRONIC KIDNEY DISEASE (MULTI): ICD-10-CM

## 2025-06-11 DIAGNOSIS — R26.89 BALANCE PROBLEM: ICD-10-CM

## 2025-06-11 DIAGNOSIS — Z12.31 ENCOUNTER FOR SCREENING MAMMOGRAM FOR BREAST CANCER: ICD-10-CM

## 2025-06-11 DIAGNOSIS — J96.10 CHRONIC RESPIRATORY FAILURE, UNSPECIFIED WHETHER WITH HYPOXIA OR HYPERCAPNIA: ICD-10-CM

## 2025-06-11 DIAGNOSIS — Z00.00 ROUTINE GENERAL MEDICAL EXAMINATION AT HEALTH CARE FACILITY: Primary | ICD-10-CM

## 2025-06-11 DIAGNOSIS — K21.9 GASTROESOPHAGEAL REFLUX DISEASE WITHOUT ESOPHAGITIS: ICD-10-CM

## 2025-06-11 DIAGNOSIS — M79.604 LEG PAIN, BILATERAL: ICD-10-CM

## 2025-06-11 DIAGNOSIS — E78.5 HYPERLIPIDEMIA ASSOCIATED WITH TYPE 2 DIABETES MELLITUS: ICD-10-CM

## 2025-06-11 DIAGNOSIS — R56.9 SEIZURE-LIKE ACTIVITY (MULTI): ICD-10-CM

## 2025-06-11 DIAGNOSIS — E03.9 ACQUIRED HYPOTHYROIDISM: ICD-10-CM

## 2025-06-11 DIAGNOSIS — Z79.4 TYPE 2 DIABETES MELLITUS WITH HYPERGLYCEMIA, WITH LONG-TERM CURRENT USE OF INSULIN: ICD-10-CM

## 2025-06-11 DIAGNOSIS — E66.01 MORBID (SEVERE) OBESITY DUE TO EXCESS CALORIES (MULTI): ICD-10-CM

## 2025-06-11 DIAGNOSIS — M79.605 LEG PAIN, BILATERAL: ICD-10-CM

## 2025-06-11 DIAGNOSIS — I49.3 VENTRICULAR ECTOPY: ICD-10-CM

## 2025-06-11 DIAGNOSIS — E11.65 TYPE 2 DIABETES MELLITUS WITH HYPERGLYCEMIA, WITH LONG-TERM CURRENT USE OF INSULIN: ICD-10-CM

## 2025-06-11 DIAGNOSIS — E66.813 CLASS 3 SEVERE OBESITY DUE TO EXCESS CALORIES WITH SERIOUS COMORBIDITY AND BODY MASS INDEX (BMI) OF 45.0 TO 49.9 IN ADULT: ICD-10-CM

## 2025-06-11 DIAGNOSIS — I81 PVT (PORTAL VEIN THROMBOSIS): ICD-10-CM

## 2025-06-11 DIAGNOSIS — E11.69 HYPERLIPIDEMIA ASSOCIATED WITH TYPE 2 DIABETES MELLITUS: ICD-10-CM

## 2025-06-11 DIAGNOSIS — Z87.39 H/O: GOUT: ICD-10-CM

## 2025-06-11 PROBLEM — R93.89 ABNORMAL CXR: Status: RESOLVED | Noted: 2024-02-08 | Resolved: 2025-06-11

## 2025-06-11 PROBLEM — Z87.19 H/O ACUTE PANCREATITIS: Status: RESOLVED | Noted: 2024-01-16 | Resolved: 2025-06-11

## 2025-06-11 PROBLEM — R60.1 ANASARCA: Status: RESOLVED | Noted: 2024-03-26 | Resolved: 2025-06-11

## 2025-06-11 PROBLEM — M25.531 RIGHT WRIST PAIN: Status: RESOLVED | Noted: 2024-07-31 | Resolved: 2025-06-11

## 2025-06-11 PROBLEM — M25.511 PAIN OF RIGHT SHOULDER REGION: Status: RESOLVED | Noted: 2024-03-26 | Resolved: 2025-06-11

## 2025-06-11 PROBLEM — E83.52 HYPERCALCEMIA: Status: RESOLVED | Noted: 2024-01-16 | Resolved: 2025-06-11

## 2025-06-11 PROBLEM — R79.89 ABNORMAL LFTS: Status: RESOLVED | Noted: 2024-03-26 | Resolved: 2025-06-11

## 2025-06-11 PROBLEM — Z78.9: Status: RESOLVED | Noted: 2024-01-16 | Resolved: 2025-06-11

## 2025-06-11 PROBLEM — R25.1 TREMOR: Status: RESOLVED | Noted: 2024-03-19 | Resolved: 2025-06-11

## 2025-06-11 PROBLEM — R42 DIZZINESS: Status: RESOLVED | Noted: 2024-03-26 | Resolved: 2025-06-11

## 2025-06-11 PROBLEM — M75.81 ROTATOR CUFF TENDONITIS, RIGHT: Status: RESOLVED | Noted: 2024-04-04 | Resolved: 2025-06-11

## 2025-06-11 PROBLEM — R20.0 NUMBNESS AND TINGLING IN RIGHT HAND: Status: RESOLVED | Noted: 2024-07-30 | Resolved: 2025-06-11

## 2025-06-11 PROBLEM — M25.431 WRIST SWELLING, RIGHT: Status: RESOLVED | Noted: 2024-07-31 | Resolved: 2025-06-11

## 2025-06-11 PROBLEM — R20.2 NUMBNESS AND TINGLING IN RIGHT HAND: Status: RESOLVED | Noted: 2024-07-30 | Resolved: 2025-06-11

## 2025-06-11 PROBLEM — M79.89 SWELLING OF UPPER ARM: Status: RESOLVED | Noted: 2024-04-04 | Resolved: 2025-06-11

## 2025-06-11 PROBLEM — E87.6 HYPOKALEMIA: Status: RESOLVED | Noted: 2024-06-12 | Resolved: 2025-06-11

## 2025-06-11 PROBLEM — Z99.3 WHEELCHAIR DEPENDENT: Status: RESOLVED | Noted: 2024-01-16 | Resolved: 2025-06-11

## 2025-06-11 PROBLEM — I89.0 LYMPHEDEMA: Status: RESOLVED | Noted: 2024-01-16 | Resolved: 2025-06-11

## 2025-06-11 PROBLEM — R11.0 CHRONIC NAUSEA: Status: RESOLVED | Noted: 2024-01-16 | Resolved: 2025-06-11

## 2025-06-11 PROCEDURE — 1036F TOBACCO NON-USER: CPT | Performed by: INTERNAL MEDICINE

## 2025-06-11 PROCEDURE — G8433 SCR FOR DEP NOT CPT DOC RSN: HCPCS | Performed by: INTERNAL MEDICINE

## 2025-06-11 PROCEDURE — 3048F LDL-C <100 MG/DL: CPT | Performed by: INTERNAL MEDICINE

## 2025-06-11 PROCEDURE — 99214 OFFICE O/P EST MOD 30 MIN: CPT | Performed by: INTERNAL MEDICINE

## 2025-06-11 PROCEDURE — G0439 PPPS, SUBSEQ VISIT: HCPCS | Performed by: INTERNAL MEDICINE

## 2025-06-11 PROCEDURE — 3008F BODY MASS INDEX DOCD: CPT | Performed by: INTERNAL MEDICINE

## 2025-06-11 PROCEDURE — 3077F SYST BP >= 140 MM HG: CPT | Performed by: INTERNAL MEDICINE

## 2025-06-11 PROCEDURE — 3079F DIAST BP 80-89 MM HG: CPT | Performed by: INTERNAL MEDICINE

## 2025-06-11 PROCEDURE — 3052F HG A1C>EQUAL 8.0%<EQUAL 9.0%: CPT | Performed by: INTERNAL MEDICINE

## 2025-06-11 RX ORDER — ALLOPURINOL 300 MG/1
300 TABLET ORAL DAILY
Qty: 90 TABLET | Refills: 3 | Status: ON HOLD | OUTPATIENT
Start: 2025-06-11

## 2025-06-11 RX ORDER — LEVOTHYROXINE SODIUM 50 UG/1
50 TABLET ORAL
Qty: 90 TABLET | Refills: 3 | Status: ON HOLD | OUTPATIENT
Start: 2025-06-11

## 2025-06-11 RX ORDER — LEVOTHYROXINE SODIUM 200 UG/1
200 TABLET ORAL DAILY
Qty: 90 TABLET | Refills: 3 | Status: ON HOLD | OUTPATIENT
Start: 2025-06-11 | End: 2026-06-11

## 2025-06-11 RX ORDER — AMLODIPINE BESYLATE 10 MG/1
10 TABLET ORAL DAILY
Qty: 30 TABLET | Refills: 11 | Status: ON HOLD | OUTPATIENT
Start: 2025-06-11 | End: 2026-06-11

## 2025-06-11 RX ORDER — ZOSTER VACCINE RECOMBINANT, ADJUVANTED 50 MCG/0.5
0.5 KIT INTRAMUSCULAR SEE ADMIN INSTRUCTIONS
Qty: 0.5 ML | Refills: 1 | Status: ON HOLD | OUTPATIENT
Start: 2025-06-11

## 2025-06-11 RX ORDER — BUPROPION HYDROCHLORIDE 150 MG/1
150 TABLET ORAL EVERY MORNING
Qty: 90 TABLET | Refills: 3 | Status: ON HOLD | OUTPATIENT
Start: 2025-06-11

## 2025-06-11 RX ORDER — METOPROLOL SUCCINATE 25 MG/1
25 TABLET, EXTENDED RELEASE ORAL DAILY
Qty: 90 TABLET | Refills: 3 | Status: ON HOLD | OUTPATIENT
Start: 2025-06-11 | End: 2026-06-11

## 2025-06-11 RX ORDER — ROSUVASTATIN CALCIUM 40 MG/1
40 TABLET, COATED ORAL DAILY
Qty: 30 TABLET | Refills: 0 | Status: ON HOLD | OUTPATIENT
Start: 2025-06-11

## 2025-06-11 RX ORDER — PNEUMOCOCCAL 20-VALENT CONJUGATE VACCINE 2.2; 2.2; 2.2; 2.2; 2.2; 2.2; 2.2; 2.2; 2.2; 2.2; 2.2; 2.2; 2.2; 2.2; 2.2; 2.2; 4.4; 2.2; 2.2; 2.2 UG/.5ML; UG/.5ML; UG/.5ML; UG/.5ML; UG/.5ML; UG/.5ML; UG/.5ML; UG/.5ML; UG/.5ML; UG/.5ML; UG/.5ML; UG/.5ML; UG/.5ML; UG/.5ML; UG/.5ML; UG/.5ML; UG/.5ML; UG/.5ML; UG/.5ML; UG/.5ML
0.5 INJECTION, SUSPENSION INTRAMUSCULAR ONCE
Qty: 0.5 ML | Refills: 0 | Status: ON HOLD | OUTPATIENT
Start: 2025-06-11 | End: 2025-06-11

## 2025-06-11 RX ORDER — WARFARIN 7.5 MG/1
TABLET ORAL
Qty: 30 TABLET | Refills: 0 | Status: ON HOLD | OUTPATIENT
Start: 2025-06-11 | End: 2026-06-10

## 2025-06-11 RX ORDER — METOPROLOL SUCCINATE 50 MG/1
50 TABLET, EXTENDED RELEASE ORAL DAILY
Qty: 30 TABLET | Refills: 11 | Status: ON HOLD | OUTPATIENT
Start: 2025-06-11 | End: 2026-06-11

## 2025-06-11 RX ORDER — WARFARIN SODIUM 5 MG/1
TABLET ORAL
Qty: 90 TABLET | Refills: 3 | Status: ON HOLD | OUTPATIENT
Start: 2025-06-11 | End: 2026-06-11

## 2025-06-11 RX ORDER — DULOXETIN HYDROCHLORIDE 60 MG/1
60 CAPSULE, DELAYED RELEASE ORAL 2 TIMES DAILY
Qty: 60 CAPSULE | Refills: 11 | Status: ON HOLD | OUTPATIENT
Start: 2025-06-11 | End: 2026-06-11

## 2025-06-11 ASSESSMENT — ENCOUNTER SYMPTOMS
WHEEZING: 0
HEMATOLOGIC/LYMPHATIC NEGATIVE: 1
RESPIRATORY NEGATIVE: 1
NAUSEA: 0
PALPITATIONS: 0
NUMBNESS: 0
COUGH: 0
CONSTIPATION: 0
EYE DISCHARGE: 0
NEUROLOGICAL NEGATIVE: 1
DIARRHEA: 0
ALLERGIC/IMMUNOLOGIC NEGATIVE: 1
ADENOPATHY: 0
CARDIOVASCULAR NEGATIVE: 1
ABDOMINAL DISTENTION: 0
DYSURIA: 0
NECK STIFFNESS: 0
BACK PAIN: 1
CONFUSION: 0
EYES NEGATIVE: 1
CONSTITUTIONAL NEGATIVE: 1
SHORTNESS OF BREATH: 0
FEVER: 0
ABDOMINAL PAIN: 0
VOMITING: 0
ENDOCRINE NEGATIVE: 1
PSYCHIATRIC NEGATIVE: 1
JOINT SWELLING: 0
HEADACHES: 0
GASTROINTESTINAL NEGATIVE: 1
CHILLS: 0
LIGHT-HEADEDNESS: 0
AGITATION: 0
ARTHRALGIAS: 1

## 2025-06-11 ASSESSMENT — PATIENT HEALTH QUESTIONNAIRE - PHQ9
6. FEELING BAD ABOUT YOURSELF - OR THAT YOU ARE A FAILURE OR HAVE LET YOURSELF OR YOUR FAMILY DOWN: SEVERAL DAYS
7. TROUBLE CONCENTRATING ON THINGS, SUCH AS READING THE NEWSPAPER OR WATCHING TELEVISION: NEARLY EVERY DAY
9. THOUGHTS THAT YOU WOULD BE BETTER OFF DEAD, OR OF HURTING YOURSELF: NOT AT ALL
5. POOR APPETITE OR OVEREATING: NEARLY EVERY DAY
8. MOVING OR SPEAKING SO SLOWLY THAT OTHER PEOPLE COULD HAVE NOTICED. OR THE OPPOSITE, BEING SO FIGETY OR RESTLESS THAT YOU HAVE BEEN MOVING AROUND A LOT MORE THAN USUAL: NEARLY EVERY DAY
3. TROUBLE FALLING OR STAYING ASLEEP OR SLEEPING TOO MUCH: NOT AT ALL
SUM OF ALL RESPONSES TO PHQ QUESTIONS 1-9: 16
1. LITTLE INTEREST OR PLEASURE IN DOING THINGS: NOT AT ALL
2. FEELING DOWN, DEPRESSED OR HOPELESS: NEARLY EVERY DAY
SUM OF ALL RESPONSES TO PHQ9 QUESTIONS 1 AND 2: 3
4. FEELING TIRED OR HAVING LITTLE ENERGY: NEARLY EVERY DAY

## 2025-06-11 ASSESSMENT — ACTIVITIES OF DAILY LIVING (ADL)
MANAGING_FINANCES: INDEPENDENT
GROCERY_SHOPPING: INDEPENDENT
BATHING: INDEPENDENT
TAKING_MEDICATION: INDEPENDENT
DOING_HOUSEWORK: NEEDS ASSISTANCE
DRESSING: INDEPENDENT

## 2025-06-11 NOTE — PROGRESS NOTES
"Subjective   Reason for Visit: Roselia Mo is an 54 y.o. female here for a Medicare Wellness visit.     Past Medical, Surgical, and Family History reviewed and updated in chart.    Reviewed all medications by prescribing practitioner or clinical pharmacist (such as prescriptions, OTCs, herbal therapies and supplements) and documented in the medical record.    Here for fu with labs, has lost lost of wt with ozempoic very happy a bout it feels alot healthier   left ,Feels depressed about it  Still has difficulty with balance        Patient Care Team:  Gamal Franklin MD as PCP - General (Internal Medicine)  Ana Knight MD as PCP - [employer] PCP     Review of Systems   Constitutional: Negative.  Negative for chills and fever.   HENT: Negative.  Negative for congestion.    Eyes: Negative.  Negative for discharge.   Respiratory: Negative.  Negative for cough, shortness of breath and wheezing.    Cardiovascular: Negative.  Negative for chest pain, palpitations and leg swelling.   Gastrointestinal: Negative.  Negative for abdominal distention, abdominal pain, constipation, diarrhea, nausea and vomiting.   Endocrine: Negative.    Genitourinary: Negative.  Negative for dysuria and urgency.   Musculoskeletal:  Positive for arthralgias and back pain. Negative for joint swelling and neck stiffness.   Skin: Negative.  Negative for rash.   Allergic/Immunologic: Negative.  Negative for immunocompromised state.   Neurological: Negative.  Negative for light-headedness, numbness and headaches.   Hematological: Negative.  Negative for adenopathy.   Psychiatric/Behavioral: Negative.  Negative for agitation, behavioral problems, confusion and suicidal ideas.    All other systems reviewed and are negative.      Objective   Vitals:  /87 (BP Location: Right arm, Patient Position: Sitting)   Pulse 50   Ht 1.575 m (5' 2\")   Wt 112 kg (247 lb)   BMI 45.18 kg/m²       Physical Exam  Vitals " reviewed.   Constitutional:       General: She is not in acute distress.     Appearance: She is obese.   HENT:      Head: Normocephalic and atraumatic.      Nose: Nose normal.   Eyes:      Conjunctiva/sclera: Conjunctivae normal.      Pupils: Pupils are equal, round, and reactive to light.   Neck:      Vascular: No carotid bruit.   Cardiovascular:      Rate and Rhythm: Normal rate and regular rhythm.      Pulses: Normal pulses.      Heart sounds:      No gallop.   Pulmonary:      Effort: Pulmonary effort is normal. No respiratory distress.      Breath sounds: Normal breath sounds. No wheezing.   Abdominal:      General: Bowel sounds are normal.      Palpations: Abdomen is soft.      Tenderness: There is no abdominal tenderness.   Musculoskeletal:         General: Normal range of motion.      Cervical back: Normal range of motion. No rigidity.      Right lower leg: No edema.      Left lower leg: No edema.   Lymphadenopathy:      Cervical: No cervical adenopathy.   Skin:     General: Skin is warm.      Findings: No rash.   Neurological:      General: No focal deficit present.      Mental Status: She is alert and oriented to person, place, and time.   Psychiatric:         Mood and Affect: Mood normal.         Behavior: Behavior normal.         Assessment & Plan  Type 2 diabetes mellitus with hyperglycemia, with long-term current use of insulin    Orders:    empagliflozin (Jardiance) 10 mg tablet; Take 1 tablet (10 mg) by mouth once daily.    semaglutide 2 mg/dose (8 mg/3 mL) pen injector; Inject 2 mg under the skin 1 (one) time per week.    Comprehensive Metabolic Panel; Future    Hemoglobin A1C; Future    Albumin-Creatinine Ratio, Urine Random; Future    PVT (portal vein thrombosis)    Orders:    warfarin (Coumadin) 5 mg tablet; Take as directed per After Visit Summary.    warfarin (Coumadin) 7.5 mg tablet; Take as directed per After Visit Summary.    Ventricular ectopy    Orders:    metoprolol succinate XL  (Toprol-XL) 25 mg 24 hr tablet; Take 1 tablet (25 mg) by mouth once daily. Take with 50mg tablet for daily total of 75mg    Hypertension associated with diabetes    Orders:    metoprolol succinate XL (Toprol-XL) 50 mg 24 hr tablet; Take 1 tablet (50 mg) by mouth once daily. Do not crush or chew.    amLODIPine (Norvasc) 10 mg tablet; Take 1 tablet (10 mg) by mouth once daily.    Hyperlipidemia associated with type 2 diabetes mellitus    Orders:    rosuvastatin (Crestor) 40 mg tablet; Take 1 tablet (40 mg) by mouth once daily.    Acquired hypothyroidism    Orders:    levothyroxine (Synthroid, Levoxyl) 50 mcg tablet; Take 1 tablet (50 mcg) by mouth once daily in the morning. Take before meals. TAKE WITH 200MG    levothyroxine (Synthroid, Levoxyl) 200 mcg tablet; Take 1 tablet (200 mcg) by mouth early in the morning.. Take on an empty stomach at the same time each day, either 30 to 60 minutes prior to breakfast    TSH with reflex to Free T4 if abnormal; Future    Moderate episode of recurrent major depressive disorder    Orders:    DULoxetine (Cymbalta) 60 mg DR capsule; Take 1 capsule (60 mg) by mouth 2 times a day. Do not crush or chew.    buPROPion XL (Wellbutrin XL) 150 mg 24 hr tablet; Take 1 tablet (150 mg) by mouth once daily in the morning. Do not crush, chew, or split.    Leg pain, bilateral    Orders:    DULoxetine (Cymbalta) 60 mg DR capsule; Take 1 capsule (60 mg) by mouth 2 times a day. Do not crush or chew.    Gastroesophageal reflux disease without esophagitis    Orders:    buPROPion XL (Wellbutrin XL) 150 mg 24 hr tablet; Take 1 tablet (150 mg) by mouth once daily in the morning. Do not crush, chew, or split.    H/O: gout    Orders:    allopurinol (Zyloprim) 300 mg tablet; Take 1 tablet (300 mg) by mouth once daily.    Routine general medical examination at health care facility    Orders:    1 Year Follow Up In Primary Care - Wellness Exam; Future    zoster vaccine-recombinant adjuvanted (Shingrix, PF,)  50 mcg/0.5 mL vaccine; Inject 0.5 mL into the muscle see administration instructions.    pneumoc 20-home conj-dip cr,PF, (Prevnar 20, PF,) 0.5 mL vaccine; Inject 0.5 mL into the muscle 1 time for 1 dose.    Balance problem    Orders:    Referral to Physical Therapy; Future    Chronic bronchitis, unspecified chronic bronchitis type (Multi)  stable         Morbid (severe) obesity due to excess calories (Multi)  Losing wt with ozempic         Stage 3a chronic kidney disease (Multi)  stable    Orders:    Vitamin D 25-Hydroxy,Total (for eval of Vitamin D levels); Future    Chronic respiratory failure, unspecified whether with hypoxia or hypercapnia  stable         Seizure-like activity (Multi)         Encounter for screening mammogram for breast cancer    Orders:    BI mammo bilateral screening tomosynthesis; Future    Class 3 severe obesity due to excess calories with serious comorbidity and body mass index (BMI) of 45.0 to 49.9 in adult              Advanced Care planning discussed with patient,diagnosis , treatment and prognosis discussed with pt ,pt has capacity to make own decision, pt does not have an AD, advised to set one up and bring a copy for the chart.      HTN addressed as follow:    MONITOR BP   GOAL BP LOWER THAN 130/80  LOW SALT  EXERCISE DAILY    Diabetes Mellitus/IFG addressed as follow:    1800 TRENT ADA  HGA1C GOAL LESS THAN 7  LOSE WT  EXERCISE DAILY    Labs reviewed with pt      Clinically doing a lot better    Has been taking cymbalta 60 mg daily to raise to bid    Wants to postpone colonoscopy on hold    Dxs cleaned up      Fu 4 mo bw

## 2025-06-11 NOTE — ASSESSMENT & PLAN NOTE
Orders:    empagliflozin (Jardiance) 10 mg tablet; Take 1 tablet (10 mg) by mouth once daily.    semaglutide 2 mg/dose (8 mg/3 mL) pen injector; Inject 2 mg under the skin 1 (one) time per week.    Comprehensive Metabolic Panel; Future    Hemoglobin A1C; Future    Albumin-Creatinine Ratio, Urine Random; Future

## 2025-06-11 NOTE — ASSESSMENT & PLAN NOTE
Orders:    DULoxetine (Cymbalta) 60 mg DR capsule; Take 1 capsule (60 mg) by mouth 2 times a day. Do not crush or chew.    buPROPion XL (Wellbutrin XL) 150 mg 24 hr tablet; Take 1 tablet (150 mg) by mouth once daily in the morning. Do not crush, chew, or split.

## 2025-06-11 NOTE — ASSESSMENT & PLAN NOTE
Orders:    DULoxetine (Cymbalta) 60 mg DR capsule; Take 1 capsule (60 mg) by mouth 2 times a day. Do not crush or chew.

## 2025-06-11 NOTE — ASSESSMENT & PLAN NOTE
Orders:    levothyroxine (Synthroid, Levoxyl) 50 mcg tablet; Take 1 tablet (50 mcg) by mouth once daily in the morning. Take before meals. TAKE WITH 200MG    levothyroxine (Synthroid, Levoxyl) 200 mcg tablet; Take 1 tablet (200 mcg) by mouth early in the morning.. Take on an empty stomach at the same time each day, either 30 to 60 minutes prior to breakfast    TSH with reflex to Free T4 if abnormal; Future

## 2025-06-11 NOTE — ASSESSMENT & PLAN NOTE
Orders:    metoprolol succinate XL (Toprol-XL) 50 mg 24 hr tablet; Take 1 tablet (50 mg) by mouth once daily. Do not crush or chew.    amLODIPine (Norvasc) 10 mg tablet; Take 1 tablet (10 mg) by mouth once daily.

## 2025-06-11 NOTE — ASSESSMENT & PLAN NOTE
Wound Diameter In Cm(Optional): 0 stable          Add 73166 Cpt? (Important Note: In 2017 The Use Of 20285 Is Being Tracked By Cms To Determine Future Global Period Reimbursement For Global Periods): yes Detail Level: Detailed Wound Crusting?: clean Wound Evaluated By (Optional): Jon Phelps MD Wound Color?: pink

## 2025-06-12 ENCOUNTER — TELEPHONE (OUTPATIENT)
Dept: PRIMARY CARE | Facility: CLINIC | Age: 55
End: 2025-06-12
Payer: MEDICARE

## 2025-06-12 NOTE — TELEPHONE ENCOUNTER
Spoke w/ [facility] and they ran through again with a lower quantity the #8 mL (84 day supply) and there was a paid claim

## 2025-06-15 ENCOUNTER — HOSPITAL ENCOUNTER (INPATIENT)
Facility: HOSPITAL | Age: 55
DRG: 637 | End: 2025-06-15
Attending: EMERGENCY MEDICINE | Admitting: STUDENT IN AN ORGANIZED HEALTH CARE EDUCATION/TRAINING PROGRAM
Payer: MEDICARE

## 2025-06-15 ENCOUNTER — APPOINTMENT (OUTPATIENT)
Dept: CARDIOLOGY | Facility: HOSPITAL | Age: 55
DRG: 637 | End: 2025-06-15
Payer: MEDICARE

## 2025-06-15 ENCOUNTER — APPOINTMENT (OUTPATIENT)
Dept: RADIOLOGY | Facility: HOSPITAL | Age: 55
DRG: 637 | End: 2025-06-15
Payer: MEDICARE

## 2025-06-15 DIAGNOSIS — E11.10 TYPE 2 DIABETES MELLITUS WITH KETOACIDOSIS WITHOUT COMA, WITHOUT LONG-TERM CURRENT USE OF INSULIN: Primary | ICD-10-CM

## 2025-06-15 DIAGNOSIS — K43.9 VENTRAL HERNIA WITHOUT OBSTRUCTION OR GANGRENE: ICD-10-CM

## 2025-06-15 DIAGNOSIS — R11.2 NAUSEA AND VOMITING, UNSPECIFIED VOMITING TYPE: ICD-10-CM

## 2025-06-15 DIAGNOSIS — E86.0 DEHYDRATION: ICD-10-CM

## 2025-06-15 DIAGNOSIS — R16.0 HYPODENSE MASS OF LIVER: ICD-10-CM

## 2025-06-15 DIAGNOSIS — R79.1 ELEVATED INR: ICD-10-CM

## 2025-06-15 DIAGNOSIS — T45.515A WARFARIN-INDUCED COAGULOPATHY (MULTI): ICD-10-CM

## 2025-06-15 DIAGNOSIS — D68.32 WARFARIN-INDUCED COAGULOPATHY (MULTI): ICD-10-CM

## 2025-06-15 LAB
ALBUMIN SERPL BCP-MCNC: 5 G/DL (ref 3.4–5)
ALP SERPL-CCNC: 296 U/L (ref 33–110)
ALT SERPL W P-5'-P-CCNC: 12 U/L (ref 7–45)
ANION GAP SERPL CALC-SCNC: 23 MMOL/L (ref 10–20)
ANION GAP SERPL CALC-SCNC: 28 MMOL/L (ref 10–20)
APTT PPP: 116 SECONDS (ref 26–36)
AST SERPL W P-5'-P-CCNC: 17 U/L (ref 9–39)
B-OH-BUTYR SERPL-SCNC: 7.95 MMOL/L (ref 0.02–0.27)
B-OH-BUTYR SERPL-SCNC: >24 MMOL/L (ref 0.02–0.27)
BASE EXCESS BLDA CALC-SCNC: -16.7 MMOL/L (ref -2–3)
BASOPHILS # BLD AUTO: 0.04 X10*3/UL (ref 0–0.1)
BASOPHILS NFR BLD AUTO: 0.3 %
BILIRUB DIRECT SERPL-MCNC: 0.1 MG/DL (ref 0–0.3)
BILIRUB SERPL-MCNC: 0.5 MG/DL (ref 0–1.2)
BODY TEMPERATURE: 37 DEGREES CELSIUS
BUN SERPL-MCNC: 14 MG/DL (ref 6–23)
BUN SERPL-MCNC: 15 MG/DL (ref 6–23)
CALCIUM SERPL-MCNC: 10.6 MG/DL (ref 8.6–10.3)
CALCIUM SERPL-MCNC: 9.8 MG/DL (ref 8.6–10.3)
CARDIAC TROPONIN I PNL SERPL HS: 10 NG/L (ref 0–13)
CHLORIDE SERPL-SCNC: 104 MMOL/L (ref 98–107)
CHLORIDE SERPL-SCNC: 107 MMOL/L (ref 98–107)
CO2 SERPL-SCNC: 11 MMOL/L (ref 21–32)
CO2 SERPL-SCNC: 12 MMOL/L (ref 21–32)
CREAT SERPL-MCNC: 0.89 MG/DL (ref 0.5–1.05)
CREAT SERPL-MCNC: 1.06 MG/DL (ref 0.5–1.05)
EGFRCR SERPLBLD CKD-EPI 2021: 62 ML/MIN/1.73M*2
EGFRCR SERPLBLD CKD-EPI 2021: 76 ML/MIN/1.73M*2
EOSINOPHIL # BLD AUTO: 0 X10*3/UL (ref 0–0.7)
EOSINOPHIL NFR BLD AUTO: 0 %
ERYTHROCYTE [DISTWIDTH] IN BLOOD BY AUTOMATED COUNT: 13.5 % (ref 11.5–14.5)
GLUCOSE BLD MANUAL STRIP-MCNC: 149 MG/DL (ref 74–99)
GLUCOSE BLD MANUAL STRIP-MCNC: 151 MG/DL (ref 74–99)
GLUCOSE SERPL-MCNC: 142 MG/DL (ref 74–99)
GLUCOSE SERPL-MCNC: 168 MG/DL (ref 74–99)
HCO3 BLDA-SCNC: 8.3 MMOL/L (ref 22–26)
HCT VFR BLD AUTO: 56.4 % (ref 36–46)
HGB BLD-MCNC: 19.1 G/DL (ref 12–16)
IMM GRANULOCYTES # BLD AUTO: 0.04 X10*3/UL (ref 0–0.7)
IMM GRANULOCYTES NFR BLD AUTO: 0.3 % (ref 0–0.9)
INHALED O2 CONCENTRATION: 21 %
INR PPP: ABNORMAL
LACTATE SERPL-SCNC: 0.8 MMOL/L (ref 0.4–2)
LIPASE SERPL-CCNC: 27 U/L (ref 9–82)
LYMPHOCYTES # BLD AUTO: 1.59 X10*3/UL (ref 1.2–4.8)
LYMPHOCYTES NFR BLD AUTO: 13.8 %
MCH RBC QN AUTO: 30.7 PG (ref 26–34)
MCHC RBC AUTO-ENTMCNC: 33.9 G/DL (ref 32–36)
MCV RBC AUTO: 91 FL (ref 80–100)
MONOCYTES # BLD AUTO: 1.05 X10*3/UL (ref 0.1–1)
MONOCYTES NFR BLD AUTO: 9.1 %
NEUTROPHILS # BLD AUTO: 8.84 X10*3/UL (ref 1.2–7.7)
NEUTROPHILS NFR BLD AUTO: 76.5 %
NRBC BLD-RTO: 0 /100 WBCS (ref 0–0)
OXYHGB MFR BLDA: 96.4 % (ref 94–98)
PCO2 BLDA: 19 MM HG (ref 38–42)
PH BLDA: 7.25 PH (ref 7.38–7.42)
PLATELET # BLD AUTO: 267 X10*3/UL (ref 150–450)
PO2 BLDA: 102 MM HG (ref 85–95)
POTASSIUM SERPL-SCNC: 3.9 MMOL/L (ref 3.5–5.3)
POTASSIUM SERPL-SCNC: 4 MMOL/L (ref 3.5–5.3)
PROT SERPL-MCNC: 8.1 G/DL (ref 6.4–8.2)
PROTHROMBIN TIME: >100 SECONDS (ref 9.8–12.4)
RBC # BLD AUTO: 6.23 X10*6/UL (ref 4–5.2)
SAO2 % BLDA: 99 % (ref 94–100)
SODIUM SERPL-SCNC: 138 MMOL/L (ref 136–145)
SODIUM SERPL-SCNC: 139 MMOL/L (ref 136–145)
T4 FREE SERPL-MCNC: 1.17 NG/DL (ref 0.61–1.12)
TSH SERPL-ACNC: 0.22 MIU/L (ref 0.44–3.98)
WBC # BLD AUTO: 11.6 X10*3/UL (ref 4.4–11.3)

## 2025-06-15 PROCEDURE — 96374 THER/PROPH/DIAG INJ IV PUSH: CPT

## 2025-06-15 PROCEDURE — 80048 BASIC METABOLIC PNL TOTAL CA: CPT | Mod: CCI | Performed by: STUDENT IN AN ORGANIZED HEALTH CARE EDUCATION/TRAINING PROGRAM

## 2025-06-15 PROCEDURE — 2500000004 HC RX 250 GENERAL PHARMACY W/ HCPCS (ALT 636 FOR OP/ED): Performed by: STUDENT IN AN ORGANIZED HEALTH CARE EDUCATION/TRAINING PROGRAM

## 2025-06-15 PROCEDURE — 84439 ASSAY OF FREE THYROXINE: CPT | Performed by: STUDENT IN AN ORGANIZED HEALTH CARE EDUCATION/TRAINING PROGRAM

## 2025-06-15 PROCEDURE — 99291 CRITICAL CARE FIRST HOUR: CPT | Mod: 25 | Performed by: PHYSICIAN ASSISTANT

## 2025-06-15 PROCEDURE — 85610 PROTHROMBIN TIME: CPT | Performed by: PHYSICIAN ASSISTANT

## 2025-06-15 PROCEDURE — 82010 KETONE BODYS QUAN: CPT | Performed by: PHYSICIAN ASSISTANT

## 2025-06-15 PROCEDURE — 74177 CT ABD & PELVIS W/CONTRAST: CPT | Performed by: STUDENT IN AN ORGANIZED HEALTH CARE EDUCATION/TRAINING PROGRAM

## 2025-06-15 PROCEDURE — 99291 CRITICAL CARE FIRST HOUR: CPT | Performed by: STUDENT IN AN ORGANIZED HEALTH CARE EDUCATION/TRAINING PROGRAM

## 2025-06-15 PROCEDURE — 82947 ASSAY GLUCOSE BLOOD QUANT: CPT

## 2025-06-15 PROCEDURE — 96376 TX/PRO/DX INJ SAME DRUG ADON: CPT

## 2025-06-15 PROCEDURE — 96361 HYDRATE IV INFUSION ADD-ON: CPT

## 2025-06-15 PROCEDURE — 36415 COLL VENOUS BLD VENIPUNCTURE: CPT | Performed by: PHYSICIAN ASSISTANT

## 2025-06-15 PROCEDURE — 83690 ASSAY OF LIPASE: CPT | Performed by: PHYSICIAN ASSISTANT

## 2025-06-15 PROCEDURE — 2550000001 HC RX 255 CONTRASTS: Performed by: EMERGENCY MEDICINE

## 2025-06-15 PROCEDURE — 85730 THROMBOPLASTIN TIME PARTIAL: CPT | Performed by: PHYSICIAN ASSISTANT

## 2025-06-15 PROCEDURE — 84484 ASSAY OF TROPONIN QUANT: CPT | Performed by: PHYSICIAN ASSISTANT

## 2025-06-15 PROCEDURE — 80053 COMPREHEN METABOLIC PANEL: CPT | Performed by: PHYSICIAN ASSISTANT

## 2025-06-15 PROCEDURE — 2500000004 HC RX 250 GENERAL PHARMACY W/ HCPCS (ALT 636 FOR OP/ED): Performed by: PHYSICIAN ASSISTANT

## 2025-06-15 PROCEDURE — 99285 EMERGENCY DEPT VISIT HI MDM: CPT | Mod: 25 | Performed by: EMERGENCY MEDICINE

## 2025-06-15 PROCEDURE — 2020000001 HC ICU ROOM DAILY

## 2025-06-15 PROCEDURE — 85025 COMPLETE CBC W/AUTO DIFF WBC: CPT | Performed by: PHYSICIAN ASSISTANT

## 2025-06-15 PROCEDURE — 74177 CT ABD & PELVIS W/CONTRAST: CPT

## 2025-06-15 PROCEDURE — 87040 BLOOD CULTURE FOR BACTERIA: CPT | Mod: SAMLAB | Performed by: STUDENT IN AN ORGANIZED HEALTH CARE EDUCATION/TRAINING PROGRAM

## 2025-06-15 PROCEDURE — 2500000001 HC RX 250 WO HCPCS SELF ADMINISTERED DRUGS (ALT 637 FOR MEDICARE OP): Performed by: STUDENT IN AN ORGANIZED HEALTH CARE EDUCATION/TRAINING PROGRAM

## 2025-06-15 PROCEDURE — 93005 ELECTROCARDIOGRAM TRACING: CPT

## 2025-06-15 PROCEDURE — 82810 BLOOD GASES O2 SAT ONLY: CPT | Performed by: PHYSICIAN ASSISTANT

## 2025-06-15 PROCEDURE — 82248 BILIRUBIN DIRECT: CPT | Performed by: PHYSICIAN ASSISTANT

## 2025-06-15 PROCEDURE — 83605 ASSAY OF LACTIC ACID: CPT | Performed by: PHYSICIAN ASSISTANT

## 2025-06-15 PROCEDURE — 82010 KETONE BODYS QUAN: CPT | Performed by: STUDENT IN AN ORGANIZED HEALTH CARE EDUCATION/TRAINING PROGRAM

## 2025-06-15 PROCEDURE — 84443 ASSAY THYROID STIM HORMONE: CPT | Performed by: STUDENT IN AN ORGANIZED HEALTH CARE EDUCATION/TRAINING PROGRAM

## 2025-06-15 PROCEDURE — 2500000002 HC RX 250 W HCPCS SELF ADMINISTERED DRUGS (ALT 637 FOR MEDICARE OP, ALT 636 FOR OP/ED): Performed by: STUDENT IN AN ORGANIZED HEALTH CARE EDUCATION/TRAINING PROGRAM

## 2025-06-15 PROCEDURE — 36600 WITHDRAWAL OF ARTERIAL BLOOD: CPT

## 2025-06-15 RX ORDER — DEXTROSE MONOHYDRATE 100 MG/ML
150 INJECTION, SOLUTION INTRAVENOUS CONTINUOUS PRN
Status: ACTIVE | OUTPATIENT
Start: 2025-06-15 | End: 2025-06-16

## 2025-06-15 RX ORDER — LEVOTHYROXINE SODIUM 50 UG/1
50 TABLET ORAL
Status: DISCONTINUED | OUTPATIENT
Start: 2025-06-16 | End: 2025-06-18 | Stop reason: HOSPADM

## 2025-06-15 RX ORDER — DULOXETIN HYDROCHLORIDE 60 MG/1
60 CAPSULE, DELAYED RELEASE ORAL 2 TIMES DAILY
Status: DISCONTINUED | OUTPATIENT
Start: 2025-06-15 | End: 2025-06-18 | Stop reason: HOSPADM

## 2025-06-15 RX ORDER — ROSUVASTATIN CALCIUM 20 MG/1
40 TABLET, COATED ORAL DAILY
Status: DISCONTINUED | OUTPATIENT
Start: 2025-06-16 | End: 2025-06-18 | Stop reason: HOSPADM

## 2025-06-15 RX ORDER — ONDANSETRON HYDROCHLORIDE 2 MG/ML
4 INJECTION, SOLUTION INTRAVENOUS EVERY 4 HOURS PRN
Status: DISCONTINUED | OUTPATIENT
Start: 2025-06-15 | End: 2025-06-18 | Stop reason: HOSPADM

## 2025-06-15 RX ORDER — PROCHLORPERAZINE EDISYLATE 5 MG/ML
10 INJECTION INTRAMUSCULAR; INTRAVENOUS EVERY 6 HOURS PRN
Status: DISCONTINUED | OUTPATIENT
Start: 2025-06-15 | End: 2025-06-18 | Stop reason: HOSPADM

## 2025-06-15 RX ORDER — ONDANSETRON HYDROCHLORIDE 2 MG/ML
4 INJECTION, SOLUTION INTRAVENOUS ONCE
Status: COMPLETED | OUTPATIENT
Start: 2025-06-15 | End: 2025-06-15

## 2025-06-15 RX ORDER — MORPHINE SULFATE 4 MG/ML
4 INJECTION, SOLUTION INTRAMUSCULAR; INTRAVENOUS EVERY 4 HOURS PRN
Status: DISCONTINUED | OUTPATIENT
Start: 2025-06-15 | End: 2025-06-18 | Stop reason: HOSPADM

## 2025-06-15 RX ORDER — MORPHINE SULFATE 2 MG/ML
2 INJECTION, SOLUTION INTRAMUSCULAR; INTRAVENOUS EVERY 4 HOURS PRN
Status: DISCONTINUED | OUTPATIENT
Start: 2025-06-15 | End: 2025-06-18 | Stop reason: HOSPADM

## 2025-06-15 RX ORDER — DEXTROSE 50 % IN WATER (D50W) INTRAVENOUS SYRINGE
50
Status: DISCONTINUED | OUTPATIENT
Start: 2025-06-15 | End: 2025-06-16

## 2025-06-15 RX ORDER — METOPROLOL SUCCINATE 25 MG/1
25 TABLET, EXTENDED RELEASE ORAL DAILY
Status: DISCONTINUED | OUTPATIENT
Start: 2025-06-16 | End: 2025-06-18 | Stop reason: HOSPADM

## 2025-06-15 RX ORDER — PHYTONADIONE 5 MG/1
5 TABLET ORAL ONCE
Status: COMPLETED | OUTPATIENT
Start: 2025-06-15 | End: 2025-06-15

## 2025-06-15 RX ORDER — PANTOPRAZOLE SODIUM 40 MG/10ML
40 INJECTION, POWDER, LYOPHILIZED, FOR SOLUTION INTRAVENOUS
Status: DISCONTINUED | OUTPATIENT
Start: 2025-06-16 | End: 2025-06-16

## 2025-06-15 RX ORDER — WARFARIN SODIUM 5 MG/1
5 TABLET ORAL DAILY
Status: DISCONTINUED | OUTPATIENT
Start: 2025-06-16 | End: 2025-06-17

## 2025-06-15 RX ORDER — POLYETHYLENE GLYCOL 3350 17 G/17G
17 POWDER, FOR SOLUTION ORAL DAILY
Status: DISCONTINUED | OUTPATIENT
Start: 2025-06-16 | End: 2025-06-18 | Stop reason: HOSPADM

## 2025-06-15 RX ORDER — CETIRIZINE HYDROCHLORIDE 10 MG/1
10 TABLET ORAL DAILY
Status: DISCONTINUED | OUTPATIENT
Start: 2025-06-16 | End: 2025-06-18 | Stop reason: HOSPADM

## 2025-06-15 RX ORDER — ALLOPURINOL 300 MG/1
300 TABLET ORAL DAILY
Status: DISCONTINUED | OUTPATIENT
Start: 2025-06-16 | End: 2025-06-18 | Stop reason: HOSPADM

## 2025-06-15 RX ORDER — TOPIRAMATE 50 MG/1
25 TABLET, FILM COATED ORAL 2 TIMES DAILY
Status: DISCONTINUED | OUTPATIENT
Start: 2025-06-15 | End: 2025-06-18 | Stop reason: HOSPADM

## 2025-06-15 RX ORDER — PHYTONADIONE 5 MG/1
10 TABLET ORAL ONCE
Status: DISCONTINUED | OUTPATIENT
Start: 2025-06-15 | End: 2025-06-15

## 2025-06-15 RX ORDER — BUPROPION HYDROCHLORIDE 150 MG/1
150 TABLET ORAL EVERY MORNING
Status: DISCONTINUED | OUTPATIENT
Start: 2025-06-16 | End: 2025-06-18 | Stop reason: HOSPADM

## 2025-06-15 RX ORDER — PANTOPRAZOLE SODIUM 40 MG/1
40 TABLET, DELAYED RELEASE ORAL 2 TIMES DAILY
Status: DISCONTINUED | OUTPATIENT
Start: 2025-06-15 | End: 2025-06-15

## 2025-06-15 RX ORDER — MORPHINE SULFATE 4 MG/ML
4 INJECTION, SOLUTION INTRAMUSCULAR; INTRAVENOUS ONCE
Status: DISCONTINUED | OUTPATIENT
Start: 2025-06-15 | End: 2025-06-17

## 2025-06-15 RX ORDER — METOPROLOL SUCCINATE 50 MG/1
50 TABLET, EXTENDED RELEASE ORAL DAILY
Status: DISCONTINUED | OUTPATIENT
Start: 2025-06-16 | End: 2025-06-18 | Stop reason: HOSPADM

## 2025-06-15 RX ORDER — AMLODIPINE BESYLATE 10 MG/1
10 TABLET ORAL DAILY
Status: DISCONTINUED | OUTPATIENT
Start: 2025-06-16 | End: 2025-06-18 | Stop reason: HOSPADM

## 2025-06-15 RX ORDER — FUROSEMIDE 20 MG/1
20 TABLET ORAL DAILY
Status: DISCONTINUED | OUTPATIENT
Start: 2025-06-16 | End: 2025-06-16

## 2025-06-15 RX ORDER — DEXTROSE MONOHYDRATE AND SODIUM CHLORIDE 5; .45 G/100ML; G/100ML
150 INJECTION, SOLUTION INTRAVENOUS CONTINUOUS PRN
Status: ACTIVE | OUTPATIENT
Start: 2025-06-15 | End: 2025-06-16

## 2025-06-15 RX ORDER — ACETAMINOPHEN 325 MG/1
650 TABLET ORAL EVERY 6 HOURS PRN
Status: DISCONTINUED | OUTPATIENT
Start: 2025-06-15 | End: 2025-06-18 | Stop reason: HOSPADM

## 2025-06-15 RX ORDER — SODIUM CHLORIDE 9 MG/ML
150 INJECTION, SOLUTION INTRAVENOUS CONTINUOUS
Status: DISCONTINUED | OUTPATIENT
Start: 2025-06-15 | End: 2025-06-16

## 2025-06-15 RX ORDER — LEVOTHYROXINE SODIUM 100 UG/1
200 TABLET ORAL DAILY
Status: DISCONTINUED | OUTPATIENT
Start: 2025-06-16 | End: 2025-06-18 | Stop reason: HOSPADM

## 2025-06-15 RX ADMIN — ONDANSETRON 4 MG: 2 INJECTION INTRAMUSCULAR; INTRAVENOUS at 17:27

## 2025-06-15 RX ADMIN — PHYTONADIONE 5 MG: 5 TABLET ORAL at 19:37

## 2025-06-15 RX ADMIN — IOHEXOL 68 ML: 350 INJECTION, SOLUTION INTRAVENOUS at 17:53

## 2025-06-15 RX ADMIN — SODIUM CHLORIDE 150 ML/HR: 0.9 INJECTION, SOLUTION INTRAVENOUS at 20:11

## 2025-06-15 RX ADMIN — ACETAMINOPHEN 650 MG: 325 TABLET ORAL at 20:09

## 2025-06-15 RX ADMIN — SODIUM CHLORIDE 1000 ML: 0.9 INJECTION, SOLUTION INTRAVENOUS at 17:27

## 2025-06-15 RX ADMIN — ONDANSETRON 4 MG: 2 INJECTION INTRAMUSCULAR; INTRAVENOUS at 20:09

## 2025-06-15 SDOH — SOCIAL STABILITY: SOCIAL INSECURITY
WITHIN THE LAST YEAR, HAVE YOU BEEN RAPED OR FORCED TO HAVE ANY KIND OF SEXUAL ACTIVITY BY YOUR PARTNER OR EX-PARTNER?: NO

## 2025-06-15 SDOH — SOCIAL STABILITY: SOCIAL INSECURITY: WITHIN THE LAST YEAR, HAVE YOU BEEN AFRAID OF YOUR PARTNER OR EX-PARTNER?: NO

## 2025-06-15 SDOH — SOCIAL STABILITY: SOCIAL INSECURITY: DO YOU FEEL UNSAFE GOING BACK TO THE PLACE WHERE YOU ARE LIVING?: NO

## 2025-06-15 SDOH — ECONOMIC STABILITY: FOOD INSECURITY: WITHIN THE PAST 12 MONTHS, YOU WORRIED THAT YOUR FOOD WOULD RUN OUT BEFORE YOU GOT THE MONEY TO BUY MORE.: NEVER TRUE

## 2025-06-15 SDOH — SOCIAL STABILITY: SOCIAL INSECURITY: WITHIN THE LAST YEAR, HAVE YOU BEEN HUMILIATED OR EMOTIONALLY ABUSED IN OTHER WAYS BY YOUR PARTNER OR EX-PARTNER?: NO

## 2025-06-15 SDOH — SOCIAL STABILITY: SOCIAL INSECURITY: DOES ANYONE TRY TO KEEP YOU FROM HAVING/CONTACTING OTHER FRIENDS OR DOING THINGS OUTSIDE YOUR HOME?: NO

## 2025-06-15 SDOH — ECONOMIC STABILITY: INCOME INSECURITY: IN THE PAST 12 MONTHS HAS THE ELECTRIC, GAS, OIL, OR WATER COMPANY THREATENED TO SHUT OFF SERVICES IN YOUR HOME?: YES

## 2025-06-15 SDOH — ECONOMIC STABILITY: FOOD INSECURITY: WITHIN THE PAST 12 MONTHS, THE FOOD YOU BOUGHT JUST DIDN'T LAST AND YOU DIDN'T HAVE MONEY TO GET MORE.: NEVER TRUE

## 2025-06-15 SDOH — SOCIAL STABILITY: SOCIAL INSECURITY: ARE YOU OR HAVE YOU BEEN THREATENED OR ABUSED PHYSICALLY, EMOTIONALLY, OR SEXUALLY BY ANYONE?: NO

## 2025-06-15 SDOH — SOCIAL STABILITY: SOCIAL INSECURITY: HAVE YOU HAD ANY THOUGHTS OF HARMING ANYONE ELSE?: NO

## 2025-06-15 SDOH — SOCIAL STABILITY: SOCIAL INSECURITY
WITHIN THE LAST YEAR, HAVE YOU BEEN KICKED, HIT, SLAPPED, OR OTHERWISE PHYSICALLY HURT BY YOUR PARTNER OR EX-PARTNER?: NO

## 2025-06-15 SDOH — SOCIAL STABILITY: SOCIAL INSECURITY: ABUSE: ADULT

## 2025-06-15 SDOH — SOCIAL STABILITY: SOCIAL INSECURITY: WERE YOU ABLE TO COMPLETE ALL THE BEHAVIORAL HEALTH SCREENINGS?: YES

## 2025-06-15 SDOH — SOCIAL STABILITY: SOCIAL INSECURITY: DO YOU FEEL ANYONE HAS EXPLOITED OR TAKEN ADVANTAGE OF YOU FINANCIALLY OR OF YOUR PERSONAL PROPERTY?: NO

## 2025-06-15 SDOH — SOCIAL STABILITY: SOCIAL INSECURITY: ARE THERE ANY APPARENT SIGNS OF INJURIES/BEHAVIORS THAT COULD BE RELATED TO ABUSE/NEGLECT?: NO

## 2025-06-15 SDOH — SOCIAL STABILITY: SOCIAL INSECURITY: HAVE YOU HAD THOUGHTS OF HARMING ANYONE ELSE?: NO

## 2025-06-15 SDOH — SOCIAL STABILITY: SOCIAL INSECURITY: HAS ANYONE EVER THREATENED TO HURT YOUR FAMILY OR YOUR PETS?: NO

## 2025-06-15 ASSESSMENT — ENCOUNTER SYMPTOMS
ARTHRALGIAS: 0
FEVER: 0
SHORTNESS OF BREATH: 0
VOMITING: 1
SORE THROAT: 0
PALPITATIONS: 0
BACK PAIN: 0
SEIZURES: 0
COLOR CHANGE: 0
CHILLS: 0
EYE PAIN: 0
NAUSEA: 1
DYSURIA: 0
HEMATURIA: 0
ABDOMINAL PAIN: 1
COUGH: 0

## 2025-06-15 ASSESSMENT — PAIN SCALES - GENERAL
PAINLEVEL_OUTOF10: 0 - NO PAIN
PAINLEVEL_OUTOF10: 5 - MODERATE PAIN

## 2025-06-15 ASSESSMENT — ACTIVITIES OF DAILY LIVING (ADL)
ADEQUATE_TO_COMPLETE_ADL: YES
LACK_OF_TRANSPORTATION: NO
GROOMING: INDEPENDENT
DRESSING YOURSELF: INDEPENDENT
HEARING - LEFT EAR: HEARING AID
PATIENT'S MEMORY ADEQUATE TO SAFELY COMPLETE DAILY ACTIVITIES?: YES
HEARING - RIGHT EAR: HEARING AID
JUDGMENT_ADEQUATE_SAFELY_COMPLETE_DAILY_ACTIVITIES: YES
ASSISTIVE_DEVICE: WHEELCHAIR
BATHING: INDEPENDENT
TOILETING: INDEPENDENT
FEEDING YOURSELF: INDEPENDENT
WALKS IN HOME: INDEPENDENT

## 2025-06-15 ASSESSMENT — COGNITIVE AND FUNCTIONAL STATUS - GENERAL
DAILY ACTIVITIY SCORE: 24
MOBILITY SCORE: 23
PATIENT BASELINE BEDBOUND: NO
CLIMB 3 TO 5 STEPS WITH RAILING: A LITTLE

## 2025-06-15 ASSESSMENT — LIFESTYLE VARIABLES
SKIP TO QUESTIONS 9-10: 1
AUDIT-C TOTAL SCORE: 0
HOW OFTEN DO YOU HAVE A DRINK CONTAINING ALCOHOL: NEVER
HOW OFTEN DO YOU HAVE 6 OR MORE DRINKS ON ONE OCCASION: NEVER
HOW MANY STANDARD DRINKS CONTAINING ALCOHOL DO YOU HAVE ON A TYPICAL DAY: PATIENT DOES NOT DRINK
AUDIT-C TOTAL SCORE: 0

## 2025-06-15 ASSESSMENT — PAIN - FUNCTIONAL ASSESSMENT
PAIN_FUNCTIONAL_ASSESSMENT: 0-10
PAIN_FUNCTIONAL_ASSESSMENT: 0-10

## 2025-06-15 NOTE — H&P
"History Of Present Illness  Roseliase LUÍS Mo is a 54 y.o. female presenting with nausea, vomiting, and generalized weakness. Patient states that her symptoms started 4 days ago. She reports pain mainly to her right lower abdomen. She states that she is generally weak. She also responds \"sort of\" when asked about SOB.   Pt has hx DM2, is on ozempic. Has been on ozempic for more than a year, but dose was recently increased last week.  Pt is lethargic throughout interview and minimally responsive.   Additionally, she is currently on coumadin for portal vein thrmobosis. INR is undetectable and PT >10  Chief Complaint   Patient presents with    Weakness, Gen     Amb to ED with c/o 4 day hx of vomiting, dry heaves, and subsequent weakness--states she \"cannot let her dogs out so she is just letting them poop in the house\"--sister bedside--pt denies pain--pt is on Ozempic for weight loss          HPI obtained from pt.  Notes from ED physician and nurse reviewed. Case discussed with attending ED physician.     Past Medical History  Medical History[1]     Surgical History  Surgical History[2]   Recent Surgeries in Hospitalist            No cases to display           Surgical History[3]      Social History  Social History     Substance and Sexual Activity   Alcohol Use Not Currently    Comment: rarely      Social History     Substance and Sexual Activity   Drug Use Not Currently    Frequency: 7.0 times per week    Types: Marijuana    Comment: one or two bowels per day      Social History     Substance and Sexual Activity   Sexual Activity Defer      Tobacco Use History[4]   Food Insecurity: High Risk (4/11/2024)    Received from [facility] Comprehensive Survey     I am going to read two statements to you, and I am going to ask you to tell me how you would rate each statement: Within the past 12 months, we worried whether our food would run out before we got ...: Sometimes true     Within the past 12 months the food we " bought just didn't last and we didn't have money to get more. Was that Often true, Sometimes true, or Never true for you?: Sometimes true      Financial Resource Strain: Low Risk  (6/13/2024)    Overall Financial Resource Strain (CARDIA)     Difficulty of Paying Living Expenses: Not hard at all   Recent Concern: Financial Resource Strain - Medium Risk (3/20/2024)    Overall Financial Resource Strain (CARDIA)     Difficulty of Paying Living Expenses: Somewhat hard      Intimate Partner Violence: Patient Declined (3/23/2024)    Humiliation, Afraid, Rape, and Kick questionnaire     Fear of Current or Ex-Partner: Patient declined     Emotionally Abused: Patient declined     Physically Abused: Patient declined     Sexually Abused: Patient declined      Transportation Needs: No Transportation Needs (6/13/2024)    PRAPARE - Transportation     Lack of Transportation (Medical): No     Lack of Transportation (Non-Medical): No        Family History  Family History[5]      Allergies  RX Allergies[6]   Allergies[7]     Physical Exam   Physical Exam   Constitutional:       General: She is not in acute distress.     Appearance: Normal appearance. She is well-developed. She is not ill-appearing.   HENT:      Head: Normocephalic and atraumatic.      Mouth/Throat:      Mouth: Mucous membranes are dry.      Pharynx: No oropharyngeal exudate or posterior oropharyngeal erythema.   Eyes:      Extraocular Movements: Extraocular movements intact.      Conjunctiva/sclera: Conjunctivae normal.   Cardiovascular:      Rate and Rhythm: Normal rate and regular rhythm.      Heart sounds: No murmur heard.  Pulmonary:      Effort: Pulmonary effort is normal. No respiratory distress.      Breath sounds: Normal breath sounds. No stridor. No wheezing, rhonchi or rales.   Abdominal:      General: There is no distension.      Palpations: Abdomen is soft. There is no mass.      Tenderness: There is abdominal tenderness (RLQ). There is no right CVA  "tenderness, left CVA tenderness, guarding or rebound.      Hernia: A hernia (large ventral hernia, reducible) is present.   Musculoskeletal:         General: No swelling.      Cervical back: Normal range of motion and neck supple. No rigidity.   Skin:     General: Skin is warm and dry.      Capillary Refill: Capillary refill takes less than 2 seconds.   Neurological:      Mental Status: She is alert.   Psychiatric:         Mood and Affect: Mood normal.      Last Recorded Vitals  Visit Vitals  Pulse (!) 110   Temp 36.7 °C (98.1 °F)   Resp 20      Visit Vitals  Pulse (!) 110   Temp 36.7 °C (98.1 °F)   Resp 20   Ht 1.626 m (5' 4\")   Wt 112 kg (247 lb)   SpO2 98%   BMI 42.40 kg/m²   OB Status Menopausal   Smoking Status Former   BSA 2.25 m²        Relevant Results      Results for orders placed or performed during the hospital encounter of 06/15/25 (from the past 24 hours)   CBC and Auto Differential   Result Value Ref Range    WBC 11.6 (H) 4.4 - 11.3 x10*3/uL    nRBC 0.0 0.0 - 0.0 /100 WBCs    RBC 6.23 (H) 4.00 - 5.20 x10*6/uL    Hemoglobin 19.1 (H) 12.0 - 16.0 g/dL    Hematocrit 56.4 (H) 36.0 - 46.0 %    MCV 91 80 - 100 fL    MCH 30.7 26.0 - 34.0 pg    MCHC 33.9 32.0 - 36.0 g/dL    RDW 13.5 11.5 - 14.5 %    Platelets 267 150 - 450 x10*3/uL    Neutrophils % 76.5 40.0 - 80.0 %    Immature Granulocytes %, Automated 0.3 0.0 - 0.9 %    Lymphocytes % 13.8 13.0 - 44.0 %    Monocytes % 9.1 2.0 - 10.0 %    Eosinophils % 0.0 0.0 - 6.0 %    Basophils % 0.3 0.0 - 2.0 %    Neutrophils Absolute 8.84 (H) 1.20 - 7.70 x10*3/uL    Immature Granulocytes Absolute, Automated 0.04 0.00 - 0.70 x10*3/uL    Lymphocytes Absolute 1.59 1.20 - 4.80 x10*3/uL    Monocytes Absolute 1.05 (H) 0.10 - 1.00 x10*3/uL    Eosinophils Absolute 0.00 0.00 - 0.70 x10*3/uL    Basophils Absolute 0.04 0.00 - 0.10 x10*3/uL   Basic metabolic panel   Result Value Ref Range    Glucose 168 (H) 74 - 99 mg/dL    Sodium 139 136 - 145 mmol/L    Potassium 4.0 3.5 - 5.3 " mmol/L    Chloride 104 98 - 107 mmol/L    Bicarbonate 11 (L) 21 - 32 mmol/L    Anion Gap 28 (H) 10 - 20 mmol/L    Urea Nitrogen 15 6 - 23 mg/dL    Creatinine 1.06 (H) 0.50 - 1.05 mg/dL    eGFR 62 >60 mL/min/1.73m*2    Calcium 10.6 (H) 8.6 - 10.3 mg/dL   Lipase   Result Value Ref Range    Lipase 27 9 - 82 U/L   Hepatic function panel   Result Value Ref Range    Albumin 5.0 3.4 - 5.0 g/dL    Bilirubin, Total 0.5 0.0 - 1.2 mg/dL    Bilirubin, Direct 0.1 0.0 - 0.3 mg/dL    Alkaline Phosphatase 296 (H) 33 - 110 U/L    ALT 12 7 - 45 U/L    AST 17 9 - 39 U/L    Total Protein 8.1 6.4 - 8.2 g/dL   Protime-INR   Result Value Ref Range    Protime >100.0 (HH) 9.8 - 12.4 seconds    INR     APTT   Result Value Ref Range    aPTT 116 (HH) 26 - 36 seconds   Beta Hydroxybutyrate   Result Value Ref Range    Beta-Hydroxybutyrate >24.00 (H) 0.02 - 0.27 mmol/L   Troponin I, High Sensitivity   Result Value Ref Range    Troponin I, High Sensitivity 10 0 - 13 ng/L   Blood Gas Arterial   Result Value Ref Range    POCT pH, Arterial 7.25 (LL) 7.38 - 7.42 pH    POCT pCO2, Arterial 19 (L) 38 - 42 mm Hg    POCT pO2, Arterial 102 (H) 85 - 95 mm Hg    POCT SO2, Arterial 99 94 - 100 %    POCT Oxy Hemoglobin, Arterial 96.4 94.0 - 98.0 %    POCT Base Excess, Arterial -16.7 (L) -2.0 - 3.0 mmol/L    POCT HCO3 Calculated, Arterial 8.3 (L) 22.0 - 26.0 mmol/L    Patient Temperature 37.0 degrees Celsius    FiO2 21 %   POCT GLUCOSE   Result Value Ref Range    POCT Glucose 151 (H) 74 - 99 mg/dL      Imaging  CT abdomen pelvis w IV contrast  Result Date: 6/15/2025  1.  No acute abnormality in the abdomen/pelvis. There is ventral abdominal wall scarring there is a left paramidline hernia containing a loop of transverse colon without bowel obstruction or incarceration. There is also fat containing ventral abdominal wall hernia in the right paramidline region. 2. Stomach is distended with fluid with relative collapse of the gastro duodenal junction and without  clear evidence for gastric wall thickening. This may be physiologic, however clinical correlation for gastroparesis recommended. 3. Liver is mildly enlarged. Several hypodensities throughout the liver not well characterize, possibly cysts or hemangiomas. There is also new focal area of hypoattenuation anterior to the gallbladder which may represent focal hepatic steatosis.     Signed by: Jose Dorantes 6/15/2025 6:36 PM Dictation workstation:   [id number]      Cardiology, Vascular, and Other Imaging  No other imaging results found for the past 2 days       I personally reviewed and interpreted the above results, multiple of which contributed to my medical making decisions.        Assessment/Plan     Metabolic acidosis  -admit to ICU w tele  -insulin drip ordered in ED, but holding for now, likely not DKA, more likely starvation ketosis  -bmp and bhb q4h  -aggressive IV fluid rehydration  -zofran and compazine for nausea    Hx hepatic vein thrombosis  -INR unreadable, PT >100  -holding coumadin, dose will need adjusted it appears she has been taking 7.5 daily according to chart review    Hypothyroid  -continue home levothyroxine 250 cg  -will get tsh and t4    Leukocytosis  -could be reactive from DKA  -will also start infectious workup    Fluids: d10 in hald normal  Nutrition: NPO  Bowel prophylaxis:   DVT prophylaxis:  on coumadin, currently being held        Kelli Lo MD            [1]   Past Medical History:  Diagnosis Date    Arthritis     Asthma     CHF (congestive heart failure)     CKD (chronic kidney disease) stage 3, GFR 30-59 ml/min (Multi)     COPD (chronic obstructive pulmonary disease) (Multi)     Cough 03/19/2024    Diabetes mellitus (Multi)     Disease of thyroid gland     Hypertension     Lymphedema     Pulmonary HTN (Multi)     Shortness of breath 03/19/2024    Tachycardia 03/19/2024   [2]   Past Surgical History:  Procedure Laterality Date    CARPAL TUNNEL RELEASE Bilateral 1998      SECTION, LOW TRANSVERSE       AND     COLONOSCOPY  2014    [facility]    HERNIA REPAIR      WITH MESH    HYSTERECTOMY      2 PARTIAL    KNEE SURGERY Left     MINISCUS REPAIR   [3]   Past Surgical History:  Procedure Laterality Date    CARPAL TUNNEL RELEASE Bilateral 1998     SECTION, LOW TRANSVERSE       AND     COLONOSCOPY  2014    [facility]    HERNIA REPAIR      WITH MESH    HYSTERECTOMY      2 PARTIAL    KNEE SURGERY Left     MINISCUS REPAIR   [4]   Social History  Tobacco Use   Smoking Status Former    Current packs/day: 0.00    Average packs/day: 1 pack/day for 37.8 years (37.8 ttl pk-yrs)    Types: Cigarettes    Start date: 1977    Quit date: 2014    Years since quitting: 10.6    Passive exposure: Past   Smokeless Tobacco Never   Tobacco Comments    CBD vaping   [5]   Family History  Problem Relation Name Age of Onset    Blindness Mother      Hypertension Mother      Hyperlipidemia Mother      Heart disease Mother      Heart failure Father      Hypertension Father      Hyperlipidemia Father      Diabetes Father      Skin cancer Father      Blindness Maternal Grandmother      Hypertension Maternal Grandmother      Hyperlipidemia Maternal Grandmother      Heart failure Maternal Grandmother      Dementia Paternal Grandmother      Heart attack Paternal Grandfather      Hypertension Paternal Grandfather      Hyperlipidemia Paternal Grandfather     [6]   Allergies  Allergen Reactions    Dulaglutide Other     Pancreatitis  Does not avoid any foods related    Nickel Diarrhea, Nausea And Vomiting, Rash and GI Upset     Cobalt, white gold  Avoids most high Nickel foods not strictly, chooses to continue to use metal utensils.   Avoids green leafy vegetables, tap water, coffee, tea, oatmeal.  Wheat sometimes gives a rash but still eats.    No severe reaction to these foods only if eats too much get diarrhea.     Metformin Diarrhea, Other  and Unknown    Palladium Rash     by allergy testing.  Does not avoid any foods related    Cobalt Rash    Exenatide Other     pancreantitis   pancreantitis    pancreantitis    Sitagliptin Other     pancreantitis   [7]   Allergies  Allergen Reactions    Dulaglutide Other     Pancreatitis  Does not avoid any foods related    Nickel Diarrhea, Nausea And Vomiting, Rash and GI Upset     Cobalt, white gold  Avoids most high Nickel foods not strictly, chooses to continue to use metal utensils.   Avoids green leafy vegetables, tap water, coffee, tea, oatmeal.  Wheat sometimes gives a rash but still eats.    No severe reaction to these foods only if eats too much get diarrhea.     Metformin Diarrhea, Other and Unknown    Palladium Rash     by allergy testing.  Does not avoid any foods related    Cobalt Rash    Exenatide Other     pancreantitis   pancreantitis    pancreantitis    Sitagliptin Other     pancreantitis

## 2025-06-15 NOTE — ED PROVIDER NOTES
Patient is a 54-year-old female with a history of diabetes, hypothyroidism, hepatic vein thrombosis, HTN, CKD, and COPD who presents today with complaint of nausea, vomiting, and generalized weakness.  Patient states that her symptoms started 4 days ago.  She reports pain mainly to her right lower abdomen.  She states that she is generally weak.  She denies any chest pain or shortness of breath.  She reports nausea, vomiting and dry heaving.  She is on semaglutide but has been on this for approximately 1 year.  She reports that recent increase in her dose but has not taken the new dose and has been on the previous dose without any complications.  She denies any fever or chills.           Review of Systems   Constitutional:  Negative for chills and fever.   HENT:  Negative for ear pain and sore throat.    Eyes:  Negative for pain and visual disturbance.   Respiratory:  Negative for cough and shortness of breath.    Cardiovascular:  Negative for chest pain and palpitations.   Gastrointestinal:  Positive for abdominal pain, nausea and vomiting.   Genitourinary:  Negative for dysuria and hematuria.   Musculoskeletal:  Negative for arthralgias and back pain.   Skin:  Negative for color change and rash.   Neurological:  Negative for seizures and syncope.   All other systems reviewed and are negative.       Physical Exam  Vitals and nursing note reviewed.   Constitutional:       General: She is not in acute distress.     Appearance: Normal appearance. She is well-developed. She is not ill-appearing.   HENT:      Head: Normocephalic and atraumatic.      Mouth/Throat:      Mouth: Mucous membranes are dry.      Pharynx: No oropharyngeal exudate or posterior oropharyngeal erythema.   Eyes:      Extraocular Movements: Extraocular movements intact.      Conjunctiva/sclera: Conjunctivae normal.   Cardiovascular:      Rate and Rhythm: Normal rate and regular rhythm.      Heart sounds: No murmur heard.  Pulmonary:      Effort:  Pulmonary effort is normal. No respiratory distress.      Breath sounds: Normal breath sounds. No stridor. No wheezing, rhonchi or rales.   Abdominal:      General: There is no distension.      Palpations: Abdomen is soft. There is no mass.      Tenderness: There is abdominal tenderness (RLQ). There is no right CVA tenderness, left CVA tenderness, guarding or rebound.      Hernia: A hernia (large ventral hernia, reducible) is present.   Musculoskeletal:         General: No swelling.      Cervical back: Normal range of motion and neck supple. No rigidity.   Skin:     General: Skin is warm and dry.      Capillary Refill: Capillary refill takes less than 2 seconds.   Neurological:      Mental Status: She is alert.   Psychiatric:         Mood and Affect: Mood normal.          Labs Reviewed   CBC WITH AUTO DIFFERENTIAL - Abnormal       Result Value    WBC 11.6 (*)     nRBC 0.0      RBC 6.23 (*)     Hemoglobin 19.1 (*)     Hematocrit 56.4 (*)     MCV 91      MCH 30.7      MCHC 33.9      RDW 13.5      Platelets 267      Neutrophils % 76.5      Immature Granulocytes %, Automated 0.3      Lymphocytes % 13.8      Monocytes % 9.1      Eosinophils % 0.0      Basophils % 0.3      Neutrophils Absolute 8.84 (*)     Immature Granulocytes Absolute, Automated 0.04      Lymphocytes Absolute 1.59      Monocytes Absolute 1.05 (*)     Eosinophils Absolute 0.00      Basophils Absolute 0.04     BASIC METABOLIC PANEL - Abnormal    Glucose 168 (*)     Sodium 139      Potassium 4.0      Chloride 104      Bicarbonate 11 (*)     Anion Gap 28 (*)     Urea Nitrogen 15      Creatinine 1.06 (*)     eGFR 62      Calcium 10.6 (*)    HEPATIC FUNCTION PANEL - Abnormal    Albumin 5.0      Bilirubin, Total 0.5      Bilirubin, Direct 0.1      Alkaline Phosphatase 296 (*)     ALT 12      AST 17      Total Protein 8.1     PROTIME-INR - Abnormal    Protime >100.0 (*)     INR        Narrative:     NO CLOTTS, REPEATED FOR VERIFICATION   APTT - Abnormal    aPTT  116 (*)     Narrative:     The APTT is no longer used for monitoring Unfractionated Heparin Therapy. For monitoring Heparin Therapy, use the Heparin Assay.   BLOOD GAS ARTERIAL - Abnormal    POCT pH, Arterial 7.25 (*)     POCT pCO2, Arterial 19 (*)     POCT pO2, Arterial 102 (*)     POCT SO2, Arterial 99      POCT Oxy Hemoglobin, Arterial 96.4      POCT Base Excess, Arterial -16.7 (*)     POCT HCO3 Calculated, Arterial 8.3 (*)     Patient Temperature 37.0      FiO2 21     BETA HYDROXYBUTYRATE - Abnormal    Beta-Hydroxybutyrate >24.00 (*)     Narrative:     The beta-hydroxybutyrate test performance characteristics have been validated by  [facility]. This test has not been approved by the FDA; however, such approval is not necessary.     TSH - Abnormal    Thyroid Stimulating Hormone 0.22 (*)     Narrative:     TSH testing is performed using different testing methodology at [facility] than at other [facility]. Direct result comparisons should only be made within the same method.     THYROXINE, FREE - Abnormal    Thyroxine, Free 1.17 (*)     Narrative:     Thyroxine Free testing is performed using different testing methodology at [facility] than at other [facility]. Direct result comparisons should only be made within the same method.    Biotin can cause falsely elevated free T4 results. Patients taking a Biotin dose of up to 10 mg/day should refrain from taking Biotin for 24 hours before sample collection. Patient taking a Biotin dose of >10 mg/day should consult with their physician or the laboratory before the blood draw.   BETA HYDROXYBUTYRATE - Abnormal    Beta-Hydroxybutyrate 7.95 (*)     Narrative:     The beta-hydroxybutyrate test performance characteristics have been validated by  [facility]. This test has not been approved by the FDA; however, such approval is not necessary.      BASIC METABOLIC PANEL - Abnormal    Glucose 142 (*)     Sodium 138      Potassium 3.9      Chloride 107      Bicarbonate 12 (*)     Anion Gap 23 (*)     Urea Nitrogen 14      Creatinine 0.89      eGFR 76      Calcium 9.8     POCT GLUCOSE - Abnormal    POCT Glucose 151 (*)    LIPASE - Normal    Lipase 27      Narrative:     Venipuncture immediately after or during the administration of Metamizole may lead to falsely low results. Testing should be performed immediately prior to Metamizole dosing.   TROPONIN I, HIGH SENSITIVITY - Normal    Troponin I, High Sensitivity 10      Narrative:     Less than 99th percentile of normal range cutoff-  Female and children under 18 years old <14 ng/L; Male <21 ng/L: Negative  Repeat testing should be performed if clinically indicated.     Female and children under 18 years old 14-50 ng/L; Male 21-50 ng/L:  Consistent with possible cardiac damage and possible increased clinical   risk. Serial measurements may help to assess extent of myocardial damage.     >50 ng/L: Consistent with cardiac damage, increased clinical risk and  myocardial infarction. Serial measurements may help assess extent of   myocardial damage.      NOTE: Children less than 1 year old may have higher baseline troponin   levels and results should be interpreted in conjunction with the overall   clinical context.     NOTE: Troponin I testing is performed using a different   testing methodology at [facility] than at other   [facility]. Direct result comparisons should only   be made within the same method.   LACTATE - Normal    Lactate 0.8      Narrative:     Venipuncture immediately after or during the administration of Metamizole may lead to falsely low results. Testing should be performed immediately prior to Metamizole dosing.   BLOOD CULTURE   BLOOD CULTURE   URINALYSIS WITH REFLEX CULTURE AND MICROSCOPIC    Narrative:     The following orders were created for panel order Urinalysis with  Reflex Culture and Microscopic.  Procedure                               Abnormality         Status                     ---------                               -----------         ------                     Urinalysis with Reflex C...[[id number]]                                                 Extra Urine Gray Tube[[id number]]                                                         Please view results for these tests on the individual orders.   URINALYSIS WITH REFLEX CULTURE AND MICROSCOPIC   EXTRA URINE GRAY TUBE   MAGNESIUM   CBC   COMPREHENSIVE METABOLIC PANEL   BETA HYDROXYBUTYRATE   BETA HYDROXYBUTYRATE   BASIC METABOLIC PANEL   BASIC METABOLIC PANEL   POCT GLUCOSE METER        CT abdomen pelvis w IV contrast   Final Result   1.  No acute abnormality in the abdomen/pelvis. There is ventral   abdominal wall scarring there is a left paramidline hernia containing   a loop of transverse colon without bowel obstruction or   incarceration. There is also fat containing ventral abdominal wall   hernia in the right paramidline region.   2. Stomach is distended with fluid with relative collapse of the   gastro duodenal junction and without clear evidence for gastric wall   thickening. This may be physiologic, however clinical correlation for   gastroparesis recommended.   3. Liver is mildly enlarged. Several hypodensities throughout the   liver not well characterize, possibly cysts or hemangiomas. There is   also new focal area of hypoattenuation anterior to the gallbladder   which may represent focal hepatic steatosis.             Signed by: Jose Dorantes 6/15/2025 6:36 PM   Dictation workstation:   [id number]           ECG 12 Lead    Performed by: Naty Vogt PA-C  Authorized by: Naty Vogt PA-C    ECG interpreted by ED Physician in the absence of a cardiologist: yes    Comments:      EKG shows sinus tachycardia with a rate of 111 bpm.  PVCs.  No ST elevation.  MN interval is 150 ms and QRS duration  is 74 ms.  EKG interpretation per myself, Naty Vogt  Critical Care    Performed by: Naty Vogt PA-C  Authorized by: Damien Thomason DO    Critical care provider statement:     Critical care time (minutes):  45    Critical care time was exclusive of:  Separately billable procedures and treating other patients    Critical care was necessary to treat or prevent imminent or life-threatening deterioration of the following conditions:  Metabolic crisis and dehydration    Critical care was time spent personally by me on the following activities:  Blood draw for specimens, ordering and review of laboratory studies, ordering and review of radiographic studies, pulse oximetry, re-evaluation of patient's condition and examination of patient    Care discussed with: admitting provider         Medical Decision Making  Patient is a type II diabetic who has been on Ozempic for approximately 1 year who presents today with nausea, vomiting, and lower abdominal pain for the past 4 days.  Patient was noted to have a blood glucose of 168.  Her bicarb was noted to be 11 with an anion gap of 28.  ABG shows acidosis with a pH of 7.25.  Was given IV hydration.  There was question as to whether or not patient is in DKA given these findings.  Initially DKA protocol was ordered but had not been initiated and intensivist, Dr. Pardeep Nichole felt that at this time patient needs aggressive hydration.  Patient was also noted to have an elevated PTT of greater than 100 and INR was unable to be calculated.  She is on Coumadin for portal vein thrombosis and states that she follows with pharmacist Ramesh at the Coumadin clinic.  Patient denies any bleeding and her hemoglobin and hematocrit are stable. She was given 5mg of vitamin K. CT scan of the abdomen pelvis shows no acute abnormality.  There is a loop of transverse colon without bowel obstruction or incarceration and a ventral hernia.  The patient's ventral hernia was reducible  with no concern for incarceration or strangulation at this time. Patient admitted to the ICU. Attending physician, Dr. Thomason was actively involved in patient's care        Amount and/or Complexity of Data Reviewed  Labs: ordered. Decision-making details documented in ED Course.  Radiology: ordered. Decision-making details documented in ED Course.  ECG/medicine tests: ordered and independent interpretation performed. Decision-making details documented in ED Course.         ED Course as of 06/15/25 2053   Sun Genaro 15, 2025   1631 EKG interpreted by me shows sinus tachycardia with rate of 111.  Frequent PVCs.  Left axis deviation.  Nonspecific ST-T changes.  No acute injury pattern. [BT]   1833 54-year-old female with type 2 diabetes on exam PICC presented with nausea and vomiting.  Chemistry shows glucose 168 bicarb 11 anion gap 28.  Ketones greater than 24.  pH on ABG 7.25.  Workup consistent with DKA.  We recheck the fingerstick glucose after her 1 L saline bolus and her blood sugar is 151.  As such, she will be given 1 L of D5 half-normal saline, placed on D5 half-normal saline at 150 ml/h.  Case discussed with medicine hospitalist who will admit for DKA. [BT]   1848 CT abdomen pelvis w IV contrast  CT abdomen and pelvis with ventral hernia no bowel obstruction. [BT]   1848 Protime(!!): >100.0  INR greater than 100.  She is not anemic or losing blood.  Will give vitamin K 10 mg now. [BT]      ED Course User Index  [BT] Damien Thomason,          Diagnoses as of 06/15/25 2053   Type 2 diabetes mellitus with ketoacidosis without coma, without long-term current use of insulin   Nausea and vomiting, unspecified vomiting type   Dehydration   Elevated INR   Warfarin-induced coagulopathy (Multi)   Ventral hernia without obstruction or gangrene   Hypodense mass of liver                    Naty Vogt PA-C  06/15/25 2053

## 2025-06-15 NOTE — ED PROVIDER NOTES
Attending Note:    54-year-old female with type 2 diabetes on exam PICC presented with nausea and vomiting.  Chemistry shows glucose 168 bicarb 11 anion gap 28.  Ketones greater than 24.  pH on ABG 7.25.  Workup consistent with DKA.  We recheck the fingerstick glucose after her 1 L saline bolus and her blood sugar is 151.  As such, she will be given 1 L of D5 half-normal saline, placed on D5 half-normal saline at 150 ml/h.  Case discussed with medicine hospitalist who will admit for DKA.  Surgery reviewed CT - large ventral hernia, reducible.     Damien Thomason,   06/15/25 8070

## 2025-06-16 VITALS
OXYGEN SATURATION: 100 % | DIASTOLIC BLOOD PRESSURE: 110 MMHG | SYSTOLIC BLOOD PRESSURE: 163 MMHG | HEART RATE: 128 BPM | HEIGHT: 64 IN | TEMPERATURE: 96.6 F | BODY MASS INDEX: 40.41 KG/M2 | RESPIRATION RATE: 26 BRPM | WEIGHT: 236.7 LBS

## 2025-06-16 LAB
ALBUMIN SERPL BCP-MCNC: 4.2 G/DL (ref 3.4–5)
ALP SERPL-CCNC: 234 U/L (ref 33–110)
ALT SERPL W P-5'-P-CCNC: 9 U/L (ref 7–45)
ANION GAP SERPL CALC-SCNC: 13 MMOL/L (ref 10–20)
ANION GAP SERPL CALC-SCNC: 16 MMOL/L (ref 10–20)
ANION GAP SERPL CALC-SCNC: 23 MMOL/L (ref 10–20)
ANION GAP SERPL CALC-SCNC: 24 MMOL/L (ref 10–20)
ANION GAP SERPL CALC-SCNC: 24 MMOL/L (ref 10–20)
APPEARANCE UR: CLEAR
AST SERPL W P-5'-P-CCNC: 12 U/L (ref 9–39)
B-OH-BUTYR SERPL-SCNC: 1.3 MMOL/L (ref 0.02–0.27)
B-OH-BUTYR SERPL-SCNC: 2.8 MMOL/L (ref 0.02–0.27)
B-OH-BUTYR SERPL-SCNC: 5 MMOL/L (ref 0.02–0.27)
B-OH-BUTYR SERPL-SCNC: 8.8 MMOL/L (ref 0.02–0.27)
B-OH-BUTYR SERPL-SCNC: >24 MMOL/L (ref 0.02–0.27)
BASE EXCESS BLDA CALC-SCNC: -15.5 MMOL/L (ref -2–3)
BILIRUB SERPL-MCNC: 0.5 MG/DL (ref 0–1.2)
BILIRUB UR STRIP.AUTO-MCNC: ABNORMAL MG/DL
BODY TEMPERATURE: 37 DEGREES CELSIUS
BUN SERPL-MCNC: 10 MG/DL (ref 6–23)
BUN SERPL-MCNC: 10 MG/DL (ref 6–23)
BUN SERPL-MCNC: 12 MG/DL (ref 6–23)
BUN SERPL-MCNC: 7 MG/DL (ref 6–23)
BUN SERPL-MCNC: 8 MG/DL (ref 6–23)
CALCIUM SERPL-MCNC: 9.1 MG/DL (ref 8.6–10.3)
CALCIUM SERPL-MCNC: 9.4 MG/DL (ref 8.6–10.3)
CALCIUM SERPL-MCNC: 9.6 MG/DL (ref 8.6–10.3)
CALCIUM SERPL-MCNC: 9.6 MG/DL (ref 8.6–10.3)
CALCIUM SERPL-MCNC: 9.7 MG/DL (ref 8.6–10.3)
CHLORIDE SERPL-SCNC: 110 MMOL/L (ref 98–107)
CHLORIDE SERPL-SCNC: 112 MMOL/L (ref 98–107)
CHLORIDE SERPL-SCNC: 114 MMOL/L (ref 98–107)
CO2 SERPL-SCNC: 10 MMOL/L (ref 21–32)
CO2 SERPL-SCNC: 15 MMOL/L (ref 21–32)
CO2 SERPL-SCNC: 19 MMOL/L (ref 21–32)
COLOR UR: ABNORMAL
CREAT SERPL-MCNC: 0.72 MG/DL (ref 0.5–1.05)
CREAT SERPL-MCNC: 0.73 MG/DL (ref 0.5–1.05)
CREAT SERPL-MCNC: 0.78 MG/DL (ref 0.5–1.05)
CREAT SERPL-MCNC: 0.78 MG/DL (ref 0.5–1.05)
CREAT SERPL-MCNC: 0.81 MG/DL (ref 0.5–1.05)
EGFRCR SERPLBLD CKD-EPI 2021: 85 ML/MIN/1.73M*2
EGFRCR SERPLBLD CKD-EPI 2021: 89 ML/MIN/1.73M*2
EGFRCR SERPLBLD CKD-EPI 2021: 89 ML/MIN/1.73M*2
EGFRCR SERPLBLD CKD-EPI 2021: >90 ML/MIN/1.73M*2
EGFRCR SERPLBLD CKD-EPI 2021: >90 ML/MIN/1.73M*2
ERYTHROCYTE [DISTWIDTH] IN BLOOD BY AUTOMATED COUNT: 13.5 % (ref 11.5–14.5)
GLUCOSE BLD MANUAL STRIP-MCNC: 120 MG/DL (ref 74–99)
GLUCOSE BLD MANUAL STRIP-MCNC: 123 MG/DL (ref 74–99)
GLUCOSE BLD MANUAL STRIP-MCNC: 124 MG/DL (ref 74–99)
GLUCOSE BLD MANUAL STRIP-MCNC: 135 MG/DL (ref 74–99)
GLUCOSE BLD MANUAL STRIP-MCNC: 136 MG/DL (ref 74–99)
GLUCOSE BLD MANUAL STRIP-MCNC: 138 MG/DL (ref 74–99)
GLUCOSE BLD MANUAL STRIP-MCNC: 141 MG/DL (ref 74–99)
GLUCOSE BLD MANUAL STRIP-MCNC: 142 MG/DL (ref 74–99)
GLUCOSE BLD MANUAL STRIP-MCNC: 148 MG/DL (ref 74–99)
GLUCOSE BLD MANUAL STRIP-MCNC: 150 MG/DL (ref 74–99)
GLUCOSE BLD MANUAL STRIP-MCNC: 157 MG/DL (ref 74–99)
GLUCOSE BLD MANUAL STRIP-MCNC: 158 MG/DL (ref 74–99)
GLUCOSE BLD MANUAL STRIP-MCNC: 178 MG/DL (ref 74–99)
GLUCOSE BLD MANUAL STRIP-MCNC: 192 MG/DL (ref 74–99)
GLUCOSE BLD MANUAL STRIP-MCNC: 199 MG/DL (ref 74–99)
GLUCOSE BLD MANUAL STRIP-MCNC: 238 MG/DL (ref 74–99)
GLUCOSE SERPL-MCNC: 133 MG/DL (ref 74–99)
GLUCOSE SERPL-MCNC: 139 MG/DL (ref 74–99)
GLUCOSE SERPL-MCNC: 145 MG/DL (ref 74–99)
GLUCOSE SERPL-MCNC: 145 MG/DL (ref 74–99)
GLUCOSE SERPL-MCNC: 155 MG/DL (ref 74–99)
GLUCOSE UR STRIP.AUTO-MCNC: ABNORMAL MG/DL
HCO3 BLDA-SCNC: 9.2 MMOL/L (ref 22–26)
HCT VFR BLD AUTO: 50.8 % (ref 36–46)
HGB BLD-MCNC: 17.1 G/DL (ref 12–16)
HOLD SPECIMEN: 293
HOLD SPECIMEN: NORMAL
HOLD SPECIMEN: NORMAL
INHALED O2 CONCENTRATION: 21 %
INR PPP: 3.5 (ref 0.9–1.1)
KETONES UR STRIP.AUTO-MCNC: ABNORMAL MG/DL
LEUKOCYTE ESTERASE UR QL STRIP.AUTO: ABNORMAL
MAGNESIUM SERPL-MCNC: 1.91 MG/DL (ref 1.6–2.4)
MAGNESIUM SERPL-MCNC: 2.1 MG/DL (ref 1.6–2.4)
MCH RBC QN AUTO: 30.7 PG (ref 26–34)
MCHC RBC AUTO-ENTMCNC: 33.7 G/DL (ref 32–36)
MCV RBC AUTO: 91 FL (ref 80–100)
MUCOUS THREADS #/AREA URNS AUTO: ABNORMAL /LPF
NITRITE UR QL STRIP.AUTO: NEGATIVE
NRBC BLD-RTO: 0 /100 WBCS (ref 0–0)
OXYHGB MFR BLDA: 97.3 % (ref 94–98)
PCO2 BLDA: 20 MM HG (ref 38–42)
PH BLDA: 7.27 PH (ref 7.38–7.42)
PH UR STRIP.AUTO: 6 [PH]
PHOSPHATE SERPL-MCNC: 1.6 MG/DL (ref 2.5–4.9)
PLATELET # BLD AUTO: 207 X10*3/UL (ref 150–450)
PO2 BLDA: 94 MM HG (ref 85–95)
POTASSIUM SERPL-SCNC: 3 MMOL/L (ref 3.5–5.3)
POTASSIUM SERPL-SCNC: 3.1 MMOL/L (ref 3.5–5.3)
POTASSIUM SERPL-SCNC: 3.4 MMOL/L (ref 3.5–5.3)
POTASSIUM SERPL-SCNC: 3.5 MMOL/L (ref 3.5–5.3)
POTASSIUM SERPL-SCNC: 3.5 MMOL/L (ref 3.5–5.3)
PROT SERPL-MCNC: 6.5 G/DL (ref 6.4–8.2)
PROT UR STRIP.AUTO-MCNC: ABNORMAL MG/DL
PROTHROMBIN TIME: 38.9 SECONDS (ref 9.8–12.4)
RBC # BLD AUTO: 5.57 X10*6/UL (ref 4–5.2)
RBC # UR STRIP.AUTO: NEGATIVE MG/DL
RBC #/AREA URNS AUTO: ABNORMAL /HPF
SAO2 % BLDA: 99 % (ref 94–100)
SODIUM SERPL-SCNC: 140 MMOL/L (ref 136–145)
SODIUM SERPL-SCNC: 141 MMOL/L (ref 136–145)
SODIUM SERPL-SCNC: 142 MMOL/L (ref 136–145)
SP GR UR STRIP.AUTO: 1.03
UROBILINOGEN UR STRIP.AUTO-MCNC: NORMAL MG/DL
WBC # BLD AUTO: 8.8 X10*3/UL (ref 4.4–11.3)
WBC #/AREA URNS AUTO: ABNORMAL /HPF

## 2025-06-16 PROCEDURE — 80053 COMPREHEN METABOLIC PANEL: CPT | Performed by: STUDENT IN AN ORGANIZED HEALTH CARE EDUCATION/TRAINING PROGRAM

## 2025-06-16 PROCEDURE — 85610 PROTHROMBIN TIME: CPT | Performed by: HOSPITALIST

## 2025-06-16 PROCEDURE — 82947 ASSAY GLUCOSE BLOOD QUANT: CPT

## 2025-06-16 PROCEDURE — 2500000005 HC RX 250 GENERAL PHARMACY W/O HCPCS: Performed by: HOSPITALIST

## 2025-06-16 PROCEDURE — 36600 WITHDRAWAL OF ARTERIAL BLOOD: CPT

## 2025-06-16 PROCEDURE — 81001 URINALYSIS AUTO W/SCOPE: CPT | Performed by: PHYSICIAN ASSISTANT

## 2025-06-16 PROCEDURE — 83735 ASSAY OF MAGNESIUM: CPT | Performed by: STUDENT IN AN ORGANIZED HEALTH CARE EDUCATION/TRAINING PROGRAM

## 2025-06-16 PROCEDURE — 36415 COLL VENOUS BLD VENIPUNCTURE: CPT | Performed by: HOSPITALIST

## 2025-06-16 PROCEDURE — 80048 BASIC METABOLIC PNL TOTAL CA: CPT | Mod: CCI | Performed by: STUDENT IN AN ORGANIZED HEALTH CARE EDUCATION/TRAINING PROGRAM

## 2025-06-16 PROCEDURE — 83735 ASSAY OF MAGNESIUM: CPT | Performed by: HOSPITALIST

## 2025-06-16 PROCEDURE — 84100 ASSAY OF PHOSPHORUS: CPT | Performed by: HOSPITALIST

## 2025-06-16 PROCEDURE — 2500000004 HC RX 250 GENERAL PHARMACY W/ HCPCS (ALT 636 FOR OP/ED): Performed by: STUDENT IN AN ORGANIZED HEALTH CARE EDUCATION/TRAINING PROGRAM

## 2025-06-16 PROCEDURE — 82010 KETONE BODYS QUAN: CPT | Performed by: STUDENT IN AN ORGANIZED HEALTH CARE EDUCATION/TRAINING PROGRAM

## 2025-06-16 PROCEDURE — 2500000001 HC RX 250 WO HCPCS SELF ADMINISTERED DRUGS (ALT 637 FOR MEDICARE OP): Performed by: STUDENT IN AN ORGANIZED HEALTH CARE EDUCATION/TRAINING PROGRAM

## 2025-06-16 PROCEDURE — 36415 COLL VENOUS BLD VENIPUNCTURE: CPT | Performed by: STUDENT IN AN ORGANIZED HEALTH CARE EDUCATION/TRAINING PROGRAM

## 2025-06-16 PROCEDURE — 87086 URINE CULTURE/COLONY COUNT: CPT | Mod: SAMLAB | Performed by: PHYSICIAN ASSISTANT

## 2025-06-16 PROCEDURE — 2020000001 HC ICU ROOM DAILY

## 2025-06-16 PROCEDURE — 99233 SBSQ HOSP IP/OBS HIGH 50: CPT | Performed by: HOSPITALIST

## 2025-06-16 PROCEDURE — 85027 COMPLETE CBC AUTOMATED: CPT | Performed by: STUDENT IN AN ORGANIZED HEALTH CARE EDUCATION/TRAINING PROGRAM

## 2025-06-16 PROCEDURE — 82010 KETONE BODYS QUAN: CPT | Performed by: HOSPITALIST

## 2025-06-16 PROCEDURE — 2500000002 HC RX 250 W HCPCS SELF ADMINISTERED DRUGS (ALT 637 FOR MEDICARE OP, ALT 636 FOR OP/ED): Performed by: HOSPITALIST

## 2025-06-16 PROCEDURE — 2500000002 HC RX 250 W HCPCS SELF ADMINISTERED DRUGS (ALT 637 FOR MEDICARE OP, ALT 636 FOR OP/ED): Performed by: STUDENT IN AN ORGANIZED HEALTH CARE EDUCATION/TRAINING PROGRAM

## 2025-06-16 PROCEDURE — 99291 CRITICAL CARE FIRST HOUR: CPT | Performed by: EMERGENCY MEDICINE

## 2025-06-16 PROCEDURE — 2500000004 HC RX 250 GENERAL PHARMACY W/ HCPCS (ALT 636 FOR OP/ED): Performed by: HOSPITALIST

## 2025-06-16 PROCEDURE — 82805 BLOOD GASES W/O2 SATURATION: CPT | Performed by: HOSPITALIST

## 2025-06-16 RX ORDER — DEXTROSE MONOHYDRATE 100 MG/ML
150 INJECTION, SOLUTION INTRAVENOUS CONTINUOUS PRN
Status: DISCONTINUED | OUTPATIENT
Start: 2025-06-16 | End: 2025-06-17

## 2025-06-16 RX ORDER — POTASSIUM CHLORIDE 14.9 MG/ML
20 INJECTION INTRAVENOUS ONCE
Status: COMPLETED | OUTPATIENT
Start: 2025-06-16 | End: 2025-06-16

## 2025-06-16 RX ORDER — POTASSIUM CHLORIDE 20 MEQ/1
40 TABLET, EXTENDED RELEASE ORAL ONCE
Status: COMPLETED | OUTPATIENT
Start: 2025-06-16 | End: 2025-06-16

## 2025-06-16 RX ORDER — DEXTROSE 50 % IN WATER (D50W) INTRAVENOUS SYRINGE
50
Status: DISCONTINUED | OUTPATIENT
Start: 2025-06-16 | End: 2025-06-18 | Stop reason: HOSPADM

## 2025-06-16 RX ORDER — SODIUM CHLORIDE, SODIUM LACTATE, POTASSIUM CHLORIDE, CALCIUM CHLORIDE 600; 310; 30; 20 MG/100ML; MG/100ML; MG/100ML; MG/100ML
125 INJECTION, SOLUTION INTRAVENOUS CONTINUOUS
Status: DISCONTINUED | OUTPATIENT
Start: 2025-06-16 | End: 2025-06-16

## 2025-06-16 RX ORDER — DEXTROSE MONOHYDRATE AND SODIUM CHLORIDE 5; .45 G/100ML; G/100ML
150 INJECTION, SOLUTION INTRAVENOUS CONTINUOUS PRN
Status: DISCONTINUED | OUTPATIENT
Start: 2025-06-16 | End: 2025-06-17

## 2025-06-16 RX ORDER — AMILORIDE HYDROCHLORIDE 5 MG/1
20 TABLET ORAL 2 TIMES DAILY
Status: DISCONTINUED | OUTPATIENT
Start: 2025-06-16 | End: 2025-06-18 | Stop reason: HOSPADM

## 2025-06-16 RX ORDER — WARFARIN SODIUM 5 MG/1
5 TABLET ORAL ONCE
Status: COMPLETED | OUTPATIENT
Start: 2025-06-16 | End: 2025-06-16

## 2025-06-16 RX ORDER — PANTOPRAZOLE SODIUM 40 MG/10ML
40 INJECTION, POWDER, LYOPHILIZED, FOR SOLUTION INTRAVENOUS
Status: DISCONTINUED | OUTPATIENT
Start: 2025-06-16 | End: 2025-06-18 | Stop reason: HOSPADM

## 2025-06-16 RX ORDER — POTASSIUM CHLORIDE 20 MEQ/1
20 TABLET, EXTENDED RELEASE ORAL ONCE
Status: DISCONTINUED | OUTPATIENT
Start: 2025-06-16 | End: 2025-06-17

## 2025-06-16 RX ADMIN — SODIUM CHLORIDE, POTASSIUM CHLORIDE, SODIUM LACTATE AND CALCIUM CHLORIDE 125 ML/HR: 600; 310; 30; 20 INJECTION, SOLUTION INTRAVENOUS at 00:56

## 2025-06-16 RX ADMIN — DEXTROSE 150 ML/HR: 10 SOLUTION INTRAVENOUS at 09:31

## 2025-06-16 RX ADMIN — SODIUM CHLORIDE 1000 ML: 0.9 INJECTION, SOLUTION INTRAVENOUS at 08:16

## 2025-06-16 RX ADMIN — DULOXETINE 60 MG: 60 CAPSULE, DELAYED RELEASE ORAL at 20:42

## 2025-06-16 RX ADMIN — METOPROLOL SUCCINATE 50 MG: 50 TABLET, EXTENDED RELEASE ORAL at 09:38

## 2025-06-16 RX ADMIN — DEXTROSE MONOHYDRATE 150 ML/HR: 100 INJECTION, SOLUTION INTRAVENOUS at 23:23

## 2025-06-16 RX ADMIN — PROCHLORPERAZINE EDISYLATE 10 MG: 5 INJECTION INTRAMUSCULAR; INTRAVENOUS at 04:19

## 2025-06-16 RX ADMIN — POTASSIUM PHOSPHATE, MONOBASIC POTASSIUM PHOSPHATE, DIBASIC 30 MMOL: 224; 236 INJECTION, SOLUTION, CONCENTRATE INTRAVENOUS at 15:35

## 2025-06-16 RX ADMIN — WARFARIN SODIUM 5 MG: 5 TABLET ORAL at 17:16

## 2025-06-16 RX ADMIN — AMLODIPINE BESYLATE 10 MG: 10 TABLET ORAL at 09:38

## 2025-06-16 RX ADMIN — POLYETHYLENE GLYCOL 3350 17 G: 17 POWDER, FOR SOLUTION ORAL at 11:36

## 2025-06-16 RX ADMIN — DEXTROSE AND SODIUM CHLORIDE 150 ML/HR: 5; .45 INJECTION, SOLUTION INTRAVENOUS at 18:07

## 2025-06-16 RX ADMIN — POTASSIUM CHLORIDE 40 MEQ: 1500 TABLET, EXTENDED RELEASE ORAL at 17:48

## 2025-06-16 RX ADMIN — TOPIRAMATE 25 MG: 50 TABLET, FILM COATED ORAL at 20:42

## 2025-06-16 RX ADMIN — ONDANSETRON 4 MG: 2 INJECTION INTRAMUSCULAR; INTRAVENOUS at 01:07

## 2025-06-16 RX ADMIN — POTASSIUM CHLORIDE 20 MEQ: 14.9 INJECTION, SOLUTION INTRAVENOUS at 01:07

## 2025-06-16 RX ADMIN — DEXTROSE MONOHYDRATE 150 ML/HR: 100 INJECTION, SOLUTION INTRAVENOUS at 17:18

## 2025-06-16 RX ADMIN — ONDANSETRON 4 MG: 2 INJECTION INTRAMUSCULAR; INTRAVENOUS at 17:27

## 2025-06-16 RX ADMIN — PANTOPRAZOLE SODIUM 40 MG: 40 INJECTION, POWDER, FOR SOLUTION INTRAVENOUS at 17:16

## 2025-06-16 RX ADMIN — METOPROLOL SUCCINATE 25 MG: 25 TABLET, EXTENDED RELEASE ORAL at 09:38

## 2025-06-16 RX ADMIN — INSULIN HUMAN 2 UNITS/HR: 1 INJECTION, SOLUTION INTRAVENOUS at 08:16

## 2025-06-16 ASSESSMENT — ACTIVITIES OF DAILY LIVING (ADL): LACK_OF_TRANSPORTATION: NO

## 2025-06-16 ASSESSMENT — COGNITIVE AND FUNCTIONAL STATUS - GENERAL
DAILY ACTIVITIY SCORE: 24
MOBILITY SCORE: 24
MOBILITY SCORE: 23
CLIMB 3 TO 5 STEPS WITH RAILING: A LITTLE
DAILY ACTIVITIY SCORE: 24

## 2025-06-16 ASSESSMENT — PAIN - FUNCTIONAL ASSESSMENT
PAIN_FUNCTIONAL_ASSESSMENT: 0-10

## 2025-06-16 ASSESSMENT — PAIN SCALES - GENERAL
PAINLEVEL_OUTOF10: 0 - NO PAIN

## 2025-06-16 NOTE — CARE PLAN
The patient's goals for the shift include      The clinical goals for the shift include close gap.    progressing

## 2025-06-16 NOTE — CARE PLAN
The clinical goals for the shift include no emisis by end of shift      Problem: Pain - Adult  Goal: Verbalizes/displays adequate comfort level or baseline comfort level  Outcome: Progressing     Problem: Safety - Adult  Goal: Free from fall injury  Outcome: Progressing     Problem: Skin  Goal: Decreased wound size/increased tissue granulation at next dressing change  Outcome: Progressing  Flowsheets (Taken 6/16/2025 0605)  Decreased wound size/increased tissue granulation at next dressing change: Promote sleep for wound healing  Goal: Participates in plan/prevention/treatment measures  Outcome: Progressing  Flowsheets (Taken 6/16/2025 0605)  Participates in plan/prevention/treatment measures: Elevate heels  Goal: Prevent/manage excess moisture  Outcome: Progressing  Flowsheets (Taken 6/16/2025 0605)  Prevent/manage excess moisture:   Moisturize dry skin   Monitor for/manage infection if present  Goal: Prevent/minimize sheer/friction injuries  Outcome: Progressing  Flowsheets (Taken 6/16/2025 0605)  Prevent/minimize sheer/friction injuries:   Turn/reposition every 2 hours/use positioning/transfer devices   HOB 30 degrees or less   Use pull sheet  Goal: Promote/optimize nutrition  Outcome: Progressing  Flowsheets (Taken 6/16/2025 0605)  Promote/optimize nutrition: Discuss with provider if NPO > 2 days  Goal: Promote skin healing  Outcome: Progressing  Flowsheets (Taken 6/16/2025 0605)  Promote skin healing: Turn/reposition every 2 hours/use positioning/transfer devices

## 2025-06-16 NOTE — PROGRESS NOTES
Roselia Mo is a 54 y.o. female on day 1 of admission presenting with Type 2 diabetes mellitus with ketoacidosis without coma, without long-term current use of insulin.      Subjective   Seen and examined   Clinically stable   No complaints   No fever   C/o Nausea . No vomiting        Objective     Last Recorded Vitals  BP (!) 170/92   Pulse (!) 119   Temp 36.4 °C (97.5 °F) (Temporal)   Resp 25   Wt 107 kg (236 lb 11.2 oz)   SpO2 100%   Intake/Output last 3 Shifts:    Intake/Output Summary (Last 24 hours) at 6/16/2025 0733  Last data filed at 6/16/2025 0600  Gross per 24 hour   Intake 1445.83 ml   Output --   Net 1445.83 ml       Admission Weight  Weight: 112 kg (247 lb) (06/15/25 1625)    Daily Weight  06/15/25 : 107 kg (236 lb 11.2 oz)    Image Results  CT abdomen pelvis w IV contrast  Narrative: Interpreted By:  Jose Dorantes,   STUDY:  CT ABDOMEN PELVIS W IV CONTRAST;  6/15/2025 5:53 pm      INDICATION:  Signs/Symptoms:RLQ abd pain, vomiting.      COMPARISON:  CT abdomen pelvis 05/08/2024.      ACCESSION NUMBER(S):  [id number]      ORDERING CLINICIAN:  GOKUL CANAS      TECHNIQUE:  CT of the abdomen and pelvis was performed after administration of  intravenous contrast. Standard contiguous axial images were obtained  through the abdomen and pelvis. Coronal and sagittal reconstructions  were performed.      FINDINGS:  LOWER CHEST: No acute abnormality of the lung bases. Mild linear  atelectasis or scarring in the anterior lung bases. BONES: No acute  osseous abnormality. ABDOMINAL WALL: Mild ventral abdominal wall  scarring without evidence of fluid collections or enterocutaneous  fistula. There are multiple ventral abdominal wall hernias including  left paramidline hernia containing a loop of transverse colon and fat  containing right paramidline hernia. No evidence of incarceration or  obstruction. LYMPH NODES: No pathologically enlarged lymph nodes in  the abdomen or pelvis.      ABDOMEN:       LIVER: Mildly enlarged and normal in contour. There are several  hypodense lesions in the liver including multiple hypodensities  anterior to the gallbladder in hepatic segment 4 possibly  representing focal hepatic steatosis, however indeterminate and new  from prior. BILE DUCTS: Normal caliber.  GALLBLADDER: No calcified gallstones. No wall thickening.  PANCREAS: No evidence of pancreatic duct dilatation or peripancreatic  edema. SPLEEN: Within normal limits.  ADRENALS: Within normal limits.  KIDNEYS and URETERS: No evidence of hydronephrosis or obstructive  nephrolithiasis. Suspected punctate nonobstructing intrarenal calculi  present. Normal size and enhancement pattern. Small bilateral renal  cysts.          VESSELS: No abdominal aortic aneurysm. Mesenteric vessels appear  patent.      RETROPERITONEUM: No evidence of retroperitoneal hematoma.      PELVIS:      REPRODUCTIVE ORGANS: No pelvic masses.  BLADDER: No gross abnormality.      BOWEL: Stomach is distended with fluid with transition to normal  bowel caliber at the gastro duodenal junction and without evidence of  wall thickening. No bowel dilatation or obstruction. Normal appendix.  Formed stool throughout the colon without wall thickening or acute  inflammatory change. Scattered sigmoid diverticulosis without  diverticulitis. PERITONEUM: No ascites or free air, no fluid  collection.      Impression: 1.  No acute abnormality in the abdomen/pelvis. There is ventral  abdominal wall scarring there is a left paramidline hernia containing  a loop of transverse colon without bowel obstruction or  incarceration. There is also fat containing ventral abdominal wall  hernia in the right paramidline region.  2. Stomach is distended with fluid with relative collapse of the  gastro duodenal junction and without clear evidence for gastric wall  thickening. This may be physiologic, however clinical correlation for  gastroparesis recommended.  3. Liver is mildly  enlarged. Several hypodensities throughout the  liver not well characterize, possibly cysts or hemangiomas. There is  also new focal area of hypoattenuation anterior to the gallbladder  which may represent focal hepatic steatosis.          Signed by: Jose Dorantes 6/15/2025 6:36 PM  Dictation workstation:   [id number]      Physical Exam  Vitals and nursing note reviewed.   HENT:      Head: Normocephalic and atraumatic.   Eyes:      Extraocular Movements: Extraocular movements intact.      Pupils: Pupils are equal, round, and reactive to light.   Cardiovascular:      Rate and Rhythm: Normal rate and regular rhythm.      Pulses: Normal pulses.      Heart sounds: Normal heart sounds.   Pulmonary:      Effort: Pulmonary effort is normal.      Breath sounds: Normal breath sounds.   Abdominal:      General: Abdomen is flat. Bowel sounds are normal.   Musculoskeletal:         General: Normal range of motion.      Cervical back: Normal range of motion and neck supple.   Skin:     General: Skin is warm.   Neurological:      General: No focal deficit present.      Mental Status: She is alert and oriented to person, place, and time.   Psychiatric:         Mood and Affect: Mood normal.         Behavior: Behavior normal.       Assessment & Plan  Type 2 diabetes mellitus with ketoacidosis without coma, without long-term current use of insulin    A/P    Euglycemic DKA    Seen and examined  Clinically stable   Still anion gap 24   Bicarb is <10   Beta hydroxy 20 more than 24  Normal saline 150 cc if blood sugar more than 250 switch to D5 half-normal saline if less than     Accu-Chek every 1 hour  Insulin infusion per protocol  BMP every 4 hours    Morbid obesity    Hyperlipidemia  Crestor    Hypertension: Toprol-XL and Norvasc    Hypothyroidism levothyroxine    Gout allopurinol    Anxiety and depression: Wellbutrin    GERD Protonix    DVT prophylaxis: Lovenox subcutaneous            Jennifer Garcia MD

## 2025-06-16 NOTE — ED PROCEDURE NOTE
Procedure  Critical Care    Performed by: Damien Thomason DO  Authorized by: Damien Thomason DO    Critical care provider statement:     Critical care time (minutes):  40    Critical care was necessary to treat or prevent imminent or life-threatening deterioration of the following conditions:  Endocrine crisis and metabolic crisis (DKA, coagulopathy,)    Critical care was time spent personally by me on the following activities:  Ordering and review of laboratory studies, ordering and review of radiographic studies, discussions with consultants, re-evaluation of patient's condition, examination of patient, evaluation of patient's response to treatment and development of treatment plan with patient or surrogate    Care discussed with: admitting provider                 Damien Thomason DO  06/16/25 0732

## 2025-06-16 NOTE — CONSULTS
"Nutrition Initial Assessment:   Nutrition Assessment    Reason for Assessment: Admission nursing screening    Patient is a 54 y.o. female presenting with nausea, vomiting, and generalized weakness x 4 days. Noted pt has been on Ozempic >1 year, ut dose was increased last week.  Medical History[1]    Patient was assessed virtually.    Nutrition History:  Food and Nutrient History: Spoke with pt on the phone. Pt reports unable to keep any food or liquids down for ~3 days prior to admission. States she is now starting to feel hungry and is looking forward to diet advancement. Reports she has had decreased appetite since starting Ozempic, typically eats small meals like soup or a sandwich and some snacks. Drinks flavored water, 2-3 17.5 oz bottles/day. Noted allergy to nickel - avoids leafy greens, tap water, coffee, tea, oatmeal. Discussed high rate of weight loss and wanting to make sure she is eating enough calories and protein to maintain lean body mass. Agreeable to try Ensure Max BID, vanilla flavor only, once diet advances. Also discussed increasing fluid intake at home during hotter summer months.       Anthropometrics:  Height: 162.6 cm (5' 4\")   Weight: 108 kg (237 lb 14 oz)   BMI (Calculated): 40.81  IBW/kg (Dietitian Calculated): 54.5 kg                         Weight History:   Wt Readings from Last 25 Encounters:   06/16/25 108 kg (237 lb 14 oz)   06/11/25 112 kg (247 lb)   05/28/25 123 kg (271 lb 8 oz)   04/29/25 126 kg (277 lb 12.8 oz)   04/15/25 124 kg (272 lb 12.8 oz)   04/01/25 126 kg (278 lb 12.8 oz)   04/01/25 126 kg (277 lb 6.4 oz)   11/26/24 146 kg (322 lb 12.8 oz)   11/21/24 (!) 152 kg (336 lb)   10/24/24 (!) 153 kg (338 lb)   10/17/24 (!) 160 kg (352 lb)   10/02/24 (!) 160 kg (352 lb 12.8 oz)   10/02/24 (!) 160 kg (352 lb)   09/17/24 (!) 176 kg (387 lb 12.8 oz)   09/10/24 (!) 176 kg (387 lb 9.6 oz)   07/24/24 (!) 168 kg (370 lb)   07/16/24 (!) 168 kg (370 lb 3.2 oz)   07/15/24 (!) 176 kg (388 " "lb)   07/04/24 (!) 176 kg (388 lb)   06/27/24 (!) 176 kg (388 lb)   06/17/24 (!) 169 kg (372 lb 2.2 oz)   06/05/24 (!) 168 kg (370 lb 9.5 oz)   06/05/24 (!) 165 kg (363 lb 14.4 oz)   05/23/24 (!) 186 kg (410 lb 6.4 oz)   05/12/24 (!) 179 kg (395 lb 9.6 oz)     Weight Change %:  Weight History / % Weight Change: Per chart review, pt with clinically significant weight loss of 36% in 1 year, 26% in 6 months and 14% in 3 months. Noted pt is on Ozempic and weight loss is intentional.  Significant Weight Loss: Yes  Interpretation of Weight Loss: >20% in 1 year    Nutrition Focused Physical Exam Findings:    Subcutaneous Fat Loss:   Defer Subcutaneous Fat Loss Assessment: Defer all  Defer All Reason: remote assessment  Muscle Wasting:  Defer Muscle Wasting Assessment: Defer all  Defer All Reason: remote assessment  Edema:  Edema: none  Physical Findings:  Digestive System Findings: Constipation, Nausea, Vomiting    Nutrition Significant Labs:  BMP Trend:   Results from last 7 days   Lab Units 06/16/25  1547 06/16/25  1221 06/16/25  0406 06/15/25  2357   GLUCOSE mg/dL 133* 155* 145*  145* 139*   CALCIUM mg/dL 9.4 9.1 9.6  9.6 9.7   SODIUM mmol/L 141 142 142  142 140   POTASSIUM mmol/L 3.1* 3.0* 3.5  3.5 3.4*   CO2 mmol/L 19* 15* 10*  10* 10*   CHLORIDE mmol/L 112* 114* 112*  112* 110*   BUN mg/dL 7 8 10  10 12   CREATININE mg/dL 0.72 0.73 0.78  0.78 0.81    , A1C:  Lab Results   Component Value Date    HGBA1C 8.1 (H) 05/28/2025   , BG POCT trend:   Results from last 7 days   Lab Units 06/16/25  1702 06/16/25  1605 06/16/25  1502 06/16/25  1417 06/16/25  1303   POCT GLUCOSE mg/dL 148* 150* 142* 141* 157*    , Vit D:   Lab Results   Component Value Date    VITD25 34 02/01/2024        Nutrition Specific Medications:  Scheduled medications  Scheduled Medications[2]  Continuous medications  Continuous Medications[3]  PRN medications  PRN Medications[4]      I/O:   Last BM Date:  (\" its been weeks\");      Dietary Orders " (From admission, onward)       Start     Ordered    06/16/25 1654  Adult diet Regular, Consistent Carb; CCD 60 gm/meal  Diet effective now        Question Answer Comment   Diet type Regular    Diet type Consistent Carb    Carb diet selection: CCD 60 gm/meal        06/16/25 1654    06/15/25 2118  May Participate in Room Service  ( ROOM SERVICE MAY PARTICIPATE)  Once        Question:  .  Answer:  Yes    06/15/25 2117                     Estimated Needs:   Total Energy Estimated Needs in 24 hours (kCal): 1635 kCal  Method for Estimating Needs: 30 kcal/kg IBW  Total Protein Estimated Needs in 24 Hours (g): 82 g  Method for Estimating 24 Hour Protein Needs: 1.5 g/kg IBW  Total Fluid Estimated Needs in 24 Hours (mL): 1635 mL  Method for Estimating 24 Hour Fluid Needs: 1 ml/kcal or per provider        Nutrition Diagnosis   Malnutrition Diagnosis  Patient has Malnutrition Diagnosis:  (Pt at risk with high rate of ongoing weight loss.)    Nutrition Diagnosis  Patient has Nutrition Diagnosis: Yes  Diagnosis Status (1): New  Nutrition Diagnosis 1: Inadequate oral intake  Related to (1): decreased ability to consume sufficient energy  As Evidenced by (1): pt report of decreased appetite and intake as a side effect of Ozempic, significant weight loss >20% in 1 year.       Nutrition Interventions/Recommendations   Nutrition prescription for oral nutrition    Nutrition Recommendations:  Individualized Nutrition Prescription Provided for : Noted diet just advanced. Continue Consistent Carb diet as ordered. Trial Ensure Max BID.    Nutrition Interventions/Goals:   Meals and Snacks: Carbohydrate-modified diet  Goal: Pt to tolerate consistent carbohydrate diet as ordered  Medical Food Supplement: Commercial beverage medical food supplement therapy  Goal: Ensure Plus High Protein BID to provider 350 kcal, 20 g protein each      Education Documentation  No documentation found.    Pt asked about insurance coverage for supplements. This  RD let pt know it is typically only covered by Medicaid. Pt is planning to reapply for Medicaid. This RD explained supplement order would need to be prescribed, and to speak to physician about interest in getting supplements covered by insurance when/if approved for Medicaid. Daily high protein supplement would be very beneficial for patient while on Ozempic to aid in meeting calorie and protein needs to maintain lean body mass.       Nutrition Monitoring and Evaluation   Intake / Amount of food: Consumes at least 50% or more of meals/snacks/supplements    Body Weight:  (Body weight - slow rate of weight loss to average <10% reduction per 6 months.)    Glucose/Endocrine Profile: Glucose within normal limits ( mg/dL)         Goal Status: New goal(s) identified    Time Spent (min): 75 minutes         06/16/25 at 5:18 PM - JEB CALIXTO RDN, LD         [1]   Past Medical History:  Diagnosis Date    Arthritis     Asthma     CHF (congestive heart failure)     CKD (chronic kidney disease) stage 3, GFR 30-59 ml/min (Multi)     COPD (chronic obstructive pulmonary disease) (Multi)     Cough 03/19/2024    Diabetes mellitus (Multi)     Disease of thyroid gland     Hypertension     Lymphedema     Pulmonary HTN (Multi)     Shortness of breath 03/19/2024    Tachycardia 03/19/2024   [2] allopurinol, 300 mg, oral, Daily  aMILoride, 20 mg, oral, BID  amLODIPine, 10 mg, oral, Daily  buPROPion XL, 150 mg, oral, q AM  cetirizine, 10 mg, oral, Daily  dextrose 5 % and sodium chloride 0.45 % bolus, 1,000 mL, intravenous, Once  DULoxetine, 60 mg, oral, BID  [Held by provider] empagliflozin, 10 mg, oral, Daily  levothyroxine, 200 mcg, oral, Daily  levothyroxine, 50 mcg, oral, Daily before breakfast  metoprolol succinate XL, 25 mg, oral, Daily  metoprolol succinate XL, 50 mg, oral, Daily  morphine, 4 mg, intravenous, Once  pantoprazole, 40 mg, intravenous, BID AC  polyethylene glycol, 17 g, oral, Daily  potassium chloride CR, 20 mEq,  oral, Once  potassium chloride CR, 40 mEq, oral, Once  potassium phosphate, 30 mmol, intravenous, Once  rosuvastatin, 40 mg, oral, Daily  topiramate, 25 mg, oral, BID  [Held by provider] warfarin, 5 mg, oral, Daily     [3] [Held by provider] dextrose 10 % in water (D10W), 150 mL/hr  [Held by provider] dextrose 10 % in water (D10W), 150 mL/hr  dextrose 10 % in water (D10W), 150 mL/hr  dextrose 10 % in water (D10W), 150 mL/hr, Last Rate: 150 mL/hr (06/16/25 1649)  [Held by provider] dextrose 5%-0.45 % sodium chloride, 150 mL/hr  dextrose 5%-0.45 % sodium chloride, 150 mL/hr  [Held by provider] insulin regular (HumuLIN R,NovoLIN R) 100 Units in sodium chloride 0.9% 100 mL (1 Units/mL) infusion, 2 Units/hr  insulin regular, 0-50 Units/hr, Last Rate: 2 Units/hr (06/16/25 1649)     [4] PRN medications: acetaminophen, [Held by provider] dextrose 10 % in water (D10W), [Held by provider] dextrose 10 % in water (D10W), dextrose 10 % in water (D10W), dextrose 10 % in water (D10W), [Held by provider] dextrose 5%-0.45 % sodium chloride, dextrose 5%-0.45 % sodium chloride, dextrose, insulin regular, morphine **OR** morphine, ondansetron, prochlorperazine

## 2025-06-16 NOTE — PROGRESS NOTES
06/16/25 1115   Discharge Planning   Living Arrangements Spouse/significant other   Support Systems Spouse/significant other;Children   Assistance Needed uses a walker and wheelchair   Type of Residence Private residence   Number of Stairs to Enter Residence 0   Number of Stairs Within Residence 0   Do you have animals or pets at home? Yes   Type of Animals or Pets 2 dogs   Who is requesting discharge planning? Provider   Home or Post Acute Services None   Expected Discharge Disposition Home   Does the patient need discharge transport arranged? No   Financial Resource Strain   How hard is it for you to pay for the very basics like food, housing, medical care, and heating? Not hard   Housing Stability   In the last 12 months, was there a time when you were not able to pay the mortgage or rent on time? N   In the past 12 months, how many times have you moved where you were living? 0   At any time in the past 12 months, were you homeless or living in a shelter (including now)? N   Transportation Needs   In the past 12 months, has lack of transportation kept you from medical appointments or from getting medications? no   In the past 12 months, has lack of transportation kept you from meetings, work, or from getting things needed for daily living? No   Stroke Family Assessment   Stroke Family Assessment Needed No   Intensity of Service   Intensity of Service 0-30 min     Met with pt at the bedside and verified address, phone number and emergency contact information. PCP is Noemi last seen in June and preferred pharmacy is [facility], denies issues obtaining or affording medications and takes as ordered. Pt is dependent on others, lives at her sisters for the last 2 years.  and her have  and he is in [place] but is coming back to help with her. Per the pt the sister is too busy caring for their mother who is blind and doesn't live with them to care for her. Pt states she uses a walker and a  wheelchair and has a ramp. She sits on the side of the tub to bathe. She is noncompliant with her oxygen of 4L 24/7 from [employer] and with her Bipap. She is a diabetic and states she checks her blood sugar daily. Pt states she want H[facility] at discharge.  [facility] likely another 48 hrs. Care Transitions to follow. Nelda WEBSTER/RN-TCC     1400 HHC choice list printout to provide pt. TCC to follow up. Nelda WEBSTER/RN-TCC

## 2025-06-17 LAB
ALBUMIN SERPL BCP-MCNC: 3.7 G/DL (ref 3.4–5)
ALP SERPL-CCNC: 195 U/L (ref 33–110)
ALT SERPL W P-5'-P-CCNC: 8 U/L (ref 7–45)
ANION GAP SERPL CALC-SCNC: 10 MMOL/L (ref 10–20)
ANION GAP SERPL CALC-SCNC: 11 MMOL/L (ref 10–20)
ANION GAP SERPL CALC-SCNC: 12 MMOL/L (ref 10–20)
ANION GAP SERPL CALC-SCNC: 15 MMOL/L (ref 10–20)
AST SERPL W P-5'-P-CCNC: 13 U/L (ref 9–39)
B-OH-BUTYR SERPL-SCNC: 0.3 MMOL/L (ref 0.02–0.27)
B-OH-BUTYR SERPL-SCNC: 2.1 MMOL/L (ref 0.02–0.27)
B-OH-BUTYR SERPL-SCNC: 2.1 MMOL/L (ref 0.02–0.27)
B-OH-BUTYR SERPL-SCNC: 2.2 MMOL/L (ref 0.02–0.27)
BACTERIA UR CULT: NORMAL
BILIRUB SERPL-MCNC: 0.8 MG/DL (ref 0–1.2)
BUN SERPL-MCNC: 6 MG/DL (ref 6–23)
BUN SERPL-MCNC: 7 MG/DL (ref 6–23)
BUN SERPL-MCNC: 8 MG/DL (ref 6–23)
BUN SERPL-MCNC: 9 MG/DL (ref 6–23)
CALCIUM SERPL-MCNC: 9.2 MG/DL (ref 8.6–10.3)
CALCIUM SERPL-MCNC: 9.2 MG/DL (ref 8.6–10.3)
CALCIUM SERPL-MCNC: 9.5 MG/DL (ref 8.6–10.3)
CALCIUM SERPL-MCNC: 9.6 MG/DL (ref 8.6–10.3)
CHLORIDE SERPL-SCNC: 107 MMOL/L (ref 98–107)
CHLORIDE SERPL-SCNC: 109 MMOL/L (ref 98–107)
CO2 SERPL-SCNC: 18 MMOL/L (ref 21–32)
CO2 SERPL-SCNC: 22 MMOL/L (ref 21–32)
CO2 SERPL-SCNC: 23 MMOL/L (ref 21–32)
CO2 SERPL-SCNC: 24 MMOL/L (ref 21–32)
CREAT SERPL-MCNC: 0.5 MG/DL (ref 0.5–1.05)
CREAT SERPL-MCNC: 0.6 MG/DL (ref 0.5–1.05)
CREAT SERPL-MCNC: 0.63 MG/DL (ref 0.5–1.05)
CREAT SERPL-MCNC: 0.68 MG/DL (ref 0.5–1.05)
EGFRCR SERPLBLD CKD-EPI 2021: >90 ML/MIN/1.73M*2
ERYTHROCYTE [DISTWIDTH] IN BLOOD BY AUTOMATED COUNT: 13.4 % (ref 11.5–14.5)
GLUCOSE BLD MANUAL STRIP-MCNC: 121 MG/DL (ref 74–99)
GLUCOSE BLD MANUAL STRIP-MCNC: 147 MG/DL (ref 74–99)
GLUCOSE BLD MANUAL STRIP-MCNC: 150 MG/DL (ref 74–99)
GLUCOSE BLD MANUAL STRIP-MCNC: 154 MG/DL (ref 74–99)
GLUCOSE BLD MANUAL STRIP-MCNC: 161 MG/DL (ref 74–99)
GLUCOSE BLD MANUAL STRIP-MCNC: 168 MG/DL (ref 74–99)
GLUCOSE BLD MANUAL STRIP-MCNC: 169 MG/DL (ref 74–99)
GLUCOSE BLD MANUAL STRIP-MCNC: 170 MG/DL (ref 74–99)
GLUCOSE BLD MANUAL STRIP-MCNC: 170 MG/DL (ref 74–99)
GLUCOSE BLD MANUAL STRIP-MCNC: 176 MG/DL (ref 74–99)
GLUCOSE BLD MANUAL STRIP-MCNC: 187 MG/DL (ref 74–99)
GLUCOSE BLD MANUAL STRIP-MCNC: 193 MG/DL (ref 74–99)
GLUCOSE BLD MANUAL STRIP-MCNC: 196 MG/DL (ref 74–99)
GLUCOSE BLD MANUAL STRIP-MCNC: 229 MG/DL (ref 74–99)
GLUCOSE BLD MANUAL STRIP-MCNC: 243 MG/DL (ref 74–99)
GLUCOSE BLD MANUAL STRIP-MCNC: 246 MG/DL (ref 74–99)
GLUCOSE BLD MANUAL STRIP-MCNC: 258 MG/DL (ref 74–99)
GLUCOSE SERPL-MCNC: 171 MG/DL (ref 74–99)
GLUCOSE SERPL-MCNC: 176 MG/DL (ref 74–99)
GLUCOSE SERPL-MCNC: 193 MG/DL (ref 74–99)
GLUCOSE SERPL-MCNC: 248 MG/DL (ref 74–99)
HCT VFR BLD AUTO: 44.6 % (ref 36–46)
HGB BLD-MCNC: 15.2 G/DL (ref 12–16)
INR PPP: 1.2 (ref 0.9–1.1)
MAGNESIUM SERPL-MCNC: 1.65 MG/DL (ref 1.6–2.4)
MAGNESIUM SERPL-MCNC: 1.68 MG/DL (ref 1.6–2.4)
MCH RBC QN AUTO: 30.5 PG (ref 26–34)
MCHC RBC AUTO-ENTMCNC: 34.1 G/DL (ref 32–36)
MCV RBC AUTO: 89 FL (ref 80–100)
NRBC BLD-RTO: 0 /100 WBCS (ref 0–0)
PLATELET # BLD AUTO: 180 X10*3/UL (ref 150–450)
POTASSIUM SERPL-SCNC: 2.9 MMOL/L (ref 3.5–5.3)
POTASSIUM SERPL-SCNC: 3 MMOL/L (ref 3.5–5.3)
POTASSIUM SERPL-SCNC: 3.3 MMOL/L (ref 3.5–5.3)
POTASSIUM SERPL-SCNC: 3.9 MMOL/L (ref 3.5–5.3)
PROT SERPL-MCNC: 5.6 G/DL (ref 6.4–8.2)
PROTHROMBIN TIME: 13.7 SECONDS (ref 9.8–12.4)
RBC # BLD AUTO: 4.99 X10*6/UL (ref 4–5.2)
SODIUM SERPL-SCNC: 137 MMOL/L (ref 136–145)
SODIUM SERPL-SCNC: 138 MMOL/L (ref 136–145)
SODIUM SERPL-SCNC: 138 MMOL/L (ref 136–145)
SODIUM SERPL-SCNC: 139 MMOL/L (ref 136–145)
WBC # BLD AUTO: 9.5 X10*3/UL (ref 4.4–11.3)

## 2025-06-17 PROCEDURE — 82947 ASSAY GLUCOSE BLOOD QUANT: CPT

## 2025-06-17 PROCEDURE — 2500000002 HC RX 250 W HCPCS SELF ADMINISTERED DRUGS (ALT 637 FOR MEDICARE OP, ALT 636 FOR OP/ED): Performed by: STUDENT IN AN ORGANIZED HEALTH CARE EDUCATION/TRAINING PROGRAM

## 2025-06-17 PROCEDURE — 85610 PROTHROMBIN TIME: CPT | Performed by: HOSPITALIST

## 2025-06-17 PROCEDURE — 80053 COMPREHEN METABOLIC PANEL: CPT | Performed by: STUDENT IN AN ORGANIZED HEALTH CARE EDUCATION/TRAINING PROGRAM

## 2025-06-17 PROCEDURE — 36415 COLL VENOUS BLD VENIPUNCTURE: CPT | Performed by: HOSPITALIST

## 2025-06-17 PROCEDURE — 2500000001 HC RX 250 WO HCPCS SELF ADMINISTERED DRUGS (ALT 637 FOR MEDICARE OP): Performed by: PHARMACIST

## 2025-06-17 PROCEDURE — 82010 KETONE BODYS QUAN: CPT | Performed by: HOSPITALIST

## 2025-06-17 PROCEDURE — 1200000002 HC GENERAL ROOM WITH TELEMETRY DAILY

## 2025-06-17 PROCEDURE — 83735 ASSAY OF MAGNESIUM: CPT

## 2025-06-17 PROCEDURE — 2500000004 HC RX 250 GENERAL PHARMACY W/ HCPCS (ALT 636 FOR OP/ED): Performed by: HOSPITALIST

## 2025-06-17 PROCEDURE — 99233 SBSQ HOSP IP/OBS HIGH 50: CPT

## 2025-06-17 PROCEDURE — 2500000004 HC RX 250 GENERAL PHARMACY W/ HCPCS (ALT 636 FOR OP/ED): Performed by: STUDENT IN AN ORGANIZED HEALTH CARE EDUCATION/TRAINING PROGRAM

## 2025-06-17 PROCEDURE — 2500000001 HC RX 250 WO HCPCS SELF ADMINISTERED DRUGS (ALT 637 FOR MEDICARE OP): Performed by: STUDENT IN AN ORGANIZED HEALTH CARE EDUCATION/TRAINING PROGRAM

## 2025-06-17 PROCEDURE — 2500000001 HC RX 250 WO HCPCS SELF ADMINISTERED DRUGS (ALT 637 FOR MEDICARE OP)

## 2025-06-17 PROCEDURE — 83735 ASSAY OF MAGNESIUM: CPT | Performed by: STUDENT IN AN ORGANIZED HEALTH CARE EDUCATION/TRAINING PROGRAM

## 2025-06-17 PROCEDURE — 2500000002 HC RX 250 W HCPCS SELF ADMINISTERED DRUGS (ALT 637 FOR MEDICARE OP, ALT 636 FOR OP/ED): Performed by: PHARMACIST

## 2025-06-17 PROCEDURE — 2500000002 HC RX 250 W HCPCS SELF ADMINISTERED DRUGS (ALT 637 FOR MEDICARE OP, ALT 636 FOR OP/ED)

## 2025-06-17 PROCEDURE — 85027 COMPLETE CBC AUTOMATED: CPT | Performed by: STUDENT IN AN ORGANIZED HEALTH CARE EDUCATION/TRAINING PROGRAM

## 2025-06-17 PROCEDURE — 80048 BASIC METABOLIC PNL TOTAL CA: CPT | Mod: CCI

## 2025-06-17 PROCEDURE — 2500000004 HC RX 250 GENERAL PHARMACY W/ HCPCS (ALT 636 FOR OP/ED)

## 2025-06-17 PROCEDURE — 2500000005 HC RX 250 GENERAL PHARMACY W/O HCPCS

## 2025-06-17 RX ORDER — POTASSIUM CHLORIDE 14.9 MG/ML
20 INJECTION INTRAVENOUS
Status: COMPLETED | OUTPATIENT
Start: 2025-06-17 | End: 2025-06-17

## 2025-06-17 RX ORDER — POTASSIUM CHLORIDE 20 MEQ/1
20 TABLET, EXTENDED RELEASE ORAL ONCE
Status: COMPLETED | OUTPATIENT
Start: 2025-06-17 | End: 2025-06-17

## 2025-06-17 RX ORDER — POTASSIUM CHLORIDE 20 MEQ/1
40 TABLET, EXTENDED RELEASE ORAL ONCE
Status: DISCONTINUED | OUTPATIENT
Start: 2025-06-17 | End: 2025-06-17

## 2025-06-17 RX ORDER — AMOXICILLIN 250 MG
2 CAPSULE ORAL 2 TIMES DAILY
Status: DISCONTINUED | OUTPATIENT
Start: 2025-06-17 | End: 2025-06-18 | Stop reason: HOSPADM

## 2025-06-17 RX ORDER — WARFARIN 7.5 MG/1
7.5 TABLET ORAL ONCE
Status: COMPLETED | OUTPATIENT
Start: 2025-06-17 | End: 2025-06-17

## 2025-06-17 RX ORDER — POTASSIUM CHLORIDE 20 MEQ/1
40 TABLET, EXTENDED RELEASE ORAL ONCE
Status: COMPLETED | OUTPATIENT
Start: 2025-06-17 | End: 2025-06-17

## 2025-06-17 RX ORDER — INSULIN GLARGINE 100 [IU]/ML
10 INJECTION, SOLUTION SUBCUTANEOUS EVERY 24 HOURS
Status: DISCONTINUED | OUTPATIENT
Start: 2025-06-17 | End: 2025-06-18 | Stop reason: HOSPADM

## 2025-06-17 RX ORDER — INSULIN LISPRO 100 [IU]/ML
0-10 INJECTION, SOLUTION INTRAVENOUS; SUBCUTANEOUS
Status: DISCONTINUED | OUTPATIENT
Start: 2025-06-17 | End: 2025-06-18 | Stop reason: HOSPADM

## 2025-06-17 RX ADMIN — SENNOSIDES AND DOCUSATE SODIUM 2 TABLET: 50; 8.6 TABLET ORAL at 21:18

## 2025-06-17 RX ADMIN — LEVOTHYROXINE SODIUM 50 MCG: 0.05 TABLET ORAL at 06:00

## 2025-06-17 RX ADMIN — BUPROPION HYDROCHLORIDE 150 MG: 150 TABLET, EXTENDED RELEASE ORAL at 09:09

## 2025-06-17 RX ADMIN — Medication 21 PERCENT: at 18:24

## 2025-06-17 RX ADMIN — DULOXETINE 60 MG: 60 CAPSULE, DELAYED RELEASE ORAL at 21:18

## 2025-06-17 RX ADMIN — METOPROLOL SUCCINATE 50 MG: 50 TABLET, EXTENDED RELEASE ORAL at 09:09

## 2025-06-17 RX ADMIN — SENNOSIDES AND DOCUSATE SODIUM 2 TABLET: 50; 8.6 TABLET ORAL at 12:07

## 2025-06-17 RX ADMIN — TOPIRAMATE 25 MG: 50 TABLET, FILM COATED ORAL at 09:08

## 2025-06-17 RX ADMIN — ALLOPURINOL 300 MG: 300 TABLET ORAL at 09:09

## 2025-06-17 RX ADMIN — POTASSIUM CHLORIDE 20 MEQ: 1500 TABLET, EXTENDED RELEASE ORAL at 10:42

## 2025-06-17 RX ADMIN — AMILORIDE HYDROCLORIDE 20 MG: 5 TABLET ORAL at 21:17

## 2025-06-17 RX ADMIN — ONDANSETRON 4 MG: 2 INJECTION INTRAMUSCULAR; INTRAVENOUS at 05:31

## 2025-06-17 RX ADMIN — INSULIN GLARGINE 10 UNITS: 100 INJECTION, SOLUTION SUBCUTANEOUS at 12:46

## 2025-06-17 RX ADMIN — PANTOPRAZOLE SODIUM 40 MG: 40 INJECTION, POWDER, FOR SOLUTION INTRAVENOUS at 15:33

## 2025-06-17 RX ADMIN — POTASSIUM CHLORIDE 20 MEQ: 14.9 INJECTION, SOLUTION INTRAVENOUS at 09:13

## 2025-06-17 RX ADMIN — METOPROLOL SUCCINATE 25 MG: 25 TABLET, EXTENDED RELEASE ORAL at 09:09

## 2025-06-17 RX ADMIN — PANTOPRAZOLE SODIUM 40 MG: 40 INJECTION, POWDER, FOR SOLUTION INTRAVENOUS at 06:00

## 2025-06-17 RX ADMIN — ROSUVASTATIN CALCIUM 40 MG: 20 TABLET, FILM COATED ORAL at 09:09

## 2025-06-17 RX ADMIN — TOPIRAMATE 25 MG: 50 TABLET, FILM COATED ORAL at 21:18

## 2025-06-17 RX ADMIN — INSULIN LISPRO 2 UNITS: 100 INJECTION, SOLUTION INTRAVENOUS; SUBCUTANEOUS at 15:33

## 2025-06-17 RX ADMIN — ACETAMINOPHEN 650 MG: 325 TABLET ORAL at 09:10

## 2025-06-17 RX ADMIN — POTASSIUM CHLORIDE 40 MEQ: 1500 TABLET, EXTENDED RELEASE ORAL at 15:33

## 2025-06-17 RX ADMIN — WARFARIN SODIUM 7.5 MG: 7.5 TABLET ORAL at 17:24

## 2025-06-17 RX ADMIN — AMLODIPINE BESYLATE 10 MG: 10 TABLET ORAL at 09:09

## 2025-06-17 RX ADMIN — POTASSIUM CHLORIDE 20 MEQ: 14.9 INJECTION, SOLUTION INTRAVENOUS at 10:55

## 2025-06-17 RX ADMIN — DULOXETINE 60 MG: 60 CAPSULE, DELAYED RELEASE ORAL at 09:09

## 2025-06-17 RX ADMIN — POLYETHYLENE GLYCOL 3350 17 G: 17 POWDER, FOR SOLUTION ORAL at 09:08

## 2025-06-17 RX ADMIN — CETIRIZINE HYDROCHLORIDE 10 MG: 10 TABLET, FILM COATED ORAL at 09:09

## 2025-06-17 RX ADMIN — DEXTROSE AND SODIUM CHLORIDE 150 ML/HR: 5; .45 INJECTION, SOLUTION INTRAVENOUS at 06:00

## 2025-06-17 RX ADMIN — LEVOTHYROXINE SODIUM 200 MCG: 0.1 TABLET ORAL at 05:31

## 2025-06-17 RX ADMIN — AMILORIDE HYDROCLORIDE 20 MG: 5 TABLET ORAL at 09:10

## 2025-06-17 ASSESSMENT — PAIN SCALES - GENERAL
PAINLEVEL_OUTOF10: 0 - NO PAIN
PAINLEVEL_OUTOF10: 2
PAINLEVEL_OUTOF10: 0 - NO PAIN

## 2025-06-17 ASSESSMENT — COGNITIVE AND FUNCTIONAL STATUS - GENERAL
MOBILITY SCORE: 24
DAILY ACTIVITIY SCORE: 24

## 2025-06-17 ASSESSMENT — PAIN - FUNCTIONAL ASSESSMENT
PAIN_FUNCTIONAL_ASSESSMENT: 0-10

## 2025-06-17 ASSESSMENT — PAIN DESCRIPTION - LOCATION: LOCATION: GENERALIZED

## 2025-06-17 ASSESSMENT — PAIN DESCRIPTION - DESCRIPTORS: DESCRIPTORS: ACHING

## 2025-06-17 NOTE — PROGRESS NOTES
Per medical team, patient is not yet medically appropriate for discharge today; will likely require at least another 24 hours in the hospital.  to meet with patient at bedside to review discharge plan and Medicare IM. SW to send a referral to patient's [facility], pending patient's wishes. Plan for patient is to return home when medically ready, possibly with new services to begin through [facility] agency TBD. Care Transitions to follow and assist. LAMBERT Worthington

## 2025-06-17 NOTE — CARE PLAN
The patient's goals for the shift include      The clinical goals for the shift include close gap able to tolerate eating no nausea and vomiting    Over the shift, the patient did make progress toward the following goals.     Problem: Pain - Adult  Goal: Verbalizes/displays adequate comfort level or baseline comfort level  Outcome: Progressing  Flowsheets (Taken 6/17/2025 2800)  Verbalizes/displays adequate comfort level or baseline comfort level:   Encourage patient to monitor pain and request assistance   Assess pain using appropriate pain scale   Administer analgesics based on type and severity of pain and evaluate response     Problem: Safety - Adult  Goal: Free from fall injury  Outcome: Progressing     Problem: Nutrition  Goal: Nutrient intake appropriate for maintaining nutritional needs  Outcome: Progressing     Problem: Skin  Goal: Decreased wound size/increased tissue granulation at next dressing change  Outcome: Progressing  Goal: Participates in plan/prevention/treatment measures  Outcome: Progressing  Goal: Prevent/manage excess moisture  Outcome: Progressing     Problem: Diabetes  Goal: Maintain electrolyte levels within acceptable range throughout shift  Outcome: Progressing  Goal: Maintain glucose levels >70mg/dl to <250mg/dl throughout shift  Outcome: Progressing  Goal: No changes in neurological exam by end of shift  Outcome: Progressing  Goal: Learn about and adhere to nutrition recommendations by end of shift  Outcome: Progressing  Goal: Vital signs within normal range for age by end of shift  Outcome: Progressing  Goal: Increase self care and/or family involovement by end of shift  Outcome: Progressing

## 2025-06-17 NOTE — DOCUMENTATION CLARIFICATION NOTE
"    PATIENT:               RAY GUERRA  ACCT #:                  [id number]  MRN:                       [id number]  :                       1970  ADMIT DATE:       6/15/2025 4:23 PM  DISCH DATE:  RESPONDING PROVIDER #:        [id number]          PROVIDER RESPONSE TEXT:    Chronic Diastolic Congestive Heart Failure    CDI QUERY TEXT:    Clarification      Instruction:    Based on your assessment of the patient and the clinical information, please provide the requested documentation by clicking on the appropriate radio button and enter any additional information if prompted.    Question: Please further clarify the type and acuity of congestive heart failure    When answering this query, please exercise your independent professional judgment. The fact that a question is being asked, does not imply that any particular answer is desired or expected.    The patient's clinical indicators include:  Clinical Information:  * 55 year old female to ED with complaint of nausea, vomiting, and generalized weakness.    Clinical Indicators:  * 6/15/25 H&P notes history CHF - no BNP  * 3/24/25 Historical data ECHO \"Left Ventricle: Left ventricular systolic function is normal, with an estimated ejection fraction of 65%. There are no regional wall motion abnormalities. The left ventricular cavity size is normal. Left ventricular diastolic filling was indeterminate. Left Atrium: The left atrium was not well visualized. Right Ventricle: The right ventricle was not well visualized. Unable to determine right ventricular systolic function. Right Atrium: The right atrium was not well visualized\"    Treatment:  * Routine home medications- Lasix 20 mg oral daily (held during adm to date)- Toprol 75 mg oral daily begun  t date    Risk Factors:  * HTN- Morbid obesity- DM with DKA currently  Options provided:  -- Chronic Diastolic Congestive Heart Failure  -- Other - I will add my own diagnosis  -- Refer to Clinical Documentation " Reviewer    Query created by: Marce Ontiveros on 6/17/2025 8:31 AM      Electronically signed by:  MELANIE LAGUNAS MD 6/17/2025 5:32 PM

## 2025-06-17 NOTE — CONSULTS
Pharmacy Consult for Warfarin (Coumadin) Management - Daily Progress Note     - Warfarin history:  admitted on warfarin     - Bleeding/bruising events in past 24 hours:  not applicable    - Notable medication changes in past 24 hours:  not applicable    - Additional notes:  not applicable      Anticoagulation Dosing History  Previous home dose:  7.5 mg daily    Labs  POC INR   Date Value Ref Range Status   04/15/2025 1.60 Normal Range: .9-1.1 Final   01/30/2025 2.20 Normal Range: .9-1.1 Final   12/23/2024 1.90 Normal Range: .9-1.1 Final     INR External   Date Value Ref Range Status   05/28/2025 2.30  Final   04/29/2025 2.40  Final   04/01/2025 2.90  Final     INR   Date Value Ref Range Status   06/17/2025 1.2 (H) 0.9 - 1.1 Final   06/16/2025 3.5 (H) 0.9 - 1.1 Final   06/15/2025   Final     Comment:     Unable To Calculate INR     Results from last 7 days   Lab Units 06/17/25  0355 06/16/25  0406 06/15/25  1700   HEMOGLOBIN g/dL 15.2 17.1* 19.1*     Results from last 7 days   Lab Units 06/17/25  0355 06/16/25  0406 06/15/25  1700   HEMATOCRIT % 44.6 50.8* 56.4*     Results from last 7 days   Lab Units 06/17/25  0355 06/16/25  0406 06/15/25  1700   PLATELETS AUTO x10*3/uL 180 207 267     Review    Treatment Indication:  Atrial Fibrillation/Atrial Flutter  Target INR:  2-3    Inpatient dosing pattern:     6/15: warfarin held, vitamin K given PO  6/16: 5 mg x1  6/17: 7.5 mg x1    Warfarin Dosing Plan: warfarin 7.5 mg once    Orders placed per pharmacy consult. Pharmacy will continue to monitor and adjust therapy as needed.     Iesha Alatorre, StephanieD

## 2025-06-17 NOTE — PROGRESS NOTES
Hospital Medicine Progress Note    Patient Name: Roselia oM  YOB: 1970  MRN: [id number]  Date of Service: 06/17/25  Time of Dictation: 2:29 PM    Assessment and Plan  1.  Euglycemic diabetic ketoacidosis  2.  Hypokalemia  3.  Hyperglycemia in the setting of known type 2 diabetes mellitus complicated by polyneuropathy  Presented with nausea, vomiting, generalized weakness of 4 days duration; initial evaluation notable for anion gap metabolic acidosis (pH 7.25, pCO2 19 mmHg, HCO3 8.3 mmol/L, GAP 28), elevated beta-hydroxybutyrate (>24 mmol/L), neutrophil-predominant leukocytosis (WBC 11.6 k/uL), PT >100, INR undetectable.  Radiography showed no acute abnormality of the abdomen/pelvis.  She remained on the insulin drip since admission, with fluids transitioned to dextrose.  Today, tolerating diet and feels clinically improved; anion gap now closed.  Bridge with Lantus 10 units at lunch; keep insulin drip on for additional 2 hours then discontinue.  May downgrade to regular floor following bridging with Lantus.  Add medium-dose sliding scale insulin aspart for correction with meals.  May benefit from discontinuation of SGLT2 upon discharge to prevent recurrent episodes of euglycemic DKA.  Continue carb controlled diet.  May be medically suitable for discharge tomorrow morning if she remains clinically stable and is tolerating diet well.    Medical Comorbidities:  1.  Chronic kidney disease 3a  2.  Hyperlipidemia: Prescribed rosuvastatin 40 mg daily.  3.  Hypertension: Prescribed amlodipine 10 mg daily  4.  COPD  5.  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  6.  Chronic diastolic heart failure  7.  Fibromyalgia  8.  Migraine: Prescribed topiramate 25 mg twice daily.  9.  Major depression: Prescribed duloxetine 60 mg twice daily, bupropion 150 mg daily.  10.  Nonalcoholic fatty liver  11.  Psoriasis  12.  Obstructive sleep apnea  13.  Hypothyroidism: Prescribed levothyroxine 200 mcg  daily.  Malnutrition Assessment:  Not on file.    Prophylaxis/General Management  DVT Prophylaxis: Warfarin  Diet: Consistent carb  Bowel Regimen: Docusate-senna  Code Status: Full code    Disposition: Patient continues to exhibit/require:   Uncontrolled symptoms requiring further management to ensure safe discharge and Abnormal laboratory studies requiring further monitoring/management   warranting further hospitalization.  Tentative discharge within 24-48 hours.     Subjective  No acute overnight events.  Evaluated at bedside with RN and PharmD/RPh during triad rounds at ~10h20.   Feels well this morning.  No fevers, chills, chest pain, dyspnea.  Reviewed diagnostic work-up x24 hours and plan of care, including plan to bridge to subcutaneous insulin and discontinue IV drip.  Patient queried for questions/concerns - all voiced questions/concerns addressed.     Objective  Vital Signs (x24h)  Temp:  [36 °C (96.8 °F)-36.4 °C (97.5 °F)] 36.2 °C (97.2 °F)  Heart Rate:  [] 105  Resp:  [11-25] 17  BP: (105-143)/() 114/64    Physical Exam (at ~10h20):  General: Alert, resting with eyes open, sitting upright in recliner chair at bedside.  No evident acute distress.   HEENT: Slight bitemporal muscular wasting.  Pupils equal and round, with symmetric eye movements.   Neck: Trachea midline.   Cardiovascular: Regular rate and rhythm. No murmurs, rubs, gallops. No peripheral edema.   Pulmonary: Chest clear to auscultation bilaterally. Normal work of breathing.   Abdominal: Soft, non-tender, non-distended. Bowel sounds present.   Neurological: Moves all extremities. No evident focal neurological deficits.   Musculoskeletal:  Calves supple, non-tender.      Laboratory Studies:  Lab Results   Component Value Date    WBC 9.5 06/17/2025    HGB 15.2 06/17/2025    HCT 44.6 06/17/2025    MCV 89 06/17/2025     06/17/2025     Lab Results   Component Value Date     06/17/2025    K 3.3 (L) 06/17/2025      06/17/2025    CO2 24 06/17/2025    CREATININE 0.68 06/17/2025    BUN 8 06/17/2025    CALCIUM 9.6 06/17/2025    PHOS 1.6 (L) 06/16/2025     Lab Results   Component Value Date    AST 13 06/17/2025    ALT 8 06/17/2025    ALKPHOS 195 (H) 06/17/2025    BILITOT 0.8 06/17/2025    ALBUMIN 3.7 06/17/2025    INR 1.2 (H) 06/17/2025    PROTIME 13.7 (H) 06/17/2025    APTT 116 (HH) 06/15/2025     Diagnostic Imaging (Pertinent Findings/Impression):   CT abdomen/pelvis with IV contrast (06/15/25): No acute abnormality in the abdomen/pelvis. There is ventral abdominal wall scarring there is a left paramidline hernia containing a loop of transverse colon without bowel obstruction or incarceration. There is also fat containing ventral abdominal wall hernia in the right paramidline region. 2. Stomach is distended with fluid with relative collapse of the gastro duodenal junction and without clear evidence for gastric wall thickening. This may be physiologic, however clinical correlation for gastroparesis recommended. 3. Liver is mildly enlarged. Several hypodensities throughout the liver not well characterize, possibly cysts or hemangiomas. There is also new focal area of hypoattenuation anterior to the gallbladder which may represent focal hepatic steatosis.     Medications:  Current Medications[1]    Medical Decision Making  The following information was utilized in the care of Roselia Mo on 6/17/25:  Tests, Documents, and Historians:   Reviewed diagnostic testing ordered by another provider including complete ER workup with radiography reports  Discussion with Other Healthcare Professionals:   Discussed above pharmacologic therapy with RN and PharmD/Rph during bedside rounds and Reviewed discharge planning with case management    Patient is high risk for morbidity in light of:  Chronic illness with severe exacerbation, progression, or side effects  Pharmacologic therapy requiring intensive monitoring for toxicity (e.g.,  antibiotics, infusions)    Total estimated time on patient care 45 minutes.  The above was typed using dictation software and may contain typographic errors.     Nataliya Yeung         [1]   Current Facility-Administered Medications:     acetaminophen (Tylenol) tablet 650 mg, 650 mg, oral, q6h PRN, Kelli Lo MD, 650 mg at 06/17/25 0910    allopurinol (Zyloprim) tablet 300 mg, 300 mg, oral, Daily, Kelli Lo MD, 300 mg at 06/17/25 0909    aMILoride (Midamor) tablet 20 mg, 20 mg, oral, BID, Stephanie HintonD, 20 mg at 06/17/25 0910    amLODIPine (Norvasc) tablet 10 mg, 10 mg, oral, Daily, Kelli Lo MD, 10 mg at 06/17/25 0909    buPROPion XL (Wellbutrin XL) 24 hr tablet 150 mg, 150 mg, oral, q AM, Kelli Lo MD, 150 mg at 06/17/25 0909    cetirizine (ZyrTEC) tablet 10 mg, 10 mg, oral, Daily, Kelli Lo MD, 10 mg at 06/17/25 0909    DEXTROSE 5 % AND 0.45 % NACL IV BOLUS bolus 1,000 mL, 1,000 mL, intravenous, Once, Damien Thomason DO    dextrose 50 % injection 50 mL, 50 mL, intravenous, q15 min PRN, Jennifer Garcia MD    DULoxetine (Cymbalta) DR capsule 60 mg, 60 mg, oral, BID, Kelli Lo MD, 60 mg at 06/17/25 0909    [Held by provider] empagliflozin (Jardiance) tablet 10 mg, 10 mg, oral, Daily, Kelli Lo MD    insulin glargine (Lantus) injection 10 Units, 10 Units, subcutaneous, q24h, Nataliya Yeung MD, 10 Units at 06/17/25 1246    insulin lispro injection 0-10 Units, 0-10 Units, subcutaneous, TID AC, Nataliya Yeung MD    insulin regular (HumuLIN, NovoLIN) bolus from bag 10 Units, 10 Units, intravenous, PRN, Jennifer Garcia MD    levothyroxine (Synthroid, Levoxyl) tablet 200 mcg, 200 mcg, oral, Daily, Kelli Lo MD, 200 mcg at 06/17/25 0531    levothyroxine (Synthroid, Levoxyl) tablet 50 mcg, 50 mcg, oral, Daily before breakfast, Kelli Lo MD, 50 mcg at 06/17/25 0600    metoprolol succinate XL (Toprol-XL) 24 hr tablet 25 mg, 25 mg,  oral, Daily, Kelli Lo MD, 25 mg at 06/17/25 0909    metoprolol succinate XL (Toprol-XL) 24 hr tablet 50 mg, 50 mg, oral, Daily, Kelli Lo MD, 50 mg at 06/17/25 0909    morphine injection 2 mg, 2 mg, intravenous, q4h PRN **OR** morphine injection 4 mg, 4 mg, intravenous, q4h PRN, Kelli Lo MD    ondansetron (Zofran) injection 4 mg, 4 mg, intravenous, q4h PRN, Kelli Lo MD, 4 mg at 06/17/25 0531    oxygen (O2) therapy, , inhalation, Continuous PRN - O2/gases, Nataliya Yeung MD    pantoprazole (Protonix) injection 40 mg, 40 mg, intravenous, BID AC, Kelli Lo MD, 40 mg at 06/17/25 0600    polyethylene glycol (Glycolax, Miralax) packet 17 g, 17 g, oral, Daily, Kelli Lo MD, 17 g at 06/17/25 0908    potassium chloride CR (Klor-Con M20) ER tablet 40 mEq, 40 mEq, oral, Once, Nataliya Yeung MD    prochlorperazine (Compazine) injection 10 mg, 10 mg, intravenous, q6h PRN, Kelli Lo MD, 10 mg at 06/16/25 0419    rosuvastatin (Crestor) tablet 40 mg, 40 mg, oral, Daily, Kelli Lo MD, 40 mg at 06/17/25 0909    sennosides-docusate sodium (Mariajose-Colace) 8.6-50 mg per tablet 2 tablet, 2 tablet, oral, BID, Nataliya Yeung MD, 2 tablet at 06/17/25 1207    topiramate (Topamax) tablet 25 mg, 25 mg, oral, BID, Kelli Lo MD, 25 mg at 06/17/25 0908    warfarin (Coumadin) tablet 7.5 mg, 7.5 mg, oral, Once, Iesha Alatorre, PharmD

## 2025-06-18 ENCOUNTER — PHARMACY VISIT (OUTPATIENT)
Dept: PHARMACY | Facility: CLINIC | Age: 55
End: 2025-06-18
Payer: COMMERCIAL

## 2025-06-18 ENCOUNTER — HOME HEALTH ADMISSION (OUTPATIENT)
Dept: HOME HEALTH SERVICES | Facility: HOME HEALTH | Age: 55
End: 2025-06-18
Payer: MEDICARE

## 2025-06-18 ENCOUNTER — HOSPITAL ENCOUNTER (OUTPATIENT)
Dept: RADIOLOGY | Facility: CLINIC | Age: 55
End: 2025-06-18
Payer: MEDICARE

## 2025-06-18 ENCOUNTER — DOCUMENTATION (OUTPATIENT)
Dept: HOME HEALTH SERVICES | Facility: HOME HEALTH | Age: 55
End: 2025-06-18
Payer: MEDICARE

## 2025-06-18 ENCOUNTER — READMISSION MANAGEMENT (OUTPATIENT)
Dept: CALL CENTER | Facility: HOSPITAL | Age: 55
End: 2025-06-18
Payer: MEDICAID

## 2025-06-18 VITALS
TEMPERATURE: 97.5 F | BODY MASS INDEX: 40.61 KG/M2 | RESPIRATION RATE: 18 BRPM | WEIGHT: 237.88 LBS | SYSTOLIC BLOOD PRESSURE: 110 MMHG | HEART RATE: 84 BPM | DIASTOLIC BLOOD PRESSURE: 63 MMHG | OXYGEN SATURATION: 96 % | HEIGHT: 64 IN

## 2025-06-18 LAB
ALBUMIN SERPL BCP-MCNC: 3.8 G/DL (ref 3.4–5)
ALP SERPL-CCNC: 206 U/L (ref 33–110)
ALT SERPL W P-5'-P-CCNC: 8 U/L (ref 7–45)
ANION GAP SERPL CALC-SCNC: 13 MMOL/L (ref 10–20)
AST SERPL W P-5'-P-CCNC: 13 U/L (ref 9–39)
BILIRUB SERPL-MCNC: 0.8 MG/DL (ref 0–1.2)
BUN SERPL-MCNC: 11 MG/DL (ref 6–23)
CALCIUM SERPL-MCNC: 9.6 MG/DL (ref 8.6–10.3)
CHLORIDE SERPL-SCNC: 107 MMOL/L (ref 98–107)
CO2 SERPL-SCNC: 24 MMOL/L (ref 21–32)
CREAT SERPL-MCNC: 0.55 MG/DL (ref 0.5–1.05)
EGFRCR SERPLBLD CKD-EPI 2021: >90 ML/MIN/1.73M*2
ERYTHROCYTE [DISTWIDTH] IN BLOOD BY AUTOMATED COUNT: 13.1 % (ref 11.5–14.5)
GLUCOSE BLD MANUAL STRIP-MCNC: 178 MG/DL (ref 74–99)
GLUCOSE BLD MANUAL STRIP-MCNC: 242 MG/DL (ref 74–99)
GLUCOSE SERPL-MCNC: 200 MG/DL (ref 74–99)
HCT VFR BLD AUTO: 44.7 % (ref 36–46)
HGB BLD-MCNC: 15.3 G/DL (ref 12–16)
INR PPP: 1 (ref 0.9–1.1)
MAGNESIUM SERPL-MCNC: 1.69 MG/DL (ref 1.6–2.4)
MCH RBC QN AUTO: 30.7 PG (ref 26–34)
MCHC RBC AUTO-ENTMCNC: 34.2 G/DL (ref 32–36)
MCV RBC AUTO: 90 FL (ref 80–100)
NRBC BLD-RTO: 0 /100 WBCS (ref 0–0)
PLATELET # BLD AUTO: 134 X10*3/UL (ref 150–450)
POTASSIUM SERPL-SCNC: 3.5 MMOL/L (ref 3.5–5.3)
PROT SERPL-MCNC: 5.7 G/DL (ref 6.4–8.2)
PROTHROMBIN TIME: 11.4 SECONDS (ref 9.8–12.4)
RBC # BLD AUTO: 4.99 X10*6/UL (ref 4–5.2)
SODIUM SERPL-SCNC: 140 MMOL/L (ref 136–145)
WBC # BLD AUTO: 5.7 X10*3/UL (ref 4.4–11.3)

## 2025-06-18 PROCEDURE — 94761 N-INVAS EAR/PLS OXIMETRY MLT: CPT

## 2025-06-18 PROCEDURE — 2500000001 HC RX 250 WO HCPCS SELF ADMINISTERED DRUGS (ALT 637 FOR MEDICARE OP)

## 2025-06-18 PROCEDURE — 85027 COMPLETE CBC AUTOMATED: CPT | Performed by: STUDENT IN AN ORGANIZED HEALTH CARE EDUCATION/TRAINING PROGRAM

## 2025-06-18 PROCEDURE — 82947 ASSAY GLUCOSE BLOOD QUANT: CPT

## 2025-06-18 PROCEDURE — 2500000004 HC RX 250 GENERAL PHARMACY W/ HCPCS (ALT 636 FOR OP/ED): Performed by: STUDENT IN AN ORGANIZED HEALTH CARE EDUCATION/TRAINING PROGRAM

## 2025-06-18 PROCEDURE — RXMED WILLOW AMBULATORY MEDICATION CHARGE

## 2025-06-18 PROCEDURE — 2500000002 HC RX 250 W HCPCS SELF ADMINISTERED DRUGS (ALT 637 FOR MEDICARE OP, ALT 636 FOR OP/ED): Performed by: STUDENT IN AN ORGANIZED HEALTH CARE EDUCATION/TRAINING PROGRAM

## 2025-06-18 PROCEDURE — 2500000002 HC RX 250 W HCPCS SELF ADMINISTERED DRUGS (ALT 637 FOR MEDICARE OP, ALT 636 FOR OP/ED)

## 2025-06-18 PROCEDURE — 99239 HOSP IP/OBS DSCHRG MGMT >30: CPT

## 2025-06-18 PROCEDURE — 2500000004 HC RX 250 GENERAL PHARMACY W/ HCPCS (ALT 636 FOR OP/ED)

## 2025-06-18 PROCEDURE — 2500000005 HC RX 250 GENERAL PHARMACY W/O HCPCS

## 2025-06-18 PROCEDURE — 84075 ASSAY ALKALINE PHOSPHATASE: CPT | Performed by: STUDENT IN AN ORGANIZED HEALTH CARE EDUCATION/TRAINING PROGRAM

## 2025-06-18 PROCEDURE — 36415 COLL VENOUS BLD VENIPUNCTURE: CPT | Performed by: STUDENT IN AN ORGANIZED HEALTH CARE EDUCATION/TRAINING PROGRAM

## 2025-06-18 PROCEDURE — 83735 ASSAY OF MAGNESIUM: CPT | Performed by: STUDENT IN AN ORGANIZED HEALTH CARE EDUCATION/TRAINING PROGRAM

## 2025-06-18 PROCEDURE — 85610 PROTHROMBIN TIME: CPT | Performed by: HOSPITALIST

## 2025-06-18 RX ORDER — INSULIN GLARGINE 100 [IU]/ML
10 INJECTION, SOLUTION SUBCUTANEOUS NIGHTLY
Qty: 3 ML | Refills: 0 | Status: SHIPPED | OUTPATIENT
Start: 2025-06-18 | End: 2025-06-18 | Stop reason: HOSPADM

## 2025-06-18 RX ORDER — POTASSIUM CHLORIDE 20 MEQ/1
40 TABLET, EXTENDED RELEASE ORAL ONCE
Status: COMPLETED | OUTPATIENT
Start: 2025-06-18 | End: 2025-06-18

## 2025-06-18 RX ORDER — ISOPROPYL ALCOHOL 70 ML/100ML
1 SWAB TOPICAL
Qty: 100 EACH | Refills: 0 | Status: SHIPPED | OUTPATIENT
Start: 2025-06-18 | End: 2025-07-18

## 2025-06-18 RX ORDER — WARFARIN 10 MG/1
10 TABLET ORAL ONCE
Status: DISCONTINUED | OUTPATIENT
Start: 2025-06-18 | End: 2025-06-18 | Stop reason: HOSPADM

## 2025-06-18 RX ORDER — DEXTROSE 4 G
TABLET,CHEWABLE ORAL
Qty: 1 EACH | Refills: 0 | OUTPATIENT
Start: 2025-06-18

## 2025-06-18 RX ORDER — INSULIN GLARGINE 100 [IU]/ML
10 INJECTION, SOLUTION SUBCUTANEOUS NIGHTLY
Qty: 15 ML | Refills: 0 | Status: SHIPPED | OUTPATIENT
Start: 2025-06-18 | End: 2025-09-16

## 2025-06-18 RX ORDER — LANCETS
EACH MISCELLANEOUS
Qty: 100 EACH | Refills: 0 | Status: SHIPPED | OUTPATIENT
Start: 2025-06-18

## 2025-06-18 RX ORDER — PEN NEEDLE, DIABETIC 29 G X1/2"
NEEDLE, DISPOSABLE MISCELLANEOUS
Qty: 100 EACH | Refills: 0 | Status: SHIPPED | OUTPATIENT
Start: 2025-06-18 | End: 2026-06-18

## 2025-06-18 RX ADMIN — METOPROLOL SUCCINATE 25 MG: 25 TABLET, EXTENDED RELEASE ORAL at 08:25

## 2025-06-18 RX ADMIN — Medication 21 PERCENT: at 00:34

## 2025-06-18 RX ADMIN — BUPROPION HYDROCHLORIDE 150 MG: 150 TABLET, EXTENDED RELEASE ORAL at 08:26

## 2025-06-18 RX ADMIN — ONDANSETRON 4 MG: 2 INJECTION INTRAMUSCULAR; INTRAVENOUS at 12:41

## 2025-06-18 RX ADMIN — METOPROLOL SUCCINATE 50 MG: 50 TABLET, EXTENDED RELEASE ORAL at 08:25

## 2025-06-18 RX ADMIN — LEVOTHYROXINE SODIUM 200 MCG: 0.1 TABLET ORAL at 05:56

## 2025-06-18 RX ADMIN — Medication 21 PERCENT: at 11:56

## 2025-06-18 RX ADMIN — CETIRIZINE HYDROCHLORIDE 10 MG: 10 TABLET, FILM COATED ORAL at 08:25

## 2025-06-18 RX ADMIN — ROSUVASTATIN CALCIUM 40 MG: 20 TABLET, FILM COATED ORAL at 08:25

## 2025-06-18 RX ADMIN — Medication 21 PERCENT: at 06:33

## 2025-06-18 RX ADMIN — INSULIN LISPRO 4 UNITS: 100 INJECTION, SOLUTION INTRAVENOUS; SUBCUTANEOUS at 12:36

## 2025-06-18 RX ADMIN — POTASSIUM CHLORIDE 40 MEQ: 1500 TABLET, EXTENDED RELEASE ORAL at 08:25

## 2025-06-18 RX ADMIN — INSULIN LISPRO 2 UNITS: 100 INJECTION, SOLUTION INTRAVENOUS; SUBCUTANEOUS at 08:32

## 2025-06-18 RX ADMIN — INSULIN GLARGINE 10 UNITS: 100 INJECTION, SOLUTION SUBCUTANEOUS at 12:36

## 2025-06-18 RX ADMIN — MORPHINE SULFATE 4 MG: 4 INJECTION, SOLUTION INTRAMUSCULAR; INTRAVENOUS at 02:15

## 2025-06-18 RX ADMIN — DULOXETINE 60 MG: 60 CAPSULE, DELAYED RELEASE ORAL at 08:25

## 2025-06-18 RX ADMIN — PANTOPRAZOLE SODIUM 40 MG: 40 INJECTION, POWDER, FOR SOLUTION INTRAVENOUS at 06:00

## 2025-06-18 RX ADMIN — AMLODIPINE BESYLATE 10 MG: 10 TABLET ORAL at 08:26

## 2025-06-18 RX ADMIN — ALLOPURINOL 300 MG: 300 TABLET ORAL at 08:26

## 2025-06-18 RX ADMIN — AMILORIDE HYDROCLORIDE 20 MG: 5 TABLET ORAL at 08:26

## 2025-06-18 RX ADMIN — TOPIRAMATE 25 MG: 50 TABLET, FILM COATED ORAL at 08:26

## 2025-06-18 RX ADMIN — LEVOTHYROXINE SODIUM 50 MCG: 0.05 TABLET ORAL at 06:00

## 2025-06-18 RX ADMIN — MORPHINE SULFATE 2 MG: 2 INJECTION, SOLUTION INTRAMUSCULAR; INTRAVENOUS at 13:00

## 2025-06-18 ASSESSMENT — COGNITIVE AND FUNCTIONAL STATUS - GENERAL
DAILY ACTIVITIY SCORE: 24
MOBILITY SCORE: 24

## 2025-06-18 ASSESSMENT — PAIN SCALES - GENERAL
PAINLEVEL_OUTOF10: 0 - NO PAIN
PAINLEVEL_OUTOF10: 6
PAINLEVEL_OUTOF10: 0 - NO PAIN
PAINLEVEL_OUTOF10: 9
PAINLEVEL_OUTOF10: 3

## 2025-06-18 ASSESSMENT — PAIN - FUNCTIONAL ASSESSMENT
PAIN_FUNCTIONAL_ASSESSMENT: 0-10

## 2025-06-18 ASSESSMENT — PAIN DESCRIPTION - ORIENTATION: ORIENTATION: RIGHT;LEFT

## 2025-06-18 ASSESSMENT — PAIN DESCRIPTION - DESCRIPTORS: DESCRIPTORS: ACHING;DISCOMFORT

## 2025-06-18 ASSESSMENT — PAIN DESCRIPTION - LOCATION: LOCATION: LEG

## 2025-06-18 NOTE — PROGRESS NOTES
06/18/25 1133   Discharge Planning   Home or Post Acute Services In home services   Type of Home Care Services Home OT;Home PT   Expected Discharge Disposition Home H     Met with pt, role of TCC explained. Pt confirms her plan is home and request hhc through . Pt feels that she only needs PT/OT. Pt going home with new glucometer and supplies so offered to have nursing as well. Pt denied need stating that she was familiar with how to use supplies and insulin pens. Requested [facility] order be placed by provider. Will follow for acceptance and SOC.

## 2025-06-18 NOTE — CARE PLAN
Problem: Pain - Adult  Goal: Verbalizes/displays adequate comfort level or baseline comfort level  Outcome: Progressing     Problem: Safety - Adult  Goal: Free from fall injury  Outcome: Progressing     Problem: Discharge Planning  Goal: Discharge to home or other facility with appropriate resources  Outcome: Progressing     Problem: Chronic Conditions and Co-morbidities  Goal: Patient's chronic conditions and co-morbidity symptoms are monitored and maintained or improved  Outcome: Progressing     Problem: Nutrition  Goal: Nutrient intake appropriate for maintaining nutritional needs  Outcome: Progressing     Problem: Skin  Goal: Decreased wound size/increased tissue granulation at next dressing change  Outcome: Progressing  Goal: Participates in plan/prevention/treatment measures  Outcome: Progressing  Goal: Prevent/manage excess moisture  Outcome: Progressing  Goal: Prevent/minimize sheer/friction injuries  Outcome: Progressing  Goal: Promote/optimize nutrition  Outcome: Progressing  Goal: Promote skin healing  Outcome: Progressing     Problem: Diabetes  Goal: Decrease in ketones present in urine by end of shift  Outcome: Progressing  Goal: Maintain electrolyte levels within acceptable range throughout shift  Outcome: Progressing  Goal: Maintain glucose levels >70mg/dl to <250mg/dl throughout shift  Outcome: Progressing  Goal: No changes in neurological exam by end of shift  Outcome: Progressing  Goal: Learn about and adhere to nutrition recommendations by end of shift  Outcome: Progressing  Goal: Vital signs within normal range for age by end of shift  Outcome: Progressing  Goal: Increase self care and/or family involovement by end of shift  Outcome: Progressing

## 2025-06-18 NOTE — CONSULTS
Pharmacy Consult for Warfarin (Coumadin) Management - Daily Progress Note     - Warfarin history:  admitted on warfarin     - Bleeding/bruising events in past 24 hours:  not applicable    - Notable medication changes in past 24 hours:  not applicable    - Additional notes:  not applicable      Anticoagulation Dosing History  Previous home dose:  Warfarin 7.5 mg daily    Labs  POC INR   Date Value Ref Range Status   04/15/2025 1.60 Normal Range: .9-1.1 Final   01/30/2025 2.20 Normal Range: .9-1.1 Final   12/23/2024 1.90 Normal Range: .9-1.1 Final     INR External   Date Value Ref Range Status   05/28/2025 2.30  Final   04/29/2025 2.40  Final   04/01/2025 2.90  Final     INR   Date Value Ref Range Status   06/18/2025 1.0 0.9 - 1.1 Final   06/17/2025 1.2 (H) 0.9 - 1.1 Final   06/16/2025 3.5 (H) 0.9 - 1.1 Final     Results from last 7 days   Lab Units 06/18/25  0417 06/17/25  0355 06/16/25  0406   HEMOGLOBIN g/dL 15.3 15.2 17.1*     Results from last 7 days   Lab Units 06/18/25  0417 06/17/25  0355 06/16/25  0406   HEMATOCRIT % 44.7 44.6 50.8*     Results from last 7 days   Lab Units 06/18/25  0417 06/17/25  0355 06/16/25  0406   PLATELETS AUTO x10*3/uL 134* 180 207     Review    Treatment Indication:  Atrial Fibrillation/Atrial Flutter  Target INR:  2-3    Inpatient dosing pattern:  6/15: warfarin held, vitamin K given PO  6/16: 5 mg x1  6/17: 7.5 mg x1    Warfarin Dosing Plan: Warfarin 10 mg x 1 today and repeat PT/INR tomorrow.    Orders placed per pharmacy consult. Pharmacy will continue to monitor and adjust therapy as needed.     Becki PAPA Friday Peace Silva

## 2025-06-18 NOTE — CARE PLAN
Problem: Pain - Adult  Goal: Verbalizes/displays adequate comfort level or baseline comfort level  Outcome: Progressing     Problem: Safety - Adult  Goal: Free from fall injury  Outcome: Progressing     Problem: Discharge Planning  Goal: Discharge to home or other facility with appropriate resources  Outcome: Progressing     Problem: Chronic Conditions and Co-morbidities  Goal: Patient's chronic conditions and co-morbidity symptoms are monitored and maintained or improved  Outcome: Progressing     Problem: Nutrition  Goal: Nutrient intake appropriate for maintaining nutritional needs  Outcome: Progressing     Problem: Skin  Goal: Participates in plan/prevention/treatment measures  Outcome: Progressing  Goal: Prevent/manage excess moisture  Outcome: Progressing  Goal: Prevent/minimize sheer/friction injuries  Outcome: Progressing  Goal: Promote/optimize nutrition  Outcome: Progressing     Problem: Diabetes  Goal: Decrease in ketones present in urine by end of shift  Outcome: Progressing  Goal: Maintain electrolyte levels within acceptable range throughout shift  Outcome: Progressing  Goal: Maintain glucose levels >70mg/dl to <250mg/dl throughout shift  Outcome: Progressing  Goal: No changes in neurological exam by end of shift  Outcome: Progressing  Goal: Learn about and adhere to nutrition recommendations by end of shift  Outcome: Progressing  Goal: Vital signs within normal range for age by end of shift  Outcome: Progressing  Goal: Increase self care and/or family involovement by end of shift  Outcome: Progressing

## 2025-06-18 NOTE — DISCHARGE INSTRUCTIONS
"You are admitted with a diabetic complication called \"euglycemic diabetic ketoacidosis\".  This is a diabetic emergency that can be caused by certain diabetes medicines, including Jardiance.  Please stop taking your Jardiance until you follow-up with your primary care doctor; we highly recommend that you stop this medication going forward.    Ozempic is sometimes associated with \"euglycemic diabetic ketoacidosis\".  It is less commonly associated with this emergency than Jardiance.  Please talk to your doctor about whether continuing Ozempic is right for you.    We have started you on a new diabetic insulin, called Lantus.  This is a long-acting insulin that you need to take once a day.  Please take this medication daily and check your blood sugar each morning before you eat breakfast.    When you follow-up with your family doctor, please bring your blood sugar logs and your discharge instructions.  We highly recommend you follow-up with your doctor within 1 week.    Your INR was slightly off during your hospital stay.  Please take warfarin 10mg today (2 of your 5mg pills), then start taking your 7.5mg pills tomorrow until you next follow-up with the warfarin clinic this Friday.     Thank you very much for allowing us to be part of your care.  We wish you the very best health in the future!  "

## 2025-06-18 NOTE — HH CARE COORDINATION
Home Care received a Referral for Physical Therapy and Occupational Therapy. We have processed the referral for a Start of Care on 06-19-25, 24-48 HOURS.     If you have any questions or concerns, please feel free to contact us at [phone]. Follow the prompts, enter your five digit zip code, and you will be directed to your care team on [facility].

## 2025-06-18 NOTE — DISCHARGE SUMMARY
Discharge Summary    Name: Roselia Mo  YOB: 1970  MRN: [id number]  Admit Date: 6/15/2025   Discharge Date: 6/18/2025    Discharge Diagnoses  1.  Euglycemic diabetic ketoacidosis  2.  Hypokalemia: Resolved.    Outpatient Follow-up Items  Pertinent New Findings Requiring Follow-up:   Hepatomegaly with hypodensities throughout the liver, cyst versus hemangiomas.  Medication Changes:   Warfarin 10 mg once today, then decrease to 7.5 mg daily.  Stopped empagliflozin 10mg daily.  Started Lantus 10 units nightly.  Prescribed glucometer with lancets, alcohol swabs, glucose strips, pen needles.  Other Follow-up Instructions:   Recommend follow-up with primary care provider within 1 week of hospital discharge.  Recommend repeat TSH with thyroid studies in 2-4 weeks.    Hospital Course  Ms. Roselia Mo is a 54-year-old female with a pertinent history of T2DM (on Jardiance), NALFD who was admitted to [facility] 06/15/25 - 06/18/25 with euglycemic diabetic ketoacidosis.    She presented with nausea, vomiting, and generalized weakness of 4 days duration.  Of note, her prescribed Ozempic dose had been increased several days prior thereto.  Initial evaluation was notable for anion gap metabolic acidosis (pH 7.25, pCO2 19 mmHg, HCO3 8.3 mmol/L, GAP 28), elevated beta-hydroxybutyrate (>24 mmol/L), neutrophil-predominant leukocytosis (WBC 11.6 k/uL), PT >100, INR undetectable.  Radiography showed no acute abnormality of the abdomen/pelvis.  She was admitted to the ICU and started on an insulin drip with dextrose infusion; she remained on insulin drip until her anion gap closed, on day 3 of hospitalization.  At that time, she was transitioned to subcutaneous Lantus with sliding scale with meals.    In light of lack of infectious symptoms, etiology of current presentation was suspected to be secondary to dehydration and prescribed SGLT-2i.  She was advised that Ozempic may be also contributing to her  presentation, though that this was less likely.  Accordingly, Jardiance was discontinued and she was started on Lantus daily.  She was provided prescription for glucometer with associated supplies and was referred to home health care for PT/OT upon discharge.  She was encouraged to follow-up with her primary care provider within 1 week of hospital discharge to reassess her current medications/determine whether dose adjustments to insulin is needed.    On day of discharge, she had some persistent abdominal cramping which she suspected was due to the protein shakes she consumed during her stay.  Her symptoms improved that afternoon, and she felt comfortable with discharge home.  She was discharged home with her family early that afternoon.  All voiced questions/concerns were addressed prior to departure.     Objective Findings on Day of Discharge  Vital Signs (x24h):  Temp:  [36 °C (96.8 °F)-36.4 °C (97.5 °F)] 36.4 °C (97.5 °F)  Heart Rate:  [] 84  Resp:  [15-29] 18  BP: (105-156)/() 110/63    Physical Exam on Day of Discharge (at ~09h00):  General: Alert, resting with eyes open, semi-Nelson's in bed. No evident acute distress.   Eyes: Pupils equal and round, with symmetric eye movements.   Neck: Trachea midline.  No JVD.  Cardiovascular: Regular rate and rhythm. No murmurs, rubs, gallops. Capillary refill <2.5 seconds. No evident peripheral edema.   Pulmonary: Chest clear to auscultation bilaterally. Normal work of breathing.   Abdominal: Soft, non-tender, non-distended. Bowel sounds present.   Neurological: Moves all extremities. No evident focal neurological deficits.     Laboratory Studies  Lab Results   Component Value Date    WBC 5.7 06/18/2025    HGB 15.3 06/18/2025    HCT 44.7 06/18/2025    MCV 90 06/18/2025     (L) 06/18/2025     Lab Results   Component Value Date     06/18/2025    K 3.5 06/18/2025     06/18/2025    CO2 24 06/18/2025    CREATININE 0.55 06/18/2025    BUN 11  06/18/2025    CALCIUM 9.6 06/18/2025    PHOS 1.6 (L) 06/16/2025     Lab Results   Component Value Date    AST 13 06/18/2025    ALT 8 06/18/2025    ALKPHOS 206 (H) 06/18/2025    BILITOT 0.8 06/18/2025    ALBUMIN 3.8 06/18/2025    INR 1.0 06/18/2025    PROTIME 11.4 06/18/2025    APTT 116 (HH) 06/15/2025     Discharge Medications     Medication List      PAUSE taking these medications     empagliflozin 10 mg tablet; Wait to take this until your doctor or other   care provider tells you to start again.; Commonly known as: Jardiance;   Take 1 tablet (10 mg) by mouth once daily.     START taking these medications     insulin glargine 100 unit/mL injection; Commonly known as: Lantus;   Inject 10 Units under the skin once daily at bedtime. Take as directed per   insulin instructions.     CHANGE how you take these medications     warfarin 7.5 mg tablet; Commonly known as: Coumadin; Take as directed.   If you are unsure how to take this medication, talk to your nurse or   doctor.; Original instructions: Take as directed per After Visit Summary.;   What changed: Another medication with the same name was removed. Continue   taking this medication, and follow the directions you see here.     CONTINUE taking these medications     acetaminophen 500 mg tablet; Commonly known as: Tylenol   albuterol 90 mcg/actuation aerosol powdr breath activated inhaler   allopurinol 300 mg tablet; Commonly known as: Zyloprim; Take 1 tablet   (300 mg) by mouth once daily.   aMILoride 5 mg tablet; Commonly known as: Midamor; Take 4 tablets (20   mg) by mouth 2 times a day.   amLODIPine 10 mg tablet; Commonly known as: Norvasc; Take 1 tablet (10   mg) by mouth once daily.   buPROPion  mg 24 hr tablet; Commonly known as: Wellbutrin XL; Take   1 tablet (150 mg) by mouth once daily in the morning. Do not crush, chew,   or split.   cetirizine 10 mg tablet; Commonly known as: ZyrTEC   diclofenac sodium 1 % gel; Commonly known as: Voltaren; Apply  2.25   inches (2 g) topically 4 times a day.   DULoxetine 60 mg DR capsule; Commonly known as: Cymbalta; Take 1 capsule   (60 mg) by mouth 2 times a day. Do not crush or chew.   furosemide 20 mg tablet; Commonly known as: Lasix; Take 1 tablet (20 mg)   by mouth once daily.   * levothyroxine 50 mcg tablet; Commonly known as: Synthroid, Levoxyl;   Take 1 tablet (50 mcg) by mouth once daily in the morning. Take before   meals. TAKE WITH 200MG   * levothyroxine 200 mcg tablet; Commonly known as: Synthroid, Levoxyl;   Take 1 tablet (200 mcg) by mouth early in the morning.. Take on an empty   stomach at the same time each day, either 30 to 60 minutes prior to   breakfast   lidocaine 5 % patch; Commonly known as: Lidoderm; Place 1 patch over 12   hours on the skin once daily. Apply to painful area 12 hours per day,   remove for 12 hours.   * metoprolol succinate XL 25 mg 24 hr tablet; Commonly known as:   Toprol-XL; Take 1 tablet (25 mg) by mouth once daily. Take with 50mg   tablet for daily total of 75mg   * metoprolol succinate XL 50 mg 24 hr tablet; Commonly known as:   Toprol-XL; Take 1 tablet (50 mg) by mouth once daily. Do not crush or   chew.   nystatin (bulk) 1 million unit powder; Apply bid prn   oxygen gas therapy; Commonly known as: O2; Inhale 1 each every 12 hours.   pantoprazole 40 mg EC tablet; Commonly known as: ProtoNix; Take 1 tablet   (40 mg) by mouth 2 times a day. Do not crush, chew, or split.   polyethylene glycol 17 gram packet; Commonly known as: Glycolax,   Miralax; Take 17 g by mouth once daily.   rosuvastatin 40 mg tablet; Commonly known as: Crestor; Take 1 tablet (40   mg) by mouth once daily.   semaglutide 2 mg/dose (8 mg/3 mL) pen injector; Inject 2 mg under the   skin 1 (one) time per week.   Shingrix (PF) 50 mcg/0.5 mL vaccine; Generic drug: zoster   vaccine-recombinant adjuvanted; Inject 0.5 mL into the muscle see   administration instructions.   topiramate 25 mg tablet; Commonly known as:  Topamax; TAKE 1 TABLET BY   MOUTH TWICE A DAY   Wrist Brace Large misc; Generic drug: arm brace; 1 1e11 [employer]   once daily. To wear for R WRIST, TO WEAR DURING THE DAY, OFF AT NIGHT.  * This list has 4 medication(s) that are the same as other medications   prescribed for you. Read the directions carefully, and ask your doctor or   other care provider to review them with you.     STOP taking these medications     Prevnar 20 (PF) 0.5 mL vaccine; Generic drug: pneumoc 20-home conj-dip   cr(PF)     Nataliya Yeung MD

## 2025-06-18 NOTE — NURSING NOTE
Discharge Note: 6/18/2025 1322 AVS and pt responsibilities reviewed with pt and copy given. DKA. Diabetes and diet, Blood sugar tracking, education reviewed with pt and information sheets given. Pt verbalizes understanding of instructions received, verbalizes understanding of when to seek medical attention, denies any home going or personal care needs. Denies further questions or concerns. Reviewed follow up appts with pt and verbalizes understanding. Discharged via wheelchair to private car accompanied by yehuda, personal belongings taken with pt, no distress noted, no complaints voiced. Christel WOOD

## 2025-06-18 NOTE — OUTREACH NOTE
Prep Survey      Flowsheet Row Responses   Religious facility patient discharged from? Non-BH   Is LACE score < 7 ? Non-BH Discharge   Eligibility Not Eligible   What are the reasons patient is not eligible? Other  [Seen pcp in 2022]   Does the patient have one of the following disease processes/diagnoses(primary or secondary)? Other   Prep survey completed? Yes            Juany HERNANDEZ - Registered Nurse

## 2025-06-20 ENCOUNTER — TELEPHONE (OUTPATIENT)
Dept: PRIMARY CARE | Facility: CLINIC | Age: 55
End: 2025-06-20
Payer: MEDICARE

## 2025-06-20 ENCOUNTER — PATIENT OUTREACH (OUTPATIENT)
Dept: PRIMARY CARE | Facility: CLINIC | Age: 55
End: 2025-06-20
Payer: MEDICARE

## 2025-06-20 LAB
BACTERIA BLD CULT: NORMAL
BACTERIA BLD CULT: NORMAL

## 2025-06-20 NOTE — TELEPHONE ENCOUNTER
----- Message from Anitha Serrano sent at 6/20/2025 11:40 AM EDT -----  Regarding: tcm    Can you please contact pt to schedule hosp follow up within 13 days of discharge on or before 7/1/2025  [place] [facility] [place] Diagnosis: DM2  Admission Date: 6/15/2025  Discharge Date: 6/18/2025        Thank you

## 2025-06-20 NOTE — PROGRESS NOTES
Discharge Facility: [facility]  Discharge Diagnosis: DM2  Admission Date: 6/15/2025  Discharge Date: 6/18/2025    PCP Appointment Date: none- 10/15/2025  Specialist Appointment Date:   -nephrology 3/31/2026    Hospital Encounter and Summary Linked: Yes  ED to Hosp-Admission (Discharged) with Nataliya Yeung MD; Damien Thomason DO (06/15/2025)   See discharge assessment below for further details    Hospital Course  Ms. Roselia Mo is a 54-year-old female with a pertinent history of T2DM (on Jardiance), NALFD who was admitted to [facility] 06/15/25 - 06/18/25 with euglycemic diabetic ketoacidosis.     She presented with nausea, vomiting, and generalized weakness of 4 days duration.  Of note, her prescribed Ozempic dose had been increased several days prior thereto.  Initial evaluation was notable for anion gap metabolic acidosis (pH 7.25, pCO2 19 mmHg, HCO3 8.3 mmol/L, GAP 28), elevated beta-hydroxybutyrate (>24 mmol/L), neutrophil-predominant leukocytosis (WBC 11.6 k/uL), PT >100, INR undetectable.  Radiography showed no acute abnormality of the abdomen/pelvis.  She was admitted to the ICU and started on an insulin drip with dextrose infusion; she remained on insulin drip until her anion gap closed, on day 3 of hospitalization.  At that time, she was transitioned to subcutaneous Lantus with sliding scale with meals.     In light of lack of infectious symptoms, etiology of current presentation was suspected to be secondary to dehydration and prescribed SGLT-2i.  She was advised that Ozempic may be also contributing to her presentation, though that this was less likely.  Accordingly, Jardiance was discontinued and she was started on Lantus daily.  She was provided prescription for glucometer with associated supplies and was referred to home health care for PT/OT upon discharge.  She was encouraged to follow-up with her primary care provider within 1 week of hospital discharge to reassess her current  medications/determine whether dose adjustments to insulin is needed.     On day of discharge, she had some persistent abdominal cramping which she suspected was due to the protein shakes she consumed during her stay.  Her symptoms improved that afternoon, and she felt comfortable with discharge home.  She was discharged home with her family early that afternoon.  All voiced questions/concerns were addressed prior to departure.     Wrap Up  Wrap Up Additional Comments: CTS spoke with patient. She was admitted to [facility] on 6/15/2025 with DM2. Discharged home with home care on 6/18/2025. PAtient stated that she is doing better. Still having issues with her blood sugars. We did review her medications. Patient stated that home care is due to [facility] tomorrow 6/21/2025. Patient does need an appt with PCP. There were no available appts with PCP. Will send a message to clerical pool. Patient voiced no questions or concerns at this time. Patient has my contact information for non- emergent issues that may arise. (6/20/2025 11:29 AM)  Call End Time: 1134 (6/20/2025 11:29 AM)    Engagement  Call Start Time: 1130 (6/20/2025 11:29 AM)    Medications  Medications reviewed with patient/caregiver?: Yes (6/20/2025 11:29 AM)  Is the patient having any side effects they believe may be caused by any medication additions or changes?: No (6/20/2025 11:29 AM)  Does the patient have all medications ordered at discharge?: Yes (6/20/2025 11:29 AM)  Care Management Interventions: No intervention needed (6/20/2025 11:29 AM)  Prescription Comments: start insulin. change: counadin . pause: jardiance. (6/20/2025 11:29 AM)  Is the patient taking all medications as directed (includes completed medication regime)?: Yes (6/20/2025 11:29 AM)  Medication Comments: see med list (6/20/2025 11:29 AM)    Appointments  Does the patient have a primary care provider?: Yes (6/20/2025 11:29 AM)  Care Management Interventions: Advised patient to make  appointment (6/20/2025 11:29 AM)  Has the patient kept scheduled appointments due by today?: Yes (6/20/2025 11:29 AM)  Care Management Interventions: Advised patient to keep appointment (6/20/2025 11:29 AM)    Self Management  What is the home health agency?:  home care (6/20/2025 11:29 AM)  Has home health visited the patient within 72 hours of discharge?: Call prior to 72 hours (6/20/2025 11:29 AM)  What Durable Medical Equipment (DME) was ordered?: n/a (6/20/2025 11:29 AM)    Patient Teaching  Does the patient have access to their discharge instructions?: Yes (6/20/2025 11:29 AM)  Care Management Interventions: Reviewed instructions with patient (6/20/2025 11:29 AM)  What is the patient's perception of their health status since discharge?: Improving (6/20/2025 11:29 AM)  Is the patient/caregiver able to teach back the hierarchy of who to call/visit for symptoms/problems? PCP, Specialist, Home Health nurse, Urgent Care, ED, 911: Yes (6/20/2025 11:29 AM)  Patient/Caregiver Education Comments: see wrap up (6/20/2025 11:29 AM)

## 2025-06-21 ENCOUNTER — HOME CARE VISIT (OUTPATIENT)
Dept: HOME HEALTH SERVICES | Facility: HOME HEALTH | Age: 55
End: 2025-06-21
Payer: MEDICARE

## 2025-06-21 LAB
ATRIAL RATE: 111 BPM
P AXIS: 82 DEGREES
P OFFSET: 212 MS
P ONSET: 152 MS
PR INTERVAL: 150 MS
Q ONSET: 227 MS
QRS COUNT: 18 BEATS
QRS DURATION: 74 MS
QT INTERVAL: 314 MS
QTC CALCULATION(BAZETT): 427 MS
QTC FREDERICIA: 385 MS
R AXIS: -18 DEGREES
T AXIS: 75 DEGREES
T OFFSET: 384 MS
VENTRICULAR RATE: 111 BPM

## 2025-06-21 PROCEDURE — 169592 NO-PAY CLAIM PROCEDURE

## 2025-06-21 PROCEDURE — G0151 HHCP-SERV OF PT,EA 15 MIN: HCPCS | Mod: HHH

## 2025-06-21 SDOH — HEALTH STABILITY: PHYSICAL HEALTH: EXERCISE TYPE: SKILLED THERAPEUTIC EXERCISES

## 2025-06-21 ASSESSMENT — BALANCE ASSESSMENTS
BALANCE SCORE: 6
TURNING 360 DEGREES STEPS: 0 - DISCONTINUOUS STEPS
ATTEMPTS TO ARISE: 1 - ABLE, REQUIRES MORE THAN ONE ATTEMPT
IMMEDIATE STANDING BALANCE FIRST 5 SECONDS: 1 - STEADY BUT USES WALKER OR OTHER SUPPORT
ARISING SCORE: 1
SITTING DOWN: 1 - USES ARMS OR NOT SMOOTH MOTION
ARISES: 1 - ABLE, USES ARMS TO HELP
NUDGED: 0 - BEGINS TO FALL
SITTING BALANCE: 1 - STEADY, SAFE
NUDGED SCORE: 0
EYES CLOSED AT MAXIMUM POSITION NUDGED: 0 - UNSTEADY
STANDING BALANCE: 1 - STEADY BUT WIDE STANCE AND USES CANE OR OTHER SUPPORT

## 2025-06-21 ASSESSMENT — ACTIVITIES OF DAILY LIVING (ADL)
PHYSICAL_TRANSFERS_DEVICES: BODY SUPPORT
PHYSICAL TRANSFERS ASSESSED: 1
AMBULATION ASSISTANCE: MAXIMUM ASSIST
AMBULATION ASSISTANCE ON FLAT SURFACES: 1
CURRENT_FUNCTION: MAXIMUM ASSIST
ENTERING_EXITING_HOME: MAXIMUM ASSIST
AMBULATION ASSISTANCE: 1
AMBULATION ASSISTANCE: STAND BY ASSIST
OASIS_M1830: 03

## 2025-06-21 ASSESSMENT — GAIT ASSESSMENTS
TRUNK: 1 - NO SWAY BUT FLEXION OF KNEES OR BACK OR SPREADS ARMS WHILE WALKING
STEP SYMMETRY: 0 - RIGHT AND LEFT STEP LENGTH NOT EQUAL
GAIT SCORE: 2
PATH: 1 - MILD/MODERATE DEVIATION OR USES WALKING AID
STEP CONTINUITY: 0 - STOPPING OR DISCONTINUITY BETWEEN STEPS
TRUNK SCORE: 1
PATH SCORE: 1
BALANCE AND GAIT SCORE: 8
INITIATION OF GAIT IMMEDIATELY AFTER GO: 0 - ANY HESITANCY OR MULTIPLE ATTEMPTS TO START
WALKING STANCE: 0 - HEELS APART

## 2025-06-21 ASSESSMENT — ENCOUNTER SYMPTOMS
PAIN: 1
PAIN LOCATION: LEFT KNEE
PERSON REPORTING PAIN: PATIENT
PAIN LOCATION: RIGHT KNEE
PAIN LOCATION - PAIN SEVERITY: 7/10
PAIN LOCATION - PAIN SEVERITY: 7/10
HYPERTENSION: 1

## 2025-06-22 NOTE — HOME HEALTH
GOALS: PATIENT WILL DEMONSTRATE IMPROVED ENDURANCE, STRENGTH, MOBILITY, AND BALANCE.  SUBJECTIVE: THE PATIENT REPORTED BILATERAL KNEE PAINS. PATIENT REPORTED DIFFICULTY WITH AMBULATION.  PRIOR LEVEL OF FUNCTION: MAX ASSIST NEEDED FOR TRANSFERS AND AMBULATION.  OBJECTIVE: MANUAL MUSCLE TESTING WAS PERFORMED TO DETERMINE BOTH CORE AND LE STRENGTH. PATIENT DEMONSTRATED 2-/5 MUSCLE STRENGTH TO CORE AND NEGIN LE'S. BALANCE TESTING WAS PERFORMED WHICH SHOWED PATIENT TO BE A HIGH FALL RISK. PATIENT WAS ABLE TO WALK WITH ASSISTIVE DEVICE, BUT DEMONSTRATED DIFFICULTY WITH TRANSFERS FROM STS AND GAIT ABNORMALITIES ALONG WITH BALANCE IMPAIRMENT.  THERAPEUTIC ACTIVITIES: MMT, TINETTI, MOBILITY, AND GAIT ASSESSMENT COMPLETED  ASSESSMENT: DEMONSTRATED GROSSLY REDUCED STRENGTH AND ENDURANCE SECONDARY TO ASSOCIATED COMORBID CONDITIONS. PATIENT, PRESENTLY, IS A FALL RISK, CURRENTLY AMBULATING WITH A WALKER. FALL PRECAUTIONS DISCUSSED.  PLAN: CONSIDER COMORBID FACTORS WHEN IMPLEMENTING POC. CONTINUE WITH GENERAL ENDURANCE AND STRENGTH TRAINING UE'S AND LE'S, GAIT TRAINING ACTIVITIES, BALANCE AND PROPRIOCEPTIVE TRAINING, AND FALL PREVENTION STRATEGIES AS PER PT POC. PROGRESS AS TOLERATED.  PLAN FOR NEXT VISIT: SKILLED EXERCISES, BALANCE TRAINING, ENDURANCE AND STRENGTH TRAINING, TRANSFER TRAINING.  REHAB POTENTIALS FOR STATED GOALS: GOOD  PATIENT STATED GOAL: ABLE TO WALK WITHOUT DIFFICULTY.

## 2025-06-23 ENCOUNTER — APPOINTMENT (OUTPATIENT)
Dept: PHYSICAL THERAPY | Facility: CLINIC | Age: 55
End: 2025-06-23

## 2025-06-23 ENCOUNTER — HOME CARE VISIT (OUTPATIENT)
Dept: HOME HEALTH SERVICES | Facility: HOME HEALTH | Age: 55
End: 2025-06-23
Payer: MEDICARE

## 2025-06-23 PROCEDURE — G0152 HHCP-SERV OF OT,EA 15 MIN: HCPCS | Mod: HHH

## 2025-06-23 ASSESSMENT — ACTIVITIES OF DAILY LIVING (ADL)
BATHING ASSESSED: 1
BATHING_CURRENT_FUNCTION: MINIMUM ASSIST
DRESSING_LB_CURRENT_FUNCTION: MINIMUM ASSIST
PREPARING MEALS: NEEDS ASSISTANCE

## 2025-06-23 ASSESSMENT — ENCOUNTER SYMPTOMS
PAIN: 1
PERSON REPORTING PAIN: PATIENT

## 2025-06-24 ENCOUNTER — HOME CARE VISIT (OUTPATIENT)
Dept: HOME HEALTH SERVICES | Facility: HOME HEALTH | Age: 55
End: 2025-06-24
Payer: MEDICARE

## 2025-06-24 PROCEDURE — G0157 HHC PT ASSISTANT EA 15: HCPCS | Mod: CQ,HHH

## 2025-06-24 SDOH — HEALTH STABILITY: PHYSICAL HEALTH: EXERCISE TYPE: SEATED

## 2025-06-24 SDOH — HEALTH STABILITY: PHYSICAL HEALTH
EXERCISE COMMENTS: PT COMPLETED SEATED THER EX X12 EA:  ANKLE PUMPS     MARCHING           LAQS                HIP ABD/ADD     HS CURLS            HIP ROT

## 2025-06-24 ASSESSMENT — ENCOUNTER SYMPTOMS
LIMITED RANGE OF MOTION: 1
PERSON REPORTING PAIN: PATIENT
PAIN LOCATION - PAIN FREQUENCY: WITH ACTIVITY
OCCASIONAL FEELINGS OF UNSTEADINESS: 1
PAIN LOCATION - EXACERBATING FACTORS: PRESSURE, ACTIVITY
PAIN LOCATION - PAIN SEVERITY: 0/10
LOSS OF SENSATION IN FEET: 1
PAIN LOCATION - PAIN QUALITY: SORE
PAIN: 1
DEPRESSION: 1
PAIN LOCATION: RIGHT KNEE
PAIN LOCATION - RELIEVING FACTORS: REST, MEDS
MUSCLE WEAKNESS: 1

## 2025-06-24 ASSESSMENT — ACTIVITIES OF DAILY LIVING (ADL)
CURRENT_FUNCTION: SUPERVISION
PHYSICAL TRANSFERS ASSESSED: 1
AMBULATION ASSISTANCE: SUPERVISION
AMBULATION ASSISTANCE ON FLAT SURFACES: 1
AMBULATION ASSISTANCE: 1

## 2025-06-25 ENCOUNTER — APPOINTMENT (OUTPATIENT)
Dept: CARDIAC REHAB | Facility: HOSPITAL | Age: 55
End: 2025-06-25
Payer: MEDICARE

## 2025-06-25 ENCOUNTER — APPOINTMENT (OUTPATIENT)
Dept: PRIMARY CARE | Facility: CLINIC | Age: 55
End: 2025-06-25
Payer: MEDICARE

## 2025-06-25 VITALS
HEIGHT: 64 IN | BODY MASS INDEX: 41.15 KG/M2 | DIASTOLIC BLOOD PRESSURE: 78 MMHG | HEART RATE: 96 BPM | SYSTOLIC BLOOD PRESSURE: 121 MMHG | WEIGHT: 241 LBS

## 2025-06-25 DIAGNOSIS — I15.2 HYPERTENSION ASSOCIATED WITH DIABETES: ICD-10-CM

## 2025-06-25 DIAGNOSIS — E55.9 VITAMIN D DEFICIENCY: ICD-10-CM

## 2025-06-25 DIAGNOSIS — E03.9 ACQUIRED HYPOTHYROIDISM: Primary | ICD-10-CM

## 2025-06-25 DIAGNOSIS — E11.59 HYPERTENSION ASSOCIATED WITH DIABETES: ICD-10-CM

## 2025-06-25 DIAGNOSIS — Z09 HOSPITAL DISCHARGE FOLLOW-UP: ICD-10-CM

## 2025-06-25 PROCEDURE — 3078F DIAST BP <80 MM HG: CPT | Performed by: NURSE PRACTITIONER

## 2025-06-25 PROCEDURE — 3074F SYST BP LT 130 MM HG: CPT | Performed by: NURSE PRACTITIONER

## 2025-06-25 PROCEDURE — 3052F HG A1C>EQUAL 8.0%<EQUAL 9.0%: CPT | Performed by: NURSE PRACTITIONER

## 2025-06-25 PROCEDURE — 3048F LDL-C <100 MG/DL: CPT | Performed by: NURSE PRACTITIONER

## 2025-06-25 PROCEDURE — 99495 TRANSJ CARE MGMT MOD F2F 14D: CPT | Performed by: NURSE PRACTITIONER

## 2025-06-25 PROCEDURE — 3008F BODY MASS INDEX DOCD: CPT | Performed by: NURSE PRACTITIONER

## 2025-06-25 PROCEDURE — 1036F TOBACCO NON-USER: CPT | Performed by: NURSE PRACTITIONER

## 2025-06-25 ASSESSMENT — PATIENT HEALTH QUESTIONNAIRE - PHQ9
SUM OF ALL RESPONSES TO PHQ QUESTIONS 1-9: 16
7. TROUBLE CONCENTRATING ON THINGS, SUCH AS READING THE NEWSPAPER OR WATCHING TELEVISION: SEVERAL DAYS
3. TROUBLE FALLING OR STAYING ASLEEP OR SLEEPING TOO MUCH: NEARLY EVERY DAY
1. LITTLE INTEREST OR PLEASURE IN DOING THINGS: NEARLY EVERY DAY
4. FEELING TIRED OR HAVING LITTLE ENERGY: MORE THAN HALF THE DAYS
6. FEELING BAD ABOUT YOURSELF - OR THAT YOU ARE A FAILURE OR HAVE LET YOURSELF OR YOUR FAMILY DOWN: SEVERAL DAYS
SUM OF ALL RESPONSES TO PHQ9 QUESTIONS 1 AND 2: 6
2. FEELING DOWN, DEPRESSED OR HOPELESS: NEARLY EVERY DAY
5. POOR APPETITE OR OVEREATING: NEARLY EVERY DAY
9. THOUGHTS THAT YOU WOULD BE BETTER OFF DEAD, OR OF HURTING YOURSELF: NOT AT ALL
8. MOVING OR SPEAKING SO SLOWLY THAT OTHER PEOPLE COULD HAVE NOTICED. OR THE OPPOSITE, BEING SO FIGETY OR RESTLESS THAT YOU HAVE BEEN MOVING AROUND A LOT MORE THAN USUAL: NOT AT ALL

## 2025-06-25 ASSESSMENT — ENCOUNTER SYMPTOMS
SEIZURES: 0
FLANK PAIN: 0
EYE PAIN: 0
WHEEZING: 0
ARTHRALGIAS: 0
SLEEP DISTURBANCE: 0
APNEA: 0
DIFFICULTY URINATING: 0
NERVOUS/ANXIOUS: 0
JOINT SWELLING: 0
NECK PAIN: 0
FREQUENCY: 0
HEADACHES: 0
ABDOMINAL PAIN: 0
TROUBLE SWALLOWING: 0
CHOKING: 0
NUMBNESS: 0
FACIAL ASYMMETRY: 0
ABDOMINAL DISTENTION: 0
DYSURIA: 0
PALPITATIONS: 0
MYALGIAS: 0
WEAKNESS: 0
BACK PAIN: 0
CONSTIPATION: 0
CONFUSION: 0
CHEST TIGHTNESS: 0
PHOTOPHOBIA: 0
SHORTNESS OF BREATH: 0
VOMITING: 0
CHILLS: 0
FEVER: 0
UNEXPECTED WEIGHT CHANGE: 0
EYE REDNESS: 0
HEMATURIA: 0
FATIGUE: 0
WOUND: 0
SPEECH DIFFICULTY: 0
BLOOD IN STOOL: 0
SORE THROAT: 0
DIZZINESS: 0
NAUSEA: 0
COUGH: 0
DIARRHEA: 0

## 2025-06-25 NOTE — PROGRESS NOTES
"Subjective   Patient ID: Roselia Mo is a 54 y.o. female who presents for Hospital Follow-up (F/U HOSPITAL DISCHARGE 6/18/25 - Euglycemic diabetic ketoacidosis.).      HPI:  DR. PINA PATIENT THAT Presents today for [facility] FOR  DIABETIC KETOACIDOSIS AND HYPOKALEMIA. NO NEW COMPLAINTS     DM- SHE DENIES ANY LOW OR HIGH GLUCOSE LEVELS. STATES HER GLUCOSE RANGES 125-150'S. SHE WAS STARTED ON LANTUS 10 MG BY THE HOSPITAL AND DOING WELL    THYROID- TSH LEVELS WERE LOW IN THE HOSPITAL. SHE IS ON LEVOTHYROXINE. THEY RECOMMENDED REPEAT THYROID LABS IN 2-4 WEEKS. I WILL ORDER LABS AND THYROID US AND HAVE HER FU WITH PCP AFTER.     HTN- STABLE    Visit Vitals  /78   Pulse 96   Ht 1.626 m (5' 4\")   Wt 109 kg (241 lb)   BMI 41.37 kg/m²   OB Status Menopausal   Smoking Status Former   BSA 2.22 m²        Review of Systems   Constitutional:  Negative for chills, fatigue, fever and unexpected weight change.   HENT:  Negative for congestion, ear pain, sore throat and trouble swallowing.    Eyes:  Negative for photophobia, pain, redness and visual disturbance.   Respiratory:  Negative for apnea, cough, choking, chest tightness, shortness of breath and wheezing.    Cardiovascular:  Negative for chest pain, palpitations and leg swelling.   Gastrointestinal:  Negative for abdominal distention, abdominal pain, blood in stool, constipation, diarrhea, nausea and vomiting.   Genitourinary:  Negative for difficulty urinating, dysuria, flank pain, frequency, hematuria and urgency.   Musculoskeletal:  Negative for arthralgias, back pain, gait problem, joint swelling, myalgias and neck pain.   Skin:  Negative for rash and wound.   Neurological:  Negative for dizziness, seizures, syncope, facial asymmetry, speech difficulty, weakness, numbness and headaches.   Psychiatric/Behavioral:  Negative for confusion, sleep disturbance and suicidal ideas. The patient is not nervous/anxious.        Objective   [facility] " RECORDS REVIEWED    Physical Exam  Constitutional:       Appearance: Normal appearance. She is normal weight.   HENT:      Head: Normocephalic.   Eyes:      Extraocular Movements: Extraocular movements intact.      Conjunctiva/sclera: Conjunctivae normal.      Pupils: Pupils are equal, round, and reactive to light.   Cardiovascular:      Rate and Rhythm: Normal rate and regular rhythm.      Pulses: Normal pulses.      Heart sounds: Normal heart sounds.   Pulmonary:      Effort: Pulmonary effort is normal.      Breath sounds: Normal breath sounds.   Musculoskeletal:         General: Normal range of motion.      Cervical back: Normal range of motion.   Skin:     General: Skin is warm and dry.   Neurological:      General: No focal deficit present.      Mental Status: She is alert and oriented to person, place, and time.   Psychiatric:         Mood and Affect: Mood normal.         Behavior: Behavior normal.         Thought Content: Thought content normal.         Judgment: Judgment normal.            Assessment/Plan   Problem List Items Addressed This Visit       Acquired hypothyroidism - Primary    Relevant Orders    Thyroid Stimulating Hormone    Thyroxine, Free    Triiodothyronine, Free    CBC and Auto Differential    Comprehensive Metabolic Panel    US thyroid    Hypertension associated with diabetes    Vitamin D deficiency    Relevant Orders    Vitamin D 25-Hydroxy,Total (for eval of Vitamin D levels)     Other Visit Diagnoses         Hospital discharge follow-up              PLEASE MONITOR CLOSELY FOR ANY UNTOWARD SIDE EFFECTS OR COMPLICATIONS OF MEDICATIONS. PATIENT IS STRONGLY ADVISED TO BE COMPLIANT WITH RECOMMENDATIONS. QUESTIONS AND CONCERNS WERE ADDRESSED. INSTRUCTED TO CALL, RETURN SOONER, OR GO TO THE ER,  IF SYMPTOMS PERSIST OR WORSEN. THEY VOICED UNDERSTANDING AND  DENIES FURTHER QUESTIONS AT THIS TIME.    TIME CODE  1. PREPARATION FOR PATIENT'S VISIT (REVIEWING CHART, CURRENT MEDICAL RECORDS, OUTSIDE  HEALTH PROVIDER RECORDS, PREVIOUS HISTORY, EXAM, TEST, PROCEDURE, AND MEDICATIONS)  2. FACE TO FACE ENCOUNTER OBTAINING HISTORY FROM THE PATIENT/FAMILY/CAREGIVERS; PERFORMING EVALUATION AND EXAMINATION; ORDERING TESTS OR PROCEDURES; REFERRING AND COMMUNICATING WITH OTHER HEALTHCARE PROVIDERS; COUNSELING AND EDUCATION OF THE PATIENT/FAMILY/CAREGIVERS; INDEPENDENTLY INTERPRETING RESULTS (TESTS, LABS, PROCEDURES, IMAGING) AND COMMUNICATING AND EXPLAINING RESULTS TO THE PATIENT/FAMILY/CAREGIVERS  3. COORDINATION OF CARE; PREPARING AND PRINTING DISCHARGE INSTRUCTIONS AND ANY EDUCATIONAL MATERIAL FOR THE PATIENT/FAMILY/CAREGIVERS. DOCUMENTING CLINICAL INFORMATION IN THE ELECTRONIC MEDICAL RECORD   4. REVIEWING OARRS AS NEEDED    MDM  1) COMPLEXITY: MORE THAN 1 STABLE CHRONIC CONDITION ADDRESSED OR 1 ACUTE ILLNESS ADDRESSED   2)DATA: TESTS INTERPRETED AND OR ORDERED, TOOK INDEPENDENT HISTORY OR RECORDS REVIEWED  3)RISK: MODERATE RISK DUE TO NATURE OF MEDICAL CONDITIONS/COMORBIDITY OR MEDICATIONS ORDERED OR SURGICAL OR PROCEDURE REFERRAL    2-4 WEEKS WITH LABS/US WITH PCP

## 2025-06-26 ENCOUNTER — APPOINTMENT (OUTPATIENT)
Dept: RADIOLOGY | Facility: HOSPITAL | Age: 55
End: 2025-06-26
Payer: MEDICARE

## 2025-06-26 ENCOUNTER — HOSPITAL ENCOUNTER (OUTPATIENT)
Dept: RADIOLOGY | Facility: HOSPITAL | Age: 55
End: 2025-06-26
Payer: MEDICARE

## 2025-06-26 ENCOUNTER — HOME CARE VISIT (OUTPATIENT)
Dept: HOME HEALTH SERVICES | Facility: HOME HEALTH | Age: 55
End: 2025-06-26
Payer: MEDICARE

## 2025-06-26 DIAGNOSIS — G43.909 MIGRAINE WITHOUT STATUS MIGRAINOSUS, NOT INTRACTABLE, UNSPECIFIED MIGRAINE TYPE: ICD-10-CM

## 2025-06-27 ENCOUNTER — HOME CARE VISIT (OUTPATIENT)
Dept: HOME HEALTH SERVICES | Facility: HOME HEALTH | Age: 55
End: 2025-06-27
Payer: MEDICARE

## 2025-06-27 PROCEDURE — G0152 HHCP-SERV OF OT,EA 15 MIN: HCPCS | Mod: HHH

## 2025-06-27 RX ORDER — TOPIRAMATE 25 MG/1
25 TABLET, FILM COATED ORAL 2 TIMES DAILY
Qty: 60 TABLET | Refills: 0 | Status: SHIPPED | OUTPATIENT
Start: 2025-06-27

## 2025-06-27 ASSESSMENT — ENCOUNTER SYMPTOMS
PAIN LOCATION: LEFT WRIST
PAIN: 1
PAIN LOCATION - PAIN SEVERITY: 5/10
SUBJECTIVE PAIN PROGRESSION: GRADUALLY IMPROVING
LOWEST PAIN SEVERITY IN PAST 24 HOURS: 5/10
HIGHEST PAIN SEVERITY IN PAST 24 HOURS: 10/10
PAIN LOCATION - RELIEVING FACTORS: MEDS REST
PERSON REPORTING PAIN: PATIENT
PAIN SEVERITY GOAL: 0/10

## 2025-06-27 ASSESSMENT — ACTIVITIES OF DAILY LIVING (ADL)
DRESSING_LB_CURRENT_FUNCTION: MINIMUM ASSIST
BATHING ASSESSED: 1
BATHING_CURRENT_FUNCTION: MINIMUM ASSIST

## 2025-06-30 ENCOUNTER — HOME CARE VISIT (OUTPATIENT)
Dept: HOME HEALTH SERVICES | Facility: HOME HEALTH | Age: 55
End: 2025-06-30
Payer: MEDICARE

## 2025-06-30 PROCEDURE — G0157 HHC PT ASSISTANT EA 15: HCPCS | Mod: CQ,HHH

## 2025-06-30 PROCEDURE — G0152 HHCP-SERV OF OT,EA 15 MIN: HCPCS | Mod: HHH

## 2025-06-30 SDOH — HEALTH STABILITY: PHYSICAL HEALTH: EXERCISE TYPE: SEATED

## 2025-06-30 ASSESSMENT — ENCOUNTER SYMPTOMS
PAIN: 1
PAIN LOCATION - RELIEVING FACTORS: MEDS REST
SUBJECTIVE PAIN PROGRESSION: GRADUALLY IMPROVING
PAIN LOCATION: RIGHT KNEE
PAIN LOCATION - EXACERBATING FACTORS: ACTIVITY
PERSON REPORTING PAIN: PATIENT
PERSON REPORTING PAIN: PATIENT
LOWEST PAIN SEVERITY IN PAST 24 HOURS: 0/10
NAUSEA: 1
PAIN LOCATION - PAIN QUALITY: SHARP
PAIN SEVERITY GOAL: 0/10
SUBJECTIVE PAIN PROGRESSION: WAXING AND WANING
PAIN LOCATION - PAIN FREQUENCY: WITH ACTIVITY
LIMITED RANGE OF MOTION: 1
PAIN LOCATION - RELIEVING FACTORS: MEDS/REST
MUSCLE WEAKNESS: 1
HIGHEST PAIN SEVERITY IN PAST 24 HOURS: 10/10
LOWEST PAIN SEVERITY IN PAST 24 HOURS: 4/10
HIGHEST PAIN SEVERITY IN PAST 24 HOURS: 8/10
PAIN LOCATION - PAIN QUALITY: ACHE
PAIN LOCATION: RIGHT KNEE
PAIN: 1
PAIN SEVERITY GOAL: 0/10
PAIN LOCATION - PAIN SEVERITY: 8/10
PAIN LOCATION - PAIN SEVERITY: 0/10

## 2025-06-30 ASSESSMENT — ACTIVITIES OF DAILY LIVING (ADL)
DRESSING_LB_CURRENT_FUNCTION: MINIMUM ASSIST
AMBULATION ASSISTANCE: 1
AMBULATION_DISTANCE/DURATION_TOLERATED: X~150’
PREPARING MEALS: NEEDS ASSISTANCE
AMBULATION ASSISTANCE: SUPERVISION
PHYSICAL TRANSFERS ASSESSED: 1
BATHING ASSESSED: 1
AMBULATION ASSISTANCE ON FLAT SURFACES: 1
BATHING_CURRENT_FUNCTION: MINIMUM ASSIST
CURRENT_FUNCTION: SUPERVISION

## 2025-07-01 NOTE — SIGNIFICANT NOTE
12/07/21 0955   Coping/Psychosocial   Observed Emotional State calm; pleasant; happy   Verbalized Emotional State acceptance; hopefulness; happiness   Trust Relationship/Rapport questions answered   Family/Support Persons spouse   Involvement in Care interacting with patient   Additional Documentation Spiritual Care (Group)   Spiritual Care   Use of Spiritual Resources spirituality for coping, indicated strong use of   Spiritual Care Source  initiative   Spiritual Care Follow-Up follow-up, none required as presently assessed   Response to Spiritual Care receptive of support; thanks expressed   Spiritual Care Interventions supportive conversation provided   Spiritual Care Visit Type initial   Receptivity to Spiritual Care visit welcomed      No

## 2025-07-02 ENCOUNTER — HOSPITAL ENCOUNTER (OUTPATIENT)
Dept: RADIOLOGY | Facility: HOSPITAL | Age: 55
Discharge: HOME | End: 2025-07-02
Payer: MEDICARE

## 2025-07-02 ENCOUNTER — TELEPHONE (OUTPATIENT)
Dept: PRIMARY CARE | Facility: CLINIC | Age: 55
End: 2025-07-02
Payer: MEDICARE

## 2025-07-02 ENCOUNTER — HOME CARE VISIT (OUTPATIENT)
Dept: HOME HEALTH SERVICES | Facility: HOME HEALTH | Age: 55
End: 2025-07-02
Payer: MEDICARE

## 2025-07-02 ENCOUNTER — CLINICAL SUPPORT (OUTPATIENT)
Dept: CARDIAC REHAB | Facility: HOSPITAL | Age: 55
End: 2025-07-02
Payer: MEDICARE

## 2025-07-02 ENCOUNTER — PATIENT OUTREACH (OUTPATIENT)
Dept: PRIMARY CARE | Facility: CLINIC | Age: 55
End: 2025-07-02
Payer: MEDICARE

## 2025-07-02 ENCOUNTER — ANTICOAGULATION - WARFARIN VISIT (OUTPATIENT)
Dept: PHARMACY | Facility: HOSPITAL | Age: 55
End: 2025-07-02
Payer: MEDICARE

## 2025-07-02 VITALS
RESPIRATION RATE: 20 BRPM | OXYGEN SATURATION: 95 % | HEART RATE: 98 BPM | DIASTOLIC BLOOD PRESSURE: 72 MMHG | BODY MASS INDEX: 40.23 KG/M2 | SYSTOLIC BLOOD PRESSURE: 120 MMHG | WEIGHT: 234.4 LBS

## 2025-07-02 DIAGNOSIS — I81 PORTAL VEIN THROMBOSIS: Primary | ICD-10-CM

## 2025-07-02 DIAGNOSIS — I50.32 CHRONIC DIASTOLIC CONGESTIVE HEART FAILURE: Primary | ICD-10-CM

## 2025-07-02 DIAGNOSIS — I82.0 HEPATIC VEIN THROMBOSIS (MULTI): ICD-10-CM

## 2025-07-02 DIAGNOSIS — R92.8 OTHER ABNORMAL AND INCONCLUSIVE FINDINGS ON DIAGNOSTIC IMAGING OF BREAST: ICD-10-CM

## 2025-07-02 DIAGNOSIS — E03.9 ACQUIRED HYPOTHYROIDISM: ICD-10-CM

## 2025-07-02 DIAGNOSIS — M10.9 GOUTY ARTHRITIS: Primary | ICD-10-CM

## 2025-07-02 LAB
ANION GAP SERPL CALC-SCNC: 14 MMOL/L (ref 10–20)
BNP SERPL-MCNC: 13 PG/ML (ref 0–99)
BUN SERPL-MCNC: 11 MG/DL (ref 6–23)
CALCIUM SERPL-MCNC: 10.5 MG/DL (ref 8.6–10.3)
CHLORIDE SERPL-SCNC: 109 MMOL/L (ref 98–107)
CO2 SERPL-SCNC: 20 MMOL/L (ref 21–32)
CREAT SERPL-MCNC: 0.6 MG/DL (ref 0.5–1.05)
EGFRCR SERPLBLD CKD-EPI 2021: >90 ML/MIN/1.73M*2
GLUCOSE SERPL-MCNC: 158 MG/DL (ref 74–99)
INR IN PPP BY COAGULATION ASSAY EXTERNAL: 5.3
POTASSIUM SERPL-SCNC: 3.7 MMOL/L (ref 3.5–5.3)
PROTHROMBIN TIME (PT) IN PPP BY COAGULATION ASSAY EXTERNAL: NORMAL
SODIUM SERPL-SCNC: 139 MMOL/L (ref 136–145)

## 2025-07-02 PROCEDURE — 77066 DX MAMMO INCL CAD BI: CPT

## 2025-07-02 PROCEDURE — 83880 ASSAY OF NATRIURETIC PEPTIDE: CPT

## 2025-07-02 PROCEDURE — 85610 PROTHROMBIN TIME: CPT

## 2025-07-02 PROCEDURE — 99212 OFFICE O/P EST SF 10 MIN: CPT

## 2025-07-02 PROCEDURE — 36415 COLL VENOUS BLD VENIPUNCTURE: CPT

## 2025-07-02 PROCEDURE — 76536 US EXAM OF HEAD AND NECK: CPT

## 2025-07-02 PROCEDURE — 77066 DX MAMMO INCL CAD BI: CPT | Performed by: RADIOLOGY

## 2025-07-02 PROCEDURE — 76536 US EXAM OF HEAD AND NECK: CPT | Performed by: RADIOLOGY

## 2025-07-02 PROCEDURE — 77062 BREAST TOMOSYNTHESIS BI: CPT | Performed by: RADIOLOGY

## 2025-07-02 PROCEDURE — 80048 BASIC METABOLIC PNL TOTAL CA: CPT

## 2025-07-02 PROCEDURE — G0157 HHC PT ASSISTANT EA 15: HCPCS | Mod: CQ,HHH

## 2025-07-02 RX ORDER — PREDNISONE 10 MG/1
30 TABLET ORAL DAILY
Qty: 15 TABLET | Refills: 0 | Status: SHIPPED | OUTPATIENT
Start: 2025-07-02

## 2025-07-02 SDOH — HEALTH STABILITY: PHYSICAL HEALTH
EXERCISE COMMENTS: PT COMPLETED SEATED THER EX X10 EA:  ANKLE PUMPS     MARCHING           LAQS                HIP ABD/ADD     HS CURLS            HIP ROT

## 2025-07-02 SDOH — HEALTH STABILITY: PHYSICAL HEALTH: EXERCISE TYPE: SEATED

## 2025-07-02 ASSESSMENT — ENCOUNTER SYMPTOMS
LIMITED RANGE OF MOTION: 1
HIGHEST PAIN SEVERITY IN PAST 24 HOURS: 10/10
MUSCLE WEAKNESS: 1
PERSON REPORTING PAIN: PATIENT
SUBJECTIVE PAIN PROGRESSION: WAXING AND WANING
PAIN LOCATION - PAIN SEVERITY: 4/10
PAIN LOCATION - PAIN QUALITY: SHARP
PAIN SEVERITY GOAL: 0/10
PAIN LOCATION - RELIEVING FACTORS: REST
PAIN LOCATION - EXACERBATING FACTORS: ACTIVITY
PAIN LOCATION: RIGHT LEG
PAIN: 1
PAIN LOCATION - PAIN FREQUENCY: WITH ACTIVITY
LOWEST PAIN SEVERITY IN PAST 24 HOURS: 4/10

## 2025-07-02 ASSESSMENT — ACTIVITIES OF DAILY LIVING (ADL)
PHYSICAL TRANSFERS ASSESSED: 1
AMBULATION ASSISTANCE: 1
AMBULATION ASSISTANCE: STAND BY ASSIST
AMBULATION ASSISTANCE ON FLAT SURFACES: 1
CURRENT_FUNCTION: SUPERVISION

## 2025-07-02 NOTE — PROGRESS NOTES
Roselia arrived via wheelchair to the [facility] for her scheduled visit and INR. Roselia reports that she was recently admitted to the hospital for DKA, states that she is feeling better, but now feels like she is having a gout flair up. Roselia reports that she has reached out to her PCP and is supposed to be having a medication called in to the pharmacy. Roselia's weight is down 37lbs since her last visit in May. She denies increased shortness of breath, PND, or Orthopnea. Uses CPAP nightly. Eating and sleeping without distress. Home medications reviewed. A BMP and BNP were drawn today and results were called to Evonne Yan DNP. INR was 5.3, results were called to Pharmacist Jono. The plan for Roselia is to hold Coumadin today 7/2/25, tomorrow 7/3/25, Friday 7/47/25 take 1/2 tablet of 7.5mg and then resume 7.5mg daily. Roselia will return in 1 week. Reviewed signs and symptoms of increased heart failure and when to call for help. Patient verbalizes understanding for: Low sodium diet: 2000 mg daily of sodium, follow-up appointment with [facility], weighing self daily, medications dose and schedule, and signs of increased heart failure.      Vitals:  BP: 120/72           HR: 98           Resp: 20          SPO2:95% on room air        Weight: 234.4lb                         Previous Weight: 271.5lbs         Lung Sounds: clear    Edema: none    JVD: absent     Labs: BMP BNP     Medications Administered:     Next Appointment Date: July 10, 2025@ 2pm    Note in EMR for treating Physician: Evonne Yan DNP    In-House Supervising Physician: Dr. Monaco

## 2025-07-02 NOTE — PATIENT INSTRUCTIONS
Continue medications at same dose    Diet  2000 mg (2 gram) sodium diet-Do not add salt to foods or cook with salt. Check labels for Sodium (Na)     Resources  [facility] [phone] Monday - Friday 7:00 am - 3:00 pm  Websites: www.[employer]; American Heart Association: www.heart.org    Special Instructions:  If you smoke-Quit!  If you are not able to contact your doctor and need immediate medical attention, call 911  If you are not able to keep you're scheduled appointment at the [facility], call [phone]  Watch for and report to your doctor all warning signs of Heart Failure (increased difficulty with breathing, 3-5 pound weight gain in one week or less, increased swelling, increased tiredness)  Weigh yourself each day at the same time with the same amount of clothes on. Record your weight log. Bring it with your to each visit

## 2025-07-02 NOTE — PROGRESS NOTES
Pt enrolled in [facility] for management of Portal vein thrombosis [I81].     Pt current location [facility] clinic. Rest of [facility] visit completed by RN per clinic policy.     Current anticoagulant: Warfarin    Time since last visit: 2 weeks    Last INR: 1.00 on warfarin 52.5 mg in the previous week (measured during recent hospitalization from 6/15-6/18 for DKA). Patient was instructed to take 10 mg x1 dose then resume @ 7.5 mg daily upon discharge (no follow up noted w/ the clinic after that in discharge summary).     Last Creatinine:   Lab Results   Component Value Date    CREATININE 0.55 06/18/2025     Last hemoglobin/hematocrit:  Lab Results   Component Value Date    HGB 15.3 06/18/2025     Lab Results   Component Value Date    HCT 44.7 06/18/2025       Current INR: 5.30 is SUPRAtherapeutic for goal range of 2.0-3.0 and is reflective of 52.5 mg in the previous week prior to visit.    Dosing confirmed with patient  [facility] nurse notes significant weight lost in the past few months (~40 lbs) and was recently taken off of the Jardiance in favor of Lantus for T2DM management  Patient denies any missed doses.  Patient denies any diet changes or OTC/herbal supplement changes since last visit.  Patient denies any adverse reactions or barriers.  Patient denies any CP/SOB, fatigue, bleeding or bruising since last visit.   Patient denies any change in alcohol or tobacco use since last visit.   Patient denies any upcoming medical or dental procedures.    Plan:  Patient was instructed to hold dose for 2 days, then take 3.75 mg x1 dose, then 7.5 mg daily thereafter.  INR follow up will occur in 1 week.  Patient was instructed to maintain consistent vegetable intake, to monitor for any bruising or bleeding, and to call with any medication changes or concerns.    Relayed above information to [facility] nurse over the phone    Stephanie UribeD BCPS  P:[phone]  F:[phone]

## 2025-07-02 NOTE — PROGRESS NOTES
Confirmation of at least 2 patient identifiers.    Completed telephonic follow-up with patient after recent visit with Dr Franklin 6/24/2025    Spoke to patient during outreach call.    Patient reports feeling: Improved    Patient has questions or concerns about medications: No    Have all prescribed medications been filled? Yes    Patient has necessary resources to manage their care? Yes    Patient has questions or concerns? No    Next care management follow-up approximately within one month.  Care  information provided to patient.

## 2025-07-02 NOTE — TELEPHONE ENCOUNTER
Patient called in for meds for her gout, in her knees and feet. Patient can barely walk. Patient was called and was notified meds will be called in and told if problem continues she will need to be seen in office.  Thank you

## 2025-07-03 ENCOUNTER — HOME CARE VISIT (OUTPATIENT)
Dept: HOME HEALTH SERVICES | Facility: HOME HEALTH | Age: 55
End: 2025-07-03
Payer: MEDICARE

## 2025-07-03 PROCEDURE — G0152 HHCP-SERV OF OT,EA 15 MIN: HCPCS | Mod: HHH

## 2025-07-03 ASSESSMENT — ACTIVITIES OF DAILY LIVING (ADL)
BATHING_CURRENT_FUNCTION: MINIMUM ASSIST
PREPARING MEALS: NEEDS ASSISTANCE
DRESSING_LB_CURRENT_FUNCTION: MINIMUM ASSIST
BATHING_CURRENT_FUNCTION: CONTACT GUARD ASSIST
BATHING ASSESSED: 1
DRESSING_LB_CURRENT_FUNCTION: CONTACT GUARD ASSIST

## 2025-07-03 ASSESSMENT — ENCOUNTER SYMPTOMS
PERSON REPORTING PAIN: PATIENT
DENIES PAIN: 1

## 2025-07-05 DIAGNOSIS — I81 PVT (PORTAL VEIN THROMBOSIS): ICD-10-CM

## 2025-07-07 ENCOUNTER — HOME CARE VISIT (OUTPATIENT)
Dept: HOME HEALTH SERVICES | Facility: HOME HEALTH | Age: 55
End: 2025-07-07
Payer: MEDICARE

## 2025-07-07 PROCEDURE — G0152 HHCP-SERV OF OT,EA 15 MIN: HCPCS | Mod: HHH

## 2025-07-07 PROCEDURE — G0157 HHC PT ASSISTANT EA 15: HCPCS | Mod: CQ,HHH

## 2025-07-07 RX ORDER — WARFARIN 7.5 MG/1
TABLET ORAL
Qty: 30 TABLET | Refills: 0 | Status: SHIPPED | OUTPATIENT
Start: 2025-07-07

## 2025-07-07 SDOH — HEALTH STABILITY: PHYSICAL HEALTH: EXERCISE TYPE: SEATED

## 2025-07-07 ASSESSMENT — ACTIVITIES OF DAILY LIVING (ADL)
AMBULATION ASSISTANCE: SUPERVISION
BATHING_CURRENT_FUNCTION: MINIMUM ASSIST
PHYSICAL TRANSFERS ASSESSED: 1
BATHING_CURRENT_FUNCTION: CONTACT GUARD ASSIST
BATHING ASSESSED: 1
AMBULATION ASSISTANCE: 1
AMBULATION ASSISTANCE ON FLAT SURFACES: 1
PREPARING MEALS: NEEDS ASSISTANCE
DRESSING_LB_CURRENT_FUNCTION: MINIMUM ASSIST
DRESSING_LB_CURRENT_FUNCTION: CONTACT GUARD ASSIST
CURRENT_FUNCTION: SUPERVISION

## 2025-07-07 ASSESSMENT — ENCOUNTER SYMPTOMS
PAIN LOCATION: RIGHT KNEE
PAIN: 1
SUBJECTIVE PAIN PROGRESSION: GRADUALLY IMPROVING
HIGHEST PAIN SEVERITY IN PAST 24 HOURS: 4/10
PAIN LOCATION - RELIEVING FACTORS: MEDS
PAIN SEVERITY GOAL: 0/10
MUSCLE WEAKNESS: 1
PAIN LOCATION - PAIN SEVERITY: 4/10
PAIN LOCATION - EXACERBATING FACTORS: AMB
PAIN LOCATION - PAIN QUALITY: ACHE
LOWEST PAIN SEVERITY IN PAST 24 HOURS: 0/10
PAIN: 1
PAIN LOCATION - PAIN SEVERITY: 4/10
PERSON REPORTING PAIN: PATIENT
LIMITED RANGE OF MOTION: 1
PAIN SEVERITY GOAL: 0/10
HIGHEST PAIN SEVERITY IN PAST 24 HOURS: 4/10
PAIN LOCATION - PAIN FREQUENCY: CONSTANT
PAIN LOCATION - RELIEVING FACTORS: MEDS REST
PAIN LOCATION: RIGHT LEG
LOWEST PAIN SEVERITY IN PAST 24 HOURS: 3/10
SUBJECTIVE PAIN PROGRESSION: WAXING AND WANING
PERSON REPORTING PAIN: PATIENT
PAIN LOCATION - PAIN QUALITY: VIBRATING

## 2025-07-08 ENCOUNTER — TELEPHONE (OUTPATIENT)
Dept: PRIMARY CARE | Facility: CLINIC | Age: 55
End: 2025-07-08
Payer: MEDICARE

## 2025-07-09 ENCOUNTER — HOME CARE VISIT (OUTPATIENT)
Dept: HOME HEALTH SERVICES | Facility: HOME HEALTH | Age: 55
End: 2025-07-09
Payer: MEDICARE

## 2025-07-09 PROCEDURE — G0157 HHC PT ASSISTANT EA 15: HCPCS | Mod: CQ,HHH

## 2025-07-09 PROCEDURE — G0152 HHCP-SERV OF OT,EA 15 MIN: HCPCS | Mod: HHH

## 2025-07-09 SDOH — HEALTH STABILITY: PHYSICAL HEALTH
EXERCISE COMMENTS: PT COMPLETED 10 OF THE FOLLOWING STANDING EX:  HEEL RAISES  TOE RAISES  SL LEG ABD/ADD  SL LEG EX  MINI SQUATS

## 2025-07-09 SDOH — HEALTH STABILITY: PHYSICAL HEALTH: EXERCISE TYPE: STANDING

## 2025-07-09 ASSESSMENT — ACTIVITIES OF DAILY LIVING (ADL)
BATHING ASSESSED: 1
PHYSICAL TRANSFERS ASSESSED: 1
BATHING_CURRENT_FUNCTION: CONTACT GUARD ASSIST
AMBULATION ASSISTANCE: 1
AMBULATION ASSISTANCE: CONTACT GUARD ASSIST
AMBULATION_DISTANCE/DURATION_TOLERATED: X~120'
DRESSING_LB_CURRENT_FUNCTION: CONTACT GUARD ASSIST
CURRENT_FUNCTION: STAND BY ASSIST
BATHING_CURRENT_FUNCTION: MINIMUM ASSIST
CURRENT_FUNCTION: ONE PERSON
DRESSING_LB_CURRENT_FUNCTION: MINIMUM ASSIST
AMBULATION ASSISTANCE ON FLAT SURFACES: 1
AMBULATION ASSISTANCE: ONE PERSON

## 2025-07-09 ASSESSMENT — ENCOUNTER SYMPTOMS
SUBJECTIVE PAIN PROGRESSION: UNCHANGED
PAIN LOCATION - EXACERBATING FACTORS: WALKING
PAIN LOCATION - PAIN SEVERITY: 4/10
PAIN LOCATION - PAIN FREQUENCY: FREQUENT
PAIN SEVERITY GOAL: 0/10
PAIN LOCATION - RELIEVING FACTORS: MEDS REST
PAIN LOCATION - PAIN QUALITY: ACHE
PAIN: 1
MUSCLE WEAKNESS: 1
PAIN SEVERITY GOAL: 0/10
PAIN LOCATION - PAIN QUALITY: SHARP
PERSON REPORTING PAIN: PATIENT
PAIN LOCATION - PAIN SEVERITY: 4/10
PAIN LOCATION: RIGHT KNEE
LOWEST PAIN SEVERITY IN PAST 24 HOURS: 3/10
PAIN LOCATION - RELIEVING FACTORS: REST
SUBJECTIVE PAIN PROGRESSION: GRADUALLY IMPROVING
PAIN LOCATION: LEFT KNEE
HIGHEST PAIN SEVERITY IN PAST 24 HOURS: 6/10
HIGHEST PAIN SEVERITY IN PAST 24 HOURS: 4/10
PERSON REPORTING PAIN: PATIENT
LOWEST PAIN SEVERITY IN PAST 24 HOURS: 0/10
PAIN: 1

## 2025-07-09 NOTE — TELEPHONE ENCOUNTER
I'm getting the home health messages for this pt (plan of care update). Please let her home health know I am not the pcp. (It's DR PINA).

## 2025-07-10 ENCOUNTER — CLINICAL SUPPORT (OUTPATIENT)
Facility: HOSPITAL | Age: 55
End: 2025-07-10
Payer: MEDICARE

## 2025-07-10 ENCOUNTER — ANTICOAGULATION - WARFARIN VISIT (OUTPATIENT)
Dept: PHARMACY | Facility: HOSPITAL | Age: 55
End: 2025-07-10
Payer: MEDICARE

## 2025-07-10 VITALS
WEIGHT: 233.1 LBS | BODY MASS INDEX: 40.01 KG/M2 | OXYGEN SATURATION: 97 % | SYSTOLIC BLOOD PRESSURE: 111 MMHG | HEART RATE: 97 BPM | DIASTOLIC BLOOD PRESSURE: 77 MMHG | RESPIRATION RATE: 20 BRPM

## 2025-07-10 DIAGNOSIS — I82.0 HEPATIC VEIN THROMBOSIS (MULTI): ICD-10-CM

## 2025-07-10 DIAGNOSIS — I81 PORTAL VEIN THROMBOSIS: Primary | ICD-10-CM

## 2025-07-10 LAB
INR IN PPP BY COAGULATION ASSAY EXTERNAL: 3.2
PROTHROMBIN TIME (PT) IN PPP BY COAGULATION ASSAY EXTERNAL: NORMAL

## 2025-07-10 PROCEDURE — 99211 OFF/OP EST MAY X REQ PHY/QHP: CPT | Performed by: STUDENT IN AN ORGANIZED HEALTH CARE EDUCATION/TRAINING PROGRAM

## 2025-07-10 PROCEDURE — 99211 OFF/OP EST MAY X REQ PHY/QHP: CPT

## 2025-07-10 PROCEDURE — 85610 PROTHROMBIN TIME: CPT | Performed by: STUDENT IN AN ORGANIZED HEALTH CARE EDUCATION/TRAINING PROGRAM

## 2025-07-10 NOTE — PROGRESS NOTES
"Roselia arrived ambulatory to the [facility] for her scheduled visit for INR check. Roselia states she is feeling better, \"I was on a steroid for the gout and it has helped, but I'm still having some pain.\" Home medications reviewed. INR was 3.2  results called to Pharmacist Jono in Coumadin clinic. The plan is for Roselia to take Coumadin 1/2 tablet of 7.5mg today and then resume Coumadin 7.5mg daily and return in 2 weeks.     Vitals:  BP: 111/77           HR: 97          Resp:20          SPO2:97% on room air         Next Appointment Date: July 23, 2025@ 11am    Note in EMR for Treating Physician: Dr. Earl    In-House Supervising Physician: Dr. Monaco  "

## 2025-07-10 NOTE — PATIENT INSTRUCTIONS
Diet  2000 mg (2 gram) sodium diet-Do not add salt to foods or cook with salt. Check labels for Sodium (Na)     Resources  [facility] [phone] Monday - Friday 7:00 am - 3:00 pm  Websites: [email or web address]; American Heart Association: www.heart.org    Special Instructions:  If you smoke-Quit!  If you are not able to contact your doctor and need immediate medical attention, call 911  If you are not able to keep you're scheduled appointment at the [facility], call [phone]  Watch for and report to your doctor all warning signs of Heart Failure (increased difficulty with breathing, 3-5 pound weight gain in one week or less, increased swelling, increased tiredness)  Weigh yourself each day at the same time with the same amount of clothes on. Record your weight log. Bring it with your to each visit

## 2025-07-10 NOTE — PROGRESS NOTES
Pt enrolled in [facility] for management of Portal vein thrombosis [I81].     Pt current location [facility] clinic. Rest of [facility] visit completed by RN per clinic policy.     Current anticoagulant: Warfarin    Time since last visit: 1 week    Last INR: 5.30 on warfarin 33.75 mg in the previous week. Pt was instructed to hold two doses, then take 3.75 mg x1 dose and resume normal dose thereafter at last visit.    Last Creatinine:   Lab Results   Component Value Date    CREATININE 0.60 07/02/2025     Last hemoglobin/hematocrit:  Lab Results   Component Value Date    HGB 15.3 06/18/2025     Lab Results   Component Value Date    HCT 44.7 06/18/2025       Current INR: 3.20 is SUPRAtherapeutic for goal range of 2.0-3.0 and is reflective of 33.75 mg in the previous week prior to visit.    Dosing confirmed with patient  Patient denies any missed doses.  [facility] nurse notes that patient was given a few days of PO prednisone for suspected bronchitis and is due to finish @ the end of this week. She notes that patient denies diet changes or OTC/herbal supplement changes since last visit.  Patient denies any adverse reactions or barriers.  Patient denies any CP/SOB, fatigue, bleeding or bruising since last visit.   Patient denies any change in alcohol or tobacco use since last visit.   Patient denies any upcoming medical or dental procedures.    Plan:  Patient was instructed to take 3.75 mg x1 dose, then resume normal dose thereafter.  INR follow up will occur in 2 weeks.  Patient was instructed to maintain consistent vegetable intake, to monitor for any bruising or bleeding, and to call with any medication changes or concerns.    Relayed above information to [facility] nurse over the phone    Stephanie UribeD BCPS  P:[phone]  F:[phone]

## 2025-07-14 ENCOUNTER — HOME CARE VISIT (OUTPATIENT)
Dept: HOME HEALTH SERVICES | Facility: HOME HEALTH | Age: 55
End: 2025-07-14
Payer: MEDICARE

## 2025-07-14 PROCEDURE — G0151 HHCP-SERV OF PT,EA 15 MIN: HCPCS | Mod: HHH

## 2025-07-14 PROCEDURE — G0152 HHCP-SERV OF OT,EA 15 MIN: HCPCS | Mod: HHH

## 2025-07-14 SDOH — HEALTH STABILITY: PHYSICAL HEALTH: EXERCISE TYPE: SKILLED HEP

## 2025-07-14 ASSESSMENT — GAIT ASSESSMENTS
BALANCE AND GAIT SCORE: 13
GAIT SCORE: 6
STEP SYMMETRY: 0 - RIGHT AND LEFT STEP LENGTH NOT EQUAL
STEP CONTINUITY: 0 - STOPPING OR DISCONTINUITY BETWEEN STEPS
TRUNK SCORE: 1
INITIATION OF GAIT IMMEDIATELY AFTER GO: 0 - ANY HESITANCY OR MULTIPLE ATTEMPTS TO START
PATH SCORE: 1
WALKING STANCE: 0 - HEELS APART
TRUNK: 1 - NO SWAY BUT FLEXION OF KNEES OR BACK OR SPREADS ARMS WHILE WALKING
PATH: 1 - MILD/MODERATE DEVIATION OR USES WALKING AID

## 2025-07-14 ASSESSMENT — ACTIVITIES OF DAILY LIVING (ADL)
AMBULATION ASSISTANCE ON FLAT SURFACES: 1
PHYSICAL_TRANSFERS_DEVICES: BODY SUPPORT
AMBULATION ASSISTANCE: MAXIMUM ASSIST
CURRENT_FUNCTION: MAXIMUM ASSIST
AMBULATION ASSISTANCE: STAND BY ASSIST
AMBULATION_DISTANCE/DURATION_TOLERATED: 40 FT
DRESSING_LB_CURRENT_FUNCTION: MINIMUM ASSIST
PHYSICAL TRANSFERS ASSESSED: 1
DRESSING_LB_CURRENT_FUNCTION: CONTACT GUARD ASSIST
BATHING ASSESSED: 1
AMBULATION ASSISTANCE: 1
BATHING_CURRENT_FUNCTION: CONTACT GUARD ASSIST
BATHING_CURRENT_FUNCTION: MINIMUM ASSIST

## 2025-07-14 ASSESSMENT — BALANCE ASSESSMENTS
SITTING BALANCE: 1 - STEADY, SAFE
ARISES: 1 - ABLE, USES ARMS TO HELP
IMMEDIATE STANDING BALANCE FIRST 5 SECONDS: 1 - STEADY BUT USES WALKER OR OTHER SUPPORT
TURNING 360 DEGREES STEPS: 0 - DISCONTINUOUS STEPS
BALANCE SCORE: 7
SITTING DOWN: 1 - USES ARMS OR NOT SMOOTH MOTION
NUDGED SCORE: 0
EYES CLOSED AT MAXIMUM POSITION NUDGED: 0 - UNSTEADY
ATTEMPTS TO ARISE: 2 - ABLE TO RISE, ONE ATTEMPT
STANDING BALANCE: 1 - STEADY BUT WIDE STANCE AND USES CANE OR OTHER SUPPORT
ARISING SCORE: 1
NUDGED: 0 - BEGINS TO FALL

## 2025-07-14 ASSESSMENT — ENCOUNTER SYMPTOMS
PAIN: 1
PERSON REPORTING PAIN: PATIENT
PAIN SEVERITY GOAL: 0/10
PAIN LOCATION - PAIN QUALITY: ACHE
SUBJECTIVE PAIN PROGRESSION: GRADUALLY IMPROVING
HIGHEST PAIN SEVERITY IN PAST 24 HOURS: 6/10
PERSON REPORTING PAIN: PATIENT
PAIN LOCATION - RELIEVING FACTORS: MEDS REST
PAIN LOCATION: RIGHT KNEE
LOWEST PAIN SEVERITY IN PAST 24 HOURS: 1/10
PAIN LOCATION - PAIN SEVERITY: 6/10
DENIES PAIN: 1

## 2025-07-14 NOTE — HOME HEALTH
S: Patient seen for PT reassessment visit today. Patient reported no pain. Patient reported she has been performing the home exercises as planned twice daily. No changes in homebound status.    O: Patient ambulated in the home using WC/walker.    A: Patient tolerated the treatment well today and was able to complete the exercise regimen with moderate transfer and gait difficulty. Patient demonstrated ability to perform safe STS transfer from chair with WC/walker and initiate gait with hesitancy. Patient can expect consistent increase in strength and mobility by continuing with current exercise regimen.    P: Consider comorbid conditions when implementing and progressing POC. Continue current exercise regimen including skilled LE therapeutic exercises to improve gross strength and endurance, 2 x 10 reps; gait training with walker/cane in the home environment; balance and proprioceptive training emphasizing focus towards narrow base of support and vesticular challenges.    Changes made to POC/Focus for next 30 days: Progress with baseline activity level. Balance training - strength training - stair training.    Rehab potentials: Good.    Fall risk: High.    Interdisciplinary communication: PT 2 x 4 weeks.    Functional outcome comparison: MMT grossly increased cristy LE +1 grade and Tinetti improved from 8 to 13. Patient will benefit in terms of transfers, ambulation, and maximizing independence in her ADL's with continued skilled care.

## 2025-07-15 ENCOUNTER — HOME CARE VISIT (OUTPATIENT)
Dept: HOME HEALTH SERVICES | Facility: HOME HEALTH | Age: 55
End: 2025-07-15
Payer: MEDICARE

## 2025-07-15 PROCEDURE — G0152 HHCP-SERV OF OT,EA 15 MIN: HCPCS | Mod: HHH

## 2025-07-15 ASSESSMENT — ACTIVITIES OF DAILY LIVING (ADL)
BATHING_CURRENT_FUNCTION: CONTACT GUARD ASSIST
HOUSEKEEPING ASSESSED: 1
BATHING ASSESSED: 1
PREPARING MEALS: NEEDS ASSISTANCE
DRESSING_LB_CURRENT_FUNCTION: CONTACT GUARD ASSIST
LIGHT HOUSEKEEPING: NEEDS ASSISTANCE

## 2025-07-15 ASSESSMENT — ENCOUNTER SYMPTOMS
DENIES PAIN: 1
PERSON REPORTING PAIN: PATIENT

## 2025-07-16 ENCOUNTER — HOME CARE VISIT (OUTPATIENT)
Dept: HOME HEALTH SERVICES | Facility: HOME HEALTH | Age: 55
End: 2025-07-16
Payer: MEDICARE

## 2025-07-16 PROCEDURE — G0157 HHC PT ASSISTANT EA 15: HCPCS | Mod: CQ,HHH

## 2025-07-16 SDOH — HEALTH STABILITY: PHYSICAL HEALTH: EXERCISE TYPE: STANDING

## 2025-07-16 SDOH — HEALTH STABILITY: PHYSICAL HEALTH
EXERCISE COMMENTS: PT COMPLETED STANDING X 12 EA:  MARCHING  HIP EXT  HIP ABD/ADD  HS CURLS  TOE RAISES  HEEL RAISES  MINI SQUATS

## 2025-07-16 ASSESSMENT — ACTIVITIES OF DAILY LIVING (ADL)
AMBULATION ASSISTANCE: STAND BY ASSIST
PHYSICAL TRANSFERS ASSESSED: 1
AMBULATION ASSISTANCE: 1
CURRENT_FUNCTION: SUPERVISION
AMBULATION ASSISTANCE ON FLAT SURFACES: 1

## 2025-07-16 ASSESSMENT — ENCOUNTER SYMPTOMS
PERSON REPORTING PAIN: PATIENT
MUSCLE WEAKNESS: 1
DENIES PAIN: 1

## 2025-07-20 DIAGNOSIS — E11.69 HYPERLIPIDEMIA ASSOCIATED WITH TYPE 2 DIABETES MELLITUS: ICD-10-CM

## 2025-07-20 DIAGNOSIS — E78.5 HYPERLIPIDEMIA ASSOCIATED WITH TYPE 2 DIABETES MELLITUS: ICD-10-CM

## 2025-07-21 ENCOUNTER — APPOINTMENT (OUTPATIENT)
Dept: PRIMARY CARE | Facility: CLINIC | Age: 55
End: 2025-07-21
Payer: MEDICARE

## 2025-07-21 ENCOUNTER — HOME CARE VISIT (OUTPATIENT)
Dept: HOME HEALTH SERVICES | Facility: HOME HEALTH | Age: 55
End: 2025-07-21
Payer: MEDICARE

## 2025-07-21 VITALS
HEIGHT: 64 IN | SYSTOLIC BLOOD PRESSURE: 112 MMHG | BODY MASS INDEX: 39.44 KG/M2 | DIASTOLIC BLOOD PRESSURE: 76 MMHG | WEIGHT: 231 LBS | HEART RATE: 96 BPM

## 2025-07-21 DIAGNOSIS — K21.9 GASTROESOPHAGEAL REFLUX DISEASE, UNSPECIFIED WHETHER ESOPHAGITIS PRESENT: ICD-10-CM

## 2025-07-21 DIAGNOSIS — K43.9 VENTRAL HERNIA WITHOUT OBSTRUCTION OR GANGRENE: ICD-10-CM

## 2025-07-21 DIAGNOSIS — R10.84 GENERALIZED ABDOMINAL PAIN: ICD-10-CM

## 2025-07-21 DIAGNOSIS — R19.7 DIARRHEA OF PRESUMED INFECTIOUS ORIGIN: ICD-10-CM

## 2025-07-21 DIAGNOSIS — R19.7 DIARRHEA OF PRESUMED INFECTIOUS ORIGIN: Primary | ICD-10-CM

## 2025-07-21 DIAGNOSIS — R11.0 NAUSEA: ICD-10-CM

## 2025-07-21 DIAGNOSIS — K21.9 GASTROESOPHAGEAL REFLUX DISEASE WITHOUT ESOPHAGITIS: ICD-10-CM

## 2025-07-21 PROCEDURE — 3008F BODY MASS INDEX DOCD: CPT | Performed by: INTERNAL MEDICINE

## 2025-07-21 PROCEDURE — 3074F SYST BP LT 130 MM HG: CPT | Performed by: INTERNAL MEDICINE

## 2025-07-21 PROCEDURE — 99214 OFFICE O/P EST MOD 30 MIN: CPT | Performed by: INTERNAL MEDICINE

## 2025-07-21 PROCEDURE — G0152 HHCP-SERV OF OT,EA 15 MIN: HCPCS | Mod: HHH

## 2025-07-21 PROCEDURE — G2211 COMPLEX E/M VISIT ADD ON: HCPCS | Performed by: INTERNAL MEDICINE

## 2025-07-21 PROCEDURE — 3078F DIAST BP <80 MM HG: CPT | Performed by: INTERNAL MEDICINE

## 2025-07-21 RX ORDER — ONDANSETRON 4 MG/1
4 TABLET, ORALLY DISINTEGRATING ORAL EVERY 8 HOURS PRN
Qty: 30 TABLET | Refills: 0 | Status: SHIPPED | OUTPATIENT
Start: 2025-07-21 | End: 2025-07-31

## 2025-07-21 RX ORDER — SEMAGLUTIDE 0.68 MG/ML
INJECTION, SOLUTION SUBCUTANEOUS WEEKLY
COMMUNITY
Start: 2025-07-19

## 2025-07-21 RX ORDER — ROSUVASTATIN CALCIUM 40 MG/1
40 TABLET, COATED ORAL DAILY
Qty: 30 TABLET | Refills: 0 | Status: SHIPPED | OUTPATIENT
Start: 2025-07-21

## 2025-07-21 ASSESSMENT — ENCOUNTER SYMPTOMS
ENDOCRINE NEGATIVE: 1
BACK PAIN: 0
ABDOMINAL PAIN: 1
VOMITING: 0
NUMBNESS: 0
FEVER: 0
PALPITATIONS: 0
COUGH: 0
NAUSEA: 1
CONSTITUTIONAL NEGATIVE: 1
NEUROLOGICAL NEGATIVE: 1
ABDOMINAL DISTENTION: 0
ADENOPATHY: 0
RESPIRATORY NEGATIVE: 1
DYSURIA: 0
DIARRHEA: 1
LIGHT-HEADEDNESS: 0
SHORTNESS OF BREATH: 0
EYE DISCHARGE: 0
WHEEZING: 0
ALLERGIC/IMMUNOLOGIC NEGATIVE: 1
CARDIOVASCULAR NEGATIVE: 1
CONFUSION: 0
AGITATION: 0
HEADACHES: 0
PERSON REPORTING PAIN: PATIENT
CHILLS: 0
MUSCULOSKELETAL NEGATIVE: 1
HEMATOLOGIC/LYMPHATIC NEGATIVE: 1
CONSTIPATION: 0
JOINT SWELLING: 0
NECK STIFFNESS: 0
EYES NEGATIVE: 1
DENIES PAIN: 1
PSYCHIATRIC NEGATIVE: 1

## 2025-07-21 ASSESSMENT — ACTIVITIES OF DAILY LIVING (ADL)
BATHING ASSESSED: 1
BATHING_CURRENT_FUNCTION: CONTACT GUARD ASSIST
DRESSING_LB_CURRENT_FUNCTION: CONTACT GUARD ASSIST

## 2025-07-21 NOTE — PROGRESS NOTES
Subjective   Patient ID: Roselia Mo is a 54 y.o. female who presents for Follow-up (FU THYROID TESTING AND LABS).  GETS NAUSEAS  AND DIARRHEA AFTER EATING, GETS BLOATED, WATERY DIARRHEA X 5 DAYS        Review of Systems   Constitutional: Negative.  Negative for chills and fever.   HENT: Negative.  Negative for congestion.    Eyes: Negative.  Negative for discharge.   Respiratory: Negative.  Negative for cough, shortness of breath and wheezing.    Cardiovascular: Negative.  Negative for chest pain, palpitations and leg swelling.   Gastrointestinal:  Positive for abdominal pain, diarrhea and nausea. Negative for abdominal distention, constipation and vomiting.   Endocrine: Negative.    Genitourinary: Negative.  Negative for dysuria and urgency.   Musculoskeletal: Negative.  Negative for back pain, joint swelling and neck stiffness.   Skin: Negative.  Negative for rash.   Allergic/Immunologic: Negative.  Negative for immunocompromised state.   Neurological: Negative.  Negative for light-headedness, numbness and headaches.   Hematological: Negative.  Negative for adenopathy.   Psychiatric/Behavioral: Negative.  Negative for agitation, behavioral problems and confusion.    All other systems reviewed and are negative.      Objective   Physical Exam  Vitals reviewed.   Constitutional:       General: She is not in acute distress.     Appearance: Normal appearance.   HENT:      Head: Normocephalic and atraumatic.      Nose: Nose normal.     Eyes:      Conjunctiva/sclera: Conjunctivae normal.      Pupils: Pupils are equal, round, and reactive to light.     Neck:      Vascular: No carotid bruit.     Cardiovascular:      Rate and Rhythm: Normal rate and regular rhythm.      Pulses: Normal pulses.      Heart sounds:      No gallop.   Pulmonary:      Effort: Pulmonary effort is normal. No respiratory distress.      Breath sounds: Normal breath sounds. No wheezing.   Abdominal:      General: Bowel sounds are normal.       "Palpations: Abdomen is soft.      Tenderness: There is abdominal tenderness.      Hernia: A hernia (ventral) is present.      Comments: Excessive skin due to recent wt loss     Musculoskeletal:         General: Normal range of motion.      Cervical back: Normal range of motion. No rigidity.   Lymphadenopathy:      Cervical: No cervical adenopathy.     Skin:     General: Skin is warm.      Findings: No rash.     Neurological:      General: No focal deficit present.      Mental Status: She is alert and oriented to person, place, and time.     Psychiatric:         Mood and Affect: Mood normal.         Behavior: Behavior normal.       /76 (BP Location: Right arm, Patient Position: Sitting)   Pulse 96   Ht 1.626 m (5' 4\")   Wt 105 kg (231 lb)   BMI 39.65 kg/m²    Hemoglobin A1C   Date/Time Value Ref Range Status   05/28/2025 11:43 AM 8.1 (H) See comment % Final     Assessment/Plan   Problem List Items Addressed This Visit       Gastroesophageal reflux disease without esophagitis    Relevant Medications    Ozempic 0.25 mg or 0.5 mg (2 mg/3 mL) pen injector    Other Relevant Orders    Esophagogastroduodenoscopy (EGD)    Comprehensive Metabolic Panel    CBC and Auto Differential    TSH with reflex to Free T4 if abnormal    Magnesium    Ventral hernia    Relevant Orders    Colonoscopy Diagnostic    Comprehensive Metabolic Panel    CBC and Auto Differential    TSH with reflex to Free T4 if abnormal    Magnesium     Other Visit Diagnoses         Diarrhea of presumed infectious origin    -  Primary    Relevant Orders    Colonoscopy Diagnostic    Stool Pathogen Panel, PCR    C. difficile, PCR    Comprehensive Metabolic Panel    CBC and Auto Differential    TSH with reflex to Free T4 if abnormal    Magnesium      Generalized abdominal pain        Relevant Orders    Stool Pathogen Panel, PCR    C. difficile, PCR    Comprehensive Metabolic Panel    CBC and Auto Differential    TSH with reflex to Free T4 if abnormal    " Magnesium      Nausea        Relevant Medications    ondansetron ODT (Zofran-ODT) 4 mg disintegrating tablet    Other Relevant Orders    Esophagogastroduodenoscopy (EGD)    Comprehensive Metabolic Panel    CBC and Auto Differential    TSH with reflex to Free T4 if abnormal    Magnesium             Labs reviewed with pt     We discussed possible ozempic side effect, but because of wt loss and general doing better PT WOULD LIKE TO REMAIN ON OZEMPIC      FU 1 MO

## 2025-07-22 ENCOUNTER — HOME CARE VISIT (OUTPATIENT)
Dept: HOME HEALTH SERVICES | Facility: HOME HEALTH | Age: 55
End: 2025-07-22
Payer: MEDICARE

## 2025-07-22 PROCEDURE — G0157 HHC PT ASSISTANT EA 15: HCPCS | Mod: CQ,HHH

## 2025-07-22 SDOH — HEALTH STABILITY: PHYSICAL HEALTH
EXERCISE COMMENTS: PT COMPLETED 15X OF THE FOLLOWING THEREX:   ANKLE PUMPS  MARCHES  LAQ'S  HIP ABD/ADD  HIP ROT  HAMSTRING CURLS

## 2025-07-22 SDOH — HEALTH STABILITY: PHYSICAL HEALTH: EXERCISE TYPE: SEATED

## 2025-07-22 SDOH — ECONOMIC STABILITY: HOUSING INSECURITY: UNSAFE APPLIANCES: 1

## 2025-07-22 ASSESSMENT — ENCOUNTER SYMPTOMS
PAIN LOCATION - EXACERBATING FACTORS: WALKING, STANDING
MUSCLE WEAKNESS: 1
SUBJECTIVE PAIN PROGRESSION: UNCHANGED
PAIN SEVERITY GOAL: 0/10
PERSON REPORTING PAIN: PATIENT
HIGHEST PAIN SEVERITY IN PAST 24 HOURS: 10/10
PAIN LOCATION - PAIN QUALITY: CONSTANT DULL
LIMITED RANGE OF MOTION: 1
LOWEST PAIN SEVERITY IN PAST 24 HOURS: 10/10
PAIN LOCATION - PAIN FREQUENCY: CONSTANT
PAIN LOCATION - PAIN SEVERITY: 10/10
PAIN: 1
PAIN LOCATION: LEFT KNEE

## 2025-07-22 ASSESSMENT — ACTIVITIES OF DAILY LIVING (ADL)
AMBULATION ASSISTANCE ON FLAT SURFACES: 1
AMBULATION_DISTANCE/DURATION_TOLERATED: X~100'
AMBULATION ASSISTANCE: CONTACT GUARD ASSIST
AMBULATION ASSISTANCE: ONE PERSON
PHYSICAL TRANSFERS ASSESSED: 1
AMBULATION ASSISTANCE: 1
CURRENT_FUNCTION: STAND BY ASSIST
CURRENT_FUNCTION: ONE PERSON

## 2025-07-23 ENCOUNTER — APPOINTMENT (OUTPATIENT)
Facility: HOSPITAL | Age: 55
End: 2025-07-23
Payer: MEDICARE

## 2025-07-23 ENCOUNTER — HOME CARE VISIT (OUTPATIENT)
Dept: HOME HEALTH SERVICES | Facility: HOME HEALTH | Age: 55
End: 2025-07-23
Payer: MEDICARE

## 2025-07-23 PROCEDURE — G0152 HHCP-SERV OF OT,EA 15 MIN: HCPCS | Mod: HHH

## 2025-07-23 ASSESSMENT — ENCOUNTER SYMPTOMS
PAIN LOCATION - RELIEVING FACTORS: MEDS REST
PAIN LOCATION - PAIN QUALITY: ACHE
PAIN LOCATION: LEFT KNEE
PAIN LOCATION - RELIEVING FACTORS: MEDS REST
PAIN LOCATION: RIGHT KNEE
PAIN SEVERITY GOAL: 0/10
PERSON REPORTING PAIN: PATIENT
HIGHEST PAIN SEVERITY IN PAST 24 HOURS: 5/10
SUBJECTIVE PAIN PROGRESSION: GRADUALLY IMPROVING
LOWEST PAIN SEVERITY IN PAST 24 HOURS: 3/10
PAIN LOCATION - PAIN QUALITY: ACHE
PAIN LOCATION - PAIN SEVERITY: 5/10
PAIN LOCATION - PAIN SEVERITY: 5/10
PAIN: 1

## 2025-07-23 ASSESSMENT — ACTIVITIES OF DAILY LIVING (ADL)
BATHING_CURRENT_FUNCTION: CONTACT GUARD ASSIST
BATHING ASSESSED: 1
HOUSEKEEPING ASSESSED: 1
PREPARING MEALS: NEEDS ASSISTANCE
LIGHT HOUSEKEEPING: NEEDS ASSISTANCE
BATHING_CURRENT_FUNCTION: MINIMUM ASSIST
DRESSING_LB_CURRENT_FUNCTION: MINIMUM ASSIST
DRESSING_LB_CURRENT_FUNCTION: CONTACT GUARD ASSIST

## 2025-07-24 ENCOUNTER — HOME CARE VISIT (OUTPATIENT)
Dept: HOME HEALTH SERVICES | Facility: HOME HEALTH | Age: 55
End: 2025-07-24
Payer: MEDICARE

## 2025-07-24 ENCOUNTER — TELEPHONE (OUTPATIENT)
Dept: GASTROENTEROLOGY | Facility: CLINIC | Age: 55
End: 2025-07-24

## 2025-07-24 ENCOUNTER — APPOINTMENT (OUTPATIENT)
Facility: HOSPITAL | Age: 55
End: 2025-07-24
Payer: MEDICARE

## 2025-07-24 PROCEDURE — G0157 HHC PT ASSISTANT EA 15: HCPCS | Mod: CQ,HHH

## 2025-07-24 SDOH — HEALTH STABILITY: PHYSICAL HEALTH
EXERCISE COMMENTS: PT COMPLETED 10X OF THE FOLLOWING THEREX:   ANKLE PUMPS  MARCHES  LAQ'S  HIP ABD/ADD  HIP ROT  HAMSTRING CURLS

## 2025-07-24 SDOH — HEALTH STABILITY: PHYSICAL HEALTH: EXERCISE TYPE: SEATED

## 2025-07-24 ASSESSMENT — ENCOUNTER SYMPTOMS
LOWEST PAIN SEVERITY IN PAST 24 HOURS: 0/10
PAIN SEVERITY GOAL: 0/10
SUBJECTIVE PAIN PROGRESSION: GRADUALLY IMPROVING
DENIES PAIN: 1
PERSON REPORTING PAIN: PATIENT
HIGHEST PAIN SEVERITY IN PAST 24 HOURS: 0/10
MUSCLE WEAKNESS: 1
LIMITED RANGE OF MOTION: 1

## 2025-07-24 ASSESSMENT — ACTIVITIES OF DAILY LIVING (ADL)
AMBULATION ASSISTANCE ON FLAT SURFACES: 1
AMBULATION ASSISTANCE: 1
CURRENT_FUNCTION: STAND BY ASSIST
PHYSICAL TRANSFERS ASSESSED: 1
AMBULATION ASSISTANCE: CONTACT GUARD ASSIST
AMBULATION_DISTANCE/DURATION_TOLERATED: X~100'
CURRENT_FUNCTION: ONE PERSON
AMBULATION ASSISTANCE: ONE PERSON

## 2025-07-24 NOTE — TELEPHONE ENCOUNTER
Patient notified and verbalized understanding. Also wrote on the instruction sheet I sent her in the mail.   ----- Message from Damien Dsouza sent at 7/24/2025  7:13 AM EDT -----  She can hold for 3 days, we will make sure her INR isnt too high at the beginning of th hold. We will review with her at her next heart failure clinic visit.  ----- Message -----  From: Jackelyn Thorpe MA  Sent: 7/21/2025   3:39 PM EDT  To: Damien Dsouza, PharmD    Roselia is scheduled for an EGD and Colonoscopy Monday Aug 25. How many days should she hold her coumadin?     Thank you

## 2025-07-26 DIAGNOSIS — G43.909 MIGRAINE WITHOUT STATUS MIGRAINOSUS, NOT INTRACTABLE, UNSPECIFIED MIGRAINE TYPE: ICD-10-CM

## 2025-07-28 ENCOUNTER — APPOINTMENT (OUTPATIENT)
Facility: HOSPITAL | Age: 55
End: 2025-07-28
Payer: MEDICARE

## 2025-07-28 ENCOUNTER — HOME CARE VISIT (OUTPATIENT)
Dept: HOME HEALTH SERVICES | Facility: HOME HEALTH | Age: 55
End: 2025-07-28
Payer: MEDICARE

## 2025-07-28 PROCEDURE — G0152 HHCP-SERV OF OT,EA 15 MIN: HCPCS | Mod: HHH

## 2025-07-28 ASSESSMENT — ACTIVITIES OF DAILY LIVING (ADL)
DRESSING_LB_CURRENT_FUNCTION: CONTACT GUARD ASSIST
BATHING_CURRENT_FUNCTION: CONTACT GUARD ASSIST
BATHING ASSESSED: 1

## 2025-07-29 ENCOUNTER — ANTICOAGULATION - WARFARIN VISIT (OUTPATIENT)
Dept: PHARMACY | Facility: HOSPITAL | Age: 55
End: 2025-07-29

## 2025-07-29 ENCOUNTER — CLINICAL SUPPORT (OUTPATIENT)
Facility: HOSPITAL | Age: 55
End: 2025-07-29
Payer: MEDICARE

## 2025-07-29 ENCOUNTER — HOME CARE VISIT (OUTPATIENT)
Dept: HOME HEALTH SERVICES | Facility: HOME HEALTH | Age: 55
End: 2025-07-29
Payer: MEDICARE

## 2025-07-29 VITALS
OXYGEN SATURATION: 96 % | WEIGHT: 228.5 LBS | DIASTOLIC BLOOD PRESSURE: 74 MMHG | BODY MASS INDEX: 39.22 KG/M2 | HEART RATE: 103 BPM | RESPIRATION RATE: 20 BRPM | SYSTOLIC BLOOD PRESSURE: 108 MMHG

## 2025-07-29 DIAGNOSIS — I82.0 HEPATIC VEIN THROMBOSIS (MULTI): ICD-10-CM

## 2025-07-29 DIAGNOSIS — I81 PORTAL VEIN THROMBOSIS: Primary | ICD-10-CM

## 2025-07-29 DIAGNOSIS — I50.32 CHRONIC DIASTOLIC HEART FAILURE: ICD-10-CM

## 2025-07-29 DIAGNOSIS — I82.0 BUDD-CHIARI SYNDROME (MULTI): ICD-10-CM

## 2025-07-29 LAB
POC INR: 3.6 (ref 0.9–1.1)
POC PROTHROMBIN TIME: ABNORMAL (ref 9.3–12.5)

## 2025-07-29 PROCEDURE — 99211 OFF/OP EST MAY X REQ PHY/QHP: CPT

## 2025-07-29 PROCEDURE — 85610 PROTHROMBIN TIME: CPT

## 2025-07-29 PROCEDURE — G0157 HHC PT ASSISTANT EA 15: HCPCS | Mod: CQ,HHH

## 2025-07-29 RX ORDER — TOPIRAMATE 25 MG/1
25 TABLET, FILM COATED ORAL 2 TIMES DAILY
Qty: 60 TABLET | Refills: 0 | Status: SHIPPED | OUTPATIENT
Start: 2025-07-29

## 2025-07-29 SDOH — ECONOMIC STABILITY: HOUSING INSECURITY: UNSAFE APPLIANCES: 1

## 2025-07-29 SDOH — HEALTH STABILITY: PHYSICAL HEALTH
EXERCISE COMMENTS: PT COMPLETED 15 OF THE FOLLOWING STANDING EX:  HEEL RAISES  TOE RAISES  SL LEG ABD/ADD  SL LEG EX  MINI SQUATS

## 2025-07-29 SDOH — HEALTH STABILITY: PHYSICAL HEALTH: EXERCISE TYPE: STANDING

## 2025-07-29 ASSESSMENT — ACTIVITIES OF DAILY LIVING (ADL)
AMBULATION ASSISTANCE: CONTACT GUARD ASSIST
CURRENT_FUNCTION: STAND BY ASSIST
CURRENT_FUNCTION: ONE PERSON
AMBULATION ASSISTANCE: 1
AMBULATION ASSISTANCE ON FLAT SURFACES: 1
AMBULATION ASSISTANCE: ONE PERSON
PHYSICAL TRANSFERS ASSESSED: 1

## 2025-07-29 ASSESSMENT — ENCOUNTER SYMPTOMS
DENIES PAIN: 1
LIMITED RANGE OF MOTION: 1
MUSCLE WEAKNESS: 1
SUBJECTIVE PAIN PROGRESSION: GRADUALLY IMPROVING
PERSON REPORTING PAIN: PATIENT
PAIN SEVERITY GOAL: 0/10
HIGHEST PAIN SEVERITY IN PAST 24 HOURS: 0/10
LOWEST PAIN SEVERITY IN PAST 24 HOURS: 0/10

## 2025-07-29 NOTE — PROGRESS NOTES
"Roselia arrived ambulatory to the [facility] for her scheduled visit for an INR check. States she is feeling \"no too bad just can't eat and waiting to get a colonoscopy next month to see what's going on \".  Home medications reviewed without any changes. Her INR today is 3.6, consulted with Ramesh the pharmacist with the Coumadin Clinic who instructed to have Roselia hold her Coumadin dose today and then start 5mg every Monday and Friday and 7.5mg all other days with a follow up INR in 2 weeks with the [facility]. Her heart rate today is 100-105, she stated she didn't take her am medications yet which include her Toprol XL 75mg. She was instructed to take her am medications once she gets home and she verbalized understanding and stated that she would. We reviewed signs and symptoms of increased heart failure and when to call for help.     Vitals:  BP:  108/74          HR: 103          Resp: 20         SPO2: 96%        Next Appointment Date: August 13th, 2025 @ 11am     Note in EMR for Treating Physician: Dr. Earl/ Evonne Yan [facility]    In-House Supervising Physician: Dr. Hartley     "

## 2025-07-29 NOTE — PATIENT INSTRUCTIONS
Continue medications at same dose, hold Coumadin dose today and then take 5mg every Monday and Friday and 7.5mg all other days with a follow up INR at the [facility] Failure Clinic on August 13th, 2025 @ 11am     Patient verbalizes understanding for:  Low sodium diet-1500-2000mg daily of sodium  Fluid Restriction  Follow-up appointment with [facility]  Weighing self daily   Medications dose and schedule  Signs of increased heart failure  Activity Recommendations     Diet  2000 mg (2 gram) sodium diet-Do not add salt to foods or cook with salt. Check labels for Sodium (Na)     Resources  [facility] [phone] Monday - Friday 7:00 am - 3:00 pm  Websites: [email or web address]; American Heart Association: www.heart.org    Special Instructions:  If you smoke-Quit!  If you are not able to contact your doctor and need immediate medical attention, call 911  If you are not able to keep you're scheduled appointment at the [facility] Failure Clinic, call [phone]  Watch for and report to your doctor all warning signs of Heart Failure (increased difficulty with breathing, 3-5 pound weight gain in one week or less, increased swelling, increased tiredness)  Weigh yourself each day at the same time with the same amount of clothes on. Record your weight log. Bring it with your to each visit

## 2025-07-29 NOTE — PROGRESS NOTES
Pt enrolled in [facility] for management of Portal vein thrombosis [I81].     Pt current location [facility] clinic. Rest of [facility] visit completed by RN per clinic policy.     Current anticoagulant: Warfarin    Time since last visit: 3 weeks    Last INR: 3.2 on warfarin 41.25 mg in the previous week with previous usual dose of 52.5 mg weekly. Pt was instructed to take 3.75mg once, then resume usual 52.5 mg weekly at last visit.    Last Creatinine:   Lab Results   Component Value Date    CREATININE 0.60 07/02/2025     Last hemoglobin/hematocrit:  Lab Results   Component Value Date    HGB 15.3 06/18/2025     Lab Results   Component Value Date    HCT 44.7 06/18/2025       Current INR: 3.6 is SUPRAtherapeutic for goal range of 2.0-3.0 and is reflective of 52.5 mg in the previous week prior to visit.    Dosing confirmed with patient  Patient denies any missed doses.  Patient denies any medication changes, diet changes, or OTC/herbal supplement changes since last visit.  Patient denies any adverse reactions or barriers.  Patient denies any CP/SOB, fatigue, bleeding or bruising since last visit.   Patient denies any change in alcohol or tobacco use since last visit.   Notes that she is to have colonoscopy on 8/25, will hold warfarin for 3 days prior and we will check INR after, if significantly low then we will discuss with provider if she is to bridge up.    Plan:  Patient was instructed to hold x1, then start 47.5mg weekly.  INR follow up will occur in 2 weeks.  Patient was instructed to maintain consistent vegetable intake, to monitor for any bruising or bleeding, and to call with any medication changes or concerns.    Pt handout given with above information    Damien Dsouza, PharmD  P:[phone]  F:[phone]

## 2025-07-30 ENCOUNTER — HOME CARE VISIT (OUTPATIENT)
Dept: HOME HEALTH SERVICES | Facility: HOME HEALTH | Age: 55
End: 2025-07-30
Payer: MEDICARE

## 2025-07-30 VITALS — SYSTOLIC BLOOD PRESSURE: 114 MMHG | HEART RATE: 67 BPM | DIASTOLIC BLOOD PRESSURE: 82 MMHG | RESPIRATION RATE: 16 BRPM

## 2025-07-30 PROCEDURE — G0152 HHCP-SERV OF OT,EA 15 MIN: HCPCS | Mod: HHH

## 2025-07-30 ASSESSMENT — ACTIVITIES OF DAILY LIVING (ADL)
BATHING ASSESSED: 1
BATHING_CURRENT_FUNCTION: CONTACT GUARD ASSIST
PREPARING MEALS: NEEDS ASSISTANCE
LIGHT HOUSEKEEPING: NEEDS ASSISTANCE
HOUSEKEEPING ASSESSED: 1
DRESSING_LB_CURRENT_FUNCTION: CONTACT GUARD ASSIST

## 2025-07-30 ASSESSMENT — ENCOUNTER SYMPTOMS
DENIES PAIN: 1
PERSON REPORTING PAIN: PATIENT

## 2025-07-31 ENCOUNTER — HOME CARE VISIT (OUTPATIENT)
Dept: HOME HEALTH SERVICES | Facility: HOME HEALTH | Age: 55
End: 2025-07-31
Payer: MEDICARE

## 2025-07-31 PROCEDURE — G0157 HHC PT ASSISTANT EA 15: HCPCS | Mod: CQ,HHH

## 2025-07-31 SDOH — HEALTH STABILITY: PHYSICAL HEALTH
EXERCISE COMMENTS: PT COMPLETED 15X OF THE FOLLOWING THEREX:   ANKLE PUMPS  MARCHES  LAQ'S  HIP ABD/ADD  HIP ROT  HAMSTRING CURLS    PT COMPLETED 15  OF THE FOLLOWING STANDING EX:  HEEL RAISES  TOE RAISES  SL LEG ABD/ADD  SL LEG EX  MINI SQUATS

## 2025-07-31 SDOH — ECONOMIC STABILITY: HOUSING INSECURITY: UNSAFE APPLIANCES: 1

## 2025-07-31 SDOH — HEALTH STABILITY: PHYSICAL HEALTH: EXERCISE TYPE: SEATED/STANDING

## 2025-07-31 ASSESSMENT — ENCOUNTER SYMPTOMS
PAIN SEVERITY GOAL: 0/10
PERSON REPORTING PAIN: PATIENT
LOWEST PAIN SEVERITY IN PAST 24 HOURS: 0/10
DENIES PAIN: 1
LIMITED RANGE OF MOTION: 1
MUSCLE WEAKNESS: 1
HIGHEST PAIN SEVERITY IN PAST 24 HOURS: 0/10

## 2025-07-31 ASSESSMENT — ACTIVITIES OF DAILY LIVING (ADL)
CURRENT_FUNCTION: STAND BY ASSIST
AMBULATION ASSISTANCE: CONTACT GUARD ASSIST
PHYSICAL TRANSFERS ASSESSED: 1
CURRENT_FUNCTION: ONE PERSON
AMBULATION ASSISTANCE ON FLAT SURFACES: 1
AMBULATION ASSISTANCE: 1
AMBULATION ASSISTANCE: ONE PERSON

## 2025-08-01 ENCOUNTER — HOSPITAL ENCOUNTER (EMERGENCY)
Facility: HOSPITAL | Age: 55
Discharge: HOME | End: 2025-08-01
Attending: EMERGENCY MEDICINE
Payer: MEDICARE

## 2025-08-01 ENCOUNTER — HOSPITAL ENCOUNTER (EMERGENCY)
Dept: VASCULAR MEDICINE | Facility: HOSPITAL | Age: 55
Discharge: HOME | End: 2025-08-01
Payer: MEDICARE

## 2025-08-01 VITALS
TEMPERATURE: 97.6 F | HEIGHT: 62 IN | WEIGHT: 228 LBS | HEART RATE: 95 BPM | SYSTOLIC BLOOD PRESSURE: 128 MMHG | DIASTOLIC BLOOD PRESSURE: 92 MMHG | BODY MASS INDEX: 41.96 KG/M2 | RESPIRATION RATE: 18 BRPM | OXYGEN SATURATION: 96 %

## 2025-08-01 DIAGNOSIS — M79.605 LEFT LEG PAIN: ICD-10-CM

## 2025-08-01 DIAGNOSIS — F33.1 MODERATE EPISODE OF RECURRENT MAJOR DEPRESSIVE DISORDER: ICD-10-CM

## 2025-08-01 DIAGNOSIS — K21.9 GASTROESOPHAGEAL REFLUX DISEASE WITHOUT ESOPHAGITIS: ICD-10-CM

## 2025-08-01 DIAGNOSIS — M79.662 PAIN IN LEFT LOWER LEG: ICD-10-CM

## 2025-08-01 DIAGNOSIS — M79.605 LEFT LEG PAIN: Primary | ICD-10-CM

## 2025-08-01 PROCEDURE — 99281 EMR DPT VST MAYX REQ PHY/QHP: CPT | Mod: 25 | Performed by: EMERGENCY MEDICINE

## 2025-08-01 PROCEDURE — 99284 EMERGENCY DEPT VISIT MOD MDM: CPT | Mod: 25

## 2025-08-01 PROCEDURE — 93971 EXTREMITY STUDY: CPT

## 2025-08-01 PROCEDURE — 93971 EXTREMITY STUDY: CPT | Performed by: INTERNAL MEDICINE

## 2025-08-01 RX ORDER — BUPROPION HYDROCHLORIDE 150 MG/1
150 TABLET ORAL EVERY MORNING
Qty: 30 TABLET | Refills: 0 | Status: SHIPPED | OUTPATIENT
Start: 2025-08-01

## 2025-08-01 NOTE — TELEPHONE ENCOUNTER
PT CALLED IN AND LEFT A MESSAGE ON OUR VOICEMAIL. PT STATES THAT DR. TURPIN INCREASED HER DOSAGE OF BURPROPION FROM 1 PILL DAILY TO 2 PILLS. HE DID NOT SEND IN AN UPDATED RX. I AM PROPOSING A SHORT TERM SUPPLY UNTIL THIS CAN BE ADDRESSED WITH HER PCP.    IF MORE INFORMATION IS NEEDED, PLEASE ADVISE

## 2025-08-01 NOTE — ED PROVIDER NOTES
"HPI   Chief Complaint   Patient presents with    Leg Pain     To ED for c/o LLE edema and tenderness x 2 days, with location of edema \"doubling in size tonight\". Reports hx of portal vein thrombosis, on warfarin.       Limitations to History: None    HPI: 54-year-old female presents with concern for swelling to her left lower extremity as well as a palpable area of pain in her left calf.  Noticed this over the past 2 days.  Patient recently lost a significant amount of weight and just began walking more frequently again.  Denies any chest pain or shortness of breath.  Patient is on Coumadin and has been supratherapeutic.  Denies any fall or trauma.    Additional History Obtained from: Sister at the bedside.    ------------------------------------------------------------------------------------------------------------------------------------------  Physical Exam:    VS: As documented in the triage note and EMR flowsheet from this visit were reviewed.    Appearance: Alert. cooperative,  in no acute distress.   Skin: Intact,  dry skin, no lesions, rash, petechiae or purpura.   Pulmonary: Clear bilaterally with good chest wall excursion. No rales, rhonchi or wheezing. No accessory muscle use or stridor.  Cardiac: Regular rate and rhythm, no rubs, murmurs, or gallops.   Musculoskeletal: Full range of motion.  Pulses full and equal. No cyanosis, clubbing.  Bilateral lower extremity nonpitting edema.  Tenderness to palpation of the left medial calf.  Neurological: Station grossly intact to the bilateral lower extremity.  Psychiatric: Appropriate mood and affect.                Patient History   Medical History[1]  Surgical History[2]  Family History[3]  Social History[4]    Physical Exam   ED Triage Vitals [08/01/25 0220]   Temperature Heart Rate Respirations BP   36.4 °C (97.6 °F) 95 18 (!) 128/92      Pulse Ox Temp Source Heart Rate Source Patient Position   96 % Temporal Monitor Sitting      BP Location FiO2 (%)   "   Left arm --       Physical Exam      ED Course & MDM   Diagnoses as of 25 0742   Left leg pain                 No data recorded     Joselito Coma Scale Score: 15 (25 0219 : Yon Carson RN)                           Medical Decision Making  Medical Decision Making:    Patient appears well and nontoxic.  No tachycardia or hypoxemia.  Patient does have an area of tenderness with slight discoloration to her left medial calf.  Patient has been supratherapeutic on her Coumadin.  For this reason patient will be set up for left lower extremity Doppler ultrasound in 5 hours to evaluate for DVT.  Asked to follow-up with primary care following.  Stable at time of discharge.    Differential Diagnoses Considered: DVT, muscle strain, deconditioning    Escalation of Care:  Appropriate for discharge and follow-up for ultrasound in 5 hours and then primary care following.          Procedure  Procedures       [1]   Past Medical History:  Diagnosis Date    Arthritis     Asthma     Breast cyst years    CHF (congestive heart failure)     Chronic kidney disease     CKD (chronic kidney disease) stage 3, GFR 30-59 ml/min (Multi)     COPD (chronic obstructive pulmonary disease) (Multi)     Cough 2024    Diabetes mellitus (Multi)     Disease of thyroid gland     GERD (gastroesophageal reflux disease)     Hernia, internal     Hypertension     Irritable bowel syndrome     Liver disease     Lymphedema     Pancreatitis (HHS-HCC)     Pulmonary HTN (Multi)     Shortness of breath 2024    Sleep apnea 2017    Tachycardia 2024   [2]   Past Surgical History:  Procedure Laterality Date    CARPAL TUNNEL RELEASE Bilateral      SECTION, LOW TRANSVERSE       AND     COLONOSCOPY  2014    [facility]    HERNIA REPAIR      WITH MESH    HYSTERECTOMY      2 PARTIAL    KNEE SURGERY Left     MINISCUS REPAIR    OOPHORECTOMY     [3]   Family History  Problem Relation Name Age of Onset     Blindness Mother      Hypertension Mother      Hyperlipidemia Mother      Heart disease Mother      Heart failure Father      Hypertension Father      Hyperlipidemia Father      Diabetes Father      Skin cancer Father      Blindness Maternal Grandmother      Hypertension Maternal Grandmother      Hyperlipidemia Maternal Grandmother      Heart failure Maternal Grandmother      Dementia Paternal Grandmother      Heart attack Paternal Grandfather      Hypertension Paternal Grandfather      Hyperlipidemia Paternal Grandfather     [4]   Social History  Tobacco Use    Smoking status: Former     Current packs/day: 0.00     Average packs/day: 1 pack/day for 37.8 years (37.8 ttl pk-yrs)     Types: Cigarettes     Start date: 1/1/1977     Quit date: 11/1/2014     Years since quitting: 10.7     Passive exposure: Past    Smokeless tobacco: Never    Tobacco comments:     CBD vaping   Vaping Use    Vaping status: Former   Substance Use Topics    Alcohol use: Not Currently     Comment: rarely    Drug use: Not Currently     Frequency: 7.0 times per week     Types: Marijuana     Comment: one or two bowels per day        Dennys Proctor,   08/01/25 0744

## 2025-08-03 DIAGNOSIS — I81 PVT (PORTAL VEIN THROMBOSIS): ICD-10-CM

## 2025-08-04 ENCOUNTER — HOME CARE VISIT (OUTPATIENT)
Dept: HOME HEALTH SERVICES | Facility: HOME HEALTH | Age: 55
End: 2025-08-04
Payer: MEDICARE

## 2025-08-04 ENCOUNTER — APPOINTMENT (OUTPATIENT)
Dept: PRIMARY CARE | Facility: CLINIC | Age: 55
End: 2025-08-04
Payer: MEDICARE

## 2025-08-04 VITALS — RESPIRATION RATE: 16 BRPM | HEART RATE: 78 BPM | DIASTOLIC BLOOD PRESSURE: 83 MMHG | SYSTOLIC BLOOD PRESSURE: 132 MMHG

## 2025-08-04 PROCEDURE — G0152 HHCP-SERV OF OT,EA 15 MIN: HCPCS | Mod: HHH

## 2025-08-04 ASSESSMENT — ACTIVITIES OF DAILY LIVING (ADL)
BATHING ASSESSED: 1
PREPARING MEALS: NEEDS ASSISTANCE
HOUSEKEEPING ASSESSED: 1
BATHING_CURRENT_FUNCTION: CONTACT GUARD ASSIST
DRESSING_LB_CURRENT_FUNCTION: CONTACT GUARD ASSIST
LIGHT HOUSEKEEPING: NEEDS ASSISTANCE

## 2025-08-05 ENCOUNTER — HOME CARE VISIT (OUTPATIENT)
Dept: HOME HEALTH SERVICES | Facility: HOME HEALTH | Age: 55
End: 2025-08-05
Payer: MEDICARE

## 2025-08-05 ENCOUNTER — CLINICAL SUPPORT (OUTPATIENT)
Facility: HOSPITAL | Age: 55
End: 2025-08-05
Payer: MEDICARE

## 2025-08-05 ENCOUNTER — ANTICOAGULATION - WARFARIN VISIT (OUTPATIENT)
Dept: PHARMACY | Facility: HOSPITAL | Age: 55
End: 2025-08-05
Payer: MEDICARE

## 2025-08-05 VITALS
SYSTOLIC BLOOD PRESSURE: 108 MMHG | OXYGEN SATURATION: 97 % | DIASTOLIC BLOOD PRESSURE: 71 MMHG | HEART RATE: 93 BPM | RESPIRATION RATE: 20 BRPM

## 2025-08-05 DIAGNOSIS — I81 PORTAL VEIN THROMBOSIS: Primary | ICD-10-CM

## 2025-08-05 DIAGNOSIS — I82.0 HEPATIC VEIN THROMBOSIS (MULTI): ICD-10-CM

## 2025-08-05 LAB
POC INR: 3.8 (ref 0.9–1.1)
POC PROTHROMBIN TIME: ABNORMAL (ref 9.3–12.5)

## 2025-08-05 PROCEDURE — 99211 OFF/OP EST MAY X REQ PHY/QHP: CPT

## 2025-08-05 PROCEDURE — G0157 HHC PT ASSISTANT EA 15: HCPCS | Mod: CQ,HHH

## 2025-08-05 RX ORDER — WARFARIN 7.5 MG/1
TABLET ORAL
Qty: 30 TABLET | Refills: 0 | Status: SHIPPED | OUTPATIENT
Start: 2025-08-05

## 2025-08-05 SDOH — HEALTH STABILITY: PHYSICAL HEALTH: EXERCISE TYPE: STANDING

## 2025-08-05 ASSESSMENT — ACTIVITIES OF DAILY LIVING (ADL)
AMBULATION ASSISTANCE: SUPERVISION
PHYSICAL TRANSFERS ASSESSED: 1
AMBULATION ASSISTANCE: 1
CURRENT_FUNCTION: SUPERVISION
AMBULATION ASSISTANCE ON FLAT SURFACES: 1

## 2025-08-05 ASSESSMENT — ENCOUNTER SYMPTOMS
PERSON REPORTING PAIN: PATIENT
PAIN LOCATION - RELIEVING FACTORS: POSITIONING
LIMITED RANGE OF MOTION: 1
MUSCLE WEAKNESS: 1
SUBJECTIVE PAIN PROGRESSION: WAXING AND WANING
PAIN LOCATION - PAIN SEVERITY: 7/10
LOWEST PAIN SEVERITY IN PAST 24 HOURS: 3/10
PAIN LOCATION - PAIN FREQUENCY: CONSTANT
PAIN LOCATION: LEFT LEG
PAIN: 1
PAIN SEVERITY GOAL: 0/10
HIGHEST PAIN SEVERITY IN PAST 24 HOURS: 7/10
PAIN LOCATION - EXACERBATING FACTORS: ACTIVITY
PAIN LOCATION - PAIN QUALITY: THROBBING

## 2025-08-05 NOTE — PROGRESS NOTES
Pt enrolled in [facility] for management of Portal vein thrombosis [I81].     Pt current location [facility] clinic. Rest of [facility] visit completed by RN per clinic policy.     Current anticoagulant: Warfarin    Time since last visit: 1 weeks    Last INR: 3.6 on warfarin 52.5 mg in the previous week. Pt was instructed to hold warfarin once, then resume usual dosing at last visit.    Last Creatinine:   Lab Results   Component Value Date    CREATININE 0.60 07/02/2025     Last hemoglobin/hematocrit:  Lab Results   Component Value Date    HGB 15.3 06/18/2025     Lab Results   Component Value Date    HCT 44.7 06/18/2025       Current INR: 3.8 is SUPRAtherapeutic for goal range of 2.0-3.0 and is reflective of 47.5 mg in the previous week prior to visit.    Dosing confirmed with patient  Patient denies any missed doses.  Patient denies any medication changes, diet changes, or OTC/herbal supplement changes since last visit.  Patient denies any adverse reactions or barriers.  Patient denies any CP/SOB, fatigue, bleeding or bruising since last visit.   Patient denies any change in alcohol or tobacco use since last visit.   Patient denies any upcoming medical or dental procedures.    Plan:  Patient was instructed to hold x1, 2.5mg x1, then start 6mg daily.  INR follow up will occur in 1 weeks.  Patient was instructed to maintain consistent vegetable intake, to monitor for any bruising or bleeding, and to call with any medication changes or concerns.    Pt handout given with above information    Damien Dsouza, StephanieD  P:[phone]  F:[phone]

## 2025-08-05 NOTE — PROGRESS NOTES
Roselia arrived ambulatory to the [facility] for her scheduled visit for INR check. Roselia reports that last week she was having a lot of pain in her left leg, felt a knot and started to have a bruise, she went to the ER for evaluation, she was checked for a blood clot and was found to not have one. Roselia states she is feeling better, the bruise is 3/4 the length of her shin and a knot is palpated. Home medications reviewed. INR was 3.8   results called to Pharmacist Ramesh in Coumadin clinic. The plan for Roselia is to hold Coumadin today 8/5/25, Take Coumadin 2.5mg tomorrow 8/6/25 and then start taking Coumadin 6mg daily. Roselia will follow up in 1 week.     Vitals:  BP:  108/71          HR: 93          Resp: 20         SPO2: 97% on room air        Next Appointment Date: August 13, 2025@ 11am    Note in EMR for Treating Physician: Evonne Yan DNP    In-House Supervising Physician: Dr. Hartley

## 2025-08-05 NOTE — PATIENT INSTRUCTIONS
Diet  2000 mg (2 gram) sodium diet-Do not add salt to foods or cook with salt. Check labels for Sodium (Na)     Resources  [facility] [phone] Monday - Friday 7:00 am - 3:00 pm  Websites: [email or web address]; American [employer] Association: www.heart.org    Special Instructions:  If you smoke-Quit!  If you are not able to contact your doctor and need immediate medical attention, call 911  If you are not able to keep you're scheduled appointment at the [facility], call [phone]  Watch for and report to your doctor all warning signs of Heart Failure (increased difficulty with breathing, 3-5 pound weight gain in one week or less, increased swelling, increased tiredness)  Weigh yourself each day at the same time with the same amount of clothes on. Record your weight log. Bring it with your to each visit

## 2025-08-06 ENCOUNTER — HOME CARE VISIT (OUTPATIENT)
Dept: HOME HEALTH SERVICES | Facility: HOME HEALTH | Age: 55
End: 2025-08-06
Payer: MEDICARE

## 2025-08-06 PROCEDURE — G0152 HHCP-SERV OF OT,EA 15 MIN: HCPCS | Mod: HHH

## 2025-08-06 ASSESSMENT — ENCOUNTER SYMPTOMS
DENIES PAIN: 1
PERSON REPORTING PAIN: PATIENT

## 2025-08-07 ENCOUNTER — HOME CARE VISIT (OUTPATIENT)
Dept: HOME HEALTH SERVICES | Facility: HOME HEALTH | Age: 55
End: 2025-08-07
Payer: MEDICARE

## 2025-08-07 PROCEDURE — G0151 HHCP-SERV OF PT,EA 15 MIN: HCPCS | Mod: HHH

## 2025-08-07 SDOH — HEALTH STABILITY: PHYSICAL HEALTH: EXERCISE TYPE: SKILLED THERAPEUTIC EXERCISES

## 2025-08-07 ASSESSMENT — GAIT ASSESSMENTS
INITIATION OF GAIT IMMEDIATELY AFTER GO: 1 - NO HESITANCY
PATH SCORE: 1
WALKING STANCE: 1 - HEELS ALMOST TOUCHING WHILE WALKING
TRUNK SCORE: 1
BALANCE AND GAIT SCORE: 22
STEP SYMMETRY: 1 - RIGHT AND LEFT STEP LENGTH APPEAR EQUAL
GAIT SCORE: 10
STEP CONTINUITY: 1 - STEPS APPEAR CONTINUOUS
TRUNK: 1 - NO SWAY BUT FLEXION OF KNEES OR BACK OR SPREADS ARMS WHILE WALKING
PATH: 1 - MILD/MODERATE DEVIATION OR USES WALKING AID

## 2025-08-07 ASSESSMENT — BALANCE ASSESSMENTS
IMMEDIATE STANDING BALANCE FIRST 5 SECONDS: 2 - STEADY WITHOUT WALKER OR OTHER SUPPORT
SITTING DOWN: 2 - SAFE, SMOOTH MOTION
NUDGED: 1 - STAGGERS, GRABS, CATCHES SELF
SITTING BALANCE: 1 - STEADY, SAFE
NUDGED SCORE: 1
STANDING BALANCE: 1 - STEADY BUT WIDE STANCE AND USES CANE OR OTHER SUPPORT
ARISING SCORE: 2
ARISES: 2 - ABLE WITHOUT USING ARMS
ATTEMPTS TO ARISE: 2 - ABLE TO RISE, ONE ATTEMPT
TURNING 360 DEGREES STEPS: 0 - DISCONTINUOUS STEPS
BALANCE SCORE: 12
EYES CLOSED AT MAXIMUM POSITION NUDGED: 1 - STEADY

## 2025-08-07 ASSESSMENT — ENCOUNTER SYMPTOMS
PERSON REPORTING PAIN: PATIENT
DENIES PAIN: 1

## 2025-08-07 ASSESSMENT — ACTIVITIES OF DAILY LIVING (ADL): AMBULATION ASSISTANCE ON FLAT SURFACES: 1

## 2025-08-11 ENCOUNTER — HOME CARE VISIT (OUTPATIENT)
Dept: HOME HEALTH SERVICES | Facility: HOME HEALTH | Age: 55
End: 2025-08-11
Payer: MEDICARE

## 2025-08-11 PROCEDURE — G0152 HHCP-SERV OF OT,EA 15 MIN: HCPCS | Mod: HHH

## 2025-08-11 ASSESSMENT — ACTIVITIES OF DAILY LIVING (ADL)
LIGHT HOUSEKEEPING: NEEDS ASSISTANCE
PREPARING MEALS: NEEDS ASSISTANCE
BATHING ASSESSED: 1
HOUSEKEEPING ASSESSED: 1
DRESSING_LB_CURRENT_FUNCTION: SUPERVISION
BATHING_CURRENT_FUNCTION: SUPERVISION

## 2025-08-11 ASSESSMENT — ENCOUNTER SYMPTOMS
PERSON REPORTING PAIN: PATIENT
DENIES PAIN: 1

## 2025-08-13 ENCOUNTER — ANTICOAGULATION - WARFARIN VISIT (OUTPATIENT)
Dept: PHARMACY | Facility: HOSPITAL | Age: 55
End: 2025-08-13
Payer: MEDICARE

## 2025-08-13 ENCOUNTER — CLINICAL SUPPORT (OUTPATIENT)
Facility: HOSPITAL | Age: 55
End: 2025-08-13
Payer: MEDICARE

## 2025-08-13 ENCOUNTER — HOME CARE VISIT (OUTPATIENT)
Dept: HOME HEALTH SERVICES | Facility: HOME HEALTH | Age: 55
End: 2025-08-13
Payer: MEDICARE

## 2025-08-13 VITALS
RESPIRATION RATE: 18 BRPM | SYSTOLIC BLOOD PRESSURE: 93 MMHG | DIASTOLIC BLOOD PRESSURE: 67 MMHG | OXYGEN SATURATION: 97 % | HEART RATE: 98 BPM

## 2025-08-13 DIAGNOSIS — I81 PORTAL VEIN THROMBOSIS: Primary | ICD-10-CM

## 2025-08-13 DIAGNOSIS — I82.0 HEPATIC VEIN THROMBOSIS (MULTI): ICD-10-CM

## 2025-08-13 LAB
POC INR: 1.6 (ref 2–3)
POC PROTHROMBIN TIME: ABNORMAL (ref 9.3–12.5)

## 2025-08-13 PROCEDURE — 99211 OFF/OP EST MAY X REQ PHY/QHP: CPT

## 2025-08-13 PROCEDURE — G0152 HHCP-SERV OF OT,EA 15 MIN: HCPCS | Mod: HHH

## 2025-08-13 ASSESSMENT — ACTIVITIES OF DAILY LIVING (ADL)
OASIS_M1830: 00
HOME_HEALTH_OASIS: 00

## 2025-08-13 ASSESSMENT — ENCOUNTER SYMPTOMS
PERSON REPORTING PAIN: PATIENT
DENIES PAIN: 1

## 2025-08-18 ENCOUNTER — CLINICAL SUPPORT (OUTPATIENT)
Facility: HOSPITAL | Age: 55
End: 2025-08-18
Payer: MEDICARE

## 2025-08-18 ENCOUNTER — ANTICOAGULATION - WARFARIN VISIT (OUTPATIENT)
Dept: PHARMACY | Facility: HOSPITAL | Age: 55
End: 2025-08-18
Payer: MEDICARE

## 2025-08-18 VITALS
DIASTOLIC BLOOD PRESSURE: 71 MMHG | RESPIRATION RATE: 20 BRPM | OXYGEN SATURATION: 97 % | BODY MASS INDEX: 41.3 KG/M2 | HEART RATE: 89 BPM | WEIGHT: 225.8 LBS | SYSTOLIC BLOOD PRESSURE: 111 MMHG

## 2025-08-18 DIAGNOSIS — I82.0 HEPATIC VEIN THROMBOSIS (MULTI): ICD-10-CM

## 2025-08-18 DIAGNOSIS — I81 PORTAL VEIN THROMBOSIS: Primary | ICD-10-CM

## 2025-08-18 DIAGNOSIS — I81 PORTAL VEIN THROMBOSIS: ICD-10-CM

## 2025-08-18 LAB
POC INR: 2.1 (ref 0.9–1.1)
POC PROTHROMBIN TIME: ABNORMAL (ref 9.3–12.5)

## 2025-08-18 PROCEDURE — 85610 PROTHROMBIN TIME: CPT | Mod: QW | Performed by: PHARMACIST

## 2025-08-18 PROCEDURE — 99211 OFF/OP EST MAY X REQ PHY/QHP: CPT

## 2025-08-19 ENCOUNTER — APPOINTMENT (OUTPATIENT)
Dept: PRIMARY CARE | Facility: CLINIC | Age: 55
End: 2025-08-19
Payer: MEDICARE

## 2025-08-19 VITALS
HEIGHT: 62 IN | BODY MASS INDEX: 41.59 KG/M2 | WEIGHT: 226 LBS | DIASTOLIC BLOOD PRESSURE: 86 MMHG | SYSTOLIC BLOOD PRESSURE: 135 MMHG | HEART RATE: 80 BPM

## 2025-08-19 DIAGNOSIS — Z79.4 TYPE 2 DIABETES MELLITUS WITH HYPERGLYCEMIA, WITH LONG-TERM CURRENT USE OF INSULIN: ICD-10-CM

## 2025-08-19 DIAGNOSIS — F41.9 ANXIETY: Primary | ICD-10-CM

## 2025-08-19 DIAGNOSIS — E11.65 TYPE 2 DIABETES MELLITUS WITH HYPERGLYCEMIA, WITH LONG-TERM CURRENT USE OF INSULIN: ICD-10-CM

## 2025-08-19 DIAGNOSIS — I15.2 HYPERTENSION ASSOCIATED WITH DIABETES: ICD-10-CM

## 2025-08-19 DIAGNOSIS — F33.1 MODERATE EPISODE OF RECURRENT MAJOR DEPRESSIVE DISORDER: ICD-10-CM

## 2025-08-19 DIAGNOSIS — K21.9 GASTROESOPHAGEAL REFLUX DISEASE WITHOUT ESOPHAGITIS: ICD-10-CM

## 2025-08-19 DIAGNOSIS — E11.59 HYPERTENSION ASSOCIATED WITH DIABETES: ICD-10-CM

## 2025-08-19 PROCEDURE — G2211 COMPLEX E/M VISIT ADD ON: HCPCS | Performed by: INTERNAL MEDICINE

## 2025-08-19 PROCEDURE — 99214 OFFICE O/P EST MOD 30 MIN: CPT | Performed by: INTERNAL MEDICINE

## 2025-08-19 PROCEDURE — 3079F DIAST BP 80-89 MM HG: CPT | Performed by: INTERNAL MEDICINE

## 2025-08-19 PROCEDURE — 3008F BODY MASS INDEX DOCD: CPT | Performed by: INTERNAL MEDICINE

## 2025-08-19 PROCEDURE — 3075F SYST BP GE 130 - 139MM HG: CPT | Performed by: INTERNAL MEDICINE

## 2025-08-19 RX ORDER — BUPROPION HYDROCHLORIDE 300 MG/1
300 TABLET ORAL EVERY MORNING
Qty: 90 TABLET | Refills: 3 | Status: SHIPPED | OUTPATIENT
Start: 2025-08-19

## 2025-08-19 ASSESSMENT — ENCOUNTER SYMPTOMS
LIGHT-HEADEDNESS: 0
CHILLS: 0
RESPIRATORY NEGATIVE: 1
DYSURIA: 0
NEUROLOGICAL NEGATIVE: 1
WHEEZING: 0
NECK STIFFNESS: 0
COUGH: 0
CONSTIPATION: 0
SHORTNESS OF BREATH: 0
ALLERGIC/IMMUNOLOGIC NEGATIVE: 1
CONSTITUTIONAL NEGATIVE: 1
ENDOCRINE NEGATIVE: 1
ADENOPATHY: 0
NUMBNESS: 0
FEVER: 0
DIARRHEA: 0
CARDIOVASCULAR NEGATIVE: 1
PALPITATIONS: 0
ABDOMINAL PAIN: 0
CONFUSION: 0
EYE DISCHARGE: 0
VOMITING: 0
NAUSEA: 0
GASTROINTESTINAL NEGATIVE: 1
HEMATOLOGIC/LYMPHATIC NEGATIVE: 1
BACK PAIN: 0
HEADACHES: 0
MUSCULOSKELETAL NEGATIVE: 1
ABDOMINAL DISTENTION: 0
PSYCHIATRIC NEGATIVE: 1
JOINT SWELLING: 0
AGITATION: 0
EYES NEGATIVE: 1

## 2025-08-22 DIAGNOSIS — G43.909 MIGRAINE WITHOUT STATUS MIGRAINOSUS, NOT INTRACTABLE, UNSPECIFIED MIGRAINE TYPE: ICD-10-CM

## 2025-08-23 DIAGNOSIS — E11.69 HYPERLIPIDEMIA ASSOCIATED WITH TYPE 2 DIABETES MELLITUS: ICD-10-CM

## 2025-08-23 DIAGNOSIS — E78.5 HYPERLIPIDEMIA ASSOCIATED WITH TYPE 2 DIABETES MELLITUS: ICD-10-CM

## 2025-08-23 RX ORDER — TOPIRAMATE 25 MG/1
25 TABLET, FILM COATED ORAL 2 TIMES DAILY
Qty: 60 TABLET | Refills: 0 | Status: SHIPPED | OUTPATIENT
Start: 2025-08-23

## 2025-08-24 ENCOUNTER — ANESTHESIA EVENT (OUTPATIENT)
Dept: OPERATING ROOM | Facility: HOSPITAL | Age: 55
End: 2025-08-24
Payer: MEDICARE

## 2025-08-25 ENCOUNTER — HOSPITAL ENCOUNTER (OUTPATIENT)
Dept: OPERATING ROOM | Facility: HOSPITAL | Age: 55
Setting detail: OUTPATIENT SURGERY
Discharge: HOME | End: 2025-08-25
Payer: MEDICARE

## 2025-08-25 ENCOUNTER — ANESTHESIA (OUTPATIENT)
Dept: OPERATING ROOM | Facility: HOSPITAL | Age: 55
End: 2025-08-25
Payer: MEDICARE

## 2025-08-25 VITALS
TEMPERATURE: 97.6 F | RESPIRATION RATE: 18 BRPM | HEART RATE: 86 BPM | OXYGEN SATURATION: 99 % | BODY MASS INDEX: 39.96 KG/M2 | WEIGHT: 225.53 LBS | SYSTOLIC BLOOD PRESSURE: 136 MMHG | HEIGHT: 63 IN | DIASTOLIC BLOOD PRESSURE: 79 MMHG

## 2025-08-25 DIAGNOSIS — K43.9 VENTRAL HERNIA WITHOUT OBSTRUCTION OR GANGRENE: Primary | ICD-10-CM

## 2025-08-25 DIAGNOSIS — K21.9 GASTROESOPHAGEAL REFLUX DISEASE, UNSPECIFIED WHETHER ESOPHAGITIS PRESENT: ICD-10-CM

## 2025-08-25 DIAGNOSIS — R11.0 NAUSEA: ICD-10-CM

## 2025-08-25 DIAGNOSIS — R19.7 DIARRHEA OF PRESUMED INFECTIOUS ORIGIN: ICD-10-CM

## 2025-08-25 DIAGNOSIS — K43.9 VENTRAL HERNIA WITHOUT OBSTRUCTION OR GANGRENE: ICD-10-CM

## 2025-08-25 PROBLEM — E11.21 DIABETIC NEPHROPATHY (MULTI): Status: ACTIVE | Noted: 2025-08-25

## 2025-08-25 PROBLEM — I82.409 DVT (DEEP VENOUS THROMBOSIS) (MULTI): Status: ACTIVE | Noted: 2025-08-25

## 2025-08-25 LAB — GLUCOSE BLD MANUAL STRIP-MCNC: 147 MG/DL (ref 74–99)

## 2025-08-25 PROCEDURE — 2500000005 HC RX 250 GENERAL PHARMACY W/O HCPCS: Performed by: ANESTHESIOLOGY

## 2025-08-25 PROCEDURE — 2500000004 HC RX 250 GENERAL PHARMACY W/ HCPCS (ALT 636 FOR OP/ED): Performed by: ANESTHESIOLOGY

## 2025-08-25 PROCEDURE — 3700000001 HC GENERAL ANESTHESIA TIME - INITIAL BASE CHARGE: Performed by: ANESTHESIOLOGY

## 2025-08-25 PROCEDURE — 3600000002 HC OR TIME - INITIAL BASE CHARGE - PROCEDURE LEVEL TWO: Performed by: ANESTHESIOLOGY

## 2025-08-25 PROCEDURE — 3700000002 HC GENERAL ANESTHESIA TIME - EACH INCREMENTAL 1 MINUTE: Performed by: ANESTHESIOLOGY

## 2025-08-25 PROCEDURE — 7100000010 HC PHASE TWO TIME - EACH INCREMENTAL 1 MINUTE: Performed by: ANESTHESIOLOGY

## 2025-08-25 PROCEDURE — 45385 COLONOSCOPY W/LESION REMOVAL: CPT | Performed by: INTERNAL MEDICINE

## 2025-08-25 PROCEDURE — 43239 EGD BIOPSY SINGLE/MULTIPLE: CPT | Performed by: INTERNAL MEDICINE

## 2025-08-25 PROCEDURE — 2500000005 HC RX 250 GENERAL PHARMACY W/O HCPCS: Performed by: INTERNAL MEDICINE

## 2025-08-25 PROCEDURE — 3600000007 HC OR TIME - EACH INCREMENTAL 1 MINUTE - PROCEDURE LEVEL TWO: Performed by: ANESTHESIOLOGY

## 2025-08-25 PROCEDURE — 82947 ASSAY GLUCOSE BLOOD QUANT: CPT

## 2025-08-25 PROCEDURE — 7100000009 HC PHASE TWO TIME - INITIAL BASE CHARGE: Performed by: ANESTHESIOLOGY

## 2025-08-25 RX ORDER — SODIUM CHLORIDE, SODIUM LACTATE, POTASSIUM CHLORIDE, CALCIUM CHLORIDE 600; 310; 30; 20 MG/100ML; MG/100ML; MG/100ML; MG/100ML
20 INJECTION, SOLUTION INTRAVENOUS CONTINUOUS
Status: DISCONTINUED | OUTPATIENT
Start: 2025-08-25 | End: 2025-08-26 | Stop reason: HOSPADM

## 2025-08-25 RX ORDER — MIDAZOLAM HYDROCHLORIDE 2 MG/2ML
2 INJECTION, SOLUTION INTRAMUSCULAR; INTRAVENOUS ONCE
Status: COMPLETED | OUTPATIENT
Start: 2025-08-25 | End: 2025-08-25

## 2025-08-25 RX ORDER — FENTANYL CITRATE 50 UG/ML
INJECTION, SOLUTION INTRAMUSCULAR; INTRAVENOUS AS NEEDED
Status: DISCONTINUED | OUTPATIENT
Start: 2025-08-25 | End: 2025-08-25

## 2025-08-25 RX ORDER — ONDANSETRON HYDROCHLORIDE 2 MG/ML
4 INJECTION, SOLUTION INTRAVENOUS ONCE
Status: COMPLETED | OUTPATIENT
Start: 2025-08-25 | End: 2025-08-25

## 2025-08-25 RX ORDER — ONDANSETRON HYDROCHLORIDE 2 MG/ML
4 INJECTION, SOLUTION INTRAVENOUS ONCE AS NEEDED
Status: DISCONTINUED | OUTPATIENT
Start: 2025-08-25 | End: 2025-08-26 | Stop reason: HOSPADM

## 2025-08-25 RX ORDER — ROSUVASTATIN CALCIUM 40 MG/1
40 TABLET, COATED ORAL DAILY
Qty: 30 TABLET | Refills: 11 | Status: SHIPPED | OUTPATIENT
Start: 2025-08-25

## 2025-08-25 RX ORDER — FAMOTIDINE 10 MG/ML
20 INJECTION, SOLUTION INTRAVENOUS ONCE
Status: COMPLETED | OUTPATIENT
Start: 2025-08-25 | End: 2025-08-25

## 2025-08-25 RX ORDER — PROPOFOL 10 MG/ML
INJECTION, EMULSION INTRAVENOUS CONTINUOUS PRN
Status: DISCONTINUED | OUTPATIENT
Start: 2025-08-25 | End: 2025-08-25

## 2025-08-25 RX ORDER — SODIUM CHLORIDE, SODIUM LACTATE, POTASSIUM CHLORIDE, CALCIUM CHLORIDE 600; 310; 30; 20 MG/100ML; MG/100ML; MG/100ML; MG/100ML
100 INJECTION, SOLUTION INTRAVENOUS CONTINUOUS
Status: DISCONTINUED | OUTPATIENT
Start: 2025-08-25 | End: 2025-08-26 | Stop reason: HOSPADM

## 2025-08-25 RX ORDER — LIDOCAINE HYDROCHLORIDE 10 MG/ML
INJECTION, SOLUTION EPIDURAL; INFILTRATION; INTRACAUDAL; PERINEURAL AS NEEDED
Status: DISCONTINUED | OUTPATIENT
Start: 2025-08-25 | End: 2025-08-25

## 2025-08-25 RX ADMIN — MIDAZOLAM HYDROCHLORIDE 2 MG: 1 INJECTION, SOLUTION INTRAMUSCULAR; INTRAVENOUS at 10:52

## 2025-08-25 RX ADMIN — Medication 20 MG: at 11:02

## 2025-08-25 RX ADMIN — FENTANYL CITRATE 25 MCG: 50 INJECTION, SOLUTION INTRAMUSCULAR; INTRAVENOUS at 11:14

## 2025-08-25 RX ADMIN — FENTANYL CITRATE 50 MCG: 50 INJECTION, SOLUTION INTRAMUSCULAR; INTRAVENOUS at 11:01

## 2025-08-25 RX ADMIN — Medication 10 MG: at 11:19

## 2025-08-25 RX ADMIN — SODIUM CHLORIDE, SODIUM LACTATE, POTASSIUM CHLORIDE, AND CALCIUM CHLORIDE 20 ML/HR: .6; .31; .03; .02 INJECTION, SOLUTION INTRAVENOUS at 10:52

## 2025-08-25 RX ADMIN — LIDOCAINE HYDROCHLORIDE 30 MG: 10 INJECTION, SOLUTION EPIDURAL; INFILTRATION; INTRACAUDAL; PERINEURAL at 11:01

## 2025-08-25 RX ADMIN — ONDANSETRON 4 MG: 2 INJECTION INTRAMUSCULAR; INTRAVENOUS at 10:51

## 2025-08-25 RX ADMIN — FENTANYL CITRATE 25 MCG: 50 INJECTION, SOLUTION INTRAMUSCULAR; INTRAVENOUS at 11:19

## 2025-08-25 RX ADMIN — PROPOFOL 10 MG: 10 INJECTION, EMULSION INTRAVENOUS at 11:08

## 2025-08-25 RX ADMIN — FAMOTIDINE 20 MG: 10 INJECTION, SOLUTION INTRAVENOUS at 10:51

## 2025-08-25 RX ADMIN — PROPOFOL 20 MG: 10 INJECTION, EMULSION INTRAVENOUS at 11:15

## 2025-08-25 RX ADMIN — BENZOCAINE, BUTAMBEN, AND TETRACAINE HYDROCHLORIDE 2 SPRAY: .028; .004; .004 AEROSOL, SPRAY TOPICAL at 11:02

## 2025-08-25 RX ADMIN — PROPOFOL 100 MCG/KG/MIN: 10 INJECTION, EMULSION INTRAVENOUS at 11:03

## 2025-08-25 SDOH — HEALTH STABILITY: MENTAL HEALTH: CURRENT SMOKER: 0

## 2025-08-25 ASSESSMENT — PAIN - FUNCTIONAL ASSESSMENT: PAIN_FUNCTIONAL_ASSESSMENT: 0-10

## 2025-08-25 ASSESSMENT — PAIN SCALES - GENERAL
PAINLEVEL_OUTOF10: 0 - NO PAIN
PAIN_LEVEL: 0
PAINLEVEL_OUTOF10: 0 - NO PAIN
PAINLEVEL_OUTOF10: 0 - NO PAIN

## 2025-08-27 ENCOUNTER — ANTICOAGULATION - WARFARIN VISIT (OUTPATIENT)
Dept: PHARMACY | Facility: HOSPITAL | Age: 55
End: 2025-08-27
Payer: MEDICARE

## 2025-08-27 ENCOUNTER — CLINICAL SUPPORT (OUTPATIENT)
Facility: HOSPITAL | Age: 55
End: 2025-08-27
Payer: MEDICARE

## 2025-08-27 VITALS
HEART RATE: 98 BPM | DIASTOLIC BLOOD PRESSURE: 69 MMHG | OXYGEN SATURATION: 97 % | SYSTOLIC BLOOD PRESSURE: 102 MMHG | RESPIRATION RATE: 20 BRPM

## 2025-08-27 DIAGNOSIS — I81 PORTAL VEIN THROMBOSIS: Primary | ICD-10-CM

## 2025-08-27 DIAGNOSIS — I82.0 HEPATIC VEIN THROMBOSIS (MULTI): ICD-10-CM

## 2025-08-27 DIAGNOSIS — I81 PORTAL VEIN THROMBOSIS: ICD-10-CM

## 2025-08-27 LAB
POC INR: 1.2 (ref 0.9–1.1)
POC PROTHROMBIN TIME: ABNORMAL (ref 9.3–12.5)

## 2025-08-27 PROCEDURE — 99211 OFF/OP EST MAY X REQ PHY/QHP: CPT

## 2025-08-27 PROCEDURE — 85610 PROTHROMBIN TIME: CPT | Mod: QW | Performed by: PHARMACIST

## 2025-08-29 ENCOUNTER — CLINICAL SUPPORT (OUTPATIENT)
Facility: HOSPITAL | Age: 55
End: 2025-08-29
Payer: MEDICARE

## 2025-08-29 ENCOUNTER — ANTICOAGULATION - WARFARIN VISIT (OUTPATIENT)
Dept: PHARMACY | Facility: HOSPITAL | Age: 55
End: 2025-08-29
Payer: MEDICARE

## 2025-08-29 VITALS
SYSTOLIC BLOOD PRESSURE: 101 MMHG | BODY MASS INDEX: 39.59 KG/M2 | OXYGEN SATURATION: 97 % | WEIGHT: 223.5 LBS | HEART RATE: 98 BPM | RESPIRATION RATE: 20 BRPM | DIASTOLIC BLOOD PRESSURE: 70 MMHG

## 2025-08-29 DIAGNOSIS — I81 PORTAL VEIN THROMBOSIS: Primary | ICD-10-CM

## 2025-08-29 DIAGNOSIS — I50.32 CHRONIC DIASTOLIC HEART FAILURE: ICD-10-CM

## 2025-08-29 DIAGNOSIS — I82.0 HEPATIC VEIN THROMBOSIS (MULTI): ICD-10-CM

## 2025-08-29 LAB
INR IN PPP BY COAGULATION ASSAY EXTERNAL: 1.8 (ref 2–3)
PROTHROMBIN TIME (PT) IN PPP BY COAGULATION ASSAY EXTERNAL: ABNORMAL

## 2025-08-29 PROCEDURE — 99213 OFFICE O/P EST LOW 20 MIN: CPT

## 2025-09-03 ENCOUNTER — PATIENT OUTREACH (OUTPATIENT)
Dept: PRIMARY CARE | Facility: CLINIC | Age: 55
End: 2025-09-03
Payer: MEDICARE

## 2025-09-03 LAB
LABORATORY COMMENT REPORT: NORMAL
PATH REPORT.FINAL DX SPEC: NORMAL
PATH REPORT.GROSS SPEC: NORMAL
PATH REPORT.TOTAL CANCER: NORMAL

## 2025-09-05 ENCOUNTER — ANTICOAGULATION - WARFARIN VISIT (OUTPATIENT)
Dept: PHARMACY | Facility: HOSPITAL | Age: 55
End: 2025-09-05
Payer: MEDICARE

## 2025-09-05 ENCOUNTER — CLINICAL SUPPORT (OUTPATIENT)
Facility: HOSPITAL | Age: 55
End: 2025-09-05
Payer: MEDICARE

## 2025-09-05 VITALS
RESPIRATION RATE: 20 BRPM | DIASTOLIC BLOOD PRESSURE: 69 MMHG | OXYGEN SATURATION: 98 % | SYSTOLIC BLOOD PRESSURE: 101 MMHG | HEART RATE: 90 BPM

## 2025-09-05 DIAGNOSIS — I82.0 HEPATIC VEIN THROMBOSIS (MULTI): ICD-10-CM

## 2025-09-05 DIAGNOSIS — I81 PORTAL VEIN THROMBOSIS: Primary | ICD-10-CM

## 2025-09-05 LAB
INR IN PPP BY COAGULATION ASSAY EXTERNAL: 1.3
PROTHROMBIN TIME (PT) IN PPP BY COAGULATION ASSAY EXTERNAL: NORMAL

## 2025-09-05 PROCEDURE — 99211 OFF/OP EST MAY X REQ PHY/QHP: CPT

## 2025-09-08 ENCOUNTER — APPOINTMENT (OUTPATIENT)
Dept: SURGERY | Facility: CLINIC | Age: 55
End: 2025-09-08
Payer: MEDICARE

## 2025-10-15 ENCOUNTER — APPOINTMENT (OUTPATIENT)
Dept: PRIMARY CARE | Facility: CLINIC | Age: 55
End: 2025-10-15
Payer: MEDICARE

## 2026-02-11 ENCOUNTER — APPOINTMENT (OUTPATIENT)
Dept: PRIMARY CARE | Facility: CLINIC | Age: 56
End: 2026-02-11
Payer: MEDICARE

## 2026-03-31 ENCOUNTER — APPOINTMENT (OUTPATIENT)
Dept: NEPHROLOGY | Facility: CLINIC | Age: 56
End: 2026-03-31
Payer: MEDICARE

## (undated) DEVICE — DEV ATOMIZATION MUCOSAL/NASALTRACH

## (undated) DEVICE — SINGLE USE BIOPSY VALVE MAJ-210: Brand: SINGLE USE BIOPSY VALVE (STERILE)

## (undated) DEVICE — SINGLE USE SUCTION VALVE MAJ-209: Brand: SINGLE USE SUCTION VALVE (STERILE)

## (undated) DEVICE — CUP SPECI 4OZ LF STRL

## (undated) DEVICE — BRONCH KIT: Brand: MEDLINE INDUSTRIES, INC.